# Patient Record
Sex: MALE | Race: WHITE | NOT HISPANIC OR LATINO | URBAN - METROPOLITAN AREA
[De-identification: names, ages, dates, MRNs, and addresses within clinical notes are randomized per-mention and may not be internally consistent; named-entity substitution may affect disease eponyms.]

---

## 2017-01-03 ENCOUNTER — OUTPATIENT (OUTPATIENT)
Dept: OUTPATIENT SERVICES | Facility: HOSPITAL | Age: 72
LOS: 1 days | Discharge: HOME | End: 2017-01-03
Payer: MEDICARE

## 2017-01-03 DIAGNOSIS — M48.00 SPINAL STENOSIS, SITE UNSPECIFIED: Chronic | ICD-10-CM

## 2017-01-03 DIAGNOSIS — Z41.9 ENCOUNTER FOR PROCEDURE FOR PURPOSES OTHER THAN REMEDYING HEALTH STATE, UNSPECIFIED: Chronic | ICD-10-CM

## 2017-01-03 DIAGNOSIS — K21.0 GASTRO-ESOPHAGEAL REFLUX DISEASE WITH ESOPHAGITIS: ICD-10-CM

## 2017-01-03 DIAGNOSIS — L05.91 PILONIDAL CYST WITHOUT ABSCESS: Chronic | ICD-10-CM

## 2017-01-03 DIAGNOSIS — Z98.89 OTHER SPECIFIED POSTPROCEDURAL STATES: Chronic | ICD-10-CM

## 2017-01-03 DIAGNOSIS — Q66.89 OTHER SPECIFIED CONGENITAL DEFORMITIES OF FEET: Chronic | ICD-10-CM

## 2017-01-03 PROCEDURE — 88313 SPECIAL STAINS GROUP 2: CPT

## 2017-01-03 PROCEDURE — 88313 SPECIAL STAINS GROUP 2: CPT | Mod: 26

## 2017-01-03 PROCEDURE — 88312 SPECIAL STAINS GROUP 1: CPT | Mod: 26

## 2017-01-03 PROCEDURE — 43239 EGD BIOPSY SINGLE/MULTIPLE: CPT

## 2017-01-03 PROCEDURE — 88305 TISSUE EXAM BY PATHOLOGIST: CPT

## 2017-01-03 PROCEDURE — 88312 SPECIAL STAINS GROUP 1: CPT

## 2017-01-03 PROCEDURE — 88305 TISSUE EXAM BY PATHOLOGIST: CPT | Mod: 26

## 2017-01-04 LAB — SURGICAL PATHOLOGY FINAL REPORT - CH: SIGNIFICANT CHANGE UP

## 2017-01-05 DIAGNOSIS — K29.50 UNSPECIFIED CHRONIC GASTRITIS WITHOUT BLEEDING: ICD-10-CM

## 2017-01-05 DIAGNOSIS — K27.9 PEPTIC ULCER, SITE UNSPECIFIED, UNSPECIFIED AS ACUTE OR CHRONIC, WITHOUT HEMORRHAGE OR PERFORATION: ICD-10-CM

## 2017-01-05 DIAGNOSIS — K21.0 GASTRO-ESOPHAGEAL REFLUX DISEASE WITH ESOPHAGITIS: ICD-10-CM

## 2017-01-05 DIAGNOSIS — Z87.891 PERSONAL HISTORY OF NICOTINE DEPENDENCE: ICD-10-CM

## 2017-01-05 DIAGNOSIS — I10 ESSENTIAL (PRIMARY) HYPERTENSION: ICD-10-CM

## 2017-01-05 DIAGNOSIS — Z79.82 LONG TERM (CURRENT) USE OF ASPIRIN: ICD-10-CM

## 2017-01-05 DIAGNOSIS — G70.00 MYASTHENIA GRAVIS WITHOUT (ACUTE) EXACERBATION: ICD-10-CM

## 2017-01-05 DIAGNOSIS — K29.80 DUODENITIS WITHOUT BLEEDING: ICD-10-CM

## 2017-01-23 ENCOUNTER — APPOINTMENT (OUTPATIENT)
Dept: CARDIOLOGY | Facility: CLINIC | Age: 72
End: 2017-01-23

## 2017-01-23 ENCOUNTER — NON-APPOINTMENT (OUTPATIENT)
Age: 72
End: 2017-01-23

## 2017-01-23 VITALS
HEIGHT: 67 IN | DIASTOLIC BLOOD PRESSURE: 76 MMHG | OXYGEN SATURATION: 97 % | HEART RATE: 67 BPM | BODY MASS INDEX: 44.1 KG/M2 | SYSTOLIC BLOOD PRESSURE: 125 MMHG | WEIGHT: 281 LBS

## 2017-01-23 DIAGNOSIS — E78.5 HYPERLIPIDEMIA, UNSPECIFIED: ICD-10-CM

## 2017-01-30 ENCOUNTER — APPOINTMENT (OUTPATIENT)
Dept: INTERNAL MEDICINE | Facility: CLINIC | Age: 72
End: 2017-01-30

## 2017-01-30 VITALS
HEIGHT: 67 IN | RESPIRATION RATE: 14 BRPM | SYSTOLIC BLOOD PRESSURE: 110 MMHG | BODY MASS INDEX: 44.1 KG/M2 | DIASTOLIC BLOOD PRESSURE: 80 MMHG | WEIGHT: 281 LBS | HEART RATE: 77 BPM | OXYGEN SATURATION: 96 %

## 2017-01-31 ENCOUNTER — APPOINTMENT (OUTPATIENT)
Dept: CARDIOLOGY | Facility: CLINIC | Age: 72
End: 2017-01-31

## 2017-01-31 LAB
ALBUMIN SERPL ELPH-MCNC: 4.3 G/DL
ALP BLD-CCNC: 62 U/L
ALT SERPL-CCNC: 17 U/L
ANION GAP SERPL CALC-SCNC: 18 MMOL/L
AST SERPL-CCNC: 20 U/L
BASOPHILS # BLD AUTO: 0.04 K/UL
BASOPHILS NFR BLD AUTO: 0.4 %
BILIRUB SERPL-MCNC: 0.4 MG/DL
BUN SERPL-MCNC: 22 MG/DL
CALCIUM SERPL-MCNC: 10.2 MG/DL
CHLORIDE SERPL-SCNC: 99 MMOL/L
CHOLEST SERPL-MCNC: 255 MG/DL
CHOLEST/HDLC SERPL: 4.2 RATIO
CK SERPL-CCNC: 145 U/L
CO2 SERPL-SCNC: 24 MMOL/L
CREAT SERPL-MCNC: 0.84 MG/DL
EOSINOPHIL # BLD AUTO: 0.24 K/UL
EOSINOPHIL NFR BLD AUTO: 2.2 %
ESTIMATED AVERAGE GLUCOSE: 126 MG/DL
GLUCOSE SERPL-MCNC: 94 MG/DL
HBA1C MFR BLD HPLC: 6 %
HCT VFR BLD CALC: 48.6 %
HDLC SERPL-MCNC: 61 MG/DL
HGB BLD-MCNC: 15.7 G/DL
IMM GRANULOCYTES NFR BLD AUTO: 0.5 %
LDLC SERPL CALC-MCNC: 168 MG/DL
LYMPHOCYTES # BLD AUTO: 1.9 K/UL
LYMPHOCYTES NFR BLD AUTO: 17.3 %
MAN DIFF?: NORMAL
MCHC RBC-ENTMCNC: 30.5 PG
MCHC RBC-ENTMCNC: 32.3 GM/DL
MCV RBC AUTO: 94.4 FL
MONOCYTES # BLD AUTO: 1.11 K/UL
MONOCYTES NFR BLD AUTO: 10.1 %
NEUTROPHILS # BLD AUTO: 7.67 K/UL
NEUTROPHILS NFR BLD AUTO: 69.5 %
PLATELET # BLD AUTO: 331 K/UL
POTASSIUM SERPL-SCNC: 5 MMOL/L
PROT SERPL-MCNC: 7.3 G/DL
RBC # BLD: 5.15 M/UL
RBC # FLD: 15.8 %
SODIUM SERPL-SCNC: 141 MMOL/L
TRIGL SERPL-MCNC: 128 MG/DL
WBC # FLD AUTO: 11.01 K/UL

## 2017-02-07 ENCOUNTER — OUTPATIENT (OUTPATIENT)
Dept: OUTPATIENT SERVICES | Facility: HOSPITAL | Age: 72
LOS: 1 days | Discharge: HOME | End: 2017-02-07
Payer: MEDICARE

## 2017-02-07 ENCOUNTER — TRANSCRIPTION ENCOUNTER (OUTPATIENT)
Age: 72
End: 2017-02-07

## 2017-02-07 DIAGNOSIS — L05.91 PILONIDAL CYST WITHOUT ABSCESS: Chronic | ICD-10-CM

## 2017-02-07 DIAGNOSIS — Q66.89 OTHER SPECIFIED CONGENITAL DEFORMITIES OF FEET: Chronic | ICD-10-CM

## 2017-02-07 DIAGNOSIS — Z98.89 OTHER SPECIFIED POSTPROCEDURAL STATES: Chronic | ICD-10-CM

## 2017-02-07 DIAGNOSIS — Z41.9 ENCOUNTER FOR PROCEDURE FOR PURPOSES OTHER THAN REMEDYING HEALTH STATE, UNSPECIFIED: Chronic | ICD-10-CM

## 2017-02-07 DIAGNOSIS — M48.00 SPINAL STENOSIS, SITE UNSPECIFIED: Chronic | ICD-10-CM

## 2017-02-07 DIAGNOSIS — K27.9 PEPTIC ULCER, SITE UNSPECIFIED, UNSPECIFIED AS ACUTE OR CHRONIC, WITHOUT HEMORRHAGE OR PERFORATION: ICD-10-CM

## 2017-02-07 PROCEDURE — G0105: CPT

## 2017-02-10 DIAGNOSIS — Z12.11 ENCOUNTER FOR SCREENING FOR MALIGNANT NEOPLASM OF COLON: ICD-10-CM

## 2017-02-10 DIAGNOSIS — Z79.82 LONG TERM (CURRENT) USE OF ASPIRIN: ICD-10-CM

## 2017-02-10 DIAGNOSIS — I10 ESSENTIAL (PRIMARY) HYPERTENSION: ICD-10-CM

## 2017-02-10 DIAGNOSIS — Z86.010 PERSONAL HISTORY OF COLONIC POLYPS: ICD-10-CM

## 2017-02-10 DIAGNOSIS — K57.30 DIVERTICULOSIS OF LARGE INTESTINE WITHOUT PERFORATION OR ABSCESS WITHOUT BLEEDING: ICD-10-CM

## 2017-02-10 DIAGNOSIS — G70.00 MYASTHENIA GRAVIS WITHOUT (ACUTE) EXACERBATION: ICD-10-CM

## 2017-02-10 DIAGNOSIS — Z79.52 LONG TERM (CURRENT) USE OF SYSTEMIC STEROIDS: ICD-10-CM

## 2017-03-29 ENCOUNTER — MEDICATION RENEWAL (OUTPATIENT)
Age: 72
End: 2017-03-29

## 2017-04-14 ENCOUNTER — RX RENEWAL (OUTPATIENT)
Age: 72
End: 2017-04-14

## 2017-05-22 ENCOUNTER — APPOINTMENT (OUTPATIENT)
Dept: INTERNAL MEDICINE | Facility: CLINIC | Age: 72
End: 2017-05-22

## 2017-05-22 VITALS
DIASTOLIC BLOOD PRESSURE: 68 MMHG | SYSTOLIC BLOOD PRESSURE: 124 MMHG | HEART RATE: 85 BPM | OXYGEN SATURATION: 98 % | RESPIRATION RATE: 14 BRPM | BODY MASS INDEX: 43.79 KG/M2 | WEIGHT: 279 LBS | HEIGHT: 67 IN

## 2017-05-22 DIAGNOSIS — R35.0 FREQUENCY OF MICTURITION: ICD-10-CM

## 2017-05-23 LAB
ALBUMIN SERPL ELPH-MCNC: 4.3 G/DL
ALP BLD-CCNC: 64 U/L
ALT SERPL-CCNC: 19 U/L
ANION GAP SERPL CALC-SCNC: 18 MMOL/L
AST SERPL-CCNC: 19 U/L
BASOPHILS # BLD AUTO: 0.03 K/UL
BASOPHILS NFR BLD AUTO: 0.3 %
BILIRUB SERPL-MCNC: 0.3 MG/DL
BUN SERPL-MCNC: 25 MG/DL
CALCIUM SERPL-MCNC: 9.6 MG/DL
CHLORIDE SERPL-SCNC: 101 MMOL/L
CHOLEST SERPL-MCNC: 189 MG/DL
CHOLEST/HDLC SERPL: 3.4 RATIO
CK SERPL-CCNC: 129 U/L
CO2 SERPL-SCNC: 22 MMOL/L
CREAT SERPL-MCNC: 0.89 MG/DL
EOSINOPHIL # BLD AUTO: 0.25 K/UL
EOSINOPHIL NFR BLD AUTO: 2.4 %
GLUCOSE SERPL-MCNC: 110 MG/DL
HBA1C MFR BLD HPLC: 6 %
HCT VFR BLD CALC: 47.4 %
HDLC SERPL-MCNC: 56 MG/DL
HGB BLD-MCNC: 14.9 G/DL
IMM GRANULOCYTES NFR BLD AUTO: 0.3 %
LDLC SERPL CALC-MCNC: 108 MG/DL
LYMPHOCYTES # BLD AUTO: 1.71 K/UL
LYMPHOCYTES NFR BLD AUTO: 16.4 %
MAN DIFF?: NORMAL
MCHC RBC-ENTMCNC: 30 PG
MCHC RBC-ENTMCNC: 31.4 GM/DL
MCV RBC AUTO: 95.6 FL
MONOCYTES # BLD AUTO: 0.68 K/UL
MONOCYTES NFR BLD AUTO: 6.5 %
NEUTROPHILS # BLD AUTO: 7.74 K/UL
NEUTROPHILS NFR BLD AUTO: 74.1 %
PLATELET # BLD AUTO: 354 K/UL
POTASSIUM SERPL-SCNC: 4.5 MMOL/L
PROT SERPL-MCNC: 7 G/DL
RBC # BLD: 4.96 M/UL
RBC # FLD: 16.4 %
SODIUM SERPL-SCNC: 141 MMOL/L
TRIGL SERPL-MCNC: 126 MG/DL
TSH SERPL-ACNC: 1.1 UIU/ML
WBC # FLD AUTO: 10.44 K/UL

## 2017-05-24 ENCOUNTER — TRANSCRIPTION ENCOUNTER (OUTPATIENT)
Age: 72
End: 2017-05-24

## 2017-05-26 LAB
CAU: 3 MG/DL
CREAT 24H UR-MCNC: 1.8 G/24 H
CREAT ?TM UR-MCNC: 117 MG/DL
MAGNESIUM 24H UR-MRATE: <15 MG/24H
PHOSPHATE/CREAT 24H UR-SRTO: 1.3 G/24 H
PROT 24H UR-MRATE: 8 MG/DL
PROT ?TM UR-MCNC: 24 HR
PROT UR-MCNC: 124 MG/24 H
SPECIMEN VOL 24H UR: 1500 ML
SPECIMEN VOL 24H UR: 1550 ML
SPECIMEN VOL 24H UR: 1550 ML
SPECIMEN VOL 24H UR: 46 MG/24 H
U MG: <1 MG/DL
U PHOS: 83 MG/DL
U URIC: 25.9 MG/DL
URATE/CREAT 24H UR: 388 MG/24HR

## 2017-05-31 LAB
CITRATE UR QL: 864 MG/24 H
CITRATE UR-MCNC: 1500 ML
OXALATE UR-SCNC: 73.5 MG/24 H
SPECIMEN VOL 24H UR: 1500 ML/24 H

## 2017-07-28 ENCOUNTER — RX RENEWAL (OUTPATIENT)
Age: 72
End: 2017-07-28

## 2017-07-28 ENCOUNTER — MEDICATION RENEWAL (OUTPATIENT)
Age: 72
End: 2017-07-28

## 2017-07-28 ENCOUNTER — APPOINTMENT (OUTPATIENT)
Dept: CARDIOLOGY | Facility: CLINIC | Age: 72
End: 2017-07-28
Payer: MEDICARE

## 2017-07-28 ENCOUNTER — NON-APPOINTMENT (OUTPATIENT)
Age: 72
End: 2017-07-28

## 2017-07-28 VITALS
BODY MASS INDEX: 44.73 KG/M2 | HEART RATE: 75 BPM | WEIGHT: 285 LBS | OXYGEN SATURATION: 95 % | SYSTOLIC BLOOD PRESSURE: 160 MMHG | HEIGHT: 67 IN | DIASTOLIC BLOOD PRESSURE: 75 MMHG

## 2017-07-28 VITALS — SYSTOLIC BLOOD PRESSURE: 140 MMHG | DIASTOLIC BLOOD PRESSURE: 80 MMHG

## 2017-07-28 PROCEDURE — 93000 ELECTROCARDIOGRAM COMPLETE: CPT

## 2017-07-28 PROCEDURE — 99215 OFFICE O/P EST HI 40 MIN: CPT

## 2017-07-28 RX ORDER — PREDNISONE 10 MG/1
10 TABLET ORAL
Qty: 180 | Refills: 0 | Status: DISCONTINUED | COMMUNITY
Start: 2017-03-07

## 2017-09-25 ENCOUNTER — APPOINTMENT (OUTPATIENT)
Dept: INTERNAL MEDICINE | Facility: CLINIC | Age: 72
End: 2017-09-25
Payer: MEDICARE

## 2017-09-25 VITALS
HEIGHT: 67 IN | HEART RATE: 82 BPM | SYSTOLIC BLOOD PRESSURE: 140 MMHG | OXYGEN SATURATION: 97 % | WEIGHT: 283 LBS | RESPIRATION RATE: 14 BRPM | DIASTOLIC BLOOD PRESSURE: 70 MMHG | TEMPERATURE: 98 F | BODY MASS INDEX: 44.42 KG/M2

## 2017-09-25 PROCEDURE — 36415 COLL VENOUS BLD VENIPUNCTURE: CPT

## 2017-09-25 PROCEDURE — 90686 IIV4 VACC NO PRSV 0.5 ML IM: CPT

## 2017-09-25 PROCEDURE — G0008: CPT

## 2017-09-25 PROCEDURE — 99214 OFFICE O/P EST MOD 30 MIN: CPT | Mod: 25

## 2017-09-26 LAB
ALBUMIN SERPL ELPH-MCNC: 3.9 G/DL
ALP BLD-CCNC: 54 U/L
ALT SERPL-CCNC: 13 U/L
ANION GAP SERPL CALC-SCNC: 14 MMOL/L
AST SERPL-CCNC: 24 U/L
BASOPHILS # BLD AUTO: 0.05 K/UL
BASOPHILS NFR BLD AUTO: 0.5 %
BILIRUB SERPL-MCNC: 0.3 MG/DL
BUN SERPL-MCNC: 25 MG/DL
CALCIUM SERPL-MCNC: 9.8 MG/DL
CHLORIDE SERPL-SCNC: 98 MMOL/L
CHOLEST SERPL-MCNC: 158 MG/DL
CHOLEST/HDLC SERPL: 3 RATIO
CK SERPL-CCNC: 140 U/L
CO2 SERPL-SCNC: 24 MMOL/L
CREAT SERPL-MCNC: 0.92 MG/DL
EOSINOPHIL # BLD AUTO: 0.39 K/UL
EOSINOPHIL NFR BLD AUTO: 3.8 %
GLUCOSE SERPL-MCNC: 130 MG/DL
HBA1C MFR BLD HPLC: 6.1 %
HCT VFR BLD CALC: 44.2 %
HDLC SERPL-MCNC: 52 MG/DL
HGB BLD-MCNC: 14.6 G/DL
IMM GRANULOCYTES NFR BLD AUTO: 0.6 %
LDLC SERPL CALC-MCNC: 82 MG/DL
LYMPHOCYTES # BLD AUTO: 2.22 K/UL
LYMPHOCYTES NFR BLD AUTO: 21.6 %
MAN DIFF?: NORMAL
MCHC RBC-ENTMCNC: 30.6 PG
MCHC RBC-ENTMCNC: 33 GM/DL
MCV RBC AUTO: 92.7 FL
MONOCYTES # BLD AUTO: 1 K/UL
MONOCYTES NFR BLD AUTO: 9.7 %
NEUTROPHILS # BLD AUTO: 6.55 K/UL
NEUTROPHILS NFR BLD AUTO: 63.8 %
PLATELET # BLD AUTO: 362 K/UL
POTASSIUM SERPL-SCNC: 4 MMOL/L
PROT SERPL-MCNC: 7.4 G/DL
PSA FREE FLD-MCNC: 24.2
PSA FREE SERPL-MCNC: 1.02 NG/ML
PSA SERPL-MCNC: 4.21 NG/ML
RBC # BLD: 4.77 M/UL
RBC # FLD: 15.7 %
SODIUM SERPL-SCNC: 136 MMOL/L
TRIGL SERPL-MCNC: 119 MG/DL
WBC # FLD AUTO: 10.27 K/UL

## 2017-10-13 ENCOUNTER — MEDICATION RENEWAL (OUTPATIENT)
Age: 72
End: 2017-10-13

## 2018-01-22 ENCOUNTER — APPOINTMENT (OUTPATIENT)
Dept: INTERNAL MEDICINE | Facility: CLINIC | Age: 73
End: 2018-01-22
Payer: MEDICARE

## 2018-01-22 ENCOUNTER — LABORATORY RESULT (OUTPATIENT)
Age: 73
End: 2018-01-22

## 2018-01-22 VITALS
OXYGEN SATURATION: 97 % | HEIGHT: 67 IN | BODY MASS INDEX: 43.79 KG/M2 | TEMPERATURE: 98.6 F | DIASTOLIC BLOOD PRESSURE: 70 MMHG | HEART RATE: 76 BPM | RESPIRATION RATE: 14 BRPM | SYSTOLIC BLOOD PRESSURE: 130 MMHG | WEIGHT: 279 LBS

## 2018-01-22 PROCEDURE — 99214 OFFICE O/P EST MOD 30 MIN: CPT | Mod: 25

## 2018-01-22 PROCEDURE — 36415 COLL VENOUS BLD VENIPUNCTURE: CPT

## 2018-01-23 LAB
25(OH)D3 SERPL-MCNC: 42.2 NG/ML
ALBUMIN SERPL ELPH-MCNC: 3.9 G/DL
ALP BLD-CCNC: 59 U/L
ALT SERPL-CCNC: 20 U/L
ANION GAP SERPL CALC-SCNC: 13 MMOL/L
AST SERPL-CCNC: 21 U/L
BASOPHILS # BLD AUTO: 0 K/UL
BASOPHILS NFR BLD AUTO: 0 %
BILIRUB SERPL-MCNC: 0.3 MG/DL
BUN SERPL-MCNC: 27 MG/DL
CALCIUM SERPL-MCNC: 10.3 MG/DL
CHLORIDE SERPL-SCNC: 98 MMOL/L
CHOLEST SERPL-MCNC: 180 MG/DL
CHOLEST/HDLC SERPL: 3.8 RATIO
CK SERPL-CCNC: 95 U/L
CO2 SERPL-SCNC: 26 MMOL/L
CREAT SERPL-MCNC: 1.05 MG/DL
EOSINOPHIL # BLD AUTO: 0.29 K/UL
EOSINOPHIL NFR BLD AUTO: 2.5
GLUCOSE SERPL-MCNC: 92 MG/DL
HBA1C MFR BLD HPLC: 6.2 %
HCT VFR BLD CALC: 46.2 %
HDLC SERPL-MCNC: 48 MG/DL
HGB BLD-MCNC: 15 G/DL
LDLC SERPL CALC-MCNC: 110 MG/DL
LYMPHOCYTES # BLD AUTO: 1.71 K/UL
LYMPHOCYTES NFR BLD AUTO: 14.9 %
MAN DIFF?: NORMAL
MCHC RBC-ENTMCNC: 30.1 PG
MCHC RBC-ENTMCNC: 32.5 GM/DL
MCV RBC AUTO: 92.6 FL
MONOCYTES # BLD AUTO: 1.52 K/UL
MONOCYTES NFR BLD AUTO: 13.2 %
NEUTROPHILS # BLD AUTO: 7.12 K/UL
NEUTROPHILS NFR BLD AUTO: 62 %
PLATELET # BLD AUTO: 411 K/UL
POTASSIUM SERPL-SCNC: 4.1 MMOL/L
PROT SERPL-MCNC: 7.6 G/DL
RBC # BLD: 4.99 M/UL
RBC # FLD: 15.4 %
SODIUM SERPL-SCNC: 137 MMOL/L
TRIGL SERPL-MCNC: 112 MG/DL
TSH SERPL-ACNC: 1.4 UIU/ML
WBC # FLD AUTO: 11.49 K/UL

## 2018-01-24 ENCOUNTER — NON-APPOINTMENT (OUTPATIENT)
Age: 73
End: 2018-01-24

## 2018-01-24 ENCOUNTER — APPOINTMENT (OUTPATIENT)
Dept: CARDIOLOGY | Facility: CLINIC | Age: 73
End: 2018-01-24
Payer: MEDICARE

## 2018-01-24 VITALS
SYSTOLIC BLOOD PRESSURE: 140 MMHG | HEIGHT: 67 IN | OXYGEN SATURATION: 95 % | BODY MASS INDEX: 43.79 KG/M2 | WEIGHT: 279 LBS | DIASTOLIC BLOOD PRESSURE: 79 MMHG | HEART RATE: 87 BPM

## 2018-01-24 PROCEDURE — 99214 OFFICE O/P EST MOD 30 MIN: CPT

## 2018-01-24 PROCEDURE — 93000 ELECTROCARDIOGRAM COMPLETE: CPT

## 2018-01-24 RX ORDER — SULFAMETHOXAZOLE AND TRIMETHOPRIM 800; 160 MG/1; MG/1
800-160 TABLET ORAL
Qty: 8 | Refills: 0 | Status: DISCONTINUED | COMMUNITY
Start: 2017-12-20

## 2018-02-05 ENCOUNTER — MEDICATION RENEWAL (OUTPATIENT)
Age: 73
End: 2018-02-05

## 2018-03-30 ENCOUNTER — APPOINTMENT (OUTPATIENT)
Dept: INTERNAL MEDICINE | Facility: CLINIC | Age: 73
End: 2018-03-30
Payer: MEDICARE

## 2018-03-30 VITALS
SYSTOLIC BLOOD PRESSURE: 140 MMHG | BODY MASS INDEX: 44.1 KG/M2 | WEIGHT: 281 LBS | OXYGEN SATURATION: 97 % | HEART RATE: 71 BPM | DIASTOLIC BLOOD PRESSURE: 70 MMHG | TEMPERATURE: 98.3 F | HEIGHT: 67 IN | RESPIRATION RATE: 14 BRPM

## 2018-03-30 VITALS — SYSTOLIC BLOOD PRESSURE: 130 MMHG | DIASTOLIC BLOOD PRESSURE: 74 MMHG

## 2018-03-30 LAB
CREAT SPEC-SCNC: 75 MG/DL
MICROALBUMIN 24H UR DL<=1MG/L-MCNC: 0.3 MG/DL
MICROALBUMIN/CREAT 24H UR-RTO: 4 MG/G

## 2018-03-30 PROCEDURE — 99214 OFFICE O/P EST MOD 30 MIN: CPT | Mod: 25

## 2018-03-30 PROCEDURE — 36415 COLL VENOUS BLD VENIPUNCTURE: CPT

## 2018-03-31 LAB
PSA FREE FLD-MCNC: 20.5
PSA FREE SERPL-MCNC: 0.95 NG/ML
PSA SERPL-MCNC: 4.64 NG/ML

## 2018-07-26 ENCOUNTER — NON-APPOINTMENT (OUTPATIENT)
Age: 73
End: 2018-07-26

## 2018-07-26 ENCOUNTER — APPOINTMENT (OUTPATIENT)
Dept: CARDIOLOGY | Facility: CLINIC | Age: 73
End: 2018-07-26
Payer: MEDICARE

## 2018-07-26 VITALS
HEIGHT: 67 IN | WEIGHT: 277 LBS | SYSTOLIC BLOOD PRESSURE: 152 MMHG | BODY MASS INDEX: 43.47 KG/M2 | OXYGEN SATURATION: 97 % | HEART RATE: 75 BPM | DIASTOLIC BLOOD PRESSURE: 79 MMHG

## 2018-07-26 VITALS — SYSTOLIC BLOOD PRESSURE: 146 MMHG | DIASTOLIC BLOOD PRESSURE: 70 MMHG

## 2018-07-26 PROCEDURE — 93000 ELECTROCARDIOGRAM COMPLETE: CPT

## 2018-07-26 PROCEDURE — 99214 OFFICE O/P EST MOD 30 MIN: CPT

## 2018-07-31 ENCOUNTER — APPOINTMENT (OUTPATIENT)
Dept: INTERNAL MEDICINE | Facility: CLINIC | Age: 73
End: 2018-07-31
Payer: MEDICARE

## 2018-07-31 VITALS
HEIGHT: 67 IN | HEART RATE: 69 BPM | TEMPERATURE: 98 F | SYSTOLIC BLOOD PRESSURE: 130 MMHG | DIASTOLIC BLOOD PRESSURE: 80 MMHG | BODY MASS INDEX: 43.63 KG/M2 | RESPIRATION RATE: 14 BRPM | OXYGEN SATURATION: 98 % | WEIGHT: 278 LBS

## 2018-07-31 PROCEDURE — 99214 OFFICE O/P EST MOD 30 MIN: CPT | Mod: 25

## 2018-07-31 PROCEDURE — 36415 COLL VENOUS BLD VENIPUNCTURE: CPT

## 2018-07-31 NOTE — HISTORY OF PRESENT ILLNESS
[FreeTextEntry1] : Here for follow up for his cholesterol and glucose \par Saw cardiologist\par will probably be switched to Crestor

## 2018-07-31 NOTE — PHYSICAL EXAM

## 2018-08-01 LAB
ALBUMIN SERPL ELPH-MCNC: 4.1 G/DL
ALP BLD-CCNC: 60 U/L
ALT SERPL-CCNC: 22 U/L
ANION GAP SERPL CALC-SCNC: 15 MMOL/L
AST SERPL-CCNC: 21 U/L
BASOPHILS # BLD AUTO: 0.04 K/UL
BASOPHILS NFR BLD AUTO: 0.4 %
BILIRUB SERPL-MCNC: 0.4 MG/DL
BUN SERPL-MCNC: 26 MG/DL
CALCIUM SERPL-MCNC: 9.6 MG/DL
CHLORIDE SERPL-SCNC: 98 MMOL/L
CHOLEST SERPL-MCNC: 172 MG/DL
CHOLEST/HDLC SERPL: 3.3 RATIO
CK SERPL-CCNC: 146 U/L
CO2 SERPL-SCNC: 24 MMOL/L
CREAT SERPL-MCNC: 0.94 MG/DL
EOSINOPHIL # BLD AUTO: 0.29 K/UL
EOSINOPHIL NFR BLD AUTO: 2.6 %
ESTIMATED AVERAGE GLUCOSE: 128 MG/DL
GLUCOSE SERPL-MCNC: 103 MG/DL
HBA1C MFR BLD HPLC: 6.1 %
HCT VFR BLD CALC: 46.1 %
HDLC SERPL-MCNC: 52 MG/DL
HGB BLD-MCNC: 14.7 G/DL
IMM GRANULOCYTES NFR BLD AUTO: 0.4 %
LDLC SERPL CALC-MCNC: 99 MG/DL
LYMPHOCYTES # BLD AUTO: 2.79 K/UL
LYMPHOCYTES NFR BLD AUTO: 24.8 %
MAN DIFF?: NORMAL
MCHC RBC-ENTMCNC: 29.2 PG
MCHC RBC-ENTMCNC: 31.9 GM/DL
MCV RBC AUTO: 91.7 FL
MONOCYTES # BLD AUTO: 1.46 K/UL
MONOCYTES NFR BLD AUTO: 13 %
NEUTROPHILS # BLD AUTO: 6.61 K/UL
NEUTROPHILS NFR BLD AUTO: 58.8 %
PLATELET # BLD AUTO: 363 K/UL
POTASSIUM SERPL-SCNC: 4.1 MMOL/L
PROT SERPL-MCNC: 7.4 G/DL
PSA FREE FLD-MCNC: 21.7
PSA FREE SERPL-MCNC: 0.93 NG/ML
PSA SERPL-MCNC: 4.28 NG/ML
RBC # BLD: 5.03 M/UL
RBC # FLD: 16.1 %
SODIUM SERPL-SCNC: 137 MMOL/L
TRIGL SERPL-MCNC: 103 MG/DL
WBC # FLD AUTO: 11.24 K/UL

## 2018-10-02 ENCOUNTER — APPOINTMENT (OUTPATIENT)
Dept: INTERNAL MEDICINE | Facility: CLINIC | Age: 73
End: 2018-10-02

## 2018-11-19 ENCOUNTER — MEDICATION RENEWAL (OUTPATIENT)
Age: 73
End: 2018-11-19

## 2018-11-27 ENCOUNTER — APPOINTMENT (OUTPATIENT)
Dept: INTERNAL MEDICINE | Facility: CLINIC | Age: 73
End: 2018-11-27
Payer: MEDICARE

## 2018-11-27 VITALS
DIASTOLIC BLOOD PRESSURE: 70 MMHG | HEART RATE: 88 BPM | RESPIRATION RATE: 14 BRPM | BODY MASS INDEX: 43.63 KG/M2 | TEMPERATURE: 98.7 F | OXYGEN SATURATION: 97 % | HEIGHT: 67 IN | WEIGHT: 278 LBS | SYSTOLIC BLOOD PRESSURE: 130 MMHG

## 2018-11-27 PROCEDURE — 36415 COLL VENOUS BLD VENIPUNCTURE: CPT

## 2018-11-27 PROCEDURE — 99214 OFFICE O/P EST MOD 30 MIN: CPT | Mod: 25

## 2018-11-27 NOTE — PHYSICAL EXAM
[No Acute Distress] : no acute distress [Well Nourished] : well nourished [Well Developed] : well developed [Well-Appearing] : well-appearing [Normal Voice/Communication] : normal voice/communication [Normal Sclera/Conjunctiva] : normal sclera/conjunctiva [PERRL] : pupils equal round and reactive to light [EOMI] : extraocular movements intact [Normal Outer Ear/Nose] : the outer ears and nose were normal in appearance [Normal Oropharynx] : the oropharynx was normal [Normal TMs] : both tympanic membranes were normal [No JVD] : no jugular venous distention [Supple] : supple [No Lymphadenopathy] : no lymphadenopathy [Thyroid Normal, No Nodules] : the thyroid was normal and there were no nodules present [No Respiratory Distress] : no respiratory distress  [Clear to Auscultation] : lungs were clear to auscultation bilaterally [No Accessory Muscle Use] : no accessory muscle use [Normal Rate] : normal rate  [Regular Rhythm] : with a regular rhythm [Normal S1, S2] : normal S1 and S2 [No Murmur] : no murmur heard [No Carotid Bruits] : no carotid bruits [No Abdominal Bruit] : a ~M bruit was not heard ~T in the abdomen [No Varicosities] : no varicosities [Pedal Pulses Present] : the pedal pulses are present [No Edema] : there was no peripheral edema [No Extremity Clubbing/Cyanosis] : no extremity clubbing/cyanosis [No Palpable Aorta] : no palpable aorta [Soft] : abdomen soft [Non Tender] : non-tender [Non-distended] : non-distended [No Masses] : no abdominal mass palpated [No HSM] : no HSM [Normal Bowel Sounds] : normal bowel sounds [Normal Supraclavicular Nodes] : no supraclavicular lymphadenopathy [Normal Axillary Nodes] : no axillary lymphadenopathy [Normal Posterior Cervical Nodes] : no posterior cervical lymphadenopathy [Normal Anterior Cervical Nodes] : no anterior cervical lymphadenopathy [No CVA Tenderness] : no CVA  tenderness [No Spinal Tenderness] : no spinal tenderness [No Joint Swelling] : no joint swelling [Grossly Normal Strength/Tone] : grossly normal strength/tone [No Rash] : no rash [Normal Gait] : normal gait [Coordination Grossly Intact] : coordination grossly intact [No Focal Deficits] : no focal deficits [Deep Tendon Reflexes (DTR)] : deep tendon reflexes were 2+ and symmetric [Speech Grossly Normal] : speech grossly normal [Memory Grossly Normal] : memory grossly normal [Normal Affect] : the affect was normal [Alert and Oriented x3] : oriented to person, place, and time [Normal Mood] : the mood was normal [Normal Insight/Judgement] : insight and judgment were intact

## 2018-11-28 LAB
25(OH)D3 SERPL-MCNC: 34.3 NG/ML
ALBUMIN SERPL ELPH-MCNC: 4.2 G/DL
ALP BLD-CCNC: 58 U/L
ALT SERPL-CCNC: 22 U/L
ANION GAP SERPL CALC-SCNC: 10 MMOL/L
AST SERPL-CCNC: 21 U/L
BASOPHILS # BLD AUTO: 0.04 K/UL
BASOPHILS NFR BLD AUTO: 0.4 %
BILIRUB SERPL-MCNC: 0.3 MG/DL
BUN SERPL-MCNC: 22 MG/DL
CALCIUM SERPL-MCNC: 10.1 MG/DL
CHLORIDE SERPL-SCNC: 100 MMOL/L
CHOLEST SERPL-MCNC: 184 MG/DL
CHOLEST/HDLC SERPL: 3.5 RATIO
CK SERPL-CCNC: 149 U/L
CO2 SERPL-SCNC: 26 MMOL/L
CREAT SERPL-MCNC: 0.78 MG/DL
EOSINOPHIL # BLD AUTO: 0.33 K/UL
EOSINOPHIL NFR BLD AUTO: 3.5 %
GLUCOSE SERPL-MCNC: 110 MG/DL
HBA1C MFR BLD HPLC: 6.1 %
HCT VFR BLD CALC: 47.2 %
HDLC SERPL-MCNC: 53 MG/DL
HGB BLD-MCNC: 15.4 G/DL
IMM GRANULOCYTES NFR BLD AUTO: 0.3 %
LDLC SERPL CALC-MCNC: 111 MG/DL
LYMPHOCYTES # BLD AUTO: 1.7 K/UL
LYMPHOCYTES NFR BLD AUTO: 18.2 %
MAN DIFF?: NORMAL
MCHC RBC-ENTMCNC: 30.1 PG
MCHC RBC-ENTMCNC: 32.6 GM/DL
MCV RBC AUTO: 92.4 FL
MONOCYTES # BLD AUTO: 0.77 K/UL
MONOCYTES NFR BLD AUTO: 8.2 %
NEUTROPHILS # BLD AUTO: 6.49 K/UL
NEUTROPHILS NFR BLD AUTO: 69.4 %
PLATELET # BLD AUTO: 392 K/UL
POTASSIUM SERPL-SCNC: 4.2 MMOL/L
PROT SERPL-MCNC: 7.6 G/DL
PSA FREE FLD-MCNC: 23.3
PSA FREE SERPL-MCNC: 0.95 NG/ML
PSA SERPL-MCNC: 4.07 NG/ML
RBC # BLD: 5.11 M/UL
RBC # FLD: 15.7 %
SODIUM SERPL-SCNC: 136 MMOL/L
TRIGL SERPL-MCNC: 98 MG/DL
TSH SERPL-ACNC: 1.25 UIU/ML
WBC # FLD AUTO: 9.36 K/UL

## 2019-01-25 ENCOUNTER — NON-APPOINTMENT (OUTPATIENT)
Age: 74
End: 2019-01-25

## 2019-01-25 ENCOUNTER — APPOINTMENT (OUTPATIENT)
Dept: CARDIOLOGY | Facility: CLINIC | Age: 74
End: 2019-01-25
Payer: MEDICARE

## 2019-01-25 VITALS
SYSTOLIC BLOOD PRESSURE: 153 MMHG | HEART RATE: 83 BPM | DIASTOLIC BLOOD PRESSURE: 73 MMHG | WEIGHT: 266 LBS | OXYGEN SATURATION: 96 % | BODY MASS INDEX: 41.75 KG/M2 | HEIGHT: 67 IN

## 2019-01-25 VITALS — DIASTOLIC BLOOD PRESSURE: 68 MMHG | SYSTOLIC BLOOD PRESSURE: 120 MMHG

## 2019-01-25 PROCEDURE — 93000 ELECTROCARDIOGRAM COMPLETE: CPT

## 2019-01-25 PROCEDURE — 99214 OFFICE O/P EST MOD 30 MIN: CPT

## 2019-02-15 ENCOUNTER — MEDICATION RENEWAL (OUTPATIENT)
Age: 74
End: 2019-02-15

## 2019-02-28 ENCOUNTER — TRANSCRIPTION ENCOUNTER (OUTPATIENT)
Age: 74
End: 2019-02-28

## 2019-03-04 ENCOUNTER — TRANSCRIPTION ENCOUNTER (OUTPATIENT)
Age: 74
End: 2019-03-04

## 2019-03-05 ENCOUNTER — RESULT REVIEW (OUTPATIENT)
Age: 74
End: 2019-03-05

## 2019-03-05 ENCOUNTER — OUTPATIENT (OUTPATIENT)
Dept: OUTPATIENT SERVICES | Facility: HOSPITAL | Age: 74
LOS: 1 days | End: 2019-03-05
Payer: MEDICARE

## 2019-03-05 DIAGNOSIS — Z41.9 ENCOUNTER FOR PROCEDURE FOR PURPOSES OTHER THAN REMEDYING HEALTH STATE, UNSPECIFIED: Chronic | ICD-10-CM

## 2019-03-05 DIAGNOSIS — Q66.89 OTHER SPECIFIED CONGENITAL DEFORMITIES OF FEET: Chronic | ICD-10-CM

## 2019-03-05 DIAGNOSIS — L05.91 PILONIDAL CYST WITHOUT ABSCESS: Chronic | ICD-10-CM

## 2019-03-05 DIAGNOSIS — Z98.89 OTHER SPECIFIED POSTPROCEDURAL STATES: Chronic | ICD-10-CM

## 2019-03-05 DIAGNOSIS — M48.00 SPINAL STENOSIS, SITE UNSPECIFIED: Chronic | ICD-10-CM

## 2019-03-05 DIAGNOSIS — K21.0 GASTRO-ESOPHAGEAL REFLUX DISEASE WITH ESOPHAGITIS: ICD-10-CM

## 2019-03-05 PROCEDURE — 43239 EGD BIOPSY SINGLE/MULTIPLE: CPT

## 2019-03-05 PROCEDURE — 88305 TISSUE EXAM BY PATHOLOGIST: CPT | Mod: 26

## 2019-03-05 PROCEDURE — 88313 SPECIAL STAINS GROUP 2: CPT | Mod: 26

## 2019-03-06 LAB — SURGICAL PATHOLOGY STUDY: SIGNIFICANT CHANGE UP

## 2019-03-18 ENCOUNTER — APPOINTMENT (OUTPATIENT)
Dept: INTERNAL MEDICINE | Facility: CLINIC | Age: 74
End: 2019-03-18
Payer: MEDICARE

## 2019-03-18 VITALS
DIASTOLIC BLOOD PRESSURE: 80 MMHG | BODY MASS INDEX: 42.06 KG/M2 | RESPIRATION RATE: 14 BRPM | OXYGEN SATURATION: 97 % | HEART RATE: 78 BPM | SYSTOLIC BLOOD PRESSURE: 140 MMHG | TEMPERATURE: 98.6 F | HEIGHT: 67 IN | WEIGHT: 268 LBS

## 2019-03-18 VITALS — SYSTOLIC BLOOD PRESSURE: 132 MMHG | DIASTOLIC BLOOD PRESSURE: 80 MMHG

## 2019-03-18 PROCEDURE — 99214 OFFICE O/P EST MOD 30 MIN: CPT | Mod: 25

## 2019-03-18 PROCEDURE — 36415 COLL VENOUS BLD VENIPUNCTURE: CPT

## 2019-03-18 NOTE — COUNSELING
[Weight management counseling provided] : Weight management [Low Fat Diet] : Low fat diet [Decrease Portions] : Decrease food portions [Walking] : Walking

## 2019-03-18 NOTE — PHYSICAL EXAM
[No Acute Distress] : no acute distress [Well Nourished] : well nourished [Well Developed] : well developed [Well-Appearing] : well-appearing [Normal Voice/Communication] : normal voice/communication [Normal Sclera/Conjunctiva] : normal sclera/conjunctiva [PERRL] : pupils equal round and reactive to light [EOMI] : extraocular movements intact [Normal Outer Ear/Nose] : the outer ears and nose were normal in appearance [Normal Oropharynx] : the oropharynx was normal [No JVD] : no jugular venous distention [Supple] : supple [No Lymphadenopathy] : no lymphadenopathy [Thyroid Normal, No Nodules] : the thyroid was normal and there were no nodules present [No Respiratory Distress] : no respiratory distress  [Clear to Auscultation] : lungs were clear to auscultation bilaterally [No Accessory Muscle Use] : no accessory muscle use [Normal Rate] : normal rate  [Regular Rhythm] : with a regular rhythm [Normal S1, S2] : normal S1 and S2 [No Murmur] : no murmur heard [No Carotid Bruits] : no carotid bruits [No Abdominal Bruit] : a ~M bruit was not heard ~T in the abdomen [No Varicosities] : no varicosities [Pedal Pulses Present] : the pedal pulses are present [No Edema] : there was no peripheral edema [No Extremity Clubbing/Cyanosis] : no extremity clubbing/cyanosis [No Palpable Aorta] : no palpable aorta [Soft] : abdomen soft [Non Tender] : non-tender [Non-distended] : non-distended [No Masses] : no abdominal mass palpated [No HSM] : no HSM [Normal Bowel Sounds] : normal bowel sounds [Normal Posterior Cervical Nodes] : no posterior cervical lymphadenopathy [Normal Anterior Cervical Nodes] : no anterior cervical lymphadenopathy [No CVA Tenderness] : no CVA  tenderness [No Spinal Tenderness] : no spinal tenderness [No Joint Swelling] : no joint swelling [Grossly Normal Strength/Tone] : grossly normal strength/tone [No Rash] : no rash [Coordination Grossly Intact] : coordination grossly intact [No Focal Deficits] : no focal deficits [Deep Tendon Reflexes (DTR)] : deep tendon reflexes were 2+ and symmetric [Speech Grossly Normal] : speech grossly normal [Memory Grossly Normal] : memory grossly normal [Normal Affect] : the affect was normal [Alert and Oriented x3] : oriented to person, place, and time [Normal Mood] : the mood was normal [Normal Insight/Judgement] : insight and judgment were intact [de-identified] : unsteady gait

## 2019-03-19 LAB
25(OH)D3 SERPL-MCNC: 32.9 NG/ML
ALBUMIN SERPL ELPH-MCNC: 4.2 G/DL
ALP BLD-CCNC: 74 U/L
ALT SERPL-CCNC: 31 U/L
ANION GAP SERPL CALC-SCNC: 13 MMOL/L
AST SERPL-CCNC: 27 U/L
BASOPHILS # BLD AUTO: 0.07 K/UL
BASOPHILS NFR BLD AUTO: 0.7 %
BILIRUB SERPL-MCNC: 0.3 MG/DL
BUN SERPL-MCNC: 25 MG/DL
CALCIUM SERPL-MCNC: 10.1 MG/DL
CHLORIDE SERPL-SCNC: 99 MMOL/L
CHOLEST SERPL-MCNC: 184 MG/DL
CHOLEST/HDLC SERPL: 3.4 RATIO
CK SERPL-CCNC: 118 U/L
CO2 SERPL-SCNC: 26 MMOL/L
CREAT SERPL-MCNC: 0.87 MG/DL
EOSINOPHIL # BLD AUTO: 0.26 K/UL
EOSINOPHIL NFR BLD AUTO: 2.4 %
GLUCOSE SERPL-MCNC: 99 MG/DL
HBA1C MFR BLD HPLC: 6.1 %
HCT VFR BLD CALC: 50.4 %
HDLC SERPL-MCNC: 55 MG/DL
HGB BLD-MCNC: 15.6 G/DL
IMM GRANULOCYTES NFR BLD AUTO: 0.5 %
LDLC SERPL CALC-MCNC: 99 MG/DL
LYMPHOCYTES # BLD AUTO: 2.29 K/UL
LYMPHOCYTES NFR BLD AUTO: 21.3 %
MAN DIFF?: NORMAL
MCHC RBC-ENTMCNC: 29.3 PG
MCHC RBC-ENTMCNC: 31 GM/DL
MCV RBC AUTO: 94.6 FL
MONOCYTES # BLD AUTO: 1.05 K/UL
MONOCYTES NFR BLD AUTO: 9.8 %
NEUTROPHILS # BLD AUTO: 7.01 K/UL
NEUTROPHILS NFR BLD AUTO: 65.3 %
PLATELET # BLD AUTO: 379 K/UL
POTASSIUM SERPL-SCNC: 4.5 MMOL/L
PROT SERPL-MCNC: 6.9 G/DL
PSA FREE FLD-MCNC: 23 %
PSA FREE SERPL-MCNC: 0.86 NG/ML
PSA SERPL-MCNC: 3.81 NG/ML
RBC # BLD: 5.33 M/UL
RBC # FLD: 16.1 %
SODIUM SERPL-SCNC: 138 MMOL/L
TRIGL SERPL-MCNC: 150 MG/DL
TSH SERPL-ACNC: 1.03 UIU/ML
WBC # FLD AUTO: 10.73 K/UL

## 2019-07-15 ENCOUNTER — APPOINTMENT (OUTPATIENT)
Dept: INTERNAL MEDICINE | Facility: CLINIC | Age: 74
End: 2019-07-15
Payer: MEDICARE

## 2019-07-15 VITALS
HEART RATE: 76 BPM | OXYGEN SATURATION: 98 % | RESPIRATION RATE: 14 BRPM | SYSTOLIC BLOOD PRESSURE: 122 MMHG | BODY MASS INDEX: 41.04 KG/M2 | TEMPERATURE: 98.4 F | WEIGHT: 262 LBS | DIASTOLIC BLOOD PRESSURE: 70 MMHG

## 2019-07-15 PROCEDURE — 36415 COLL VENOUS BLD VENIPUNCTURE: CPT

## 2019-07-15 PROCEDURE — 99214 OFFICE O/P EST MOD 30 MIN: CPT | Mod: 25

## 2019-07-15 NOTE — PHYSICAL EXAM
[No Acute Distress] : no acute distress [Well Nourished] : well nourished [Well Developed] : well developed [Well-Appearing] : well-appearing [Normal Voice/Communication] : normal voice/communication [Normal Sclera/Conjunctiva] : normal sclera/conjunctiva [PERRL] : pupils equal round and reactive to light [EOMI] : extraocular movements intact [Normal Outer Ear/Nose] : the outer ears and nose were normal in appearance [Normal Oropharynx] : the oropharynx was normal [No JVD] : no jugular venous distention [No Lymphadenopathy] : no lymphadenopathy [Supple] : supple [Thyroid Normal, No Nodules] : the thyroid was normal and there were no nodules present [No Respiratory Distress] : no respiratory distress  [No Accessory Muscle Use] : no accessory muscle use [Clear to Auscultation] : lungs were clear to auscultation bilaterally [Normal Rate] : normal rate  [Regular Rhythm] : with a regular rhythm [Normal S1, S2] : normal S1 and S2 [No Murmur] : no murmur heard [No Carotid Bruits] : no carotid bruits [No Abdominal Bruit] : a ~M bruit was not heard ~T in the abdomen [No Varicosities] : no varicosities [Pedal Pulses Present] : the pedal pulses are present [No Edema] : there was no peripheral edema [No Palpable Aorta] : no palpable aorta [No Extremity Clubbing/Cyanosis] : no extremity clubbing/cyanosis [Soft] : abdomen soft [Non Tender] : non-tender [Non-distended] : non-distended [No Masses] : no abdominal mass palpated [No HSM] : no HSM [Normal Bowel Sounds] : normal bowel sounds [Normal Posterior Cervical Nodes] : no posterior cervical lymphadenopathy [Normal Anterior Cervical Nodes] : no anterior cervical lymphadenopathy [No CVA Tenderness] : no CVA  tenderness [No Spinal Tenderness] : no spinal tenderness [No Joint Swelling] : no joint swelling [Grossly Normal Strength/Tone] : grossly normal strength/tone [No Rash] : no rash [Coordination Grossly Intact] : coordination grossly intact [No Focal Deficits] : no focal deficits [Deep Tendon Reflexes (DTR)] : deep tendon reflexes were 2+ and symmetric [Speech Grossly Normal] : speech grossly normal [Memory Grossly Normal] : memory grossly normal [Normal Affect] : the affect was normal [Alert and Oriented x3] : oriented to person, place, and time [Normal Mood] : the mood was normal [Normal Insight/Judgement] : insight and judgment were intact [de-identified] : unsteady gait

## 2019-07-15 NOTE — HEALTH RISK ASSESSMENT
[] : No [No] : In the past 12 months have you used drugs other than those required for medical reasons? No [No falls in past year] : Patient reported no falls in the past year [0] : 1) Little interest or pleasure doing things: Not at all (0)

## 2019-07-15 NOTE — COUNSELING
[Weight management counseling provided] : Weight management [Healthy eating counseling provided] : healthy eating [Activity counseling provided] : activity [Low Fat Diet] : Low fat diet [Low Salt Diet] : Low salt diet [Walking] : Walking

## 2019-07-16 LAB
25(OH)D3 SERPL-MCNC: 32.9 NG/ML
ALBUMIN SERPL ELPH-MCNC: 4.3 G/DL
ALP BLD-CCNC: 66 U/L
ALT SERPL-CCNC: 21 U/L
ANION GAP SERPL CALC-SCNC: 19 MMOL/L
AST SERPL-CCNC: 19 U/L
BASOPHILS # BLD AUTO: 0.06 K/UL
BASOPHILS NFR BLD AUTO: 0.6 %
BILIRUB SERPL-MCNC: 0.4 MG/DL
BUN SERPL-MCNC: 23 MG/DL
CALCIUM SERPL-MCNC: 9.9 MG/DL
CHLORIDE SERPL-SCNC: 100 MMOL/L
CHOLEST SERPL-MCNC: 174 MG/DL
CHOLEST/HDLC SERPL: 3.6 RATIO
CK SERPL-CCNC: 116 U/L
CO2 SERPL-SCNC: 19 MMOL/L
CREAT SERPL-MCNC: 0.88 MG/DL
EOSINOPHIL # BLD AUTO: 0.22 K/UL
EOSINOPHIL NFR BLD AUTO: 2.2 %
ESTIMATED AVERAGE GLUCOSE: 126 MG/DL
GLUCOSE SERPL-MCNC: 129 MG/DL
HBA1C MFR BLD HPLC: 6 %
HCT VFR BLD CALC: 48.1 %
HDLC SERPL-MCNC: 48 MG/DL
HGB BLD-MCNC: 15.5 G/DL
IMM GRANULOCYTES NFR BLD AUTO: 0.5 %
LDLC SERPL CALC-MCNC: 100 MG/DL
LYMPHOCYTES # BLD AUTO: 1.9 K/UL
LYMPHOCYTES NFR BLD AUTO: 19.4 %
MAN DIFF?: NORMAL
MCHC RBC-ENTMCNC: 29.6 PG
MCHC RBC-ENTMCNC: 32.2 GM/DL
MCV RBC AUTO: 91.8 FL
MONOCYTES # BLD AUTO: 0.82 K/UL
MONOCYTES NFR BLD AUTO: 8.4 %
NEUTROPHILS # BLD AUTO: 6.76 K/UL
NEUTROPHILS NFR BLD AUTO: 68.9 %
PLATELET # BLD AUTO: 372 K/UL
POTASSIUM SERPL-SCNC: 4.4 MMOL/L
PROT SERPL-MCNC: 6.8 G/DL
RBC # BLD: 5.24 M/UL
RBC # FLD: 15.5 %
SODIUM SERPL-SCNC: 138 MMOL/L
TRIGL SERPL-MCNC: 131 MG/DL
TSH SERPL-ACNC: 1.17 UIU/ML
WBC # FLD AUTO: 9.81 K/UL

## 2019-08-30 ENCOUNTER — APPOINTMENT (OUTPATIENT)
Dept: CARDIOLOGY | Facility: CLINIC | Age: 74
End: 2019-08-30
Payer: MEDICARE

## 2019-08-30 ENCOUNTER — NON-APPOINTMENT (OUTPATIENT)
Age: 74
End: 2019-08-30

## 2019-08-30 VITALS
WEIGHT: 261 LBS | HEART RATE: 103 BPM | BODY MASS INDEX: 40.97 KG/M2 | DIASTOLIC BLOOD PRESSURE: 77 MMHG | SYSTOLIC BLOOD PRESSURE: 157 MMHG | OXYGEN SATURATION: 96 % | HEIGHT: 67 IN

## 2019-08-30 VITALS — SYSTOLIC BLOOD PRESSURE: 130 MMHG | DIASTOLIC BLOOD PRESSURE: 70 MMHG

## 2019-08-30 PROCEDURE — 93000 ELECTROCARDIOGRAM COMPLETE: CPT

## 2019-08-30 PROCEDURE — 99214 OFFICE O/P EST MOD 30 MIN: CPT

## 2019-09-03 ENCOUNTER — NON-APPOINTMENT (OUTPATIENT)
Age: 74
End: 2019-09-03

## 2019-10-02 ENCOUNTER — APPOINTMENT (OUTPATIENT)
Dept: INTERNAL MEDICINE | Facility: CLINIC | Age: 74
End: 2019-10-02
Payer: MEDICARE

## 2019-10-02 PROCEDURE — G0008: CPT

## 2019-10-02 PROCEDURE — 90662 IIV NO PRSV INCREASED AG IM: CPT

## 2019-10-08 ENCOUNTER — RX RENEWAL (OUTPATIENT)
Age: 74
End: 2019-10-08

## 2019-11-11 ENCOUNTER — APPOINTMENT (OUTPATIENT)
Dept: INTERNAL MEDICINE | Facility: CLINIC | Age: 74
End: 2019-11-11
Payer: MEDICARE

## 2019-11-11 VITALS
BODY MASS INDEX: 41.28 KG/M2 | SYSTOLIC BLOOD PRESSURE: 140 MMHG | OXYGEN SATURATION: 95 % | HEIGHT: 67 IN | RESPIRATION RATE: 14 BRPM | DIASTOLIC BLOOD PRESSURE: 60 MMHG | HEART RATE: 78 BPM | TEMPERATURE: 98.7 F | WEIGHT: 263 LBS

## 2019-11-11 DIAGNOSIS — Z00.00 ENCOUNTER FOR GENERAL ADULT MEDICAL EXAMINATION W/OUT ABNORMAL FINDINGS: ICD-10-CM

## 2019-11-11 PROCEDURE — 36415 COLL VENOUS BLD VENIPUNCTURE: CPT

## 2019-11-11 PROCEDURE — 99214 OFFICE O/P EST MOD 30 MIN: CPT | Mod: 25

## 2019-11-11 NOTE — PHYSICAL EXAM
[No Acute Distress] : no acute distress [Well Nourished] : well nourished [Well Developed] : well developed [Well-Appearing] : well-appearing [Normal Voice/Communication] : normal voice/communication [Normal Sclera/Conjunctiva] : normal sclera/conjunctiva [PERRL] : pupils equal round and reactive to light [EOMI] : extraocular movements intact [Normal Outer Ear/Nose] : the outer ears and nose were normal in appearance [Normal Oropharynx] : the oropharynx was normal [No JVD] : no jugular venous distention [No Lymphadenopathy] : no lymphadenopathy [Supple] : supple [Thyroid Normal, No Nodules] : the thyroid was normal and there were no nodules present [No Respiratory Distress] : no respiratory distress  [No Accessory Muscle Use] : no accessory muscle use [Clear to Auscultation] : lungs were clear to auscultation bilaterally [Normal Rate] : normal rate  [Regular Rhythm] : with a regular rhythm [Normal S1, S2] : normal S1 and S2 [No Murmur] : no murmur heard [No Carotid Bruits] : no carotid bruits [No Abdominal Bruit] : a ~M bruit was not heard ~T in the abdomen [No Varicosities] : no varicosities [Pedal Pulses Present] : the pedal pulses are present [No Edema] : there was no peripheral edema [No Palpable Aorta] : no palpable aorta [No Extremity Clubbing/Cyanosis] : no extremity clubbing/cyanosis [Soft] : abdomen soft [Non Tender] : non-tender [Non-distended] : non-distended [No Masses] : no abdominal mass palpated [No HSM] : no HSM [Normal Bowel Sounds] : normal bowel sounds [Normal Supraclavicular Nodes] : no supraclavicular lymphadenopathy [Normal Posterior Cervical Nodes] : no posterior cervical lymphadenopathy [No CVA Tenderness] : no CVA  tenderness [Normal Anterior Cervical Nodes] : no anterior cervical lymphadenopathy [No Spinal Tenderness] : no spinal tenderness [Grossly Normal Strength/Tone] : grossly normal strength/tone [No Joint Swelling] : no joint swelling [Coordination Grossly Intact] : coordination grossly intact [No Rash] : no rash [No Focal Deficits] : no focal deficits [Normal Gait] : normal gait [Deep Tendon Reflexes (DTR)] : deep tendon reflexes were 2+ and symmetric [Memory Grossly Normal] : memory grossly normal [Speech Grossly Normal] : speech grossly normal [Normal Mood] : the mood was normal [Alert and Oriented x3] : oriented to person, place, and time [Normal Affect] : the affect was normal [Normal Insight/Judgement] : insight and judgment were intact

## 2019-11-12 LAB
25(OH)D3 SERPL-MCNC: 36.8 NG/ML
ALBUMIN SERPL ELPH-MCNC: 4.1 G/DL
ALP BLD-CCNC: 61 U/L
ALT SERPL-CCNC: 22 U/L
ANION GAP SERPL CALC-SCNC: 16 MMOL/L
AST SERPL-CCNC: 19 U/L
BASOPHILS # BLD AUTO: 0.06 K/UL
BASOPHILS NFR BLD AUTO: 0.6 %
BILIRUB SERPL-MCNC: 0.3 MG/DL
BUN SERPL-MCNC: 27 MG/DL
CALCIUM SERPL-MCNC: 10 MG/DL
CHLORIDE SERPL-SCNC: 101 MMOL/L
CHOLEST SERPL-MCNC: 177 MG/DL
CHOLEST/HDLC SERPL: 3.3 RATIO
CK SERPL-CCNC: 98 U/L
CO2 SERPL-SCNC: 22 MMOL/L
CREAT SERPL-MCNC: 0.91 MG/DL
EOSINOPHIL # BLD AUTO: 0.25 K/UL
EOSINOPHIL NFR BLD AUTO: 2.3 %
ESTIMATED AVERAGE GLUCOSE: 126 MG/DL
GLUCOSE SERPL-MCNC: 111 MG/DL
HBA1C MFR BLD HPLC: 6 %
HCT VFR BLD CALC: 47 %
HDLC SERPL-MCNC: 53 MG/DL
HGB BLD-MCNC: 15.1 G/DL
IMM GRANULOCYTES NFR BLD AUTO: 0.6 %
LDLC SERPL CALC-MCNC: 101 MG/DL
LYMPHOCYTES # BLD AUTO: 2.49 K/UL
LYMPHOCYTES NFR BLD AUTO: 23.1 %
MAN DIFF?: NORMAL
MCHC RBC-ENTMCNC: 29.8 PG
MCHC RBC-ENTMCNC: 32.1 GM/DL
MCV RBC AUTO: 92.7 FL
MONOCYTES # BLD AUTO: 1.1 K/UL
MONOCYTES NFR BLD AUTO: 10.2 %
NEUTROPHILS # BLD AUTO: 6.82 K/UL
NEUTROPHILS NFR BLD AUTO: 63.2 %
PLATELET # BLD AUTO: 353 K/UL
POTASSIUM SERPL-SCNC: 4.3 MMOL/L
PROT SERPL-MCNC: 6.6 G/DL
PSA SERPL-MCNC: 4.11 NG/ML
RBC # BLD: 5.07 M/UL
RBC # FLD: 15.7 %
SODIUM SERPL-SCNC: 139 MMOL/L
TRIGL SERPL-MCNC: 115 MG/DL
TSH SERPL-ACNC: 1.29 UIU/ML
WBC # FLD AUTO: 10.78 K/UL

## 2019-12-02 ENCOUNTER — RX RENEWAL (OUTPATIENT)
Age: 74
End: 2019-12-02

## 2019-12-27 ENCOUNTER — MEDICATION RENEWAL (OUTPATIENT)
Age: 74
End: 2019-12-27

## 2020-01-23 ENCOUNTER — NON-APPOINTMENT (OUTPATIENT)
Age: 75
End: 2020-01-23

## 2020-01-23 ENCOUNTER — APPOINTMENT (OUTPATIENT)
Dept: CARDIOLOGY | Facility: CLINIC | Age: 75
End: 2020-01-23
Payer: MEDICARE

## 2020-01-23 VITALS
BODY MASS INDEX: 40.65 KG/M2 | WEIGHT: 259 LBS | DIASTOLIC BLOOD PRESSURE: 78 MMHG | SYSTOLIC BLOOD PRESSURE: 122 MMHG | OXYGEN SATURATION: 97 % | HEART RATE: 94 BPM | HEIGHT: 67 IN

## 2020-01-23 PROCEDURE — 99214 OFFICE O/P EST MOD 30 MIN: CPT

## 2020-01-23 PROCEDURE — 93000 ELECTROCARDIOGRAM COMPLETE: CPT

## 2020-02-03 ENCOUNTER — EMERGENCY (EMERGENCY)
Facility: HOSPITAL | Age: 75
LOS: 1 days | Discharge: HOME | End: 2020-02-03
Attending: EMERGENCY MEDICINE | Admitting: EMERGENCY MEDICINE
Payer: MEDICARE

## 2020-02-03 VITALS
SYSTOLIC BLOOD PRESSURE: 115 MMHG | RESPIRATION RATE: 16 BRPM | DIASTOLIC BLOOD PRESSURE: 55 MMHG | HEART RATE: 84 BPM | OXYGEN SATURATION: 98 % | TEMPERATURE: 97 F

## 2020-02-03 VITALS
WEIGHT: 162.04 LBS | HEIGHT: 66 IN | TEMPERATURE: 98 F | SYSTOLIC BLOOD PRESSURE: 126 MMHG | OXYGEN SATURATION: 97 % | HEART RATE: 90 BPM | RESPIRATION RATE: 16 BRPM | DIASTOLIC BLOOD PRESSURE: 66 MMHG

## 2020-02-03 DIAGNOSIS — L05.91 PILONIDAL CYST WITHOUT ABSCESS: Chronic | ICD-10-CM

## 2020-02-03 DIAGNOSIS — Z98.89 OTHER SPECIFIED POSTPROCEDURAL STATES: Chronic | ICD-10-CM

## 2020-02-03 DIAGNOSIS — Q66.89 OTHER SPECIFIED CONGENITAL DEFORMITIES OF FEET: Chronic | ICD-10-CM

## 2020-02-03 DIAGNOSIS — M48.00 SPINAL STENOSIS, SITE UNSPECIFIED: Chronic | ICD-10-CM

## 2020-02-03 DIAGNOSIS — Z41.9 ENCOUNTER FOR PROCEDURE FOR PURPOSES OTHER THAN REMEDYING HEALTH STATE, UNSPECIFIED: Chronic | ICD-10-CM

## 2020-02-03 LAB
ALBUMIN SERPL ELPH-MCNC: 3.4 G/DL — SIGNIFICANT CHANGE UP (ref 3.3–5)
ALP SERPL-CCNC: 62 U/L — SIGNIFICANT CHANGE UP (ref 40–120)
ALT FLD-CCNC: 32 U/L — SIGNIFICANT CHANGE UP (ref 12–78)
ANION GAP SERPL CALC-SCNC: 8 MMOL/L — SIGNIFICANT CHANGE UP (ref 5–17)
APPEARANCE UR: CLEAR — SIGNIFICANT CHANGE UP
AST SERPL-CCNC: 37 U/L — SIGNIFICANT CHANGE UP (ref 15–37)
BASOPHILS # BLD AUTO: 0.04 K/UL — SIGNIFICANT CHANGE UP (ref 0–0.2)
BASOPHILS NFR BLD AUTO: 0.4 % — SIGNIFICANT CHANGE UP (ref 0–2)
BILIRUB SERPL-MCNC: 0.7 MG/DL — SIGNIFICANT CHANGE UP (ref 0.2–1.2)
BILIRUB UR-MCNC: NEGATIVE — SIGNIFICANT CHANGE UP
BUN SERPL-MCNC: 21 MG/DL — SIGNIFICANT CHANGE UP (ref 7–23)
CALCIUM SERPL-MCNC: 9.6 MG/DL — SIGNIFICANT CHANGE UP (ref 8.5–10.1)
CHLORIDE SERPL-SCNC: 103 MMOL/L — SIGNIFICANT CHANGE UP (ref 96–108)
CO2 SERPL-SCNC: 24 MMOL/L — SIGNIFICANT CHANGE UP (ref 22–31)
COLOR SPEC: SIGNIFICANT CHANGE UP
CREAT SERPL-MCNC: 1 MG/DL — SIGNIFICANT CHANGE UP (ref 0.5–1.3)
DIFF PNL FLD: NEGATIVE — SIGNIFICANT CHANGE UP
EOSINOPHIL # BLD AUTO: 0.16 K/UL — SIGNIFICANT CHANGE UP (ref 0–0.5)
EOSINOPHIL NFR BLD AUTO: 1.6 % — SIGNIFICANT CHANGE UP (ref 0–6)
GLUCOSE SERPL-MCNC: 117 MG/DL — HIGH (ref 70–99)
GLUCOSE UR QL: NEGATIVE — SIGNIFICANT CHANGE UP
HCT VFR BLD CALC: 48.3 % — SIGNIFICANT CHANGE UP (ref 39–50)
HGB BLD-MCNC: 16.2 G/DL — SIGNIFICANT CHANGE UP (ref 13–17)
IMM GRANULOCYTES NFR BLD AUTO: 0.6 % — SIGNIFICANT CHANGE UP (ref 0–1.5)
KETONES UR-MCNC: NEGATIVE — SIGNIFICANT CHANGE UP
LEUKOCYTE ESTERASE UR-ACNC: NEGATIVE — SIGNIFICANT CHANGE UP
LIDOCAIN IGE QN: 77 U/L — SIGNIFICANT CHANGE UP (ref 73–393)
LYMPHOCYTES # BLD AUTO: 2.33 K/UL — SIGNIFICANT CHANGE UP (ref 1–3.3)
LYMPHOCYTES # BLD AUTO: 23.8 % — SIGNIFICANT CHANGE UP (ref 13–44)
MCHC RBC-ENTMCNC: 30.1 PG — SIGNIFICANT CHANGE UP (ref 27–34)
MCHC RBC-ENTMCNC: 33.5 GM/DL — SIGNIFICANT CHANGE UP (ref 32–36)
MCV RBC AUTO: 89.6 FL — SIGNIFICANT CHANGE UP (ref 80–100)
MONOCYTES # BLD AUTO: 1.09 K/UL — HIGH (ref 0–0.9)
MONOCYTES NFR BLD AUTO: 11.1 % — SIGNIFICANT CHANGE UP (ref 2–14)
NEUTROPHILS # BLD AUTO: 6.11 K/UL — SIGNIFICANT CHANGE UP (ref 1.8–7.4)
NEUTROPHILS NFR BLD AUTO: 62.5 % — SIGNIFICANT CHANGE UP (ref 43–77)
NITRITE UR-MCNC: NEGATIVE — SIGNIFICANT CHANGE UP
NRBC # BLD: 0 /100 WBCS — SIGNIFICANT CHANGE UP (ref 0–0)
PH UR: 6 — SIGNIFICANT CHANGE UP (ref 5–8)
PLATELET # BLD AUTO: 358 K/UL — SIGNIFICANT CHANGE UP (ref 150–400)
POTASSIUM SERPL-MCNC: 4 MMOL/L — SIGNIFICANT CHANGE UP (ref 3.5–5.3)
POTASSIUM SERPL-SCNC: 4 MMOL/L — SIGNIFICANT CHANGE UP (ref 3.5–5.3)
PROT SERPL-MCNC: 7.7 G/DL — SIGNIFICANT CHANGE UP (ref 6–8.3)
PROT UR-MCNC: NEGATIVE — SIGNIFICANT CHANGE UP
RBC # BLD: 5.39 M/UL — SIGNIFICANT CHANGE UP (ref 4.2–5.8)
RBC # FLD: 15.3 % — HIGH (ref 10.3–14.5)
SODIUM SERPL-SCNC: 135 MMOL/L — SIGNIFICANT CHANGE UP (ref 135–145)
SP GR SPEC: 1.01 — SIGNIFICANT CHANGE UP (ref 1.01–1.02)
UROBILINOGEN FLD QL: NEGATIVE — SIGNIFICANT CHANGE UP
WBC # BLD: 9.79 K/UL — SIGNIFICANT CHANGE UP (ref 3.8–10.5)
WBC # FLD AUTO: 9.79 K/UL — SIGNIFICANT CHANGE UP (ref 3.8–10.5)

## 2020-02-03 PROCEDURE — 81003 URINALYSIS AUTO W/O SCOPE: CPT

## 2020-02-03 PROCEDURE — 74177 CT ABD & PELVIS W/CONTRAST: CPT | Mod: 26

## 2020-02-03 PROCEDURE — 99284 EMERGENCY DEPT VISIT MOD MDM: CPT | Mod: 25

## 2020-02-03 PROCEDURE — 96374 THER/PROPH/DIAG INJ IV PUSH: CPT | Mod: XU

## 2020-02-03 PROCEDURE — 74177 CT ABD & PELVIS W/CONTRAST: CPT

## 2020-02-03 PROCEDURE — 83690 ASSAY OF LIPASE: CPT

## 2020-02-03 PROCEDURE — 36415 COLL VENOUS BLD VENIPUNCTURE: CPT

## 2020-02-03 PROCEDURE — 85027 COMPLETE CBC AUTOMATED: CPT

## 2020-02-03 PROCEDURE — 99284 EMERGENCY DEPT VISIT MOD MDM: CPT

## 2020-02-03 PROCEDURE — 80053 COMPREHEN METABOLIC PANEL: CPT

## 2020-02-03 RX ORDER — SODIUM CHLORIDE 9 MG/ML
1000 INJECTION INTRAMUSCULAR; INTRAVENOUS; SUBCUTANEOUS ONCE
Refills: 0 | Status: COMPLETED | OUTPATIENT
Start: 2020-02-03 | End: 2020-02-03

## 2020-02-03 RX ORDER — FAMOTIDINE 10 MG/ML
20 INJECTION INTRAVENOUS ONCE
Refills: 0 | Status: COMPLETED | OUTPATIENT
Start: 2020-02-03 | End: 2020-02-03

## 2020-02-03 RX ADMIN — SODIUM CHLORIDE 1000 MILLILITER(S): 9 INJECTION INTRAMUSCULAR; INTRAVENOUS; SUBCUTANEOUS at 06:42

## 2020-02-03 RX ADMIN — FAMOTIDINE 20 MILLIGRAM(S): 10 INJECTION INTRAVENOUS at 06:49

## 2020-02-03 NOTE — ED PROVIDER NOTE - PATIENT PORTAL LINK FT
You can access the FollowMyHealth Patient Portal offered by [facility] by registering at the following website: [email or web address]. By joining [facility]’s FollowMyHealth portal, you will also be able to view your health information using other applications (apps) compatible with our system.

## 2020-02-03 NOTE — ED PEDIATRIC NURSE REASSESSMENT NOTE - NS ED NURSE REASSESS COMMENT FT2
Received the patient in the Er. Patient is alert and oriented, Denies any pain at this time. Pending for Ct scan

## 2020-02-03 NOTE — ED PROVIDER NOTE - CARE PROVIDER_API CALL
Aryan Hudson)  Gastroenterology; Internal Medicine  [address] [facility], [address] [place] [address]  Phone: [phone]  Fax: [phone]  Follow Up Time:

## 2020-02-03 NOTE — ED PROVIDER NOTE - OBJECTIVE STATEMENT
73yo male who presents with abd pain for a week, pt c/o pain after eating, no vomiting or diarrhea, no fever, chills, pt was to see his GI doc today but the gunjan is too severe, pt takes omeprazole for the pain

## 2020-02-03 NOTE — ED ADULT NURSE NOTE - PSH
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

## 2020-02-03 NOTE — ED ADULT NURSE NOTE - NSIMPLEMENTINTERV_GEN_ALL_ED
Implemented All Universal Safety Interventions:  [place] to call system. Call bell, personal items and telephone within reach. Instruct patient to call for assistance. Room bathroom lighting operational. Non-slip footwear when patient is off stretcher. Physically safe environment: no spills, clutter or unnecessary equipment. Stretcher in lowest position, wheels locked, appropriate side rails in place.

## 2020-02-03 NOTE — ED PROVIDER NOTE - PROVIDER TOKENS
PROVIDER:[TOKEN:[[id number]]] ,DirectAddress_Unknown,[email or web address]

## 2020-02-03 NOTE — ED ADULT NURSE NOTE - PMH
Calculus of kidney    Club foot  Born Right Foot  Diabetes  Type 2 - does not take medications - monitors Blood Glucose at home - diet controlled  Hypertension    Myasthenia gravis    Urinary tract infection  notes h/o UTI's

## 2020-02-03 NOTE — ED ADULT NURSE NOTE - OBJECTIVE STATEMENT
Patient complaining of diffuse abdominal pain started a week ago denies nausea and vomiting waiting for MD evaluation provided with pillow. As stated by patient it must have been the pills that he's been taking and he cannot eat a lot.

## 2020-03-02 ENCOUNTER — TRANSCRIPTION ENCOUNTER (OUTPATIENT)
Age: 75
End: 2020-03-02

## 2020-03-03 ENCOUNTER — RESULT REVIEW (OUTPATIENT)
Age: 75
End: 2020-03-03

## 2020-03-03 ENCOUNTER — OUTPATIENT (OUTPATIENT)
Dept: OUTPATIENT SERVICES | Facility: HOSPITAL | Age: 75
LOS: 1 days | End: 2020-03-03
Payer: MEDICARE

## 2020-03-03 DIAGNOSIS — Z41.9 ENCOUNTER FOR PROCEDURE FOR PURPOSES OTHER THAN REMEDYING HEALTH STATE, UNSPECIFIED: Chronic | ICD-10-CM

## 2020-03-03 DIAGNOSIS — L05.91 PILONIDAL CYST WITHOUT ABSCESS: Chronic | ICD-10-CM

## 2020-03-03 DIAGNOSIS — Q66.89 OTHER SPECIFIED CONGENITAL DEFORMITIES OF FEET: Chronic | ICD-10-CM

## 2020-03-03 DIAGNOSIS — K21.0 GASTRO-ESOPHAGEAL REFLUX DISEASE WITH ESOPHAGITIS: ICD-10-CM

## 2020-03-03 DIAGNOSIS — Z98.89 OTHER SPECIFIED POSTPROCEDURAL STATES: Chronic | ICD-10-CM

## 2020-03-03 DIAGNOSIS — M48.00 SPINAL STENOSIS, SITE UNSPECIFIED: Chronic | ICD-10-CM

## 2020-03-03 PROCEDURE — 88313 SPECIAL STAINS GROUP 2: CPT

## 2020-03-03 PROCEDURE — 88312 SPECIAL STAINS GROUP 1: CPT | Mod: 26

## 2020-03-03 PROCEDURE — 43239 EGD BIOPSY SINGLE/MULTIPLE: CPT

## 2020-03-03 PROCEDURE — 88305 TISSUE EXAM BY PATHOLOGIST: CPT

## 2020-03-03 PROCEDURE — 88313 SPECIAL STAINS GROUP 2: CPT | Mod: 26

## 2020-03-03 PROCEDURE — 88312 SPECIAL STAINS GROUP 1: CPT

## 2020-03-03 PROCEDURE — 88305 TISSUE EXAM BY PATHOLOGIST: CPT | Mod: 26

## 2020-03-04 LAB — SURGICAL PATHOLOGY STUDY: SIGNIFICANT CHANGE UP

## 2020-03-11 ENCOUNTER — APPOINTMENT (OUTPATIENT)
Dept: INTERNAL MEDICINE | Facility: CLINIC | Age: 75
End: 2020-03-11
Payer: MEDICARE

## 2020-03-11 VITALS
BODY MASS INDEX: 39.24 KG/M2 | DIASTOLIC BLOOD PRESSURE: 70 MMHG | HEIGHT: 67 IN | RESPIRATION RATE: 14 BRPM | HEART RATE: 86 BPM | OXYGEN SATURATION: 97 % | WEIGHT: 250 LBS | TEMPERATURE: 98.2 F | SYSTOLIC BLOOD PRESSURE: 124 MMHG

## 2020-03-11 PROCEDURE — 99214 OFFICE O/P EST MOD 30 MIN: CPT | Mod: 25

## 2020-03-11 PROCEDURE — 36415 COLL VENOUS BLD VENIPUNCTURE: CPT

## 2020-03-11 NOTE — PHYSICAL EXAM
[No Acute Distress] : no acute distress [Well Nourished] : well nourished [Well Developed] : well developed [Well-Appearing] : well-appearing [Normal Voice/Communication] : normal voice/communication [Normal Sclera/Conjunctiva] : normal sclera/conjunctiva [PERRL] : pupils equal round and reactive to light [EOMI] : extraocular movements intact [Normal Outer Ear/Nose] : the outer ears and nose were normal in appearance [Normal Oropharynx] : the oropharynx was normal [No JVD] : no jugular venous distention [No Lymphadenopathy] : no lymphadenopathy [Supple] : supple [Thyroid Normal, No Nodules] : the thyroid was normal and there were no nodules present [No Respiratory Distress] : no respiratory distress  [No Accessory Muscle Use] : no accessory muscle use [Clear to Auscultation] : lungs were clear to auscultation bilaterally [Normal Rate] : normal rate  [Regular Rhythm] : with a regular rhythm [Normal S1, S2] : normal S1 and S2 [No Murmur] : no murmur heard [No Carotid Bruits] : no carotid bruits [No Abdominal Bruit] : a ~M bruit was not heard ~T in the abdomen [No Varicosities] : no varicosities [Pedal Pulses Present] : the pedal pulses are present [No Edema] : there was no peripheral edema [No Palpable Aorta] : no palpable aorta [No Extremity Clubbing/Cyanosis] : no extremity clubbing/cyanosis [Soft] : abdomen soft [Non Tender] : non-tender [Non-distended] : non-distended [No Masses] : no abdominal mass palpated [No HSM] : no HSM [Normal Bowel Sounds] : normal bowel sounds [Normal Supraclavicular Nodes] : no supraclavicular lymphadenopathy [Normal Posterior Cervical Nodes] : no posterior cervical lymphadenopathy [Normal Anterior Cervical Nodes] : no anterior cervical lymphadenopathy [No CVA Tenderness] : no CVA  tenderness [No Spinal Tenderness] : no spinal tenderness [No Joint Swelling] : no joint swelling [Grossly Normal Strength/Tone] : grossly normal strength/tone [No Rash] : no rash [Coordination Grossly Intact] : coordination grossly intact [No Focal Deficits] : no focal deficits [Deep Tendon Reflexes (DTR)] : deep tendon reflexes were 2+ and symmetric [Speech Grossly Normal] : speech grossly normal [Memory Grossly Normal] : memory grossly normal [Normal Affect] : the affect was normal [Normal Mood] : the mood was normal [Normal Insight/Judgement] : insight and judgment were intact [de-identified] : Unsteady gait

## 2020-03-12 LAB
25(OH)D3 SERPL-MCNC: 38.9 NG/ML
ALBUMIN SERPL ELPH-MCNC: 4.3 G/DL
ALP BLD-CCNC: 56 U/L
ALT SERPL-CCNC: 18 U/L
ANION GAP SERPL CALC-SCNC: 17 MMOL/L
AST SERPL-CCNC: 19 U/L
BASOPHILS # BLD AUTO: 0.04 K/UL
BASOPHILS NFR BLD AUTO: 0.4 %
BILIRUB SERPL-MCNC: 0.4 MG/DL
BUN SERPL-MCNC: 20 MG/DL
CALCIUM SERPL-MCNC: 9.8 MG/DL
CHLORIDE SERPL-SCNC: 97 MMOL/L
CHOLEST SERPL-MCNC: 173 MG/DL
CHOLEST/HDLC SERPL: 2.9 RATIO
CK SERPL-CCNC: 69 U/L
CO2 SERPL-SCNC: 23 MMOL/L
CREAT SERPL-MCNC: 0.92 MG/DL
EOSINOPHIL # BLD AUTO: 0.06 K/UL
EOSINOPHIL NFR BLD AUTO: 0.6 %
ESTIMATED AVERAGE GLUCOSE: 128 MG/DL
GLUCOSE SERPL-MCNC: 97 MG/DL
HBA1C MFR BLD HPLC: 6.1 %
HCT VFR BLD CALC: 49 %
HDLC SERPL-MCNC: 59 MG/DL
HGB BLD-MCNC: 15.8 G/DL
IMM GRANULOCYTES NFR BLD AUTO: 0.5 %
LDLC SERPL CALC-MCNC: 91 MG/DL
LYMPHOCYTES # BLD AUTO: 1.21 K/UL
LYMPHOCYTES NFR BLD AUTO: 11.2 %
MAN DIFF?: NORMAL
MCHC RBC-ENTMCNC: 29.5 PG
MCHC RBC-ENTMCNC: 32.2 GM/DL
MCV RBC AUTO: 91.6 FL
MONOCYTES # BLD AUTO: 0.76 K/UL
MONOCYTES NFR BLD AUTO: 7 %
NEUTROPHILS # BLD AUTO: 8.73 K/UL
NEUTROPHILS NFR BLD AUTO: 80.3 %
PLATELET # BLD AUTO: 379 K/UL
POTASSIUM SERPL-SCNC: 4.3 MMOL/L
PROT SERPL-MCNC: 6.8 G/DL
PSA SERPL-MCNC: 4.72 NG/ML
RBC # BLD: 5.35 M/UL
RBC # FLD: 14.8 %
SODIUM SERPL-SCNC: 136 MMOL/L
TRIGL SERPL-MCNC: 116 MG/DL
TSH SERPL-ACNC: 0.84 UIU/ML
WBC # FLD AUTO: 10.85 K/UL

## 2020-06-15 ENCOUNTER — APPOINTMENT (OUTPATIENT)
Dept: INTERNAL MEDICINE | Facility: CLINIC | Age: 75
End: 2020-06-15
Payer: MEDICARE

## 2020-06-15 VITALS
OXYGEN SATURATION: 98 % | WEIGHT: 254 LBS | RESPIRATION RATE: 14 BRPM | DIASTOLIC BLOOD PRESSURE: 84 MMHG | SYSTOLIC BLOOD PRESSURE: 152 MMHG | TEMPERATURE: 97.8 F | BODY MASS INDEX: 39.78 KG/M2 | HEART RATE: 88 BPM

## 2020-06-15 VITALS — SYSTOLIC BLOOD PRESSURE: 130 MMHG | DIASTOLIC BLOOD PRESSURE: 80 MMHG

## 2020-06-15 PROCEDURE — 36415 COLL VENOUS BLD VENIPUNCTURE: CPT

## 2020-06-15 PROCEDURE — 99214 OFFICE O/P EST MOD 30 MIN: CPT | Mod: 25

## 2020-06-15 NOTE — PHYSICAL EXAM
[No Acute Distress] : no acute distress [Well Nourished] : well nourished [Well Developed] : well developed [Well-Appearing] : well-appearing [Normal Voice/Communication] : normal voice/communication [Normal Sclera/Conjunctiva] : normal sclera/conjunctiva [PERRL] : pupils equal round and reactive to light [EOMI] : extraocular movements intact [Normal Outer Ear/Nose] : the outer ears and nose were normal in appearance [No JVD] : no jugular venous distention [No Lymphadenopathy] : no lymphadenopathy [Supple] : supple [Thyroid Normal, No Nodules] : the thyroid was normal and there were no nodules present [No Respiratory Distress] : no respiratory distress  [No Accessory Muscle Use] : no accessory muscle use [Clear to Auscultation] : lungs were clear to auscultation bilaterally [Normal Rate] : normal rate  [Regular Rhythm] : with a regular rhythm [Normal S1, S2] : normal S1 and S2 [No Murmur] : no murmur heard [No Carotid Bruits] : no carotid bruits [No Abdominal Bruit] : a ~M bruit was not heard ~T in the abdomen [No Varicosities] : no varicosities [Pedal Pulses Present] : the pedal pulses are present [No Edema] : there was no peripheral edema [No Extremity Clubbing/Cyanosis] : no extremity clubbing/cyanosis [No Palpable Aorta] : no palpable aorta [Soft] : abdomen soft [Non Tender] : non-tender [Non-distended] : non-distended [No Masses] : no abdominal mass palpated [No HSM] : no HSM [Normal Bowel Sounds] : normal bowel sounds [Normal Supraclavicular Nodes] : no supraclavicular lymphadenopathy [Normal Posterior Cervical Nodes] : no posterior cervical lymphadenopathy [Normal Anterior Cervical Nodes] : no anterior cervical lymphadenopathy [No CVA Tenderness] : no CVA  tenderness [No Spinal Tenderness] : no spinal tenderness [No Joint Swelling] : no joint swelling [No Rash] : no rash [Grossly Normal Strength/Tone] : grossly normal strength/tone [Coordination Grossly Intact] : coordination grossly intact [No Focal Deficits] : no focal deficits [Normal Gait] : normal gait [Deep Tendon Reflexes (DTR)] : deep tendon reflexes were 2+ and symmetric [Speech Grossly Normal] : speech grossly normal [Memory Grossly Normal] : memory grossly normal [Alert and Oriented x3] : oriented to person, place, and time [Normal Affect] : the affect was normal [Normal Insight/Judgement] : insight and judgment were intact [Normal Mood] : the mood was normal

## 2020-06-16 LAB
25(OH)D3 SERPL-MCNC: 31.4 NG/ML
ALBUMIN SERPL ELPH-MCNC: 4.2 G/DL
ALP BLD-CCNC: 78 U/L
ALT SERPL-CCNC: 19 U/L
ANION GAP SERPL CALC-SCNC: 16 MMOL/L
AST SERPL-CCNC: 19 U/L
BASOPHILS # BLD AUTO: 0.06 K/UL
BASOPHILS NFR BLD AUTO: 0.6 %
BILIRUB SERPL-MCNC: 0.3 MG/DL
BUN SERPL-MCNC: 20 MG/DL
CALCIUM SERPL-MCNC: 9.8 MG/DL
CHLORIDE SERPL-SCNC: 98 MMOL/L
CHOLEST SERPL-MCNC: 170 MG/DL
CHOLEST/HDLC SERPL: 2.9 RATIO
CK SERPL-CCNC: 89 U/L
CO2 SERPL-SCNC: 24 MMOL/L
CREAT SERPL-MCNC: 0.84 MG/DL
EOSINOPHIL # BLD AUTO: 0.16 K/UL
EOSINOPHIL NFR BLD AUTO: 1.5 %
ESTIMATED AVERAGE GLUCOSE: 126 MG/DL
GLUCOSE SERPL-MCNC: 126 MG/DL
HBA1C MFR BLD HPLC: 6 %
HCT VFR BLD CALC: 50.8 %
HDLC SERPL-MCNC: 59 MG/DL
HGB BLD-MCNC: 16.1 G/DL
IMM GRANULOCYTES NFR BLD AUTO: 0.6 %
LDLC SERPL CALC-MCNC: 84 MG/DL
LYMPHOCYTES # BLD AUTO: 1.14 K/UL
LYMPHOCYTES NFR BLD AUTO: 10.7 %
MAN DIFF?: NORMAL
MCHC RBC-ENTMCNC: 29.4 PG
MCHC RBC-ENTMCNC: 31.7 GM/DL
MCV RBC AUTO: 92.7 FL
MONOCYTES # BLD AUTO: 0.82 K/UL
MONOCYTES NFR BLD AUTO: 7.7 %
NEUTROPHILS # BLD AUTO: 8.41 K/UL
NEUTROPHILS NFR BLD AUTO: 78.9 %
PLATELET # BLD AUTO: 365 K/UL
POTASSIUM SERPL-SCNC: 4.2 MMOL/L
PROT SERPL-MCNC: 6.4 G/DL
RBC # BLD: 5.48 M/UL
RBC # FLD: 15.5 %
SODIUM SERPL-SCNC: 138 MMOL/L
TRIGL SERPL-MCNC: 133 MG/DL
TSH SERPL-ACNC: 0.97 UIU/ML
WBC # FLD AUTO: 10.65 K/UL

## 2020-07-07 ENCOUNTER — APPOINTMENT (OUTPATIENT)
Dept: INTERNAL MEDICINE | Facility: CLINIC | Age: 75
End: 2020-07-07
Payer: MEDICARE

## 2020-07-07 VITALS
TEMPERATURE: 99.1 F | WEIGHT: 254 LBS | SYSTOLIC BLOOD PRESSURE: 130 MMHG | DIASTOLIC BLOOD PRESSURE: 82 MMHG | HEIGHT: 67 IN | HEART RATE: 100 BPM | BODY MASS INDEX: 39.87 KG/M2 | RESPIRATION RATE: 15 BRPM | OXYGEN SATURATION: 97 %

## 2020-07-07 PROCEDURE — 36415 COLL VENOUS BLD VENIPUNCTURE: CPT

## 2020-07-07 PROCEDURE — 99213 OFFICE O/P EST LOW 20 MIN: CPT | Mod: 25

## 2020-07-07 NOTE — HISTORY OF PRESENT ILLNESS
[de-identified] : Pt here for f/u. Hx of elevated PSA, followed by uro-Dr. Sims. Needs repeat. Otherwise feeling well, no acute complaints.

## 2020-07-07 NOTE — PHYSICAL EXAM
[No Acute Distress] : no acute distress [Well Developed] : well developed [Well Nourished] : well nourished [Well-Appearing] : well-appearing [Normal Sclera/Conjunctiva] : normal sclera/conjunctiva [PERRL] : pupils equal round and reactive to light [Normal Outer Ear/Nose] : the outer ears and nose were normal in appearance [EOMI] : extraocular movements intact [Normal Oropharynx] : the oropharynx was normal [No JVD] : no jugular venous distention [No Lymphadenopathy] : no lymphadenopathy [Thyroid Normal, No Nodules] : the thyroid was normal and there were no nodules present [Supple] : supple [No Respiratory Distress] : no respiratory distress  [No Accessory Muscle Use] : no accessory muscle use [Clear to Auscultation] : lungs were clear to auscultation bilaterally [Normal Rate] : normal rate  [Normal S1, S2] : normal S1 and S2 [Regular Rhythm] : with a regular rhythm [No Carotid Bruits] : no carotid bruits [No Murmur] : no murmur heard [No Varicosities] : no varicosities [Pedal Pulses Present] : the pedal pulses are present [No Abdominal Bruit] : a ~M bruit was not heard ~T in the abdomen [No Edema] : there was no peripheral edema [No Extremity Clubbing/Cyanosis] : no extremity clubbing/cyanosis [No Palpable Aorta] : no palpable aorta [Non Tender] : non-tender [Soft] : abdomen soft [Non-distended] : non-distended [No Masses] : no abdominal mass palpated [Normal Bowel Sounds] : normal bowel sounds [No HSM] : no HSM [No CVA Tenderness] : no CVA  tenderness [Grossly Normal Strength/Tone] : grossly normal strength/tone [No Spinal Tenderness] : no spinal tenderness [No Joint Swelling] : no joint swelling [Coordination Grossly Intact] : coordination grossly intact [No Focal Deficits] : no focal deficits [No Rash] : no rash [Normal Affect] : the affect was normal [Normal Insight/Judgement] : insight and judgment were intact [Deep Tendon Reflexes (DTR)] : deep tendon reflexes were 2+ and symmetric

## 2020-07-09 LAB
PSA FREE FLD-MCNC: 36 %
PSA FREE SERPL-MCNC: 2.57 NG/ML
PSA SERPL-MCNC: 7.08 NG/ML

## 2020-07-30 ENCOUNTER — APPOINTMENT (OUTPATIENT)
Dept: CARDIOLOGY | Facility: CLINIC | Age: 75
End: 2020-07-30
Payer: MEDICARE

## 2020-07-30 ENCOUNTER — NON-APPOINTMENT (OUTPATIENT)
Age: 75
End: 2020-07-30

## 2020-07-30 VITALS
WEIGHT: 254 LBS | SYSTOLIC BLOOD PRESSURE: 152 MMHG | HEIGHT: 67 IN | OXYGEN SATURATION: 96 % | HEART RATE: 103 BPM | DIASTOLIC BLOOD PRESSURE: 84 MMHG | BODY MASS INDEX: 39.87 KG/M2

## 2020-07-30 PROCEDURE — 99214 OFFICE O/P EST MOD 30 MIN: CPT

## 2020-07-30 PROCEDURE — 93000 ELECTROCARDIOGRAM COMPLETE: CPT

## 2020-10-14 ENCOUNTER — APPOINTMENT (OUTPATIENT)
Dept: INTERNAL MEDICINE | Facility: CLINIC | Age: 75
End: 2020-10-14
Payer: MEDICARE

## 2020-10-14 VITALS
TEMPERATURE: 97.3 F | WEIGHT: 252 LBS | HEART RATE: 110 BPM | OXYGEN SATURATION: 97 % | DIASTOLIC BLOOD PRESSURE: 78 MMHG | HEIGHT: 67 IN | SYSTOLIC BLOOD PRESSURE: 148 MMHG | RESPIRATION RATE: 14 BRPM | BODY MASS INDEX: 39.55 KG/M2

## 2020-10-14 VITALS — SYSTOLIC BLOOD PRESSURE: 136 MMHG | DIASTOLIC BLOOD PRESSURE: 80 MMHG

## 2020-10-14 DIAGNOSIS — Z23 ENCOUNTER FOR IMMUNIZATION: ICD-10-CM

## 2020-10-14 PROCEDURE — 90662 IIV NO PRSV INCREASED AG IM: CPT

## 2020-10-14 PROCEDURE — 99214 OFFICE O/P EST MOD 30 MIN: CPT | Mod: 25

## 2020-10-14 PROCEDURE — G0008: CPT

## 2020-10-14 NOTE — HEALTH RISK ASSESSMENT
[No] : In the past 12 months have you used drugs other than those required for medical reasons? No [No falls in past year] : Patient reported no falls in the past year [0] : 2) Feeling down, depressed, or hopeless: Not at all (0) [] : No [[id number]] : 0

## 2020-10-14 NOTE — PHYSICAL EXAM
[No Acute Distress] : no acute distress [Well Nourished] : well nourished [Well-Appearing] : well-appearing [Well Developed] : well developed [Normal Sclera/Conjunctiva] : normal sclera/conjunctiva [Normal Voice/Communication] : normal voice/communication [PERRL] : pupils equal round and reactive to light [EOMI] : extraocular movements intact [Normal Outer Ear/Nose] : the outer ears and nose were normal in appearance [Normal Oropharynx] : the oropharynx was normal [No JVD] : no jugular venous distention [Supple] : supple [No Lymphadenopathy] : no lymphadenopathy [Thyroid Normal, No Nodules] : the thyroid was normal and there were no nodules present [No Respiratory Distress] : no respiratory distress  [No Accessory Muscle Use] : no accessory muscle use [Clear to Auscultation] : lungs were clear to auscultation bilaterally [Normal Rate] : normal rate  [Regular Rhythm] : with a regular rhythm [Normal S1, S2] : normal S1 and S2 [No Murmur] : no murmur heard [No Carotid Bruits] : no carotid bruits [No Abdominal Bruit] : a ~M bruit was not heard ~T in the abdomen [No Varicosities] : no varicosities [Pedal Pulses Present] : the pedal pulses are present [No Edema] : there was no peripheral edema [No Palpable Aorta] : no palpable aorta [No Extremity Clubbing/Cyanosis] : no extremity clubbing/cyanosis [Soft] : abdomen soft [Non Tender] : non-tender [Non-distended] : non-distended [No Masses] : no abdominal mass palpated [No HSM] : no HSM [Normal Bowel Sounds] : normal bowel sounds [Normal Supraclavicular Nodes] : no supraclavicular lymphadenopathy [Normal Posterior Cervical Nodes] : no posterior cervical lymphadenopathy [Normal Anterior Cervical Nodes] : no anterior cervical lymphadenopathy [No CVA Tenderness] : no CVA  tenderness [No Spinal Tenderness] : no spinal tenderness [No Joint Swelling] : no joint swelling [Grossly Normal Strength/Tone] : grossly normal strength/tone [No Rash] : no rash [Coordination Grossly Intact] : coordination grossly intact [Normal Gait] : normal gait [No Focal Deficits] : no focal deficits [Speech Grossly Normal] : speech grossly normal [Memory Grossly Normal] : memory grossly normal [Normal Affect] : the affect was normal [Alert and Oriented x3] : oriented to person, place, and time [Normal Mood] : the mood was normal [Normal Insight/Judgement] : insight and judgment were intact

## 2020-10-15 LAB
25(OH)D3 SERPL-MCNC: 34.4 NG/ML
ALBUMIN SERPL ELPH-MCNC: 4.3 G/DL
ALP BLD-CCNC: 73 U/L
ALT SERPL-CCNC: 14 U/L
ANION GAP SERPL CALC-SCNC: 17 MMOL/L
AST SERPL-CCNC: 19 U/L
BASOPHILS # BLD AUTO: 0.07 K/UL
BASOPHILS NFR BLD AUTO: 0.6 %
BILIRUB SERPL-MCNC: 0.4 MG/DL
BUN SERPL-MCNC: 20 MG/DL
CALCIUM SERPL-MCNC: 9.8 MG/DL
CHLORIDE SERPL-SCNC: 98 MMOL/L
CHOLEST SERPL-MCNC: 190 MG/DL
CHOLEST/HDLC SERPL: 2.9 RATIO
CK SERPL-CCNC: 89 U/L
CO2 SERPL-SCNC: 22 MMOL/L
CREAT SERPL-MCNC: 0.94 MG/DL
EOSINOPHIL # BLD AUTO: 0.16 K/UL
EOSINOPHIL NFR BLD AUTO: 1.4 %
ESTIMATED AVERAGE GLUCOSE: 131 MG/DL
GLUCOSE SERPL-MCNC: 93 MG/DL
HBA1C MFR BLD HPLC: 6.2 %
HCT VFR BLD CALC: 50.1 %
HDLC SERPL-MCNC: 66 MG/DL
HGB BLD-MCNC: 16 G/DL
IMM GRANULOCYTES NFR BLD AUTO: 0.4 %
LDLC SERPL CALC-MCNC: 104 MG/DL
LYMPHOCYTES # BLD AUTO: 2.74 K/UL
LYMPHOCYTES NFR BLD AUTO: 24.1 %
MAN DIFF?: NORMAL
MCHC RBC-ENTMCNC: 29.5 PG
MCHC RBC-ENTMCNC: 31.9 GM/DL
MCV RBC AUTO: 92.4 FL
MONOCYTES # BLD AUTO: 1.4 K/UL
MONOCYTES NFR BLD AUTO: 12.3 %
NEUTROPHILS # BLD AUTO: 6.97 K/UL
NEUTROPHILS NFR BLD AUTO: 61.2 %
PLATELET # BLD AUTO: 348 K/UL
POTASSIUM SERPL-SCNC: 3.8 MMOL/L
PROT SERPL-MCNC: 6.9 G/DL
RBC # BLD: 5.42 M/UL
RBC # FLD: 15.5 %
SODIUM SERPL-SCNC: 136 MMOL/L
TRIGL SERPL-MCNC: 102 MG/DL
TSH SERPL-ACNC: 1.45 UIU/ML
WBC # FLD AUTO: 11.39 K/UL

## 2021-01-22 ENCOUNTER — NON-APPOINTMENT (OUTPATIENT)
Age: 76
End: 2021-01-22

## 2021-01-22 ENCOUNTER — APPOINTMENT (OUTPATIENT)
Dept: CARDIOLOGY | Facility: CLINIC | Age: 76
End: 2021-01-22
Payer: MEDICARE

## 2021-01-22 VITALS
WEIGHT: 260 LBS | HEART RATE: 103 BPM | BODY MASS INDEX: 40.81 KG/M2 | OXYGEN SATURATION: 95 % | HEIGHT: 67 IN | SYSTOLIC BLOOD PRESSURE: 164 MMHG | DIASTOLIC BLOOD PRESSURE: 75 MMHG

## 2021-01-22 VITALS — DIASTOLIC BLOOD PRESSURE: 60 MMHG | SYSTOLIC BLOOD PRESSURE: 118 MMHG

## 2021-01-22 PROCEDURE — 93000 ELECTROCARDIOGRAM COMPLETE: CPT

## 2021-01-22 PROCEDURE — 99214 OFFICE O/P EST MOD 30 MIN: CPT

## 2021-01-22 NOTE — REVIEW OF SYSTEMS
[Recent Weight Loss (___ Lbs)] : recent [unfilled] ~Ulb weight loss [see HPI] : see HPI [Dyspnea on exertion] : dyspnea during exertion [Lower Ext Edema] : lower extremity edema [Joint Pain] : joint pain [Negative] : Heme/Lymph

## 2021-01-22 NOTE — DISCUSSION/SUMMARY
[FreeTextEntry1] : Joesph is a very pleasant 75-year-old gentleman with hypertension, dyslipidemia, obesity, myasthenia gravis, diet-controlled diabetes,  and chronic lower extremity swelling who presents to the office for follow-up.  His blood pressure and heart rate are well controlled.   His LDL was controlled on Praluent, and he will continue it.  I asked him to elevate his extremities whenever possible.  His LE edema is at baseline currently.   All other medications will be continued as ordered.  He will follow in six months, or earlier if needed.   Overall he is stable from a cardiac point of view.  Bp controlled at home.

## 2021-01-22 NOTE — HISTORY OF PRESENT ILLNESS
[FreeTextEntry1] : Joesph is a 75-year-old gentleman with a history of hypertension, dyslipidemia, obesity, myasthenia gravis, diet-controlled diabetes, chronic lower extremity swelling who presents to the office for follow-up.  He was intolerant of statin drugs, and his LDL was uncontrolled.  He is now on Praluent, and his last LDL was controlled.  He was tolerating it without side effects.  He is not able to exercise secondary to his back pain.  In fact, he is quite limited due to his back pain.  He is unable to stand for prolonged periods of time, and can not walk long distances.  He does have dyspnea, but this has been a chronic problem for him and has not changed at all recently.  He denies any new chest pains.  He denies PND, orthopnea, syncope, dizziness, or lightheadedness.  He is medication and dietary compliant.    For the most part, everything is stable.  His PSA has trended up, and he is seeing urology tomorrow.  BP is high today, but has been controlled at outside office visits.

## 2021-01-22 NOTE — REASON FOR VISIT
[Follow-Up - Clinic] : a clinic follow-up of [Hyperlipidemia] : hyperlipidemia [Hypertension] : hypertension [FreeTextEntry1] : obesity, lower extremity edema

## 2021-01-22 NOTE — PHYSICAL EXAM
[Normal Appearance] : normal appearance [General Appearance - In No Acute Distress] : no acute distress [Normal Conjunctiva] : the conjunctiva exhibited no abnormalities [Normal Oral Mucosa] : normal oral mucosa [Normal Jugular Venous V Waves Present] : normal jugular venous V waves present [Respiration, Rhythm And Depth] : normal respiratory rhythm and effort [Exaggerated Use Of Accessory Muscles For Inspiration] : no accessory muscle use [Auscultation Breath Sounds / Voice Sounds] : lungs were clear to auscultation bilaterally [Bowel Sounds] : normal bowel sounds [Abdomen Soft] : soft [Abdomen Tenderness] : non-tender [Nail Clubbing] : no clubbing of the fingernails [Cyanosis, Localized] : no localized cyanosis [Petechial Hemorrhages (___cm)] : no petechial hemorrhages [] : no rash [Skin Color & Pigmentation] : normal skin color and pigmentation [No Venous Stasis] : no venous stasis [Oriented To Time, Place, And Person] : oriented to person, place, and time [Affect] : the affect was normal [Mood] : the mood was normal [No Anxiety] : not feeling anxious [Not Palpable] : not palpable [No Precordial Heave] : no precordial heave was noted [Normal Rate] : normal [Rhythm Regular] : regular [Normal S1] : normal S1 [S1 Diminished] : was diminished [Normal S2] : normal S2 [S2 Diminished] : was diminished [No Gallop] : no gallop heard [Distant] : the heart sounds were distant [No Murmur] : no murmurs heard [1+] : left 1+ [No Abnormalities] : the abdominal aorta was not enlarged and no bruit was heard [___ +] : bilateral [unfilled]U+ pretibial pitting edema [FreeTextEntry1] : Slow gait.  Walks with a cane. [Apical Thrill] : no thrill palpable at the apex [Click] : no click [Pericardial Rub] : no pericardial rub [Right Carotid Bruit] : no bruit heard over the right carotid [Left Carotid Bruit] : no bruit heard over the left carotid [Bruit] : no bruit heard [Rt] : no varicose veins of the right leg [Lt] : no varicose veins of the left leg

## 2021-01-22 NOTE — CARDIOLOGY SUMMARY
[No Ischemia] : no Ischemia [___] : [unfilled] [None] : normal LV function [Normal] : normal LA size [Mild] : mild mitral regurgitation

## 2021-02-10 ENCOUNTER — APPOINTMENT (OUTPATIENT)
Dept: INTERNAL MEDICINE | Facility: CLINIC | Age: 76
End: 2021-02-10
Payer: MEDICARE

## 2021-02-10 VITALS — DIASTOLIC BLOOD PRESSURE: 84 MMHG | SYSTOLIC BLOOD PRESSURE: 134 MMHG

## 2021-02-10 VITALS
DIASTOLIC BLOOD PRESSURE: 90 MMHG | BODY MASS INDEX: 40.1 KG/M2 | OXYGEN SATURATION: 97 % | RESPIRATION RATE: 15 BRPM | HEART RATE: 96 BPM | TEMPERATURE: 97.6 F | WEIGHT: 256 LBS | SYSTOLIC BLOOD PRESSURE: 140 MMHG

## 2021-02-10 DIAGNOSIS — R97.20 ELEVATED PROSTATE, SPECIFIC ANTIGEN [PSA]: ICD-10-CM

## 2021-02-10 PROCEDURE — 99214 OFFICE O/P EST MOD 30 MIN: CPT

## 2021-02-10 NOTE — PLAN
[FreeTextEntry1] : Continue blood pressure medications\par Will follow PSA\par Diabetes: Check A1c\par Advised to follow proper diet and lose weight

## 2021-02-10 NOTE — END OF VISIT
[FreeTextEntry3] : "I, Todd Dillard, personally scribed the services dictated to me by Dr. Colt Wade MD in this documentation on 02/10/2021 "\par \par "I Dr. Colt Wade MD, personally performed the services described in this documentation on 02/10/2021 for the patient as scribed by Todd Dillard in my presence. I have reviewed and verified that all the information is accurate and true."

## 2021-02-10 NOTE — HEALTH RISK ASSESSMENT
[No] : In the past 12 months have you used drugs other than those required for medical reasons? No [No falls in past year] : Patient reported no falls in the past year [0] : 2) Feeling down, depressed, or hopeless: Not at all (0) [] : No [[id number]] : 0

## 2021-02-10 NOTE — HISTORY OF PRESENT ILLNESS
[FreeTextEntry1] : follow up  [de-identified] : DEION HEATH is a 75 year old M with history of elevated PSA who presents today for follow up for his HTN, blood sugar, and cholesterol. Patient has no new complaints

## 2021-02-10 NOTE — PHYSICAL EXAM
[No Acute Distress] : no acute distress [Well Nourished] : well nourished [Well Developed] : well developed [Well-Appearing] : well-appearing [Normal Voice/Communication] : normal voice/communication [Normal Sclera/Conjunctiva] : normal sclera/conjunctiva [PERRL] : pupils equal round and reactive to light [EOMI] : extraocular movements intact [Normal Outer Ear/Nose] : the outer ears and nose were normal in appearance [No JVD] : no jugular venous distention [No Lymphadenopathy] : no lymphadenopathy [Supple] : supple [Thyroid Normal, No Nodules] : the thyroid was normal and there were no nodules present [No Respiratory Distress] : no respiratory distress  [No Accessory Muscle Use] : no accessory muscle use [Clear to Auscultation] : lungs were clear to auscultation bilaterally [Normal Rate] : normal rate  [Regular Rhythm] : with a regular rhythm [Normal S1, S2] : normal S1 and S2 [No Murmur] : no murmur heard [No Carotid Bruits] : no carotid bruits [No Abdominal Bruit] : a ~M bruit was not heard ~T in the abdomen [No Varicosities] : no varicosities [Pedal Pulses Present] : the pedal pulses are present [No Edema] : there was no peripheral edema [No Palpable Aorta] : no palpable aorta [No Extremity Clubbing/Cyanosis] : no extremity clubbing/cyanosis [Soft] : abdomen soft [Non Tender] : non-tender [Non-distended] : non-distended [No Masses] : no abdominal mass palpated [No HSM] : no HSM [Normal Bowel Sounds] : normal bowel sounds [Normal Posterior Cervical Nodes] : no posterior cervical lymphadenopathy [Normal Anterior Cervical Nodes] : no anterior cervical lymphadenopathy [No CVA Tenderness] : no CVA  tenderness [No Spinal Tenderness] : no spinal tenderness [No Joint Swelling] : no joint swelling [Grossly Normal Strength/Tone] : grossly normal strength/tone [No Rash] : no rash [Coordination Grossly Intact] : coordination grossly intact [No Focal Deficits] : no focal deficits [Deep Tendon Reflexes (DTR)] : deep tendon reflexes were 2+ and symmetric [Speech Grossly Normal] : speech grossly normal [Memory Grossly Normal] : memory grossly normal [Normal Affect] : the affect was normal [Alert and Oriented x3] : oriented to person, place, and time [Normal Mood] : the mood was normal [Normal Insight/Judgement] : insight and judgment were intact [de-identified] : ambulatory with cane

## 2021-02-11 LAB
25(OH)D3 SERPL-MCNC: 29.4 NG/ML
ALBUMIN SERPL ELPH-MCNC: 4.2 G/DL
ALP BLD-CCNC: 78 U/L
ALT SERPL-CCNC: 19 U/L
ANION GAP SERPL CALC-SCNC: 19 MMOL/L
AST SERPL-CCNC: 22 U/L
BASOPHILS # BLD AUTO: 0.08 K/UL
BASOPHILS NFR BLD AUTO: 0.8 %
BILIRUB SERPL-MCNC: 0.3 MG/DL
BUN SERPL-MCNC: 21 MG/DL
CALCIUM SERPL-MCNC: 10.1 MG/DL
CHLORIDE SERPL-SCNC: 102 MMOL/L
CHOLEST SERPL-MCNC: 182 MG/DL
CK SERPL-CCNC: 99 U/L
CO2 SERPL-SCNC: 19 MMOL/L
CREAT SERPL-MCNC: 1.02 MG/DL
EOSINOPHIL # BLD AUTO: 0.09 K/UL
EOSINOPHIL NFR BLD AUTO: 0.9 %
ESTIMATED AVERAGE GLUCOSE: 128 MG/DL
GLUCOSE SERPL-MCNC: 109 MG/DL
HBA1C MFR BLD HPLC: 6.1 %
HCT VFR BLD CALC: 51.4 %
HDLC SERPL-MCNC: 59 MG/DL
HGB BLD-MCNC: 16.7 G/DL
IMM GRANULOCYTES NFR BLD AUTO: 0.4 %
LDLC SERPL CALC-MCNC: 99 MG/DL
LYMPHOCYTES # BLD AUTO: 1.35 K/UL
LYMPHOCYTES NFR BLD AUTO: 13.5 %
MAN DIFF?: NORMAL
MCHC RBC-ENTMCNC: 30.4 PG
MCHC RBC-ENTMCNC: 32.5 GM/DL
MCV RBC AUTO: 93.5 FL
MONOCYTES # BLD AUTO: 0.81 K/UL
MONOCYTES NFR BLD AUTO: 8.1 %
NEUTROPHILS # BLD AUTO: 7.63 K/UL
NEUTROPHILS NFR BLD AUTO: 76.3 %
NONHDLC SERPL-MCNC: 122 MG/DL
PLATELET # BLD AUTO: 347 K/UL
POTASSIUM SERPL-SCNC: 5 MMOL/L
PROT SERPL-MCNC: 6.7 G/DL
PSA SERPL-MCNC: 5.89 NG/ML
RBC # BLD: 5.5 M/UL
RBC # FLD: 15.4 %
SODIUM SERPL-SCNC: 140 MMOL/L
TRIGL SERPL-MCNC: 117 MG/DL
TSH SERPL-ACNC: 1.12 UIU/ML
WBC # FLD AUTO: 10 K/UL

## 2021-05-03 ENCOUNTER — APPOINTMENT (OUTPATIENT)
Dept: INTERNAL MEDICINE | Facility: CLINIC | Age: 76
End: 2021-05-03
Payer: MEDICARE

## 2021-05-03 ENCOUNTER — NON-APPOINTMENT (OUTPATIENT)
Age: 76
End: 2021-05-03

## 2021-05-03 VITALS
HEIGHT: 67 IN | HEART RATE: 94 BPM | WEIGHT: 250 LBS | BODY MASS INDEX: 39.24 KG/M2 | DIASTOLIC BLOOD PRESSURE: 72 MMHG | OXYGEN SATURATION: 97 % | RESPIRATION RATE: 14 BRPM | SYSTOLIC BLOOD PRESSURE: 136 MMHG | TEMPERATURE: 97.3 F

## 2021-05-03 DIAGNOSIS — Z01.818 ENCOUNTER FOR OTHER PREPROCEDURAL EXAMINATION: ICD-10-CM

## 2021-05-03 DIAGNOSIS — E11.65 TYPE 2 DIABETES MELLITUS WITH HYPERGLYCEMIA: ICD-10-CM

## 2021-05-03 PROCEDURE — 93000 ELECTROCARDIOGRAM COMPLETE: CPT

## 2021-05-03 PROCEDURE — 99214 OFFICE O/P EST MOD 30 MIN: CPT | Mod: 25

## 2021-05-03 NOTE — PHYSICAL EXAM
[No Acute Distress] : no acute distress [Well Nourished] : well nourished [Well Developed] : well developed [Well-Appearing] : well-appearing [Normal Voice/Communication] : normal voice/communication [Normal Sclera/Conjunctiva] : normal sclera/conjunctiva [PERRL] : pupils equal round and reactive to light [EOMI] : extraocular movements intact [Normal Outer Ear/Nose] : the outer ears and nose were normal in appearance [No JVD] : no jugular venous distention [No Lymphadenopathy] : no lymphadenopathy [Supple] : supple [Thyroid Normal, No Nodules] : the thyroid was normal and there were no nodules present [No Respiratory Distress] : no respiratory distress  [No Accessory Muscle Use] : no accessory muscle use [Clear to Auscultation] : lungs were clear to auscultation bilaterally [Normal Rate] : normal rate  [Regular Rhythm] : with a regular rhythm [Normal S1, S2] : normal S1 and S2 [No Murmur] : no murmur heard [No Carotid Bruits] : no carotid bruits [No Abdominal Bruit] : a ~M bruit was not heard ~T in the abdomen [No Varicosities] : no varicosities [Pedal Pulses Present] : the pedal pulses are present [No Edema] : there was no peripheral edema [No Palpable Aorta] : no palpable aorta [No Extremity Clubbing/Cyanosis] : no extremity clubbing/cyanosis [Soft] : abdomen soft [Non Tender] : non-tender [Non-distended] : non-distended [No Masses] : no abdominal mass palpated [No HSM] : no HSM [Normal Bowel Sounds] : normal bowel sounds [Normal Posterior Cervical Nodes] : no posterior cervical lymphadenopathy [Normal Anterior Cervical Nodes] : no anterior cervical lymphadenopathy [No CVA Tenderness] : no CVA  tenderness [No Spinal Tenderness] : no spinal tenderness [No Joint Swelling] : no joint swelling [Grossly Normal Strength/Tone] : grossly normal strength/tone [No Rash] : no rash [Coordination Grossly Intact] : coordination grossly intact [No Focal Deficits] : no focal deficits [Normal Gait] : normal gait [Speech Grossly Normal] : speech grossly normal [Deep Tendon Reflexes (DTR)] : deep tendon reflexes were 2+ and symmetric [Memory Grossly Normal] : memory grossly normal [Normal Affect] : the affect was normal [Alert and Oriented x3] : oriented to person, place, and time [Normal Mood] : the mood was normal [Normal Insight/Judgement] : insight and judgment were intact

## 2021-05-04 LAB
ALBUMIN SERPL ELPH-MCNC: 4.3 G/DL
ALP BLD-CCNC: 74 U/L
ALT SERPL-CCNC: 21 U/L
ANION GAP SERPL CALC-SCNC: 14 MMOL/L
APPEARANCE: CLEAR
AST SERPL-CCNC: 22 U/L
BASOPHILS # BLD AUTO: 0.04 K/UL
BASOPHILS NFR BLD AUTO: 0.4 %
BILIRUB SERPL-MCNC: 0.4 MG/DL
BILIRUBIN URINE: NEGATIVE
BLOOD URINE: NEGATIVE
BUN SERPL-MCNC: 19 MG/DL
CALCIUM SERPL-MCNC: 9.9 MG/DL
CHLORIDE SERPL-SCNC: 100 MMOL/L
CO2 SERPL-SCNC: 23 MMOL/L
COLOR: NORMAL
CREAT SERPL-MCNC: 0.95 MG/DL
EOSINOPHIL # BLD AUTO: 0 K/UL
EOSINOPHIL NFR BLD AUTO: 0 %
GLUCOSE QUALITATIVE U: NEGATIVE
GLUCOSE SERPL-MCNC: 117 MG/DL
HCT VFR BLD CALC: 52 %
HGB BLD-MCNC: 16.7 G/DL
IMM GRANULOCYTES NFR BLD AUTO: 0.4 %
KETONES URINE: NEGATIVE
LEUKOCYTE ESTERASE URINE: NEGATIVE
LYMPHOCYTES # BLD AUTO: 0.93 K/UL
LYMPHOCYTES NFR BLD AUTO: 10.1 %
MAN DIFF?: NORMAL
MCHC RBC-ENTMCNC: 30 PG
MCHC RBC-ENTMCNC: 32.1 GM/DL
MCV RBC AUTO: 93.5 FL
MONOCYTES # BLD AUTO: 0.67 K/UL
MONOCYTES NFR BLD AUTO: 7.3 %
NEUTROPHILS # BLD AUTO: 7.54 K/UL
NEUTROPHILS NFR BLD AUTO: 81.8 %
NITRITE URINE: NEGATIVE
PH URINE: 7
PLATELET # BLD AUTO: 362 K/UL
POTASSIUM SERPL-SCNC: 4.3 MMOL/L
PROT SERPL-MCNC: 7 G/DL
PROTEIN URINE: NORMAL
RBC # BLD: 5.56 M/UL
RBC # FLD: 15.3 %
SODIUM SERPL-SCNC: 137 MMOL/L
SPECIFIC GRAVITY URINE: 1.02
UROBILINOGEN URINE: NORMAL
WBC # FLD AUTO: 9.22 K/UL

## 2021-05-06 LAB — BACTERIA UR CULT: ABNORMAL

## 2021-05-12 ENCOUNTER — APPOINTMENT (OUTPATIENT)
Dept: INTERNAL MEDICINE | Facility: CLINIC | Age: 76
End: 2021-05-12
Payer: MEDICARE

## 2021-05-12 VITALS
DIASTOLIC BLOOD PRESSURE: 76 MMHG | SYSTOLIC BLOOD PRESSURE: 142 MMHG | HEART RATE: 90 BPM | RESPIRATION RATE: 14 BRPM | BODY MASS INDEX: 39.39 KG/M2 | WEIGHT: 251 LBS | OXYGEN SATURATION: 98 % | TEMPERATURE: 96.2 F | HEIGHT: 67 IN

## 2021-05-12 DIAGNOSIS — A49.9 URINARY TRACT INFECTION, SITE NOT SPECIFIED: ICD-10-CM

## 2021-05-12 DIAGNOSIS — N39.0 URINARY TRACT INFECTION, SITE NOT SPECIFIED: ICD-10-CM

## 2021-05-12 LAB
APPEARANCE: CLEAR
BILIRUBIN URINE: NEGATIVE
BLOOD URINE: NEGATIVE
COLOR: NORMAL
GLUCOSE QUALITATIVE U: NEGATIVE
KETONES URINE: NEGATIVE
LEUKOCYTE ESTERASE URINE: NEGATIVE
NITRITE URINE: NEGATIVE
PH URINE: 6.5
PROTEIN URINE: NEGATIVE
SPECIFIC GRAVITY URINE: 1.01
UROBILINOGEN URINE: NORMAL

## 2021-05-12 PROCEDURE — 99214 OFFICE O/P EST MOD 30 MIN: CPT

## 2021-05-12 NOTE — PHYSICAL EXAM
[No Acute Distress] : no acute distress [Well Nourished] : well nourished [Well Developed] : well developed [Well-Appearing] : well-appearing [Normal Voice/Communication] : normal voice/communication [Normal Sclera/Conjunctiva] : normal sclera/conjunctiva [PERRL] : pupils equal round and reactive to light [EOMI] : extraocular movements intact [Normal Outer Ear/Nose] : the outer ears and nose were normal in appearance [No JVD] : no jugular venous distention [No Lymphadenopathy] : no lymphadenopathy [Supple] : supple [Thyroid Normal, No Nodules] : the thyroid was normal and there were no nodules present [No Respiratory Distress] : no respiratory distress  [No Accessory Muscle Use] : no accessory muscle use [Clear to Auscultation] : lungs were clear to auscultation bilaterally [Normal Rate] : normal rate  [Regular Rhythm] : with a regular rhythm [Normal S1, S2] : normal S1 and S2 [No Murmur] : no murmur heard [No Carotid Bruits] : no carotid bruits [No Abdominal Bruit] : a ~M bruit was not heard ~T in the abdomen [No Varicosities] : no varicosities [Pedal Pulses Present] : the pedal pulses are present [No Edema] : there was no peripheral edema [No Palpable Aorta] : no palpable aorta [No Extremity Clubbing/Cyanosis] : no extremity clubbing/cyanosis [Soft] : abdomen soft [Non Tender] : non-tender [Non-distended] : non-distended [No Masses] : no abdominal mass palpated [No HSM] : no HSM [Normal Bowel Sounds] : normal bowel sounds [Normal Supraclavicular Nodes] : no supraclavicular lymphadenopathy [Normal Posterior Cervical Nodes] : no posterior cervical lymphadenopathy [Normal Anterior Cervical Nodes] : no anterior cervical lymphadenopathy [No CVA Tenderness] : no CVA  tenderness [No Spinal Tenderness] : no spinal tenderness [No Joint Swelling] : no joint swelling [Grossly Normal Strength/Tone] : grossly normal strength/tone [No Rash] : no rash [Coordination Grossly Intact] : coordination grossly intact [No Focal Deficits] : no focal deficits [Deep Tendon Reflexes (DTR)] : deep tendon reflexes were 2+ and symmetric [Speech Grossly Normal] : speech grossly normal [Memory Grossly Normal] : memory grossly normal [Normal Affect] : the affect was normal [Alert and Oriented x3] : oriented to person, place, and time [Normal Mood] : the mood was normal [Normal Insight/Judgement] : insight and judgment were intact

## 2021-05-13 PROBLEM — N39.0 BACTERIAL UTI: Status: ACTIVE | Noted: 2021-05-06

## 2021-05-13 LAB — BACTERIA UR CULT: NORMAL

## 2021-05-13 NOTE — HEALTH RISK ASSESSMENT
[No] : In the past 12 months have you used drugs other than those required for medical reasons? No [No falls in past year] : Patient reported no falls in the past year [Assistive Device] : Patient uses an assistive device [0] : 2) Feeling down, depressed, or hopeless: Not at all (0) [] : No [de-identified] : cane [[id number]] : 0

## 2021-05-13 NOTE — RESULTS/DATA
[ECG Reviewed] : reviewed [NSR] : normal sinus rhythm [Left Anterior Hemiblock] : left anterior hemiblock [NS ST-T Wave Changes] : there are nonspecific ST-T wave changes [No Interval Change] : no interval change [] : results reviewed [de-identified] : HCT 52 [de-identified] : glucose 117 [de-identified] : UA negative

## 2021-05-13 NOTE — ASSESSMENT
[Patient Optimized for Surgery] : Patient optimized for surgery [No Further Testing Recommended] : no further testing recommended [Continue medications as is] : Continue current medications [FreeTextEntry4] : Cleared

## 2021-05-13 NOTE — END OF VISIT
[] : A student assisted with documenting this visit. I have reviewed and verified all information documented by the student, and made modifications to such information, when appropriate. [FreeTextEntry3] : "I, Todd Dillard, personally scribed the services dictated to me by Dr. Colt Wade MD in this documentation on 05/12/2021 "\par \par "I Dr. Colt Wade MD, personally performed the services described in this documentation on 05/12/2021 for the patient as scribed by Todd Dillard in my presence. I have reviewed and verified that all the information is accurate and true."

## 2021-05-13 NOTE — HISTORY OF PRESENT ILLNESS
[Diabetes] : diabetes [(Patient denies any chest pain, claudication, dyspnea on exertion, orthopnea, palpitations or syncope)] : Patient denies any chest pain, claudication, dyspnea on exertion, orthopnea, palpitations or syncope [Aortic Stenosis] : no aortic stenosis [Atrial Fibrillation] : no atrial fibrillation [Coronary Artery Disease] : no coronary artery disease [Recent Myocardial Infarction] : no recent myocardial infarction [Implantable Device/Pacemaker] : no implantable device/pacemaker [Asthma] : no asthma [COPD] : no COPD [Sleep Apnea] : no sleep apnea [Smoker] : not a smoker [Family Member] : no family member with adverse anesthesia reaction/sudden death [Self] : no previous adverse anesthesia reaction [Chronic Anticoagulation] : no chronic anticoagulation [Chronic Kidney Disease] : no chronic kidney disease [FreeTextEntry1] : Fusion Prostate Biopsy  [FreeTextEntry2] : May 21, 2021 [FreeTextEntry3] : Dr. Andrews

## 2021-05-13 NOTE — HISTORY OF PRESENT ILLNESS
[FreeTextEntry1] : follow up  [de-identified] : DEION HEATH is a 76 year old M who presents today for follow up for his UTI and BP. Presently on antibiotics. Patient has no new complaints Surgery next week

## 2021-05-14 ENCOUNTER — EMERGENCY (EMERGENCY)
Facility: HOSPITAL | Age: 76
LOS: 1 days | Discharge: HOME | End: 2021-05-14
Attending: EMERGENCY MEDICINE | Admitting: EMERGENCY MEDICINE
Payer: MEDICARE

## 2021-05-14 VITALS
HEART RATE: 68 BPM | DIASTOLIC BLOOD PRESSURE: 78 MMHG | OXYGEN SATURATION: 97 % | SYSTOLIC BLOOD PRESSURE: 126 MMHG | RESPIRATION RATE: 17 BRPM | TEMPERATURE: 99 F

## 2021-05-14 VITALS
DIASTOLIC BLOOD PRESSURE: 84 MMHG | OXYGEN SATURATION: 98 % | HEART RATE: 86 BPM | SYSTOLIC BLOOD PRESSURE: 145 MMHG | HEIGHT: 66 IN | TEMPERATURE: 98 F | WEIGHT: 250 LBS | RESPIRATION RATE: 18 BRPM

## 2021-05-14 DIAGNOSIS — Z41.9 ENCOUNTER FOR PROCEDURE FOR PURPOSES OTHER THAN REMEDYING HEALTH STATE, UNSPECIFIED: Chronic | ICD-10-CM

## 2021-05-14 DIAGNOSIS — Q66.89 OTHER SPECIFIED CONGENITAL DEFORMITIES OF FEET: Chronic | ICD-10-CM

## 2021-05-14 DIAGNOSIS — L05.91 PILONIDAL CYST WITHOUT ABSCESS: Chronic | ICD-10-CM

## 2021-05-14 DIAGNOSIS — Z98.89 OTHER SPECIFIED POSTPROCEDURAL STATES: Chronic | ICD-10-CM

## 2021-05-14 DIAGNOSIS — M48.00 SPINAL STENOSIS, SITE UNSPECIFIED: Chronic | ICD-10-CM

## 2021-05-14 LAB
ALBUMIN SERPL ELPH-MCNC: 3.5 G/DL — SIGNIFICANT CHANGE UP (ref 3.3–5)
ALP SERPL-CCNC: 69 U/L — SIGNIFICANT CHANGE UP (ref 40–120)
ALT FLD-CCNC: 21 U/L — SIGNIFICANT CHANGE UP (ref 12–78)
ANION GAP SERPL CALC-SCNC: 8 MMOL/L — SIGNIFICANT CHANGE UP (ref 5–17)
APPEARANCE UR: CLEAR — SIGNIFICANT CHANGE UP
AST SERPL-CCNC: 16 U/L — SIGNIFICANT CHANGE UP (ref 15–37)
BASOPHILS # BLD AUTO: 0.04 K/UL — SIGNIFICANT CHANGE UP (ref 0–0.2)
BASOPHILS NFR BLD AUTO: 0.4 % — SIGNIFICANT CHANGE UP (ref 0–2)
BILIRUB SERPL-MCNC: 0.5 MG/DL — SIGNIFICANT CHANGE UP (ref 0.2–1.2)
BILIRUB UR-MCNC: NEGATIVE — SIGNIFICANT CHANGE UP
BUN SERPL-MCNC: 18 MG/DL — SIGNIFICANT CHANGE UP (ref 7–23)
CALCIUM SERPL-MCNC: 9.2 MG/DL — SIGNIFICANT CHANGE UP (ref 8.5–10.1)
CHLORIDE SERPL-SCNC: 103 MMOL/L — SIGNIFICANT CHANGE UP (ref 96–108)
CO2 SERPL-SCNC: 26 MMOL/L — SIGNIFICANT CHANGE UP (ref 22–31)
COLOR SPEC: YELLOW — SIGNIFICANT CHANGE UP
CREAT SERPL-MCNC: 0.93 MG/DL — SIGNIFICANT CHANGE UP (ref 0.5–1.3)
DIFF PNL FLD: NEGATIVE — SIGNIFICANT CHANGE UP
EOSINOPHIL # BLD AUTO: 0.11 K/UL — SIGNIFICANT CHANGE UP (ref 0–0.5)
EOSINOPHIL NFR BLD AUTO: 1.1 % — SIGNIFICANT CHANGE UP (ref 0–6)
GLUCOSE SERPL-MCNC: 111 MG/DL — HIGH (ref 70–99)
GLUCOSE UR QL: NEGATIVE — SIGNIFICANT CHANGE UP
HCT VFR BLD CALC: 49.4 % — SIGNIFICANT CHANGE UP (ref 39–50)
HGB BLD-MCNC: 16.8 G/DL — SIGNIFICANT CHANGE UP (ref 13–17)
IMM GRANULOCYTES NFR BLD AUTO: 0.3 % — SIGNIFICANT CHANGE UP (ref 0–1.5)
KETONES UR-MCNC: NEGATIVE — SIGNIFICANT CHANGE UP
LACTATE SERPL-SCNC: 1.2 MMOL/L — SIGNIFICANT CHANGE UP (ref 0.7–2)
LACTATE SERPL-SCNC: 2.3 MMOL/L — HIGH (ref 0.7–2)
LEUKOCYTE ESTERASE UR-ACNC: NEGATIVE — SIGNIFICANT CHANGE UP
LIDOCAIN IGE QN: 84 U/L — SIGNIFICANT CHANGE UP (ref 73–393)
LYMPHOCYTES # BLD AUTO: 1.9 K/UL — SIGNIFICANT CHANGE UP (ref 1–3.3)
LYMPHOCYTES # BLD AUTO: 19.7 % — SIGNIFICANT CHANGE UP (ref 13–44)
MCHC RBC-ENTMCNC: 30.7 PG — SIGNIFICANT CHANGE UP (ref 27–34)
MCHC RBC-ENTMCNC: 34 GM/DL — SIGNIFICANT CHANGE UP (ref 32–36)
MCV RBC AUTO: 90.1 FL — SIGNIFICANT CHANGE UP (ref 80–100)
MONOCYTES # BLD AUTO: 1.18 K/UL — HIGH (ref 0–0.9)
MONOCYTES NFR BLD AUTO: 12.2 % — SIGNIFICANT CHANGE UP (ref 2–14)
NEUTROPHILS # BLD AUTO: 6.38 K/UL — SIGNIFICANT CHANGE UP (ref 1.8–7.4)
NEUTROPHILS NFR BLD AUTO: 66.3 % — SIGNIFICANT CHANGE UP (ref 43–77)
NITRITE UR-MCNC: NEGATIVE — SIGNIFICANT CHANGE UP
NRBC # BLD: 0 /100 WBCS — SIGNIFICANT CHANGE UP (ref 0–0)
PH UR: 6 — SIGNIFICANT CHANGE UP (ref 5–8)
PLATELET # BLD AUTO: 375 K/UL — SIGNIFICANT CHANGE UP (ref 150–400)
POTASSIUM SERPL-MCNC: 3.6 MMOL/L — SIGNIFICANT CHANGE UP (ref 3.5–5.3)
POTASSIUM SERPL-SCNC: 3.6 MMOL/L — SIGNIFICANT CHANGE UP (ref 3.5–5.3)
PROT SERPL-MCNC: 7.7 G/DL — SIGNIFICANT CHANGE UP (ref 6–8.3)
PROT UR-MCNC: NEGATIVE — SIGNIFICANT CHANGE UP
RBC # BLD: 5.48 M/UL — SIGNIFICANT CHANGE UP (ref 4.2–5.8)
RBC # FLD: 15.1 % — HIGH (ref 10.3–14.5)
SODIUM SERPL-SCNC: 137 MMOL/L — SIGNIFICANT CHANGE UP (ref 135–145)
SP GR SPEC: 1.01 — SIGNIFICANT CHANGE UP (ref 1.01–1.02)
UROBILINOGEN FLD QL: NEGATIVE — SIGNIFICANT CHANGE UP
WBC # BLD: 9.64 K/UL — SIGNIFICANT CHANGE UP (ref 3.8–10.5)
WBC # FLD AUTO: 9.64 K/UL — SIGNIFICANT CHANGE UP (ref 3.8–10.5)

## 2021-05-14 PROCEDURE — 36415 COLL VENOUS BLD VENIPUNCTURE: CPT

## 2021-05-14 PROCEDURE — 83690 ASSAY OF LIPASE: CPT

## 2021-05-14 PROCEDURE — G1004: CPT

## 2021-05-14 PROCEDURE — 80053 COMPREHEN METABOLIC PANEL: CPT

## 2021-05-14 PROCEDURE — 99284 EMERGENCY DEPT VISIT MOD MDM: CPT | Mod: 25

## 2021-05-14 PROCEDURE — 83605 ASSAY OF LACTIC ACID: CPT

## 2021-05-14 PROCEDURE — 99284 EMERGENCY DEPT VISIT MOD MDM: CPT

## 2021-05-14 PROCEDURE — 85025 COMPLETE CBC W/AUTO DIFF WBC: CPT

## 2021-05-14 PROCEDURE — 81003 URINALYSIS AUTO W/O SCOPE: CPT

## 2021-05-14 PROCEDURE — 74176 CT ABD & PELVIS W/O CONTRAST: CPT

## 2021-05-14 PROCEDURE — 87086 URINE CULTURE/COLONY COUNT: CPT

## 2021-05-14 PROCEDURE — 74176 CT ABD & PELVIS W/O CONTRAST: CPT | Mod: 26,ME

## 2021-05-14 PROCEDURE — 96360 HYDRATION IV INFUSION INIT: CPT

## 2021-05-14 RX ORDER — DICLOFENAC SODIUM 75 MG/1
2 TABLET, DELAYED RELEASE ORAL
Qty: 0 | Refills: 0 | DISCHARGE

## 2021-05-14 RX ORDER — SODIUM CHLORIDE 9 MG/ML
1000 INJECTION INTRAMUSCULAR; INTRAVENOUS; SUBCUTANEOUS ONCE
Refills: 0 | Status: COMPLETED | OUTPATIENT
Start: 2021-05-14 | End: 2021-05-14

## 2021-05-14 RX ADMIN — SODIUM CHLORIDE 1000 MILLILITER(S): 9 INJECTION INTRAMUSCULAR; INTRAVENOUS; SUBCUTANEOUS at 12:30

## 2021-05-14 RX ADMIN — SODIUM CHLORIDE 1000 MILLILITER(S): 9 INJECTION INTRAMUSCULAR; INTRAVENOUS; SUBCUTANEOUS at 11:30

## 2021-05-14 NOTE — ED PROVIDER NOTE - PROGRESS NOTE DETAILS
Ash montejo to [place] home on his Bactrim which he is already taking. He will fu with pt in office.  Pt feeling well, has been asymptomatic since arrival in ed, will finish abx course and will fu with PMD and urology Reevaluated patient at bedside.  Patient feeling much improved.  Discussed the results of all diagnostic testing in ED and copies of all reports given.   An opportunity to ask questions was given.  Discussed the importance of prompt, close medical follow-up.  Patient will return with any changes, concerns or persistent / worsening symptoms.  Understanding of all instructions verbalized.

## 2021-05-14 NOTE — ED PROVIDER NOTE - CARE PROVIDERS DIRECT ADDRESSES
[email or web address]

## 2021-05-14 NOTE — ED PROVIDER NOTE - CHPI ED SYMPTOMS NEG
no abdominal distension/no blood in stool/no dysuria/no fever/no hematuria/no vomiting/no burning urination/no chills

## 2021-05-14 NOTE — ED PROVIDER NOTE - PATIENT PORTAL LINK FT
You can access the FollowMyHealth Patient Portal offered by [facility] by registering at the following website: [email or web address]. By joining [employer]’s FollowMyHealth portal, you will also be able to view your health information using other applications (apps) compatible with our system.

## 2021-05-14 NOTE — ED PROVIDER NOTE - NSFOLLOWUPINSTRUCTIONS_ED_ALL_ED_FT
1) Follow-up with your Primary Medical Doctor. Call today for prompt follow-up.  2) Return to Emergency room for any worsening or persistent pain, weakness,  vomiting, diarrhea, unable to eat / drink, weak or dizzy, or any other concerning symptoms.   -If you have ANY FEVER, you must return to ED immediately  3) See attached instruction sheets for additional information, including information regarding signs and symptoms to look out for, reasons to seek immediate care and other important instructions.  4) Follow-up with Urology, see attached. Call ASAP today for appointment.  5) Continue your Bactrim as prescribed

## 2021-05-14 NOTE — ED PROVIDER NOTE - CARE PROVIDER_API CALL
Colt Wade)  Internal Medicine  [address] [place] [address]  Phone: [phone]  Fax: [phone]  Follow Up Time:     Terry Sims  UROLOGY  [address], [address]  [place] [address]  Phone: [phone]  Fax: [phone]  Follow Up Time:

## 2021-05-14 NOTE — ED PROVIDER NOTE - OBJECTIVE STATEMENT
77 yo M p/w L flank pain today. Pt was diag with UTI 1 week ago - althogh wasn't having any sx at that time. Now with L flank pain today. no fever/chills. No cough/uri. no known covid exposures, prior infxn. pt NOT yet vaccinated. No weakness / dizziness. no v/d. no fall / recent trauma. no agg/allev factors. no other inj or.

## 2021-05-15 LAB
CULTURE RESULTS: SIGNIFICANT CHANGE UP
SPECIMEN SOURCE: SIGNIFICANT CHANGE UP

## 2021-06-01 ENCOUNTER — APPOINTMENT (OUTPATIENT)
Dept: INTERNAL MEDICINE | Facility: CLINIC | Age: 76
End: 2021-06-01
Payer: MEDICARE

## 2021-06-01 ENCOUNTER — NON-APPOINTMENT (OUTPATIENT)
Age: 76
End: 2021-06-01

## 2021-06-01 ENCOUNTER — INPATIENT (INPATIENT)
Facility: HOSPITAL | Age: 76
LOS: 2 days | Discharge: HOME | DRG: 175 | End: 2021-06-04
Attending: HOSPITALIST | Admitting: STUDENT IN AN ORGANIZED HEALTH CARE EDUCATION/TRAINING PROGRAM
Payer: MEDICARE

## 2021-06-01 VITALS
DIASTOLIC BLOOD PRESSURE: 69 MMHG | HEART RATE: 88 BPM | SYSTOLIC BLOOD PRESSURE: 124 MMHG | RESPIRATION RATE: 18 BRPM | OXYGEN SATURATION: 96 % | TEMPERATURE: 98 F | WEIGHT: 250 LBS | HEIGHT: 66 IN

## 2021-06-01 VITALS
BODY MASS INDEX: 39.55 KG/M2 | SYSTOLIC BLOOD PRESSURE: 132 MMHG | HEART RATE: 99 BPM | TEMPERATURE: 97.9 F | HEIGHT: 67 IN | DIASTOLIC BLOOD PRESSURE: 74 MMHG | RESPIRATION RATE: 14 BRPM | WEIGHT: 252 LBS | OXYGEN SATURATION: 98 %

## 2021-06-01 DIAGNOSIS — M48.00 SPINAL STENOSIS, SITE UNSPECIFIED: Chronic | ICD-10-CM

## 2021-06-01 DIAGNOSIS — L05.91 PILONIDAL CYST WITHOUT ABSCESS: Chronic | ICD-10-CM

## 2021-06-01 DIAGNOSIS — R09.89 OTHER SPECIFIED SYMPTOMS AND SIGNS INVOLVING THE CIRCULATORY AND RESPIRATORY SYSTEMS: ICD-10-CM

## 2021-06-01 DIAGNOSIS — I26.99 OTHER PULMONARY EMBOLISM WITHOUT ACUTE COR PULMONALE: ICD-10-CM

## 2021-06-01 DIAGNOSIS — Z98.890 OTHER SPECIFIED POSTPROCEDURAL STATES: Chronic | ICD-10-CM

## 2021-06-01 DIAGNOSIS — Q66.89 OTHER SPECIFIED CONGENITAL DEFORMITIES OF FEET: Chronic | ICD-10-CM

## 2021-06-01 DIAGNOSIS — Z98.89 OTHER SPECIFIED POSTPROCEDURAL STATES: Chronic | ICD-10-CM

## 2021-06-01 DIAGNOSIS — Z41.9 ENCOUNTER FOR PROCEDURE FOR PURPOSES OTHER THAN REMEDYING HEALTH STATE, UNSPECIFIED: Chronic | ICD-10-CM

## 2021-06-01 LAB
ALBUMIN SERPL ELPH-MCNC: 3.2 G/DL — LOW (ref 3.3–5)
ALP SERPL-CCNC: 63 U/L — SIGNIFICANT CHANGE UP (ref 40–120)
ALT FLD-CCNC: 27 U/L — SIGNIFICANT CHANGE UP (ref 12–78)
ANION GAP SERPL CALC-SCNC: 8 MMOL/L — SIGNIFICANT CHANGE UP (ref 5–17)
AST SERPL-CCNC: 38 U/L — HIGH (ref 15–37)
BASOPHILS # BLD AUTO: 0.06 K/UL — SIGNIFICANT CHANGE UP (ref 0–0.2)
BASOPHILS NFR BLD AUTO: 0.6 % — SIGNIFICANT CHANGE UP (ref 0–2)
BILIRUB SERPL-MCNC: 0.6 MG/DL — SIGNIFICANT CHANGE UP (ref 0.2–1.2)
BUN SERPL-MCNC: 20 MG/DL — SIGNIFICANT CHANGE UP (ref 7–23)
CALCIUM SERPL-MCNC: 9 MG/DL — SIGNIFICANT CHANGE UP (ref 8.5–10.1)
CHLORIDE SERPL-SCNC: 101 MMOL/L — SIGNIFICANT CHANGE UP (ref 96–108)
CO2 SERPL-SCNC: 26 MMOL/L — SIGNIFICANT CHANGE UP (ref 22–31)
CREAT SERPL-MCNC: 0.91 MG/DL — SIGNIFICANT CHANGE UP (ref 0.5–1.3)
D DIMER BLD IA.RAPID-MCNC: 1130 NG/ML DDU — HIGH
EOSINOPHIL # BLD AUTO: 0.14 K/UL — SIGNIFICANT CHANGE UP (ref 0–0.5)
EOSINOPHIL NFR BLD AUTO: 1.3 % — SIGNIFICANT CHANGE UP (ref 0–6)
GLUCOSE SERPL-MCNC: 95 MG/DL — SIGNIFICANT CHANGE UP (ref 70–99)
HCT VFR BLD CALC: 46.4 % — SIGNIFICANT CHANGE UP (ref 39–50)
HGB BLD-MCNC: 15.5 G/DL — SIGNIFICANT CHANGE UP (ref 13–17)
IMM GRANULOCYTES NFR BLD AUTO: 0.6 % — SIGNIFICANT CHANGE UP (ref 0–1.5)
LYMPHOCYTES # BLD AUTO: 19.3 % — SIGNIFICANT CHANGE UP (ref 13–44)
LYMPHOCYTES # BLD AUTO: 2.1 K/UL — SIGNIFICANT CHANGE UP (ref 1–3.3)
MCHC RBC-ENTMCNC: 30.4 PG — SIGNIFICANT CHANGE UP (ref 27–34)
MCHC RBC-ENTMCNC: 33.4 GM/DL — SIGNIFICANT CHANGE UP (ref 32–36)
MCV RBC AUTO: 91 FL — SIGNIFICANT CHANGE UP (ref 80–100)
MONOCYTES # BLD AUTO: 1.28 K/UL — HIGH (ref 0–0.9)
MONOCYTES NFR BLD AUTO: 11.8 % — SIGNIFICANT CHANGE UP (ref 2–14)
NEUTROPHILS # BLD AUTO: 7.21 K/UL — SIGNIFICANT CHANGE UP (ref 1.8–7.4)
NEUTROPHILS NFR BLD AUTO: 66.4 % — SIGNIFICANT CHANGE UP (ref 43–77)
NRBC # BLD: 0 /100 WBCS — SIGNIFICANT CHANGE UP (ref 0–0)
NT-PROBNP SERPL-SCNC: 158 PG/ML — SIGNIFICANT CHANGE UP (ref 0–450)
PLATELET # BLD AUTO: 335 K/UL — SIGNIFICANT CHANGE UP (ref 150–400)
POTASSIUM SERPL-MCNC: 4 MMOL/L — SIGNIFICANT CHANGE UP (ref 3.5–5.3)
POTASSIUM SERPL-SCNC: 4 MMOL/L — SIGNIFICANT CHANGE UP (ref 3.5–5.3)
PROT SERPL-MCNC: 7.4 G/DL — SIGNIFICANT CHANGE UP (ref 6–8.3)
RBC # BLD: 5.1 M/UL — SIGNIFICANT CHANGE UP (ref 4.2–5.8)
RBC # FLD: 14.6 % — HIGH (ref 10.3–14.5)
SARS-COV-2 RNA SPEC QL NAA+PROBE: SIGNIFICANT CHANGE UP
SODIUM SERPL-SCNC: 135 MMOL/L — SIGNIFICANT CHANGE UP (ref 135–145)
TROPONIN I SERPL-MCNC: <.015 NG/ML — SIGNIFICANT CHANGE UP (ref 0.01–0.04)
WBC # BLD: 10.86 K/UL — HIGH (ref 3.8–10.5)
WBC # FLD AUTO: 10.86 K/UL — HIGH (ref 3.8–10.5)

## 2021-06-01 PROCEDURE — 99223 1ST HOSP IP/OBS HIGH 75: CPT

## 2021-06-01 PROCEDURE — 93306 TTE W/DOPPLER COMPLETE: CPT | Mod: 26

## 2021-06-01 PROCEDURE — 99214 OFFICE O/P EST MOD 30 MIN: CPT | Mod: 25

## 2021-06-01 PROCEDURE — 93000 ELECTROCARDIOGRAM COMPLETE: CPT

## 2021-06-01 PROCEDURE — 71045 X-RAY EXAM CHEST 1 VIEW: CPT | Mod: 26

## 2021-06-01 PROCEDURE — 99284 EMERGENCY DEPT VISIT MOD MDM: CPT

## 2021-06-01 PROCEDURE — 71275 CT ANGIOGRAPHY CHEST: CPT | Mod: 26,ME

## 2021-06-01 PROCEDURE — G1004: CPT

## 2021-06-01 PROCEDURE — 93970 EXTREMITY STUDY: CPT | Mod: 26

## 2021-06-01 PROCEDURE — 93010 ELECTROCARDIOGRAM REPORT: CPT

## 2021-06-01 RX ORDER — DEXTROSE 50 % IN WATER 50 %
12.5 SYRINGE (ML) INTRAVENOUS ONCE
Refills: 0 | Status: DISCONTINUED | OUTPATIENT
Start: 2021-06-01 | End: 2021-06-04

## 2021-06-01 RX ORDER — HYDROCHLOROTHIAZIDE 25 MG
12.5 TABLET ORAL DAILY
Refills: 0 | Status: DISCONTINUED | OUTPATIENT
Start: 2021-06-01 | End: 2021-06-02

## 2021-06-01 RX ORDER — PANTOPRAZOLE SODIUM 20 MG/1
40 TABLET, DELAYED RELEASE ORAL
Refills: 0 | Status: DISCONTINUED | OUTPATIENT
Start: 2021-06-01 | End: 2021-06-04

## 2021-06-01 RX ORDER — ENOXAPARIN SODIUM 100 MG/ML
110 INJECTION SUBCUTANEOUS
Refills: 0 | Status: DISCONTINUED | OUTPATIENT
Start: 2021-06-01 | End: 2021-06-04

## 2021-06-01 RX ORDER — ASPIRIN/CALCIUM CARB/MAGNESIUM 324 MG
81 TABLET ORAL DAILY
Refills: 0 | Status: DISCONTINUED | OUTPATIENT
Start: 2021-06-02 | End: 2021-06-04

## 2021-06-01 RX ORDER — PYRIDOSTIGMINE BROMIDE 60 MG/5ML
0 SOLUTION ORAL
Qty: 0 | Refills: 0 | DISCHARGE

## 2021-06-01 RX ORDER — DEXTROSE 50 % IN WATER 50 %
25 SYRINGE (ML) INTRAVENOUS ONCE
Refills: 0 | Status: DISCONTINUED | OUTPATIENT
Start: 2021-06-01 | End: 2021-06-04

## 2021-06-01 RX ORDER — PYRIDOSTIGMINE BROMIDE 60 MG/5ML
1 SOLUTION ORAL
Qty: 0 | Refills: 0 | DISCHARGE

## 2021-06-01 RX ORDER — CEFUROXIME AXETIL 250 MG/1
250 TABLET ORAL
Qty: 20 | Refills: 0 | Status: DISCONTINUED | COMMUNITY
Start: 2021-05-06 | End: 2021-06-01

## 2021-06-01 RX ORDER — ENOXAPARIN SODIUM 100 MG/ML
100 INJECTION SUBCUTANEOUS ONCE
Refills: 0 | Status: DISCONTINUED | OUTPATIENT
Start: 2021-06-01 | End: 2021-06-01

## 2021-06-01 RX ORDER — INSULIN LISPRO 100/ML
VIAL (ML) SUBCUTANEOUS
Refills: 0 | Status: DISCONTINUED | OUTPATIENT
Start: 2021-06-01 | End: 2021-06-04

## 2021-06-01 RX ORDER — GLUCAGON INJECTION, SOLUTION 0.5 MG/.1ML
1 INJECTION, SOLUTION SUBCUTANEOUS ONCE
Refills: 0 | Status: DISCONTINUED | OUTPATIENT
Start: 2021-06-01 | End: 2021-06-04

## 2021-06-01 RX ORDER — PYRIDOSTIGMINE BROMIDE 60 MG/5ML
120 SOLUTION ORAL DAILY
Refills: 0 | Status: DISCONTINUED | OUTPATIENT
Start: 2021-06-01 | End: 2021-06-04

## 2021-06-01 RX ORDER — FUROSEMIDE 40 MG
40 TABLET ORAL ONCE
Refills: 0 | Status: COMPLETED | OUTPATIENT
Start: 2021-06-01 | End: 2021-06-01

## 2021-06-01 RX ORDER — SODIUM CHLORIDE 9 MG/ML
1000 INJECTION, SOLUTION INTRAVENOUS
Refills: 0 | Status: DISCONTINUED | OUTPATIENT
Start: 2021-06-01 | End: 2021-06-04

## 2021-06-01 RX ORDER — LIDOCAINE 4 G/100G
1 CREAM TOPICAL EVERY 24 HOURS
Refills: 0 | Status: DISCONTINUED | OUTPATIENT
Start: 2021-06-01 | End: 2021-06-04

## 2021-06-01 RX ORDER — DEXTROSE 50 % IN WATER 50 %
15 SYRINGE (ML) INTRAVENOUS ONCE
Refills: 0 | Status: DISCONTINUED | OUTPATIENT
Start: 2021-06-01 | End: 2021-06-04

## 2021-06-01 RX ORDER — INSULIN LISPRO 100/ML
VIAL (ML) SUBCUTANEOUS AT BEDTIME
Refills: 0 | Status: DISCONTINUED | OUTPATIENT
Start: 2021-06-01 | End: 2021-06-04

## 2021-06-01 RX ORDER — LISINOPRIL 2.5 MG/1
20 TABLET ORAL DAILY
Refills: 0 | Status: DISCONTINUED | OUTPATIENT
Start: 2021-06-01 | End: 2021-06-04

## 2021-06-01 RX ADMIN — ENOXAPARIN SODIUM 110 MILLIGRAM(S): 100 INJECTION SUBCUTANEOUS at 21:56

## 2021-06-01 RX ADMIN — Medication 40 MILLIGRAM(S): at 16:26

## 2021-06-01 NOTE — HEALTH RISK ASSESSMENT
[No] : In the past 12 months have you used drugs other than those required for medical reasons? No [No falls in past year] : Patient reported no falls in the past year [Assistive Device] : Patient uses an assistive device [0] : 2) Feeling down, depressed, or hopeless: Not at all (0) [] : No [de-identified] : cane [[id number]] : 0

## 2021-06-01 NOTE — ED PROVIDER NOTE - ATTENDING CONTRIBUTION TO CARE
76 male sent to ER for evaluation of shortness of breath with exertion and bilateral lower extremity edema for the past week, patient obese, (+)lower bilateral lower extremity edema, lungs clear, no signs of cellulitis, f/u labs, ekg, chest xray, to get iv lasix, call cardiology.

## 2021-06-01 NOTE — HISTORY OF PRESENT ILLNESS
[FreeTextEntry8] : DEION HEATH is a 76 year old M who presents today for an acute visit. Pt complains of SOB and leg swelling that has been going on for about a week now.

## 2021-06-01 NOTE — H&P ADULT - NSHPLABSRESULTS_GEN_ALL_CORE
15.5   10.86 )-----------( 335      ( 01 Jun 2021 14:36 )             46.4     01 Jun 2021 14:36    135    |  101    |  20     ----------------------------<  95     4.0     |  26     |  0.91     Ca    9.0        01 Jun 2021 14:36    TPro  7.4    /  Alb  3.2    /  TBili  0.6    /  DBili  x      /  AST  38     /  ALT  27     /  AlkPhos  63     01 Jun 2021 14:36    LIVER FUNCTIONS - ( 01 Jun 2021 14:36 )  Alb: 3.2 g/dL / Pro: 7.4 g/dL / ALK PHOS: 63 U/L / ALT: 27 U/L / AST: 38 U/L / GGT: x             CAPILLARY BLOOD GLUCOSE    < from: CT Angio Chest PE Protocol w/ IV Cont (06.01.21 @ 18:09) >    IMPRESSION:  Bilateral pulmonary emboli as described above.    No evidence of right heart strain.    The findings were discussed with BENITO Jaramillo on 6/1/2021 7:48 PM    < end of copied text >            CARDIAC MARKERS ( 01 Jun 2021 14:36 )  <.015 ng/mL / x     / x     / x     / x

## 2021-06-01 NOTE — ED ADULT NURSE NOTE - OBJECTIVE STATEMENT
76 year old male presents to the ED complaining of shortness of breath and bilateral leg swelling. 76 year old male presents to the ED complaining of shortness of breath and bilateral leg swelling. Patient reports one week of symptoms. Patient reports worsening bilateral lower extremity swelling, +2 pitting edema noted in ED. Patient ambulatory with a cane at baseline, even prior to swelling. Patient reports associated shortness of breath for the last week. Patient with normal spO2 in ED without oxygen supplementation. Patient reports labored breathing with exertion, comfortable at rest. Patient denies sick contacts or known COVID exposure. Patient not yet vaccinated for COVID. Patient denies fever/chills. Patient admits he saw Dr. Sims urology for results of a prostate exam; Dr. Sims saw patient's legs and referred him to the PMD. PMD Mauro saw patient today who referred him to the ED for further evaluation. Patient denies history of CHF or COPD.

## 2021-06-01 NOTE — END OF VISIT
[FreeTextEntry3] : "I, Todd Dillard, personally scribed the services dictated to me by Dr. Colt Wade MD in this documentation on 06/01/2021 "\par \par "I Dr. Colt Wade MD, personally performed the services described in this documentation on 06/01/2021 for the patient as scribed by Todd Dillard in my presence. I have reviewed and verified that all the information is accurate and true."

## 2021-06-01 NOTE — H&P ADULT - NSHPREVIEWOFSYSTEMS_GEN_ALL_CORE
Constitutional: denies fever, chills, general malaise  HEENT: denies dry mouth, sore throat, runny nose, visual changes  Respiratory: denies SOB, FAUSTIN, cough, sputum production, wheezing, hemoptysis  Cardiovascular: denies CP, palpitations. admits bilaterally lower extremity edema   Gastrointestinal: denies nausea, vomiting, diarrhea, constipation, abdominal pain, melena, hematochezia   Genitourinary: denies dysuria, frequency, urgency, incontinence, hematuria   Skin/Breast: denies rash, wound, hives, itching  Musculoskeletal: denies myalgias, muscle weakness. admits chronic back pain, admits left hip pain. admits difficulty ambulating   Neurologic: denies syncope, LOC, headache, weakness, dizziness, parasthesias, numbness  ROS negative except as noted above

## 2021-06-01 NOTE — ED ADULT NURSE REASSESSMENT NOTE - NS ED NURSE REASSESS COMMENT FT1
Plan for tele admission. Patient and son made aware. Awaiting admission assessment and orders. COVID resulted negative. Awaiting bed assignment. Safety maintained.

## 2021-06-01 NOTE — H&P ADULT - NSICDXFAMILYHX_GEN_ALL_CORE_FT
FAMILY HISTORY:  Father  Still living? No  Family history of stroke, Age at diagnosis: Age Unknown    Mother  Still living? No  Family history of kidney disease, Age at diagnosis: Age Unknown    Sibling  Still living? No  Family history of diabetes mellitus type II, Age at diagnosis: Age Unknown

## 2021-06-01 NOTE — H&P ADULT - NSHPPHYSICALEXAM_GEN_ALL_CORE
Vital Signs Last 24 Hrs  T(C): 36.8 (01 Jun 2021 14:30), Max: 36.8 (01 Jun 2021 14:30)  T(F): 98.2 (01 Jun 2021 14:30), Max: 98.2 (01 Jun 2021 14:30)  HR: 71 (01 Jun 2021 16:15) (71 - 88)  BP: 130/64 (01 Jun 2021 16:15) (124/69 - 132/76)  BP(mean): --  RR: 16 (01 Jun 2021 16:15) (16 - 18)  SpO2: 97% (01 Jun 2021 16:15) (96% - 97%)    General: Well developed, well nourished, NAD, obese   HEENT: NCAT, PERRLA, EOMI bl, moist mucous membranes   Neck: Supple, nontender, no JVD  Neurology: A&Ox3, nonfocal  Respiratory: CTA B/L, No W/R/R  CV: RRR, +S1/S2, no murmurs, rubs or gallops  Abdominal: Soft, NT, ND +BSx4  Extremities: 2+ pitting edema b/l lower extremities  Skin: warm, dry, normal color

## 2021-06-01 NOTE — H&P ADULT - NSHPSOCIALHISTORY_GEN_ALL_CORE
Lives home alone   ambulates with a cane, needs to climb stairs to get to bathroom multiple times of day. denies any falls   former smoker- quit 25 years ago   denies etoh, recreational drugs  flu vaccine up to date  does not want covid vaccine in hospital , wants to speak with PCP Mauro and get it there.

## 2021-06-01 NOTE — H&P ADULT - ASSESSMENT
77 yo m pmh myasthenia gravis, htn, hld, type 2 dm (diet controlled- believes last a1c 6), chronic back pain, club foot, presents with complaint of lower extremity swelling found to have bilateral PE     # Pulmonary Emboli  - pt presenting with new onset lower extremity edema found to have bilateral PE   - CTA : Pulmonary emboli in the right upper and lower lobe pulmonary arteries with extension into segmental branches, segmental and subsegmental branches in the right middle lobe, and left upper lobe branches to the lingula  - pt is hemodynamically stable. troponin, probnp WNL. no evidence of heart strain on imaging  - ordered for 100mg SQ lovenox in ED. continue with lovenox 110mg SQ BID   - check TTE   - PESI 88, class III intermediate risk.   - Cardiology Consulted- [facility]     #Abnormal EKG  - No prior image for comparison however EKG from 2016 read has presence of anterior fascicular block  - pt with no anginal symptoms.   - tropoinin <.15  - continue asa 81mg   - cardiology following    # Myasthenia Gravis   - continue with pyridostigmine   - continue with prednisone   - pt with minimal ambulation, is not interested in physical therapy  - of note pt complaining of left hip pain, previously evaluated. will order lidoderm patch- should not be contraindicated given limited systemic absorption however if any worsening weakness noted will d/c     #Hypertension  - Bp wnl.   - continue lisinopril, hctz     #HLD  - per cardio intolerant to statins   - continue praluent upon d/c     # DM type 2 , not on medications  - diet controlled  - f/u am a1c. per pt recent alc 6  - glucose on presentation WNL  - monitor accucheks  - consistent carb diet     # GERD  - continue pantoprazole, interchange for home omeprazole     # CT findings:  - Calcified granulomas in the liver and spleen. Fatty infiltration of the pancreas. Nonobstructive calculus in the lower pole of the left kidney measuring 6 mm. Atherosclerotic changes including the superior mesenteric artery and renal arteries.   - no emergent interventions. monitor. f/u outpatient.     #Prophylactic measure  - DVT ppx - on full a/c for PE.

## 2021-06-01 NOTE — REVIEW OF SYSTEMS
[Lower Ext Edema] : lower extremity edema [Shortness Of Breath] : shortness of breath [Dyspnea on Exertion] : dyspnea on exertion [Negative] : Heme/Lymph

## 2021-06-01 NOTE — ED PROVIDER NOTE - OBJECTIVE STATEMENT
75 yo M PMHx HTN, DM, myasthenia gravis presents to ED c/o shortness of breath and painless bilateral lower extremity edema x 1 week, progressively worsening. Pt reports shorntess of breath at rest, worse with exertion, states he sleeps in lounge chair at night. Pt sent in by PCP. Pt denies chest pain, back pain, leg pain, fever/chills, recent travel, N/V.  PCP- Mauro Woods

## 2021-06-01 NOTE — H&P ADULT - HISTORY OF PRESENT ILLNESS
75 yo m pmh myasthenia gravis, htn, hld, type 2 dm (diet controlled- believes last a1c 6), chronic back pain, club foot, presents with complaint of lower extremity swelling. pt states approx 2 weeks ago he had a prostate biopsy with Dr. Sims. He was due to follow up for results and when he saw Dr. Sims he presented with new lower extremity edema. Dr. Sims referred pt back to Dr. Wade PCP. PCP noted pt had new lower extremity swelling and appeared short of breath. Pts son at bedside states pt also  has seemed much more short of breath recently however pt denies. PCP sent pt to ED for further evaluation.   Pt states swelling began 1 week ago, he has never had similar swelling. He does admit to being sedentary but states this is unchanged from his baseline as he has myasthenia gravis and chronic back pain. he only ambulates to get to and from the bathroom which he does frequently due to his hctz. denies fall/trauma but admits to left hip pain which was evaluated and he was told there was no pathology found.  Pt denies chest pain, palpitations, syncopal episodes, lightheadedness dizziness, shortness of breath, wheezing or cough. Denies previous blood clots.   of note per patient prostate biopsy was negative for malignancy.     In the ED CBC, Ddimer, CMP, troponin, probnp, EKG, CTA, COVID pcr were performed Significant for wbc 10.86, ddimer 1130, albumin 3.2, troponin and probnp wnl. EKG Sinus rhythm 73bpm Left anterior fasicular block. CTA revealed Pulmonary emboli in the right upper and lower lobe pulmonary arteries with extension into segmental branches, segmental and subsegmental branches in the right middle lobe, and left upper lobe branches to the lingula. No evidence of heart strain. Pt was given lasix 40 IVP x 1.

## 2021-06-01 NOTE — ED ADULT NURSE NOTE - NSIMPLEMENTINTERV_GEN_ALL_ED
Implemented All Universal Safety Interventions:  [place] to call system. Call bell, personal items and telephone within reach. Instruct patient to call for assistance. Room bathroom lighting operational. Non-slip footwear when patient is off stretcher. Physically safe environment: no spills, clutter or unnecessary equipment. Stretcher in lowest position, wheels locked, appropriate side rails in place.

## 2021-06-01 NOTE — CONSULT NOTE ADULT - ASSESSMENT
DOCUMENTATION IN PROGRESS     75-year-old male with a history of hypertension, dyslipidemia (intolerant of statin drugs, and is now on Praluent), obesity, myasthenia gravis, diet-controlled diabetes, chronic lower extremity swelling who presents to ED c/o progressively worsening shortness of breath and painless bilateral lower extremity edema x 1 week. Cardiology consulted for CHF. He follows Dr Woods for cardiology.     Diastolic CHF   - Patient p/w FAUSTIN and BLE edema   - Echo: 1/31/2017, Stage 1 diastolic dysfunction., normal LV function, normal LA size, mild mitral regurgitation   - Patient reports no shortness of breath at rest, but SOB worse with exertion. He sleeps in lounge chair at night for long time.     - He does have chr LE edema but reports much worsening.   - Serum Pro-Brain Natriuretic Peptide: 158 pg/mL (06-01-21 @ 14:36)  - Stress Test: 9/2014, no Ischemia, Vasodilator nuclear perfusion stress test was negative for ischemia. There was normal left ventricular systolic function and normal left ventricular myocardial perfusion. Post stress EF 61%.   - He takes HCTZ 12.5 at home, reports no reduction in urine output, rather he gets up night to void.    - CXR showed small right mid lung field granuloma   - Would diurese patient with Lasix 40 mg IV x 1.   - Please obtain transthoracic echocardiogram to assess ventricular function and r/o valvular abnormalities     Hypertension  - BP: 132/76 (06-01-21 @ 14:30) (124/69 - 132/76)  - Continue Lisinopril 20  - Monitor routine hemodynamics     Hyperlipidemia  - Patient intolerant to statins   - Continue Praluent     - Other cardiovascular workup will depend on clinical course.  - Will continue to follow if admitted     Mikayla Pink, MS FNP, [facility]  Nurse Practitioner- Cardiology   Spectra #[phone] DOCUMENTATION IN PROGRESS     75-year-old male with a history of hypertension, dyslipidemia (intolerant of statin drugs, and is now on Praluent), obesity, myasthenia gravis, diet-controlled diabetes, chronic lower extremity swelling who presents to ED c/o progressively worsening shortness of breath and painless bilateral lower extremity edema x 1 week. Cardiology consulted for CHF. He follows Dr Woods for cardiology.     Diastolic CHF   - Patient p/w FAUSTIN and BLE edema   - Echo: 1/31/2017, Stage 1 diastolic dysfunction., normal LV function, normal LA size, mild mitral regurgitation   - Patient reports no shortness of breath at rest, but SOB worse with exertion. He sleeps in lounge chair at night for long time.     - He does have chr LE edema but reports much worsening.   - Serum Pro-Brain Natriuretic Peptide: 158 pg/mL (06-01-21 @ 14:36)  - EKG showed SR, LAFB, similar to olf EKG. No ischemic changes noted.   - Stress Test: 9/2014, no Ischemia, Vasodilator nuclear perfusion stress test was negative for ischemia. There was normal left ventricular systolic function and normal left ventricular myocardial perfusion. Post stress EF 61%.   - He takes HCTZ 12.5 at home, reports no reduction in urine output, rather he gets up night to void.    - CXR showed small right mid lung field granuloma   - Would diurese patient with Lasix 40 mg IV x 1.   - Please obtain transthoracic echocardiogram to assess ventricular function and r/o valvular abnormalities     Hypertension  - BP: 132/76 (06-01-21 @ 14:30) (124/69 - 132/76)  - Continue Lisinopril 20  - Monitor routine hemodynamics     Hyperlipidemia  - Patient intolerant to statins   - Continue Praluent     - Other cardiovascular workup will depend on clinical course.  - Will continue to follow if admitted     Mikayla Pink, MS FNP, [facility]  Nurse Practitioner- Cardiology   Spectra #[phone] 75-year-old male with a history of hypertension, dyslipidemia (intolerant of statin drugs, and is now on Praluent), obesity, myasthenia gravis, diet-controlled diabetes, chronic lower extremity swelling who presents to ED c/o progressively worsening shortness of breath and painless bilateral lower extremity edema x 1 week. Cardiology consulted for CHF. He follows Dr Woods for cardiology.     Diastolic CHF   - Patient p/w FAUSTIN and BLE edema worseing x 1 week. Does not notice much FAUSTIN as he is not active baseline, He sleeps in lounge chair at night for almost 10 years. he reports no chnage in medications or diet.   - Echo: 1/31/2017, Stage 1 diastolic dysfunction., normal LV function, normal LA size, mild mitral regurgitation   - Serum Pro-Brain Natriuretic Peptide: 158 pg/mL (06-01-21 @ 14:36)  - EKG showed SR, LAFB, similar to olf EKG. No ischemic changes noted.   - CXR showed small right mid lung field granuloma. No pulmonary edema noted.   - Stress Test: 9/2014, no Ischemia, Vasodilator nuclear perfusion stress test was negative for ischemia. There was normal left ventricular systolic function and normal left ventricular myocardial perfusion. Post stress EF 61%.   - He takes HCTZ 12.5 at home, reports no reduction in urine output, rather he gets up night to void.    - Would diurese patient with Lasix 40 mg IV x 1.   - Re-assess the patient after diuresis. If improvement in edema and patient condition, he can be discharged home with lasix 40 mg x 1 week and then f/u with Dr Woods within 1 week.   - Patient may need repeat transthoracic echocardiogram to assess ventricular function and r/o valvular abnormalities. But can be done outpatient.     Hypertension  - BP: 132/76 (06-01-21 @ 14:30) (124/69 - 132/76)  - Continue Lisinopril 20/HCTZ 12.5   - Monitor routine hemodynamics     Hyperlipidemia  - Patient intolerant to statins   - Continue Praluent     - Other cardiovascular workup will depend on clinical course.  - Will continue to follow if admitted     MS FABIO DickensP, [facility]  Nurse Practitioner- Cardiology   Spectra #[phone] 75-year-old male with a history of hypertension, dyslipidemia (intolerant of statin drugs, and is now on Praluent), obesity, myasthenia gravis, diet-controlled diabetes, chronic lower extremity swelling who presents to ED c/o progressively worsening shortness of breath and painless bilateral lower extremity edema x 1 week. Cardiology consulted for CHF. He follows Dr Woods for cardiology.     Diastolic CHF   - Patient p/w FAUSTIN and BLE edema worseing x 1 week. Does not notice much FAUSTIN as he is not active baseline, He sleeps in lounge chair at night for almost 10 years. he reports no chnage in medications or diet.   - Echo: 1/31/2017, Stage 1 diastolic dysfunction., normal LV function, normal LA size, mild mitral regurgitation   - Serum Pro-Brain Natriuretic Peptide: 158 pg/mL (06-01-21 @ 14:36)  - EKG showed SR, LAFB, similar to old EKG. No ischemic changes noted.   - CXR showed small right mid lung field granuloma. No pulmonary edema noted.   - Stress Test: 9/2014, no Ischemia, Vasodilator nuclear perfusion stress test was negative for ischemia. There was normal left ventricular systolic function and normal left ventricular myocardial perfusion. Post stress EF 61%.   - He takes HCTZ 12.5 at home, reports no reduction in urine output, rather he gets up night to void.    - Would diurese patient with Lasix 40 mg IV x 1.   - Re-assess the patient after diuresis. If improvement in edema and patient condition, he can be discharged home with lasix 40 mg x 1 week and then f/u with Dr Woods within 1 week.   - check d dimer and lower ext dopplers.   - Patient may need repeat transthoracic echocardiogram to assess ventricular function and r/o valvular abnormalities. But can be done outpatient.     Hypertension  - BP: 132/76 (06-01-21 @ 14:30) (124/69 - 132/76)  - Continue Lisinopril 20/HCTZ 12.5   - Monitor routine hemodynamics     Hyperlipidemia  - Patient intolerant to statins   - Continue Praluent     - Other cardiovascular workup will depend on clinical course.  - Will continue to follow if admitted     Mikayla Pink, MS FNP, [facility]  Nurse Practitioner- Cardiology   Spectra #[phone]

## 2021-06-01 NOTE — ED PROVIDER NOTE - CLINICAL SUMMARY MEDICAL DECISION MAKING FREE TEXT BOX
75 yo M HTN DM p/w bl le edema and dyspnea-- ro new onset CHF -- plan for labs bnp trop, CXR, cards consult, ?lasix

## 2021-06-01 NOTE — CONSULT NOTE ADULT - SUBJECTIVE AND OBJECTIVE BOX
[facility] - Manuel Carter Grossman, [place], Peggy, Evan, Jovana Thibodeaux  Office Number: [phone]    Initial Consult Note  CHIEF COMPLAINT: Patient is a 76y old  Male who presents with a chief complaint of SOB  HPI: 75-year-old male with a history of hypertension, dyslipidemia, obesity, myasthenia gravis, diet-controlled diabetes, chronic lower extremity swelling who presents to ED c/o progressively worsening shortness of breath and painless bilateral lower extremity edema x 1 week. Pt reports shortness of breath at rest, worse with exertion, states he sleeps in lounge chair at night. Pt sent in by PCP. Pt denies chest pain, back pain, leg pain, fever/chills, recent travel, N/V.     He is intolerant of statin drugs, and is now on Praluent. He recently underwent prostate biopsy.     In ED, T(F): 98.2, HR: 76, BP: 132/76, RR: 18, SpO2: 97%. Labs remarkable for Trop I <.015.     Cardiology Summary  EK2021, NSR. LAFB. Nonspecific ST and T wave changes. Poor R wave progression   Stress Test: 2014, no Ischemia, Vasodilator nuclear perfusion stress test was negative for ischemia. There was normal left ventricular systolic function and normal left ventricular myocardial perfusion. Post stress EF 61%.   Echo: 2017, Stage 1 diastolic dysfunction., normal LV function, normal LA size, mild mitral regurgitation     Current Meds  · Aspirin EC 81 MG Oral Tablet Delayed Release; 1 tablet qod  · Lisinopril-hydroCHLOROthiazide 20-12.5 MG Oral Tablet; TAKE 1 TABLET BY MOUTH  DAILY  · Omeprazole 40 MG Oral Capsule Delayed Release; TAKE 1 CAPSULE BY MOUTH  DAILY  · Praluent 75 MG/ML Subcutaneous Solution Auto-injector; INJECT 75MG SUBCUTANEOUSLY EVERY 2 WEEKS  · PredniSONE 5 MG Oral Tablet; TAKE 1 AND 1/2 TABLETS QOD as per DIRECTIONS  · Pyridostigmine Bromide 60 MG Oral Tablet; TAKE 1 TABLET TWICE DAILY  Allergies  No Known Allergies    Intolerances  Avelox (Other)  fluoroquinolone antibiotics (Other)  Ketek (Other)  telithromycin (Other)    PAST MEDICAL & SURGICAL HISTORY:  Calculus of kidney    Club foot  Born Right Foot    Myasthenia gravis    Hypertension    Hypertension    Diabetes  Type 2 - does not take medications - monitors Blood Glucose at home - diet controlled    Urinary tract infection  notes h/o UTI&#x27;s    Elective surgery  4 age 11 @ [facility] - cut under Patella secondary 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    Club foot  Surgery at birth for Club Foot Right foot    Pilonidal cyst  Surgery 40 years ago    H/O colonoscopy    Spinal stenosis    MEDICATIONS  (STANDING):    MEDICATIONS  (PRN):    FAMILY HISTORY:  Family history of stroke (Father)  Father -  age 62    Family history of kidney disease (Mother)  Mother -  age 67    Family history of diabetes mellitus type II (Sibling)  Brother &amp; Sister    SOCIAL HISTORY  Marital Status:   Occupation:   Lives with:     SUBSTANCE USE  Tobacco Usage:  ( ) None ( x) never smoked   ( ) former smoker  ( ) current smoker; Packs per day:   Alcohol Usage: ( ) none  ( ) occasional ( ) 2-3 times a week ( ) daily; Last drink:   Recreational drugs ( ) None    REVIEW OF SYSTEMS   CONSTITUTIONAL: No fevers, No chills, No fatigue, No weight gain  EYES: No vision changes, No vertigo, No throat pain   ENT: No congestion, No ear pain, No sore throat.  NECK: No pain, No stiffness  RESPIRATORY: No shortness of breath, No cough, No wheezing, No hemoptysis  CARDIOVASCULAR: No chest pain. No palpitations, No FAUSTIN, No orthopnea, No PND, No pleuritic pain  GASTROINTESTINAL: No abdominal pain, No nausea, No vomiting, No hematemesis, No diarrhea No constipation. No melena  GENITOURINARY: No dysuria, No frequency, No incontinence, No hematuria  NEUROLOGICAL: No dizziness, No lightheadedness, No syncope, No LOC, No headache, No numbness, No weakness  MUSCULOSKELETAL: No joint pain, No joint swelling.  PSYCHIATRIC: No anxiety, No depression  DERMATOLOGY: No diaphoresis. No itching, No rashes, No pressure ulcers  HEME/LYMPH: No easy bruising, or bleeding gums  All other review of systems is negative unless indicated above.    VITAL SIGNS:   Vital Signs Last 24 Hrs  T(C): 36.8 (2021 14:30), Max: 36.8 (2021 14:30)  T(F): 98.2 (2021 14:30), Max: 98.2 (2021 14:30)  HR: 76 (2021 14:30) (76 - 88)  BP: 132/76 (2021 14:30) (124/69 - 132/76)  BP(mean): --  RR: 18 (2021 14:30) (18 - 18)  SpO2: 97% (2021 14:30) (96% - 97%)    Physical Exam:  Appearance: NAD, no distress, alert,   HEENT: Moist Mucous Membranes, Anicteric  Cardiovascular: Regular rate and rhythm, Normal S1 S2, No JVD, No murmurs, No rubs, gallops or clicks  Respiratory: Lungs clear to auscultation. No rales, No rhonchi, No wheezing. No tenderness to palpation  Gastrointestinal:  Soft, Non-tender, + BS  Neurologic: Non-focal  Skin: Warm and dry, No rashes, No ecchymosis, No cyanosis, No ulcers   Musculoskeletal: No clubbing, No cyanosis  Vascular: Peripheral pulses palpable 2+ bilaterally  Psychiatry: Mood & affect appropriate  Lymph: No peripheral edema.     I&O's Summary    LABS: All Labs Reviewed:                        15.5   10.86 )-----------( 335      ( 2021 14:36 )             46.4     2021 14:36    135    |  101    |  20     ----------------------------<  95     4.0     |  26     |  0.91     Ca    9.0        2021 14:36    TPro  7.4    /  Alb  3.2    /  TBili  0.6    /  DBili  x      /  AST  38     /  ALT  27     /  AlkPhos  63     2021 14:36  Troponin I, Serum: <.015 ng/mL (21 @ 14:36)   [facility] - Manuel Carter, Janel Calloway, Peggy, Evan, Jovana Thibodeaux  Office Number: [phone]    Initial Consult Note  CHIEF COMPLAINT: Patient is a 76y old  Male who presents with a chief complaint of SOB  HPI: 75-year-old male with a history of hypertension, dyslipidemia (intolerant of statin drugs, and is now on Praluent), obesity, myasthenia gravis, diet-controlled diabetes, chronic lower extremity swelling who presents to ED c/o progressively worsening shortness of breath and painless bilateral lower extremity edema x 1 week. Pt reports no shortness of breath at rest, but SOB worse with exertion. He sleeps in lounge chair at night for long time.  He recently underwent prostate biopsy, saw urologist today who noticed LE edema, sent him to PMD who then sent patient to ED.     In ED, T(F): 98.2, HR: 76, BP: 132/76, RR: 18, SpO2: 97%. Labs remarkable for Trop I <.015. Denies fever, chills, chest pain, palpitation, dizziness, lightheadedness. He follows Dr Woods for cardiology.     Cardiology Summary  EK2021, NSR. LAFB. Nonspecific ST and T wave changes. Poor R wave progression   Stress Test: 2014, no Ischemia, Vasodilator nuclear perfusion stress test was negative for ischemia. There was normal left ventricular systolic function and normal left ventricular myocardial perfusion. Post stress EF 61%.   Echo: 2017, Stage 1 diastolic dysfunction., normal LV function, normal LA size, mild mitral regurgitation     Current Meds  · Aspirin EC 81 MG Oral Tablet Delayed Release; 1 tablet qod  · Lisinopril-hydroCHLOROthiazide 20-12.5 MG Oral Tablet; TAKE 1 TABLET BY MOUTH  DAILY  · Omeprazole 40 MG Oral Capsule Delayed Release; TAKE 1 CAPSULE BY MOUTH  DAILY  · Praluent 75 MG/ML Subcutaneous Solution Auto-injector; INJECT 75MG SUBCUTANEOUSLY EVERY 2 WEEKS  · PredniSONE 5 MG Oral Tablet; TAKE 1 AND 1/2 TABLETS QOD as per DIRECTIONS  · Pyridostigmine Bromide 60 MG Oral Tablet; TAKE 1 TABLET TWICE DAILY  Allergies  No Known Allergies    Intolerances  Avelox (Other)  fluoroquinolone antibiotics (Other)  Ketek (Other)  telithromycin (Other)    PAST MEDICAL & SURGICAL HISTORY:  Calculus of kidney    Club foot  Born Right Foot    Myasthenia gravis    Hypertension    Hypertension    Diabetes  Type 2 - does not take medications - monitors Blood Glucose at home - diet controlled    Urinary tract infection  notes h/o UTI&#x27;s    Elective surgery  4 age 11 @ [facility] - cut under Patella secondary 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    Club foot  Surgery at birth for Club Foot Right foot    Pilonidal cyst  Surgery 40 years ago    H/O colonoscopy    Spinal stenosis    MEDICATIONS  (STANDING):    MEDICATIONS  (PRN):    FAMILY HISTORY:  Family history of stroke (Father)  Father -  age 62    Family history of kidney disease (Mother)  Mother -  age 67    Family history of diabetes mellitus type II (Sibling)  Brother &amp; Sister    SOCIAL HISTORY  Marital Status:   Occupation:   Lives with:     SUBSTANCE USE  Tobacco Usage:  ( ) None ( ) never smoked   (x ) former smoker  ( ) current smoker; Packs per day:   Alcohol Usage: ( x) none  ( ) occasional ( ) 2-3 times a week ( ) daily; Last drink:   Recreational drugs ( ) None    REVIEW OF SYSTEMS   CONSTITUTIONAL: No fevers, No chills, No fatigue, No weight gain  EYES: No vision changes, No vertigo, No throat pain   ENT: No congestion, No ear pain, No sore throat.  NECK: No pain, No stiffness  RESPIRATORY: No shortness of breath, No cough, No wheezing, No hemoptysis  CARDIOVASCULAR: No chest pain. No palpitations, No FAUSTIN, No orthopnea, No PND, No pleuritic pain  GASTROINTESTINAL: No abdominal pain, No nausea, No vomiting, No hematemesis, No diarrhea No constipation. No melena  GENITOURINARY: No dysuria, No frequency, No incontinence, No hematuria  NEUROLOGICAL: No dizziness, No lightheadedness, No syncope, No LOC, No headache, No numbness, No weakness  MUSCULOSKELETAL: No joint pain, No joint swelling.  PSYCHIATRIC: No anxiety, No depression  DERMATOLOGY: No diaphoresis. No itching, No rashes, No pressure ulcers  HEME/LYMPH: No easy bruising, or bleeding gums  All other review of systems is negative unless indicated above.    VITAL SIGNS:   Vital Signs Last 24 Hrs  T(C): 36.8 (2021 14:30), Max: 36.8 (2021 14:30)  T(F): 98.2 (2021 14:30), Max: 98.2 (2021 14:30)  HR: 76 (2021 14:30) (76 - 88)  BP: 132/76 (2021 14:30) (124/69 - 132/76)  BP(mean): --  RR: 18 (2021 14:30) (18 - 18)  SpO2: 97% (2021 14:30) (96% - 97%)    Physical Exam:  Appearance: NAD, no distress, alert, Obese   HEENT: Moist Mucous Membranes, Anicteric  Cardiovascular: Regular rate and rhythm, Normal S1 S2, No JVD, No murmurs, No rubs, gallops or clicks  Respiratory: Lungs clear to auscultation. No rales, No rhonchi, No wheezing. No tenderness to palpation  Gastrointestinal:  Soft, Non-tender, + BS  Neurologic: Non-focal  Skin: Warm and dry, No rashes, No ecchymosis, No cyanosis, No ulcers   Musculoskeletal: No clubbing, No cyanosis  Vascular: Peripheral pulses palpable 2+ bilaterally  Psychiatry: Mood & affect appropriate  Lymph: +2 Bilateral LE Edema     I&O's Summary    LABS: All Labs Reviewed:                        15.5   10.86 )-----------( 335      ( 2021 14:36 )             46.4     2021 14:36    135    |  101    |  20     ----------------------------<  95     4.0     |  26     |  0.91     Ca    9.0        2021 14:36    TPro  7.4    /  Alb  3.2    /  TBili  0.6    /  DBili  x      /  AST  38     /  ALT  27     /  AlkPhos  63     2021 14:36  Troponin I, Serum: <.015 ng/mL (21 @ 14:36)   [facility] - Manuel Carter, Janel Calloway, Peggy, Evan, Jovana Thibodeaux  Office Number: [phone]    Initial Consult Note  CHIEF COMPLAINT: Patient is a 76y old  Male who presents with a chief complaint of SOB  HPI: 75-year-old male with a history of hypertension, dyslipidemia (intolerant of statin drugs, and is now on Praluent), obesity, myasthenia gravis, diet-controlled diabetes, chronic lower extremity swelling who presents to ED c/o progressively worsening shortness of breath and painless bilateral lower extremity edema x 1 week. Pt reports no shortness of breath at rest, but SOB worse with exertion. He sleeps in lounge chair at night for long time.  He recently underwent prostate biopsy, saw urologist today who noticed LE edema, sent him to PMD who then sent patient to ED.     In ED, T(F): 98.2, HR: 76, BP: 132/76, RR: 18, SpO2: 97%. Labs remarkable for Trop I <.015. Denies fever, chills, chest pain, palpitation, dizziness, lightheadedness. He follows Dr Woods for cardiology.     Cardiology Summary  EK2021, NSR. LAFB. Nonspecific ST and T wave changes. Poor R wave progression   Stress Test: 2014, no Ischemia, Vasodilator nuclear perfusion stress test was negative for ischemia. There was normal left ventricular systolic function and normal left ventricular myocardial perfusion. Post stress EF 61%.   Echo: 2017, Stage 1 diastolic dysfunction., normal LV function, normal LA size, mild mitral regurgitation     Current Meds  · Aspirin EC 81 MG Oral Tablet Delayed Release; 1 tablet qod  · Lisinopril-hydroCHLOROthiazide 20-12.5 MG Oral Tablet; TAKE 1 TABLET BY MOUTH  DAILY  · Omeprazole 40 MG Oral Capsule Delayed Release; TAKE 1 CAPSULE BY MOUTH  DAILY  · Praluent 75 MG/ML Subcutaneous Solution Auto-injector; INJECT 75MG SUBCUTANEOUSLY EVERY 2 WEEKS  · PredniSONE 5 MG Oral Tablet; TAKE 1 AND 1/2 TABLETS QOD as per DIRECTIONS  · Pyridostigmine Bromide 60 MG Oral Tablet; TAKE 1 TABLET TWICE DAILY  Allergies  No Known Allergies    Intolerances  Avelox (Other)  fluoroquinolone antibiotics (Other)  Ketek (Other)  telithromycin (Other)    PAST MEDICAL & SURGICAL HISTORY:  Calculus of kidney    Club foot  Born Right Foot    Myasthenia gravis    Hypertension    Hypertension    Diabetes  Type 2 - does not take medications - monitors Blood Glucose at home - diet controlled    Urinary tract infection  notes h/o UTI&#x27;s    Elective surgery  4 age 11 @ [facility] - cut under Patella secondary 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    Club foot  Surgery at birth for Club Foot Right foot    Pilonidal cyst  Surgery 40 years ago    H/O colonoscopy    Spinal stenosis    MEDICATIONS  (STANDING):    MEDICATIONS  (PRN):    FAMILY HISTORY:  Family history of stroke (Father)  Father -  age 62    Family history of kidney disease (Mother)  Mother -  age 67    Family history of diabetes mellitus type II (Sibling)  Brother &amp; Sister    SOCIAL HISTORY  Marital Status:   Occupation:   Lives with:     SUBSTANCE USE  Tobacco Usage:  ( ) None ( ) never smoked   (x ) former smoker  ( ) current smoker; Packs per day:   Alcohol Usage: ( x) none  ( ) occasional ( ) 2-3 times a week ( ) daily; Last drink:   Recreational drugs ( ) None    REVIEW OF SYSTEMS   CONSTITUTIONAL: No fevers, No chills, No fatigue, No weight gain  EYES: No vision changes, No vertigo, No throat pain   ENT: No congestion, No ear pain, No sore throat.  NECK: No pain, No stiffness  RESPIRATORY: No shortness of breath, No cough, No wheezing, No hemoptysis  CARDIOVASCULAR: No chest pain. No palpitations, No FAUSTIN, No orthopnea, No PND, No pleuritic pain  GASTROINTESTINAL: No abdominal pain, No nausea, No vomiting, No hematemesis, No diarrhea No constipation. No melena  GENITOURINARY: No dysuria, No frequency, No incontinence, No hematuria  NEUROLOGICAL: No dizziness, No lightheadedness, No syncope, No LOC, No headache, No numbness, No weakness  MUSCULOSKELETAL: No joint pain, No joint swelling.  PSYCHIATRIC: No anxiety, No depression  DERMATOLOGY: No diaphoresis. No itching, No rashes, No pressure ulcers  HEME/LYMPH: No easy bruising, or bleeding gums  All other review of systems is negative unless indicated above.    VITAL SIGNS:   Vital Signs Last 24 Hrs  T(C): 36.8 (2021 14:30), Max: 36.8 (2021 14:30)  T(F): 98.2 (2021 14:30), Max: 98.2 (2021 14:30)  HR: 76 (2021 14:30) (76 - 88)  BP: 132/76 (2021 14:30) (124/69 - 132/76)  BP(mean): --  RR: 18 (2021 14:30) (18 - 18)  SpO2: 97% (2021 14:30) (96% - 97%)    Physical Exam:  Appearance: NAD, no distress, alert, Obese   HEENT: Moist Mucous Membranes, Anicteric  Cardiovascular: Regular rate and rhythm, Normal S1 S2, No JVD, No murmurs, No rubs, gallops or clicks  Respiratory: Lungs clear to auscultation. No rales, No rhonchi, No wheezing. No tenderness to palpation  Gastrointestinal:  Soft, Non-tender, + BS  Neurologic: Non-focal  Skin: Warm and dry, No rashes, No ecchymosis, No cyanosis, No ulcers   Musculoskeletal: No clubbing, No cyanosis  Vascular: Peripheral pulses palpable 2+ bilaterally  Psychiatry: Mood & affect appropriate  Lymph: +2-3 Bilateral LE Edema     I&O's Summary    LABS: All Labs Reviewed:                        15.5   10.86 )-----------( 335      ( 2021 14:36 )             46.4     2021 14:36    135    |  101    |  20     ----------------------------<  95     4.0     |  26     |  0.91     Ca    9.0        2021 14:36    TPro  7.4    /  Alb  3.2    /  TBili  0.6    /  DBili  x      /  AST  38     /  ALT  27     /  AlkPhos  63     2021 14:36  Troponin I, Serum: <.015 ng/mL (21 @ 14:36)

## 2021-06-01 NOTE — PHYSICAL EXAM
[No Acute Distress] : no acute distress [Well Nourished] : well nourished [Well Developed] : well developed [Well-Appearing] : well-appearing [Normal Voice/Communication] : normal voice/communication [Normal Sclera/Conjunctiva] : normal sclera/conjunctiva [PERRL] : pupils equal round and reactive to light [EOMI] : extraocular movements intact [Normal Outer Ear/Nose] : the outer ears and nose were normal in appearance [No JVD] : no jugular venous distention [No Lymphadenopathy] : no lymphadenopathy [Supple] : supple [Thyroid Normal, No Nodules] : the thyroid was normal and there were no nodules present [No Respiratory Distress] : no respiratory distress  [No Accessory Muscle Use] : no accessory muscle use [Clear to Auscultation] : lungs were clear to auscultation bilaterally [Normal Rate] : normal rate  [Regular Rhythm] : with a regular rhythm [Normal S1, S2] : normal S1 and S2 [No Murmur] : no murmur heard [No Carotid Bruits] : no carotid bruits [No Abdominal Bruit] : a ~M bruit was not heard ~T in the abdomen [No Varicosities] : no varicosities [Pedal Pulses Present] : the pedal pulses are present [No Palpable Aorta] : no palpable aorta [No Extremity Clubbing/Cyanosis] : no extremity clubbing/cyanosis [___ +] : bilateral [unfilled]U+ pitting edema to the knees [Soft] : abdomen soft [Non Tender] : non-tender [Non-distended] : non-distended [No Masses] : no abdominal mass palpated [No HSM] : no HSM [Normal Bowel Sounds] : normal bowel sounds [Normal Supraclavicular Nodes] : no supraclavicular lymphadenopathy [Normal Posterior Cervical Nodes] : no posterior cervical lymphadenopathy [Normal Anterior Cervical Nodes] : no anterior cervical lymphadenopathy [No CVA Tenderness] : no CVA  tenderness [No Spinal Tenderness] : no spinal tenderness [No Joint Swelling] : no joint swelling [Grossly Normal Strength/Tone] : grossly normal strength/tone [No Rash] : no rash [Coordination Grossly Intact] : coordination grossly intact [No Focal Deficits] : no focal deficits [Deep Tendon Reflexes (DTR)] : deep tendon reflexes were 2+ and symmetric [Speech Grossly Normal] : speech grossly normal [Memory Grossly Normal] : memory grossly normal [Normal Affect] : the affect was normal [Alert and Oriented x3] : oriented to person, place, and time [Normal Mood] : the mood was normal [Normal Insight/Judgement] : insight and judgment were intact [de-identified] : bilateral leg edema

## 2021-06-01 NOTE — CONSULT NOTE ADULT - ATTENDING COMMENTS
I saw and examined the patient personally. Spoke with above provider regarding this case. I reviewed the above findings completely.  I agree with the above history, physical, and plan which I have edited where appropriate.     pt with worsening lower ext edema x 1 week. he denies worsening ugalde but is noted to have more dyspnea. Check lower ext venous duplex/ddimer.   trial of IV lasix to see if improves. No signs of significant ischemia. Further cardiac workup will depend on clinical course.     Addendum  Noted to have DDIMER. ctpa with PEs. will need echo to assess for right heart strain. would with diuresis w lasix  will need full dose AC. monitor tele. Further cardiac workup will depend on clinical course.

## 2021-06-01 NOTE — H&P ADULT - NSICDXPASTMEDICALHX_GEN_ALL_CORE_FT
PAST MEDICAL HISTORY:  Calculus of kidney     Club foot Born Right Foot    Diabetes Type 2 - does not take medications - monitors Blood Glucose at home - diet controlled    Hyperlipidemia     Hypertension     Myasthenia gravis     Urinary tract infection notes h/o UTI's

## 2021-06-02 LAB
A1C WITH ESTIMATED AVERAGE GLUCOSE RESULT: 5.9 % — HIGH (ref 4–5.6)
ALBUMIN SERPL ELPH-MCNC: 3.1 G/DL — LOW (ref 3.3–5)
ALP SERPL-CCNC: 58 U/L — SIGNIFICANT CHANGE UP (ref 40–120)
ALT FLD-CCNC: 20 U/L — SIGNIFICANT CHANGE UP (ref 12–78)
ANION GAP SERPL CALC-SCNC: 8 MMOL/L — SIGNIFICANT CHANGE UP (ref 5–17)
AST SERPL-CCNC: 23 U/L — SIGNIFICANT CHANGE UP (ref 15–37)
BILIRUB SERPL-MCNC: 0.6 MG/DL — SIGNIFICANT CHANGE UP (ref 0.2–1.2)
BUN SERPL-MCNC: 21 MG/DL — SIGNIFICANT CHANGE UP (ref 7–23)
CALCIUM SERPL-MCNC: 9.2 MG/DL — SIGNIFICANT CHANGE UP (ref 8.5–10.1)
CHLORIDE SERPL-SCNC: 100 MMOL/L — SIGNIFICANT CHANGE UP (ref 96–108)
CO2 SERPL-SCNC: 30 MMOL/L — SIGNIFICANT CHANGE UP (ref 22–31)
COVID-19 SPIKE DOMAIN AB INTERP: NEGATIVE — SIGNIFICANT CHANGE UP
COVID-19 SPIKE DOMAIN ANTIBODY RESULT: 0.4 U/ML — SIGNIFICANT CHANGE UP
CREAT SERPL-MCNC: 0.93 MG/DL — SIGNIFICANT CHANGE UP (ref 0.5–1.3)
ESTIMATED AVERAGE GLUCOSE: 123 MG/DL — HIGH (ref 68–114)
GLUCOSE SERPL-MCNC: 80 MG/DL — SIGNIFICANT CHANGE UP (ref 70–99)
HCT VFR BLD CALC: 46.5 % — SIGNIFICANT CHANGE UP (ref 39–50)
HCYS SERPL-MCNC: 15.7 UMOL/L — HIGH
HGB BLD-MCNC: 15.2 G/DL — SIGNIFICANT CHANGE UP (ref 13–17)
MCHC RBC-ENTMCNC: 29.7 PG — SIGNIFICANT CHANGE UP (ref 27–34)
MCHC RBC-ENTMCNC: 32.7 GM/DL — SIGNIFICANT CHANGE UP (ref 32–36)
MCV RBC AUTO: 90.8 FL — SIGNIFICANT CHANGE UP (ref 80–100)
NRBC # BLD: 0 /100 WBCS — SIGNIFICANT CHANGE UP (ref 0–0)
PLATELET # BLD AUTO: 333 K/UL — SIGNIFICANT CHANGE UP (ref 150–400)
POTASSIUM SERPL-MCNC: 3.9 MMOL/L — SIGNIFICANT CHANGE UP (ref 3.5–5.3)
POTASSIUM SERPL-SCNC: 3.9 MMOL/L — SIGNIFICANT CHANGE UP (ref 3.5–5.3)
PROT SERPL-MCNC: 6.7 G/DL — SIGNIFICANT CHANGE UP (ref 6–8.3)
RBC # BLD: 5.12 M/UL — SIGNIFICANT CHANGE UP (ref 4.2–5.8)
RBC # FLD: 14.8 % — HIGH (ref 10.3–14.5)
SARS-COV-2 IGG+IGM SERPL QL IA: 0.4 U/ML — SIGNIFICANT CHANGE UP
SARS-COV-2 IGG+IGM SERPL QL IA: NEGATIVE — SIGNIFICANT CHANGE UP
SODIUM SERPL-SCNC: 138 MMOL/L — SIGNIFICANT CHANGE UP (ref 135–145)
TSH SERPL-MCNC: 0.77 UIU/ML — SIGNIFICANT CHANGE UP (ref 0.36–3.74)
WBC # BLD: 10.38 K/UL — SIGNIFICANT CHANGE UP (ref 3.8–10.5)
WBC # FLD AUTO: 10.38 K/UL — SIGNIFICANT CHANGE UP (ref 3.8–10.5)

## 2021-06-02 PROCEDURE — 99232 SBSQ HOSP IP/OBS MODERATE 35: CPT

## 2021-06-02 PROCEDURE — 99233 SBSQ HOSP IP/OBS HIGH 50: CPT

## 2021-06-02 RX ORDER — SENNA PLUS 8.6 MG/1
2 TABLET ORAL AT BEDTIME
Refills: 0 | Status: DISCONTINUED | OUTPATIENT
Start: 2021-06-02 | End: 2021-06-04

## 2021-06-02 RX ORDER — FUROSEMIDE 40 MG
40 TABLET ORAL DAILY
Refills: 0 | Status: DISCONTINUED | OUTPATIENT
Start: 2021-06-02 | End: 2021-06-03

## 2021-06-02 RX ADMIN — PYRIDOSTIGMINE BROMIDE 120 MILLIGRAM(S): 60 SOLUTION ORAL at 11:26

## 2021-06-02 RX ADMIN — LIDOCAINE 1 PATCH: 4 CREAM TOPICAL at 19:35

## 2021-06-02 RX ADMIN — ENOXAPARIN SODIUM 110 MILLIGRAM(S): 100 INJECTION SUBCUTANEOUS at 21:10

## 2021-06-02 RX ADMIN — Medication 7.5 MILLIGRAM(S): at 11:27

## 2021-06-02 RX ADMIN — LIDOCAINE 1 PATCH: 4 CREAM TOPICAL at 11:28

## 2021-06-02 RX ADMIN — Medication 81 MILLIGRAM(S): at 11:26

## 2021-06-02 RX ADMIN — LISINOPRIL 20 MILLIGRAM(S): 2.5 TABLET ORAL at 05:16

## 2021-06-02 RX ADMIN — ENOXAPARIN SODIUM 110 MILLIGRAM(S): 100 INJECTION SUBCUTANEOUS at 09:41

## 2021-06-02 RX ADMIN — PANTOPRAZOLE SODIUM 40 MILLIGRAM(S): 20 TABLET, DELAYED RELEASE ORAL at 05:16

## 2021-06-02 RX ADMIN — Medication 12.5 MILLIGRAM(S): at 05:15

## 2021-06-02 NOTE — PROGRESS NOTE ADULT - SUBJECTIVE AND OBJECTIVE BOX
[facility] -- Manuel Carter Grossman, [place], [place] Woods [place], [place]: Office # [phone]    Follow Up:  SOB PE    Subjective/Observations: Patient seen and examined. Patient awake, alert,  resting comfortably in chair. No complaints of chest pain, dyspnea, palpitations or dizziness. Tolerating room air.     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 1000 milliLiter(s) (50 mL/Hr) IV Continuous <Continuous>  dextrose 5%. 1000 milliLiter(s) (100 mL/Hr) IV Continuous <Continuous>  dextrose 50% Injectable 25 Gram(s) IV Push once  dextrose 50% Injectable 12.5 Gram(s) IV Push once  dextrose 50% Injectable 25 Gram(s) IV Push once  enoxaparin Injectable 110 milliGRAM(s) SubCutaneous two times a day  glucagon  Injectable 1 milliGRAM(s) IntraMuscular once  hydrochlorothiazide 12.5 milliGRAM(s) Oral daily  insulin lispro (ADMELOG) corrective regimen sliding scale   SubCutaneous three times a day before meals  insulin lispro (ADMELOG) corrective regimen sliding scale   SubCutaneous at bedtime  lidocaine   Patch 1 Patch Transdermal every 24 hours  lisinopril 20 milliGRAM(s) Oral daily  pantoprazole    Tablet 40 milliGRAM(s) Oral before breakfast  predniSONE   Tablet 7.5 milliGRAM(s) Oral every other day  pyridostigmine 120 milliGRAM(s) Oral daily  senna 2 Tablet(s) Oral at bedtime    MEDICATIONS  (PRN):    Allergies    No Known Allergies    Intolerances    Avelox (Other)  fluoroquinolone antibiotics (Other)  Ketek (Other)  telithromycin (Other)    Vital Signs Last 24 Hrs  T(C): 36.7 (02 Jun 2021 05:07), Max: 36.8 (01 Jun 2021 14:30)  T(F): 98.1 (02 Jun 2021 05:07), Max: 98.2 (01 Jun 2021 14:30)  HR: 62 (02 Jun 2021 05:07) (62 - 88)  BP: 114/64 (02 Jun 2021 05:07) (108/64 - 142/76)  BP(mean): --  RR: 17 (02 Jun 2021 05:07) (16 - 18)  SpO2: 94% (02 Jun 2021 05:07) (94% - 98%)  I&O's Summary    01 Jun 2021 07:01  -  02 Jun 2021 07:00  --------------------------------------------------------  IN: 0 mL / OUT: 600 mL / NET: -600 mL      Weight (kg): 108.9 (06-02 @ 05:07)    TELE: SR 60-80  PHYSICAL EXAM:  Appearance: NAD, no distress, alert, obese   HEENT: Moist Mucous Membranes, Anicteric  Cardiovascular: Regular rate and rhythm, Normal S1 S2, No JVD, No murmurs, No rubs, gallops or clicks  Respiratory: Non-labored, Clear to auscultation, No rales, No rhonchi, No wheezing.   Gastrointestinal:  Soft, Non-tender, + BS  Neurologic: Non-focal  Skin: Warm and dry, No visible rashes or ulcers, No ecchymosis, No cyanosis  Musculoskeletal: No clubbing, No cyanosis, No joint swelling/tenderness  Psychiatry: Mood & affect appropriate  Lymph: +2-3 LE b/l  peripheral edema.     LABS: All Labs Reviewed:                        15.2   10.38 )-----------( 333      ( 02 Jun 2021 07:03 )             46.5                         15.5   10.86 )-----------( 335      ( 01 Jun 2021 14:36 )             46.4     02 Jun 2021 07:03    138    |  100    |  21     ----------------------------<  80     3.9     |  30     |  0.93   01 Jun 2021 14:36    135    |  101    |  20     ----------------------------<  95     4.0     |  26     |  0.91     Ca    9.2        02 Jun 2021 07:03  Ca    9.0        01 Jun 2021 14:36    TPro  6.7    /  Alb  3.1    /  TBili  0.6    /  DBili  x      /  AST  23     /  ALT  20     /  AlkPhos  58     02 Jun 2021 07:03  TPro  7.4    /  Alb  3.2    /  TBili  0.6    /  DBili  x      /  AST  38     /  ALT  27     /  AlkPhos  63     01 Jun 2021 14:36  Troponin I, Serum: <.015 ng/mL (06-01-21 @ 14:36)  D-Dimer Assay, Quantitative: 1130 ng/mL DDU (06-01-21 @ 16:42)    < from: CT Angio Chest PE Protocol w/ IV Cont (06.01.21 @ 18:09) >  EXAM:  CT ANGIO CHEST PULM [facility]                        PROCEDURE DATE:  06/01/2021    INTERPRETATION:  CLINICAL INFORMATION: Positive d-dimer. Leg swelling.  COMPARISON: CT abdomen/pelvis 5/14/2021  CONTRAST/COMPLICATIONS:  IV Contrast: Omnipaque 350  65 cc administered   35 cc discarded  Oral Contrast: NONE  Complications: None reported at time of study completion  PROCEDURE:  CT Angiography of the Chest.  Sagittal and coronal reformats were performed as well as 3D (MIP) reconstructions.  FINDINGS:  LUNGS AND AIRWAYS: Secretions in the trachea. Calcified granulomas in the right upper lobe. Subcentimeter peripheral nodules in the right and left lower lobes, greater on the right, not significantly changed since 9/12/2015.  PLEURA: No pleural effusion.  MEDIASTINUM AND KONRAD: No enlarged mediastinal lymph nodes. Calcified mediastinal and right hilar lymph nodes.  VESSELS: Pulmonary emboli in the right upper and lower lobe pulmonary arteries with extension into segmental branches, segmental and subsegmental branches in the right middle lobe, and left upper lobe branches to the lingula. Thoracic aorta normal in caliber. Coronary artery calcifications.  HEART: Heart size is normal. No pericardial effusion. No evidence of right heart strain.  CHEST WALL AND LOWER NECK: No enlarged axillary lymph nodes.  VISUALIZED UPPER ABDOMEN: Calcified granulomas in the liver and spleen. Fatty infiltration of the pancreas. Nonobstructive calculus in the lower pole of the left kidney measuring 6 mm. Atherosclerotic changes including the superior mesenteric artery and renal arteries.  BONES: Degenerative changes in the spine.    IMPRESSION:  Bilateral pulmonary emboli as described above.  No evidence of right heart strain.  < end of copied text >    < from: US Duplex Venous Lower Ext Complete, Bilateral (06.01.21 @ 17:07) >  IMPRESSION:  No evidence of deep venous thrombosis in either lower extremity.  < end of copied text >

## 2021-06-02 NOTE — CONSULT NOTE ADULT - ASSESSMENT
[ASSESSMENT and  PLAN]  Unprovoked,. BL PE  ·	Pulmonary emboli in the right upper and lower lobe pulmonary arteries with extension into segmental branches, segmental and subsegmental branches in the right middle lobe, and left upper lobe branches to the lingula.   - PESI 88, class III intermediate risk.   Echo unremarkable. Limited study  CAD. Coronary artery calcifications.    Renal stones    L hip/flank pain. c/o now more L hip bone pain.    LFTs unremarkable     Depression / irritibaility      RECOMMENDATIONS  Check Xray of L hip [ordered]  Discussed limitations of testing.   Plan in AM    Continue Lovenox 1mg/kg q12h. .   Recommend change to Eliquis 10mg q12h x7d thereafter Eliquis 5mg q12h thereafter.     Thank you for consulting us.     I have discussed the above plan of care with patient/family  [son Olvin] in detail. They expressed understanding of the treatment plan . Risks, benefits and alternatives discussed in detail. I have asked if they have any questions or concerns and appropriately addressed them; all questions answered to their satisfactions and in lay terms.

## 2021-06-02 NOTE — CONSULT NOTE ADULT - ASSESSMENT
pt with new PE - bilateral  PE - AC - on lovenox - eventual transition to DOAC - no evidence of RV strain - CE and ProBNP noted - ECHO done - report pending, on RA - VSS  may need CT abd pel - eval reason for PE  MG - on Prednisone low dose and Mestinon - indep with ADL  GERD -   HTN - cvs rx regimen and BP control  Pulm nodules - multiple - all sub cm - very distant hx of smoking, non emergent Pulm follow up in the office with Dr Mccormick in [place] office -   out of bed  check Sat on RA  ambulate ok  will need Hypercoagulation work up as outpatient with Heme

## 2021-06-02 NOTE — CONSULT NOTE ADULT - SUBJECTIVE AND OBJECTIVE BOX
Date/Time Patient Seen:  		  Referring MD:   Data Reviewed	       Patient is a 76y old  Male who presents with a chief complaint of PE (01 Jun 2021 21:09)      Subjective/HPI  in bed  seen and examined  vs and meds reviewed  labs reviewed  h and p reviewed  er provider note reviewed  imaging noted    non smoker  lives alone  retired liquor distributor     75 yo m pmh myasthenia gravis, htn, hld, type 2 dm (diet controlled- believes last a1c 6), chronic back pain, club foot, presents with complaint of lower extremity swelling. pt states approx 2 weeks ago he had a prostate biopsy with Dr. Sims. He was due to follow up for results and when he saw Dr. Sims he presented with new lower extremity edema. Dr. Sims referred pt back to Dr. Wade PCP. PCP noted pt had new lower extremity swelling and appeared short of breath. Pts son at bedside states pt also  has seemed much more short of breath recently however pt denies. PCP sent pt to ED for further evaluation.   Pt states swelling began 1 week ago, he has never had similar swelling. He does admit to being sedentary but states this is unchanged from his baseline as he has myasthenia gravis and chronic back pain. he only ambulates to get to and from the bathroom which he does frequently due to his hctz. denies fall/trauma but admits to left hip pain which was evaluated and he was told there was no pathology found.  Pt denies chest pain, palpitations, syncopal episodes, lightheadedness dizziness, shortness of breath, wheezing or cough. Denies previous blood clots.   of note per patient prostate biopsy was negative for malignancy.     In the ED CBC, Ddimer, CMP, troponin, probnp, EKG, CTA, COVID pcr were performed Significant for wbc 10.86, ddimer 1130, albumin 3.2, troponin and probnp wnl. EKG Sinus rhythm 73bpm Left anterior fasicular block. CTA revealed Pulmonary emboli in the right upper and lower lobe pulmonary arteries with extension into segmental branches, segmental and subsegmental branches in the right middle lobe, and left upper lobe branches to the lingula. No evidence of heart strain. Pt was given lasix 40 IVP x 1.     FAMILY HISTORY:  Father  Still living? No  Family history of stroke, Age at diagnosis: Age Unknown    Mother  Still living? No  Family history of kidney disease, Age at diagnosis: Age Unknown    Sibling  Still living? No  Family history of diabetes mellitus type II, Age at diagnosis: Age Unknown.     Social History:  Social History (marital status, living situation, occupation, tobacco use, alcohol and drug use, and sexual history): Lives home alone   ambulates with a cane, needs to climb stairs to get to bathroom multiple times of day. denies any falls   former smoker- quit 25 years ago   denies etoh, recreational drugs  flu vaccine up to date  does not want covid vaccine in hospital , wants to speak with PCP Mauro and get it there.     Tobacco Screening:  · Core Measure Site	Yes  · Has the patient used tobacco in the past 30 days?	No    Risk Assessment:    Present on Admission:  Deep Venous Thrombosis	suspected  Pulmonary Embolus	yes     Heart Failure:  Does this patient have a history of or has been diagnosed with heart failure? no.       PAST MEDICAL & SURGICAL HISTORY:  Calculus of kidney    Club foot  Born Right Foot    Myasthenia gravis    Hypertension    Diabetes  Type 2 - does not take medications - monitors Blood Glucose at home - diet controlled    Urinary tract infection  notes h/o UTI&#x27;s    Hyperlipidemia    Elective surgery  1956 age 11 @ [facility] - cut under Patella secondary 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    Club foot  Surgery at birth for Club Foot Right foot    Pilonidal cyst  Surgery 40 years ago    H/O colonoscopy    Spinal stenosis    H/O prostate biopsy          Medication list         MEDICATIONS  (STANDING):  aspirin enteric coated 81 milliGRAM(s) Oral daily  dextrose 40% Gel 15 Gram(s) Oral once  dextrose 5%. 1000 milliLiter(s) (50 mL/Hr) IV Continuous <Continuous>  dextrose 5%. 1000 milliLiter(s) (100 mL/Hr) IV Continuous <Continuous>  dextrose 50% Injectable 25 Gram(s) IV Push once  dextrose 50% Injectable 12.5 Gram(s) IV Push once  dextrose 50% Injectable 25 Gram(s) IV Push once  enoxaparin Injectable 110 milliGRAM(s) SubCutaneous two times a day  glucagon  Injectable 1 milliGRAM(s) IntraMuscular once  hydrochlorothiazide 12.5 milliGRAM(s) Oral daily  insulin lispro (ADMELOG) corrective regimen sliding scale   SubCutaneous three times a day before meals  insulin lispro (ADMELOG) corrective regimen sliding scale   SubCutaneous at bedtime  lidocaine   Patch 1 Patch Transdermal every 24 hours  lisinopril 20 milliGRAM(s) Oral daily  pantoprazole    Tablet 40 milliGRAM(s) Oral before breakfast  predniSONE   Tablet 7.5 milliGRAM(s) Oral every other day  pyridostigmine 120 milliGRAM(s) Oral daily  senna 2 Tablet(s) Oral at bedtime    MEDICATIONS  (PRN):         Vitals log        ICU Vital Signs Last 24 Hrs  T(C): 36.7 (02 Jun 2021 05:07), Max: 36.8 (01 Jun 2021 14:30)  T(F): 98.1 (02 Jun 2021 05:07), Max: 98.2 (01 Jun 2021 14:30)  HR: 62 (02 Jun 2021 05:07) (62 - 88)  BP: 114/64 (02 Jun 2021 05:07) (108/64 - 142/76)  BP(mean): --  ABP: --  ABP(mean): --  RR: 17 (02 Jun 2021 05:07) (16 - 18)  SpO2: 94% (02 Jun 2021 05:07) (94% - 98%)           Input and Output:  I&O's Detail    01 Jun 2021 07:01  -  02 Jun 2021 07:00  --------------------------------------------------------  IN:  Total IN: 0 mL    OUT:    Voided (mL): 600 mL  Total OUT: 600 mL    Total NET: -600 mL          Lab Data                        15.2   10.38 )-----------( 333      ( 02 Jun 2021 07:03 )             46.5     06-01    135  |  101  |  20  ----------------------------<  95  4.0   |  26  |  0.91    Ca    9.0      01 Jun 2021 14:36    TPro  7.4  /  Alb  3.2<L>  /  TBili  0.6  /  DBili  x   /  AST  38<H>  /  ALT  27  /  AlkPhos  63  06-01      CARDIAC MARKERS ( 01 Jun 2021 14:36 )  <.015 ng/mL / x     / x     / x     / x            Review of Systems	  uses a cane  sob  ugalde      Objective     Physical Examination    heart s1s2  lung dec BS  abd soft  extr edema  head at  head nc  verbal  alert  cn grossly int  moves all extr      Pertinent Lab findings & Imaging      Restrepo:  NO   Adequate UO     I&O's Detail    01 Jun 2021 07:01  -  02 Jun 2021 07:00  --------------------------------------------------------  IN:  Total IN: 0 mL    OUT:    Voided (mL): 600 mL  Total OUT: 600 mL    Total NET: -600 mL               Discussed with:     Cultures:	        Radiology    EXAM:  CT ANGIO CHEST PULM [facility]                            PROCEDURE DATE:  06/01/2021          INTERPRETATION:  CLINICAL INFORMATION: Positive d-dimer. Leg swelling.    COMPARISON: CT abdomen/pelvis 5/14/2021    CONTRAST/COMPLICATIONS:  IV Contrast: Omnipaque 350  65 cc administered   35 cc discarded  Oral Contrast: NONE  Complications: None reported at time of study completion    PROCEDURE:  CT Angiography of the Chest.  Sagittal and coronal reformats were performed as well as 3D (MIP) reconstructions.    FINDINGS:    LUNGS AND AIRWAYS: Secretions in the trachea. Calcified granulomas in the right upper lobe. Subcentimeter peripheral nodules in the right and left lower lobes, greater on the right, not significantly changed since 9/12/2015.  PLEURA: No pleural effusion.  MEDIASTINUM AND KONRAD: No enlarged mediastinal lymph nodes. Calcified mediastinal and right hilar lymph nodes.  VESSELS: Pulmonary emboli in the right upper and lower lobe pulmonary arteries with extension into segmental branches, segmental and subsegmental branches in the right middle lobe, and left upper lobe branches to the lingula. Thoracic aorta normal in caliber. Coronary artery calcifications.  HEART: Heart size is normal. No pericardial effusion. No evidence of right heart strain.  CHEST WALL AND LOWER NECK: No enlarged axillary lymph nodes.  VISUALIZED UPPER ABDOMEN: Calcified granulomas in the liver and spleen. Fatty infiltration of the pancreas. Nonobstructive calculus in the lower pole of the left kidney measuring 6 mm. Atherosclerotic changes including the superior mesenteric artery and renal arteries.  BONES: Degenerative changes in the spine.    IMPRESSION:  Bilateral pulmonary emboli as described above.    No evidence of right heart strain.    The findings were discussed with BENITO Jaramillo on 6/1/2021 7:48 PM            CHRISTINE BARBOZA MD; Attending Radiologist  This document has been electronically signed. Jun 1 2021  7:52PM            LE doppler NEG

## 2021-06-02 NOTE — SBIRT NOTE ADULT - NSSBIRTSAVECONSULT_GEN_A_CORE
[address] 
[phone] Patient: Mary Webber MRN: [id number] :1941 About your hospitalization You were admitted on:  2018 You last received care in the:  [address] You were discharged on:  2018 Why you were hospitalized Your primary diagnosis was:  Not on File Your diagnoses also included:  Arnel (Acute Kidney Injury) (Hcc), Intractable Pain Follow-up Information Follow up With Details Comments Contact Info Francisco Rousseau MD   [place] [address] [address] 
[phone] Your Scheduled Appointments 2018  9:40 AM EDT  
REMOTE DEVICE CHECK ORDERS ONLY ENCOUNTER with NAOMI KAUFFMAN 62 [facility] CARDIOLOGY [place] OFFICE ([address] [place] [address]) [address] 
[address] Guido Cruz   
[phone] Please remember THIS REMOTE CHECK IS COMPLETED FROM HOME - YOU WILL NOT COME TO THE OFFICE FOR THIS APPOINTMENT. In preparation for this check, please ensure your monitor box is working appropriately. If your monitor requires you to send a transmission, please make sure it is sent by 11:00AM on this day so we can have it processed and resulted to your doctor without delay. If you have a question or problem with the monitor box, please contact your respective company:   [address] [place] [address]/[employer] - [phone]  [employer] - [phone]  [employer] - [phone]  [facility] [phone]  If you have any further questions or need to move this appointment, we are happy to help and can be reached at [phone]. 2018  3:15 PM EDT  
LAB with [address]  
[facility] OUTREACH INSURANCE ([address] 140 [facility]  
[phone]  2018  3:30 PM EDT  
 Follow Up with CONCEPCION Jose 52 [facility] Hematology and Oncology [facility]) C/ Gilberto Carrasquillos 33 [facility] [address]  
[phone] Monday July 23, 2018  3:45 PM EDT Follow Up with Shekhar John MD  
[facility] Hematology and Oncology [facility]) C/ Gilberto Abreu 33 [facility] [address]  
[phone] Tuesday July 24, 2018  8:00 AM EDT Infusion with NUR4  
ST. [address] ([address]) [address]  [address] [place] [phone] [facility] [address]  
[phone] [address] Tuesday July 24, 2018 11:30 AM EDT Office Visit with Jade Huynh MD  
[facility] ([address] [place] [address]) [address] [phone] Bring all medications, insurance and prescription cards and please be prepared to pay any copay that may be due. Thursday August 09, 2018  1:20 PM EDT Follow Up with Anders Yoder MD  
[facility] ENDOCRINOLOGY) [address] Ishmael Parsons [address] [facility] [address]  
[phone] Friday August 10, 2018 10:15 AM EDT Extended Office Visit with Jade Huynh MD  
[facility] ([address] [place] [address]) [address] [phone] Discharge Orders None A check sandip indicates which time of day the medication should be taken. My Medications START taking these medications Instructions Each Dose to Equal  
 Morning Noon Evening Bedtime  
 ixazomib 2.3 mg Cap Notes to Patient:  As dirceted Take 1 Cap by mouth every seven (7) days. 2.3 mg  
    
   
   
   
  
 magnesium oxide 400 mg tablet Commonly known as:  MAG-OX Take 1 Tab by mouth four (4) times daily.   
 400 mg  
    
  
   
  
 Complete senna-docusate 8.6-50 mg per tablet Commonly known as:  Jyl Orf Take 2 Tabs by mouth two (2) times a day. 2 Tab CHANGE how you take these medications Instructions Each Dose to Equal  
 Morning Noon Evening Bedtime * fentaNYL 75 mcg/hr Commonly known as:  Sweta Rogersa What changed:  Another medication with the same name was added. Make sure you understand how and when to take each. Notes to Patient:  As directed 1 Patch by TransDERmal route every seventy-two (72) hours. Max Daily Amount: 1 Patch. 1 Patch * fentaNYL 100 mcg/hr PATCH Commonly known as:  Sweta Borjas What changed: You were already taking a medication with the same name, and this prescription was added. Make sure you understand how and when to take each. 1 Patch by TransDERmal route every seventy-two (72) hours. Max Daily Amount: 1 Patch. 1 Patch 7/15/18 * Notice: This list has 2 medication(s) that are the same as other medications prescribed for you. Read the directions carefully, and ask your doctor or other care provider to review them with you. CONTINUE taking these medications Instructions Each Dose to Equal  
 Morning Noon Evening Bedtime ABREVA 10 % topical cream  
Generic drug:  docosanol Notes to Patient:  5 times a day Apply  to affected area five (5) times daily. atorvastatin 20 mg tablet Commonly known as:  LIPITOR Take 1 Tab by mouth daily. 20 mg  
    
  
   
   
   
  
 carvedilol 6.25 mg tablet Commonly known as:  Pink Parrot Take 1 Tab by mouth two (2) times daily (with meals). 6.25 mg  
    
  
   
   
  
   
  
 chlorhexidine 0.12 % solution Commonly known as:  PERIDEX 15 mL by Swish and Spit route two (2) times a day. 15 mL CROMOLYN NA  
   
 by Nasal route. ELIQUIS 5 mg tablet Generic drug:  apixaban Take 5 mg by mouth two (2) times a day. 5 mg  
    
  
   
   
  
   
  
 levothyroxine 137 mcg tablet Commonly known as:  SYNTHROID Take 137 mcg by mouth Daily (before breakfast). 137 mcg  
    
  
   
   
   
  
 lubiPROStone 24 mcg capsule Commonly known as:  Niesha Proper Take 1 Cap by mouth two (2) times daily (with meals) for 30 days. 24 mcg  
    
  
   
   
  
   
  
 mirtazapine 15 mg tablet Commonly known as:  Haylie Flynn Take 1 Tab by mouth nightly. 15 mg  
    
   
   
   
  
  
 omeprazole 20 mg capsule Commonly known as:  PRILOSEC Take 20 mg by mouth daily. 20 mg  
    
  
   
   
   
  
 oxyCODONE IR 30 mg immediate release tablet Commonly known as:  Chantell Ariza Notes to Patient:  Take on as needed schedule Take 1 Tab by mouth every four (4) hours as needed for Pain. Max Daily Amount: 180 mg.  
 30 mg  
    
   
   
   
  
 potassium chloride SA 10 mEq capsule Commonly known as:  Zada Chessman Take 1 Cap by mouth two (2) times a day. 10 mEq  
    
  
   
   
  
   
  
 torsemide 20 mg tablet Commonly known as:  DEMADEX Take 1 Tab by mouth daily. Indications: Edema, Pulmonary Edema due to Chronic Heart Failure 20 mg  
    
  
   
   
   
  
 VITAMIN B-12 1,000 mcg sublingual tablet Generic drug:  cyanocobalamin Take 1,000 mcg by mouth daily. 1000 mcg VITAMIN D3 2,000 unit Tab Generic drug:  cholecalciferol (vitamin D3) Take  by mouth. WHEY PROTEIN PO Take  by mouth. STOP taking these medications   
 lenalidomide 10 mg Cap Commonly known as:  REVLIMID [facility] to Get Your Medications [facility]  [address], [address] [address] Phone:  [phone]  
  magnesium oxide 400 mg tablet  
 senna-docusate 8.6-50 mg per tablet Information on where to get these meds will be given to you by the nurse or doctor. ! Ask your nurse or doctor about these medications  
  fentaNYL 100 mcg/hr PATCH  
 ixazomib 2.3 mg Cap Opioid Education Prescription Opioids: What You Need to Know: 
 
 
After general anesthesia or intravenous sedation, for 24 hours or while taking prescription Narcotics: · Limit your activities · Do not drive and operate hazardous machinery · Do not make important personal or business decisions · Do  not drink alcoholic beverages · If you have not urinated within 8 hours after discharge, please contact your surgeon on call. Report the following to your surgeon: 
· Excessive pain, swelling, redness or odor of or around the surgical area · Temperature over 100.5 · Nausea and vomiting lasting longer than 4 hours or if unable to take medications · Any signs of decreased circulation or nerve impairment to extremity: change in color, persistent  numbness, tingling, coldness or increase pain · Any questions What to do at Home: 
Recommended activity: Activity as tolerated, per MD instructions If you experience any of the following symptoms fever > 100.5, nausea, vomiting, pain, chest pain and/or shortness of breath please follow up with MD. 
 
*  Please give a list of your current medications to your Primary Care Provider. *  Please update this list whenever your medications are discontinued, doses are 
    changed, or new medications (including over-the-counter products) are added. *  Please carry medication information at all times in case of emergency situations. These are general instructions for a healthy lifestyle: No smoking/ No tobacco products/ Avoid exposure to second hand smoke Surgeon General's Warning:  Quitting smoking now greatly reduces serious risk to your health. Obesity, smoking, and sedentary lifestyle greatly increases your risk for illness A healthy diet, regular physical exercise & weight monitoring are important for maintaining a healthy lifestyle You may be retaining fluid if you have a history of heart failure or if you experience any of the following symptoms:  Weight gain of 3 pounds or more overnight or 5 pounds in a week, increased swelling in our hands or feet or shortness of breath while lying flat in bed. Please call your doctor as soon as you notice any of these symptoms; do not wait until your next office visit. Recognize signs and symptoms of STROKE: 
 
F-face looks uneven A-arms unable to move or move unevenly S-speech slurred or non-existent T-time-call 911 as soon as signs and symptoms begin-DO NOT go Back to bed or wait to see if you get better-TIME IS BRAIN. Warning Signs of HEART ATTACK Call 911 if you have these symptoms: 
? Chest discomfort. Most heart attacks involve discomfort in the center of the chest that lasts more than a few minutes, or that goes away and comes back. It can feel like uncomfortable pressure, squeezing, fullness, or pain. ? Discomfort in other areas of the upper body. Symptoms can include pain or discomfort in one or both arms, the back, neck, jaw, or stomach. ? Shortness of breath with or without chest discomfort. ? Other signs may include breaking out in a cold sweat, nausea, or lightheadedness. Don't wait more than five minutes to call [address]! Fast action can save your life.  Calling 911 is almost always the fastest way to get lifesaving treatment. Emergency Medical Services staff can begin treatment when they arrive  up to an hour sooner than if someone gets to the hospital by car. The discharge information has been reviewed with the patient. The patient verbalized understanding. Discharge medications reviewed with the patient and appropriate educational materials and side effects teaching were provided. ___________________________________________________________________________________________________________________________________ Acute Kidney Injury: Care Instructions Your Care Instructions Acute kidney injury (MARY) is a sudden decrease in kidney function. This can happen over a period of hours, days or, in some cases, weeks. MARY used to be called acute renal failure, but kidney failure doesn't always happen with MARY. Common causes of MARY are dehydration, blood loss, and medicines. When MARY happens, the kidneys have trouble removing waste and excess fluids from the body. The waste and fluids build up and become harmful. Kidney function may return to normal if the cause of MARY is treated quickly. Your chance of a full recovery depends on what caused the problem, how quickly the cause was treated, and what other medical problems you have. A machine may be used to help your kidneys remove waste and fluids for a short period of time. This is called dialysis. Follow-up care is a key part of your treatment and safety. Be sure to make and go to all appointments, and call your doctor if you are having problems. It's also a good idea to know your test results and keep a list of the medicines you take. How can you care for yourself at home? · Talk to your doctor about how much fluid you should drink. · Eat a balanced diet. Talk to your doctor or a dietitian about what type of diet may be best for you. You may need to limit sodium, potassium, and phosphorus. · If you need dialysis, follow the instructions and schedule for dialysis that your doctor gives you. · Do not smoke. Smoking can make your condition worse. If you need help quitting, talk to your doctor about stop-smoking programs and medicines. These can increase your chances of quitting for good. · Do not drink alcohol. · Review all of your medicines with your doctor. Do not take any medicines, including nonsteroidal anti-inflammatory drugs (NSAIDs) such as ibuprofen (Advil, Motrin) or naproxen (Aleve), unless your doctor says it is safe for you to do so. · Make sure that anyone treating you for any health problem knows that you have had MARY. When should you call for help? Call 911 anytime you think you may need emergency care. For example, call if: 
  · You passed out (lost consciousness).  
 Call your doctor now or seek immediate medical care if: 
  · You have new or worse nausea and vomiting.  
  · You have much less urine than normal, or you have no urine.  
  · You are feeling confused or cannot think clearly.  
  · You have new or more blood in your urine.  
  · You have new swelling.  
  · You are dizzy or lightheaded, or feel like you may faint.  
 Watch closely for changes in your health, and be sure to contact your doctor if: 
  · You do not get better as expected. Where can you learn more? Go to http://nakul-sharon.info/. Enter A137 in the search box to learn more about \"Acute Kidney Injury: Care Instructions. \" Current as of: May 12, 2017 Content Version: 11.7 © 8699-1372 [employer]. Care instructions adapted under license by [employer] (which disclaims liability or warranty for this information). If you have questions about a medical condition or this instruction, always ask your healthcare professional. [place] [address] any warranty or liability for your use of this information. Introducing [facility]! Dear Cecil Kayser: Thank you for requesting a FastCustomer account. Our records indicate that you already have an active FastCustomer account. You can access your account anytime at [email or web address] Did you know that you can access your hospital and ER discharge instructions at any time in FastCustomer? You can also review all of your test results from your hospital stay or ER visit. Additional Information If you have questions, please visit the Frequently Asked Questions section of the FastCustomer website at [email or web address] Remember, FastCustomer is NOT to be used for urgent needs. For medical emergencies, dial 911. Now available from your iPhone and Android! Introducing Nnamdi Cantu As a [facility] patient, I wanted to make you aware of our electronic visit tool called Nnamdi Cantu. OrtegaDailyStrength allows you to connect within minutes with a medical provider 24 hours a day, seven days a week via a mobile device or tablet or logging into a secure website from your computer. You can access Nnamdi Cantu from anywhere in the [place]. A virtual visit might be right for you when you have a simple condition and feel like you just dont want to get out of bed, or cant get away from work for an appointment, when your regular [facility] provider is not available (evenings, weekends or holidays), or when youre out of town and need minor care. Electronic visits cost only $49 and if the [employer]Cigital provider determines a prescription is needed to treat your condition, one can be electronically transmitted to a nearby pharmacy*. Please take a moment to enroll today if you have not already done so. The enrollment process is free and takes just a few minutes. To enroll, please download the IdeaString vadim to your tablet or phone, or visit [email or web address] to enroll on your computer. And, as an [address] [place] [address] patient with a YesVideo account, the results of your visits will be scanned into your electronic medical record and your primary care provider will be able to view the scanned results. We urge you to continue to see your regular [facility] provider for your ongoing medical care. And while your primary care provider may not be the one available when you seek a Nnamdi Langfordfin virtual visit, the peace of mind you get from getting a real diagnosis real time can be priceless. For more information on Nnamdi Langfordfin, view our Frequently Asked Questions (FAQs) at [email or web address]. Sincerely, 
 
Everton Rutledge MD 
Chief Medical Officer Vaibhav Culver *:  certain medications cannot be prescribed via BioGreen Teckmaria teresaSafedoX Unresulted Labs-Please follow up with your PCP about these lab tests Order Current Status IR INSERT TUNL CVC W PORT OVER 5 YEARS Preliminary result Providers Seen During Your Hospitalization Provider Specialty Primary office phone Vania Ocasio MD Hematology and Oncology [phone] Arash Frias MD Oncology [phone] Your Primary Care Physician (PCP) Primary Care Physician Office Phone Office Fax Janny Caicedo [phone] You are allergic to the following Allergen Reactions Ace Inhibitors Other (comments) Lisinopril Cough Pcn (Penicillins) Swelling Recent Documentation Weight BMI Smoking Status 82.2 kg 23.28 kg/m2 Never Smoker Emergency Contacts Name Discharge Info Relation Home Work Mobile [address] [place] [address] CAREGIVER [3] Spouse [3] [phone] Patient Belongings  The following personal items are in your possession at time of discharge: 
  Dental Appliances: None  Visual Aid: Glasses, With patient      Home Medications: None   Jewelry: None  Clothing: Shirt, Pants, Socks, [employer]    Other Valuables: [employer] Please provide this summary of care documentation to your next provider. Signatures-by signing, you are acknowledging that this After Visit Summary has been reviewed with you and you have received a copy. Patient Signature:  ____________________________________________________________ Date:  ____________________________________________________________  
  
[facility] Provider Signature:  ____________________________________________________________ Date:  ____________________________________________________________

## 2021-06-02 NOTE — CONSULT NOTE ADULT - SUBJECTIVE AND OBJECTIVE BOX
INCOMPLETE NOTE.  Documentation in Progress  PT SEEN AND EVALUATED.   FULL/ADDITIONAL RECOMMENDATIONS TO FOLLOW   ***************************************************************    Patient is a 76y old  Male who presents with a chief complaint of PE (2021 11:51)      HPI:  75 yo m pmh myasthenia gravis, htn, hld, type 2 dm (diet controlled- believes last a1c 6), chronic back pain, club foot, presents with complaint of lower extremity swelling. pt states approx 2 weeks ago he had a prostate biopsy with Dr. Sims. He was due to follow up for results and when he saw Dr. Sims he presented with new lower extremity edema. Dr. Sims referred pt back to Dr. Wade PCP. PCP noted pt had new lower extremity swelling and appeared short of breath. Pts son at bedside states pt also  has seemed much more short of breath recently however pt denies. PCP sent pt to ED for further evaluation.   Pt states swelling began 1 week ago, he has never had similar swelling. He does admit to being sedentary but states this is unchanged from his baseline as he has myasthenia gravis and chronic back pain. he only ambulates to get to and from the bathroom which he does frequently due to his hctz. denies fall/trauma but admits to left hip pain which was evaluated and he was told there was no pathology found.  Pt denies chest pain, palpitations, syncopal episodes, lightheadedness dizziness, shortness of breath, wheezing or cough. Denies previous blood clots.   of note per patient prostate biopsy was negative for malignancy.     In the ED CBC, Ddimer, CMP, troponin, probnp, EKG, CTA, COVID pcr were performed Significant for wbc 10.86, ddimer 1130, albumin 3.2, troponin and probnp wnl. EKG Sinus rhythm 73bpm Left anterior fasicular block. CTA revealed Pulmonary emboli in the right upper and lower lobe pulmonary arteries with extension into segmental branches, segmental and subsegmental branches in the right middle lobe, and left upper lobe branches to the lingula. No evidence of heart strain. Pt was given lasix 40 IVP x 1.  (2021 21:09)        PAST MEDICAL & SURGICAL HISTORY:  Calculus of kidney    Club foot  Born Right Foot    Myasthenia gravis    Hypertension    Diabetes  Type 2 - does not take medications - monitors Blood Glucose at home - diet controlled    Urinary tract infection  notes h/o UTI&#x27;s    Hyperlipidemia    Elective surgery  6 age 11 @ [facility] - cut under Patella secondary 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    Club foot  Surgery at birth for Club Foot Right foot    Pilonidal cyst  Surgery 40 years ago    H/O colonoscopy    Spinal stenosis    H/O prostate biopsy         HEALTH ISSUES - PROBLEM Dx:  Pulmonary thromboembolism    Suspected deep vein thrombosis (DVT)            Other pulmonary embolism without acute cor pulmonale [I26.99]    Calculus of kidney [592.0]    Club foot [754.70]    Myasthenia gravis [358.00]    Hypertension [401.9]    Diabetes [250.00]    Urinary tract infection [599.0]    Hyperlipidemia [E78.5]    Elective surgery [V50.9]    Club foot [754.70]    Pilonidal cyst [685.1]    H/O colonoscopy [V45.89]    Spinal stenosis [724.00]    H/O prostate biopsy [Z98.890]        FAMILY HISTORY:  Family history of stroke (Father)  Father -  age 62    Family history of kidney disease (Mother)  Mother -  age 67    Family history of diabetes mellitus type II (Sibling)  Brother &amp; Sister          [SOCIAL HISTORY: ]     smoking:       EtOH:       illicit drugs:       occupation:       marital status:       Other:       [ALLERGIES/INTOLERANCES:]  Allergies    No Known Allergies    Intolerances    Avelox (Other)  fluoroquinolone antibiotics (Other)  Ketek (Other)  telithromycin (Other)        [MEDICATIONS]  MEDICATIONS  (STANDING):  aspirin enteric coated 81 milliGRAM(s) Oral daily  dextrose 40% Gel 15 Gram(s) Oral once  dextrose 5%. 1000 milliLiter(s) (50 mL/Hr) IV Continuous <Continuous>  dextrose 5%. 1000 milliLiter(s) (100 mL/Hr) IV Continuous <Continuous>  dextrose 50% Injectable 25 Gram(s) IV Push once  dextrose 50% Injectable 12.5 Gram(s) IV Push once  dextrose 50% Injectable 25 Gram(s) IV Push once  enoxaparin Injectable 110 milliGRAM(s) SubCutaneous two times a day  furosemide   Injectable 40 milliGRAM(s) IV Push daily  glucagon  Injectable 1 milliGRAM(s) IntraMuscular once  insulin lispro (ADMELOG) corrective regimen sliding scale   SubCutaneous three times a day before meals  insulin lispro (ADMELOG) corrective regimen sliding scale   SubCutaneous at bedtime  lidocaine   Patch 1 Patch Transdermal every 24 hours  lisinopril 20 milliGRAM(s) Oral daily  pantoprazole    Tablet 40 milliGRAM(s) Oral before breakfast  predniSONE   Tablet 7.5 milliGRAM(s) Oral every other day  pyridostigmine 120 milliGRAM(s) Oral daily  senna 2 Tablet(s) Oral at bedtime    MEDICATIONS  (PRN):        [REVIEW OF SYSTEMS: ]  CONSTITUTIONAL: normal, no fever, no shakes, no chills   EYES: No eye pain, no visual disturbances, no discharge  ENMT:  no discharge  NECK: No pain, no stiffness  BREASTS: No pain, no masses, no nipple discharge  RESPIRATORY: No cough, no wheezing, no chills, no hemoptysis; No shortness of breath  CARDIOVASCULAR: No chest pain, no palpitations, no dizziness, no leg swelling  GASTROINTESTINAL: No abdominal, no epigastric pain. No nausea, no vomiting, no hematemesis; No diarrhea , no constipation. No melena, no hematochezia.  GENITOURINARY: No dysuria, no frequency, no hematuria, no incontinence  NEUROLOGICAL: No headaches, no memory loss, no loss of strength, no numbness, no tremors  SKIN: No itching, no burning, no rashes, no lesions   LYMPH NODES: No enlarged glands  ENDOCRINE: No heat or cold intolerance; No hair loss  MUSCULOSKELETAL: No joint pain or swelling; No muscle, no back, no extremity pain  PSYCHIATRIC: No depression, no anxiety, no mood swings, no difficulty sleeping  HEME/LYMPH: No easy bruising, no bleeding gums      [VITALS SIGNS 24hrs]  Vital Signs Last 24 Hrs  T(C): 36.5 (2021 19:35), Max: 36.7 (2021 22:00)  T(F): 97.7 (2021 19:35), Max: 98.1 (2021 22:00)  HR: 65 (2021 19:35) (62 - 86)  BP: 119/74 (2021 19:35) (104/67 - 142/76)  BP(mean): --  RR: 18 (2021 19:35) (16 - 18)  SpO2: 94% (2021 19:35) (94% - 98%)    [PHYSICAL EXAM]  General: adult in NAD,  WN,  WD.  HEENT: clear oropharynx, anicteric sclera, pink conjunctivae.  Neck: supple, no masses.  CV: normal S1S2, no murmur, no rubs, no gallops.  Lungs: clear to auscultation, no wheezes, no rales, no rhonchi.  Abdomen: soft, non-tender, non-distended, no hepatosplenomegaly, normal BS, no guarding.  Ext: no clubbing, no cyanosis, no edema.  Skin: no rashes,  no petechiae, no venous stasis changes.  Neuro: alert and oriented X3, no focal motor deficits.  LN: no SC NIKI.      [LABS: ]                        15.2   10.38 )-----------( 333      ( 2021 07:03 )             46.5     CBC Full  -  ( 2021 07:03 )  WBC Count : 10.38 K/uL  RBC Count : 5.12 M/uL  Hemoglobin : 15.2 g/dL  Hematocrit : 46.5 %  Platelet Count - Automated : 333 K/uL  Mean Cell Volume : 90.8 fl  Mean Cell Hemoglobin : 29.7 pg  Mean Cell Hemoglobin Concentration : 32.7 gm/dL  Auto Neutrophil # : x  Auto Lymphocyte # : x  Auto Monocyte # : x  Auto Eosinophil # : x  Auto Basophil # : x  Auto Neutrophil % : x  Auto Lymphocyte % : x  Auto Monocyte % : x  Auto Eosinophil % : x  Auto Basophil % : x    -02    138  |  100  |  21  ----------------------------<  80  3.9   |  30  |  0.93    Ca    9.2      2021 07:03    TPro  6.7  /  Alb  3.1<L>  /  TBili  0.6  /  DBili  x   /  AST  23  /  ALT  20  /  AlkPhos  58  06-02      LIVER FUNCTIONS - ( 2021 07:03 )  Alb: 3.1 g/dL / Pro: 6.7 g/dL / ALK PHOS: 58 U/L / ALT: 20 U/L / AST: 23 U/L / GGT: x           CARDIAC MARKERS ( 2021 14:36 )  <.015 ng/mL / x     / x     / x     / x              WBC  TREND (5 Days)  WBC Count: 10.38 K/uL ( @ 07:03)  WBC Count: 10.86 K/uL ( @ 14:36)    HGB  TREND (5 Days)  Hemoglobin: 15.2 g/dL ( @ 07:03)  Hemoglobin: 15.5 g/dL ( @ 14:36)    HCT  TREND (5 Days)  Hematocrit: 46.5 % ( @ 07:03)  Hematocrit: 46.4 % ( @ 14:36)    PLT  TREND (5 Days)  Platelet Count - Automated: 333 K/uL ( @ 07:03)  Platelet Count - Automated: 335 K/uL ( @ 14:36)      Anemia Studies                      ***********************  Myeloma Studies                          Microbiology        [PATHOLOGY]       **********************      [RADIOLOGY & ADDITIONAL STUDIES:]        Patient is a 76y old  Male who presents with a chief complaint of PE (2021 11:51)      HPI:  77 yo m pmh myasthenia gravis, htn, hld, type 2 dm (diet controlled- believes last a1c 6), chronic back pain, club foot, presents with complaint of lower extremity swelling. pt states approx 2 weeks ago he had a prostate biopsy with Dr. Sims. He was due to follow up for results and when he saw Dr. Sims he presented with new lower extremity edema. Dr. Sims referred pt back to Dr. Wade PCP. PCP noted pt had new lower extremity swelling and appeared short of breath. Pts son at bedside states pt also  has seemed much more short of breath recently however pt denies. PCP sent pt to ED for further evaluation.   Pt states swelling began 1 week ago, he has never had similar swelling. He does admit to being sedentary but states this is unchanged from his baseline as he has myasthenia gravis and chronic back pain. he only ambulates to get to and from the bathroom which he does frequently due to his hctz. denies fall/trauma but admits to left hip pain which was evaluated and he was told there was no pathology found.  Pt denies chest pain, palpitations, syncopal episodes, lightheadedness dizziness, shortness of breath, wheezing or cough. Denies previous blood clots.   of note per patient prostate biopsy was negative for malignancy.     In the ED CBC, Ddimer, CMP, troponin, probnp, EKG, CTA, COVID pcr were performed Significant for wbc 10.86, ddimer 1130, albumin 3.2, troponin and probnp wnl. EKG Sinus rhythm 73bpm Left anterior fasicular block. CTA revealed Pulmonary emboli in the right upper and lower lobe pulmonary arteries with extension into segmental branches, segmental and subsegmental branches in the right middle lobe, and left upper lobe branches to the lingula. No evidence of heart strain. Pt was given lasix 40 IVP x 1.  (2021 21:09)    Heme asked to see pt for new x PE  No personal hx VTE.  no travel  no surgery.   More sedentary  had sen urology for prostate bx in last 2mo    No dx COVID  refuses vaccination till can obtain with PMD.     +10lbs unintentional wt loss in 1mo  Colonoscopy 2017  EGD 2019  c/o L hip pain x1mo    PAST MEDICAL & SURGICAL HISTORY:  Calculus of kidney  Club foot, Born Right Foot  Myasthenia gravis  Hypertension  Diabetes, Type 2 - does not take medications - monitors Blood Glucose at home - diet controlled  Urinary tract infection, notes h/o UTI&#x27;s  Hyperlipidemia  Elective surgery, 1956 age 11 @ [facility] - cut under Patella secondary 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  Club foot, Surgery at birth for Club Foot Right foot  Pilonidal cyst, Surgery 40 years ago  H/O colonoscopy  Spinal stenosis  H/O prostate biopsy       HEALTH ISSUES - PROBLEM Dx:  Pulmonary thromboembolism  Suspected deep vein thrombosis (DVT)    Other pulmonary embolism without acute cor pulmonale [I26.99]  Calculus of kidney [592.0]  Club foot [754.70]  Myasthenia gravis [358.00]  Hypertension [401.9]  Diabetes [250.00]  Urinary tract infection [599.0]  Hyperlipidemia [E78.5]  Elective surgery [V50.9]  Club foot [754.70]  Pilonidal cyst [685.1]  H/O colonoscopy [V45.89]  Spinal stenosis [724.00]  H/O prostate biopsy [Z98.890]      FAMILY HISTORY:  Family history of stroke (Father)  Father -  age 62    Family history of kidney disease (Mother)  Mother -  age 67    Family history of diabetes mellitus type II (Sibling)  Brother &amp; Sister      mother  63yo, CHF, ? CVA  father  63yo CVA  Eldest sister Nelsy,  MI, DM  older brother Bruce. , DM, amputations, complicaiton of DM      [SOCIAL HISTORY: ]     smoking:  ex-moker 1PPD, quit x25yrs     EtOH:  denies     illicit drugs:  denies     occupation:  retired [employer] in [employer]     marital status:   since 2018     Other: 1 son Olvin      [ALLERGIES/INTOLERANCES:]  Allergies     No Known Allergies  Intolerances     Avelox (Other)     fluoroquinolone antibiotics (Other)     Ketek (Other)     telithromycin (Other)      [MEDICATIONS]  MEDICATIONS  (STANDING):  aspirin enteric coated 81 milliGRAM(s) Oral daily  dextrose 40% Gel 15 Gram(s) Oral once  dextrose 5%. 1000 milliLiter(s) (50 mL/Hr) IV Continuous <Continuous>  dextrose 5%. 1000 milliLiter(s) (100 mL/Hr) IV Continuous <Continuous>  dextrose 50% Injectable 25 Gram(s) IV Push once  dextrose 50% Injectable 12.5 Gram(s) IV Push once  dextrose 50% Injectable 25 Gram(s) IV Push once  enoxaparin Injectable 110 milliGRAM(s) SubCutaneous two times a day  furosemide   Injectable 40 milliGRAM(s) IV Push daily  glucagon  Injectable 1 milliGRAM(s) IntraMuscular once  insulin lispro (ADMELOG) corrective regimen sliding scale   SubCutaneous three times a day before meals  insulin lispro (ADMELOG) corrective regimen sliding scale   SubCutaneous at bedtime  lidocaine   Patch 1 Patch Transdermal every 24 hours  lisinopril 20 milliGRAM(s) Oral daily  pantoprazole    Tablet 40 milliGRAM(s) Oral before breakfast  predniSONE   Tablet 7.5 milliGRAM(s) Oral every other day  pyridostigmine 120 milliGRAM(s) Oral daily  senna 2 Tablet(s) Oral at bedtime    MEDICATIONS  (PRN):      [REVIEW OF SYSTEMS: ]  CONSTITUTIONAL: normal, no fever, no shakes, no chills   EYES: No eye pain, no visual disturbances, no discharge  ENMT:  no discharge  NECK: No pain, no stiffness  BREASTS: No pain, no masses, no nipple discharge  RESPIRATORY: No cough, no wheezing, no chills, no hemoptysis; No shortness of breath  CARDIOVASCULAR: No chest pain, no palpitations, no dizziness, no leg swelling  GASTROINTESTINAL: No abdominal, no epigastric pain. No nausea, no vomiting, no hematemesis; No diarrhea , no constipation. No melena, no hematochezia.  GENITOURINARY: No dysuria, no frequency, no hematuria, no incontinence  NEUROLOGICAL: No headaches, no memory loss, no loss of strength, no numbness, no tremors  SKIN: No itching, no burning, no rashes, no lesions   LYMPH NODES: No enlarged glands  ENDOCRINE: No heat or cold intolerance; No hair loss  MUSCULOSKELETAL: No joint pain or swelling; No muscle, no back, no extremity pain  PSYCHIATRIC: No depression, no anxiety, no mood swings, no difficulty sleeping  HEME/LYMPH: No easy bruising, no bleeding gums      [VITALS SIGNS 24hrs]  Vital Signs Last 24 Hrs  T(C): 36.5 (2021 19:35), Max: 36.7 (2021 22:00)  T(F): 97.7 (2021 19:35), Max: 98.1 (2021 22:00)  HR: 65 (2021 19:35) (62 - 86)  BP: 119/74 (2021 19:35) (104/67 - 142/76)  BP(mean): --  RR: 18 (2021 19:35) (16 - 18)  SpO2: 94% (2021 19:35) (94% - 98%)      [PHYSICAL EXAM]  General: adult in NAD,  WN,  WD.  HEENT: clear oropharynx, anicteric sclera, pink conjunctivae.  Neck: supple, no masses.  CV: normal S1S2, no murmur, no rubs, no gallops.  Lungs: clear to auscultation, no wheezes, no rales, no rhonchi.  Abdomen: soft, non-tender, non-distended, no hepatosplenomegaly, normal BS, no guarding.  Ext: no clubbing, no cyanosis, no edema.  Skin: no rashes,  no petechiae, no venous stasis changes.  Neuro: alert and oriented X3, no focal motor deficits.  LN: no SC NIKI.      [LABS: ]                        15.2   10.38 )-----------( 333      ( 2021 07:03 )             46.5     CBC Full  -  ( 2021 07:03 )  WBC Count : 10.38 K/uL  RBC Count : 5.12 M/uL  Hemoglobin : 15.2 g/dL  Hematocrit : 46.5 %  Platelet Count - Automated : 333 K/uL  Mean Cell Volume : 90.8 fl  Mean Cell Hemoglobin : 29.7 pg  Mean Cell Hemoglobin Concentration : 32.7 gm/dL  Auto Neutrophil # : x  Auto Lymphocyte # : x  Auto Monocyte # : x  Auto Eosinophil # : x  Auto Basophil # : x  Auto Neutrophil % : x  Auto Lymphocyte % : x  Auto Monocyte % : x  Auto Eosinophil % : x  Auto Basophil % : x    06-02    138  |  100  |  21  ----------------------------<  80  3.9   |  30  |  0.93    Ca    9.2      2021 07:03    TPro  6.7  /  Alb  3.1<L>  /  TBili  0.6  /  DBili  x   /  AST  23  /  ALT  20  /  AlkPhos  58  -      LIVER FUNCTIONS - ( 2021 07:03 )  Alb: 3.1 g/dL / Pro: 6.7 g/dL / ALK PHOS: 58 U/L / ALT: 20 U/L / AST: 23 U/L / GGT: x           CARDIAC MARKERS ( 2021 14:36 )  <.015 ng/mL / x     / x     / x     / x              WBC  TREND (5 Days)  WBC Count: 10.38 K/uL ( @ 07:03)  WBC Count: 10.86 K/uL ( @ 14:36)    HGB  TREND (5 Days)  Hemoglobin: 15.2 g/dL ( @ 07:03)  Hemoglobin: 15.5 g/dL ( @ 14:36)    HCT  TREND (5 Days)  Hematocrit: 46.5 % ( @ 07:03)  Hematocrit: 46.4 % ( @ 14:36)    PLT  TREND (5 Days)  Platelet Count - Automated: 333 K/uL ( @ 07:03)  Platelet Count - Automated: 335 K/uL ( @ 14:36)        [RADIOLOGY & ADDITIONAL STUDIES:]       < from: CT Angio Chest PE Protocol w/ IV Cont (21 @ 18:09) >  EXAM:  CT ANGIO CHEST PULM [facility]                        PROCEDURE DATE:  2021    INTERPRETATION:  CLINICAL INFORMATION: Positive d-dimer. Leg swelling.  COMPARISON: CT abdomen/pelvis 2021  FINDINGS:  ·	LUNGS AND AIRWAYS: Secretions in the trachea. Calcified granulomas in the right upper lobe. Subcentimeter peripheral nodules in the right and left lower lobes, greater on the right, not significantly changed since 2015.  ·	PLEURA: No pleural effusion.  ·	MEDIASTINUM AND KONRAD: No enlarged mediastinal lymph nodes. Calcified mediastinal and right hilar lymph nodes.  ·	VESSELS: Pulmonary emboli in the right upper and lower lobe pulmonary arteries with extension into segmental branches, segmental and subsegmental branches in the right middle lobe, and left upper lobe branches to the lingula. Thoracic aorta normal in caliber. Coronary artery calcifications.  ·	HEART: Heart size is normal. No pericardial effusion. No evidence of right heart strain.  ·	CHEST WALL AND LOWER NECK: No enlarged axillary lymph nodes.  ·	VISUALIZED UPPER ABDOMEN: Calcified granulomas in the liver and spleen. Fatty infiltration of the pancreas. Nonobstructive calculus in the lower pole of the left kidney measuring 6 mm. Atherosclerotic changes including the superior mesenteric artery and renal arteries.  ·	BONES: Degenerative changes in the spine.  IMPRESSION:  ·	Bilateral pulmonary emboli as described above.  ·	No evidence of right heart strain.  ·	The findings were discussed with BENITO Jaramillo on 2021 7:48 PM  < end of copied text >      < from: US Duplex Venous Lower Ext Complete, Bilateral (21 @ 17:07) >  EXAM:  US DPLX LWR EXT VEINS COMPL BI                        PROCEDURE DATE:  2021    INTERPRETATION:  CLINICAL INFORMATION: Bilateral lower extremity swelling.  COMPARISON: None available.  TECHNIQUE: Duplex sonography of the BILATERAL LOWER extremity veins with color and spectral Doppler, with and without compression.  FINDINGS:  RIGHT:  ·	Normal compressibility of the RIGHT common femoral, femoral and popliteal veins.  ·	Doppler examination shows normal spontaneous and phasic flow.  ·	No RIGHT calf vein thrombosis is detected.  LEFT:  ·	Normal compressibility of the LEFT common femoral, femoral and popliteal veins.  ·	Doppler examination shows normal spontaneous and phasic flow.  ·	No LEFT calf vein thrombosis is detected.  ·	Subcutaneous edema in the calves.  IMPRESSION:  ·	No evidence of deep venous thrombosis in either lower extremity.  < end of copied text >        < from: CT Renal Stone Hunt (21 @ 11:17) >  EXAM:  CT RENAL STONE HUNT                        PROCEDURE DATE:  2021    INTERPRETATION:  CLINICAL INFORMATION: Left flank pain. Recent urinary tract infection  COMPARISON: 2/3/2020  FINDINGS:  ·	LOWER CHEST: 3 small right lower lobe lung nodules,unchanged since the previous abdominal CT dated 2016. Given more then 4 years of stability, these are likely benign. Small left atelectasis.  ·	LIVER: Punctate calcifications are again seen in the liver, likely due to prior granulomatous disease.  ·	BILE DUCTS: Normal caliber.  ·	GALLBLADDER: Within normal limits.  ·	SPLEEN: Punctate calcifications are again seen in the spleen, likely due to prior granulomatous disease.  ·	PANCREAS: Within normal limits.  ·	ADRENALS: Within normal limits.  ·	KIDNEYS/URETERS: Small nonobstructive calculi in the left kidney.  No evidence for a calculus in the left ureter or right kidney.  Stable 6 mm calculus at the right distal ureter (image 93 series 2). No hydronephrosis.  ·	BLADDER: Within normal limits.  ·	REPRODUCTIVE ORGANS: The prostate is enlarged, unchanged.  ·	BOWEL: No bowel obstruction or grossly thickened bowel wall.  Colonic diverticulosis without evidence for diverticulitis. Small periampullary duodenal diverticulum. Appendix within normal limits.  ·	PERITONEUM: No free air or ascites.  ·	VESSELS: Calcified atherosclerotic disease.  ·	RETROPERITONEUM/LYMPH NODES: No lymphadenopathy.  ·	ABDOMINAL WALL: Small fat containing umbilical hernia.  ·	BONES: Degenerative spondylosis.  IMPRESSION:  ·	Small nonobstructive calculi in the left kidney.  No evidence for a calculus in the left ureter.  Stable 6 mm calculus at the right distal ureter. No hydronephrosis.  < end of copied text >

## 2021-06-02 NOTE — PROGRESS NOTE ADULT - ASSESSMENT
75-year-old male with a history of hypertension, dyslipidemia (intolerant of statin drugs, and is now on Praluent), obesity, myasthenia gravis, diet-controlled diabetes, chronic lower extremity swelling who presents to ED c/o progressively worsening shortness of breath and painless bilateral lower extremity edema x 1 week. Cardiology consulted for CHF. He follows Dr Woods for cardiology.     PE  - Patient p/w chronic FAUSTIN but recently has gotten worse and BLE edema worseing x 1 week  - D-dimer 1130  - CTA done revealing bilateral PEs  - Lower extremity doppler negative for DVT  - Full dose Lovenox initiated would change to DOAC when able  - Follow up TTE to assess for right heart strain    HfpEF  - Echo: 1/31/2017, Stage 1 diastolic dysfunction., normal LV function, normal LA size, mild mitral regurgitation   - Serum Pro-Brain Natriuretic Peptide: 158 pg/mL (06-01-21 @ 14:36)  - EKG showed SR, LAFB, similar to old EKG. No ischemic changes noted.   - Stress Test: 9/2014, no Ischemia, Vasodilator nuclear perfusion stress test was negative for ischemia. There was normal left ventricular systolic function and normal left ventricular myocardial perfusion. Post stress EF 61%.   -S/P  Lasix 40 mg IV x 1 in ED would continue to diurese with Lasix 40 mg IV QD  - D/C HCTZ  - Awaiting repeat transthoracic echocardiogram to assess ventricular function and r/o valvular abnormalities.     Hypertension  - BP: 114/64 (06-02-21 @ 05:07) (108/64 - 142/76)  - Continue Lisinopril 20 mg QD  - Monitor routine hemodynamics     Hyperlipidemia  - Patient intolerant to statins   - Continue Praluent       - Monitor and replete lytes, keep K>4, Mg>2.  - All other medical needs as per primary team.  - Other cardiovascular workup will depend on clinical course.  - Will continue to follow.    Yadi Crow, MS ANP, [facility]  Nurse Practitioner- Cardiology   Spectra #[phone]

## 2021-06-02 NOTE — PROGRESS NOTE ADULT - SUBJECTIVE AND OBJECTIVE BOX
Patient is a 76y old  Male who presents with a chief complaint of PE (02 Jun 2021 07:29)       INTERVAL HPI/OVERNIGHT EVENTS: Patient seen and examined at bedside. Denies chest pain, palpitation, sob     MEDICATIONS  (STANDING):  aspirin enteric coated 81 milliGRAM(s) Oral daily  dextrose 40% Gel 15 Gram(s) Oral once  dextrose 5%. 1000 milliLiter(s) (50 mL/Hr) IV Continuous <Continuous>  dextrose 5%. 1000 milliLiter(s) (100 mL/Hr) IV Continuous <Continuous>  dextrose 50% Injectable 25 Gram(s) IV Push once  dextrose 50% Injectable 12.5 Gram(s) IV Push once  dextrose 50% Injectable 25 Gram(s) IV Push once  enoxaparin Injectable 110 milliGRAM(s) SubCutaneous two times a day  glucagon  Injectable 1 milliGRAM(s) IntraMuscular once  hydrochlorothiazide 12.5 milliGRAM(s) Oral daily  insulin lispro (ADMELOG) corrective regimen sliding scale   SubCutaneous three times a day before meals  insulin lispro (ADMELOG) corrective regimen sliding scale   SubCutaneous at bedtime  lidocaine   Patch 1 Patch Transdermal every 24 hours  lisinopril 20 milliGRAM(s) Oral daily  pantoprazole    Tablet 40 milliGRAM(s) Oral before breakfast  predniSONE   Tablet 7.5 milliGRAM(s) Oral every other day  pyridostigmine 120 milliGRAM(s) Oral daily  senna 2 Tablet(s) Oral at bedtime    MEDICATIONS  (PRN):      Allergies    No Known Allergies    Intolerances    Avelox (Other)  fluoroquinolone antibiotics (Other)  Ketek (Other)  telithromycin (Other)      REVIEW OF SYSTEMS:  All 10 systems re  Vital Signs Last 24 Hrs  T(C): 36.7 (02 Jun 2021 05:07), Max: 36.8 (01 Jun 2021 14:30)  T(F): 98.1 (02 Jun 2021 05:07), Max: 98.2 (01 Jun 2021 14:30)  HR: 62 (02 Jun 2021 05:07) (62 - 88)  BP: 114/64 (02 Jun 2021 05:07) (108/64 - 142/76)  BP(mean): --  RR: 17 (02 Jun 2021 05:07) (16 - 18)  SpO2: 94% (02 Jun 2021 05:07) (94% - 98%)    PHYSICAL EXAM:  GENERAL: NAD, Awake, Alert   HEAD:  Atraumatic, Normocephalic  EYES: EOMI, PERRLA, conjunctiva and sclera clear  ENMT: No tonsillar erythema, exudates, or enlargement; Moist mucous membranes  NECK: Supple, No JVD, Normal thyroid  NERVOUS SYSTEM:  Alert & Oriented X3, no focal deficits   CHEST/LUNG: Clear to auscultation bilaterally; No rales, rhonchi, wheezing, or rubs  HEART: S1S2+. Regular rate and rhythm  ABDOMEN: Soft, Nontender, Nondistended; Bowel sounds present  EXTREMITIES:  2+ Peripheral Pulses, No clubbing, cyanosis  LYMPH: No lymphadenopathy noted  SKIN: No rashes or lesions    LABS:                        15.2   10.38 )-----------( 333      ( 02 Jun 2021 07:03 )             46.5     02 Jun 2021 07:03    138    |  100    |  21     ----------------------------<  80     3.9     |  30     |  0.93     Ca    9.2        02 Jun 2021 07:03    TPro  6.7    /  Alb  3.1    /  TBili  0.6    /  DBili  x      /  AST  23     /  ALT  20     /  AlkPhos  58     02 Jun 2021 07:03      CAPILLARY BLOOD GLUCOSE      POCT Blood Glucose.: 92 mg/dL (02 Jun 2021 08:00)  POCT Blood Glucose.: 108 mg/dL (01 Jun 2021 21:45)    BLOOD CULTURE    RADIOLOGY & ADDITIONAL TESTS:    Imaging Personally Reviewed:  [ ] YES     Consultant(s) Notes Reviewed:      Care Discussed with Consultants/Other Providers:

## 2021-06-02 NOTE — PROGRESS NOTE ADULT - ASSESSMENT
75 yo m pmh myasthenia gravis, htn, hld, type 2 dm (diet controlled- believes last a1c 6), chronic back pain, club foot, presents with complaint of lower extremity swelling found to have bilateral PE     # Pulmonary Emboli  - pt presenting with new onset lower extremity edema found to have bilateral PE   - CTA : Pulmonary emboli in the right upper and lower lobe pulmonary arteries with extension into segmental branches, segmental and subsegmental branches in the right middle lobe, and left upper lobe branches to the lingula  - pt is hemodynamically stable. troponin, probnp WNL. no evidence of heart strain on imaging  - Continue with lovenox 110mg SQ BID   - F/u  TTE results   - PESI 88, class III intermediate risk.   - Cardiology Consulted- [facility] as well Hematology Dr. 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   - tropoinin <.15  - continue asa 81mg   - cardiology following    # Myasthenia Gravis   - continue with pyridostigmine   - continue with prednisone   - pt with minimal ambulation, is not interested in physical therapy  - of note pt complaining of left hip pain, previously evaluated. will order lidoderm patch- should not be contraindicated given limited systemic absorption however if any worsening weakness noted will d/c   - Refusing PT, worried about weakness and fall. Neuro Consulted Dr. VERONIQUE Cunningham     #Hypertension  - Stable   - continue lisinopril, hctz     #HLD  - per cardio intolerant to statins   - continue praluent upon d/c     # DM type 2 , not on medications  - diet controlled  - f/u am a1c. per pt recent alc 6  - glucose on presentation WNL  - monitor accucheks  - consistent carb diet     # GERD  - continue pantoprazole, interchange for home omeprazole     # CT findings:  - Calcified granulomas in the liver and spleen. Fatty infiltration of the pancreas. Nonobstructive calculus in the lower pole of the left kidney measuring 6 mm. Atherosclerotic changes including the superior mesenteric artery and renal arteries.   - no emergent interventions. monitor. f/u outpatient.     #Prophylactic measure  - DVT ppx - on full a/c for PE.        77 yo m pmh myasthenia gravis, htn, hld, type 2 dm (diet controlled- believes last a1c 6), chronic back pain, club foot, presents with complaint of lower extremity swelling found to have bilateral PE     # Pulmonary Emboli  - pt presenting with new onset lower extremity edema found to have bilateral PE   - CTA : Pulmonary emboli in the right upper and lower lobe pulmonary arteries with extension into segmental branches, segmental and subsegmental branches in the right middle lobe, and left upper lobe branches to the lingula  - pt is hemodynamically stable. troponin, probnp WNL. no evidence of heart strain on imaging  - Continue with lovenox 110mg SQ BID   - F/u  TTE results   - PESI 88, class III intermediate risk.   - Cardiology Consulted- [facility] as well Hematology Dr. 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   - tropoinin <.15  - continue asa 81mg   - cardiology following    # Myasthenia Gravis   - continue with pyridostigmine   - continue with prednisone   - pt with minimal ambulation, is not interested in physical therapy  - of note pt complaining of left hip pain, previously evaluated. will order lidoderm patch- should not be contraindicated given limited systemic absorption however if any worsening weakness noted will d/c   - Refusing PT, worried about weakness and fall. Neuro Consulted Dr. VERONIQUE Cunningham     #Hypertension  - Stable   - continue lisinopril, hctz     #HLD  - per cardio intolerant to statins   - continue praluent upon d/c     # DM type 2 , not on medications  - diet controlled  - f/u am a1c. per pt recent alc 6  - glucose on presentation WNL  - monitor accucheks  - consistent carb diet     # GERD  - continue pantoprazole, interchange for home omeprazole     # CT findings:  - Calcified granulomas in the liver and spleen. Fatty infiltration of the pancreas. Nonobstructive calculus in the lower pole of the left kidney measuring 6 mm. Atherosclerotic changes including the superior mesenteric artery and renal arteries.   - no emergent interventions. monitor. f/u outpatient.     #Prophylactic measure  - DVT ppx - on full a/c for PE.

## 2021-06-03 ENCOUNTER — TRANSCRIPTION ENCOUNTER (OUTPATIENT)
Age: 76
End: 2021-06-03

## 2021-06-03 LAB
ANA TITR SER: NEGATIVE — SIGNIFICANT CHANGE UP
ANION GAP SERPL CALC-SCNC: 8 MMOL/L — SIGNIFICANT CHANGE UP (ref 5–17)
BUN SERPL-MCNC: 20 MG/DL — SIGNIFICANT CHANGE UP (ref 7–23)
CALCIUM SERPL-MCNC: 9.6 MG/DL — SIGNIFICANT CHANGE UP (ref 8.5–10.1)
CHLORIDE SERPL-SCNC: 98 MMOL/L — SIGNIFICANT CHANGE UP (ref 96–108)
CO2 SERPL-SCNC: 31 MMOL/L — SIGNIFICANT CHANGE UP (ref 22–31)
CREAT SERPL-MCNC: 0.99 MG/DL — SIGNIFICANT CHANGE UP (ref 0.5–1.3)
GLUCOSE SERPL-MCNC: 96 MG/DL — SIGNIFICANT CHANGE UP (ref 70–99)
HCT VFR BLD CALC: 51.3 % — HIGH (ref 39–50)
HGB BLD-MCNC: 16.9 G/DL — SIGNIFICANT CHANGE UP (ref 13–17)
MAGNESIUM SERPL-MCNC: 2.4 MG/DL — SIGNIFICANT CHANGE UP (ref 1.6–2.6)
MCHC RBC-ENTMCNC: 30 PG — SIGNIFICANT CHANGE UP (ref 27–34)
MCHC RBC-ENTMCNC: 32.9 GM/DL — SIGNIFICANT CHANGE UP (ref 32–36)
MCV RBC AUTO: 91.1 FL — SIGNIFICANT CHANGE UP (ref 80–100)
NRBC # BLD: 0 /100 WBCS — SIGNIFICANT CHANGE UP (ref 0–0)
PHOSPHATE SERPL-MCNC: 3.2 MG/DL — SIGNIFICANT CHANGE UP (ref 2.5–4.5)
PLATELET # BLD AUTO: 376 K/UL — SIGNIFICANT CHANGE UP (ref 150–400)
POTASSIUM SERPL-MCNC: 3.7 MMOL/L — SIGNIFICANT CHANGE UP (ref 3.5–5.3)
POTASSIUM SERPL-SCNC: 3.7 MMOL/L — SIGNIFICANT CHANGE UP (ref 3.5–5.3)
RBC # BLD: 5.63 M/UL — SIGNIFICANT CHANGE UP (ref 4.2–5.8)
RBC # FLD: 15 % — HIGH (ref 10.3–14.5)
SODIUM SERPL-SCNC: 137 MMOL/L — SIGNIFICANT CHANGE UP (ref 135–145)
WBC # BLD: 10.22 K/UL — SIGNIFICANT CHANGE UP (ref 3.8–10.5)
WBC # FLD AUTO: 10.22 K/UL — SIGNIFICANT CHANGE UP (ref 3.8–10.5)

## 2021-06-03 PROCEDURE — 99232 SBSQ HOSP IP/OBS MODERATE 35: CPT

## 2021-06-03 PROCEDURE — 99233 SBSQ HOSP IP/OBS HIGH 50: CPT

## 2021-06-03 PROCEDURE — 73522 X-RAY EXAM HIPS BI 3-4 VIEWS: CPT | Mod: 26

## 2021-06-03 RX ORDER — SENNA PLUS 8.6 MG/1
2 TABLET ORAL
Qty: 0 | Refills: 0 | DISCHARGE
Start: 2021-06-03

## 2021-06-03 RX ORDER — APIXABAN 2.5 MG/1
1 TABLET, FILM COATED ORAL
Qty: 60 | Refills: 0
Start: 2021-06-03 | End: 2021-07-02

## 2021-06-03 RX ORDER — LIDOCAINE 4 G/100G
1 CREAM TOPICAL
Qty: 0 | Refills: 0 | DISCHARGE
Start: 2021-06-03

## 2021-06-03 RX ORDER — FUROSEMIDE 40 MG
40 TABLET ORAL DAILY
Refills: 0 | Status: DISCONTINUED | OUTPATIENT
Start: 2021-06-04 | End: 2021-06-04

## 2021-06-03 RX ADMIN — SENNA PLUS 2 TABLET(S): 8.6 TABLET ORAL at 21:31

## 2021-06-03 RX ADMIN — LIDOCAINE 1 PATCH: 4 CREAM TOPICAL at 20:11

## 2021-06-03 RX ADMIN — LIDOCAINE 1 PATCH: 4 CREAM TOPICAL at 23:35

## 2021-06-03 RX ADMIN — Medication 40 MILLIGRAM(S): at 05:12

## 2021-06-03 RX ADMIN — ENOXAPARIN SODIUM 110 MILLIGRAM(S): 100 INJECTION SUBCUTANEOUS at 21:30

## 2021-06-03 RX ADMIN — ENOXAPARIN SODIUM 110 MILLIGRAM(S): 100 INJECTION SUBCUTANEOUS at 08:15

## 2021-06-03 RX ADMIN — LIDOCAINE 1 PATCH: 4 CREAM TOPICAL at 11:30

## 2021-06-03 RX ADMIN — PYRIDOSTIGMINE BROMIDE 120 MILLIGRAM(S): 60 SOLUTION ORAL at 11:30

## 2021-06-03 RX ADMIN — PANTOPRAZOLE SODIUM 40 MILLIGRAM(S): 20 TABLET, DELAYED RELEASE ORAL at 05:13

## 2021-06-03 RX ADMIN — LIDOCAINE 1 PATCH: 4 CREAM TOPICAL at 00:17

## 2021-06-03 RX ADMIN — Medication 81 MILLIGRAM(S): at 11:30

## 2021-06-03 NOTE — DISCHARGE NOTE PROVIDER - PROVIDER TOKENS
PROVIDER:[TOKEN:[[id number]]],PROVIDER:[TOKEN:[[id number]]] PROVIDER:[TOKEN:[[id number]]],PROVIDER:[TOKEN:[[id number]]]

## 2021-06-03 NOTE — DISCHARGE NOTE PROVIDER - CARE PROVIDERS DIRECT ADDRESSES
,DirectAddress_Unknown,DirectAddress_Unknown ,DirectAddress_Unknown,[email or web address]

## 2021-06-03 NOTE — DISCHARGE NOTE PROVIDER - NSDCMRMEDTOKEN_GEN_ALL_CORE_FT
aspirin 81 mg oral tablet: 1 tab(s) orally every other day  Eliquis Starter Pack for Treatment of DVT and PE 5 mg oral tablet: 1 tab(s) orally 2 times a day   lidocaine 5% topical film: Apply topically to affected area once a day  lisinopril-hydrochlorothiazide 20 mg-12.5 mg oral tablet: 1 tab(s) orally once a day  omeprazole 40 mg oral delayed release capsule: 1 cap(s) orally once a day  Praluent Pen 75 mg/mL subcutaneous solution:  subcutaneous every 2 weeks  predniSONE: 7.5 milligram(s) orally every other day (at bedtime) - pt takes in AM  pyridostigmine 60 mg oral tablet: 2 tab(s) orally once a day  senna oral tablet: 2 tab(s) orally once a day (at bedtime), As Needed for constipation    aspirin 81 mg oral tablet: 1 tab(s) orally every other day. Please f/u with your doctor to see if can continue since you are also on Eliquis now   Eliquis Starter Pack for Treatment of DVT and PE 5 mg oral tablet: 1 tab(s) orally 2 times a day   furosemide 40 mg oral tablet: 1 tab(s) orally once a day  lidocaine 5% topical film: Apply topically to affected area once a day  lisinopril-hydrochlorothiazide 20 mg-12.5 mg oral tablet: 1 tab(s) orally once a day  omeprazole 40 mg oral delayed release capsule: 1 cap(s) orally once a day  Praluent Pen 75 mg/mL subcutaneous solution:  subcutaneous every 2 weeks  predniSONE: 7.5 milligram(s) orally every other day (at bedtime) - pt takes in AM  pyridostigmine 60 mg oral tablet: 2 tab(s) orally once a day  senna oral tablet: 2 tab(s) orally once a day (at bedtime), As Needed for constipation    aspirin 81 mg oral tablet: 1 tab(s) orally every other day. Please f/u with your doctor to see if can continue since you are also on Eliquis now   Eliquis Starter Pack for Treatment of DVT and PE 5 mg oral tablet: 1 tab(s) orally 2 times a day   furosemide 40 mg oral tablet: 1 tab(s) orally once a day  lidocaine 5% topical film: Apply topically to affected area once a day  lisinopril-hydrochlorothiazide 20 mg-12.5 mg oral tablet: 1 tab(s) orally once a day  metoprolol succinate 25 mg oral tablet, extended release: 1 tab(s) orally once a day  omeprazole 40 mg oral delayed release capsule: 1 cap(s) orally once a day  Praluent Pen 75 mg/mL subcutaneous solution:  subcutaneous every 2 weeks  predniSONE: 7.5 milligram(s) orally every other day (at bedtime) - pt takes in AM  pyridostigmine 60 mg oral tablet: 2 tab(s) orally once a day  senna oral tablet: 2 tab(s) orally once a day (at bedtime), As Needed for constipation

## 2021-06-03 NOTE — PROGRESS NOTE ADULT - ASSESSMENT
75 yo m pmh myasthenia gravis, htn, hld, type 2 dm (diet controlled- believes last a1c 6), chronic back pain, club foot, presents with complaint of lower extremity swelling found to have bilateral PE     # Pulmonary Emboli  - pt presenting with new onset lower extremity edema found to have bilateral PE   - CTA : Pulmonary emboli in the right upper and lower lobe pulmonary arteries with extension into segmental branches, segmental and subsegmental branches in the right middle lobe, and left upper lobe branches to the lingula  - pt is hemodynamically stable. troponin, probnp WNL. no evidence of heart strain on imaging  - Continue with lovenox 110mg SQ BID   - Will switch to Eliquis upon discharge, when ready     # Acute diastolic  CHF   elevated BNP upon admission  Continue IV lasix daily per cardio for now  Strict I&O  Daily weight. TTE + diastolic dysfunction, EF 55-60%  Had episodes of NSVT on Monitor. Keep K >4.0 and Mg > 2.00  Cardio f/u       # Myasthenia Gravis   - continue with pyridostigmine   - continue with prednisone   - F/u X ray of Hip   - Neuro f/u     #Hypertension  - Stable   - continue lisinopril, hctz     #HLD  - per cardio intolerant to statins   - continue praluent upon d/c     # DM type 2 , not on medications  - diet controlled  - HbA1c 5.9   - glucose on presentation WNL  - monitor accucheks  - consistent carb diet     # GERD  - continue pantoprazole, interchange for home omeprazole     # CT findings:  - Calcified granulomas in the liver and spleen. Fatty infiltration of the pancreas. Nonobstructive calculus in the lower pole of the left kidney measuring 6 mm. Atherosclerotic changes including the superior mesenteric artery and renal arteries.   - no emergent interventions. monitor. f/u outpatient. Patient made aware and will f/u with his doctor.     #Prophylactic measure  - DVT ppx - on full a/c for PE.

## 2021-06-03 NOTE — PROGRESS NOTE ADULT - SUBJECTIVE AND OBJECTIVE BOX
Neurology follow up note    DEION [id number]      Interval History:    Patient feels ok no new complaints.    MEDICATIONS    aspirin enteric coated 81 milliGRAM(s) Oral daily  dextrose 40% Gel 15 Gram(s) Oral once  dextrose 5%. 1000 milliLiter(s) IV Continuous <Continuous>  dextrose 5%. 1000 milliLiter(s) IV Continuous <Continuous>  dextrose 50% Injectable 25 Gram(s) IV Push once  dextrose 50% Injectable 12.5 Gram(s) IV Push once  dextrose 50% Injectable 25 Gram(s) IV Push once  enoxaparin Injectable 110 milliGRAM(s) SubCutaneous two times a day  furosemide   Injectable 40 milliGRAM(s) IV Push daily  glucagon  Injectable 1 milliGRAM(s) IntraMuscular once  insulin lispro (ADMELOG) corrective regimen sliding scale   SubCutaneous three times a day before meals  insulin lispro (ADMELOG) corrective regimen sliding scale   SubCutaneous at bedtime  lidocaine   Patch 1 Patch Transdermal every 24 hours  lisinopril 20 milliGRAM(s) Oral daily  pantoprazole    Tablet 40 milliGRAM(s) Oral before breakfast  predniSONE   Tablet 7.5 milliGRAM(s) Oral every other day  pyridostigmine 120 milliGRAM(s) Oral daily  senna 2 Tablet(s) Oral at bedtime      Allergies    No Known Allergies    Intolerances    Avelox (Other)  fluoroquinolone antibiotics (Other)  Ketek (Other)  telithromycin (Other)        Weight (kg): 109 (06-03 @ 04:39)    Vital Signs Last 24 Hrs  T(C): 36.4 (03 Jun 2021 04:39), Max: 36.5 (02 Jun 2021 14:43)  T(F): 97.5 (03 Jun 2021 04:39), Max: 97.7 (02 Jun 2021 14:43)  HR: 61 (03 Jun 2021 04:39) (61 - 67)  BP: 109/71 (03 Jun 2021 04:39) (104/67 - 119/74)  BP(mean): --  RR: 18 (03 Jun 2021 04:39) (18 - 18)  SpO2: 96% (03 Jun 2021 04:39) (94% - 96%)      REVIEW OF SYSTEMS:  Constitutional:  The patient denies fever, chills, or night sweats.  Head:  No headaches.  Eyes:  No double vision or blurry vision.  Ears:  No ringing in the ears.  Neck:  No neck pain.  Respiratory:  No shortness of breath.  Cardiovascular:  No chest pain.  Abdomen:  No nausea, vomiting, or abdominal pain.  Extremities/Neurological:  No numbness or tingling.  Musculoskeletal:  No joint pain.  General:  Positive history of swelling in the legs which brought him to the emergency room.    PHYSICAL EXAMINATION:    HEENT:  Head:  Normocephalic, atraumatic.  Eyes:  No scleral icterus.  Ears:  Hearing bilaterally intact.  NECK:  Supple.  RESPIRATORY:  Good air entry bilaterally.  CARDIOVASCULAR:  S1 and S2 heard.  ABDOMEN:  Soft and nontender.  EXTREMITIES:  No clubbing or cyanosis was noted.      NEUROLOGIC:  The patient is awake and alert.  Extraocular movements were intact.  The patient was able to look up for over one minute with no ptosis.  Speech was fluent.  Smile was symmetric.  Motor:  Bilateral upper and lower were 4+/5.  Sensory:  Bilateral upper and lower intact to light touch.  No drift.  Finger-to-nose within normal limits.            LABS:  CBC Full  -  ( 03 Jun 2021 07:24 )  WBC Count : 10.22 K/uL  RBC Count : 5.63 M/uL  Hemoglobin : 16.9 g/dL  Hematocrit : 51.3 %  Platelet Count - Automated : 376 K/uL  Mean Cell Volume : 91.1 fl  Mean Cell Hemoglobin : 30.0 pg  Mean Cell Hemoglobin Concentration : 32.9 gm/dL  Auto Neutrophil # : x  Auto Lymphocyte # : x  Auto Monocyte # : x  Auto Eosinophil # : x  Auto Basophil # : x  Auto Neutrophil % : x  Auto Lymphocyte % : x  Auto Monocyte % : x  Auto Eosinophil % : x  Auto Basophil % : x      06-03    137  |  98  |  20  ----------------------------<  96  3.7   |  31  |  0.99    Ca    9.6      03 Jun 2021 07:24  Phos  3.2     06-03  Mg     2.4     06-03    TPro  6.7  /  Alb  3.1<L>  /  TBili  0.6  /  DBili  x   /  AST  23  /  ALT  20  /  AlkPhos  58  06-02    Hemoglobin A1C:     LIVER FUNCTIONS - ( 02 Jun 2021 07:03 )  Alb: 3.1 g/dL / Pro: 6.7 g/dL / ALK PHOS: 58 U/L / ALT: 20 U/L / AST: 23 U/L / GGT: x           Vitamin B12         RADIOLOGY    ANALYSIS AND PLAN:  This is a 76-year-old with a history of myasthenia gravis.  For myasthenia gravis, this appears to be an ocular variant in regards to ptosis.  The patient does not have any clear signs to suggest any exacerbation of myasthenia.  No motor weakness and no shortness of breath and does not have that from myasthenia.  For history of myasthenia gravis, continue the patient on his pyridostigmine 120 mg once a day along with prednisone 7.5 mg every other day.  For history of hypertension, monitor systolic blood pressure.  For history of pulmonary emboli, anticoagulation as needed.  Fall precaution.  Greater than 45 minutes of time was spent with the patient, plan of care, reviewing data, with greater than 50% of the visit was spent counseling and/or coordinating care with multidisciplinary healthcare team

## 2021-06-03 NOTE — DISCHARGE NOTE PROVIDER - NSDCCPCAREPLAN_GEN_ALL_CORE_FT
PRINCIPAL DISCHARGE DIAGNOSIS  Diagnosis: Pulmonary embolism  Assessment and Plan of Treatment: Continue Eliquis as prescribed  F/u with Heamtologist, information attached.  Continue all meds per the list  Acute Diastolic CHF: Continue lasix and f/u with Cardio  F/u with All your doctors.       PRINCIPAL DISCHARGE DIAGNOSIS  Diagnosis: Pulmonary embolism  Assessment and Plan of Treatment: Continue Eliquis as prescribed  F/u with Heamtologist, information attached.  Continue all meds per the list  Acute Diastolic CHF: Continue lasix and f/u with Cardio  F/u with All your doctors. f/u with PCP within 3 to 5 days       PRINCIPAL DISCHARGE DIAGNOSIS  Diagnosis: Pulmonary embolism  Assessment and Plan of Treatment: Continue Eliquis as prescribed  F/u with Heamtologist, information attached.  Continue all meds per the list  Acute Diastolic CHF: Continue lasix and f/u with Cardio Dr. Marion next week.   F/u with All your doctors. f/u with PCP within 3 to 5 days

## 2021-06-03 NOTE — PROGRESS NOTE ADULT - SUBJECTIVE AND OBJECTIVE BOX
Patient is a 76y old  Male who presents with a chief complaint of PE (03 Jun 2021 06:15)       INTERVAL HPI/OVERNIGHT EVENTS: Patient seen and examined at bedside. Denies new symptoms, complaints. Events noted for overnight few beats of NSVT. Patient  denies chest pain, palpitation, sob     MEDICATIONS  (STANDING):  aspirin enteric coated 81 milliGRAM(s) Oral daily  dextrose 40% Gel 15 Gram(s) Oral once  dextrose 5%. 1000 milliLiter(s) (50 mL/Hr) IV Continuous <Continuous>  dextrose 5%. 1000 milliLiter(s) (100 mL/Hr) IV Continuous <Continuous>  dextrose 50% Injectable 25 Gram(s) IV Push once  dextrose 50% Injectable 12.5 Gram(s) IV Push once  dextrose 50% Injectable 25 Gram(s) IV Push once  enoxaparin Injectable 110 milliGRAM(s) SubCutaneous two times a day  furosemide   Injectable 40 milliGRAM(s) IV Push daily  glucagon  Injectable 1 milliGRAM(s) IntraMuscular once  insulin lispro (ADMELOG) corrective regimen sliding scale   SubCutaneous three times a day before meals  insulin lispro (ADMELOG) corrective regimen sliding scale   SubCutaneous at bedtime  lidocaine   Patch 1 Patch Transdermal every 24 hours  lisinopril 20 milliGRAM(s) Oral daily  pantoprazole    Tablet 40 milliGRAM(s) Oral before breakfast  predniSONE   Tablet 7.5 milliGRAM(s) Oral every other day  pyridostigmine 120 milliGRAM(s) Oral daily  senna 2 Tablet(s) Oral at bedtime    MEDICATIONS  (PRN):      Allergies    No Known Allergies    Intolerances    Avelox (Other)  fluoroquinolone antibiotics (Other)  Ketek (Other)  telithromycin (Other)      REVIEW OF SYSTEMS:  CONSTITUTIONAL: No fever,  or fatigue  EYES: No eye pain, visual disturbances, or discharge  ENMT:  No difficulty hearing, tinnitus, vertigo; No sinus or throat pain  NECK: No pain or stiffness  RESPIRATORY: No cough, wheezing, chills or hemoptysis; No shortness of breath  CARDIOVASCULAR: No chest pain, palpitations, dizziness, or leg swelling  GASTROINTESTINAL: No abdominal or epigastric pain. No nausea, vomiting, or hematemesis; No diarrhea or constipation.   GENITOURINARY: No dysuria, frequency, hematuria, or incontinence  NEUROLOGICAL: No headaches, memory loss, loss of strength, numbness, or tremors  SKIN: No itching, burning, rashes, or lesions   LYMPH NODES: No enlarged glands  MUSCULOSKELETAL: No joint pain or swelling; No muscle, back, or extremity pain  HEME/LYMPH: No easy bruising, or bleeding gums  ALLERGY AND IMMUNOLOGIC: No hives or eczema    Vital Signs Last 24 Hrs  T(C): 36.4 (03 Jun 2021 04:39), Max: 36.5 (02 Jun 2021 14:43)  T(F): 97.5 (03 Jun 2021 04:39), Max: 97.7 (02 Jun 2021 14:43)  HR: 61 (03 Jun 2021 04:39) (61 - 67)  BP: 109/71 (03 Jun 2021 04:39) (104/67 - 119/74)  BP(mean): --  RR: 18 (03 Jun 2021 04:39) (18 - 18)  SpO2: 96% (03 Jun 2021 04:39) (94% - 96%)    PHYSICAL EXAM:  GENERAL: NAD, Awake, Alert   HEAD:  Atraumatic, Normocephalic  EYES: EOMI, PERRLA, conjunctiva and sclera clear  ENMT: No tonsillar erythema, exudates, or enlargement; Moist mucous membranes  NECK: Supple, No JVD, Normal thyroid  NERVOUS SYSTEM:  Alert & Oriented X3, no focal deficits   CHEST/LUNG: Clear to auscultation bilaterally; No rales, rhonchi, wheezing, or rubs  HEART: S1S2+. Regular rate and rhythm  ABDOMEN: Soft, Nontender, Nondistended; Bowel sounds present  EXTREMITIES:  2+ Peripheral Pulses, No clubbing, cyanosis, + edema bilat LE   LYMPH: No lymphadenopathy noted  SKIN: No rashes or lesions    LABS:                        16.9   10.22 )-----------( 376      ( 03 Jun 2021 07:24 )             51.3     03 Jun 2021 07:24    137    |  98     |  20     ----------------------------<  96     3.7     |  31     |  0.99     Ca    9.6        03 Jun 2021 07:24  Phos  3.2       03 Jun 2021 07:24  Mg     2.4       03 Jun 2021 07:24        CAPILLARY BLOOD GLUCOSE      POCT Blood Glucose.: 100 mg/dL (03 Jun 2021 07:54)  POCT Blood Glucose.: 118 mg/dL (02 Jun 2021 21:17)  POCT Blood Glucose.: 115 mg/dL (02 Jun 2021 16:58)  POCT Blood Glucose.: 116 mg/dL (02 Jun 2021 11:55)    BLOOD CULTURE    RADIOLOGY & ADDITIONAL TESTS:    Imaging Personally Reviewed:  [ ] YES     Consultant(s) Notes Reviewed:      Care Discussed with Consultants/Other Providers:

## 2021-06-03 NOTE — DISCHARGE NOTE PROVIDER - HOSPITAL COURSE
77 yo m pmh myasthenia gravis, htn, hld, type 2 dm (diet controlled- believes last a1c 6), chronic back pain, club foot, presents with complaint of lower extremity swelling found to have bilateral PE and acute CHF, diastolic dysfunction. Patient was started on Lovenox and IV Lasix. His TTE showed: EF 55-60% and diastolic dysfunction.  He was seen by cardio, neuro and Hematology. He will be  discharged on Eliquis and will f/u with hematologist and all his doctor as out patient. Seen and examined the days of discharge. Discharge time spent 36 minutes , including medication reconciliation, prescription writing, f/u with consultants, coordination with SW/CM.      77 yo m pmh myasthenia gravis, htn, hld, type 2 dm (diet controlled- believes last a1c 6), chronic back pain, club foot, presents with complaint of lower extremity swelling found to have bilateral PE and acute CHF, diastolic dysfunction. Patient was started on Lovenox and IV Lasix. His TTE showed: EF 55-60% and diastolic dysfunction.  He was seen by cardio, neuro and Hematology. He will be  discharged on Eliquis and will f/u with hematologist and all his doctor as out patient. Seen and examined the days of discharge. Discharge time spent 36 minutes , including medication reconciliation, prescription writing, f/u with consultants, coordination with SW/CM.   VSS  GEN: NAD, AAOx3  HEENT: NCAT, PERRLA   Heart: S1S2+, RRR  Lungs: Clear bilat, no wheezing   Abdomen: Soft, +BS  Extremities: No cyanosis, erythema   Skin: Warm, dry, no rash   Neuro: AAOx 3, no focal motor/sensory deficits      75 yo m pmh myasthenia gravis, htn, hld, type 2 dm (diet controlled- believes last a1c 6), chronic back pain, club foot, presents with complaint of lower extremity swelling found to have bilateral PE and acute CHF, diastolic dysfunction. Patient was started on Lovenox and IV Lasix. His TTE showed: EF 55-60% and diastolic dysfunction.  He was seen by cardio, neuro and Hematology. He will be  discharged on Eliquis and will f/u with hematologist and all his doctor as out patient. Patient also had few beats of NSVT and was started on metoprolol by cardiology. He will see Dr. Marion next week in the office. Patient cleared by Cardio for discharge, d/w Dr. Islas. Plan also d/w patient and his son at bedside. Seen and examined the days of discharge. Discharge time spent 36 minutes , including medication reconciliation, prescription writing, f/u with consultants, coordination with SW/CM.   VSS  GEN: NAD, AAOx3  HEENT: NCAT, PERRLA   Heart: S1S2+, RRR  Lungs: Clear bilat, no wheezing   Abdomen: Soft, +BS  Extremities: No cyanosis, erythema   Skin: Warm, dry, no rash   Neuro: AAOx 3, no focal motor/sensory deficits

## 2021-06-03 NOTE — CHART NOTE - NSCHARTNOTEFT_GEN_A_CORE
77 yo m pmh myasthenia gravis, htn, hld, type 2 dm (diet controlled- believes last a1c 6), chronic back pain, club foot, presents with complaint of lower extremity swelling found to have bilateral PE     - RN called informing that patient had 4 beats of non sustained V. Tech.  - Patient remained asymptomatic during the episode  - Vitals are stable  - will add mag and phos to the morning labs.  - Continue to monitor  - RN to call with any changes.

## 2021-06-03 NOTE — DISCHARGE NOTE PROVIDER - CARE PROVIDER_API CALL
Yandel Yao  HEMATOLOGY  [address] [facility] [facility], [address]  [place] [address]  Phone: [phone]  Fax: [phone]  Follow Up Time:     Vitaliy Whaley)  Cardiovascular Disease; Internal Medicine  [address] [place], [place] [id number]  Phone: [phone]  Fax: [phone]  Follow Up Time:    Yandel Yao I  HEMATOLOGY  [address] [facility], [address]  [place] [address]  Phone: [phone]  Fax: [phone]  Follow Up Time:     Vinh Woods)  Cardio [facility]  [address] [place] [address]  Phone: [phone]  Fax: [phone]  Follow Up Time:

## 2021-06-03 NOTE — PROGRESS NOTE ADULT - ASSESSMENT
TTE report - grossly normal  HEME Onc eval noted -   overnight events noted  Planned for Left Hip X ray    pt with new PE - bilateral  PE - AC - on lovenox - eventual transition to DOAC - no evidence of RV strain - CE and ProBNP noted - ECHO done - report pending, on RA - VSS  may need CT abd pel - eval reason for PE  MG - on Prednisone low dose and Mestinon - indep with ADL  GERD -   HTN - cvs rx regimen and BP control - on LASIX IV - Cardio following -   Pulm nodules - multiple - all sub cm - very distant hx of smoking, non emergent Pulm follow up in the office with Dr Mccormick in [place] office -   out of bed  check Sat on RA  ambulate ok  will need Hypercoagulation work up as outpatient with Elvin

## 2021-06-03 NOTE — PROGRESS NOTE ADULT - SUBJECTIVE AND OBJECTIVE BOX
Interval History:    Chart reviewed and events noted;   Overnight events:    MEDICATIONS  (STANDING):  aspirin enteric coated 81 milliGRAM(s) Oral daily  dextrose 40% Gel 15 Gram(s) Oral once  dextrose 5%. 1000 milliLiter(s) (50 mL/Hr) IV Continuous <Continuous>  dextrose 5%. 1000 milliLiter(s) (100 mL/Hr) IV Continuous <Continuous>  dextrose 50% Injectable 25 Gram(s) IV Push once  dextrose 50% Injectable 12.5 Gram(s) IV Push once  dextrose 50% Injectable 25 Gram(s) IV Push once  enoxaparin Injectable 110 milliGRAM(s) SubCutaneous two times a day  glucagon  Injectable 1 milliGRAM(s) IntraMuscular once  insulin lispro (ADMELOG) corrective regimen sliding scale   SubCutaneous three times a day before meals  insulin lispro (ADMELOG) corrective regimen sliding scale   SubCutaneous at bedtime  lidocaine   Patch 1 Patch Transdermal every 24 hours  lisinopril 20 milliGRAM(s) Oral daily  pantoprazole    Tablet 40 milliGRAM(s) Oral before breakfast  predniSONE   Tablet 7.5 milliGRAM(s) Oral every other day  pyridostigmine 120 milliGRAM(s) Oral daily  senna 2 Tablet(s) Oral at bedtime    MEDICATIONS  (PRN):      Vital Signs Last 24 Hrs  T(C): 36.6 (03 Jun 2021 16:05), Max: 36.6 (03 Jun 2021 16:05)  T(F): 97.8 (03 Jun 2021 16:05), Max: 97.8 (03 Jun 2021 16:05)  HR: 91 (03 Jun 2021 17:53) (60 - 91)  BP: 113/69 (03 Jun 2021 16:05) (109/71 - 125/62)  BP(mean): --  RR: 18 (03 Jun 2021 16:05) (18 - 18)  SpO2: 97% (03 Jun 2021 16:05) (94% - 97%)    PHYSICAL EXAM  General: adult in NAD  HEENT: clear oropharynx, anicteric sclera, pink conjunctivae  Neck: supple  CV: normal S1S2 with no murmur rubs or gallops  Lungs: clear to auscultation, no wheezes, no rhales  Abdomen: soft non-tender non-distended, no hepato/splenomegaly  Ext: no clubbing cyanosis or edema  Skin: no rashes and no petichiae  Neuro: alert and oriented X3 no focal deficits      LABS:  CBC Full  -  ( 03 Jun 2021 07:24 )  WBC Count : 10.22 K/uL  RBC Count : 5.63 M/uL  Hemoglobin : 16.9 g/dL  Hematocrit : 51.3 %  Platelet Count - Automated : 376 K/uL  Mean Cell Volume : 91.1 fl  Mean Cell Hemoglobin : 30.0 pg  Mean Cell Hemoglobin Concentration : 32.9 gm/dL  Auto Neutrophil # : x  Auto Lymphocyte # : x  Auto Monocyte # : x  Auto Eosinophil # : x  Auto Basophil # : x  Auto Neutrophil % : x  Auto Lymphocyte % : x  Auto Monocyte % : x  Auto Eosinophil % : x  Auto Basophil % : x    06-03    137  |  98  |  20  ----------------------------<  96  3.7   |  31  |  0.99    Ca    9.6      03 Jun 2021 07:24  Phos  3.2     06-03  Mg     2.4     06-03    TPro  6.7  /  Alb  3.1<L>  /  TBili  0.6  /  DBili  x   /  AST  23  /  ALT  20  /  AlkPhos  58  06-02        fe studies      WBC trend  10.22 K/uL (06-03-21 @ 07:24)  10.38 K/uL (06-02-21 @ 07:03)  10.86 K/uL (06-01-21 @ 14:36)      Hgb trend  16.9 g/dL (06-03-21 @ 07:24)  15.2 g/dL (06-02-21 @ 07:03)  15.5 g/dL (06-01-21 @ 14:36)      plt trend  376 K/uL (06-03-21 @ 07:24)  333 K/uL (06-02-21 @ 07:03)  335 K/uL (06-01-21 @ 14:36)        RADIOLOGY & ADDITIONAL STUDIES:  < from: CT Angio Chest PE Protocol w/ IV Cont (06.01.21 @ 18:09) >  EXAM:  CT ANGIO CHEST PULM [facility]                            PROCEDURE DATE:  06/01/2021          INTERPRETATION:  CLINICAL INFORMATION: Positive d-dimer. Leg swelling.    COMPARISON: CT abdomen/pelvis 5/14/2021    CONTRAST/COMPLICATIONS:  IV Contrast: Omnipaque 350  65 cc administered   35 cc discarded  Oral Contrast: NONE  Complications: None reported at time of study completion    PROCEDURE:  CT Angiography of the Chest.  Sagittal and coronal reformats were performed as well as 3D (MIP) reconstructions.    FINDINGS:    LUNGS AND AIRWAYS: Secretions in the trachea. Calcified granulomas in the right upper lobe. Subcentimeter peripheral nodules in the right and left lower lobes, greater on the right, not significantly changed since 9/12/2015.  PLEURA: No pleural effusion.  MEDIASTINUM AND KONRAD: No enlarged mediastinal lymph nodes. Calcified mediastinal and right hilar lymph nodes.  VESSELS: Pulmonary emboli in the right upper and lower lobe pulmonary arteries with extension into segmental branches, segmental and subsegmental branches in the right middle lobe, and left upper lobe branches to the lingula. Thoracic aorta normal in caliber. Coronary artery calcifications.  HEART: Heart size is normal. No pericardial effusion. No evidence of right heart strain.  CHEST WALL AND LOWER NECK: No enlarged axillary lymph nodes.  VISUALIZED UPPER ABDOMEN: Calcified granulomas in the liver and spleen. Fatty infiltration of the pancreas. Nonobstructive calculus in the lower pole of the left kidney measuring 6 mm. Atherosclerotic changes including the superior mesenteric artery and renal arteries.  BONES: Degenerative changes in the spine.    IMPRESSION:  Bilateral pulmonary emboli as described above.    No evidence of right heart strain.    The findings were discussed with BENITO Jaramillo on 6/1/2021 7:48 PM            CHRISTINE BARBOZA MD; Attending Radiologist  This document has been electronically signed. Jun 1 2021  7:52PM    < end of copied text >  < from: US Duplex Venous Lower Ext Complete, Bilateral (06.01.21 @ 17:07) >    EXAM:  US DPLX LWR EXT VEINS COMPL BI                            PROCEDURE DATE:  06/01/2021          INTERPRETATION:  CLINICAL INFORMATION: Bilateral lower extremity swelling.    COMPARISON: None available.    TECHNIQUE: Duplex sonography of the BILATERAL LOWER extremity veins with color and spectral Doppler, with and without compression.    FINDINGS:    RIGHT:  Normal compressibility of the RIGHT common femoral, femoral and popliteal veins.  Doppler examination shows normal spontaneous and phasic flow.  No RIGHT calf vein thrombosis is detected.    LEFT:  Normal compressibility of the LEFT common femoral, femoral and popliteal veins.  Doppler examination shows normal spontaneous and phasic flow.  No LEFT calf vein thrombosis is detected.    Subcutaneous edema in the calves.    IMPRESSION:  No evidence of deep venous thrombosis in either lower extremity.                CHRISTINE BARBOZA MD; Attending Radiologist  This document has been electronically signed. Jun 1 2021  5:20PM    < end of copied text >

## 2021-06-03 NOTE — PROGRESS NOTE ADULT - ASSESSMENT
Florina Galicia DNP, NP-C  Cardiology   Spectra #[phone]        75-year-old male with a history of hypertension, dyslipidemia (intolerant of statin drugs, and is now on Praluent), obesity, myasthenia gravis, diet-controlled diabetes, chronic lower extremity swelling who presents to ED c/o progressively worsening shortness of breath and painless bilateral lower extremity edema x 1 week. Cardiology consulted for CHF. He follows Dr Woods for cardiology.     Acute PE  - D-dimer 1130.  CTA + bilateral PE's  - Lower extremity doppler negative for DVT  - Hemodynamically stable.  TTE showed normal LV/RVF, no evidence of RHS  - Continue full dose Lovenox; can switch to DOAC     HfpEF  - Echo: 1/31/2017, Stage 1 diastolic dysfunction., normal LV function, normal LA size, mild mitral regurgitation   - Serum Pro-Brain Natriuretic Peptide: 158 pg/mL (06-01-21 @ 14:36)  - EKG showed SR, LAFB, similar to old EKG. No ischemic changes noted.   - Stress Test: 9/2014, no Ischemia, Vasodilator nuclear perfusion stress test was negative for ischemia. There was normal left ventricular systolic function and normal left ventricular myocardial perfusion. Post stress EF 61%.   - Diuresed well, now clinically euvolemic.  Net neg 2.7L.  Can switch IV Lasix to PO  - D/C HCTZ  - Monitor and replete lytes, keep K>4, Mg>2.    Hypertension  - BP stable and controlled  - Continue Lisinopril 20 mg QD  - Monitor routine hemodynamics     Hyperlipidemia  - Patient intolerant to statins   - On home Praluent     - All other medical needs as per primary team.  - Other cardiovascular workup will depend on clinical course.  - Will continue to follow.    Florina Galicia DNP, NP-C  Cardiology   Spectra #[phone]

## 2021-06-03 NOTE — PROGRESS NOTE ADULT - ASSESSMENT
[ASSESSMENT and  PLAN]  Unprovoked,. BL PE  ·	Pulmonary emboli in the right upper and lower lobe pulmonary arteries with extension into segmental branches, segmental and subsegmental branches in the right middle lobe, and left upper lobe branches to the lingula.   - PESI 88, class III intermediate risk.   Echo unremarkable. Limited study  CAD. Coronary artery calcifications.    Renal stones    L hip/flank pain. c/o now more L hip bone pain.    LFTs unremarkable     Depression / irritibaility      RECOMMENDATIONS  Check Xray of L hip [ordered]  Discussed limitations of testing.   Plan in AM    Continue Lovenox 1mg/kg q12h. .   Recommend change to Eliquis 10mg q12h x7d thereafter Eliquis 5mg q12h thereafter.     continue medical and orthopedic evaluation

## 2021-06-03 NOTE — PROGRESS NOTE ADULT - TIME BILLING
Patient seen and examined at bedside. Chart, meds, vitals, labs reviewed. Plan d/w patient, all questions answered.

## 2021-06-03 NOTE — PROGRESS NOTE ADULT - SUBJECTIVE AND OBJECTIVE BOX
[facility] -- Manuel Carter Grossman, [place], [place] Woods [place], [place]  Office # [phone]    Follow Up:  Volume overload, PE    Subjective/Observations:     REVIEW OF SYSTEMS: All other review of systems is negative unless indicated above  PAST MEDICAL & SURGICAL HISTORY:  Calculus of kidney    Club foot  Born Right Foot    Myasthenia gravis    Hypertension    Diabetes  Type 2 - does not take medications - monitors Blood Glucose at home - diet controlled    Urinary tract infection  notes h/o UTI&#x27;s    Hyperlipidemia    Elective surgery  1956 age 11 @ [facility] - cut under Patella secondary 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    Club foot  Surgery at birth for Club Foot Right foot    Pilonidal cyst  Surgery 40 years ago    H/O colonoscopy    Spinal stenosis    H/O prostate biopsy    MEDICATIONS  (STANDING):  aspirin enteric coated 81 milliGRAM(s) Oral daily  dextrose 40% Gel 15 Gram(s) Oral once  dextrose 5%. 1000 milliLiter(s) (50 mL/Hr) IV Continuous <Continuous>  dextrose 5%. 1000 milliLiter(s) (100 mL/Hr) IV Continuous <Continuous>  dextrose 50% Injectable 25 Gram(s) IV Push once  dextrose 50% Injectable 12.5 Gram(s) IV Push once  dextrose 50% Injectable 25 Gram(s) IV Push once  enoxaparin Injectable 110 milliGRAM(s) SubCutaneous two times a day  furosemide   Injectable 40 milliGRAM(s) IV Push daily  glucagon  Injectable 1 milliGRAM(s) IntraMuscular once  insulin lispro (ADMELOG) corrective regimen sliding scale   SubCutaneous three times a day before meals  insulin lispro (ADMELOG) corrective regimen sliding scale   SubCutaneous at bedtime  lidocaine   Patch 1 Patch Transdermal every 24 hours  lisinopril 20 milliGRAM(s) Oral daily  pantoprazole    Tablet 40 milliGRAM(s) Oral before breakfast  predniSONE   Tablet 7.5 milliGRAM(s) Oral every other day  pyridostigmine 120 milliGRAM(s) Oral daily  senna 2 Tablet(s) Oral at bedtime    MEDICATIONS  (PRN):    Allergies    No Known Allergies    Intolerances    Avelox (Other)  fluoroquinolone antibiotics (Other)  Ketek (Other)  telithromycin (Other)    Vital Signs Last 24 Hrs  T(C): 36.5 (03 Jun 2021 11:35), Max: 36.5 (02 Jun 2021 14:43)  T(F): 97.7 (03 Jun 2021 11:35), Max: 97.7 (02 Jun 2021 14:43)  HR: 66 (03 Jun 2021 11:35) (61 - 67)  BP: 125/62 (03 Jun 2021 11:35) (104/67 - 125/62)  BP(mean): --  RR: 18 (03 Jun 2021 11:35) (18 - 18)  SpO2: 97% (03 Jun 2021 11:35) (94% - 97%)  I&O's Summary    02 Jun 2021 07:01  -  03 Jun 2021 07:00  --------------------------------------------------------  IN: 0 mL / OUT: 2750 mL / NET: -2750 mL    03 Jun 2021 07:01  -  03 Jun 2021 13:13  --------------------------------------------------------  IN: 237 mL / OUT: 600 mL / NET: -363 mL      Weight (kg): 109 (06-03 @ 04:39)    PHYSICAL EXAM:  TELE: NSR  Constitutional: NAD, awake and alert, well-developed  HEENT: Moist Mucous Membranes, Anicteric  Pulmonary: Non-labored, breath sounds are clear bilaterally, No wheezing, rales or rhonchi  Cardiovascular: Regular, S1 and S2, No murmurs, rubs, gallops or clicks  Gastrointestinal: Bowel Sounds present, soft, nontender.   Lymph: No peripheral edema. No lymphadenopathy.  Skin: No visible rashes or ulcers.  Psych:  Mood & affect appropriate  LABS: All Labs Reviewed:                        16.9   10.22 )-----------( 376      ( 03 Jun 2021 07:24 )             51.3                         15.2   10.38 )-----------( 333      ( 02 Jun 2021 07:03 )             46.5                         15.5   10.86 )-----------( 335      ( 01 Jun 2021 14:36 )             46.4     03 Jun 2021 07:24    137    |  98     |  20     ----------------------------<  96     3.7     |  31     |  0.99   02 Jun 2021 07:03    138    |  100    |  21     ----------------------------<  80     3.9     |  30     |  0.93   01 Jun 2021 14:36    135    |  101    |  20     ----------------------------<  95     4.0     |  26     |  0.91     Ca    9.6        03 Jun 2021 07:24  Ca    9.2        02 Jun 2021 07:03  Ca    9.0        01 Jun 2021 14:36  Phos  3.2       03 Jun 2021 07:24  Mg     2.4       03 Jun 2021 07:24    TPro  6.7    /  Alb  3.1    /  TBili  0.6    /  DBili  x      /  AST  23     /  ALT  20     /  AlkPhos  58     02 Jun 2021 07:03  TPro  7.4    /  Alb  3.2    /  TBili  0.6    /  DBili  x      /  AST  38     /  ALT  27     /  AlkPhos  63     01 Jun 2021 14:36    CARDIAC MARKERS ( 01 Jun 2021 14:36 )  <.015 ng/mL / x     / x     / x     / x           EXAM:  ECHO TTE WO CON COMP W DOPP         PROCEDURE DATE:  06/01/2021        INTERPRETATION:  INDICATION: Bilateral pulmonary edema  Sonographer AS    Blood Pressure 130/64    Height 167.6 cm     Weight 113.4 kg       BSA 2.2 sq m    Dimensions:  LA 2.8       Normal Values: 2.0 - 4.0 cm  Ao 2.8        Normal Values: 2.0 - 3.8 cm  SEPTUM 1.0       Normal Values: 0.6 - 1.2 cm  PWT 0.9       Normal Values: 0.6 - 1.1 cm  LVIDd 3.6         Normal Values: 3.0 - 5.6 cm  LVIDs 2.4         Normal Values: 1.8 - 4.0 cm      OBSERVATIONS:  Technically difficult and limited study  Mitral Valve: normal, trace physiologic MR.  Aortic Valve/Aorta: Not well-visualized  Tricuspid Valve: Not well-visualized  Pulmonic Valve: Not well-visualized  Left Atrium:normal  Right Atrium: Not well-visualized  Left Ventricle: Left ventricle is not well-visualized. Overall grossly normal left ventricular systolic function, estimated LVEF of 55-60%.  Right Ventricle: Grossly normal size and systolic function.  Pericardium: no significant pericardial effusion.  Pulmonary/RV Pressure: estimated PA systolic pressure of 9.5 mmHg assuming an RA pressure of 3 mmHg.  LV diastolic dysfunction is present    IMPRESSION:  Technically difficult and limited study  Overall grossly normal left ventricular systolic function, estimated LVEF of 55-60%.  Grossly normal RV size and systolic function.  The aortic valve is not well-visualized  Trace physiologic MR      GULSHAN BECKER MD; Attending Cardiologist  This document has been electronically signed. Jun 2 2021  4:21PM      EXAM:  CT ANGIO CHEST PULM [facility]                            PROCEDURE DATE:  06/01/2021          INTERPRETATION:  CLINICAL INFORMATION: Positive d-dimer. Leg swelling.    COMPARISON: CT abdomen/pelvis 5/14/2021    CONTRAST/COMPLICATIONS:  IV Contrast: Omnipaque 350  65 cc administered   35 cc discarded  Oral Contrast: NONE  Complications: None reported at time of study completion    PROCEDURE:  CT Angiography of the Chest.  Sagittal and coronal reformats were performed as well as 3D (MIP) reconstructions.    FINDINGS:    LUNGS AND AIRWAYS: Secretions in the trachea. Calcified granulomas in the right upper lobe. Subcentimeter peripheral nodules in the right and left lower lobes, greater on the right, not significantly changed since 9/12/2015.  PLEURA: No pleural effusion.  MEDIASTINUM AND KONRAD: No enlarged mediastinal lymph nodes. Calcified mediastinal and right hilar lymph nodes.  VESSELS: Pulmonary emboli in the right upper and lower lobe pulmonary arteries with extension into segmental branches, segmental and subsegmental branches in the right middle lobe, and left upper lobe branches to the lingula. Thoracic aorta normal in caliber. Coronary artery calcifications.  HEART: Heart size is normal. No pericardial effusion. No evidence of right heart strain.  CHEST WALL AND LOWER NECK: No enlarged axillary lymph nodes.  VISUALIZED UPPER ABDOMEN: Calcified granulomas in the liver and spleen. Fatty infiltration of the pancreas. Nonobstructive calculus in the lower pole of the left kidney measuring 6 mm. Atherosclerotic changes including the superior mesenteric artery and renal arteries.  BONES: Degenerative changes in the spine.    IMPRESSION:  Bilateral pulmonary emboli as described above.    No evidence of right heart strain.    The findings were discussed with BENITO Jaramillo on 6/1/2021 7:48 PM    CHRISTINE BARBOZA MD; Attending Radiologist  This document has been electronically signed. Jun 1 2021  7:52PM      Ventricular Rate 78 BPM    Atrial Rate 78 BPM    P-R Interval 144 ms    QRS Duration 114 ms    Q-T Interval 390 ms    QTC Calculation(Bazett) 444 ms    P Axis 87 degrees    R Axis -49 degrees    T Axis 75 degrees    Diagnosis Line Normal sinus rhythm  Pulmonary disease pattern  Left anterior fascicular block  Nonspecific ST abnormality  Abnormal ECG  When compared with ECG of 30-SEP-2016 08:13,  No significant change was found  Confirmed by Brodie MANNING, Atr ([id number]) on 6/2/2021 12:41:06 PM     [facility] -- Manuel Carter Grossman, [place], [place], [place]  Office # [phone]    Follow Up:  Volume overload, PE    Subjective/Observations: Sitting on a recliner, comfortable breathing.  Denies SOB, FAUSTIN or orthopnea.  Denies pleuritic pain or chest pain.  Diuresing well    REVIEW OF SYSTEMS: All other review of systems is negative unless indicated above  PAST MEDICAL & SURGICAL HISTORY:  Calculus of kidney    Club foot  Born Right Foot    Myasthenia gravis    Hypertension    Diabetes  Type 2 - does not take medications - monitors Blood Glucose at home - diet controlled    Urinary tract infection  notes h/o UTI&#x27;s    Hyperlipidemia    Elective surgery  1956 age 11 @ [facility] - cut under Patella secondary 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    Club foot  Surgery at birth for Club Foot Right foot    Pilonidal cyst  Surgery 40 years ago    H/O colonoscopy    Spinal stenosis    H/O prostate biopsy    MEDICATIONS  (STANDING):  aspirin enteric coated 81 milliGRAM(s) Oral daily  dextrose 40% Gel 15 Gram(s) Oral once  dextrose 5%. 1000 milliLiter(s) (50 mL/Hr) IV Continuous <Continuous>  dextrose 5%. 1000 milliLiter(s) (100 mL/Hr) IV Continuous <Continuous>  dextrose 50% Injectable 25 Gram(s) IV Push once  dextrose 50% Injectable 12.5 Gram(s) IV Push once  dextrose 50% Injectable 25 Gram(s) IV Push once  enoxaparin Injectable 110 milliGRAM(s) SubCutaneous two times a day  furosemide   Injectable 40 milliGRAM(s) IV Push daily  glucagon  Injectable 1 milliGRAM(s) IntraMuscular once  insulin lispro (ADMELOG) corrective regimen sliding scale   SubCutaneous three times a day before meals  insulin lispro (ADMELOG) corrective regimen sliding scale   SubCutaneous at bedtime  lidocaine   Patch 1 Patch Transdermal every 24 hours  lisinopril 20 milliGRAM(s) Oral daily  pantoprazole    Tablet 40 milliGRAM(s) Oral before breakfast  predniSONE   Tablet 7.5 milliGRAM(s) Oral every other day  pyridostigmine 120 milliGRAM(s) Oral daily  senna 2 Tablet(s) Oral at bedtime    MEDICATIONS  (PRN):    Allergies    No Known Allergies    Intolerances    Avelox (Other)  fluoroquinolone antibiotics (Other)  Ketek (Other)  telithromycin (Other)    Vital Signs Last 24 Hrs  T(C): 36.5 (03 Jun 2021 11:35), Max: 36.5 (02 Jun 2021 14:43)  T(F): 97.7 (03 Jun 2021 11:35), Max: 97.7 (02 Jun 2021 14:43)  HR: 66 (03 Jun 2021 11:35) (61 - 67)  BP: 125/62 (03 Jun 2021 11:35) (104/67 - 125/62)  BP(mean): --  RR: 18 (03 Jun 2021 11:35) (18 - 18)  SpO2: 97% (03 Jun 2021 11:35) (94% - 97%)  I&O's Summary    02 Jun 2021 07:01  -  03 Jun 2021 07:00  --------------------------------------------------------  IN: 0 mL / OUT: 2750 mL / NET: -2750 mL    03 Jun 2021 07:01  -  03 Jun 2021 13:13  --------------------------------------------------------  IN: 237 mL / OUT: 600 mL / NET: -363 mL      Weight (kg): 109 (06-03 @ 04:39)    PHYSICAL EXAM:  TELE: NSR  Constitutional: NAD, awake and alert, obese  HEENT: Moist Mucous Membranes, Anicteric  Pulmonary: Non-labored, breath sounds are clear bilaterally, No wheezing, rales or rhonchi  Cardiovascular: Regular, S1 and S2, No murmurs, rubs, gallops or clicks  Gastrointestinal: Bowel Sounds present, soft, nontender.   Lymph: 1+ BLE edema. No lymphadenopathy.  Skin: No visible rashes or ulcers.  Psych:  Mood & affect appropriate  LABS: All Labs Reviewed:                        16.9   10.22 )-----------( 376      ( 03 Jun 2021 07:24 )             51.3                         15.2   10.38 )-----------( 333      ( 02 Jun 2021 07:03 )             46.5                         15.5   10.86 )-----------( 335      ( 01 Jun 2021 14:36 )             46.4     03 Jun 2021 07:24    137    |  98     |  20     ----------------------------<  96     3.7     |  31     |  0.99   02 Jun 2021 07:03    138    |  100    |  21     ----------------------------<  80     3.9     |  30     |  0.93   01 Jun 2021 14:36    135    |  101    |  20     ----------------------------<  95     4.0     |  26     |  0.91     Ca    9.6        03 Jun 2021 07:24  Ca    9.2        02 Jun 2021 07:03  Ca    9.0        01 Jun 2021 14:36  Phos  3.2       03 Jun 2021 07:24  Mg     2.4       03 Jun 2021 07:24    TPro  6.7    /  Alb  3.1    /  TBili  0.6    /  DBili  x      /  AST  23     /  ALT  20     /  AlkPhos  58     02 Jun 2021 07:03  TPro  7.4    /  Alb  3.2    /  TBili  0.6    /  DBili  x      /  AST  38     /  ALT  27     /  AlkPhos  63     01 Jun 2021 14:36    CARDIAC MARKERS ( 01 Jun 2021 14:36 )  <.015 ng/mL / x     / x     / x     / x           EXAM:  ECHO TTE WO CON COMP W DOPP         PROCEDURE DATE:  06/01/2021        INTERPRETATION:  INDICATION: Bilateral pulmonary edema  Sonographer AS    Blood Pressure 130/64    Height 167.6 cm     Weight 113.4 kg       BSA 2.2 sq m    Dimensions:  LA 2.8       Normal Values: 2.0 - 4.0 cm  Ao 2.8        Normal Values: 2.0 - 3.8 cm  SEPTUM 1.0       Normal Values: 0.6 - 1.2 cm  PWT 0.9       Normal Values: 0.6 - 1.1 cm  LVIDd 3.6         Normal Values: 3.0 - 5.6 cm  LVIDs 2.4         Normal Values: 1.8 - 4.0 cm      OBSERVATIONS:  Technically difficult and limited study  Mitral Valve: normal, trace physiologic MR.  Aortic Valve/Aorta: Not well-visualized  Tricuspid Valve: Not well-visualized  Pulmonic Valve: Not well-visualized  Left Atrium:normal  Right Atrium: Not well-visualized  Left Ventricle: Left ventricle is not well-visualized. Overall grossly normal left ventricular systolic function, estimated LVEF of 55-60%.  Right Ventricle: Grossly normal size and systolic function.  Pericardium: no significant pericardial effusion.  Pulmonary/RV Pressure: estimated PA systolic pressure of 9.5 mmHg assuming an RA pressure of 3 mmHg.  LV diastolic dysfunction is present    IMPRESSION:  Technically difficult and limited study  Overall grossly normal left ventricular systolic function, estimated LVEF of 55-60%.  Grossly normal RV size and systolic function.  The aortic valve is not well-visualized  Trace physiologic MR      GULSHAN BECKER MD; Attending Cardiologist  This document has been electronically signed. Jun 2 2021  4:21PM      EXAM:  CT ANGIO CHEST PULM [facility]                            PROCEDURE DATE:  06/01/2021          INTERPRETATION:  CLINICAL INFORMATION: Positive d-dimer. Leg swelling.    COMPARISON: CT abdomen/pelvis 5/14/2021    CONTRAST/COMPLICATIONS:  IV Contrast: Omnipaque 350  65 cc administered   35 cc discarded  Oral Contrast: NONE  Complications: None reported at time of study completion    PROCEDURE:  CT Angiography of the Chest.  Sagittal and coronal reformats were performed as well as 3D (MIP) reconstructions.    FINDINGS:    LUNGS AND AIRWAYS: Secretions in the trachea. Calcified granulomas in the right upper lobe. Subcentimeter peripheral nodules in the right and left lower lobes, greater on the right, not significantly changed since 9/12/2015.  PLEURA: No pleural effusion.  MEDIASTINUM AND KONRAD: No enlarged mediastinal lymph nodes. Calcified mediastinal and right hilar lymph nodes.  VESSELS: Pulmonary emboli in the right upper and lower lobe pulmonary arteries with extension into segmental branches, segmental and subsegmental branches in the right middle lobe, and left upper lobe branches to the lingula. Thoracic aorta normal in caliber. Coronary artery calcifications.  HEART: Heart size is normal. No pericardial effusion. No evidence of right heart strain.  CHEST WALL AND LOWER NECK: No enlarged axillary lymph nodes.  VISUALIZED UPPER ABDOMEN: Calcified granulomas in the liver and spleen. Fatty infiltration of the pancreas. Nonobstructive calculus in the lower pole of the left kidney measuring 6 mm. Atherosclerotic changes including the superior mesenteric artery and renal arteries.  BONES: Degenerative changes in the spine.    IMPRESSION:  Bilateral pulmonary emboli as described above.    No evidence of right heart strain.    The findings were discussed with BENITO Jaramillo on 6/1/2021 7:48 PM    CHRISTINE BARBOZA MD; Attending Radiologist  This document has been electronically signed. Jun 1 2021  7:52PM    Ventricular Rate 78 BPM    Atrial Rate 78 BPM    P-R Interval 144 ms    QRS Duration 114 ms    Q-T Interval 390 ms    QTC Calculation(Bazett) 444 ms    P Axis 87 degrees    R Axis -49 degrees    T Axis 75 degrees    Diagnosis Line Normal sinus rhythm  Pulmonary disease pattern  Left anterior fascicular block  Nonspecific ST abnormality  Abnormal ECG  When compared with ECG of 30-SEP-2016 08:13,  No significant change was found  Confirmed by Brodie MANNING, Art ([id number]) on 6/2/2021 12:41:06 PM

## 2021-06-03 NOTE — PROGRESS NOTE ADULT - SUBJECTIVE AND OBJECTIVE BOX
Date/Time Patient Seen:  		  Referring MD:   Data Reviewed	       Patient is a 76y old  Male who presents with a chief complaint of PE (02 Jun 2021 20:59)      Subjective/HPI     PAST MEDICAL & SURGICAL HISTORY:  Calculus of kidney    Club foot  Born Right Foot    Myasthenia gravis    Hypertension    Diabetes  Type 2 - does not take medications - monitors Blood Glucose at home - diet controlled    Urinary tract infection  notes h/o UTI&#x27;s    Hyperlipidemia    Elective surgery  1956 age 11 @ [facility] - cut under Patella secondary 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    Club foot  Surgery at birth for Club Foot Right foot    Pilonidal cyst  Surgery 40 years ago    H/O colonoscopy    Spinal stenosis    H/O prostate biopsy          Medication list         MEDICATIONS  (STANDING):  aspirin enteric coated 81 milliGRAM(s) Oral daily  dextrose 40% Gel 15 Gram(s) Oral once  dextrose 5%. 1000 milliLiter(s) (50 mL/Hr) IV Continuous <Continuous>  dextrose 5%. 1000 milliLiter(s) (100 mL/Hr) IV Continuous <Continuous>  dextrose 50% Injectable 25 Gram(s) IV Push once  dextrose 50% Injectable 12.5 Gram(s) IV Push once  dextrose 50% Injectable 25 Gram(s) IV Push once  enoxaparin Injectable 110 milliGRAM(s) SubCutaneous two times a day  furosemide   Injectable 40 milliGRAM(s) IV Push daily  glucagon  Injectable 1 milliGRAM(s) IntraMuscular once  insulin lispro (ADMELOG) corrective regimen sliding scale   SubCutaneous three times a day before meals  insulin lispro (ADMELOG) corrective regimen sliding scale   SubCutaneous at bedtime  lidocaine   Patch 1 Patch Transdermal every 24 hours  lisinopril 20 milliGRAM(s) Oral daily  pantoprazole    Tablet 40 milliGRAM(s) Oral before breakfast  predniSONE   Tablet 7.5 milliGRAM(s) Oral every other day  pyridostigmine 120 milliGRAM(s) Oral daily  senna 2 Tablet(s) Oral at bedtime    MEDICATIONS  (PRN):         Vitals log        ICU Vital Signs Last 24 Hrs  T(C): 36.4 (03 Jun 2021 04:39), Max: 36.5 (02 Jun 2021 14:43)  T(F): 97.5 (03 Jun 2021 04:39), Max: 97.7 (02 Jun 2021 14:43)  HR: 61 (03 Jun 2021 04:39) (61 - 67)  BP: 109/71 (03 Jun 2021 04:39) (104/67 - 119/74)  BP(mean): --  ABP: --  ABP(mean): --  RR: 18 (03 Jun 2021 04:39) (18 - 18)  SpO2: 96% (03 Jun 2021 04:39) (94% - 96%)           Input and Output:  I&O's Detail    01 Jun 2021 07:01  -  02 Jun 2021 07:00  --------------------------------------------------------  IN:  Total IN: 0 mL    OUT:    Voided (mL): 600 mL  Total OUT: 600 mL    Total NET: -600 mL      02 Jun 2021 07:01  -  03 Jun 2021 06:15  --------------------------------------------------------  IN:  Total IN: 0 mL    OUT:    Voided (mL): 1650 mL  Total OUT: 1650 mL    Total NET: -1650 mL          Lab Data                        15.2   10.38 )-----------( 333      ( 02 Jun 2021 07:03 )             46.5     06-02    138  |  100  |  21  ----------------------------<  80  3.9   |  30  |  0.93    Ca    9.2      02 Jun 2021 07:03    TPro  6.7  /  Alb  3.1<L>  /  TBili  0.6  /  DBili  x   /  AST  23  /  ALT  20  /  AlkPhos  58  06-02      CARDIAC MARKERS ( 01 Jun 2021 14:36 )  <.015 ng/mL / x     / x     / x     / x            Review of Systems	      Objective     Physical Examination    heart s1s2  lung dc BS  abd soft  head nc  head at  extr edema      Pertinent Lab findings & Imaging      Lauro:  NO   Adequate UO     I&O's Detail    01 Jun 2021 07:01  -  02 Jun 2021 07:00  --------------------------------------------------------  IN:  Total IN: 0 mL    OUT:    Voided (mL): 600 mL  Total OUT: 600 mL    Total NET: -600 mL      02 Jun 2021 07:01  -  03 Jun 2021 06:15  --------------------------------------------------------  IN:  Total IN: 0 mL    OUT:    Voided (mL): 1650 mL  Total OUT: 1650 mL    Total NET: -1650 mL               Discussed with:     Cultures:	        Radiology

## 2021-06-04 ENCOUNTER — TRANSCRIPTION ENCOUNTER (OUTPATIENT)
Age: 76
End: 2021-06-04

## 2021-06-04 VITALS
RESPIRATION RATE: 18 BRPM | DIASTOLIC BLOOD PRESSURE: 67 MMHG | TEMPERATURE: 98 F | HEART RATE: 77 BPM | SYSTOLIC BLOOD PRESSURE: 102 MMHG | OXYGEN SATURATION: 95 %

## 2021-06-04 LAB
ANION GAP SERPL CALC-SCNC: 5 MMOL/L — SIGNIFICANT CHANGE UP (ref 5–17)
BUN SERPL-MCNC: 20 MG/DL — SIGNIFICANT CHANGE UP (ref 7–23)
CALCIUM SERPL-MCNC: 9.1 MG/DL — SIGNIFICANT CHANGE UP (ref 8.5–10.1)
CHLORIDE SERPL-SCNC: 98 MMOL/L — SIGNIFICANT CHANGE UP (ref 96–108)
CO2 SERPL-SCNC: 33 MMOL/L — HIGH (ref 22–31)
CREAT SERPL-MCNC: 1 MG/DL — SIGNIFICANT CHANGE UP (ref 0.5–1.3)
GLUCOSE SERPL-MCNC: 84 MG/DL — SIGNIFICANT CHANGE UP (ref 70–99)
HCT VFR BLD CALC: 47.9 % — SIGNIFICANT CHANGE UP (ref 39–50)
HGB BLD-MCNC: 15.8 G/DL — SIGNIFICANT CHANGE UP (ref 13–17)
MCHC RBC-ENTMCNC: 30.1 PG — SIGNIFICANT CHANGE UP (ref 27–34)
MCHC RBC-ENTMCNC: 33 GM/DL — SIGNIFICANT CHANGE UP (ref 32–36)
MCV RBC AUTO: 91.2 FL — SIGNIFICANT CHANGE UP (ref 80–100)
NRBC # BLD: 0 /100 WBCS — SIGNIFICANT CHANGE UP (ref 0–0)
PLATELET # BLD AUTO: 350 K/UL — SIGNIFICANT CHANGE UP (ref 150–400)
POTASSIUM SERPL-MCNC: 3.8 MMOL/L — SIGNIFICANT CHANGE UP (ref 3.5–5.3)
POTASSIUM SERPL-SCNC: 3.8 MMOL/L — SIGNIFICANT CHANGE UP (ref 3.5–5.3)
RBC # BLD: 5.25 M/UL — SIGNIFICANT CHANGE UP (ref 4.2–5.8)
RBC # FLD: 14.6 % — HIGH (ref 10.3–14.5)
SODIUM SERPL-SCNC: 136 MMOL/L — SIGNIFICANT CHANGE UP (ref 135–145)
WBC # BLD: 9.3 K/UL — SIGNIFICANT CHANGE UP (ref 3.8–10.5)
WBC # FLD AUTO: 9.3 K/UL — SIGNIFICANT CHANGE UP (ref 3.8–10.5)

## 2021-06-04 PROCEDURE — 83880 ASSAY OF NATRIURETIC PEPTIDE: CPT

## 2021-06-04 PROCEDURE — 86038 ANTINUCLEAR ANTIBODIES: CPT

## 2021-06-04 PROCEDURE — 99232 SBSQ HOSP IP/OBS MODERATE 35: CPT

## 2021-06-04 PROCEDURE — 80053 COMPREHEN METABOLIC PANEL: CPT

## 2021-06-04 PROCEDURE — 80048 BASIC METABOLIC PNL TOTAL CA: CPT

## 2021-06-04 PROCEDURE — 83036 HEMOGLOBIN GLYCOSYLATED A1C: CPT

## 2021-06-04 PROCEDURE — 85027 COMPLETE CBC AUTOMATED: CPT

## 2021-06-04 PROCEDURE — 96374 THER/PROPH/DIAG INJ IV PUSH: CPT | Mod: XU

## 2021-06-04 PROCEDURE — 36415 COLL VENOUS BLD VENIPUNCTURE: CPT

## 2021-06-04 PROCEDURE — 85025 COMPLETE CBC W/AUTO DIFF WBC: CPT

## 2021-06-04 PROCEDURE — 93005 ELECTROCARDIOGRAM TRACING: CPT

## 2021-06-04 PROCEDURE — 99285 EMERGENCY DEPT VISIT HI MDM: CPT

## 2021-06-04 PROCEDURE — 83090 ASSAY OF HOMOCYSTEINE: CPT

## 2021-06-04 PROCEDURE — 85379 FIBRIN DEGRADATION QUANT: CPT

## 2021-06-04 PROCEDURE — 82962 GLUCOSE BLOOD TEST: CPT

## 2021-06-04 PROCEDURE — 93970 EXTREMITY STUDY: CPT

## 2021-06-04 PROCEDURE — 84484 ASSAY OF TROPONIN QUANT: CPT

## 2021-06-04 PROCEDURE — 71045 X-RAY EXAM CHEST 1 VIEW: CPT

## 2021-06-04 PROCEDURE — 97161 PT EVAL LOW COMPLEX 20 MIN: CPT

## 2021-06-04 PROCEDURE — 86769 SARS-COV-2 COVID-19 ANTIBODY: CPT

## 2021-06-04 PROCEDURE — 84100 ASSAY OF PHOSPHORUS: CPT

## 2021-06-04 PROCEDURE — 73522 X-RAY EXAM HIPS BI 3-4 VIEWS: CPT

## 2021-06-04 PROCEDURE — 99239 HOSP IP/OBS DSCHRG MGMT >30: CPT

## 2021-06-04 PROCEDURE — 93306 TTE W/DOPPLER COMPLETE: CPT

## 2021-06-04 PROCEDURE — 84443 ASSAY THYROID STIM HORMONE: CPT

## 2021-06-04 PROCEDURE — U0005: CPT

## 2021-06-04 PROCEDURE — 83735 ASSAY OF MAGNESIUM: CPT

## 2021-06-04 PROCEDURE — 71275 CT ANGIOGRAPHY CHEST: CPT

## 2021-06-04 PROCEDURE — U0003: CPT

## 2021-06-04 RX ORDER — ASPIRIN/CALCIUM CARB/MAGNESIUM 324 MG
1 TABLET ORAL
Qty: 0 | Refills: 0 | DISCHARGE

## 2021-06-04 RX ORDER — METOPROLOL TARTRATE 50 MG
1 TABLET ORAL
Qty: 10 | Refills: 0
Start: 2021-06-04 | End: 2021-06-13

## 2021-06-04 RX ORDER — LIDOCAINE 4 G/100G
1 CREAM TOPICAL
Qty: 3 | Refills: 0
Start: 2021-06-04 | End: 2021-06-06

## 2021-06-04 RX ORDER — FUROSEMIDE 40 MG
1 TABLET ORAL
Qty: 15 | Refills: 0
Start: 2021-06-04 | End: 2021-06-18

## 2021-06-04 RX ORDER — METOPROLOL TARTRATE 50 MG
25 TABLET ORAL DAILY
Refills: 0 | Status: DISCONTINUED | OUTPATIENT
Start: 2021-06-04 | End: 2021-06-04

## 2021-06-04 RX ADMIN — Medication 40 MILLIGRAM(S): at 05:42

## 2021-06-04 RX ADMIN — PYRIDOSTIGMINE BROMIDE 120 MILLIGRAM(S): 60 SOLUTION ORAL at 11:42

## 2021-06-04 RX ADMIN — Medication 7.5 MILLIGRAM(S): at 11:43

## 2021-06-04 RX ADMIN — Medication 81 MILLIGRAM(S): at 11:42

## 2021-06-04 RX ADMIN — PANTOPRAZOLE SODIUM 40 MILLIGRAM(S): 20 TABLET, DELAYED RELEASE ORAL at 05:42

## 2021-06-04 RX ADMIN — LIDOCAINE 1 PATCH: 4 CREAM TOPICAL at 11:43

## 2021-06-04 RX ADMIN — Medication 25 MILLIGRAM(S): at 14:35

## 2021-06-04 RX ADMIN — ENOXAPARIN SODIUM 110 MILLIGRAM(S): 100 INJECTION SUBCUTANEOUS at 11:43

## 2021-06-04 RX ADMIN — LISINOPRIL 20 MILLIGRAM(S): 2.5 TABLET ORAL at 05:42

## 2021-06-04 NOTE — PROGRESS NOTE ADULT - PROVIDER SPECIALTY LIST ADULT
Cardiology
Neurology
Pulmonology
Heme/Onc
Pulmonology
Cardiology
Cardiology
Neurology
Hospitalist
Heme/Onc
Hospitalist

## 2021-06-04 NOTE — PROGRESS NOTE ADULT - SUBJECTIVE AND OBJECTIVE BOX
Neurology follow up note    DEION [id number]      Interval History:    Patient feels ok no new complaints.    MEDICATIONS    aspirin enteric coated 81 milliGRAM(s) Oral daily  dextrose 40% Gel 15 Gram(s) Oral once  dextrose 5%. 1000 milliLiter(s) IV Continuous <Continuous>  dextrose 5%. 1000 milliLiter(s) IV Continuous <Continuous>  dextrose 50% Injectable 25 Gram(s) IV Push once  dextrose 50% Injectable 12.5 Gram(s) IV Push once  dextrose 50% Injectable 25 Gram(s) IV Push once  enoxaparin Injectable 110 milliGRAM(s) SubCutaneous two times a day  furosemide    Tablet 40 milliGRAM(s) Oral daily  glucagon  Injectable 1 milliGRAM(s) IntraMuscular once  insulin lispro (ADMELOG) corrective regimen sliding scale   SubCutaneous three times a day before meals  insulin lispro (ADMELOG) corrective regimen sliding scale   SubCutaneous at bedtime  lidocaine   Patch 1 Patch Transdermal every 24 hours  lisinopril 20 milliGRAM(s) Oral daily  pantoprazole    Tablet 40 milliGRAM(s) Oral before breakfast  predniSONE   Tablet 7.5 milliGRAM(s) Oral every other day  pyridostigmine 120 milliGRAM(s) Oral daily  senna 2 Tablet(s) Oral at bedtime      Allergies    No Known Allergies    Intolerances    Avelox (Other)  fluoroquinolone antibiotics (Other)  Ketek (Other)  telithromycin (Other)          Vital Signs Last 24 Hrs  T(C): 36.4 (04 Jun 2021 04:53), Max: 36.6 (03 Jun 2021 16:05)  T(F): 97.5 (04 Jun 2021 04:53), Max: 97.8 (03 Jun 2021 16:05)  HR: 66 (04 Jun 2021 04:53) (60 - 91)  BP: 125/76 (04 Jun 2021 04:53) (113/69 - 125/76)  BP(mean): --  RR: 18 (04 Jun 2021 04:53) (18 - 18)  SpO2: 94% (04 Jun 2021 04:53) (94% - 97%)    REVIEW OF SYSTEMS:  Constitutional:  The patient denies fever, chills, or night sweats.  Head:  No headaches.  Eyes:  No double vision or blurry vision.  Ears:  No ringing in the ears.  Neck:  No neck pain.  Respiratory:  No shortness of breath.  Cardiovascular:  No chest pain.  Abdomen:  No nausea, vomiting, or abdominal pain.  Extremities/Neurological:  No numbness or tingling.  Musculoskeletal:  No joint pain.  General:  Positive history of swelling in the legs which brought him to the emergency room.    PHYSICAL EXAMINATION:    HEENT:  Head:  Normocephalic, atraumatic.  Eyes:  No scleral icterus.  Ears:  Hearing bilaterally intact.  NECK:  Supple.  RESPIRATORY:  Good air entry bilaterally.  CARDIOVASCULAR:  S1 and S2 heard.  ABDOMEN:  Soft and nontender.  EXTREMITIES:  No clubbing or cyanosis was noted.      NEUROLOGIC:  The patient is awake and alert.  Extraocular movements were intact.  The patient was able to look up for over one minute with no ptosis.  Speech was fluent.  Smile was symmetric.  Motor:  Bilateral upper and lower were 4+/5.  Sensory:  Bilateral upper and lower intact to light touch.  No drift.  Finger-to-nose within normal limits.                 LABS:  CBC Full  -  ( 04 Jun 2021 06:46 )  WBC Count : 9.30 K/uL  RBC Count : 5.25 M/uL  Hemoglobin : 15.8 g/dL  Hematocrit : 47.9 %  Platelet Count - Automated : 350 K/uL  Mean Cell Volume : 91.2 fl  Mean Cell Hemoglobin : 30.1 pg  Mean Cell Hemoglobin Concentration : 33.0 gm/dL  Auto Neutrophil # : x  Auto Lymphocyte # : x  Auto Monocyte # : x  Auto Eosinophil # : x  Auto Basophil # : x  Auto Neutrophil % : x  Auto Lymphocyte % : x  Auto Monocyte % : x  Auto Eosinophil % : x  Auto Basophil % : x      06-04    136  |  98  |  20  ----------------------------<  84  3.8   |  33<H>  |  1.00    Ca    9.1      04 Jun 2021 06:46  Phos  3.2     06-03  Mg     2.4     06-03      Hemoglobin A1C:       Vitamin B12         RADIOLOGY    ANALYSIS AND PLAN:  This is a 76-year-old with a history of myasthenia gravis.  For myasthenia gravis, this appears to be an ocular variant in regards to ptosis.  The patient does not have any clear signs to suggest any exacerbation of myasthenia.  No motor weakness and no shortness of breath and does not have that from myasthenia.  For history of myasthenia gravis, continue the patient on his pyridostigmine 120 mg once a day along with prednisone 7.5 mg every other day.  For history of hypertension, monitor systolic blood pressure.  For history of pulmonary emboli, anticoagulation as needed.  Fall precaution.  Neurologic standpoint only cleared for discharge planning   Greater than 45 minutes of time was spent with the patient, plan of care, reviewing data, with greater than 50% of the visit was spent counseling and/or coordinating care with multidisciplinary healthcare team

## 2021-06-04 NOTE — PROGRESS NOTE ADULT - ASSESSMENT
[ASSESSMENT and  PLAN]  Unprovoked,. BL PE  ·	Pulmonary emboli in the right upper and lower lobe pulmonary arteries with extension into segmental branches, segmental and subsegmental branches in the right middle lobe, and left upper lobe branches to the lingula.   - PESI 88, class III intermediate risk.   Echo unremarkable. Limited study  CAD. Coronary artery calcifications.    Renal stones    L hip/flank pain. c/o now more L hip bone pain.    LFTs unremarkable     Depression / irritibaility    negative hip xrays.     RECOMMENDATIONS  Continue Eliquis 10mg q12h x7d thereafter Eliquis 5mg q12h thereafter.     continue medical and orthopedic evaluation    Follow in office post DC    Thank you for consulting us.   No additional recommendations at current time.   Will sign off on case for now.   Please call, or re-consult if needed.

## 2021-06-04 NOTE — PROGRESS NOTE ADULT - SUBJECTIVE AND OBJECTIVE BOX
[INTERVAL HX: ]  Patient seen and examined;  Chart reviewed and events noted;   no change in condition.   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 pt states approx 2 weeks ago he had a prostate biopsy with Dr. Sims. He was due to follow up for results and when he saw Dr. Sims he presented with new lower extremity edema. Dr. Sims referred pt back to Dr. Wade PCP. PCP noted pt had new lower extremity swelling and appeared short of breath. Pts son at bedside states pt also  has seemed much more short of breath recently however pt denies. PCP sent pt to ED for further evaluation.   Pt states swelling began 1 week ago, he has never had similar swelling. He does admit to being sedentary but states this is unchanged from his baseline as he has myasthenia gravis and chronic back pain. he only ambulates to get to and from the bathroom which he does frequently due to his hctz. denies fall/trauma but admits to left hip pain which was evaluated and he was told there was no pathology found.  Pt denies chest pain, palpitations, syncopal episodes, lightheadedness dizziness, shortness of breath, wheezing or cough. Denies previous blood clots.   of note per patient prostate biopsy was negative for malignancy.     In the ED CBC, Ddimer, CMP, troponin, probnp, EKG, CTA, COVID pcr were performed Significant for wbc 10.86, ddimer 1130, albumin 3.2, troponin and probnp wnl. EKG Sinus rhythm 73bpm Left anterior fasicular block. CTA revealed Pulmonary emboli in the right upper and lower lobe pulmonary arteries with extension into segmental branches, segmental and subsegmental branches in the right middle lobe, and left upper lobe branches to the lingula. No evidence of heart strain. Pt was given lasix 40 IVP x 1.  (01 Jun 2021 21:09)        MEDICATIONS  (STANDING):  aspirin enteric coated 81 milliGRAM(s) Oral daily  dextrose 40% Gel 15 Gram(s) Oral once  dextrose 5%. 1000 milliLiter(s) (50 mL/Hr) IV Continuous <Continuous>  dextrose 5%. 1000 milliLiter(s) (100 mL/Hr) IV Continuous <Continuous>  dextrose 50% Injectable 25 Gram(s) IV Push once  dextrose 50% Injectable 12.5 Gram(s) IV Push once  dextrose 50% Injectable 25 Gram(s) IV Push once  enoxaparin Injectable 110 milliGRAM(s) SubCutaneous two times a day  furosemide    Tablet 40 milliGRAM(s) Oral daily  glucagon  Injectable 1 milliGRAM(s) IntraMuscular once  insulin lispro (ADMELOG) corrective regimen sliding scale   SubCutaneous three times a day before meals  insulin lispro (ADMELOG) corrective regimen sliding scale   SubCutaneous at bedtime  lidocaine   Patch 1 Patch Transdermal every 24 hours  lisinopril 20 milliGRAM(s) Oral daily  metoprolol succinate ER 25 milliGRAM(s) Oral daily  pantoprazole    Tablet 40 milliGRAM(s) Oral before breakfast  predniSONE   Tablet 7.5 milliGRAM(s) Oral every other day  pyridostigmine 120 milliGRAM(s) Oral daily  senna 2 Tablet(s) Oral at bedtime    MEDICATIONS  (PRN):      Vital Signs Last 24 Hrs  T(C): 36.6 (04 Jun 2021 15:12), Max: 36.6 (04 Jun 2021 11:34)  T(F): 97.8 (04 Jun 2021 15:12), Max: 97.8 (04 Jun 2021 11:34)  HR: 77 (04 Jun 2021 15:12) (66 - 97)  BP: 102/67 (04 Jun 2021 15:12) (98/64 - 125/76)  BP(mean): --  RR: 18 (04 Jun 2021 15:12) (18 - 18)  SpO2: 95% (04 Jun 2021 15:12) (94% - 95%)      [PHYSICAL EXAM]  General: adult in NAD,  WN,  WD.  HEENT: clear oropharynx, anicteric sclera, pink conjunctivae.  Neck: supple, no masses.  CV: normal S1S2, no murmur, no rubs, no gallops.  Lungs: clear to auscultation, no wheezes, no rales, no rhonchi.  Abdomen: soft, non-tender, non-distended, no hepatosplenomegaly, normal BS, no guarding.  Ext: no clubbing, no cyanosis, no edema.  Skin: no rashes,  no petechiae, no venous stasis changes.  Neuro: alert and oriented X3  , no focal motor deficits.  LN: no SC NIKI.      [LABS:]                        15.8   9.30  )-----------( 350      ( 04 Jun 2021 06:46 )             47.9     06-04    136  |  98  |  20  ----------------------------<  84  3.8   |  33<H>  |  1.00    Ca    9.1      04 Jun 2021 06:46  Phos  3.2     06-03  Mg     2.4     06-03                  [RADIOLOGY STUDIES:]    < from: Xray Hip w/ Pelvis 3-4 Views, Bilateral (06.03.21 @ 10:15) >    EXAM:  XR HIPS BI WITH PELV 3-4V                            PROCEDURE DATE:  06/03/2021          INTERPRETATION:  Radiographs of the bilateral hips    CLINICAL INFORMATION:  Injury with   Pain.    TECHNIQUE: Bilateral AP and oblique views of the hip were obtained. AP pelvis    FINDINGS:   No prior exams are available for comparison.    No fracture or dislocation..  The hip remains located..  The joint space is preserved.  No significant productive changes or other cortical/ trabecular abnormalities..  There are diffuse arterial vascular wall calcifications. .    Osseous pelvis intact.    IMPRESSION:    No acute radiographic osseous pathology..      < end of copied text >

## 2021-06-04 NOTE — PROGRESS NOTE ADULT - ASSESSMENT
TTE report - grossly normal  HEME Onc eval noted -   MSK - x rays noted -   dc planned - will be going home on DOAC    pt with new PE - bilateral  PE - AC - on lovenox - eventual transition to DOAC - no evidence of RV strain - CE and ProBNP noted - ECHO done, on RA - VSS  MG - on Prednisone low dose and Mestinon - indep with ADL  GERD -   HTN - cvs rx regimen and BP control - on LASIX IV - Cardio following -   Pulm nodules - multiple - all sub cm - very distant hx of smoking, non emergent Pulm follow up in the office with Dr Mccormick in [place] office -   out of bed  check Sat on RA on exertion  ambulate ok  will need Hypercoagulation work up as outpatient with Elvin TTE report - grossly normal  HEME Onc eval noted -   MSK - x rays noted -   dc planned - will be going home on DOAC    pt with new PE - bilateral  PE - AC - on lovenox - eventual transition to DOAC - no evidence of RV strain - CE and ProBNP noted - ECHO done, on RA - VSS  MG - on Prednisone low dose and Mestinon - indep with ADL  GERD -   HTN - cvs rx regimen and BP control - on LASIX - Cardio following -   Pulm nodules - multiple - all sub cm - very distant hx of smoking, non emergent Pulm follow up in the office with Dr Mccormick in [place] office -   out of bed  check Sat on RA on exertion  ambulate ok  will need Hypercoagulation work up as outpatient with Elvin

## 2021-06-04 NOTE — PHYSICAL THERAPY INITIAL EVALUATION ADULT - PERTINENT HX OF CURRENT PROBLEM, REHAB EVAL
Pt admitted 6/1 with LE swelling and was diagnosed with bilateral PE. Pt also with left hip pain x-ray 6/3 (-) for acute pathology

## 2021-06-04 NOTE — PROGRESS NOTE ADULT - ASSESSMENT
75-year-old male with a history of hypertension, dyslipidemia (intolerant of statin drugs, and is now on Praluent), obesity, myasthenia gravis, diet-controlled diabetes, chronic lower extremity swelling who presents to ED c/o progressively worsening shortness of breath and painless bilateral lower extremity edema x 1 week. Cardiology consulted for CHF. He follows Dr Woods for cardiology.     Acute PE  - D-dimer 1130.  CTA + bilateral PE's  - Lower extremity doppler negative for DVT  - Remains hemodynamically stable.  TTE showed normal LV/RVF, no evidence of RHS  - Switch FD Lovenox to DOAC    HfpEF  - Echo: 1/31/2017, Stage 1 diastolic dysfunction, normal LV function, normal LA size, mild mitral regurgitation   - Serum Pro-Brain Natriuretic Peptide: 158 pg/mL (06-01-21 @ 14:36)  - EKG showed SR, LAFB, similar to old EKG. No ischemic changes noted.   - Stress Test: 9/2014, no Ischemia, Vasodilator nuclear perfusion stress test was negative for ischemia. There was normal left ventricular systolic function and normal left ventricular myocardial perfusion. Post stress EF 61%.   - Diuresed well, now clinically euvolemic.  Net neg 900 cc.    - On home HCTZ but will keep on Lasix PO on D/C and will follow up with Dr. Woods  - Monitor and replete lytes, keep K>4, Mg>2.    Hypertension  - BP stable and controlled, though, with couple episodes of systolic at high 90's.  Asymptomatic.  If remains hypotensive, woud reduce ACEI  - Continue Lisinopril 20 mg QD  - Monitor routine hemodynamics     Hyperlipidemia  - Patient intolerant to statins   - On home Praluent     - All other medical needs as per primary team.  - Stable from cardiac standpoint for D/C.  To follow up with Dr. Woods  - Will continue to follow.    Florina Galicia DNP, NP-C  Cardiology   Spectra #[phone]

## 2021-06-04 NOTE — DISCHARGE NOTE NURSING/CASE MANAGEMENT/SOCIAL WORK - PATIENT PORTAL LINK FT
You can access the FollowMyHealth Patient Portal offered by [facility] by registering at the following website: [email or web address]. By joining [employer]’s FollowMyHealth portal, you will also be able to view your health information using other applications (apps) compatible with our system.

## 2021-06-04 NOTE — PHYSICAL THERAPY INITIAL EVALUATION ADULT - PATIENT/FAMILY AGREES WITH PLAN
Per OV in July:    Plaque psoriasis  -     Discontinue: betamethasone valerate (VALISONE) 0.1 % ointment; Apply thin layer to affected areas on neck, chest, and groin twice daily for 3 weeks. Then, call your dermatologist.  -     betamethasone valerate (VALISONE) 0.1 % ointment; Apply thin layer to affected areas on neck, chest, and groin twice daily for 3 weeks. Then, call your dermatologist.  Â      BSA 10% today  Neg SILVIA indicates this is not dermatophyte  Discussed TCS vs Biologic  Trial of TCS first  Pt to update me in 3 wk  Would want to do skin bx prior to initiating biologic    ____________________________    Will send message to MD to advise on refill of topical medication. yes

## 2021-06-04 NOTE — PROVIDER CONTACT NOTE (CHANGE IN STATUS NOTIFICATION) - ASSESSMENT
Pt A&Ox4, pt sleeping when author entered room. Pt denies any CP/CD, SOB, palpitations. No acute distress.

## 2021-06-04 NOTE — PROGRESS NOTE ADULT - ATTENDING COMMENTS
Chart reviewed    Patient seen and examined    Agree with plan as outlined above    75-year-old male with a history of hypertension, dyslipidemia (intolerant of statin drugs, and is now on Praluent), obesity, myasthenia gravis, diet-controlled diabetes, chronic lower extremity swelling who presents to ED c/o progressively worsening shortness of breath and painless bilateral lower extremity edema x 1 week. Cardiology consulted for CHF. He follows Dr Woods for cardiology.     PE  - Patient p/w chronic FAUSTIN but recently has gotten worse and BLE edema worseing x 1 week  - D-dimer 1130  - CTA done revealing bilateral PEs  - Lower extremity doppler negative for DVT  - Full dose Lovenox initiated would change to DOAC when able  - Follow up TTE to assess for right heart strain    HfpEF  - Echo: 1/31/2017, Stage 1 diastolic dysfunction., normal LV function, normal LA size, mild mitral regurgitation   - Serum Pro-Brain Natriuretic Peptide: 158 pg/mL (06-01-21 @ 14:36)  - EKG showed SR, LAFB, similar to old EKG. No ischemic changes noted.   - Stress Test: 9/2014, no Ischemia, Vasodilator nuclear perfusion stress test was negative for ischemia. There was normal left ventricular systolic function and normal left ventricular myocardial perfusion. Post stress EF 61%.   -S/P  Lasix 40 mg IV x 1 in ED would continue to diurese with Lasix 40 mg IV QD  - D/C HCTZ  - Awaiting repeat transthoracic echocardiogram to assess ventricular function and r/o valvular abnormalities.
pe now on ac  improved overall  dc planning for today with followup in our office
Seen/examined. agree with above.  clinically improving  can change lasix to po  cont ac

## 2021-06-04 NOTE — PROGRESS NOTE ADULT - SUBJECTIVE AND OBJECTIVE BOX
Date/Time Patient Seen:  		  Referring MD:   Data Reviewed	       Patient is a 76y old  Male who presents with a chief complaint of PE (03 Jun 2021 18:18)      Subjective/HPI     PAST MEDICAL & SURGICAL HISTORY:  Calculus of kidney    Club foot  Born Right Foot    Myasthenia gravis    Hypertension    Diabetes  Type 2 - does not take medications - monitors Blood Glucose at home - diet controlled    Urinary tract infection  notes h/o UTI&#x27;s    Hyperlipidemia    Elective surgery  1956 age 11 @ [facility] - cut under Patella secondary 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    Club foot  Surgery at birth for Club Foot Right foot    Pilonidal cyst  Surgery 40 years ago    H/O colonoscopy    Spinal stenosis    H/O prostate biopsy          Medication list         MEDICATIONS  (STANDING):  aspirin enteric coated 81 milliGRAM(s) Oral daily  dextrose 40% Gel 15 Gram(s) Oral once  dextrose 5%. 1000 milliLiter(s) (50 mL/Hr) IV Continuous <Continuous>  dextrose 5%. 1000 milliLiter(s) (100 mL/Hr) IV Continuous <Continuous>  dextrose 50% Injectable 25 Gram(s) IV Push once  dextrose 50% Injectable 12.5 Gram(s) IV Push once  dextrose 50% Injectable 25 Gram(s) IV Push once  enoxaparin Injectable 110 milliGRAM(s) SubCutaneous two times a day  furosemide    Tablet 40 milliGRAM(s) Oral daily  glucagon  Injectable 1 milliGRAM(s) IntraMuscular once  insulin lispro (ADMELOG) corrective regimen sliding scale   SubCutaneous three times a day before meals  insulin lispro (ADMELOG) corrective regimen sliding scale   SubCutaneous at bedtime  lidocaine   Patch 1 Patch Transdermal every 24 hours  lisinopril 20 milliGRAM(s) Oral daily  pantoprazole    Tablet 40 milliGRAM(s) Oral before breakfast  predniSONE   Tablet 7.5 milliGRAM(s) Oral every other day  pyridostigmine 120 milliGRAM(s) Oral daily  senna 2 Tablet(s) Oral at bedtime    MEDICATIONS  (PRN):         Vitals log        ICU Vital Signs Last 24 Hrs  T(C): 36.4 (04 Jun 2021 04:53), Max: 36.6 (03 Jun 2021 16:05)  T(F): 97.5 (04 Jun 2021 04:53), Max: 97.8 (03 Jun 2021 16:05)  HR: 66 (04 Jun 2021 04:53) (60 - 91)  BP: 125/76 (04 Jun 2021 04:53) (113/69 - 125/76)  BP(mean): --  ABP: --  ABP(mean): --  RR: 18 (04 Jun 2021 04:53) (18 - 18)  SpO2: 94% (04 Jun 2021 04:53) (94% - 97%)           Input and Output:  I&O's Detail    02 Jun 2021 07:01  -  03 Jun 2021 07:00  --------------------------------------------------------  IN:  Total IN: 0 mL    OUT:    Voided (mL): 2750 mL  Total OUT: 2750 mL    Total NET: -2750 mL      03 Jun 2021 07:01  -  04 Jun 2021 05:58  --------------------------------------------------------  IN:    Oral Fluid: 237 mL  Total IN: 237 mL    OUT:    Voided (mL): 1200 mL  Total OUT: 1200 mL    Total NET: -963 mL          Lab Data                        16.9   10.22 )-----------( 376      ( 03 Jun 2021 07:24 )             51.3     06-03    137  |  98  |  20  ----------------------------<  96  3.7   |  31  |  0.99    Ca    9.6      03 Jun 2021 07:24  Phos  3.2     06-03  Mg     2.4     06-03    TPro  6.7  /  Alb  3.1<L>  /  TBili  0.6  /  DBili  x   /  AST  23  /  ALT  20  /  AlkPhos  58  06-02            Review of Systems	      Objective     Physical Examination    heart s1s2  lung dec BS  abd soft  head nc      Pertinent Lab findings & Imaging      Lauro:  NO   Adequate UO     I&O's Detail    02 Jun 2021 07:01  -  03 Jun 2021 07:00  --------------------------------------------------------  IN:  Total IN: 0 mL    OUT:    Voided (mL): 2750 mL  Total OUT: 2750 mL    Total NET: -2750 mL      03 Jun 2021 07:01  -  04 Jun 2021 05:58  --------------------------------------------------------  IN:    Oral Fluid: 237 mL  Total IN: 237 mL    OUT:    Voided (mL): 1200 mL  Total OUT: 1200 mL    Total NET: -963 mL               Discussed with:     Cultures:	        Radiology

## 2021-06-04 NOTE — PROGRESS NOTE ADULT - SUBJECTIVE AND OBJECTIVE BOX
[facility] -- Manuel Carter Grossman, [place], Evan Woods [place], [place]  Office # [phone]    Follow Up:    Volume overload, PE    Subjective/Observations: Sitting on  the chair, comfortable on RA.  Admits to be ambulating on RA with no FAUSTIN.  No orthopnea but sleeps on the recliner at baseline.  Denies chest pain or palpitations.  Still diuresing well      REVIEW OF SYSTEMS: All other review of systems is negative unless indicated above  PAST MEDICAL & SURGICAL HISTORY:  Calculus of kidney    Club foot  Born Right Foot    Myasthenia gravis    Hypertension    Diabetes  Type 2 - does not take medications - monitors Blood Glucose at home - diet controlled    Urinary tract infection  notes h/o UTI&#x27;s    Hyperlipidemia    Elective surgery  1956 age 11 @ [facility] - cut under Patella secondary 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    Club foot  Surgery at birth for Club Foot Right foot    Pilonidal cyst  Surgery 40 years ago    H/O colonoscopy    Spinal stenosis    H/O prostate biopsy    MEDICATIONS  (STANDING):  aspirin enteric coated 81 milliGRAM(s) Oral daily  dextrose 40% Gel 15 Gram(s) Oral once  dextrose 5%. 1000 milliLiter(s) (50 mL/Hr) IV Continuous <Continuous>  dextrose 5%. 1000 milliLiter(s) (100 mL/Hr) IV Continuous <Continuous>  dextrose 50% Injectable 25 Gram(s) IV Push once  dextrose 50% Injectable 12.5 Gram(s) IV Push once  dextrose 50% Injectable 25 Gram(s) IV Push once  enoxaparin Injectable 110 milliGRAM(s) SubCutaneous two times a day  furosemide    Tablet 40 milliGRAM(s) Oral daily  glucagon  Injectable 1 milliGRAM(s) IntraMuscular once  insulin lispro (ADMELOG) corrective regimen sliding scale   SubCutaneous three times a day before meals  insulin lispro (ADMELOG) corrective regimen sliding scale   SubCutaneous at bedtime  lidocaine   Patch 1 Patch Transdermal every 24 hours  lisinopril 20 milliGRAM(s) Oral daily  pantoprazole    Tablet 40 milliGRAM(s) Oral before breakfast  predniSONE   Tablet 7.5 milliGRAM(s) Oral every other day  pyridostigmine 120 milliGRAM(s) Oral daily  senna 2 Tablet(s) Oral at bedtime    MEDICATIONS  (PRN):    Allergies    No Known Allergies    Intolerances    Avelox (Other)  fluoroquinolone antibiotics (Other)  Ketek (Other)  telithromycin (Other)    Vital Signs Last 24 Hrs  T(C): 36.6 (04 Jun 2021 11:34), Max: 36.6 (03 Jun 2021 16:05)  T(F): 97.8 (04 Jun 2021 11:34), Max: 97.8 (03 Jun 2021 16:05)  HR: 97 (04 Jun 2021 11:34) (60 - 97)  BP: 98/64 (04 Jun 2021 11:50) (98/64 - 125/76)  BP(mean): --  RR: 18 (04 Jun 2021 11:34) (18 - 18)  SpO2: 94% (04 Jun 2021 11:34) (94% - 97%)  I&O's Summary    03 Jun 2021 07:01  -  04 Jun 2021 07:00  --------------------------------------------------------  IN: 237 mL / OUT: 1200 mL / NET: -963 mL    04 Jun 2021 07:01  -  04 Jun 2021 12:37  --------------------------------------------------------  IN: 0 mL / OUT: 1075 mL / NET: -1075 mL    PHYSICAL EXAM:  TELE: NSR with bried episodes of tachycardia during ambulation to the BR  Constitutional: NAD, awake and alert, morbidly obese  HEENT: Moist Mucous Membranes, Anicteric  Pulmonary: Non-labored, breath sounds are clear bilaterally, No wheezing, rales or rhonchi  Cardiovascular: Regular, S1 and S2, No murmurs, rubs, gallops or clicks  Gastrointestinal: Bowel Sounds present, soft, nontender.   Lymph: Trace peripheral edema. No lymphadenopathy.  Skin: No visible rashes or ulcers.  Psych:  Mood & affect appropriate  LABS: All Labs Reviewed:                        15.8   9.30  )-----------( 350      ( 04 Jun 2021 06:46 )             47.9                         16.9   10.22 )-----------( 376      ( 03 Jun 2021 07:24 )             51.3                         15.2   10.38 )-----------( 333      ( 02 Jun 2021 07:03 )             46.5     04 Jun 2021 06:46    136    |  98     |  20     ----------------------------<  84     3.8     |  33     |  1.00   03 Jun 2021 07:24    137    |  98     |  20     ----------------------------<  96     3.7     |  31     |  0.99   02 Jun 2021 07:03    138    |  100    |  21     ----------------------------<  80     3.9     |  30     |  0.93     Ca    9.1        04 Jun 2021 06:46  Ca    9.6        03 Jun 2021 07:24  Ca    9.2        02 Jun 2021 07:03  Phos  3.2       03 Jun 2021 07:24  Mg     2.4       03 Jun 2021 07:24    TPro  6.7    /  Alb  3.1    /  TBili  0.6    /  DBili  x      /  AST  23     /  ALT  20     /  AlkPhos  58     02 Jun 2021 07:03  TPro  7.4    /  Alb  3.2    /  TBili  0.6    /  DBili  x      /  AST  38     /  ALT  27     /  AlkPhos  63     01 Jun 2021 14:36     EXAM:  ECHO TTE WO CON COMP W DOPP         PROCEDURE DATE:  06/01/2021        INTERPRETATION:  INDICATION: Bilateral pulmonary edema  Sonographer AS    Blood Pressure 130/64    Height 167.6 cm     Weight 113.4 kg       BSA 2.2 sq m    Dimensions:  LA 2.8       Normal Values: 2.0 - 4.0 cm  Ao 2.8        Normal Values: 2.0 - 3.8 cm  SEPTUM 1.0       Normal Values: 0.6 - 1.2 cm  PWT 0.9       Normal Values: 0.6 - 1.1 cm  LVIDd 3.6         Normal Values: 3.0 - 5.6 cm  LVIDs 2.4         Normal Values: 1.8 - 4.0 cm      OBSERVATIONS:  Technically difficult and limited study  Mitral Valve: normal, trace physiologic MR.  Aortic Valve/Aorta: Not well-visualized  Tricuspid Valve: Not well-visualized  Pulmonic Valve: Not well-visualized  Left Atrium:normal  Right Atrium: Not well-visualized  Left Ventricle: Left ventricle is not well-visualized. Overall grossly normal left ventricular systolic function, estimated LVEF of 55-60%.  Right Ventricle: Grossly normal size and systolic function.  Pericardium: no significant pericardial effusion.  Pulmonary/RV Pressure: estimated PA systolic pressure of 9.5 mmHg assuming an RA pressure of 3 mmHg.  LV diastolic dysfunction is present    IMPRESSION:  Technically difficult and limited study  Overall grossly normal left ventricular systolic function, estimated LVEF of 55-60%.  Grossly normal RV size and systolic function.  The aortic valve is not well-visualized  Trace physiologic MR      GULSHAN BECKER MD; Attending Cardiologist  This document has been electronically signed. Jun 2 2021  4:21PM      [facility]                            PROCEDURE DATE:  06/01/2021          INTERPRETATION:  CLINICAL INFORMATION: Positive d-dimer. Leg swelling.    COMPARISON: CT abdomen/pelvis 5/14/2021    CONTRAST/COMPLICATIONS:  IV Contrast: Omnipaque 350  65 cc administered   35 cc discarded  Oral Contrast: NONE  Complications: None reported at time of study completion    PROCEDURE:  CT Angiography of the Chest.  Sagittal and coronal reformats were performed as well as 3D (MIP) reconstructions.    FINDINGS:    LUNGS AND AIRWAYS: Secretions in the trachea. Calcified granulomas in the right upper lobe. Subcentimeter peripheral nodules in the right and left lower lobes, greater on the right, not significantly changed since 9/12/2015.  PLEURA: No pleural effusion.  MEDIASTINUM AND KONRAD: No enlarged mediastinal lymph nodes. Calcified mediastinal and right hilar lymph nodes.  VESSELS: Pulmonary emboli in the right upper and lower lobe pulmonary arteries with extension into segmental branches, segmental and subsegmental branches in the right middle lobe, and left upper lobe branches to the lingula. Thoracic aorta normal in caliber. Coronary artery calcifications.  HEART: Heart size is normal. No pericardial effusion. No evidence of right heart strain.  CHEST WALL AND LOWER NECK: No enlarged axillary lymph nodes.  VISUALIZED UPPER ABDOMEN: Calcified granulomas in the liver and spleen. Fatty infiltration of the pancreas. Nonobstructive calculus in the lower pole of the left kidney measuring 6 mm. Atherosclerotic changes including the superior mesenteric artery and renal arteries.  BONES: Degenerative changes in the spine.    IMPRESSION:  Bilateral pulmonary emboli as described above.    No evidence of right heart strain.    The findings were discussed with BENITO Jaramillo on 6/1/2021 7:48 PM    CHRISTINE BARBOZA MD; Attending Radiologist  This document has been electronically signed. Jun 1 2021  7:52PM    Ventricular Rate 78 BPM    Atrial Rate 78 BPM    P-R Interval 144 ms    QRS Duration 114 ms    Q-T Interval 390 ms    QTC Calculation(Bazett) 444 ms    P Axis 87 degrees    R Axis -49 degrees    T Axis 75 degrees    Diagnosis Line Normal sinus rhythm  Pulmonary disease pattern  Left anterior fascicular block  Nonspecific ST abnormality  Abnormal ECG  When compared with ECG of 30-SEP-2016 08:13,  No significant change was found  Confirmed by Brodie MANNING, Art ([id number]) on 6/2/2021 12:41:06 PM

## 2021-06-07 PROBLEM — E78.5 HYPERLIPIDEMIA, UNSPECIFIED: Chronic | Status: ACTIVE | Noted: 2021-06-01

## 2021-06-08 ENCOUNTER — NON-APPOINTMENT (OUTPATIENT)
Age: 76
End: 2021-06-08

## 2021-06-11 ENCOUNTER — APPOINTMENT (OUTPATIENT)
Dept: CARDIOLOGY | Facility: CLINIC | Age: 76
End: 2021-06-11
Payer: MEDICARE

## 2021-06-11 ENCOUNTER — NON-APPOINTMENT (OUTPATIENT)
Age: 76
End: 2021-06-11

## 2021-06-11 ENCOUNTER — APPOINTMENT (OUTPATIENT)
Dept: INTERNAL MEDICINE | Facility: CLINIC | Age: 76
End: 2021-06-11
Payer: MEDICARE

## 2021-06-11 VITALS
HEART RATE: 93 BPM | BODY MASS INDEX: 37.67 KG/M2 | OXYGEN SATURATION: 97 % | WEIGHT: 240 LBS | SYSTOLIC BLOOD PRESSURE: 126 MMHG | DIASTOLIC BLOOD PRESSURE: 80 MMHG | RESPIRATION RATE: 17 BRPM | HEIGHT: 67 IN

## 2021-06-11 VITALS
TEMPERATURE: 96.7 F | RESPIRATION RATE: 14 BRPM | OXYGEN SATURATION: 98 % | HEIGHT: 67 IN | WEIGHT: 240 LBS | BODY MASS INDEX: 37.67 KG/M2 | HEART RATE: 100 BPM | DIASTOLIC BLOOD PRESSURE: 80 MMHG | SYSTOLIC BLOOD PRESSURE: 138 MMHG

## 2021-06-11 VITALS — HEART RATE: 84 BPM

## 2021-06-11 DIAGNOSIS — I26.99 OTHER PULMONARY EMBOLISM W/OUT ACUTE COR PULMONALE: ICD-10-CM

## 2021-06-11 PROCEDURE — 99215 OFFICE O/P EST HI 40 MIN: CPT

## 2021-06-11 PROCEDURE — 99496 TRANSJ CARE MGMT HIGH F2F 7D: CPT

## 2021-06-11 PROCEDURE — 93000 ELECTROCARDIOGRAM COMPLETE: CPT

## 2021-06-11 RX ORDER — ASPIRIN 81 MG/1
81 TABLET ORAL
Refills: 0 | Status: DISCONTINUED | COMMUNITY
Start: 2017-01-23 | End: 2021-06-11

## 2021-06-11 RX ORDER — LIDOCAINE 5% 700 MG/1
5 PATCH TOPICAL
Qty: 1 | Refills: 0 | Status: ACTIVE | COMMUNITY

## 2021-06-11 NOTE — PLAN
[FreeTextEntry1] : Continue medications\par Follow up with cardiologist and Hematologist\par Further instructions pending lab results \par

## 2021-06-11 NOTE — HISTORY OF PRESENT ILLNESS
[Post-hospitalization from ___ Hospital] : Post-hospitalization from [unfilled] Hospital [Admitted on: ___] : The patient was admitted on [unfilled] [Discharged on ___] : discharged on [unfilled] [Discharge Summary] : discharge summary [Discharge Med List] : discharge medication list [Patient Contacted By: ____] : and contacted by [unfilled] [FreeTextEntry2] : Pt admitted to hospital for SOB. Found to have CHF and Pulmonary Embolus. Also had NSVT. Seen by cardiologist. Medications adjusted. Presently on Eliquis, Lasix and Toprol. Feels better.

## 2021-06-11 NOTE — HEALTH RISK ASSESSMENT
[No] : In the past 12 months have you used drugs other than those required for medical reasons? No [No falls in past year] : Patient reported no falls in the past year [Assistive Device] : Patient uses an assistive device [0] : 2) Feeling down, depressed, or hopeless: Not at all (0) [] : No [de-identified] : cane [[id number]] : 0

## 2021-06-11 NOTE — END OF VISIT
[FreeTextEntry3] : "I, Jany Blevins, personally scribed the services dictated to me by Dr. Colt Wade MD in this documentation on 06/11/2021 "\par \par "I Dr. Colt Wade MD, personally performed the services described in this documentation on 06/11/2021 for the patient as scribed by Jany Blevins in my presence. I have reviewed and verified that all the information is accurate and true."\par \par

## 2021-06-12 LAB
ANION GAP SERPL CALC-SCNC: 20 MMOL/L
BASOPHILS # BLD AUTO: 0.06 K/UL
BASOPHILS NFR BLD AUTO: 0.6 %
BUN SERPL-MCNC: 29 MG/DL
CALCIUM SERPL-MCNC: 10 MG/DL
CHLORIDE SERPL-SCNC: 93 MMOL/L
CO2 SERPL-SCNC: 23 MMOL/L
CREAT SERPL-MCNC: 1.11 MG/DL
EOSINOPHIL # BLD AUTO: 0.06 K/UL
EOSINOPHIL NFR BLD AUTO: 0.6 %
GLUCOSE SERPL-MCNC: 123 MG/DL
HCT VFR BLD CALC: 51.3 %
HGB BLD-MCNC: 16.7 G/DL
IMM GRANULOCYTES NFR BLD AUTO: 0.6 %
LYMPHOCYTES # BLD AUTO: 1.77 K/UL
LYMPHOCYTES NFR BLD AUTO: 16.6 %
MAN DIFF?: NORMAL
MCHC RBC-ENTMCNC: 30.4 PG
MCHC RBC-ENTMCNC: 32.6 GM/DL
MCV RBC AUTO: 93.3 FL
MONOCYTES # BLD AUTO: 1.18 K/UL
MONOCYTES NFR BLD AUTO: 11 %
NEUTROPHILS # BLD AUTO: 7.56 K/UL
NEUTROPHILS NFR BLD AUTO: 70.6 %
PLATELET # BLD AUTO: 377 K/UL
POTASSIUM SERPL-SCNC: 3.6 MMOL/L
RBC # BLD: 5.5 M/UL
RBC # FLD: 15.2 %
SODIUM SERPL-SCNC: 136 MMOL/L
WBC # FLD AUTO: 10.69 K/UL

## 2021-07-06 ENCOUNTER — APPOINTMENT (OUTPATIENT)
Dept: CARDIOLOGY | Facility: CLINIC | Age: 76
End: 2021-07-06

## 2021-07-08 ENCOUNTER — EMERGENCY (EMERGENCY)
Facility: HOSPITAL | Age: 76
LOS: 1 days | Discharge: HOME | End: 2021-07-08
Attending: EMERGENCY MEDICINE | Admitting: EMERGENCY MEDICINE
Payer: MEDICARE

## 2021-07-08 ENCOUNTER — TRANSCRIPTION ENCOUNTER (OUTPATIENT)
Age: 76
End: 2021-07-08

## 2021-07-08 VITALS
HEART RATE: 88 BPM | OXYGEN SATURATION: 97 % | TEMPERATURE: 98 F | WEIGHT: 238.98 LBS | HEIGHT: 66 IN | SYSTOLIC BLOOD PRESSURE: 107 MMHG | DIASTOLIC BLOOD PRESSURE: 65 MMHG | RESPIRATION RATE: 16 BRPM

## 2021-07-08 VITALS
HEART RATE: 71 BPM | TEMPERATURE: 98 F | RESPIRATION RATE: 16 BRPM | OXYGEN SATURATION: 96 % | SYSTOLIC BLOOD PRESSURE: 118 MMHG | DIASTOLIC BLOOD PRESSURE: 54 MMHG

## 2021-07-08 DIAGNOSIS — Z41.9 ENCOUNTER FOR PROCEDURE FOR PURPOSES OTHER THAN REMEDYING HEALTH STATE, UNSPECIFIED: Chronic | ICD-10-CM

## 2021-07-08 DIAGNOSIS — L05.91 PILONIDAL CYST WITHOUT ABSCESS: Chronic | ICD-10-CM

## 2021-07-08 DIAGNOSIS — M48.00 SPINAL STENOSIS, SITE UNSPECIFIED: Chronic | ICD-10-CM

## 2021-07-08 DIAGNOSIS — Z98.89 OTHER SPECIFIED POSTPROCEDURAL STATES: Chronic | ICD-10-CM

## 2021-07-08 DIAGNOSIS — Z98.890 OTHER SPECIFIED POSTPROCEDURAL STATES: Chronic | ICD-10-CM

## 2021-07-08 DIAGNOSIS — Q66.89 OTHER SPECIFIED CONGENITAL DEFORMITIES OF FEET: Chronic | ICD-10-CM

## 2021-07-08 LAB
ALBUMIN SERPL ELPH-MCNC: 3.3 G/DL — SIGNIFICANT CHANGE UP (ref 3.3–5)
ALP SERPL-CCNC: 53 U/L — SIGNIFICANT CHANGE UP (ref 40–120)
ALT FLD-CCNC: 26 U/L — SIGNIFICANT CHANGE UP (ref 12–78)
ANION GAP SERPL CALC-SCNC: 9 MMOL/L — SIGNIFICANT CHANGE UP (ref 5–17)
APPEARANCE UR: CLEAR — SIGNIFICANT CHANGE UP
APTT BLD: 37.3 SEC — HIGH (ref 27.5–35.5)
AST SERPL-CCNC: 21 U/L — SIGNIFICANT CHANGE UP (ref 15–37)
BASOPHILS # BLD AUTO: 0.06 K/UL — SIGNIFICANT CHANGE UP (ref 0–0.2)
BASOPHILS NFR BLD AUTO: 0.5 % — SIGNIFICANT CHANGE UP (ref 0–2)
BILIRUB SERPL-MCNC: 0.9 MG/DL — SIGNIFICANT CHANGE UP (ref 0.2–1.2)
BILIRUB UR-MCNC: NEGATIVE — SIGNIFICANT CHANGE UP
BUN SERPL-MCNC: 39 MG/DL — HIGH (ref 7–23)
CALCIUM SERPL-MCNC: 9.6 MG/DL — SIGNIFICANT CHANGE UP (ref 8.5–10.1)
CHLORIDE SERPL-SCNC: 94 MMOL/L — LOW (ref 96–108)
CK MB CFR SERPL CALC: <1 NG/ML — SIGNIFICANT CHANGE UP (ref 0–3.6)
CO2 SERPL-SCNC: 28 MMOL/L — SIGNIFICANT CHANGE UP (ref 22–31)
COLOR SPEC: YELLOW — SIGNIFICANT CHANGE UP
CREAT SERPL-MCNC: 1.4 MG/DL — HIGH (ref 0.5–1.3)
DIFF PNL FLD: NEGATIVE — SIGNIFICANT CHANGE UP
EOSINOPHIL # BLD AUTO: 0.03 K/UL — SIGNIFICANT CHANGE UP (ref 0–0.5)
EOSINOPHIL NFR BLD AUTO: 0.2 % — SIGNIFICANT CHANGE UP (ref 0–6)
EPI CELLS # UR: NEGATIVE — SIGNIFICANT CHANGE UP
GLUCOSE SERPL-MCNC: 117 MG/DL — HIGH (ref 70–99)
GLUCOSE UR QL: NEGATIVE — SIGNIFICANT CHANGE UP
HCT VFR BLD CALC: 47.8 % — SIGNIFICANT CHANGE UP (ref 39–50)
HGB BLD-MCNC: 16.2 G/DL — SIGNIFICANT CHANGE UP (ref 13–17)
IMM GRANULOCYTES NFR BLD AUTO: 0.9 % — SIGNIFICANT CHANGE UP (ref 0–1.5)
INR BLD: 1.52 RATIO — HIGH (ref 0.88–1.16)
KETONES UR-MCNC: ABNORMAL
LEUKOCYTE ESTERASE UR-ACNC: ABNORMAL
LIDOCAIN IGE QN: 126 U/L — SIGNIFICANT CHANGE UP (ref 73–393)
LYMPHOCYTES # BLD AUTO: 1.3 K/UL — SIGNIFICANT CHANGE UP (ref 1–3.3)
LYMPHOCYTES # BLD AUTO: 10.4 % — LOW (ref 13–44)
MAGNESIUM SERPL-MCNC: 2.5 MG/DL — SIGNIFICANT CHANGE UP (ref 1.6–2.6)
MCHC RBC-ENTMCNC: 30.2 PG — SIGNIFICANT CHANGE UP (ref 27–34)
MCHC RBC-ENTMCNC: 33.9 GM/DL — SIGNIFICANT CHANGE UP (ref 32–36)
MCV RBC AUTO: 89.2 FL — SIGNIFICANT CHANGE UP (ref 80–100)
MONOCYTES # BLD AUTO: 1.3 K/UL — HIGH (ref 0–0.9)
MONOCYTES NFR BLD AUTO: 10.4 % — SIGNIFICANT CHANGE UP (ref 2–14)
NEUTROPHILS # BLD AUTO: 9.69 K/UL — HIGH (ref 1.8–7.4)
NEUTROPHILS NFR BLD AUTO: 77.6 % — HIGH (ref 43–77)
NITRITE UR-MCNC: NEGATIVE — SIGNIFICANT CHANGE UP
NRBC # BLD: 0 /100 WBCS — SIGNIFICANT CHANGE UP (ref 0–0)
NT-PROBNP SERPL-SCNC: 137 PG/ML — SIGNIFICANT CHANGE UP (ref 0–450)
PH UR: 5 — SIGNIFICANT CHANGE UP (ref 5–8)
PLATELET # BLD AUTO: 346 K/UL — SIGNIFICANT CHANGE UP (ref 150–400)
POTASSIUM SERPL-MCNC: 3.1 MMOL/L — LOW (ref 3.5–5.3)
POTASSIUM SERPL-SCNC: 3.1 MMOL/L — LOW (ref 3.5–5.3)
PROT SERPL-MCNC: 7.7 G/DL — SIGNIFICANT CHANGE UP (ref 6–8.3)
PROT UR-MCNC: 30 MG/DL
PROTHROM AB SERPL-ACNC: 17.4 SEC — HIGH (ref 10.6–13.6)
RBC # BLD: 5.36 M/UL — SIGNIFICANT CHANGE UP (ref 4.2–5.8)
RBC # FLD: 14.7 % — HIGH (ref 10.3–14.5)
RBC CASTS # UR COMP ASSIST: SIGNIFICANT CHANGE UP /HPF (ref 0–4)
SODIUM SERPL-SCNC: 131 MMOL/L — LOW (ref 135–145)
SP GR SPEC: 1.02 — SIGNIFICANT CHANGE UP (ref 1.01–1.02)
TROPONIN I SERPL-MCNC: <.015 NG/ML — SIGNIFICANT CHANGE UP (ref 0.01–0.04)
UROBILINOGEN FLD QL: NEGATIVE — SIGNIFICANT CHANGE UP
WBC # BLD: 12.49 K/UL — HIGH (ref 3.8–10.5)
WBC # FLD AUTO: 12.49 K/UL — HIGH (ref 3.8–10.5)
WBC UR QL: SIGNIFICANT CHANGE UP

## 2021-07-08 PROCEDURE — 83880 ASSAY OF NATRIURETIC PEPTIDE: CPT

## 2021-07-08 PROCEDURE — 93005 ELECTROCARDIOGRAM TRACING: CPT

## 2021-07-08 PROCEDURE — 99284 EMERGENCY DEPT VISIT MOD MDM: CPT

## 2021-07-08 PROCEDURE — 99284 EMERGENCY DEPT VISIT MOD MDM: CPT | Mod: 25

## 2021-07-08 PROCEDURE — 85025 COMPLETE CBC W/AUTO DIFF WBC: CPT

## 2021-07-08 PROCEDURE — 87086 URINE CULTURE/COLONY COUNT: CPT

## 2021-07-08 PROCEDURE — 36415 COLL VENOUS BLD VENIPUNCTURE: CPT

## 2021-07-08 PROCEDURE — 93010 ELECTROCARDIOGRAM REPORT: CPT

## 2021-07-08 PROCEDURE — 71045 X-RAY EXAM CHEST 1 VIEW: CPT

## 2021-07-08 PROCEDURE — 82553 CREATINE MB FRACTION: CPT

## 2021-07-08 PROCEDURE — 99285 EMERGENCY DEPT VISIT HI MDM: CPT

## 2021-07-08 PROCEDURE — 84484 ASSAY OF TROPONIN QUANT: CPT

## 2021-07-08 PROCEDURE — 81001 URINALYSIS AUTO W/SCOPE: CPT

## 2021-07-08 PROCEDURE — 83735 ASSAY OF MAGNESIUM: CPT

## 2021-07-08 PROCEDURE — 71045 X-RAY EXAM CHEST 1 VIEW: CPT | Mod: 26

## 2021-07-08 PROCEDURE — 85610 PROTHROMBIN TIME: CPT

## 2021-07-08 PROCEDURE — 85730 THROMBOPLASTIN TIME PARTIAL: CPT

## 2021-07-08 PROCEDURE — 80053 COMPREHEN METABOLIC PANEL: CPT

## 2021-07-08 PROCEDURE — 83690 ASSAY OF LIPASE: CPT

## 2021-07-08 RX ORDER — POTASSIUM CHLORIDE 20 MEQ
40 PACKET (EA) ORAL ONCE
Refills: 0 | Status: COMPLETED | OUTPATIENT
Start: 2021-07-08 | End: 2021-07-08

## 2021-07-08 RX ORDER — SODIUM CHLORIDE 9 MG/ML
500 INJECTION INTRAMUSCULAR; INTRAVENOUS; SUBCUTANEOUS ONCE
Refills: 0 | Status: COMPLETED | OUTPATIENT
Start: 2021-07-08 | End: 2021-07-08

## 2021-07-08 RX ORDER — ASPIRIN/CALCIUM CARB/MAGNESIUM 324 MG
1 TABLET ORAL
Qty: 0 | Refills: 0 | DISCHARGE

## 2021-07-08 RX ADMIN — SODIUM CHLORIDE 500 MILLILITER(S): 9 INJECTION INTRAMUSCULAR; INTRAVENOUS; SUBCUTANEOUS at 09:34

## 2021-07-08 RX ADMIN — Medication 40 MILLIEQUIVALENT(S): at 12:55

## 2021-07-08 NOTE — ED PROVIDER NOTE - FAMILY HISTORY
Father  Still living? No  Family history of stroke, Age at diagnosis: Age Unknown     Mother  Still living? No  Family history of kidney disease, Age at diagnosis: Age Unknown     Sibling  Still living? No  Family history of diabetes mellitus type II, Age at diagnosis: Age Unknown

## 2021-07-08 NOTE — ED PROVIDER NOTE - PMH
Calculus of kidney    Club foot  Born Right Foot  Diabetes  Type 2 - does not take medications - monitors Blood Glucose at home - diet controlled  Hyperlipidemia    Hypertension    Myasthenia gravis    Urinary tract infection  notes h/o UTI's

## 2021-07-08 NOTE — ED PROVIDER NOTE - PATIENT PORTAL LINK FT
You can access the FollowMyHealth Patient Portal offered by [facility] by registering at the following website: [email or web address]. By joining [facility]’s FollowMyHealth portal, you will also be able to view your health information using other applications (apps) compatible with our system.

## 2021-07-08 NOTE — ED PROVIDER NOTE - NSFOLLOWUPINSTRUCTIONS_ED_ALL_ED_FT
Stop taking lasix (furosemide) and lisinopril/hctz. Continue with all your other medications. Go to see your cardiologist tomorrow.    WHAT YOU NEED TO KNOW:    Hypotension is a condition that causes your blood pressure (BP) to drop lower than it should be. Hypotension may be mild, serious, or life-threatening.    DISCHARGE INSTRUCTIONS:    Medicines:   •Alpha-adrenoreceptor agonists: These medicines may increase your BP and decrease your symptoms. Take these medicines as directed.       •Steroids: This medicine helps prevent salt loss from your body. Steroids may also help increase the amount of fluid in your body and raise your BP.      •Vasopressors: These medicines help constrict (make smaller) your blood vessels and increase your BP. Vasopressor medicines may increase the blood flow to your brain and help decrease your symptoms.      •Antidiuretic hormone: This medicine helps control your BP and helps decrease your need to urinate during the night.       •Antiparkinson medicine: This medicine may help increase your standing BP and decrease your symptoms.      •Take your medicine as directed. Contact your healthcare provider if you think your medicine is not helping or if you have side effects. Tell him of her if you are allergic to any medicine. Keep a list of the medicines, vitamins, and herbs you take. Include the amounts, and when and why you take them. Bring the list or the pill bottles to follow-up visits. Carry your medicine list with you in case of an emergency.      Follow up with your healthcare provider or specialist as directed: Write down your questions so you remember to ask them during your visits.    Check your blood pressure: You may need to check your BP at home. Record the results and bring them with you to follow-up visits. Ask when and how often to check your BP. You may need to wear a BP monitor for up to 24 hours. This will record your blood pressure during your normal daily activities. The monitor will take your BP every 15 to 30 minutes. Try to keep still while your BP is taken. Avoid heavy activity, such as exercise, while you are wearing the monitor.    How to take a Blood Pressure         Manage your symptoms:   •Change positions slowly: When you get out of bed, sit up first, then slowly move your legs to the side of the bed. If you are not having any symptoms, slowly stand up. If you have symptoms, sit down right away.      •Exercise and do physical counter maneuvers: Ask your healthcare provider or specialist about the best exercise plan for you. Physical counter maneuvers may help to increase your BP and increase blood flow to your heart. They include crossing your legs, squatting, and bending at the waist. You can also rise up on your toes while you are standing, and tighten your thigh muscles.      •Drink liquids as directed: Ask your healthcare provider or specialist how much liquid to drink each day and which liquids are best for you. Drink 500 milliliters (½ liter) of liquid quickly in the morning or before meals to help increase your BP. Your healthcare provider may tell you to drink 2 cups of coffee with, or after, breakfast and lunch. The caffeine in the coffee can help prevent a drop in your BP. Your healthcare provider may also give you caffeine pills.       •Change how you eat meals: If your BP drops after eating large meals, try to eat smaller meals more often. Eat foods low in carbohydrates and cholesterol to help prevent BP drops after you eat. Ask if you need to increase the amount of sodium (salt) you eat each day.      •Raise the head of your bed: Raise the head of your bed 4 to 8 inches. This may help prevent morning BP drops and decrease the need to urinate during the night.      Avoid things that make your hypotension worse:   •Do not drink alcohol: Alcohol can make your symptoms worse. Ask for information if you need help quitting.      •Avoid straining: Activities and movements that cause you to strain can cause a drop in your BP. Activities to avoid include lifting, coughing, and other movements that increase the feeling of pressure in your chest.      •Avoid the heat: This can cause a decrease in your BP. Stay inside during very hot days, or limit the amount of time you are outside. Do not take hot baths.      Contact your healthcare provider or specialist if:   •You vomit several times or have diarrhea, and you cannot drink liquid.      •You have a fever.       •You have new or increased symptoms, such as dizziness, weakness, or fainting.      •Your legs, ankles, and feet are swollen, or you gain weight for no known reason.      •You have questions or concerns about your condition or care.      Return to the emergency department if:   •You become confused or cannot speak.      •You urinate very little or not at all.      •You have a seizure.      •You have chest pain or trouble breathing.      •You have changes in vision or cannot see.         © Copyright [employer] 2021           back to top                          © Copyright [employer] 2021

## 2021-07-08 NOTE — ED PROVIDER NOTE - PROGRESS NOTE DETAILS
Pt asymptomatic on orthostatics. Discussed plan of care with patient for 10min. Awaiting cardiac recommendations. patient to stop lisinopril/hctz and lasix, to see cardio Dr. Marion tomorrow

## 2021-07-08 NOTE — ED ADULT NURSE NOTE - OBJECTIVE STATEMENT
Received pt in bed alert and oriented x4.  C/O hypotension.  Pt reports he took blood pressure at home and called dr office who told him to go to the ER for hypotension.  Pt reports no symptoms of tiredness or weakness.  Pt help metoprolol, Lasix this am.  Pt denies chest pain, or sob. pt denies fevers.

## 2021-07-08 NOTE — ED PROVIDER NOTE - NS ED ROS FT
CONSTITUTIONAL: denies fever, chills, fatigue, weakness  HEENT: denies blurred vision, sore throat  SKIN: denies new lesions, rash  CARDIOVASCULAR: denies chest pain, chest pressure, palpitations  RESPIRATORY: denies shortness of breath, sputum production  GASTROINTESTINAL: admits nausea, admits vomiting x1, denies diarrhea, admits mild abdominal pain  GENITOURINARY: denies dysuria, discharge  NEUROLOGICAL: denies numbness, headache, focal weakness  HEMATOLOGIC: denies gross bleeding, bruising  LYMPHATICS: denies enlarged lymph nodes, extremity swelling  ENDOCRINOLOGIC: denies sweating, cold or heat intolerance

## 2021-07-08 NOTE — CONSULT NOTE ADULT - ATTENDING COMMENTS
Chart reviewed    Patient seen and examined    Agree with plan as outlined above    77yo male with PMH of recent PE diagnosed in 6/2021 on Eliquis, HFpEF (EF 55-60% in 6/21), HTN, HLD, obesity, Myasthenia gravis, diet controlled T2DM, chronic LE edema presents to the ED with hypotension. Patient was noted to have BP of 99/63 at urologist's office yesterday. PMD called patient to go to the ED to be evaluated. Currently, he is asymptomatic but did develop some blurry vision yesterday afternoon after his appointment. His orthostatics show lying BP 97/55 HR 64, sitting /55 HR 73 and standing /76 HR 88. He qualifies for positive orthostatics based on his tachycardia, but his BP is acceptable and he denied symptoms upon standing. On discharge, patient was sent home on Lasix 40mg daily in addition to his lisinopril-HCTZ which he was previously taking prior to admission. Suspect that his hypotension is a result of overdiuresis at this time.    Recommendation:   - Hold home Lisinopril-HCTZ and Lasix  - Patient to follow up in the office tomorrow for blood pressure recheck   - He can then follow up with Dr. Woods for further management of his blood pressure

## 2021-07-08 NOTE — ED PROVIDER NOTE - CARE PROVIDER_API CALL
Vinh Woods)  Cardio [facility]  [address] [place] [address]  Phone: [phone]  Fax: [phone]  Follow Up Time:

## 2021-07-08 NOTE — CONSULT NOTE ADULT - SUBJECTIVE AND OBJECTIVE BOX
[facility] -- Manuel Carter, Janel Calloway, Peggy, Evan, Carlos Eduardo Whaley: Office # [phone]    Patient is a 76y old  Male who presents with a chief complaint of hypotension.    INTERVAL HPI:   75yo male with PMH of recent PE diagnosed in 2021, HFpEF (EF 55-60% in ), HTN, HLD, obesity, Myasthenia gravis, diet controlled T2DM, chronic LE edema presents to the ED with hypotension. Per patient, he was seen at urology Dr. Sims's office when his vitals were taken showing BP 99/63. Dr. Sims called patient's primary doctor, Dr. Wade, who called the patient and urged him to come to the ED to be evaluated. Patient took his blood pressure at home with home BP wrist cuff and stated it was low. He did not come in last night despite Dr. Wade's recommendation. Yesterday, patient noted that his vision felt a little blurry after his urology appointment. Otherwise, he denies headache, dizziness, worsening shortness of breath, chest pain, palpitations. He admits to one episode of vomiting two days ago, which he attributes to eating too much.     Of note, patient was admitted on 2021 for bilateral pulmonary emboli and was started on Eliquis 5mg BID as an outpatient. He was noted to have worsening lower extremity edema, requiring IV lasix administration and was eventually switched to PO lasix. Patient has been taking Losartan-HCTZ for management of his BP. He was started on metoprolol 25mg daily while inpatient and discharged home with this. He was seen in the office by Dr. Woods on 21 for hospital follow up. He was continued on PO lasix at that time and asked to follow up in one month. Patient states he was scheduled for an appointment with Dr. Woods yesterday, but couldn't make it due to feeling unwell.    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    Club foot  Surgery at birth for Club Foot Right foot    Pilonidal cyst  Surgery 40 years ago    H/O colonoscopy    Spinal stenosis    H/O prostate biopsy        ECHO FINDINGS:  < from: TTE Echo Complete w/o Contrast w/ Doppler (21 @ 21:46) >     EXAM:  ECHO TTE WO CON COMP W DOPP         PROCEDURE DATE:  2021        INTERPRETATION:  INDICATION: Bilateral pulmonary edema  Sonographer AS    Blood Pressure 130/64    Height 167.6 cm     Weight 113.4 kg       BSA 2.2 sq m    Dimensions:  LA 2.8       Normal Values: 2.0 - 4.0 cm  Ao 2.8        Normal Values: 2.0 - 3.8 cm  SEPTUM 1.0       Normal Values: 0.6 - 1.2 cm  PWT 0.9       Normal Values: 0.6 - 1.1 cm  LVIDd 3.6         Normal Values: 3.0 - 5.6 cm  LVIDs 2.4         Normal Values: 1.8 - 4.0 cm      OBSERVATIONS:  Technically difficult and limited study  Mitral Valve: normal, trace physiologic MR.  Aortic Valve/Aorta: Not well-visualized  Tricuspid Valve: Not well-visualized  Pulmonic Valve: Not well-visualized  Left Atrium:normal  Right Atrium: Not well-visualized  Left Ventricle: Left ventricle is not well-visualized. Overall grossly normal left ventricular systolic function, estimated LVEF of 55-60%.  Right Ventricle: Grossly normal size and systolic function.  Pericardium: no significant pericardial effusion.  Pulmonary/RV Pressure: estimated PA systolic pressure of 9.5 mmHg assuming an RA pressure of 3 mmHg.  LV diastolic dysfunction is present        IMPRESSION:  Technically difficult and limited study  Overall grossly normal left ventricular systolic function, estimated LVEF of 55-60%.  Grossly normal RV size and systolic function.  The aortic valve is not well-visualized  Trace physiologic MR GULSHAN BECKER MD; Attending Cardiologist  This document has been electronically signed. 2021  4:21PM    < end of copied text >      MEDICATIONS  (STANDING):    MEDICATIONS  (PRN):      FAMILY HISTORY:  Family history of stroke (Father)  Father -  age 62    Family history of kidney disease (Mother)  Mother -  age 67    Family history of diabetes mellitus type II (Sibling)  Brother &amp; Sister      Denies Family history of CAD or early MI    ROS:  Constitutional: denies fever, chills  HEENT: admits blurry vision. denies difficulty hearing  Respiratory: admits chronic FAUSTIN. No SOB cough  Cardiovascular: denies CP, palpitations, orthopnea, PND, LE edema  Gastrointestinal: denies nausea, vomiting, abdominal pain  Genitourinary: denies urinary changes  Skin: Denies rashes, itching  Neurologic: denies headache, weakness, dizziness  Hematology/Oncology: denies bleeding, easy bruising  ROS negative except as noted above      SOCIAL HISTORY:  Former smoker quit 25 years ago, no drug or alcohol use  No tobacco, Alcohol or Drug use  Vital Signs Last 24 Hrs  T(C): 36.7 (2021 08:51), Max: 36.7 (2021 08:51)  T(F): 98 (2021 08:51), Max: 98 (2021 08:51)  HR: 75 (2021 09:31) (75 - 88)  BP: 109/55 (2021 09:31) (107/65 - 109/55)  BP(mean): --  RR: 16 (2021 08:51) (16 - 16)  SpO2: 97% (2021 08:51) (97% - 97%)    Physical Exam:  General: Well developed, well nourished, NAD  HEENT: NCAT, PERRLA, EOMI bl, moist mucous membranes   Neck: Supple, nontender, no mass  Neurology: A&Ox3, nonfocal, sensation intact   Respiratory: CTA B/L, No W/R/R  CV: RRR, +S1/S2, no murmurs, rubs or gallops  Abdominal: Soft, NT, ND +BSx4, no palpable masses  Extremities: No C/C/E, + peripheral pulses  MSK: Normal ROM, no joint erythema or warmth, no joint swelling   Heme: No obvious ecchymosis or petechiae   Skin: warm, dry, normal color      ECG: sinus rhytm, rate 68, left anterior fascicular block    I&O's Detail      LABS:                        16.2   12.49 )-----------( 346      ( 2021 09:38 )             47.8     07-08    131<L>  |  94<L>  |  39<H>  ----------------------------<  117<H>  3.1<L>   |  28  |  1.40<H>    Ca    9.6      2021 09:38  Mg     2.5     07-08    TPro  7.7  /  Alb  3.3  /  TBili  0.9  /  DBili  x   /  AST  21  /  ALT  26  /  AlkPhos  53  07-08    CARDIAC MARKERS ( 2021 09:38 )  <.015 ng/mL / x     / x     / x     / <1.0 ng/mL      PT/INR - ( 2021 09:38 )   PT: 17.4 sec;   INR: 1.52 ratio         PTT - ( 2021 09:38 )  PTT:37.3 sec  Urinalysis Basic - ( 2021 10:41 )    Color: Yellow / Appearance: Clear / S.020 / pH: x  Gluc: x / Ketone: Trace  / Bili: Negative / Urobili: Negative   Blood: x / Protein: 30 mg/dL / Nitrite: Negative   Leuk Esterase: Trace / RBC: 0-2 /HPF / WBC 0-2   Sq Epi: x / Non Sq Epi: Negative / Bacteria: x      I&O's Summary    BNPSerum Pro-Brain Natriuretic Peptide: 137 pg/mL ( @ 09:38)      RADIOLOGY & ADDITIONAL STUDIES:  prelim read CXR unchanged from previous

## 2021-07-08 NOTE — CONSULT NOTE ADULT - ASSESSMENT
Impression:  77yo male with PMH of recent PE diagnosed in 6/2021 on Eliquis, HFpEF (EF 55-60% in 6/21), HTN, HLD, obesity, Myasthenia gravis, diet controlled T2DM, chronic LE edema presents to the ED with hypotension. Patient was noted to have BP of 99/63 at urologist's office yesterday. PMD called patient to go to the ED to be evaluated. Currently, he is asymptomatic but did develop some blurry vision yesterday afternoon after his appointment. His orthostatics show lying BP 97/55 HR 64, sitting /55 HR 73 and standing /76 HR 88. He qualifies for positive orthostatics based on his tachycardia, but his BP is acceptable and he denied symptoms upon standing. On discharge, patient was sent home on Lasix 40mg daily in addition to his lisinopril-HCTZ which he was previously taking prior to admission. Suspect that his hypotension is a result of overdiuresis at this time.    Recommendation:   - Hold home Lisinopril-HCTZ and Lasix  - Patient to follow up in the office tomorrow for blood pressure recheck   - He can then follow up with Dr. Woods for further management of his blood pressure

## 2021-07-08 NOTE — ED PROVIDER NOTE - ATTENDING CONTRIBUTION TO CARE
76 male sent to ER for evalution of hypotension, patient feels well, no acute distress, heart and lungs clear, abdomen soft, non tender, f/u labs, ekg, reviwe meds, cardio consult, r-eval.

## 2021-07-08 NOTE — ED PROVIDER NOTE - OBJECTIVE STATEMENT
Pt is a 75 yo M presenting to the ED after calling his PCP office (Dr. Wade) and notifying the nurse on call of his "low blood pressure". Nurse on phone instructed patient to go to the ED promptly. Pt has cannot recall brachial BP value measured at home. Pt endorses nausea x3days, rated 3/10, worsened by PO intake of solids/liquids. Vomited x1 yesterday. Denies F/C/SOB/CP/LE Swelling/headache/dysuria/diarrhea. Endorses mild abd pain with associated PO intake. Abd pain has no radiating features, is intermittent, described as "an annoyance", located epigastrically, nonprogressive in severity.     Pt recently admitted on 6/11 found to have acute CHF w/ diastolic dysfunction and pulmonary emboli. D/C'd on eliquis 5mg BID, metoprolol succ 25mg qD, Lasix 40mg qD. He states he has not taken these three new medications today. He has not taken ASA 81mg today. He has taken the rest of his discharge meds per routine. Pt is a 75 yo M presenting to the ED after calling his PCP office (Dr. Wade) and notifying the nurse on call of his "low blood pressure". Nurse on phone instructed patient to go to the ED promptly. Pt has cannot recall brachial BP value measured at home. Pt endorses nausea x3days, rated 3/10, worsened by PO intake of solids/liquids. Vomited x1 yesterday. Denies F/C/SOB/CP/LE Swelling/headache/dysuria/diarrhea. Endorses mild abd pain with associated PO intake. Abd pain has no radiating features, is intermittent, described as "an annoyance", located epigastrically, nonprogressive in severity.     Pt recently admitted on 6/01/21 found to have acute CHF w/ diastolic dysfunction and pulmonary emboli. D/C'd on eliquis 5mg BID, metoprolol succ 25mg qD, Lasix 40mg qD. He states he has not taken these three new medications today. He has not taken ASA 81mg today. He has taken the rest of his discharge meds per routine.

## 2021-07-08 NOTE — ED PROVIDER NOTE - PHYSICAL EXAMINATION
T(C): 36.7 (07-08-21 @ 08:51), Max: 36.7 (07-08-21 @ 08:51)  HR: 75 (07-08-21 @ 09:31) (75 - 88)  BP: 109/55 (07-08-21 @ 09:31) (107/65 - 109/55)  RR: 16 (07-08-21 @ 08:51) (16 - 16)  SpO2: 97% (07-08-21 @ 08:51) (97% - 97%)    GENERAL: patient appears well, no acute distress, conversant  EYES: anicteric sclerae, no exudates  ENMT: oropharynx clear without erythema, no exudates, moist mucous membranes  NECK: supple, soft  LUNGS: clear to auscultation, no intercostal retractions  HEART: S1/S2, regular rate and rhythm, no murmurs noted, no lower extremity edema  GASTROINTESTINAL: abdomen is soft, nontender, nondistended, normoactive bowel sounds  INTEGUMENT: warm skin, appears well perfused  PSYCHIATRIC: mood is good, affect is congruent, linear and logical thought process  HEME/LYMPH: no palpable supraclavicular nodules, no obvious ecchymosis or petechiae

## 2021-07-08 NOTE — ED PROVIDER NOTE - PSH
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

## 2021-07-08 NOTE — ED PROVIDER NOTE - CLINICAL SUMMARY MEDICAL DECISION MAKING FREE TEXT BOX
77 yo M recently admitted on 6/01/21 for acute diastolic CHF and PE presenting to the ED after calling his PCP office (Dr. Wade) and notifying the nurse on call of his "low blood pressure". Nurse prompted patient to go to the ED. Working up cardiac, infectious, and metabolic etiologies for hypotension. Following up orthostatics, labs. Appreciating cardiac reccomendations.

## 2021-07-09 LAB
CULTURE RESULTS: SIGNIFICANT CHANGE UP
SPECIMEN SOURCE: SIGNIFICANT CHANGE UP

## 2021-07-12 ENCOUNTER — APPOINTMENT (OUTPATIENT)
Dept: CARDIOLOGY | Facility: CLINIC | Age: 76
End: 2021-07-12
Payer: MEDICARE

## 2021-07-12 ENCOUNTER — NON-APPOINTMENT (OUTPATIENT)
Age: 76
End: 2021-07-12

## 2021-07-12 VITALS
DIASTOLIC BLOOD PRESSURE: 79 MMHG | WEIGHT: 234 LBS | BODY MASS INDEX: 36.73 KG/M2 | HEIGHT: 67 IN | HEART RATE: 85 BPM | OXYGEN SATURATION: 94 % | SYSTOLIC BLOOD PRESSURE: 137 MMHG

## 2021-07-12 VITALS — DIASTOLIC BLOOD PRESSURE: 64 MMHG | SYSTOLIC BLOOD PRESSURE: 118 MMHG

## 2021-07-12 PROCEDURE — 99215 OFFICE O/P EST HI 40 MIN: CPT

## 2021-07-12 PROCEDURE — 93000 ELECTROCARDIOGRAM COMPLETE: CPT

## 2021-07-12 RX ORDER — FUROSEMIDE 40 MG/1
40 TABLET ORAL DAILY
Qty: 90 | Refills: 1 | Status: DISCONTINUED | COMMUNITY
Start: 1900-01-01 | End: 2021-07-12

## 2021-07-14 LAB
ALBUMIN SERPL ELPH-MCNC: 3.9 G/DL
ALP BLD-CCNC: 57 U/L
ALT SERPL-CCNC: 14 U/L
ANION GAP SERPL CALC-SCNC: 14 MMOL/L
AST SERPL-CCNC: 16 U/L
BASOPHILS # BLD AUTO: 0.06 K/UL
BASOPHILS NFR BLD AUTO: 0.6 %
BILIRUB SERPL-MCNC: 0.6 MG/DL
BUN SERPL-MCNC: 23 MG/DL
CALCIUM SERPL-MCNC: 9.9 MG/DL
CHLORIDE SERPL-SCNC: 98 MMOL/L
CO2 SERPL-SCNC: 27 MMOL/L
CREAT SERPL-MCNC: 1.18 MG/DL
EOSINOPHIL # BLD AUTO: 0.08 K/UL
EOSINOPHIL NFR BLD AUTO: 0.8 %
GLUCOSE SERPL-MCNC: 136 MG/DL
HCT VFR BLD CALC: 46.8 %
HGB BLD-MCNC: 15.5 G/DL
IMM GRANULOCYTES NFR BLD AUTO: 0.4 %
LYMPHOCYTES # BLD AUTO: 1.52 K/UL
LYMPHOCYTES NFR BLD AUTO: 15.5 %
MAN DIFF?: NORMAL
MCHC RBC-ENTMCNC: 30.6 PG
MCHC RBC-ENTMCNC: 33.1 GM/DL
MCV RBC AUTO: 92.3 FL
MONOCYTES # BLD AUTO: 0.94 K/UL
MONOCYTES NFR BLD AUTO: 9.6 %
NEUTROPHILS # BLD AUTO: 7.18 K/UL
NEUTROPHILS NFR BLD AUTO: 73.1 %
PLATELET # BLD AUTO: 362 K/UL
POTASSIUM SERPL-SCNC: 3.8 MMOL/L
PROT SERPL-MCNC: 6.5 G/DL
RBC # BLD: 5.07 M/UL
RBC # FLD: 15 %
SODIUM SERPL-SCNC: 140 MMOL/L
WBC # FLD AUTO: 9.82 K/UL

## 2021-07-22 ENCOUNTER — APPOINTMENT (OUTPATIENT)
Dept: CARDIOLOGY | Facility: CLINIC | Age: 76
End: 2021-07-22

## 2021-07-27 ENCOUNTER — TRANSCRIPTION ENCOUNTER (OUTPATIENT)
Age: 76
End: 2021-07-27

## 2021-07-27 ENCOUNTER — APPOINTMENT (OUTPATIENT)
Dept: INTERNAL MEDICINE | Facility: CLINIC | Age: 76
End: 2021-07-27
Payer: MEDICARE

## 2021-07-27 VITALS
SYSTOLIC BLOOD PRESSURE: 122 MMHG | OXYGEN SATURATION: 96 % | HEART RATE: 86 BPM | BODY MASS INDEX: 40.02 KG/M2 | RESPIRATION RATE: 14 BRPM | WEIGHT: 255 LBS | DIASTOLIC BLOOD PRESSURE: 72 MMHG | TEMPERATURE: 97.9 F | HEIGHT: 67 IN

## 2021-07-27 PROCEDURE — 99214 OFFICE O/P EST MOD 30 MIN: CPT

## 2021-07-27 NOTE — END OF VISIT
[FreeTextEntry3] : "I, Jany Blevins, personally scribed the services dictated to me by Dr. Colt Wade MD in this documentation on 07/27/2021 "\par \par "I Dr. Colt Wade MD, personally performed the services described in this documentation on 07/27/2021 for the patient as scribed by Jany Blevins in my presence. I have reviewed and verified that all the information is accurate and true."\par \par

## 2021-07-27 NOTE — REVIEW OF SYSTEMS
[Lower Ext Edema] : lower extremity edema [Dyspnea on Exertion] : dyspnea on exertion [Negative] : Heme/Lymph

## 2021-07-27 NOTE — PHYSICAL EXAM
[No Acute Distress] : no acute distress [Well Nourished] : well nourished [Well Developed] : well developed [Well-Appearing] : well-appearing [Normal Voice/Communication] : normal voice/communication [Normal Sclera/Conjunctiva] : normal sclera/conjunctiva [PERRL] : pupils equal round and reactive to light [EOMI] : extraocular movements intact [Normal Outer Ear/Nose] : the outer ears and nose were normal in appearance [No JVD] : no jugular venous distention [No Lymphadenopathy] : no lymphadenopathy [Supple] : supple [Thyroid Normal, No Nodules] : the thyroid was normal and there were no nodules present [No Respiratory Distress] : no respiratory distress  [No Accessory Muscle Use] : no accessory muscle use [Clear to Auscultation] : lungs were clear to auscultation bilaterally [Normal Rate] : normal rate  [Regular Rhythm] : with a regular rhythm [Normal S1, S2] : normal S1 and S2 [No Murmur] : no murmur heard [No Carotid Bruits] : no carotid bruits [No Abdominal Bruit] : a ~M bruit was not heard ~T in the abdomen [No Varicosities] : no varicosities [Pedal Pulses Present] : the pedal pulses are present [No Palpable Aorta] : no palpable aorta [No Extremity Clubbing/Cyanosis] : no extremity clubbing/cyanosis [Soft] : abdomen soft [Non Tender] : non-tender [Non-distended] : non-distended [No Masses] : no abdominal mass palpated [No HSM] : no HSM [Normal Bowel Sounds] : normal bowel sounds [Normal Supraclavicular Nodes] : no supraclavicular lymphadenopathy [Normal Posterior Cervical Nodes] : no posterior cervical lymphadenopathy [Normal Anterior Cervical Nodes] : no anterior cervical lymphadenopathy [No CVA Tenderness] : no CVA  tenderness [No Spinal Tenderness] : no spinal tenderness [No Joint Swelling] : no joint swelling [Grossly Normal Strength/Tone] : grossly normal strength/tone [No Rash] : no rash [Coordination Grossly Intact] : coordination grossly intact [No Focal Deficits] : no focal deficits [Normal Gait] : normal gait [Deep Tendon Reflexes (DTR)] : deep tendon reflexes were 2+ and symmetric [Speech Grossly Normal] : speech grossly normal [Memory Grossly Normal] : memory grossly normal [Normal Affect] : the affect was normal [Alert and Oriented x3] : oriented to person, place, and time [Normal Mood] : the mood was normal [Normal Insight/Judgement] : insight and judgment were intact [de-identified] : lower extremity edema

## 2021-07-27 NOTE — HEALTH RISK ASSESSMENT
[No] : In the past 12 months have you used drugs other than those required for medical reasons? No [No falls in past year] : Patient reported no falls in the past year [0] : 2) Feeling down, depressed, or hopeless: Not at all (0) [PHQ-2 Negative - No further assessment needed] : PHQ-2 Negative - No further assessment needed [] : No [[id number]] : 0

## 2021-07-28 LAB
ALBUMIN SERPL ELPH-MCNC: 3.9 G/DL
ALP BLD-CCNC: 58 U/L
ALT SERPL-CCNC: 15 U/L
ANION GAP SERPL CALC-SCNC: 11 MMOL/L
AST SERPL-CCNC: 18 U/L
BASOPHILS # BLD AUTO: 0.05 K/UL
BASOPHILS NFR BLD AUTO: 0.5 %
BILIRUB SERPL-MCNC: 0.4 MG/DL
BUN SERPL-MCNC: 15 MG/DL
CALCIUM SERPL-MCNC: 9.6 MG/DL
CHLORIDE SERPL-SCNC: 105 MMOL/L
CO2 SERPL-SCNC: 23 MMOL/L
CREAT SERPL-MCNC: 0.91 MG/DL
EOSINOPHIL # BLD AUTO: 0.14 K/UL
EOSINOPHIL NFR BLD AUTO: 1.4 %
GLUCOSE SERPL-MCNC: 93 MG/DL
HCT VFR BLD CALC: 45.7 %
HGB BLD-MCNC: 15 G/DL
IMM GRANULOCYTES NFR BLD AUTO: 0.5 %
LYMPHOCYTES # BLD AUTO: 1.9 K/UL
LYMPHOCYTES NFR BLD AUTO: 19.1 %
MAN DIFF?: NORMAL
MCHC RBC-ENTMCNC: 30.7 PG
MCHC RBC-ENTMCNC: 32.8 GM/DL
MCV RBC AUTO: 93.5 FL
MONOCYTES # BLD AUTO: 1.01 K/UL
MONOCYTES NFR BLD AUTO: 10.1 %
NEUTROPHILS # BLD AUTO: 6.81 K/UL
NEUTROPHILS NFR BLD AUTO: 68.4 %
PLATELET # BLD AUTO: 344 K/UL
POTASSIUM SERPL-SCNC: 4.4 MMOL/L
PROT SERPL-MCNC: 6.1 G/DL
RBC # BLD: 4.89 M/UL
RBC # FLD: 17.2 %
SODIUM SERPL-SCNC: 138 MMOL/L
WBC # FLD AUTO: 9.96 K/UL

## 2021-07-30 ENCOUNTER — APPOINTMENT (OUTPATIENT)
Dept: INTERNAL MEDICINE | Facility: CLINIC | Age: 76
End: 2021-07-30
Payer: MEDICARE

## 2021-07-30 VITALS
OXYGEN SATURATION: 97 % | HEART RATE: 82 BPM | SYSTOLIC BLOOD PRESSURE: 124 MMHG | WEIGHT: 248 LBS | RESPIRATION RATE: 14 BRPM | HEIGHT: 67 IN | TEMPERATURE: 97.8 F | BODY MASS INDEX: 38.92 KG/M2 | DIASTOLIC BLOOD PRESSURE: 74 MMHG

## 2021-07-30 LAB
ANION GAP SERPL CALC-SCNC: 13 MMOL/L
BUN SERPL-MCNC: 19 MG/DL
CALCIUM SERPL-MCNC: 9.9 MG/DL
CHLORIDE SERPL-SCNC: 103 MMOL/L
CO2 SERPL-SCNC: 23 MMOL/L
CREAT SERPL-MCNC: 0.91 MG/DL
GLUCOSE SERPL-MCNC: 115 MG/DL
POTASSIUM SERPL-SCNC: 4.7 MMOL/L
SODIUM SERPL-SCNC: 139 MMOL/L

## 2021-07-30 PROCEDURE — 99214 OFFICE O/P EST MOD 30 MIN: CPT

## 2021-07-30 NOTE — PHYSICAL EXAM
[No Acute Distress] : no acute distress [Well Nourished] : well nourished [Well Developed] : well developed [Well-Appearing] : well-appearing [Normal Voice/Communication] : normal voice/communication [Normal Sclera/Conjunctiva] : normal sclera/conjunctiva [PERRL] : pupils equal round and reactive to light [EOMI] : extraocular movements intact [Normal Outer Ear/Nose] : the outer ears and nose were normal in appearance [No JVD] : no jugular venous distention [No Lymphadenopathy] : no lymphadenopathy [Supple] : supple [Thyroid Normal, No Nodules] : the thyroid was normal and there were no nodules present [No Respiratory Distress] : no respiratory distress  [No Accessory Muscle Use] : no accessory muscle use [Clear to Auscultation] : lungs were clear to auscultation bilaterally [Normal Rate] : normal rate  [Regular Rhythm] : with a regular rhythm [Normal S1, S2] : normal S1 and S2 [No Murmur] : no murmur heard [No Carotid Bruits] : no carotid bruits [No Abdominal Bruit] : a ~M bruit was not heard ~T in the abdomen [No Varicosities] : no varicosities [Pedal Pulses Present] : the pedal pulses are present [No Palpable Aorta] : no palpable aorta [No Extremity Clubbing/Cyanosis] : no extremity clubbing/cyanosis [Soft] : abdomen soft [Non Tender] : non-tender [Non-distended] : non-distended [No Masses] : no abdominal mass palpated [No HSM] : no HSM [Normal Bowel Sounds] : normal bowel sounds [Normal Supraclavicular Nodes] : no supraclavicular lymphadenopathy [Normal Posterior Cervical Nodes] : no posterior cervical lymphadenopathy [Normal Anterior Cervical Nodes] : no anterior cervical lymphadenopathy [No CVA Tenderness] : no CVA  tenderness [No Spinal Tenderness] : no spinal tenderness [No Joint Swelling] : no joint swelling [Grossly Normal Strength/Tone] : grossly normal strength/tone [No Rash] : no rash [Coordination Grossly Intact] : coordination grossly intact [No Focal Deficits] : no focal deficits [Normal Gait] : normal gait [Deep Tendon Reflexes (DTR)] : deep tendon reflexes were 2+ and symmetric [Speech Grossly Normal] : speech grossly normal [Memory Grossly Normal] : memory grossly normal [Normal Affect] : the affect was normal [Alert and Oriented x3] : oriented to person, place, and time [Normal Mood] : the mood was normal [Normal Insight/Judgement] : insight and judgment were intact [de-identified] : lower extremity edema

## 2021-07-30 NOTE — HEALTH RISK ASSESSMENT
[No] : In the past 12 months have you used drugs other than those required for medical reasons? No [No falls in past year] : Patient reported no falls in the past year [0] : 2) Feeling down, depressed, or hopeless: Not at all (0) [PHQ-2 Negative - No further assessment needed] : PHQ-2 Negative - No further assessment needed [] : No [[id number]] : 0

## 2021-07-30 NOTE — END OF VISIT
[FreeTextEntry3] : "I, Jany Blevins, personally scribed the services dictated to me by Dr. Colt Wade MD in this documentation on 07/30/2021 "\par \par "I Dr. Colt Wade MD, personally performed the services described in this documentation on 07/30/2021 for the patient as scribed by Jany Blevins in my presence. I have reviewed and verified that all the information is accurate and true."\par \par

## 2021-07-30 NOTE — HISTORY OF PRESENT ILLNESS
[FreeTextEntry1] : follow up [de-identified] : DEION HERNANDEZLEONORA is a 76 year old M who presents today for follow up for CHF and SOB. Pt is feeling better today.

## 2021-08-03 ENCOUNTER — APPOINTMENT (OUTPATIENT)
Dept: INTERNAL MEDICINE | Facility: CLINIC | Age: 76
End: 2021-08-03
Payer: MEDICARE

## 2021-08-03 VITALS
BODY MASS INDEX: 37.67 KG/M2 | OXYGEN SATURATION: 98 % | DIASTOLIC BLOOD PRESSURE: 78 MMHG | WEIGHT: 240 LBS | HEIGHT: 67 IN | TEMPERATURE: 97.3 F | RESPIRATION RATE: 14 BRPM | HEART RATE: 64 BPM | SYSTOLIC BLOOD PRESSURE: 126 MMHG

## 2021-08-03 DIAGNOSIS — R06.02 SHORTNESS OF BREATH: ICD-10-CM

## 2021-08-03 DIAGNOSIS — R60.0 LOCALIZED EDEMA: ICD-10-CM

## 2021-08-03 PROCEDURE — 99213 OFFICE O/P EST LOW 20 MIN: CPT

## 2021-08-03 NOTE — HEALTH RISK ASSESSMENT
[No] : In the past 12 months have you used drugs other than those required for medical reasons? No [No falls in past year] : Patient reported no falls in the past year [0] : 2) Feeling down, depressed, or hopeless: Not at all (0) [PHQ-2 Negative - No further assessment needed] : PHQ-2 Negative - No further assessment needed [] : No [[id number]] : 0

## 2021-08-03 NOTE — HISTORY OF PRESENT ILLNESS
[FreeTextEntry1] : Follow up  [de-identified] : DEION HEATH is a 76 year old M who presents today for follow up for SOB and CHF. Pt has lost 15 pounds on medication. Patient has no new complaints\par

## 2021-08-03 NOTE — PHYSICAL EXAM
[No Acute Distress] : no acute distress [Well Nourished] : well nourished [Well Developed] : well developed [Well-Appearing] : well-appearing [Normal Voice/Communication] : normal voice/communication [Normal Sclera/Conjunctiva] : normal sclera/conjunctiva [PERRL] : pupils equal round and reactive to light [EOMI] : extraocular movements intact [Normal Outer Ear/Nose] : the outer ears and nose were normal in appearance [No JVD] : no jugular venous distention [No Lymphadenopathy] : no lymphadenopathy [Supple] : supple [Thyroid Normal, No Nodules] : the thyroid was normal and there were no nodules present [No Respiratory Distress] : no respiratory distress  [No Accessory Muscle Use] : no accessory muscle use [Clear to Auscultation] : lungs were clear to auscultation bilaterally [Normal Rate] : normal rate  [Regular Rhythm] : with a regular rhythm [Normal S1, S2] : normal S1 and S2 [No Murmur] : no murmur heard [No Carotid Bruits] : no carotid bruits [No Abdominal Bruit] : a ~M bruit was not heard ~T in the abdomen [No Varicosities] : no varicosities [Pedal Pulses Present] : the pedal pulses are present [No Palpable Aorta] : no palpable aorta [No Extremity Clubbing/Cyanosis] : no extremity clubbing/cyanosis [Soft] : abdomen soft [Non Tender] : non-tender [Non-distended] : non-distended [No Masses] : no abdominal mass palpated [No HSM] : no HSM [Normal Bowel Sounds] : normal bowel sounds [Normal Supraclavicular Nodes] : no supraclavicular lymphadenopathy [Normal Posterior Cervical Nodes] : no posterior cervical lymphadenopathy [Normal Anterior Cervical Nodes] : no anterior cervical lymphadenopathy [No CVA Tenderness] : no CVA  tenderness [No Spinal Tenderness] : no spinal tenderness [No Joint Swelling] : no joint swelling [Grossly Normal Strength/Tone] : grossly normal strength/tone [No Rash] : no rash [Coordination Grossly Intact] : coordination grossly intact [No Focal Deficits] : no focal deficits [Normal Gait] : normal gait [Deep Tendon Reflexes (DTR)] : deep tendon reflexes were 2+ and symmetric [Speech Grossly Normal] : speech grossly normal [Memory Grossly Normal] : memory grossly normal [Normal Affect] : the affect was normal [Alert and Oriented x3] : oriented to person, place, and time [Normal Mood] : the mood was normal [Normal Insight/Judgement] : insight and judgment were intact [de-identified] : decreased lower extremity edema

## 2021-08-03 NOTE — END OF VISIT
[FreeTextEntry3] : "I, Jany Blevins, personally scribed the services dictated to me by Dr. Colt Wade MD in this documentation on 08/03/2021 "\par \par "I Dr. Colt Wade MD, personally performed the services described in this documentation on 08/03/2021 for the patient as scribed by Jany Blevins in my presence. I have reviewed and verified that all the information is accurate and true."\par \par

## 2021-08-05 NOTE — ED PROVIDER NOTE - INTERPRETATION
- General Post-Op/Procedure Note


Date of Surgery/Procedure: 08/05/21


Operative Procedure(s): Gomez Cerclage


Pre Op Diagnosis: [id number] Cervical incomptance


Post-Op Diagnosis: Same


Anesthesia Technique: Spinal


Primary Surgeon: Fran Clemons


EBL in mLs: 50


Complications: None


Condition: Good
normal sinus rhythm

## 2021-08-10 ENCOUNTER — NON-APPOINTMENT (OUTPATIENT)
Age: 76
End: 2021-08-10

## 2021-08-10 ENCOUNTER — APPOINTMENT (OUTPATIENT)
Dept: CARDIOLOGY | Facility: CLINIC | Age: 76
End: 2021-08-10
Payer: MEDICARE

## 2021-08-10 VITALS
SYSTOLIC BLOOD PRESSURE: 159 MMHG | DIASTOLIC BLOOD PRESSURE: 83 MMHG | WEIGHT: 239 LBS | OXYGEN SATURATION: 98 % | HEIGHT: 67 IN | BODY MASS INDEX: 37.51 KG/M2 | HEART RATE: 62 BPM

## 2021-08-10 VITALS — SYSTOLIC BLOOD PRESSURE: 130 MMHG | DIASTOLIC BLOOD PRESSURE: 80 MMHG

## 2021-08-10 PROCEDURE — 93000 ELECTROCARDIOGRAM COMPLETE: CPT

## 2021-08-10 PROCEDURE — 99214 OFFICE O/P EST MOD 30 MIN: CPT

## 2021-08-18 ENCOUNTER — APPOINTMENT (OUTPATIENT)
Dept: INTERNAL MEDICINE | Facility: CLINIC | Age: 76
End: 2021-08-18
Payer: MEDICARE

## 2021-08-18 VITALS
RESPIRATION RATE: 15 BRPM | OXYGEN SATURATION: 98 % | BODY MASS INDEX: 37.59 KG/M2 | DIASTOLIC BLOOD PRESSURE: 82 MMHG | SYSTOLIC BLOOD PRESSURE: 146 MMHG | HEART RATE: 76 BPM | TEMPERATURE: 97.4 F | WEIGHT: 240 LBS

## 2021-08-18 DIAGNOSIS — R19.7 DIARRHEA, UNSPECIFIED: ICD-10-CM

## 2021-08-18 PROCEDURE — 99214 OFFICE O/P EST MOD 30 MIN: CPT | Mod: 25

## 2021-08-18 PROCEDURE — 36415 COLL VENOUS BLD VENIPUNCTURE: CPT

## 2021-08-18 NOTE — HISTORY OF PRESENT ILLNESS
[FreeTextEntry1] : follow up [de-identified] : DEION HEATH is a 76 year old M who presents today for diarrhea for 3 days watery\par no fever or chill no antibiotics no travel

## 2021-08-18 NOTE — PLAN
[FreeTextEntry1] : Further instructions pending lab results \par use Imodium if needed \par stool cultures\par push fluids

## 2021-08-18 NOTE — HEALTH RISK ASSESSMENT
[No] : In the past 12 months have you used drugs other than those required for medical reasons? No [No falls in past year] : Patient reported no falls in the past year [0] : 2) Feeling down, depressed, or hopeless: Not at all (0) [PHQ-2 Negative - No further assessment needed] : PHQ-2 Negative - No further assessment needed [] : No [[id number]] : 0

## 2021-08-19 LAB
ALBUMIN SERPL ELPH-MCNC: 4.1 G/DL
ALP BLD-CCNC: 70 U/L
ALT SERPL-CCNC: 13 U/L
ANION GAP SERPL CALC-SCNC: 14 MMOL/L
AST SERPL-CCNC: 20 U/L
BASOPHILS # BLD AUTO: 0.03 K/UL
BASOPHILS NFR BLD AUTO: 0.3 %
BILIRUB SERPL-MCNC: 0.5 MG/DL
BUN SERPL-MCNC: 15 MG/DL
CALCIUM SERPL-MCNC: 10 MG/DL
CHLORIDE SERPL-SCNC: 101 MMOL/L
CO2 SERPL-SCNC: 22 MMOL/L
CREAT SERPL-MCNC: 0.91 MG/DL
EOSINOPHIL # BLD AUTO: 0.01 K/UL
EOSINOPHIL NFR BLD AUTO: 0.1 %
GLUCOSE SERPL-MCNC: 108 MG/DL
HCT VFR BLD CALC: 49.6 %
HGB BLD-MCNC: 15.8 G/DL
IMM GRANULOCYTES NFR BLD AUTO: 0.4 %
LYMPHOCYTES # BLD AUTO: 0.87 K/UL
LYMPHOCYTES NFR BLD AUTO: 7.4 %
MAN DIFF?: NORMAL
MCHC RBC-ENTMCNC: 30.7 PG
MCHC RBC-ENTMCNC: 31.9 GM/DL
MCV RBC AUTO: 96.5 FL
MONOCYTES # BLD AUTO: 0.59 K/UL
MONOCYTES NFR BLD AUTO: 5 %
NEUTROPHILS # BLD AUTO: 10.16 K/UL
NEUTROPHILS NFR BLD AUTO: 86.8 %
PLATELET # BLD AUTO: 403 K/UL
POTASSIUM SERPL-SCNC: 4.4 MMOL/L
PROT SERPL-MCNC: 7 G/DL
RBC # BLD: 5.14 M/UL
RBC # FLD: 16.3 %
SODIUM SERPL-SCNC: 137 MMOL/L
WBC # FLD AUTO: 11.71 K/UL

## 2021-08-25 LAB
BACTERIA STL CULT: NORMAL
C DIFF TOX GENS STL QL NAA+PROBE: NORMAL
CDIFF BY PCR: NOT DETECTED

## 2021-08-26 LAB — DEPRECATED O AND P PREP STL: NORMAL

## 2021-09-15 ENCOUNTER — APPOINTMENT (OUTPATIENT)
Dept: INTERNAL MEDICINE | Facility: CLINIC | Age: 76
End: 2021-09-15
Payer: MEDICARE

## 2021-09-15 VITALS
DIASTOLIC BLOOD PRESSURE: 72 MMHG | WEIGHT: 235 LBS | HEART RATE: 74 BPM | SYSTOLIC BLOOD PRESSURE: 130 MMHG | RESPIRATION RATE: 14 BRPM | HEIGHT: 67 IN | TEMPERATURE: 97.3 F | OXYGEN SATURATION: 98 % | BODY MASS INDEX: 36.88 KG/M2

## 2021-09-15 PROCEDURE — G0008: CPT

## 2021-09-15 PROCEDURE — 90662 IIV NO PRSV INCREASED AG IM: CPT

## 2021-09-15 PROCEDURE — 99214 OFFICE O/P EST MOD 30 MIN: CPT | Mod: 25

## 2021-09-15 NOTE — PHYSICAL EXAM
[No Acute Distress] : no acute distress [Well Nourished] : well nourished [Well Developed] : well developed [Well-Appearing] : well-appearing [Normal Voice/Communication] : normal voice/communication [Normal Sclera/Conjunctiva] : normal sclera/conjunctiva [PERRL] : pupils equal round and reactive to light [EOMI] : extraocular movements intact [Normal Outer Ear/Nose] : the outer ears and nose were normal in appearance [No JVD] : no jugular venous distention [No Lymphadenopathy] : no lymphadenopathy [Supple] : supple [Thyroid Normal, No Nodules] : the thyroid was normal and there were no nodules present [No Respiratory Distress] : no respiratory distress  [No Accessory Muscle Use] : no accessory muscle use [Clear to Auscultation] : lungs were clear to auscultation bilaterally [Normal Rate] : normal rate  [Regular Rhythm] : with a regular rhythm [Normal S1, S2] : normal S1 and S2 [No Murmur] : no murmur heard [No Carotid Bruits] : no carotid bruits [No Abdominal Bruit] : a ~M bruit was not heard ~T in the abdomen [No Varicosities] : no varicosities [Pedal Pulses Present] : the pedal pulses are present [No Palpable Aorta] : no palpable aorta [No Extremity Clubbing/Cyanosis] : no extremity clubbing/cyanosis [Soft] : abdomen soft [Non Tender] : non-tender [Non-distended] : non-distended [No Masses] : no abdominal mass palpated [No HSM] : no HSM [Normal Bowel Sounds] : normal bowel sounds [Normal Supraclavicular Nodes] : no supraclavicular lymphadenopathy [Normal Posterior Cervical Nodes] : no posterior cervical lymphadenopathy [Normal Anterior Cervical Nodes] : no anterior cervical lymphadenopathy [No CVA Tenderness] : no CVA  tenderness [No Spinal Tenderness] : no spinal tenderness [No Joint Swelling] : no joint swelling [Grossly Normal Strength/Tone] : grossly normal strength/tone [No Rash] : no rash [Coordination Grossly Intact] : coordination grossly intact [No Focal Deficits] : no focal deficits [Normal Gait] : normal gait [Deep Tendon Reflexes (DTR)] : deep tendon reflexes were 2+ and symmetric [Speech Grossly Normal] : speech grossly normal [Memory Grossly Normal] : memory grossly normal [Normal Affect] : the affect was normal [Alert and Oriented x3] : oriented to person, place, and time [Normal Mood] : the mood was normal [Normal Insight/Judgement] : insight and judgment were intact [de-identified] : mild bilateral ankle edema

## 2021-09-15 NOTE — HISTORY OF PRESENT ILLNESS
[FreeTextEntry1] : follow up [de-identified] : DEION HEATH is a 76 year old M who presents today for DM, cholesterol and BP. Patient has no new complaints\par

## 2021-09-15 NOTE — HEALTH RISK ASSESSMENT
[No] : In the past 12 months have you used drugs other than those required for medical reasons? No [No falls in past year] : Patient reported no falls in the past year [0] : 2) Feeling down, depressed, or hopeless: Not at all (0) [PHQ-2 Negative - No further assessment needed] : PHQ-2 Negative - No further assessment needed [] : No [[id number]] : 0

## 2021-09-15 NOTE — END OF VISIT
[FreeTextEntry3] : "I, Jany Blevins, personally scribed the services dictated to me by Dr. Colt Wade MD in this documentation on 09/15/2021 "\par \par "I Dr. Colt Wade MD, personally performed the services described in this documentation on 09/15/2021 for the patient as scribed by Jany Blevins in my presence. I have reviewed and verified that all the information is accurate and true."\par \par

## 2021-09-16 DIAGNOSIS — E55.9 VITAMIN D DEFICIENCY, UNSPECIFIED: ICD-10-CM

## 2021-09-16 LAB
25(OH)D3 SERPL-MCNC: 25 NG/ML
ALBUMIN SERPL ELPH-MCNC: 4 G/DL
ALP BLD-CCNC: 71 U/L
ALT SERPL-CCNC: 14 U/L
ANION GAP SERPL CALC-SCNC: 14 MMOL/L
AST SERPL-CCNC: 18 U/L
BASOPHILS # BLD AUTO: 0.08 K/UL
BASOPHILS NFR BLD AUTO: 0.8 %
BILIRUB SERPL-MCNC: 0.4 MG/DL
BUN SERPL-MCNC: 19 MG/DL
CALCIUM SERPL-MCNC: 9.8 MG/DL
CHLORIDE SERPL-SCNC: 102 MMOL/L
CHOLEST SERPL-MCNC: 171 MG/DL
CK SERPL-CCNC: 65 U/L
CO2 SERPL-SCNC: 22 MMOL/L
CREAT SERPL-MCNC: 0.99 MG/DL
EOSINOPHIL # BLD AUTO: 0.17 K/UL
EOSINOPHIL NFR BLD AUTO: 1.6 %
ESTIMATED AVERAGE GLUCOSE: 117 MG/DL
GLUCOSE SERPL-MCNC: 105 MG/DL
HBA1C MFR BLD HPLC: 5.7 %
HCT VFR BLD CALC: 48 %
HDLC SERPL-MCNC: 53 MG/DL
HGB BLD-MCNC: 15.6 G/DL
IMM GRANULOCYTES NFR BLD AUTO: 0.4 %
LDLC SERPL CALC-MCNC: 94 MG/DL
LYMPHOCYTES # BLD AUTO: 1.37 K/UL
LYMPHOCYTES NFR BLD AUTO: 12.9 %
MAN DIFF?: NORMAL
MCHC RBC-ENTMCNC: 30.8 PG
MCHC RBC-ENTMCNC: 32.5 GM/DL
MCV RBC AUTO: 94.9 FL
MONOCYTES # BLD AUTO: 0.87 K/UL
MONOCYTES NFR BLD AUTO: 8.2 %
NEUTROPHILS # BLD AUTO: 8.08 K/UL
NEUTROPHILS NFR BLD AUTO: 76.1 %
NONHDLC SERPL-MCNC: 117 MG/DL
PLATELET # BLD AUTO: 367 K/UL
POTASSIUM SERPL-SCNC: 4.2 MMOL/L
PROT SERPL-MCNC: 6.5 G/DL
RBC # BLD: 5.06 M/UL
RBC # FLD: 15.3 %
SODIUM SERPL-SCNC: 137 MMOL/L
TRIGL SERPL-MCNC: 117 MG/DL
TSH SERPL-ACNC: 1.11 UIU/ML
WBC # FLD AUTO: 10.61 K/UL

## 2021-09-24 RX ORDER — POTASSIUM CHLORIDE 1500 MG/1
20 TABLET, EXTENDED RELEASE ORAL DAILY
Qty: 30 | Refills: 3 | Status: ACTIVE | COMMUNITY
Start: 2021-07-27 | End: 1900-01-01

## 2021-11-08 ENCOUNTER — APPOINTMENT (OUTPATIENT)
Dept: CARDIOLOGY | Facility: CLINIC | Age: 76
End: 2021-11-08
Payer: MEDICARE

## 2021-11-08 VITALS
SYSTOLIC BLOOD PRESSURE: 149 MMHG | DIASTOLIC BLOOD PRESSURE: 80 MMHG | BODY MASS INDEX: 38.45 KG/M2 | WEIGHT: 245 LBS | OXYGEN SATURATION: 98 % | HEART RATE: 60 BPM | HEIGHT: 67 IN

## 2021-11-08 PROCEDURE — 93000 ELECTROCARDIOGRAM COMPLETE: CPT

## 2021-11-08 PROCEDURE — 99214 OFFICE O/P EST MOD 30 MIN: CPT

## 2021-11-19 ENCOUNTER — RX RENEWAL (OUTPATIENT)
Age: 76
End: 2021-11-19

## 2021-12-15 ENCOUNTER — APPOINTMENT (OUTPATIENT)
Dept: INTERNAL MEDICINE | Facility: CLINIC | Age: 76
End: 2021-12-15
Payer: MEDICARE

## 2021-12-15 VITALS
BODY MASS INDEX: 38.45 KG/M2 | RESPIRATION RATE: 14 BRPM | DIASTOLIC BLOOD PRESSURE: 80 MMHG | TEMPERATURE: 97.7 F | WEIGHT: 245 LBS | SYSTOLIC BLOOD PRESSURE: 130 MMHG | HEART RATE: 64 BPM | OXYGEN SATURATION: 98 % | HEIGHT: 67 IN

## 2021-12-15 PROCEDURE — 99214 OFFICE O/P EST MOD 30 MIN: CPT

## 2021-12-15 NOTE — PHYSICAL EXAM
[No Acute Distress] : no acute distress [Well Nourished] : well nourished [Well Developed] : well developed [Well-Appearing] : well-appearing [Normal Voice/Communication] : normal voice/communication [Normal Sclera/Conjunctiva] : normal sclera/conjunctiva [PERRL] : pupils equal round and reactive to light [EOMI] : extraocular movements intact [Normal Outer Ear/Nose] : the outer ears and nose were normal in appearance [No JVD] : no jugular venous distention [No Lymphadenopathy] : no lymphadenopathy [Supple] : supple [Thyroid Normal, No Nodules] : the thyroid was normal and there were no nodules present [No Respiratory Distress] : no respiratory distress  [No Accessory Muscle Use] : no accessory muscle use [Clear to Auscultation] : lungs were clear to auscultation bilaterally [Normal Rate] : normal rate  [Regular Rhythm] : with a regular rhythm [Normal S1, S2] : normal S1 and S2 [No Murmur] : no murmur heard [No Carotid Bruits] : no carotid bruits [No Abdominal Bruit] : a ~M bruit was not heard ~T in the abdomen [No Varicosities] : no varicosities [Pedal Pulses Present] : the pedal pulses are present [No Palpable Aorta] : no palpable aorta [No Extremity Clubbing/Cyanosis] : no extremity clubbing/cyanosis [Soft] : abdomen soft [Non Tender] : non-tender [Non-distended] : non-distended [No Masses] : no abdominal mass palpated [No HSM] : no HSM [Normal Bowel Sounds] : normal bowel sounds [Normal Supraclavicular Nodes] : no supraclavicular lymphadenopathy [Normal Posterior Cervical Nodes] : no posterior cervical lymphadenopathy [Normal Anterior Cervical Nodes] : no anterior cervical lymphadenopathy [No CVA Tenderness] : no CVA  tenderness [No Spinal Tenderness] : no spinal tenderness [No Joint Swelling] : no joint swelling [Grossly Normal Strength/Tone] : grossly normal strength/tone [No Rash] : no rash [Coordination Grossly Intact] : coordination grossly intact [No Focal Deficits] : no focal deficits [Normal Gait] : normal gait [Deep Tendon Reflexes (DTR)] : deep tendon reflexes were 2+ and symmetric [Speech Grossly Normal] : speech grossly normal [Memory Grossly Normal] : memory grossly normal [Normal Affect] : the affect was normal [Alert and Oriented x3] : oriented to person, place, and time [Normal Mood] : the mood was normal [Normal Insight/Judgement] : insight and judgment were intact [de-identified] : mild bilateral leg edema

## 2021-12-15 NOTE — END OF VISIT
[FreeTextEntry3] : "I, Jany Blevins, personally scribed the services dictated to me by Dr. Colt Wade MD in this documentation on 12/15/2021 " \par \par "I Dr. Colt Wade MD, personally performed the services described in this documentation on 12/15/2021 for the patient as scribed by Jany Blevins in my presence. I have reviewed and verified that all the information is accurate and true."\par

## 2021-12-15 NOTE — HISTORY OF PRESENT ILLNESS
[FreeTextEntry1] : follow up\par  [de-identified] : DEION HEATH is a 76 year old M who presents today for follow up for BP, blood sugar and cholesterol. Patient has no new complaints\par

## 2021-12-15 NOTE — HEALTH RISK ASSESSMENT
[Former] : Former [No] : In the past 12 months have you used drugs other than those required for medical reasons? No [No falls in past year] : Patient reported no falls in the past year [Assistive Device] : Patient uses an assistive device [0] : 2) Feeling down, depressed, or hopeless: Not at all (0) [PHQ-2 Negative - No further assessment needed] : PHQ-2 Negative - No further assessment needed [de-identified] : cane [[id number]] : 0

## 2021-12-16 LAB
25(OH)D3 SERPL-MCNC: 29.6 NG/ML
ALBUMIN SERPL ELPH-MCNC: 4 G/DL
ALP BLD-CCNC: 87 U/L
ALT SERPL-CCNC: 12 U/L
ANION GAP SERPL CALC-SCNC: 15 MMOL/L
AST SERPL-CCNC: 16 U/L
BASOPHILS # BLD AUTO: 0.08 K/UL
BASOPHILS NFR BLD AUTO: 0.8 %
BILIRUB SERPL-MCNC: 0.4 MG/DL
BUN SERPL-MCNC: 20 MG/DL
CALCIUM SERPL-MCNC: 9.9 MG/DL
CHLORIDE SERPL-SCNC: 102 MMOL/L
CHOLEST SERPL-MCNC: 180 MG/DL
CK SERPL-CCNC: 75 U/L
CO2 SERPL-SCNC: 24 MMOL/L
CREAT SERPL-MCNC: 1.03 MG/DL
EOSINOPHIL # BLD AUTO: 0.2 K/UL
EOSINOPHIL NFR BLD AUTO: 1.9 %
ESTIMATED AVERAGE GLUCOSE: 126 MG/DL
GLUCOSE SERPL-MCNC: 82 MG/DL
HBA1C MFR BLD HPLC: 6 %
HCT VFR BLD CALC: 51.6 %
HDLC SERPL-MCNC: 62 MG/DL
HGB BLD-MCNC: 16.1 G/DL
IMM GRANULOCYTES NFR BLD AUTO: 0.5 %
LDLC SERPL CALC-MCNC: 96 MG/DL
LYMPHOCYTES # BLD AUTO: 2.48 K/UL
LYMPHOCYTES NFR BLD AUTO: 23.6 %
MAN DIFF?: NORMAL
MCHC RBC-ENTMCNC: 29.1 PG
MCHC RBC-ENTMCNC: 31.2 GM/DL
MCV RBC AUTO: 93.3 FL
MONOCYTES # BLD AUTO: 1.3 K/UL
MONOCYTES NFR BLD AUTO: 12.4 %
NEUTROPHILS # BLD AUTO: 6.38 K/UL
NEUTROPHILS NFR BLD AUTO: 60.8 %
NONHDLC SERPL-MCNC: 118 MG/DL
PLATELET # BLD AUTO: 351 K/UL
POTASSIUM SERPL-SCNC: 4.6 MMOL/L
PROT SERPL-MCNC: 6.7 G/DL
PSA SERPL-MCNC: 5.39 NG/ML
RBC # BLD: 5.53 M/UL
RBC # FLD: 15.3 %
SODIUM SERPL-SCNC: 140 MMOL/L
TRIGL SERPL-MCNC: 110 MG/DL
TSH SERPL-ACNC: 1.97 UIU/ML
WBC # FLD AUTO: 10.49 K/UL

## 2022-02-24 NOTE — PROGRESS NOTE ADULT - TIME BILLING
UROLOGY INPATIENT PROGRESS NOTE     Patient: Nick Castaneda Attending: Jonny Joshua MD   : 1967 Date of Admission:  2022   AGE: 54 year old Current Hospital Day:  Hospital Day: 2   SEX: male Date:  2022 10:07 AM   Code Status: Full Resuscitation        Chief Complaint:  Prostate biopsy post infection      Subjective      Hx:  1. No family hx prostate cancer  2. Elevated PSA consult 2021  3.   2022-4K score 28   4.    PSA 7.77 with 4K score 2022 Pt here for: f/u elevated psa after completing 4K score  5. S/p TRUS PNB 2022  6. Admitted for sepsis s/p prostate biopsy 22    Feeling slightly improved from yesterday, pain better, but still severe in entire groin area. Feverish. Nausea improved. Urinary urgency has improved, still notes dysuria, specifically at end of stream. Urine bloody, but no issues passing urine.      Problem List: Active Hospital Problems    *Fever and chills           Medications/Infusions/Fluids-I/O  Scheduled:   • sodium chloride  500 mL Intravenous Once   • VANCOMYCIN - PHARMACIST MONITORED   Does not apply See Admin Instructions   • vancomycin (VANCOCIN) IVPB  1,750 mg Intravenous Once   • [START ON 2022] vancomycin (VANCOCIN) IVPB  1,250 mg Intravenous 2 times per day   • sodium chloride (PF)  2 mL Intracatheter 2 times per day   • Magnesium Standard Replacement Protocol   Does not apply See Admin Instructions   • Potassium Standard Replacement Protocol   Does not apply See Admin Instructions   • piperacillin-tazobactam (ZOSYN) extended interval IVPB  3.375 g Intravenous 3 times per day   • pantoprazole  40 mg Oral Daily with dinner     Continuous Infusions:      Intake/Output:          Intake/Output Summary (Last 24 hours) at 2022 1007  Last data filed at 2022 0513  Gross per 24 hour   Intake 1395 ml   Output 1075 ml   Net 320 ml       Physical Exam     Vital Signs  Vital Last Value 24 Hour Range   Temperature 100.1 °F (37.8  Patient seen and examined at bedside. °C) Temp  Min: 98.9 °F (37.2 °C)  Max: 100.5 °F (38.1 °C)   Pulse 93 Pulse  Min: 83  Max: 109   Respiratory 16 Resp  Min: 16  Max: 25   Blood Pressure (!) 83/47 BP  Min: 83/47  Max: 125/66   Pulse Oximetry 90 % SpO2  Min: 90 %  Max: 100 %      General: Alert, oriented x3, no acute distress   Respiratory: Normal effort   Abdomen:  No abd tenderness  :  No SP fullness or tenderness. Normal phallus, no meatal discharge. Normal left testicle and cord. Normal right testicle, mildly epididymal fullness, mildly tender without guarding. No posterior scrotal tenderness or fluctuance. Warm to touch though.        Component      Latest Ref Rng & Units 12/27/2021 2/23/2022   COLOR       Yellow Jolie   CLARITY       Clear Cloudy   GLUCOSE(URINE)      Negative mg/dL Negative Negative   BILIRUBIN      Negative Negative Negative   KETONES      Negative mg/dL Negative Negative   SPECIFIC GRAVITY      1.005 - 1.030 1.017 >1.030 (H)   BLOOD      Negative Negative Large (A)   pH      5.0 - 7.0 7.0 5.0   PROTEIN(URINE)      Negative mg/dL Negative 100 (A)   UROBILINOGEN      0.2, 1.0 mg/dL 0.2 0.2   NITRITE      Negative Negative Negative   LEUKOCYTE ESTERASE      Negative Negative Negative   Squamous EPI'S      None Seen, 1 to 5 /hpf  None Seen   ERYTHROCYTES, URINALYSIS      None Seen, 1 to 2 /hpf  >100 (A)   LEUKOCYTES, URINALYSIS      None Seen, 1 to 5 /hpf  26 to 100 (A)   BACTERIA, URINALYSIS      None Seen /hpf  Few (A)   HYALINE CASTS, URINALYSIS      None Seen, 1 to 5 /lpf  None Seen       Component CULTURE,  FLUOROQUINOLONE RESISTANT ENTEROBACTERIACEAE SCREEN   Latest Ref Rng & Units    1/19/2022 No Fluoroquinolone resistant Gram Negative Bacilli Isolated       Component PROCALCITONIN   Latest Ref Rng & Units <=0.09 ng/mL   2/23/2022 0.95 (H)     Component       Gram Smear   Latest Ref Rng & Units          2/23/2022      1:25 PM Gram negative bacilli. (AA)     Component       Culture, Blood or Bone Marrow   Latest Ref Rng &  Units          2/23/2022      1:44 PM No Growth <24 hours     Component WBC   Latest Ref Rng & Units 4.2 - 11.0 K/mcL   2/23/2022 4.4   2/24/2022 13.8 (H)     Pathologic Diagnosis   Prostate, right base, right mid, right apex, left base, left mid and left apex, needle biopsies:   -Benign prostatic tissue with features suggesting nodular glandular hyperplasia  -Mild mixed inflammation, right side       Diagnostics    CT Pelvis    Result Date: 2/23/2022  CT PELVIS W CONTRAST Contrast: 100 cc of opaque 300 intravenous. INDICATION:  Abscess, anal or rectal, recent prostate biopsy yesteday, now w fever. pain- also concern hematoma. COMPARISON:  None. FINDINGS: Adjacent to the rectum inferiorly there are hazy changes within the pericolonic fat. These are consistent with some inflammation/post biopsy changes. No fluid collections are identified. No air is identified within the pericolonic tissue planes. The rectal wall is slightly thickened and this is also likely consequent to previous biopsy. Within the prostate gland no abnormal fluid collections are seen. No air is identified within the prostate gland. There is no evidence of pericolonic or perirectal abscess. No abscess is identified within the prostate gland. Prostate gland is enlarged measuring 4.8 x 5.9 x 4.7 cm. The seminal vesicles are slightly prominent but otherwise unremarkable. There is no free fluid identified in the pelvis. There are a few diverticula identified of the sigmoid colon without diverticulitis. Urinary bladder is unremarkable. The appendix is not identified area there are no CT findings to suggest appendicitis. The visualized small bowel is unremarkable. There is no adenopathy identified in the pelvis or inguinal regions. The osseous structures demonstrate some minimal degenerative changes of the spine. No fractures are seen.     IMPRESSION: 1. Strandy tissue plane induration surrounding the rectum without associated fluid collection or abnormal  gas collections within the soft tissue. Findings are consistent with some edema and probably post prostate biopsy edema in the tissue planes surrounding the rectum. No abscess seen. 2. Mild thickening of the rectal wall, consistent with postbiopsy changes. 3. Normal appearance of prostate gland except for enlargement. No evidence of abscess. 4. No free fluid in the abdomen or pelvis. No evidence of hematoma. 5. Diverticulosis of the sigmoid colon without diverticulitis.           Assessment       The patient is a 54 year old male   pod#2 s/p TRUS PNB with post procedural pain, possible sepsis / epididymitis.  -leukocytosis today  -fever spike of 100.5  -urine cultures pending. Gram neg on 1 blood culture so far. On zosyn  -path negative (not yet discussed with patient)    - post voids ok, continue prn pvr   -discussed pain control with wife, for safety would continue prn narcotics. Could consider scheduled tylenol, but may mask fever trend. Will evaluate for urine color, if not overtly bloody can add nsaids.     Plan  1. Hold next voided sample to evaluate urine color   2. If urine color ok (will evaluate)- will consider adding nsaids   3. Follow cx- urine and blood   4. Agree with broad spectrum abx (zosyn)  5. Will discuss path at next visit  6. Cont pvr prn  7. Cont sepsis protocol     ALE Palom[place]       Patient seen and examined at bedside. Chart, meds, vitals, labs reviewed.

## 2022-03-22 ENCOUNTER — APPOINTMENT (OUTPATIENT)
Dept: INTERNAL MEDICINE | Facility: CLINIC | Age: 77
End: 2022-03-22
Payer: MEDICARE

## 2022-03-22 VITALS
HEART RATE: 69 BPM | OXYGEN SATURATION: 97 % | HEIGHT: 67 IN | WEIGHT: 245 LBS | BODY MASS INDEX: 38.45 KG/M2 | DIASTOLIC BLOOD PRESSURE: 78 MMHG | SYSTOLIC BLOOD PRESSURE: 130 MMHG | RESPIRATION RATE: 14 BRPM

## 2022-03-22 PROCEDURE — 99214 OFFICE O/P EST MOD 30 MIN: CPT

## 2022-03-22 NOTE — HEALTH RISK ASSESSMENT
[Never] : Never [No] : In the past 12 months have you used drugs other than those required for medical reasons? No [No falls in past year] : Patient reported no falls in the past year [0] : 2) Feeling down, depressed, or hopeless: Not at all (0) [PHQ-2 Negative - No further assessment needed] : PHQ-2 Negative - No further assessment needed [[id number]] : 0

## 2022-03-22 NOTE — PHYSICAL EXAM
[No Acute Distress] : no acute distress [Well Nourished] : well nourished [Well Developed] : well developed [Well-Appearing] : well-appearing [Normal Voice/Communication] : normal voice/communication [Normal Sclera/Conjunctiva] : normal sclera/conjunctiva [PERRL] : pupils equal round and reactive to light [EOMI] : extraocular movements intact [Normal Outer Ear/Nose] : the outer ears and nose were normal in appearance [Normal Oropharynx] : the oropharynx was normal [No Lymphadenopathy] : no lymphadenopathy [No JVD] : no jugular venous distention [Supple] : supple [Thyroid Normal, No Nodules] : the thyroid was normal and there were no nodules present [No Respiratory Distress] : no respiratory distress  [No Accessory Muscle Use] : no accessory muscle use [Clear to Auscultation] : lungs were clear to auscultation bilaterally [Normal Rate] : normal rate  [Regular Rhythm] : with a regular rhythm [Normal S1, S2] : normal S1 and S2 [No Murmur] : no murmur heard [No Carotid Bruits] : no carotid bruits [No Abdominal Bruit] : a ~M bruit was not heard ~T in the abdomen [No Varicosities] : no varicosities [Pedal Pulses Present] : the pedal pulses are present [No Palpable Aorta] : no palpable aorta [No Extremity Clubbing/Cyanosis] : no extremity clubbing/cyanosis [Soft] : abdomen soft [Non Tender] : non-tender [Non-distended] : non-distended [No Masses] : no abdominal mass palpated [No HSM] : no HSM [Normal Bowel Sounds] : normal bowel sounds [Normal Supraclavicular Nodes] : no supraclavicular lymphadenopathy [Normal Posterior Cervical Nodes] : no posterior cervical lymphadenopathy [Normal Anterior Cervical Nodes] : no anterior cervical lymphadenopathy [No CVA Tenderness] : no CVA  tenderness [No Spinal Tenderness] : no spinal tenderness [No Joint Swelling] : no joint swelling [Grossly Normal Strength/Tone] : grossly normal strength/tone [No Rash] : no rash [Coordination Grossly Intact] : coordination grossly intact [No Focal Deficits] : no focal deficits [Normal Gait] : normal gait [Deep Tendon Reflexes (DTR)] : deep tendon reflexes were 2+ and symmetric [Speech Grossly Normal] : speech grossly normal [Normal Affect] : the affect was normal [Normal Mood] : the mood was normal [Normal Insight/Judgement] : insight and judgment were intact [de-identified] : bilateral leg edema

## 2022-03-22 NOTE — HISTORY OF PRESENT ILLNESS
[FreeTextEntry1] : follow up [de-identified] : DEION HEATH is a 76 year old M who presents today for follow up for his cholesterol blood sugars HTN CHF

## 2022-03-22 NOTE — PLAN
[FreeTextEntry1] : Continue medications\par Further instructions pending lab results \par follow up  with Cardiologist\par return 3 months

## 2022-03-23 LAB
25(OH)D3 SERPL-MCNC: 35.2 NG/ML
ALBUMIN SERPL ELPH-MCNC: 4.1 G/DL
ALP BLD-CCNC: 78 U/L
ALT SERPL-CCNC: 13 U/L
ANION GAP SERPL CALC-SCNC: 16 MMOL/L
AST SERPL-CCNC: 18 U/L
BASOPHILS # BLD AUTO: 0.07 K/UL
BASOPHILS NFR BLD AUTO: 0.7 %
BILIRUB SERPL-MCNC: 0.5 MG/DL
BUN SERPL-MCNC: 21 MG/DL
CALCIUM SERPL-MCNC: 9.5 MG/DL
CHLORIDE SERPL-SCNC: 103 MMOL/L
CHOLEST SERPL-MCNC: 172 MG/DL
CK SERPL-CCNC: 78 U/L
CO2 SERPL-SCNC: 23 MMOL/L
CREAT SERPL-MCNC: 1 MG/DL
EGFR: 77 ML/MIN/1.73M2
EOSINOPHIL # BLD AUTO: 0.16 K/UL
EOSINOPHIL NFR BLD AUTO: 1.6 %
ESTIMATED AVERAGE GLUCOSE: 120 MG/DL
GLUCOSE SERPL-MCNC: 96 MG/DL
HBA1C MFR BLD HPLC: 5.8 %
HCT VFR BLD CALC: 51.5 %
HDLC SERPL-MCNC: 63 MG/DL
HGB BLD-MCNC: 16.3 G/DL
IMM GRANULOCYTES NFR BLD AUTO: 0.6 %
LDLC SERPL CALC-MCNC: 86 MG/DL
LYMPHOCYTES # BLD AUTO: 1.37 K/UL
LYMPHOCYTES NFR BLD AUTO: 13.9 %
MAN DIFF?: NORMAL
MCHC RBC-ENTMCNC: 29.7 PG
MCHC RBC-ENTMCNC: 31.7 GM/DL
MCV RBC AUTO: 93.8 FL
MONOCYTES # BLD AUTO: 0.72 K/UL
MONOCYTES NFR BLD AUTO: 7.3 %
NEUTROPHILS # BLD AUTO: 7.45 K/UL
NEUTROPHILS NFR BLD AUTO: 75.9 %
NONHDLC SERPL-MCNC: 109 MG/DL
PLATELET # BLD AUTO: 300 K/UL
POTASSIUM SERPL-SCNC: 4.7 MMOL/L
PROT SERPL-MCNC: 6.8 G/DL
PSA SERPL-MCNC: 4.66 NG/ML
RBC # BLD: 5.49 M/UL
RBC # FLD: 15.2 %
SODIUM SERPL-SCNC: 142 MMOL/L
TRIGL SERPL-MCNC: 114 MG/DL
TSH SERPL-ACNC: 1.74 UIU/ML
WBC # FLD AUTO: 9.83 K/UL

## 2022-04-14 DIAGNOSIS — K21.9 GASTRO-ESOPHAGEAL REFLUX DISEASE W/OUT ESOPHAGITIS: ICD-10-CM

## 2022-05-05 ENCOUNTER — APPOINTMENT (OUTPATIENT)
Dept: CARDIOLOGY | Facility: CLINIC | Age: 77
End: 2022-05-05
Payer: MEDICARE

## 2022-05-05 ENCOUNTER — NON-APPOINTMENT (OUTPATIENT)
Age: 77
End: 2022-05-05

## 2022-05-05 VITALS
WEIGHT: 245 LBS | HEIGHT: 67 IN | SYSTOLIC BLOOD PRESSURE: 151 MMHG | HEART RATE: 58 BPM | OXYGEN SATURATION: 97 % | BODY MASS INDEX: 38.45 KG/M2 | DIASTOLIC BLOOD PRESSURE: 66 MMHG

## 2022-05-05 PROCEDURE — 93000 ELECTROCARDIOGRAM COMPLETE: CPT

## 2022-05-05 PROCEDURE — 99214 OFFICE O/P EST MOD 30 MIN: CPT

## 2022-05-21 ENCOUNTER — RX RENEWAL (OUTPATIENT)
Age: 77
End: 2022-05-21

## 2022-06-21 ENCOUNTER — APPOINTMENT (OUTPATIENT)
Dept: INTERNAL MEDICINE | Facility: CLINIC | Age: 77
End: 2022-06-21
Payer: MEDICARE

## 2022-06-21 VITALS
TEMPERATURE: 97.1 F | OXYGEN SATURATION: 98 % | BODY MASS INDEX: 39.24 KG/M2 | WEIGHT: 250 LBS | DIASTOLIC BLOOD PRESSURE: 82 MMHG | RESPIRATION RATE: 16 BRPM | HEART RATE: 69 BPM | HEIGHT: 67 IN | SYSTOLIC BLOOD PRESSURE: 138 MMHG

## 2022-06-21 DIAGNOSIS — E11.9 TYPE 2 DIABETES MELLITUS W/OUT COMPLICATIONS: ICD-10-CM

## 2022-06-21 PROCEDURE — 99214 OFFICE O/P EST MOD 30 MIN: CPT

## 2022-06-21 NOTE — HEALTH RISK ASSESSMENT
[Never] : Never [No] : In the past 12 months have you used drugs other than those required for medical reasons? No [No falls in past year] : Patient reported no falls in the past year [Assistive Device] : Patient uses an assistive device [0] : 2) Feeling down, depressed, or hopeless: Not at all (0) [PHQ-2 Negative - No further assessment needed] : PHQ-2 Negative - No further assessment needed [de-identified] : cane  [[id number]] : 0

## 2022-06-21 NOTE — PLAN
[FreeTextEntry1] : Continue medications \par Further instructions pending lab results \par Follow up with neurologist \par Sent to Dr. Madsen

## 2022-06-21 NOTE — PHYSICAL EXAM
[No Acute Distress] : no acute distress [Well Nourished] : well nourished [Well Developed] : well developed [Well-Appearing] : well-appearing [Normal Voice/Communication] : normal voice/communication [Normal Sclera/Conjunctiva] : normal sclera/conjunctiva [PERRL] : pupils equal round and reactive to light [EOMI] : extraocular movements intact [Normal Outer Ear/Nose] : the outer ears and nose were normal in appearance [No JVD] : no jugular venous distention [No Lymphadenopathy] : no lymphadenopathy [Supple] : supple [Thyroid Normal, No Nodules] : the thyroid was normal and there were no nodules present [No Respiratory Distress] : no respiratory distress  [No Accessory Muscle Use] : no accessory muscle use [Clear to Auscultation] : lungs were clear to auscultation bilaterally [Normal Rate] : normal rate  [Regular Rhythm] : with a regular rhythm [Normal S1, S2] : normal S1 and S2 [No Murmur] : no murmur heard [No Carotid Bruits] : no carotid bruits [No Abdominal Bruit] : a ~M bruit was not heard ~T in the abdomen [No Varicosities] : no varicosities [Pedal Pulses Present] : the pedal pulses are present [No Palpable Aorta] : no palpable aorta [No Extremity Clubbing/Cyanosis] : no extremity clubbing/cyanosis [Soft] : abdomen soft [Non Tender] : non-tender [Non-distended] : non-distended [No Masses] : no abdominal mass palpated [No HSM] : no HSM [Normal Bowel Sounds] : normal bowel sounds [Normal Supraclavicular Nodes] : no supraclavicular lymphadenopathy [Normal Posterior Cervical Nodes] : no posterior cervical lymphadenopathy [Normal Anterior Cervical Nodes] : no anterior cervical lymphadenopathy [No CVA Tenderness] : no CVA  tenderness [No Spinal Tenderness] : no spinal tenderness [No Joint Swelling] : no joint swelling [Grossly Normal Strength/Tone] : grossly normal strength/tone [No Rash] : no rash [Coordination Grossly Intact] : coordination grossly intact [No Focal Deficits] : no focal deficits [Normal Gait] : normal gait [Deep Tendon Reflexes (DTR)] : deep tendon reflexes were 2+ and symmetric [Speech Grossly Normal] : speech grossly normal [Memory Grossly Normal] : memory grossly normal [Normal Affect] : the affect was normal [Alert and Oriented x3] : oriented to person, place, and time [Normal Mood] : the mood was normal [Normal Insight/Judgement] : insight and judgment were intact [de-identified] : bilateral leg edema

## 2022-06-21 NOTE — END OF VISIT
[FreeTextEntry3] : "I, Jany Blevins, personally scribed the services dictated to me by Dr. Colt Wade MD in this documentation on 06/21/2022 " \par \par "I Dr. Colt Wade MD, personally performed the services described in this documentation on 06/21/2022 for the patient as scribed by Jany Blevins in my presence. I have reviewed and verified that all the information is accurate and true."\par

## 2022-06-21 NOTE — HISTORY OF PRESENT ILLNESS
[FreeTextEntry1] : follow up  [de-identified] : DEION HEATH is a 77 year old M who presents today for follow up for DM, cholesterol and BP. Patient has no new complaints\par

## 2022-06-22 LAB
25(OH)D3 SERPL-MCNC: 36.1 NG/ML
ALBUMIN SERPL ELPH-MCNC: 4.1 G/DL
ALP BLD-CCNC: 71 U/L
ALT SERPL-CCNC: 12 U/L
ANION GAP SERPL CALC-SCNC: 18 MMOL/L
AST SERPL-CCNC: 19 U/L
BASOPHILS # BLD AUTO: 0.06 K/UL
BASOPHILS NFR BLD AUTO: 0.7 %
BILIRUB SERPL-MCNC: 0.4 MG/DL
BUN SERPL-MCNC: 18 MG/DL
CALCIUM SERPL-MCNC: 9.6 MG/DL
CHLORIDE SERPL-SCNC: 102 MMOL/L
CHOLEST SERPL-MCNC: 167 MG/DL
CK SERPL-CCNC: 84 U/L
CO2 SERPL-SCNC: 22 MMOL/L
CREAT SERPL-MCNC: 1.06 MG/DL
EGFR: 71 ML/MIN/1.73M2
EOSINOPHIL # BLD AUTO: 0.1 K/UL
EOSINOPHIL NFR BLD AUTO: 1.1 %
ESTIMATED AVERAGE GLUCOSE: 126 MG/DL
GLUCOSE SERPL-MCNC: 90 MG/DL
HBA1C MFR BLD HPLC: 6 %
HCT VFR BLD CALC: 52.6 %
HDLC SERPL-MCNC: 63 MG/DL
HGB BLD-MCNC: 16.3 G/DL
IMM GRANULOCYTES NFR BLD AUTO: 0.5 %
LDLC SERPL CALC-MCNC: 85 MG/DL
LYMPHOCYTES # BLD AUTO: 1.88 K/UL
LYMPHOCYTES NFR BLD AUTO: 21.4 %
MAN DIFF?: NORMAL
MCHC RBC-ENTMCNC: 30 PG
MCHC RBC-ENTMCNC: 31 GM/DL
MCV RBC AUTO: 96.9 FL
MONOCYTES # BLD AUTO: 1.14 K/UL
MONOCYTES NFR BLD AUTO: 13 %
NEUTROPHILS # BLD AUTO: 5.58 K/UL
NEUTROPHILS NFR BLD AUTO: 63.3 %
NONHDLC SERPL-MCNC: 104 MG/DL
PLATELET # BLD AUTO: 322 K/UL
POTASSIUM SERPL-SCNC: 4.9 MMOL/L
PROT SERPL-MCNC: 6.8 G/DL
PSA SERPL-MCNC: 5.39 NG/ML
RBC # BLD: 5.43 M/UL
RBC # FLD: 15.5 %
SODIUM SERPL-SCNC: 141 MMOL/L
TRIGL SERPL-MCNC: 97 MG/DL
TSH SERPL-ACNC: 1.44 UIU/ML
WBC # FLD AUTO: 8.8 K/UL

## 2022-08-16 ENCOUNTER — RX RENEWAL (OUTPATIENT)
Age: 77
End: 2022-08-16

## 2022-09-20 ENCOUNTER — APPOINTMENT (OUTPATIENT)
Dept: INTERNAL MEDICINE | Facility: CLINIC | Age: 77
End: 2022-09-20

## 2022-09-20 VITALS
TEMPERATURE: 97.9 F | BODY MASS INDEX: 39.24 KG/M2 | HEIGHT: 67 IN | SYSTOLIC BLOOD PRESSURE: 136 MMHG | WEIGHT: 250 LBS | DIASTOLIC BLOOD PRESSURE: 72 MMHG | OXYGEN SATURATION: 96 % | HEART RATE: 82 BPM | RESPIRATION RATE: 16 BRPM

## 2022-09-20 PROCEDURE — 99214 OFFICE O/P EST MOD 30 MIN: CPT | Mod: 25

## 2022-09-20 PROCEDURE — G0008: CPT

## 2022-09-20 PROCEDURE — 90662 IIV NO PRSV INCREASED AG IM: CPT

## 2022-09-20 NOTE — PHYSICAL EXAM
[No Acute Distress] : no acute distress [Well Nourished] : well nourished [Well Developed] : well developed [Well-Appearing] : well-appearing [Normal Voice/Communication] : normal voice/communication [Normal Sclera/Conjunctiva] : normal sclera/conjunctiva [PERRL] : pupils equal round and reactive to light [EOMI] : extraocular movements intact [Normal Outer Ear/Nose] : the outer ears and nose were normal in appearance [Normal Oropharynx] : the oropharynx was normal [No JVD] : no jugular venous distention [No Lymphadenopathy] : no lymphadenopathy [Supple] : supple [Thyroid Normal, No Nodules] : the thyroid was normal and there were no nodules present [No Respiratory Distress] : no respiratory distress  [No Accessory Muscle Use] : no accessory muscle use [Clear to Auscultation] : lungs were clear to auscultation bilaterally [Normal Rate] : normal rate  [Regular Rhythm] : with a regular rhythm [Normal S1, S2] : normal S1 and S2 [No Murmur] : no murmur heard [No Carotid Bruits] : no carotid bruits [No Abdominal Bruit] : a ~M bruit was not heard ~T in the abdomen [No Varicosities] : no varicosities [Pedal Pulses Present] : the pedal pulses are present [No Palpable Aorta] : no palpable aorta [No Extremity Clubbing/Cyanosis] : no extremity clubbing/cyanosis [Soft] : abdomen soft [Non Tender] : non-tender [Non-distended] : non-distended [No Masses] : no abdominal mass palpated [No HSM] : no HSM [Normal Bowel Sounds] : normal bowel sounds [Normal Supraclavicular Nodes] : no supraclavicular lymphadenopathy [Normal Posterior Cervical Nodes] : no posterior cervical lymphadenopathy [Normal Anterior Cervical Nodes] : no anterior cervical lymphadenopathy [No CVA Tenderness] : no CVA  tenderness [No Spinal Tenderness] : no spinal tenderness [No Joint Swelling] : no joint swelling [Grossly Normal Strength/Tone] : grossly normal strength/tone [No Rash] : no rash [Coordination Grossly Intact] : coordination grossly intact [No Focal Deficits] : no focal deficits [Normal Gait] : normal gait [Deep Tendon Reflexes (DTR)] : deep tendon reflexes were 2+ and symmetric [Speech Grossly Normal] : speech grossly normal [Memory Grossly Normal] : memory grossly normal [Normal Affect] : the affect was normal [Alert and Oriented x3] : oriented to person, place, and time [Normal Mood] : the mood was normal [Normal Insight/Judgement] : insight and judgment were intact [de-identified] : Mild bilateral leg edema

## 2022-09-20 NOTE — END OF VISIT
[FreeTextEntry3] : "I, Pamela Phipps, personally scribed the services dictated to me by Dr. Colt Wade MD in this documentation on 09/20/2022 " \par \par "I Dr. Colt Wade MD, personally performed the services described in this documentation on 09/20/2022 for the patient as scribed by Pamela Phipps in my presence. I have reviewed and verified that all the information is accurate and true."

## 2022-09-20 NOTE — HEALTH RISK ASSESSMENT
[Never] : Never [Never (0 pts)] : Never (0 points) [No] : In the past 12 months have you used drugs other than those required for medical reasons? No [No falls in past year] : Patient reported no falls in the past year [0] : 2) Feeling down, depressed, or hopeless: Not at all (0) [PHQ-2 Negative - No further assessment needed] : PHQ-2 Negative - No further assessment needed [[id number]] : 0

## 2022-09-20 NOTE — HISTORY OF PRESENT ILLNESS
[FreeTextEntry1] : follow up  [de-identified] : DEION HAETH is a 77 year old M who presents today for Follow up for Blood pressure, and Cholesterol. Pt recently saw neurologist\par medications adjusted

## 2022-09-21 LAB
25(OH)D3 SERPL-MCNC: 36.9 NG/ML
ALBUMIN SERPL ELPH-MCNC: 3.9 G/DL
ALP BLD-CCNC: 71 U/L
ALT SERPL-CCNC: 12 U/L
ANION GAP SERPL CALC-SCNC: 16 MMOL/L
AST SERPL-CCNC: 13 U/L
BASOPHILS # BLD AUTO: 0.08 K/UL
BASOPHILS NFR BLD AUTO: 0.9 %
BILIRUB SERPL-MCNC: 0.6 MG/DL
BUN SERPL-MCNC: 20 MG/DL
CALCIUM SERPL-MCNC: 9.9 MG/DL
CHLORIDE SERPL-SCNC: 102 MMOL/L
CHOLEST SERPL-MCNC: 151 MG/DL
CK SERPL-CCNC: 67 U/L
CO2 SERPL-SCNC: 21 MMOL/L
CREAT SERPL-MCNC: 1.16 MG/DL
EGFR: 64 ML/MIN/1.73M2
EOSINOPHIL # BLD AUTO: 0.15 K/UL
EOSINOPHIL NFR BLD AUTO: 1.7 %
ESTIMATED AVERAGE GLUCOSE: 123 MG/DL
GLUCOSE SERPL-MCNC: 94 MG/DL
HBA1C MFR BLD HPLC: 5.9 %
HCT VFR BLD CALC: 50.2 %
HDLC SERPL-MCNC: 62 MG/DL
HGB BLD-MCNC: 16.3 G/DL
IMM GRANULOCYTES NFR BLD AUTO: 0.6 %
LDLC SERPL CALC-MCNC: 73 MG/DL
LYMPHOCYTES # BLD AUTO: 1.64 K/UL
LYMPHOCYTES NFR BLD AUTO: 18.3 %
MAN DIFF?: NORMAL
MCHC RBC-ENTMCNC: 29.5 PG
MCHC RBC-ENTMCNC: 32.5 GM/DL
MCV RBC AUTO: 90.9 FL
MONOCYTES # BLD AUTO: 1.09 K/UL
MONOCYTES NFR BLD AUTO: 12.2 %
NEUTROPHILS # BLD AUTO: 5.95 K/UL
NEUTROPHILS NFR BLD AUTO: 66.3 %
NONHDLC SERPL-MCNC: 89 MG/DL
PLATELET # BLD AUTO: 344 K/UL
POTASSIUM SERPL-SCNC: 4.5 MMOL/L
PROT SERPL-MCNC: 6.4 G/DL
PSA SERPL-MCNC: 4.5 NG/ML
RBC # BLD: 5.52 M/UL
RBC # FLD: 15.6 %
SODIUM SERPL-SCNC: 138 MMOL/L
TRIGL SERPL-MCNC: 77 MG/DL
TSH SERPL-ACNC: 1.43 UIU/ML
WBC # FLD AUTO: 8.96 K/UL

## 2022-10-20 RX ORDER — ISOPROPYL ALCOHOL 0.7 ML/ML
SWAB TOPICAL
Qty: 1 | Refills: 3 | Status: ACTIVE | COMMUNITY
Start: 2022-10-20

## 2022-11-02 ENCOUNTER — NON-APPOINTMENT (OUTPATIENT)
Age: 77
End: 2022-11-02

## 2022-11-02 ENCOUNTER — APPOINTMENT (OUTPATIENT)
Dept: CARDIOLOGY | Facility: CLINIC | Age: 77
End: 2022-11-02

## 2022-11-02 VITALS
HEART RATE: 60 BPM | WEIGHT: 238 LBS | BODY MASS INDEX: 37.35 KG/M2 | HEIGHT: 67 IN | DIASTOLIC BLOOD PRESSURE: 83 MMHG | SYSTOLIC BLOOD PRESSURE: 147 MMHG | OXYGEN SATURATION: 97 %

## 2022-11-02 PROCEDURE — 99214 OFFICE O/P EST MOD 30 MIN: CPT

## 2022-11-02 PROCEDURE — 93000 ELECTROCARDIOGRAM COMPLETE: CPT

## 2022-11-10 NOTE — ED PROVIDER NOTE - PR
· HIV, Hep C negative  · Colonoscopy discussed and will like to discuss next year  · Flu vaccine today 144

## 2022-11-14 ENCOUNTER — RX RENEWAL (OUTPATIENT)
Age: 77
End: 2022-11-14

## 2022-11-22 ENCOUNTER — RX RENEWAL (OUTPATIENT)
Age: 77
End: 2022-11-22

## 2022-12-20 ENCOUNTER — APPOINTMENT (OUTPATIENT)
Dept: INTERNAL MEDICINE | Facility: CLINIC | Age: 77
End: 2022-12-20

## 2022-12-20 VITALS
RESPIRATION RATE: 14 BRPM | BODY MASS INDEX: 37.67 KG/M2 | DIASTOLIC BLOOD PRESSURE: 80 MMHG | HEIGHT: 67 IN | WEIGHT: 240 LBS | HEART RATE: 101 BPM | OXYGEN SATURATION: 98 % | SYSTOLIC BLOOD PRESSURE: 136 MMHG

## 2022-12-20 DIAGNOSIS — E78.00 PURE HYPERCHOLESTEROLEMIA, UNSPECIFIED: ICD-10-CM

## 2022-12-20 PROCEDURE — 99214 OFFICE O/P EST MOD 30 MIN: CPT

## 2022-12-20 NOTE — PHYSICAL EXAM
[No Acute Distress] : no acute distress [Well Nourished] : well nourished [Well Developed] : well developed [Well-Appearing] : well-appearing [Normal Voice/Communication] : normal voice/communication [Normal Sclera/Conjunctiva] : normal sclera/conjunctiva [PERRL] : pupils equal round and reactive to light [EOMI] : extraocular movements intact [Normal Outer Ear/Nose] : the outer ears and nose were normal in appearance [Normal Oropharynx] : the oropharynx was normal [No JVD] : no jugular venous distention [No Lymphadenopathy] : no lymphadenopathy [Supple] : supple [Thyroid Normal, No Nodules] : the thyroid was normal and there were no nodules present [No Respiratory Distress] : no respiratory distress  [No Accessory Muscle Use] : no accessory muscle use [Clear to Auscultation] : lungs were clear to auscultation bilaterally [Normal Rate] : normal rate  [Regular Rhythm] : with a regular rhythm [Normal S1, S2] : normal S1 and S2 [No Murmur] : no murmur heard [No Carotid Bruits] : no carotid bruits [No Abdominal Bruit] : a ~M bruit was not heard ~T in the abdomen [No Varicosities] : no varicosities [Pedal Pulses Present] : the pedal pulses are present [No Palpable Aorta] : no palpable aorta [No Extremity Clubbing/Cyanosis] : no extremity clubbing/cyanosis [Soft] : abdomen soft [Non Tender] : non-tender [Non-distended] : non-distended [No Masses] : no abdominal mass palpated [No HSM] : no HSM [Normal Bowel Sounds] : normal bowel sounds [Normal Supraclavicular Nodes] : no supraclavicular lymphadenopathy [Normal Posterior Cervical Nodes] : no posterior cervical lymphadenopathy [Normal Anterior Cervical Nodes] : no anterior cervical lymphadenopathy [No CVA Tenderness] : no CVA  tenderness [No Spinal Tenderness] : no spinal tenderness [No Joint Swelling] : no joint swelling [Grossly Normal Strength/Tone] : grossly normal strength/tone [No Rash] : no rash [Coordination Grossly Intact] : coordination grossly intact [No Focal Deficits] : no focal deficits [Normal Gait] : normal gait [Deep Tendon Reflexes (DTR)] : deep tendon reflexes were 2+ and symmetric [Speech Grossly Normal] : speech grossly normal [Memory Grossly Normal] : memory grossly normal [Normal Affect] : the affect was normal [Alert and Oriented x3] : oriented to person, place, and time [Normal Mood] : the mood was normal [Normal Insight/Judgement] : insight and judgment were intact [de-identified] : bilateral leg edema [de-identified] : lesions on head

## 2022-12-20 NOTE — COUNSELING
[Potential consequences of obesity discussed] : Potential consequences of obesity discussed [Encouraged to increase physical activity] : Encouraged to increase physical activity [Decrease Portions] : decrease portions [Good understanding] : Patient has a good understanding of disease, goals and obesity follow-up plan

## 2022-12-20 NOTE — END OF VISIT
[FreeTextEntry3] : "I, Pamela Phipps, personally scribed the services dictated to me by Dr. Colt Wade MD in this documentation on 12/20/2022 " \par \par "I Dr. Colt Wade MD, personally performed the services described in this documentation on 12/20/2022 for the patient as scribed by Pamela Phipps in my presence. I have reviewed and verified that all the information is accurate and true."

## 2022-12-20 NOTE — HISTORY OF PRESENT ILLNESS
[FreeTextEntry1] : follow up  [de-identified] : DEION HEATH is a 77 year old M who presents today for Follow up for Blood pressure, Blood sugar, and Cholesterol

## 2022-12-20 NOTE — HEALTH RISK ASSESSMENT
[Never] : Never [Never (0 pts)] : Never (0 points) [No] : In the past 12 months have you used drugs other than those required for medical reasons? No [No falls in past year] : Patient reported no falls in the past year [0] : 2) Feeling down, depressed, or hopeless: Not at all (0) [PHQ-2 Negative - No further assessment needed] : PHQ-2 Negative - No further assessment needed [[id number]] : 0

## 2022-12-20 NOTE — PLAN
[FreeTextEntry1] : Sent to Dermatologist \par continue medications \par further instructions pending lab results

## 2022-12-21 LAB
ALBUMIN SERPL ELPH-MCNC: 3.9 G/DL
ALP BLD-CCNC: 81 U/L
ALT SERPL-CCNC: 10 U/L
ANION GAP SERPL CALC-SCNC: 13 MMOL/L
AST SERPL-CCNC: 17 U/L
BASOPHILS # BLD AUTO: 0.07 K/UL
BASOPHILS NFR BLD AUTO: 0.7 %
BILIRUB SERPL-MCNC: 0.5 MG/DL
BUN SERPL-MCNC: 19 MG/DL
CALCIUM SERPL-MCNC: 9.6 MG/DL
CHLORIDE SERPL-SCNC: 103 MMOL/L
CHOLEST SERPL-MCNC: 154 MG/DL
CK SERPL-CCNC: 69 U/L
CO2 SERPL-SCNC: 24 MMOL/L
CREAT SERPL-MCNC: 1.11 MG/DL
EGFR: 68 ML/MIN/1.73M2
EOSINOPHIL # BLD AUTO: 0.2 K/UL
EOSINOPHIL NFR BLD AUTO: 2 %
ESTIMATED AVERAGE GLUCOSE: 120 MG/DL
GLUCOSE SERPL-MCNC: 80 MG/DL
HBA1C MFR BLD HPLC: 5.8 %
HCT VFR BLD CALC: 51.4 %
HDLC SERPL-MCNC: 60 MG/DL
HGB BLD-MCNC: 16.4 G/DL
IMM GRANULOCYTES NFR BLD AUTO: 0.4 %
LDLC SERPL CALC-MCNC: 81 MG/DL
LYMPHOCYTES # BLD AUTO: 1.7 K/UL
LYMPHOCYTES NFR BLD AUTO: 16.7 %
MAN DIFF?: NORMAL
MCHC RBC-ENTMCNC: 30.5 PG
MCHC RBC-ENTMCNC: 31.9 GM/DL
MCV RBC AUTO: 95.5 FL
MONOCYTES # BLD AUTO: 1.11 K/UL
MONOCYTES NFR BLD AUTO: 10.9 %
NEUTROPHILS # BLD AUTO: 7.09 K/UL
NEUTROPHILS NFR BLD AUTO: 69.3 %
NONHDLC SERPL-MCNC: 93 MG/DL
PLATELET # BLD AUTO: 297 K/UL
POTASSIUM SERPL-SCNC: 4.7 MMOL/L
PROT SERPL-MCNC: 6.4 G/DL
RBC # BLD: 5.38 M/UL
RBC # FLD: 15.8 %
SODIUM SERPL-SCNC: 140 MMOL/L
TRIGL SERPL-MCNC: 63 MG/DL
TSH SERPL-ACNC: 1.3 UIU/ML
WBC # FLD AUTO: 10.21 K/UL

## 2023-04-18 ENCOUNTER — APPOINTMENT (OUTPATIENT)
Dept: INTERNAL MEDICINE | Facility: CLINIC | Age: 78
End: 2023-04-18
Payer: MEDICARE

## 2023-04-18 VITALS
HEIGHT: 67 IN | BODY MASS INDEX: 37.35 KG/M2 | WEIGHT: 238 LBS | RESPIRATION RATE: 14 BRPM | OXYGEN SATURATION: 98 % | SYSTOLIC BLOOD PRESSURE: 128 MMHG | TEMPERATURE: 98.1 F | HEART RATE: 88 BPM | DIASTOLIC BLOOD PRESSURE: 80 MMHG

## 2023-04-18 DIAGNOSIS — E78.00 PURE HYPERCHOLESTEROLEMIA, UNSPECIFIED: ICD-10-CM

## 2023-04-18 DIAGNOSIS — I10 ESSENTIAL (PRIMARY) HYPERTENSION: ICD-10-CM

## 2023-04-18 DIAGNOSIS — G70.00 MYASTHENIA GRAVIS W/OUT (ACUTE) EXACERBATION: ICD-10-CM

## 2023-04-18 DIAGNOSIS — E11.43 TYPE 2 DIABETES MELLITUS WITH DIABETIC AUTONOMIC (POLY)NEUROPATHY: ICD-10-CM

## 2023-04-18 PROCEDURE — 99214 OFFICE O/P EST MOD 30 MIN: CPT

## 2023-04-18 NOTE — PHYSICAL EXAM
[No Acute Distress] : no acute distress [Well Nourished] : well nourished [Well Developed] : well developed [Well-Appearing] : well-appearing [Normal Voice/Communication] : normal voice/communication [Normal Sclera/Conjunctiva] : normal sclera/conjunctiva [PERRL] : pupils equal round and reactive to light [EOMI] : extraocular movements intact [Normal Outer Ear/Nose] : the outer ears and nose were normal in appearance [Normal Oropharynx] : the oropharynx was normal [No JVD] : no jugular venous distention [No Lymphadenopathy] : no lymphadenopathy [Supple] : supple [Thyroid Normal, No Nodules] : the thyroid was normal and there were no nodules present [No Respiratory Distress] : no respiratory distress  [No Accessory Muscle Use] : no accessory muscle use [Clear to Auscultation] : lungs were clear to auscultation bilaterally [Normal Rate] : normal rate  [Regular Rhythm] : with a regular rhythm [Normal S1, S2] : normal S1 and S2 [No Murmur] : no murmur heard [No Carotid Bruits] : no carotid bruits [No Abdominal Bruit] : a ~M bruit was not heard ~T in the abdomen [No Varicosities] : no varicosities [Pedal Pulses Present] : the pedal pulses are present [No Palpable Aorta] : no palpable aorta [No Extremity Clubbing/Cyanosis] : no extremity clubbing/cyanosis [Soft] : abdomen soft [Non Tender] : non-tender [Non-distended] : non-distended [No Masses] : no abdominal mass palpated [No HSM] : no HSM [Normal Bowel Sounds] : normal bowel sounds [Normal Supraclavicular Nodes] : no supraclavicular lymphadenopathy [Normal Posterior Cervical Nodes] : no posterior cervical lymphadenopathy [Normal Anterior Cervical Nodes] : no anterior cervical lymphadenopathy [No CVA Tenderness] : no CVA  tenderness [No Spinal Tenderness] : no spinal tenderness [No Joint Swelling] : no joint swelling [Grossly Normal Strength/Tone] : grossly normal strength/tone [No Rash] : no rash [Coordination Grossly Intact] : coordination grossly intact [No Focal Deficits] : no focal deficits [Normal Gait] : normal gait [Deep Tendon Reflexes (DTR)] : deep tendon reflexes were 2+ and symmetric [Speech Grossly Normal] : speech grossly normal [Memory Grossly Normal] : memory grossly normal [Normal Affect] : the affect was normal [Alert and Oriented x3] : oriented to person, place, and time [Normal Mood] : the mood was normal [Normal Insight/Judgement] : insight and judgment were intact [de-identified] : mild left leg edema

## 2023-04-18 NOTE — HEALTH RISK ASSESSMENT
[No] : In the past 12 months have you used drugs other than those required for medical reasons? No [No falls in past year] : Patient reported no falls in the past year [0] : 2) Feeling down, depressed, or hopeless: Not at all (0) [PHQ-2 Negative - No further assessment needed] : PHQ-2 Negative - No further assessment needed [Former] : Former [> 15 Years] : > 15 Years [[id number]] : 0

## 2023-04-18 NOTE — HISTORY OF PRESENT ILLNESS
[FreeTextEntry1] : follow up  [de-identified] : DEION HEATH is a 78 year old M who presents today for Follow up for Blood pressure, Blood sugar, and Cholesterol \par feels well\par tolerating medications\par has Myasthenia Gravis stable

## 2023-04-18 NOTE — END OF VISIT
[FreeTextEntry3] : "I, Andrea Mustafa, personally scribed the services dictated to me by Dr. Colt Wade MD in this documentation on 04/18/2023 " \par \par "I Dr. Colt Wade MD, personally performed the services described in this documentation on 04/18/2023 for the patient as scribed by Andrea Mustafa in my presence. I have reviewed and verified that all the information is accurate and true."

## 2023-04-18 NOTE — PLAN
[FreeTextEntry1] : continue medications \par further instructions pending lab results \par Advised to lose weight \par follow up Cardiologist

## 2023-04-19 LAB
ALBUMIN SERPL ELPH-MCNC: 4.1 G/DL
ALP BLD-CCNC: 87 U/L
ALT SERPL-CCNC: 10 U/L
ANION GAP SERPL CALC-SCNC: 17 MMOL/L
AST SERPL-CCNC: 18 U/L
BASOPHILS # BLD AUTO: 0.08 K/UL
BASOPHILS NFR BLD AUTO: 0.7 %
BILIRUB SERPL-MCNC: 0.5 MG/DL
BUN SERPL-MCNC: 22 MG/DL
CALCIUM SERPL-MCNC: 10 MG/DL
CHLORIDE SERPL-SCNC: 101 MMOL/L
CHOLEST SERPL-MCNC: 154 MG/DL
CK SERPL-CCNC: 61 U/L
CO2 SERPL-SCNC: 22 MMOL/L
CREAT SERPL-MCNC: 1.15 MG/DL
EGFR: 64 ML/MIN/1.73M2
EOSINOPHIL # BLD AUTO: 0.16 K/UL
EOSINOPHIL NFR BLD AUTO: 1.5 %
ESTIMATED AVERAGE GLUCOSE: 114 MG/DL
GLUCOSE SERPL-MCNC: 95 MG/DL
HBA1C MFR BLD HPLC: 5.6 %
HCT VFR BLD CALC: 54.3 %
HDLC SERPL-MCNC: 68 MG/DL
HGB BLD-MCNC: 17.3 G/DL
IMM GRANULOCYTES NFR BLD AUTO: 0.5 %
LDLC SERPL CALC-MCNC: 70 MG/DL
LYMPHOCYTES # BLD AUTO: 1.74 K/UL
LYMPHOCYTES NFR BLD AUTO: 16 %
MAN DIFF?: NORMAL
MCHC RBC-ENTMCNC: 30.5 PG
MCHC RBC-ENTMCNC: 31.9 GM/DL
MCV RBC AUTO: 95.6 FL
MONOCYTES # BLD AUTO: 1.23 K/UL
MONOCYTES NFR BLD AUTO: 11.3 %
NEUTROPHILS # BLD AUTO: 7.6 K/UL
NEUTROPHILS NFR BLD AUTO: 70 %
NONHDLC SERPL-MCNC: 86 MG/DL
PLATELET # BLD AUTO: 312 K/UL
POTASSIUM SERPL-SCNC: 4.5 MMOL/L
PROT SERPL-MCNC: 6.7 G/DL
PSA SERPL-MCNC: 4.57 NG/ML
RBC # BLD: 5.68 M/UL
RBC # FLD: 15.5 %
SODIUM SERPL-SCNC: 140 MMOL/L
TRIGL SERPL-MCNC: 78 MG/DL
TSH SERPL-ACNC: 1.25 UIU/ML
WBC # FLD AUTO: 10.86 K/UL

## 2023-05-16 ENCOUNTER — RX RENEWAL (OUTPATIENT)
Age: 78
End: 2023-05-16

## 2023-05-30 ENCOUNTER — NON-APPOINTMENT (OUTPATIENT)
Age: 78
End: 2023-05-30

## 2023-05-30 ENCOUNTER — APPOINTMENT (OUTPATIENT)
Dept: CARDIOLOGY | Facility: CLINIC | Age: 78
End: 2023-05-30
Payer: MEDICARE

## 2023-05-30 VITALS
OXYGEN SATURATION: 97 % | BODY MASS INDEX: 38.14 KG/M2 | SYSTOLIC BLOOD PRESSURE: 141 MMHG | DIASTOLIC BLOOD PRESSURE: 85 MMHG | WEIGHT: 243 LBS | HEART RATE: 67 BPM | HEIGHT: 67 IN

## 2023-05-30 DIAGNOSIS — I50.9 HEART FAILURE, UNSPECIFIED: ICD-10-CM

## 2023-05-30 PROCEDURE — 99214 OFFICE O/P EST MOD 30 MIN: CPT

## 2023-05-30 PROCEDURE — 93000 ELECTROCARDIOGRAM COMPLETE: CPT

## 2023-06-29 ENCOUNTER — RX RENEWAL (OUTPATIENT)
Age: 78
End: 2023-06-29

## 2023-07-28 NOTE — HISTORY OF PRESENT ILLNESS
Spoke with patient and encouraged her to minimize the use of DHT reliance and we will put her on Qulipta 60 mg daily jamb starting it with Medrol Dosepak this will replace the Eli Malave MD  Vascular & General Neurology  Director, Multiple Sclerosis Program  [facility] [place]  7/28/2023, Time completed 12:20 PM [Friend] : friend [FreeTextEntry8] : here for leg edema 20 pound weight loss over 3 weeks \par no SOB\par no chest pains

## 2023-08-13 ENCOUNTER — RX RENEWAL (OUTPATIENT)
Age: 78
End: 2023-08-13

## 2023-08-14 ENCOUNTER — APPOINTMENT (OUTPATIENT)
Dept: INTERNAL MEDICINE | Facility: CLINIC | Age: 78
End: 2023-08-14

## 2023-08-14 ENCOUNTER — INPATIENT (INPATIENT)
Facility: HOSPITAL | Age: 78
LOS: 2 days | Discharge: NURSING, REHAB OR LONG-TERM CARE | DRG: 300 | End: 2023-08-17
Attending: HOSPITALIST | Admitting: INTERNAL MEDICINE
Payer: MEDICARE

## 2023-08-14 VITALS — WEIGHT: 242.95 LBS | HEIGHT: 66 IN

## 2023-08-14 DIAGNOSIS — K21.9 GASTRO-ESOPHAGEAL REFLUX DISEASE WITHOUT ESOPHAGITIS: ICD-10-CM

## 2023-08-14 DIAGNOSIS — Z98.89 OTHER SPECIFIED POSTPROCEDURAL STATES: Chronic | ICD-10-CM

## 2023-08-14 DIAGNOSIS — I10 ESSENTIAL (PRIMARY) HYPERTENSION: ICD-10-CM

## 2023-08-14 DIAGNOSIS — Z79.899 OTHER LONG TERM (CURRENT) DRUG THERAPY: ICD-10-CM

## 2023-08-14 DIAGNOSIS — M48.00 SPINAL STENOSIS, SITE UNSPECIFIED: Chronic | ICD-10-CM

## 2023-08-14 DIAGNOSIS — G70.00 MYASTHENIA GRAVIS WITHOUT (ACUTE) EXACERBATION: ICD-10-CM

## 2023-08-14 DIAGNOSIS — Z98.890 OTHER SPECIFIED POSTPROCEDURAL STATES: Chronic | ICD-10-CM

## 2023-08-14 DIAGNOSIS — R26.2 DIFFICULTY IN WALKING, NOT ELSEWHERE CLASSIFIED: ICD-10-CM

## 2023-08-14 DIAGNOSIS — Q66.89 OTHER SPECIFIED CONGENITAL DEFORMITIES OF FEET: Chronic | ICD-10-CM

## 2023-08-14 DIAGNOSIS — Z29.9 ENCOUNTER FOR PROPHYLACTIC MEASURES, UNSPECIFIED: ICD-10-CM

## 2023-08-14 DIAGNOSIS — I50.30 UNSPECIFIED DIASTOLIC (CONGESTIVE) HEART FAILURE: ICD-10-CM
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
## 2023-08-14 DIAGNOSIS — Z86.711 PERSONAL HISTORY OF PULMONARY EMBOLISM: ICD-10-CM

## 2023-08-14 DIAGNOSIS — E11.9 TYPE 2 DIABETES MELLITUS WITHOUT COMPLICATIONS: ICD-10-CM

## 2023-08-14 DIAGNOSIS — E78.5 HYPERLIPIDEMIA, UNSPECIFIED: ICD-10-CM

## 2023-08-14 DIAGNOSIS — Z41.9 ENCOUNTER FOR PROCEDURE FOR PURPOSES OTHER THAN REMEDYING HEALTH STATE, UNSPECIFIED: Chronic | ICD-10-CM

## 2023-08-14 DIAGNOSIS — L05.91 PILONIDAL CYST WITHOUT ABSCESS: Chronic | ICD-10-CM

## 2023-08-14 DIAGNOSIS — R93.89 ABNORMAL FINDINGS ON DIAGNOSTIC IMAGING OF OTHER SPECIFIED BODY STRUCTURES: ICD-10-CM

## 2023-08-14 LAB
ALBUMIN SERPL ELPH-MCNC: 2.6 G/DL — LOW (ref 3.3–5)
ALP SERPL-CCNC: 77 U/L — SIGNIFICANT CHANGE UP (ref 40–120)
ALT FLD-CCNC: 31 U/L — SIGNIFICANT CHANGE UP (ref 12–78)
ANION GAP SERPL CALC-SCNC: 7 MMOL/L — SIGNIFICANT CHANGE UP (ref 5–17)
APPEARANCE UR: CLEAR — SIGNIFICANT CHANGE UP
AST SERPL-CCNC: 22 U/L — SIGNIFICANT CHANGE UP (ref 15–37)
BACTERIA # UR AUTO: ABNORMAL /HPF
BASOPHILS # BLD AUTO: 0.07 K/UL — SIGNIFICANT CHANGE UP (ref 0–0.2)
BASOPHILS NFR BLD AUTO: 0.5 % — SIGNIFICANT CHANGE UP (ref 0–2)
BILIRUB SERPL-MCNC: 1.2 MG/DL — SIGNIFICANT CHANGE UP (ref 0.2–1.2)
BILIRUB UR-MCNC: NEGATIVE — SIGNIFICANT CHANGE UP
BUN SERPL-MCNC: 18 MG/DL — SIGNIFICANT CHANGE UP (ref 7–23)
CALCIUM SERPL-MCNC: 9.2 MG/DL — SIGNIFICANT CHANGE UP (ref 8.5–10.1)
CHLORIDE SERPL-SCNC: 103 MMOL/L — SIGNIFICANT CHANGE UP (ref 96–108)
CO2 SERPL-SCNC: 24 MMOL/L — SIGNIFICANT CHANGE UP (ref 22–31)
COLOR SPEC: SIGNIFICANT CHANGE UP
CREAT SERPL-MCNC: 1.2 MG/DL — SIGNIFICANT CHANGE UP (ref 0.5–1.3)
DIFF PNL FLD: NEGATIVE — SIGNIFICANT CHANGE UP
EGFR: 61 ML/MIN/1.73M2 — SIGNIFICANT CHANGE UP
EOSINOPHIL # BLD AUTO: 0.18 K/UL — SIGNIFICANT CHANGE UP (ref 0–0.5)
EOSINOPHIL NFR BLD AUTO: 1.4 % — SIGNIFICANT CHANGE UP (ref 0–6)
EPI CELLS # UR: PRESENT
GLUCOSE SERPL-MCNC: 105 MG/DL — HIGH (ref 70–99)
GLUCOSE UR QL: NEGATIVE MG/DL — SIGNIFICANT CHANGE UP
HCT VFR BLD CALC: 49.5 % — SIGNIFICANT CHANGE UP (ref 39–50)
HGB BLD-MCNC: 16.1 G/DL — SIGNIFICANT CHANGE UP (ref 13–17)
IMM GRANULOCYTES NFR BLD AUTO: 1.1 % — HIGH (ref 0–0.9)
KETONES UR-MCNC: NEGATIVE MG/DL — SIGNIFICANT CHANGE UP
LEUKOCYTE ESTERASE UR-ACNC: NEGATIVE — SIGNIFICANT CHANGE UP
LYMPHOCYTES # BLD AUTO: 1.64 K/UL — SIGNIFICANT CHANGE UP (ref 1–3.3)
LYMPHOCYTES # BLD AUTO: 12.5 % — LOW (ref 13–44)
MCHC RBC-ENTMCNC: 30.4 PG — SIGNIFICANT CHANGE UP (ref 27–34)
MCHC RBC-ENTMCNC: 32.5 GM/DL — SIGNIFICANT CHANGE UP (ref 32–36)
MCV RBC AUTO: 93.4 FL — SIGNIFICANT CHANGE UP (ref 80–100)
MONOCYTES # BLD AUTO: 1.49 K/UL — HIGH (ref 0–0.9)
MONOCYTES NFR BLD AUTO: 11.4 % — SIGNIFICANT CHANGE UP (ref 2–14)
NEUTROPHILS # BLD AUTO: 9.6 K/UL — HIGH (ref 1.8–7.4)
NEUTROPHILS NFR BLD AUTO: 73.1 % — SIGNIFICANT CHANGE UP (ref 43–77)
NITRITE UR-MCNC: NEGATIVE — SIGNIFICANT CHANGE UP
NRBC # BLD: 0 /100 WBCS — SIGNIFICANT CHANGE UP (ref 0–0)
PH UR: 5.5 — SIGNIFICANT CHANGE UP (ref 5–8)
PLATELET # BLD AUTO: 351 K/UL — SIGNIFICANT CHANGE UP (ref 150–400)
POTASSIUM SERPL-MCNC: 4.2 MMOL/L — SIGNIFICANT CHANGE UP (ref 3.5–5.3)
POTASSIUM SERPL-SCNC: 4.2 MMOL/L — SIGNIFICANT CHANGE UP (ref 3.5–5.3)
PROT SERPL-MCNC: 7.1 G/DL — SIGNIFICANT CHANGE UP (ref 6–8.3)
PROT UR-MCNC: NEGATIVE MG/DL — SIGNIFICANT CHANGE UP
RBC # BLD: 5.3 M/UL — SIGNIFICANT CHANGE UP (ref 4.2–5.8)
RBC # FLD: 15.2 % — HIGH (ref 10.3–14.5)
RBC CASTS # UR COMP ASSIST: 3 /HPF — SIGNIFICANT CHANGE UP (ref 0–4)
SODIUM SERPL-SCNC: 134 MMOL/L — LOW (ref 135–145)
SP GR SPEC: 1.01 — SIGNIFICANT CHANGE UP (ref 1–1.03)
UROBILINOGEN FLD QL: 1 MG/DL — SIGNIFICANT CHANGE UP (ref 0.2–1)
WBC # BLD: 13.12 K/UL — HIGH (ref 3.8–10.5)
WBC # FLD AUTO: 13.12 K/UL — HIGH (ref 3.8–10.5)
WBC UR QL: 6 /HPF — HIGH (ref 0–5)

## 2023-08-14 PROCEDURE — 93010 ELECTROCARDIOGRAM REPORT: CPT

## 2023-08-14 PROCEDURE — 99285 EMERGENCY DEPT VISIT HI MDM: CPT | Mod: FS

## 2023-08-14 PROCEDURE — 71045 X-RAY EXAM CHEST 1 VIEW: CPT | Mod: 26

## 2023-08-14 PROCEDURE — 99223 1ST HOSP IP/OBS HIGH 75: CPT | Mod: GC

## 2023-08-14 PROCEDURE — 72170 X-RAY EXAM OF PELVIS: CPT | Mod: 26

## 2023-08-14 PROCEDURE — 73562 X-RAY EXAM OF KNEE 3: CPT | Mod: 26,50

## 2023-08-14 RX ORDER — PYRIDOSTIGMINE BROMIDE 60 MG/5ML
120 SOLUTION ORAL DAILY
Refills: 0 | Status: DISCONTINUED | OUTPATIENT
Start: 2023-08-14 | End: 2023-08-15

## 2023-08-14 RX ORDER — PANTOPRAZOLE SODIUM 20 MG/1
40 TABLET, DELAYED RELEASE ORAL
Refills: 0 | Status: DISCONTINUED | OUTPATIENT
Start: 2023-08-14 | End: 2023-08-17

## 2023-08-14 RX ORDER — FUROSEMIDE 40 MG
40 TABLET ORAL
Refills: 0 | Status: DISCONTINUED | OUTPATIENT
Start: 2023-08-15 | End: 2023-08-17

## 2023-08-14 RX ORDER — LISINOPRIL/HYDROCHLOROTHIAZIDE 10-12.5 MG
1 TABLET ORAL
Qty: 0 | Refills: 0 | DISCHARGE

## 2023-08-14 RX ORDER — ACETAMINOPHEN 500 MG
650 TABLET ORAL EVERY 6 HOURS
Refills: 0 | Status: DISCONTINUED | OUTPATIENT
Start: 2023-08-14 | End: 2023-08-17

## 2023-08-14 RX ORDER — METOPROLOL TARTRATE 50 MG
25 TABLET ORAL DAILY
Refills: 0 | Status: DISCONTINUED | OUTPATIENT
Start: 2023-08-14 | End: 2023-08-17

## 2023-08-14 RX ORDER — APIXABAN 2.5 MG/1
5 TABLET, FILM COATED ORAL EVERY 12 HOURS
Refills: 0 | Status: DISCONTINUED | OUTPATIENT
Start: 2023-08-14 | End: 2023-08-15

## 2023-08-14 NOTE — ED ADULT NURSE NOTE - NSICDXPASTSURGICALHX_GEN_ALL_CORE_FT
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

## 2023-08-14 NOTE — H&P ADULT - PROBLEM SELECTOR PROBLEM 4
HTN (hypertension) History of pulmonary embolism (HFpEF) heart failure with preserved ejection fraction

## 2023-08-14 NOTE — ED PROVIDER NOTE - OBJECTIVE STATEMENT
79 yo M PMHx myasthenia gravis, HTN presents to ED c/o bilateral leg pain when ambulating x 1 week. pt reports mechanical slip and fall out of chair 1 week ago. states he accidentally slid out of chair onto ground, no head trauma, was lying on ground for about 5 hours until his son was able to help him up. son states he found father on ground awake and alert. father has had trouble ambulating since fall. Denies headache, dizziness, chest pain, back pain, abd pain, N/V/D, SOB, fever/chills. pt states he is asymptomatic at rest

## 2023-08-14 NOTE — ED ADULT NURSE NOTE - PAIN RATING/NUMBER SCALE (0-10): ACTIVITY
[Time Spent: ___ minutes] : I have spent [unfilled] minutes of time on the encounter. 0 (no pain/absence of nonverbal indicators of pain)

## 2023-08-14 NOTE — H&P ADULT - PROBLEM SELECTOR PLAN 7
Chronic  - Patient takes Praluent pen SQ [id number]; last dose was Sat 8/12 Chronic, known history of T2DM, noted on chart review from previous admission 6/2021 -- was controlled   - Not currently taking home meds  - Hypoglycemia protocol, fingerstick glucose QAC&HS   - Consistent carb/DASH diet  - F/u AM HbA1c Chronic, known history of T2DM, noted on chart review from previous admission 6/2021 -- controlled, a1c 5.9  - Not currently taking home meds  - Fingerstick glucose QAC&HS   - Consistent carb/DASH diet  - F/u AM HbA1c Chronic  - Continue therapeutic interchange for home Omeprazole - Pantoprazole

## 2023-08-14 NOTE — H&P ADULT - PROBLEM SELECTOR PLAN 1
Patient presents s/p mechanical fall, occurred 8/6; denies headstrike  XR pelvis: Arterial calcifications are seen. Degenerative loss of disc height particularly on the right at L4-5 again noted. Consider rather free of degeneration. The extreme left lateral hip excluded. No visible fracture or change in third 2021.  XR Right knee. There is a mild to moderate effusion and arterial calcification. There is diffuse moderate degeneration calcified menisci. No fracture.  XR Left knee. There is a left knee effusion and arterial calcification. There is advanced tricompartmental degeneration. No fracture.  CXR: Stable right lung granuloma. Heart size normal compared to July 8, 2021.  - Fall precautions, bed alarm, chair alarm  - Check orthostatics  - PT eval, recommended KAVITA  - Social work following Patient presents s/p mechanical fall, occurred 8/6; denies headstrike, no fractures on imaging  XR pelvis: Arterial calcifications are seen. Degenerative loss of disc height particularly on the right at L4-5 again noted. Consider rather free of degeneration. The extreme left lateral hip excluded. No visible fracture or change in third 2021.  XR Right knee. There is a mild to moderate effusion and arterial calcification. There is diffuse moderate degeneration calcified menisci. No fracture.  XR Left knee. There is a left knee effusion and arterial calcification. There is advanced tricompartmental degeneration. No fracture.  CXR: Stable right lung granuloma. Heart size normal compared to July 8, 2021.  - Fall precautions, bed alarm, chair alarm  - Check orthostatics  - PT eval, recommended K[facility]  - Social work following

## 2023-08-14 NOTE — H&P ADULT - HISTORY OF PRESENT ILLNESS
------------CHARTING IN PROGRESS-----------  77 yo M PMHx myasthenia gravis, HTN presents to ED c/o bilateral leg pain when ambulating x 1 week. pt reports mechanical slip and fall out of chair 1 week ago. states he accidentally slid out of chair onto ground, no head trauma, was lying on ground for about 5 hours until his son was able to help him up. son states he found father on ground awake and alert. father has had trouble ambulating since fall. Denies headache, dizziness, chest pain, back pain, abd pain, N/V/D, SOB, fever/chills. pt states he is asymptomatic at rest      ED Course:   Vitals: BP: 142/76 --> 135/77, HR: 60, Temp: 97.8F, RR: 18, SpO2: 98% on RA   Labs: WBC elevated 13.12, Na low 134, Glucose 105, Albumin low 2.6  UA: negative nitrites, negative LE, WBC 6, moderate bacteria, present squamous epithelial cells  XR pelvis: Arterial calcifications are seen. Degenerative loss of disc height particularly on the right at L4-5 again noted. Consider rather free of degeneration. The extreme left lateral hip excluded. No visible fracture or change in third 2021.    XR bilateral knees:  Right knee. There is a mild to moderate effusion and arterial   calcification. There is diffuse moderate degeneration calcified menisci. No fracture.  Left knee. There is a left knee effusion and arterial calcification.   There is advanced tricompartmental degeneration. No fracture.    CXR: Stable right lung granuloma. Heart size normal compared to July 8, 2021.    EKG: PENDING    Received in the ED PT evaluation, recommends [facility]     79 yo M PMHx myasthenia gravis, HTN, HLD, T2DM, chronic back pain, club foot presents to ED c/o difficulty ambulating x 1 week. Pt reports mechanical slip and fall out of chair 1 week ago on 8/6. Patient states he accidentally slid out of lounge chair onto ground, no head trauma, was lying on ground for about 5 hours until his son arrived home and was able to help him up. Per ED note, son states he found father on ground awake and alert. Patient has had trouble ambulating since fall. Denies leg pain, headache, dizziness, chest pain, back pain, abd pain, N/V/D, SOB, fever/chills. Pt states he is asymptomatic at rest.       ED Course:   Vitals: BP: 142/76 --> 135/77, HR: 60, Temp: 97.8F, RR: 18, SpO2: 98% on RA   Labs: WBC elevated 13.12, Na low 134, Glucose 105, Albumin low 2.6  UA: negative nitrites, negative LE, WBC 6, moderate bacteria, present squamous epithelial cells  XR pelvis: Arterial calcifications are seen. Degenerative loss of disc height particularly on the right at L4-5 again noted. Consider rather free of degeneration. The extreme left lateral hip excluded. No visible fracture or change in third 2021.    XR bilateral knees:  Right knee. There is a mild to moderate effusion and arterial   calcification. There is diffuse moderate degeneration calcified menisci. No fracture.  Left knee. There is a left knee effusion and arterial calcification.   There is advanced tricompartmental degeneration. No fracture.    CXR: Stable right lung granuloma. Heart size normal compared to July 8, 2021.    EKG: PENDING    Received in the ED PT evaluation, recommends [facility]ITA     77 yo M with PMH myasthenia gravis, HFpEF (EF 55-60% in 6/21), HTN, HLD, T2DM, chronic back pain, club foot, chronic LE edema, presents to ED c/o difficulty when ambulating x 1 week s/p mechanical fall at home. Pt reports mechanical slip and fall out of chair 1 week ago on 8/6. Patient states he accidentally slid out of lounge chair onto ground, no head trauma, was lying on ground for about 5 hours until his son arrived home and was able to help him up. Per ED note, son states he found father on ground awake and alert. Patient has had trouble ambulating since fall. Denies leg pain, headache, dizziness, chest pain, back pain, abd pain, N/V/D, SOB, fever/chills. Pt states he is asymptomatic at rest. Patient was seen by PT and SW in ED.       ED Course:   Vitals: BP: 142/76 --> 135/77, HR: 60, Temp: 97.8F, RR: 18, SpO2: 98% on RA   Labs: WBC elevated 13.12, Na low 134, Glucose 105, Albumin low 2.6  UA: negative nitrites, negative LE, WBC 6, moderate bacteria, present squamous epithelial cells  XR pelvis: Arterial calcifications are seen. Degenerative loss of disc height particularly on the right at L4-5 again noted. Consider rather free of degeneration. The extreme left lateral hip excluded. No visible fracture or change in third 2021.    XR bilateral knees:  Right knee. There is a mild to moderate effusion and arterial   calcification. There is diffuse moderate degeneration calcified menisci. No fracture.  Left knee. There is a left knee effusion and arterial calcification.   There is advanced tricompartmental degeneration. No fracture.    CXR: Stable right lung granuloma. Heart size normal compared to July 8, 2021.    EKG: PENDING    Received in the ED PT evaluation, recommends KAVITA

## 2023-08-14 NOTE — ED ADULT NURSE REASSESSMENT NOTE - NS ED NURSE REASSESS COMMENT FT1
1702:  patient aware that he will be admitted.  he was unable to walk or participate with the physical therapist.  awaiting orders.  lolita montes.

## 2023-08-14 NOTE — H&P ADULT - PROBLEM SELECTOR PLAN 3
Chronic  - Continue home pyridostigmine Patient was previously admitted from 6/1-6/4/2021 for bilateral PE and acute CHF, diastolic dysfunction  - Continue home Eliquis 5mg BID

## 2023-08-14 NOTE — H&P ADULT - PROBLEM SELECTOR PLAN 8
CXR: Stable right lung granuloma. Heart size normal compared to July 8, 2021.  - Discussed with patient need for follow up with PCP for continued monitoring  - Per patient, aware of granuloma on previous imaging Chronic  - Continue therapeutic interchange for home Omeprazole - Pantoprazole Chronic  - Patient takes Praluent pen SQ [id number]; last dose was Sat 8/12

## 2023-08-14 NOTE — PHYSICAL THERAPY INITIAL EVALUATION ADULT - ADDITIONAL COMMENTS
Patient lives with son in private home, split level, but remains on one level for most of time. Patient was independent in ADLs and ambulated with SAC as needed, however, son notes decline in function, particularly since fall last week.

## 2023-08-14 NOTE — H&P ADULT - PROBLEM SELECTOR PLAN 5
Chronic, known history of T2DM, noted on chart review from previous admission 6/2021 -- was controlled   - Not currently taking home meds  - Hypoglycemia protocol, fingerstick glucose QAC&HS   - Consistent carb/DASH diet  - F/u AM HbA1c Patient with history fo HFpEF (EF 55-60% in 6/21), denies SOB on admission, has chronic LE edema  - Patient takes Lasix PO 40mg qod  - Did not take lasix today, due for medication tomorrow  - Continue home Lasix 40 qod starting tomorrow 8/15 Patient with history fo HFpEF (EF 55-60% in 6/21), denies SOB on admission, has chronic LE edema  - Patient takes Lasix PO 40mg qod  - Did not take lasix today, due for medication tomorrow  - Continue home Lasix 40 qod starting tomorrow 8/15  - Follows with Cardio Dr. Woods Patient with history fo HFpEF (EF 55-60% in 6/21), denies SOB on admission, has chronic LE edema  - Patient takes Lasix PO 40mg qod  - Did not take Lasix today, due for medication tomorrow  - Continue home Lasix 40 qod starting tomorrow 8/15  - Follows with Cardio Dr. Woods Chronic, stable on admission  - Continue home medications -metoprolol succinate- with hold parameters  - Continue home furosemide qod  - Monitor routine hemodynamics

## 2023-08-14 NOTE — ED ADULT NURSE NOTE - OBJECTIVE STATEMENT
the patient presents to the er with complaints of leg pain (knees down).  he states that LAST Sunday (8/5/23) the patient got up in the am and went to sit in chair.  When he went to get up from that chair, he states "my legs just gave out".  He landed on floor, where he laid for at least 7.5 hours.  He denies hitting his head at the time.  No loc.  He states he has not had any medical treatment to date.  He is here now because his son is concerned, stating "we live in a split level, and over the years, I have seen his legs deteriorating and him having a harder time going up / down steps".  the patient only complains of pain from knees down bilaterally and only with standing.  he denies any back pain.  he is alert / oriented x4.  awaiting evaluation.

## 2023-08-14 NOTE — ED PROVIDER NOTE - ATTENDING APP SHARED VISIT CONTRIBUTION OF CARE
This was a shared visit with SHARIFA. I reviewed and verified the documentation and independently performed the documented MDM.

## 2023-08-14 NOTE — H&P ADULT - NSHPSOCIALHISTORY_GEN_ALL_CORE
Living Situation: lives with son  ADLs/Mobility: at home does not use assistive devices; when he is outside of the house, uses cane  Tobacco Use: quit 40 years ago  Alcohol Use: denies  Drug Use: denies

## 2023-08-14 NOTE — ED PROVIDER NOTE - DIFFERENTIAL DIAGNOSIS
occult fracture, occult infection, functional decline, anemia, electrolyte abnormality. Differential Diagnosis

## 2023-08-14 NOTE — H&P ADULT - PROBLEM SELECTOR PLAN 4
Chronic, stable on admission  - Continue home medications -metoprolol succinate- with hold parameters  - Continue home furosemide qod  - Monitor routine hemodynamics Patient was previously admitted from 6/1-6/4/2021 for bilateral PE and acute CHF, diastolic dysfunction  - Continue home Eliquis 5mg BID Patient with history fo HFpEF (EF 55-60% in 6/21), denies SOB on admission, has chronic LE edema  - Patient takes Lasix PO 40mg qod  - Did not take Lasix today, due for medication tomorrow  - Continue home Lasix 40 qod starting tomorrow 8/15  - Follows with Cardio Dr. Woods

## 2023-08-14 NOTE — H&P ADULT - PROBLEM SELECTOR PLAN 2
Confirmed some of the medications with patient, however does not remember all; defers to son for the full list  - Called son Olvin [phone] x3, no answer left VM  - Patient will tell son to bring in completed list  - Called [facility], only KCl 40mEq qd picked up from pharmacy  - Called [facility], closed; only reachable during business hours  - Primary team to confirm updated list of medications with patient's son Confirmed some of the medications with patient, however does not remember all; defers to son for the full list  - Called son Olvin [phone] x4, no answer left VM  - Patient will tell son to bring in completed list  - Called [facility], only KCl 40mEq qd picked up from pharmacy  - Called [facility], closed; only reachable during business hours  - Primary team to confirm updated list of medications with patient's son Chronic  - Continue home pyridostigmine, prednisone

## 2023-08-14 NOTE — ED ADULT NURSE NOTE - NSICDXFAMILYHX_GEN_ALL_CORE_FT
Excision of 4cm back mass FAMILY HISTORY:  Father  Still living? No  Family history of stroke, Age at diagnosis: Age Unknown    Mother  Still living? No  Family history of kidney disease, Age at diagnosis: Age Unknown    Sibling  Still living? No  Family history of diabetes mellitus type II, Age at diagnosis: Age Unknown

## 2023-08-14 NOTE — H&P ADULT - ASSESSMENT
79 yo M PMHx myasthenia gravis, HTN, HLD, T2DM, chronic back pain, club foot presents to ED c/o bilateral leg pain when ambulating x 1 week. Admitted for fall and KAVITA placement.  77 yo M PMHx myasthenia gravis, HFpEF (EF 55-60% in 6/21), HTN, HLD, T2DM, chronic back pain, club foot, chronic LE edema, presents to ED c/o bilateral leg pain when ambulating x 1 week. Admitted for fall and KAVITA placement.  79 yo M PMHx myasthenia gravis, HFpEF (EF 55-60% in 6/21), HTN, HLD, T2DM, chronic back pain, club foot, chronic LE edema, presents to ED c/o difficulty when ambulating x 1 week s/p mechanical fall at home. Admitted for fall and KAVITA placement.

## 2023-08-14 NOTE — H&P ADULT - PROBLEM SELECTOR PROBLEM 5
T2DM (type 2 diabetes mellitus) (HFpEF) heart failure with preserved ejection fraction HTN (hypertension)

## 2023-08-14 NOTE — H&P ADULT - PROBLEM SELECTOR PLAN 10
CXR: Stable right lung granuloma. Heart size normal compared to July 8, 2021.  - Discussed with patient need for follow up with PCP for continued monitoring  - Per patient, aware of granuloma on previous imaging DVT: Patient takes Eliquis     DISPO: Planning for [facility]. SW and PT following

## 2023-08-14 NOTE — PHYSICAL THERAPY INITIAL EVALUATION ADULT - TRANSFER TRAINING, PT EVAL
Patient will transfer from all surfaces with min A x 1 in 2 weeks in order to increase mobility at home

## 2023-08-14 NOTE — H&P ADULT - NSHPPHYSICALEXAM_GEN_ALL_CORE
T(C): 36.6 (08-14-23 @ 16:05), Max: 36.6 (08-14-23 @ 16:05)  HR: 64 (08-14-23 @ 18:15) (60 - 64)  BP: 135/77 (08-14-23 @ 18:15) (135/77 - 142/76)  RR: 18 (08-14-23 @ 18:15) (18 - 18)  SpO2: 98% (08-14-23 @ 18:15) (98% - 98%)  Wt(kg): --    Physical Exam:   GENERAL: NAD  HEENT: head NC/AT; EOM intact, conjunctiva & sclera clear; hearing grossly intact, moist mucous membranes  NECK: supple, no JVD, nontender  RESPIRATORY: CTA B/L, no wheezing, rales, rhonchi or rubs  CARDIOVASCULAR: S1&S2, RRR, no murmurs or gallops  ABDOMEN: soft, non-tender, non-distended, + Bowel sounds x4 quadrants, no guarding, rebound or rigidity  MUSCULOSKELETAL:  pedal edema up to ankle 2+ bilaterally  LYMPH: no cervical lymphadenopathy  VASCULAR: Radial pulses 2+ bilaterally, no varicose veins   SKIN: warm and dry, several hyperpigmented lesions at the surface of head, erythematous circumscribed swelling with excoriations  NEUROLOGIC: AA&O X3, no focal deficits, no sensory loss, able to move all extremities spontaneously  Psych: Normal mood and affect, normal behavior

## 2023-08-14 NOTE — ED ADULT NURSE NOTE - PRIMARY CARE PROVIDER
sofiya kelly Spiral Flap Text: The defect edges were debeveled with a #15 scalpel blade.  Given the location of the defect, shape of the defect and the proximity to free margins a spiral flap was deemed most appropriate.  Using a sterile surgical marker, an appropriate rotation flap was drawn incorporating the defect and placing the expected incisions within the relaxed skin tension lines where possible. The area thus outlined was incised deep to adipose tissue with a #15 scalpel blade.  The skin margins were undermined to an appropriate distance in all directions utilizing iris scissors.

## 2023-08-14 NOTE — ED PROVIDER NOTE - CLINICAL SUMMARY MEDICAL DECISION MAKING FREE TEXT BOX
brought in by family- declining at home, fell recently and has decreased mobility. check labs, x-ray,  pt/sw, likely admit.

## 2023-08-14 NOTE — H&P ADULT - PROBLEM SELECTOR PLAN 9
Lovenox for DVT ppx    DISPO: Planning for [facility]. SW and PT following Chronic  - Patient takes Praluent pen SQ [id number]; last dose was Sat 8/12 CXR: Stable right lung granuloma. Heart size normal compared to July 8, 2021.  - Discussed with patient need for follow up with PCP for continued monitoring  - Per patient, aware of granuloma on previous imaging

## 2023-08-14 NOTE — ED ADULT NURSE NOTE - NSFALLHARMRISKINTERV_ED_ALL_ED
Assistance OOB with selected safe patient handling equipment if applicable/Assistance with ambulation/Communicate risk of Fall with Harm to all staff, patient, and family/Monitor gait and stability/Provide visual cue: red socks, yellow wristband, yellow gown, etc/Reinforce activity limits and safety measures with patient and family/Bed in lowest position, wheels locked, appropriate side rails in place/Call bell, personal items and telephone in reach/Instruct patient to call for assistance before getting out of bed/chair/stretcher/Non-slip footwear applied when patient is off stretcher/[place] to call system/Physically safe environment - no spills, clutter or unnecessary equipment/Purposeful Proactive Rounding/Room/bathroom lighting operational, light cord in reach

## 2023-08-14 NOTE — H&P ADULT - ATTENDING COMMENTS
79 yo M PMHx myasthenia gravis, HFpEF (EF 55-60% in 6/21), HTN, HLD, T2DM, chronic back pain, club foot, chronic LE edema, presents to ED c/o difficulty when ambulating x 1 week s/p mechanical fall at home. Admitted for fall and [facility] placement.     HPI as above.     Plan:  Mechanical fall, will need rehab placement  -comtinue home medications.     remainder as above

## 2023-08-14 NOTE — H&P ADULT - PROBLEM SELECTOR PLAN 6
Chronic  - Continue therapeutic interchange for home Omeprazole - Pantoprazole Chronic, stable on admission  - Continue home medications -metoprolol succinate- with hold parameters  - Continue home furosemide qod  - Monitor routine hemodynamics Chronic, known history of T2DM, noted on chart review from previous admission 6/2021 -- controlled, a1c 5.9  - Not currently taking home meds  - Fingerstick glucose QAC&HS   - Consistent carb/DASH diet  - F/u AM HbA1c

## 2023-08-14 NOTE — ED PROVIDER NOTE - PROGRESS NOTE DETAILS
Received signout on this patient.  Seen by PT, social work.  Recommend admission for rehab placement.  Spoke with Dr. Celis for admission.  Shashank Pack MD.

## 2023-08-15 LAB
A1C WITH ESTIMATED AVERAGE GLUCOSE RESULT: 5.6 % — SIGNIFICANT CHANGE UP (ref 4–5.6)
ALBUMIN SERPL ELPH-MCNC: 2.3 G/DL — LOW (ref 3.3–5)
ALP SERPL-CCNC: 68 U/L — SIGNIFICANT CHANGE UP (ref 40–120)
ALT FLD-CCNC: 26 U/L — SIGNIFICANT CHANGE UP (ref 12–78)
ANION GAP SERPL CALC-SCNC: 7 MMOL/L — SIGNIFICANT CHANGE UP (ref 5–17)
AST SERPL-CCNC: 22 U/L — SIGNIFICANT CHANGE UP (ref 15–37)
BASOPHILS # BLD AUTO: 0.04 K/UL — SIGNIFICANT CHANGE UP (ref 0–0.2)
BASOPHILS NFR BLD AUTO: 0.3 % — SIGNIFICANT CHANGE UP (ref 0–2)
BILIRUB SERPL-MCNC: 1.2 MG/DL — SIGNIFICANT CHANGE UP (ref 0.2–1.2)
BUN SERPL-MCNC: 18 MG/DL — SIGNIFICANT CHANGE UP (ref 7–23)
CALCIUM SERPL-MCNC: 8.9 MG/DL — SIGNIFICANT CHANGE UP (ref 8.5–10.1)
CHLORIDE SERPL-SCNC: 102 MMOL/L — SIGNIFICANT CHANGE UP (ref 96–108)
CO2 SERPL-SCNC: 21 MMOL/L — LOW (ref 22–31)
CREAT SERPL-MCNC: 0.9 MG/DL — SIGNIFICANT CHANGE UP (ref 0.5–1.3)
EGFR: 86 ML/MIN/1.73M2 — SIGNIFICANT CHANGE UP
EOSINOPHIL # BLD AUTO: 0.1 K/UL — SIGNIFICANT CHANGE UP (ref 0–0.5)
EOSINOPHIL NFR BLD AUTO: 0.8 % — SIGNIFICANT CHANGE UP (ref 0–6)
ESTIMATED AVERAGE GLUCOSE: 114 MG/DL — SIGNIFICANT CHANGE UP (ref 68–114)
GLUCOSE SERPL-MCNC: 96 MG/DL — SIGNIFICANT CHANGE UP (ref 70–99)
HCT VFR BLD CALC: 45.4 % — SIGNIFICANT CHANGE UP (ref 39–50)
HGB BLD-MCNC: 15 G/DL — SIGNIFICANT CHANGE UP (ref 13–17)
IMM GRANULOCYTES NFR BLD AUTO: 1.2 % — HIGH (ref 0–0.9)
LYMPHOCYTES # BLD AUTO: 1.37 K/UL — SIGNIFICANT CHANGE UP (ref 1–3.3)
LYMPHOCYTES # BLD AUTO: 11.5 % — LOW (ref 13–44)
MCHC RBC-ENTMCNC: 30.5 PG — SIGNIFICANT CHANGE UP (ref 27–34)
MCHC RBC-ENTMCNC: 33 GM/DL — SIGNIFICANT CHANGE UP (ref 32–36)
MCV RBC AUTO: 92.3 FL — SIGNIFICANT CHANGE UP (ref 80–100)
MONOCYTES # BLD AUTO: 1.42 K/UL — HIGH (ref 0–0.9)
MONOCYTES NFR BLD AUTO: 11.9 % — SIGNIFICANT CHANGE UP (ref 2–14)
NEUTROPHILS # BLD AUTO: 8.84 K/UL — HIGH (ref 1.8–7.4)
NEUTROPHILS NFR BLD AUTO: 74.3 % — SIGNIFICANT CHANGE UP (ref 43–77)
NRBC # BLD: 0 /100 WBCS — SIGNIFICANT CHANGE UP (ref 0–0)
PLATELET # BLD AUTO: 317 K/UL — SIGNIFICANT CHANGE UP (ref 150–400)
POTASSIUM SERPL-MCNC: 4.6 MMOL/L — SIGNIFICANT CHANGE UP (ref 3.5–5.3)
POTASSIUM SERPL-SCNC: 4.6 MMOL/L — SIGNIFICANT CHANGE UP (ref 3.5–5.3)
PROT SERPL-MCNC: 6.4 G/DL — SIGNIFICANT CHANGE UP (ref 6–8.3)
RBC # BLD: 4.92 M/UL — SIGNIFICANT CHANGE UP (ref 4.2–5.8)
RBC # FLD: 15 % — HIGH (ref 10.3–14.5)
SODIUM SERPL-SCNC: 130 MMOL/L — LOW (ref 135–145)
WBC # BLD: 11.91 K/UL — HIGH (ref 3.8–10.5)
WBC # FLD AUTO: 11.91 K/UL — HIGH (ref 3.8–10.5)

## 2023-08-15 PROCEDURE — 99232 SBSQ HOSP IP/OBS MODERATE 35: CPT

## 2023-08-15 RX ORDER — ACETAMINOPHEN 500 MG
1000 TABLET ORAL ONCE
Refills: 0 | Status: COMPLETED | OUTPATIENT
Start: 2023-08-15 | End: 2023-08-15

## 2023-08-15 RX ORDER — PYRIDOSTIGMINE BROMIDE 60 MG/5ML
60 SOLUTION ORAL DAILY
Refills: 0 | Status: DISCONTINUED | OUTPATIENT
Start: 2023-08-15 | End: 2023-08-17

## 2023-08-15 RX ORDER — APIXABAN 2.5 MG/1
5 TABLET, FILM COATED ORAL
Refills: 0 | Status: DISCONTINUED | OUTPATIENT
Start: 2023-08-16 | End: 2023-08-17

## 2023-08-15 RX ORDER — LIDOCAINE 4 G/100G
1 CREAM TOPICAL ONCE
Refills: 0 | Status: COMPLETED | OUTPATIENT
Start: 2023-08-15 | End: 2023-08-15

## 2023-08-15 RX ADMIN — Medication 40 MILLIGRAM(S): at 08:38

## 2023-08-15 RX ADMIN — APIXABAN 5 MILLIGRAM(S): 2.5 TABLET, FILM COATED ORAL at 22:01

## 2023-08-15 RX ADMIN — Medication 400 MILLIGRAM(S): at 01:40

## 2023-08-15 RX ADMIN — APIXABAN 5 MILLIGRAM(S): 2.5 TABLET, FILM COATED ORAL at 08:37

## 2023-08-15 RX ADMIN — PYRIDOSTIGMINE BROMIDE 60 MILLIGRAM(S): 60 SOLUTION ORAL at 17:28

## 2023-08-15 RX ADMIN — PANTOPRAZOLE SODIUM 40 MILLIGRAM(S): 20 TABLET, DELAYED RELEASE ORAL at 08:38

## 2023-08-15 NOTE — CARE COORDINATION ASSESSMENT. - NSPASTMEDSURGHISTORY_GEN_ALL_CORE_FT
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

## 2023-08-15 NOTE — PATIENT PROFILE ADULT - FUNCTIONAL ASSESSMENT - BASIC MOBILITY 2.
Pt provided with saltine crackers for PO challenge at this time  PO ice chips and water without vomiting  No s/s distress  Call bell in reach        Mike Thomson RN  02/21/20 Rl Moreno RN  02/21/20 [id number] 2 = A lot of assistance

## 2023-08-15 NOTE — CARE COORDINATION ASSESSMENT. - OTHER PERTINENT DISCHARGE PLANNING INFORMATION:
SW met with this pt at bedside admitted s/p fall for 3 night stay to [facility]. Pt from home with his son, uses a rolling walker for ambulation. Pt reports he has 1 step to enter and remains on the first floor. Pts PCP is Dr. Wade, cardio is Dr Marion. PT recommending [facility], pt in agreement- [address] [place] [address]. SW consult noted for Life challenegs and unable to ambulate- PT recommending [facility]. TASHIA requested, SW to follow.

## 2023-08-15 NOTE — CARE COORDINATION ASSESSMENT. - NSCAREPROVIDERS_GEN_ALL_CORE_FT
CARE PROVIDERS:  Accepting Physician: Shai Celis  Access Services: Amanda Corcoran  Administration: Gideon Hayward  Administration: Ashli Villela  Admitting: Shai Celis  Attending: Shai Celis  Case Management: Flaquito Martinez  Case Management: Hayley Jaimes  Covering Team: Zoraida Herndon  ED ACP: Ofe Jaramillo  ED Attending: Spenser Mercer ED Nurse: Carmelita Foreman  Emergency Medicine: Shashank Pack  Nurse: Shashank Jeffrey  Nurse: Nelsy Melgoza  Oncology: Hon Bassem  Outpatient Provider: Zay Cohen  Outpatient Provider: Yoni Castellon  Outpatient Provider: Vinh Woods  Outpatient Provider: Colt Wade  Override: Nelsy Melgoza  Override: Davon Morales  Override: Shashank Jeffrey  PCA/Nursing Assistant: Cinda Hu  PCA/Nursing Assistant: Aissatou Moss  Primary Team: Yulissa Connell  Primary Team: Joo Love  Primary Team: Marlene Nichols  Registered Dietitian: Ana Rawls  : Zoraida Buckner

## 2023-08-15 NOTE — PATIENT PROFILE ADULT - FALL HARM RISK - HARM RISK INTERVENTIONS

## 2023-08-16 ENCOUNTER — TRANSCRIPTION ENCOUNTER (OUTPATIENT)
Age: 78
End: 2023-08-16

## 2023-08-16 LAB
ANION GAP SERPL CALC-SCNC: 9 MMOL/L — SIGNIFICANT CHANGE UP (ref 5–17)
BUN SERPL-MCNC: 16 MG/DL — SIGNIFICANT CHANGE UP (ref 7–23)
CALCIUM SERPL-MCNC: 8.8 MG/DL — SIGNIFICANT CHANGE UP (ref 8.5–10.1)
CHLORIDE SERPL-SCNC: 101 MMOL/L — SIGNIFICANT CHANGE UP (ref 96–108)
CO2 SERPL-SCNC: 26 MMOL/L — SIGNIFICANT CHANGE UP (ref 22–31)
CREAT SERPL-MCNC: 1.1 MG/DL — SIGNIFICANT CHANGE UP (ref 0.5–1.3)
EGFR: 68 ML/MIN/1.73M2 — SIGNIFICANT CHANGE UP
GLUCOSE SERPL-MCNC: 93 MG/DL — SIGNIFICANT CHANGE UP (ref 70–99)
HCT VFR BLD CALC: 47.2 % — SIGNIFICANT CHANGE UP (ref 39–50)
HGB BLD-MCNC: 15.5 G/DL — SIGNIFICANT CHANGE UP (ref 13–17)
MCHC RBC-ENTMCNC: 30.5 PG — SIGNIFICANT CHANGE UP (ref 27–34)
MCHC RBC-ENTMCNC: 32.8 GM/DL — SIGNIFICANT CHANGE UP (ref 32–36)
MCV RBC AUTO: 92.9 FL — SIGNIFICANT CHANGE UP (ref 80–100)
NRBC # BLD: 0 /100 WBCS — SIGNIFICANT CHANGE UP (ref 0–0)
PLATELET # BLD AUTO: 340 K/UL — SIGNIFICANT CHANGE UP (ref 150–400)
POTASSIUM SERPL-MCNC: 3.6 MMOL/L — SIGNIFICANT CHANGE UP (ref 3.5–5.3)
POTASSIUM SERPL-SCNC: 3.6 MMOL/L — SIGNIFICANT CHANGE UP (ref 3.5–5.3)
RBC # BLD: 5.08 M/UL — SIGNIFICANT CHANGE UP (ref 4.2–5.8)
RBC # FLD: 15 % — HIGH (ref 10.3–14.5)
SODIUM SERPL-SCNC: 136 MMOL/L — SIGNIFICANT CHANGE UP (ref 135–145)
WBC # BLD: 11.16 K/UL — HIGH (ref 3.8–10.5)
WBC # FLD AUTO: 11.16 K/UL — HIGH (ref 3.8–10.5)

## 2023-08-16 PROCEDURE — 99233 SBSQ HOSP IP/OBS HIGH 50: CPT

## 2023-08-16 RX ORDER — PYRIDOSTIGMINE BROMIDE 60 MG/5ML
2 SOLUTION ORAL
Qty: 0 | Refills: 0 | DISCHARGE

## 2023-08-16 RX ADMIN — APIXABAN 5 MILLIGRAM(S): 2.5 TABLET, FILM COATED ORAL at 08:11

## 2023-08-16 RX ADMIN — PANTOPRAZOLE SODIUM 40 MILLIGRAM(S): 20 TABLET, DELAYED RELEASE ORAL at 05:22

## 2023-08-16 RX ADMIN — Medication 25 MILLIGRAM(S): at 05:22

## 2023-08-16 RX ADMIN — APIXABAN 5 MILLIGRAM(S): 2.5 TABLET, FILM COATED ORAL at 17:49

## 2023-08-16 RX ADMIN — PYRIDOSTIGMINE BROMIDE 60 MILLIGRAM(S): 60 SOLUTION ORAL at 11:51

## 2023-08-16 NOTE — PROGRESS NOTE ADULT - PROBLEM SELECTOR PLAN 2
Chronic  - Continue home pyridostigmine, prednisone
Chronic  - Continue home pyridostigmine, prednisone

## 2023-08-16 NOTE — DISCHARGE NOTE PROVIDER - NSDCCPCAREPLAN_GEN_ALL_CORE_FT
PRINCIPAL DISCHARGE DIAGNOSIS  Diagnosis: Ambulatory dysfunction  Assessment and Plan of Treatment: You presented to the hospital with complaint of fall and difficulty ambulating. During your admission we did radiological studies (xrays) of your pelvis, knee and chest. The results were negative, no evidence of fracture. You were seen by the physical therapy department and they recommended sub-acute rehabilitaion facility to help you regain strenth and mobility.      SECONDARY DISCHARGE DIAGNOSES  Diagnosis: Myasthenia gravis  Assessment and Plan of Treatment: Continue pyridostigme. Follow with your neurologist as outpatient.    Diagnosis: HTN (hypertension)  Assessment and Plan of Treatment: Continue current medical management.     PRINCIPAL DISCHARGE DIAGNOSIS  Diagnosis: Ambulatory dysfunction  Assessment and Plan of Treatment: You presented to the hospital with complaint of fall and difficulty ambulating. During your admission we did radiological studies (xrays) of your pelvis, knee and chest. The results were negative, no evidence of fracture. You were seen by the physical therapy department and they recommended sub-acute rehabilitaion facility to help you regain strenth and mobility.  Patient to f/u with all his doctors. Please f/u with your doctor  about Myasthenia Gravis Mediciations.      SECONDARY DISCHARGE DIAGNOSES  Diagnosis: Myasthenia gravis  Assessment and Plan of Treatment: Continue pyridostigme. Follow with your neurologist as outpatient.    Diagnosis: HTN (hypertension)  Assessment and Plan of Treatment: Continue current medical management.

## 2023-08-16 NOTE — PROGRESS NOTE ADULT - PROBLEM SELECTOR PLAN 10
DVT: Patient takes Eliquis     DISPO: Planning for [facility]. SW and PT following
DVT: Patient takes Eliquis     DISPO: Planning for [facility]. SW and PT following

## 2023-08-16 NOTE — PROGRESS NOTE ADULT - PROBLEM SELECTOR PROBLEM 4
(HFpEF) heart failure with preserved ejection fraction
(HFpEF) heart failure with preserved ejection fraction

## 2023-08-16 NOTE — DISCHARGE NOTE PROVIDER - NSDCFUSCHEDAPPT_GEN_ALL_CORE_FT
Vinh Woods  [facility]  CARDIOLOGY [address] [facility]  Scheduled Appointment: 11/14/2023

## 2023-08-16 NOTE — PROGRESS NOTE ADULT - PROBLEM SELECTOR PLAN 5
Chronic, stable on admission  - Continue home medications -metoprolol succinate- with hold parameters  - Continue home furosemide qod  - Monitor routine hemodynamics
Chronic, stable on admission  - Continue home medications -metoprolol succinate- with hold parameters  - Continue home furosemide qod  - Monitor routine hemodynamics

## 2023-08-16 NOTE — DISCHARGE NOTE PROVIDER - NSDCMRMEDTOKEN_GEN_ALL_CORE_FT
Eliquis Starter Pack for Treatment of DVT and PE 5 mg oral tablet: 1 tab(s) orally 2 times a day   furosemide 40 mg oral tablet: 1 tab(s) orally once a day  metoprolol succinate 25 mg oral tablet, extended release: 1 tab(s) orally once a day  omeprazole 40 mg oral delayed release capsule: 1 cap(s) orally once a day  Praluent Pen 75 mg/mL subcutaneous solution:  subcutaneous every 2 weeks  pyRIDostigmine 60 mg oral tablet: 1 tab(s) orally once a day   acetaminophen 325 mg oral tablet: 2 tab(s) orally every 6 hours As needed Temp greater or equal to 38C (100.4F), Mild Pain (1 - 3)  apixaban 5 mg oral tablet: 1 tab(s) orally 2 times a day  furosemide 40 mg oral tablet: 1 tab(s) orally once a day  metoprolol succinate 25 mg oral tablet, extended release: 1 tab(s) orally once a day  omeprazole 40 mg oral delayed release capsule: 1 cap(s) orally once a day  Praluent Pen 75 mg/mL subcutaneous solution:  subcutaneous every 2 weeks  predniSONE 2.5 mg oral tablet: 3 tab(s) orally once a day  pyRIDostigmine 60 mg oral tablet: 1 tab(s) orally once a day

## 2023-08-16 NOTE — DISCHARGE NOTE PROVIDER - CARE PROVIDER_API CALL
Colt Wade  Internal Medicine  [address] [place] [id number]  Phone: [phone]  Fax: [phone]  Follow Up Time:

## 2023-08-16 NOTE — PROGRESS NOTE ADULT - PROBLEM SELECTOR PLAN 9
CXR: Stable right lung granuloma. Heart size normal compared to July 8, 2021.  - Discussed with patient need for follow up with PCP for continued monitoring  - Per patient, aware of granuloma on previous imaging
CXR: Stable right lung granuloma. Heart size normal compared to July 8, 2021.  - Discussed with patient need for follow up with PCP for continued monitoring  - Per patient, aware of granuloma on previous imaging

## 2023-08-16 NOTE — PROGRESS NOTE ADULT - PROBLEM SELECTOR PLAN 6
Chronic, known history of T2DM, noted on chart review from previous admission 6/2021 -- controlled, a1c 5.9  - Not currently taking home meds  - Fingerstick glucose QAC&HS   - Consistent carb/DASH diet  - F/u AM HbA1c
Chronic, known history of T2DM, noted on chart review from previous admission 6/2021 -- controlled, a1c 5.9  - Not currently taking home meds  - Fingerstick glucose QAC&HS   - Consistent carb/DASH diet  - HbA1c 5.6

## 2023-08-16 NOTE — PROGRESS NOTE ADULT - PROBLEM SELECTOR PLAN 3
Patient was previously admitted from 6/1-6/4/2021 for bilateral PE and acute CHF, diastolic dysfunction  - Continue home Eliquis 5mg BID
Patient was previously admitted from 6/1-6/4/2021 for bilateral PE and acute CHF, diastolic dysfunction  - Continue home Eliquis 5mg BID

## 2023-08-16 NOTE — DISCHARGE NOTE PROVIDER - HOSPITAL COURSE
HPI:  77 yo M with PMH myasthenia gravis, HFpEF (EF 55-60% in 6/21), HTN, HLD, T2DM, chronic back pain, club foot, chronic LE edema, presents to ED c/o difficulty when ambulating x 1 week s/p mechanical fall at home. Pt reports mechanical slip and fall out of chair 1 week ago on 8/6. Patient states he accidentally slid out of lounge chair onto ground, no head trauma, was lying on ground for about 5 hours until his son arrived home and was able to help him up. Per ED note, son states he found father on ground awake and alert. Patient has had trouble ambulating since fall. Denies leg pain, headache, dizziness, chest pain, back pain, abd pain, N/V/D, SOB, fever/chills. Pt states he is asymptomatic at rest. Patient was seen by PT and SW in ED.       ED Course:   Vitals: BP: 142/76 --> 135/77, HR: 60, Temp: 97.8F, RR: 18, SpO2: 98% on RA   Labs: WBC elevated 13.12, Na low 134, Glucose 105, Albumin low 2.6  UA: negative nitrites, negative LE, WBC 6, moderate bacteria, present squamous epithelial cells  XR pelvis: Arterial calcifications are seen. Degenerative loss of disc height particularly on the right at L4-5 again noted. Consider rather free of degeneration. The extreme left lateral hip excluded. No visible fracture or change in third 2021.    XR bilateral knees:  Right knee. There is a mild to moderate effusion and arterial   calcification. There is diffuse moderate degeneration calcified menisci. No fracture.  Left knee. There is a left knee effusion and arterial calcification.   There is advanced tricompartmental degeneration. No fracture.    CXR: Stable right lung granuloma. Heart size normal compared to July 8, 2021.    EKG: PENDING    Received in the ED PT evaluation, recommends [facility]     (14 Aug 2023 18:29)      ---  HOSPITAL COURSE: Patient admitted to medicine floor for management of     Pt seen and examined on day of discharge. Patient is medically optimized for discharge to home with close outpatient followup.    PHYSICAL EXAM ON DAY OF DISCHARGE:        ---  CONSULTANTS:     ---  TIME SPENT:  I, the attending physician, was physically present for the key portions of the evaluation and management (E/M) service provided. The total amount of time spent reviewing the hospital notes, laboratory values, imaging findings, assessing/counseling the patient, discussing with consultant physicians, social work, nursing staff was -- minutes    ---  Primary care provider was made aware of plan for discharge:      [  ] NO     [  ] YES   HPI:  79 yo M with PMH myasthenia gravis, HFpEF (EF 55-60% in 6/21), HTN, HLD, T2DM, chronic back pain, club foot, chronic LE edema, presents to ED c/o difficulty when ambulating x 1 week s/p mechanical fall at home. Pt reports mechanical slip and fall out of chair 1 week ago on 8/6. Patient states he accidentally slid out of lounge chair onto ground, no head trauma, was lying on ground for about 5 hours until his son arrived home and was able to help him up. Per ED note, son states he found father on ground awake and alert. Patient has had trouble ambulating since fall. Denies leg pain, headache, dizziness, chest pain, back pain, abd pain, N/V/D, SOB, fever/chills. Pt states he is asymptomatic at rest. Patient was seen by PT and SW in ED.       ED Course:   Vitals: BP: 142/76 --> 135/77, HR: 60, Temp: 97.8F, RR: 18, SpO2: 98% on RA   Labs: WBC elevated 13.12, Na low 134, Glucose 105, Albumin low 2.6  UA: negative nitrites, negative LE, WBC 6, moderate bacteria, present squamous epithelial cells  XR pelvis: Arterial calcifications are seen. Degenerative loss of disc height particularly on the right at L4-5 again noted. Consider rather free of degeneration. The extreme left lateral hip excluded. No visible fracture or change in third 2021.    XR bilateral knees:  Right knee. There is a mild to moderate effusion and arterial   calcification. There is diffuse moderate degeneration calcified menisci. No fracture.  Left knee. There is a left knee effusion and arterial calcification.   There is advanced tricompartmental degeneration. No fracture.    CXR: Stable right lung granuloma. Heart size normal compared to July 8, 2021.    EKG: PENDING    Received in the ED PT evaluation, recommends [facility]     (14 Aug 2023 18:29)      ---  HOSPITAL COURSE: Patient admitted to medicine floor for management of     Pt seen and examined on day of discharge. Patient is medically optimized for discharge to home with close outpatient followup.    PHYSICAL EXAM ON DAY OF DISCHARGE:  GENERAL: NAD, lying in bed comfortably, answering appropriately   HEAD:  Atraumatic, Normocephalic  EYES: EOMI, conjunctiva and sclera clear  ENT: Moist mucous membranes  NECK: Supple, No JVD  CHEST/LUNG: Clear to auscultation bilaterally; No rales, rhonchi, wheezing, or rubs. Unlabored respirations  HEART: Regular rate and rhythm; No murmurs, rubs, or gallops  ABDOMEN: Bowel sounds present; Soft, Nontender, Nondistended  EXTREMITIES:  2+ Peripheral Pulses, brisk capillary refill. No clubbing, cyanosis, or edema  NERVOUS SYSTEM:  Alert & Oriented X3, speech clear. No deficits   SKIN: chronic LE skin changes. no erythema. Warm , dry, color appropriate           ---  CONSULTANTS:     ---  TIME SPENT:  I, the attending physician, was physically present for the key portions of the evaluation and management (E/M) service provided. The total amount of time spent reviewing the hospital notes, laboratory values, imaging findings, assessing/counseling the patient, discussing with consultant physicians, social work, nursing staff was 56 minutes    ---  Primary care provider was made aware of plan for discharge:      [  ] NO     [  ] YES

## 2023-08-16 NOTE — PROGRESS NOTE ADULT - PROBLEM SELECTOR PLAN 8
Chronic  - Patient takes Praluent pen SQ [id number]; last dose was Sat 8/12
Chronic  - Patient takes Praluent pen SQ [id number]; last dose was Sat 8/12

## 2023-08-16 NOTE — PROGRESS NOTE ADULT - PROBLEM SELECTOR PLAN 4
Patient with history fo HFpEF (EF 55-60% in 6/21), denies SOB on admission, has chronic LE edema  - Continue home Lasix 40 qod   - Follows with Cardio Dr. Woods
Patient with history fo HFpEF (EF 55-60% in 6/21), denies SOB on admission, has chronic LE edema  - Continue home Lasix 40 qod   - Follows with Cardio Dr. Woods

## 2023-08-16 NOTE — PROGRESS NOTE ADULT - PROBLEM SELECTOR PLAN 7
Chronic  - Continue therapeutic interchange for home Omeprazole - Pantoprazole
Chronic  - Continue therapeutic interchange for home Omeprazole - Pantoprazole

## 2023-08-16 NOTE — PROGRESS NOTE ADULT - TIME BILLING
Note written by attending. Meds, labs, vitals, chart reviewed.
Note written by attending, see above.  Time spent: 40min. More than 50% of the visit was spent counseling the patient on medical condition and coordination of care

## 2023-08-16 NOTE — PROGRESS NOTE ADULT - SUBJECTIVE AND OBJECTIVE BOX
Patient is a 78y old  Male who presents with a chief complaint of fall, need for KAVITA (15 Aug 2023 11:04)       INTERVAL HPI/OVERNIGHT EVENTS: Patient seen and examined at bedside. Awake, alert. Denies chest pain, palpitation, sob     MEDICATIONS  (STANDING):  apixaban 5 milliGRAM(s) Oral <User Schedule>  furosemide    Tablet 40 milliGRAM(s) Oral <User Schedule>  lidocaine   4% Patch 1 Patch Transdermal once  metoprolol succinate ER 25 milliGRAM(s) Oral daily  pantoprazole    Tablet 40 milliGRAM(s) Oral before breakfast  predniSONE   Tablet 7.5 milliGRAM(s) Oral <User Schedule>  pyridostigmine 60 milliGRAM(s) Oral daily    MEDICATIONS  (PRN):  acetaminophen     Tablet .. 650 milliGRAM(s) Oral every 6 hours PRN Temp greater or equal to 38C (100.4F), Mild Pain (1 - 3)      Allergies    No Known Allergies    Intolerances    Ketek (Other)  fluoroquinolone antibiotics (Other)  telithromycin (Other)  Avelox (Other)      REVIEW OF SYSTEMS:  Per HPI, all other ROS noted negative   Vital Signs Last 24 Hrs  T(C): 36.3 (16 Aug 2023 05:24), Max: 36.8 (15 Aug 2023 19:50)  T(F): 97.4 (16 Aug 2023 05:24), Max: 98.3 (15 Aug 2023 19:50)  HR: 61 (16 Aug 2023 05:24) (56 - 62)  BP: 142/75 (16 Aug 2023 05:24) (120/75 - 142/75)  BP(mean): --  RR: 18 (16 Aug 2023 05:24) (18 - 18)  SpO2: 95% (16 Aug 2023 05:24) (95% - 96%)    Parameters below as of 16 Aug 2023 05:24  Patient On (Oxygen Delivery Method): room air        PHYSICAL EXAM:  GENERAL: NAD, lying in bed comfortably, answering appropriately   HEAD:  Atraumatic, Normocephalic  EYES: EOMI, conjunctiva and sclera clear  ENT: Moist mucous membranes  NECK: Supple, No JVD  CHEST/LUNG: Clear to auscultation bilaterally; No rales, rhonchi, wheezing, or rubs. Unlabored respirations  HEART: Regular rate and rhythm; No murmurs, rubs, or gallops  ABDOMEN: Bowel sounds present; Soft, Nontender, Nondistended  EXTREMITIES:  2+ Peripheral Pulses, brisk capillary refill. No clubbing, cyanosis, or edema  NERVOUS SYSTEM:  Alert & Oriented X3, speech clear. No deficits   SKIN: chronic LE skin changes. no erythema. Warm , dry, color appropriate     LABS:                        15.5   11.16 )-----------( 340      ( 16 Aug 2023 08:53 )             47.2     16 Aug 2023 08:53    x      |  x      |  16     ----------------------------<  93     x       |  26     |  1.10     Ca    8.8        16 Aug 2023 08:53        CAPILLARY BLOOD GLUCOSE        BLOOD CULTURE    RADIOLOGY & ADDITIONAL TESTS:    Imaging Personally Reviewed:  [ ] YES     Consultant(s) Notes Reviewed:      Care Discussed with Consultants/Other Providers:
Patient is a 78y old  Male who presents with a chief complaint of fall, need for KAVITA (14 Aug 2023 18:29)      Subjective:  INTERVAL HPI/OVERNIGHT EVENTS: Patient seen and examined at bedside. Pt admitted yesterday evening. s/p fall at home. Pt states he has had progressive weakness over time, worse since this past sunday. usually able to get around his house with minimal assistance, uses cane outside of house. Currently denies all complaints. PT eval rec KAVITA.     MEDICATIONS  (STANDING):  apixaban 5 milliGRAM(s) Oral every 12 hours  furosemide    Tablet 40 milliGRAM(s) Oral <User Schedule>  lidocaine   4% Patch 1 Patch Transdermal once  metoprolol succinate ER 25 milliGRAM(s) Oral daily  pantoprazole    Tablet 40 milliGRAM(s) Oral before breakfast  predniSONE   Tablet 7.5 milliGRAM(s) Oral <User Schedule>  pyridostigmine 120 milliGRAM(s) Oral daily    MEDICATIONS  (PRN):  acetaminophen     Tablet .. 650 milliGRAM(s) Oral every 6 hours PRN Temp greater or equal to 38C (100.4F), Mild Pain (1 - 3)      Allergies    No Known Allergies    Intolerances    Ketek (Other)  fluoroquinolone antibiotics (Other)  telithromycin (Other)  Avelox (Other)      REVIEW OF SYSTEMS:  CONSTITUTIONAL: No fever or chills +generalized weakness.   HEENT:  No headache, no sore throat  RESPIRATORY: No cough, wheezing, or shortness of breath  CARDIOVASCULAR: No chest pain, palpitations  GASTROINTESTINAL: No abd pain, nausea, vomiting, or diarrhea  GENITOURINARY: No dysuria, frequency, or hematuria  NEUROLOGICAL: no focal weakness or dizziness. denies numbness/tingling  MUSCULOSKELETAL: no myalgias +chronic b/l foot pain    Objective:  Vital Signs Last 24 Hrs  T(C): 36.9 (15 Aug 2023 08:34), Max: 36.9 (15 Aug 2023 06:59)  T(F): 98.4 (15 Aug 2023 08:34), Max: 98.5 (15 Aug 2023 06:59)  HR: 54 (15 Aug 2023 08:34) (54 - 71)  BP: 146/83 (15 Aug 2023 08:34) (131/73 - 166/88)  BP(mean): --  RR: 18 (15 Aug 2023 08:34) (17 - 18)  SpO2: 98% (15 Aug 2023 08:34) (93% - 98%)    Parameters below as of 15 Aug 2023 08:34  Patient On (Oxygen Delivery Method): room air        GENERAL: NAD, lying in bed comfortably  HEAD:  Atraumatic, Normocephalic  EYES: EOMI, conjunctiva and sclera clear  ENT: Moist mucous membranes  NECK: Supple, No JVD  CHEST/LUNG: Clear to auscultation bilaterally; No rales, rhonchi, wheezing, or rubs. Unlabored respirations  HEART: Regular rate and rhythm; No murmurs, rubs, or gallops  ABDOMEN: Bowel sounds present; Soft, Nontender, Nondistended  EXTREMITIES:  2+ Peripheral Pulses, brisk capillary refill. No clubbing, cyanosis, or edema  NERVOUS SYSTEM:  Alert & Oriented X3, speech clear. No deficits   SKIN: chronic LE skin changes. no erythema. Warm , dry, color appropriate     LABS:                        15.0   11.91 )-----------( 317      ( 15 Aug 2023 04:52 )             45.4     CBC Full  -  ( 15 Aug 2023 04:52 )  WBC Count : 11.91 K/uL  Hemoglobin : 15.0 g/dL  Hematocrit : 45.4 %  Platelet Count - Automated : 317 K/uL  Mean Cell Volume : 92.3 fl  Mean Cell Hemoglobin : 30.5 pg  Mean Cell Hemoglobin Concentration : 33.0 gm/dL  Auto Neutrophil # : 8.84 K/uL  Auto Lymphocyte # : 1.37 K/uL  Auto Monocyte # : 1.42 K/uL  Auto Eosinophil # : 0.10 K/uL  Auto Basophil # : 0.04 K/uL  Auto Neutrophil % : 74.3 %  Auto Lymphocyte % : 11.5 %  Auto Monocyte % : 11.9 %  Auto Eosinophil % : 0.8 %  Auto Basophil % : 0.3 %    15 Aug 2023 04:52    130    |  102    |  18     ----------------------------<  96     4.6     |  21     |  0.90     Ca    8.9        15 Aug 2023 04:52    TPro  6.4    /  Alb  2.3    /  TBili  1.2    /  DBili  x      /  AST  22     /  ALT  26     /  AlkPhos  68     15 Aug 2023 04:52      Urinalysis Basic - ( 15 Aug 2023 04:52 )    Color: x / Appearance: x / SG: x / pH: x  Gluc: 96 mg/dL / Ketone: x  / Bili: x / Urobili: x   Blood: x / Protein: x / Nitrite: x   Leuk Esterase: x / RBC: x / WBC x   Sq Epi: x / Non Sq Epi: x / Bacteria: x      CAPILLARY BLOOD GLUCOSE              RADIOLOGY & ADDITIONAL TESTS:    Personally reviewed.     Consultant(s) Notes Reviewed:  [x] YES  [ ] NO

## 2023-08-16 NOTE — PROGRESS NOTE ADULT - ASSESSMENT
79 yo M PMHx myasthenia gravis, HFpEF (EF 55-60% in 6/21), HTN, HLD, T2DM, chronic back pain, club foot, chronic LE edema, presents to ED c/o difficulty when ambulating x 1 week s/p mechanical fall at home. Admitted for fall and KAVITA placement. 
79 yo M PMHx myasthenia gravis, HFpEF (EF 55-60% in 6/21), HTN, HLD, T2DM, chronic back pain, club foot, chronic LE edema, presents to ED c/o difficulty when ambulating x 1 week s/p mechanical fall at home. Admitted for fall and KAVITA placement.

## 2023-08-16 NOTE — DISCHARGE NOTE PROVIDER - NSDCFUADDAPPT_GEN_ALL_CORE_FT
Please call and make a follow up appointment with your neurologist and your primary medical doctor for post hospital follow up.

## 2023-08-17 ENCOUNTER — TRANSCRIPTION ENCOUNTER (OUTPATIENT)
Age: 78
End: 2023-08-17

## 2023-08-17 VITALS
DIASTOLIC BLOOD PRESSURE: 80 MMHG | SYSTOLIC BLOOD PRESSURE: 133 MMHG | OXYGEN SATURATION: 94 % | RESPIRATION RATE: 18 BRPM | TEMPERATURE: 98 F | HEART RATE: 71 BPM

## 2023-08-17 LAB
HCT VFR BLD CALC: 46.7 % — SIGNIFICANT CHANGE UP (ref 39–50)
HGB BLD-MCNC: 15.1 G/DL — SIGNIFICANT CHANGE UP (ref 13–17)
MCHC RBC-ENTMCNC: 30 PG — SIGNIFICANT CHANGE UP (ref 27–34)
MCHC RBC-ENTMCNC: 32.3 GM/DL — SIGNIFICANT CHANGE UP (ref 32–36)
MCV RBC AUTO: 92.8 FL — SIGNIFICANT CHANGE UP (ref 80–100)
NRBC # BLD: 0 /100 WBCS — SIGNIFICANT CHANGE UP (ref 0–0)
PLATELET # BLD AUTO: 367 K/UL — SIGNIFICANT CHANGE UP (ref 150–400)
RBC # BLD: 5.03 M/UL — SIGNIFICANT CHANGE UP (ref 4.2–5.8)
RBC # FLD: 15 % — HIGH (ref 10.3–14.5)
WBC # BLD: 11.9 K/UL — HIGH (ref 3.8–10.5)
WBC # FLD AUTO: 11.9 K/UL — HIGH (ref 3.8–10.5)

## 2023-08-17 PROCEDURE — 85027 COMPLETE CBC AUTOMATED: CPT

## 2023-08-17 PROCEDURE — 81001 URINALYSIS AUTO W/SCOPE: CPT

## 2023-08-17 PROCEDURE — 80053 COMPREHEN METABOLIC PANEL: CPT

## 2023-08-17 PROCEDURE — 99239 HOSP IP/OBS DSCHRG MGMT >30: CPT

## 2023-08-17 PROCEDURE — 73562 X-RAY EXAM OF KNEE 3: CPT

## 2023-08-17 PROCEDURE — 93005 ELECTROCARDIOGRAM TRACING: CPT

## 2023-08-17 PROCEDURE — 71045 X-RAY EXAM CHEST 1 VIEW: CPT

## 2023-08-17 PROCEDURE — 97162 PT EVAL MOD COMPLEX 30 MIN: CPT

## 2023-08-17 PROCEDURE — 82962 GLUCOSE BLOOD TEST: CPT

## 2023-08-17 PROCEDURE — 83036 HEMOGLOBIN GLYCOSYLATED A1C: CPT

## 2023-08-17 PROCEDURE — 97116 GAIT TRAINING THERAPY: CPT

## 2023-08-17 PROCEDURE — 72170 X-RAY EXAM OF PELVIS: CPT

## 2023-08-17 PROCEDURE — 80048 BASIC METABOLIC PNL TOTAL CA: CPT

## 2023-08-17 PROCEDURE — 36415 COLL VENOUS BLD VENIPUNCTURE: CPT

## 2023-08-17 PROCEDURE — 85025 COMPLETE CBC W/AUTO DIFF WBC: CPT

## 2023-08-17 PROCEDURE — 97530 THERAPEUTIC ACTIVITIES: CPT

## 2023-08-17 PROCEDURE — 99285 EMERGENCY DEPT VISIT HI MDM: CPT

## 2023-08-17 RX ORDER — APIXABAN 2.5 MG/1
1 TABLET, FILM COATED ORAL
Qty: 0 | Refills: 0 | DISCHARGE
Start: 2023-08-17

## 2023-08-17 RX ORDER — ACETAMINOPHEN 500 MG
2 TABLET ORAL
Qty: 0 | Refills: 0 | DISCHARGE
Start: 2023-08-17

## 2023-08-17 RX ADMIN — APIXABAN 5 MILLIGRAM(S): 2.5 TABLET, FILM COATED ORAL at 08:55

## 2023-08-17 RX ADMIN — Medication 25 MILLIGRAM(S): at 05:21

## 2023-08-17 RX ADMIN — Medication 7.5 MILLIGRAM(S): at 05:21

## 2023-08-17 RX ADMIN — PANTOPRAZOLE SODIUM 40 MILLIGRAM(S): 20 TABLET, DELAYED RELEASE ORAL at 05:21

## 2023-08-17 RX ADMIN — PYRIDOSTIGMINE BROMIDE 60 MILLIGRAM(S): 60 SOLUTION ORAL at 11:16

## 2023-08-17 RX ADMIN — Medication 40 MILLIGRAM(S): at 05:21

## 2023-08-17 NOTE — DISCHARGE NOTE NURSING/CASE MANAGEMENT/SOCIAL WORK - PATIENT PORTAL LINK FT
You can access the FollowMyHealth Patient Portal offered by [facility] by registering at the following website: [email or web address]. By joining [employer]’s FollowMyHealth portal, you will also be able to view your health information using other applications (apps) compatible with our system.

## 2023-08-17 NOTE — CAREGIVER ENGAGEMENT NOTE - CAREGIVER OUTREACH NOTES - FREE TEXT
SW met with this pt and spoke with son via telephone- plan for DC today to [facility], both in agreement with dc. Kyle mitchell arranged for 1pm . Pt, family, team in agreement. SW remains available.

## 2023-09-08 RX ORDER — ALIROCUMAB 75 MG/ML
75 INJECTION, SOLUTION SUBCUTANEOUS
Qty: 6 | Refills: 3 | Status: ACTIVE | COMMUNITY
Start: 2019-02-04

## 2023-11-03 ENCOUNTER — INPATIENT (INPATIENT)
Facility: HOSPITAL | Age: 78
LOS: 4 days | Discharge: HOME | DRG: 871 | End: 2023-11-08
Attending: STUDENT IN AN ORGANIZED HEALTH CARE EDUCATION/TRAINING PROGRAM | Admitting: STUDENT IN AN ORGANIZED HEALTH CARE EDUCATION/TRAINING PROGRAM
Payer: MEDICARE

## 2023-11-03 VITALS
RESPIRATION RATE: 16 BRPM | TEMPERATURE: 98 F | HEART RATE: 81 BPM | SYSTOLIC BLOOD PRESSURE: 160 MMHG | WEIGHT: 240.08 LBS | DIASTOLIC BLOOD PRESSURE: 89 MMHG | OXYGEN SATURATION: 97 % | HEIGHT: 68 IN

## 2023-11-03 DIAGNOSIS — Q66.89 OTHER SPECIFIED CONGENITAL DEFORMITIES OF FEET: Chronic | ICD-10-CM

## 2023-11-03 DIAGNOSIS — L05.91 PILONIDAL CYST WITHOUT ABSCESS: Chronic | ICD-10-CM

## 2023-11-03 DIAGNOSIS — Z98.89 OTHER SPECIFIED POSTPROCEDURAL STATES: Chronic | ICD-10-CM

## 2023-11-03 DIAGNOSIS — M48.00 SPINAL STENOSIS, SITE UNSPECIFIED: Chronic | ICD-10-CM

## 2023-11-03 DIAGNOSIS — Z98.890 OTHER SPECIFIED POSTPROCEDURAL STATES: Chronic | ICD-10-CM

## 2023-11-03 DIAGNOSIS — Z41.9 ENCOUNTER FOR PROCEDURE FOR PURPOSES OTHER THAN REMEDYING HEALTH STATE, UNSPECIFIED: Chronic | ICD-10-CM

## 2023-11-03 LAB
ALBUMIN SERPL ELPH-MCNC: 2.6 G/DL — LOW (ref 3.3–5)
ALBUMIN SERPL ELPH-MCNC: 2.6 G/DL — LOW (ref 3.3–5)
ALP SERPL-CCNC: 84 U/L — SIGNIFICANT CHANGE UP (ref 40–120)
ALP SERPL-CCNC: 84 U/L — SIGNIFICANT CHANGE UP (ref 40–120)
ALT FLD-CCNC: 29 U/L — SIGNIFICANT CHANGE UP (ref 12–78)
ALT FLD-CCNC: 29 U/L — SIGNIFICANT CHANGE UP (ref 12–78)
ANION GAP SERPL CALC-SCNC: 10 MMOL/L — SIGNIFICANT CHANGE UP (ref 5–17)
ANION GAP SERPL CALC-SCNC: 10 MMOL/L — SIGNIFICANT CHANGE UP (ref 5–17)
APPEARANCE UR: ABNORMAL
APPEARANCE UR: ABNORMAL
APTT BLD: 34.2 SEC — SIGNIFICANT CHANGE UP (ref 24.5–35.6)
APTT BLD: 34.2 SEC — SIGNIFICANT CHANGE UP (ref 24.5–35.6)
AST SERPL-CCNC: 28 U/L — SIGNIFICANT CHANGE UP (ref 15–37)
AST SERPL-CCNC: 28 U/L — SIGNIFICANT CHANGE UP (ref 15–37)
BASOPHILS # BLD AUTO: 0 K/UL — SIGNIFICANT CHANGE UP (ref 0–0.2)
BASOPHILS # BLD AUTO: 0 K/UL — SIGNIFICANT CHANGE UP (ref 0–0.2)
BASOPHILS NFR BLD AUTO: 0 % — SIGNIFICANT CHANGE UP (ref 0–2)
BASOPHILS NFR BLD AUTO: 0 % — SIGNIFICANT CHANGE UP (ref 0–2)
BILIRUB SERPL-MCNC: 0.8 MG/DL — SIGNIFICANT CHANGE UP (ref 0.2–1.2)
BILIRUB SERPL-MCNC: 0.8 MG/DL — SIGNIFICANT CHANGE UP (ref 0.2–1.2)
BILIRUB UR-MCNC: NEGATIVE — SIGNIFICANT CHANGE UP
BILIRUB UR-MCNC: NEGATIVE — SIGNIFICANT CHANGE UP
BUN SERPL-MCNC: 15 MG/DL — SIGNIFICANT CHANGE UP (ref 7–23)
BUN SERPL-MCNC: 15 MG/DL — SIGNIFICANT CHANGE UP (ref 7–23)
CALCIUM SERPL-MCNC: 9.2 MG/DL — SIGNIFICANT CHANGE UP (ref 8.5–10.1)
CALCIUM SERPL-MCNC: 9.2 MG/DL — SIGNIFICANT CHANGE UP (ref 8.5–10.1)
CHLORIDE SERPL-SCNC: 99 MMOL/L — SIGNIFICANT CHANGE UP (ref 96–108)
CHLORIDE SERPL-SCNC: 99 MMOL/L — SIGNIFICANT CHANGE UP (ref 96–108)
CK MB BLD-MCNC: <5.3 % — HIGH (ref 0–3.5)
CK MB BLD-MCNC: <5.3 % — HIGH (ref 0–3.5)
CK MB CFR SERPL CALC: <1 NG/ML — SIGNIFICANT CHANGE UP (ref 0–3.6)
CK MB CFR SERPL CALC: <1 NG/ML — SIGNIFICANT CHANGE UP (ref 0–3.6)
CK SERPL-CCNC: 19 U/L — LOW (ref 26–308)
CK SERPL-CCNC: 19 U/L — LOW (ref 26–308)
CO2 SERPL-SCNC: 28 MMOL/L — SIGNIFICANT CHANGE UP (ref 22–31)
CO2 SERPL-SCNC: 28 MMOL/L — SIGNIFICANT CHANGE UP (ref 22–31)
COLOR SPEC: YELLOW — SIGNIFICANT CHANGE UP
COLOR SPEC: YELLOW — SIGNIFICANT CHANGE UP
CREAT SERPL-MCNC: 0.99 MG/DL — SIGNIFICANT CHANGE UP (ref 0.5–1.3)
CREAT SERPL-MCNC: 0.99 MG/DL — SIGNIFICANT CHANGE UP (ref 0.5–1.3)
DIFF PNL FLD: ABNORMAL
DIFF PNL FLD: ABNORMAL
EGFR: 77 ML/MIN/1.73M2 — SIGNIFICANT CHANGE UP
EGFR: 77 ML/MIN/1.73M2 — SIGNIFICANT CHANGE UP
EOSINOPHIL # BLD AUTO: 0 K/UL — SIGNIFICANT CHANGE UP (ref 0–0.5)
EOSINOPHIL # BLD AUTO: 0 K/UL — SIGNIFICANT CHANGE UP (ref 0–0.5)
EOSINOPHIL NFR BLD AUTO: 0 % — SIGNIFICANT CHANGE UP (ref 0–6)
EOSINOPHIL NFR BLD AUTO: 0 % — SIGNIFICANT CHANGE UP (ref 0–6)
GLUCOSE SERPL-MCNC: 149 MG/DL — HIGH (ref 70–99)
GLUCOSE SERPL-MCNC: 149 MG/DL — HIGH (ref 70–99)
GLUCOSE UR QL: NEGATIVE MG/DL — SIGNIFICANT CHANGE UP
GLUCOSE UR QL: NEGATIVE MG/DL — SIGNIFICANT CHANGE UP
HCT VFR BLD CALC: 50.4 % — HIGH (ref 39–50)
HCT VFR BLD CALC: 50.4 % — HIGH (ref 39–50)
HGB BLD-MCNC: 16.7 G/DL — SIGNIFICANT CHANGE UP (ref 13–17)
HGB BLD-MCNC: 16.7 G/DL — SIGNIFICANT CHANGE UP (ref 13–17)
INR BLD: 1.37 RATIO — HIGH (ref 0.85–1.18)
INR BLD: 1.37 RATIO — HIGH (ref 0.85–1.18)
KETONES UR-MCNC: NEGATIVE MG/DL — SIGNIFICANT CHANGE UP
KETONES UR-MCNC: NEGATIVE MG/DL — SIGNIFICANT CHANGE UP
LACTATE SERPL-SCNC: 3.9 MMOL/L — HIGH (ref 0.7–2)
LACTATE SERPL-SCNC: 3.9 MMOL/L — HIGH (ref 0.7–2)
LEUKOCYTE ESTERASE UR-ACNC: ABNORMAL
LEUKOCYTE ESTERASE UR-ACNC: ABNORMAL
LIDOCAIN IGE QN: 22 U/L — SIGNIFICANT CHANGE UP (ref 13–75)
LIDOCAIN IGE QN: 22 U/L — SIGNIFICANT CHANGE UP (ref 13–75)
LYMPHOCYTES # BLD AUTO: 0.93 K/UL — LOW (ref 1–3.3)
LYMPHOCYTES # BLD AUTO: 0.93 K/UL — LOW (ref 1–3.3)
LYMPHOCYTES # BLD AUTO: 5 % — LOW (ref 13–44)
LYMPHOCYTES # BLD AUTO: 5 % — LOW (ref 13–44)
MAGNESIUM SERPL-MCNC: 2 MG/DL — SIGNIFICANT CHANGE UP (ref 1.6–2.6)
MAGNESIUM SERPL-MCNC: 2 MG/DL — SIGNIFICANT CHANGE UP (ref 1.6–2.6)
MCHC RBC-ENTMCNC: 31.6 PG — SIGNIFICANT CHANGE UP (ref 27–34)
MCHC RBC-ENTMCNC: 31.6 PG — SIGNIFICANT CHANGE UP (ref 27–34)
MCHC RBC-ENTMCNC: 33.1 GM/DL — SIGNIFICANT CHANGE UP (ref 32–36)
MCHC RBC-ENTMCNC: 33.1 GM/DL — SIGNIFICANT CHANGE UP (ref 32–36)
MCV RBC AUTO: 95.3 FL — SIGNIFICANT CHANGE UP (ref 80–100)
MCV RBC AUTO: 95.3 FL — SIGNIFICANT CHANGE UP (ref 80–100)
MONOCYTES # BLD AUTO: 1.3 K/UL — HIGH (ref 0–0.9)
MONOCYTES # BLD AUTO: 1.3 K/UL — HIGH (ref 0–0.9)
MONOCYTES NFR BLD AUTO: 7 % — SIGNIFICANT CHANGE UP (ref 2–14)
MONOCYTES NFR BLD AUTO: 7 % — SIGNIFICANT CHANGE UP (ref 2–14)
NEUTROPHILS # BLD AUTO: 16.33 K/UL — HIGH (ref 1.8–7.4)
NEUTROPHILS # BLD AUTO: 16.33 K/UL — HIGH (ref 1.8–7.4)
NEUTROPHILS NFR BLD AUTO: 88 % — HIGH (ref 43–77)
NEUTROPHILS NFR BLD AUTO: 88 % — HIGH (ref 43–77)
NITRITE UR-MCNC: NEGATIVE — SIGNIFICANT CHANGE UP
NITRITE UR-MCNC: NEGATIVE — SIGNIFICANT CHANGE UP
NRBC # BLD: SIGNIFICANT CHANGE UP /100 WBCS (ref 0–0)
NRBC # BLD: SIGNIFICANT CHANGE UP /100 WBCS (ref 0–0)
NT-PROBNP SERPL-SCNC: 616 PG/ML — HIGH (ref 0–450)
NT-PROBNP SERPL-SCNC: 616 PG/ML — HIGH (ref 0–450)
PH UR: 6 — SIGNIFICANT CHANGE UP (ref 5–8)
PH UR: 6 — SIGNIFICANT CHANGE UP (ref 5–8)
PLATELET # BLD AUTO: 367 K/UL — SIGNIFICANT CHANGE UP (ref 150–400)
PLATELET # BLD AUTO: 367 K/UL — SIGNIFICANT CHANGE UP (ref 150–400)
POTASSIUM SERPL-MCNC: 3.7 MMOL/L — SIGNIFICANT CHANGE UP (ref 3.5–5.3)
POTASSIUM SERPL-MCNC: 3.7 MMOL/L — SIGNIFICANT CHANGE UP (ref 3.5–5.3)
POTASSIUM SERPL-SCNC: 3.7 MMOL/L — SIGNIFICANT CHANGE UP (ref 3.5–5.3)
POTASSIUM SERPL-SCNC: 3.7 MMOL/L — SIGNIFICANT CHANGE UP (ref 3.5–5.3)
PROT SERPL-MCNC: 7.2 G/DL — SIGNIFICANT CHANGE UP (ref 6–8.3)
PROT SERPL-MCNC: 7.2 G/DL — SIGNIFICANT CHANGE UP (ref 6–8.3)
PROT UR-MCNC: NEGATIVE MG/DL — SIGNIFICANT CHANGE UP
PROT UR-MCNC: NEGATIVE MG/DL — SIGNIFICANT CHANGE UP
PROTHROM AB SERPL-ACNC: 15.9 SEC — HIGH (ref 9.5–13)
PROTHROM AB SERPL-ACNC: 15.9 SEC — HIGH (ref 9.5–13)
RBC # BLD: 5.29 M/UL — SIGNIFICANT CHANGE UP (ref 4.2–5.8)
RBC # BLD: 5.29 M/UL — SIGNIFICANT CHANGE UP (ref 4.2–5.8)
RBC # FLD: 17.2 % — HIGH (ref 10.3–14.5)
RBC # FLD: 17.2 % — HIGH (ref 10.3–14.5)
SODIUM SERPL-SCNC: 137 MMOL/L — SIGNIFICANT CHANGE UP (ref 135–145)
SODIUM SERPL-SCNC: 137 MMOL/L — SIGNIFICANT CHANGE UP (ref 135–145)
SP GR SPEC: 1.01 — SIGNIFICANT CHANGE UP (ref 1–1.03)
SP GR SPEC: 1.01 — SIGNIFICANT CHANGE UP (ref 1–1.03)
TROPONIN I, HIGH SENSITIVITY RESULT: 12.3 NG/L — SIGNIFICANT CHANGE UP
TROPONIN I, HIGH SENSITIVITY RESULT: 12.3 NG/L — SIGNIFICANT CHANGE UP
UROBILINOGEN FLD QL: 1 MG/DL — SIGNIFICANT CHANGE UP (ref 0.2–1)
UROBILINOGEN FLD QL: 1 MG/DL — SIGNIFICANT CHANGE UP (ref 0.2–1)
WBC # BLD: 18.56 K/UL — HIGH (ref 3.8–10.5)
WBC # BLD: 18.56 K/UL — HIGH (ref 3.8–10.5)
WBC # FLD AUTO: 18.56 K/UL — HIGH (ref 3.8–10.5)
WBC # FLD AUTO: 18.56 K/UL — HIGH (ref 3.8–10.5)

## 2023-11-03 PROCEDURE — 74177 CT ABD & PELVIS W/CONTRAST: CPT | Mod: 26,MA

## 2023-11-03 PROCEDURE — 93010 ELECTROCARDIOGRAM REPORT: CPT

## 2023-11-03 PROCEDURE — 99285 EMERGENCY DEPT VISIT HI MDM: CPT

## 2023-11-03 PROCEDURE — 71260 CT THORAX DX C+: CPT | Mod: 26,MA

## 2023-11-03 PROCEDURE — 73610 X-RAY EXAM OF ANKLE: CPT | Mod: 26,LT

## 2023-11-03 PROCEDURE — 93970 EXTREMITY STUDY: CPT | Mod: 26

## 2023-11-03 RX ORDER — PYRIDOSTIGMINE BROMIDE 60 MG/5ML
1 SOLUTION ORAL
Refills: 0 | DISCHARGE

## 2023-11-03 RX ORDER — PIPERACILLIN AND TAZOBACTAM 4; .5 G/20ML; G/20ML
3.38 INJECTION, POWDER, LYOPHILIZED, FOR SOLUTION INTRAVENOUS ONCE
Refills: 0 | Status: COMPLETED | OUTPATIENT
Start: 2023-11-03 | End: 2023-11-03

## 2023-11-03 RX ORDER — SODIUM CHLORIDE 9 MG/ML
2000 INJECTION INTRAMUSCULAR; INTRAVENOUS; SUBCUTANEOUS ONCE
Refills: 0 | Status: COMPLETED | OUTPATIENT
Start: 2023-11-03 | End: 2023-11-03

## 2023-11-03 RX ORDER — SODIUM CHLORIDE 9 MG/ML
500 INJECTION INTRAMUSCULAR; INTRAVENOUS; SUBCUTANEOUS ONCE
Refills: 0 | Status: COMPLETED | OUTPATIENT
Start: 2023-11-03 | End: 2023-11-03

## 2023-11-03 RX ADMIN — PIPERACILLIN AND TAZOBACTAM 200 GRAM(S): 4; .5 INJECTION, POWDER, LYOPHILIZED, FOR SOLUTION INTRAVENOUS at 22:30

## 2023-11-03 RX ADMIN — SODIUM CHLORIDE 500 MILLILITER(S): 9 INJECTION INTRAMUSCULAR; INTRAVENOUS; SUBCUTANEOUS at 19:45

## 2023-11-03 RX ADMIN — Medication 0.5 MILLIGRAM(S): at 21:37

## 2023-11-03 RX ADMIN — SODIUM CHLORIDE 2000 MILLILITER(S): 9 INJECTION INTRAMUSCULAR; INTRAVENOUS; SUBCUTANEOUS at 23:45

## 2023-11-03 RX ADMIN — SODIUM CHLORIDE 500 MILLILITER(S): 9 INJECTION INTRAMUSCULAR; INTRAVENOUS; SUBCUTANEOUS at 19:22

## 2023-11-03 NOTE — CONSULT NOTE ADULT - SUBJECTIVE AND OBJECTIVE BOX
[facility] - Janel Jackson, Peggy, Evan, Jovana, Tomas Thibodeaux  Office Number: [phone]    Initial Consult Note    CHIEF COMPLAINT: Patient is a 78y old  Male who presents with a chief complaint chest pain     HPI:  78 year old male with past medical history of PE , HF ef 55-60% , chronic le edema,OA myasthenia gravis, HTN, DM2, HLD, presenting with chest pain.    Son at bedside reports that he was just discharged from rehab on Monday..  He endorses that earlier today around 11 AM he was complaining of abdominal pain with nausea vomiting.  Physical therapy came to the house and was working with his legs during this time he developed chest discomfort which lasted for at least an hour after the physical therapist left.  For the abdominal pain and the chest discomfort patient son became concerned so EMS was called and patient was taken to the emergency room.    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    FAMILY HISTORY:  Family history of stroke (Father)  Father -  age 62    Family history of kidney disease (Mother)  Mother -  age 67    Family history of diabetes mellitus type II (Sibling)  Brother & Sister      No family history of acute MI or sudden cardiac death.    MEDICATIONS  (STANDING):  sodium chloride 0.9% Bolus 500 milliLiter(s) IV Bolus once    MEDICATIONS  (PRN):      Allergies    levofloxacin (Unknown)  ofloxacin (Unknown)  gatifloxacin (Unknown)    Intolerances    Avelox (Other)  Ketek (Other)  telithromycin (Other)  fluoroquinolone antibiotics (Other)      REVIEW OF SYSTEMS:  All other review of systems is negative unless indicated above    VITAL SIGNS:   Vital Signs Last 24 Hrs  T(C): 36.4 (2023 16:32), Max: 36.4 (2023 16:32)  T(F): 97.6 (2023 16:32), Max: 97.6 (2023 16:32)  HR: 81 (2023 16:32) (81 - 81)  BP: 160/89 (2023 16:32) (160/89 - 160/89)  BP(mean): --  RR: 16 (2023 16:32) (16 - 16)  SpO2: 97% (2023 16:32) (97% - 97%)    Parameters below as of 2023 16:32  Patient On (Oxygen Delivery Method): room air        I&O's Summary      On Exam:    Constitutional: NAD, alert and oriented x 3  Lungs:  Non-labored, breath sounds are clear bilaterally, No wheezing, rales or rhonchi  Cardiovascular: RRR.  S1 and S2 positive.  No murmurs, rubs, gallops or clicks  Gastrointestinal: Bowel Sounds present, soft, nontender.   Lymph: No peripheral edema. No cervical lymphadenopathy.  Neurological: Alert, no focal deficits  Skin: No rashes or ulcers   Psych:  Mood & affect appropriate.    LABS: All Labs Reviewed:                Blood Culture:         RADIOLOGY:    EKG:     EXAM:  ECHO TTE WO CON COMP W DOPP         PROCEDURE DATE:  2021        INTERPRETATION:  INDICATION: Bilateral pulmonary edema  Sonographer AS    Blood Pressure 130/64    Height 167.6 cm     Weight 113.4 kg       BSA 2.2 sq m    Dimensions:  LA 2.8       Normal Values: 2.0 - 4.0 cm  Ao 2.8        Normal Values: 2.0 - 3.8 cm  SEPTUM 1.0       Normal Values: 0.6 - 1.2 cm  PWT 0.9       Normal Values: 0.6 - 1.1 cm  LVIDd 3.6         Normal Values: 3.0 - 5.6 cm  LVIDs 2.4         Normal Values: 1.8 - 4.0 cm      OBSERVATIONS:  Technically difficult and limited study  Mitral Valve: normal, trace physiologic MR.  Aortic Valve/Aorta: Not well-visualized  Tricuspid Valve: Not well-visualized  Pulmonic Valve: Not well-visualized  Left Atrium:normal  Right Atrium: Not well-visualized  Left Ventricle: Left ventricle is not well-visualized. Overall grossly normal left ventricular systolic function, estimated LVEF of 55-60%.  Right Ventricle: Grossly normal size and systolic function.  Pericardium: no significant pericardial effusion.  Pulmonary/RV Pressure: estimated PA systolic pressure of 9.5 mmHg assuming an RA pressure of 3 mmHg.  LV diastolic dysfunction is present        IMPRESSION:  Technically difficult and limited study  Overall grossly normal left ventricular systolic function, estimated LVEF of 55-60%.  Grossly normal RV size and systolic function.  The aortic valve is not well-visualized  Trace physiologic MR                           [facility] - Janel Jackson, Peggy, Evan, Jovana, Tomas Thibodeaux  Office Number: [phone]    Initial Consult Note    CHIEF COMPLAINT: Patient is a 78y old  Male who presents with a chief complaint chest pain     HPI:  78 year old male with past medical history of PE , HF ef 55-60% , chronic le edema,OA myasthenia gravis, HTN, DM2, HLD, presenting with chest pain.    Son at bedside reports that he was just discharged from rehab on Monday after he was there for a fall .  He endorses that earlier today around 11 AM he was complaining of abdominal pain with nausea vomiting. Has been constipated this week.   Physical therapy came to the house and was working with his legs during this time he developed chest discomfort which lasted for at least an hour after the physical therapist left.  Also with complaints of calf pain. Seen at bedside, does report some memory issues but denies chest pain currently , no dizziness palpitations or shortness of breath.   Follows in office with Dr Woods   Dw Son at bedside.   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    FAMILY HISTORY:  Family history of stroke (Father)  Father -  age 62    Family history of kidney disease (Mother)  Mother -  age 67    Family history of diabetes mellitus type II (Sibling)  Brother & Sister      No family history of acute MI or sudden cardiac death.    MEDICATIONS  (STANDING):  sodium chloride 0.9% Bolus 500 milliLiter(s) IV Bolus once    MEDICATIONS  (PRN):      Allergies    levofloxacin (Unknown)  ofloxacin (Unknown)  gatifloxacin (Unknown)    Intolerances    Avelox (Other)  Ketek (Other)  telithromycin (Other)  fluoroquinolone antibiotics (Other)      REVIEW OF SYSTEMS:  All other review of systems is negative unless indicated above    VITAL SIGNS:   Vital Signs Last 24 Hrs  T(C): 36.4 (2023 16:32), Max: 36.4 (2023 16:32)  T(F): 97.6 (2023 16:32), Max: 97.6 (2023 16:32)  HR: 81 (2023 16:32) (81 - 81)  BP: 160/89 (2023 16:32) (160/89 - 160/89)  BP(mean): --  RR: 16 (2023 16:32) (16 - 16)  SpO2: 97% (2023 16:32) (97% - 97%)    Parameters below as of 2023 16:32  Patient On (Oxygen Delivery Method): room air        I&O's Summary      On Exam:    Constitutional: NAD,  Lungs:  Non-labored, breath sounds are clear bilaterally, No wheezing, rales or rhonchi  Cardiovascular: RRR.  S1 and S2 positive.  No murmurs, rubs, gallops or clicks  Gastrointestinal: Bowel Sounds present, soft, nontender.   Lymph: + le edema   Neurological: Alert, no focal deficits  Skin: No rashes or ulcers   Psych:  Mood & affect appropriate.    LABS: All Labs Reviewed:         EXAM:  ECHO TTE WO CON COMP W DOPP         PROCEDURE DATE:  2021        INTERPRETATION:  INDICATION: Bilateral pulmonary edema  Sonographer AS    Blood Pressure 130/64    Height 167.6 cm     Weight 113.4 kg       BSA 2.2 sq m    Dimensions:  LA 2.8       Normal Values: 2.0 - 4.0 cm  Ao 2.8        Normal Values: 2.0 - 3.8 cm  SEPTUM 1.0       Normal Values: 0.6 - 1.2 cm  PWT 0.9       Normal Values: 0.6 - 1.1 cm  LVIDd 3.6         Normal Values: 3.0 - 5.6 cm  LVIDs 2.4         Normal Values: 1.8 - 4.0 cm      OBSERVATIONS:  Technically difficult and limited study  Mitral Valve: normal, trace physiologic MR.  Aortic Valve/Aorta: Not well-visualized  Tricuspid Valve: Not well-visualized  Pulmonic Valve: Not well-visualized  Left Atrium:normal  Right Atrium: Not well-visualized  Left Ventricle: Left ventricle is not well-visualized. Overall grossly normal left ventricular systolic function, estimated LVEF of 55-60%.  Right Ventricle: Grossly normal size and systolic function.  Pericardium: no significant pericardial effusion.  Pulmonary/RV Pressure: estimated PA systolic pressure of 9.5 mmHg assuming an RA pressure of 3 mmHg.  LV diastolic dysfunction is present        IMPRESSION:  Technically difficult and limited study  Overall grossly normal left ventricular systolic function, estimated LVEF of 55-60%.  Grossly normal RV size and systolic function.  The aortic valve is not well-visualized  Trace physiologic MR

## 2023-11-03 NOTE — ED PROVIDER NOTE - PROGRESS NOTE DETAILS
Patient is very agitated that he has not been able to eat or drink explained in great detail that his white count is elevated there was concern for intra-abdominal process and without the CTs he is not medically cleared.  Became very aggressive to the nursing staff.  Again went to bedside explained in great detail why.  Son reporting that his father is very agitated he is going to keep going on until he "has a stroke" and if that happens then he will hold its responsible.  Explained that without clearance from the CAT scan I cannot say that he can medically be medically cleared.  Also advised that if they decide to eat or drink that would be within the realm we would still continue work-up.  Son does not want to make the medical decision but again threatened to hold me liable for anything that happens.  Upon arriving back to my desk the CT report was completed.  There is left perinephric stranding and moderate to severe left hydro and hydroureter to the level of the ureterovesicular junction no nephrolithiasis.  Evaluation of the left ureterovesicular junction is limited by your prominent prostate gland protruding into the base of the bladder bladder mass or distal ureteral stricture cannot be excluded urology consultation recommended.  Explained these results to patient and son at bedside patient still demanding water.  Asked if he has been seen by urology he has but they cannot recall who.  Explained that this could be an infected pyelonephritis secondary to obstructive process urology to be consulted for possible stent placement.  Still demanding to eat again stated that they can but without urology consultation this is not recommended again patient's son threatened.  Nursing supervision was called.  After much consultation the decision was made to give small dose of Ativan and continue to monitor. received call from Dr. Malone, CT a/p report is for the wrong patient, CT shows distended gallbladder and patchy lower lobe infiltrate. Patient is very agitated that he has not been able to eat or drink explained in great detail that his white count is elevated there was concern for intra-abdominal process and without the CTs he is not medically cleared.  Became very aggressive to the nursing staff.  Again went to bedside explained in great detail why.  Son reporting that his father is very agitated he is going to keep going on until he "has a stroke" and if that happens then he will hold its responsible.  Explained that without clearance from the CAT scan I cannot say that he can medically be medically cleared.  Also advised that if they decide to eat or drink that would be within the realm we would still continue work-up.  Son does not want to make the medical decision but again threatened to hold me liable for anything that happens.  Upon arriving back to my desk the CT report was completed.  There is left perinephric stranding and moderate to severe left hydro and hydroureter to the level of the ureterovesicular junction no nephrolithiasis.  Evaluation of the left ureterovesicular junction is limited by your prominent prostate gland protruding into the base of the bladder bladder mass or distal ureteral stricture cannot be excluded urology consultation recommended.  Explained these results to patient and son at bedside patient still demanding water.  Asked if he has been seen by urology he has but they cannot recall who.  Explained that this could be an infected pyelonephritis secondary to obstructive process urology to be consulted for possible stent placement.  Still demanding to eat again stated that they can but without urology consultation this is not recommended again patient's son threatened.  Nursing supervision was called.  After much consultation the decision was made to give small dose of Ativan and continue to monitor. Risks benefits of giving Ativan in the setting of myasthenia gravis was reviewed.  At this time patient did not appear to be in a myasthenia crisis with no respiratory distress.  Given the man of agitation and inability to calm the patient the decision was made that would be his best interest to give low-dose Ativan.  Patient was placed on monitor with continuous pulse ox reading.

## 2023-11-03 NOTE — ED ADULT NURSE NOTE - NSFALLHARMRISKINTERV_ED_ALL_ED

## 2023-11-03 NOTE — ED ADULT NURSE NOTE - OBJECTIVE STATEMENT
pt to er by ambulance c/o chest / ab pain was nauseous at home denies nausea in hospital is alert oriented currently pain free iv started 20 angio left arm labs drawn family at bedside with pt

## 2023-11-03 NOTE — ED PROVIDER NOTE - OBJECTIVE STATEMENT
Patient is a 78-year-old gentleman who presents to the emergency room with episode of chest pain.  Past medical history of myasthenia gravis hypertension hyperlipidemia diabetes chronic back pain clubfoot chronic lower extremity edema.  Patient was last admitted to the hospital August 14 August 17, 2023 with difficulty ambulating x1 week and mechanical fall.  Patient was evaluated by PT was recommended that he be admitted for subacute rehab.  He was medically managed and ultimately discharged.  Patient presenting to the emergency room for multiple medical complaints.  Son at bedside reports that he was just discharged from rehab on Monday.  Son is currently residing with him.  He has not had a bowel movement since Monday.  He endorses that earlier today around 11 AM he was complaining of abdominal pain with nausea vomiting.  Physical therapy came to the house and was working with his legs during this time he developed chest discomfort which lasted for at least an hour after the physical therapist left.  For the abdominal pain and the chest discomfort patient son became concerned so EMS was called and patient was taken to the emergency room.  Currently patient reports that he feels dehydrated and is requesting water.  Denies any headache nausea vomiting chest pain or shortness of breath at this time.  Denies abdominal pain but is tender on exam.  Son is also endorsing that patient was complaining of atraumatic left ankle pain at site of a clubfoot.

## 2023-11-03 NOTE — ED PROVIDER NOTE - CLINICAL SUMMARY MEDICAL DECISION MAKING FREE TEXT BOX
Patient is a 78-year-old gentleman who presents to the emergency room with episode of chest pain.  Past medical history of myasthenia gravis hypertension hyperlipidemia diabetes chronic back pain clubfoot chronic lower extremity edema.  Patient was last admitted to the hospital August 14 August 17, 2023 with difficulty ambulating x1 week and mechanical fall.  Patient was evaluated by PT was recommended that he be admitted for subacute rehab.  He was medically managed and ultimately discharged.  Patient presenting to the emergency room for multiple medical complaints.  Son at bedside reports that he was just discharged from rehab on Monday.  Son is currently residing with him.  He has not had a bowel movement since Monday.  He endorses that earlier today around 11 AM he was complaining of abdominal pain with nausea vomiting.  Physical therapy came to the house and was working with his legs during this time he developed chest discomfort which lasted for at least an hour after the physical therapist left.  For the abdominal pain and the chest discomfort patient son became concerned so EMS was called and patient was taken to the emergency room.  Currently patient reports that he feels dehydrated and is requesting water.  Denies any headache nausea vomiting chest pain or shortness of breath at this time.  Denies abdominal pain but is tender on exam.  Son is also endorsing that patient was complaining of atraumatic left ankle pain at site of a clubfoot. Patient presenting for multiple medical complaints for abdominal pain nausea vomiting differential is broad includes possible colitis versus diverticulitis versus gastritis this patient had been on omeprazole but does not seem to have been taking this while in rehab.  Was also consider UTI versus small bowel obstruction given the fact the patient has not had a bowel movement in days.  Will obtain screening labs check UA urine culture obtain CT imaging of the abdomen pelvis hydrate and monitor.  For chest discomfort this is brought on by exertion could be related to his abdominal pain but must also consider cardiac pathology.  Currently chest pain-free.  Will obtain screening enzymes EKG and monitor.  For left ankle pain may be related to clubfoot will obtain venous Doppler monitor.  Patient's ultimate clinical disposition will be pending results and imaging. Independent review of EKG reveals a sinus rhythm with occasional PVCs at a rate of 82 bpm Patient is a 78-year-old gentleman who presents to the emergency room with episode of chest pain.  Past medical history of myasthenia gravis hypertension hyperlipidemia diabetes chronic back pain clubfoot chronic lower extremity edema.  Patient was last admitted to the hospital August 14 August 17, 2023 with difficulty ambulating x1 week and mechanical fall.  Patient was evaluated by PT was recommended that he be admitted for subacute rehab.  He was medically managed and ultimately discharged.  Patient presenting to the emergency room for multiple medical complaints.  Son at bedside reports that he was just discharged from rehab on Monday.  Son is currently residing with him.  He has not had a bowel movement since Monday.  He endorses that earlier today around 11 AM he was complaining of abdominal pain with nausea vomiting.  Physical therapy came to the house and was working with his legs during this time he developed chest discomfort which lasted for at least an hour after the physical therapist left.  For the abdominal pain and the chest discomfort patient son became concerned so EMS was called and patient was taken to the emergency room.  Currently patient reports that he feels dehydrated and is requesting water.  Denies any headache nausea vomiting chest pain or shortness of breath at this time.  Denies abdominal pain but is tender on exam.  Son is also endorsing that patient was complaining of atraumatic left ankle pain at site of a clubfoot. Patient presenting for multiple medical complaints for abdominal pain nausea vomiting differential is broad includes possible colitis versus diverticulitis versus gastritis this patient had been on omeprazole but does not seem to have been taking this while in rehab.  Was also consider UTI versus small bowel obstruction given the fact the patient has not had a bowel movement in days.  Will obtain screening labs check UA urine culture obtain CT imaging of the abdomen pelvis hydrate and monitor.  For chest discomfort this is brought on by exertion could be related to his abdominal pain but must also consider cardiac pathology.  Currently chest pain-free.  Will obtain screening enzymes EKG and monitor.  For left ankle pain may be related to clubfoot will obtain venous Doppler monitor.  Patient's ultimate clinical disposition will be pending results and imaging. Independent review of EKG reveals a sinus rhythm with occasional PVCs at a rate of 82 bpm. Results of labs reviewed patient with an elevated white count no significant electrolyte abnormality cardiac enzyme within normal proBNP not significantly elevated.  CT imaging notes left perinephric stranding and moderate to severe left hydro and hydroureter to the level of the left ureterovesicular junction evaluation of the left UVJ is limited by prominent prostate gland protruding into the base of the bladder.  Bladder mass or distal ureteral obstruction cannot be excluded.  Antibiotics running urine noted appears to be slightly infected urology Dr. Disla contacted.  CT imaging reviewed with him over the phone.  Per read there is no significant hydro.  Gallbladder does seem to be enlarged although T. bili and LFTs were within normal.  Blood cultures lactate ordered antibiotics running.  At this time no acute urologic intervention.  We will add renal ultrasound right upper quadrant ultrasound to further quantify CT imaging.  Attempts to reach radiologist were unsuccessful to review the scan with him.  Initial lactate was noted to be 3.9 additional fluid bolus was ordered per sepsis guidelines.  Initially patient only received a liter as there is history of CHF and concern for possible volume overload.  We will repeat lactate.  Repeat lactate noted 2.6 another 500 cc bolus ordered.  Ultrasound still pending patient signed out to night attending pending results.  Patient will require admission for further work-up and management.

## 2023-11-03 NOTE — CONSULT NOTE ADULT - ASSESSMENT
78 year old male with past medical history of PE 2021, HF ef 55-60% , chronic le edema, myasthenia gravis, HTN, DM2, HLD, presenting with chest pain.     EKG SR with PVCS and LAFB similar to baseline   troponin pending     Echo as above 2021 trace mr , lv systolic ef 55-60% , Lv diastolic dysfunction   history pe on eliquis     bp 160/89 on home toprol    ALL LABS pending at time of consult     Further plan pending clinical course and results of above   Monitor and replete electrolytes. Keep K>4.0 and Mg>2.0.  Janet Loo FNP-C  Cardiology NP  [id number] 78 year old male with past medical history of PE 2021, HF ef 55-60% , chronic le edema,OA myasthenia gravis, HTN, DM2, HLD, presenting with chest pain.    Son at bedside reports that he was just discharged from rehab on Monday after he was there for a fall .  He endorses that earlier today around 11 AM he was complaining of abdominal pain with nausea vomiting. Has been constipated this week.   Physical therapy came to the house and was working with his legs during this time he developed chest discomfort which lasted for at least an hour after the physical therapist left.    EKG SR with PVCS and LAFB similar to baseline   troponin pending    Echo as above 2021 trace mr , lv systolic ef 55-60% , Lv diastolic dysfunction   no sign of volume overload on room air, aside from chronic LE edema   history pe on eliquis BID son reports compliance   le doppler pending     bp 160/89 on home toprol    Leukocytosis noted, fu primary recs     Further plan pending clinical course and results of above   Monitor and replete electrolytes. Keep K>4.0 and Mg>2.0.    Janet Loo FNP-C  Cardiology NP  [facility] [id number]

## 2023-11-03 NOTE — ED PROVIDER NOTE - DIFFERENTIAL DIAGNOSIS
Patient presenting for multiple medical complaints for abdominal pain nausea vomiting differential is broad includes possible colitis versus diverticulitis versus gastritis this patient had been on omeprazole but does not seem to have been taking this while in rehab.  Was also consider UTI versus small bowel obstruction given the fact the patient has not had a bowel movement in days.  Will obtain screening labs check UA urine culture obtain CT imaging of the abdomen pelvis hydrate and monitor.  For chest discomfort this is brought on by exertion could be related to his abdominal pain but must also consider cardiac pathology.  Currently chest pain-free.  Will obtain screening enzymes EKG and monitor.  For left ankle pain may be related to clubfoot will obtain venous Doppler monitor.  Patient's ultimate clinical disposition will be pending results and imaging. Differential Diagnosis

## 2023-11-03 NOTE — ED PROVIDER NOTE - CPE EDP ENMT NORM
Patient requests all Lab, Cardiology, and Radiology Results on their Discharge Instructions normal...

## 2023-11-04 DIAGNOSIS — Z86.711 PERSONAL HISTORY OF PULMONARY EMBOLISM: ICD-10-CM

## 2023-11-04 DIAGNOSIS — K21.9 GASTRO-ESOPHAGEAL REFLUX DISEASE WITHOUT ESOPHAGITIS: ICD-10-CM

## 2023-11-04 DIAGNOSIS — I50.30 UNSPECIFIED DIASTOLIC (CONGESTIVE) HEART FAILURE: ICD-10-CM

## 2023-11-04 DIAGNOSIS — A41.9 SEPSIS, UNSPECIFIED ORGANISM: ICD-10-CM

## 2023-11-04 DIAGNOSIS — Z29.9 ENCOUNTER FOR PROPHYLACTIC MEASURES, UNSPECIFIED: ICD-10-CM

## 2023-11-04 DIAGNOSIS — R07.9 CHEST PAIN, UNSPECIFIED: ICD-10-CM

## 2023-11-04 DIAGNOSIS — K81.9 CHOLECYSTITIS, UNSPECIFIED: ICD-10-CM

## 2023-11-04 DIAGNOSIS — E78.5 HYPERLIPIDEMIA, UNSPECIFIED: ICD-10-CM

## 2023-11-04 DIAGNOSIS — N39.0 URINARY TRACT INFECTION, SITE NOT SPECIFIED: ICD-10-CM

## 2023-11-04 DIAGNOSIS — N13.30 UNSPECIFIED HYDRONEPHROSIS: ICD-10-CM

## 2023-11-04 DIAGNOSIS — J18.9 PNEUMONIA, UNSPECIFIED ORGANISM: ICD-10-CM

## 2023-11-04 DIAGNOSIS — G70.00 MYASTHENIA GRAVIS WITHOUT (ACUTE) EXACERBATION: ICD-10-CM

## 2023-11-04 DIAGNOSIS — I10 ESSENTIAL (PRIMARY) HYPERTENSION: ICD-10-CM

## 2023-11-04 LAB
ALBUMIN SERPL ELPH-MCNC: 2.1 G/DL — LOW (ref 3.3–5)
ALBUMIN SERPL ELPH-MCNC: 2.1 G/DL — LOW (ref 3.3–5)
ALP SERPL-CCNC: 73 U/L — SIGNIFICANT CHANGE UP (ref 40–120)
ALP SERPL-CCNC: 73 U/L — SIGNIFICANT CHANGE UP (ref 40–120)
ALT FLD-CCNC: 32 U/L — SIGNIFICANT CHANGE UP (ref 12–78)
ALT FLD-CCNC: 32 U/L — SIGNIFICANT CHANGE UP (ref 12–78)
ANION GAP SERPL CALC-SCNC: 10 MMOL/L — SIGNIFICANT CHANGE UP (ref 5–17)
ANION GAP SERPL CALC-SCNC: 10 MMOL/L — SIGNIFICANT CHANGE UP (ref 5–17)
AST SERPL-CCNC: 41 U/L — HIGH (ref 15–37)
AST SERPL-CCNC: 41 U/L — HIGH (ref 15–37)
BASOPHILS # BLD AUTO: 0.04 K/UL — SIGNIFICANT CHANGE UP (ref 0–0.2)
BASOPHILS # BLD AUTO: 0.04 K/UL — SIGNIFICANT CHANGE UP (ref 0–0.2)
BASOPHILS NFR BLD AUTO: 0.2 % — SIGNIFICANT CHANGE UP (ref 0–2)
BASOPHILS NFR BLD AUTO: 0.2 % — SIGNIFICANT CHANGE UP (ref 0–2)
BILIRUB SERPL-MCNC: 0.9 MG/DL — SIGNIFICANT CHANGE UP (ref 0.2–1.2)
BILIRUB SERPL-MCNC: 0.9 MG/DL — SIGNIFICANT CHANGE UP (ref 0.2–1.2)
BUN SERPL-MCNC: 15 MG/DL — SIGNIFICANT CHANGE UP (ref 7–23)
BUN SERPL-MCNC: 15 MG/DL — SIGNIFICANT CHANGE UP (ref 7–23)
CALCIUM SERPL-MCNC: 8.7 MG/DL — SIGNIFICANT CHANGE UP (ref 8.5–10.1)
CALCIUM SERPL-MCNC: 8.7 MG/DL — SIGNIFICANT CHANGE UP (ref 8.5–10.1)
CHLORIDE SERPL-SCNC: 103 MMOL/L — SIGNIFICANT CHANGE UP (ref 96–108)
CHLORIDE SERPL-SCNC: 103 MMOL/L — SIGNIFICANT CHANGE UP (ref 96–108)
CO2 SERPL-SCNC: 26 MMOL/L — SIGNIFICANT CHANGE UP (ref 22–31)
CO2 SERPL-SCNC: 26 MMOL/L — SIGNIFICANT CHANGE UP (ref 22–31)
CREAT SERPL-MCNC: 0.87 MG/DL — SIGNIFICANT CHANGE UP (ref 0.5–1.3)
CREAT SERPL-MCNC: 0.87 MG/DL — SIGNIFICANT CHANGE UP (ref 0.5–1.3)
EGFR: 87 ML/MIN/1.73M2 — SIGNIFICANT CHANGE UP
EGFR: 87 ML/MIN/1.73M2 — SIGNIFICANT CHANGE UP
EOSINOPHIL # BLD AUTO: 0 K/UL — SIGNIFICANT CHANGE UP (ref 0–0.5)
EOSINOPHIL # BLD AUTO: 0 K/UL — SIGNIFICANT CHANGE UP (ref 0–0.5)
EOSINOPHIL NFR BLD AUTO: 0 % — SIGNIFICANT CHANGE UP (ref 0–6)
EOSINOPHIL NFR BLD AUTO: 0 % — SIGNIFICANT CHANGE UP (ref 0–6)
GLUCOSE SERPL-MCNC: 139 MG/DL — HIGH (ref 70–99)
GLUCOSE SERPL-MCNC: 139 MG/DL — HIGH (ref 70–99)
HCT VFR BLD CALC: 46.5 % — SIGNIFICANT CHANGE UP (ref 39–50)
HCT VFR BLD CALC: 46.5 % — SIGNIFICANT CHANGE UP (ref 39–50)
HGB BLD-MCNC: 15.7 G/DL — SIGNIFICANT CHANGE UP (ref 13–17)
HGB BLD-MCNC: 15.7 G/DL — SIGNIFICANT CHANGE UP (ref 13–17)
IMM GRANULOCYTES NFR BLD AUTO: 1.1 % — HIGH (ref 0–0.9)
IMM GRANULOCYTES NFR BLD AUTO: 1.1 % — HIGH (ref 0–0.9)
LACTATE SERPL-SCNC: 2.6 MMOL/L — HIGH (ref 0.7–2)
LACTATE SERPL-SCNC: 2.6 MMOL/L — HIGH (ref 0.7–2)
LYMPHOCYTES # BLD AUTO: 0.64 K/UL — LOW (ref 1–3.3)
LYMPHOCYTES # BLD AUTO: 0.64 K/UL — LOW (ref 1–3.3)
LYMPHOCYTES # BLD AUTO: 3.5 % — LOW (ref 13–44)
LYMPHOCYTES # BLD AUTO: 3.5 % — LOW (ref 13–44)
MCHC RBC-ENTMCNC: 31.7 PG — SIGNIFICANT CHANGE UP (ref 27–34)
MCHC RBC-ENTMCNC: 31.7 PG — SIGNIFICANT CHANGE UP (ref 27–34)
MCHC RBC-ENTMCNC: 33.8 GM/DL — SIGNIFICANT CHANGE UP (ref 32–36)
MCHC RBC-ENTMCNC: 33.8 GM/DL — SIGNIFICANT CHANGE UP (ref 32–36)
MCV RBC AUTO: 93.8 FL — SIGNIFICANT CHANGE UP (ref 80–100)
MCV RBC AUTO: 93.8 FL — SIGNIFICANT CHANGE UP (ref 80–100)
MONOCYTES # BLD AUTO: 1.6 K/UL — HIGH (ref 0–0.9)
MONOCYTES # BLD AUTO: 1.6 K/UL — HIGH (ref 0–0.9)
MONOCYTES NFR BLD AUTO: 8.7 % — SIGNIFICANT CHANGE UP (ref 2–14)
MONOCYTES NFR BLD AUTO: 8.7 % — SIGNIFICANT CHANGE UP (ref 2–14)
MRSA PCR RESULT.: SIGNIFICANT CHANGE UP
MRSA PCR RESULT.: SIGNIFICANT CHANGE UP
NEUTROPHILS # BLD AUTO: 15.85 K/UL — HIGH (ref 1.8–7.4)
NEUTROPHILS # BLD AUTO: 15.85 K/UL — HIGH (ref 1.8–7.4)
NEUTROPHILS NFR BLD AUTO: 86.5 % — HIGH (ref 43–77)
NEUTROPHILS NFR BLD AUTO: 86.5 % — HIGH (ref 43–77)
NRBC # BLD: 0 /100 WBCS — SIGNIFICANT CHANGE UP (ref 0–0)
NRBC # BLD: 0 /100 WBCS — SIGNIFICANT CHANGE UP (ref 0–0)
PLATELET # BLD AUTO: 289 K/UL — SIGNIFICANT CHANGE UP (ref 150–400)
PLATELET # BLD AUTO: 289 K/UL — SIGNIFICANT CHANGE UP (ref 150–400)
POTASSIUM SERPL-MCNC: 3.7 MMOL/L — SIGNIFICANT CHANGE UP (ref 3.5–5.3)
POTASSIUM SERPL-MCNC: 3.7 MMOL/L — SIGNIFICANT CHANGE UP (ref 3.5–5.3)
POTASSIUM SERPL-SCNC: 3.7 MMOL/L — SIGNIFICANT CHANGE UP (ref 3.5–5.3)
POTASSIUM SERPL-SCNC: 3.7 MMOL/L — SIGNIFICANT CHANGE UP (ref 3.5–5.3)
PROT SERPL-MCNC: 6.2 G/DL — SIGNIFICANT CHANGE UP (ref 6–8.3)
PROT SERPL-MCNC: 6.2 G/DL — SIGNIFICANT CHANGE UP (ref 6–8.3)
RBC # BLD: 4.96 M/UL — SIGNIFICANT CHANGE UP (ref 4.2–5.8)
RBC # BLD: 4.96 M/UL — SIGNIFICANT CHANGE UP (ref 4.2–5.8)
RBC # FLD: 17.1 % — HIGH (ref 10.3–14.5)
RBC # FLD: 17.1 % — HIGH (ref 10.3–14.5)
S AUREUS DNA NOSE QL NAA+PROBE: DETECTED
S AUREUS DNA NOSE QL NAA+PROBE: DETECTED
SODIUM SERPL-SCNC: 139 MMOL/L — SIGNIFICANT CHANGE UP (ref 135–145)
SODIUM SERPL-SCNC: 139 MMOL/L — SIGNIFICANT CHANGE UP (ref 135–145)
WBC # BLD: 18.33 K/UL — HIGH (ref 3.8–10.5)
WBC # BLD: 18.33 K/UL — HIGH (ref 3.8–10.5)
WBC # FLD AUTO: 18.33 K/UL — HIGH (ref 3.8–10.5)
WBC # FLD AUTO: 18.33 K/UL — HIGH (ref 3.8–10.5)

## 2023-11-04 PROCEDURE — 78226 HEPATOBILIARY SYSTEM IMAGING: CPT | Mod: 26

## 2023-11-04 PROCEDURE — 99232 SBSQ HOSP IP/OBS MODERATE 35: CPT

## 2023-11-04 PROCEDURE — 76775 US EXAM ABDO BACK WALL LIM: CPT | Mod: 26,XU

## 2023-11-04 PROCEDURE — 99223 1ST HOSP IP/OBS HIGH 75: CPT

## 2023-11-04 PROCEDURE — 99223 1ST HOSP IP/OBS HIGH 75: CPT | Mod: GC

## 2023-11-04 PROCEDURE — 76705 ECHO EXAM OF ABDOMEN: CPT | Mod: 26

## 2023-11-04 RX ORDER — APIXABAN 2.5 MG/1
5 TABLET, FILM COATED ORAL EVERY 12 HOURS
Refills: 0 | Status: DISCONTINUED | OUTPATIENT
Start: 2023-11-04 | End: 2023-11-08

## 2023-11-04 RX ORDER — INFLUENZA VIRUS VACCINE 15; 15; 15; 15 UG/.5ML; UG/.5ML; UG/.5ML; UG/.5ML
0.7 SUSPENSION INTRAMUSCULAR ONCE
Refills: 0 | Status: DISCONTINUED | OUTPATIENT
Start: 2023-11-04 | End: 2023-11-08

## 2023-11-04 RX ORDER — PIPERACILLIN AND TAZOBACTAM 4; .5 G/20ML; G/20ML
3.38 INJECTION, POWDER, LYOPHILIZED, FOR SOLUTION INTRAVENOUS EVERY 8 HOURS
Refills: 0 | Status: COMPLETED | OUTPATIENT
Start: 2023-11-04 | End: 2023-11-07

## 2023-11-04 RX ORDER — METOPROLOL TARTRATE 50 MG
25 TABLET ORAL DAILY
Refills: 0 | Status: DISCONTINUED | OUTPATIENT
Start: 2023-11-04 | End: 2023-11-08

## 2023-11-04 RX ORDER — ACETAMINOPHEN 500 MG
650 TABLET ORAL EVERY 6 HOURS
Refills: 0 | Status: DISCONTINUED | OUTPATIENT
Start: 2023-11-04 | End: 2023-11-08

## 2023-11-04 RX ORDER — PYRIDOSTIGMINE BROMIDE 60 MG/5ML
60 SOLUTION ORAL DAILY
Refills: 0 | Status: DISCONTINUED | OUTPATIENT
Start: 2023-11-04 | End: 2023-11-08

## 2023-11-04 RX ORDER — APIXABAN 2.5 MG/1
5 TABLET, FILM COATED ORAL EVERY 12 HOURS
Refills: 0 | Status: DISCONTINUED | OUTPATIENT
Start: 2023-11-04 | End: 2023-11-04

## 2023-11-04 RX ORDER — HYDROCORTISONE 20 MG
100 TABLET ORAL ONCE
Refills: 0 | Status: COMPLETED | OUTPATIENT
Start: 2023-11-04 | End: 2023-11-04

## 2023-11-04 RX ORDER — HYDROCORTISONE 20 MG
50 TABLET ORAL EVERY 8 HOURS
Refills: 0 | Status: DISCONTINUED | OUTPATIENT
Start: 2023-11-04 | End: 2023-11-06

## 2023-11-04 RX ORDER — ONDANSETRON 8 MG/1
4 TABLET, FILM COATED ORAL EVERY 8 HOURS
Refills: 0 | Status: DISCONTINUED | OUTPATIENT
Start: 2023-11-04 | End: 2023-11-08

## 2023-11-04 RX ORDER — PANTOPRAZOLE SODIUM 20 MG/1
40 TABLET, DELAYED RELEASE ORAL
Refills: 0 | Status: DISCONTINUED | OUTPATIENT
Start: 2023-11-04 | End: 2023-11-08

## 2023-11-04 RX ORDER — SODIUM CHLORIDE 9 MG/ML
500 INJECTION INTRAMUSCULAR; INTRAVENOUS; SUBCUTANEOUS ONCE
Refills: 0 | Status: COMPLETED | OUTPATIENT
Start: 2023-11-04 | End: 2023-11-04

## 2023-11-04 RX ORDER — FUROSEMIDE 40 MG
40 TABLET ORAL DAILY
Refills: 0 | Status: DISCONTINUED | OUTPATIENT
Start: 2023-11-04 | End: 2023-11-08

## 2023-11-04 RX ORDER — POTASSIUM CHLORIDE 20 MEQ
20 PACKET (EA) ORAL DAILY
Refills: 0 | Status: DISCONTINUED | OUTPATIENT
Start: 2023-11-04 | End: 2023-11-08

## 2023-11-04 RX ADMIN — Medication 50 MILLIGRAM(S): at 13:07

## 2023-11-04 RX ADMIN — PIPERACILLIN AND TAZOBACTAM 25 GRAM(S): 4; .5 INJECTION, POWDER, LYOPHILIZED, FOR SOLUTION INTRAVENOUS at 21:20

## 2023-11-04 RX ADMIN — SODIUM CHLORIDE 500 MILLILITER(S): 9 INJECTION INTRAMUSCULAR; INTRAVENOUS; SUBCUTANEOUS at 02:44

## 2023-11-04 RX ADMIN — Medication 0.5 MILLIGRAM(S): at 00:38

## 2023-11-04 RX ADMIN — Medication 100 MILLIGRAM(S): at 05:53

## 2023-11-04 RX ADMIN — Medication 25 MILLIGRAM(S): at 06:30

## 2023-11-04 RX ADMIN — PANTOPRAZOLE SODIUM 40 MILLIGRAM(S): 20 TABLET, DELAYED RELEASE ORAL at 06:30

## 2023-11-04 RX ADMIN — SODIUM CHLORIDE 2000 MILLILITER(S): 9 INJECTION INTRAMUSCULAR; INTRAVENOUS; SUBCUTANEOUS at 02:00

## 2023-11-04 RX ADMIN — APIXABAN 5 MILLIGRAM(S): 2.5 TABLET, FILM COATED ORAL at 06:30

## 2023-11-04 RX ADMIN — PIPERACILLIN AND TAZOBACTAM 25 GRAM(S): 4; .5 INJECTION, POWDER, LYOPHILIZED, FOR SOLUTION INTRAVENOUS at 13:08

## 2023-11-04 RX ADMIN — PYRIDOSTIGMINE BROMIDE 60 MILLIGRAM(S): 60 SOLUTION ORAL at 13:06

## 2023-11-04 RX ADMIN — Medication 50 MILLIGRAM(S): at 21:20

## 2023-11-04 RX ADMIN — Medication 20 MILLIEQUIVALENT(S): at 13:06

## 2023-11-04 RX ADMIN — Medication 40 MILLIGRAM(S): at 06:59

## 2023-11-04 RX ADMIN — PIPERACILLIN AND TAZOBACTAM 25 GRAM(S): 4; .5 INJECTION, POWDER, LYOPHILIZED, FOR SOLUTION INTRAVENOUS at 06:30

## 2023-11-04 RX ADMIN — APIXABAN 5 MILLIGRAM(S): 2.5 TABLET, FILM COATED ORAL at 17:48

## 2023-11-04 NOTE — CARE COORDINATION ASSESSMENT. - NSCAREPROVIDERS_GEN_ALL_CORE_FT
CARE PROVIDERS:  Accepting Physician: Mary Jang  Admitting: Mary Jang  Attending: Mary Jang  Consultant: Bret Echevarria  Consultant: Jordin Mckinley  Consultant: Levy Nieves  Consultant: Rick Baxter  Consultant: Janet Loo  Consultant: Zhang Henry  Consultant: Yandel Yao  Consultant: Vinh Woods  ED Attending: Sujatha Ji  ED Nurse: Anuradha Ramirez  ED Nurse 2: Abiola Dias  Nurse: Claudette Tomlinson  Nurse: Bhavana Kohler  Nurse: Samina Stephens  Oncology: Luevano,   Ordered: ADM, User  Ordered: Physician, Ordering  Outpatient Provider: Zay Cohen  Outpatient Provider: Yoni Castellon  Outpatient Provider: Vinh Woods  Outpatient Provider: Colt Wade  Override: Jean Hair  Override: Claudette Tomlinson  PCA/Nursing Assistant: Aakash English  Primary Team: Klarissa Alvarado  Primary Team: Ni Gomez  Primary Team: Lesley Knight  Primary Team: Mary Jang  Primary Team: Marco Gomez  Primary Team: Edna Marshall  Primary Team: Sd Romero  Radiology Technician: Lashon Copeland  Registered Dietitian: Meron aJcobs  : Ruby Del Angel  Team: [facility]  Hospitalists, Team

## 2023-11-04 NOTE — CONSULT NOTE ADULT - SUBJECTIVE AND OBJECTIVE BOX
[facility]  INFECTIOUS DISEASES   [address] [place] [address]  Tel: [phone]     Fax: [phone]  =======================================================      [id number]  DEION HEATH    History obtained from the chart. Unable to obtain medical history from patient due to medical condition      CC: Patient is a 78y old  Male who presents with a chief complaint of sepsis 2/2 UTI, suspected cholecystitis and PNA (04 Nov 2023 08:31)      78y  Male with h/o PE (6/2021), CHF (EF 55-60% in 6/2021), Myasthenia Gravis, Hypertension, Hyperlipidemia, clubfoot, chronic LE edema presents to ED from home c/o chest pain. Pt was [facility] to [facility] [place] from 8/14-8/17 s/p fall and was discharged to [facility], He returned home from Rehab on Monday 10/30 and moved in with his son, Olvin. on 11/3 was complaining of abdominal pain associated with nausea and a few episodes of non-bloody emesis. Later in the day, he was working with physical therapy at home when he developed sudden-onset chest discomfort. The episode lasted a few hours and was not associated with any dizziness, palpitations, shortness of breath. He presented to the ER, noted to be afebrile, leukocytosis to 18.5k. Patient started on Zosyn. ID input requested.       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  Club foot Surgery at birth for Club Foot Right foot  Pilonidal cyst Surgery 40 years ago  H/O colonoscopy  Spinal stenosis  H/O prostate biopsy      Social Hx:  Living at home       FAMILY HISTORY:  Family history of stroke (Father)   Family history of kidney disease (Mother)   Family history of diabetes mellitus type II (Sibling) Brother & Sister      Allergies  levofloxacin (Unknown)  ofloxacin (Unknown)  gatifloxacin (Unknown)    Intolerances  Avelox (Other)  Ketek (Other)  telithromycin (Other)  fluoroquinolone antibiotics (Other)      REVIEW OF SYSTEMS:  unable to obtain due to medical condition       Physical Exam:  GEN: lethargic   HEENT: normocephalic and atraumatic. anicteric     NECK: Supple.   LUNGS: CTA B/L.  HEART: RRR  ABDOMEN: Soft, NT, ND.  +BS.    : No CVA tenderness  EXTREMITIES: Without  edema.  MSK: No joint swelling  NEUROLOGIC: Confused       Height (cm): 172.7 (11-03 @ 16:32)  Weight (kg): 108.9 (11-03 @ 16:32)  BMI (kg/m2): 36.5 (11-03 @ 16:32)  BSA (m2): 2.21 (11-03 @ 16:32)    Vitals:  T(F): 97.9 (04 Nov 2023 06:25), Max: 99.3 (04 Nov 2023 00:57)  HR: 88 (04 Nov 2023 06:25)  BP: 139/90 (04 Nov 2023 06:25)  RR: 18 (04 Nov 2023 06:25)  SpO2: 94% (04 Nov 2023 06:25) (93% - 98%)  temp max in last 48H T(F): , Max: 99.3 (11-04-23 @ 00:57)    Current Antibiotics:  piperacillin/tazobactam IVPB.. 3.375 Gram(s) IV Intermittent every 8 hours    Other medications:  furosemide    Tablet 40 milliGRAM(s) Oral daily  hydrocortisone sodium succinate Injectable 50 milliGRAM(s) IV Push every 8 hours  metoprolol succinate ER 25 milliGRAM(s) Oral daily  pantoprazole    Tablet 40 milliGRAM(s) Oral before breakfast  potassium chloride    Tablet ER 20 milliEquivalent(s) Oral daily  pyridostigmine 60 milliGRAM(s) Oral daily                            15.7   18.33 )-----------( 289      ( 04 Nov 2023 08:45 )             46.5     11-04    139  |  103  |  15  ----------------------------<  139<H>  3.7   |  26  |  0.87    Ca    8.7      04 Nov 2023 08:45  Mg     2.0     11-03    TPro  6.2  /  Alb  2.1<L>  /  TBili  0.9  /  DBili  x   /  AST  41<H>  /  ALT  32  /  AlkPhos  73  11-04    RECENT CULTURES:      WBC Count: 18.33 K/uL (11-04-23 @ 08:45)  WBC Count: 18.56 K/uL (11-03-23 @ 17:23)    Creatinine: 0.87 mg/dL (11-04-23 @ 08:45)  Creatinine: 0.99 mg/dL (11-03-23 @ 19:15)    Urinalysis (11.03.23 @ 21:40)    pH Urine: 6.0   Glucose Qualitative, Urine: Negative mg/dL   Blood, Urine: Small   Color: Yellow   Urine Appearance: Cloudy   Bilirubin: Negative   Ketone - Urine: Negative mg/dL   Specific Gravity: 1.010   Protein, Urine: Negative mg/dL   Urobilinogen: 1.0 mg/dL   Nitrite: Negative   Leukocyte Esterase Concentration: Large  Urine Microscopic-Add On (NC) (11.03.23 @ 21:40)    Bacteria: Many /HPF   Squamous Epithelial Cells: Present   Red Blood Cell - Urine: 5 /HPF   White Blood Cell - Urine: 32 /HPF          < from: CT Abdomen and Pelvis w/ IV Cont (11.03.23 @ 20:40) >  ACC: [id number] EXAM:  CT CHEST IC   ORDERED BY: MADELEINE NGUYEN     ACC: [id number] EXAM:  CT ABDOMEN AND PELVIS IC   ORDERED BY: MADELEINE NGUYEN     PROCEDURE DATE:  11/03/2023      INTERPRETATION:  CLINICAL INFORMATION: Chest and abdominal pain and   leukocytosis.    COMPARISON: CT chest abdomen and pelvis 12/10/2014.    CONTRAST/COMPLICATIONS:  IV Contrast: Omnipaque 350 (accession [id number]), IV contrast documented   in unlinked concurrent exam (accession [id number])  90 cc administered   (accession [id number]), 0 cc administered (accession [id number])   10 cc   discarded (accession [id number]), 0 cc discarded (accession [id number])  Oral Contrast: NONE  Complications: None reported at time of study completion    PROCEDURE:  CT of the Chest, Abdomen and Pelvis was performed.  Sagittal and coronal reformats were performed.    FINDINGS:  CHEST:  LUNGS AND LARGE AIRWAYS: Patent central airways. Subcentimeter left lower   lobe calcified granuloma. 2 mm right upper lobe pulmonary nodule.  PLEURA: No pleural effusion.  VESSELS: Within normal limits.  HEART: Left atrial enlargement. Mild coronary artery calcifications. No   pericardial effusion.  MEDIASTINUM AND KONRAD: No lymphadenopathy.  CHEST WALL AND LOWER NECK: Within normal limits.    ABDOMEN AND PELVIS:  LIVER: Within normal limits.  BILE DUCTS: Normal caliber.  GALLBLADDER: Within normal limits.  SPLEEN: Within normal limits.  PANCREAS: Within normal limits.  ADRENALS: Within normal limits.  KIDNEYS/URETERS: Numerous bilateralrenal cysts some of which are likely   hemorrhagic.  Left perinephric stranding and moderate to severe left hydronephrosis and   hydroureter to the level of the left ureterovesicular junction. No   nephrolithiasis. Evaluation of the left ureterovesicular junction is   limited by a prominent prostate gland median lobe protruding into the   base of the bladder. Bladder mass or distal ureteral stricture cannot be   excluded. Recommend urology consultation.    BLADDER: Within normal limits.  REPRODUCTIVE ORGANS: Enlarged prostate with prominent median lobe with   mass effect on the urinary bladder.    BOWEL: No bowel obstruction. Appendix is not visualized. No evidence of   inflammation in the pericecal region.  PERITONEUM: No ascites.  VESSELS: Within normal limits.  RETROPERITONEUM/LYMPH NODES: No lymphadenopathy.  ABDOMINAL WALL: Within normal limits.  BONES: Degenerative changes. Irregular sclerotic focus in the right pubic   symphysis is indeterminant. Another sclerotic focus is noted atthe L3   right pedicle.    IMPRESSION:    Left perinephric stranding and moderate to severe left hydronephrosis and   hydroureter to the level of the left ureterovesicular junction. No   nephrolithiasis. Evaluation of the left ureterovesicular junction is   limited by a prominent prostate gland median lobe protruding into the   base of the bladder. Bladder mass or distal ureteral stricture cannot be   excluded. Recommend urology consultation.    Numerous bilateral renal cysts some of which are likely hemorrhagic.   Recommend ultrasound and/or MRI for further evaluation.    --- End of Report ---  < end of copied text >      < from: US Renal (11.04.23 @ 04:34) >  ACC: [id number] EXAM:  US KIDNEY(S)   ORDERED BY: MADELEINE NGUYEN     ACC: [id number] EXAM:  US ABDOMEN RT UPR QUADRANT   ORDERED BY: MADELEINE NGUYEN     PROCEDURE DATE:  11/04/2023      INTERPRETATION:  CLINICAL INFORMATION: Abdominal pain.  Nausea and   vomiting.    COMPARISON: None available.    TECHNIQUE: Sonography of the right upper quadrant.  Sonography of the   kidneys and bladder.    FINDINGS:  Liver: Poorly visualized.  13.3 cm.  Bile ducts: Limited visualization.  Common bileduct not visualized  Gallbladder: Poorly visualized.  Layering sludge.  No shadowing   gallstones.  Pancreas: Obscured by bowel gas.  Right kidney: 9.6 cm. No hydronephrosis.  Left kidney: 9.6 cm.  No hydronephrosis.  Bladder: Incompletely distended with volume of 182 mL.  Reproductive organs: Enlarged prostate measures 4.7 x 4.3 x 4.1 cm,   within ellipsoid volume of approximately 43 mL.  Ascites: None.  Aorta/IVC: Obscured by bowel gas.    IMPRESSION:  Gallbladder sludge.  No gross sonographic evidence for cholelithiasis or   acute cholecystitis.  No right-sided or left-sided hydronephrosis.  Incompletely distended bladder.  Enlarged prostate with volume of approximately 43 mL.    < end of copied text >      < from: NM Hepatobiliary Imaging (11.04.23 @ 12:49) >  ACC: [id number] EXAM:  NM HEPATOBILIARY IMG   ORDERED BY: TIERRA VAZQUEZ     PROCEDURE DATE:  11/04/2023          INTERPRETATION:  CLINICAL INFORMATION: Right upper quadrant pain    DURATION of DYNAMIC SERIES: 80 minutes  RADIOPHARMACEUTICAL: 3.3 mCi Tc-99m-Mebrofenin, I.V.  PHARMACOLOGIC INTERVENTION: None.    TECHNIQUE: Dynamic imaging of the anterior abdomen was performed   following radiopharmaceutical injection. Static images of the abdomen in   the anterior, right anterior oblique, and right lateral views were   obtained immediately thereafter.    COMPARISON: None    OTHER STUDIES USED FOR CORRELATION: CT 11/3/2023    FINDINGS: There is prompt, homogeneous uptake of radiopharmaceutical by   the hepatocytes. Activity is seen in the gallbladder at approximately 50   minutes and in the bowel at approximately 25 minutes. There is good   clearance of activity from the liver by the end of the study.    IMPRESSION: Normal hepatobiliary scan.    No evidence of acute cholcystitis or biliaryobstruction.    --- End of Report ---    < end of copied text >

## 2023-11-04 NOTE — PROGRESS NOTE ADULT - ASSESSMENT
78 year old male with past medical history of PE 2021, HF ef 55-60% , chronic le edema,OA myasthenia gravis, HTN, DM2, HLD, presenting with chest pain.    Son at bedside reports that he was just discharged from rehab on Monday after he was there for a fall .  He endorses that earlier today around 11 AM he was complaining of abdominal pain with nausea vomiting. Has been constipated this week.   Physical therapy came to the house and was working with his legs during this time he developed chest discomfort which lasted for at least an hour after the physical therapist left.    chest pain  - chest pain during PT. chest pain free   -EKG SR with PVCS and LAFB similar to baseline   -troponin neg x1, can check one more time, then stop trending if wnl    -Echo as above 2021 trace mr , lv systolic ef 55-60% , Lv diastolic dysfunction   -no sign of volume overload on room air, aside from chronic LE edema   -history pe on eliquis BID son reports compliance   -le doppler neg for dvt  - continue lasix po    - bp controlled, continue toprol   - Monitor and replete lytes, keep K>4, Mg>2.  - Will continue to follow.    Levy Nieves NP  Nurse Practitioner- Cardiology   Call TEAMS

## 2023-11-04 NOTE — CONSULT NOTE ADULT - ASSESSMENT
78y  Male with h/o PE (6/2021), CHF (EF 55-60% in 6/2021), Myasthenia Gravis, Hypertension, Hyperlipidemia, clubfoot, chronic LE edema presents to ED from home c/o chest pain. Pt was admitted to [facility] from 8/14-8/17 s/p fall and was discharged to [facility], He returned home from Rehab on Monday 10/30 and moved in with his son, Olvin. on 11/3 was complaining of abdominal pain associated with nausea and a few episodes of non-bloody emesis. Later in the day, he was working with physical therapy at home when he developed sudden-onset chest discomfort. The episode lasted a few hours and was not associated with any dizziness, palpitations, shortness of breath. He presented to the ER, noted to be afebrile, leukocytosis to 18.5k. Patient started on Zosyn.    Leukocytosis  Concern for infection    - Blood cultures pending  - Urine Cx pending  - UA with some pyuria   - CT Chest reporting no PNA   - CT A/P reporting left perinephric stranding with left hydronephrosis. Renal cysts. Enlarged prostate.   - US Abd reporting no cholcystitis, no hydronephrosis. Enlarged prostate  - HIDA scan negative for acute cholecystitis   - Continue  Zosyn, if cultures are no growth will D/C antibiotics   - Follow up cultures  - Trend Fever  - Trend WBC      Thank you for allowing me to participate in the care of your patient.   Will Follow    Discussed treatment plan with: Dr Lara

## 2023-11-04 NOTE — H&P ADULT - HISTORY OF PRESENT ILLNESS
77 yo M with PMH of PE (6/2021), CHF (EF 55-60% in 6/2021), Myasthenia Gravis, Hypertension, Hyperlipidemia, clubfoot, chronic LE edema presents to ED from home c/o chest pain. History is obtained from pt's cousin Maranda via telephone as pt is unable to provide 2/2 somnolence. Pt was admitted to [facility] from 8/14-8/17 s/p fall and was discharged to [facility], He returned home from Rehab on Monday and moved in with his son, Olvin. This AM, pt was complaining of abdominal pain associated with nausea and a few episodes of non-bloody emesis. Later in the day, he was working with physical therapy at home when he developed sudden-onset chest discomfort. The episode lasted a few hours and was not associated with any dizziness, palpitations, shortness of breath.    ED course:  Vitals: T 97.6F, HR 81, /89, RR 16, SpO2 97% on RA  Labs: WBC 18.56, Hct 50.4, pro-   UA: many bacteria, large LE, small blood, 32 WBC, 5 RBC  EKG: NSR w PVCs 82bpm  CXR: unremarkable per wet read, pending official read  CT a/p: Left perinephric stranding and moderate to severe left hydronephrosis and hydroureter to the level of the left ureterovesicular junction. No nephrolithiasis. Evaluation of the left ureterovesicular junction is limited by a prominent prostate gland median lobe protruding into the base of the bladder. Bladder mass or distal ureteral stricture cannot be  excluded. Recommend urology consultation. Numerous bilateral renal cysts some of which are likely hemorrhagic. Recommend ultrasound and/or MRI for further evaluation.  Given: IV Ativan 0.5mg x2, IV Zosyn x1, 2L IV NS bolus x1, 0.5L IV NS bolus x1, 0.5L IV NS bolus x1 79 yo M with PMH of PE (6/2021), CHF (EF 55-60% in 6/2021), Myasthenia Gravis, Hypertension, Hyperlipidemia, clubfoot, chronic LE edema presents to ED from home c/o chest pain. History is obtained from pt's cousin Maranda via telephone as pt is unable to provide 2/2 somnolence. Pt was admitted to [facility] from 8/14-8/17 s/p fall and was discharged to [facility], He returned home from Rehab on Monday and moved in with his son, Olvin. This AM, pt was complaining of abdominal pain associated with nausea and a few episodes of non-bloody emesis. Later in the day, he was working with physical therapy at home when he developed sudden-onset chest discomfort. The episode lasted a few hours and was not associated with any dizziness, palpitations, shortness of breath.    ED course:  Vitals: T 97.6F, HR 81, /89, RR 16, SpO2 97% on RA  Labs: WBC 18.56, Hct 50.4, pro-   UA: many bacteria, large LE, small blood, 32 WBC, 5 RBC  EKG: NSR w PVCs 82bpm  CXR: pending read  CT c/a/p: distended gallbladder per wet read, f/u official report   Given: IV Ativan 0.5mg x2, IV Zosyn x1, 2L IV NS bolus x1, 0.5L IV NS bolus x1, 0.5L IV NS bolus x1 79 yo M with PMH of PE (6/2021), CHF (EF 55-60% in 6/2021), Myasthenia Gravis, Hypertension, Hyperlipidemia, clubfoot, chronic LE edema presents to ED from home c/o chest pain. History is obtained from pt's cousin Maranda via telephone as pt is unable to provide 2/2 somnolence. Pt was admitted to [facility]"" from 8/14-8/17 s/p fall and was discharged to [facility], He returned home from Rehab on Monday and moved in with his son, Olvin. This AM, pt was complaining of abdominal pain associated with nausea and a few episodes of non-bloody emesis. Later in the day, he was working with physical therapy at home when he developed sudden-onset chest discomfort. The episode lasted a few hours and was not associated with any dizziness, palpitations, shortness of breath.    ED course:  Vitals: T 97.6F, HR 81, /89, RR 16, SpO2 97% on RA  Labs: WBC 18.56, Hct 50.4, pro-   UA: many bacteria, large LE, small blood, 32 WBC, 5 RBC  EKG: NSR w PVCs 82bpm  CXR: pending read  CT c/a/p: distended gallbladder without stones and patchy RLL infiltrates indicative of PNA per wet read, f/u official report   Given: IV Ativan 0.5mg x2, IV Zosyn x1, 2L IV NS bolus x1, 0.5L IV NS bolus x1, 0.5L IV NS bolus x1 79 yo M with PMH of PE (6/2021), CHF (EF 55-60% in 6/2021), Myasthenia Gravis, Hypertension, Hyperlipidemia, clubfoot, chronic LE edema presents to ED from home c/o chest pain. History is obtained from pt's cousin Maranda via telephone as pt is unable to provide 2/2 somnolence. Pt was admitted to [facility] from 8/14-8/17 s/p fall and was discharged to [facility], He returned home from Rehab on Monday and moved in with his son, Olvin. This AM, pt was complaining of abdominal pain associated with nausea and a few episodes of non-bloody emesis. Later in the day, he was working with physical therapy at home when he developed sudden-onset chest discomfort. The episode lasted a few hours and was not associated with any dizziness, palpitations, shortness of breath.  ED course:  Vitals: T 97.6F, HR 81, /89, RR 16, SpO2 97% on RA  Labs: WBC 18.56, Hct 50.4, pro-   UA: many bacteria, large LE, small blood, 32 WBC, 5 RBC  EKG: NSR w PVCs 82bpm  CXR: pending read  CT c/a/p: distended gallbladder without stones and patchy RLL infiltrates indicative of PNA per wet read, f/u official report   Given: IV Ativan 0.5mg x2, IV Zosyn x1, 2L IV NS bolus x1, 0.5L IV NS bolus x1, 0.5L IV NS bolus x1

## 2023-11-04 NOTE — CARE COORDINATION ASSESSMENT. - OTHER PERTINENT REFERRAL INFORMATION
CLEM spoke with pt marti Max via telephone; pt unable to engage in assessment due to confusion. Pt resides in a split level home where his son Olvin has converted an apartment for him on the first floor. Son Olvin has been staying with him to asst with ADL's. Pt was recently discharged from [facility] where he used about 75 of his medicare days. Pt was getting homecare PTA, however marti Bah does not recall the name of the homecare agency.

## 2023-11-04 NOTE — PATIENT PROFILE ADULT - FALL HARM RISK - PATIENT NEEDS ASSISTANCE
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      Standing/Walking/Toileting/Moving from bed to chair

## 2023-11-04 NOTE — H&P ADULT - TIME BILLING
I personally conducted a physical examination of the patient. I personally gathered the patient's history. I edited the above listed findings which were prepared by the listed resident physician. I personally discussed the plan of care with the patient. CARISSA ed RN, ed provider, surgery PA, attending radiologist

## 2023-11-04 NOTE — PATIENT PROFILE ADULT - NSFALLSECTIONLABEL_GEN_A_CORE
. Double O-Z Flap Text: The defect edges were debeveled with a #15 scalpel blade.  Given the location of the defect, shape of the defect and the proximity to free margins a Double O-Z flap was deemed most appropriate.  Using a sterile surgical marker, an appropriate transposition flap was drawn incorporating the defect and placing the expected incisions within the relaxed skin tension lines where possible. The area thus outlined was incised deep to adipose tissue with a #15 scalpel blade.  The skin margins were undermined to an appropriate distance in all directions utilizing iris scissors.

## 2023-11-04 NOTE — H&P ADULT - ASSESSMENT
77 yo M with PMH of PE (6/2021), CHF (EF 55-60% in 6/2021), Myasthenia Gravis, Hypertension, Hyperlipidemia, clubfoot, chronic LE edema presents to ED from home c/o chest pain, admitted for sepsis 2/2 hydronephrosis.   77 yo M with PMH of PE (6/2021), CHF (EF 55-60% in 6/2021), Myasthenia Gravis, Hypertension, Hyperlipidemia, clubfoot, chronic LE edema presents to ED from home c/o chest pain, admitted for sepsis 2/2 UTI and possible cholecystitis.       77 yo M with PMH of PE (6/2021), CHF (EF 55-60% in 6/2021), Myasthenia Gravis, Hypertension, Hyperlipidemia, clubfoot, chronic LE edema presents to ED from home c/o chest pain, admitted for sepsis 2/2 UTI, suspected cholecystitis and PNA

## 2023-11-04 NOTE — CONSULT NOTE ADULT - NS ATTEND AMEND GEN_ALL_CORE FT
77 yo male chest pain, r/o cholecystitis, h/o PEon eliquis  Abd soft, NT    -Will get HIDA scan
78 year old male with past medical history of PE 2021, HF ef 55-60% , chronic le edema,OA myasthenia gravis, HTN, DM2, HLD, presenting with chest pain.    Son reports that he was just discharged from rehab on Monday after he was there for a fall .  He endorses that earlier today around 11 AM he was complaining of abdominal pain with nausea vomiting. Has been constipated this week.   Physical therapy came to the house and was working with his legs during this time he developed chest discomfort which lasted for at least an hour after the physical therapist left.    EKG SR with PVCS and LAFB similar to baseline   troponin pending.  Trend x 2    Echo as above 2021 trace mr , lv systolic ef 55-60% , Lv diastolic dysfunction   no sign of volume overload on room air, aside from chronic LE edema   history pe on eliquis BID son reports compliance   le doppler pending   repeat echo    bp 160/89 on home toprol    Leukocytosis noted, fu primary recs

## 2023-11-04 NOTE — H&P ADULT - PROBLEM SELECTOR PLAN 5
- On home Praluent subQ [id number] - B/l PE diagnosed in 6/2021  - Continue Eliquis - Continue home Pyridostigmine  - Pt on home Prednisone x years   - Hold home Prednisone and start stress dose steroids in the setting of sepsis   - Start stress dose steroids - IV Solucortef 100mg x1 now followed by IV Solucortef 50mg q8h - Echo (6/2021): EF 55-60%  - No signs/symptoms of volume overload at present  - Continue home Lasix 40mg qd

## 2023-11-04 NOTE — PATIENT PROFILE ADULT - FALL HARM RISK - HARM RISK INTERVENTIONS

## 2023-11-04 NOTE — H&P ADULT - PROBLEM SELECTOR PLAN 3
- B/l PE diagnosed in 6/2021  - Continue Eliquis - Echo (6/2021): EF 55-60%  - No signs/symptoms of volume overload at present  - Continue home Lasix 40mg qd - Chest pain resolved  - EKG: NSR w PVCs  - Trops negative   - Follows with Cardio Dr. Woods outpt   - Cardio (Dr. Woods) consulted in ED, recs appreciated - CT chest concerning for PNA  - Pt sating well on RA at present   - Supplemental O2 PRN   - F/u culture data and legionella urine ag, strep pneumo ag, MRSA PCR   - Continue IV Zosyn

## 2023-11-04 NOTE — CONSULT NOTE ADULT - SUBJECTIVE AND OBJECTIVE BOX
HPI:  77 yo M with PMH of PE (2021), CHF (EF 55-60% in 2021), Myasthenia Gravis, Hypertension, Hyperlipidemia, clubfoot, chronic LE edema presents to ED from home c/o chest pain. History is obtained from pt's cousin Maranda via telephone as pt is unable to provide 2/2 somnolence. Pt was admitted to hospitals from - s/p fall and was discharged to [facility], He returned home from Rehab on Monday and moved in with his son, Olvin. This AM, pt was complaining of abdominal pain associated with nausea and a few episodes of non-bloody emesis. Later in the day, he was working with physical therapy at home when he developed sudden-onset chest discomfort. The episode lasted a few hours and was not associated with any dizziness, palpitations, shortness of breath.    Interval HPI:  Patient seen and examined at bedside, per nurse patient is usually A&Ox3 but at this time he is agitated and will not participate in exam. 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 3.375 Gram(s) IV Intermittent every 8 hours  potassium chloride    Tablet ER 20 milliEquivalent(s) Oral daily  pyridostigmine 60 milliGRAM(s) Oral daily    MEDICATIONS  (PRN):  acetaminophen     Tablet .. 650 milliGRAM(s) Oral every 6 hours PRN Temp greater or equal to 38C (100.4F), Mild Pain (1 - 3)  ondansetron Injectable 4 milliGRAM(s) IV Push every 8 hours PRN Nausea and/or Vomiting      Allergies  levofloxacin (Unknown)  ofloxacin (Unknown)  gatifloxacin (Unknown)    Intolerances  Avelox (Other)  Ketek (Other)  telithromycin (Other)  fluoroquinolone antibiotics (Other)    FAMILY HISTORY:  Family history of stroke (Father)  Father -  age 62    Family history of kidney disease (Mother)  Mother -  age 67    Family history of diabetes mellitus type II (Sibling)  Brother & Sister      Vital Signs Last 24 Hrs  T(C): 36.6 (2023 06:25), Max: 37.4 (2023 00:57)  T(F): 97.9 (2023 06:25), Max: 99.3 (2023 00:57)  HR: 88 (2023 06:25) (81 - 93)  BP: 139/90 (2023 06:25) (131/84 - 160/89)  BP(mean): --  RR: 18 (2023 06:25) (16 - 18)  SpO2: 94% (2023 06:25) (93% - 98%)    Parameters below as of 2023 06:25  Patient On (Oxygen Delivery Method): room air  O2 Flow (L/min): 2      PHYSICAL EXAM:  Constitutional: AAOx1?, agitated  HEENT: NCAT, airway patent  Cardiovascular: RRR, pulses present bilaterally  Respiratory: nonlabored breathing  Gastrointestinal: abdomen soft, nontender, non distended, limited by patient refusal to participate  Neuro: no focal deficits  Extremities: non edematous, no calf pain bilaterally     LABS:                        16.7   18.56 )-----------( 367      ( 2023 17:23 )             50.4     11-03    137  |  99  |  15  ----------------------------<  149<H>  3.7   |  28  |  0.99    Ca    9.2      2023 19:15  Mg     2.0     11-03    TPro  7.2  /  Alb  2.6<L>  /  TBili  0.8  /  DBili  x   /  AST  28  /  ALT  29  /  AlkPhos  84  11-03    PT/INR - ( 2023 19:15 )   PT: 15.9 sec;   INR: 1.37 ratio         PTT - ( 2023 19:15 )  PTT:34.2 sec  Urinalysis Basic - ( 2023 21:40 )    Color: Yellow / Appearance: Cloudy / S.010 / pH: x  Gluc: x / Ketone: Negative mg/dL  / Bili: Negative / Urobili: 1.0 mg/dL   Blood: x / Protein: Negative mg/dL / Nitrite: Negative   Leuk Esterase: Large / RBC: 5 /HPF / WBC 32 /HPF   Sq Epi: x / Non Sq Epi: x / Bacteria: Many /HPF        RADIOLOGY & ADDITIONAL STUDIES:  < from: US Abdomen Upper Quadrant Right (23 @ 04:33) >  IMPRESSION:  Gallbladder sludge.  No gross sonographic evidence for cholelithiasis or   acute cholecystitis.  No right-sided or left-sided hydronephrosis.  Incompletely distended bladder.  Enlarged prostate with volume of approximately 43 mL.    < end of copied text >    *CT report does not correlate with patient CT, linked to the incorrect chart*

## 2023-11-04 NOTE — H&P ADULT - PROBLEM SELECTOR PLAN 2
- Continue home Prednisone and Pyridostigmine - Chest pain resolved  - EKG: NSR w PVCs  - Trops negative   - Follows with Cardio Dr. Woods outpt   - Cardio (Dr. Woods) consulted in ED, recs appreciated - CT a/p concerning for cholecystitis  - F/u RUQ US  - Continue IV Zosyn  - NPO for now   - Surgery consulted, f/u recs - CT a/p concerning for cholecystitis  - F/u RUQ US  - Continue IV Zosyn  - NPO for now pending surgery eval   - Surgery consulted, f/u recs

## 2023-11-04 NOTE — H&P ADULT - PROBLEM SELECTOR PLAN 11
DVT ppx: continue home Eliquis DVT ppx: continue home Eliquis, if need for procedure will switch to heparin gtt for full ac

## 2023-11-04 NOTE — CONSULT NOTE ADULT - ASSESSMENT
79 yo male pmh PE, CHF, myasthenia gravis, HTN, HLD, clubfoot, chronic LE edema, presented w chest pain, admitted for sepsis. Found to be septic, WBC 18K, UTI, ?PNA, ?cholecystitis    Plan:  - HIDA scan today  - pending results of HIDA patient may need to be transferred for perc genaro  - IV abx  - NPO  - continue to trend labs   - pain/nausea management  - rest of care per primary team

## 2023-11-04 NOTE — H&P ADULT - PROBLEM SELECTOR PLAN 6
- Continue home Omeprazole - Continue home Metoprolol and Lasix with hold parameters - b/l PE diagnosed in 6/2021  - Continue Eliquis - Continue home Pyridostigmine  - Pt on home Prednisone x years   - Hold home Prednisone and start stress dose steroids in the setting of sepsis   - Start stress dose steroids - IV Solucortef 100mg x1 now followed by IV Solucortef 50mg q8h

## 2023-11-04 NOTE — H&P ADULT - PROBLEM SELECTOR PLAN 8
- Continue home Omeprazole - On home Praluent subQ [id number] - Continue home Metoprolol and Lasix with hold parameters

## 2023-11-04 NOTE — PROGRESS NOTE ADULT - ASSESSMENT
79 yo M with PMH of PE (6/2021), CHF (EF 55-60% in 6/2021), Myasthenia Gravis, Hypertension, Hyperlipidemia, clubfoot, chronic LE edema presents to ED from home c/o chest pain, admitted for sepsis 2/2 UTI, suspected cholecystitis and PNA

## 2023-11-04 NOTE — H&P ADULT - NSHPPHYSICALEXAM_GEN_ALL_CORE
VITALS:   T(C): 37.4 (11-04-23 @ 00:57), Max: 37.4 (11-04-23 @ 00:57)  HR: 90 (11-04-23 @ 00:57) (81 - 93)  BP: 145/79 (11-04-23 @ 00:57) (145/79 - 160/89)  RR: 17 (11-04-23 @ 00:57) (16 - 17)  SpO2: 93% (11-04-23 @ 00:57) (93% - 97%)    GENERAL: NAD, somnolent  HEAD:  Atraumatic, Normocephalic  EYES: EOMI, PERRLA, conjunctiva and sclera clear  ENT: Moist mucous membranes, no pharyngeal exudates  NECK: Supple, No JVD  CHEST/LUNG: Clear to auscultation bilaterally; No rales, rhonchi, wheezing, or rubs. Unlabored respirations  HEART: Regular rate and rhythm; No murmurs, rubs, or gallops  ABDOMEN: Soft, nontender, nondistended  EXTREMITIES: No clubbing, cyanosis, or edema  NERVOUS SYSTEM: somnolent, unable to participate in exam  SKIN: No rashes or lesions VITALS:   T(C): 37.4 (11-04-23 @ 00:57), Max: 37.4 (11-04-23 @ 00:57)  HR: 90 (11-04-23 @ 00:57) (81 - 93)  BP: 145/79 (11-04-23 @ 00:57) (145/79 - 160/89)  RR: 17 (11-04-23 @ 00:57) (16 - 17)  SpO2: 93% (11-04-23 @ 00:57) (93% - 97%)    GENERAL: NAD, somnolent  HEAD:  Atraumatic, Normocephalic  EYES: EOMI, PERRLA, conjunctiva and sclera clear  ENT: Moist mucous membranes, no pharyngeal exudates  NECK: Supple, No JVD  CHEST/LUNG: Clear to auscultation bilaterally; No rales, rhonchi, wheezing, or rubs. Unlabored respirations  HEART: Regular rate and rhythm; No murmurs, rubs, or gallops  ABDOMEN: Soft, nontender, nondistended  EXTREMITIES: No clubbing, cyanosis, or edema  NERVOUS SYSTEM: somnolent, arousable to verbal commands although unable to participate in exam  SKIN: No rashes or lesions VITALS:   T(C): 37.4 (11-04-23 @ 00:57), Max: 37.4 (11-04-23 @ 00:57)  HR: 90 (11-04-23 @ 00:57) (81 - 93)  BP: 145/79 (11-04-23 @ 00:57) (145/79 - 160/89)  RR: 17 (11-04-23 @ 00:57) (16 - 17)  SpO2: 93% (11-04-23 @ 00:57) (93% - 97%)  GENERAL: NAD  HEAD:  Atraumatic, Normocephalic  EYES: EOMI, PERRLA, conjunctiva and sclera clear  ENT: Moist mucous membranes, no pharyngeal exudates  NECK: Supple, No JVD  CHEST/LUNG: Clear to auscultation bilaterally; No rales, rhonchi, wheezing, or rubs. Unlabored respirations  HEART: Regular rate and rhythm; No murmurs, rubs, or gallops  ABDOMEN: Soft, nontender, nondistended  EXTREMITIES: No clubbing, cyanosis, or edema  NERVOUS SYSTEM:  arousable to verbal commands although unable to participate in exam  SKIN: No rashes or lesions

## 2023-11-04 NOTE — H&P ADULT - REASON FOR ADMISSION
sepsis 2/2 hydronephrosis sepsis 2/2 UTI and suspected cholecystitis sepsis 2/2 UTI, suspected cholecystitis and PNA

## 2023-11-04 NOTE — H&P ADULT - PROBLEM SELECTOR PROBLEM 3
History of pulmonary embolism (HFpEF) heart failure with preserved ejection fraction Chest pain Pneumonia

## 2023-11-04 NOTE — CHART NOTE - NSCHARTNOTEFT_GEN_A_CORE
Spoke to radiologist Dr. Malone to clarify radiology results after discussion with ED provider. CT report in chart from 2040 on Nov 3 does not correlate to patient CT and appears to be linked to the wrong chart. Reading radiologist notified by Dr. Malone so that report can be appended in AM. Per Dr. Malone CT with patchy RLL infiltrates indicative of possible PNA. Gallbladder distended with wall thickening. RUQ US with gallbladder wall thickening and sludge. No stones. H&P has been adjusted to reflect above and plan edited appropriately.

## 2023-11-04 NOTE — H&P ADULT - PROBLEM SELECTOR PROBLEM 5
HLD (hyperlipidemia) History of pulmonary embolism Myasthenia gravis (HFpEF) heart failure with preserved ejection fraction

## 2023-11-04 NOTE — H&P ADULT - PROBLEM SELECTOR PLAN 4
- Continue home Prednisone and Pyridostigmine - Echo (6/2021): EF 55-60%  - No signs/symptoms of volume overload at present  - Continue home Lasix 40mg qd - Chest pain resolved  - EKG: NSR w PVCs  - Trops negative   - Follows with Cardio Dr. Woods outpt   - Cardio (Dr. Woods) consulted in ED, recs appreciated

## 2023-11-04 NOTE — H&P ADULT - ATTENDING COMMENTS
79 yo M with PMH of PE (6/2021), CHF (EF 55-60% in 6/2021), Myasthenia Gravis, Hypertension, Hyperlipidemia, clubfoot, chronic LE edema presents to ED from home c/o chest pain, admitted for sepsis 2/2 UTI, suspected cholecystitis and PNA    Agree with above, Edited where appropriate. See chart note regarding radiology results, the ct report currently in system is incorrect. Plan above reflects discussion with radiologist regarding accurate ct/US results.

## 2023-11-04 NOTE — H&P ADULT - PROBLEM SELECTOR PLAN 7
DVT ppx: continue home Eliquis - On home Praluent subQ [id number] - Continue home Metoprolol and Lasix with hold parameters - b/l PE diagnosed in 6/2021  - Continue Eliquis

## 2023-11-05 ENCOUNTER — TRANSCRIPTION ENCOUNTER (OUTPATIENT)
Age: 78
End: 2023-11-05

## 2023-11-05 LAB
ALBUMIN SERPL ELPH-MCNC: 2.2 G/DL — LOW (ref 3.3–5)
ALBUMIN SERPL ELPH-MCNC: 2.2 G/DL — LOW (ref 3.3–5)
ALP SERPL-CCNC: 74 U/L — SIGNIFICANT CHANGE UP (ref 40–120)
ALP SERPL-CCNC: 74 U/L — SIGNIFICANT CHANGE UP (ref 40–120)
ALT FLD-CCNC: 33 U/L — SIGNIFICANT CHANGE UP (ref 12–78)
ALT FLD-CCNC: 33 U/L — SIGNIFICANT CHANGE UP (ref 12–78)
ANION GAP SERPL CALC-SCNC: 9 MMOL/L — SIGNIFICANT CHANGE UP (ref 5–17)
ANION GAP SERPL CALC-SCNC: 9 MMOL/L — SIGNIFICANT CHANGE UP (ref 5–17)
AST SERPL-CCNC: 28 U/L — SIGNIFICANT CHANGE UP (ref 15–37)
AST SERPL-CCNC: 28 U/L — SIGNIFICANT CHANGE UP (ref 15–37)
BASOPHILS # BLD AUTO: 0.02 K/UL — SIGNIFICANT CHANGE UP (ref 0–0.2)
BASOPHILS # BLD AUTO: 0.02 K/UL — SIGNIFICANT CHANGE UP (ref 0–0.2)
BASOPHILS NFR BLD AUTO: 0.1 % — SIGNIFICANT CHANGE UP (ref 0–2)
BASOPHILS NFR BLD AUTO: 0.1 % — SIGNIFICANT CHANGE UP (ref 0–2)
BILIRUB SERPL-MCNC: 0.9 MG/DL — SIGNIFICANT CHANGE UP (ref 0.2–1.2)
BILIRUB SERPL-MCNC: 0.9 MG/DL — SIGNIFICANT CHANGE UP (ref 0.2–1.2)
BUN SERPL-MCNC: 16 MG/DL — SIGNIFICANT CHANGE UP (ref 7–23)
BUN SERPL-MCNC: 16 MG/DL — SIGNIFICANT CHANGE UP (ref 7–23)
CALCIUM SERPL-MCNC: 9 MG/DL — SIGNIFICANT CHANGE UP (ref 8.5–10.1)
CALCIUM SERPL-MCNC: 9 MG/DL — SIGNIFICANT CHANGE UP (ref 8.5–10.1)
CHLORIDE SERPL-SCNC: 100 MMOL/L — SIGNIFICANT CHANGE UP (ref 96–108)
CHLORIDE SERPL-SCNC: 100 MMOL/L — SIGNIFICANT CHANGE UP (ref 96–108)
CO2 SERPL-SCNC: 31 MMOL/L — SIGNIFICANT CHANGE UP (ref 22–31)
CO2 SERPL-SCNC: 31 MMOL/L — SIGNIFICANT CHANGE UP (ref 22–31)
CREAT SERPL-MCNC: 1 MG/DL — SIGNIFICANT CHANGE UP (ref 0.5–1.3)
CREAT SERPL-MCNC: 1 MG/DL — SIGNIFICANT CHANGE UP (ref 0.5–1.3)
EGFR: 76 ML/MIN/1.73M2 — SIGNIFICANT CHANGE UP
EGFR: 76 ML/MIN/1.73M2 — SIGNIFICANT CHANGE UP
EOSINOPHIL # BLD AUTO: 0 K/UL — SIGNIFICANT CHANGE UP (ref 0–0.5)
EOSINOPHIL # BLD AUTO: 0 K/UL — SIGNIFICANT CHANGE UP (ref 0–0.5)
EOSINOPHIL NFR BLD AUTO: 0 % — SIGNIFICANT CHANGE UP (ref 0–6)
EOSINOPHIL NFR BLD AUTO: 0 % — SIGNIFICANT CHANGE UP (ref 0–6)
GLUCOSE SERPL-MCNC: 121 MG/DL — HIGH (ref 70–99)
GLUCOSE SERPL-MCNC: 121 MG/DL — HIGH (ref 70–99)
HCT VFR BLD CALC: 44.3 % — SIGNIFICANT CHANGE UP (ref 39–50)
HCT VFR BLD CALC: 44.3 % — SIGNIFICANT CHANGE UP (ref 39–50)
HGB BLD-MCNC: 14.7 G/DL — SIGNIFICANT CHANGE UP (ref 13–17)
HGB BLD-MCNC: 14.7 G/DL — SIGNIFICANT CHANGE UP (ref 13–17)
IMM GRANULOCYTES NFR BLD AUTO: 1.2 % — HIGH (ref 0–0.9)
IMM GRANULOCYTES NFR BLD AUTO: 1.2 % — HIGH (ref 0–0.9)
LYMPHOCYTES # BLD AUTO: 1.25 K/UL — SIGNIFICANT CHANGE UP (ref 1–3.3)
LYMPHOCYTES # BLD AUTO: 1.25 K/UL — SIGNIFICANT CHANGE UP (ref 1–3.3)
LYMPHOCYTES # BLD AUTO: 7.7 % — LOW (ref 13–44)
LYMPHOCYTES # BLD AUTO: 7.7 % — LOW (ref 13–44)
MCHC RBC-ENTMCNC: 31.5 PG — SIGNIFICANT CHANGE UP (ref 27–34)
MCHC RBC-ENTMCNC: 31.5 PG — SIGNIFICANT CHANGE UP (ref 27–34)
MCHC RBC-ENTMCNC: 33.2 GM/DL — SIGNIFICANT CHANGE UP (ref 32–36)
MCHC RBC-ENTMCNC: 33.2 GM/DL — SIGNIFICANT CHANGE UP (ref 32–36)
MCV RBC AUTO: 95.1 FL — SIGNIFICANT CHANGE UP (ref 80–100)
MCV RBC AUTO: 95.1 FL — SIGNIFICANT CHANGE UP (ref 80–100)
MONOCYTES # BLD AUTO: 1.22 K/UL — HIGH (ref 0–0.9)
MONOCYTES # BLD AUTO: 1.22 K/UL — HIGH (ref 0–0.9)
MONOCYTES NFR BLD AUTO: 7.6 % — SIGNIFICANT CHANGE UP (ref 2–14)
MONOCYTES NFR BLD AUTO: 7.6 % — SIGNIFICANT CHANGE UP (ref 2–14)
NEUTROPHILS # BLD AUTO: 13.46 K/UL — HIGH (ref 1.8–7.4)
NEUTROPHILS # BLD AUTO: 13.46 K/UL — HIGH (ref 1.8–7.4)
NEUTROPHILS NFR BLD AUTO: 83.4 % — HIGH (ref 43–77)
NEUTROPHILS NFR BLD AUTO: 83.4 % — HIGH (ref 43–77)
NRBC # BLD: 0 /100 WBCS — SIGNIFICANT CHANGE UP (ref 0–0)
NRBC # BLD: 0 /100 WBCS — SIGNIFICANT CHANGE UP (ref 0–0)
PLATELET # BLD AUTO: 311 K/UL — SIGNIFICANT CHANGE UP (ref 150–400)
PLATELET # BLD AUTO: 311 K/UL — SIGNIFICANT CHANGE UP (ref 150–400)
POTASSIUM SERPL-MCNC: 3.7 MMOL/L — SIGNIFICANT CHANGE UP (ref 3.5–5.3)
POTASSIUM SERPL-MCNC: 3.7 MMOL/L — SIGNIFICANT CHANGE UP (ref 3.5–5.3)
POTASSIUM SERPL-SCNC: 3.7 MMOL/L — SIGNIFICANT CHANGE UP (ref 3.5–5.3)
POTASSIUM SERPL-SCNC: 3.7 MMOL/L — SIGNIFICANT CHANGE UP (ref 3.5–5.3)
PROT SERPL-MCNC: 6.3 G/DL — SIGNIFICANT CHANGE UP (ref 6–8.3)
PROT SERPL-MCNC: 6.3 G/DL — SIGNIFICANT CHANGE UP (ref 6–8.3)
RBC # BLD: 4.66 M/UL — SIGNIFICANT CHANGE UP (ref 4.2–5.8)
RBC # BLD: 4.66 M/UL — SIGNIFICANT CHANGE UP (ref 4.2–5.8)
RBC # FLD: 17 % — HIGH (ref 10.3–14.5)
RBC # FLD: 17 % — HIGH (ref 10.3–14.5)
S PNEUM AG UR QL: NEGATIVE — SIGNIFICANT CHANGE UP
S PNEUM AG UR QL: NEGATIVE — SIGNIFICANT CHANGE UP
SODIUM SERPL-SCNC: 140 MMOL/L — SIGNIFICANT CHANGE UP (ref 135–145)
SODIUM SERPL-SCNC: 140 MMOL/L — SIGNIFICANT CHANGE UP (ref 135–145)
WBC # BLD: 16.14 K/UL — HIGH (ref 3.8–10.5)
WBC # BLD: 16.14 K/UL — HIGH (ref 3.8–10.5)
WBC # FLD AUTO: 16.14 K/UL — HIGH (ref 3.8–10.5)
WBC # FLD AUTO: 16.14 K/UL — HIGH (ref 3.8–10.5)

## 2023-11-05 PROCEDURE — 99232 SBSQ HOSP IP/OBS MODERATE 35: CPT

## 2023-11-05 RX ORDER — ASCORBIC ACID 60 MG
500 TABLET,CHEWABLE ORAL
Refills: 0 | Status: DISCONTINUED | OUTPATIENT
Start: 2023-11-05 | End: 2023-11-08

## 2023-11-05 RX ADMIN — PIPERACILLIN AND TAZOBACTAM 25 GRAM(S): 4; .5 INJECTION, POWDER, LYOPHILIZED, FOR SOLUTION INTRAVENOUS at 21:19

## 2023-11-05 RX ADMIN — PYRIDOSTIGMINE BROMIDE 60 MILLIGRAM(S): 60 SOLUTION ORAL at 11:58

## 2023-11-05 RX ADMIN — PANTOPRAZOLE SODIUM 40 MILLIGRAM(S): 20 TABLET, DELAYED RELEASE ORAL at 05:39

## 2023-11-05 RX ADMIN — Medication 50 MILLIGRAM(S): at 05:38

## 2023-11-05 RX ADMIN — APIXABAN 5 MILLIGRAM(S): 2.5 TABLET, FILM COATED ORAL at 17:37

## 2023-11-05 RX ADMIN — Medication 50 MILLIGRAM(S): at 21:23

## 2023-11-05 RX ADMIN — Medication 20 MILLIEQUIVALENT(S): at 11:58

## 2023-11-05 RX ADMIN — APIXABAN 5 MILLIGRAM(S): 2.5 TABLET, FILM COATED ORAL at 05:39

## 2023-11-05 RX ADMIN — Medication 40 MILLIGRAM(S): at 05:39

## 2023-11-05 RX ADMIN — PIPERACILLIN AND TAZOBACTAM 25 GRAM(S): 4; .5 INJECTION, POWDER, LYOPHILIZED, FOR SOLUTION INTRAVENOUS at 13:51

## 2023-11-05 RX ADMIN — Medication 50 MILLIGRAM(S): at 15:43

## 2023-11-05 RX ADMIN — Medication 650 MILLIGRAM(S): at 19:17

## 2023-11-05 RX ADMIN — Medication 500 MILLIGRAM(S): at 17:37

## 2023-11-05 RX ADMIN — PIPERACILLIN AND TAZOBACTAM 25 GRAM(S): 4; .5 INJECTION, POWDER, LYOPHILIZED, FOR SOLUTION INTRAVENOUS at 05:39

## 2023-11-05 NOTE — DIETITIAN INITIAL EVALUATION ADULT - PROBLEM SELECTOR PLAN 2
- CT a/p concerning for cholecystitis  - F/u RUQ US  - Continue IV Zosyn  - NPO for now pending surgery eval   - Surgery consulted, f/u recs

## 2023-11-05 NOTE — DIETITIAN INITIAL EVALUATION ADULT - PROBLEM SELECTOR PLAN 3
- CT chest concerning for PNA  - Pt sating well on RA at present   - Supplemental O2 PRN   - F/u culture data and legionella urine ag, strep pneumo ag, MRSA PCR   - Continue IV Zosyn

## 2023-11-05 NOTE — DIETITIAN INITIAL EVALUATION ADULT - PERTINENT MEDS FT
MEDICATIONS  (STANDING):  apixaban 5 milliGRAM(s) Oral every 12 hours  furosemide    Tablet 40 milliGRAM(s) Oral daily  hydrocortisone sodium succinate Injectable 50 milliGRAM(s) IV Push every 8 hours  influenza  Vaccine (HIGH DOSE) 0.7 milliLiter(s) IntraMuscular once  metoprolol succinate ER 25 milliGRAM(s) Oral daily  pantoprazole    Tablet 40 milliGRAM(s) Oral before breakfast  piperacillin/tazobactam IVPB.. 3.375 Gram(s) IV Intermittent every 8 hours  potassium chloride    Tablet ER 20 milliEquivalent(s) Oral daily  pyridostigmine 60 milliGRAM(s) Oral daily    MEDICATIONS  (PRN):  acetaminophen     Tablet .. 650 milliGRAM(s) Oral every 6 hours PRN Temp greater or equal to 38C (100.4F), Mild Pain (1 - 3)  ondansetron Injectable 4 milliGRAM(s) IV Push every 8 hours PRN Nausea and/or Vomiting

## 2023-11-05 NOTE — DISCHARGE NOTE PROVIDER - PROVIDER TOKENS
PROVIDER:[TOKEN:[[id number]]],PROVIDER:[TOKEN:[[id number]],FOLLOWUP:[2 weeks]] PROVIDER:[TOKEN:[[id number]]],PROVIDER:[TOKEN:[[id number]],FOLLOWUP:[2 weeks]],PROVIDER:[TOKEN:[[id number]]]

## 2023-11-05 NOTE — PROGRESS NOTE ADULT - ASSESSMENT
78y  Male with h/o PE (6/2021), CHF (EF 55-60% in 6/2021), Myasthenia Gravis, Hypertension, Hyperlipidemia, clubfoot, chronic LE edema presents to ED from home c/o chest pain. Pt was admitted to [facility] from 8/14-8/17 s/p fall and was discharged to [facility], He returned home from Rehab on Monday 10/30 and moved in with his son, Olvin. on 11/3 was complaining of abdominal pain associated with nausea and a few episodes of non-bloody emesis. Later in the day, he was working with physical therapy at home when he developed sudden-onset chest discomfort. The episode lasted a few hours and was not associated with any dizziness, palpitations, shortness of breath. He presented to the ER, noted to be afebrile, leukocytosis to 18.5k. Patient started on Zosyn.    Leukocytosis  Concern for infection    - Blood cultures 11/3 no growth   - Urine Cx pending  - UA with some pyuria   - CT Chest reporting no PNA   - CT A/P reporting left perinephric stranding with left hydronephrosis. Renal cysts. Enlarged prostate.   - US Abd reporting no cholecystitis no hydronephrosis. Enlarged prostate  - HIDA scan negative for acute cholecystitis   - Continue  Zosyn, if cultures are no growth will D/C antibiotics   - Follow up cultures  - Trend Fever  - Trend WBC      Will Follow    Discussed treatment plan with: Dr Lara

## 2023-11-05 NOTE — DISCHARGE NOTE PROVIDER - CARE PROVIDER_API CALL
Vinh Woosd  Cardiology  [address] [place] [address]  Phone: [phone]  Fax: [phone]  Follow Up Time:     Rober Sanchez  Pulmonary Disease  [address] [facility] [address] [facility], [address]  [place] [address]  Phone: [phone]  Fax: [phone]  Follow Up Time: 2 weeks   Vinh Woods  Cardiology  [address] [place] [address]  Phone: [phone]  Fax: [phone]  Follow Up Time:     Rober Sanchez  Pulmonary Disease  [address] [facility], [address]  [place] [address]  Phone: [phone]  Fax: [phone]  Follow Up Time: 2 weeks    [facility]  Follow Up Time:

## 2023-11-05 NOTE — DISCHARGE NOTE PROVIDER - NSDCMRMEDTOKEN_GEN_ALL_CORE_FT
apixaban 5 mg oral tablet: 1 tab(s) orally 2 times a day  furosemide 40 mg oral tablet: 1 tab(s) orally once a day  metoprolol succinate 25 mg oral tablet, extended release: 1 tab(s) orally once a day  omeprazole 40 mg oral delayed release capsule: 1 cap(s) orally once a day  Potassium Chloride (Eqv-Klor-Con M20) 20 mEq oral tablet, extended release: 1 tab(s) orally once a day  Praluent Pen 75 mg/mL subcutaneous solution: 75 milligram(s) subcutaneous every 2 weeks  predniSONE 2.5 mg oral tablet: 3 tab(s) orally once a day  pyRIDostigmine 60 mg oral tablet: 1 tab(s) orally once a day   apixaban 5 mg oral tablet: 1 tab(s) orally 2 times a day  ascorbic acid 500 mg oral tablet: 1 tab(s) orally 2 times a day  cefpodoxime 200 mg oral tablet: 1 tab(s) orally every 12 hours  furosemide 40 mg oral tablet: 1 tab(s) orally once a day  metoprolol succinate 25 mg oral tablet, extended release: 1 tab(s) orally once a day  Multiple Vitamins oral tablet: 1 tab(s) orally once a day  omeprazole 40 mg oral delayed release capsule: 1 cap(s) orally once a day  Potassium Chloride (Eqv-Klor-Con M20) 20 mEq oral tablet, extended release: 1 tab(s) orally once a day  Praluent Pen 75 mg/mL subcutaneous solution: 75 milligram(s) subcutaneous every 2 weeks  predniSONE 2.5 mg oral tablet: 3 tab(s) orally once a day  pyRIDostigmine 60 mg oral tablet: 1 tab(s) orally once a day   apixaban 5 mg oral tablet: 1 tab(s) orally 2 times a day  ascorbic acid 500 mg oral tablet: 1 tab(s) orally 2 times a day  cefpodoxime 200 mg oral tablet: 1 tab(s) orally every 12 hours Take 1 tablet tonight at 6 PM  furosemide 40 mg oral tablet: 1 tab(s) orally once a day  metoprolol succinate 25 mg oral tablet, extended release: 1 tab(s) orally once a day  Multiple Vitamins oral tablet: 1 tab(s) orally once a day  omeprazole 40 mg oral delayed release capsule: 1 cap(s) orally once a day  Potassium Chloride (Eqv-Klor-Con M20) 20 mEq oral tablet, extended release: 1 tab(s) orally once a day Resume taking this tomorrow, 11/9/23  Praluent Pen 75 mg/mL subcutaneous solution: 75 milligram(s) subcutaneous every 2 weeks  predniSONE 2.5 mg oral tablet: 3 tab(s) orally once a day  pyRIDostigmine 60 mg oral tablet: 1 tab(s) orally once a day   cefpodoxime 200 mg oral tablet: 1 tab(s) orally every 12 hours Take 1 tablet tonight at 6 PM  Eliquis 5 mg oral tablet: 1 tab(s) orally 2 times a day  furosemide 40 mg oral tablet: 1 tab(s) orally once a day  metoprolol succinate 25 mg oral tablet, extended release: 1 tab(s) orally once a day  Multiple Vitamins oral tablet: 1 tab(s) orally once a day  omeprazole 40 mg oral delayed release capsule: 1 cap(s) orally once a day  Potassium Chloride (Eqv-Klor-Con M20) 20 mEq oral tablet, extended release: 1 tab(s) orally once a day Resume taking this tomorrow, 11/9/23  Praluent Pen 75 mg/mL subcutaneous solution: 75 milligram(s) subcutaneous every 2 weeks  predniSONE 2.5 mg oral tablet: 3 tab(s) orally once a day

## 2023-11-05 NOTE — DIETITIAN INITIAL EVALUATION ADULT - PROBLEM SELECTOR PLAN 6
- Continue home Pyridostigmine  - Pt on home Prednisone x years   - Hold home Prednisone and start stress dose steroids in the setting of sepsis   - Start stress dose steroids - IV Solucortef 100mg x1 now followed by IV Solucortef 50mg q8h

## 2023-11-05 NOTE — DIETITIAN INITIAL EVALUATION ADULT - NSFNSPHYEXAMSKINFT_GEN_A_CORE
Pressure Injury 1: sacrum, Stage II  Pressure Injury 2: Right:, elbow, Stage I  Pressure Injury 3: none, none  Pressure Injury 4: none, none  Pressure Injury 5: none, none  Pressure Injury 6: none, none  Pressure Injury 7: none, none  Pressure Injury 8: none, none  Pressure Injury 9: none, none  Pressure Injury 10: none, none  Pressure Injury 11: none, none, Pressure Injury 1: sacrum, Stage II  Pressure Injury 2: Right:, elbow, Stage I  Pressure Injury 3: none, none  Pressure Injury 4: none, none  Pressure Injury 5: none, none  Pressure Injury 6: none, none  Pressure Injury 7: none, none  Pressure Injury 8: none, none  Pressure Injury 9: none, none  Pressure Injury 10: none, none  Pressure Injury 11: none, none

## 2023-11-05 NOTE — DISCHARGE NOTE PROVIDER - CARE PROVIDERS DIRECT ADDRESSES
[email or web address]

## 2023-11-05 NOTE — DIETITIAN INITIAL EVALUATION ADULT - PROBLEM SELECTOR PLAN 5
- Echo (6/2021): EF 55-60%  - No signs/symptoms of volume overload at present  - Continue home Lasix 40mg qd

## 2023-11-05 NOTE — DISCHARGE NOTE PROVIDER - NSDCCPCAREPLAN_GEN_ALL_CORE_FT
PRINCIPAL DISCHARGE DIAGNOSIS  Diagnosis: Sepsis  Assessment and Plan of Treatment: You were admitted for sepsis, suspected to be due to a UTI and pneumonia. You were treated with IV antibiotics. Please take your last dose of oral antibiotics (Cefpodoxime) this evening at 6 PM.   - Your CT chest showed findings concerning for granulomas, it is recommended that you schedule an appointment with a pulmonologist after discharge for further evaluation and imaging.  - Follow up with your primary care doctor within 1 week for follow up of pending infectious workup tests and further management.      SECONDARY DISCHARGE DIAGNOSES  Diagnosis: Acute UTI  Assessment and Plan of Treatment:

## 2023-11-05 NOTE — CONSULT NOTE ADULT - REASON FOR ADMISSION
sepsis 2/2 UTI, suspected cholecystitis and PNA
chest pain

## 2023-11-05 NOTE — DIETITIAN INITIAL EVALUATION ADULT - PROBLEM SELECTOR PLAN 1
Pt meets sepsis criteria on admission 2/2 UTI, possible cholecystitis and PNA  - CT c/a/p: distended gallbladder without stones and patchy RLL infiltrates indicative of PNA per wet read, f/u official report   - UA: many bacteria, large LE, small blood, 32 WBC, 5 RBC  - Lactate 3.9->2.6  - Given 2L IV NS bolus x1, 0.5L IV NS bolus x1, 0.5L IV NS bolus x1, IV Zosyn in ED  - Continue IV Zosyn   - Will hold off on further IVF given Hx of CHF  - Trend WBC and monitor for fever  - F/u UCx, BCx x2  - Tylenol PRN for fever/pain   - ID (Dr. Mckinley) consulted, f/u recs

## 2023-11-05 NOTE — DISCHARGE NOTE PROVIDER - NSDCFUSCHEDAPPT_GEN_ALL_CORE_FT
Vinh Woods  [facility]  CARDIOLOGY [address] [facility] [address]  Scheduled Appointment: 11/14/2023

## 2023-11-05 NOTE — DIETITIAN INITIAL EVALUATION ADULT - PROBLEM SELECTOR PLAN 9
DISPLAY PLAN FREE TEXT DISPLAY PLAN FREE TEXT DISPLAY PLAN FREE TEXT DISPLAY PLAN FREE TEXT DISPLAY PLAN FREE TEXT DISPLAY PLAN FREE TEXT DISPLAY PLAN FREE TEXT DISPLAY PLAN FREE TEXT DISPLAY PLAN FREE TEXT DISPLAY PLAN FREE TEXT - On home Praluent subQ [email or web address]

## 2023-11-05 NOTE — DIETITIAN INITIAL EVALUATION ADULT - PROBLEM SELECTOR PLAN 4
- Chest pain resolved  - EKG: NSR w PVCs  - Trops negative   - Follows with Cardio Dr. Woods outpt   - Cardio (Dr. Woods) consulted in ED, recs appreciated

## 2023-11-05 NOTE — DIETITIAN INITIAL EVALUATION ADULT - PERTINENT LABORATORY DATA
11-05    140  |  100  |  16  ----------------------------<  121<H>  3.7   |  31  |  1.00    Ca    9.0      05 Nov 2023 07:40  Mg     2.0     11-03    TPro  6.3  /  Alb  2.2<L>  /  TBili  0.9  /  DBili  x   /  AST  28  /  ALT  33  /  AlkPhos  74  11-05  POCT Blood Glucose.: 105 mg/dL (11-04-23 @ 21:26)  A1C with Estimated Average Glucose Result: 5.6 % (08-15-23 @ 04:52)

## 2023-11-05 NOTE — DISCHARGE NOTE PROVIDER - ATTENDING DISCHARGE PHYSICAL EXAMINATION:
General: laying in bed, NAD  Neurology: A&Ox3, nonfocal, BURNS x 4  Respiratory: CTA B/L  CV: RRR, S1S2, no murmurs, rubs or gallops  Abdominal: Soft, NT, ND +BS  Extremities: No edema, + peripheral pulses    Vital Signs Last 24 Hrs  T(C): 36.5 (08 Nov 2023 05:14), Max: 36.6 (07 Nov 2023 12:17)  T(F): 97.7 (08 Nov 2023 05:14), Max: 97.9 (07 Nov 2023 12:17)  HR: 50 (08 Nov 2023 05:14) (50 - 62)  BP: 127/70 (08 Nov 2023 05:14) (119/69 - 128/76)  BP(mean): --  RR: 17 (08 Nov 2023 05:14) (17 - 18)  SpO2: 95% (08 Nov 2023 05:14) (94% - 95%)    Parameters below as of 08 Nov 2023 05:14  Patient On (Oxygen Delivery Method): room air

## 2023-11-05 NOTE — CONSULT NOTE ADULT - ASSESSMENT
r/o acute ccy    no further GI symptoms  hida negative  ct and u/s noted  Advance diet as tolerated  cont iv antibiotics per primary for  infection  will follow

## 2023-11-05 NOTE — PROGRESS NOTE ADULT - ASSESSMENT
78 year old male with past medical history of PE 2021, HF ef 55-60% , chronic le edema,OA myasthenia gravis, HTN, DM2, HLD, presenting with chest pain.      Chest pain   - recent discharge from rehab, with home PT service, he developed chest discomfort during and post session that lasted at least an hour  - no anginal complaints during exam     - EKG SR with PVCs and LAFB similar to baseline   - troponin neg x1, no trend there after     - TTE: (2021) trace MR LVSF normal EF 55-60% , LVDD   - no sign of volume overload, on O2 via NC, wean as tolerated  - + chronic LE edema   - continue Lasix po    - know hx of PE on Eliquis BID  - LE doppler neg for DVT  - HR, BP controlled and stable   - continue Toprol  - Monitor and replete Lytes. Keep K > 4 and Mg > 2    - Will continue to follow.    April Hutchinson, [facility]  Nurse Practitioner - Cardiology   call TEAMS

## 2023-11-05 NOTE — DIETITIAN INITIAL EVALUATION ADULT - OTHER INFO
79 y/o male adm with sepsis, poss cholecystitis and pneumonia. PMH HLD, UTI, DM, HTN, myasthenia gravis, HF, PE. Pt visited at bedside this morning. Pt reports that his appetite is good, tolerating diet well, no complaints. Pt usually has a good appetite. No problems chewing or swallowing. Pt's diet recall indicates that pt consumes an adequate intake of HBV proteins. Verbally reviewed DASH/TLC diet with pt. Pt verbalized understanding.

## 2023-11-05 NOTE — DISCHARGE NOTE PROVIDER - HOSPITAL COURSE
ADMISSION H+P:    HPI:  79 yo M with PMH of PE (6/2021), CHF (EF 55-60% in 6/2021), Myasthenia Gravis, Hypertension, Hyperlipidemia, clubfoot, chronic LE edema presents to ED from home c/o chest pain. History is obtained from pt's cousin Maranda via telephone as pt is unable to provide 2/2 somnolence. Pt was admitted to [facility] from 8/14-8/17 s/p fall and was discharged to [facility], He returned home from Rehab on Monday and moved in with his son, Olvin. This AM, pt was complaining of abdominal pain associated with nausea and a few episodes of non-bloody emesis. Later in the day, he was working with physical therapy at home when he developed sudden-onset chest discomfort. The episode lasted a few hours and was not associated with any dizziness, palpitations, shortness of breath.  ED course:  Vitals: T 97.6F, HR 81, /89, RR 16, SpO2 97% on RA  Labs: WBC 18.56, Hct 50.4, pro-   UA: many bacteria, large LE, small blood, 32 WBC, 5 RBC  EKG: NSR w PVCs 82bpm  CXR: pending read  CT c/a/p: distended gallbladder without stones and patchy RLL infiltrates indicative of PNA per wet read, f/u official report   Given: IV Ativan 0.5mg x2, IV Zosyn x1, 2L IV NS bolus x1, 0.5L IV NS bolus x1, 0.5L IV NS bolus x1 (04 Nov 2023 03:21)      ---  HOSPITAL COURSE: Patient admitted for sepsis 2/2 UTI, suspected cholecystitis and PNA. HIDA negative. UCx >100,000 GNR. Patient treated with IV Zosyn.     Patient was medically optimized and improved clinically throughout hospital course. Patient seen and examined on day of discharge.    Vital Signs    Physical Exam:    Patient is medically stable for discharge to ____ with outpatient follow up.  ---  CONSULTANTS:     ---  TIME SPENT:   I, the attending physician, was physically present for the key portions of the evaluation and management (E/M) service provided. The total amount of time spent reviewing the hospital course, laboratory values, imaging findings, assessing/counseling the patient, discussing with consultant physicians, social work, nursing staff was -- minutes.     ---  FINAL DISCHARGE DIAGNOSIS LIST:  Please see last daily progress note for final discharge diagnoses    ---  Primary care provider was made aware of plan for discharge:  [    ] NO     [     ] YES       ADMISSION H+P:    HPI:  77 yo M with PMH of PE (6/2021), CHF (EF 55-60% in 6/2021), Myasthenia Gravis, Hypertension, Hyperlipidemia, clubfoot, chronic LE edema presents to ED from home c/o chest pain. History is obtained from pt's cousin Maranda via telephone as pt is unable to provide 2/2 somnolence. Pt was admitted to [facility] from 8/14-8/17 s/p fall and was discharged to [facility], He returned home from Rehab on Monday and moved in with his son, Olvin. This AM, pt was complaining of abdominal pain associated with nausea and a few episodes of non-bloody emesis. Later in the day, he was working with physical therapy at home when he developed sudden-onset chest discomfort. The episode lasted a few hours and was not associated with any dizziness, palpitations, shortness of breath.  ED course:  Vitals: T 97.6F, HR 81, /89, RR 16, SpO2 97% on RA  Labs: WBC 18.56, Hct 50.4, pro-   UA: many bacteria, large LE, small blood, 32 WBC, 5 RBC  EKG: NSR w PVCs 82bpm  CXR: pending read  CT c/a/p: distended gallbladder without stones and patchy RLL infiltrates indicative of PNA per wet read, f/u official report   Given: IV Ativan 0.5mg x2, IV Zosyn x1, 2L IV NS bolus x1, 0.5L IV NS bolus x1, 0.5L IV NS bolus x1 (04 Nov 2023 03:21)      ---  HOSPITAL COURSE: Patient admitted for sepsis 2/2 UTI, suspected cholecystitis and PNA. HIDA negative. UCx >100,000 GNR (klebsiella). Patient treated with IV Zosyn and then transitioned to PO antibiotics. Patient's symptoms improved throughout hospital course. PT evaluated patient, however patient mostly wheelchair bound and usually has assistance with transfers. CT chest concerning for granulomas, recommend outpatient Pulm evaluation.    Patient was medically optimized and improved clinically throughout hospital course. Patient seen and examined on day of discharge.    Vital Signs    Physical Exam:    Patient is medically stable for discharge to ____ with outpatient follow up.  ---  CONSULTANTS:   Dr. Connor (ID)  Dr. Silva (Surgery)  Dr. Woods (Cardio)  Dr. Burgos (GI)    ---  TIME SPENT:   I, the attending physician, was physically present for the key portions of the evaluation and management (E/M) service provided. The total amount of time spent reviewing the hospital course, laboratory values, imaging findings, assessing/counseling the patient, discussing with consultant physicians, social work, nursing staff was -- minutes.     ---  Primary care provider was made aware of plan for discharge:  [    ] NO     [     ] YES       ADMISSION H+P:    HPI:  79 yo M with PMH of PE (6/2021), CHF (EF 55-60% in 6/2021), Myasthenia Gravis, Hypertension, Hyperlipidemia, clubfoot, chronic LE edema presents to ED from home c/o chest pain. History is obtained from pt's cousin Maranda via telephone as pt is unable to provide 2/2 somnolence. Pt was admitted to [facility] from 8/14-8/17 s/p fall and was discharged to [facility], He returned home from Rehab on Monday and moved in with his son, Olvin. This AM, pt was complaining of abdominal pain associated with nausea and a few episodes of non-bloody emesis. Later in the day, he was working with physical therapy at home when he developed sudden-onset chest discomfort. The episode lasted a few hours and was not associated with any dizziness, palpitations, shortness of breath.  ED course:  Vitals: T 97.6F, HR 81, /89, RR 16, SpO2 97% on RA  Labs: WBC 18.56, Hct 50.4, pro-   UA: many bacteria, large LE, small blood, 32 WBC, 5 RBC  EKG: NSR w PVCs 82bpm  CXR: pending read  CT c/a/p: distended gallbladder without stones and patchy RLL infiltrates indicative of PNA per wet read, f/u official report   Given: IV Ativan 0.5mg x2, IV Zosyn x1, 2L IV NS bolus x1, 0.5L IV NS bolus x1, 0.5L IV NS bolus x1 (04 Nov 2023 03:21)      ---  HOSPITAL COURSE: Patient admitted for sepsis 2/2 UTI, suspected cholecystitis and PNA. HIDA negative. UCx >100,000 GNR (klebsiella). Patient treated with IV Zosyn and then transitioned to PO antibiotics. Patient's symptoms improved throughout hospital course. PT evaluated patient, however patient mostly wheelchair bound and usually has assistance with transfers. CT chest concerning for granulomas, recommend outpatient Pulm evaluation.    Patient was medically optimized and improved clinically throughout hospital course. Patient seen and examined on day of discharge.    Patient is medically stable for discharge with outpatient follow up.    ---  CONSULTANTS:   Dr. Connor (ID)  Dr. Silva (Surgery)  Dr. Woods (Cardio)  Dr. Burgos (GI) ADMISSION H+P:    HPI:  77 yo M with PMH of PE (6/2021), CHF (EF 55-60% in 6/2021), Myasthenia Gravis, Hypertension, Hyperlipidemia, clubfoot, chronic LE edema presents to ED from home c/o chest pain. History is obtained from pt's cousin Maranda via telephone as pt is unable to provide 2/2 somnolence. Pt was [facility]14[facility]8/17 s/p fall and was discharged to [facility], He returned home from Rehab on Monday and moved in with his son, Olvin. This AM, pt was complaining of abdominal pain associated with nausea and a few episodes of non-bloody emesis. Later in the day, he was working with physical therapy at home when he developed sudden-onset chest discomfort. The episode lasted a few hours and was not associated with any dizziness, palpitations, shortness of breath.  ED course:  Vitals: T 97.6F, HR 81, /89, RR 16, SpO2 97% on RA  Labs: WBC 18.56, Hct 50.4, pro-   UA: many bacteria, large LE, small blood, 32 WBC, 5 RBC  EKG: NSR w PVCs 82bpm  CXR: pending read  CT c/a/p: distended gallbladder without stones and patchy RLL infiltrates indicative of PNA per wet read, f/u official report   Given: IV Ativan 0.5mg x2, IV Zosyn x1, 2L IV NS bolus x1, 0.5L IV NS bolus x1, 0.5L IV NS bolus x1 (04 Nov 2023 03:21)      ---  HOSPITAL COURSE: Patient admitted for sepsis 2/2 UTI, suspected cholecystitis and PNA. HIDA negative. UCx >100,000 GNR (klebsiella). Patient treated with IV Zosyn and then transitioned to PO antibiotics. Patient's symptoms improved throughout hospital course. PT evaluated patient, however patient mostly wheelchair bound and usually has assistance with transfers. CT chest concerning for granulomas, recommend outpatient Pulm evaluation.     Patient was medically optimized and improved clinically throughout hospital course. Patient seen and examined on day of discharge. Patient is medically stable for discharge with outpatient follow up. Discharge meds reviewed with son Olvin - he reports that patient takes Eliquis (no longer on generic apixaban) which he will resume at home and he is no longer on pyridostigmine (son confirmed it was not dispensed with his retail and mail order pharmacies).    ---  CONSULTANTS:   Dr. Connor (ID)  Dr. Silva (Surgery)  Dr. Woods (Cardio)  Dr. Burgos (GI)

## 2023-11-06 ENCOUNTER — TRANSCRIPTION ENCOUNTER (OUTPATIENT)
Age: 78
End: 2023-11-06

## 2023-11-06 LAB
ANION GAP SERPL CALC-SCNC: 8 MMOL/L — SIGNIFICANT CHANGE UP (ref 5–17)
ANION GAP SERPL CALC-SCNC: 8 MMOL/L — SIGNIFICANT CHANGE UP (ref 5–17)
BUN SERPL-MCNC: 12 MG/DL — SIGNIFICANT CHANGE UP (ref 7–23)
BUN SERPL-MCNC: 12 MG/DL — SIGNIFICANT CHANGE UP (ref 7–23)
CALCIUM SERPL-MCNC: 9.2 MG/DL — SIGNIFICANT CHANGE UP (ref 8.5–10.1)
CALCIUM SERPL-MCNC: 9.2 MG/DL — SIGNIFICANT CHANGE UP (ref 8.5–10.1)
CHLORIDE SERPL-SCNC: 98 MMOL/L — SIGNIFICANT CHANGE UP (ref 96–108)
CHLORIDE SERPL-SCNC: 98 MMOL/L — SIGNIFICANT CHANGE UP (ref 96–108)
CO2 SERPL-SCNC: 31 MMOL/L — SIGNIFICANT CHANGE UP (ref 22–31)
CO2 SERPL-SCNC: 31 MMOL/L — SIGNIFICANT CHANGE UP (ref 22–31)
CREAT SERPL-MCNC: 1 MG/DL — SIGNIFICANT CHANGE UP (ref 0.5–1.3)
CREAT SERPL-MCNC: 1 MG/DL — SIGNIFICANT CHANGE UP (ref 0.5–1.3)
EGFR: 76 ML/MIN/1.73M2 — SIGNIFICANT CHANGE UP
EGFR: 76 ML/MIN/1.73M2 — SIGNIFICANT CHANGE UP
GLUCOSE SERPL-MCNC: 111 MG/DL — HIGH (ref 70–99)
GLUCOSE SERPL-MCNC: 111 MG/DL — HIGH (ref 70–99)
HCT VFR BLD CALC: 46.6 % — SIGNIFICANT CHANGE UP (ref 39–50)
HCT VFR BLD CALC: 46.6 % — SIGNIFICANT CHANGE UP (ref 39–50)
HGB BLD-MCNC: 15.4 G/DL — SIGNIFICANT CHANGE UP (ref 13–17)
HGB BLD-MCNC: 15.4 G/DL — SIGNIFICANT CHANGE UP (ref 13–17)
LEGIONELLA AG UR QL: NEGATIVE — SIGNIFICANT CHANGE UP
LEGIONELLA AG UR QL: NEGATIVE — SIGNIFICANT CHANGE UP
MCHC RBC-ENTMCNC: 31.5 PG — SIGNIFICANT CHANGE UP (ref 27–34)
MCHC RBC-ENTMCNC: 31.5 PG — SIGNIFICANT CHANGE UP (ref 27–34)
MCHC RBC-ENTMCNC: 33 GM/DL — SIGNIFICANT CHANGE UP (ref 32–36)
MCHC RBC-ENTMCNC: 33 GM/DL — SIGNIFICANT CHANGE UP (ref 32–36)
MCV RBC AUTO: 95.3 FL — SIGNIFICANT CHANGE UP (ref 80–100)
MCV RBC AUTO: 95.3 FL — SIGNIFICANT CHANGE UP (ref 80–100)
NRBC # BLD: 0 /100 WBCS — SIGNIFICANT CHANGE UP (ref 0–0)
NRBC # BLD: 0 /100 WBCS — SIGNIFICANT CHANGE UP (ref 0–0)
PLATELET # BLD AUTO: 319 K/UL — SIGNIFICANT CHANGE UP (ref 150–400)
PLATELET # BLD AUTO: 319 K/UL — SIGNIFICANT CHANGE UP (ref 150–400)
POTASSIUM SERPL-MCNC: 2.9 MMOL/L — CRITICAL LOW (ref 3.5–5.3)
POTASSIUM SERPL-MCNC: 2.9 MMOL/L — CRITICAL LOW (ref 3.5–5.3)
POTASSIUM SERPL-SCNC: 2.9 MMOL/L — CRITICAL LOW (ref 3.5–5.3)
POTASSIUM SERPL-SCNC: 2.9 MMOL/L — CRITICAL LOW (ref 3.5–5.3)
RBC # BLD: 4.89 M/UL — SIGNIFICANT CHANGE UP (ref 4.2–5.8)
RBC # BLD: 4.89 M/UL — SIGNIFICANT CHANGE UP (ref 4.2–5.8)
RBC # FLD: 17.1 % — HIGH (ref 10.3–14.5)
RBC # FLD: 17.1 % — HIGH (ref 10.3–14.5)
SODIUM SERPL-SCNC: 137 MMOL/L — SIGNIFICANT CHANGE UP (ref 135–145)
SODIUM SERPL-SCNC: 137 MMOL/L — SIGNIFICANT CHANGE UP (ref 135–145)
WBC # BLD: 16.61 K/UL — HIGH (ref 3.8–10.5)
WBC # BLD: 16.61 K/UL — HIGH (ref 3.8–10.5)
WBC # FLD AUTO: 16.61 K/UL — HIGH (ref 3.8–10.5)
WBC # FLD AUTO: 16.61 K/UL — HIGH (ref 3.8–10.5)

## 2023-11-06 PROCEDURE — 99232 SBSQ HOSP IP/OBS MODERATE 35: CPT

## 2023-11-06 PROCEDURE — 99233 SBSQ HOSP IP/OBS HIGH 50: CPT

## 2023-11-06 RX ORDER — POTASSIUM CHLORIDE 20 MEQ
10 PACKET (EA) ORAL ONCE
Refills: 0 | Status: COMPLETED | OUTPATIENT
Start: 2023-11-06 | End: 2023-11-06

## 2023-11-06 RX ORDER — POTASSIUM CHLORIDE 20 MEQ
40 PACKET (EA) ORAL EVERY 4 HOURS
Refills: 0 | Status: COMPLETED | OUTPATIENT
Start: 2023-11-06 | End: 2023-11-06

## 2023-11-06 RX ORDER — HYDROCORTISONE 20 MG
50 TABLET ORAL EVERY 12 HOURS
Refills: 0 | Status: DISCONTINUED | OUTPATIENT
Start: 2023-11-06 | End: 2023-11-07

## 2023-11-06 RX ADMIN — Medication 500 MILLIGRAM(S): at 17:13

## 2023-11-06 RX ADMIN — Medication 40 MILLIEQUIVALENT(S): at 22:13

## 2023-11-06 RX ADMIN — Medication 1 TABLET(S): at 13:27

## 2023-11-06 RX ADMIN — PYRIDOSTIGMINE BROMIDE 60 MILLIGRAM(S): 60 SOLUTION ORAL at 13:27

## 2023-11-06 RX ADMIN — APIXABAN 5 MILLIGRAM(S): 2.5 TABLET, FILM COATED ORAL at 17:13

## 2023-11-06 RX ADMIN — Medication 100 MILLIEQUIVALENT(S): at 17:14

## 2023-11-06 RX ADMIN — Medication 40 MILLIGRAM(S): at 18:41

## 2023-11-06 RX ADMIN — Medication 20 MILLIEQUIVALENT(S): at 13:26

## 2023-11-06 RX ADMIN — Medication 50 MILLIGRAM(S): at 17:13

## 2023-11-06 RX ADMIN — PIPERACILLIN AND TAZOBACTAM 25 GRAM(S): 4; .5 INJECTION, POWDER, LYOPHILIZED, FOR SOLUTION INTRAVENOUS at 22:13

## 2023-11-06 RX ADMIN — PIPERACILLIN AND TAZOBACTAM 25 GRAM(S): 4; .5 INJECTION, POWDER, LYOPHILIZED, FOR SOLUTION INTRAVENOUS at 13:27

## 2023-11-06 RX ADMIN — Medication 40 MILLIEQUIVALENT(S): at 17:13

## 2023-11-06 NOTE — PROGRESS NOTE ADULT - ASSESSMENT
r/o acute ccy    no further GI symptoms  hida negative  ct and u/s noted  Advance diet as tolerated  cont iv antibiotics per primary for  infection  will follow     I reviewed the overnight course of events on the unit, re-confirming the patient history.  40 minutes spent on total encounter of which more than fifty percent of the encounter was spent counseling and/or coordinating care by the attending physician.  Advanced care planning was discussed with patient.  Advanced care planning forms were reviewed and discussed.  Risks, benefits and alternatives of gastroenterologic procedures were discussed in detail and all questions were answered.

## 2023-11-06 NOTE — PATIENT CHOICE NOTE. - NSPTCHOICESTATE_GEN_ALL_CORE

## 2023-11-06 NOTE — PROGRESS NOTE ADULT - ASSESSMENT
78 year old male with past medical history of PE 2021, HF ef 55-60% , chronic le edema,OA myasthenia gravis, HTN, DM2, HLD, presenting with chest pain.      Chest pain   - recent discharge from rehab, with home PT service, he developed chest discomfort during and post session that lasted at least an hour  - denies anginal complaints during exam     - EKG SR with PVCs and LAFB similar to baseline   - troponin neg x1, no trend there after     - TTE: (2021) trace MR LVSF normal EF 55-60% , LVDD   - no sign of volume overload, weaned off O2   - + chronic LE edema   - continue Lasix po    - know hx of PE on Eliquis  - LE doppler neg for DVT  - HR, BP controlled and stable   - continue Toprol and AC   - Monitor and replete Lytes. Keep K > 4 and Mg > 2    - Will continue to follow.    April Hutchinson [facility]  Nurse Practitioner - Cardiology   call TEAMS

## 2023-11-06 NOTE — PROGRESS NOTE ADULT - ASSESSMENT
78y  Male with h/o PE (6/2021), CHF (EF 55-60% in 6/2021), Myasthenia Gravis, Hypertension, Hyperlipidemia, clubfoot, chronic LE edema presents to ED from home c/o chest pain. Pt was admitted to [facility] from 8/14-8/17 s/p fall and was discharged to [facility], He returned home from Rehab on Monday 10/30 and moved in with his son, Olvin. on 11/3 was complaining of abdominal pain associated with nausea and a few episodes of non-bloody emesis. Later in the day, he was working with physical therapy at home when he developed sudden-onset chest discomfort. The episode lasted a few hours and was not associated with any dizziness, palpitations, shortness of breath.     Found to have leukocytosis, could be 2/2 RLL consolidation suspicious or pneumonia. CT also showed RUL calcified granulomas on RUL, liver and spleen. HIDA showed no evidence of acute cholecystitis or biliary obstruction. Urine culture growing gram negative rods and blood cultures currently no growth. No fevers and WBC improved today.    #Leukocytosis  #RLL pneumonia  #Positive urine culture    -continue Zosyn pending urine cultures  -follow cultures to completion  -monitor WBC    Rachel Connor MD  Division of infectious Diseases  Cell [phone] between 8am and 6pm  After 6pm and over the weekends please call ID service line at [phone].     55 minutes spent on total encounter assessing patient, examination, chart review, counseling and coordinating care by the attending physician/nurse/care manager.    78y  Male with h/o PE (6/2021), CHF (EF 55-60% in 6/2021), Myasthenia Gravis, Hypertension, Hyperlipidemia, clubfoot, chronic LE edema presents to ED from home c/o chest pain. Pt was admitted to [facility] from 8/14-8/17 s/p fall and was discharged to [facility], He returned home from Rehab on Monday 10/30 and moved in with his son, Olvin. on 11/3 was complaining of abdominal pain associated with nausea and a few episodes of non-bloody emesis. Later in the day, he was working with physical therapy at home when he developed sudden-onset chest discomfort. The episode lasted a few hours and was not associated with any dizziness, palpitations, shortness of breath.     Found to have leukocytosis, could be 2/2 RLL consolidation suspicious or pneumonia. CT also showed RUL calcified granulomas on RUL, liver and spleen. HIDA showed no evidence of acute cholecystitis or biliary obstruction. Urine culture growing gram negative rods and blood cultures currently no growth. No fevers and WBC improved today.    #Leukocytosis  #RLL pneumonia  #Positive urine culture    -continue Zosyn to complete 5 days, can switch to cefpodoxime if ready for discharge prior  -follow cultures to completion  -monitor WBC  -aspiration precautions  -discussed with Dr. Catracho Connor MD  Division of infectious Diseases  Cell [phone] between 8am and 6pm  After 6pm and over the weekends please call ID service line at [phone].     55 minutes spent on total encounter assessing patient, examination, chart review, counseling and coordinating care by the attending physician/nurse/care manager.    78y  Male with h/o PE (6/2021), CHF (EF 55-60% in 6/2021), Myasthenia Gravis, Hypertension, Hyperlipidemia, clubfoot, chronic LE edema presents to ED from home c/o chest pain. Pt was admitted to \[facility]\"" from 8/14-8/17 s/p fall and was discharged to [facility], He returned home from Rehab on Monday 10/30 and moved in with his son, Olvin. on 11/3 was complaining of abdominal pain associated with nausea and a few episodes of non-bloody emesis. Later in the day, he was working with physical therapy at home when he developed sudden-onset chest discomfort. The episode lasted a few hours and was not associated with any dizziness, palpitations, shortness of breath.     Found to have leukocytosis, could be 2/2 RLL consolidation suspicious or pneumonia. CT also showed RUL calcified granulomas on RUL, liver and spleen. HIDA showed no evidence of acute cholecystitis or biliary obstruction. Urine culture growing gram negative rods and blood cultures currently no growth. No fevers and WBC improved today.    #Leukocytosis  #RLL pneumonia  #RUL calcified granulomas  #Positive urine culture    -continue Zosyn to complete 5 days, can switch to cefpodoxime if ready for discharge prior  -suggest Quantiferon, serum fungitell and galactomannan, urine histoplasma antigen  -follow cultures to completion  -monitor WBC  -aspiration precautions  -discussed with Dr. Catracho Connor MD  Division of infectious Diseases  Cell [phone] between 8am and 6pm  After 6pm and over the weekends please call ID service line at [phone].     55 minutes spent on total encounter assessing patient, examination, chart review, counseling and coordinating care by the attending physician/nurse/care manager.    78y  Male with h/o PE (6/2021), CHF (EF 55-60% in 6/2021), Myasthenia Gravis, Hypertension, Hyperlipidemia, clubfoot, chronic LE edema presents to ED from home c/o chest pain. Pt was admitted to [facility] from 8/14-8/17 s/p fall and was discharged to [facility], He returned home from Rehab on Monday 10/30 and moved in with his son, Olvin. on 11/3 was complaining of abdominal pain associated with nausea and a few episodes of non-bloody emesis. Later in the day, he was working with physical therapy at home when he developed sudden-onset chest discomfort. The episode lasted a few hours and was not associated with any dizziness, palpitations, shortness of breath.     Found to have leukocytosis, could be 2/2 RLL consolidation suspicious or pneumonia. CT addendum reviewed, initial reports was done in error. CT also showed RUL calcified granulomas on RUL, liver and spleen. HIDA showed no evidence of acute cholecystitis or biliary obstruction. Urine culture growing gram negative rods and blood cultures currently no growth. No fevers and WBC improved today.    #Leukocytosis  #RLL pneumonia  #RUL calcified granulomas  #Positive urine culture    -continue Zosyn to complete 5 days, can switch to cefpodoxime if ready for discharge prior  -suggest Quantiferon, serum fungitell and galactomannan, urine histoplasma antigen  -follow cultures to completion  -monitor WBC  -aspiration precautions  -discussed with Dr. Catracho Connor MD  Division of infectious Diseases  Cell [phone] between 8am and 6pm  After 6pm and over the weekends please call ID service line at [phone].     55 minutes spent on total encounter assessing patient, examination, chart review, counseling and coordinating care by the attending physician/nurse/care manager.

## 2023-11-06 NOTE — GOALS OF CARE CONVERSATION - ADVANCED CARE PLANNING - CONVERSATION DETAILS
[facility]- Palliative care SW met with patient at bedside. Patient presented with some confusion and asked SW to leave as he did not want to answer any questions. Call placed to patient's son, Olvin. Reviewed patient's medical and social history as well as events leading to patient's hospitalization. Writer discussed patient's current diagnosis (PE, CHF, Myasthenia Gravis, Hypertension, Hyperlipidemia, clubfoot, chronic LE edema)  medical condition and management. Son states patient has a HCP and he is patient's health care agent. Son showed insight into medical condition. All questions answered.

## 2023-11-06 NOTE — DISCHARGE NOTE NURSING/CASE MANAGEMENT/SOCIAL WORK - PATIENT PORTAL LINK FT
You can access the FollowMyHealth Patient Portal offered by [facility] by registering at the following website: [email or web address]. By joining [facility]’s FollowMyHealth portal, you will also be able to view your health information using other applications (apps) compatible with our system.

## 2023-11-07 LAB
-  AMOXICILLIN/CLAVULANIC ACID: SIGNIFICANT CHANGE UP
-  AMOXICILLIN/CLAVULANIC ACID: SIGNIFICANT CHANGE UP
-  AMPICILLIN/SULBACTAM: SIGNIFICANT CHANGE UP
-  AMPICILLIN/SULBACTAM: SIGNIFICANT CHANGE UP
-  AMPICILLIN: SIGNIFICANT CHANGE UP
-  AMPICILLIN: SIGNIFICANT CHANGE UP
-  AZTREONAM: SIGNIFICANT CHANGE UP
-  AZTREONAM: SIGNIFICANT CHANGE UP
-  CEFAZOLIN: SIGNIFICANT CHANGE UP
-  CEFAZOLIN: SIGNIFICANT CHANGE UP
-  CEFEPIME: SIGNIFICANT CHANGE UP
-  CEFEPIME: SIGNIFICANT CHANGE UP
-  CEFOXITIN: SIGNIFICANT CHANGE UP
-  CEFOXITIN: SIGNIFICANT CHANGE UP
-  CEFTRIAXONE: SIGNIFICANT CHANGE UP
-  CEFTRIAXONE: SIGNIFICANT CHANGE UP
-  CEFUROXIME: SIGNIFICANT CHANGE UP
-  CEFUROXIME: SIGNIFICANT CHANGE UP
-  CIPROFLOXACIN: SIGNIFICANT CHANGE UP
-  CIPROFLOXACIN: SIGNIFICANT CHANGE UP
-  ERTAPENEM: SIGNIFICANT CHANGE UP
-  ERTAPENEM: SIGNIFICANT CHANGE UP
-  GENTAMICIN: SIGNIFICANT CHANGE UP
-  GENTAMICIN: SIGNIFICANT CHANGE UP
-  IMIPENEM: SIGNIFICANT CHANGE UP
-  IMIPENEM: SIGNIFICANT CHANGE UP
-  LEVOFLOXACIN: SIGNIFICANT CHANGE UP
-  LEVOFLOXACIN: SIGNIFICANT CHANGE UP
-  MEROPENEM: SIGNIFICANT CHANGE UP
-  MEROPENEM: SIGNIFICANT CHANGE UP
-  NITROFURANTOIN: SIGNIFICANT CHANGE UP
-  NITROFURANTOIN: SIGNIFICANT CHANGE UP
-  PIPERACILLIN/TAZOBACTAM: SIGNIFICANT CHANGE UP
-  PIPERACILLIN/TAZOBACTAM: SIGNIFICANT CHANGE UP
-  TOBRAMYCIN: SIGNIFICANT CHANGE UP
-  TOBRAMYCIN: SIGNIFICANT CHANGE UP
-  TRIMETHOPRIM/SULFAMETHOXAZOLE: SIGNIFICANT CHANGE UP
-  TRIMETHOPRIM/SULFAMETHOXAZOLE: SIGNIFICANT CHANGE UP
ALBUMIN SERPL ELPH-MCNC: 2.1 G/DL — LOW (ref 3.3–5)
ALBUMIN SERPL ELPH-MCNC: 2.1 G/DL — LOW (ref 3.3–5)
ALP SERPL-CCNC: 88 U/L — SIGNIFICANT CHANGE UP (ref 40–120)
ALP SERPL-CCNC: 88 U/L — SIGNIFICANT CHANGE UP (ref 40–120)
ALT FLD-CCNC: 52 U/L — SIGNIFICANT CHANGE UP (ref 12–78)
ALT FLD-CCNC: 52 U/L — SIGNIFICANT CHANGE UP (ref 12–78)
ANION GAP SERPL CALC-SCNC: 9 MMOL/L — SIGNIFICANT CHANGE UP (ref 5–17)
ANION GAP SERPL CALC-SCNC: 9 MMOL/L — SIGNIFICANT CHANGE UP (ref 5–17)
AST SERPL-CCNC: 52 U/L — HIGH (ref 15–37)
AST SERPL-CCNC: 52 U/L — HIGH (ref 15–37)
BILIRUB SERPL-MCNC: 0.8 MG/DL — SIGNIFICANT CHANGE UP (ref 0.2–1.2)
BILIRUB SERPL-MCNC: 0.8 MG/DL — SIGNIFICANT CHANGE UP (ref 0.2–1.2)
BUN SERPL-MCNC: 18 MG/DL — SIGNIFICANT CHANGE UP (ref 7–23)
BUN SERPL-MCNC: 18 MG/DL — SIGNIFICANT CHANGE UP (ref 7–23)
CALCIUM SERPL-MCNC: 8.8 MG/DL — SIGNIFICANT CHANGE UP (ref 8.5–10.1)
CALCIUM SERPL-MCNC: 8.8 MG/DL — SIGNIFICANT CHANGE UP (ref 8.5–10.1)
CHLORIDE SERPL-SCNC: 99 MMOL/L — SIGNIFICANT CHANGE UP (ref 96–108)
CHLORIDE SERPL-SCNC: 99 MMOL/L — SIGNIFICANT CHANGE UP (ref 96–108)
CO2 SERPL-SCNC: 33 MMOL/L — HIGH (ref 22–31)
CO2 SERPL-SCNC: 33 MMOL/L — HIGH (ref 22–31)
CREAT SERPL-MCNC: 1.1 MG/DL — SIGNIFICANT CHANGE UP (ref 0.5–1.3)
CREAT SERPL-MCNC: 1.1 MG/DL — SIGNIFICANT CHANGE UP (ref 0.5–1.3)
CULTURE RESULTS: ABNORMAL
CULTURE RESULTS: ABNORMAL
EGFR: 68 ML/MIN/1.73M2 — SIGNIFICANT CHANGE UP
EGFR: 68 ML/MIN/1.73M2 — SIGNIFICANT CHANGE UP
GLUCOSE SERPL-MCNC: 111 MG/DL — HIGH (ref 70–99)
GLUCOSE SERPL-MCNC: 111 MG/DL — HIGH (ref 70–99)
HCT VFR BLD CALC: 40.7 % — SIGNIFICANT CHANGE UP (ref 39–50)
HCT VFR BLD CALC: 40.7 % — SIGNIFICANT CHANGE UP (ref 39–50)
HGB BLD-MCNC: 13.2 G/DL — SIGNIFICANT CHANGE UP (ref 13–17)
HGB BLD-MCNC: 13.2 G/DL — SIGNIFICANT CHANGE UP (ref 13–17)
MAGNESIUM SERPL-MCNC: 2 MG/DL — SIGNIFICANT CHANGE UP (ref 1.6–2.6)
MAGNESIUM SERPL-MCNC: 2 MG/DL — SIGNIFICANT CHANGE UP (ref 1.6–2.6)
MCHC RBC-ENTMCNC: 31.1 PG — SIGNIFICANT CHANGE UP (ref 27–34)
MCHC RBC-ENTMCNC: 31.1 PG — SIGNIFICANT CHANGE UP (ref 27–34)
MCHC RBC-ENTMCNC: 32.4 GM/DL — SIGNIFICANT CHANGE UP (ref 32–36)
MCHC RBC-ENTMCNC: 32.4 GM/DL — SIGNIFICANT CHANGE UP (ref 32–36)
MCV RBC AUTO: 96 FL — SIGNIFICANT CHANGE UP (ref 80–100)
MCV RBC AUTO: 96 FL — SIGNIFICANT CHANGE UP (ref 80–100)
METHOD TYPE: SIGNIFICANT CHANGE UP
METHOD TYPE: SIGNIFICANT CHANGE UP
NRBC # BLD: 0 /100 WBCS — SIGNIFICANT CHANGE UP (ref 0–0)
NRBC # BLD: 0 /100 WBCS — SIGNIFICANT CHANGE UP (ref 0–0)
ORGANISM # SPEC MICROSCOPIC CNT: ABNORMAL
ORGANISM # SPEC MICROSCOPIC CNT: ABNORMAL
ORGANISM # SPEC MICROSCOPIC CNT: SIGNIFICANT CHANGE UP
ORGANISM # SPEC MICROSCOPIC CNT: SIGNIFICANT CHANGE UP
PLATELET # BLD AUTO: 301 K/UL — SIGNIFICANT CHANGE UP (ref 150–400)
PLATELET # BLD AUTO: 301 K/UL — SIGNIFICANT CHANGE UP (ref 150–400)
POTASSIUM SERPL-MCNC: 3.4 MMOL/L — LOW (ref 3.5–5.3)
POTASSIUM SERPL-MCNC: 3.4 MMOL/L — LOW (ref 3.5–5.3)
POTASSIUM SERPL-SCNC: 3.4 MMOL/L — LOW (ref 3.5–5.3)
POTASSIUM SERPL-SCNC: 3.4 MMOL/L — LOW (ref 3.5–5.3)
PROT SERPL-MCNC: 5.7 G/DL — LOW (ref 6–8.3)
PROT SERPL-MCNC: 5.7 G/DL — LOW (ref 6–8.3)
RBC # BLD: 4.24 M/UL — SIGNIFICANT CHANGE UP (ref 4.2–5.8)
RBC # BLD: 4.24 M/UL — SIGNIFICANT CHANGE UP (ref 4.2–5.8)
RBC # FLD: 17.1 % — HIGH (ref 10.3–14.5)
RBC # FLD: 17.1 % — HIGH (ref 10.3–14.5)
SODIUM SERPL-SCNC: 141 MMOL/L — SIGNIFICANT CHANGE UP (ref 135–145)
SODIUM SERPL-SCNC: 141 MMOL/L — SIGNIFICANT CHANGE UP (ref 135–145)
SPECIMEN SOURCE: SIGNIFICANT CHANGE UP
SPECIMEN SOURCE: SIGNIFICANT CHANGE UP
WBC # BLD: 11.42 K/UL — HIGH (ref 3.8–10.5)
WBC # BLD: 11.42 K/UL — HIGH (ref 3.8–10.5)
WBC # FLD AUTO: 11.42 K/UL — HIGH (ref 3.8–10.5)
WBC # FLD AUTO: 11.42 K/UL — HIGH (ref 3.8–10.5)

## 2023-11-07 PROCEDURE — 99232 SBSQ HOSP IP/OBS MODERATE 35: CPT

## 2023-11-07 RX ORDER — CEFPODOXIME PROXETIL 100 MG
1 TABLET ORAL
Qty: 2 | Refills: 0
Start: 2023-11-07 | End: 2023-11-07

## 2023-11-07 RX ORDER — ASCORBIC ACID 60 MG
1 TABLET,CHEWABLE ORAL
Qty: 0 | Refills: 0 | DISCHARGE
Start: 2023-11-07

## 2023-11-07 RX ORDER — CEFPODOXIME PROXETIL 100 MG
200 TABLET ORAL EVERY 12 HOURS
Refills: 0 | Status: DISCONTINUED | OUTPATIENT
Start: 2023-11-08 | End: 2023-11-08

## 2023-11-07 RX ORDER — POTASSIUM CHLORIDE 20 MEQ
40 PACKET (EA) ORAL ONCE
Refills: 0 | Status: COMPLETED | OUTPATIENT
Start: 2023-11-07 | End: 2023-11-07

## 2023-11-07 RX ADMIN — APIXABAN 5 MILLIGRAM(S): 2.5 TABLET, FILM COATED ORAL at 06:00

## 2023-11-07 RX ADMIN — Medication 40 MILLIGRAM(S): at 06:01

## 2023-11-07 RX ADMIN — PIPERACILLIN AND TAZOBACTAM 25 GRAM(S): 4; .5 INJECTION, POWDER, LYOPHILIZED, FOR SOLUTION INTRAVENOUS at 06:00

## 2023-11-07 RX ADMIN — APIXABAN 5 MILLIGRAM(S): 2.5 TABLET, FILM COATED ORAL at 17:02

## 2023-11-07 RX ADMIN — Medication 40 MILLIEQUIVALENT(S): at 17:42

## 2023-11-07 RX ADMIN — Medication 50 MILLIGRAM(S): at 17:02

## 2023-11-07 RX ADMIN — Medication 500 MILLIGRAM(S): at 06:00

## 2023-11-07 RX ADMIN — PYRIDOSTIGMINE BROMIDE 60 MILLIGRAM(S): 60 SOLUTION ORAL at 11:07

## 2023-11-07 RX ADMIN — Medication 650 MILLIGRAM(S): at 17:48

## 2023-11-07 RX ADMIN — Medication 650 MILLIGRAM(S): at 16:31

## 2023-11-07 RX ADMIN — Medication 1 TABLET(S): at 11:07

## 2023-11-07 RX ADMIN — PIPERACILLIN AND TAZOBACTAM 25 GRAM(S): 4; .5 INJECTION, POWDER, LYOPHILIZED, FOR SOLUTION INTRAVENOUS at 21:33

## 2023-11-07 RX ADMIN — Medication 500 MILLIGRAM(S): at 17:02

## 2023-11-07 RX ADMIN — PANTOPRAZOLE SODIUM 40 MILLIGRAM(S): 20 TABLET, DELAYED RELEASE ORAL at 06:00

## 2023-11-07 RX ADMIN — Medication 20 MILLIEQUIVALENT(S): at 11:07

## 2023-11-07 RX ADMIN — PIPERACILLIN AND TAZOBACTAM 25 GRAM(S): 4; .5 INJECTION, POWDER, LYOPHILIZED, FOR SOLUTION INTRAVENOUS at 13:08

## 2023-11-07 RX ADMIN — Medication 50 MILLIGRAM(S): at 06:01

## 2023-11-07 NOTE — PHYSICAL THERAPY INITIAL EVALUATION ADULT - PERTINENT HX OF CURRENT PROBLEM, REHAB EVAL
79 yo M with PMH of PE (6/2021), CHF (EF 55-60% in 6/2021), Myasthenia Gravis, Hypertension, Hyperlipidemia, clubfoot, chronic LE edema presents to ED from home c/o chest pain. History is obtained from pt's cousin Maranda via telephone as pt is unable to provide 2/2 somnolence. Pt was admitted to [facility] from 8/14-8/17 s/p fall and was discharged to [facility], He returned home from Rehab on Monday and moved in with his son, Olvin. This AM, pt was complaining of abdominal pain associated with nausea and a few episodes of non-bloody emesis. Later in the day, he was working with physical therapy at home when he developed sudden-onset chest discomfort. The episode lasted a few hours and was not associated with any dizziness, palpitations, shortness of breath.

## 2023-11-07 NOTE — PHYSICAL THERAPY INITIAL EVALUATION ADULT - ADDITIONAL COMMENTS
Pt stated he is 2-person assist to transfer to w/c at home.  Son and friend help him OOB in the morning and then back to bed later in the day.  Pt owns hospital bed, w/c.

## 2023-11-07 NOTE — PROGRESS NOTE ADULT - PROBLEM SELECTOR PROBLEM 5
(HFpEF) heart failure with preserved ejection fraction

## 2023-11-07 NOTE — PROGRESS NOTE ADULT - PROBLEM SELECTOR PLAN 2
- CT a/p concerning for cholecystitis  - F/u RUQ US  - Continue IV Zosyn  - NPO for now pending surgery eval   - Surgery consulted, f/u recs
- CT a/p concerning for cholecystitis  - F/u RUQ US  - Continue IV Zosyn  - NPO for now pending surgery eval   - Surgery consulted, f/u recs
- CT a/p concerning for cholecystitis  -HIDA negative, continue current diet, no plan for surgical intervention at this time  - Surgery consulted, f/u recs
- CT a/p concerning for cholecystitis  -HIDA negative, continue current diet, no plan for surgical intervention at this time  - Surgery consulted, f/u recs

## 2023-11-07 NOTE — PROGRESS NOTE ADULT - ASSESSMENT
78y  Male with h/o PE (6/2021), CHF (EF 55-60% in 6/2021), Myasthenia Gravis, Hypertension, Hyperlipidemia, clubfoot, chronic LE edema presents to ED from home c/o chest pain. Pt was admitted to [facility] from 8/14-8/17 s/p fall and was discharged to [facility], He returned home from Rehab on Monday 10/30 and moved in with his son, Olvin. on 11/3 was complaining of abdominal pain associated with nausea and a few episodes of non-bloody emesis. Later in the day, he was working with physical therapy at home when he developed sudden-onset chest discomfort. The episode lasted a few hours and was not associated with any dizziness, palpitations, shortness of breath.     Found to have leukocytosis, could be 2/2 RLL consolidation suspicious or pneumonia. CT also showed RUL calcified granulomas on RUL, liver and spleen. HIDA showed no evidence of acute cholecystitis or biliary obstruction. Urine culture growing klebsiella (sensitivities reviewed), although no urinary complaints. Blood cultures remain no growth. No fevers and WBC improved today, suspect leukocytosis is also multifactorial from steroids.    #Leukocytosis  #RLL pneumonia  #RUL calcified granulomas  #Positive urine culture    -suggest cefpodoxime 200mg PO BID x 1 day  -discontinue Zosyn  -follow up Quantiferon, serum fungitell and galactomannan  -suggest urine histoplasma antigen  -follow cultures to completion  -suggest Pulmonary evaluation and follow up lung imaging as outpatient  -aspiration precautions  -discussed with Dr. Hayden  -will sign off, please call ID if any further questions. Thank you.    Rachel Connor MD  Division of infectious Diseases  Cell [phone] between 8am and 6pm  After 6pm and over the weekends please call ID service line at [phone].    78y  Male with h/o PE (6/2021), CHF (EF 55-60% in 6/2021), Myasthenia Gravis, Hypertension, Hyperlipidemia, clubfoot, chronic LE edema presents to ED from home c/o chest pain. Pt was admitted to [facility] from 8/14-8/17 s/p fall and was discharged to [facility], He returned home from Rehab on Monday 10/30 and moved in with his son, Olvin. on 11/3 was complaining of abdominal pain associated with nausea and a few episodes of non-bloody emesis. Later in the day, he was working with physical therapy at home when he developed sudden-onset chest discomfort. The episode lasted a few hours and was not associated with any dizziness, palpitations, shortness of breath.     Found to have leukocytosis, could be 2/2 RLL consolidation suspicious or pneumonia. CT also showed RUL calcified granulomas on RUL, liver and spleen. HIDA showed no evidence of acute cholecystitis or biliary obstruction. Urine culture growing klebsiella (sensitivities reviewed), although no urinary complaints. Blood cultures remain no growth. No fevers and WBC improved today, suspect leukocytosis is also multifactorial from steroids.    #Leukocytosis  #RLL pneumonia  #RUL calcified granulomas  #Positive urine culture    -suggest cefpodoxime 200mg PO BID x 1 day  -discontinue Zosyn  -follow up Quantiferon, serum fungitell and galactomannan  -suggest urine histoplasma antigen  -follow cultures to completion  -suggest Pulmonary evaluation and follow up lung imaging as outpatient  -aspiration precautions  -discussed with Dr. Hayden  -will sign off, please call ID if any further questions. Thank you.    Rachel Connor MD  Division of infectious Diseases  Cell [phone] between 8am and 6pm  After 6pm and over the weekends please call ID service line at [phone].     35 minutes spent on total encounter assessing patient, examination, chart review, counseling and coordinating care by the attending physician/nurse/care manager.

## 2023-11-07 NOTE — PROGRESS NOTE ADULT - PROBLEM SELECTOR PLAN 6
- Continue home Pyridostigmine  - Pt on home Prednisone x years   - Hold home Prednisone and start stress dose steroids in the setting of sepsis- sepsis resolved, tapered solucortef  - restart home dose prednisone in AM
- Continue home Pyridostigmine  - Pt on home Prednisone x years   - Hold home Prednisone and start stress dose steroids in the setting of sepsis   - Start stress dose steroids - IV Solucortef 100mg x1 now followed by IV Solucortef 50mg q8h- tapered to Q12H today
- Continue home Pyridostigmine  - Pt on home Prednisone x years   - Hold home Prednisone and start stress dose steroids in the setting of sepsis   - Start stress dose steroids - IV Solucortef 100mg x1 now followed by IV Solucortef 50mg q8h
- Continue home Pyridostigmine  - Pt on home Prednisone x years   - Hold home Prednisone and start stress dose steroids in the setting of sepsis   - Start stress dose steroids - IV Solucortef 100mg x1 now followed by IV Solucortef 50mg q8h

## 2023-11-07 NOTE — PROGRESS NOTE ADULT - PROBLEM SELECTOR PLAN 10
- Continue home Omeprazole

## 2023-11-07 NOTE — CASE MANAGEMENT PROGRESS NOTE - NSCMPROGRESSNOTE_GEN_ALL_CORE
Patient is ready for transitional care. The patient and son given 24 hour notice. The patient will be DC tomorrow via ambulette at 11am via wide wheelchair. The patient will have a TR with [facility] [phone] for SN, PT, HHA. The patient has a safe DC plan home.

## 2023-11-07 NOTE — PROGRESS NOTE ADULT - ASSESSMENT
r/o acute ccy    no further GI symptoms  hida negative  ct and u/s noted  Advance diet as tolerated  Abx per ID recs  will follow     I reviewed the overnight course of events on the unit, re-confirming the patient history.  40 minutes spent on total encounter of which more than fifty percent of the encounter was spent counseling and/or coordinating care by the attending physician.  Advanced care planning was discussed with patient.  Advanced care planning forms were reviewed and discussed.  Risks, benefits and alternatives of gastroenterologic procedures were discussed in detail and all questions were answered.

## 2023-11-07 NOTE — PROGRESS NOTE ADULT - PROBLEM SELECTOR PLAN 7
- b/l PE diagnosed in 6/2021  - Continue Eliquis

## 2023-11-07 NOTE — PROGRESS NOTE ADULT - PROBLEM SELECTOR PLAN 11
DVT ppx: continue home Eliquis, if need for procedure will switch to heparin gtt for full ac
DVT ppx: continue home Eliquis
DVT ppx: continue home Eliquis, if need for procedure will switch to heparin gtt for full ac
DVT ppx: continue home Eliquis

## 2023-11-07 NOTE — PROGRESS NOTE ADULT - ASSESSMENT
78 year old male with past medical history of PE 2021, HF ef 55-60% , chronic le edema,OA myasthenia gravis, HTN, DM2, HLD, presenting with chest pain.      Chest pain   - recent discharge from rehab, with home PT service, he developed chest discomfort during and post session that lasted at least an hour  - EKG SR with PVCs and LAFB similar to baseline   - troponin neg x1, no trend there after   - denies anginal complaints during exam     - TTE: (2021) trace MR LVSF normal EF 55-60% , LVDD   - no sign of volume overload, weaned off O2   - + chronic LE edema   - continue Lasix PO     - know hx of PE on Eliquis  - LE doppler neg for DVT  - HR, BP controlled and stable   - continue Toprol and AC   - Monitor and replete Lytes. Keep K > 4 and Mg > 2    - +UTI/ concern for PNA on ABX per ID/primary   - Will continue to follow.    April Hutchinson [facility]  Nurse Practitioner - Cardiology   call TEAMS

## 2023-11-07 NOTE — PROGRESS NOTE ADULT - PROBLEM SELECTOR PLAN 8
- Continue home Metoprolol and Lasix with hold parameters

## 2023-11-07 NOTE — PROGRESS NOTE ADULT - PROBLEM SELECTOR PLAN 1
Pt meets sepsis criteria on admission 2/2 UTI, possible cholecystitis and PNA  - CT c/a/p: distended gallbladder without stones and patchy RLL infiltrates indicative of PNA   - UA: many bacteria, large LE, small blood, 32 WBC, 5 RBC  - Given 2L IV NS bolus x1, 0.5L IV NS bolus x1, 0.5L IV NS bolus x1, IV Zosyn in ED  - Continue IV abx  - Trend WBC and monitor for fever  - F/u UCx, BCx x2, Urine cx >100k gram neg rods- klebsiella  - Tylenol PRN for fever/pain   - Discussed with ID, patient without urinary symptoms, doubt UTI, likely PNA, will complete 5 day course of IV antibiotics tonight, plan for DC home in AM
Pt meets sepsis criteria on admission 2/2 UTI, possible cholecystitis and PNA  - CT c/a/p: distended gallbladder without stones and patchy RLL infiltrates indicative of PNA per wet read, f/u official report   - UA: many bacteria, large LE, small blood, 32 WBC, 5 RBC  - Lactate 3.9->2.6  - Given 2L IV NS bolus x1, 0.5L IV NS bolus x1, 0.5L IV NS bolus x1, IV Zosyn in ED  - Continue IV Zosyn   - Will hold off on further IVF given Hx of CHF  - Trend WBC and monitor for fever  - F/u UCx, BCx x2  - Tylenol PRN for fever/pain   - ID (Dr. Mckinley) consulted, f/u recs
Pt meets sepsis criteria on admission 2/2 UTI, possible cholecystitis and PNA  - CT c/a/p: distended gallbladder without stones and patchy RLL infiltrates indicative of PNA per wet read, f/u official report   - UA: many bacteria, large LE, small blood, 32 WBC, 5 RBC  - Lactate 3.9->2.6  - Given 2L IV NS bolus x1, 0.5L IV NS bolus x1, 0.5L IV NS bolus x1, IV Zosyn in ED  - Continue IV Zosyn   - Will hold off on further IVF given Hx of CHF  - Trend WBC and monitor for fever  - F/u UCx, BCx x2  - Tylenol PRN for fever/pain   - ID (Dr. Mckinley) consulted, f/u recs
Pt meets sepsis criteria on admission 2/2 UTI, possible cholecystitis and PNA  - CT c/a/p: distended gallbladder without stones and patchy RLL infiltrates indicative of PNA   - UA: many bacteria, large LE, small blood, 32 WBC, 5 RBC  - Given 2L IV NS bolus x1, 0.5L IV NS bolus x1, 0.5L IV NS bolus x1, IV Zosyn in ED  - Continue IV abx  - Trend WBC and monitor for fever  - F/u UCx, BCx x2, Urine cx >100k gram neg rods  - Tylenol PRN for fever/pain   - ID (Dr. Mckinley) consulted, f/u recs

## 2023-11-07 NOTE — PROGRESS NOTE ADULT - PROBLEM SELECTOR PLAN 3
- CT chest concerning for PNA  - Pt sating well on RA at present   - Supplemental O2 PRN   - F/u culture data and legionella urine ag, strep pneumo ag, MRSA PCR   - Continue IV abx, appreciate ID recs
- CT chest concerning for PNA  - Pt sating well on RA at present   - Supplemental O2 PRN   - F/u culture data and legionella urine ag, strep pneumo ag, MRSA PCR   - Continue IV Zosyn
- CT chest concerning for PNA  - Pt sating well on RA at present   - Supplemental O2 PRN   - F/u culture data and legionella urine ag, strep pneumo ag, MRSA PCR   - Continue IV abx, appreciate ID recs- completes course tonight
- CT chest concerning for PNA  - Pt sating well on RA at present   - Supplemental O2 PRN   - F/u culture data and legionella urine ag, strep pneumo ag, MRSA PCR   - Continue IV Zosyn

## 2023-11-07 NOTE — PROGRESS NOTE ADULT - NSPROGADDITIONALINFOA_GEN_ALL_CORE
plan of care discussed with son, Olvin, over phone
plan of care discussed with son, Olvin, over phone. Patient is usually wheelchair bound, needs help with transfers which son usually does. Will call PT to assess if patient will require any additional help at home. Plan for dc home in AM.

## 2023-11-07 NOTE — PROGRESS NOTE ADULT - PROBLEM SELECTOR PLAN 9
- On home Praluent subQ [id number]
- On home Praluent subQ [id number]
- On home Praluent subQ [id number]
- On home Praluent subQ [id number]

## 2023-11-08 VITALS
HEART RATE: 50 BPM | RESPIRATION RATE: 17 BRPM | OXYGEN SATURATION: 95 % | SYSTOLIC BLOOD PRESSURE: 127 MMHG | DIASTOLIC BLOOD PRESSURE: 70 MMHG | TEMPERATURE: 98 F

## 2023-11-08 LAB
1,3 BETA GLUCAN SER QL: NEGATIVE — SIGNIFICANT CHANGE UP
1,3 BETA GLUCAN SER QL: NEGATIVE — SIGNIFICANT CHANGE UP
ANION GAP SERPL CALC-SCNC: 9 MMOL/L — SIGNIFICANT CHANGE UP (ref 5–17)
ANION GAP SERPL CALC-SCNC: 9 MMOL/L — SIGNIFICANT CHANGE UP (ref 5–17)
BUN SERPL-MCNC: 18 MG/DL — SIGNIFICANT CHANGE UP (ref 7–23)
BUN SERPL-MCNC: 18 MG/DL — SIGNIFICANT CHANGE UP (ref 7–23)
CALCIUM SERPL-MCNC: 8.7 MG/DL — SIGNIFICANT CHANGE UP (ref 8.5–10.1)
CALCIUM SERPL-MCNC: 8.7 MG/DL — SIGNIFICANT CHANGE UP (ref 8.5–10.1)
CHLORIDE SERPL-SCNC: 101 MMOL/L — SIGNIFICANT CHANGE UP (ref 96–108)
CHLORIDE SERPL-SCNC: 101 MMOL/L — SIGNIFICANT CHANGE UP (ref 96–108)
CO2 SERPL-SCNC: 28 MMOL/L — SIGNIFICANT CHANGE UP (ref 22–31)
CO2 SERPL-SCNC: 28 MMOL/L — SIGNIFICANT CHANGE UP (ref 22–31)
CREAT SERPL-MCNC: 0.88 MG/DL — SIGNIFICANT CHANGE UP (ref 0.5–1.3)
CREAT SERPL-MCNC: 0.88 MG/DL — SIGNIFICANT CHANGE UP (ref 0.5–1.3)
EGFR: 87 ML/MIN/1.73M2 — SIGNIFICANT CHANGE UP
EGFR: 87 ML/MIN/1.73M2 — SIGNIFICANT CHANGE UP
FUNGITELL: 46 PG/ML — SIGNIFICANT CHANGE UP
FUNGITELL: 46 PG/ML — SIGNIFICANT CHANGE UP
GLUCOSE SERPL-MCNC: 105 MG/DL — HIGH (ref 70–99)
GLUCOSE SERPL-MCNC: 105 MG/DL — HIGH (ref 70–99)
HCT VFR BLD CALC: 40.5 % — SIGNIFICANT CHANGE UP (ref 39–50)
HCT VFR BLD CALC: 40.5 % — SIGNIFICANT CHANGE UP (ref 39–50)
HGB BLD-MCNC: 13.4 G/DL — SIGNIFICANT CHANGE UP (ref 13–17)
HGB BLD-MCNC: 13.4 G/DL — SIGNIFICANT CHANGE UP (ref 13–17)
MCHC RBC-ENTMCNC: 31.5 PG — SIGNIFICANT CHANGE UP (ref 27–34)
MCHC RBC-ENTMCNC: 31.5 PG — SIGNIFICANT CHANGE UP (ref 27–34)
MCHC RBC-ENTMCNC: 33.1 GM/DL — SIGNIFICANT CHANGE UP (ref 32–36)
MCHC RBC-ENTMCNC: 33.1 GM/DL — SIGNIFICANT CHANGE UP (ref 32–36)
MCV RBC AUTO: 95.1 FL — SIGNIFICANT CHANGE UP (ref 80–100)
MCV RBC AUTO: 95.1 FL — SIGNIFICANT CHANGE UP (ref 80–100)
NRBC # BLD: 0 /100 WBCS — SIGNIFICANT CHANGE UP (ref 0–0)
NRBC # BLD: 0 /100 WBCS — SIGNIFICANT CHANGE UP (ref 0–0)
PLATELET # BLD AUTO: 269 K/UL — SIGNIFICANT CHANGE UP (ref 150–400)
PLATELET # BLD AUTO: 269 K/UL — SIGNIFICANT CHANGE UP (ref 150–400)
POTASSIUM SERPL-MCNC: 3.3 MMOL/L — LOW (ref 3.5–5.3)
POTASSIUM SERPL-MCNC: 3.3 MMOL/L — LOW (ref 3.5–5.3)
POTASSIUM SERPL-SCNC: 3.3 MMOL/L — LOW (ref 3.5–5.3)
POTASSIUM SERPL-SCNC: 3.3 MMOL/L — LOW (ref 3.5–5.3)
RBC # BLD: 4.26 M/UL — SIGNIFICANT CHANGE UP (ref 4.2–5.8)
RBC # BLD: 4.26 M/UL — SIGNIFICANT CHANGE UP (ref 4.2–5.8)
RBC # FLD: 16.8 % — HIGH (ref 10.3–14.5)
RBC # FLD: 16.8 % — HIGH (ref 10.3–14.5)
SODIUM SERPL-SCNC: 138 MMOL/L — SIGNIFICANT CHANGE UP (ref 135–145)
SODIUM SERPL-SCNC: 138 MMOL/L — SIGNIFICANT CHANGE UP (ref 135–145)
WBC # BLD: 10.64 K/UL — HIGH (ref 3.8–10.5)
WBC # BLD: 10.64 K/UL — HIGH (ref 3.8–10.5)
WBC # FLD AUTO: 10.64 K/UL — HIGH (ref 3.8–10.5)
WBC # FLD AUTO: 10.64 K/UL — HIGH (ref 3.8–10.5)

## 2023-11-08 PROCEDURE — 99232 SBSQ HOSP IP/OBS MODERATE 35: CPT

## 2023-11-08 PROCEDURE — 99239 HOSP IP/OBS DSCHRG MGMT >30: CPT

## 2023-11-08 RX ORDER — PYRIDOSTIGMINE BROMIDE 60 MG/5ML
1 SOLUTION ORAL
Refills: 0 | DISCHARGE

## 2023-11-08 RX ORDER — CEFPODOXIME PROXETIL 100 MG
1 TABLET ORAL
Qty: 1 | Refills: 0
Start: 2023-11-08 | End: 2023-11-08

## 2023-11-08 RX ORDER — POTASSIUM CHLORIDE 20 MEQ
40 PACKET (EA) ORAL ONCE
Refills: 0 | Status: COMPLETED | OUTPATIENT
Start: 2023-11-08 | End: 2023-11-08

## 2023-11-08 RX ADMIN — Medication 650 MILLIGRAM(S): at 09:02

## 2023-11-08 RX ADMIN — Medication 40 MILLIEQUIVALENT(S): at 10:36

## 2023-11-08 RX ADMIN — Medication 650 MILLIGRAM(S): at 00:34

## 2023-11-08 RX ADMIN — Medication 200 MILLIGRAM(S): at 05:28

## 2023-11-08 RX ADMIN — APIXABAN 5 MILLIGRAM(S): 2.5 TABLET, FILM COATED ORAL at 05:27

## 2023-11-08 RX ADMIN — Medication 650 MILLIGRAM(S): at 01:34

## 2023-11-08 RX ADMIN — Medication 500 MILLIGRAM(S): at 05:27

## 2023-11-08 RX ADMIN — Medication 7.5 MILLIGRAM(S): at 05:27

## 2023-11-08 RX ADMIN — PANTOPRAZOLE SODIUM 40 MILLIGRAM(S): 20 TABLET, DELAYED RELEASE ORAL at 05:28

## 2023-11-08 RX ADMIN — Medication 40 MILLIGRAM(S): at 05:28

## 2023-11-08 NOTE — PHARMACOTHERAPY INTERVENTION NOTE - COMMENTS
[facility] Discharge Reconciliation    Reviewed completed discharge reconciliation, cefpodoxime for one more day to complete 5 day course for pneumonia, patient to be discharged home.

## 2023-11-08 NOTE — PROGRESS NOTE ADULT - SUBJECTIVE AND OBJECTIVE BOX
[facility]  Zay Yi PA-C  [address] [place] [address]  [phone]      INTERVAL HPI/OVERNIGHT EVENTS:  Pt s/e  No GI complaints    MEDICATIONS  (STANDING):  apixaban 5 milliGRAM(s) Oral every 12 hours  ascorbic acid 500 milliGRAM(s) Oral two times a day  furosemide    Tablet 40 milliGRAM(s) Oral daily  hydrocortisone sodium succinate Injectable 50 milliGRAM(s) IV Push every 12 hours  influenza  Vaccine (HIGH DOSE) 0.7 milliLiter(s) IntraMuscular once  metoprolol succinate ER 25 milliGRAM(s) Oral daily  multivitamin 1 Tablet(s) Oral daily  pantoprazole    Tablet 40 milliGRAM(s) Oral before breakfast  piperacillin/tazobactam IVPB.. 3.375 Gram(s) IV Intermittent every 8 hours  potassium chloride    Tablet ER 20 milliEquivalent(s) Oral daily  pyridostigmine 60 milliGRAM(s) Oral daily    MEDICATIONS  (PRN):  acetaminophen     Tablet .. 650 milliGRAM(s) Oral every 6 hours PRN Temp greater or equal to 38C (100.4F), Mild Pain (1 - 3)  ondansetron Injectable 4 milliGRAM(s) IV Push every 8 hours PRN Nausea and/or Vomiting      Allergies    levofloxacin (Unknown)  ofloxacin (Unknown)  gatifloxacin (Unknown)    Intolerances    Avelox (Other)  Ketek (Other)  telithromycin (Other)  fluoroquinolone antibiotics (Other)      PHYSICAL EXAM:   Vital Signs:  Vital Signs Last 24 Hrs  T(C): 36.6 (2023 12:17), Max: 36.6 (2023 12:17)  T(F): 97.9 (2023 12:17), Max: 97.9 (2023 12:17)  HR: 62 (2023 12:17) (53 - 63)  BP: 128/76 (2023 12:17) (113/70 - 132/75)  BP(mean): --  RR: 18 (2023 12:17) (18 - 18)  SpO2: 95% (2023 12:17) (95% - 97%)    Parameters below as of 2023 12:17  Patient On (Oxygen Delivery Method): room air      Daily     Daily Weight in k.5 (2023 05:27)    GENERAL:  Appears stated age  HEENT:  NC/AT  CHEST:  Full & symmetric excursion  HEART:  Regular rhythm  ABDOMEN:  Soft, non-tender, non-distended  EXTEREMITIES:  no cyanosis  SKIN:  No rash  NEURO:  Alert      LABS:                        13.2   11.42 )-----------( 301      ( 2023 07:20 )             40.7     11-07    141  |  99  |  18  ----------------------------<  111<H>  3.4<L>   |  33<H>  |  1.10    Ca    8.8      2023 07:20  Mg     2.0     11-07    TPro  5.7<L>  /  Alb  2.1<L>  /  TBili  0.8  /  DBili  x   /  AST  52<H>  /  ALT  52  /  AlkPhos  88  11-07      Urinalysis Basic - ( 2023 07:20 )    Color: x / Appearance: x / SG: x / pH: x  Gluc: 111 mg/dL / Ketone: x  / Bili: x / Urobili: x   Blood: x / Protein: x / Nitrite: x   Leuk Esterase: x / RBC: x / WBC x   Sq Epi: x / Non Sq Epi: x / Bacteria: x  
Negat HIDA scan
[facility]  INFECTIOUS DISEASES - Lalita Mckinley, [address], [place] [address]  Tel: [phone]     Fax: [phone]  =======================================================    DEION HEATH [id number]    Follow up: No fevers. Denies any pain, cough or SOB. Reports feeling well.    Allergies:  Avelox (Other)  Ketek (Other)  telithromycin (Other)  levofloxacin (Unknown)  fluoroquinolone antibiotics (Other)  ofloxacin (Unknown)  gatifloxacin (Unknown)      Antibiotics:  acetaminophen     Tablet .. 650 milliGRAM(s) Oral every 6 hours PRN  apixaban 5 milliGRAM(s) Oral every 12 hours  ascorbic acid 500 milliGRAM(s) Oral two times a day  furosemide    Tablet 40 milliGRAM(s) Oral daily  hydrocortisone sodium succinate Injectable 50 milliGRAM(s) IV Push every 12 hours  influenza  Vaccine (HIGH DOSE) 0.7 milliLiter(s) IntraMuscular once  metoprolol succinate ER 25 milliGRAM(s) Oral daily  multivitamin 1 Tablet(s) Oral daily  ondansetron Injectable 4 milliGRAM(s) IV Push every 8 hours PRN  pantoprazole    Tablet 40 milliGRAM(s) Oral before breakfast  piperacillin/tazobactam IVPB.. 3.375 Gram(s) IV Intermittent every 8 hours  potassium chloride    Tablet ER 20 milliEquivalent(s) Oral daily  pyridostigmine 60 milliGRAM(s) Oral daily       REVIEW OF SYSTEMS:  CONSTITUTIONAL:  No Fever or chills  HEENT:   No sore throat or runny nose.  CARDIOVASCULAR:  No chest pain or SOB  RESPIRATORY:  No cough, shortness of breath  GASTROINTESTINAL:  No nausea, vomiting or diarrhea.  GENITOURINARY:  No dysuria  MUSCULOSKELETAL:  no joint aches, no back pain  NEUROLOGIC:  No headache or dizziness  PSYCHIATRIC:  No disorder of thought or mood.     Physical Exam:  ICU Vital Signs Last 24 Hrs  T(C): 36.6 (07 Nov 2023 12:17), Max: 36.6 (07 Nov 2023 12:17)  T(F): 97.9 (07 Nov 2023 12:17), Max: 97.9 (07 Nov 2023 12:17)  HR: 62 (07 Nov 2023 12:17) (53 - 63)  BP: 128/76 (07 Nov 2023 12:17) (113/70 - 132/75)  BP(mean): --  ABP: --  ABP(mean): --  RR: 18 (07 Nov 2023 12:17) (18 - 18)  SpO2: 95% (07 Nov 2023 12:17) (95% - 97%)    O2 Parameters below as of 07 Nov 2023 12:17  Patient On (Oxygen Delivery Method): room air       GEN: NAD  HEENT: normocephalic and atraumatic.  NECK: Supple.    LUNGS: Normal respiratory effort  HEART: Regular rate and rhythm   ABDOMEN: Soft, nontender, and nondistended.    EXTREMITIES: No leg edema.  NEUROLOGIC: Answering questions appropriately  PSYCHIATRIC: Appropriate affect .    Labs:  11-07    141  |  99  |  18  ----------------------------<  111<H>  3.4<L>   |  33<H>  |  1.10    Ca    8.8      07 Nov 2023 07:20  Mg     2.0     11-07    TPro  5.7<L>  /  Alb  2.1<L>  /  TBili  0.8  /  DBili  x   /  AST  52<H>  /  ALT  52  /  AlkPhos  88  11-07                          13.2   11.42 )-----------( 301      ( 07 Nov 2023 07:20 )             40.7       Urinalysis Basic - ( 07 Nov 2023 07:20 )    Color: x / Appearance: x / SG: x / pH: x  Gluc: 111 mg/dL / Ketone: x  / Bili: x / Urobili: x   Blood: x / Protein: x / Nitrite: x   Leuk Esterase: x / RBC: x / WBC x   Sq Epi: x / Non Sq Epi: x / Bacteria: x      LIVER FUNCTIONS - ( 07 Nov 2023 07:20 )  Alb: 2.1 g/dL / Pro: 5.7 g/dL / ALK PHOS: 88 U/L / ALT: 52 U/L / AST: 52 U/L / GGT: x             RECENT CULTURES:  11-03 @ 21:50 .Blood Blood-Venous     No growth at 72 Hours        11-03 @ 21:45 .Blood Blood-Venous     No growth at 72 Hours        11-03 @ 21:40 Clean Catch Clean Catch (Midstream) Klebsiella pneumoniae    >100,000 CFU/ml Klebsiella pneumoniae              All imaging and data are reviewed.   
[facility]  INFECTIOUS DISEASES   [address] [place] [address]  Tel: [phone]     Fax: [phone]  =======================================================      DEION HEATH [id number]    Follow up: Leukocytosis    No complaints   More awake and alert this morning       Allergies:  Avelox (Other)  Ketek (Other)  telithromycin (Other)  levofloxacin (Unknown)  fluoroquinolone antibiotics (Other)  ofloxacin (Unknown)  gatifloxacin (Unknown)       REVIEW OF SYSTEMS:  CONSTITUTIONAL:  No Fever or chills  HEENT:   No diplopia or blurred vision.  No earache, sore throat or runny nose.  CARDIOVASCULAR:  No Chest Pain  RESPIRATORY:  No cough, shortness of breath  GASTROINTESTINAL:  No nausea, vomiting or diarrhea.  GENITOURINARY:  No dysuria, frequency or urgency. No Blood in urine  MUSCULOSKELETAL:  no joint aches, no muscle pain  SKIN:  No change in skin, hair or nails.  NEUROLOGIC:  No Headaches      Physical Exam:  GEN: NAD  HEENT: normocephalic and atraumatic. EOMI. PERRL.    NECK: Supple.   LUNGS: CTA B/L.  HEART: RRR  ABDOMEN: Soft, NT, ND.  +BS.    : No CVA tenderness  EXTREMITIES: Without  edema.  MSK: No joint swelling  NEUROLOGIC:  Alert and oriented to person and place  SKIN: No rash      Vitals:  T(F): 97.2 (05 Nov 2023 05:01), Max: 97.7 (04 Nov 2023 20:48)  HR: 52 (05 Nov 2023 05:01)  BP: 127/71 (05 Nov 2023 05:01)  RR: 18 (05 Nov 2023 05:01)  SpO2: 97% (05 Nov 2023 05:01) (97% - 97%)  temp max in last 48H T(F): , Max: 99.3 (11-04-23 @ 00:57)    Current Antibiotics:  piperacillin/tazobactam IVPB.. 3.375 Gram(s) IV Intermittent every 8 hours    Other medications:  apixaban 5 milliGRAM(s) Oral every 12 hours  furosemide    Tablet 40 milliGRAM(s) Oral daily  hydrocortisone sodium succinate Injectable 50 milliGRAM(s) IV Push every 8 hours  influenza  Vaccine (HIGH DOSE) 0.7 milliLiter(s) IntraMuscular once  metoprolol succinate ER 25 milliGRAM(s) Oral daily  pantoprazole    Tablet 40 milliGRAM(s) Oral before breakfast  potassium chloride    Tablet ER 20 milliEquivalent(s) Oral daily  pyridostigmine 60 milliGRAM(s) Oral daily                            15.7   18.33 )-----------( 289      ( 04 Nov 2023 08:45 )             46.5     11-04    139  |  103  |  15  ----------------------------<  139<H>  3.7   |  26  |  0.87    Ca    8.7      04 Nov 2023 08:45  Mg     2.0     11-03    TPro  6.2  /  Alb  2.1<L>  /  TBili  0.9  /  DBili  x   /  AST  41<H>  /  ALT  32  /  AlkPhos  73  11-04    RECENT CULTURES:  11-03 @ 21:50 .Blood Blood-Venous     No growth at 24 hours    11-03 @ 21:45 .Blood Blood-Venous     No growth at 24 hours      WBC Count: 18.33 K/uL (11-04-23 @ 08:45)  WBC Count: 18.56 K/uL (11-03-23 @ 17:23)    Creatinine: 0.87 mg/dL (11-04-23 @ 08:45)  Creatinine: 0.99 mg/dL (11-03-23 @ 19:15)                            
[facility]  INFECTIOUS DISEASES - Lalita Mckinley, [place] [address]  Tel: [phone]     Fax: [phone]  =======================================================    JOESPH GAXIOLA [id number]    Follow up: No fevers. Denies any pain, nausea, diarrhea, cough or SOB. Denies any headache, back pain or abdominal pain.    Allergies:  Avelox (Other)  Ketek (Other)  telithromycin (Other)  levofloxacin (Unknown)  fluoroquinolone antibiotics (Other)  ofloxacin (Unknown)  gatifloxacin (Unknown)      Antibiotics:  acetaminophen     Tablet .. 650 milliGRAM(s) Oral every 6 hours PRN  apixaban 5 milliGRAM(s) Oral every 12 hours  ascorbic acid 500 milliGRAM(s) Oral two times a day  furosemide    Tablet 40 milliGRAM(s) Oral daily  hydrocortisone sodium succinate Injectable 50 milliGRAM(s) IV Push every 12 hours  influenza  Vaccine (HIGH DOSE) 0.7 milliLiter(s) IntraMuscular once  metoprolol succinate ER 25 milliGRAM(s) Oral daily  multivitamin 1 Tablet(s) Oral daily  ondansetron Injectable 4 milliGRAM(s) IV Push every 8 hours PRN  pantoprazole    Tablet 40 milliGRAM(s) Oral before breakfast  piperacillin/tazobactam IVPB.. 3.375 Gram(s) IV Intermittent every 8 hours  potassium chloride    Tablet ER 20 milliEquivalent(s) Oral daily  pyridostigmine 60 milliGRAM(s) Oral daily       REVIEW OF SYSTEMS:  CONSTITUTIONAL:  No Fever or chills  HEENT:   No sore throat or runny nose.  CARDIOVASCULAR:  No chest pain or SOB  RESPIRATORY:  No cough, shortness of breath  GASTROINTESTINAL:  No nausea, vomiting or diarrhea.  GENITOURINARY:  No dysuria  MUSCULOSKELETAL:  no joint aches, no back pain  NEUROLOGIC:  No headache or dizziness  PSYCHIATRIC:  No disorder of thought or mood.     Physical Exam:  ICU Vital Signs Last 24 Hrs  T(C): 36.4 (2023 11:47), Max: 36.4 (2023 11:47)  T(F): 97.6 (2023 11:47), Max: 97.6 (2023 11:47)  HR: 72 (2023 11:47) (55 - 72)  BP: 119/62 (2023 11:47) (107/66 - 125/77)  BP(mean): --  ABP: --  ABP(mean): --  RR: 18 (2023 11:47) (18 - 18)  SpO2: 97% (2023 11:47) (95% - 97%)    O2 Parameters below as of 2023 11:47  Patient On (Oxygen Delivery Method): room air           GEN: NAD  HEENT: normocephalic and atraumatic.  NECK: Supple.  No lymphadenopathy   LUNGS: Clear to auscultation.  HEART: Regular rate and rhythm   ABDOMEN: Soft, nontender, and nondistended.    EXTREMITIES: No leg edema.  NEUROLOGIC: Answering questions appropriately  PSYCHIATRIC: Appropriate affect .    Labs:      140  |  100  |  16  ----------------------------<  121<H>  3.7   |  31  |  1.00    Ca    9.0      2023 07:40    TPro  6.3  /  Alb  2.2<L>  /  TBili  0.9  /  DBili  x   /  AST  28  /  ALT  33  /  AlkPhos  74                            14.7   16.14 )-----------( 311      ( 2023 07:40 )             44.3       Urinalysis Basic - ( 2023 07:40 )    Color: x / Appearance: x / SG: x / pH: x  Gluc: 121 mg/dL / Ketone: x  / Bili: x / Urobili: x   Blood: x / Protein: x / Nitrite: x   Leuk Esterase: x / RBC: x / WBC x   Sq Epi: x / Non Sq Epi: x / Bacteria: x      LIVER FUNCTIONS - ( 2023 07:40 )  Alb: 2.2 g/dL / Pro: 6.3 g/dL / ALK PHOS: 74 U/L / ALT: 33 U/L / AST: 28 U/L / GGT: x             RECENT CULTURES:   @ 21:50 .Blood Blood-Venous     No growth at 48 Hours         @ 21:45 .Blood Blood-Venous     No growth at 48 Hours        11- @ 21:40 Clean Catch Clean Catch (Midstream)     >100,000 CFU/ml Gram Negative Rods              All imaging and data are reviewed.     < from: [facility] Hepatobiliary Imaging (23 @ 12:49) >  FINDINGS: There is prompt, homogeneous uptake of radiopharmaceutical by   the hepatocytes. Activity is seen in the gallbladder at approximately 50   minutes and in the bowel at approximately 25 minutes. There is good   clearance of activity from the liver by the end of the study.    IMPRESSION: Normal hepatobiliary scan.    No evidence of acute cholcystitis or biliaryobstruction.    < end of copied text >    < from: CT Chest w/ IV Cont (23 @ 20:39) >  *** ADDENDUM # 1 ***    The dictation which appears in this CT report is incorrect and belongs to   another patient. This was a result of computer error. Dr. Malone   conveyed the correct results for this patient to Mary Jang DO. at   5:10 AM on .    The correct dictation for patient Joesph Gaxiola  1945, CT of   chest abdomen pelvis on 11/3/2023 is as follows:    CLINICAL INFORMATION: Abdominal pain. Leukocytosis    COMPARISON: Chest CT 2021. CT abdomen pelvis 2021.    PROCEDURE:  CT of the Chest, Abdomen and Pelvis was performed.  Sagittal and coronal reformats were performed.    FINDINGS:  CHEST:  LUNGS AND LARGE AIRWAYS: Patent central airways. Right lobe consolidation   with air bronchograms suspicious for pneumonia. Correlate clinically.   Recommend short-term follow-up noncontrast chest CT to assess for   resolution.  Right upper lobe calcified granulomas.  PLEURA: No pleural effusion.  VESSELS: Within normal limits.  HEART: Heart size is normal. No pericardial effusion. Coronary artery   calcifications.  MEDIASTINUM AND KONRAD: Calcified mediastinal and right hilar lymph nodes.  CHEST WALL AND LOWER NECK: 2.3 cm right lobe thyroid nodule with small   calcification. Thyroid ultrasound is recommended.    ABDOMEN AND PELVIS:  LIVER: Numerous calcified granulomas..  BILE DUCTS: Normal caliber.  GALLBLADDER: The gallbladder is mildly distended. There is diffuse   gallbladder wall thickening.  SPLEEN: Numerous calcified granulomas..  PANCREAS: Mild fatty infiltration of the pancreas.  ADRENALS: Within normal limits.  KIDNEYS/URETERS: A few small cortical hypodensities are too small to   characterize. No hydronephrosis or nephrolithiasis.    BLADDER: The urinary bladder contains a small amount of nondependent air.   Question recent instrumentation.  REPRODUCTIVE ORGANS: Prostate gland is enlarged with mass effect on the   urinary bladder.    BOWEL: Small periampullary duodenal diverticulum. No bowel obstruction.   Appendix is normal.  PERITONEUM: No ascites.  VESSELS: Atherosclerotic changes.  RETROPERITONEUM/LYMPH NODES: No lymphadenopathy.  ABDOMINAL WALL: Within normal limits.  BONES: Degenerative changes. Mild lumbar scoliosis.    IMPRESSION:    Right lobe consolidation with air bronchograms suspicious for pneumonia.   Correlate clinically. Recommend short-term follow-up noncontrast chest CT   to assess for resolution.    Right upper lobe calcified granulomas and right hilar calcified lymph   nodes are suspicious for prior granulomatous disease such as tuberculosis   or sarcoidosis. Numerous calcified granulomas are also noted within the   liver and spleen. Correlate clinically.    The gallbladder is mildly distended. There is diffuse gallbladder wall   thickening. Correlate for gallbladder disease.    The urinary bladder contains a small amount of nondependent air. Question   recent instrumentation.disease.    2.3 cm right lobe thyroid nodule with small calcification. Thyroid   ultrasound is recommended.    --- End of Report ---    *** END OF ADDENDUM # 1 ***      PROCEDURE DATE:  2023          INTERPRETATION:  CLINICAL INFORMATION: Chest and abdominal pain and   leukocytosis.    COMPARISON: CT chest abdomen and pelvis 12/10/2014.    CONTRAST/COMPLICATIONS:  IV Contrast: Omnipaque 350 (accession [id number]), IV contrast documented   in unlinked concurrent exam (accession [id number])  90 cc administered   (accession [id number]), 0 cc administered (accession [id number])   10 cc   discarded (accession [id number]), 0 cc discarded (accession [id number])  Oral Contrast: NONE  Complications: None reported at time of study completion    PROCEDURE:  CT of the Chest, Abdomen and Pelvis was performed.  Sagittal and coronal reformats were performed.    FINDINGS:  CHEST:  LUNGS AND LARGE AIRWAYS: Patent central airways. Subcentimeter left lower   lobe calcified granuloma. 2 mm right upper lobe pulmonary nodule.  PLEURA: No pleural effusion.  VESSELS: Within normal limits.  HEART: Left atrial enlargement. Mild coronary artery calcifications. No   pericardial effusion.  MEDIASTINUM AND KONRAD: No lymphadenopathy.  CHEST WALL AND LOWER NECK: Within normal limits.    ABDOMEN AND PELVIS:  LIVER: Within normal limits.  BILE DUCTS: Normal caliber.  GALLBLADDER: Within normal limits.  SPLEEN: Within normal limits.  PANCREAS: Within normal limits.  ADRENALS: Within normal limits.  KIDNEYS/URETERS: Numerous bilateral renal cysts some of which are likely   hemorrhagic.  Left perinephric stranding and moderate to severe left hydronephrosis and   hydroureter to the level of the left ureterovesicular junction. No   nephrolithiasis. Evaluation of the left ureterovesicular junction is   limited by a prominent prostate gland median lobe protruding into the   base of the bladder. Bladder mass or distal ureteral stricture cannot be   excluded. Recommend urology consultation.    BLADDER: Within normal limits.  REPRODUCTIVE ORGANS: Enlarged prostate with prominent median lobe with   mass effect on the urinary bladder.    BOWEL: No bowel obstruction. Appendix is not visualized. No evidence of   inflammation in the pericecal region.  PERITONEUM: No ascites.  VESSELS: Within normal limits.  RETROPERITONEUM/LYMPH NODES: No lymphadenopathy.  ABDOMINAL WALL: Within normal limits.  BONES: Degenerative changes. Irregular sclerotic focus in the right pubic   symphysis is indeterminant. Another sclerotic focus is noted at the L3   right pedicle.    IMPRESSION:    Left perinephric stranding and moderate to severe left hydronephrosis and   hydroureter to the level of the left ureterovesicular junction. No   nephrolithiasis. Evaluation of the left ureterovesicular junction is   limited by a prominent prostate gland median lobe protruding into the   base of the bladder. Bladder mass or distal ureteral stricture cannot be   excluded. Recommend urology consultation.    Numerous bilateral renal cysts some of which are likely hemorrhagic.   Recommend ultrasound and/or MRI for further evaluation.      --- End of Report ---    ***Please see the addendum at the top of this report. It may contain   additional important information or changes.****      < end of copied text >  
[facility] -- Janel Jackson Pannella, Patel, Savella, Goodger, Cohen  Office # [phone]    Follow Up:   Chest pain     Subjective/Observations:  seen and examined, awake, alert, resting in bed, denies chest pain, dyspnea, now on room air, refusing meds and requesting to go home.       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      Club foot  Surgery at birth for Club Foot Right foot      Pilonidal cyst  Surgery 40 years ago      H/O colonoscopy      Spinal stenosis      H/O prostate biopsy        MEDICATIONS  (STANDING):  apixaban 5 milliGRAM(s) Oral every 12 hours  ascorbic acid 500 milliGRAM(s) Oral two times a day  furosemide    Tablet 40 milliGRAM(s) Oral daily  hydrocortisone sodium succinate Injectable 50 milliGRAM(s) IV Push every 8 hours  influenza  Vaccine (HIGH DOSE) 0.7 milliLiter(s) IntraMuscular once  metoprolol succinate ER 25 milliGRAM(s) Oral daily  multivitamin 1 Tablet(s) Oral daily  pantoprazole    Tablet 40 milliGRAM(s) Oral before breakfast  piperacillin/tazobactam IVPB.. 3.375 Gram(s) IV Intermittent every 8 hours  potassium chloride    Tablet ER 20 milliEquivalent(s) Oral daily  pyridostigmine 60 milliGRAM(s) Oral daily    MEDICATIONS  (PRN):  acetaminophen     Tablet .. 650 milliGRAM(s) Oral every 6 hours PRN Temp greater or equal to 38C (100.4F), Mild Pain (1 - 3)  ondansetron Injectable 4 milliGRAM(s) IV Push every 8 hours PRN Nausea and/or Vomiting    Allergies    levofloxacin (Unknown)  ofloxacin (Unknown)  gatifloxacin (Unknown)    Intolerances    Avelox (Other)  Ketek (Other)  telithromycin (Other)  fluoroquinolone antibiotics (Other)    Vital Signs Last 24 Hrs  T(C): 36.2 (06 Nov 2023 04:32), Max: 36.3 (05 Nov 2023 20:00)  T(F): 97.2 (06 Nov 2023 04:32), Max: 97.3 (05 Nov 2023 20:00)  HR: 55 (06 Nov 2023 04:32) (55 - 66)  BP: 125/77 (06 Nov 2023 04:32) (107/66 - 125/77)  BP(mean): --  RR: 18 (06 Nov 2023 04:32) (18 - 18)  SpO2: 96% (06 Nov 2023 04:32) (95% - 96%)    Parameters below as of 06 Nov 2023 04:32  Patient On (Oxygen Delivery Method): room air      I&O's Summary        TELE: Not on telemetry   PHYSICAL EXAM:  Constitutional: NAD, awake and alert, obese  HEENT: Moist Mucous Membranes, Anicteric  Pulmonary: Non-labored, breath sounds are clear bilaterally, No wheezing, rales or rhonchi  Cardiovascular: Regular, S1 and S2, No murmurs, rubs, gallops or clicks  Gastrointestinal: Bowel Sounds present, soft, nontender.   Lymph: trace b/l LE  edema. No lymphadenopathy.  Skin: No visible rashes or ulcers.  Psych:  Mood & affect appropriate  LABS: All Labs Reviewed:                        14.7   16.14 )-----------( 311      ( 05 Nov 2023 07:40 )             44.3                         15.7   18.33 )-----------( 289      ( 04 Nov 2023 08:45 )             46.5                         16.7   18.56 )-----------( 367      ( 03 Nov 2023 17:23 )             50.4     05 Nov 2023 07:40    140    |  100    |  16     ----------------------------<  121    3.7     |  31     |  1.00   04 Nov 2023 08:45    139    |  103    |  15     ----------------------------<  139    3.7     |  26     |  0.87   03 Nov 2023 19:15    137    |  99     |  15     ----------------------------<  149    3.7     |  28     |  0.99     Ca    9.0        05 Nov 2023 07:40  Ca    8.7        04 Nov 2023 08:45  Ca    9.2        03 Nov 2023 19:15  Mg     2.0       03 Nov 2023 19:15    TPro  6.3    /  Alb  2.2    /  TBili  0.9    /  DBili  x      /  AST  28     /  ALT  33     /  AlkPhos  74     05 Nov 2023 07:40  TPro  6.2    /  Alb  2.1    /  TBili  0.9    /  DBili  x      /  AST  41     /  ALT  32     /  AlkPhos  73     04 Nov 2023 08:45  TPro  7.2    /  Alb  2.6    /  TBili  0.8    /  DBili  x      /  AST  28     /  ALT  29     /  AlkPhos  84     03 Nov 2023 19:15          12 Lead ECG:   Ventricular Rate 79 BPM    Atrial Rate 79 BPM    P-R Interval 142 ms    QRS Duration 112 ms    Q-T Interval 462 ms    QTC Calculation(Bazett) 529 ms    P Axis 37 degrees    R Axis -50 degrees    T Axis 106 degrees    Diagnosis Line Sinus rhythm with frequent premature ventricular complexes  Left anterior fascicular block  T wave abnormality, consider lateral ischemia  Prolonged QT  Confirmed by SANDY PAUL ([id number]) on 11/4/2023 11:46:19 AM (11-03-23 @ 16:26)       EXAM:  ECHO TTE WO CON COMP W DOPP         PROCEDURE DATE:  06/01/2021        INTERPRETATION:  INDICATION: Bilateral pulmonary edema  Sonographer AS    Blood Pressure 130/64    Height 167.6 cm     Weight 113.4 kg       BSA 2.2 sq m    Dimensions:  LA 2.8       Normal Values: 2.0 - 4.0 cm  Ao 2.8        Normal Values: 2.0 - 3.8 cm  SEPTUM 1.0       Normal Values: 0.6 - 1.2 cm  PWT 0.9       Normal Values: 0.6 - 1.1 cm  LVIDd 3.6         Normal Values: 3.0 - 5.6 cm  LVIDs 2.4         Normal Values: 1.8 - 4.0 cm      OBSERVATIONS:  Technically difficult and limited study  Mitral Valve: normal, trace physiologic MR.  Aortic Valve/Aorta: Not well-visualized  Tricuspid Valve: Not well-visualized  Pulmonic Valve: Not well-visualized  Left Atrium:normal  Right Atrium: Not well-visualized  Left Ventricle: Left ventricle is not well-visualized. Overall grossly normal left ventricular systolic function, estimated LVEF of 55-60%.  Right Ventricle: Grossly normal size and systolic function.  Pericardium: no significant pericardial effusion.  Pulmonary/RV Pressure: estimated PA systolic pressure of 9.5 mmHg assuming an RA pressure of 3 mmHg.  LV diastolic dysfunction is present        IMPRESSION:  Technically difficult and limited study  Overall grossly normal left ventricular systolic function, estimated LVEF of 55-60%.  Grossly normal RV size and systolic function.  The aortic valve is not well-visualized  Trace physiologic MR GULSHAN BECKER MD; Attending Cardiologist  This document has been electronically signed. Jun 2 2021  4:21PM     
[facility] -- Janel Jackson, Evan Woods Savella, Goodger, Cohen  Office # [phone]    Follow Up:  Chest pain     Subjective/Observations: Patient seen and examined, awake, alert, calm and cooperative, denies chest pain, dyspnea, palpitations or dizziness, orthopnea and PND. Tolerating room air.     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      Club foot  Surgery at birth for Club Foot Right foot      Pilonidal cyst  Surgery 40 years ago      H/O colonoscopy      Spinal stenosis      H/O prostate biopsy        MEDICATIONS  (STANDING):  apixaban 5 milliGRAM(s) Oral every 12 hours  ascorbic acid 500 milliGRAM(s) Oral two times a day  furosemide    Tablet 40 milliGRAM(s) Oral daily  hydrocortisone sodium succinate Injectable 50 milliGRAM(s) IV Push every 12 hours  influenza  Vaccine (HIGH DOSE) 0.7 milliLiter(s) IntraMuscular once  metoprolol succinate ER 25 milliGRAM(s) Oral daily  multivitamin 1 Tablet(s) Oral daily  pantoprazole    Tablet 40 milliGRAM(s) Oral before breakfast  piperacillin/tazobactam IVPB.. 3.375 Gram(s) IV Intermittent every 8 hours  potassium chloride    Tablet ER 20 milliEquivalent(s) Oral daily  pyridostigmine 60 milliGRAM(s) Oral daily    MEDICATIONS  (PRN):  acetaminophen     Tablet .. 650 milliGRAM(s) Oral every 6 hours PRN Temp greater or equal to 38C (100.4F), Mild Pain (1 - 3)  ondansetron Injectable 4 milliGRAM(s) IV Push every 8 hours PRN Nausea and/or Vomiting    Allergies    levofloxacin (Unknown)  ofloxacin (Unknown)  gatifloxacin (Unknown)    Intolerances    Avelox (Other)  Ketek (Other)  telithromycin (Other)  fluoroquinolone antibiotics (Other)    Vital Signs Last 24 Hrs  T(C): 36.2 (07 Nov 2023 05:27), Max: 36.5 (06 Nov 2023 20:30)  T(F): 97.2 (07 Nov 2023 05:27), Max: 97.7 (06 Nov 2023 20:30)  HR: 53 (07 Nov 2023 05:27) (53 - 72)  BP: 132/75 (07 Nov 2023 05:27) (113/70 - 132/75)  BP(mean): --  RR: 18 (07 Nov 2023 05:27) (18 - 18)  SpO2: 96% (07 Nov 2023 05:27) (96% - 97%)    Parameters below as of 07 Nov 2023 05:27  Patient On (Oxygen Delivery Method): room air      I&O's Summary    06 Nov 2023 07:01  -  07 Nov 2023 07:00  --------------------------------------------------------  IN: 200 mL / OUT: 0 mL / NET: 200 mL          TELE: Not on telemetry   PHYSICAL EXAM:  Constitutional: NAD, awake and alert, obese  HEENT: Moist Mucous Membranes, Anicteric  Pulmonary: Non-labored, breath sounds are clear bilaterally, No wheezing, rales or rhonchi  Cardiovascular: Regular, S1 and S2, No murmurs, rubs, gallops or clicks  Gastrointestinal: Bowel Sounds present, soft, nontender.   Lymph: trace b/l LE  edema. No lymphadenopathy.  Skin: No visible rashes or ulcers.  Psych:  Mood & affect appropriate  LABS: All Labs Reviewed:                                   13.2   11.42 )-----------( 301      ( 07 Nov 2023 07:20 )             40.7                         15.4   16.61 )-----------( 319      ( 06 Nov 2023 14:06 )             46.6                         14.7   16.14 )-----------( 311      ( 05 Nov 2023 07:40 )             44.3     07 Nov 2023 07:20    141    |  99     |  18     ----------------------------<  111    3.4     |  33     |  1.10   06 Nov 2023 14:06    137    |  98     |  12     ----------------------------<  111    2.9     |  31     |  1.00   05 Nov 2023 07:40    140    |  100    |  16     ----------------------------<  121    3.7     |  31     |  1.00     Ca    8.8        07 Nov 2023 07:20  Ca    9.2        06 Nov 2023 14:06  Ca    9.0        05 Nov 2023 07:40  Mg     2.0       07 Nov 2023 07:20    TPro  5.7    /  Alb  2.1    /  TBili  0.8    /  DBili  x      /  AST  52     /  ALT  52     /  AlkPhos  88     07 Nov 2023 07:20  TPro  6.3    /  Alb  2.2    /  TBili  0.9    /  DBili  x      /  AST  28     /  ALT  33     /  AlkPhos  74     05 Nov 2023 07:40          12 Lead ECG:   Ventricular Rate 79 BPM    Atrial Rate 79 BPM    P-R Interval 142 ms    QRS Duration 112 ms    Q-T Interval 462 ms    QTC Calculation(Bazett) 529 ms    P Axis 37 degrees    R Axis -50 degrees    T Axis 106 degrees    Diagnosis Line Sinus rhythm with frequent premature ventricular complexes  Left anterior fascicular block  T wave abnormality, consider lateral ischemia  Prolonged QT  Confirmed by SANDY WOODS ([id number]) on 11/4/2023 11:46:19 AM (11-03-23 @ 16:26)       EXAM:  ECHO TTE WO CON COMP W DOPP         PROCEDURE DATE:  06/01/2021        INTERPRETATION:  INDICATION: Bilateral pulmonary edema  Sonographer AS    Blood Pressure 130/64    Height 167.6 cm     Weight 113.4 kg       BSA 2.2 sq m    Dimensions:  LA 2.8       Normal Values: 2.0 - 4.0 cm  Ao 2.8        Normal Values: 2.0 - 3.8 cm  SEPTUM 1.0       Normal Values: 0.6 - 1.2 cm  PWT 0.9       Normal Values: 0.6 - 1.1 cm  LVIDd 3.6         Normal Values: 3.0 - 5.6 cm  LVIDs 2.4         Normal Values: 1.8 - 4.0 cm      OBSERVATIONS:  Technically difficult and limited study  Mitral Valve: normal, trace physiologic MR.  Aortic Valve/Aorta: Not well-visualized  Tricuspid Valve: Not well-visualized  Pulmonic Valve: Not well-visualized  Left Atrium:normal  Right Atrium: Not well-visualized  Left Ventricle: Left ventricle is not well-visualized. Overall grossly normal left ventricular systolic function, estimated LVEF of 55-60%.  Right Ventricle: Grossly normal size and systolic function.  Pericardium: no significant pericardial effusion.  Pulmonary/RV Pressure: estimated PA systolic pressure of 9.5 mmHg assuming an RA pressure of 3 mmHg.  LV diastolic dysfunction is present        IMPRESSION:  Technically difficult and limited study  Overall grossly normal left ventricular systolic function, estimated LVEF of 55-60%.  Grossly normal RV size and systolic function.  The aortic valve is not well-visualized  Trace physiologic MR GULSHAN BECKER MD; Attending Cardiologist  This document has been electronically signed. Jun 2 2021  4:21PM     
[facility] -- Manuel Carter,  Janel, Evan Woods Savella, Goodger  Office # [phone]    Follow Up:  chest pain    Subjective/Observations: No events overnight resting comfortably in bed.  No complaints of chest pain, dyspnea, or palpitations reported. No signs of orthopnea or PND. On supplemental 02 via NC 3L.       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 3.375 Gram(s) IV Intermittent every 8 hours  potassium chloride    Tablet ER 20 milliEquivalent(s) Oral daily  pyridostigmine 60 milliGRAM(s) Oral daily    MEDICATIONS  (PRN):  acetaminophen     Tablet .. 650 milliGRAM(s) Oral every 6 hours PRN Temp greater or equal to 38C (100.4F), Mild Pain (1 - 3)  ondansetron Injectable 4 milliGRAM(s) IV Push every 8 hours PRN Nausea and/or Vomiting    Allergies    levofloxacin (Unknown)  ofloxacin (Unknown)  gatifloxacin (Unknown)    Intolerances    Avelox (Other)  Ketek (Other)  telithromycin (Other)  fluoroquinolone antibiotics (Other)    Vital Signs Last 24 Hrs  T(C): 36.6 (04 Nov 2023 06:25), Max: 37.4 (04 Nov 2023 00:57)  T(F): 97.9 (04 Nov 2023 06:25), Max: 99.3 (04 Nov 2023 00:57)  HR: 88 (04 Nov 2023 06:25) (81 - 93)  BP: 139/90 (04 Nov 2023 06:25) (131/84 - 160/89)  BP(mean): --  RR: 18 (04 Nov 2023 06:25) (16 - 18)  SpO2: 94% (04 Nov 2023 06:25) (93% - 98%)    Parameters below as of 04 Nov 2023 06:25  Patient On (Oxygen Delivery Method): room air  O2 Flow (L/min): 2    I&O's Summary    Weight (kg): 108.9 (11-03 @ 16:32)    TELE: off  PHYSICAL EXAM:  Constitutional: NAD, awake and alert, elderly   HEENT: Moist Mucous Membranes, Anicteric  Pulmonary: Non-labored, breath sounds are clear bilaterally, No wheezing, rales or rhonchi  Cardiovascular: Regular, S1 and S2, No murmurs, rubs, gallops or clicks  Gastrointestinal: Bowel Sounds present, soft, nontender.   Lymph: +trace  b/l LE peripheral edema, cool to touch. No lymphadenopathy.  Skin: No visible rashes or ulcers.  Psych:  Mood & affect appropriate    LABS: All Labs Reviewed:                        16.7   18.56 )-----------( 367      ( 03 Nov 2023 17:23 )             50.4     03 Nov 2023 19:15    137    |  99     |  15     ----------------------------<  149    3.7     |  28     |  0.99     Ca    9.2        03 Nov 2023 19:15  Mg     2.0       03 Nov 2023 19:15    TPro  7.2    /  Alb  2.6    /  TBili  0.8    /  DBili  x      /  AST  28     /  ALT  29     /  AlkPhos  84     03 Nov 2023 19:15    PT/INR - ( 03 Nov 2023 19:15 )   PT: 15.9 sec;   INR: 1.37 ratio         PTT - ( 03 Nov 2023 19:15 )  PTT:34.2 sec  CARDIAC MARKERS ( 03 Nov 2023 19:15 )  x     / x     / 19 U/L / x     / <1.0 ng/mL           EXAM:  ECHO TTE WO CON COMP W DOPP         PROCEDURE DATE:  06/01/2021        INTERPRETATION:  INDICATION: Bilateral pulmonary edema  Sonographer AS    Blood Pressure 130/64    Height 167.6 cm     Weight 113.4 kg       BSA 2.2 sq m    Dimensions:  LA 2.8       Normal Values: 2.0 - 4.0 cm  Ao 2.8        Normal Values: 2.0 - 3.8 cm  SEPTUM 1.0       Normal Values: 0.6 - 1.2 cm  PWT 0.9       Normal Values: 0.6 - 1.1 cm  LVIDd 3.6         Normal Values: 3.0 - 5.6 cm  LVIDs 2.4         Normal Values: 1.8 - 4.0 cm      OBSERVATIONS:  Technically difficult and limited study  Mitral Valve: normal, trace physiologic MR.  Aortic Valve/Aorta: Not well-visualized  Tricuspid Valve: Not well-visualized  Pulmonic Valve: Not well-visualized  Left Atrium:normal  Right Atrium: Not well-visualized  Left Ventricle: Left ventricle is not well-visualized. Overall grossly normal left ventricular systolic function, estimated LVEF of 55-60%.  Right Ventricle: Grossly normal size and systolic function.  Pericardium: no significant pericardial effusion.  Pulmonary/RV Pressure: estimated PA systolic pressure of 9.5 mmHg assuming an RA pressure of 3 mmHg.  LV diastolic dysfunction is present        IMPRESSION:  Technically difficult and limited study  Overall grossly normal left ventricular systolic function, estimated LVEF of 55-60%.  Grossly normal RV size and systolic function.  The aortic valve is not well-visualized  Trace physiologic MR                GULSHAN BECKER MD; Attending Cardiologist  This document has been electronically signed. Jun 2 2021  4:21PM     
[facility]  Zay Yi PA-C  [address] [place] [address]  [phone]      INTERVAL HPI/OVERNIGHT EVENTS:  Pt s/e  No new GI events    MEDICATIONS  (STANDING):  apixaban 5 milliGRAM(s) Oral every 12 hours  ascorbic acid 500 milliGRAM(s) Oral two times a day  furosemide    Tablet 40 milliGRAM(s) Oral daily  hydrocortisone sodium succinate Injectable 50 milliGRAM(s) IV Push every 8 hours  influenza  Vaccine (HIGH DOSE) 0.7 milliLiter(s) IntraMuscular once  metoprolol succinate ER 25 milliGRAM(s) Oral daily  multivitamin 1 Tablet(s) Oral daily  pantoprazole    Tablet 40 milliGRAM(s) Oral before breakfast  piperacillin/tazobactam IVPB.. 3.375 Gram(s) IV Intermittent every 8 hours  potassium chloride    Tablet ER 20 milliEquivalent(s) Oral daily  pyridostigmine 60 milliGRAM(s) Oral daily    MEDICATIONS  (PRN):  acetaminophen     Tablet .. 650 milliGRAM(s) Oral every 6 hours PRN Temp greater or equal to 38C (100.4F), Mild Pain (1 - 3)  ondansetron Injectable 4 milliGRAM(s) IV Push every 8 hours PRN Nausea and/or Vomiting      Allergies    levofloxacin (Unknown)  ofloxacin (Unknown)  gatifloxacin (Unknown)    Intolerances    Avelox (Other)  Ketek (Other)  telithromycin (Other)  fluoroquinolone antibiotics (Other)      PHYSICAL EXAM:   Vital Signs:  Vital Signs Last 24 Hrs  T(C): 36.2 (2023 04:32), Max: 36.3 (2023 20:00)  T(F): 97.2 (2023 04:32), Max: 97.3 (2023 20:00)  HR: 55 (2023 04:32) (55 - 66)  BP: 125/77 (2023 04:32) (107/66 - 125/77)  BP(mean): --  RR: 18 (2023 04:32) (18 - 18)  SpO2: 96% (2023 04:32) (95% - 96%)    Parameters below as of 2023 04:32  Patient On (Oxygen Delivery Method): room air      Daily     Daily Weight in k.7 (2023 04:32)    GENERAL:  Appears stated age  HEENT:  NC/AT  CHEST:  Full & symmetric excursion  HEART:  Regular rhythm  ABDOMEN:  Soft, non-tender, non-distended  EXTEREMITIES:  no cyanosis  SKIN:  No rash  NEURO:  Alert      LABS:                        14.7   16.14 )-----------( 311      ( 2023 07:40 )             44.3     11-05    140  |  100  |  16  ----------------------------<  121<H>  3.7   |  31  |  1.00    Ca    9.0      2023 07:40    TPro  6.3  /  Alb  2.2<L>  /  TBili  0.9  /  DBili  x   /  AST  28  /  ALT  33  /  AlkPhos  74  11-05      Urinalysis Basic - ( 2023 07:40 )    Color: x / Appearance: x / SG: x / pH: x  Gluc: 121 mg/dL / Ketone: x  / Bili: x / Urobili: x   Blood: x / Protein: x / Nitrite: x   Leuk Esterase: x / RBC: x / WBC x   Sq Epi: x / Non Sq Epi: x / Bacteria: x  
[facility] -- Janel Jackson Pannella, Patel, Savella, Goodger, Cohen  Office # [phone]    Follow Up:  Chest pain     Subjective/Observations: seen and examined, awake, alert, resting comfortably in bed, denies chest pain, dyspnea, palpitations or dizziness, orthopnea and PND. 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 3.375 Gram(s) IV Intermittent every 8 hours  potassium chloride    Tablet ER 20 milliEquivalent(s) Oral daily  pyridostigmine 60 milliGRAM(s) Oral daily    MEDICATIONS  (PRN):  acetaminophen     Tablet .. 650 milliGRAM(s) Oral every 6 hours PRN Temp greater or equal to 38C (100.4F), Mild Pain (1 - 3)  ondansetron Injectable 4 milliGRAM(s) IV Push every 8 hours PRN Nausea and/or Vomiting    Allergies    levofloxacin (Unknown)  ofloxacin (Unknown)  gatifloxacin (Unknown)    Intolerances    Avelox (Other)  Ketek (Other)  telithromycin (Other)  fluoroquinolone antibiotics (Other)    Vital Signs Last 24 Hrs  T(C): 36.2 (05 Nov 2023 05:01), Max: 36.5 (04 Nov 2023 20:48)  T(F): 97.2 (05 Nov 2023 05:01), Max: 97.7 (04 Nov 2023 20:48)  HR: 52 (05 Nov 2023 05:01) (52 - 75)  BP: 127/71 (05 Nov 2023 05:01) (109/64 - 127/71)  BP(mean): --  RR: 18 (05 Nov 2023 05:01) (18 - 18)  SpO2: 97% (05 Nov 2023 05:01) (97% - 97%)    Parameters below as of 05 Nov 2023 05:01  Patient On (Oxygen Delivery Method): nasal cannula      I&O's Summary    04 Nov 2023 08:01  -  05 Nov 2023 07:00  --------------------------------------------------------  IN: 0 mL / OUT: 300 mL / NET: -300 mL          TELE: Not on telemetry   PHYSICAL EXAM:  Constitutional: NAD, awake and alert, obese  HEENT: Moist Mucous Membranes, Anicteric  Pulmonary: Non-labored, breath sounds are clear bilaterally, No wheezing, rales or rhonchi  Cardiovascular: Regular, S1 and S2, No murmurs, rubs, gallops or clicks  Gastrointestinal: Bowel Sounds present, soft, nontender.   Lymph: trace b/l LE  edema. No lymphadenopathy.  Skin: No visible rashes or ulcers.  Psych:  Mood & affect appropriate  LABS: All Labs Reviewed:                        14.7   16.14 )-----------( 311      ( 05 Nov 2023 07:40 )             44.3                         15.7   18.33 )-----------( 289      ( 04 Nov 2023 08:45 )             46.5                         16.7   18.56 )-----------( 367      ( 03 Nov 2023 17:23 )             50.4     04 Nov 2023 08:45    139    |  103    |  15     ----------------------------<  139    3.7     |  26     |  0.87   03 Nov 2023 19:15    137    |  99     |  15     ----------------------------<  149    3.7     |  28     |  0.99     Ca    8.7        04 Nov 2023 08:45  Ca    9.2        03 Nov 2023 19:15  Mg     2.0       03 Nov 2023 19:15    TPro  6.2    /  Alb  2.1    /  TBili  0.9    /  DBili  x      /  AST  41     /  ALT  32     /  AlkPhos  73     04 Nov 2023 08:45  TPro  7.2    /  Alb  2.6    /  TBili  0.8    /  DBili  x      /  AST  28     /  ALT  29     /  AlkPhos  84     03 Nov 2023 19:15    PT/INR - ( 03 Nov 2023 19:15 )   PT: 15.9 sec;   INR: 1.37 ratio         PTT - ( 03 Nov 2023 19:15 )  PTT:34.2 sec  CARDIAC MARKERS ( 03 Nov 2023 19:15 )  x     / x     / 19 U/L / x     / <1.0 ng/mL      12 Lead ECG:   Ventricular Rate 79 BPM    Atrial Rate 79 BPM    P-R Interval 142 ms    QRS Duration 112 ms    Q-T Interval 462 ms    QTC Calculation(Bazett) 529 ms    P Axis 37 degrees    R Axis -50 degrees    T Axis 106 degrees    Diagnosis Line Sinus rhythm with frequent premature ventricular complexes  Left anterior fascicular block  T wave abnormality, consider lateral ischemia  Prolonged QT  Confirmed by SANDY PAUL ([id number]) on 11/4/2023 11:46:19 AM (11-03-23 @ 16:26)       EXAM:  ECHO TTE WO CON COMP W DOPP         PROCEDURE DATE:  06/01/2021        INTERPRETATION:  INDICATION: Bilateral pulmonary edema  Sonographer AS    Blood Pressure 130/64    Height 167.6 cm     Weight 113.4 kg       BSA 2.2 sq m    Dimensions:  LA 2.8       Normal Values: 2.0 - 4.0 cm  Ao 2.8        Normal Values: 2.0 - 3.8 cm  SEPTUM 1.0       Normal Values: 0.6 - 1.2 cm  PWT 0.9       Normal Values: 0.6 - 1.1 cm  LVIDd 3.6         Normal Values: 3.0 - 5.6 cm  LVIDs 2.4         Normal Values: 1.8 - 4.0 cm      OBSERVATIONS:  Technically difficult and limited study  Mitral Valve: normal, trace physiologic MR.  Aortic Valve/Aorta: Not well-visualized  Tricuspid Valve: Not well-visualized  Pulmonic Valve: Not well-visualized  Left Atrium:normal  Right Atrium: Not well-visualized  Left Ventricle: Left ventricle is not well-visualized. Overall grossly normal left ventricular systolic function, estimated LVEF of 55-60%.  Right Ventricle: Grossly normal size and systolic function.  Pericardium: no significant pericardial effusion.  Pulmonary/RV Pressure: estimated PA systolic pressure of 9.5 mmHg assuming an RA pressure of 3 mmHg.  LV diastolic dysfunction is present        IMPRESSION:  Technically difficult and limited study  Overall grossly normal left ventricular systolic function, estimated LVEF of 55-60%.  Grossly normal RV size and systolic function.  The aortic valve is not well-visualized  Trace physiologic MR GULSHAN BECKER MD; Attending Cardiologist  This document has been electronically signed. Jun 2 2021  4:21PM     
[facility] -- Janel Jackson Pannella, Patel, Savella, Goodger, Cohen  Office # [phone]    Follow Up:  Chest pain     Subjective/Observations: Patient seen and examined, awake, alert, calm and cooperative, denies chest pain, dyspnea, palpitations or dizziness, orthopnea and PND. Tolerating room air.     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      Club foot  Surgery at birth for Club Foot Right foot      Pilonidal cyst  Surgery 40 years ago      H/O colonoscopy      Spinal stenosis      H/O prostate biopsy        MEDICATIONS  (STANDING):  apixaban 5 milliGRAM(s) Oral every 12 hours  ascorbic acid 500 milliGRAM(s) Oral two times a day  cefpodoxime 200 milliGRAM(s) Oral every 12 hours  furosemide    Tablet 40 milliGRAM(s) Oral daily  influenza  Vaccine (HIGH DOSE) 0.7 milliLiter(s) IntraMuscular once  metoprolol succinate ER 25 milliGRAM(s) Oral daily  multivitamin 1 Tablet(s) Oral daily  pantoprazole    Tablet 40 milliGRAM(s) Oral before breakfast  potassium chloride    Tablet ER 20 milliEquivalent(s) Oral daily  predniSONE   Tablet 7.5 milliGRAM(s) Oral daily  pyridostigmine 60 milliGRAM(s) Oral daily    MEDICATIONS  (PRN):  acetaminophen     Tablet .. 650 milliGRAM(s) Oral every 6 hours PRN Temp greater or equal to 38C (100.4F), Mild Pain (1 - 3)  ondansetron Injectable 4 milliGRAM(s) IV Push every 8 hours PRN Nausea and/or Vomiting    Allergies    levofloxacin (Unknown)  ofloxacin (Unknown)  gatifloxacin (Unknown)    Intolerances    Avelox (Other)  Ketek (Other)  telithromycin (Other)  fluoroquinolone antibiotics (Other)    Vital Signs Last 24 Hrs  T(C): 36.5 (08 Nov 2023 05:14), Max: 36.6 (07 Nov 2023 12:17)  T(F): 97.7 (08 Nov 2023 05:14), Max: 97.9 (07 Nov 2023 12:17)  HR: 50 (08 Nov 2023 05:14) (50 - 62)  BP: 127/70 (08 Nov 2023 05:14) (119/69 - 128/76)  BP(mean): --  RR: 17 (08 Nov 2023 05:14) (17 - 18)  SpO2: 95% (08 Nov 2023 05:14) (94% - 95%)    Parameters below as of 08 Nov 2023 05:14  Patient On (Oxygen Delivery Method): room air      I&O's Summary        TELE:   PHYSICAL EXAM:  Constitutional: NAD, awake and alert  HEENT: Moist Mucous Membranes, Anicteric  Pulmonary: Non-labored, breath sounds are clear bilaterally, No wheezing, rales or rhonchi  Cardiovascular: IRRRegular, S1 and S2, No murmurs, rubs, gallops or clicks  Gastrointestinal: Bowel Sounds present, soft, nontender.   Lymph: No peripheral edema. No lymphadenopathy.  Skin: No visible rashes or ulcers.  Psych:  Mood & affect appropriate  LABS: All Labs Reviewed:                        13.4   10.64 )-----------( 269      ( 08 Nov 2023 06:30 )             40.5                         13.2   11.42 )-----------( 301      ( 07 Nov 2023 07:20 )             40.7                         15.4   16.61 )-----------( 319      ( 06 Nov 2023 14:06 )             46.6     08 Nov 2023 06:30    138    |  101    |  18     ----------------------------<  105    3.3     |  28     |  0.88   07 Nov 2023 07:20    141    |  99     |  18     ----------------------------<  111    3.4     |  33     |  1.10   06 Nov 2023 14:06    137    |  98     |  12     ----------------------------<  111    2.9     |  31     |  1.00     Ca    8.7        08 Nov 2023 06:30  Ca    8.8        07 Nov 2023 07:20  Ca    9.2        06 Nov 2023 14:06  Mg     2.0       07 Nov 2023 07:20    TPro  5.7    /  Alb  2.1    /  TBili  0.8    /  DBili  x      /  AST  52     /  ALT  52     /  AlkPhos  88     07 Nov 2023 07:20          12 Lead ECG:   Ventricular Rate 79 BPM    Atrial Rate 79 BPM    P-R Interval 142 ms    QRS Duration 112 ms    Q-T Interval 462 ms    QTC Calculation(Bazett) 529 ms    P Axis 37 degrees    R Axis -50 degrees    T Axis 106 degrees    Diagnosis Line Sinus rhythm with frequent premature ventricular complexes  Left anterior fascicular block  T wave abnormality, consider lateral ischemia  Prolonged QT  Confirmed by SANDY PAUL ([id number]) on 11/4/2023 11:46:19 AM (11-03-23 @ 16:26)       EXAM:  ECHO TTE WO CON COMP W DOPP         PROCEDURE DATE:  06/01/2021        INTERPRETATION:  INDICATION: Bilateral pulmonary edema  Sonographer AS    Blood Pressure 130/64    Height 167.6 cm     Weight 113.4 kg       BSA 2.2 sq m    Dimensions:  LA 2.8       Normal Values: 2.0 - 4.0 cm  Ao 2.8        Normal Values: 2.0 - 3.8 cm  SEPTUM 1.0       Normal Values: 0.6 - 1.2 cm  PWT 0.9       Normal Values: 0.6 - 1.1 cm  LVIDd 3.6         Normal Values: 3.0 - 5.6 cm  LVIDs 2.4         Normal Values: 1.8 - 4.0 cm      OBSERVATIONS:  Technically difficult and limited study  Mitral Valve: normal, trace physiologic MR.  Aortic Valve/Aorta: Not well-visualized  Tricuspid Valve: Not well-visualized  Pulmonic Valve: Not well-visualized  Left Atrium:normal  Right Atrium: Not well-visualized  Left Ventricle: Left ventricle is not well-visualized. Overall grossly normal left ventricular systolic function, estimated LVEF of 55-60%.  Right Ventricle: Grossly normal size and systolic function.  Pericardium: no significant pericardial effusion.  Pulmonary/RV Pressure: estimated PA systolic pressure of 9.5 mmHg assuming an RA pressure of 3 mmHg.  LV diastolic dysfunction is present        IMPRESSION:  Technically difficult and limited study  Overall grossly normal left ventricular systolic function, estimated LVEF of 55-60%.  Grossly normal RV size and systolic function.  The aortic valve is not well-visualized  Trace physiologic MR                GULSHAN BECKER MD; Attending Cardiologist  This document has been electronically signed. Jun 2 2021  4:21PM     
INTERVAL HPI/OVERNIGHT EVENTS:  Patient seen and examined at bedside. States he wants to go home and there is nothing wrong with him. Educated patient on his current condition, diagnosis and prognosis. Educated on benefits of continued IV abx and to wait for final culture results prior to discharge, patient agreeable and willing to stay. States he is feeling overall well, denies any chest pain, SOB, abd pain, cough.    ROS: All other review of systems is negative unless indicated above.    MEDICATIONS  (STANDING):  apixaban 5 milliGRAM(s) Oral every 12 hours  ascorbic acid 500 milliGRAM(s) Oral two times a day  furosemide    Tablet 40 milliGRAM(s) Oral daily  hydrocortisone sodium succinate Injectable 50 milliGRAM(s) IV Push every 12 hours  influenza  Vaccine (HIGH DOSE) 0.7 milliLiter(s) IntraMuscular once  metoprolol succinate ER 25 milliGRAM(s) Oral daily  multivitamin 1 Tablet(s) Oral daily  pantoprazole    Tablet 40 milliGRAM(s) Oral before breakfast  piperacillin/tazobactam IVPB.. 3.375 Gram(s) IV Intermittent every 8 hours  potassium chloride    Tablet ER 20 milliEquivalent(s) Oral daily  potassium chloride   Powder 40 milliEquivalent(s) Oral every 4 hours  potassium chloride  10 mEq/100 mL IVPB 10 milliEquivalent(s) IV Intermittent once  pyridostigmine 60 milliGRAM(s) Oral daily    MEDICATIONS  (PRN):  acetaminophen     Tablet .. 650 milliGRAM(s) Oral every 6 hours PRN Temp greater or equal to 38C (100.4F), Mild Pain (1 - 3)  ondansetron Injectable 4 milliGRAM(s) IV Push every 8 hours PRN Nausea and/or Vomiting      Allergies    levofloxacin (Unknown)  ofloxacin (Unknown)  gatifloxacin (Unknown)    Intolerances    Avelox (Other)  Ketek (Other)  telithromycin (Other)  fluoroquinolone antibiotics (Other)        Vital Signs Last 24 Hrs  T(C): 36.4 (06 Nov 2023 11:47), Max: 36.4 (06 Nov 2023 11:47)  T(F): 97.6 (06 Nov 2023 11:47), Max: 97.6 (06 Nov 2023 11:47)  HR: 72 (06 Nov 2023 11:47) (55 - 72)  BP: 119/62 (06 Nov 2023 11:47) (107/66 - 125/77)  BP(mean): --  RR: 18 (06 Nov 2023 11:47) (18 - 18)  SpO2: 97% (06 Nov 2023 11:47) (95% - 97%)    Parameters below as of 06 Nov 2023 11:47  Patient On (Oxygen Delivery Method): room air        Physical Exam:  General: laying in bed, NAD  Neurology: A&Ox3, nonfocal, BURNS x 4  Respiratory: CTA B/L  CV: RRR, S1S2, no murmurs, rubs or gallops  Abdominal: Soft, NT, ND +BS  Extremities: No edema, + peripheral pulses      LABS:                        15.4   16.61 )-----------( 319      ( 06 Nov 2023 14:06 )             46.6     11-06    137  |  98  |  12  ----------------------------<  111<H>  2.9<LL>   |  31  |  1.00    Ca    9.2      06 Nov 2023 14:06    TPro  6.3  /  Alb  2.2<L>  /  TBili  0.9  /  DBili  x   /  AST  28  /  ALT  33  /  AlkPhos  74  11-05      Urinalysis Basic - ( 06 Nov 2023 14:06 )    Color: x / Appearance: x / SG: x / pH: x  Gluc: 111 mg/dL / Ketone: x  / Bili: x / Urobili: x   Blood: x / Protein: x / Nitrite: x   Leuk Esterase: x / RBC: x / WBC x   Sq Epi: x / Non Sq Epi: x / Bacteria: x        RADIOLOGY & ADDITIONAL TESTS:
Patient is a 78y old  Male who presents with a chief complaint of sepsis 2/2 UTI, suspected cholecystitis and PNA (2023 03:21)      INTERVAL HPI/OVERNIGHT EVENTS: Patient seen and examined at bedside. No overnight events.     MEDICATIONS  (STANDING):  apixaban 5 milliGRAM(s) Oral every 12 hours  furosemide    Tablet 40 milliGRAM(s) Oral daily  hydrocortisone sodium succinate Injectable 50 milliGRAM(s) IV Push every 8 hours  metoprolol succinate ER 25 milliGRAM(s) Oral daily  pantoprazole    Tablet 40 milliGRAM(s) Oral before breakfast  piperacillin/tazobactam IVPB.. 3.375 Gram(s) IV Intermittent every 8 hours  potassium chloride    Tablet ER 20 milliEquivalent(s) Oral daily  pyridostigmine 60 milliGRAM(s) Oral daily    MEDICATIONS  (PRN):  acetaminophen     Tablet .. 650 milliGRAM(s) Oral every 6 hours PRN Temp greater or equal to 38C (100.4F), Mild Pain (1 - 3)  ondansetron Injectable 4 milliGRAM(s) IV Push every 8 hours PRN Nausea and/or Vomiting      Allergies    levofloxacin (Unknown)  ofloxacin (Unknown)  gatifloxacin (Unknown)    Intolerances    Avelox (Other)  Ketek (Other)  telithromycin (Other)  fluoroquinolone antibiotics (Other)      REVIEW OF SYSTEMS:  CONSTITUTIONAL: No fever or chills  HEENT:  No headache, no sore throat  RESPIRATORY: No cough, wheezing, or shortness of breath  CARDIOVASCULAR: No chest pain, palpitations  GASTROINTESTINAL: No abd pain, nausea, vomiting, or diarrhea  GENITOURINARY: No dysuria, frequency, or hematuria  NEUROLOGICAL: no focal weakness or dizziness  MUSCULOSKELETAL: no myalgias     Vital Signs Last 24 Hrs  T(C): 36.6 (2023 06:25), Max: 37.4 (2023 00:57)  T(F): 97.9 (2023 06:25), Max: 99.3 (2023 00:57)  HR: 88 (2023 06:25) (81 - 93)  BP: 139/90 (2023 06:25) (131/84 - 160/89)  BP(mean): --  RR: 18 (2023 06:25) (16 - 18)  SpO2: 94% (2023 06:25) (93% - 98%)    Parameters below as of 2023 06:25  Patient On (Oxygen Delivery Method): room air  O2 Flow (L/min): 2      PHYSICAL EXAM:  GENERAL: NAD  HEENT:  anicteric, moist mucous membranes  CHEST/LUNG:  decreased breath sounds b/l  HEART:  RRR, S1, S2  ABDOMEN:  BS+, soft, nontender, nondistended  EXTREMITIES: no edema, cyanosis, or calf tenderness  NERVOUS SYSTEM: awake, alert    LABS:                        16.7   18.56 )-----------( 367      ( 2023 17:23 )             50.4     CBC Full  -  ( 2023 17:23 )  WBC Count : 18.56 K/uL  Hemoglobin : 16.7 g/dL  Hematocrit : 50.4 %  Platelet Count - Automated : 367 K/uL  Mean Cell Volume : 95.3 fl  Mean Cell Hemoglobin : 31.6 pg  Mean Cell Hemoglobin Concentration : 33.1 gm/dL  Auto Neutrophil # : 16.33 K/uL  Auto Lymphocyte # : 0.93 K/uL  Auto Monocyte # : 1.30 K/uL  Auto Eosinophil # : 0.00 K/uL  Auto Basophil # : 0.00 K/uL  Auto Neutrophil % : 88.0 %  Auto Lymphocyte % : 5.0 %  Auto Monocyte % : 7.0 %  Auto Eosinophil % : 0.0 %  Auto Basophil % : 0.0 %    2023 19:15    137    |  99     |  15     ----------------------------<  149    3.7     |  28     |  0.99     Ca    9.2        2023 19:15  Mg     2.0       2023 19:15    TPro  7.2    /  Alb  2.6    /  TBili  0.8    /  DBili  x      /  AST  28     /  ALT  29     /  AlkPhos  84     2023 19:15    PT/INR - ( 2023 19:15 )   PT: 15.9 sec;   INR: 1.37 ratio         PTT - ( 2023 19:15 )  PTT:34.2 sec  Urinalysis Basic - ( 2023 21:40 )    Color: Yellow / Appearance: Cloudy / S.010 / pH: x  Gluc: x / Ketone: Negative mg/dL  / Bili: Negative / Urobili: 1.0 mg/dL   Blood: x / Protein: Negative mg/dL / Nitrite: Negative   Leuk Esterase: Large / RBC: 5 /HPF / WBC 32 /HPF   Sq Epi: x / Non Sq Epi: x / Bacteria: Many /HPF      CAPILLARY BLOOD GLUCOSE              RADIOLOGY & ADDITIONAL TESTS:    Personally reviewed.     Consultant(s) Notes Reviewed:  [x] YES  [ ] NO    
INTERVAL HPI/OVERNIGHT EVENTS:  Patient seen and examined at bedside. States he is feeling well, denies any chest pain, SOB, abd pain. Eager to go home.     ROS: All other review of systems is negative unless indicated above.    MEDICATIONS  (STANDING):  apixaban 5 milliGRAM(s) Oral every 12 hours  ascorbic acid 500 milliGRAM(s) Oral two times a day  furosemide    Tablet 40 milliGRAM(s) Oral daily  hydrocortisone sodium succinate Injectable 50 milliGRAM(s) IV Push every 12 hours  influenza  Vaccine (HIGH DOSE) 0.7 milliLiter(s) IntraMuscular once  metoprolol succinate ER 25 milliGRAM(s) Oral daily  multivitamin 1 Tablet(s) Oral daily  pantoprazole    Tablet 40 milliGRAM(s) Oral before breakfast  piperacillin/tazobactam IVPB.. 3.375 Gram(s) IV Intermittent every 8 hours  potassium chloride    Tablet ER 20 milliEquivalent(s) Oral daily  pyridostigmine 60 milliGRAM(s) Oral daily    MEDICATIONS  (PRN):  acetaminophen     Tablet .. 650 milliGRAM(s) Oral every 6 hours PRN Temp greater or equal to 38C (100.4F), Mild Pain (1 - 3)  ondansetron Injectable 4 milliGRAM(s) IV Push every 8 hours PRN Nausea and/or Vomiting      Allergies    levofloxacin (Unknown)  ofloxacin (Unknown)  gatifloxacin (Unknown)    Intolerances    Avelox (Other)  Ketek (Other)  telithromycin (Other)  fluoroquinolone antibiotics (Other)        Vital Signs Last 24 Hrs  T(C): 36.6 (07 Nov 2023 12:17), Max: 36.6 (07 Nov 2023 12:17)  T(F): 97.9 (07 Nov 2023 12:17), Max: 97.9 (07 Nov 2023 12:17)  HR: 62 (07 Nov 2023 12:17) (53 - 63)  BP: 128/76 (07 Nov 2023 12:17) (113/70 - 132/75)  BP(mean): --  RR: 18 (07 Nov 2023 12:17) (18 - 18)  SpO2: 95% (07 Nov 2023 12:17) (95% - 97%)    Parameters below as of 07 Nov 2023 12:17  Patient On (Oxygen Delivery Method): room air        11-06 @ 07:01 - 11-07 @ 07:00  --------------------------------------------------------  IN: 200 mL / OUT: 0 mL / NET: 200 mL      Physical Exam:  General: laying in bed, NAD  Neurology: A&Ox3, nonfocal, BURNS x 4  Respiratory: CTA B/L  CV: RRR, S1S2, no murmurs, rubs or gallops  Abdominal: Soft, NT, ND +BS  Extremities: No edema, + peripheral pulses        LABS:                        13.2   11.42 )-----------( 301      ( 07 Nov 2023 07:20 )             40.7     11-07    141  |  99  |  18  ----------------------------<  111<H>  3.4<L>   |  33<H>  |  1.10    Ca    8.8      07 Nov 2023 07:20  Mg     2.0     11-07    TPro  5.7<L>  /  Alb  2.1<L>  /  TBili  0.8  /  DBili  x   /  AST  52<H>  /  ALT  52  /  AlkPhos  88  11-07      Urinalysis Basic - ( 07 Nov 2023 07:20 )    Color: x / Appearance: x / SG: x / pH: x  Gluc: 111 mg/dL / Ketone: x  / Bili: x / Urobili: x   Blood: x / Protein: x / Nitrite: x   Leuk Esterase: x / RBC: x / WBC x   Sq Epi: x / Non Sq Epi: x / Bacteria: x        RADIOLOGY & ADDITIONAL TESTS:
Patient is a 78y old  Male who presents with a chief complaint of sepsis 2/2 UTI, suspected cholecystitis and PNA (05 Nov 2023 08:05)      INTERVAL HPI/OVERNIGHT EVENTS: Patient seen and examined at bedside. HIDA scan negative yesterday. No complaints at this time.     MEDICATIONS  (STANDING):  apixaban 5 milliGRAM(s) Oral every 12 hours  furosemide    Tablet 40 milliGRAM(s) Oral daily  hydrocortisone sodium succinate Injectable 50 milliGRAM(s) IV Push every 8 hours  influenza  Vaccine (HIGH DOSE) 0.7 milliLiter(s) IntraMuscular once  metoprolol succinate ER 25 milliGRAM(s) Oral daily  pantoprazole    Tablet 40 milliGRAM(s) Oral before breakfast  piperacillin/tazobactam IVPB.. 3.375 Gram(s) IV Intermittent every 8 hours  potassium chloride    Tablet ER 20 milliEquivalent(s) Oral daily  pyridostigmine 60 milliGRAM(s) Oral daily    MEDICATIONS  (PRN):  acetaminophen     Tablet .. 650 milliGRAM(s) Oral every 6 hours PRN Temp greater or equal to 38C (100.4F), Mild Pain (1 - 3)  ondansetron Injectable 4 milliGRAM(s) IV Push every 8 hours PRN Nausea and/or Vomiting      Allergies    levofloxacin (Unknown)  ofloxacin (Unknown)  gatifloxacin (Unknown)    Intolerances    Avelox (Other)  Ketek (Other)  telithromycin (Other)  fluoroquinolone antibiotics (Other)      REVIEW OF SYSTEMS:  CONSTITUTIONAL: No fever or chills  HEENT:  No headache, no sore throat  RESPIRATORY: No cough, wheezing, or shortness of breath  CARDIOVASCULAR: No chest pain, palpitations  GASTROINTESTINAL: No abd pain, nausea, vomiting, or diarrhea  GENITOURINARY: No dysuria, frequency, or hematuria  NEUROLOGICAL: no focal weakness or dizziness  MUSCULOSKELETAL: no myalgias     Vital Signs Last 24 Hrs  T(C): 36.2 (05 Nov 2023 05:01), Max: 36.5 (04 Nov 2023 20:48)  T(F): 97.2 (05 Nov 2023 05:01), Max: 97.7 (04 Nov 2023 20:48)  HR: 52 (05 Nov 2023 05:01) (52 - 75)  BP: 127/71 (05 Nov 2023 05:01) (109/64 - 127/71)  BP(mean): --  RR: 18 (05 Nov 2023 05:01) (18 - 18)  SpO2: 97% (05 Nov 2023 05:01) (97% - 97%)    Parameters below as of 05 Nov 2023 05:01  Patient On (Oxygen Delivery Method): nasal cannula        PHYSICAL EXAM:  GENERAL: NAD  HEENT:  anicteric, moist mucous membranes  CHEST/LUNG:  CTA b/l, no rales, wheezes, or rhonchi  HEART:  RRR, S1, S2  ABDOMEN:  BS+, soft, nontender, nondistended  EXTREMITIES: no edema, cyanosis, or calf tenderness  NERVOUS SYSTEM: awake, alert    LABS:                        14.7   16.14 )-----------( 311      ( 05 Nov 2023 07:40 )             44.3     CBC Full  -  ( 05 Nov 2023 07:40 )  WBC Count : 16.14 K/uL  Hemoglobin : 14.7 g/dL  Hematocrit : 44.3 %  Platelet Count - Automated : 311 K/uL  Mean Cell Volume : 95.1 fl  Mean Cell Hemoglobin : 31.5 pg  Mean Cell Hemoglobin Concentration : 33.2 gm/dL  Auto Neutrophil # : 13.46 K/uL  Auto Lymphocyte # : 1.25 K/uL  Auto Monocyte # : 1.22 K/uL  Auto Eosinophil # : 0.00 K/uL  Auto Basophil # : 0.02 K/uL  Auto Neutrophil % : 83.4 %  Auto Lymphocyte % : 7.7 %  Auto Monocyte % : 7.6 %  Auto Eosinophil % : 0.0 %  Auto Basophil % : 0.1 %    05 Nov 2023 07:40    140    |  100    |  16     ----------------------------<  121    3.7     |  31     |  1.00     Ca    9.0        05 Nov 2023 07:40    TPro  6.3    /  Alb  2.2    /  TBili  0.9    /  DBili  x      /  AST  28     /  ALT  33     /  AlkPhos  74     05 Nov 2023 07:40    PT/INR - ( 03 Nov 2023 19:15 )   PT: 15.9 sec;   INR: 1.37 ratio         PTT - ( 03 Nov 2023 19:15 )  PTT:34.2 sec  Urinalysis Basic - ( 05 Nov 2023 07:40 )    Color: x / Appearance: x / SG: x / pH: x  Gluc: 121 mg/dL / Ketone: x  / Bili: x / Urobili: x   Blood: x / Protein: x / Nitrite: x   Leuk Esterase: x / RBC: x / WBC x   Sq Epi: x / Non Sq Epi: x / Bacteria: x      CAPILLARY BLOOD GLUCOSE      POCT Blood Glucose.: 105 mg/dL (04 Nov 2023 21:26)  POCT Blood Glucose.: 132 mg/dL (04 Nov 2023 16:43)        Culture - Blood (collected 11-03-23 @ 21:50)  Source: .Blood Blood-Venous  Preliminary Report (11-05-23 @ 03:01):    No growth at 24 hours    Culture - Blood (collected 11-03-23 @ 21:45)  Source: .Blood Blood-Venous  Preliminary Report (11-05-23 @ 03:01):    No growth at 24 hours        RADIOLOGY & ADDITIONAL TESTS:    Personally reviewed.     Consultant(s) Notes Reviewed:  [x] YES  [ ] NO

## 2023-11-08 NOTE — PROGRESS NOTE ADULT - REASON FOR ADMISSION
sepsis 2/2 UTI, suspected cholecystitis and PNA

## 2023-11-08 NOTE — PROGRESS NOTE ADULT - ASSESSMENT
78 year old male with past medical history of PE 2021, HF ef 55-60% , chronic le edema,OA myasthenia gravis, HTN, DM2, HLD, presenting with chest pain.      Chest pain   - recent discharge from rehab, with home PT service, he developed chest discomfort during and post PT session that lasted at least an hour, resolved  - EKG SR with PVCs and LAFB similar to baseline   - troponin neg x1, no trend there after   - denies anginal complaints during exam     - TTE: (2021) trace MR LVSF normal EF 55-60% , LVDD   - no sign of volume overload, weaned off O2   - + chronic LE edema   - continue Lasix PO     - know hx of PE on Eliquis  - LE doppler neg for DVT  - BP controlled and stable, HR 50-60's   - continue home Toprol and AC   - Monitor and replete Lytes. Keep K > 4 and Mg > 2    - +UTI/ concern for PNA on ABX per ID/primary   - DC planning per primary   - Will continue to follow.    April Hutchinson [facility]  Nurse Practitioner - Cardiology   call TEAMS

## 2023-11-08 NOTE — PROGRESS NOTE ADULT - NS ATTEND AMEND GEN_ALL_CORE FT
78 year old male with past medical history of PE 2021, HF ef 55-60% , chronic le edema,OA myasthenia gravis, HTN, DM2, HLD, presenting with chest pain.      no s/s acute ischemia  non-cardiac cp, no need to recheck ce  normal lvef without sig valve disease  chronic edema without left hf, cont lasix po  h/o pe, cont ac
78 year old male with past medical history of PE 2021, HF ef 55-60% , chronic le edema,OA myasthenia gravis, HTN, DM2, HLD, presenting with chest pain.      no s/s acute ischemia  non-cardiac cp, no need to recheck ce  normal lvef without sig valve disease  chronic edema without left hf, cont lasix po  h/o pe, cont ac
78 year old male with past medical history of PE 2021, HF ef 55-60% , chronic le edema,OA myasthenia gravis, HTN, DM2, HLD, presenting with chest pain.      no s/s acute ischemia  non-cardiac CP, trop negative x 1  normal lvef without sig valve disease  chronic edema without left hf, cont lasix po  h/o pe, cont ac.  DC planning
78 year old male with past medical history of PE 2021, HF ef 55-60% , chronic le edema,OA myasthenia gravis, HTN, DM2, HLD, presenting with chest pain.    Son at bedside reported that he was just discharged from rehab on Monday after he was there for a fall .  He endorses that on day of admission he was complaining of abdominal pain with nausea vomiting. Has been constipated this week.   Physical therapy came to the house and was working with his legs during this time he developed chest discomfort which lasted for at least an hour after the physical therapist left.    chest pain  - chest pain during PT. chest pain free   -EKG SR with PVCS and LAFB similar to baseline   -no evidence of ischemia    -Echo as above 2021 trace mr , lv systolic ef 55-60% , Lv diastolic dysfunction   -no sign of volume overload on room air, aside from chronic LE edema   -history pe on eliquis BID son reports compliance   -le doppler neg for dvt  - continue lasix po    - bp controlled, continue toprol
78 year old male with past medical history of PE 2021, HF ef 55-60% , chronic le edema,OA myasthenia gravis, HTN, DM2, HLD, presenting with chest pain.    Son at bedside reported that he was just discharged from rehab on Monday after he was there for a fall .  He endorses that on day of admission he was complaining of abdominal pain with nausea vomiting. Has been constipated this week.   Physical therapy came to the house and was working with his legs during this time he developed chest discomfort which lasted for at least an hour after the physical therapist left.    chest pain  - chest pain during PT. chest pain free   -EKG SR with PVCS and LAFB similar to baseline   -no evidence of ischemia    -Echo as above 2021 trace mr , lv systolic ef 55-60% , Lv diastolic dysfunction   -no sign of volume overload on room air, aside from chronic LE edema   -history pe on eliquis BID son reports compliance   -le doppler neg for dvt  - continue lasix po    - bp controlled, continue toprol

## 2023-11-08 NOTE — PROGRESS NOTE ADULT - PROVIDER SPECIALTY LIST ADULT
Cardiology
Gastroenterology
Gastroenterology
Cardiology
Infectious Disease
Surgery
Infectious Disease
Infectious Disease
Hospitalist

## 2023-11-09 LAB
CULTURE RESULTS: SIGNIFICANT CHANGE UP
GAMMA INTERFERON BACKGROUND BLD IA-ACNC: 0.02 IU/ML — SIGNIFICANT CHANGE UP
GAMMA INTERFERON BACKGROUND BLD IA-ACNC: 0.02 IU/ML — SIGNIFICANT CHANGE UP
M TB IFN-G BLD-IMP: ABNORMAL
M TB IFN-G BLD-IMP: ABNORMAL
M TB IFN-G CD4+ BCKGRND COR BLD-ACNC: 0.01 IU/ML — SIGNIFICANT CHANGE UP
M TB IFN-G CD4+ BCKGRND COR BLD-ACNC: 0.01 IU/ML — SIGNIFICANT CHANGE UP
M TB IFN-G CD4+CD8+ BCKGRND COR BLD-ACNC: 0.01 IU/ML — SIGNIFICANT CHANGE UP
M TB IFN-G CD4+CD8+ BCKGRND COR BLD-ACNC: 0.01 IU/ML — SIGNIFICANT CHANGE UP
QUANT TB PLUS MITOGEN MINUS NIL: 0.17 IU/ML — SIGNIFICANT CHANGE UP
QUANT TB PLUS MITOGEN MINUS NIL: 0.17 IU/ML — SIGNIFICANT CHANGE UP
SPECIMEN SOURCE: SIGNIFICANT CHANGE UP

## 2023-11-10 LAB
GALACTOMANNAN AG SERPL-ACNC: 0.07 INDEX — SIGNIFICANT CHANGE UP (ref 0–0.49)
GALACTOMANNAN AG SERPL-ACNC: 0.07 INDEX — SIGNIFICANT CHANGE UP (ref 0–0.49)

## 2023-11-13 PROCEDURE — 93970 EXTREMITY STUDY: CPT

## 2023-11-13 PROCEDURE — 85610 PROTHROMBIN TIME: CPT

## 2023-11-13 PROCEDURE — 82962 GLUCOSE BLOOD TEST: CPT

## 2023-11-13 PROCEDURE — 87640 STAPH A DNA AMP PROBE: CPT

## 2023-11-13 PROCEDURE — 36415 COLL VENOUS BLD VENIPUNCTURE: CPT

## 2023-11-13 PROCEDURE — 87899 AGENT NOS ASSAY W/OPTIC: CPT

## 2023-11-13 PROCEDURE — 96375 TX/PRO/DX INJ NEW DRUG ADDON: CPT

## 2023-11-13 PROCEDURE — 97162 PT EVAL MOD COMPLEX 30 MIN: CPT

## 2023-11-13 PROCEDURE — 87086 URINE CULTURE/COLONY COUNT: CPT

## 2023-11-13 PROCEDURE — 87305 ASPERGILLUS AG IA: CPT

## 2023-11-13 PROCEDURE — 87077 CULTURE AEROBIC IDENTIFY: CPT

## 2023-11-13 PROCEDURE — 87040 BLOOD CULTURE FOR BACTERIA: CPT

## 2023-11-13 PROCEDURE — 93005 ELECTROCARDIOGRAM TRACING: CPT

## 2023-11-13 PROCEDURE — 85730 THROMBOPLASTIN TIME PARTIAL: CPT

## 2023-11-13 PROCEDURE — 87186 SC STD MICRODIL/AGAR DIL: CPT

## 2023-11-13 PROCEDURE — 96374 THER/PROPH/DIAG INJ IV PUSH: CPT

## 2023-11-13 PROCEDURE — 73610 X-RAY EXAM OF ANKLE: CPT

## 2023-11-13 PROCEDURE — 81001 URINALYSIS AUTO W/SCOPE: CPT

## 2023-11-13 PROCEDURE — 76775 US EXAM ABDO BACK WALL LIM: CPT

## 2023-11-13 PROCEDURE — 76705 ECHO EXAM OF ABDOMEN: CPT

## 2023-11-13 PROCEDURE — 82550 ASSAY OF CK (CPK): CPT

## 2023-11-13 PROCEDURE — 74177 CT ABD & PELVIS W/CONTRAST: CPT | Mod: MA

## 2023-11-13 PROCEDURE — 99285 EMERGENCY DEPT VISIT HI MDM: CPT | Mod: 25

## 2023-11-13 PROCEDURE — 83605 ASSAY OF LACTIC ACID: CPT

## 2023-11-13 PROCEDURE — 87449 NOS EACH ORGANISM AG IA: CPT

## 2023-11-13 PROCEDURE — 85025 COMPLETE CBC W/AUTO DIFF WBC: CPT

## 2023-11-13 PROCEDURE — 78226 HEPATOBILIARY SYSTEM IMAGING: CPT

## 2023-11-13 PROCEDURE — 86480 TB TEST CELL IMMUN MEASURE: CPT

## 2023-11-13 PROCEDURE — 83690 ASSAY OF LIPASE: CPT

## 2023-11-13 PROCEDURE — 84484 ASSAY OF TROPONIN QUANT: CPT

## 2023-11-13 PROCEDURE — 80048 BASIC METABOLIC PNL TOTAL CA: CPT

## 2023-11-13 PROCEDURE — 80053 COMPREHEN METABOLIC PANEL: CPT

## 2023-11-13 PROCEDURE — 87641 MR-STAPH DNA AMP PROBE: CPT

## 2023-11-13 PROCEDURE — 71260 CT THORAX DX C+: CPT | Mod: MA

## 2023-11-13 PROCEDURE — 85027 COMPLETE CBC AUTOMATED: CPT

## 2023-11-13 PROCEDURE — A9537: CPT

## 2023-11-13 PROCEDURE — 82553 CREATINE MB FRACTION: CPT

## 2023-11-13 PROCEDURE — 96361 HYDRATE IV INFUSION ADD-ON: CPT

## 2023-11-13 PROCEDURE — 83735 ASSAY OF MAGNESIUM: CPT

## 2023-11-13 PROCEDURE — 83880 ASSAY OF NATRIURETIC PEPTIDE: CPT

## 2023-11-13 PROCEDURE — 96376 TX/PRO/DX INJ SAME DRUG ADON: CPT

## 2023-11-14 ENCOUNTER — APPOINTMENT (OUTPATIENT)
Dept: CARDIOLOGY | Facility: CLINIC | Age: 78
End: 2023-11-14

## 2023-11-16 ENCOUNTER — TRANSCRIPTION ENCOUNTER (OUTPATIENT)
Age: 78
End: 2023-11-16

## 2023-12-02 ENCOUNTER — RX RENEWAL (OUTPATIENT)
Age: 78
End: 2023-12-02

## 2024-01-31 ENCOUNTER — RX RENEWAL (OUTPATIENT)
Age: 79
End: 2024-01-31

## 2024-03-22 NOTE — ED ADULT NURSE NOTE - NS ED NURSE LEVEL OF CONSCIOUSNESS MENTAL STATUS
[Time Spent: ___ minutes] : I have spent [unfilled] minutes of time on the encounter. Awake/Alert/Cooperative

## 2024-03-26 ENCOUNTER — INPATIENT (INPATIENT)
Facility: HOSPITAL | Age: 79
LOS: 2 days | Discharge: HOME | DRG: 872 | End: 2024-03-29
Attending: HOSPITALIST | Admitting: INTERNAL MEDICINE
Payer: MEDICARE

## 2024-03-26 VITALS
DIASTOLIC BLOOD PRESSURE: 80 MMHG | OXYGEN SATURATION: 95 % | RESPIRATION RATE: 16 BRPM | WEIGHT: 250 LBS | HEART RATE: 70 BPM | SYSTOLIC BLOOD PRESSURE: 131 MMHG | HEIGHT: 66 IN | TEMPERATURE: 98 F

## 2024-03-26 DIAGNOSIS — Z98.890 OTHER SPECIFIED POSTPROCEDURAL STATES: Chronic | ICD-10-CM

## 2024-03-26 DIAGNOSIS — N30.01 ACUTE CYSTITIS WITH HEMATURIA: ICD-10-CM

## 2024-03-26 DIAGNOSIS — K21.9 GASTRO-ESOPHAGEAL REFLUX DISEASE WITHOUT ESOPHAGITIS: ICD-10-CM

## 2024-03-26 DIAGNOSIS — G70.00 MYASTHENIA GRAVIS WITHOUT (ACUTE) EXACERBATION: ICD-10-CM

## 2024-03-26 DIAGNOSIS — R94.31 ABNORMAL ELECTROCARDIOGRAM [ECG] [EKG]: ICD-10-CM

## 2024-03-26 DIAGNOSIS — Z41.9 ENCOUNTER FOR PROCEDURE FOR PURPOSES OTHER THAN REMEDYING HEALTH STATE, UNSPECIFIED: Chronic | ICD-10-CM

## 2024-03-26 DIAGNOSIS — Q66.89 OTHER SPECIFIED CONGENITAL DEFORMITIES OF FEET: Chronic | ICD-10-CM

## 2024-03-26 DIAGNOSIS — I50.9 HEART FAILURE, UNSPECIFIED: ICD-10-CM

## 2024-03-26 DIAGNOSIS — Z29.9 ENCOUNTER FOR PROPHYLACTIC MEASURES, UNSPECIFIED: ICD-10-CM

## 2024-03-26 DIAGNOSIS — I10 ESSENTIAL (PRIMARY) HYPERTENSION: ICD-10-CM

## 2024-03-26 DIAGNOSIS — M48.00 SPINAL STENOSIS, SITE UNSPECIFIED: Chronic | ICD-10-CM

## 2024-03-26 DIAGNOSIS — L05.91 PILONIDAL CYST WITHOUT ABSCESS: Chronic | ICD-10-CM

## 2024-03-26 DIAGNOSIS — Z98.89 OTHER SPECIFIED POSTPROCEDURAL STATES: Chronic | ICD-10-CM

## 2024-03-26 DIAGNOSIS — E78.5 HYPERLIPIDEMIA, UNSPECIFIED: ICD-10-CM

## 2024-03-26 LAB
ALBUMIN SERPL ELPH-MCNC: 2.4 G/DL — LOW (ref 3.3–5)
ALP SERPL-CCNC: 82 U/L — SIGNIFICANT CHANGE UP (ref 40–120)
ALT FLD-CCNC: 29 U/L — SIGNIFICANT CHANGE UP (ref 12–78)
ANION GAP SERPL CALC-SCNC: 16 MMOL/L — SIGNIFICANT CHANGE UP (ref 5–17)
APPEARANCE UR: ABNORMAL
APTT BLD: 31.2 SEC — SIGNIFICANT CHANGE UP (ref 24.5–35.6)
AST SERPL-CCNC: 32 U/L — SIGNIFICANT CHANGE UP (ref 15–37)
BACTERIA # UR AUTO: ABNORMAL /HPF
BASOPHILS # BLD AUTO: 0.04 K/UL — SIGNIFICANT CHANGE UP (ref 0–0.2)
BASOPHILS NFR BLD AUTO: 0.3 % — SIGNIFICANT CHANGE UP (ref 0–2)
BILIRUB SERPL-MCNC: 1.2 MG/DL — SIGNIFICANT CHANGE UP (ref 0.2–1.2)
BILIRUB UR-MCNC: NEGATIVE — SIGNIFICANT CHANGE UP
BUN SERPL-MCNC: 22 MG/DL — SIGNIFICANT CHANGE UP (ref 7–23)
CALCIUM SERPL-MCNC: 8.9 MG/DL — SIGNIFICANT CHANGE UP (ref 8.5–10.1)
CHLORIDE SERPL-SCNC: 104 MMOL/L — SIGNIFICANT CHANGE UP (ref 96–108)
CO2 SERPL-SCNC: 19 MMOL/L — LOW (ref 22–31)
COLOR SPEC: SIGNIFICANT CHANGE UP
COMMENT - URINE: SIGNIFICANT CHANGE UP
CREAT SERPL-MCNC: 0.87 MG/DL — SIGNIFICANT CHANGE UP (ref 0.5–1.3)
DIFF PNL FLD: ABNORMAL
EGFR: 87 ML/MIN/1.73M2 — SIGNIFICANT CHANGE UP
EOSINOPHIL # BLD AUTO: 0.27 K/UL — SIGNIFICANT CHANGE UP (ref 0–0.5)
EOSINOPHIL NFR BLD AUTO: 2.2 % — SIGNIFICANT CHANGE UP (ref 0–6)
EPI CELLS # UR: PRESENT
FLUAV AG NPH QL: SIGNIFICANT CHANGE UP
FLUBV AG NPH QL: SIGNIFICANT CHANGE UP
GLUCOSE SERPL-MCNC: 70 MG/DL — SIGNIFICANT CHANGE UP (ref 70–99)
GLUCOSE UR QL: NEGATIVE MG/DL — SIGNIFICANT CHANGE UP
HCT VFR BLD CALC: 45.1 % — SIGNIFICANT CHANGE UP (ref 39–50)
HGB BLD-MCNC: 14.5 G/DL — SIGNIFICANT CHANGE UP (ref 13–17)
IMM GRANULOCYTES NFR BLD AUTO: 0.9 % — SIGNIFICANT CHANGE UP (ref 0–0.9)
INR BLD: 1.04 RATIO — SIGNIFICANT CHANGE UP (ref 0.85–1.18)
KETONES UR-MCNC: 80 MG/DL
LACTATE SERPL-SCNC: 1.3 MMOL/L — SIGNIFICANT CHANGE UP (ref 0.7–2)
LACTATE SERPL-SCNC: 2.1 MMOL/L — HIGH (ref 0.7–2)
LEUKOCYTE ESTERASE UR-ACNC: ABNORMAL
LYMPHOCYTES # BLD AUTO: 1.22 K/UL — SIGNIFICANT CHANGE UP (ref 1–3.3)
LYMPHOCYTES # BLD AUTO: 9.8 % — LOW (ref 13–44)
MCHC RBC-ENTMCNC: 30.7 PG — SIGNIFICANT CHANGE UP (ref 27–34)
MCHC RBC-ENTMCNC: 32.2 GM/DL — SIGNIFICANT CHANGE UP (ref 32–36)
MCV RBC AUTO: 95.3 FL — SIGNIFICANT CHANGE UP (ref 80–100)
MONOCYTES # BLD AUTO: 1.28 K/UL — HIGH (ref 0–0.9)
MONOCYTES NFR BLD AUTO: 10.3 % — SIGNIFICANT CHANGE UP (ref 2–14)
NEUTROPHILS # BLD AUTO: 9.47 K/UL — HIGH (ref 1.8–7.4)
NEUTROPHILS NFR BLD AUTO: 76.5 % — SIGNIFICANT CHANGE UP (ref 43–77)
NITRITE UR-MCNC: POSITIVE
NRBC # BLD: 0 /100 WBCS — SIGNIFICANT CHANGE UP (ref 0–0)
PH UR: 5.5 — SIGNIFICANT CHANGE UP (ref 5–8)
PLATELET # BLD AUTO: 270 K/UL — SIGNIFICANT CHANGE UP (ref 150–400)
POTASSIUM SERPL-MCNC: 3.9 MMOL/L — SIGNIFICANT CHANGE UP (ref 3.5–5.3)
POTASSIUM SERPL-SCNC: 3.9 MMOL/L — SIGNIFICANT CHANGE UP (ref 3.5–5.3)
PROT SERPL-MCNC: 6.6 G/DL — SIGNIFICANT CHANGE UP (ref 6–8.3)
PROT UR-MCNC: 30 MG/DL
PROTHROM AB SERPL-ACNC: 12.2 SEC — SIGNIFICANT CHANGE UP (ref 9.5–13)
RBC # BLD: 4.73 M/UL — SIGNIFICANT CHANGE UP (ref 4.2–5.8)
RBC # FLD: 15.4 % — HIGH (ref 10.3–14.5)
RBC CASTS # UR COMP ASSIST: 19 /HPF — HIGH (ref 0–4)
RSV RNA NPH QL NAA+NON-PROBE: SIGNIFICANT CHANGE UP
SARS-COV-2 RNA SPEC QL NAA+PROBE: SIGNIFICANT CHANGE UP
SODIUM SERPL-SCNC: 139 MMOL/L — SIGNIFICANT CHANGE UP (ref 135–145)
SP GR SPEC: 1.02 — SIGNIFICANT CHANGE UP (ref 1–1.03)
UROBILINOGEN FLD QL: 1 MG/DL — SIGNIFICANT CHANGE UP (ref 0.2–1)
WBC # BLD: 12.39 K/UL — HIGH (ref 3.8–10.5)
WBC # FLD AUTO: 12.39 K/UL — HIGH (ref 3.8–10.5)
WBC UR QL: ABNORMAL /HPF (ref 0–5)

## 2024-03-26 PROCEDURE — 99223 1ST HOSP IP/OBS HIGH 75: CPT | Mod: GC

## 2024-03-26 PROCEDURE — 71045 X-RAY EXAM CHEST 1 VIEW: CPT | Mod: 26

## 2024-03-26 PROCEDURE — 99285 EMERGENCY DEPT VISIT HI MDM: CPT | Mod: FS

## 2024-03-26 PROCEDURE — 74176 CT ABD & PELVIS W/O CONTRAST: CPT | Mod: 26

## 2024-03-26 PROCEDURE — 93010 ELECTROCARDIOGRAM REPORT: CPT

## 2024-03-26 RX ORDER — CEFTRIAXONE 500 MG/1
1000 INJECTION, POWDER, FOR SOLUTION INTRAMUSCULAR; INTRAVENOUS ONCE
Refills: 0 | Status: COMPLETED | OUTPATIENT
Start: 2024-03-26 | End: 2024-03-26

## 2024-03-26 RX ORDER — POTASSIUM CHLORIDE 20 MEQ
1 PACKET (EA) ORAL
Qty: 0 | Refills: 0 | DISCHARGE

## 2024-03-26 RX ORDER — SODIUM CHLORIDE 9 MG/ML
500 INJECTION INTRAMUSCULAR; INTRAVENOUS; SUBCUTANEOUS ONCE
Refills: 0 | Status: COMPLETED | OUTPATIENT
Start: 2024-03-26 | End: 2024-03-26

## 2024-03-26 RX ORDER — SODIUM CHLORIDE 9 MG/ML
1000 INJECTION INTRAMUSCULAR; INTRAVENOUS; SUBCUTANEOUS ONCE
Refills: 0 | Status: COMPLETED | OUTPATIENT
Start: 2024-03-26 | End: 2024-03-26

## 2024-03-26 RX ORDER — ACETAMINOPHEN 500 MG
650 TABLET ORAL ONCE
Refills: 0 | Status: COMPLETED | OUTPATIENT
Start: 2024-03-26 | End: 2024-03-26

## 2024-03-26 RX ORDER — OMEPRAZOLE 10 MG/1
1 CAPSULE, DELAYED RELEASE ORAL
Qty: 0 | Refills: 0 | DISCHARGE

## 2024-03-26 RX ORDER — APIXABAN 2.5 MG/1
1 TABLET, FILM COATED ORAL
Qty: 0 | Refills: 0 | DISCHARGE

## 2024-03-26 RX ORDER — CEFTRIAXONE 500 MG/1
1000 INJECTION, POWDER, FOR SOLUTION INTRAMUSCULAR; INTRAVENOUS EVERY 24 HOURS
Refills: 0 | Status: DISCONTINUED | OUTPATIENT
Start: 2024-03-27 | End: 2024-03-28

## 2024-03-26 RX ORDER — METOPROLOL TARTRATE 50 MG
25 TABLET ORAL DAILY
Refills: 0 | Status: DISCONTINUED | OUTPATIENT
Start: 2024-03-26 | End: 2024-03-29

## 2024-03-26 RX ORDER — APIXABAN 2.5 MG/1
5 TABLET, FILM COATED ORAL EVERY 12 HOURS
Refills: 0 | Status: DISCONTINUED | OUTPATIENT
Start: 2024-03-26 | End: 2024-03-26

## 2024-03-26 RX ORDER — FUROSEMIDE 40 MG
40 TABLET ORAL DAILY
Refills: 0 | Status: DISCONTINUED | OUTPATIENT
Start: 2024-03-26 | End: 2024-03-29

## 2024-03-26 RX ORDER — HYDROCORTISONE 20 MG
100 TABLET ORAL ONCE
Refills: 0 | Status: DISCONTINUED | OUTPATIENT
Start: 2024-03-26 | End: 2024-03-27

## 2024-03-26 RX ORDER — PANTOPRAZOLE SODIUM 20 MG/1
40 TABLET, DELAYED RELEASE ORAL
Refills: 0 | Status: DISCONTINUED | OUTPATIENT
Start: 2024-03-26 | End: 2024-03-29

## 2024-03-26 RX ADMIN — Medication 25 MILLIGRAM(S): at 22:16

## 2024-03-26 RX ADMIN — APIXABAN 5 MILLIGRAM(S): 2.5 TABLET, FILM COATED ORAL at 22:16

## 2024-03-26 RX ADMIN — SODIUM CHLORIDE 500 MILLILITER(S): 9 INJECTION INTRAMUSCULAR; INTRAVENOUS; SUBCUTANEOUS at 15:08

## 2024-03-26 RX ADMIN — Medication 40 MILLIGRAM(S): at 22:16

## 2024-03-26 RX ADMIN — Medication 650 MILLIGRAM(S): at 14:17

## 2024-03-26 RX ADMIN — SODIUM CHLORIDE 1000 MILLILITER(S): 9 INJECTION INTRAMUSCULAR; INTRAVENOUS; SUBCUTANEOUS at 13:18

## 2024-03-26 RX ADMIN — CEFTRIAXONE 100 MILLIGRAM(S): 500 INJECTION, POWDER, FOR SOLUTION INTRAMUSCULAR; INTRAVENOUS at 17:27

## 2024-03-26 NOTE — H&P ADULT - NSHPREVIEWOFSYSTEMS_GEN_ALL_CORE
Constitutional: admits to weakness. denies fever, chills, sweating  HEENT: denies headache, dizziness, or lightheadedness  Respiratory: denies SOB, cough, or wheezing  Cardiovascular: denies CP, palpitations  Gastrointestinal: denies nausea, vomiting, diarrhea, constipation, abdominal pain, or bloody stools  Genitourinary: admits bloody urine. denies painful urination, increased frequency, urgency  Skin/Breast: denies rashes or itching  Musculoskeletal: denies muscle aches, joint swelling, or muscle weakness  Neurologic: admits to BL foot hypersensitivity to touch. denies loss of sensation, numbness, or tingling  ROS negative except as noted above

## 2024-03-26 NOTE — ED PROVIDER NOTE - ATTENDING APP SHARED VISIT CONTRIBUTION OF CARE
This was a shared visit with SHARIFA. I reviewed and verified the documentation and independently performed the documented MDM. This was a shared visit with an SHARIFA. I reviewed and verified the documentation and independently performed the documented MDM.

## 2024-03-26 NOTE — H&P ADULT - PROBLEM SELECTOR PLAN 3
- Patient with hx of (HFpEF) heart failure with preserved ejection fraction.  - Last echo (6/2021): EF 55-60%  - EKG on admission- Sinus rhythm with occasional premature ventricular complexes, Left anterior fascicular block, 68 bpm   - No signs/symptoms of volume overload at present  - Continue home Lasix 40mg qd.  - Troponin on admission negative   - Patient follows with cardio Dr. Woods outpatient   - remote tele   - Continue to monitor for routine hemodynamic changes Chronic   - Patient on home Prednisone 10mg daily   - Hold home Prednisone and start stress dose steroids in the setting of sepsis   - Start stress dose steroids - IV Solucortef 100mg x1 now followed by IV Solucortef 50mg q8h. Chronic   - Patient on home Prednisone 10mg daily   - Hold home Prednisone and start stress dose steroids in the setting of sepsis   - Start stress dose steroids - IV Solucortef 100mg x1  - Neuro Dr. Castellon consulted

## 2024-03-26 NOTE — ED ADULT NURSE NOTE - OBJECTIVE STATEMENT
Pt is AOX3, coming from home, son at bedside, pt came to the ED with c/o increase weakness, lethargy, fever, blood in urine. Pts son states he has been feeling symptoms more the last 2 days, does have hx of hematuria, has not recently f/u with outpt urologist. Pts son noticed decreased fluid and food intake. No c/o pain at this time. Son states that blood in urine is intermittent. Hx myasthenia gravis, pre diabetic, HTN,.HLD, CHF. Allergies to cipro, fluoroquinolones, ketek, telithromycin ,avelox. Able to follow commands, bed bound, skin intact, abrasion on top of head noted, no bleeding noted at this time. denies CP/SOB/HA. Denies n/v/d. Denies pain when voiding. Rectal temp 100.3. pending lab and radiology orders. care ongoing.

## 2024-03-26 NOTE — CONSULT NOTE ADULT - ASSESSMENT
ASSESSMENT & PLAN  79 yo male with 78-year-old male, history of CHF (EF 55–60-60% as of 6/21), PE (on apixaban), hypertension, HLD, MG, chronic lower extremity edema, urosepsis, pneumonia, cholecystitis, brought by son for evaluation of generalized weakness for 4 days and hematuria is found to have UTI. CT demonstrates 7 mm right distal ureteral calculus with mild right hydroureteronephrosis and dilated extrarenal pelvis. Bladder calculi measuring up to 8 mm.    - Increased WBC 12.39; trend  - Continue abx   - Improved lactate  - Hgb 14.5; trend  - Labs otherwise stable with no worsening kidney function  - Recommend conservative management at the moment with Flomax daily   - Recommend strainer when urinating for calculi    - Will follow   - Cases discussed with Dr. Paulino  ASSESSMENT & PLAN  77 yo male with 78-year-old male, history of CHF (EF 55–60-60% as of 6/21), PE (on apixaban), hypertension, HLD, MG, chronic lower extremity edema, urosepsis, pneumonia, cholecystitis, brought by son for evaluation of generalized weakness for 4 days and hematuria is found to have UTI. CT demonstrates 7 mm right distal ureteral calculus with mild right hydroureteronephrosis and dilated extrarenal pelvis. Bladder calculi measuring up to 8 mm.    - Increased WBC 12.39; trend  - Continue abx   - Improved lactate  - Hgb 14.5; trend  - Labs otherwise stable with no worsening kidney function  - Recommend conservative management at the moment with Flomax daily   - Recommend strainer when urinating for calculi    - Keep NPO at midnight every night just incase patient deteriorates   - Will follow   - Cases discussed with Dr. Paulino

## 2024-03-26 NOTE — ED PROVIDER NOTE - CLINICAL SUMMARY MEDICAL DECISION MAKING FREE TEXT BOX
78-year-old male, brought by son for evaluation of generalized weakness for 4 days and possible bloody urine.  Patient nontoxic-appearing, appears dehydrated on exam.  Lungs CTA, abdomen soft, nondistended nontender.  No signs of urinary retention.  No skin break or pressure ulcers.  Patient has history of admission for urosepsis November 2023.  At this time patient will need EKG, chest x-ray, labs, urine, cultures to assess the reason for his weakness.  Symptoms may be related to cardiac cause versus infectious cause versus dehydration.  Will give IV fluids and reassess with likely need for admission to the hospital. 78-year-old male, brought by son for evaluation of generalized weakness for 4 days and possible bloody urine.  Patient nontoxic-appearing, appears dehydrated on exam.  Lungs CTA, abdomen soft, nondistended nontender.  No signs of urinary retention.  No skin break or pressure ulcers.  Patient has history of admission for urosepsis November 2023.  At this time patient will need EKG, chest x-ray, labs, urine, cultures to assess the reason for his weakness.  Symptoms may be related to cardiac cause versus infectious cause versus dehydration.  Will give IV fluids and reassess with likely need for admission to the hospital.    KV: here for several days of generalized weakness, decreased PO intake, feeling cold, possible hematuria. Will screen for infection, check labs, XR, ekg, UA, consider CT, consider admission. 78-year-old male, brought by son for evaluation of generalized weakness for 4 days and possible bloody urine.  Patient nontoxic-appearing, appears dehydrated on exam.  Lungs CTA, abdomen soft, nondistended nontender.  No signs of urinary retention.  No skin break or pressure ulcers.  Patient has history of admission for urosepsis November 2023.  At this time patient will need EKG, chest x-ray, labs, urine, cultures to assess the reason for his weakness.  Symptoms may be related to cardiac cause versus infectious cause versus dehydration.  Will give IV fluids and reassess with likely need for admission to the hospital.    KV: here for several days of generalized weakness, decreased PO intake, feeling cold, possible hematuria. Will screen for infection, check labs, XR, ekg, UA, consider CT, consider admission.    18:22-UA consistent with infection. Given risk factors and 11/23 admission for urosepsis will admit for IV antibx and obs.

## 2024-03-26 NOTE — H&P ADULT - PROBLEM SELECTOR PLAN 6
Chronic   - On home Praluent 75mg subQ [id number]. Chronic   - Continue home medication Omeprazole 40mg per day-- converted to pantoprazole 40mg daily

## 2024-03-26 NOTE — H&P ADULT - NSHPSOCIALHISTORY_GEN_ALL_CORE
Lives with son  Does not move out of bed much Denies etoh  Smoking- Former smoker, quit over 50 years go- smokes a few cigarettes a day  recc drug use- denies   Lives with son  Does not move out of bed much

## 2024-03-26 NOTE — CONSULT NOTE ADULT - SUBJECTIVE AND OBJECTIVE BOX
SURGERY PA CONSULT NOTE:    CHIEF COMPLAINT:  Patient is a 78y old  Male who presents with a chief complaint of Acute hemorrhagic cystitis (26 Mar 2024 18:38)      HPI FROM ED:  HPI:  78-year-old male, history of CHF (EF 55–60-60% as of ), PE (on apixaban), hypertension, HLD, MG, chronic lower extremity edema, urosepsis, pneumonia, cholecystitis, brought by son for evaluation of generalized weakness for 4 days and possible hematuria.  Patient states that his son brought him into the hospital because he was appearing more tired and weak than usual. As per son, patient  does not walk and usually spends mostly in bed. Discussed with son, patient was having intermittent hematuria since past 2-3 months and was treated with PO antibiotics. However since the past 3-4 days patient was complaining of feeling cold and was appearing weaker than usual. Patient also endorses decreased appetite since the past few days. Son noticed discolored sheets today when changing patient and thought it may be bloody urine. Otherwise patient denies any chest pain, shortness of breath, abdominal pain, suprapubic tenderness, n/v/d/c.     ED Course:   Vitals: T 100.4F -> 97.8, HR 68, /71, RR 16, SPO2 98% RA  Labs: WBC 12.39, lactate 2.1 -> 1.3, UA: mod blood, protein, ketone, large LE, nitrite +, WBC, bacteria, squamous epithelial cells   EKG: Sinus rhythm with occasional premature ventricular complexes, Left anterior fascicular block, 68 bpm   Imaging: CXR- Small calcified granuloma in the right midlung and small calcified lymph nodes around the right hilum again noted. Mild chronic symmetric apical thickening again noted. No acute finding or change from CAT scan of November 3, 2023. Chronic lung findings again noted.  Given in the ED: PO Tylenol 650mg x1, IV Rocephin x1, 1.5L NS  (26 Mar 2024 18:38)    Interval HPI:   77 yo male with pmhx of CHF (EF 55–60-60% as of ), PE (on apixaban), hypertension, HLD, MG, chronic lower extremity edema, urosepsis, pneumonia, cholecystitis, brought by son for evaluation of generalized weakness for 4 days and possible hematuria.  Patient states that his son brought him into the hospital because he was appearing more tired and weak than usual. Patient denies any flank pain, abdominal pain.  Denies any sweats, chills, fevers.        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 No nausea, vomiting, or hematemesis; No diarrhea or constipation.   GENITOURINARY: +hematuria  SKIN: No itching, rashes      MEDICATIONS:  Home Medications:  Eliquis 5 mg oral tablet: 1 tab(s) orally 2 times a day (26 Mar 2024 19:43)  omeprazole 40 mg oral delayed release capsule: 1 cap(s) orally once a day (26 Mar 2024 19:43)  Potassium Chloride (Eqv-Klor-Con M20) 20 mEq oral tablet, extended release: 1 tab(s) orally once a day Resume taking this tomorrow, 23 (26 Mar 2024 19:43)  Praluent Pen 75 mg/mL subcutaneous solution: 75 milligram(s) subcutaneous every 2 weeks (26 Mar 2024 19:43)  predniSONE 10 mg oral tablet: 1 tab(s) orally once a day (26 Mar 2024 19:42)    MEDICATIONS  (STANDING):  cefTRIAXone   IVPB 1000 milliGRAM(s) IV Intermittent every 24 hours  furosemide    Tablet 40 milliGRAM(s) Oral daily  hydrocortisone sodium succinate Injectable 100 milliGRAM(s) IV Push once  metoprolol succinate ER 25 milliGRAM(s) Oral daily  pantoprazole    Tablet 40 milliGRAM(s) Oral before breakfast    MEDICATIONS  (PRN):      ALLERGIES:  Allergies    levofloxacin (Unknown)  gatifloxacin (Unknown)  ofloxacin (Unknown)    Intolerances    fluoroquinolone antibiotics (Other)  Ketek (Other)  telithromycin (Other)  Avelox (Other)      SOCIAL HISTORY:  Social History:  Denies etoh  Smoking- Former smoker, quit over 50 years go- smokes a few cigarettes a day  recc drug use- denies   Lives with son  Does not move out of bed much (26 Mar 2024 18:38)        FAMILY HISTORY:  FAMILY HISTORY:  Family history of stroke (Father)  Father -  age 62    Family history of kidney disease (Mother)  Mother -  age 67    Family history of diabetes mellitus type II (Sibling)  Brother & Sister        VITAL SIGNS:  Vital Signs Last 24 Hrs  T(C): 37 (27 Mar 2024 03:12), Max: 38 (26 Mar 2024 13:47)  T(F): 98.6 (27 Mar 2024 03:12), Max: 100.4 (26 Mar 2024 13:47)  HR: 78 (27 Mar 2024 03:12) (68 - 78)  BP: 150/90 (27 Mar 2024 03:12) (114/71 - 150/90)  BP(mean): --  RR: 16 (27 Mar 2024 00:14) (16 - 17)  SpO2: 97% (27 Mar 2024 00:14) (95% - 98%)    Parameters below as of 27 Mar 2024 00:14  Patient On (Oxygen Delivery Method): room air        PHYSICAL EXAM:  GENERAL: NAD, lying in bed comfortably  HEAD:  Atraumatic, normocephalic  EYES: EOMI, PERRLA, conjunctiva and sclera clear  NECK: Supple, trachea midline, no JVD  HEART: Regular rate and rhythm  LUNGS: Unlabored respirations.    ABDOMEN: Soft, nontender, nondistended, no flank pain  EXTREMITIES: no calf tenderness b/l  NERVOUS SYSTEM:  A&Ox3  SKIN: warm    LABS:                        14.5   12.39 )-----------( 270      ( 26 Mar 2024 13:05 )             45.1     -    139  |  104  |  22  ----------------------------<  70  3.9   |  19<L>  |  0.87    Ca    8.9      26 Mar 2024 13:05    TPro  6.6  /  Alb  2.4<L>  /  TBili  1.2  /  DBili  x   /  AST  32  /  ALT  29  /  AlkPhos  82  03-26    PT/INR - ( 26 Mar 2024 13:05 )   PT: 12.2 sec;   INR: 1.04 ratio         PTT - ( 26 Mar 2024 13:05 )  PTT:31.2 sec  Urinalysis Basic - ( 26 Mar 2024 16:25 )    Color: Dark Yellow / Appearance: Cloudy / S.023 / pH: x  Gluc: x / Ketone: 80 mg/dL  / Bili: Negative / Urobili: 1.0 mg/dL   Blood: x / Protein: 30 mg/dL / Nitrite: Positive   Leuk Esterase: Large / RBC: 19 /HPF / WBC Too Numerous to count /HPF   Sq Epi: x / Non Sq Epi: x / Bacteria: Too Numerous to count /HPF      LIVER FUNCTIONS - ( 26 Mar 2024 13:05 )  Alb: 2.4 g/dL / Pro: 6.6 g/dL / ALK PHOS: 82 U/L / ALT: 29 U/L / AST: 32 U/L / GGT: x               RADIOLOGY & ADDITIONAL STUDIES:  < from: CT Abdomen and Pelvis No Cont (24 @ 20:49) >  ACC: [id number] EXAM:  CT ABDOMEN AND PELVIS   ORDERED BY: ELIA GUZMAN     PROCEDURE DATE:  2024          INTERPRETATION:  CLINICAL INFORMATION: 78 years  Male with hemorraghic   cystitis.    COMPARISON: Contrast-enhanced CT abdomen and pelvis 11/3/2023    CONTRAST/COMPLICATIONS:  IV Contrast: None  Oral Contrast: None  Complications: None    PROCEDURE:  CT of the Abdomen and Pelvis was performed.  Sagittal and coronal reformats were performed.    LIMITATIONS: Evaluation of the solid organs, vascular structures and GI   tract is limited without oral and IV contrast.    FINDINGS:  LOWER CHEST: Bilateral lower lobe bronchiectasis. Coronary artery   calcifications and/or stents. 8 mm right lower lobe calcified granuloma.   Multiple calcified mediastinal and right hilar lymph nodes measuring up   to 1.3 cm.    LIVER: Innumerable tiny calcified granulomata.  BILE DUCTS: Normal caliber.  GALLBLADDER: Within normal limits.  SPLEEN: Innumerable tiny calcified granulomata.  PANCREAS: Atrophic.  ADRENALS: Within normal limits.  KIDNEYS/URETERS: Mild right hydroureteronephrosis to the level of a 7 mm   distal ureteral calculus (301:100). Dilated right extrarenal pelvis. No   left hydroureteronephrosis or calculus.    BLADDER: 8 mm and 5 mmright-sided bladder calculi. 2 mm left-sided   bladder calculus.  REPRODUCTIVE ORGANS: Enlarged prostate indenting the bladder base.    BOWEL: No bowel obstruction. Appendix is normal.  PERITONEUM: No ascites.  VESSELS: Atherosclerotic changes.  RETROPERITONEUM/LYMPH NODES: No lymphadenopathy.  ABDOMINAL WALL: Small fat-containing umbilical hernia.  BONES: Degenerative changes.    IMPRESSION:  7 mm right distal ureteral calculus with mild right hydroureteronephrosis   and dilated extrarenal pelvis. Bladder calculi measuring up to 8 mm.    Enlarged prostate indenting the bladder base.    Old granulomatous disease.        --- End of Report ---            TRINI BAKER MD; Attending Radiologist  This document has been electronically signed. Mar 26 2024  9:22PM    < end of copied text >

## 2024-03-26 NOTE — H&P ADULT - PROBLEM SELECTOR PLAN 5
- Chronic, stable on admission   - Continue home Metoprolol and Lasix with hold parameters. Chronic   - On home Praluent 75mg subQ [id number]. Chronic   - On home Praluent 75mg subQ [id number]- resume upon discharge

## 2024-03-26 NOTE — H&P ADULT - NSHPPHYSICALEXAM_GEN_ALL_CORE
T(C): 36.6 (03-26-24 @ 15:41), Max: 38 (03-26-24 @ 13:47)  HR: 68 (03-26-24 @ 15:41) (68 - 70)  BP: 114/71 (03-26-24 @ 15:41) (114/71 - 131/80)  RR: 16 (03-26-24 @ 15:41) (16 - 16)  SpO2: 98% (03-26-24 @ 15:41) (95% - 98%)    Physical Exam:  Gen: well appearing, NAD  HEENT: NCAT, PEERLA b/l, EOMI b/l, no conjunctival erythema  Cardio: regular rate and rhythm, +s1s2, no murmurs, rubs, or gallops  Pulm: CTA b/l, no wheezes, rales or rhonchi  Abdomen: soft, nontender, nondistended, +BS x4 quadrants, no guarding  Extremities: no clubbing, cyanosis or edema, +2 pedal pulses  Neuro: AAOx3, sensation intact, able to move upper and lower extremities   Skin: warm and dry

## 2024-03-26 NOTE — H&P ADULT - ATTENDING COMMENTS
78-year-old male, history of CHF (EF 55–60-60% as of 6/21), PE (on apixaban), hypertension, HLD, MG, chronic lower extremity edema, urosepsis, pneumonia, cholecystitis, brought by son for evaluation of generalized weakness for 4 days and possible hematuria now admitted for acute hemorraghic cystitis.      HPI as above.     Plan:   Continue Rocephin  -f/u culture results  -urology and cardio consulted  -hold eliquis tonight, f/u consultant recs regarding when to resume eliquis.   -monitor H/H  -f/u CT scan results

## 2024-03-26 NOTE — ED PROVIDER NOTE - OBJECTIVE STATEMENT
78-year-old male, history of CHF (EF 55–60-60% as of 6/21), ?PE (on apixaban), hypertension, HLD, MG, chronic lower extremity edema, urosepsis, pneumonia, cholecystitis, brought by son for evaluation of generalized weakness for 4 days and possible hematuria.  Son reports that patient  does not walk and usually spends mostly in bed.  He noticed that patient was feeling cold 4 days ago and since then reports having generalized tiredness and decreased appetite.   patient denies any headache or vision changes.  Denies any nausea/vomiting/diarrhea or constipation.  No urinary symptoms.  Denies any recent viral symptoms.  Today's son was changing him noticed some brown discoloration of the bedsheet which he thought  was bloody urine.  Patient reports never evaluated by urologist in the past.  No other complaints. 78-year-old male, history of CHF (EF 55–60-60% as of 6/21), PE (on apixaban), hypertension, HLD, MG, chronic lower extremity edema, urosepsis, pneumonia, cholecystitis, brought by son for evaluation of generalized weakness for 4 days and possible hematuria.  Son reports that patient  does not walk and usually spends mostly in bed.  He noticed that patient was feeling cold 4 days ago and since then reports having generalized tiredness and decreased appetite.   patient denies any headache or vision changes.  Denies any nausea/vomiting/diarrhea or constipation.  No urinary symptoms.  Denies any recent viral symptoms.  Today's son was changing him noticed some brown discoloration of the bedsheet which he thought  was bloody urine.  Patient reports never evaluated by urologist in the past.  No other complaints.

## 2024-03-26 NOTE — ED ADULT NURSE NOTE - NSFALLHARMRISKINTERV_ED_ALL_ED

## 2024-03-26 NOTE — H&P ADULT - PROBLEM SELECTOR PLAN 1
Patient presents to ED with 4 days of generalized weakness and bloody urine   - On admission T 100.4F, WBC 12.39, and UA grossly positive for infection and with large amounts of blood- patient meets sepsis criteria on admission   - s/p PO Tylenol 650mg x1, IV Rocephin x1, 1.5L NS in ED   - CXR- Small calcified granuloma in the right midlung and small calcified lymph nodes around the right hilum again noted. Mild chronic symmetric apical thickening again noted. No acute finding or change from CAT scan of November 3, 2023. Chronic lung findings again noted.  - UA: mod blood, protein, ketone, large LE, nitrite +, WBC, bacteria, squamous epithelial cells   - Lactate 2.1 -> 1.3   - Continue with IV Rocephin   - Ucx and Bcx ordered, f/u reccs   - Trend WBC and monitor for fever  - PO Tylneol 650mg q6 PRN for fever   - Will hold off on further IVF given Hx of CHF  - ID Dr. Mckinley consulted, f/u reccs Patient presents to ED with 4 days of generalized weakness and bloody urine   - On admission T 100.4F, WBC 12.39, and UA grossly positive for infection and with large amounts of blood- patient meets sepsis criteria on admission   - s/p PO Tylenol 650mg x1, IV Rocephin x1, 1.5L NS in ED   - CXR- Small calcified granuloma in the right midlung and small calcified lymph nodes around the right hilum again noted. Mild chronic symmetric apical thickening again noted. No acute finding or change from CAT scan of November 3, 2023. Chronic lung findings again noted.  - UA: mod blood, protein, ketone, large LE, nitrite +, WBC, bacteria, squamous epithelial cells   - CT abd/pelvis ordered, f/u results   - Lactate 2.1 -> 1.3   - Continue with IV Rocephin   - Ucx and Bcx ordered, f/u reccs   - Trend WBC and monitor for fever  - PO Tylneol 650mg q6 PRN for fever   - Will hold off on further IVF given Hx of CHF  - ID Dr. Mckinley consulted, f/u reccs  - Urology consulted- f/u reccs

## 2024-03-26 NOTE — ED PROVIDER NOTE - SKIN, MLM
Skin normal color for race, warm, dry and intact. No evidence of rash.   No pressure ulcers or skin break.

## 2024-03-26 NOTE — H&P ADULT - PROBLEM SELECTOR PLAN 4
Chronic   - Patient on home Prednisone 10mg daily   - Hold home Prednisone and start stress dose steroids in the setting of sepsis   - Start stress dose steroids - IV Solucortef 100mg x1 now followed by IV Solucortef 50mg q8h. - Chronic, stable on admission   - Continue home Metoprolol and Lasix with hold parameters.

## 2024-03-26 NOTE — H&P ADULT - HISTORY OF PRESENT ILLNESS
78-year-old male, history of CHF (EF 55–60-60% as of 6/21), PE (on apixaban), hypertension, HLD, MG, chronic lower extremity edema, urosepsis, pneumonia, cholecystitis, brought by son for evaluation of generalized weakness for 4 days and possible hematuria.  Son reports that patient  does not walk and usually spends mostly in bed.  He noticed that patient was feeling cold 4 days ago and since then reports having generalized tiredness and decreased appetite.   patient denies any headache or vision changes.  Denies any nausea/vomiting/diarrhea or constipation.  No urinary symptoms.  Denies any recent viral symptoms.  Today's son was changing him noticed some brown discoloration of the bedsheet which he thought  was bloody urine.  Patient reports never evaluated by urologist in the past.  No other complaints.    ED Course:   Vitals: T 100.4F -> 97.8, HR 68, /71, RR 16, SPO2 98% RA  Labs: WBC 12.39, lactate 2.1 -> 1.3, UA: mod blood, protein, ketone, large LE, nitrite +, WBC, bacteria, squamous epithelial cells   EKG: Sinus rhythm with occasional premature ventricular complexes, Left anterior fascicular block, 68 bpm   Imaging: CXR- Small calcified granuloma in the right midlung and small calcified lymph nodes around the right hilum again noted. Mild chronic symmetric apical thickening again noted. No acute finding or change from CAT scan of November 3, 2023. Chronic lung findings again noted.  Given in the ED: PO Tylenol 650mg x1, IV Rocephin x1, 1.5L NS  78-year-old male, history of CHF (EF 55–60-60% as of 6/21), PE (on apixaban), hypertension, HLD, MG, chronic lower extremity edema, urosepsis, pneumonia, cholecystitis, brought by son for evaluation of generalized weakness for 4 days and possible hematuria.  Patient states that his son brought him into the hospital because he was appearing more tired and weak than usual. As per son, patient  does not walk and usually spends mostly in bed. Patient also endorses decreased appetite since the past few days. Son noticed discolored sheets today when changing patient and thought it may be bloody urine. Otherwise patient denies any chest pain, shortness of breath, abdominal pain, suprapubic tenderness, n/v/d/c.     ED Course:   Vitals: T 100.4F -> 97.8, HR 68, /71, RR 16, SPO2 98% RA  Labs: WBC 12.39, lactate 2.1 -> 1.3, UA: mod blood, protein, ketone, large LE, nitrite +, WBC, bacteria, squamous epithelial cells   EKG: Sinus rhythm with occasional premature ventricular complexes, Left anterior fascicular block, 68 bpm   Imaging: CXR- Small calcified granuloma in the right midlung and small calcified lymph nodes around the right hilum again noted. Mild chronic symmetric apical thickening again noted. No acute finding or change from CAT scan of November 3, 2023. Chronic lung findings again noted.  Given in the ED: PO Tylenol 650mg x1, IV Rocephin x1, 1.5L NS  78-year-old male, history of CHF (EF 55–60-60% as of 6/21), PE (on apixaban), hypertension, HLD, MG, chronic lower extremity edema, urosepsis, pneumonia, cholecystitis, brought by son for evaluation of generalized weakness for 4 days and possible hematuria.  Patient states that his son brought him into the hospital because he was appearing more tired and weak than usual. As per son, patient  does not walk and usually spends mostly in bed. Discussed with son, patient was having intermittent hematuria since past 2-3 months and was treated with PO antibiotics. However since the past 3-4 days patient was complaining of feeling cold and was appearing weaker than usual. Patient also endorses decreased appetite since the past few days. Son noticed discolored sheets today when changing patient and thought it may be bloody urine. Otherwise patient denies any chest pain, shortness of breath, abdominal pain, suprapubic tenderness, n/v/d/c.     ED Course:   Vitals: T 100.4F -> 97.8, HR 68, /71, RR 16, SPO2 98% RA  Labs: WBC 12.39, lactate 2.1 -> 1.3, UA: mod blood, protein, ketone, large LE, nitrite +, WBC, bacteria, squamous epithelial cells   EKG: Sinus rhythm with occasional premature ventricular complexes, Left anterior fascicular block, 68 bpm   Imaging: CXR- Small calcified granuloma in the right midlung and small calcified lymph nodes around the right hilum again noted. Mild chronic symmetric apical thickening again noted. No acute finding or change from CAT scan of November 3, 2023. Chronic lung findings again noted.  Given in the ED: PO Tylenol 650mg x1, IV Rocephin x1, 1.5L NS

## 2024-03-26 NOTE — H&P ADULT - PROBLEM SELECTOR PLAN 2
EKG on admission- Sinus rhythm with occasional premature ventricular complexes, Left anterior fascicular block, 68 bpm   - Troponin on admission negative   - Patient follows with cardio Dr. Woods outpatient   - Remote tele  - Continue to monitor for routine hemodynamic changes - Patient with hx of (HFpEF) heart failure with preserved ejection fraction.  - Last echo (6/2021): EF 55-60%  - EKG on admission- Sinus rhythm with occasional premature ventricular complexes, Left anterior fascicular block, 68 bpm   - No signs/symptoms of volume overload at present  - Continue home Lasix 40mg qd.  - Troponin on admission negative   - Patient follows with cardio Dr. Woods outpatient   - remote tele   - Continue to monitor for routine hemodynamic changes - Patient with hx of (HFpEF) heart failure with preserved ejection fraction.  - Last echo (6/2021): EF 55-60%  - EKG on admission- Sinus rhythm with occasional premature ventricular complexes, Left anterior fascicular block, 68 bpm   - No signs/symptoms of volume overload at present  - Continue home Lasix 40mg qd.  - Troponin on admission negative   - Patient follows with cardio Dr. Woods outpatient   - Cardio consulted, f/u reccs   - remote tele   - Continue to monitor for routine hemodynamic changes

## 2024-03-26 NOTE — H&P ADULT - ASSESSMENT
78-year-old male, history of CHF (EF 55–60-60% as of 6/21), PE (on apixaban), hypertension, HLD, MG, chronic lower extremity edema, urosepsis, pneumonia, cholecystitis, brought by son for evaluation of generalized weakness for 4 days and possible hematuria now admitted for acute hemorraghic cystitis.

## 2024-03-27 DIAGNOSIS — N39.0 URINARY TRACT INFECTION, SITE NOT SPECIFIED: ICD-10-CM

## 2024-03-27 LAB
ALBUMIN SERPL ELPH-MCNC: 2.3 G/DL — LOW (ref 3.3–5)
ALP SERPL-CCNC: 72 U/L — SIGNIFICANT CHANGE UP (ref 40–120)
ALT FLD-CCNC: 24 U/L — SIGNIFICANT CHANGE UP (ref 12–78)
ANION GAP SERPL CALC-SCNC: 12 MMOL/L — SIGNIFICANT CHANGE UP (ref 5–17)
AST SERPL-CCNC: 22 U/L — SIGNIFICANT CHANGE UP (ref 15–37)
BASOPHILS # BLD AUTO: 0.05 K/UL — SIGNIFICANT CHANGE UP (ref 0–0.2)
BASOPHILS NFR BLD AUTO: 0.4 % — SIGNIFICANT CHANGE UP (ref 0–2)
BILIRUB SERPL-MCNC: 0.8 MG/DL — SIGNIFICANT CHANGE UP (ref 0.2–1.2)
BUN SERPL-MCNC: 16 MG/DL — SIGNIFICANT CHANGE UP (ref 7–23)
CALCIUM SERPL-MCNC: 8.5 MG/DL — SIGNIFICANT CHANGE UP (ref 8.5–10.1)
CHLORIDE SERPL-SCNC: 101 MMOL/L — SIGNIFICANT CHANGE UP (ref 96–108)
CO2 SERPL-SCNC: 26 MMOL/L — SIGNIFICANT CHANGE UP (ref 22–31)
CREAT SERPL-MCNC: 0.75 MG/DL — SIGNIFICANT CHANGE UP (ref 0.5–1.3)
EGFR: 91 ML/MIN/1.73M2 — SIGNIFICANT CHANGE UP
EOSINOPHIL # BLD AUTO: 0.33 K/UL — SIGNIFICANT CHANGE UP (ref 0–0.5)
EOSINOPHIL NFR BLD AUTO: 2.9 % — SIGNIFICANT CHANGE UP (ref 0–6)
FOLATE SERPL-MCNC: 13 NG/ML — SIGNIFICANT CHANGE UP
GLUCOSE SERPL-MCNC: 75 MG/DL — SIGNIFICANT CHANGE UP (ref 70–99)
HCT VFR BLD CALC: 41.3 % — SIGNIFICANT CHANGE UP (ref 39–50)
HGB BLD-MCNC: 13.7 G/DL — SIGNIFICANT CHANGE UP (ref 13–17)
IMM GRANULOCYTES NFR BLD AUTO: 0.9 % — SIGNIFICANT CHANGE UP (ref 0–0.9)
LYMPHOCYTES # BLD AUTO: 0.92 K/UL — LOW (ref 1–3.3)
LYMPHOCYTES # BLD AUTO: 8 % — LOW (ref 13–44)
MCHC RBC-ENTMCNC: 31 PG — SIGNIFICANT CHANGE UP (ref 27–34)
MCHC RBC-ENTMCNC: 33.2 GM/DL — SIGNIFICANT CHANGE UP (ref 32–36)
MCV RBC AUTO: 93.4 FL — SIGNIFICANT CHANGE UP (ref 80–100)
MONOCYTES # BLD AUTO: 1.21 K/UL — HIGH (ref 0–0.9)
MONOCYTES NFR BLD AUTO: 10.6 % — SIGNIFICANT CHANGE UP (ref 2–14)
NEUTROPHILS # BLD AUTO: 8.85 K/UL — HIGH (ref 1.8–7.4)
NEUTROPHILS NFR BLD AUTO: 77.2 % — HIGH (ref 43–77)
NRBC # BLD: 0 /100 WBCS — SIGNIFICANT CHANGE UP (ref 0–0)
PLATELET # BLD AUTO: 264 K/UL — SIGNIFICANT CHANGE UP (ref 150–400)
POTASSIUM SERPL-MCNC: 3.4 MMOL/L — LOW (ref 3.5–5.3)
POTASSIUM SERPL-SCNC: 3.4 MMOL/L — LOW (ref 3.5–5.3)
PROT SERPL-MCNC: 6.2 G/DL — SIGNIFICANT CHANGE UP (ref 6–8.3)
RBC # BLD: 4.42 M/UL — SIGNIFICANT CHANGE UP (ref 4.2–5.8)
RBC # FLD: 15.1 % — HIGH (ref 10.3–14.5)
SODIUM SERPL-SCNC: 139 MMOL/L — SIGNIFICANT CHANGE UP (ref 135–145)
TSH SERPL-MCNC: 1.12 UIU/ML — SIGNIFICANT CHANGE UP (ref 0.36–3.74)
VIT B12 SERPL-MCNC: 434 PG/ML — SIGNIFICANT CHANGE UP (ref 232–1245)
WBC # BLD: 11.46 K/UL — HIGH (ref 3.8–10.5)
WBC # FLD AUTO: 11.46 K/UL — HIGH (ref 3.8–10.5)

## 2024-03-27 PROCEDURE — 99233 SBSQ HOSP IP/OBS HIGH 50: CPT

## 2024-03-27 PROCEDURE — 99222 1ST HOSP IP/OBS MODERATE 55: CPT

## 2024-03-27 RX ORDER — POTASSIUM CHLORIDE 20 MEQ
10 PACKET (EA) ORAL
Refills: 0 | Status: COMPLETED | OUTPATIENT
Start: 2024-03-27 | End: 2024-03-27

## 2024-03-27 RX ORDER — INFLUENZA VIRUS VACCINE 15; 15; 15; 15 UG/.5ML; UG/.5ML; UG/.5ML; UG/.5ML
0.7 SUSPENSION INTRAMUSCULAR ONCE
Refills: 0 | Status: DISCONTINUED | OUTPATIENT
Start: 2024-03-27 | End: 2024-03-29

## 2024-03-27 RX ORDER — TAMSULOSIN HYDROCHLORIDE 0.4 MG/1
0.4 CAPSULE ORAL AT BEDTIME
Refills: 0 | Status: DISCONTINUED | OUTPATIENT
Start: 2024-03-27 | End: 2024-03-29

## 2024-03-27 RX ORDER — SODIUM CHLORIDE 9 MG/ML
1000 INJECTION, SOLUTION INTRAVENOUS
Refills: 0 | Status: DISCONTINUED | OUTPATIENT
Start: 2024-03-27 | End: 2024-03-27

## 2024-03-27 RX ADMIN — Medication 100 MILLIEQUIVALENT(S): at 09:38

## 2024-03-27 RX ADMIN — CEFTRIAXONE 100 MILLIGRAM(S): 500 INJECTION, POWDER, FOR SOLUTION INTRAMUSCULAR; INTRAVENOUS at 06:05

## 2024-03-27 RX ADMIN — Medication 100 MILLIEQUIVALENT(S): at 13:41

## 2024-03-27 RX ADMIN — PANTOPRAZOLE SODIUM 40 MILLIGRAM(S): 20 TABLET, DELAYED RELEASE ORAL at 06:04

## 2024-03-27 RX ADMIN — TAMSULOSIN HYDROCHLORIDE 0.4 MILLIGRAM(S): 0.4 CAPSULE ORAL at 21:35

## 2024-03-27 RX ADMIN — Medication 40 MILLIGRAM(S): at 06:04

## 2024-03-27 RX ADMIN — Medication 10 MILLIGRAM(S): at 09:48

## 2024-03-27 RX ADMIN — Medication 100 MILLIEQUIVALENT(S): at 11:30

## 2024-03-27 RX ADMIN — Medication 25 MILLIGRAM(S): at 06:04

## 2024-03-27 RX ADMIN — SODIUM CHLORIDE 75 MILLILITER(S): 9 INJECTION, SOLUTION INTRAVENOUS at 09:38

## 2024-03-27 NOTE — PATIENT PROFILE ADULT - FALL HARM RISK - HARM RISK INTERVENTIONS

## 2024-03-27 NOTE — PROGRESS NOTE ADULT - SUBJECTIVE AND OBJECTIVE BOX
Patient is a 78y old  Male who presents with a chief complaint of Acute hemorrhagic cystitis (26 Mar 2024 22:15)       INTERVAL HPI/OVERNIGHT EVENTS: Patient seen and examined at bedside. Denies any new symptoms/complaints. Denies chest pain, palpitations, sob     MEDICATIONS  (STANDING):  cefTRIAXone   IVPB 1000 milliGRAM(s) IV Intermittent every 24 hours  dextrose 5% + sodium chloride 0.9%. 1000 milliLiter(s) (75 mL/Hr) IV Continuous <Continuous>  furosemide    Tablet 40 milliGRAM(s) Oral daily  hydrocortisone sodium succinate Injectable 100 milliGRAM(s) IV Push once  influenza  Vaccine (HIGH DOSE) 0.7 milliLiter(s) IntraMuscular once  metoprolol succinate ER 25 milliGRAM(s) Oral daily  pantoprazole    Tablet 40 milliGRAM(s) Oral before breakfast  potassium chloride  10 mEq/100 mL IVPB 10 milliEquivalent(s) IV Intermittent every 1 hour  tamsulosin 0.4 milliGRAM(s) Oral at bedtime    MEDICATIONS  (PRN):      Allergies    levofloxacin (Unknown)  gatifloxacin (Unknown)  ofloxacin (Unknown)    Intolerances    fluoroquinolone antibiotics (Other)  Ketek (Other)  telithromycin (Other)  Avelox (Other)      REVIEW OF SYSTEMS:  CONSTITUTIONAL: No fever or fatigue  EYES: No eye pain, visual disturbances, or discharge  ENMT:  No difficulty hearing, tinnitus, vertigo; No sinus or throat pain  NECK: No pain or stiffness  RESPIRATORY: No cough, wheezing, chills or hemoptysis; No shortness of breath  CARDIOVASCULAR: No chest pain, palpitations, dizziness, or leg swelling  GASTROINTESTINAL: No abdominal or epigastric pain. No nausea, vomiting, or hematemesis     Vital Signs Last 24 Hrs  T(C): 36.4 (27 Mar 2024 05:39), Max: 38 (26 Mar 2024 13:47)  T(F): 97.6 (27 Mar 2024 05:39), Max: 100.4 (26 Mar 2024 13:47)  HR: 62 (27 Mar 2024 05:39) (62 - 78)  BP: 133/78 (27 Mar 2024 05:39) (114/71 - 150/90)  BP(mean): --  RR: 18 (27 Mar 2024 05:39) (16 - 18)  SpO2: 95% (27 Mar 2024 05:39) (94% - 98%)    Parameters below as of 27 Mar 2024 05:39  Patient On (Oxygen Delivery Method): room air        PHYSICAL EXAM:  GENERAL: NAD, awake, alert   HEAD:  Atraumatic, Normocephalic  EYES: EOMI, PERRLA, conjunctiva and sclera clear  ENMT: No tonsillar erythema, exudates, or enlargement; Moist mucous membranes  NECK: Supple, No JVD, Normal thyroid  NERVOUS SYSTEM:  Alert & Oriented X3, Good concentration; Motor Strength 5/5 B/L upper and lower extremities  CHEST/LUNG: Clear to auscultation bilaterally; No rales, rhonchi, wheezing, or rubs  HEART: S1S2+,  Regular rate and rhythm  ABDOMEN: Soft, Nontender, Nondistended; Bowel sounds present  EXTREMITIES:  2+ Peripheral Pulses, No clubbing, cyanosis  LYMPH: No lymphadenopathy noted  SKIN: No rashes or lesions    LABS:                        13.7   11.46 )-----------( 264      ( 27 Mar 2024 05:20 )             41.3     27 Mar 2024 05:20    139    |  101    |  16     ----------------------------<  75     3.4     |  26     |  0.75     Ca    8.5        27 Mar 2024 05:20    TPro  6.2    /  Alb  2.3    /  TBili  0.8    /  DBili  x      /  AST  22     /  ALT  24     /  AlkPhos  72     27 Mar 2024 05:20    PT/INR - ( 26 Mar 2024 13:05 )   PT: 12.2 sec;   INR: 1.04 ratio         PTT - ( 26 Mar 2024 13:05 )  PTT:31.2 sec  CAPILLARY BLOOD GLUCOSE        BLOOD CULTURE    RADIOLOGY & ADDITIONAL TESTS:    Imaging Personally Reviewed:  [ ] YES     Consultant(s) Notes Reviewed:      Care Discussed with Consultants/Other Providers:

## 2024-03-27 NOTE — CASE MANAGEMENT PROGRESS NOTE - NSCMPROGRESSNOTE_GEN_ALL_CORE
CM consult for managing medications at home noted and reviewed. CM to remain available and monitor for home care needs

## 2024-03-27 NOTE — CARE COORDINATION ASSESSMENT. - NSCAREPROVIDERS_GEN_ALL_CORE_FT
CARE PROVIDERS:  Accepting Physician: Shai Celis  Administration: Gideon Hayward  Admitting: Shai Celis  Attending: Carlos Brenner  Case Management: Rico Barahona  Consultant: April Hutchinson  Consultant: Yoni Castellon  Consultant: Patricia Gordon  Consultant: Mariya Paulino  Consultant: Rob Spears  Consultant: Perlita Dumont  Consultant: Mikayla Pink  Consultant: Jordin Mckinley  Consultant: Khan, Fahrina  Consultant: Polly Anthony  ED ACP: Bowen Feliciano  ED Attending: Spenser Mercer  ED Nurse: Nicolette Carrington  Nurse: Salima Andujar  Nurse: Tila Rodriguez  Nurse: Andrew Patel  Nurse: Leora Horton  Oncology: Hon Bassem  Ordered: ADM, User  Outpatient Provider: Vinh Woods  Outpatient Provider: Zay Cohen  Outpatient Provider: Yoni Castellon  Outpatient Provider: Colt Wade  Override: Leora Horton  Override: Tila Rodriguez  Override: Andrew Patel  PCA/Nursing Assistant: Cindi Ramirez  PCA/Nursing Assistant: Pascual Segovia  Physical Therapy: Ember Patricia  Physical Therapy: Clint Mendiola  Primary Team: Ron Morales  Primary Team: Shai Celis  Primary Team: Carlos Brenner  Primary Team: Gilmar Marie  Registered Dietitian: Latesha Jarquin  Respiratory Therapy: Kelli Romero  : Carolina aHyward  Team: PLV  Hospitalists, Team  UR// Supp. Assoc.: Lulú Seals

## 2024-03-27 NOTE — CARE COORDINATION ASSESSMENT. - OTHER PERTINENT DISCHARGE PLANNING INFORMATION:
Met patient at bedside.  Explained role of CM, verbalized understanding. Pt was made aware a CM will remain available through hospitalization.  Contact information given in discharge/ transitions resource folder. CM met w pt. Pt provided verbal consent to speak with son / caregiver Olvin Gaxiola - [phone]. Per pt  lives with son and is wheelchair bound. Per pt his son assists him with ADL and medication management prior to admission. Pt receives NP visits at his home. Per pt he will require ambulance or ambulette services to return home.  Pt has 1 stair to enter home, 0 steps inside stated everything is on same level, pt states he gets home care services at home but is unsure of what company. Pt son stated he would call CM back. with information. Pt has walker and wheelchair. CM verified: PCP: Dr. Wade Pharmacy: [facility]. : stated no.

## 2024-03-27 NOTE — PATIENT CHOICE NOTE. - NSPTCHOICENOTES_GEN_ALL_CORE
D/C resource folder provided at bedside this includes lists for both rehab facilities and home care agencies. Per pt he is receiving visiting nurse / PT services, but he is unsure of which company. Pt son stated he will call CM and inform CM of company.

## 2024-03-27 NOTE — PROGRESS NOTE ADULT - ASSESSMENT
ASSESSMENT & PLAN  79 yo male with 78-year-old male, history of CHF (EF 55–60-60% as of 6/21), PE (on apixaban), hypertension, HLD, MG, chronic lower extremity edema, urosepsis, pneumonia, cholecystitis, brought by son for evaluation of generalized weakness for 4 days and hematuria is found to have UTI. CT demonstrates 7 mm right distal ureteral calculus with mild right hydroureteronephrosis and dilated extrarenal pelvis. VSSAF today. 3/27 labs show improving leukocytosis.    - Regular diet, f/u tolerance  - D/c external catheter  - If pain well controlled with oral analgesics, recommend medical expulsion therapy at this time with continuation of flomax for 2 weeks, to be d/c with strainer  - Pt discussed medical likely luna of passage of stone as well as risk, pt understands  - Case discussed with Dr. Paulino ASSESSMENT & PLAN  77 yo male with 78-year-old male, history of CHF (EF 55–60-60% as of 6/21), PE (on apixaban), hypertension, HLD, MG, chronic lower extremity edema, urosepsis, pneumonia, cholecystitis, brought by son for evaluation of generalized weakness for 4 days and hematuria is found to have UTI. CT demonstrates 7 mm right distal ureteral calculus with mild right hydroureteronephrosis and dilated extrarenal pelvis. VSSAF today. 3/27 labs show improving leukocytosis.    - Regular diet, f/u tolerance  - PVRs  - If pain well controlled with oral analgesics, recommend medical expulsion therapy at this time with continuation of flomax for 2 weeks  - Pt discussed medical likelyhood of passage of stone as well as risk, pt understands  - Case discussed with Dr. Paulino

## 2024-03-27 NOTE — SOCIAL WORK PROGRESS NOTE - NSSWPROGRESSNOTE_GEN_ALL_CORE
Spoke with patients son. Discussed discharge planning. He sates his father is a full lift to chair and will not want to go to rehab. Awaiting PT evaluation.

## 2024-03-27 NOTE — CONSULT NOTE ADULT - REASON FOR ADMISSION
Acute hemorrhagic cystitis

## 2024-03-27 NOTE — CARE COORDINATION ASSESSMENT. - NS SW [facility] DISCIPLINE
pt unsure of company so will call with informaton/occupational therapy/physical therapy/skilled nursing

## 2024-03-27 NOTE — PATIENT CHOICE NOTE. - NSPTCHOICESTATE_GEN_ALL_CORE

## 2024-03-27 NOTE — CONSULT NOTE ADULT - SUBJECTIVE AND OBJECTIVE BOX
[facility]  INFECTIOUS DISEASES   [address] [place] [address]  Tel: [phone]     Fax: [phone]  ========================================================  MD Lalita Oquendo Kaushal, MD Cho, Michelle, MD Sunjit, Jaspal, MD  ========================================================    MRN-[id number]  DEION HEATH     CC: Patient is a 78y old  Male who presents with a chief complaint of Acute hemorrhagic cystitis (27 Mar 2024 10:34)    HPI:  78-year-old male, history of CHF (EF 55–60-60% as of ), PE (on apixaban), hypertension, HLD, MG, chronic lower extremity edema, urosepsis, pneumonia, cholecystitis, brought by son for evaluation of generalized weakness for 4 days and possible hematuria.  Patient states that his son brought him into the hospital because he was appearing more tired and weak than usual. As per son, patient  does not walk and usually spends mostly in bed. Discussed with son, patient was having intermittent hematuria since past 2-3 months and was treated with PO antibiotics. However since the past 3-4 days patient was complaining of feeling cold and was appearing weaker than usual. Patient also endorses decreased appetite since the past few days. Son noticed discolored sheets today when changing patient and thought it may be bloody urine. Otherwise patient denies any chest pain, shortness of breath, abdominal pain, suprapubic tenderness, n/v/d/c.     ED Course:   Vitals: T 100.4F -> 97.8, HR 68, /71, RR 16, SPO2 98% RA  Labs: WBC 12.39, lactate 2.1 -> 1.3, UA: mod blood, protein, ketone, large LE, nitrite +, WBC, bacteria, squamous epithelial cells   EKG: Sinus rhythm with occasional premature ventricular complexes, Left anterior fascicular block, 68 bpm   Imaging: CXR- Small calcified granuloma in the right midlung and small calcified lymph nodes around the right hilum again noted. Mild chronic symmetric apical thickening again noted. No acute finding or change from CAT scan of November 3, 2023. Chronic lung findings again noted.  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  Club foot  Surgery at birth for Club Foot Right foot  Pilonidal cyst  Surgery 40 years ago  H/O colonoscopy  Spinal stenosis  H/O prostate biopsy    Social Hx: No current smoking, EtOH or drugs     FAMILY HISTORY:  Family history of stroke (Father)  Father -  age 62    Family history of kidney disease (Mother)  Mother -  age 67    Family history of diabetes mellitus type II (Sibling)  Brother & Sister    Noncontributory     Allergies  levofloxacin (Unknown)  gatifloxacin (Unknown)  ofloxacin (Unknown)  fluoroquinolone antibiotics (Other)  Ketek (Other)  telithromycin (Other)  Avelox (Other)    MEDICATIONS  (STANDING):  cefTRIAXone   IVPB 1000 milliGRAM(s) IV Intermittent every 24 hours  dextrose 5% + sodium chloride 0.9%. 1000 milliLiter(s) (75 mL/Hr) IV Continuous <Continuous>  furosemide    Tablet 40 milliGRAM(s) Oral daily  influenza  Vaccine (HIGH DOSE) 0.7 milliLiter(s) IntraMuscular once  metoprolol succinate ER 25 milliGRAM(s) Oral daily  pantoprazole    Tablet 40 milliGRAM(s) Oral before breakfast  potassium chloride  10 mEq/100 mL IVPB 10 milliEquivalent(s) IV Intermittent every 1 hour  predniSONE   Tablet 10 milliGRAM(s) Oral daily  tamsulosin 0.4 milliGRAM(s) Oral at bedtime     REVIEW OF SYSTEMS:  CONSTITUTIONAL:  No Fever or chills  HEENT:  No diplopia or blurred vision.  No sore throat or runny nose.  CARDIOVASCULAR:  No chest pain   RESPIRATORY:  No cough, shortness of breath, PND or orthopnea.  GASTROINTESTINAL:  No nausea, vomiting or diarrhea.  GENITOURINARY:  No dysuria, frequency or urgency. No Blood in urine  MUSCULOSKELETAL:  no joint aches, no muscle pain  SKIN:  No change in skin, hair or nails.    Physical Exam:  Vital Signs Last 24 Hrs  T(C): 36.4 (27 Mar 2024 05:39), Max: 38 (26 Mar 2024 13:47)  T(F): 97.6 (27 Mar 2024 05:39), Max: 100.4 (26 Mar 2024 13:47)  HR: 62 (27 Mar 2024 05:39) (62 - 78)  BP: 133/78 (27 Mar 2024 05:39) (114/71 - 150/90)  BP(mean): --  RR: 18 (27 Mar 2024 05:39) (16 - 18)  SpO2: 95% (27 Mar 2024 05:39) (94% - 98%)  Parameters below as of 27 Mar 2024 05:39  Patient On (Oxygen Delivery Method): room air  Height (cm): 167.6 ( @ 12:19)  Weight (kg): 113.4 ( @ 12:19)  BMI (kg/m2): 40.4 ( @ 12:19)  BSA (m2): 2.2 ( @ 12:19)  GEN: NAD  HEENT: normocephalic and atraumatic. EOMI. PERRL.    NECK: Supple.  No lymphadenopathy   LUNGS: Clear to auscultation.  HEART: Regular rate and rhythm   ABDOMEN: Soft, nontender, and nondistended.  Positive bowel sounds.    : No CVA tenderness  EXTREMITIES: + edema. No wound, tender in touch whole lower leg and feet  NEUROLOGIC: grossly intact.  PSYCHIATRIC: Appropriate affect .  SKIN: No rash     Labs:      139  |  101  |  16  ----------------------------<  75  3.4<L>   |  26  |  0.75    Ca    8.5      27 Mar 2024 05:20    TPro  6.2  /  Alb  2.3<L>  /  TBili  0.8  /  DBili  x   /  AST  22  /  ALT  24  /  AlkPhos  72                          13.7   11.46 )-----------( 264      ( 27 Mar 2024 05:20 )             41.3     PT/INR - ( 26 Mar 2024 13:05 )   PT: 12.2 sec;   INR: 1.04 ratio    PTT - ( 26 Mar 2024 13:05 )  PTT:31.2 sec  Urinalysis Basic - ( 27 Mar 2024 05:20 )    Color: x / Appearance: x / SG: x / pH: x  Gluc: 75 mg/dL / Ketone: x  / Bili: x / Urobili: x   Blood: x / Protein: x / Nitrite: x   Leuk Esterase: x / RBC: x / WBC x   Sq Epi: x / Non Sq Epi: x / Bacteria: x    LIVER FUNCTIONS - ( 27 Mar 2024 05:20 )  Alb: 2.3 g/dL / Pro: 6.2 g/dL / ALK PHOS: 72 U/L / ALT: 24 U/L / AST: 22 U/L / GGT: x           SARS-CoV-2 Result: NotDetec (24 @ 13:05)    All imaging and other data have been reviewed.  < from: CT Abdomen and Pelvis No Cont (24 @ 20:49) >  IMPRESSION:  7 mm right distal ureteral calculus with mild right hydroureteronephrosis   and dilated extrarenal pelvis. Bladder calculi measuring up to 8 mm.  Enlarged prostate indenting the bladder base.  Old granulomatous disease.    Assessment and Plan:   77yo man with PMH of HTN, CHF, PE, MG and chronic LE edema and pain was admitted with generalized weakness for 4 days and hematuria. As per chart patient was having intermittent hematuria since past 2-3 months and was treated with PO antibiotics. However since the past 3-4 days patient was complaining of feeling cold and was appearing weaker than usual. Patient also endorses decreased appetite since the past few days. Son noticed discolored sheets today when changing patient and thought it may be bloody urine. Otherwise patient denies any chest pain, shortness of breath, abdominal pain, suprapubic tenderness, n/v/d/c.   UA showed WBC TNTC and RBC=19   WBC on admission 12k    CT abdomen with 7mm R distal ureteral calculus wit mild hydroureteronephrosis and 8mm bladder calculus and enlarged prostate    # UTI  # Nephrolithiasis     - Will follow cultures   - Urology consult noted, no intervention at this time   - Ceftriaxone 1gm daily   - Based on culture results will modify treatment   - Monitor WBC and Tmax   - Rest of the care as per primary team    Thank you for courtesy of this consult.     Will follow.  Discussed with the primary team.     Jordin Mckinley MD  Division of Infectious Diseases   Please call ID service at [phone] with any question.    75 minutes spent on total encounter assessing patient, examination, chart review, counseling and coordinating care by the attending physician/nurse/care manager.

## 2024-03-27 NOTE — CONSULT NOTE ADULT - ASSESSMENT
Assessment: 77 yo M w/ pmh HFpEF, PE on Eliquis, HTN, dyslipidemia, myasthenia gravis, diet controlled T2DM     Plan:   - No clear evidence of acute ischemia, trops negative at 9.6.  - No acute changes on EKG compared to previous. NSR @ 68bpm w/ occasional PVC's and LAFB.  - No meaningful evidence of volume overload at this time.  - Previous TTE 06/2021 shows LVEF 55-60%.  - Would recommend repeat TTE.  - Would recommend starting patient back on  FD Lovenox/Heparin if there are no further contraindications to AC or plan for procedure by Urology as hematuria has cleared (in relation to infection) in order to bridge patient back to Eliquis for PE tx as well as high risk for thrombus as patient is bed bound.  - Strict I/Os, daily weights.  - BP well controlled, monitor routine hemodynamics.  - Monitor and replete lytes, keep K>4, Mg>2.  - Other cardiovascular workup will depend on clinical course.  - All other workup per primary team.  - Will continue to follow.              79 yo M w/ pmh HFpEF, PE on Eliquis, HTN, dyslipidemia, myasthenia gravis, diet controlled T2DM p/w UTI and hematuria      - No clear evidence of acute ischemia, trops negative at 9.6.  - No acute changes on EKG compared to previous. NSR @ 68bpm w/ occasional PVC's and LAFB.  - No meaningful evidence of volume overload at this time.  - Previous TTE 06/2021 shows LVEF 55-60%.  - Would recommend repeat TTE as routine    - hematuria likely from UTI  - Fu  recs   - Would recommend starting patient back on  FD Lovenox/Heparin if there are no further contraindications to AC or plan for procedure by Urology as hematuria has cleared (in relation to infection) in order to bridge patient back to Eliquis for PE tx as well as high risk for thrombus as patient is bed bound.  - Strict I/Os, daily weights.  - BP well controlled, monitor routine hemodynamics.  - Monitor and replete lytes, keep K>4, Mg>2.  - Other cardiovascular workup will depend on clinical course.  - All other workup per primary team.  - Will continue to follow.

## 2024-03-27 NOTE — PATIENT PROFILE ADULT - NSTRANSFERBELONGINGSRESP_GEN_A_NUR
7/7/2019 1421    Patient's mom called in with complaints that the patient has pinkeye, low-grade temperature and just not feeling well.  He also had nasal congestion.  I did discuss that cannot really treat things over the phone and recommended he go to urgent treatment.  Mom acknowledged she would tell her son that.    EWA Nogueira   yes

## 2024-03-27 NOTE — PROGRESS NOTE ADULT - SUBJECTIVE AND OBJECTIVE BOX
SUBJECTIVE:  Patient seen and examined at bedside.  No overnight events.  Patient reports no new complaints at this time. Patient denies any fever, chills, chest pain, shortness of breath, nausea, vomiting, or other complaints.      VITALS  Vital Signs Last 24 Hrs  T(C): 36.4 (27 Mar 2024 05:39), Max: 38 (26 Mar 2024 13:47)  T(F): 97.6 (27 Mar 2024 05:39), Max: 100.4 (26 Mar 2024 13:47)  HR: 62 (27 Mar 2024 05:39) (62 - 78)  BP: 133/78 (27 Mar 2024 05:39) (114/71 - 150/90)  RR: 18 (27 Mar 2024 05:39) (16 - 18)  SpO2: 95% (27 Mar 2024 05:39) (94% - 98%)    Parameters below as of 27 Mar 2024 05:39  Patient On (Oxygen Delivery Method): room air        PHYSICAL EXAM  GENERAL: Male lying comfortably in bed in NAD.  RESPIRATORY:  Nonlabored breathing, no accessory muscle use.  ABDOMEN:  Soft, nondistended, nontender.  : external catheter in place  NEURO:  A&O x 3      INTAKE & OUTPUT  I&O's Summary    I&O's Detail      MEDICATIONS  MEDICATIONS  (STANDING):  cefTRIAXone   IVPB 1000 milliGRAM(s) IV Intermittent every 24 hours  furosemide    Tablet 40 milliGRAM(s) Oral daily  influenza  Vaccine (HIGH DOSE) 0.7 milliLiter(s) IntraMuscular once  metoprolol succinate ER 25 milliGRAM(s) Oral daily  pantoprazole    Tablet 40 milliGRAM(s) Oral before breakfast  potassium chloride  10 mEq/100 mL IVPB 10 milliEquivalent(s) IV Intermittent every 1 hour  predniSONE   Tablet 10 milliGRAM(s) Oral daily  tamsulosin 0.4 milliGRAM(s) Oral at bedtime    MEDICATIONS  (PRN):      LABS:                        13.7   11.46 )-----------( 264      ( 27 Mar 2024 05:20 )             41.3     03-27    139  |  101  |  16  ----------------------------<  75  3.4<L>   |  26  |  0.75    Ca    8.5      27 Mar 2024 05:20    TPro  6.2  /  Alb  2.3<L>  /  TBili  0.8  /  DBili  x   /  AST  22  /  ALT  24  /  AlkPhos  72  03-27    PT/INR - ( 26 Mar 2024 13:05 )   PT: 12.2 sec;   INR: 1.04 ratio         PTT - ( 26 Mar 2024 13:05 )  PTT:31.2 sec

## 2024-03-27 NOTE — PATIENT PROFILE ADULT - FUNCTIONAL ASSESSMENT - BASIC MOBILITY 6.
2-calculated by average/Not able to assess (calculate score using [facility] averaging method)

## 2024-03-27 NOTE — CONSULT NOTE ADULT - SUBJECTIVE AND OBJECTIVE BOX
ams weakness decondition age related  changes with infection  antibiotics as needed  MG home medications   spoke to son and outside neurologist

## 2024-03-27 NOTE — PROGRESS NOTE ADULT - ASSESSMENT
78-year-old male, history of CHF (EF 55–60-60% as of 6/21), PE (on apixaban), hypertension, HLD, MG, chronic lower extremity edema, urosepsis, pneumonia, cholecystitis, brought by son for evaluation of generalized weakness for 4 days and hematuria  admitted for UTI, CT demonstrates 7 mm right distal ureteral calculus with mild right hydroureteronephrosis and dilated extrarenal pelvis. Bladder calculi measuring up to 8 mm.

## 2024-03-27 NOTE — CONSULT NOTE ADULT - SUBJECTIVE AND OBJECTIVE BOX
[facility]         Peggy Islas Patel, Jovana, Tomas      [phone] (office)    Reason for Consult: HFpEF    Interval HPI: Patient seen and examined at bedside. No acute events overnight. Pt states that he feels back to his normal self again. States that while his legs are weak and he is bed bound, he has noticed they are note as swollen as normal. Denies CP, SOB, palpitations, dizziness, lightheadedness.    HPI:  78-year-old male, history of CHF (EF 55–60-60% as of ), PE (on apixaban), hypertension, HLD, MG, chronic lower extremity edema, urosepsis, pneumonia, cholecystitis, brought by son for evaluation of generalized weakness for 4 days and possible hematuria.  Patient states that his son brought him into the hospital because he was appearing more tired and weak than usual. As per son, patient  does not walk and usually spends mostly in bed. Discussed with son, patient was having intermittent hematuria since past 2-3 months and was treated with PO antibiotics. However since the past 3-4 days patient was complaining of feeling cold and was appearing weaker than usual. Patient also endorses decreased appetite since the past few days. Son noticed discolored sheets today when changing patient and thought it may be bloody urine. Otherwise patient denies any chest pain, shortness of breath, abdominal pain, suprapubic tenderness, n/v/d/c.     ED Course:   Vitals: T 100.4F -> 97.8, HR 68, /71, RR 16, SPO2 98% RA  Labs: WBC 12.39, lactate 2.1 -> 1.3, UA: mod blood, protein, ketone, large LE, nitrite +, WBC, bacteria, squamous epithelial cells   EKG: Sinus rhythm with occasional premature ventricular complexes, Left anterior fascicular block, 68 bpm   Imaging: CXR- Small calcified granuloma in the right midlung and small calcified lymph nodes around the right hilum again noted. Mild chronic symmetric apical thickening again noted. No acute finding or change from CAT scan of November 3, 2023. Chronic lung findings again noted.  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      Club foot  Surgery at birth for Club Foot Right foot      Pilonidal cyst  Surgery 40 years ago      H/O colonoscopy      Spinal stenosis      H/O prostate biopsy          SOCIAL HISTORY: No active tobacco, alcohol or illicit drug use    FAMILY HISTORY:  Family history of stroke (Father)  Father -  age 62    Family history of kidney disease (Mother)  Mother -  age 67    Family history of diabetes mellitus type II (Sibling)  Brother & Sister        Home Medications:  Eliquis 5 mg oral tablet: 1 tab(s) orally 2 times a day (26 Mar 2024 19:43)  omeprazole 40 mg oral delayed release capsule: 1 cap(s) orally once a day (26 Mar 2024 19:43)  Potassium Chloride (Eqv-Klor-Con M20) 20 mEq oral tablet, extended release: 1 tab(s) orally once a day Resume taking this tomorrow, 23 (26 Mar 2024 19:43)  Praluent Pen 75 mg/mL subcutaneous solution: 75 milligram(s) subcutaneous every 2 weeks (26 Mar 2024 19:43)  predniSONE 10 mg oral tablet: 1 tab(s) orally once a day (26 Mar 2024 19:42)      MEDICATIONS  (STANDING):  cefTRIAXone   IVPB 1000 milliGRAM(s) IV Intermittent every 24 hours  dextrose 5% + sodium chloride 0.9%. 1000 milliLiter(s) (75 mL/Hr) IV Continuous <Continuous>  furosemide    Tablet 40 milliGRAM(s) Oral daily  influenza  Vaccine (HIGH DOSE) 0.7 milliLiter(s) IntraMuscular once  metoprolol succinate ER 25 milliGRAM(s) Oral daily  pantoprazole    Tablet 40 milliGRAM(s) Oral before breakfast  potassium chloride  10 mEq/100 mL IVPB 10 milliEquivalent(s) IV Intermittent every 1 hour  predniSONE   Tablet 10 milliGRAM(s) Oral daily  tamsulosin 0.4 milliGRAM(s) Oral at bedtime    MEDICATIONS  (PRN):      Allergies    levofloxacin (Unknown)  gatifloxacin (Unknown)  ofloxacin (Unknown)    Intolerances    fluoroquinolone antibiotics (Other)  Ketek (Other)  telithromycin (Other)  Avelox (Other)      REVIEW OF SYSTEMS: Negative except as per HPI.    VITAL SIGNS:   Vital Signs Last 24 Hrs  T(C): 36.4 (27 Mar 2024 05:39), Max: 38 (26 Mar 2024 13:47)  T(F): 97.6 (27 Mar 2024 05:39), Max: 100.4 (26 Mar 2024 13:47)  HR: 62 (27 Mar 2024 05:39) (62 - 78)  BP: 133/78 (27 Mar 2024 05:39) (114/71 - 150/90)  BP(mean): --  RR: 18 (27 Mar 2024 05:39) (16 - 18)  SpO2: 95% (27 Mar 2024 05:39) (94% - 98%)    Parameters below as of 27 Mar 2024 05:39  Patient On (Oxygen Delivery Method): room air        I&O's Summary      PHYSICAL EXAM:  Constitutional: NAD, well-developed  HEENT NC/AT, moist mucous membranes  Pulmonary: Non-labored, breath sounds are clear bilaterally, no wheezing, rales or rhonchi  Cardiovascular: +S1, S2, RRR, no murmur  Gastrointestinal: Soft, nontender, nondistended, normoactive bowel sounds  Extremities: No peripheral edema   Neurological: Alert, strength and sensitivity are grossly intact  Skin: No obvious lesions/rashes  Psych: Mood & affect appropriate    LABS: All Labs Reviewed:                        13.7   11.46 )-----------( 264      ( 27 Mar 2024 05:20 )             41.3                         14.5   12.39 )-----------( 270      ( 26 Mar 2024 13:05 )             45.1     27 Mar 2024 05:20    139    |  101    |  16     ----------------------------<  75     3.4     |  26     |  0.75   26 Mar 2024 13:05    139    |  104    |  22     ----------------------------<  70     3.9     |  19     |  0.87     Ca    8.5        27 Mar 2024 05:20  Ca    8.9        26 Mar 2024 13:05    TPro  6.2    /  Alb  2.3    /  TBili  0.8    /  DBili  x      /  AST  22     /  ALT  24     /  AlkPhos  72     27 Mar 2024 05:20  TPro  6.6    /  Alb  2.4    /  TBili  1.2    /  DBili  x      /  AST  32     /  ALT  29     /  AlkPhos  82     26 Mar 2024 13:05    PT/INR - ( 26 Mar 2024 13:05 )   PT: 12.2 sec;   INR: 1.04 ratio         PTT - ( 26 Mar 2024 13:05 )  PTT:31.2 sec      Blood Culture:         EKG:    RADIOLOGY:    CXR:   [facility]         Peggy Islas Patel, Jovana, Tomas      [phone] (office)    Reason for Consult: HFpEF    Interval HPI: Patient seen and examined at bedside. No acute events overnight. Pt states that he feels back to his normal self again. States that while his legs are weak and he is bed bound, he has noticed they are note as swollen as normal. Denies CP, SOB, palpitations, dizziness, lightheadedness.    HPI:  78-year-old male, history of CHF (EF 55–60-60% as of ), PE (on apixaban), hypertension, HLD, MG, chronic lower extremity edema, urosepsis, pneumonia, cholecystitis, brought by son for evaluation of generalized weakness for 4 days and possible hematuria.  Patient states that his son brought him into the hospital because he was appearing more tired and weak than usual. As per son, patient  does not walk and usually spends mostly in bed. Discussed with son, patient was having intermittent hematuria since past 2-3 months and was treated with PO antibiotics. However since the past 3-4 days patient was complaining of feeling cold and was appearing weaker than usual. Patient also endorses decreased appetite since the past few days. Son noticed discolored sheets today when changing patient and thought it may be bloody urine. Otherwise patient denies any chest pain, shortness of breath, abdominal pain, suprapubic tenderness, n/v/d/c.     ED Course:   Vitals: T 100.4F -> 97.8, HR 68, /71, RR 16, SPO2 98% RA  Labs: WBC 12.39, lactate 2.1 -> 1.3, UA: mod blood, protein, ketone, large LE, nitrite +, WBC, bacteria, squamous epithelial cells   EKG: Sinus rhythm with occasional premature ventricular complexes, Left anterior fascicular block, 68 bpm   Imaging: CXR- Small calcified granuloma in the right midlung and small calcified lymph nodes around the right hilum again noted. Mild chronic symmetric apical thickening again noted. No acute finding or change from CAT scan of November 3, 2023. Chronic lung findings again noted.  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      Club foot  Surgery at birth for Club Foot Right foot      Pilonidal cyst  Surgery 40 years ago      H/O colonoscopy      Spinal stenosis      H/O prostate biopsy          SOCIAL HISTORY: No active tobacco, alcohol or illicit drug use    FAMILY HISTORY:  Family history of stroke (Father)  Father -  age 62    Family history of kidney disease (Mother)  Mother -  age 67    Family history of diabetes mellitus type II (Sibling)  Brother & Sister        Home Medications:  Eliquis 5 mg oral tablet: 1 tab(s) orally 2 times a day (26 Mar 2024 19:43)  omeprazole 40 mg oral delayed release capsule: 1 cap(s) orally once a day (26 Mar 2024 19:43)  Potassium Chloride (Eqv-Klor-Con M20) 20 mEq oral tablet, extended release: 1 tab(s) orally once a day Resume taking this tomorrow, 23 (26 Mar 2024 19:43)  Praluent Pen 75 mg/mL subcutaneous solution: 75 milligram(s) subcutaneous every 2 weeks (26 Mar 2024 19:43)  predniSONE 10 mg oral tablet: 1 tab(s) orally once a day (26 Mar 2024 19:42)      MEDICATIONS  (STANDING):  cefTRIAXone   IVPB 1000 milliGRAM(s) IV Intermittent every 24 hours  dextrose 5% + sodium chloride 0.9%. 1000 milliLiter(s) (75 mL/Hr) IV Continuous <Continuous>  furosemide    Tablet 40 milliGRAM(s) Oral daily  influenza  Vaccine (HIGH DOSE) 0.7 milliLiter(s) IntraMuscular once  metoprolol succinate ER 25 milliGRAM(s) Oral daily  pantoprazole    Tablet 40 milliGRAM(s) Oral before breakfast  potassium chloride  10 mEq/100 mL IVPB 10 milliEquivalent(s) IV Intermittent every 1 hour  predniSONE   Tablet 10 milliGRAM(s) Oral daily  tamsulosin 0.4 milliGRAM(s) Oral at bedtime    MEDICATIONS  (PRN):      Allergies    levofloxacin (Unknown)  gatifloxacin (Unknown)  ofloxacin (Unknown)    Intolerances    fluoroquinolone antibiotics (Other)  Ketek (Other)  telithromycin (Other)  Avelox (Other)      REVIEW OF SYSTEMS: Negative except as per HPI.    VITAL SIGNS:   Vital Signs Last 24 Hrs  T(C): 36.4 (27 Mar 2024 05:39), Max: 38 (26 Mar 2024 13:47)  T(F): 97.6 (27 Mar 2024 05:39), Max: 100.4 (26 Mar 2024 13:47)  HR: 62 (27 Mar 2024 05:39) (62 - 78)  BP: 133/78 (27 Mar 2024 05:39) (114/71 - 150/90)  BP(mean): --  RR: 18 (27 Mar 2024 05:39) (16 - 18)  SpO2: 95% (27 Mar 2024 05:39) (94% - 98%)    Parameters below as of 27 Mar 2024 05:39  Patient On (Oxygen Delivery Method): room air        I&O's Summary      PHYSICAL EXAM:  Constitutional: NAD, well-developed  HEENT NC/AT, moist mucous membranes  Pulmonary: Non-labored, breath sounds are clear bilaterally, no wheezing, rales or rhonchi  Cardiovascular: +S1, S2, RRR, no murmur  Gastrointestinal: Soft, nontender, nondistended, normoactive bowel sounds, obese  Extremities: No peripheral edema to B/L extremities   Neurological: Alert, 3/5 strength in B/L lower extremities  Skin: No obvious lesions/rashes  Psych: Mood & affect appropriate    LABS: All Labs Reviewed:                        13.7   11.46 )-----------( 264      ( 27 Mar 2024 05:20 )             41.3                         14.5   12.39 )-----------( 270      ( 26 Mar 2024 13:05 )             45.1     27 Mar 2024 05:20    139    |  101    |  16     ----------------------------<  75     3.4     |  26     |  0.75   26 Mar 2024 13:05    139    |  104    |  22     ----------------------------<  70     3.9     |  19     |  0.87     Ca    8.5        27 Mar 2024 05:20  Ca    8.9        26 Mar 2024 13:05    TPro  6.2    /  Alb  2.3    /  TBili  0.8    /  DBili  x      /  AST  22     /  ALT  24     /  AlkPhos  72     27 Mar 2024 05:20  TPro  6.6    /  Alb  2.4    /  TBili  1.2    /  DBili  x      /  AST  32     /  ALT  29     /  AlkPhos  82     26 Mar 2024 13:05    PT/INR - ( 26 Mar 2024 13:05 )   PT: 12.2 sec;   INR: 1.04 ratio         PTT - ( 26 Mar 2024 13:05 )  PTT:31.2 sec       [facility]         Peggy Islas Patel, Jovana, Tomas      [phone] (office)    Reason for Consult: HFpEF    Interval HPI: Patient seen and examined at bedside. No acute events overnight. Pt states that he feels back to his normal self again. States that while his legs are weak and he is bed bound, he has noticed they are note as swollen as normal. Denies CP, SOB, palpitations, dizziness, lightheadedness.    HPI:  78-year-old male, history of CHF (EF 55–60-60% as of ), PE (on apixaban), hypertension, HLD, MG, chronic lower extremity edema, urosepsis, pneumonia, cholecystitis, brought by son for evaluation of generalized weakness for 4 days and possible hematuria.  Patient states that his son brought him into the hospital because he was appearing more tired and weak than usual. As per son, patient  does not walk and usually spends mostly in bed. Discussed with son, patient was having intermittent hematuria since past 2-3 months and was treated with PO antibiotics. However since the past 3-4 days patient was complaining of feeling cold and was appearing weaker than usual. Patient also endorses decreased appetite since the past few days. Son noticed discolored sheets today when changing patient and thought it may be bloody urine. Otherwise patient denies any chest pain, shortness of breath, abdominal pain, suprapubic tenderness, n/v/d/c.     ED Course:   Vitals: T 100.4F -> 97.8, HR 68, /71, RR 16, SPO2 98% RA  Labs: WBC 12.39, lactate 2.1 -> 1.3, UA: mod blood, protein, ketone, large LE, nitrite +, WBC, bacteria, squamous epithelial cells   EKG: Sinus rhythm with occasional premature ventricular complexes, Left anterior fascicular block, 68 bpm   Imaging: CXR- Small calcified granuloma in the right midlung and small calcified lymph nodes around the right hilum again noted. Mild chronic symmetric apical thickening again noted. No acute finding or change from CAT scan of November 3, 2023. Chronic lung findings again noted.  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      Club foot  Surgery at birth for Club Foot Right foot      Pilonidal cyst  Surgery 40 years ago      H/O colonoscopy      Spinal stenosis      H/O prostate biopsy          SOCIAL HISTORY: No active tobacco, alcohol or illicit drug use    FAMILY HISTORY:  Family history of stroke (Father)  Father -  age 62    Family history of kidney disease (Mother)  Mother -  age 67    Family history of diabetes mellitus type II (Sibling)  Brother & Sister        Home Medications:  Eliquis 5 mg oral tablet: 1 tab(s) orally 2 times a day (26 Mar 2024 19:43)  omeprazole 40 mg oral delayed release capsule: 1 cap(s) orally once a day (26 Mar 2024 19:43)  Potassium Chloride (Eqv-Klor-Con M20) 20 mEq oral tablet, extended release: 1 tab(s) orally once a day Resume taking this tomorrow, 23 (26 Mar 2024 19:43)  Praluent Pen 75 mg/mL subcutaneous solution: 75 milligram(s) subcutaneous every 2 weeks (26 Mar 2024 19:43)  predniSONE 10 mg oral tablet: 1 tab(s) orally once a day (26 Mar 2024 19:42)      MEDICATIONS  (STANDING):  cefTRIAXone   IVPB 1000 milliGRAM(s) IV Intermittent every 24 hours  dextrose 5% + sodium chloride 0.9%. 1000 milliLiter(s) (75 mL/Hr) IV Continuous <Continuous>  furosemide    Tablet 40 milliGRAM(s) Oral daily  influenza  Vaccine (HIGH DOSE) 0.7 milliLiter(s) IntraMuscular once  metoprolol succinate ER 25 milliGRAM(s) Oral daily  pantoprazole    Tablet 40 milliGRAM(s) Oral before breakfast  potassium chloride  10 mEq/100 mL IVPB 10 milliEquivalent(s) IV Intermittent every 1 hour  predniSONE   Tablet 10 milliGRAM(s) Oral daily  tamsulosin 0.4 milliGRAM(s) Oral at bedtime    MEDICATIONS  (PRN):      Allergies    levofloxacin (Unknown)  gatifloxacin (Unknown)  ofloxacin (Unknown)    Intolerances    fluoroquinolone antibiotics (Other)  Ketek (Other)  telithromycin (Other)  Avelox (Other)      REVIEW OF SYSTEMS: Negative except as per HPI.    VITAL SIGNS:   Vital Signs Last 24 Hrs  T(C): 36.4 (27 Mar 2024 05:39), Max: 38 (26 Mar 2024 13:47)  T(F): 97.6 (27 Mar 2024 05:39), Max: 100.4 (26 Mar 2024 13:47)  HR: 62 (27 Mar 2024 05:39) (62 - 78)  BP: 133/78 (27 Mar 2024 05:39) (114/71 - 150/90)  BP(mean): --  RR: 18 (27 Mar 2024 05:39) (16 - 18)  SpO2: 95% (27 Mar 2024 05:39) (94% - 98%)    Parameters below as of 27 Mar 2024 05:39  Patient On (Oxygen Delivery Method): room air        I&O's Summary      PHYSICAL EXAM:  Constitutional: NAD, well-developed  HEENT NC/AT, moist mucous membranes  Pulmonary: Non-labored, breath sounds are clear bilaterally, no wheezing, rales or rhonchi  Cardiovascular: +S1, S2, RRR, + SM   Gastrointestinal: Soft, nontender, nondistended, normoactive bowel sounds, obese  Extremities: No peripheral edema to B/L extremities   Neurological: Alert, 3/5 strength in B/L lower extremities  Skin: No obvious lesions/rashes  Psych: Mood & affect appropriate    LABS: All Labs Reviewed:                        13.7   11.46 )-----------( 264      ( 27 Mar 2024 05:20 )             41.3                         14.5   12.39 )-----------( 270      ( 26 Mar 2024 13:05 )             45.1     27 Mar 2024 05:20    139    |  101    |  16     ----------------------------<  75     3.4     |  26     |  0.75   26 Mar 2024 13:05    139    |  104    |  22     ----------------------------<  70     3.9     |  19     |  0.87     Ca    8.5        27 Mar 2024 05:20  Ca    8.9        26 Mar 2024 13:05    TPro  6.2    /  Alb  2.3    /  TBili  0.8    /  DBili  x      /  AST  22     /  ALT  24     /  AlkPhos  72     27 Mar 2024 05:20  TPro  6.6    /  Alb  2.4    /  TBili  1.2    /  DBili  x      /  AST  32     /  ALT  29     /  AlkPhos  82     26 Mar 2024 13:05    PT/INR - ( 26 Mar 2024 13:05 )   PT: 12.2 sec;   INR: 1.04 ratio         PTT - ( 26 Mar 2024 13:05 )  PTT:31.2 sec

## 2024-03-27 NOTE — CASE MANAGEMENT PROGRESS NOTE - NSCMPROGRESSNOTE_GEN_ALL_CORE
Discussed pt on rounds, pt remains acute, guerrero, on IV rocephin, febrile, NPO. pending PT eval. CM will continue to collaborate with interdisciplinary team and remain available to assist.

## 2024-03-27 NOTE — CARE COORDINATION ASSESSMENT. - NSPASTMEDSURGHISTORY_GEN_ALL_CORE_FT
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

## 2024-03-27 NOTE — CAREGIVER ENGAGEMENT NOTE - CAREGIVER OUTREACH NOTES - FREE TEXT
Met patient at bedside who gave consent to speak with caregiver / son Olvin Gaxiola - [phone].  Explained role of CM to caregiver, verbalized understanding. Pt caregiver was made aware a CM will remain available through hospitalization.  Contact information given in discharge/ transitions resource folder.

## 2024-03-27 NOTE — CARE COORDINATION ASSESSMENT. - PRO ARRIVE FROM
CM met w pt. Pt provided verbal consent to speak with son / caregiver Olvin Gaxiola - [phone]. Per pt  lives with son and is wheelchair bound. Per pt his son assists him with ADL and medication management prior to admission. Pt receives NP visits at his home. Per pt he will require ambulance or ambulette services to return home.  Pt has 1 stair to enter home, 0 steps inside stated everything is on same level, pt states he gets home care services at home but is unsure of what company. Pt son stated he would call CM back. with information. Pt has walker and wheelchair. CM verified: PCP: Dr. Wade Pharmacy: [facility]. [place]: stated no./home

## 2024-03-27 NOTE — CONSULT NOTE ADULT - ATTENDING COMMENTS
77 yo M w/ pmh HFpEF, PE on Eliquis, HTN, dyslipidemia, myasthenia gravis, diet controlled T2DM p/w UTI and hematuria    No signs of significant ischemia or volume overload.   EKG nonischemic  hx of PE and is very sedentary/immobile.   hematuria currently cleared. If no  procedures planned consider resuming FD AC possible can start with Lovenox FD and if tolerates trasition back to DOAC.   Monitor and replete electrolytes. Keep K>4.0 and Mg>2.0.  Further cardiac workup will depend on clinical course.

## 2024-03-28 LAB
ANION GAP SERPL CALC-SCNC: 8 MMOL/L — SIGNIFICANT CHANGE UP (ref 5–17)
BUN SERPL-MCNC: 18 MG/DL — SIGNIFICANT CHANGE UP (ref 7–23)
CALCIUM SERPL-MCNC: 9.2 MG/DL — SIGNIFICANT CHANGE UP (ref 8.5–10.1)
CHLORIDE SERPL-SCNC: 104 MMOL/L — SIGNIFICANT CHANGE UP (ref 96–108)
CO2 SERPL-SCNC: 30 MMOL/L — SIGNIFICANT CHANGE UP (ref 22–31)
CREAT SERPL-MCNC: 0.9 MG/DL — SIGNIFICANT CHANGE UP (ref 0.5–1.3)
EGFR: 86 ML/MIN/1.73M2 — SIGNIFICANT CHANGE UP
GLUCOSE SERPL-MCNC: 119 MG/DL — HIGH (ref 70–99)
HCT VFR BLD CALC: 43.2 % — SIGNIFICANT CHANGE UP (ref 39–50)
HGB BLD-MCNC: 14.3 G/DL — SIGNIFICANT CHANGE UP (ref 13–17)
MAGNESIUM SERPL-MCNC: 1.8 MG/DL — SIGNIFICANT CHANGE UP (ref 1.6–2.6)
MCHC RBC-ENTMCNC: 31 PG — SIGNIFICANT CHANGE UP (ref 27–34)
MCHC RBC-ENTMCNC: 33.1 GM/DL — SIGNIFICANT CHANGE UP (ref 32–36)
MCV RBC AUTO: 93.5 FL — SIGNIFICANT CHANGE UP (ref 80–100)
NRBC # BLD: 0 /100 WBCS — SIGNIFICANT CHANGE UP (ref 0–0)
PLATELET # BLD AUTO: 296 K/UL — SIGNIFICANT CHANGE UP (ref 150–400)
POTASSIUM SERPL-MCNC: 3 MMOL/L — LOW (ref 3.5–5.3)
POTASSIUM SERPL-SCNC: 3 MMOL/L — LOW (ref 3.5–5.3)
RBC # BLD: 4.62 M/UL — SIGNIFICANT CHANGE UP (ref 4.2–5.8)
RBC # FLD: 14.9 % — HIGH (ref 10.3–14.5)
SODIUM SERPL-SCNC: 142 MMOL/L — SIGNIFICANT CHANGE UP (ref 135–145)
WBC # BLD: 11.01 K/UL — HIGH (ref 3.8–10.5)
WBC # FLD AUTO: 11.01 K/UL — HIGH (ref 3.8–10.5)

## 2024-03-28 PROCEDURE — 99233 SBSQ HOSP IP/OBS HIGH 50: CPT

## 2024-03-28 PROCEDURE — 99232 SBSQ HOSP IP/OBS MODERATE 35: CPT

## 2024-03-28 RX ORDER — POTASSIUM CHLORIDE 20 MEQ
40 PACKET (EA) ORAL EVERY 4 HOURS
Refills: 0 | Status: COMPLETED | OUTPATIENT
Start: 2024-03-28 | End: 2024-03-28

## 2024-03-28 RX ORDER — CEFUROXIME AXETIL 250 MG
500 TABLET ORAL EVERY 12 HOURS
Refills: 0 | Status: DISCONTINUED | OUTPATIENT
Start: 2024-03-28 | End: 2024-03-29

## 2024-03-28 RX ORDER — MAGNESIUM SULFATE 500 MG/ML
1 VIAL (ML) INJECTION ONCE
Refills: 0 | Status: COMPLETED | OUTPATIENT
Start: 2024-03-28 | End: 2024-03-28

## 2024-03-28 RX ADMIN — Medication 10 MILLIGRAM(S): at 06:14

## 2024-03-28 RX ADMIN — Medication 25 MILLIGRAM(S): at 06:14

## 2024-03-28 RX ADMIN — Medication 40 MILLIEQUIVALENT(S): at 11:48

## 2024-03-28 RX ADMIN — TAMSULOSIN HYDROCHLORIDE 0.4 MILLIGRAM(S): 0.4 CAPSULE ORAL at 22:46

## 2024-03-28 RX ADMIN — Medication 40 MILLIEQUIVALENT(S): at 14:00

## 2024-03-28 RX ADMIN — PANTOPRAZOLE SODIUM 40 MILLIGRAM(S): 20 TABLET, DELAYED RELEASE ORAL at 06:13

## 2024-03-28 RX ADMIN — Medication 500 MILLIGRAM(S): at 18:09

## 2024-03-28 RX ADMIN — Medication 40 MILLIGRAM(S): at 06:14

## 2024-03-28 NOTE — CHART NOTE - NSCHARTNOTEFT_GEN_A_CORE
I called and spoke to patient's son Tamie Gaxiola. He expressed concern that patient has 3 episodes of hematuria and UTI due to the Kidney stone and patient is basically bedbound and unable to go for his follow up appointments as out patient si if  Urology can do any procedure that is necessary as inpatient and wants to speak to them. I have informed the surgical/ Urology team, they will speak to the son. Patient refused IV insertion, so antibiotics are switched to oral. Spoke to patient as well, he states that he is agreeable for Urological procedure if clinically indicated as long he gets anesthesia. I called and spoke to patient's son Mr. Gaxiola. He expressed concern that patient has 3 episodes of hematuria and UTI over the course of past 3 months,  due to the Kidney stone and patient is unable to go for his follow up Urology appointment as outpatient because he  is basically bedbound and  and  if  Urology can do any procedure that is necessary as inpatient and wants to speak to them. I have informed the surgical/ Urology team, they will speak to the son. Patient refused IV insertion, so antibiotics are switched to oral. Spoke to patient as well, he states that he is agreeable for Urological procedure if clinically indicated as long he gets anesthesia.

## 2024-03-28 NOTE — PHYSICAL THERAPY INITIAL EVALUATION ADULT - PERTINENT HX OF CURRENT PROBLEM, REHAB EVAL
78-year-old male, history of CHF (EF 55–60-60% as of 6/21), PE (on apixaban), hypertension, HLD, MG, chronic lower extremity edema, urosepsis, pneumonia, cholecystitis, brought by son for evaluation of generalized weakness for 4 days and possible hematuria.  Patient states that his son brought him into the hospital because he was appearing more tired and weak than usual. As per son, patient  does not walk and usually spends mostly in bed. Discussed with son, patient was having intermittent hematuria since past 2-3 months and was treated with PO antibiotics. However since the past 3-4 days patient was complaining of feeling cold and was appearing weaker than usual. Patient also endorses decreased appetite since the past few days. Son noticed discolored sheets today when changing patient and thought it may be bloody urine. Otherwise patient denies any chest pain, shortness of breath, abdominal pain, suprapubic tenderness, n/v/d/c.

## 2024-03-28 NOTE — PROGRESS NOTE ADULT - SUBJECTIVE AND OBJECTIVE BOX
Patient is a 78y old  Male who presents with a chief complaint of Acute hemorrhagic cystitis (27 Mar 2024 11:12)      INTERVAL HPI/OVERNIGHT EVENTS: Patient seen and examined at bedside. Awake, alert. Refusing IV placement, since it infiltrated, refusing IV antibiotics, ok oral. States that he can not walk and does not want PT. Wants to go home.     MEDICATIONS  (STANDING):  cefuroxime   Tablet 500 milliGRAM(s) Oral every 12 hours  furosemide    Tablet 40 milliGRAM(s) Oral daily  influenza  Vaccine (HIGH DOSE) 0.7 milliLiter(s) IntraMuscular once  metoprolol succinate ER 25 milliGRAM(s) Oral daily  pantoprazole    Tablet 40 milliGRAM(s) Oral before breakfast  predniSONE   Tablet 10 milliGRAM(s) Oral daily  tamsulosin 0.4 milliGRAM(s) Oral at bedtime    MEDICATIONS  (PRN):      Allergies    levofloxacin (Unknown)  gatifloxacin (Unknown)  ofloxacin (Unknown)    Intolerances    fluoroquinolone antibiotics (Other)  Ketek (Other)  telithromycin (Other)  Avelox (Other)      REVIEW OF SYSTEMS:  CONSTITUTIONAL: No fever or fatigue  EYES: No eye pain, visual disturbances, or discharge  ENMT:  No difficulty hearing, tinnitus, vertigo; No sinus or throat pain  NECK: No pain or stiffness  RESPIRATORY: No cough, wheezing, chills or hemoptysis; No shortness of breath  CARDIOVASCULAR: No chest pain, palpitations, dizziness, or leg swelling  GASTROINTESTINAL: No abdominal or epigastric pain. No nausea, vomiting, or hematemesis; No diarrhea or constipation.   GENITOURINARY: No dysuria, frequency, hematuria, or incontinence  NEUROLOGICAL: No headaches, memory loss, loss of strength, numbness, or tremors  SKIN: No itching, burning, rashes, or lesions   LYMPH NODES: No enlarged glands    Vital Signs Last 24 Hrs  T(C): 36.3 (28 Mar 2024 05:30), Max: 36.7 (27 Mar 2024 13:42)  T(F): 97.4 (28 Mar 2024 05:30), Max: 98 (27 Mar 2024 13:42)  HR: 61 (28 Mar 2024 06:02) (59 - 70)  BP: 117/81 (28 Mar 2024 05:30) (111/77 - 143/83)  BP(mean): --  RR: 20 (28 Mar 2024 05:30) (19 - 20)  SpO2: 96% (28 Mar 2024 06:02) (93% - 97%)    Parameters below as of 28 Mar 2024 06:02  Patient On (Oxygen Delivery Method): room air        PHYSICAL EXAM:  GENERAL: NAD, comfortable.  HEAD:  Atraumatic, Normocephalic  EYES: EOMI, PERRLA, conjunctiva and sclera clear  ENMT: No tonsillar erythema, exudates, or enlargement; Moist mucous membranes  NECK: Supple, No JVD, Normal thyroid  NERVOUS SYSTEM:  Alert & Oriented X3, Good concentration; Motor Strength 5/5 B/L upper and lower extremities  CHEST/LUNG: Clear to auscultation bilaterally; No rales, rhonchi, wheezing, or rubs  HEART: S1S2+,  Regular rate and rhythm  ABDOMEN: Soft, Nontender, Nondistended; Bowel sounds present  EXTREMITIES:  2+ Peripheral Pulses, No clubbing, cyanosis, chronic nail deformity.   LYMPH: No lymphadenopathy noted  SKIN: No rashes or lesions    LABS:      Ca    8.5        27 Mar 2024 05:20      PT/INR - ( 26 Mar 2024 13:05 )   PT: 12.2 sec;   INR: 1.04 ratio         PTT - ( 26 Mar 2024 13:05 )  PTT:31.2 sec  CAPILLARY BLOOD GLUCOSE        BLOOD CULTURE  03-26 @ 13:05   No growth at 24 hours  --  --  03-26 @ 13:00   No growth at 24 hours  --  --    RADIOLOGY & ADDITIONAL TESTS:    Imaging Personally Reviewed:  [ ] YES     Consultant(s) Notes Reviewed:      Care Discussed with Consultants/Other Providers:

## 2024-03-28 NOTE — PHYSICAL THERAPY INITIAL EVALUATION ADULT - ADDITIONAL COMMENTS
Pt lives in a private home with son Olvin. Pt has no steps to enter. As per pt report, pts son assists pt with all ADLs, and pt uses w/c for primary means of mobility. Pt son performs all transfers as pt is a dependent lift to w/c. Pt states he has a commode, walk in shower, a wheelchair and all necessary equipment in place at home. Pt states he receives home care PT 2x/week.

## 2024-03-28 NOTE — PROGRESS NOTE ADULT - SUBJECTIVE AND OBJECTIVE BOX
Neurology follow up note    DEION [id number]      Interval History:    Patient feels ok no new complaints.    Allergies    levofloxacin (Unknown)  gatifloxacin (Unknown)  ofloxacin (Unknown)    Intolerances    fluoroquinolone antibiotics (Other)  Ketek (Other)  telithromycin (Other)  Avelox (Other)      MEDICATIONS    cefuroxime   Tablet 500 milliGRAM(s) Oral every 12 hours  furosemide    Tablet 40 milliGRAM(s) Oral daily  influenza  Vaccine (HIGH DOSE) 0.7 milliLiter(s) IntraMuscular once  metoprolol succinate ER 25 milliGRAM(s) Oral daily  pantoprazole    Tablet 40 milliGRAM(s) Oral before breakfast  predniSONE   Tablet 10 milliGRAM(s) Oral daily  tamsulosin 0.4 milliGRAM(s) Oral at bedtime              Vital Signs Last 24 Hrs  T(C): 36.3 (28 Mar 2024 05:30), Max: 36.7 (27 Mar 2024 13:42)  T(F): 97.4 (28 Mar 2024 05:30), Max: 98 (27 Mar 2024 13:42)  HR: 61 (28 Mar 2024 06:02) (59 - 70)  BP: 117/81 (28 Mar 2024 05:30) (111/77 - 143/83)  BP(mean): --  RR: 20 (28 Mar 2024 05:30) (19 - 20)  SpO2: 96% (28 Mar 2024 06:02) (93% - 97%)    Parameters below as of 28 Mar 2024 06:02  Patient On (Oxygen Delivery Method): room air        REVIEW OF SYSTEMS:  CONSTITUTIONAL:  No fevers, chills, night sweats.  HEAD:  No headache.  EYES:  No double vision or blurry vision.  EARS:  No ringing in the ears.  NECK:  No neck pain.  CARDIOVASCULAR:  No chest pain with shortness of breath.  ABDOMEN:  No nausea, vomiting, abdominal pain.  EXTREMITY/NEUROLOGICAL:  No numbness, no tingling.  MUSCULOSKELETAL:  Occasional joint pain.  GENERAL:  Possible episodes of feeling weak all over.  No droopy eyes.    PHYSICAL EXAMINATION:  HEAD:  Normocephalic, atraumatic.  EYES:  No scleral icterus.  EARS:  Hearing appear to be intact.  NECK:  Supple.  CARDIOVASCULAR:  S1 and S2 heard.  RESPIRATORY:  Air entry decreased bilaterally.  ABDOMEN:  Soft, nontender.  EXTREMITIES:  No clubbing or cyanosis were noted.    NEUROLOGIC:  Patient awake, alert, location [facility], year 2024, month was March, recall was 0/3 within 3 minutes and 1/3 with prompting.  The patient does have memory issues was able to name simple objects.  Extraocular movements were intact.  Speech was fluent.  Smile symmetric.  Motor, patient has decreased range of motion bilateral shoulders, unable to elevate off the bed, suspect secondary arthritis of rotator cuff issues.  Biceps, triceps, hand grasp was 4/5 bilateral lower extremity.  Slight flexion and extension of the hip and knee, has significantly increased tone say overall 1/5.      LABS:  CBC Full  -  ( 27 Mar 2024 05:20 )  WBC Count : 11.46 K/uL  RBC Count : 4.42 M/uL  Hemoglobin : 13.7 g/dL  Hematocrit : 41.3 %  Platelet Count - Automated : 264 K/uL  Mean Cell Volume : 93.4 fl  Mean Cell Hemoglobin : 31.0 pg  Mean Cell Hemoglobin Concentration : 33.2 gm/dL  Auto Neutrophil # : 8.85 K/uL  Auto Lymphocyte # : 0.92 K/uL  Auto Monocyte # : 1.21 K/uL  Auto Eosinophil # : 0.33 K/uL  Auto Basophil # : 0.05 K/uL  Auto Neutrophil % : 77.2 %  Auto Lymphocyte % : 8.0 %  Auto Monocyte % : 10.6 %  Auto Eosinophil % : 2.9 %  Auto Basophil % : 0.4 %    Urinalysis Basic - ( 27 Mar 2024 05:20 )    Color: x / Appearance: x / SG: x / pH: x  Gluc: 75 mg/dL / Ketone: x  / Bili: x / Urobili: x   Blood: x / Protein: x / Nitrite: x   Leuk Esterase: x / RBC: x / WBC x   Sq Epi: x / Non Sq Epi: x / Bacteria: x      03-27    139  |  101  |  16  ----------------------------<  75  3.4<L>   |  26  |  0.75    Ca    8.5      27 Mar 2024 05:20    TPro  6.2  /  Alb  2.3<L>  /  TBili  0.8  /  DBili  x   /  AST  22  /  ALT  24  /  AlkPhos  72  03-27    Hemoglobin A1C:     LIVER FUNCTIONS - ( 27 Mar 2024 05:20 )  Alb: 2.3 g/dL / Pro: 6.2 g/dL / ALK PHOS: 72 U/L / ALT: 24 U/L / AST: 22 U/L / GGT: x           Vitamin B12 Vitamin B12, Serum: 434 pg/mL (03-27 @ 12:25)    PT/INR - ( 26 Mar 2024 13:05 )   PT: 12.2 sec;   INR: 1.04 ratio         PTT - ( 26 Mar 2024 13:05 )  PTT:31.2 sec      RADIOLOGY    ANALYSIS AND PLAN:  This is an 78-year-old with a history of myasthenia gravis and generalized weakness.    For generalized weakness, paresis, suspect most likely this is secondary to age-related changes, deconditioning which has exacerbated by underlying infectious type process.  Suspect less likely this is a primary CNS event.  The patient just recently had on March 19th and MRI of thoracic lumbar spine did not show any cord compression.  For history of myasthenia gravis, the patient was also offered alternate medication by his outside neurologist, which he refused.  We will recommend continue patient on prednisone.  For possibly infectious type process.  Would be cautious with using antibiotics, aminoglycoside, fluoroquinolones, Macrolides, cardiovascular drugs such as beta blockers, procainamide, and other medications such as statins.  Monitor patient's magnesium level.  If antibiotics are needed risks versus benefits of using those specific antibiotics.  For history of pulmonary emboli, continue patient on anticoagulation.  For history of hypertension, monitor systolic blood pressure.  history of hypertension, monitor systolic blood pressure.  For mild cognitive impairment.  We will check TSH, vitamin 12 and folate.    I spoke with son, Olvin is aware while in hospital setting may become more disoriented with the change in location.  Olvin cell phone #[phone].  Spoke with patient outside neurologist, Dr. Rudy Bowman, updated him in the past.  He will be faxing over MRI results.    49 minutes of time was spent with the patient, plan of care, reviewing data, speaking to multidisciplinary healthcare team with greater than 50% time counseling and care coordination.  Thank you for courtesy of consultation.    PATIENT HAS NOT YET BEEN SEEN AND EXAMINED TODAY. NOTE AND CHART REVIEWED IN AM AND EXAM FORM PREVIOUS.  ONCE PATIENT SEEN, CHART WILL BE UPDATE AT PRESENT NOTE IS INCOMPLETE     Neurology follow up note    DEION [id number]      Interval History:    Patient feels ok no new complaints.    Allergies    levofloxacin (Unknown)  gatifloxacin (Unknown)  ofloxacin (Unknown)    Intolerances    fluoroquinolone antibiotics (Other)  Ketek (Other)  telithromycin (Other)  Avelox (Other)      MEDICATIONS    cefuroxime   Tablet 500 milliGRAM(s) Oral every 12 hours  furosemide    Tablet 40 milliGRAM(s) Oral daily  influenza  Vaccine (HIGH DOSE) 0.7 milliLiter(s) IntraMuscular once  metoprolol succinate ER 25 milliGRAM(s) Oral daily  pantoprazole    Tablet 40 milliGRAM(s) Oral before breakfast  predniSONE   Tablet 10 milliGRAM(s) Oral daily  tamsulosin 0.4 milliGRAM(s) Oral at bedtime              Vital Signs Last 24 Hrs  T(C): 36.3 (28 Mar 2024 05:30), Max: 36.7 (27 Mar 2024 13:42)  T(F): 97.4 (28 Mar 2024 05:30), Max: 98 (27 Mar 2024 13:42)  HR: 61 (28 Mar 2024 06:02) (59 - 70)  BP: 117/81 (28 Mar 2024 05:30) (111/77 - 143/83)  BP(mean): --  RR: 20 (28 Mar 2024 05:30) (19 - 20)  SpO2: 96% (28 Mar 2024 06:02) (93% - 97%)    Parameters below as of 28 Mar 2024 06:02  Patient On (Oxygen Delivery Method): room air        REVIEW OF SYSTEMS:  CONSTITUTIONAL:  No fevers, chills, night sweats.  HEAD:  No headache.  EYES:  No double vision or blurry vision.  EARS:  No ringing in the ears.  NECK:  No neck pain.  CARDIOVASCULAR:  No chest pain with shortness of breath.  ABDOMEN:  No nausea, vomiting, abdominal pain.  EXTREMITY/NEUROLOGICAL:  No numbness, no tingling.  MUSCULOSKELETAL:  Occasional joint pain.  GENERAL:  Possible episodes of feeling weak all over.  No droopy eyes.    PHYSICAL EXAMINATION:  HEAD:  Normocephalic, atraumatic.  EYES:  No scleral icterus.  EARS:  Hearing appear to be intact.  NECK:  Supple.  CARDIOVASCULAR:  S1 and S2 heard.  RESPIRATORY:  Air entry decreased bilaterally.  ABDOMEN:  Soft, nontender.  EXTREMITIES:  No clubbing or cyanosis were noted.    NEUROLOGIC:  Patient awake, alert, location [facility], year 2024, month was March, recall was 0/3 within 3 minutes and 1/3 with prompting.  The patient does have memory issues was able to name simple objects.  Extraocular movements were intact.  Speech was fluent.  Smile symmetric.  Motor, patient has decreased range of motion bilateral shoulders, unable to elevate off the bed, suspect secondary arthritis of rotator cuff issues.  Biceps, triceps, hand grasp was 4/5 bilateral lower extremity.  Slight flexion and extension of the hip and knee, has significantly increased tone say overall 1/5.      LABS:  CBC Full  -  ( 27 Mar 2024 05:20 )  WBC Count : 11.46 K/uL  RBC Count : 4.42 M/uL  Hemoglobin : 13.7 g/dL  Hematocrit : 41.3 %  Platelet Count - Automated : 264 K/uL  Mean Cell Volume : 93.4 fl  Mean Cell Hemoglobin : 31.0 pg  Mean Cell Hemoglobin Concentration : 33.2 gm/dL  Auto Neutrophil # : 8.85 K/uL  Auto Lymphocyte # : 0.92 K/uL  Auto Monocyte # : 1.21 K/uL  Auto Eosinophil # : 0.33 K/uL  Auto Basophil # : 0.05 K/uL  Auto Neutrophil % : 77.2 %  Auto Lymphocyte % : 8.0 %  Auto Monocyte % : 10.6 %  Auto Eosinophil % : 2.9 %  Auto Basophil % : 0.4 %    Urinalysis Basic - ( 27 Mar 2024 05:20 )    Color: x / Appearance: x / SG: x / pH: x  Gluc: 75 mg/dL / Ketone: x  / Bili: x / Urobili: x   Blood: x / Protein: x / Nitrite: x   Leuk Esterase: x / RBC: x / WBC x   Sq Epi: x / Non Sq Epi: x / Bacteria: x      03-27    139  |  101  |  16  ----------------------------<  75  3.4<L>   |  26  |  0.75    Ca    8.5      27 Mar 2024 05:20    TPro  6.2  /  Alb  2.3<L>  /  TBili  0.8  /  DBili  x   /  AST  22  /  ALT  24  /  AlkPhos  72  03-27    Hemoglobin A1C:     LIVER FUNCTIONS - ( 27 Mar 2024 05:20 )  Alb: 2.3 g/dL / Pro: 6.2 g/dL / ALK PHOS: 72 U/L / ALT: 24 U/L / AST: 22 U/L / GGT: x           Vitamin B12 Vitamin B12, Serum: 434 pg/mL (03-27 @ 12:25)    PT/INR - ( 26 Mar 2024 13:05 )   PT: 12.2 sec;   INR: 1.04 ratio         PTT - ( 26 Mar 2024 13:05 )  PTT:31.2 sec      RADIOLOGY    ANALYSIS AND PLAN:  This is an 78-year-old with a history of myasthenia gravis and generalized weakness.    For generalized weakness, paresis, suspect most likely this is secondary to age-related changes, deconditioning which has exacerbated by underlying infectious type process.  Suspect less likely this is a primary CNS event.  The patient just recently had on March 19th and MRI of thoracic lumbar spine did not show any cord compression.  For history of myasthenia gravis, the patient was also offered alternate medication by his outside neurologist, which he refused.  We will recommend continue patient on prednisone.  For possibly infectious type process.  Would be cautious with using antibiotics, aminoglycoside, fluoroquinolones, Macrolides, cardiovascular drugs such as beta blockers, procainamide, and other medications such as statins.  Monitor patient's magnesium level.  If antibiotics are needed risks versus benefits of using those specific antibiotics.  For history of pulmonary emboli, continue patient on anticoagulation.  For history of hypertension, monitor systolic blood pressure.  history of hypertension, monitor systolic blood pressure.  For mild cognitive impairment.  We will check TSH, vitamin 12 and folate.    I spoke with son, Olvin is aware while in hospital setting may become more disoriented with the change in location.  Olvin cell phone #[phone] 3/28  Spoke with patient outside neurologist, Dr. Rudy Bowman, updated him in the past.  He will be faxing over MRI results.    49 minutes of time was spent with the patient, plan of care, reviewing data, speaking to multidisciplinary healthcare team with greater than 50% time counseling and care coordination.  Thank you for courtesy of consultation.

## 2024-03-28 NOTE — PROGRESS NOTE ADULT - ASSESSMENT
79 yo M w/ pmh HFpEF, PE on Eliquis, HTN, dyslipidemia, myasthenia gravis, diet controlled T2DM p/w UTI and hematuria    HFpEF   - p/w UTI (s/p abx) and hematuria likely from UTI now resolved, urology following   - known hx of PE on Eliquis, he is very sedentary/immobile   - no sx intervention at this time  - would consider resuming FD AC possible can start with Lovenox FD and if tolerates transition back to DOAC     - EKG: NSR @ 68bpm w/ occasional PVC's and LAFB.,unchanged from prior   - No clear evidence of acute ischemia  - trops negative  - no anginal complaints     - No meaningful evidence of volume overload at this time.  - TTE (6/2021) LVEF 55-60%.  - Would recommend repeat TTE as routine  - continue home dose Lasix     - BP, HR stable and well controlled  - continue BB  - continue to monitor routine hemodynamics.  - Monitor and replete Lytes. Keep K > 4 and Mg > 2    - Will continue to follow.    JENNY VillatoroJAMIE  Nurse Practitioner - Cardiology   call TEAMS   77 yo M w/ pmh HFpEF, PE on Eliquis, HTN, dyslipidemia, myasthenia gravis, diet controlled T2DM p/w UTI and hematuria    HFpEF   - p/w UTI (s/p abx) and hematuria likely from UTI now resolved, urology following   - known hx of PE on Eliquis, he is very sedentary/immobile   - no sx intervention at this time  - would consider resuming FD AC possible can start with Lovenox FD and if tolerates transition back to DOAC     - EKG: NSR @ 68bpm w/ occasional PVC's and LAFB, unchanged from prior   - No clear evidence of acute ischemia  - trops negative  - no anginal complaints     - No meaningful evidence of volume overload at this time.  - TTE (6/2021) LVEF 55-60%.  - Would recommend repeat TTE as routine  - continue home dose Lasix     - BP, HR stable and well controlled  - continue BB  - continue to monitor routine hemodynamics.  - Monitor and replete Lytes. Keep K > 4 and Mg > 2    - Will continue to follow.    JENNY VillatoroJAMIE  Nurse Practitioner - Cardiology   call TEAMS

## 2024-03-28 NOTE — PROGRESS NOTE ADULT - SUBJECTIVE AND OBJECTIVE BOX
[facility] -- Janel Jackson Pannella, Patel, Savella, Goodger, Cohen  Office # [phone]    Follow Up:  HFpEF     Subjective/Observations: seen and examined, awake, alert, resting comfortably in bed, denies chest pain, dyspnea, palpitations or dizziness, Tolerating room air. 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      Club foot  Surgery at birth for Club Foot Right foot      Pilonidal cyst  Surgery 40 years ago      H/O colonoscopy      Spinal stenosis      H/O prostate biopsy        MEDICATIONS  (STANDING):  cefuroxime   Tablet 500 milliGRAM(s) Oral every 12 hours  furosemide    Tablet 40 milliGRAM(s) Oral daily  influenza  Vaccine (HIGH DOSE) 0.7 milliLiter(s) IntraMuscular once  metoprolol succinate ER 25 milliGRAM(s) Oral daily  pantoprazole    Tablet 40 milliGRAM(s) Oral before breakfast  predniSONE   Tablet 10 milliGRAM(s) Oral daily  tamsulosin 0.4 milliGRAM(s) Oral at bedtime    MEDICATIONS  (PRN):    Allergies    levofloxacin (Unknown)  gatifloxacin (Unknown)  ofloxacin (Unknown)    Intolerances    fluoroquinolone antibiotics (Other)  Ketek (Other)  telithromycin (Other)  Avelox (Other)    Vital Signs Last 24 Hrs  T(C): 36.3 (28 Mar 2024 05:30), Max: 36.7 (27 Mar 2024 13:42)  T(F): 97.4 (28 Mar 2024 05:30), Max: 98 (27 Mar 2024 13:42)  HR: 61 (28 Mar 2024 06:02) (59 - 70)  BP: 117/81 (28 Mar 2024 05:30) (111/77 - 143/83)  BP(mean): --  RR: 20 (28 Mar 2024 05:30) (19 - 20)  SpO2: 96% (28 Mar 2024 06:02) (93% - 97%)    Parameters below as of 28 Mar 2024 06:02  Patient On (Oxygen Delivery Method): room air      I&O's Summary    27 Mar 2024 07:01  -  28 Mar 2024 07:00  --------------------------------------------------------  IN: 1025 mL / OUT: 1600 mL / NET: -575 mL          TELE: SR/ SB 50-90's   PHYSICAL EXAM:  Constitutional: NAD, awake and alert  HEENT: Moist Mucous Membranes, Anicteric  Pulmonary: Non-labored, breath sounds are clear bilaterally, No wheezing, rales or rhonchi  Cardiovascular: Regular, S1 and S2, No murmurs, rubs, gallops or clicks  Gastrointestinal: Bowel Sounds present, soft, nontender.   Lymph: No peripheral edema. No lymphadenopathy.  Skin: No visible rashes or ulcers.  Psych:  Mood & affect appropriate  LABS: All Labs Reviewed:                        14.3   11.01 )-----------( 296      ( 28 Mar 2024 05:50 )             43.2                         13.7   11.46 )-----------( 264      ( 27 Mar 2024 05:20 )             41.3                         14.5   12.39 )-----------( 270      ( 26 Mar 2024 13:05 )             45.1     28 Mar 2024 05:50    142    |  104    |  18     ----------------------------<  119    3.0     |  30     |  0.90   27 Mar 2024 05:20    139    |  101    |  16     ----------------------------<  75     3.4     |  26     |  0.75   26 Mar 2024 13:05    139    |  104    |  22     ----------------------------<  70     3.9     |  19     |  0.87     Ca    9.2        28 Mar 2024 05:50  Ca    8.5        27 Mar 2024 05:20  Ca    8.9        26 Mar 2024 13:05  Mg     1.8       28 Mar 2024 05:50    TPro  6.2    /  Alb  2.3    /  TBili  0.8    /  DBili  x      /  AST  22     /  ALT  24     /  AlkPhos  72     27 Mar 2024 05:20  TPro  6.6    /  Alb  2.4    /  TBili  1.2    /  DBili  x      /  AST  32     /  ALT  29     /  AlkPhos  82     26 Mar 2024 13:05    PT/INR - ( 26 Mar 2024 13:05 )   PT: 12.2 sec;   INR: 1.04 ratio         PTT - ( 26 Mar 2024 13:05 )  PTT:31.2 sec      12 Lead ECG:   Ventricular Rate 68 BPM    Atrial Rate 68 BPM    P-R Interval 132 ms    QRS Duration 108 ms    Q-T Interval 418 ms    QTC Calculation(Bazett) 444 ms    P Axis 34 degrees    R Axis -45 degrees    T Axis 50 degrees    Diagnosis Line Sinus rhythm withoccasional premature ventricular complexes  Left anterior fascicular block  Abnormal ECG  When compared with ECG of 03-NOV-2023 16:26,  T wave inversion no longer evident in Lateral leads  QT has shortened  Confirmed by BRENDAN GONZALEZ ([id number]) on 3/27/2024 7:06:28 PM (03-26-24 @ 13:30)       EXAM:  ECHO TTE WO CON COMP W DOPP         PROCEDURE DATE:  06/01/2021        INTERPRETATION:  INDICATION: Bilateral pulmonary edema  Sonographer AS    Blood Pressure 130/64    Height 167.6 cm     Weight 113.4 kg       BSA 2.2 sq m    Dimensions:  LA 2.8       Normal Values: 2.0 - 4.0 cm  Ao 2.8        Normal Values: 2.0 - 3.8 cm  SEPTUM 1.0       Normal Values: 0.6 - 1.2 cm  PWT 0.9       Normal Values: 0.6 - 1.1 cm  LVIDd 3.6         Normal Values: 3.0 - 5.6 cm  LVIDs 2.4         Normal Values: 1.8 - 4.0 cm      OBSERVATIONS:  Technically difficult and limited study  Mitral Valve: normal, trace physiologic MR.  Aortic Valve/Aorta: Not well-visualized  Tricuspid Valve: Not well-visualized  Pulmonic Valve: Not well-visualized  Left Atrium:normal  Right Atrium: Not well-visualized  Left Ventricle: Left ventricle is not well-visualized. Overall grossly normal left ventricular systolic function, estimated LVEF of 55-60%.  Right Ventricle: Grossly normal size and systolic function.  Pericardium: no significant pericardial effusion.  Pulmonary/RV Pressure: estimated PA systolic pressure of 9.5 mmHg assuming an RA pressure of 3 mmHg.  LV diastolic dysfunction is present        IMPRESSION:  Technically difficult and limited study  Overall grossly normal left ventricular systolic function, estimated LVEF of 55-60%.  Grossly normal RV size and systolic function.  The aortic valve is not well-visualized  Trace physiologic MR GULSHAN BECKER MD; Attending Cardiologist  This document has been electronically signed. Jun 2 2021  4:21PM      [facility] -- Janel Jackson Pannella, Patel, Savella, Goodger, Cohen  Office # [phone]    Follow Up:  HFpEF     Subjective/Observations: seen and examined, awake, alert, resting comfortably in bed, denies chest pain, dyspnea, palpitations or dizziness, Tolerating room air. 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      Club foot  Surgery at birth for Club Foot Right foot      Pilonidal cyst  Surgery 40 years ago      H/O colonoscopy      Spinal stenosis      H/O prostate biopsy        MEDICATIONS  (STANDING):  cefuroxime   Tablet 500 milliGRAM(s) Oral every 12 hours  furosemide    Tablet 40 milliGRAM(s) Oral daily  influenza  Vaccine (HIGH DOSE) 0.7 milliLiter(s) IntraMuscular once  metoprolol succinate ER 25 milliGRAM(s) Oral daily  pantoprazole    Tablet 40 milliGRAM(s) Oral before breakfast  predniSONE   Tablet 10 milliGRAM(s) Oral daily  tamsulosin 0.4 milliGRAM(s) Oral at bedtime    MEDICATIONS  (PRN):    Allergies    levofloxacin (Unknown)  gatifloxacin (Unknown)  ofloxacin (Unknown)    Intolerances    fluoroquinolone antibiotics (Other)  Ketek (Other)  telithromycin (Other)  Avelox (Other)    Vital Signs Last 24 Hrs  T(C): 36.3 (28 Mar 2024 05:30), Max: 36.7 (27 Mar 2024 13:42)  T(F): 97.4 (28 Mar 2024 05:30), Max: 98 (27 Mar 2024 13:42)  HR: 61 (28 Mar 2024 06:02) (59 - 70)  BP: 117/81 (28 Mar 2024 05:30) (111/77 - 143/83)  BP(mean): --  RR: 20 (28 Mar 2024 05:30) (19 - 20)  SpO2: 96% (28 Mar 2024 06:02) (93% - 97%)    Parameters below as of 28 Mar 2024 06:02  Patient On (Oxygen Delivery Method): room air      I&O's Summary    27 Mar 2024 07:01  -  28 Mar 2024 07:00  --------------------------------------------------------  IN: 1025 mL / OUT: 1600 mL / NET: -575 mL          TELE: SR/ SB 50-90's   PHYSICAL EXAM:  Constitutional: NAD, awake and alert  HEENT: Moist Mucous Membranes, Anicteric  Pulmonary: Non-labored, breath sounds are clear bilaterally, No wheezing, rales or rhonchi  Cardiovascular: Regular, S1 and S2, No murmurs, rubs, gallops or clicks  Gastrointestinal: Bowel Sounds present, soft, nontender.   Lymph: No peripheral edema. No lymphadenopathy.  Skin: No visible rashes or ulcers.  Psych:  Mood & affect appropriate  LABS: All Labs Reviewed:                        14.3   11.01 )-----------( 296      ( 28 Mar 2024 05:50 )             43.2                         13.7   11.46 )-----------( 264      ( 27 Mar 2024 05:20 )             41.3                         14.5   12.39 )-----------( 270      ( 26 Mar 2024 13:05 )             45.1     28 Mar 2024 05:50    142    |  104    |  18     ----------------------------<  119    3.0     |  30     |  0.90   27 Mar 2024 05:20    139    |  101    |  16     ----------------------------<  75     3.4     |  26     |  0.75   26 Mar 2024 13:05    139    |  104    |  22     ----------------------------<  70     3.9     |  19     |  0.87     Ca    9.2        28 Mar 2024 05:50  Ca    8.5        27 Mar 2024 05:20  Ca    8.9        26 Mar 2024 13:05  Mg     1.8       28 Mar 2024 05:50    TPro  6.2    /  Alb  2.3    /  TBili  0.8    /  DBili  x      /  AST  22     /  ALT  24     /  AlkPhos  72     27 Mar 2024 05:20  TPro  6.6    /  Alb  2.4    /  TBili  1.2    /  DBili  x      /  AST  32     /  ALT  29     /  AlkPhos  82     26 Mar 2024 13:05    PT/INR - ( 26 Mar 2024 13:05 )   PT: 12.2 sec;   INR: 1.04 ratio         PTT - ( 26 Mar 2024 13:05 )  PTT:31.2 sec      12 Lead ECG:   Ventricular Rate 68 BPM    Atrial Rate 68 BPM    P-R Interval 132 ms    QRS Duration 108 ms    Q-T Interval 418 ms    QTC Calculation(Bazett) 444 ms    P Axis 34 degrees    R Axis -45 degrees    T Axis 50 degrees    Diagnosis Line Sinus rhythm withoccasional premature ventricular complexes  Left anterior fascicular block  Abnormal ECG  When compared with ECG of 03-NOV-2023 16:26,  T wave inversion no longer evident in Lateral leads  QT has shortened  Confirmed by BRENDAN GONZALEZ ([id number]) on 3/27/2024 7:06:28 PM (03-26-24 @ 13:30)       EXAM:  ECHO TTE WO CON COMP W DOPP         PROCEDURE DATE:  06/01/2021        INTERPRETATION:  INDICATION: Bilateral pulmonary edema  Sonographer AS    Blood Pressure 130/64    Height 167.6 cm     Weight 113.4 kg       BSA 2.2 sq m    Dimensions:  LA 2.8       Normal Values: 2.0 - 4.0 cm  Ao 2.8        Normal Values: 2.0 - 3.8 cm  SEPTUM 1.0       Normal Values: 0.6 - 1.2 cm  PWT 0.9       Normal Values: 0.6 - 1.1 cm  LVIDd 3.6         Normal Values: 3.0 - 5.6 cm  LVIDs 2.4         Normal Values: 1.8 - 4.0 cm      OBSERVATIONS:  Technically difficult and limited study  Mitral Valve: normal, trace physiologic MR.  Aortic Valve/Aorta: Not well-visualized  Tricuspid Valve: Not well-visualized  Pulmonic Valve: Not well-visualized  Left Atrium:normal  Right Atrium: Not well-visualized  Left Ventricle: Left ventricle is not well-visualized. Overall grossly normal left ventricular systolic function, estimated LVEF of 55-60%.  Right Ventricle: Grossly normal size and systolic function.  Pericardium: no significant pericardial effusion.  Pulmonary/RV Pressure: estimated PA systolic pressure of 9.5 mmHg assuming an RA pressure of 3 mmHg.  LV diastolic dysfunction is present        IMPRESSION:  Technically difficult and limited study  Overall grossly normal left ventricular systolic function, estimated LVEF of 55-60%.  Grossly normal RV size and systolic function.  The aortic valve is not well-visualized  Trace physiologic MR GULSHAN BECKER MD; Attending Cardiologist  This document has been electronically signed. Jun 2 2021  4:21PM

## 2024-03-28 NOTE — PROGRESS NOTE ADULT - SUBJECTIVE AND OBJECTIVE BOX
SUBJECTIVE:  Patient seen and examined at bedside. No overnight events. Patient reports no new complaints at this time. Tolerating breakfast at time of encounter. Voiding.  Patient denies any fever, chills, chest pain, shortness of breath, nausea, vomiting, or urinary complaints.    VITALS  Vital Signs Last 24 Hrs  T(C): 36.3 (28 Mar 2024 05:30), Max: 36.7 (27 Mar 2024 13:42)  T(F): 97.4 (28 Mar 2024 05:30), Max: 98 (27 Mar 2024 13:42)  HR: 61 (28 Mar 2024 06:02) (59 - 70)  BP: 117/81 (28 Mar 2024 05:30) (111/77 - 143/83)  BP(mean): --  RR: 20 (28 Mar 2024 05:30) (19 - 20)  SpO2: 96% (28 Mar 2024 06:02) (93% - 97%)    Parameters below as of 28 Mar 2024 06:02  Patient On (Oxygen Delivery Method): room air    PHYSICAL EXAM  GENERAL:  Well-developed Male lying comfortably in bed in NAD.  HEENT:  NC/AT. Sclera white. Mucous membranes moist.  ABDOMEN: Soft, nondistended, nontender;  No rebound tenderness or guarding.  EXTREMITIES: No calf tenderness bilaterally.  SKIN:  No jaundice, pallor, or cyanosis  NEURO:  A&O x 3    INTAKE & OUTPUT  I&O's Summary    27 Mar 2024 07:01  -  28 Mar 2024 07:00  --------------------------------------------------------  IN: 1025 mL / OUT: 1600 mL / NET: -575 mL    I&O's Detail    27 Mar 2024 07:01  -  28 Mar 2024 07:00  --------------------------------------------------------  IN:    dextrose 5% + sodium chloride 0.9%: 150 mL    IV PiggyBack: 75 mL    IV PiggyBack: 300 mL    Oral Fluid: 500 mL  Total IN: 1025 mL    OUT:    Voided (mL): 1600 mL  Total OUT: 1600 mL    Total NET: -575 mL    MEDICATIONS  MEDICATIONS  (STANDING):  cefuroxime   Tablet 500 milliGRAM(s) Oral every 12 hours  furosemide    Tablet 40 milliGRAM(s) Oral daily  influenza  Vaccine (HIGH DOSE) 0.7 milliLiter(s) IntraMuscular once  metoprolol succinate ER 25 milliGRAM(s) Oral daily  pantoprazole    Tablet 40 milliGRAM(s) Oral before breakfast  predniSONE   Tablet 10 milliGRAM(s) Oral daily  tamsulosin 0.4 milliGRAM(s) Oral at bedtime    LABS:                      14.3   11.01 )-----------( 296      ( 28 Mar 2024 05:50 )             43.2     03-28    142  |  104  |  18  ----------------------------<  119<H>  3.0<L>   |  30  |  0.90    Ca    9.2      28 Mar 2024 05:50  Mg     1.8     03-28    TPro  6.2  /  Alb  2.3<L>  /  TBili  0.8  /  DBili  x   /  AST  22  /  ALT  24  /  AlkPhos  72  03-27    PT/INR - ( 26 Mar 2024 13:05 )   PT: 12.2 sec;   INR: 1.04 ratio    PTT - ( 26 Mar 2024 13:05 )  PTT:31.2 sec    Culture - Blood (collected 26 Mar 2024 13:05)  Source: .Blood Blood-Peripheral  Preliminary Report (27 Mar 2024 19:01):    No growth at 24 hours    Culture - Blood (collected 26 Mar 2024 13:00)  Source: .Blood Blood-Peripheral  Preliminary Report (27 Mar 2024 19:01):    No growth at 24 hours    RADIOLOGY & ADDITIONAL STUDIES:    ASSESSMENT & PLAN   78y Male POD# s/p    - Continue diet  - Continue antibiotics  - Serial abdominal exams  - Is & Os  - DVT prophylaxis with  - OOB, pain control  - Incentive spirometry  - Follow up AM labs  - Case to be discussed with   SUBJECTIVE:  Patient seen and examined at bedside. No overnight events. Patient reports no new complaints at this time. Tolerating breakfast at time of encounter. Voiding.  Patient denies any fever, chills, chest pain, shortness of breath, nausea, vomiting, or urinary complaints.    VITALS  Vital Signs Last 24 Hrs  T(C): 36.3 (28 Mar 2024 05:30), Max: 36.7 (27 Mar 2024 13:42)  T(F): 97.4 (28 Mar 2024 05:30), Max: 98 (27 Mar 2024 13:42)  HR: 61 (28 Mar 2024 06:02) (59 - 70)  BP: 117/81 (28 Mar 2024 05:30) (111/77 - 143/83)  BP(mean): --  RR: 20 (28 Mar 2024 05:30) (19 - 20)  SpO2: 96% (28 Mar 2024 06:02) (93% - 97%)    Parameters below as of 28 Mar 2024 06:02  Patient On (Oxygen Delivery Method): room air    PHYSICAL EXAM  GENERAL:  Well-developed Male lying comfortably in bed in NAD. Urine yellow, somewhat cloudy w/ in cannister at bedside  HEENT:  NC/AT. Sclera white. Mucous membranes moist.  ABDOMEN: Soft, nondistended, nontender;  No rebound tenderness or guarding.  SKIN:  No jaundice, pallor, or cyanosis  NEURO:  A&O x 3    INTAKE & OUTPUT  I&O's Summary    27 Mar 2024 07:01  -  28 Mar 2024 07:00  --------------------------------------------------------  IN: 1025 mL / OUT: 1600 mL / NET: -575 mL    I&O's Detail    27 Mar 2024 07:01  -  28 Mar 2024 07:00  --------------------------------------------------------  IN:    dextrose 5% + sodium chloride 0.9%: 150 mL    IV PiggyBack: 75 mL    IV PiggyBack: 300 mL    Oral Fluid: 500 mL  Total IN: 1025 mL    OUT:    Voided (mL): 1600 mL  Total OUT: 1600 mL    Total NET: -575 mL    MEDICATIONS  MEDICATIONS  (STANDING):  cefuroxime   Tablet 500 milliGRAM(s) Oral every 12 hours  furosemide    Tablet 40 milliGRAM(s) Oral daily  influenza  Vaccine (HIGH DOSE) 0.7 milliLiter(s) IntraMuscular once  metoprolol succinate ER 25 milliGRAM(s) Oral daily  pantoprazole    Tablet 40 milliGRAM(s) Oral before breakfast  predniSONE   Tablet 10 milliGRAM(s) Oral daily  tamsulosin 0.4 milliGRAM(s) Oral at bedtime    LABS:                      14.3   11.01 )-----------( 296      ( 28 Mar 2024 05:50 )             43.2     03-28    142  |  104  |  18  ----------------------------<  119<H>  3.0<L>   |  30  |  0.90    Ca    9.2      28 Mar 2024 05:50  Mg     1.8     03-28    TPro  6.2  /  Alb  2.3<L>  /  TBili  0.8  /  DBili  x   /  AST  22  /  ALT  24  /  AlkPhos  72  03-27    PT/INR - ( 26 Mar 2024 13:05 )   PT: 12.2 sec;   INR: 1.04 ratio    PTT - ( 26 Mar 2024 13:05 )  PTT:31.2 sec    Culture - Blood (collected 26 Mar 2024 13:05)  Source: .Blood Blood-Peripheral  Preliminary Report (27 Mar 2024 19:01):    No growth at 24 hours    Culture - Blood (collected 26 Mar 2024 13:00)  Source: .Blood Blood-Peripheral  Preliminary Report (27 Mar 2024 19:01):    No growth at 24 hours    ASSESSMENT & PLAN  78-year-old male, history of CHF (EF 55–60-60% as of 6/21), PE (on apixaban), hypertension, HLD, MG, chronic lower extremity edema, urosepsis, pneumonia, cholecystitis, a/w UTI & CT evidence of 7 mm right distal ureteral calculus with mild right hydroureteronephrosis and dilated extrarenal pelvis.   Pt doing well, notes resolved hematuria, denies abdominal/flank pain or urologic complaints.  AFVSS. Urine yellow, somewhat cloudy in bedside cannister.    - Regular diet  - ID recs appreciated: continue Rocephin, follow cultures  - continue PVRs  - Continue MET with continuation of flomax for 2 weeks  - No indication for acute urologic intervention at this time. SUBJECTIVE:  Patient seen and examined at bedside. No overnight events. Patient reports no new complaints at this time. Tolerating breakfast at time of encounter. Voiding.  Patient denies any fever, chills, chest pain, shortness of breath, nausea, vomiting, or urinary complaints.    VITALS  Vital Signs Last 24 Hrs  T(C): 36.3 (28 Mar 2024 05:30), Max: 36.7 (27 Mar 2024 13:42)  T(F): 97.4 (28 Mar 2024 05:30), Max: 98 (27 Mar 2024 13:42)  HR: 61 (28 Mar 2024 06:02) (59 - 70)  BP: 117/81 (28 Mar 2024 05:30) (111/77 - 143/83)  BP(mean): --  RR: 20 (28 Mar 2024 05:30) (19 - 20)  SpO2: 96% (28 Mar 2024 06:02) (93% - 97%)    Parameters below as of 28 Mar 2024 06:02  Patient On (Oxygen Delivery Method): room air    PHYSICAL EXAM  GENERAL:  Well-developed Male lying comfortably in bed in NAD. Urine yellow, somewhat cloudy w/ in cannister at bedside  HEENT:  NC/AT. Sclera white. Mucous membranes moist.  ABDOMEN: Soft, nondistended, nontender;  No rebound tenderness or guarding.  SKIN:  No jaundice, pallor, or cyanosis  NEURO:  A&O x 3    INTAKE & OUTPUT  I&O's Summary    27 Mar 2024 07:01  -  28 Mar 2024 07:00  --------------------------------------------------------  IN: 1025 mL / OUT: 1600 mL / NET: -575 mL    I&O's Detail    27 Mar 2024 07:01  -  28 Mar 2024 07:00  --------------------------------------------------------  IN:    dextrose 5% + sodium chloride 0.9%: 150 mL    IV PiggyBack: 75 mL    IV PiggyBack: 300 mL    Oral Fluid: 500 mL  Total IN: 1025 mL    OUT:    Voided (mL): 1600 mL  Total OUT: 1600 mL    Total NET: -575 mL    MEDICATIONS  MEDICATIONS  (STANDING):  cefuroxime   Tablet 500 milliGRAM(s) Oral every 12 hours  furosemide    Tablet 40 milliGRAM(s) Oral daily  influenza  Vaccine (HIGH DOSE) 0.7 milliLiter(s) IntraMuscular once  metoprolol succinate ER 25 milliGRAM(s) Oral daily  pantoprazole    Tablet 40 milliGRAM(s) Oral before breakfast  predniSONE   Tablet 10 milliGRAM(s) Oral daily  tamsulosin 0.4 milliGRAM(s) Oral at bedtime    LABS:                      14.3   11.01 )-----------( 296      ( 28 Mar 2024 05:50 )             43.2     03-28    142  |  104  |  18  ----------------------------<  119<H>  3.0<L>   |  30  |  0.90    Ca    9.2      28 Mar 2024 05:50  Mg     1.8     03-28    TPro  6.2  /  Alb  2.3<L>  /  TBili  0.8  /  DBili  x   /  AST  22  /  ALT  24  /  AlkPhos  72  03-27    PT/INR - ( 26 Mar 2024 13:05 )   PT: 12.2 sec;   INR: 1.04 ratio    PTT - ( 26 Mar 2024 13:05 )  PTT:31.2 sec    Culture - Blood (collected 26 Mar 2024 13:05)  Source: .Blood Blood-Peripheral  Preliminary Report (27 Mar 2024 19:01):    No growth at 24 hours    Culture - Blood (collected 26 Mar 2024 13:00)  Source: .Blood Blood-Peripheral  Preliminary Report (27 Mar 2024 19:01):    No growth at 24 hours    ASSESSMENT & PLAN  78-year-old male, history of CHF (EF 55–60-60% as of 6/21), PE (on apixaban), hypertension, HLD, MG, chronic lower extremity edema, urosepsis, pneumonia, cholecystitis, a/w UTI & CT evidence of 7 mm right distal ureteral calculus with mild right hydroureteronephrosis and dilated extrarenal pelvis.   Pt doing well, notes resolved hematuria, denies abdominal/flank pain or urologic complaints.  AFVSS. Urine yellow, somewhat cloudy in bedside cannister.    - Regular diet  - ID recs appreciated: continue Rocephin, follow cultures  - continue PVRs  - Continue MET with continuation of flomax for 2 weeks  - No indication for acute urologic intervention at this time.  - Will sign off, please reconsult as necessary

## 2024-03-28 NOTE — PROGRESS NOTE ADULT - SUBJECTIVE AND OBJECTIVE BOX
[facility]  INFECTIOUS DISEASES   [address] [place] [address]  Tel: [phone]     Fax: [phone]  ========================================================  MD Lalita Oquedno Kaushal, MD Cho, Michelle, MD Sunjit, Jaspal, MD  ========================================================    [id number]  DEION HEATH     Follow up: Renal stones and UTI    No fever, no urinary symptoms this morning. No flank or abdominal pain.     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  Club foot  Surgery at birth for Club Foot Right foot  Pilonidal cyst  Surgery 40 years ago  H/O colonoscopy  Spinal stenosis  H/O prostate biopsy    Social Hx: No current smoking, EtOH or drugs     FAMILY HISTORY:  Family history of stroke (Father)  Father -  age 62    Family history of kidney disease (Mother)  Mother -  age 67    Family history of diabetes mellitus type II (Sibling)  Brother & Sister    Allergies  levofloxacin (Unknown)  gatifloxacin (Unknown)  ofloxacin (Unknown)  fluoroquinolone antibiotics (Other)  Ketek (Other)  telithromycin (Other)  Avelox (Other)    MEDICATIONS  (STANDING):  cefTRIAXone   IVPB 1000 milliGRAM(s) IV Intermittent every 24 hours  dextrose 5% + sodium chloride 0.9%. 1000 milliLiter(s) (75 mL/Hr) IV Continuous <Continuous>  furosemide    Tablet 40 milliGRAM(s) Oral daily  influenza  Vaccine (HIGH DOSE) 0.7 milliLiter(s) IntraMuscular once  metoprolol succinate ER 25 milliGRAM(s) Oral daily  pantoprazole    Tablet 40 milliGRAM(s) Oral before breakfast  potassium chloride  10 mEq/100 mL IVPB 10 milliEquivalent(s) IV Intermittent every 1 hour  predniSONE   Tablet 10 milliGRAM(s) Oral daily  tamsulosin 0.4 milliGRAM(s) Oral at bedtime     REVIEW OF SYSTEMS:  CONSTITUTIONAL:  No Fever or chills  HEENT:  No diplopia or blurred vision.  No sore throat or runny nose.  CARDIOVASCULAR:  No chest pain   RESPIRATORY:  No cough, shortness of breath, PND or orthopnea.  GASTROINTESTINAL:  No nausea, vomiting or diarrhea.  GENITOURINARY:  No dysuria, frequency or urgency. No Blood in urine  MUSCULOSKELETAL:  no joint aches, no muscle pain  SKIN:  No change in skin, hair or nails.    Physical Exam:  Vital Signs Last 24 Hrs  T(C): 36.3 (28 Mar 2024 05:30), Max: 36.7 (27 Mar 2024 13:42)  T(F): 97.4 (28 Mar 2024 05:30), Max: 98 (27 Mar 2024 13:42)  HR: 61 (28 Mar 2024 06:02) (59 - 70)  BP: 117/81 (28 Mar 2024 05:30) (111/77 - 143/83)  BP(mean): --  RR: 20 (28 Mar 2024 05:30) (19 - 20)  SpO2: 96% (28 Mar 2024 06:02) (93% - 97%)  Parameters below as of 28 Mar 2024 06:02  Patient On (Oxygen Delivery Method): room air  GEN: NAD  HEENT: normocephalic and atraumatic. EOMI. PERRL.    NECK: Supple.  No lymphadenopathy   LUNGS: Clear to auscultation.  HEART: Regular rate and rhythm   ABDOMEN: Soft, nontender, and nondistended.  Positive bowel sounds.    : No CVA tenderness  EXTREMITIES: + edema. No wound, tender in touch whole lower leg and feet  NEUROLOGIC: grossly intact.  PSYCHIATRIC: Appropriate affect .  SKIN: No rash       Labs:                        14.3   11.01 )-----------( 296      ( 28 Mar 2024 05:50 )             43.2         142  |  104  |  18  ----------------------------<  119<H>  3.0<L>   |  30  |  0.90    Ca    9.2      28 Mar 2024 05:50  Mg     1.8         TPro  6.2  /  Alb  2.3<L>  /  TBili  0.8  /  DBili  x   /  AST  22  /  ALT  24  /  AlkPhos  72      Culture - Urine (collected 24 @ 16:25)  Source: Clean Catch Clean Catch (Midstream)  Preliminary Report (24 @ 09:09):    50,000 - 99,000 CFU/mL Gram Negative Rods    Culture - Blood (collected 24 @ 13:05)  Source: .Blood Blood-Peripheral  Preliminary Report (24 @ 19:01):    No growth at 24 hours    Culture - Urine (collected 23 @ 21:40)  Source: Clean Catch Clean Catch (Midstream)  Final Report (23 @ 10:29):    >100,000 CFU/ml Klebsiella pneumoniae  Organism: Klebsiella pneumoniae (23 @ 10:29)  Organism: Klebsiella pneumoniae (23 @ 10:29)    Sensitivities:      Method Type: MONI      -  Amoxicillin/Clavulanic Acid: S <=8/4      -  Ampicillin: R 16 These ampicillin results predict results for amoxicillin      -  Ampicillin/Sulbactam: S <=4/2      -  Aztreonam: S <=4      -  Cefazolin: S <=2 For uncomplicated UTI with K. pneumoniae, E. coli, or P. mirablis: MONI <=16 is sensitive and MONI >=32 is resistant. This also predicts results for oral agents cefaclor, cefdinir, cefpodoxime, cefprozil, cefuroxime axetil, cephalexin and locarbef for uncomplicated UTI. Note that some isolates may be susceptible to these agents while testing resistant to cefazolin.      -  Cefepime: S <=2      -  Cefoxitin: S <=8      -  Ceftriaxone: S <=1      -  Cefuroxime: S <=4      -  Ciprofloxacin: S <=0.25      -  Ertapenem: S <=0.5      -  Gentamicin: S <=2      -  Imipenem: S <=1      -  Levofloxacin: S <=0.5      -  Meropenem: S <=1      -  Nitrofurantoin: S <=32 Should not be used to treat pyelonephritis      -  Piperacillin/Tazobactam: S <=8      -  Tobramycin: S <=2      -  Trimethoprim/Sulfamethoxazole: S <=0.5/9.5    WBC Count: 11.01 K/uL (24 @ 05:50)  WBC Count: 11.46 K/uL (24 @ 05:20)  WBC Count: 12.39 K/uL (24 @ 13:05)    Creatinine: 0.90 mg/dL (24 @ 05:50)  Creatinine: 0.75 mg/dL (24 @ 05:20)  Creatinine: 0.87 mg/dL (24 @ 13:05)     SARS-CoV-2 Result: NotDetec (24 @ 13:05)    All imaging and other data have been reviewed.  < from: CT Abdomen and Pelvis No Cont (24 @ 20:49) >  IMPRESSION:  7 mm right distal ureteral calculus with mild right hydroureteronephrosis   and dilated extrarenal pelvis. Bladder calculi measuring up to 8 mm.  Enlarged prostate indenting the bladder base.  Old granulomatous disease.    Assessment and Plan:   77yo man with PMH of HTN, CHF, PE, MG and chronic LE edema and pain was admitted with generalized weakness for 4 days and hematuria. As per chart patient was having intermittent hematuria since past 2-3 months and was treated with PO antibiotics. However since the past 3-4 days patient was complaining of feeling cold and was appearing weaker than usual. Patient also endorses decreased appetite since the past few days. Son noticed discolored sheets today when changing patient and thought it may be bloody urine. Otherwise patient denies any chest pain, shortness of breath, abdominal pain, suprapubic tenderness, n/v/d/c.   UA showed WBC TNTC and RBC=19   WBC on admission 12k    CT abdomen with 7mm R distal ureteral calculus wit mild hydroureteronephrosis and 8mm bladder calculus and enlarged prostate    # UTI  # Nephrolithiasis     - Blood cultures NGTD   - UC with GNR will follow ID and sensitivity (had a Sensitive Klebsiella in the past)  - Urology consult noted, no intervention at this time   - Continue Ceftriaxone 1gm daily   - Based on culture results will modify treatment   - Monitor WBC and Tmax   - Rest of the care as per primary team    Will follow.    Jordin Mckinley MD  Division of Infectious Diseases   Please call ID service at [phone] with any question.    50 minutes spent on total encounter assessing patient, examination, chart review, counseling and coordinating care by the attending physician/nurse/care manager.   [facility]  INFECTIOUS DISEASES   [address] [place] [address]  Tel: [phone]     Fax: [phone]  ========================================================  MD Lalita Oquendo Kaushal, MD Cho, Michelle, MD Sunjit, Jaspal, MD  ========================================================    [id number]  DEION HEATH     Follow up: Renal stones and UTI    No fever, no urinary symptoms this morning. No flank or abdominal pain.     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  Club foot  Surgery at birth for Club Foot Right foot  Pilonidal cyst  Surgery 40 years ago  H/O colonoscopy  Spinal stenosis  H/O prostate biopsy    Social Hx: No current smoking, EtOH or drugs     FAMILY HISTORY:  Family history of stroke (Father)  Father -  age 62    Family history of kidney disease (Mother)  Mother -  age 67    Family history of diabetes mellitus type II (Sibling)  Brother & Sister    Allergies  levofloxacin (Unknown)  gatifloxacin (Unknown)  ofloxacin (Unknown)  fluoroquinolone antibiotics (Other)  Ketek (Other)  telithromycin (Other)  Avelox (Other)    MEDICATIONS  (STANDING):  cefTRIAXone   IVPB 1000 milliGRAM(s) IV Intermittent every 24 hours  dextrose 5% + sodium chloride 0.9%. 1000 milliLiter(s) (75 mL/Hr) IV Continuous <Continuous>  furosemide    Tablet 40 milliGRAM(s) Oral daily  influenza  Vaccine (HIGH DOSE) 0.7 milliLiter(s) IntraMuscular once  metoprolol succinate ER 25 milliGRAM(s) Oral daily  pantoprazole    Tablet 40 milliGRAM(s) Oral before breakfast  potassium chloride  10 mEq/100 mL IVPB 10 milliEquivalent(s) IV Intermittent every 1 hour  predniSONE   Tablet 10 milliGRAM(s) Oral daily  tamsulosin 0.4 milliGRAM(s) Oral at bedtime     REVIEW OF SYSTEMS:  CONSTITUTIONAL:  No Fever or chills  HEENT:  No diplopia or blurred vision.  No sore throat or runny nose.  CARDIOVASCULAR:  No chest pain   RESPIRATORY:  No cough, shortness of breath, PND or orthopnea.  GASTROINTESTINAL:  No nausea, vomiting or diarrhea.  GENITOURINARY:  No dysuria, frequency or urgency. No Blood in urine  MUSCULOSKELETAL:  no joint aches, no muscle pain  SKIN:  No change in skin, hair or nails.    Physical Exam:  Vital Signs Last 24 Hrs  T(C): 36.3 (28 Mar 2024 05:30), Max: 36.7 (27 Mar 2024 13:42)  T(F): 97.4 (28 Mar 2024 05:30), Max: 98 (27 Mar 2024 13:42)  HR: 61 (28 Mar 2024 06:02) (59 - 70)  BP: 117/81 (28 Mar 2024 05:30) (111/77 - 143/83)  BP(mean): --  RR: 20 (28 Mar 2024 05:30) (19 - 20)  SpO2: 96% (28 Mar 2024 06:02) (93% - 97%)  Parameters below as of 28 Mar 2024 06:02  Patient On (Oxygen Delivery Method): room air  GEN: NAD  HEENT: normocephalic and atraumatic. EOMI. PERRL.    NECK: Supple.  No lymphadenopathy   LUNGS: Clear to auscultation.  HEART: Regular rate and rhythm   ABDOMEN: Soft, nontender, and nondistended.  Positive bowel sounds.    : No CVA tenderness  EXTREMITIES: + edema. No wound, tender in touch whole lower leg and feet  NEUROLOGIC: grossly intact.  PSYCHIATRIC: Appropriate affect .  SKIN: No rash       Labs:                        14.3   11.01 )-----------( 296      ( 28 Mar 2024 05:50 )             43.2         142  |  104  |  18  ----------------------------<  119<H>  3.0<L>   |  30  |  0.90    Ca    9.2      28 Mar 2024 05:50  Mg     1.8         TPro  6.2  /  Alb  2.3<L>  /  TBili  0.8  /  DBili  x   /  AST  22  /  ALT  24  /  AlkPhos  72      Culture - Urine (collected 24 @ 16:25)  Source: Clean Catch Clean Catch (Midstream)  Preliminary Report (24 @ 09:09):    50,000 - 99,000 CFU/mL Gram Negative Rods    Culture - Blood (collected 24 @ 13:05)  Source: .Blood Blood-Peripheral  Preliminary Report (24 @ 19:01):    No growth at 24 hours    Culture - Urine (collected 23 @ 21:40)  Source: Clean Catch Clean Catch (Midstream)  Final Report (23 @ 10:29):    >100,000 CFU/ml Klebsiella pneumoniae  Organism: Klebsiella pneumoniae (23 @ 10:29)  Organism: Klebsiella pneumoniae (23 @ 10:29)    Sensitivities:      Method Type: MONI      -  Amoxicillin/Clavulanic Acid: S <=8/4      -  Ampicillin: R 16 These ampicillin results predict results for amoxicillin      -  Ampicillin/Sulbactam: S <=4/2      -  Aztreonam: S <=4      -  Cefazolin: S <=2 For uncomplicated UTI with K. pneumoniae, E. coli, or P. mirablis: MONI <=16 is sensitive and MONI >=32 is resistant. This also predicts results for oral agents cefaclor, cefdinir, cefpodoxime, cefprozil, cefuroxime axetil, cephalexin and locarbef for uncomplicated UTI. Note that some isolates may be susceptible to these agents while testing resistant to cefazolin.      -  Cefepime: S <=2      -  Cefoxitin: S <=8      -  Ceftriaxone: S <=1      -  Cefuroxime: S <=4      -  Ciprofloxacin: S <=0.25      -  Ertapenem: S <=0.5      -  Gentamicin: S <=2      -  Imipenem: S <=1      -  Levofloxacin: S <=0.5      -  Meropenem: S <=1      -  Nitrofurantoin: S <=32 Should not be used to treat pyelonephritis      -  Piperacillin/Tazobactam: S <=8      -  Tobramycin: S <=2      -  Trimethoprim/Sulfamethoxazole: S <=0.5/9.5    WBC Count: 11.01 K/uL (24 @ 05:50)  WBC Count: 11.46 K/uL (24 @ 05:20)  WBC Count: 12.39 K/uL (24 @ 13:05)    Creatinine: 0.90 mg/dL (24 @ 05:50)  Creatinine: 0.75 mg/dL (24 @ 05:20)  Creatinine: 0.87 mg/dL (24 @ 13:05)     SARS-CoV-2 Result: NotDetec (24 @ 13:05)    All imaging and other data have been reviewed.  < from: CT Abdomen and Pelvis No Cont (24 @ 20:49) >  IMPRESSION:  7 mm right distal ureteral calculus with mild right hydroureteronephrosis   and dilated extrarenal pelvis. Bladder calculi measuring up to 8 mm.  Enlarged prostate indenting the bladder base.  Old granulomatous disease.    Assessment and Plan:   77yo man with PMH of HTN, CHF, PE, MG and chronic LE edema and pain was admitted with generalized weakness for 4 days and hematuria. As per chart patient was having intermittent hematuria since past 2-3 months and was treated with PO antibiotics. However since the past 3-4 days patient was complaining of feeling cold and was appearing weaker than usual. Patient also endorses decreased appetite since the past few days. Son noticed discolored sheets today when changing patient and thought it may be bloody urine. Otherwise patient denies any chest pain, shortness of breath, abdominal pain, suprapubic tenderness, n/v/d/c.   UA showed WBC TNTC and RBC=19   WBC on admission 12k    CT abdomen with 7mm R distal ureteral calculus wit mild hydroureteronephrosis and 8mm bladder calculus and enlarged prostate    # UTI  # Nephrolithiasis     - Blood cultures NGTD   - UC with GNR will follow ID and sensitivity (had a Sensitive Klebsiella in the past)  - Urology consult noted, no intervention at this time   - Was on Ceftriaxone 1gm daily   - Lost IV and declined reinsertion so switched to cefuroxime that can be continued for now    - Based on culture results will modify treatment   - Monitor WBC and Tmax   - Rest of the care as per primary team    Will follow.    Jordin Mckinley MD  Division of Infectious Diseases   Please call ID service at [phone] with any question.    50 minutes spent on total encounter assessing patient, examination, chart review, counseling and coordinating care by the attending physician/nurse/care manager.

## 2024-03-28 NOTE — CASE MANAGEMENT PROGRESS NOTE - NSCMPROGRESSNOTE_GEN_ALL_CORE
Patient is pending Urology consult for possible procedure. CM will continue to collaborate with interdisciplinary team and remain available to assist.

## 2024-03-28 NOTE — SOCIAL WORK PROGRESS NOTE - NSSWPROGRESSNOTE_GEN_ALL_CORE
Met with patient to discuss discharge planning. He is requesting at this time to return home. He no longer wants rehab or Assisted Living.

## 2024-03-28 NOTE — PROGRESS NOTE ADULT - ASSESSMENT
ASSESSMENT & PLAN  79 yo male with 78-year-old male, history of CHF (EF 55–60-60% as of 6/21), PE (on apixaban), hypertension, HLD, MG, chronic lower extremity edema, urosepsis, pneumonia, cholecystitis, brought by son for evaluation of generalized weakness for 4 days and hematuria is found to have UTI. CT demonstrates 7 mm right distal ureteral calculus with mild right hydroureteronephrosis and dilated extrarenal pelvis.

## 2024-03-28 NOTE — PHYSICAL THERAPY INITIAL EVALUATION ADULT - THERAPY FREQUENCY, PT EVAL
pt refused all PT services despite encouragement. Will D/C pt from PT at this time and no longer follow.

## 2024-03-29 ENCOUNTER — TRANSCRIPTION ENCOUNTER (OUTPATIENT)
Age: 79
End: 2024-03-29

## 2024-03-29 VITALS
HEART RATE: 61 BPM | TEMPERATURE: 97 F | DIASTOLIC BLOOD PRESSURE: 65 MMHG | RESPIRATION RATE: 17 BRPM | SYSTOLIC BLOOD PRESSURE: 105 MMHG | OXYGEN SATURATION: 95 %

## 2024-03-29 PROCEDURE — 99239 HOSP IP/OBS DSCHRG MGMT >30: CPT

## 2024-03-29 PROCEDURE — 84484 ASSAY OF TROPONIN QUANT: CPT

## 2024-03-29 PROCEDURE — 82746 ASSAY OF FOLIC ACID SERUM: CPT

## 2024-03-29 PROCEDURE — 87040 BLOOD CULTURE FOR BACTERIA: CPT

## 2024-03-29 PROCEDURE — 82607 VITAMIN B-12: CPT

## 2024-03-29 PROCEDURE — 99232 SBSQ HOSP IP/OBS MODERATE 35: CPT

## 2024-03-29 PROCEDURE — 97162 PT EVAL MOD COMPLEX 30 MIN: CPT

## 2024-03-29 PROCEDURE — 80053 COMPREHEN METABOLIC PANEL: CPT

## 2024-03-29 PROCEDURE — 87637 SARSCOV2&INF A&B&RSV AMP PRB: CPT

## 2024-03-29 PROCEDURE — 96374 THER/PROPH/DIAG INJ IV PUSH: CPT

## 2024-03-29 PROCEDURE — 83735 ASSAY OF MAGNESIUM: CPT

## 2024-03-29 PROCEDURE — 81001 URINALYSIS AUTO W/SCOPE: CPT

## 2024-03-29 PROCEDURE — 74176 CT ABD & PELVIS W/O CONTRAST: CPT | Mod: MC

## 2024-03-29 PROCEDURE — 80048 BASIC METABOLIC PNL TOTAL CA: CPT

## 2024-03-29 PROCEDURE — 85730 THROMBOPLASTIN TIME PARTIAL: CPT

## 2024-03-29 PROCEDURE — 87186 SC STD MICRODIL/AGAR DIL: CPT

## 2024-03-29 PROCEDURE — 83605 ASSAY OF LACTIC ACID: CPT

## 2024-03-29 PROCEDURE — 99285 EMERGENCY DEPT VISIT HI MDM: CPT | Mod: 25

## 2024-03-29 PROCEDURE — 71045 X-RAY EXAM CHEST 1 VIEW: CPT

## 2024-03-29 PROCEDURE — 87077 CULTURE AEROBIC IDENTIFY: CPT

## 2024-03-29 PROCEDURE — 85025 COMPLETE CBC W/AUTO DIFF WBC: CPT

## 2024-03-29 PROCEDURE — 87086 URINE CULTURE/COLONY COUNT: CPT

## 2024-03-29 PROCEDURE — 84443 ASSAY THYROID STIM HORMONE: CPT

## 2024-03-29 PROCEDURE — 85027 COMPLETE CBC AUTOMATED: CPT

## 2024-03-29 PROCEDURE — 93005 ELECTROCARDIOGRAM TRACING: CPT

## 2024-03-29 PROCEDURE — 85610 PROTHROMBIN TIME: CPT

## 2024-03-29 PROCEDURE — 36415 COLL VENOUS BLD VENIPUNCTURE: CPT

## 2024-03-29 RX ORDER — CEFUROXIME AXETIL 250 MG
1 TABLET ORAL
Qty: 12 | Refills: 0
Start: 2024-03-29 | End: 2024-04-03

## 2024-03-29 RX ORDER — APIXABAN 2.5 MG/1
5 TABLET, FILM COATED ORAL EVERY 12 HOURS
Refills: 0 | Status: DISCONTINUED | OUTPATIENT
Start: 2024-03-29 | End: 2024-03-29

## 2024-03-29 RX ORDER — APIXABAN 2.5 MG/1
5 TABLET, FILM COATED ORAL ONCE
Refills: 0 | Status: COMPLETED | OUTPATIENT
Start: 2024-03-29 | End: 2024-03-29

## 2024-03-29 RX ORDER — TAMSULOSIN HYDROCHLORIDE 0.4 MG/1
1 CAPSULE ORAL
Qty: 0 | Refills: 0 | DISCHARGE
Start: 2024-03-29

## 2024-03-29 RX ADMIN — APIXABAN 5 MILLIGRAM(S): 2.5 TABLET, FILM COATED ORAL at 10:41

## 2024-03-29 RX ADMIN — Medication 10 MILLIGRAM(S): at 05:40

## 2024-03-29 RX ADMIN — PANTOPRAZOLE SODIUM 40 MILLIGRAM(S): 20 TABLET, DELAYED RELEASE ORAL at 05:40

## 2024-03-29 RX ADMIN — Medication 500 MILLIGRAM(S): at 05:40

## 2024-03-29 NOTE — PROGRESS NOTE ADULT - PROBLEM SELECTOR PLAN 5
Chronic   - On home Praluent 75mg subQ [id number]- resume upon discharge
Chronic   - On home Praluent 75mg subQ [id number]- resume upon discharge
Chronic   - On home Praluent 75mg subQ [id number]- resume upon discharge

## 2024-03-29 NOTE — PROGRESS NOTE ADULT - REASON FOR ADMISSION
Acute hemorrhagic cystitis
Hematuria
Acute hemorrhagic cystitis

## 2024-03-29 NOTE — DISCHARGE NOTE PROVIDER - HOSPITAL COURSE
79 yo male with 78-year-old male, history of CHF (EF 55–60-60% as of 6/21), PE (on apixaban), hypertension, HLD, MG, chronic lower extremity edema, urosepsis, pneumonia, cholecystitis, brought by son for evaluation of generalized weakness for 4 days and hematuria is found to have UTI. CT demonstrates 7 mm right distal ureteral calculus with mild right hydroureteronephrosis and dilated extrarenal pelvis.     -S/p IV Ceftriaxone. On Ceftin now   -BC NGTD  -ID suggested Ceftin upon dc for 6 more days   - PO Tylneol 650mg q6 PRN for fever  -Nephrolithiasis: Per Urology no intervention needed. Spoke to son   - Refusing PT.   -SW consult  - Per Urology no intervention needed, spoke to patient 's son as well. Ok to resume Eliquis for h/o PE, stable h/h, no hematuria as inpatient  - Patient has been cleared by Urologist and ID for discharge on PO antibiotics.    Total discharge time spent 55 minutes, including medication reconciliation, counseling and education, prescriptions writing

## 2024-03-29 NOTE — DISCHARGE NOTE PROVIDER - CARE PROVIDER_API CALL
Mariya Paulino [facility]  Urology  [address] [place] [id number]  Phone: [phone]  Fax: [phone]  Follow Up Time:

## 2024-03-29 NOTE — PROGRESS NOTE ADULT - SUBJECTIVE AND OBJECTIVE BOX
[facility] -- Janel Jackson, Peggy, Jovana Gordon, , Carlos Eduardo Marin  Office # [phone]    Follow Up:  Afib on Eliquis, Hematuria    Subjective/Observations:     REVIEW OF SYSTEMS: All other review of systems is negative unless indicated above  PAST MEDICAL & SURGICAL HISTORY:  Calculus of kidney  Club foot  Born Right Foot  Myasthenia gravis  Hypertension  Diabetes  Type 2 - does not take medications - monitors Blood Glucose at home - diet controlled  Urinary tract infection  notes h/o UTI's  Hyperlipidemia  Elective surgery  1956 age 11 @ [facility] - cut under Patella secondary 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  Club foot  Surgery at birth for Club Foot Right foot  Pilonidal cyst  Surgery 40 years ago  H/O colonoscopy  Spinal stenosis  H/O prostate biopsy    MEDICATIONS  (STANDING):  cefuroxime   Tablet 500 milliGRAM(s) Oral every 12 hours  furosemide    Tablet 40 milliGRAM(s) Oral daily  influenza  Vaccine (HIGH DOSE) 0.7 milliLiter(s) IntraMuscular once  metoprolol succinate ER 25 milliGRAM(s) Oral daily  pantoprazole    Tablet 40 milliGRAM(s) Oral before breakfast  predniSONE   Tablet 10 milliGRAM(s) Oral daily  tamsulosin 0.4 milliGRAM(s) Oral at bedtime    MEDICATIONS  (PRN):    Allergies    levofloxacin (Unknown)  gatifloxacin (Unknown)  ofloxacin (Unknown)    Intolerances    fluoroquinolone antibiotics (Other)  Ketek (Other)  telithromycin (Other)  Avelox (Other)    Vital Signs Last 24 Hrs  T(C): 36.2 (29 Mar 2024 05:00), Max: 36.7 (28 Mar 2024 20:14)  T(F): 97.2 (29 Mar 2024 05:00), Max: 98 (28 Mar 2024 20:14)  HR: 61 (29 Mar 2024 05:00) (61 - 76)  BP: 105/62 (29 Mar 2024 05:00) (105/62 - 134/75)  BP(mean): --  RR: 18 (29 Mar 2024 05:00) (18 - 20)  SpO2: 94% (29 Mar 2024 05:00) (94% - 96%)    Parameters below as of 29 Mar 2024 05:00  Patient On (Oxygen Delivery Method): room air      I&O's Summary    28 Mar 2024 07:01  -  29 Mar 2024 07:00  --------------------------------------------------------  IN: 600 mL / OUT: 1150 mL / NET: -550 mL        PHYSICAL EXAM:  TELE:   Constitutional: NAD, awake and alert, well-developed  HEENT: Moist Mucous Membranes, Anicteric  Pulmonary: Non-labored, breath sounds are clear bilaterally, No wheezing, rales or rhonchi  Cardiovascular: Regular, S1 and S2, No murmurs, rubs, gallops or clicks  Gastrointestinal: Bowel Sounds present, soft, nontender.   Lymph: No peripheral edema. No lymphadenopathy.  Skin: No visible rashes or ulcers.  Psych:  Mood & affect appropriate  LABS: All Labs Reviewed:                        14.3   11.01 )-----------( 296      ( 28 Mar 2024 05:50 )             43.2                         13.7   11.46 )-----------( 264      ( 27 Mar 2024 05:20 )             41.3                         14.5   12.39 )-----------( 270      ( 26 Mar 2024 13:05 )             45.1     28 Mar 2024 05:50    142    |  104    |  18     ----------------------------<  119    3.0     |  30     |  0.90   27 Mar 2024 05:20    139    |  101    |  16     ----------------------------<  75     3.4     |  26     |  0.75   26 Mar 2024 13:05    139    |  104    |  22     ----------------------------<  70     3.9     |  19     |  0.87     Ca    9.2        28 Mar 2024 05:50  Ca    8.5        27 Mar 2024 05:20  Ca    8.9        26 Mar 2024 13:05  Mg     1.8       28 Mar 2024 05:50    TPro  6.2    /  Alb  2.3    /  TBili  0.8    /  DBili  x      /  AST  22     /  ALT  24     /  AlkPhos  72     27 Mar 2024 05:20  TPro  6.6    /  Alb  2.4    /  TBili  1.2    /  DBili  x      /  AST  32     /  ALT  29     /  AlkPhos  82     26 Mar 2024 13:05          12 Lead ECG:   Ventricular Rate 68 BPM    Atrial Rate 68 BPM    P-R Interval 132 ms    QRS Duration 108 ms    Q-T Interval 418 ms    QTC Calculation(Bazett) 444 ms    P Axis 34 degrees    R Axis -45 degrees    T Axis 50 degrees    Diagnosis Line Sinus rhythm withoccasional premature ventricular complexes  Left anterior fascicular block  Abnormal ECG  When compared with ECG of 03-NOV-2023 16:26,  T wave inversion no longer evident in Lateral leads  QT has shortened  Confirmed by BRENDAN GORDON ([id number]) on 3/27/2024 7:06:28 PM (03-26-24 @ 13:30)   EXAM:  ECHO TTE WO CON COMP W DOPP     PROCEDURE DATE:  06/01/2021    INTERPRETATION:  INDICATION: Bilateral pulmonary edema  Sonographer AS    Blood Pressure 130/64    Height 167.6 cm     Weight 113.4 kg       BSA 2.2 sq m    Dimensions:  LA 2.8       Normal Values: 2.0 - 4.0 cm  Ao 2.8        Normal Values: 2.0 - 3.8 cm  SEPTUM 1.0       Normal Values: 0.6 - 1.2 cm  PWT 0.9       Normal Values: 0.6 - 1.1 cm  LVIDd 3.6         Normal Values: 3.0 - 5.6 cm  LVIDs 2.4         Normal Values: 1.8 - 4.0 cm    OBSERVATIONS:  Technically difficult and limited study  Mitral Valve: normal, trace physiologic MR.  Aortic Valve/Aorta: Not well-visualized  Tricuspid Valve: Not well-visualized  Pulmonic Valve: Not well-visualized  Left Atrium:normal  Right Atrium: Not well-visualized  Left Ventricle: Left ventricle is not well-visualized. Overall grossly normal left ventricular systolic function, estimated LVEF of 55-60%.  Right Ventricle: Grossly normal size and systolic function.  Pericardium: no significant pericardial effusion.  Pulmonary/RV Pressure: estimated PA systolic pressure of 9.5 mmHg assuming an RA pressure of 3 mmHg.  LV diastolic dysfunction is present    IMPRESSION:  Technically difficult and limited study  Overall grossly normal left ventricular systolic function, estimated LVEF of 55-60%.  Grossly normal RV size and systolic function.  The aortic valve is not well-visualized  Trace physiologic MR      GULSHAN BECKER MD; Attending Cardiologist  This document has been electronically signed. Jun 2 2021  4:21PM      [facility] -- Janel Jackson, Peggy, Jovana Gordon, , Carlos Eduardo Marin  Office # [phone]    Follow Up:  Afib on Eliquis, Hematuria    Subjective/Observations: Awake and alert, non-orthopneic on RA.  Denies any form of respiratory or cardiac discomfort.  No further hematuria.  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  Club foot  Surgery at birth for Club Foot Right foot  Pilonidal cyst  Surgery 40 years ago  H/O colonoscopy  Spinal stenosis  H/O prostate biopsy    MEDICATIONS  (STANDING):  cefuroxime   Tablet 500 milliGRAM(s) Oral every 12 hours  furosemide    Tablet 40 milliGRAM(s) Oral daily  influenza  Vaccine (HIGH DOSE) 0.7 milliLiter(s) IntraMuscular once  metoprolol succinate ER 25 milliGRAM(s) Oral daily  pantoprazole    Tablet 40 milliGRAM(s) Oral before breakfast  predniSONE   Tablet 10 milliGRAM(s) Oral daily  tamsulosin 0.4 milliGRAM(s) Oral at bedtime    MEDICATIONS  (PRN):    Allergies    levofloxacin (Unknown)  gatifloxacin (Unknown)  ofloxacin (Unknown)    Intolerances    fluoroquinolone antibiotics (Other)  Ketek (Other)  telithromycin (Other)  Avelox (Other)    Vital Signs Last 24 Hrs  T(C): 36.2 (29 Mar 2024 05:00), Max: 36.7 (28 Mar 2024 20:14)  T(F): 97.2 (29 Mar 2024 05:00), Max: 98 (28 Mar 2024 20:14)  HR: 61 (29 Mar 2024 05:00) (61 - 76)  BP: 105/62 (29 Mar 2024 05:00) (105/62 - 134/75)  BP(mean): --  RR: 18 (29 Mar 2024 05:00) (18 - 20)  SpO2: 94% (29 Mar 2024 05:00) (94% - 96%)    Parameters below as of 29 Mar 2024 05:00  Patient On (Oxygen Delivery Method): room air      I&O's Summary    28 Mar 2024 07:01  -  29 Mar 2024 07:00  --------------------------------------------------------  IN: 600 mL / OUT: 1150 mL / NET: -550 mL      PHYSICAL EXAM:  TELE: Not on tele  Constitutional: NAD, awake and alert  HEENT: Moist Mucous Membranes, Anicteric  Pulmonary: Non-labored, breath sounds are clear bilaterally, No wheezing, rales or rhonchi  Cardiovascular: Regular, S1 and S2, No murmurs, rubs, gallops or clicks  Gastrointestinal: Bowel Sounds present, soft, nontender.   Lymph: No peripheral edema. No lymphadenopathy.  Skin: No visible rashes or ulcers.  Psych:  Mood & affect appropriate  LABS: All Labs Reviewed:                        14.3   11.01 )-----------( 296      ( 28 Mar 2024 05:50 )             43.2                         13.7   11.46 )-----------( 264      ( 27 Mar 2024 05:20 )             41.3                         14.5   12.39 )-----------( 270      ( 26 Mar 2024 13:05 )             45.1     28 Mar 2024 05:50    142    |  104    |  18     ----------------------------<  119    3.0     |  30     |  0.90   27 Mar 2024 05:20    139    |  101    |  16     ----------------------------<  75     3.4     |  26     |  0.75   26 Mar 2024 13:05    139    |  104    |  22     ----------------------------<  70     3.9     |  19     |  0.87     Ca    9.2        28 Mar 2024 05:50  Ca    8.5        27 Mar 2024 05:20  Ca    8.9        26 Mar 2024 13:05  Mg     1.8       28 Mar 2024 05:50    TPro  6.2    /  Alb  2.3    /  TBili  0.8    /  DBili  x      /  AST  22     /  ALT  24     /  AlkPhos  72     27 Mar 2024 05:20  TPro  6.6    /  Alb  2.4    /  TBili  1.2    /  DBili  x      /  AST  32     /  ALT  29     /  AlkPhos  82     26 Mar 2024 13:05    12 Lead ECG:   Ventricular Rate 68 BPM    Atrial Rate 68 BPM    P-R Interval 132 ms    QRS Duration 108 ms    Q-T Interval 418 ms    QTC Calculation(Bazett) 444 ms    P Axis 34 degrees    R Axis -45 degrees    T Axis 50 degrees    Diagnosis Line Sinus rhythm withoccasional premature ventricular complexes  Left anterior fascicular block  Abnormal ECG  When compared with ECG of 03-NOV-2023 16:26,  T wave inversion no longer evident in Lateral leads  QT has shortened  Confirmed by BRENDAN GORDON ([id number]) on 3/27/2024 7:06:28 PM (03-26-24 @ 13:30)   EXAM:  ECHO TTE WO CON COMP W DOPP     PROCEDURE DATE:  06/01/2021    INTERPRETATION:  INDICATION: Bilateral pulmonary edema  Sonographer AS    Blood Pressure 130/64    Height 167.6 cm     Weight 113.4 kg       BSA 2.2 sq m    Dimensions:  LA 2.8       Normal Values: 2.0 - 4.0 cm  Ao 2.8        Normal Values: 2.0 - 3.8 cm  SEPTUM 1.0       Normal Values: 0.6 - 1.2 cm  PWT 0.9       Normal Values: 0.6 - 1.1 cm  LVIDd 3.6         Normal Values: 3.0 - 5.6 cm  LVIDs 2.4         Normal Values: 1.8 - 4.0 cm    OBSERVATIONS:  Technically difficult and limited study  Mitral Valve: normal, trace physiologic MR.  Aortic Valve/Aorta: Not well-visualized  Tricuspid Valve: Not well-visualized  Pulmonic Valve: Not well-visualized  Left Atrium:normal  Right Atrium: Not well-visualized  Left Ventricle: Left ventricle is not well-visualized. Overall grossly normal left ventricular systolic function, estimated LVEF of 55-60%.  Right Ventricle: Grossly normal size and systolic function.  Pericardium: no significant pericardial effusion.  Pulmonary/RV Pressure: estimated PA systolic pressure of 9.5 mmHg assuming an RA pressure of 3 mmHg.  LV diastolic dysfunction is present    IMPRESSION:  Technically difficult and limited study  Overall grossly normal left ventricular systolic function, estimated LVEF of 55-60%.  Grossly normal RV size and systolic function.  The aortic valve is not well-visualized  Trace physiologic MR      GULSHAN BECKER MD; Attending Cardiologist  This document has been electronically signed. Jun 2 2021  4:21PM

## 2024-03-29 NOTE — PROGRESS NOTE ADULT - PROBLEM SELECTOR PLAN 2
- Patient with hx of (HFpEF) heart failure with preserved ejection fraction.  - Last echo (6/2021): EF 55-60%  - EKG on admission- Sinus rhythm with occasional premature ventricular complexes, Left anterior fascicular block, 68 bpm   - No signs/symptoms of volume overload at present  - Continue home Lasix 40mg qd.  - Troponin on admission negative   - Patient follows with cardio Dr. Woods outpatient   - Cardio consulted by admitting team   - remote tele no events/ dc Tele   - Continue to monitor for routine hemodynamic changes
- Patient with hx of (HFpEF) heart failure with preserved ejection fraction.  - Last echo (6/2021): EF 55-60%   - No signs/symptoms of volume overload at present  - Continue home Lasix 40mg qd.  - Patient follows with cardio Dr. Woods outpatient   - Cardio consulted by admitting team   - remote tele no events/ dc Tele   - Continue to monitor for routine hemodynamic changes
- Patient with hx of (HFpEF) heart failure with preserved ejection fraction.  - Last echo (6/2021): EF 55-60%  - EKG on admission- Sinus rhythm with occasional premature ventricular complexes, Left anterior fascicular block, 68 bpm   - No signs/symptoms of volume overload at present  - Continue home Lasix 40mg qd.  - Troponin on admission negative   - Patient follows with cardio Dr. Woods outpatient   - Cardio consulted by admitting team   - remote tele   - Continue to monitor for routine hemodynamic changes

## 2024-03-29 NOTE — PROGRESS NOTE ADULT - PROBLEM SELECTOR PLAN 7
DVT ppx: SCD's. Hold Eliquis due to hematuria    Hx of PE: takes Eliquis 5mg BID, will hold tonight and f/u urology and cardio recs if safe to resume
DVT ppx: SCD's. Hold Eliquis due to hematuria    Hx of PE: takes Eliquis 5mg BID, will hold tonight and f/u urology and cardio recs if safe to resume
DVT ppx: SCD's. Cleared by  to resume Eliquis, no more hematuria, stable h/h

## 2024-03-29 NOTE — CASE MANAGEMENT PROGRESS NOTE - NSCMPROGRESSNOTE_GEN_ALL_CORE
Discussed patient with Dr. Brenner and plan for transition home today. CM met with patient at bedside and spoke with son Olvin over phone to discuss plan. CMS STAR patient status explained and pt accepting [facility]CS-chose [facility]. Follow up appt discussed and son Olvin verbalizes concern that patient does not leave house for MD visits. Due to this concern, I contacted Abiola Drew at [facility] and discussed appt concern. Abiola arranged with [facility] for an NP in-home visit. [facility] messaged me in [employer] that they are aware of NP visit  as well. House call Physician list provided. Patient requesting [facility] for transport home which was arranged and son called [employer] with credit card payment. [facility] referral sent and accepted for VN/PT SOC 3/30. CM contact info provided. CM will remain available.

## 2024-03-29 NOTE — PROGRESS NOTE ADULT - PROBLEM SELECTOR PROBLEM 1
Exercise for a Healthier Heart     Exercise with a friend. When activity is fun, you're more likely to stick with it.   You may wonder how you can improve the health of your heart. If you’re thinking about exercise, you’re on the right track. You don’t need to become an athlete. But you do need a certain amount of brisk exercise to help strengthen your heart. If you have been diagnosed with a heart condition, your healthcare provider may advise exercise to help stabilize your condition. To help make exercise a habit, choose safe, fun activities.   Before you start  Check with your healthcare provider before starting an exercise program. This is especially important if you have not been active for a while. It's also important if you have a long-term (chronic) health problem such as heart disease, diabetes, or obesity. Or if you are at high risk for having these problems.   Why exercise?  Exercising regularly offers many healthy rewards. It can help you do all of the following:  Improve your blood cholesterol level to help prevent further heart trouble  Lower your blood pressure to help prevent a stroke or heart attack  Control diabetes, or reduce your risk of getting this disease  Improve your heart and lung function  Reach and stay at a healthy weight  Make your muscles stronger so you can stay active  Prevent falls and fractures by slowing the loss of bone mass (osteoporosis)  Manage stress better  Reduce your blood pressure  Improve your sense of self and your body image  Exercise tips    Ease into your routine. Set small goals. Then build on them. If you are not sure what your activity level should be, talk with your healthcare provider first before starting an exercise routine.  Exercise on most days. Aim for a total of 150 minutes (2 hours and 30 minutes) or more of moderate-intensity aerobic activity each week. Or 75 minutes (1 hour and 15 minutes) or more of vigorous-intensity aerobic activity each week. Or 
Acute UTI
try for a combination of both. Moderate activity means that you breathe heavier and your heart rate increases but you can still talk. Think about doing 40 minutes of moderate exercise, 3 to 4 times a week. For best results, activity should last for about 40 minutes to lower blood pressure and cholesterol. It's OK to work up to the 40-minute period over time. Examples of moderate-intensity activity are walking 1 mile in 15 minutes. Or doing 30 to 45 minutes of yard work.  Step up your daily activity level.  Along with your exercise program, try being more active the whole day. Walk instead of drive. Or park further away so that you take more steps each day. Do more household tasks or yard work. You may not be able to meet the advised mount of physical activity. But doing some moderate- or vigorous-intensity aerobic activity can help reduce your risk for heart disease. Your healthcare provider can help you figure out what is best for you.  Choose 1 or more activities you enjoy.  Walking is one of the easiest things you can do. You can also try swimming, riding a bike, dancing, or taking an exercise class.    When to call your healthcare provider  Call your healthcare provider if you have any of these:   Chest pain or feel dizzy or lightheaded  Burning, tightness, pressure, or heaviness in your chest, neck, shoulders, back, or arms  Abnormal shortness of breath  More joint or muscle pain  A very fast or irregular heartbeat (palpitations)  Deborah last reviewed this educational content on 7/1/2019  © 5759-6320 [employer]. [address] [place] [address], [place] [address]. All rights reserved. This information is not intended as a substitute for professional medical care. Always follow your healthcare professional's instructions.        
Acute UTI
Acute UTI

## 2024-03-29 NOTE — DISCHARGE NOTE PROVIDER - NSDCCPCAREPLAN_GEN_ALL_CORE_FT
PRINCIPAL DISCHARGE DIAGNOSIS  Diagnosis: Acute UTI  Assessment and Plan of Treatment: You were treated with IV antibiotics   Urologist suggested no intervention for Kidney stone  You can get copies of your test result/ CT scan after discharge from medical records  please take meds per the leigh ann t  f/u with all your doctors   f/u with Urologist

## 2024-03-29 NOTE — PROGRESS NOTE ADULT - TIME BILLING
Note written by attending. Meds, labs, vitals, chart reviewed. Plan d/w patient. Counseling and education provided.
Note written by attending. Meds, labs, vitals, chart reviewed. Plan d/w patient. Counseling and education provided.
Note written by attending. Meds, labs, vitals, chart reviewed

## 2024-03-29 NOTE — PROGRESS NOTE ADULT - PROBLEM SELECTOR PLAN 3
-On Prednisone   - Neuro Dr. Castellon
Chronic   - Patient on home Prednisone 10mg daily   - Hold home Prednisone and start stress dose steroids in the setting of sepsis   - S/p stress dose steroids - IV Solucortef 100mg x1  - Neuro Dr. Castellon consulted
Chronic   - Patient on home Prednisone 10mg daily   - Hold home Prednisone and start stress dose steroids in the setting of sepsis   - S/p stress dose steroids - IV Solucortef 100mg x1  - Neuro Dr. Castellon consulted

## 2024-03-29 NOTE — PROGRESS NOTE ADULT - NS ATTEND AMEND GEN_ALL_CORE FT
77 yo M w/ pmh HFpEF, PE on Eliquis, HTN, dyslipidemia, myasthenia gravis, diet controlled T2DM p/w UTI and hematuria    HFpEF   - p/w hematuria likely from UTI now resolved, urology following   - Known hx of PE on Eliquis, he is very sedentary/immobile   - Hgb has been stable.  Would resume home DOAC if cleared by Uro    - EKG: NSR @ 68bpm w/ occasional PVC's and LAFB, unchanged from prior   - No clear evidence of acute ischemia  - Trops negative  - No anginal complaints     - No meaningful evidence of volume overload at this time.  - TTE (6/2021) LVEF 55-60%.  - Continue home dose Lasix     - BP, HR stable and well controlled  - Continue home BB  - Monitor and replete Lytes. Keep K > 4 and Mg > 2  - Will continue to follow.  Otherwise, stable from cardiac standpoint
79 yo M w/ pmh HFpEF, PE on Eliquis, HTN, dyslipidemia, myasthenia gravis, diet controlled T2DM p/w UTI and hematuria    No signs of significant ischemia or volume overload.   EKG nonischemic  hx of PE and is very sedentary/immobile.   hematuria currently cleared. If no  procedures planned consider resuming FD AC possible can start with Lovenox FD and if tolerates transition back to DOAC.   Monitor and replete electrolytes. Keep K>4.0 and Mg>2.0. Needs aggressive K repletion this AM.   Further cardiac workup will depend on clinical course.

## 2024-03-29 NOTE — DISCHARGE NOTE PROVIDER - NSDCMRMEDTOKEN_GEN_ALL_CORE_FT
cefuroxime 500 mg oral tablet: 1 tab(s) orally every 12 hours  Eliquis 5 mg oral tablet: 1 tab(s) orally 2 times a day  furosemide 40 mg oral tablet: 1 tab(s) orally once a day  metoprolol succinate 25 mg oral tablet, extended release: 1 tab(s) orally once a day  omeprazole 40 mg oral delayed release capsule: 1 cap(s) orally once a day  Potassium Chloride (Eqv-Klor-Con M20) 20 mEq oral tablet, extended release: 1 tab(s) orally once a day Resume taking this tomorrow, 11/9/23  Praluent Pen 75 mg/mL subcutaneous solution: 75 milligram(s) subcutaneous every 2 weeks  predniSONE 10 mg oral tablet: 1 tab(s) orally once a day  tamsulosin 0.4 mg oral capsule: 1 cap(s) orally once a day (at bedtime)

## 2024-03-29 NOTE — PROGRESS NOTE ADULT - PROVIDER SPECIALTY LIST ADULT
Infectious Disease
Infectious Disease
Cardiology
Neurology
Neurology
Urology
Cardiology
Urology
Hospitalist

## 2024-03-29 NOTE — PHARMACOTHERAPY INTERVENTION NOTE - COMMENTS
77 yo male presenting with several days of generalized weakness, decreased PO intake, feeling cold and hematuria. Patient with complicated PMHx including MG.     Patient with urine culture + 11/3/2023 for Klebsiella pneumoniae pan-susceptible. UA+ today in the ED. ED team concerned about appropriate abx therapy given hx of MG. Discussed with NP Neploch - evidence based literature does not show a correlation between ceftriaxone and MG exacerbation. Order for ceftriaxone 1g x 1 entered per discussion. 
Patient with history of PE (6/2021) ordered for Eliquis 5 mg BID - clarified timing of administration with Dr. Brenner since current order will default to first dose being given tonight. MD would like for patient to receive dose this morning. Ordered Eliquis 5 mg STAT dose  for patient as per discussion.

## 2024-03-29 NOTE — PROGRESS NOTE ADULT - ASSESSMENT
77 yo M w/ pmh HFpEF, PE on Eliquis, HTN, dyslipidemia, myasthenia gravis, diet controlled T2DM p/w UTI and hematuria    HFpEF   - p/w hematuria likely from UTI now resolved, urology following   - Known hx of PE on Eliquis, he is very sedentary/immobile   - Hgb has been stable.  Would resume home DOAC if cleared by Uro    - EKG: NSR @ 68bpm w/ occasional PVC's and LAFB, unchanged from prior   - No clear evidence of acute ischemia  - Trops negative  - No anginal complaints     - No meaningful evidence of volume overload at this time.  - TTE (6/2021) LVEF 55-60%.  - Continue home dose Lasix     - BP, HR stable and well controlled  - Continue home BB  - Monitor and replete Lytes. Keep K > 4 and Mg > 2  - Will continue to follow.  Otherwise, stable from cardiac standpoint    Florina Galicia DNP, NP-C, AGACNP-C  Cardiology   Call TEAMS

## 2024-03-29 NOTE — DISCHARGE NOTE NURSING/CASE MANAGEMENT/SOCIAL WORK - NSDCPEFALRISK_GEN_ALL_CORE
For information on Fall & Injury Prevention, visit: [email or web address] OR  [email or web address] OR  https://www.cdc.gov/steadi/patient.html

## 2024-03-29 NOTE — DISCHARGE NOTE NURSING/CASE MANAGEMENT/SOCIAL WORK - PATIENT PORTAL LINK FT
You can access the FollowMyHealth Patient Portal offered by [facility] by registering at the following website: [email or web address]. By joining [employer]’s FollowMyHealth portal, you will also be able to view your health information using other applications (apps) compatible with our system.

## 2024-03-29 NOTE — PROGRESS NOTE ADULT - PROBLEM SELECTOR PLAN 6
Chronic   - Continue home medication Omeprazole 40mg per day-- converted to pantoprazole 40mg daily

## 2024-03-29 NOTE — DISCHARGE NOTE NURSING/CASE MANAGEMENT/SOCIAL WORK - NSDCFUADDAPPT_GEN_ALL_CORE_FT
I have contact [facility] & [facility] to coordinate a NP visit by [facility] within 1 week of discharge as son states pt is difficult to take out of house for visits.

## 2024-03-29 NOTE — PROGRESS NOTE ADULT - SUBJECTIVE AND OBJECTIVE BOX
Patient is a 78y old  Male who presents with a chief complaint of Acute hemorrhagic cystitis (29 Mar 2024 07:50)       INTERVAL HPI/OVERNIGHT EVENTS: Patient seen and examined at bedside. Denies any new symptoms/complaints. No chest pain, hematuria, sob. Wants to go home today     MEDICATIONS  (STANDING):  apixaban 5 milliGRAM(s) Oral every 12 hours  cefuroxime   Tablet 500 milliGRAM(s) Oral every 12 hours  furosemide    Tablet 40 milliGRAM(s) Oral daily  influenza  Vaccine (HIGH DOSE) 0.7 milliLiter(s) IntraMuscular once  metoprolol succinate ER 25 milliGRAM(s) Oral daily  pantoprazole    Tablet 40 milliGRAM(s) Oral before breakfast  predniSONE   Tablet 10 milliGRAM(s) Oral daily  tamsulosin 0.4 milliGRAM(s) Oral at bedtime    MEDICATIONS  (PRN):      Allergies    levofloxacin (Unknown)  gatifloxacin (Unknown)  ofloxacin (Unknown)    Intolerances    fluoroquinolone antibiotics (Other)  Ketek (Other)  telithromycin (Other)  Avelox (Other)      REVIEW OF SYSTEMS:  Per HPI. All other ROS noted Negative   Vital Signs Last 24 Hrs  T(C): 36.2 (29 Mar 2024 05:00), Max: 36.7 (28 Mar 2024 20:14)  T(F): 97.2 (29 Mar 2024 05:00), Max: 98 (28 Mar 2024 20:14)  HR: 61 (29 Mar 2024 05:00) (61 - 76)  BP: 105/62 (29 Mar 2024 05:00) (105/62 - 134/75)  BP(mean): --  RR: 18 (29 Mar 2024 05:00) (18 - 20)  SpO2: 94% (29 Mar 2024 05:00) (94% - 96%)    Parameters below as of 29 Mar 2024 05:00  Patient On (Oxygen Delivery Method): room air        PHYSICAL EXAM:  GENERAL: NAD, comfortable.  HEAD:  Atraumatic, Normocephalic  EYES: EOMI, PERRLA, conjunctiva and sclera clear  ENMT: No tonsillar erythema, exudates, or enlargement; Moist mucous membranes  NECK: Supple, No JVD, Normal thyroid  NERVOUS SYSTEM:  Alert & Oriented X3, Good concentration; Motor Strength 5/5 B/L upper and lower extremities  CHEST/LUNG: Clear to auscultation bilaterally; No rales, rhonchi, wheezing, or rubs  HEART: S1S2+,  Regular rate and rhythm  ABDOMEN: Soft, Nontender, Nondistended; Bowel sounds present  EXTREMITIES:  2+ Peripheral Pulses, No clubbing, cyanosis, chronic nail deformity.   LYMPH: No lymphadenopathy noted  SKIN: No rashes or lesions    LABS:      Ca    9.2        28 Mar 2024 05:50        CAPILLARY BLOOD GLUCOSE        BLOOD CULTURE  03-26 @ 16:25   50,000 - 99,000 CFU/mL Klebsiella pneumoniae  --  --  03-26 @ 13:05   No growth at 48 Hours  --  --  03-26 @ 13:00   No growth at 48 Hours  --  --    RADIOLOGY & ADDITIONAL TESTS:    Imaging Personally Reviewed:  [ ] YES     Consultant(s) Notes Reviewed:      Care Discussed with Consultants/Other Providers:

## 2024-03-29 NOTE — PROGRESS NOTE ADULT - SUBJECTIVE AND OBJECTIVE BOX
[facility]  INFECTIOUS DISEASES   [address] [place] [address]  Tel: [phone]     Fax: [phone]  ========================================================  MD Lalita Oquendo Kaushal, MD Cho, Michelle, MD Sunjit, Jaspal, MD  ========================================================    [id number]  DEION HEATH     Follow up: Renal stones and UTI    No fever, no urinary symptoms. No flank or abdominal pain.     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  Club foot  Surgery at birth for Club Foot Right foot  Pilonidal cyst  Surgery 40 years ago  H/O colonoscopy  Spinal stenosis  H/O prostate biopsy    Social Hx: No current smoking, EtOH or drugs     FAMILY HISTORY:  Family history of stroke (Father)  Father -  age 62    Family history of kidney disease (Mother)  Mother -  age 67    Family history of diabetes mellitus type II (Sibling)  Brother & Sister    Allergies  levofloxacin (Unknown)  gatifloxacin (Unknown)  ofloxacin (Unknown)  fluoroquinolone antibiotics (Other)  Ketek (Other)  telithromycin (Other)  Avelox (Other)    MEDICATIONS  (STANDING):  apixaban 5 milliGRAM(s) Oral every 12 hours  cefuroxime   Tablet 500 milliGRAM(s) Oral every 12 hours  furosemide    Tablet 40 milliGRAM(s) Oral daily  influenza  Vaccine (HIGH DOSE) 0.7 milliLiter(s) IntraMuscular once  metoprolol succinate ER 25 milliGRAM(s) Oral daily  pantoprazole    Tablet 40 milliGRAM(s) Oral before breakfast  predniSONE   Tablet 10 milliGRAM(s) Oral daily  tamsulosin 0.4 milliGRAM(s) Oral at bedtime     REVIEW OF SYSTEMS:  CONSTITUTIONAL:  No Fever or chills  HEENT:  No diplopia or blurred vision.  No sore throat or runny nose.  CARDIOVASCULAR:  No chest pain   RESPIRATORY:  No cough, shortness of breath, PND or orthopnea.  GASTROINTESTINAL:  No nausea, vomiting or diarrhea.  GENITOURINARY:  No dysuria, frequency or urgency. No Blood in urine  MUSCULOSKELETAL:  no joint aches, no muscle pain  SKIN:  No change in skin, hair or nails.    Physical Exam:  Vital Signs Last 24 Hrs  T(C): 36.2 (29 Mar 2024 05:00), Max: 36.7 (28 Mar 2024 20:14)  T(F): 97.2 (29 Mar 2024 05:00), Max: 98 (28 Mar 2024 20:14)  HR: 61 (29 Mar 2024 05:00) (61 - 76)  BP: 105/62 (29 Mar 2024 05:00) (105/62 - 134/75)  RR: 18 (29 Mar 2024 05:00) (18 - 20)  SpO2: 94% (29 Mar 2024 05:00) (94% - 96%)  Parameters below as of 29 Mar 2024 05:00  Patient On (Oxygen Delivery Method): room air  GEN: NAD  HEENT: normocephalic and atraumatic. EOMI. PERRL.    NECK: Supple.  No lymphadenopathy   LUNGS: Clear to auscultation.  HEART: Regular rate and rhythm   ABDOMEN: Soft, nontender, and nondistended.  Positive bowel sounds.    : No CVA tenderness  EXTREMITIES: + edema. No wound, tender in touch whole lower leg and feet  NEUROLOGIC: grossly intact.  PSYCHIATRIC: Appropriate affect .  SKIN: No rash     Labs:                        14.3   11.01 )-----------( 296      ( 28 Mar 2024 05:50 )             43.2         142  |  104  |  18  ----------------------------<  119<H>  3.0<L>   |  30  |  0.90    Ca    9.2      28 Mar 2024 05:50  Mg     1.8         Culture - Urine (collected 24 @ 16:25)  Source: Clean Catch Clean Catch (Midstream)  Preliminary Report (24 @ 21:00):    50,000 - 99,000 CFU/mL Klebsiella pneumoniae    Culture - Blood (collected 24 @ 13:05)  Source: .Blood Blood-Peripheral  Preliminary Report (24 @ 19:02):    No growth at 48 Hours    Culture - Blood (collected 24 @ 13:00)  Source: .Blood Blood-Peripheral  Preliminary Report (24 @ 19:02):    No growth at 48 Hours    Culture - Blood (collected 23 @ 21:50)  Source: .Blood Blood-Venous  Final Report (23 @ 03:01):    No growth at 5 days    Culture - Blood (collected 23 @ 21:45)  Source: .Blood Blood-Venous  Final Report (23 @ 03:01):    No growth at 5 days    Culture - Urine (collected 23 @ 21:40)  Source: Clean Catch Clean Catch (Midstream)  Final Report (23 @ 10:29):    >100,000 CFU/ml Klebsiella pneumoniae  Organism: Klebsiella pneumoniae (23 @ 10:29)  Organism: Klebsiella pneumoniae (23 @ 10:29)    Sensitivities:      Method Type: MONI      -  Amoxicillin/Clavulanic Acid: S <=8/4      -  Ampicillin: R 16 These ampicillin results predict results for amoxicillin      -  Ampicillin/Sulbactam: S <=4/2      -  Aztreonam: S <=4      -  Cefazolin: S <=2 For uncomplicated UTI with K. pneumoniae, E. coli, or P. mirablis: MONI <=16 is sensitive and MONI >=32 is resistant. This also predicts results for oral agents cefaclor, cefdinir, cefpodoxime, cefprozil, cefuroxime axetil, cephalexin and locarbef for uncomplicated UTI. Note that some isolates may be susceptible to these agents while testing resistant to cefazolin.      -  Cefepime: S <=2      -  Cefoxitin: S <=8      -  Ceftriaxone: S <=1      -  Cefuroxime: S <=4      -  Ciprofloxacin: S <=0.25      -  Ertapenem: S <=0.5      -  Gentamicin: S <=2      -  Imipenem: S <=1      -  Levofloxacin: S <=0.5      -  Meropenem: S <=1      -  Nitrofurantoin: S <=32 Should not be used to treat pyelonephritis      -  Piperacillin/Tazobactam: S <=8      -  Tobramycin: S <=2      -  Trimethoprim/Sulfamethoxazole: S <=0.5/9.5    WBC Count: 11.01 K/uL (24 @ 05:50)  WBC Count: 11.46 K/uL (24 @ 05:20)  WBC Count: 12.39 K/uL (24 @ 13:05)    Creatinine: 0.90 mg/dL (24 @ 05:50)  Creatinine: 0.75 mg/dL (24 @ 05:20)  Creatinine: 0.87 mg/dL (24 @ 13:05)     SARS-CoV-2 Result: NotDetec (24 @ 13:05)    All imaging and other data have been reviewed.  < from: CT Abdomen and Pelvis No Cont (24 @ 20:49) >  IMPRESSION:  7 mm right distal ureteral calculus with mild right hydroureteronephrosis   and dilated extrarenal pelvis. Bladder calculi measuring up to 8 mm.  Enlarged prostate indenting the bladder base.  Old granulomatous disease.    Assessment and Plan:   77yo man with PMH of HTN, CHF, PE, MG and chronic LE edema and pain was admitted with generalized weakness for 4 days and hematuria. As per chart patient was having intermittent hematuria since past 2-3 months and was treated with PO antibiotics. However since the past 3-4 days patient was complaining of feeling cold and was appearing weaker than usual. Patient also endorses decreased appetite since the past few days. Son noticed discolored sheets today when changing patient and thought it may be bloody urine. Otherwise patient denies any chest pain, shortness of breath, abdominal pain, suprapubic tenderness, n/v/d/c.   UA showed WBC TNTC and RBC=19   WBC on admission 12k    CT abdomen with 7mm R distal ureteral calculus wit mild hydroureteronephrosis and 8mm bladder calculus and enlarged prostate    # UTI  # Nephrolithiasis     - Blood cultures NGTD   - UC with Klebsiella sensitivity is pending but had the same in 2023 pansensitive   - Urology consult noted, no intervention at this time   - Was on Ceftriaxone 1gm daily   - Lost IV and declined reinsertion on 3/28 so switched to cefuroxime 500mg q12 (allergic to FQ)  - Monitor WBC and Tmax   - Will complete 10days of antibiotics total (6days of cefuroxime) due to hydronephrosis and the fact he still has the stone  - If recurrent pain or infection, will need  intervention for source control.     Will sign off please call with any question.   D/W Dr. Brenner.    Jordin Mckinley MD  Division of Infectious Diseases   Please call ID service at [phone] with any question.    50 minutes spent on total encounter assessing patient, examination, chart review, counseling and coordinating care by the attending physician/nurse/care manager.

## 2024-03-29 NOTE — PROGRESS NOTE ADULT - PROBLEM SELECTOR PLAN 4
- Chronic, stable on admission   - Continue home Metoprolol and Lasix with hold parameters.

## 2024-03-29 NOTE — PROGRESS NOTE ADULT - PROBLEM SELECTOR PLAN 1
- S/p IV Ceftriaxone. Refusing IV  -Start Ceftin 500mg po BID   -BS NGTD  -UC Pending   - Trend WBC and monitor for fever  - PO Tylneol 650mg q6 PRN for fever  -Nephrolithiasis: Per Urology no intervention needed.   -Patient wants to go home. States that at baseline can not walk. Refusing PT.   -SW consult
UA: mod blood, protein, ketone, large LE, nitrite +, WBC, bacteria, squamous epithelial cells   - CT abd/pelvis ordered, f/u results   - Lactate 2.1 -> 1.3   - Continue with IV Rocephin   - f/u  Ucx and Bcx  - Trend WBC and monitor for fever  - PO Tylneol 650mg q6 PRN for fever   - ID Dr. Mckinley consulted by admitting team. Will f/u recommendations   - Urology following. NPO for now. Gentle hydration while NPO.   -Will resume diet if  not planning for any procedure
- S/p IV Ceftriaxone. Refusing IV  -Start Ceftin 500mg po BID   -BS NGTD  -UC Pending sensitivities.   - Trend WBC and monitor for fever  - PO Tylneol 650mg q6 PRN for fever  -Nephrolithiasis: Per Urology no intervention needed.   -Patient wants to go home. States that at baseline can not walk. Refusing PT.   -SW consult  - Per Urology no intervention needed, spoke to patient 's son as well. Ok to resume Eliquis for h/o PE, stable h/h, no hematuria as inpatient

## 2024-03-29 NOTE — PROGRESS NOTE ADULT - SUBJECTIVE AND OBJECTIVE BOX
Neurology follow up note    DEION [id number]      Interval History:    Patient feels ok no new complaints.    Allergies    levofloxacin (Unknown)  gatifloxacin (Unknown)  ofloxacin (Unknown)    Intolerances    fluoroquinolone antibiotics (Other)  Ketek (Other)  telithromycin (Other)  Avelox (Other)      MEDICATIONS    cefuroxime   Tablet 500 milliGRAM(s) Oral every 12 hours  furosemide    Tablet 40 milliGRAM(s) Oral daily  influenza  Vaccine (HIGH DOSE) 0.7 milliLiter(s) IntraMuscular once  metoprolol succinate ER 25 milliGRAM(s) Oral daily  pantoprazole    Tablet 40 milliGRAM(s) Oral before breakfast  predniSONE   Tablet 10 milliGRAM(s) Oral daily  tamsulosin 0.4 milliGRAM(s) Oral at bedtime              Vital Signs Last 24 Hrs  T(C): 36.2 (29 Mar 2024 05:00), Max: 36.7 (28 Mar 2024 20:14)  T(F): 97.2 (29 Mar 2024 05:00), Max: 98 (28 Mar 2024 20:14)  HR: 61 (29 Mar 2024 05:00) (61 - 76)  BP: 105/62 (29 Mar 2024 05:00) (105/62 - 134/75)  BP(mean): --  RR: 18 (29 Mar 2024 05:00) (18 - 20)  SpO2: 94% (29 Mar 2024 05:00) (94% - 96%)    Parameters below as of 29 Mar 2024 05:00  Patient On (Oxygen Delivery Method): room air    REVIEW OF SYSTEMS:  CONSTITUTIONAL:  No fevers, chills, night sweats.  HEAD:  No headache.  EYES:  No double vision or blurry vision.  EARS:  No ringing in the ears.  NECK:  No neck pain.  CARDIOVASCULAR:  No chest pain with shortness of breath.  ABDOMEN:  No nausea, vomiting, abdominal pain.  EXTREMITY/NEUROLOGICAL:  No numbness, no tingling.  MUSCULOSKELETAL:  Occasional joint pain.  GENERAL:  Possible episodes of feeling weak all over.  No droopy eyes.    PHYSICAL EXAMINATION:  HEAD:  Normocephalic, atraumatic.  EYES:  No scleral icterus.  EARS:  Hearing appear to be intact.  NECK:  Supple.  CARDIOVASCULAR:  S1 and S2 heard.  RESPIRATORY:  Air entry decreased bilaterally.  ABDOMEN:  Soft, nontender.  EXTREMITIES:  No clubbing or cyanosis were noted.    NEUROLOGIC:  Patient awake, alert, location [facility], year 2024, month was March, recall was 0/3 within 3 minutes and 1/3 with prompting.  The patient does have memory issues was able to name simple objects.  Extraocular movements were intact.  Speech was fluent.  Smile symmetric.  Motor, patient has decreased range of motion bilateral shoulders, unable to elevate off the bed, suspect secondary arthritis of rotator cuff issues.  Biceps, triceps, hand grasp was 4/5 bilateral lower extremity.  Slight flexion and extension of the hip and knee, has significantly increased tone say overall 1/5.    LABS:  CBC Full  -  ( 28 Mar 2024 05:50 )  WBC Count : 11.01 K/uL  RBC Count : 4.62 M/uL  Hemoglobin : 14.3 g/dL  Hematocrit : 43.2 %  Platelet Count - Automated : 296 K/uL  Mean Cell Volume : 93.5 fl  Mean Cell Hemoglobin : 31.0 pg  Mean Cell Hemoglobin Concentration : 33.1 gm/dL  Auto Neutrophil # : x  Auto Lymphocyte # : x  Auto Monocyte # : x  Auto Eosinophil # : x  Auto Basophil # : x  Auto Neutrophil % : x  Auto Lymphocyte % : x  Auto Monocyte % : x  Auto Eosinophil % : x  Auto Basophil % : x    Urinalysis Basic - ( 28 Mar 2024 05:50 )    Color: x / Appearance: x / SG: x / pH: x  Gluc: 119 mg/dL / Ketone: x  / Bili: x / Urobili: x   Blood: x / Protein: x / Nitrite: x   Leuk Esterase: x / RBC: x / WBC x   Sq Epi: x / Non Sq Epi: x / Bacteria: x      03-28    142  |  104  |  18  ----------------------------<  119<H>  3.0<L>   |  30  |  0.90    Ca    9.2      28 Mar 2024 05:50  Mg     1.8     03-28      Hemoglobin A1C:       Vitamin B12         RADIOLOGY      ANALYSIS AND PLAN:  This is an 78-year-old with a history of myasthenia gravis and generalized weakness.    For generalized weakness, paresis, suspect most likely this is secondary to age-related changes, deconditioning which has exacerbated by underlying infectious type process.  Suspect less likely this is a primary CNS event.  The patient just recently had on March 19th and MRI of thoracic lumbar spine did not show any cord compression.  For history of myasthenia gravis, the patient was also offered alternate medication by his outside neurologist, which he refused.  We will recommend continue patient on prednisone.  For possibly infectious type process.  Would be cautious with using antibiotics, aminoglycoside, fluoroquinolones, Macrolides, cardiovascular drugs such as beta blockers, procainamide, and other medications such as statins.  Monitor patient's magnesium level.  If antibiotics are needed risks versus benefits of using those specific antibiotics.  For history of pulmonary emboli, continue patient on anticoagulation.  For history of hypertension, monitor systolic blood pressure.  history of hypertension, monitor systolic blood pressure.  For mild cognitive impairment.     I spoke with son, Olvin is aware while in hospital setting may become more disoriented with the change in location.  Olvin cell phone #[phone] 3/28    Spoke with patient outside neurologist, Dr. Rudy Bowman, updated him in the past.  He will be faxing over MRI results.    49 minutes of time was spent with the patient, plan of care, reviewing data, speaking to multidisciplinary healthcare team with greater than 50% time counseling and care coordination.  Thank you for courtesy of consultation.    PATIENT HAS NOT YET BEEN SEEN AND EXAMINED TODAY. NOTE AND CHART REVIEWED IN AM AND EXAM FORM PREVIOUS.  ONCE PATIENT SEEN, CHART WILL BE UPDATE AT PRESENT NOTE IS INCOMPLETE     Neurology follow up note    DEION [id number]      Interval History:    Patient feels ok no new complaints.    Allergies    levofloxacin (Unknown)  gatifloxacin (Unknown)  ofloxacin (Unknown)    Intolerances    fluoroquinolone antibiotics (Other)  Ketek (Other)  telithromycin (Other)  Avelox (Other)      MEDICATIONS    cefuroxime   Tablet 500 milliGRAM(s) Oral every 12 hours  furosemide    Tablet 40 milliGRAM(s) Oral daily  influenza  Vaccine (HIGH DOSE) 0.7 milliLiter(s) IntraMuscular once  metoprolol succinate ER 25 milliGRAM(s) Oral daily  pantoprazole    Tablet 40 milliGRAM(s) Oral before breakfast  predniSONE   Tablet 10 milliGRAM(s) Oral daily  tamsulosin 0.4 milliGRAM(s) Oral at bedtime              Vital Signs Last 24 Hrs  T(C): 36.2 (29 Mar 2024 05:00), Max: 36.7 (28 Mar 2024 20:14)  T(F): 97.2 (29 Mar 2024 05:00), Max: 98 (28 Mar 2024 20:14)  HR: 61 (29 Mar 2024 05:00) (61 - 76)  BP: 105/62 (29 Mar 2024 05:00) (105/62 - 134/75)  BP(mean): --  RR: 18 (29 Mar 2024 05:00) (18 - 20)  SpO2: 94% (29 Mar 2024 05:00) (94% - 96%)    Parameters below as of 29 Mar 2024 05:00  Patient On (Oxygen Delivery Method): room air    REVIEW OF SYSTEMS:  CONSTITUTIONAL:  No fevers, chills, night sweats.  HEAD:  No headache.  EYES:  No double vision or blurry vision.  EARS:  No ringing in the ears.  NECK:  No neck pain.  CARDIOVASCULAR:  No chest pain with shortness of breath.  ABDOMEN:  No nausea, vomiting, abdominal pain.  EXTREMITY/NEUROLOGICAL:  No numbness, no tingling.  MUSCULOSKELETAL:  Occasional joint pain.  GENERAL:  Possible episodes of feeling weak all over.  No droopy eyes.    PHYSICAL EXAMINATION:  HEAD:  Normocephalic, atraumatic.  EYES:  No scleral icterus.  EARS:  Hearing appear to be intact.  NECK:  Supple.  CARDIOVASCULAR:  S1 and S2 heard.  RESPIRATORY:  Air entry decreased bilaterally.  ABDOMEN:  Soft, nontender.  EXTREMITIES:  No clubbing or cyanosis were noted.    NEUROLOGIC:  Patient awake, alert, location [facility], year 2024, month was March, recall was 0/3 within 3 minutes and 1/3 with prompting.  The patient does have memory issues was able to name simple objects.  Extraocular movements were intact.  Speech was fluent.  Smile symmetric.  Motor, patient has decreased range of motion bilateral shoulders, unable to elevate off the bed, suspect secondary arthritis of rotator cuff issues.  Biceps, triceps, hand grasp was 4/5 bilateral lower extremity.  Slight flexion and extension of the hip and knee, has significantly increased tone say overall 1/5.    LABS:  CBC Full  -  ( 28 Mar 2024 05:50 )  WBC Count : 11.01 K/uL  RBC Count : 4.62 M/uL  Hemoglobin : 14.3 g/dL  Hematocrit : 43.2 %  Platelet Count - Automated : 296 K/uL  Mean Cell Volume : 93.5 fl  Mean Cell Hemoglobin : 31.0 pg  Mean Cell Hemoglobin Concentration : 33.1 gm/dL  Auto Neutrophil # : x  Auto Lymphocyte # : x  Auto Monocyte # : x  Auto Eosinophil # : x  Auto Basophil # : x  Auto Neutrophil % : x  Auto Lymphocyte % : x  Auto Monocyte % : x  Auto Eosinophil % : x  Auto Basophil % : x    Urinalysis Basic - ( 28 Mar 2024 05:50 )    Color: x / Appearance: x / SG: x / pH: x  Gluc: 119 mg/dL / Ketone: x  / Bili: x / Urobili: x   Blood: x / Protein: x / Nitrite: x   Leuk Esterase: x / RBC: x / WBC x   Sq Epi: x / Non Sq Epi: x / Bacteria: x      03-28    142  |  104  |  18  ----------------------------<  119<H>  3.0<L>   |  30  |  0.90    Ca    9.2      28 Mar 2024 05:50  Mg     1.8     03-28      Hemoglobin A1C:       Vitamin B12         RADIOLOGY      ANALYSIS AND PLAN:  This is an 78-year-old with a history of myasthenia gravis and generalized weakness.    For generalized weakness, paresis, suspect most likely this is secondary to age-related changes, deconditioning which has exacerbated by underlying infectious type process.  Suspect less likely this is a primary CNS event.  The patient just recently had on March 19th and MRI of thoracic lumbar spine did not show any cord compression.  For history of myasthenia gravis, the patient was also offered alternate medication by his outside neurologist, which he refused.  We will recommend continue patient on prednisone.  For possibly infectious type process.  Would be cautious with using antibiotics, aminoglycoside, fluoroquinolones, Macrolides, cardiovascular drugs such as beta blockers, procainamide, and other medications such as statins.  Monitor patient's magnesium level.  If antibiotics are needed risks versus benefits of using those specific antibiotics.  For history of pulmonary emboli, continue patient on anticoagulation.  For history of hypertension, monitor systolic blood pressure.  history of hypertension, monitor systolic blood pressure.  For mild cognitive impairment.   neurology wise stable     I spoke with son, Olvin is aware while in hospital setting may become more disoriented with the change in location.  Olvin cell phone #[phone] 3/29    Spoke with patient outside neurologist, Dr. Rudy Bowman, updated him in the past.  He will be faxing over MRI results.    49 minutes of time was spent with the patient, plan of care, reviewing data, speaking to multidisciplinary healthcare team with greater than 50% time counseling and care coordination.  Thank you for courtesy of consultation.

## 2024-03-31 LAB
CULTURE RESULTS: SIGNIFICANT CHANGE UP
CULTURE RESULTS: SIGNIFICANT CHANGE UP
SPECIMEN SOURCE: SIGNIFICANT CHANGE UP
SPECIMEN SOURCE: SIGNIFICANT CHANGE UP

## 2024-05-15 RX ORDER — METOPROLOL SUCCINATE 25 MG/1
25 TABLET, EXTENDED RELEASE ORAL
Qty: 90 | Refills: 0 | Status: ACTIVE | COMMUNITY
Start: 1900-01-01 | End: 1900-01-01

## 2024-05-15 RX ORDER — POTASSIUM CHLORIDE 1500 MG/1
20 TABLET, EXTENDED RELEASE ORAL
Qty: 90 | Refills: 0 | Status: ACTIVE | COMMUNITY
Start: 2021-11-19 | End: 1900-01-01

## 2024-05-16 RX ORDER — APIXABAN 5 MG/1
5 TABLET, FILM COATED ORAL
Qty: 180 | Refills: 0 | Status: ACTIVE | COMMUNITY
Start: 1900-01-01 | End: 1900-01-01

## 2024-05-17 ENCOUNTER — TRANSCRIPTION ENCOUNTER (OUTPATIENT)
Age: 79
End: 2024-05-17

## 2024-05-23 RX ORDER — OMEPRAZOLE 40 MG/1
40 CAPSULE, DELAYED RELEASE ORAL
Qty: 30 | Refills: 0 | Status: ACTIVE | COMMUNITY
Start: 1900-01-01 | End: 1900-01-01

## 2024-06-05 RX ORDER — FUROSEMIDE 40 MG/1
40 TABLET ORAL
Qty: 45 | Refills: 0 | Status: ACTIVE | COMMUNITY
Start: 2021-08-10 | End: 1900-01-01

## 2024-07-30 NOTE — H&P ADULT - PROBLEM SELECTOR PROBLEM 3
Nathaniel Krause Patient Age: 43 year old  MESSAGE:   Patient called. Stated she possibly has a bug in her right ear. Started last night. Patient is booked for 8/2/24@10am. Patient would like to know if she can wait until Friday to be seen or is this something urgent she needs to come in for right away? Also stated if there are any home remedies she can do until she comes in. Please advise.      WEIGHT AND HEIGHT:   Wt Readings from Last 1 Encounters:   04/13/18 55.8 kg (123 lb)     Ht Readings from Last 1 Encounters:   04/13/18 5' 2\" (1.575 m)     BMI Readings from Last 1 Encounters:   04/13/18 22.50 kg/m²       ALLERGIES:  Patient has no allergy information on record.  No current outpatient medications on file.     No current facility-administered medications for this visit.     PHARMACY to use: [id number] - [place] [address] AT [facility] ([address] [facility] [address]  Phone: [phone] Fax: [phone]          Pharmacy preference(s) on file:   [facility][id number] - [place] [address] AT [facility] ([facility]  [address] [facility] [address]  Phone: [phone] Fax: [phone]      CALL BACK INFO: Ok to leave response (including medical information) with family member or on answering machine  ROUTING: Patient's physician/staff        PCP: Pcp, Verify         INS: Payor: SETH/LUIS ANGEL / Plan: [id number] / Product Type: PPO MISC   PATIENT ADDRESS:  [address] [place] [address] [place] [address]   Chronic CHF Myasthenia gravis

## 2024-07-31 NOTE — DISCHARGE NOTE NURSING/CASE MANAGEMENT/SOCIAL WORK - NSDCVIVACCINE_GEN_ALL_CORE_FT
Addended by: WIL MORGAN on: 7/31/2024 10:21 AM     Modules accepted: Orders     No Vaccines Administered.

## 2024-09-04 ENCOUNTER — TRANSCRIPTION ENCOUNTER (OUTPATIENT)
Age: 79
End: 2024-09-04

## 2024-09-05 ENCOUNTER — INPATIENT (INPATIENT)
Facility: HOSPITAL | Age: 79
LOS: 0 days | Discharge: ANOTHER HOSPITAL | DRG: 871 | End: 2024-09-05
Attending: STUDENT IN AN ORGANIZED HEALTH CARE EDUCATION/TRAINING PROGRAM | Admitting: INTERNAL MEDICINE
Payer: MEDICARE

## 2024-09-05 ENCOUNTER — INPATIENT (INPATIENT)
Facility: HOSPITAL | Age: 79
LOS: 10 days | Discharge: NURSING, REHAB OR LONG-TERM CARE | DRG: 871 | End: 2024-09-16
Attending: INTERNAL MEDICINE | Admitting: INTERNAL MEDICINE
Payer: MEDICARE

## 2024-09-05 VITALS
DIASTOLIC BLOOD PRESSURE: 61 MMHG | RESPIRATION RATE: 31 BRPM | OXYGEN SATURATION: 94 % | SYSTOLIC BLOOD PRESSURE: 90 MMHG | TEMPERATURE: 99 F | HEART RATE: 84 BPM

## 2024-09-05 VITALS
WEIGHT: 240.08 LBS | TEMPERATURE: 101 F | HEIGHT: 66 IN | HEART RATE: 135 BPM | DIASTOLIC BLOOD PRESSURE: 54 MMHG | OXYGEN SATURATION: 95 % | SYSTOLIC BLOOD PRESSURE: 92 MMHG | RESPIRATION RATE: 20 BRPM

## 2024-09-05 DIAGNOSIS — L05.91 PILONIDAL CYST WITHOUT ABSCESS: Chronic | ICD-10-CM

## 2024-09-05 DIAGNOSIS — M48.00 SPINAL STENOSIS, SITE UNSPECIFIED: Chronic | ICD-10-CM

## 2024-09-05 DIAGNOSIS — Z98.890 OTHER SPECIFIED POSTPROCEDURAL STATES: Chronic | ICD-10-CM

## 2024-09-05 DIAGNOSIS — Z98.89 OTHER SPECIFIED POSTPROCEDURAL STATES: Chronic | ICD-10-CM

## 2024-09-05 DIAGNOSIS — A41.9 SEPSIS, UNSPECIFIED ORGANISM: ICD-10-CM

## 2024-09-05 DIAGNOSIS — Q66.89 OTHER SPECIFIED CONGENITAL DEFORMITIES OF FEET: Chronic | ICD-10-CM

## 2024-09-05 DIAGNOSIS — Z41.9 ENCOUNTER FOR PROCEDURE FOR PURPOSES OTHER THAN REMEDYING HEALTH STATE, UNSPECIFIED: Chronic | ICD-10-CM

## 2024-09-05 DIAGNOSIS — R65.21 SEVERE SEPSIS WITH SEPTIC SHOCK: ICD-10-CM

## 2024-09-05 LAB
ALBUMIN SERPL ELPH-MCNC: 1.9 G/DL — LOW (ref 3.3–5)
ALBUMIN SERPL ELPH-MCNC: 2.1 G/DL — LOW (ref 3.3–5)
ALP SERPL-CCNC: 79 U/L — SIGNIFICANT CHANGE UP (ref 40–120)
ALP SERPL-CCNC: 84 U/L — SIGNIFICANT CHANGE UP (ref 40–120)
ALT FLD-CCNC: 34 U/L — SIGNIFICANT CHANGE UP (ref 12–78)
ALT FLD-CCNC: 37 U/L — SIGNIFICANT CHANGE UP (ref 12–78)
ANION GAP SERPL CALC-SCNC: 12 MMOL/L — SIGNIFICANT CHANGE UP (ref 5–17)
ANION GAP SERPL CALC-SCNC: 12 MMOL/L — SIGNIFICANT CHANGE UP (ref 5–17)
APPEARANCE UR: ABNORMAL
APTT BLD: 39 SEC — HIGH (ref 24.5–35.6)
APTT BLD: 42.3 SEC — HIGH (ref 24.5–35.6)
AST SERPL-CCNC: 27 U/L — SIGNIFICANT CHANGE UP (ref 15–37)
AST SERPL-CCNC: 32 U/L — SIGNIFICANT CHANGE UP (ref 15–37)
BACTERIA # UR AUTO: ABNORMAL /HPF
BASOPHILS # BLD AUTO: 0 K/UL — SIGNIFICANT CHANGE UP (ref 0–0.2)
BASOPHILS NFR BLD AUTO: 0 % — SIGNIFICANT CHANGE UP (ref 0–2)
BILIRUB SERPL-MCNC: 0.8 MG/DL — SIGNIFICANT CHANGE UP (ref 0.2–1.2)
BILIRUB SERPL-MCNC: 1 MG/DL — SIGNIFICANT CHANGE UP (ref 0.2–1.2)
BILIRUB UR-MCNC: NEGATIVE — SIGNIFICANT CHANGE UP
BUN SERPL-MCNC: 36 MG/DL — HIGH (ref 7–23)
BUN SERPL-MCNC: 37 MG/DL — HIGH (ref 7–23)
CALCIUM SERPL-MCNC: 8.8 MG/DL — SIGNIFICANT CHANGE UP (ref 8.5–10.1)
CALCIUM SERPL-MCNC: 9 MG/DL — SIGNIFICANT CHANGE UP (ref 8.5–10.1)
CHLORIDE SERPL-SCNC: 100 MMOL/L — SIGNIFICANT CHANGE UP (ref 96–108)
CHLORIDE SERPL-SCNC: 105 MMOL/L — SIGNIFICANT CHANGE UP (ref 96–108)
CO2 SERPL-SCNC: 16 MMOL/L — LOW (ref 22–31)
CO2 SERPL-SCNC: 21 MMOL/L — LOW (ref 22–31)
COLOR SPEC: YELLOW — SIGNIFICANT CHANGE UP
COMMENT - URINE: SIGNIFICANT CHANGE UP
CREAT SERPL-MCNC: 2.2 MG/DL — HIGH (ref 0.5–1.3)
CREAT SERPL-MCNC: 2.6 MG/DL — HIGH (ref 0.5–1.3)
DIFF PNL FLD: ABNORMAL
EGFR: 24 ML/MIN/1.73M2 — LOW
EGFR: 29 ML/MIN/1.73M2 — LOW
EOSINOPHIL # BLD AUTO: 0 K/UL — SIGNIFICANT CHANGE UP (ref 0–0.5)
EOSINOPHIL NFR BLD AUTO: 0 % — SIGNIFICANT CHANGE UP (ref 0–6)
EPI CELLS # UR: PRESENT
FLUAV AG NPH QL: SIGNIFICANT CHANGE UP
FLUBV AG NPH QL: SIGNIFICANT CHANGE UP
GLUCOSE SERPL-MCNC: 105 MG/DL — HIGH (ref 70–99)
GLUCOSE SERPL-MCNC: 150 MG/DL — HIGH (ref 70–99)
GLUCOSE UR QL: NEGATIVE MG/DL — SIGNIFICANT CHANGE UP
HCT VFR BLD CALC: 47 % — SIGNIFICANT CHANGE UP (ref 39–50)
HCT VFR BLD CALC: 52.6 % — HIGH (ref 39–50)
HGB BLD-MCNC: 14.7 G/DL — SIGNIFICANT CHANGE UP (ref 13–17)
HGB BLD-MCNC: 17 G/DL — SIGNIFICANT CHANGE UP (ref 13–17)
HYPOSEGMENTATION: PRESENT — SIGNIFICANT CHANGE UP
INR BLD: 2.88 RATIO — HIGH (ref 0.85–1.18)
INR BLD: 2.96 RATIO — HIGH (ref 0.85–1.18)
KETONES UR-MCNC: NEGATIVE MG/DL — SIGNIFICANT CHANGE UP
LACTATE SERPL-SCNC: 6.7 MMOL/L — CRITICAL HIGH (ref 0.7–2)
LACTATE SERPL-SCNC: 7.9 MMOL/L — CRITICAL HIGH (ref 0.7–2)
LEUKOCYTE ESTERASE UR-ACNC: ABNORMAL
LYMPHOCYTES # BLD AUTO: 0.41 K/UL — LOW (ref 1–3.3)
LYMPHOCYTES # BLD AUTO: 1 % — LOW (ref 13–44)
MAGNESIUM SERPL-MCNC: 1.9 MG/DL — SIGNIFICANT CHANGE UP (ref 1.6–2.6)
MANUAL SMEAR VERIFICATION: SIGNIFICANT CHANGE UP
MCHC RBC-ENTMCNC: 30.1 PG — SIGNIFICANT CHANGE UP (ref 27–34)
MCHC RBC-ENTMCNC: 30.5 PG — SIGNIFICANT CHANGE UP (ref 27–34)
MCHC RBC-ENTMCNC: 31.3 GM/DL — LOW (ref 32–36)
MCHC RBC-ENTMCNC: 32.3 GM/DL — SIGNIFICANT CHANGE UP (ref 32–36)
MCV RBC AUTO: 94.4 FL — SIGNIFICANT CHANGE UP (ref 80–100)
MCV RBC AUTO: 96.3 FL — SIGNIFICANT CHANGE UP (ref 80–100)
METAMYELOCYTES # FLD: 3 % — HIGH (ref 0–0)
MONOCYTES # BLD AUTO: 0.83 K/UL — SIGNIFICANT CHANGE UP (ref 0–0.9)
MONOCYTES NFR BLD AUTO: 2 % — SIGNIFICANT CHANGE UP (ref 2–14)
MYELOCYTES NFR BLD: 2 % — HIGH (ref 0–0)
NEUTROPHILS # BLD AUTO: 38.13 K/UL — HIGH (ref 1.8–7.4)
NEUTROPHILS NFR BLD AUTO: 86 % — HIGH (ref 43–77)
NEUTS BAND # BLD: 6 % — SIGNIFICANT CHANGE UP (ref 0–8)
NITRITE UR-MCNC: NEGATIVE — SIGNIFICANT CHANGE UP
NRBC # BLD: 0 /100 WBCS — SIGNIFICANT CHANGE UP (ref 0–0)
NRBC # BLD: 0 /100 WBCS — SIGNIFICANT CHANGE UP (ref 0–0)
NRBC # BLD: SIGNIFICANT CHANGE UP /100 WBCS (ref 0–0)
NT-PROBNP SERPL-SCNC: HIGH PG/ML (ref 0–450)
PH UR: 7 — SIGNIFICANT CHANGE UP (ref 5–8)
PHOSPHATE SERPL-MCNC: 4.8 MG/DL — HIGH (ref 2.5–4.5)
PLAT MORPH BLD: NORMAL — SIGNIFICANT CHANGE UP
PLATELET # BLD AUTO: 226 K/UL — SIGNIFICANT CHANGE UP (ref 150–400)
PLATELET # BLD AUTO: 289 K/UL — SIGNIFICANT CHANGE UP (ref 150–400)
POTASSIUM SERPL-MCNC: 4.7 MMOL/L — SIGNIFICANT CHANGE UP (ref 3.5–5.3)
POTASSIUM SERPL-MCNC: 5.6 MMOL/L — HIGH (ref 3.5–5.3)
POTASSIUM SERPL-SCNC: 4.7 MMOL/L — SIGNIFICANT CHANGE UP (ref 3.5–5.3)
POTASSIUM SERPL-SCNC: 5.6 MMOL/L — HIGH (ref 3.5–5.3)
PROCALCITONIN SERPL-MCNC: 26.81 NG/ML — HIGH
PROCALCITONIN SERPL-MCNC: 36.31 NG/ML — HIGH
PROT SERPL-MCNC: 5.6 G/DL — LOW (ref 6–8.3)
PROT SERPL-MCNC: 6.4 G/DL — SIGNIFICANT CHANGE UP (ref 6–8.3)
PROT UR-MCNC: SIGNIFICANT CHANGE UP MG/DL
PROTHROM AB SERPL-ACNC: 31.8 SEC — HIGH (ref 9.5–13)
PROTHROM AB SERPL-ACNC: 32.7 SEC — HIGH (ref 9.5–13)
RBC # BLD: 4.88 M/UL — SIGNIFICANT CHANGE UP (ref 4.2–5.8)
RBC # BLD: 5.57 M/UL — SIGNIFICANT CHANGE UP (ref 4.2–5.8)
RBC # FLD: 16.4 % — HIGH (ref 10.3–14.5)
RBC # FLD: 16.8 % — HIGH (ref 10.3–14.5)
RBC BLD AUTO: SIGNIFICANT CHANGE UP
RBC CASTS # UR COMP ASSIST: 12 /HPF — HIGH (ref 0–4)
RSV RNA NPH QL NAA+NON-PROBE: SIGNIFICANT CHANGE UP
SARS-COV-2 RNA SPEC QL NAA+PROBE: SIGNIFICANT CHANGE UP
SODIUM SERPL-SCNC: 133 MMOL/L — LOW (ref 135–145)
SODIUM SERPL-SCNC: 133 MMOL/L — LOW (ref 135–145)
SP GR SPEC: 1.01 — SIGNIFICANT CHANGE UP (ref 1–1.03)
TOXIC GRANULES BLD QL SMEAR: PRESENT — SIGNIFICANT CHANGE UP
TSH SERPL-MCNC: 1.72 UIU/ML — SIGNIFICANT CHANGE UP (ref 0.36–3.74)
UROBILINOGEN FLD QL: 0.2 MG/DL — SIGNIFICANT CHANGE UP (ref 0.2–1)
WBC # BLD: 35.99 K/UL — HIGH (ref 3.8–10.5)
WBC # BLD: 41.45 K/UL — CRITICAL HIGH (ref 3.8–10.5)
WBC # FLD AUTO: 35.99 K/UL — HIGH (ref 3.8–10.5)
WBC # FLD AUTO: 41.45 K/UL — CRITICAL HIGH (ref 3.8–10.5)
WBC UR QL: 35 /HPF — HIGH (ref 0–5)

## 2024-09-05 PROCEDURE — 99223 1ST HOSP IP/OBS HIGH 75: CPT | Mod: GC

## 2024-09-05 PROCEDURE — 71250 CT THORAX DX C-: CPT | Mod: 26,MC

## 2024-09-05 PROCEDURE — 52332 CYSTOSCOPY AND TREATMENT: CPT | Mod: 53,RT

## 2024-09-05 PROCEDURE — 99285 EMERGENCY DEPT VISIT HI MDM: CPT

## 2024-09-05 PROCEDURE — 74176 CT ABD & PELVIS W/O CONTRAST: CPT | Mod: 26,MC

## 2024-09-05 PROCEDURE — 93010 ELECTROCARDIOGRAM REPORT: CPT

## 2024-09-05 PROCEDURE — 71045 X-RAY EXAM CHEST 1 VIEW: CPT | Mod: 26

## 2024-09-05 PROCEDURE — 99222 1ST HOSP IP/OBS MODERATE 55: CPT

## 2024-09-05 PROCEDURE — 99291 CRITICAL CARE FIRST HOUR: CPT

## 2024-09-05 DEVICE — GUIDEWIRE SENSOR DUAL-FLEX NITINOL STRAIGHT .035" X 150CM: Type: IMPLANTABLE DEVICE | Status: FUNCTIONAL

## 2024-09-05 DEVICE — URETERAL CATH OPEN END FLEXI-TIP 5FR .038" X 70CM: Type: IMPLANTABLE DEVICE | Status: FUNCTIONAL

## 2024-09-05 RX ORDER — ONDANSETRON 2 MG/ML
4 INJECTION, SOLUTION INTRAMUSCULAR; INTRAVENOUS ONCE
Refills: 0 | Status: DISCONTINUED | OUTPATIENT
Start: 2024-09-05 | End: 2024-09-05

## 2024-09-05 RX ORDER — PANTOPRAZOLE SODIUM 40 MG
40 TABLET, DELAYED RELEASE (ENTERIC COATED) ORAL DAILY
Refills: 0 | Status: DISCONTINUED | OUTPATIENT
Start: 2024-09-05 | End: 2024-09-05

## 2024-09-05 RX ORDER — VANCOMYCIN/0.9 % SOD CHLORIDE 1.75G/25
1000 PLASTIC BAG, INJECTION (ML) INTRAVENOUS ONCE
Refills: 0 | Status: DISCONTINUED | OUTPATIENT
Start: 2024-09-05 | End: 2024-09-05

## 2024-09-05 RX ORDER — CEFEPIME 2 G/1
2000 INJECTION, POWDER, FOR SOLUTION INTRAVENOUS EVERY 12 HOURS
Refills: 0 | Status: DISCONTINUED | OUTPATIENT
Start: 2024-09-05 | End: 2024-09-05

## 2024-09-05 RX ORDER — VANCOMYCIN/0.9 % SOD CHLORIDE 1.75G/25
1000 PLASTIC BAG, INJECTION (ML) INTRAVENOUS ONCE
Refills: 0 | Status: COMPLETED | OUTPATIENT
Start: 2024-09-05 | End: 2024-09-05

## 2024-09-05 RX ORDER — PIPERACILLIN SODIUM AND TAZOBACTAM SODIUM 3; .375 G/15ML; G/15ML
3.38 INJECTION, POWDER, FOR SOLUTION INTRAVENOUS EVERY 8 HOURS
Refills: 0 | Status: DISCONTINUED | OUTPATIENT
Start: 2024-09-05 | End: 2024-09-05

## 2024-09-05 RX ORDER — HYDROMORPHONE HYDROCHLORIDE 2 MG/1
0.5 TABLET ORAL
Refills: 0 | Status: DISCONTINUED | OUTPATIENT
Start: 2024-09-05 | End: 2024-09-05

## 2024-09-05 RX ORDER — ACETAMINOPHEN 325 MG/1
1000 TABLET ORAL ONCE
Refills: 0 | Status: COMPLETED | OUTPATIENT
Start: 2024-09-05 | End: 2024-09-05

## 2024-09-05 RX ORDER — SODIUM CHLORIDE 9 MG/ML
2500 INJECTION INTRAMUSCULAR; INTRAVENOUS; SUBCUTANEOUS ONCE
Refills: 0 | Status: COMPLETED | OUTPATIENT
Start: 2024-09-05 | End: 2024-09-05

## 2024-09-05 RX ORDER — ROPIVACAINE IN 0.9% SOD CHL/PF 0.1 %
0.05 PLASTIC BAG, INJECTION (ML) EPIDURAL
Qty: 8 | Refills: 0 | Status: DISCONTINUED | OUTPATIENT
Start: 2024-09-05 | End: 2024-09-05

## 2024-09-05 RX ORDER — PIPERACILLIN SODIUM AND TAZOBACTAM SODIUM 3; .375 G/15ML; G/15ML
3.38 INJECTION, POWDER, FOR SOLUTION INTRAVENOUS ONCE
Refills: 0 | Status: COMPLETED | OUTPATIENT
Start: 2024-09-05 | End: 2024-09-05

## 2024-09-05 RX ORDER — ACETAMINOPHEN 325 MG/1
650 TABLET ORAL ONCE
Refills: 0 | Status: DISCONTINUED | OUTPATIENT
Start: 2024-09-05 | End: 2024-09-05

## 2024-09-05 RX ADMIN — Medication 10.2 MICROGRAM(S)/KG/MIN: at 21:45

## 2024-09-05 RX ADMIN — ACETAMINOPHEN 1000 MILLIGRAM(S): 325 TABLET ORAL at 16:00

## 2024-09-05 RX ADMIN — Medication 75 MILLILITER(S): at 21:17

## 2024-09-05 RX ADMIN — Medication 1000 MILLILITER(S): at 20:55

## 2024-09-05 RX ADMIN — PIPERACILLIN SODIUM AND TAZOBACTAM SODIUM 25 GRAM(S): 3; .375 INJECTION, POWDER, FOR SOLUTION INTRAVENOUS at 21:09

## 2024-09-05 RX ADMIN — ACETAMINOPHEN 400 MILLIGRAM(S): 325 TABLET ORAL at 15:20

## 2024-09-05 RX ADMIN — PIPERACILLIN SODIUM AND TAZOBACTAM SODIUM 200 GRAM(S): 3; .375 INJECTION, POWDER, FOR SOLUTION INTRAVENOUS at 15:20

## 2024-09-05 RX ADMIN — Medication 250 MILLIGRAM(S): at 17:27

## 2024-09-05 RX ADMIN — SODIUM CHLORIDE 2500 MILLILITER(S): 9 INJECTION INTRAMUSCULAR; INTRAVENOUS; SUBCUTANEOUS at 15:21

## 2024-09-05 NOTE — CONSULT NOTE ADULT - SUBJECTIVE AND OBJECTIVE BOX
Date/Time Patient Seen:  		  Referring MD:   Data Reviewed	       Patient is a 79y old  Male who presents with a chief complaint of     Subjective/HPI   79yo male bib ems with sob and confusion, as per son pt was sob today with fever, no cough, no vomiting or diarrhea, pt is ams  PAST MEDICAL & SURGICAL HISTORY:  Calculus of kidney    Club foot  Born Right Foot    Myasthenia gravis    Hypertension    Diabetes  Type 2 - does not take medications - monitors Blood Glucose at home - diet controlled    Urinary tract infection  notes h/o UTI's    Hyperlipidemia    Elective surgery  1956 age 11 @ [facility] - cut under Patella secondary 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 denies fever, chills, sweating  HEENT: denies headache, dizziness, or lightheadedness  Respiratory: denies SOB, cough, or wheezing  Cardiovascular: denies CP, palpitations  Gastrointestinal: denies nausea, vomiting, diarrhea, constipation, abdominal pain, or bloody stools  Genitourinary: admits bloody urine. denies painful urination, increased frequency, urgency  Skin/Breast: denies rashes or itching  Musculoskeletal: denies muscle aches, joint swelling, or muscle weakness  Neurologic: admits to BL foot hypersensitivity to touch. denies loss of sensation, numbness, or tingling  ROS negative except as noted above    Goals of Care:    Advance Directives:  · Caregiver:	yes  · Name	Olvin Gaxiola  · Phone Number	[phone]      Allergies and Intolerances:        Allergies:  	levofloxacin: Drug, Unknown  	gatifloxacin: Drug, Unknown  	ofloxacin: Drug, Unknown, nurofloxacin       Intolerances:  	telithromycin: Drug, Other, Myasthenia Gravis  	Ketek: Drug, Other, Myasthenia Gravis  	Avelox: Drug, Other, Myasthenia Gravis  	fluoroquinolone antibiotics: Drug Category, Other, Myasthenia Gravis    Home Medications:   * Patient Currently Takes Medications as of 26-Mar-2024 19:43 documented in Structured Notes  · 	metoprolol succinate 25 mg oral tablet, extended release: Last Dose Taken:  , 1 tab(s) orally once a day  · 	furosemide 40 mg oral tablet: Last Dose Taken:  , 1 tab(s) orally once a day  · 	omeprazole 40 mg oral delayed release capsule: Last Dose Taken:  , 1 cap(s) orally once a day  · 	Potassium Chloride (Eqv-Klor-Con M20) 20 mEq oral tablet, extended release: Last Dose Taken:  , 1 tab(s) orally once a day Resume taking this tomorrow, 11/9/23  · 	predniSONE 10 mg oral tablet: Last Dose Taken:  , 1 tab(s) orally once a day  · 	Eliquis 5 mg oral tablet: Last Dose Taken:  , 1 tab(s) orally 2 times a day  · 	Praluent Pen 75 mg/mL subcutaneous solution: Last Dose Taken:  , 75 milligram(s) subcutaneous every 2 weeks    Patient History:    Past Medical, Past Surgical, and Family History:  PAST MEDICAL HISTORY:  Calculus of kidney     Club foot Born Right Foot    Diabetes Type 2 - does not take medications - monitors Blood Glucose at home - diet controlled    Hyperlipidemia     Hypertension     Myasthenia gravis     Urinary tract infection notes h/o UTI's.     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     FAMILY HISTORY:  Father  Still living? No  Family history of stroke, Age at diagnosis: Age Unknown    Mother  Still living? No  Family history of kidney disease, Age at diagnosis: Age Unknown    Sibling  Still living? No  Family history of diabetes mellitus type II, Age at diagnosis: Age Unknown.     Social History:  · Substance use	No  · Social History (marital status, living situation, occupation, and sexual history)	Denies etoh  Smoking- Former smoker, quit over 50 years go- smokes a few cigarettes a day  recc drug use- denies   Lives with son  Does not move out of bed much     Tobacco Screening:  · Core Measure Site	Yes  · Has the patient used tobacco in the past 30 days?	No    Risk Assessment:    Present on Admission:  Deep Venous Thrombosis	no  Pulmonary Embolus	no     HIV Screening:  · In accordance with [place] State law, we offer every patient who comes to our ED an HIV test. Would you like to be tested today?	Opt out        Medication list         MEDICATIONS  (STANDING):  acetaminophen  Suppository .. 650 milliGRAM(s) Rectal once    MEDICATIONS  (PRN):         Vitals log        ICU Vital Signs Last 24 Hrs  T(C): 39.4 (05 Sep 2024 14:30), Max: 39.4 (05 Sep 2024 14:30)  T(F): 103 (05 Sep 2024 14:30), Max: 103 (05 Sep 2024 14:30)  HR: 101 (05 Sep 2024 15:30) (101 - 135)  BP: 117/88 (05 Sep 2024 15:30) (87/47 - 117/88)  BP(mean): --  ABP: --  ABP(mean): --  RR: 20 (05 Sep 2024 15:30) (20 - 24)  SpO2: 98% (05 Sep 2024 15:30) (93% - 98%)    O2 Parameters below as of 05 Sep 2024 15:30  Patient On (Oxygen Delivery Method): nasal cannula  O2 Flow (L/min): 4               Input and Output:  I&O's Detail      Lab Data                        17.0   41.45 )-----------( 289      ( 05 Sep 2024 14:30 )             52.6     09-05    133<L>  |  100  |  37<H>  ----------------------------<  150<H>  4.7   |  21<L>  |  2.60<H>    Ca    9.0      05 Sep 2024 15:45    TPro  6.4  /  Alb  2.1<L>  /  TBili  1.0  /  DBili  x   /  AST  27  /  ALT  37  /  AlkPhos  79  09-05            Review of Systems	  weakness  sob  on o2 support  poor historian      Objective     Physical Examination    head nc  head at  heart s1s2  lung dc BS  abd soft  obese  on o2 support      Pertinent Lab findings & Imaging      Restrepo:  NO   Adequate UO     I&O's Detail           Discussed with:     Cultures:	        Radiology      ACC: [id number] EXAM:  XR CHEST PORTABLE IMMED 1V   ORDERED BY: DOREEN SIVLA     PROCEDURE DATE:  09/05/2024          INTERPRETATION:  An AP portable supine chest radiograph was performed for   sepsis.    Comparison is made to 3/26/2024.    There is a calcified granuloma in the right upper lobe along with   multiple calcified right lower paratracheal and possibly right hilar   mediastinal lymph nodes, all consistent with old granulomatous disease   and remain unchanged. There are no infiltrates seen on either side. There   is no pneumothorax. There are no pleural effusions. There is no hilar or   mediastinal widening otherwise seen in the cardiac silhouette is not   enlarged for the projection. There is no CHF. Degenerative changes of the   thoracic spine and left greater than right shoulders.    IMPRESSION:  1. Old granulomatous disease with no acute cardiopulmonary abnormalities.  2. Degenerative changes of the thoracic spine as well as the left greater   than right shoulders, unchanged.    --- End of Report ---            GUSTAVO LOZA MD; Attending Radiologist  This document has been electronically signed. Sep  5 2024  3:00PM

## 2024-09-05 NOTE — ED ADULT TRIAGE NOTE - BP NONINVASIVE DIASTOLIC (MM HG)
54 Information: Selecting Yes will display possible errors in your note based on the variables you have selected. This validation is only offered as a suggestion for you. PLEASE NOTE THAT THE VALIDATION TEXT WILL BE REMOVED WHEN YOU FINALIZE YOUR NOTE. IF YOU WANT TO FAX A PRELIMINARY NOTE YOU WILL NEED TO TOGGLE THIS TO 'NO' IF YOU DO NOT WANT IT IN YOUR FAXED NOTE.

## 2024-09-05 NOTE — ED ADULT NURSE NOTE - NSFALLHARMRISKINTERV_ED_ALL_ED

## 2024-09-05 NOTE — DISCHARGE NOTE PROVIDER - NSDCMRMEDTOKEN_GEN_ALL_CORE_FT
Eliquis 5 mg oral tablet: 1 tab(s) orally 2 times a day  furosemide 40 mg oral tablet: 1 tab(s) orally once a day  metoprolol succinate 25 mg oral tablet, extended release: 1 tab(s) orally once a day  omeprazole 40 mg oral delayed release capsule: 1 cap(s) orally once a day  Praluent Pen 75 mg/mL subcutaneous solution: 75 milligram(s) subcutaneous every 2 weeks  predniSONE 10 mg oral tablet: 1 tab(s) orally once a day  tamsulosin 0.4 mg oral capsule: 1 cap(s) orally once a day (at bedtime)

## 2024-09-05 NOTE — CONSULT NOTE ADULT - SUBJECTIVE AND OBJECTIVE BOX
Patient is a 79y old  Male who presents with a chief complaint of ureteral stone    BRIEF HOSPITAL COURSE: 81 yo M with pmhx of HTN, CHF, PE, Myasthenia Gravis, and chronic LE edema, history of 7 mm distal ureteral calculus with mild right hydroureteronephrosis and pain presented to the ED with shortness of breath and confusion after failing to follow up outpt on ureteral calculus after prior evaluation at [facility].  Patient's son is at bedside he states he seems more confused and altered then baseline; has not been feeling well. Patient was complaining of nauseous. Also is retaining urine; usually urinates in a urinal. Decreased appetite; weak; chills. Patient hasn't seen cardiologist since March to follow up on his CHF. Labs significant for WBC 41.45, procal 36.3, lactate 7.9, Scr 2.6, BNP 75973, UA. CT CAP revealed R hydro from 7 mm stone in UVJ. Patient sent emergently with urology for stent placement.    Events last 24 hours: Stent placement for 7 mm stone unsuccessful, procedure aborted, patient transferred to ICU for septic shock. Discussed case with urology for planned transfer for emergent IR for nephrostomy tube placement.    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 for Club Foot Right foot      Pilonidal cyst  Surgery 40 years ago      H/O colonoscopy      Spinal stenosis      H/O prostate biopsy        Allergies    levofloxacin (Unknown)  ofloxacin (Unknown)  gatifloxacin (Unknown)    Intolerances    telithromycin (Other)  fluoroquinolone antibiotics (Other)  Avelox (Other)  Ketek (Other)    FAMILY HISTORY:  Family history of stroke (Father)  Father -  age 62    Family history of kidney disease (Mother)  Mother -  age 67    Family history of diabetes mellitus type II (Sibling)  Brother & Sister      SOCIAL HISTORY:     Review of Systems:  Negative unless stated above    Medications:  piperacillin/tazobactam IVPB.. 3.375 Gram(s) IV Intermittent every 8 hours    norepinephrine Infusion 0.05 MICROgram(s)/kG/Min IV Continuous <Continuous>      HYDROmorphone  Injectable 0.5 milliGRAM(s) IV Push every 10 minutes PRN  ondansetron Injectable 4 milliGRAM(s) IV Push once PRN              lactated ringers Bolus 1000 milliLiter(s) IV Bolus once  lactated ringers. 1000 milliLiter(s) IV Continuous <Continuous>                ICU Vital Signs Last 24 Hrs  T(C): 37.6 (05 Sep 2024 20:41), Max: 39.4 (05 Sep 2024 14:30)  T(F): 99.7 (05 Sep 2024 20:41), Max: 103 (05 Sep 2024 14:30)  HR: 81 (05 Sep 2024 20:55) (81 - 135)  BP: 88/54 (05 Sep 2024 20:55) (74/58 - 119/56)  BP(mean): 66 (05 Sep 2024 20:55) (63 - 75)  ABP: --  ABP(mean): --  RR: 27 (05 Sep 2024 20:55) (19 - 27)  SpO2: 99% (05 Sep 2024 20:55) (90% - 99%)    O2 Parameters below as of 05 Sep 2024 20:35  Patient On (Oxygen Delivery Method): mask, nonrebreather    O2 Concentration (%): 100      Vital Signs Last 24 Hrs  T(C): 37.6 (05 Sep 2024 20:41), Max: 39.4 (05 Sep 2024 14:30)  T(F): 99.7 (05 Sep 2024 20:41), Max: 103 (05 Sep 2024 14:30)  HR: 81 (05 Sep 2024 20:55) (81 - 135)  BP: 88/54 (05 Sep 2024 20:55) (74/58 - 119/56)  BP(mean): 66 (05 Sep 2024 20:55) (63 - 75)  RR: 27 (05 Sep 2024 20:55) (19 - 27)  SpO2: 99% (05 Sep 2024 20:55) (90% - 99%)    Parameters below as of 05 Sep 2024 20:35  Patient On (Oxygen Delivery Method): mask, nonrebreather    O2 Concentration (%): 100        I&O's Detail      LABS:                        17.0   41.45 )-----------( 289      ( 05 Sep 2024 14:30 )             52.6     09-    133<L>  |  100  |  37<H>  ----------------------------<  150<H>  4.7   |  21<L>  |  2.60<H>    Ca    9.0      05 Sep 2024 15:45    TPro  6.4  /  Alb  2.1<L>  /  TBili  1.0  /  DBili  x   /  AST  27  /  ALT  37  /  AlkPhos  79  09-05          CAPILLARY BLOOD GLUCOSE      POCT Blood Glucose.: 151 mg/dL (05 Sep 2024 13:47)    PT/INR - ( 05 Sep 2024 14:30 )   PT: 31.8 sec;   INR: 2.88 ratio         PTT - ( 05 Sep 2024 14:30 )  PTT:42.3 sec  Urinalysis Basic - ( 05 Sep 2024 15:45 )    Color: x / Appearance: x / SG: x / pH: x  Gluc: 150 mg/dL / Ketone: x  / Bili: x / Urobili: x   Blood: x / Protein: x / Nitrite: x   Leuk Esterase: x / RBC: x / WBC x   Sq Epi: x / Non Sq Epi: x / Bacteria: x        CULTURES:      Physical Examination:    General: Toxic appearing, afebrile, lying in bed in distress.    HEENT: Pupils equal, reactive to light. Symmetric. No scleral icterus or injection.    PULM: Clear to auscultation B/L. No wheezes, rales, or rhonchi appreciated. No significant sputum production or increased respiratory effort.    NECK: Supple, no lymphadenopathy, trachea midline.    CVS: Regular rate and rhythm, no murmurs appreciated, +s1/s2.    ABD: Soft, nondistended, nontender, normoactive bowel sounds.    EXT: No edema, nontender.    SKIN: Warm and well perfused, no rashes noted.    NEURO: Alert, oriented, interactive, nonfocal.   Patient is a 79y old  Male who presents with a chief complaint of ureteral stone    BRIEF HOSPITAL COURSE: 81 yo M with pmhx of HTN, CHF, PE, Myasthenia Gravis, and chronic LE edema, history of 7 mm distal ureteral calculus with mild right hydroureteronephrosis and pain presented to the ED with shortness of breath and confusion after failing to follow up outpt on ureteral calculus after prior evaluation at [facility].  Patient's son is at bedside he states he seems more confused and altered then baseline; has not been feeling well. Patient was complaining of nauseous. Also is retaining urine; usually urinates in a urinal. Decreased appetite; weak; chills. Patient hasn't seen cardiologist since March to follow up on his CHF. Labs significant for WBC 41.45, procal 36.3, lactate 7.9, Scr 2.6, BNP 56242, UA. CT CAP revealed R hydro from 7 mm stone in UVJ. Patient sent emergently with urology for stent placement.    Events last 24 hours: Stent placement for 7 mm stone unsuccessful, procedure aborted, patient transferred to ICU for septic shock. 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 for Club Foot Right foot      Pilonidal cyst  Surgery 40 years ago      H/O colonoscopy      Spinal stenosis      H/O prostate biopsy        Allergies    levofloxacin (Unknown)  ofloxacin (Unknown)  gatifloxacin (Unknown)    Intolerances    telithromycin (Other)  fluoroquinolone antibiotics (Other)  Avelox (Other)  Ketek (Other)    FAMILY HISTORY:  Family history of stroke (Father)  Father -  age 62    Family history of kidney disease (Mother)  Mother -  age 67    Family history of diabetes mellitus type II (Sibling)  Brother & Sister      SOCIAL HISTORY:     Review of Systems:  Negative unless stated above    Medications:  piperacillin/tazobactam IVPB.. 3.375 Gram(s) IV Intermittent every 8 hours    norepinephrine Infusion 0.05 MICROgram(s)/kG/Min IV Continuous <Continuous>      HYDROmorphone  Injectable 0.5 milliGRAM(s) IV Push every 10 minutes PRN  ondansetron Injectable 4 milliGRAM(s) IV Push once PRN              lactated ringers Bolus 1000 milliLiter(s) IV Bolus once  lactated ringers. 1000 milliLiter(s) IV Continuous <Continuous>                ICU Vital Signs Last 24 Hrs  T(C): 37.6 (05 Sep 2024 20:41), Max: 39.4 (05 Sep 2024 14:30)  T(F): 99.7 (05 Sep 2024 20:41), Max: 103 (05 Sep 2024 14:30)  HR: 81 (05 Sep 2024 20:55) (81 - 135)  BP: 88/54 (05 Sep 2024 20:55) (74/58 - 119/56)  BP(mean): 66 (05 Sep 2024 20:55) (63 - 75)  ABP: --  ABP(mean): --  RR: 27 (05 Sep 2024 20:55) (19 - 27)  SpO2: 99% (05 Sep 2024 20:55) (90% - 99%)    O2 Parameters below as of 05 Sep 2024 20:35  Patient On (Oxygen Delivery Method): mask, nonrebreather    O2 Concentration (%): 100      Vital Signs Last 24 Hrs  T(C): 37.6 (05 Sep 2024 20:41), Max: 39.4 (05 Sep 2024 14:30)  T(F): 99.7 (05 Sep 2024 20:41), Max: 103 (05 Sep 2024 14:30)  HR: 81 (05 Sep 2024 20:55) (81 - 135)  BP: 88/54 (05 Sep 2024 20:55) (74/58 - 119/56)  BP(mean): 66 (05 Sep 2024 20:55) (63 - 75)  RR: 27 (05 Sep 2024 20:55) (19 - 27)  SpO2: 99% (05 Sep 2024 20:55) (90% - 99%)    Parameters below as of 05 Sep 2024 20:35  Patient On (Oxygen Delivery Method): mask, nonrebreather    O2 Concentration (%): 100        I&O's Detail      LABS:                        17.0   41.45 )-----------( 289      ( 05 Sep 2024 14:30 )             52.6     09-    133<L>  |  100  |  37<H>  ----------------------------<  150<H>  4.7   |  21<L>  |  2.60<H>    Ca    9.0      05 Sep 2024 15:45    TPro  6.4  /  Alb  2.1<L>  /  TBili  1.0  /  DBili  x   /  AST  27  /  ALT  37  /  AlkPhos  79  09-          CAPILLARY BLOOD GLUCOSE      POCT Blood Glucose.: 151 mg/dL (05 Sep 2024 13:47)    PT/INR - ( 05 Sep 2024 14:30 )   PT: 31.8 sec;   INR: 2.88 ratio         PTT - ( 05 Sep 2024 14:30 )  PTT:42.3 sec  Urinalysis Basic - ( 05 Sep 2024 15:45 )    Color: x / Appearance: x / SG: x / pH: x  Gluc: 150 mg/dL / Ketone: x  / Bili: x / Urobili: x   Blood: x / Protein: x / Nitrite: x   Leuk Esterase: x / RBC: x / WBC x   Sq Epi: x / Non Sq Epi: x / Bacteria: x        CULTURES:      Physical Examination:    General: Toxic appearing, afebrile, lying in bed in distress.    HEENT: Pupils equal, reactive to light. Symmetric. No scleral icterus or injection.    PULM: Clear to auscultation B/L. No wheezes, rales, or rhonchi appreciated. No significant sputum production or increased respiratory effort.    NECK: Supple, no lymphadenopathy, trachea midline.    CVS: Regular rate and rhythm, tachycardic with PVCs, +s1/s2.    ABD: Soft,rounded, nontender, normoactive bowel sounds.    EXT: Nonpitting edema, nontender.    SKIN: Cool to distal palpation, sacral redness.    NEURO: Alert, oriented, interactive, nonfocal.

## 2024-09-05 NOTE — CONSULT NOTE ADULT - SUBJECTIVE AND OBJECTIVE BOX
UROLOGY PA CONSULT NOTE:    CHIEF COMPLAINT:  Patient is a 79y old  Male who presents with a chief complaint of fevers, AMS    HPI FROM ED:  79yo male bib ems with sob and confusion, as per son pt was sob today with fever, no cough, no vomiting or diarrhea, pt is ams so hx is limited    INTERVAL HPI:   79 yo male with pmhx of HTN, CHF, PE, MG and chronic LE edema and pain presented to the ED with shortness of breath and confusion.  Patient's son is at bedside he states he seems more confused and altered then baseline; has not been feeling well. Patient was complaining of nauseous. Also is retaining urine; usually urinates in a urinal. Decreased appetite; weak; chills. Patient hasn't seen cardiologist since March to follow up on his CHF. Last BM was Saturday Patient hasn't ate since yesterday.    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 +nausea, no vomiting, +constipation.   GENITOURINARY: No dysuria, frequency or hematuria  NEUROLOGICAL: No numbness or weakness  SKIN: No itching, rashes    MEDICATIONS:  Home Medications:  Eliquis 5 mg oral tablet: 1 tab(s) orally 2 times a day (26 Mar 2024 19:43)  omeprazole 40 mg oral delayed release capsule: 1 cap(s) orally once a day (26 Mar 2024 19:43)  Potassium Chloride (Eqv-Klor-Con M20) 20 mEq oral tablet, extended release: 1 tab(s) orally once a day Resume taking this tomorrow, 23 (26 Mar 2024 19:43)  Praluent Pen 75 mg/mL subcutaneous solution: 75 milligram(s) subcutaneous every 2 weeks (26 Mar 2024 19:43)  predniSONE 10 mg oral tablet: 1 tab(s) orally once a day (26 Mar 2024 19:42)  tamsulosin 0.4 mg oral capsule: 1 cap(s) orally once a day (at bedtime) (29 Mar 2024 10:34)    MEDICATIONS  (STANDING):  acetaminophen  Suppository .. 650 milliGRAM(s) Rectal once  cefepime   IVPB 2000 milliGRAM(s) IV Intermittent every 12 hours  norepinephrine Infusion 0.05 MICROgram(s)/kG/Min (10.2 mL/Hr) IV Continuous <Continuous>    ALLERGIES:  levofloxacin (Unknown)  ofloxacin (Unknown)  gatifloxacin (Unknown)    Intolerances  telithromycin (Other)  fluoroquinolone antibiotics (Other)  Avelox (Other)  Ketek (Other)    SOCIAL HISTORY:  Bed bound  Lives with home  Has visiting nursing     FAMILY HISTORY:  FAMILY HISTORY:  Family history of stroke (Father)  Father -  age 62    Family history of kidney disease (Mother)  Mother -  age 67    Family history of diabetes mellitus type II (Sibling)  Brother & Sister    VITAL SIGNS:  Vital Signs Last 24 Hrs  T(C): 36.3 (05 Sep 2024 17:00), Max: 39.4 (05 Sep 2024 14:30)  T(F): 97.3 (05 Sep 2024 17:00), Max: 103 (05 Sep 2024 14:30)  HR: 97 (05 Sep 2024 17:30) (97 - 135)  BP: 90/58 (05 Sep 2024 17:30) (81/52 - 117/88)  BP(mean): --  RR: 20 (05 Sep 2024 17:30) (20 - 24)  SpO2: 98% (05 Sep 2024 17:30) (93% - 98%)    Parameters below as of 05 Sep 2024 17:30  Patient On (Oxygen Delivery Method): nasal cannula  O2 Flow (L/min): 4    PHYSICAL EXAM:  GENERAL: NAD  HEAD:  Atraumatic, normocephalic  EYES: EOMI, PERRLA, conjunctiva and sclera clear  NECK: Supple, trachea midline, no JVD  HEART: Tachy  LUNGS: Unlabored respirations.   ABDOMEN: Soft, Left sided abdominal tenderness on mild palpation, mildly distended  EXTREMITIES: No calf tenderness bilaterally  NERVOUS SYSTEM: A&Ox1; aware of his name    LABS:                      17.0   41.45 )-----------( 289      ( 05 Sep 2024 14:30 )             52.6     133<L>  |  100  |  37<H>  ----------------------------<  150<H>  4.7   |  21<L>  |  2.60<H>    Ca    9.0      05 Sep 2024 15:45    TPro  6.4  /  Alb  2.1<L>  /  TBili  1.0  /  DBili  x   /  AST  27  /  ALT  37  /  AlkPhos  79  09-    PT/INR - ( 05 Sep 2024 14:30 )   PT: 31.8 sec;   INR: 2.88 ratio         PTT - ( 05 Sep 2024 14:30 )  PTT:42.3 sec  Urinalysis Basic - ( 05 Sep 2024 15:45 )    Color: x / Appearance: x / SG: x / pH: x  Gluc: 150 mg/dL / Ketone: x  / Bili: x / Urobili: x   Blood: x / Protein: x / Nitrite: x   Leuk Esterase: x / RBC: x / WBC x   Sq Epi: x / Non Sq Epi: x / Bacteria: x    LIVER FUNCTIONS - ( 05 Sep 2024 15:45 )  Alb: 2.1 g/dL / Pro: 6.4 g/dL / ALK PHOS: 79 U/L / ALT: 37 U/L / AST: 27 U/L / GGT: x           Urinalysis with Rflx Culture (collected 05 Sep 2024 15:20)    RADIOLOGY & ADDITIONAL STUDIES:  < from: CT Abdomen and Pelvis No Cont (24 @ 16:26) >    ACC: [id number] EXAM:  CT ABDOMEN AND PELVIS   ORDERED BY: DOREEN SILVA   ACC: [id number] EXAM:  CT CHEST   ORDERED BY: DOREEN SILVA   PROCEDURE DATE:  2024    INTERPRETATION:  CLINICAL INFORMATION: Shortness of breath, AMS, fever.  COMPARISON: CT abdomen and pelvis 3/26/2024. CT chest, abdomen and pelvis 11/3/2023.    CONTRAST/COMPLICATIONS:  IV Contrast: NONE  Oral Contrast: NONE  Complications: None reported at time of study completion    PROCEDURE:  CT of the Chest, Abdomen and Pelvis was performed.  Sagittal and coronal reformats were performed.    FINDINGS:  CHEST:  LUNGS AND LARGE AIRWAYS: Limited evaluation of the pulmonary parenchyma due to respiratory motion artifacts obscuring detail. Patent central airways. Bilateral interlobular septal thickening more prominent in the lower lobes suggesting pulmonary venous congestion. Bilateral lower lobe groundglass and dependent patchy opacities consistent with dependent atelectasis, underlying right lower lobe pneumonia is not excluded.  PLEURA: No pleural effusion.  VESSELS: Atherosclerotic calcifications of the thoracic aorta and aortic arch branches.  HEART: Cardiomegaly. Aortic valve and severe coronary artery calcifications. No pericardial effusion.  MEDIASTINUM AND KONRAD: Again noted is calcified mediastinal and right hilar lymphadenopathy and calcified lung nodules compatible with old granulomatous disease.  CHEST WALL AND LOWER NECK: 2.3 cm low-density left thyroid nodule.    ABDOMEN AND PELVIS:  LIVER: Numerous punctate calcified nodules compatible with calcified granulomas.  BILE DUCTS: Normal caliber.  GALLBLADDER: Probable sludge. Gallstones not convincingly seen, but difficult to exclude due to motion artifacts.  SPLEEN: Numerous punctate calcified nodules consistent with calcified granulomas.  PANCREAS: Pancreatic fatty atrophy.  ADRENALS: Within normal limits.  KIDNEYS/URETERS: Nonobstructing right intrarenal calculi measuring up to 5 mm. Mild right hydroureteronephrosis extending to the right UVJ where there is a 7 mm right UVJ calculus. Punctate nonobstructing left upper pole renal calculus. Dilated left extrarenal pelvis but no left hydronephrosis.    BLADDER: Multiple urinary bladder calculi. Small left posterolateral urinary bladder diverticulum. Air bubble within the urinary bladder without a Restrepo catheter.  REPRODUCTIVE ORGANS: Prostatomegaly.    BOWEL: Limited evaluation due to respiratory motion artifacts. No bowel obstruction. Appendix is normal. Distended stool filled rectum measuring 6.4 cm in diameter consistent with fecal rectal impaction.  PERITONEUM/RETROPERITONEUM: Within normal limits.  VESSELS: Severe diffuse coarse atherosclerotic calcifications of the abdominal aorta, abdominal aortic branches, bilateral iliac and femoral arteries with scattered areas of severe stenosis and focal areas of probable short segment occlusions in bilateral femoral arteries,. Severe stenosis of the left main renal artery, SMA, celiac axis origin.  LYMPH NODES: Punctate calcified right upper quadrant lymph nodes.  ABDOMINAL WALL: Small fat-containing umbilical hernia. Diffuse pelvic/gluteal/upper thighs muscle fatty atrophy.  BONES: Severe degenerative changes throughout the spine. Lumbar scoliosis. Advanced osteoarthritis of the pubic symphysis, SI joints, hip joints.    IMPRESSION:  Bilateral interlobular septal thickening more prominent in the lower lobes suggesting pulmonary venous congestion.    Bilateral lower lobe dependent atelectasis, underlying right lower lobe infiltrates not excluded.    Distended stool filled rectum compatible with fecal rectal impaction.    Mild right hydroureteronephrosis resulting from a 7 mm stone in the right UVJ. Urinary bladder calculi.    Air within the urinary bladder lumen without a Restrepo catheter, correlation with history of recent bladder catheterization and urinalysis is recommended in order to exclude emphysematous cystitis.    Prostatomegaly. Correlation with rectal exam and PSA level is recommended.    Evidence of old granulomatous disease.    Additional findings noted above.    --- End of Report ---    ASSESSMENT:  79 year old male with PMH of HTN, CHF, PE, MG and chronic LE edema and pain presented with septic stone; CT demonstrates mild right hydroureteronephrosis resulting from a 7 mm stone in the right UVJ, leukocytosis, elevated lactate, hypotensive, tachy, febrile, OR planning tonight    PLAN:  - Recommend medicine admit  - NPO  - Monitor BP; on pressors  - Febrile   - Leukocytosis on 41.45  - Received vanco, zosyn, cefepime in the ED  - Elevated lactate 7.9  - IVF; resuscitate   - Planning for OR for cysto stent placement; likely need ICU observation s/p cysto  - Discussed case with anesthesiologist and urologist  UROLOGY PA CONSULT NOTE:    CHIEF COMPLAINT:  Patient is a 79y old  Male who presents with a chief complaint of fevers, AMS    HPI FROM ED:  79yo male bib ems with sob and confusion, as per son pt was sob today with fever, no cough, no vomiting or diarrhea, pt is ams so hx is limited    INTERVAL HPI:   81 yo male with pmhx of HTN, CHF, PE, MG and chronic LE edema, history of 7 mm distal ureteral calculus with mild right hydroureteronephrosis and pain presented to the ED with shortness of breath and confusion.  Patient's son is at bedside he states he seems more confused and altered then baseline; has not been feeling well. Patient was complaining of nauseous. Also is retaining urine; usually urinates in a urinal. Decreased appetite; weak; chills. Patient hasn't seen cardiologist since March to follow up on his CHF. Last BM was Saturday Patient hasn't ate since yesterday.  In addition, patient was seen at [facility] for ureteral calculus whereby patient was instructed to follow up as outpatient for further evaluation, and failed to follow up.      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 +nausea, no vomiting, +constipation.   GENITOURINARY: No dysuria, frequency or hematuria  NEUROLOGICAL: No numbness or weakness  SKIN: No itching, rashes    MEDICATIONS:  Home Medications:  Eliquis 5 mg oral tablet: 1 tab(s) orally 2 times a day (26 Mar 2024 19:43)  omeprazole 40 mg oral delayed release capsule: 1 cap(s) orally once a day (26 Mar 2024 19:43)  Potassium Chloride (Eqv-Klor-Con M20) 20 mEq oral tablet, extended release: 1 tab(s) orally once a day Resume taking this tomorrow, 23 (26 Mar 2024 19:43)  Praluent Pen 75 mg/mL subcutaneous solution: 75 milligram(s) subcutaneous every 2 weeks (26 Mar 2024 19:43)  predniSONE 10 mg oral tablet: 1 tab(s) orally once a day (26 Mar 2024 19:42)  tamsulosin 0.4 mg oral capsule: 1 cap(s) orally once a day (at bedtime) (29 Mar 2024 10:34)    MEDICATIONS  (STANDING):  acetaminophen  Suppository .. 650 milliGRAM(s) Rectal once  cefepime   IVPB 2000 milliGRAM(s) IV Intermittent every 12 hours  norepinephrine Infusion 0.05 MICROgram(s)/kG/Min (10.2 mL/Hr) IV Continuous <Continuous>    ALLERGIES:  levofloxacin (Unknown)  ofloxacin (Unknown)  gatifloxacin (Unknown)    Intolerances  telithromycin (Other)  fluoroquinolone antibiotics (Other)  Avelox (Other)  Ketek (Other)    SOCIAL HISTORY:  Bed bound  Lives with home  Has visiting nursing     FAMILY HISTORY:  FAMILY HISTORY:  Family history of stroke (Father)  Father -  age 62    Family history of kidney disease (Mother)  Mother -  age 67    Family history of diabetes mellitus type II (Sibling)  Brother & Sister    VITAL SIGNS:  Vital Signs Last 24 Hrs  T(C): 36.3 (05 Sep 2024 17:00), Max: 39.4 (05 Sep 2024 14:30)  T(F): 97.3 (05 Sep 2024 17:00), Max: 103 (05 Sep 2024 14:30)  HR: 97 (05 Sep 2024 17:30) (97 - 135)  BP: 90/58 (05 Sep 2024 17:30) (81/52 - 117/88)  BP(mean): --  RR: 20 (05 Sep 2024 17:30) (20 - 24)  SpO2: 98% (05 Sep 2024 17:30) (93% - 98%)    Parameters below as of 05 Sep 2024 17:30  Patient On (Oxygen Delivery Method): nasal cannula  O2 Flow (L/min): 4    PHYSICAL EXAM:  GENERAL: NAD  HEAD:  Atraumatic, normocephalic  EYES: EOMI, PERRLA, conjunctiva and sclera clear  NECK: Supple, trachea midline, no JVD  HEART: Tachy  LUNGS: Unlabored respirations.   ABDOMEN: Soft, Left sided abdominal tenderness on mild palpation, mildly distended  EXTREMITIES: No calf tenderness bilaterally  NERVOUS SYSTEM: A&Ox1; aware of his name    LABS:                      17.0   41.45 )-----------( 289      ( 05 Sep 2024 14:30 )             52.6     133<L>  |  100  |  37<H>  ----------------------------<  150<H>  4.7   |  21<L>  |  2.60<H>    Ca    9.0      05 Sep 2024 15:45    TPro  6.4  /  Alb  2.1<L>  /  TBili  1.0  /  DBili  x   /  AST  27  /  ALT  37  /  AlkPhos  79  09-    PT/INR - ( 05 Sep 2024 14:30 )   PT: 31.8 sec;   INR: 2.88 ratio         PTT - ( 05 Sep 2024 14:30 )  PTT:42.3 sec  Urinalysis Basic - ( 05 Sep 2024 15:45 )    Color: x / Appearance: x / SG: x / pH: x  Gluc: 150 mg/dL / Ketone: x  / Bili: x / Urobili: x   Blood: x / Protein: x / Nitrite: x   Leuk Esterase: x / RBC: x / WBC x   Sq Epi: x / Non Sq Epi: x / Bacteria: x    LIVER FUNCTIONS - ( 05 Sep 2024 15:45 )  Alb: 2.1 g/dL / Pro: 6.4 g/dL / ALK PHOS: 79 U/L / ALT: 37 U/L / AST: 27 U/L / GGT: x           Urinalysis with Rflx Culture (collected 05 Sep 2024 15:20)    RADIOLOGY & ADDITIONAL STUDIES:  < from: CT Abdomen and Pelvis No Cont (24 @ 16:26) >    ACC: [id number] EXAM:  CT ABDOMEN AND PELVIS   ORDERED BY: DOREEN SILVA   ACC: [id number] EXAM:  CT CHEST   ORDERED BY: DOREEN SILVA   PROCEDURE DATE:  2024    INTERPRETATION:  CLINICAL INFORMATION: Shortness of breath, AMS, fever.  COMPARISON: CT abdomen and pelvis 3/26/2024. CT chest, abdomen and pelvis 11/3/2023.    CONTRAST/COMPLICATIONS:  IV Contrast: NONE  Oral Contrast: NONE  Complications: None reported at time of study completion    PROCEDURE:  CT of the Chest, Abdomen and Pelvis was performed.  Sagittal and coronal reformats were performed.    FINDINGS:  CHEST:  LUNGS AND LARGE AIRWAYS: Limited evaluation of the pulmonary parenchyma due to respiratory motion artifacts obscuring detail. Patent central airways. Bilateral interlobular septal thickening more prominent in the lower lobes suggesting pulmonary venous congestion. Bilateral lower lobe groundglass and dependent patchy opacities consistent with dependent atelectasis, underlying right lower lobe pneumonia is not excluded.  PLEURA: No pleural effusion.  VESSELS: Atherosclerotic calcifications of the thoracic aorta and aortic arch branches.  HEART: Cardiomegaly. Aortic valve and severe coronary artery calcifications. No pericardial effusion.  MEDIASTINUM AND KONRAD: Again noted is calcified mediastinal and right hilar lymphadenopathy and calcified lung nodules compatible with old granulomatous disease.  CHEST WALL AND LOWER NECK: 2.3 cm low-density left thyroid nodule.    ABDOMEN AND PELVIS:  LIVER: Numerous punctate calcified nodules compatible with calcified granulomas.  BILE DUCTS: Normal caliber.  GALLBLADDER: Probable sludge. Gallstones not convincingly seen, but difficult to exclude due to motion artifacts.  SPLEEN: Numerous punctate calcified nodules consistent with calcified granulomas.  PANCREAS: Pancreatic fatty atrophy.  ADRENALS: Within normal limits.  KIDNEYS/URETERS: Nonobstructing right intrarenal calculi measuring up to 5 mm. Mild right hydroureteronephrosis extending to the right UVJ where there is a 7 mm right UVJ calculus. Punctate nonobstructing left upper pole renal calculus. Dilated left extrarenal pelvis but no left hydronephrosis.    BLADDER: Multiple urinary bladder calculi. Small left posterolateral urinary bladder diverticulum. Air bubble within the urinary bladder without a Restrepo catheter.  REPRODUCTIVE ORGANS: Prostatomegaly.    BOWEL: Limited evaluation due to respiratory motion artifacts. No bowel obstruction. Appendix is normal. Distended stool filled rectum measuring 6.4 cm in diameter consistent with fecal rectal impaction.  PERITONEUM/RETROPERITONEUM: Within normal limits.  VESSELS: Severe diffuse coarse atherosclerotic calcifications of the abdominal aorta, abdominal aortic branches, bilateral iliac and femoral arteries with scattered areas of severe stenosis and focal areas of probable short segment occlusions in bilateral femoral arteries,. Severe stenosis of the left main renal artery, SMA, celiac axis origin.  LYMPH NODES: Punctate calcified right upper quadrant lymph nodes.  ABDOMINAL WALL: Small fat-containing umbilical hernia. Diffuse pelvic/gluteal/upper thighs muscle fatty atrophy.  BONES: Severe degenerative changes throughout the spine. Lumbar scoliosis. Advanced osteoarthritis of the pubic symphysis, SI joints, hip joints.    IMPRESSION:  Bilateral interlobular septal thickening more prominent in the lower lobes suggesting pulmonary venous congestion.    Bilateral lower lobe dependent atelectasis, underlying right lower lobe infiltrates not excluded.    Distended stool filled rectum compatible with fecal rectal impaction.    Mild right hydroureteronephrosis resulting from a 7 mm stone in the right UVJ. Urinary bladder calculi.    Air within the urinary bladder lumen without a Restrepo catheter, correlation with history of recent bladder catheterization and urinalysis is recommended in order to exclude emphysematous cystitis.    Prostatomegaly. Correlation with rectal exam and PSA level is recommended.    Evidence of old granulomatous disease.    Additional findings noted above.    --- End of Report ---    ASSESSMENT:  79 year old male with PMH of HTN, CHF, PE, MG and chronic LE edema and pain presented with septic stone; CT demonstrates mild right hydroureteronephrosis resulting from a 7 mm stone in the right UVJ, leukocytosis, elevated lactate, hypotensive, tachy, febrile, OR planning tonight    PLAN:  - Recommend medicine admit  - NPO  - Monitor BP; on pressors  - Febrile   - Leukocytosis on 41.45  - Received vanco, zosyn, cefepime in the ED  - Elevated lactate 7.9  - IVF; resuscitate   - Planning for OR for cysto stent placement; likely need ICU observation s/p cysto  - Discussed case with anesthesiologist and urologist

## 2024-09-05 NOTE — ED ADULT NURSE NOTE - OBJECTIVE STATEMENT
Received pt in room 3B, 79 yr/o male A+OX2 to self and placed, hx of     pt was brought to the ED by son for c.o increased confusion with SOB and fever at home. Received pt in room 3B, 79 yr/o male A+OX2 to self and placed, pt is bed bound, lives at home with son. pt was brought to the ED by son for c.o increased confusion with SOB and fever at home. pt is pursed lip breathing; nasal canula applied pt tolerating well. pt was tachycardic and tychpenic on arrival, pt received meds as ordered and tolerated well. Vital signs improved after medication administration. PERRLA and facial symmetry noted. pt is denying chest pain and palpations. abdomen is obese, soft, nontender on palpation; denies nausea, vomiting, diarrhea, and constipation. pt had formed BM on arrival. pt cleaned turned and repositioned, no obvious joint deformities noted. stage 1 pressure ulcer noted to sacrum, blanchable redness noted to b/l heels.

## 2024-09-05 NOTE — BRIEF OPERATIVE NOTE - OPERATION/FINDINGS
Cystoscope passed into bladder.  Unable to identify R ureteral opening due to amount of blood in bladder.  Irrigation performed, some clots noted to be removed.  Procedure aborted and patient was transferred to ICU for further management.

## 2024-09-05 NOTE — ED PROVIDER NOTE - OBJECTIVE STATEMENT
79yo male bib ems with sob and confusion, as per son pt was sob today with fever, no cough, no vomiting or diarrhea, pt is ams so hx is lmited

## 2024-09-05 NOTE — CONSULT NOTE ADULT - CONSULT REASON
Septic shock
Septic stone
shortness of breath, fatigue
sepsis
sob  obesity  weakness  poor historian

## 2024-09-05 NOTE — CONSULT NOTE ADULT - ASSESSMENT
81yo male bib ems with sob and confusion, as per son pt was sob today with fever, no cough, no vomiting or diarrhea, pt is ams    CHF  MG  AMS  OP  OA  ? ATUL  GERD  hx of UTI  Sepsis  Lactic Acidosis  CATINA    hydration  I and O  serial labs  cx - biomarkers  cxr noted  labs reviewed  ICU cx -   at risk for decompensation  broad spectrum ABX  HOB elev  asp prec  oral hygiene  skin care  o2 support as needed  monitor VS and HD and Sat  monitored unit admission  OLD records reviewed  known to [facility]  monitor mentation  hx of MG - may have underlying resp insuff and ATUL -     
79 year old male with PMH of HTN, CHF, PE, MG and chronic LE edema and pain, R distal ureteral calculus, bed bound at baseline, who presents with SOB, not acting like himself.     Found to have fever and leukocytosis, concern for sepsis of urinary source. Also with CATINA and elevated lactate. Concern for septic stone, urinalysis shows pyuria and CT showed mild right hydroureteronephrosis resulting from a 7 mm stone in the right UVJ. It also showed air in the urinary bladder. Prior urine culture grew klebsiella pneumoniae.    #Fever  #Leukocytosis  #Sepsis  #UTI  #R UVJ stone    -suggest cefepime (renally dosed)  -suggest vancomycin x 1, then check level in AM  -suggest Urology evaluation given concern for septic stone  -follow blood and urine cultures  -monitor WBC  -discussed with son at bedside    Thank you for courtesy of this consult.     Will follow.  Discussed with the primary team.     Rachel Connor MD  Division of Infectious Diseases   Cell [phone] between 8am and 6pm   After 6pm and weekends please call ID service at [phone].     55 minutes spent on total encounter assessing patient, examination, chart review, counseling and coordinating care by the attending physician/nurse/care manager.   
Assessment: Patient is a 78 y/o M with PMHx of HTN, HLD, CHF, myasthenia gravis, and T2DM who presents to ED with shortness of breath and fatigue. Admitted for septic likely 2/2 UTI. ureteral stone. Cardiology consulted for shortness of breath.     Plan:   - Patient without symptoms of angina  - No troponin collected; EKG sinus tachycardia with known L anterior fascicular block unchanged from prior, no ischemic changes  - Monitor closely for the development of anginal symptoms or clinical signs of ischemia.   - Ok to hold Eliquis if plan for urologic procedure, f/u urology recommendations.     - proBNP 46916 with shortness of breath on presentation, appears dry on exam  - Low suspicion for acute CHF exacerbation at this time; likely shortness of breath response to lactic acidosis from distributive shock from renal stone   - Elevation in proBNP likely magnified in setting of significant CATINA   - No diuresis at this time, recommend gentle fluid resuscitation   - Previous TTE 06/21: LVEF 55-60% with poor visualization of valves; would obtain repeat TTE   - Strict I/Os, daily weights.    - S/P 2.5L NS bolus, BP remains soft   - Hold home BP medications; may require additional BP support with pressors     - Monitor and replete lytes, keep K>4, Mg>2.  - Other cardiovascular workup will depend on clinical course.  - All other workup per primary team.  - Will continue to follow.
Assessment:  81 yo M with pmhx of HTN, CHF, PE, Myasthenia Gravis, and chronic LE edema, history of 7 mm distal ureteral calculus with mild right hydroureteronephrosis and pain presented to the ED with shortness of breath and confusion after failing to follow up outpt on ureteral calculus after prior evaluation at [facility].  Patient's son is at bedside he states he seems more confused and altered then baseline; has not been feeling well. Patient was complaining of nauseous. Also is retaining urine; usually urinates in a urinal. Decreased appetite; weak; chills. Patient hasn't seen cardiologist since March to follow up on his CHF. Labs significant for WBC 41.45, procal 36.3, lactate 7.9, Scr 2.6, BNP 52016, UA. CT CAP revealed R hydro from 7 mm stone in UVJ. Patient sent emergently with urology for stent placement but unfortunately unsuccessful with urology. Patient sent to [facility] for septic shock management and stabilization. Patient accepted for transfer to [facility] with IR for emergent nephrostomy tube placement with Dr Shaw and medical management with [facility] MICU.    Septic shock  Urosepsis  UTI  CATINA  Hydronephrosis 2/2 7 mm R ureteral stone  Lactic acidosis  Leukocytosis    Plan:  -Septic shock 2/2 Urosepsis s/p IVF resuscitation requiring levophed gtt. Actively titrating levophed gtt to maintain MAP>65 for adequate organ perfusion  -Actively titrating supplemental O2 to maintain SO2>92 for adequate tissue oxygenation, SO2 97 on NC currently.  -Urosepsis, +UA noted, leukocytosis noted, blood cultures sent. S/p Zosyn, Vanco. Ordered to continue Zosyn ABX. Trend WBC and fever curves.  -Tylenol for fever, analgesia, avoid narcotics for deliriogenic effects.  -CATINA with Scr 2.6, hematuria noted with minimal UOP. Failed stent placement with urology. Patient accepted for transfer to [facility] with IR for emergent nephrostomy tube placement. Monitor I/Os. Avoid nephrotoxic meds. Replete lytes to maintain K>4, Mg>2, Phos>3.  -NPO, protonix for GI PPX  -Hold Eliquis 2/2 hematuria noted, IR procedure planned. H&H stable, trend daily. Transfuse if Hgb < 7.  -BG goal 110-180 with fingersticks PRN    CRITICAL CARE TIME SPENT: 80 minutes  Time spent evaluating/treating patient with medical issues that pose a high risk for life threatening deterioration, and/or end-organ damage, reviewing data/labs/imaging, discussing case with multidisciplinary team, discussing plan/goals of care with patient/family. Non-inclusive of procedure time. Date of entry of this note is equal to the date of services rendered.     Case Discussed with ICU Attending, Dr Rona Celis

## 2024-09-05 NOTE — CONSULT NOTE ADULT - ATTENDING COMMENTS
80 y/o M with PMHx of HTN, HLD, CHF, myasthenia gravis, and T2DM, PE on AC who presents to ED with shortness of breath and fatigue. Admitted for septic likely 2/2 UTI. ureteral stone. Cardiology consulted for shortness of breath.      - pts sx at home likey from urosepsis from obstructing stone  - now with elevated lactate.   - despite elevated BNP and mild congestion on imaging overall clinically appears dry and likely in septic shock  - Previous TTE 06/21: LVEF 55-60% with poor visualization of valves; would obtain repeat TTE   - bolus IVF  - may need midodrine or low dose pressor support if bp does not improve.   - hold bp meds.   - trend lactate  - need guerrero    - EKG sinus tachycardia with known L anterior fascicular block unchanged from prior, no ischemic changes  - Monitor closely for the development of anginal symptoms or clinical signs of ischemia.   - hx of PE. on AC. can hold if procedure planned.     - will need urgent  evaluation  - if necessary can proceed with emergent  intervention without further cardiac workup.

## 2024-09-05 NOTE — DISCHARGE NOTE PROVIDER - CARE PROVIDER_API CALL
Mariya Paulino [facility]  Urology  [address] [place] [id number]  Phone: [phone]  Fax: [phone]  Follow Up Time:

## 2024-09-05 NOTE — DISCHARGE NOTE PROVIDER - HOSPITAL COURSE
BRIEF HOSPITAL COURSE: 81 yo M with pmhx of HTN, CHF, PE, Myasthenia Gravis, and chronic LE edema, history of 7 mm distal ureteral calculus with mild right hydroureteronephrosis and pain presented to the ED with shortness of breath and confusion after failing to follow up outpt on ureteral calculus after prior evaluation at [facility].  Patient's son is at bedside he states he seems more confused and altered then baseline; has not been feeling well. Patient was complaining of nauseous. Also is retaining urine; usually urinates in a urinal. Decreased appetite; weak; chills. Patient hasn't seen cardiologist since March to follow up on his CHF. Labs significant for WBC 41.45, procal 36.3, lactate 7.9, Scr 2.6, BNP 65662, UA. CT CAP revealed R hydro from 7 mm stone in UVJ. Patient sent emergently with urology for stent placement.    Events last 24 hours: Stent placement for 7 mm stone unsuccessful, procedure aborted, patient transferred to ICU for septic shock s/p IVF and requiring levophed gtt. Patient accepted for transfer to [facility] for IR procedure with Dr Shaw and medical management with [facility] MICU.

## 2024-09-05 NOTE — CONSULT NOTE ADULT - SUBJECTIVE AND OBJECTIVE BOX
[facility]  INFECTIOUS DISEASES - Lalita Mckinley, [address], [place] [address]  Tel: [phone]     Fax: [phone]  =======================================================    [id number]  DEION HEATH     CC: Patient is a 79y old  Male who presents with a chief complaint of SOB and confusion    HPI:  79 year old male with PMH of HTN, CHF, PE, MG and chronic LE edema and pain, R distal ureteral calculus, bed bound at baseline, who presents with SOB. Son at bedside said that since yesterday patient has been SOB, also not acting like his usual self. Patient appears weak but shook head no when asked if he had any pain, headache or SOB. Patient has not urinated since last night. No known sick contacts or recent travel.     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      Club foot  Surgery at birth for Club Foot Right foot      Pilonidal cyst  Surgery 40 years ago      H/O colonoscopy      Spinal stenosis      H/O prostate biopsy          Social Hx:     FAMILY HISTORY:  Family history of stroke (Father)  Father -  age 62    Family history of kidney disease (Mother)  Mother -  age 67    Family history of diabetes mellitus type II (Sibling)  Brother & Sister        Allergies    levofloxacin (Unknown)  ofloxacin (Unknown)  gatifloxacin (Unknown)    Intolerances    telithromycin (Other)  fluoroquinolone antibiotics (Other)  Avelox (Other)  Ketek (Other)      Antibiotics:  MEDICATIONS  (STANDING):  acetaminophen  Suppository .. 650 milliGRAM(s) Rectal once  cefepime   IVPB 2000 milliGRAM(s) IV Intermittent every 12 hours  vancomycin  IVPB 1000 milliGRAM(s) IV Intermittent Once  vancomycin  IVPB. 1000 milliGRAM(s) IV Intermittent once    MEDICATIONS  (PRN):       REVIEW OF SYSTEMS:  limited 2/2 clinical condition, as per HPI    Physical Exam:  Vital Signs Last 24 Hrs  T(C): 39.4 (05 Sep 2024 14:30), Max: 39.4 (05 Sep 2024 14:30)  T(F): 103 (05 Sep 2024 14:30), Max: 103 (05 Sep 2024 14:30)  HR: 101 (05 Sep 2024 15:30) (101 - 135)  BP: 117/88 (05 Sep 2024 15:30) (87/47 - 117/88)  BP(mean): --  RR: 20 (05 Sep 2024 15:30) (20 - 24)  SpO2: 98% (05 Sep 2024 15:30) (93% - 98%)    Parameters below as of 05 Sep 2024 15:30  Patient On (Oxygen Delivery Method): nasal cannula  O2 Flow (L/min): 4    Height (cm): 167.6 ( @ 13:37)  Weight (kg): 108.9 ( @ 13:37)  BMI (kg/m2): 38.8 ( @ 13:37)  BSA (m2): 2.16 ( @ 13:37)    GEN: Appears ill but awake  HEENT: normocephalic and atraumatic.   NECK: Supple.   LUNGS: Normal respiratory effort  HEART: Tachycardic  ABDOMEN: Soft, nontender, and nondistended.    EXTREMITIES: No leg edema.  NEUROLOGIC: appears weak but answering some questions with yes/no, knows he is in the hospital    Labs:      133<L>  |  100  |  37<H>  ----------------------------<  150<H>  4.7   |  21<L>  |  2.60<H>    Ca    9.0      05 Sep 2024 15:45    TPro  6.4  /  Alb  2.1<L>  /  TBili  1.0  /  DBili  x   /  AST  27  /  ALT  37  /  AlkPhos  79                            17.0   41.45 )-----------( 289      ( 05 Sep 2024 14:30 )             52.6     PT/INR - ( 05 Sep 2024 14:30 )   PT: 31.8 sec;   INR: 2.88 ratio         PTT - ( 05 Sep 2024 14:30 )  PTT:42.3 sec  Urinalysis Basic - ( 05 Sep 2024 15:45 )    Color: x / Appearance: x / SG: x / pH: x  Gluc: 150 mg/dL / Ketone: x  / Bili: x / Urobili: x   Blood: x / Protein: x / Nitrite: x   Leuk Esterase: x / RBC: x / WBC x   Sq Epi: x / Non Sq Epi: x / Bacteria: x      LIVER FUNCTIONS - ( 05 Sep 2024 15:45 )  Alb: 2.1 g/dL / Pro: 6.4 g/dL / ALK PHOS: 79 U/L / ALT: 37 U/L / AST: 27 U/L / GGT: x                 Procalcitonin: 26.81 ng/mL (24 @ 15:45)        SARS-CoV-2 Result: NotDetec (24 @ 14:30)      RECENT CULTURES:        All imaging and other data have been reviewed.    < from: CT Chest No Cont (24 @ 16:24) >    FINDINGS:  CHEST:  LUNGS AND LARGE AIRWAYS: Limited evaluation of the pulmonary parenchyma   due to respiratory motion artifacts obscuring detail. Patent central   airways. Bilateral interlobular septal thickening more prominent in the   lower lobes suggesting pulmonary venous congestion. Bilateral lower lobe   groundglass and dependent patchy opacities consistent with dependent   atelectasis, underlying right lower lobe pneumonia is not excluded.  PLEURA: No pleural effusion.  VESSELS: Atherosclerotic calcifications of the thoracic aorta and aortic   arch branches.  HEART: Cardiomegaly. Aortic valve and severe coronary artery   calcifications. No pericardial effusion.  MEDIASTINUM AND KONRAD: Again noted is calcified mediastinal and right   hilar lymphadenopathy and calcified lung nodules compatible with old   granulomatous disease.  CHEST WALL AND LOWER NECK: 2.3 cm low-density left thyroid nodule.    ABDOMEN AND PELVIS:  LIVER: Numerous punctate calcified nodules compatible with calcified   granulomas.  BILE DUCTS: Normal caliber.  GALLBLADDER: Probable sludge. Gallstones not convincingly seen, but   difficult to exclude due to motion artifacts.  SPLEEN: Numerous punctate calcified nodules consistent with calcified   granulomas.  PANCREAS: Pancreatic fatty atrophy.  ADRENALS: Within normal limits.  KIDNEYS/URETERS: Nonobstructing right intrarenal calculi measuring up to   5 mm. Mild right hydroureteronephrosis extending to the right UVJ where   there is a 7 mm right UVJ calculus. Punctate nonobstructing left upper   pole renal calculus. Dilated left extrarenal pelvis but no left   hydronephrosis.    BLADDER: Multiple urinary bladder calculi. Small left posterolateral   urinary bladder diverticulum. Air bubble within the urinary bladder   without a Restrepo catheter.  REPRODUCTIVE ORGANS: Prostatomegaly.    BOWEL: Limited evaluation due to respiratory motion artifacts. No bowel   obstruction. Appendix is normal. Distended stool filled rectum measuring   6.4 cm in diameter consistent with fecal rectal impaction.  PERITONEUM/RETROPERITONEUM: Within normal limits.  VESSELS: Severe diffuse coarse atherosclerotic calcifications of the   abdominal aorta, abdominal aortic branches, bilateral iliac and femoral   arteries with scattered areas of severe stenosis and focal areas of   probable short segment occlusions in bilateral femoral arteries,. Severe   stenosis of the left main renal artery, SMA, celiac axis origin.  LYMPH NODES: Punctate calcified right upper quadrant lymph nodes.  ABDOMINAL WALL: Small fat-containing umbilical hernia. Diffuse   pelvic/gluteal/upper thighs muscle fatty atrophy.  BONES: Severe degenerative changes throughout the spine. Lumbar   scoliosis. Advanced osteoarthritis of the pubic symphysis, SI joints, hip   joints.    IMPRESSION:  Bilateral interlobular septal thickening more prominent in the lower   lobes suggesting pulmonary venous congestion.    Bilateral lower lobe dependent atelectasis, underlying right lower lobe   infiltrates not excluded.    Distended stool filled rectum compatible with fecal rectal impaction.    Mild right hydroureteronephrosis resulting from a 7 mm stone in the right   UVJ. Urinary bladder calculi.    Air within the urinary bladder lumen without a Restrepo catheter,   correlation with history of recent bladder catheterization and urinalysis   is recommended in order to exclude emphysematous cystitis.    Prostatomegaly. Correlation with rectal exam and PSA level is recommended.    Evidence of old granulomatous disease.    Additional findings noted above.    < end of copied text >

## 2024-09-05 NOTE — CONSULT NOTE ADULT - SUBJECTIVE AND OBJECTIVE BOX
[facility]         Peggy Islas Patel, Savella, Cohen      [phone] (office)    Reason for Consult: sob     Interval HPI: Patient is a 80 y/o M with PMHx of HTN, HLD, CHF, myasthenia gravis, and T2DM who presents to ED with shortness of breath and fatigue. Son is at bedside who provides assistance with history. Son states that the patient had been complaining of fatigue and shortness of breath since last night. Continued into this morning and worsened in severity. This has never happened to the patient before. Pt had been following with Dr. Woods prior as his cardiologist until he became bedbound about 1 year ago, unclear reason for progression into bedbound weakness. Son also states that his father had been urinating less and does have history of UTIs. Pt is compliant with his medications.   Otherwise, denies any CAD or arrhythmia history. Denies any chest pain, palpitations, nausea or vomiting.         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 for Club Foot Right foot      Pilonidal cyst  Surgery 40 years ago      H/O colonoscopy      Spinal stenosis      H/O prostate biopsy          SOCIAL HISTORY: No active tobacco, alcohol or illicit drug use    FAMILY HISTORY:  Family history of stroke (Father)  Father -  age 62    Family history of kidney disease (Mother)  Mother -  age 67    Family history of diabetes mellitus type II (Sibling)  Brother & Sister        Home Medications:  Eliquis 5 mg oral tablet: 1 tab(s) orally 2 times a day (26 Mar 2024 19:43)  omeprazole 40 mg oral delayed release capsule: 1 cap(s) orally once a day (26 Mar 2024 19:43)  Potassium Chloride (Eqv-Klor-Con M20) 20 mEq oral tablet, extended release: 1 tab(s) orally once a day Resume taking this tomorrow, 23 (26 Mar 2024 19:43)  Praluent Pen 75 mg/mL subcutaneous solution: 75 milligram(s) subcutaneous every 2 weeks (26 Mar 2024 19:43)  predniSONE 10 mg oral tablet: 1 tab(s) orally once a day (26 Mar 2024 19:42)  tamsulosin 0.4 mg oral capsule: 1 cap(s) orally once a day (at bedtime) (29 Mar 2024 10:34)      MEDICATIONS  (STANDING):  acetaminophen  Suppository .. 650 milliGRAM(s) Rectal once  cefepime   IVPB 2000 milliGRAM(s) IV Intermittent every 12 hours  norepinephrine Infusion 0.05 MICROgram(s)/kG/Min (10.2 mL/Hr) IV Continuous <Continuous>    MEDICATIONS  (PRN):      Allergies    levofloxacin (Unknown)  ofloxacin (Unknown)  gatifloxacin (Unknown)    Intolerances    telithromycin (Other)  fluoroquinolone antibiotics (Other)  Avelox (Other)  Ketek (Other)      REVIEW OF SYSTEMS: Negative except as per HPI.    VITAL SIGNS:   Vital Signs Last 24 Hrs  T(C): 36.3 (05 Sep 2024 17:00), Max: 39.4 (05 Sep 2024 14:30)  T(F): 97.3 (05 Sep 2024 17:00), Max: 103 (05 Sep 2024 14:30)  HR: 97 (05 Sep 2024 17:30) (97 - 135)  BP: 90/58 (05 Sep 2024 17:30) (81/52 - 117/88)  BP(mean): --  RR: 20 (05 Sep 2024 17:30) (20 - 24)  SpO2: 98% (05 Sep 2024 17:30) (93% - 98%)    Parameters below as of 05 Sep 2024 17:30  Patient On (Oxygen Delivery Method): nasal cannula  O2 Flow (L/min): 4      I&O's Summary      PHYSICAL EXAM:  Constitutional: NAD, elderly, ill appearing   HEENT NC/AT, dry mucous membranes  Pulmonary: Non-labored, soft wheezing b/l lobes   Cardiovascular: +S1, S2, RRR   Gastrointestinal: Soft, nontender, nondistended, normoactive bowel sounds  Extremities: No peripheral edema   Neurological: Alert, minimal movement of extremities due to weakness   Skin: Ecchymosis of upper extremities, moist texture   Psych: Mood & affect appropriate    LABS: All Labs Reviewed:                        17.0   41.45 )-----------( 289      ( 05 Sep 2024 14:30 )             52.6     05 Sep 2024 15:45    133    |  100    |  37     ----------------------------<  150    4.7     |  21     |  2.60     Ca    9.0        05 Sep 2024 15:45    TPro  6.4    /  Alb  2.1    /  TBili  1.0    /  DBili  x      /  AST  27     /  ALT  37     /  AlkPhos  79     05 Sep 2024 15:45    PT/INR - ( 05 Sep 2024 14:30 )   PT: 31.8 sec;   INR: 2.88 ratio         PTT - ( 05 Sep 2024 14:30 )  PTT:42.3 sec      Blood Culture:         EKG: sinus tachycardia with known L anterior fascicular block    RADIOLOGY: < from: CT Chest No Cont (24 @ 16:24) >    ACC: [id number] EXAM:  CT ABDOMEN AND PELVIS   ORDERED BY: DOREEN SILVA     ACC: [id number] EXAM:  CT CHEST   ORDERED BY: DOREEN SILVA     PROCEDURE DATE:  2024          INTERPRETATION:  CLINICAL INFORMATION: Shortness of breath, AMS, fever.    COMPARISON: CT abdomen and pelvis 3/26/2024. CT chest, abdomen and pelvis   11/3/2023.    CONTRAST/COMPLICATIONS:  IV Contrast: NONE  Oral Contrast: NONE  Complications: None reported at time of study completion    PROCEDURE:  CT of the Chest, Abdomen and Pelvis was performed.  Sagittal and coronal reformats were performed.    FINDINGS:  CHEST:  LUNGS AND LARGE AIRWAYS: Limited evaluation of the pulmonary parenchyma   due to respiratory motion artifacts obscuring detail. Patent central   airways. Bilateral interlobular septal thickening more prominent in the   lower lobes suggesting pulmonary venous congestion. Bilateral lower lobe   groundglass and dependent patchy opacities consistent with dependent   atelectasis, underlying right lower lobe pneumonia is not excluded.  PLEURA: No pleural effusion.  VESSELS: Atherosclerotic calcifications of the thoracic aorta and aortic   arch branches.  HEART: Cardiomegaly. Aortic valve and severe coronary artery   calcifications. No pericardial effusion.  MEDIASTINUM AND KONRAD: Again noted is calcified mediastinal and right   hilar lymphadenopathy and calcified lung nodules compatible with old   granulomatous disease.  CHEST WALL AND LOWER NECK: 2.3 cm low-density left thyroid nodule.    ABDOMEN AND PELVIS:  LIVER: Numerous punctate calcified nodules compatible with calcified   granulomas.  BILE DUCTS: Normal caliber.  GALLBLADDER: Probable sludge. Gallstones not convincingly seen, but   difficult to exclude due to motion artifacts.  SPLEEN: Numerous punctate calcified nodules consistent with calcified   granulomas.  PANCREAS: Pancreatic fatty atrophy.  ADRENALS: Within normal limits.  KIDNEYS/URETERS: Nonobstructing right intrarenal calculi measuring up to   5 mm. Mild right hydroureteronephrosis extending to the right UVJ where   there is a 7 mm right UVJ calculus. Punctate nonobstructing left upper   pole renal calculus. Dilated left extrarenal pelvis but no left   hydronephrosis.    BLADDER: Multiple urinary bladder calculi. Small left posterolateral   urinary bladder diverticulum. Air bubble within the urinary bladder   without a Restrepo catheter.  REPRODUCTIVE ORGANS: Prostatomegaly.    BOWEL: Limited evaluation due to respiratory motion artifacts. No bowel   obstruction. Appendix is normal. Distended stool filled rectum measuring   6.4 cm in diameter consistent with fecal rectal impaction.  PERITONEUM/RETROPERITONEUM: Within normal limits.  VESSELS: Severe diffuse coarse atherosclerotic calcifications of the   abdominal aorta, abdominal aortic branches, bilateral iliac and femoral   arteries with scattered areas of severe stenosis and focal areas of   probable short segment occlusions in bilateral femoral arteries,. Severe   stenosis of the left main renal artery, SMA, celiac axis origin.  LYMPH NODES: Punctate calcified right upper quadrant lymph nodes.  ABDOMINAL WALL: Small fat-containing umbilical hernia. Diffuse   pelvic/gluteal/upper thighs muscle fatty atrophy.  BONES: Severe degenerative changes throughout the spine. Lumbar   scoliosis. Advanced osteoarthritis of the pubic symphysis, SI joints, hip   joints.    IMPRESSION:  Bilateral interlobular septal thickening more prominent in the lower   lobes suggesting pulmonary venous congestion.    Bilateral lower lobe dependent atelectasis, underlying right lower lobe   infiltrates not excluded.    Distended stool filled rectum compatible with fecal rectal impaction.    Mild right hydroureteronephrosis resulting from a 7 mm stone in the right   UVJ. Urinary bladder calculi.    Air within the urinary bladder lumen without a Restrepo catheter,   correlation with history of recent bladder catheterization and urinalysis   is recommended in order to exclude emphysematous cystitis.    Prostatomegaly. Correlation with rectal exam and PSA level is recommended.    Evidence of old granulomatous disease.    Additional findings noted above.    --- End of Report ---    < end of copied text >  < from: Xray Chest 1 View-PORTABLE IMMEDIATE (24 @ 14:45) >  ACC: [id number] EXAM:  XR CHEST PORTABLE IMMED 1V   ORDERED BY: DOREEN SILVA     PROCEDURE DATE:  2024          INTERPRETATION:  An AP portable supine chest radiograph was performed for   sepsis.    Comparison is made to 3/26/2024.    There is a calcified granuloma in the right upper lobe along with   multiple calcified right lower paratracheal and possibly right hilar   mediastinal lymph nodes, all consistent with old granulomatous disease   and remain unchanged. There are no infiltrates seen on either side. There   is no pneumothorax. There are no pleural effusions. There is no hilar or   mediastinal widening otherwise seen in the cardiac silhouette is not   enlarged for the projection. There is no CHF. Degenerative changes ofthe   thoracic spine and left greater than right shoulders.    IMPRESSION:  1. Old granulomatous disease with no acute cardiopulmonary abnormalities.  2. Degenerative changes of the thoracic spine as well as the left greater   than right shoulders, unchanged.    --- End of Report ---      < end of copied text >   [facility]         Peggy Islas Patel, Savella, Cohen      [phone] (office)    Reason for Consult: sob     Interval HPI: Patient is a 78 y/o M with PMHx of HTN, HLD, CHF, myasthenia gravis, and T2DM who presents to ED with shortness of breath and fatigue. Son is at bedside who provides assistance with history. Son states that the patient had been complaining of fatigue and shortness of breath since last night. Continued into this morning and worsened in severity. This has never happened to the patient before. Pt had been following with Dr. Wodos prior as his cardiologist until he became bedbound about 1 year ago, unclear reason for progression into bedbound weakness. Son also states that his father had been urinating less and does have history of UTIs. Pt is compliant with his medications.   Otherwise, denies any CAD or arrhythmia history. Denies any chest pain, palpitations, nausea or vomiting.         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 for Club Foot Right foot      Pilonidal cyst  Surgery 40 years ago      H/O colonoscopy      Spinal stenosis      H/O prostate biopsy          SOCIAL HISTORY: No active tobacco, alcohol or illicit drug use    FAMILY HISTORY:  Family history of stroke (Father)  Father -  age 62    Family history of kidney disease (Mother)  Mother -  age 67    Family history of diabetes mellitus type II (Sibling)  Brother & Sister        Home Medications:  Eliquis 5 mg oral tablet: 1 tab(s) orally 2 times a day (26 Mar 2024 19:43)  omeprazole 40 mg oral delayed release capsule: 1 cap(s) orally once a day (26 Mar 2024 19:43)  Potassium Chloride (Eqv-Klor-Con M20) 20 mEq oral tablet, extended release: 1 tab(s) orally once a day Resume taking this tomorrow, 23 (26 Mar 2024 19:43)  Praluent Pen 75 mg/mL subcutaneous solution: 75 milligram(s) subcutaneous every 2 weeks (26 Mar 2024 19:43)  predniSONE 10 mg oral tablet: 1 tab(s) orally once a day (26 Mar 2024 19:42)  tamsulosin 0.4 mg oral capsule: 1 cap(s) orally once a day (at bedtime) (29 Mar 2024 10:34)      MEDICATIONS  (STANDING):  acetaminophen  Suppository .. 650 milliGRAM(s) Rectal once  cefepime   IVPB 2000 milliGRAM(s) IV Intermittent every 12 hours  norepinephrine Infusion 0.05 MICROgram(s)/kG/Min (10.2 mL/Hr) IV Continuous <Continuous>    MEDICATIONS  (PRN):      Allergies    levofloxacin (Unknown)  ofloxacin (Unknown)  gatifloxacin (Unknown)    Intolerances    telithromycin (Other)  fluoroquinolone antibiotics (Other)  Avelox (Other)  Ketek (Other)      REVIEW OF SYSTEMS: Negative except as per HPI.    VITAL SIGNS:   Vital Signs Last 24 Hrs  T(C): 36.3 (05 Sep 2024 17:00), Max: 39.4 (05 Sep 2024 14:30)  T(F): 97.3 (05 Sep 2024 17:00), Max: 103 (05 Sep 2024 14:30)  HR: 97 (05 Sep 2024 17:30) (97 - 135)  BP: 90/58 (05 Sep 2024 17:30) (81/52 - 117/88)  BP(mean): --  RR: 20 (05 Sep 2024 17:30) (20 - 24)  SpO2: 98% (05 Sep 2024 17:30) (93% - 98%)    Parameters below as of 05 Sep 2024 17:30  Patient On (Oxygen Delivery Method): nasal cannula  O2 Flow (L/min): 4      I&O's Summary      PHYSICAL EXAM:  Constitutional:  , elderly, ill appearing   HEENT NC/AT, dry mucous membranes  Pulmonary: Non-labored, soft wheezing b/l lobes   Cardiovascular: +S1, S2, RRR   Gastrointestinal: Soft, nontender, nondistended, normoactive bowel sounds  Extremities: No peripheral edema   Neurological: Alert, minimal movement of extremities due to weakness   Skin: Ecchymosis of upper extremities, moist texture   Psych: Mood & affect appropriate    LABS: All Labs Reviewed:                        17.0   41.45 )-----------( 289      ( 05 Sep 2024 14:30 )             52.6     05 Sep 2024 15:45    133    |  100    |  37     ----------------------------<  150    4.7     |  21     |  2.60     Ca    9.0        05 Sep 2024 15:45    TPro  6.4    /  Alb  2.1    /  TBili  1.0    /  DBili  x      /  AST  27     /  ALT  37     /  AlkPhos  79     05 Sep 2024 15:45    PT/INR - ( 05 Sep 2024 14:30 )   PT: 31.8 sec;   INR: 2.88 ratio         PTT - ( 05 Sep 2024 14:30 )  PTT:42.3 sec      Blood Culture:         EKG: sinus tachycardia with known L anterior fascicular block    RADIOLOGY: < from: CT Chest No Cont (24 @ 16:24) >    ACC: [id number] EXAM:  CT ABDOMEN AND PELVIS   ORDERED BY: ODREEN SILVA     ACC: [id number] EXAM:  CT CHEST   ORDERED BY: DOREEN SILVA     PROCEDURE DATE:  2024          INTERPRETATION:  CLINICAL INFORMATION: Shortness of breath, AMS, fever.    COMPARISON: CT abdomen and pelvis 3/26/2024. CT chest, abdomen and pelvis   11/3/2023.    CONTRAST/COMPLICATIONS:  IV Contrast: NONE  Oral Contrast: NONE  Complications: None reported at time of study completion    PROCEDURE:  CT of the Chest, Abdomen and Pelvis was performed.  Sagittal and coronal reformats were performed.    FINDINGS:  CHEST:  LUNGS AND LARGE AIRWAYS: Limited evaluation of the pulmonary parenchyma   due to respiratory motion artifacts obscuring detail. Patent central   airways. Bilateral interlobular septal thickening more prominent in the   lower lobes suggesting pulmonary venous congestion. Bilateral lower lobe   groundglass and dependent patchy opacities consistent with dependent   atelectasis, underlying right lower lobe pneumonia is not excluded.  PLEURA: No pleural effusion.  VESSELS: Atherosclerotic calcifications of the thoracic aorta and aortic   arch branches.  HEART: Cardiomegaly. Aortic valve and severe coronary artery   calcifications. No pericardial effusion.  MEDIASTINUM AND KONRAD: Again noted is calcified mediastinal and right   hilar lymphadenopathy and calcified lung nodules compatible with old   granulomatous disease.  CHEST WALL AND LOWER NECK: 2.3 cm low-density left thyroid nodule.    ABDOMEN AND PELVIS:  LIVER: Numerous punctate calcified nodules compatible with calcified   granulomas.  BILE DUCTS: Normal caliber.  GALLBLADDER: Probable sludge. Gallstones not convincingly seen, but   difficult to exclude due to motion artifacts.  SPLEEN: Numerous punctate calcified nodules consistent with calcified   granulomas.  PANCREAS: Pancreatic fatty atrophy.  ADRENALS: Within normal limits.  KIDNEYS/URETERS: Nonobstructing right intrarenal calculi measuring up to   5 mm. Mild right hydroureteronephrosis extending to the right UVJ where   there is a 7 mm right UVJ calculus. Punctate nonobstructing left upper   pole renal calculus. Dilated left extrarenal pelvis but no left   hydronephrosis.    BLADDER: Multiple urinary bladder calculi. Small left posterolateral   urinary bladder diverticulum. Air bubble within the urinary bladder   without a Restrepo catheter.  REPRODUCTIVE ORGANS: Prostatomegaly.    BOWEL: Limited evaluation due to respiratory motion artifacts. No bowel   obstruction. Appendix is normal. Distended stool filled rectum measuring   6.4 cm in diameter consistent with fecal rectal impaction.  PERITONEUM/RETROPERITONEUM: Within normal limits.  VESSELS: Severe diffuse coarse atherosclerotic calcifications of the   abdominal aorta, abdominal aortic branches, bilateral iliac and femoral   arteries with scattered areas of severe stenosis and focal areas of   probable short segment occlusions in bilateral femoral arteries,. Severe   stenosis of the left main renal artery, SMA, celiac axis origin.  LYMPH NODES: Punctate calcified right upper quadrant lymph nodes.  ABDOMINAL WALL: Small fat-containing umbilical hernia. Diffuse   pelvic/gluteal/upper thighs muscle fatty atrophy.  BONES: Severe degenerative changes throughout the spine. Lumbar   scoliosis. Advanced osteoarthritis of the pubic symphysis, SI joints, hip   joints.    IMPRESSION:  Bilateral interlobular septal thickening more prominent in the lower   lobes suggesting pulmonary venous congestion.    Bilateral lower lobe dependent atelectasis, underlying right lower lobe   infiltrates not excluded.    Distended stool filled rectum compatible with fecal rectal impaction.    Mild right hydroureteronephrosis resulting from a 7 mm stone in the right   UVJ. Urinary bladder calculi.    Air within the urinary bladder lumen without a Restrepo catheter,   correlation with history of recent bladder catheterization and urinalysis   is recommended in order to exclude emphysematous cystitis.    Prostatomegaly. Correlation with rectal exam and PSA level is recommended.    Evidence of old granulomatous disease.    Additional findings noted above.    --- End of Report ---    < end of copied text >  < from: Xray Chest 1 View-PORTABLE IMMEDIATE (24 @ 14:45) >  ACC: [id number] EXAM:  XR CHEST PORTABLE IMMED 1V   ORDERED BY: DOREEN SILVA     PROCEDURE DATE:  2024          INTERPRETATION:  An AP portable supine chest radiograph was performed for   sepsis.    Comparison is made to 3/26/2024.    There is a calcified granuloma in the right upper lobe along with   multiple calcified right lower paratracheal and possibly right hilar   mediastinal lymph nodes, all consistent with old granulomatous disease   and remain unchanged. There are no infiltrates seen on either side. There   is no pneumothorax. There are no pleural effusions. There is no hilar or   mediastinal widening otherwise seen in the cardiac silhouette is not   enlarged for the projection. There is no CHF. Degenerative changes ofthe   thoracic spine and left greater than right shoulders.    IMPRESSION:  1. Old granulomatous disease with no acute cardiopulmonary abnormalities.  2. Degenerative changes of the thoracic spine as well as the left greater   than right shoulders, unchanged.    --- End of Report ---      < end of copied text >

## 2024-09-06 ENCOUNTER — RESULT REVIEW (OUTPATIENT)
Age: 79
End: 2024-09-06

## 2024-09-06 VITALS
HEART RATE: 96 BPM | RESPIRATION RATE: 19 BRPM | DIASTOLIC BLOOD PRESSURE: 48 MMHG | OXYGEN SATURATION: 98 % | WEIGHT: 207.45 LBS | SYSTOLIC BLOOD PRESSURE: 74 MMHG | HEIGHT: 67 IN

## 2024-09-06 DIAGNOSIS — N17.9 ACUTE KIDNEY FAILURE, UNSPECIFIED: ICD-10-CM

## 2024-09-06 DIAGNOSIS — R13.10 DYSPHAGIA, UNSPECIFIED: ICD-10-CM

## 2024-09-06 DIAGNOSIS — I50.9 HEART FAILURE, UNSPECIFIED: ICD-10-CM

## 2024-09-06 DIAGNOSIS — R78.81 BACTEREMIA: ICD-10-CM

## 2024-09-06 DIAGNOSIS — Z51.5 ENCOUNTER FOR PALLIATIVE CARE: ICD-10-CM

## 2024-09-06 DIAGNOSIS — N13.9 OBSTRUCTIVE AND REFLUX UROPATHY, UNSPECIFIED: ICD-10-CM

## 2024-09-06 DIAGNOSIS — A41.9 SEPSIS, UNSPECIFIED ORGANISM: ICD-10-CM

## 2024-09-06 LAB
-  K. PNEUMONIAE GROUP: SIGNIFICANT CHANGE UP
ALBUMIN SERPL ELPH-MCNC: 2.3 G/DL — LOW (ref 3.3–5.2)
ALBUMIN SERPL ELPH-MCNC: 2.5 G/DL — LOW (ref 3.3–5.2)
ALP SERPL-CCNC: 74 U/L — SIGNIFICANT CHANGE UP (ref 40–120)
ALP SERPL-CCNC: 77 U/L — SIGNIFICANT CHANGE UP (ref 40–120)
ALT FLD-CCNC: 27 U/L — SIGNIFICANT CHANGE UP
ALT FLD-CCNC: 31 U/L — SIGNIFICANT CHANGE UP
ANION GAP SERPL CALC-SCNC: 16 MMOL/L — SIGNIFICANT CHANGE UP (ref 5–17)
ANION GAP SERPL CALC-SCNC: 18 MMOL/L — HIGH (ref 5–17)
ANION GAP SERPL CALC-SCNC: 18 MMOL/L — HIGH (ref 5–17)
ANION GAP SERPL CALC-SCNC: 20 MMOL/L — HIGH (ref 5–17)
ANISOCYTOSIS BLD QL: SLIGHT — SIGNIFICANT CHANGE UP
APTT BLD: 34.1 SEC — SIGNIFICANT CHANGE UP (ref 24.5–35.6)
AST SERPL-CCNC: 26 U/L — SIGNIFICANT CHANGE UP
AST SERPL-CCNC: 27 U/L — SIGNIFICANT CHANGE UP
BASOPHILS # BLD AUTO: 0 K/UL — SIGNIFICANT CHANGE UP (ref 0–0.2)
BASOPHILS NFR BLD AUTO: 0 % — SIGNIFICANT CHANGE UP (ref 0–2)
BILIRUB SERPL-MCNC: 0.5 MG/DL — SIGNIFICANT CHANGE UP (ref 0.4–2)
BILIRUB SERPL-MCNC: 0.8 MG/DL — SIGNIFICANT CHANGE UP (ref 0.4–2)
BLD GP AB SCN SERPL QL: SIGNIFICANT CHANGE UP
BUN SERPL-MCNC: 30.5 MG/DL — HIGH (ref 8–20)
BUN SERPL-MCNC: 30.5 MG/DL — HIGH (ref 8–20)
BUN SERPL-MCNC: 30.6 MG/DL — HIGH (ref 8–20)
BUN SERPL-MCNC: 31 MG/DL — HIGH (ref 8–20)
BURR CELLS BLD QL SMEAR: PRESENT — SIGNIFICANT CHANGE UP
CALCIUM SERPL-MCNC: 8.1 MG/DL — LOW (ref 8.4–10.5)
CALCIUM SERPL-MCNC: 8.1 MG/DL — LOW (ref 8.4–10.5)
CALCIUM SERPL-MCNC: 8.4 MG/DL — SIGNIFICANT CHANGE UP (ref 8.4–10.5)
CALCIUM SERPL-MCNC: 8.5 MG/DL — SIGNIFICANT CHANGE UP (ref 8.4–10.5)
CHLORIDE SERPL-SCNC: 96 MMOL/L — SIGNIFICANT CHANGE UP (ref 96–108)
CHLORIDE SERPL-SCNC: 96 MMOL/L — SIGNIFICANT CHANGE UP (ref 96–108)
CHLORIDE SERPL-SCNC: 99 MMOL/L — SIGNIFICANT CHANGE UP (ref 96–108)
CHLORIDE SERPL-SCNC: 99 MMOL/L — SIGNIFICANT CHANGE UP (ref 96–108)
CO2 SERPL-SCNC: 14 MMOL/L — LOW (ref 22–29)
CO2 SERPL-SCNC: 18 MMOL/L — LOW (ref 22–29)
CO2 SERPL-SCNC: 18 MMOL/L — LOW (ref 22–29)
CO2 SERPL-SCNC: 20 MMOL/L — LOW (ref 22–29)
CREAT SERPL-MCNC: 1.52 MG/DL — HIGH (ref 0.5–1.3)
CREAT SERPL-MCNC: 1.6 MG/DL — HIGH (ref 0.5–1.3)
CREAT SERPL-MCNC: 1.68 MG/DL — HIGH (ref 0.5–1.3)
CREAT SERPL-MCNC: 1.73 MG/DL — HIGH (ref 0.5–1.3)
CRP SERPL-MCNC: 226 MG/L — HIGH
EGFR: 39 ML/MIN/1.73M2 — LOW
EGFR: 41 ML/MIN/1.73M2 — LOW
EGFR: 43 ML/MIN/1.73M2 — LOW
EGFR: 46 ML/MIN/1.73M2 — LOW
EOSINOPHIL # BLD AUTO: 0 K/UL — SIGNIFICANT CHANGE UP (ref 0–0.5)
EOSINOPHIL NFR BLD AUTO: 0 % — SIGNIFICANT CHANGE UP (ref 0–6)
GAS PNL BLDA: SIGNIFICANT CHANGE UP
GAS PNL BLDA: SIGNIFICANT CHANGE UP
GLUCOSE BLDC GLUCOMTR-MCNC: 106 MG/DL — HIGH (ref 70–99)
GLUCOSE BLDC GLUCOMTR-MCNC: 122 MG/DL — HIGH (ref 70–99)
GLUCOSE BLDC GLUCOMTR-MCNC: 83 MG/DL — SIGNIFICANT CHANGE UP (ref 70–99)
GLUCOSE BLDC GLUCOMTR-MCNC: 88 MG/DL — SIGNIFICANT CHANGE UP (ref 70–99)
GLUCOSE SERPL-MCNC: 118 MG/DL — HIGH (ref 70–99)
GLUCOSE SERPL-MCNC: 129 MG/DL — HIGH (ref 70–99)
GLUCOSE SERPL-MCNC: 76 MG/DL — SIGNIFICANT CHANGE UP (ref 70–99)
GLUCOSE SERPL-MCNC: 88 MG/DL — SIGNIFICANT CHANGE UP (ref 70–99)
GRAM STN FLD: ABNORMAL
GRAM STN FLD: SIGNIFICANT CHANGE UP
HCT VFR BLD CALC: 36.2 % — LOW (ref 39–50)
HCT VFR BLD CALC: 37.4 % — LOW (ref 39–50)
HCT VFR BLD CALC: 38.3 % — LOW (ref 39–50)
HCT VFR BLD CALC: 38.6 % — LOW (ref 39–50)
HGB BLD-MCNC: 12.6 G/DL — LOW (ref 13–17)
HGB BLD-MCNC: 12.8 G/DL — LOW (ref 13–17)
HGB BLD-MCNC: 13 G/DL — SIGNIFICANT CHANGE UP (ref 13–17)
HGB BLD-MCNC: 13.2 G/DL — SIGNIFICANT CHANGE UP (ref 13–17)
INR BLD: 1.72 RATIO — HIGH (ref 0.85–1.18)
INR BLD: 2.38 RATIO — HIGH (ref 0.85–1.18)
LACTATE SERPL-SCNC: 3.4 MMOL/L — HIGH (ref 0.5–2)
LACTATE SERPL-SCNC: 3.6 MMOL/L — HIGH (ref 0.5–2)
LACTATE SERPL-SCNC: 6 MMOL/L — CRITICAL HIGH (ref 0.5–2)
LYMPHOCYTES # BLD AUTO: 0 % — LOW (ref 13–44)
LYMPHOCYTES # BLD AUTO: 0 K/UL — LOW (ref 1–3.3)
MAGNESIUM SERPL-MCNC: 1.4 MG/DL — LOW (ref 1.6–2.6)
MAGNESIUM SERPL-MCNC: 1.8 MG/DL — SIGNIFICANT CHANGE UP (ref 1.6–2.6)
MAGNESIUM SERPL-MCNC: 2 MG/DL — SIGNIFICANT CHANGE UP (ref 1.6–2.6)
MAGNESIUM SERPL-MCNC: 2.1 MG/DL — SIGNIFICANT CHANGE UP (ref 1.6–2.6)
MANUAL SMEAR VERIFICATION: SIGNIFICANT CHANGE UP
MCHC RBC-ENTMCNC: 30.8 PG — SIGNIFICANT CHANGE UP (ref 27–34)
MCHC RBC-ENTMCNC: 30.8 PG — SIGNIFICANT CHANGE UP (ref 27–34)
MCHC RBC-ENTMCNC: 30.9 PG — SIGNIFICANT CHANGE UP (ref 27–34)
MCHC RBC-ENTMCNC: 31 PG — SIGNIFICANT CHANGE UP (ref 27–34)
MCHC RBC-ENTMCNC: 33.2 GM/DL — SIGNIFICANT CHANGE UP (ref 32–36)
MCHC RBC-ENTMCNC: 34.5 GM/DL — SIGNIFICANT CHANGE UP (ref 32–36)
MCHC RBC-ENTMCNC: 34.8 GM/DL — SIGNIFICANT CHANGE UP (ref 32–36)
MCHC RBC-ENTMCNC: 34.8 GM/DL — SIGNIFICANT CHANGE UP (ref 32–36)
MCV RBC AUTO: 88.8 FL — SIGNIFICANT CHANGE UP (ref 80–100)
MCV RBC AUTO: 88.9 FL — SIGNIFICANT CHANGE UP (ref 80–100)
MCV RBC AUTO: 89.5 FL — SIGNIFICANT CHANGE UP (ref 80–100)
MCV RBC AUTO: 92.8 FL — SIGNIFICANT CHANGE UP (ref 80–100)
METAMYELOCYTES # FLD: 2.6 % — HIGH (ref 0–0)
METHOD TYPE: SIGNIFICANT CHANGE UP
MONOCYTES # BLD AUTO: 1.18 K/UL — HIGH (ref 0–0.9)
MONOCYTES NFR BLD AUTO: 3.5 % — SIGNIFICANT CHANGE UP (ref 2–14)
MRSA PCR RESULT.: SIGNIFICANT CHANGE UP
NEUTROPHILS # BLD AUTO: 31.79 K/UL — HIGH (ref 1.8–7.4)
NEUTROPHILS NFR BLD AUTO: 87 % — HIGH (ref 43–77)
NEUTS BAND # BLD: 6.9 % — SIGNIFICANT CHANGE UP (ref 0–8)
PHOSPHATE SERPL-MCNC: 3.7 MG/DL — SIGNIFICANT CHANGE UP (ref 2.4–4.7)
PHOSPHATE SERPL-MCNC: 3.9 MG/DL — SIGNIFICANT CHANGE UP (ref 2.4–4.7)
PHOSPHATE SERPL-MCNC: 4.2 MG/DL — SIGNIFICANT CHANGE UP (ref 2.4–4.7)
PHOSPHATE SERPL-MCNC: 5.2 MG/DL — HIGH (ref 2.4–4.7)
PLAT MORPH BLD: NORMAL — SIGNIFICANT CHANGE UP
PLATELET # BLD AUTO: 161 K/UL — SIGNIFICANT CHANGE UP (ref 150–400)
PLATELET # BLD AUTO: 167 K/UL — SIGNIFICANT CHANGE UP (ref 150–400)
PLATELET # BLD AUTO: 185 K/UL — SIGNIFICANT CHANGE UP (ref 150–400)
PLATELET # BLD AUTO: 192 K/UL — SIGNIFICANT CHANGE UP (ref 150–400)
POIKILOCYTOSIS BLD QL AUTO: SIGNIFICANT CHANGE UP
POLYCHROMASIA BLD QL SMEAR: SLIGHT — SIGNIFICANT CHANGE UP
POTASSIUM SERPL-MCNC: 3.3 MMOL/L — LOW (ref 3.5–5.3)
POTASSIUM SERPL-MCNC: 3.8 MMOL/L — SIGNIFICANT CHANGE UP (ref 3.5–5.3)
POTASSIUM SERPL-MCNC: 3.8 MMOL/L — SIGNIFICANT CHANGE UP (ref 3.5–5.3)
POTASSIUM SERPL-MCNC: 4.2 MMOL/L — SIGNIFICANT CHANGE UP (ref 3.5–5.3)
POTASSIUM SERPL-SCNC: 3.3 MMOL/L — LOW (ref 3.5–5.3)
POTASSIUM SERPL-SCNC: 3.8 MMOL/L — SIGNIFICANT CHANGE UP (ref 3.5–5.3)
POTASSIUM SERPL-SCNC: 3.8 MMOL/L — SIGNIFICANT CHANGE UP (ref 3.5–5.3)
POTASSIUM SERPL-SCNC: 4.2 MMOL/L — SIGNIFICANT CHANGE UP (ref 3.5–5.3)
PROT SERPL-MCNC: 5 G/DL — LOW (ref 6.6–8.7)
PROT SERPL-MCNC: 5 G/DL — LOW (ref 6.6–8.7)
PROTHROM AB SERPL-ACNC: 18.8 SEC — HIGH (ref 9.5–13)
PROTHROM AB SERPL-ACNC: 25.8 SEC — HIGH (ref 9.5–13)
RBC # BLD: 4.07 M/UL — LOW (ref 4.2–5.8)
RBC # BLD: 4.16 M/UL — LOW (ref 4.2–5.8)
RBC # BLD: 4.21 M/UL — SIGNIFICANT CHANGE UP (ref 4.2–5.8)
RBC # BLD: 4.28 M/UL — SIGNIFICANT CHANGE UP (ref 4.2–5.8)
RBC # FLD: 15.9 % — HIGH (ref 10.3–14.5)
RBC # FLD: 16 % — HIGH (ref 10.3–14.5)
RBC # FLD: 16.1 % — HIGH (ref 10.3–14.5)
RBC # FLD: 16.2 % — HIGH (ref 10.3–14.5)
RBC BLD AUTO: ABNORMAL
S AUREUS DNA NOSE QL NAA+PROBE: DETECTED
SODIUM SERPL-SCNC: 132 MMOL/L — LOW (ref 135–145)
SODIUM SERPL-SCNC: 132 MMOL/L — LOW (ref 135–145)
SODIUM SERPL-SCNC: 133 MMOL/L — LOW (ref 135–145)
SODIUM SERPL-SCNC: 135 MMOL/L — SIGNIFICANT CHANGE UP (ref 135–145)
SPECIMEN SOURCE: SIGNIFICANT CHANGE UP
WBC # BLD: 32.79 K/UL — HIGH (ref 3.8–10.5)
WBC # BLD: 33.85 K/UL — HIGH (ref 3.8–10.5)
WBC # BLD: 35.77 K/UL — HIGH (ref 3.8–10.5)
WBC # BLD: 37.28 K/UL — HIGH (ref 3.8–10.5)
WBC # FLD AUTO: 32.79 K/UL — HIGH (ref 3.8–10.5)
WBC # FLD AUTO: 33.85 K/UL — HIGH (ref 3.8–10.5)
WBC # FLD AUTO: 35.77 K/UL — HIGH (ref 3.8–10.5)
WBC # FLD AUTO: 37.28 K/UL — HIGH (ref 3.8–10.5)

## 2024-09-06 PROCEDURE — 93306 TTE W/DOPPLER COMPLETE: CPT | Mod: 26

## 2024-09-06 PROCEDURE — 70450 CT HEAD/BRAIN W/O DYE: CPT | Mod: 26

## 2024-09-06 PROCEDURE — 99497 ADVNCD CARE PLAN 30 MIN: CPT | Mod: 25

## 2024-09-06 PROCEDURE — 50432 PLMT NEPHROSTOMY CATHETER: CPT | Mod: RT

## 2024-09-06 PROCEDURE — 99233 SBSQ HOSP IP/OBS HIGH 50: CPT | Mod: GC

## 2024-09-06 PROCEDURE — 99223 1ST HOSP IP/OBS HIGH 75: CPT

## 2024-09-06 PROCEDURE — 71045 X-RAY EXAM CHEST 1 VIEW: CPT | Mod: 26,76

## 2024-09-06 PROCEDURE — 93010 ELECTROCARDIOGRAM REPORT: CPT

## 2024-09-06 RX ORDER — TAMSULOSIN HYDROCHLORIDE 0.4 MG/1
0.4 CAPSULE ORAL AT BEDTIME
Refills: 0 | Status: DISCONTINUED | OUTPATIENT
Start: 2024-09-06 | End: 2024-09-16

## 2024-09-06 RX ORDER — CHLORHEXIDINE GLUCONATE 40 MG/ML
1 SOLUTION TOPICAL DAILY
Refills: 0 | Status: DISCONTINUED | OUTPATIENT
Start: 2024-09-06 | End: 2024-09-16

## 2024-09-06 RX ORDER — PANTOPRAZOLE SODIUM 40 MG
40 TABLET, DELAYED RELEASE (ENTERIC COATED) ORAL DAILY
Refills: 0 | Status: DISCONTINUED | OUTPATIENT
Start: 2024-09-06 | End: 2024-09-16

## 2024-09-06 RX ORDER — HYDROCORTISONE 10 MG/1
50 TABLET ORAL EVERY 6 HOURS
Refills: 0 | Status: DISCONTINUED | OUTPATIENT
Start: 2024-09-06 | End: 2024-09-09

## 2024-09-06 RX ORDER — CHLORHEXIDINE GLUCONATE 40 MG/ML
15 SOLUTION TOPICAL EVERY 12 HOURS
Refills: 0 | Status: DISCONTINUED | OUTPATIENT
Start: 2024-09-06 | End: 2024-09-06

## 2024-09-06 RX ORDER — BACITRACIN 500 UNIT/G
1 OINTMENT (GRAM) TOPICAL ONCE
Refills: 0 | Status: COMPLETED | OUTPATIENT
Start: 2024-09-06 | End: 2024-09-06

## 2024-09-06 RX ORDER — CEFEPIME 2 G/1
1000 INJECTION, POWDER, FOR SOLUTION INTRAVENOUS EVERY 12 HOURS
Refills: 0 | Status: DISCONTINUED | OUTPATIENT
Start: 2024-09-06 | End: 2024-09-06

## 2024-09-06 RX ORDER — POTASSIUM CHLORIDE 10 MEQ
40 TABLET, EXT RELEASE, PARTICLES/CRYSTALS ORAL EVERY 4 HOURS
Refills: 0 | Status: COMPLETED | OUTPATIENT
Start: 2024-09-06 | End: 2024-09-07

## 2024-09-06 RX ORDER — PROTHROMBIN COMPLEX CONCENTRATE (HUMAN) 25.5; 16.5; 24; 22; 22; 26 [IU]/ML; [IU]/ML; [IU]/ML; [IU]/ML; [IU]/ML; [IU]/ML
4500 POWDER, FOR SOLUTION INTRAVENOUS ONCE
Refills: 0 | Status: COMPLETED | OUTPATIENT
Start: 2024-09-06 | End: 2024-09-06

## 2024-09-06 RX ORDER — POTASSIUM CHLORIDE 10 MEQ
10 TABLET, EXT RELEASE, PARTICLES/CRYSTALS ORAL
Refills: 0 | Status: COMPLETED | OUTPATIENT
Start: 2024-09-06 | End: 2024-09-06

## 2024-09-06 RX ORDER — MIDODRINE HYDROCHLORIDE 5 MG/1
10 TABLET ORAL EVERY 8 HOURS
Refills: 0 | Status: DISCONTINUED | OUTPATIENT
Start: 2024-09-06 | End: 2024-09-08

## 2024-09-06 RX ORDER — PROPOFOL 10 MG/ML
20 INJECTION, EMULSION INTRAVENOUS
Qty: 1000 | Refills: 0 | Status: DISCONTINUED | OUTPATIENT
Start: 2024-09-06 | End: 2024-09-06

## 2024-09-06 RX ORDER — SODIUM CHLORIDE 9 MG/ML
1000 INJECTION INTRAMUSCULAR; INTRAVENOUS; SUBCUTANEOUS
Refills: 0 | Status: DISCONTINUED | OUTPATIENT
Start: 2024-09-06 | End: 2024-09-07

## 2024-09-06 RX ORDER — NYSTATIN 100000/G
1 CREAM (GRAM) TOPICAL
Refills: 0 | Status: DISCONTINUED | OUTPATIENT
Start: 2024-09-06 | End: 2024-09-16

## 2024-09-06 RX ORDER — SODIUM BICARBONATE 84 MG/ML
0.16 INJECTION, SOLUTION INTRAVENOUS
Qty: 150 | Refills: 0 | Status: DISCONTINUED | OUTPATIENT
Start: 2024-09-06 | End: 2024-09-06

## 2024-09-06 RX ORDER — CEFEPIME 2 G/1
1000 INJECTION, POWDER, FOR SOLUTION INTRAVENOUS ONCE
Refills: 0 | Status: COMPLETED | OUTPATIENT
Start: 2024-09-06 | End: 2024-09-06

## 2024-09-06 RX ORDER — POTASSIUM CHLORIDE 10 MEQ
40 TABLET, EXT RELEASE, PARTICLES/CRYSTALS ORAL EVERY 4 HOURS
Refills: 0 | Status: DISCONTINUED | OUTPATIENT
Start: 2024-09-06 | End: 2024-09-06

## 2024-09-06 RX ORDER — CEFEPIME 2 G/1
2000 INJECTION, POWDER, FOR SOLUTION INTRAVENOUS EVERY 12 HOURS
Refills: 0 | Status: DISCONTINUED | OUTPATIENT
Start: 2024-09-06 | End: 2024-09-06

## 2024-09-06 RX ORDER — CEFEPIME 2 G/1
2000 INJECTION, POWDER, FOR SOLUTION INTRAVENOUS EVERY 24 HOURS
Refills: 0 | Status: DISCONTINUED | OUTPATIENT
Start: 2024-09-06 | End: 2024-09-06

## 2024-09-06 RX ORDER — PIPERACILLIN SODIUM AND TAZOBACTAM SODIUM 3; .375 G/15ML; G/15ML
3.38 INJECTION, POWDER, FOR SOLUTION INTRAVENOUS EVERY 8 HOURS
Refills: 0 | Status: DISCONTINUED | OUTPATIENT
Start: 2024-09-06 | End: 2024-09-06

## 2024-09-06 RX ORDER — ROPIVACAINE IN 0.9% SOD CHL/PF 0.1 %
0.05 PLASTIC BAG, INJECTION (ML) EPIDURAL
Qty: 8 | Refills: 0 | Status: DISCONTINUED | OUTPATIENT
Start: 2024-09-06 | End: 2024-09-07

## 2024-09-06 RX ORDER — HYDROCORTISONE 10 MG/1
100 TABLET ORAL ONCE
Refills: 0 | Status: COMPLETED | OUTPATIENT
Start: 2024-09-06 | End: 2024-09-06

## 2024-09-06 RX ADMIN — HYDROCORTISONE 50 MILLIGRAM(S): 10 TABLET ORAL at 13:03

## 2024-09-06 RX ADMIN — Medication 1 APPLICATION(S): at 21:22

## 2024-09-06 RX ADMIN — CHLORHEXIDINE GLUCONATE 15 MILLILITER(S): 40 SOLUTION TOPICAL at 05:54

## 2024-09-06 RX ADMIN — Medication 40 MILLIEQUIVALENT(S): at 21:22

## 2024-09-06 RX ADMIN — Medication 2000 MILLIGRAM(S): at 12:55

## 2024-09-06 RX ADMIN — PROPOFOL 11.3 MICROGRAM(S)/KG/MIN: 10 INJECTION, EMULSION INTRAVENOUS at 03:35

## 2024-09-06 RX ADMIN — CEFEPIME 1000 MILLIGRAM(S): 2 INJECTION, POWDER, FOR SOLUTION INTRAVENOUS at 10:44

## 2024-09-06 RX ADMIN — Medication 8.82 MICROGRAM(S)/KG/MIN: at 03:35

## 2024-09-06 RX ADMIN — SODIUM CHLORIDE 100 MILLILITER(S): 9 INJECTION INTRAMUSCULAR; INTRAVENOUS; SUBCUTANEOUS at 14:18

## 2024-09-06 RX ADMIN — Medication 40 MILLIGRAM(S): at 11:44

## 2024-09-06 RX ADMIN — Medication 1000 MILLILITER(S): at 02:44

## 2024-09-06 RX ADMIN — Medication 25 GRAM(S): at 04:13

## 2024-09-06 RX ADMIN — CEFEPIME 1000 MILLIGRAM(S): 2 INJECTION, POWDER, FOR SOLUTION INTRAVENOUS at 02:33

## 2024-09-06 RX ADMIN — Medication 1 APPLICATION(S): at 18:02

## 2024-09-06 RX ADMIN — HYDROCORTISONE 50 MILLIGRAM(S): 10 TABLET ORAL at 21:23

## 2024-09-06 RX ADMIN — HYDROCORTISONE 100 MILLIGRAM(S): 10 TABLET ORAL at 02:33

## 2024-09-06 RX ADMIN — Medication 100 MILLIEQUIVALENT(S): at 14:17

## 2024-09-06 RX ADMIN — HYDROCORTISONE 50 MILLIGRAM(S): 10 TABLET ORAL at 08:39

## 2024-09-06 RX ADMIN — Medication 100 MILLIEQUIVALENT(S): at 16:26

## 2024-09-06 RX ADMIN — PROTHROMBIN COMPLEX CONCENTRATE (HUMAN) 400 INTERNATIONAL UNIT(S): 25.5; 16.5; 24; 22; 22; 26 POWDER, FOR SOLUTION INTRAVENOUS at 03:00

## 2024-09-06 RX ADMIN — CHLORHEXIDINE GLUCONATE 1 APPLICATION(S): 40 SOLUTION TOPICAL at 11:44

## 2024-09-06 RX ADMIN — SODIUM BICARBONATE 100 MEQ/KG/HR: 84 INJECTION, SOLUTION INTRAVENOUS at 03:00

## 2024-09-06 RX ADMIN — Medication 1000 MILLILITER(S): at 03:16

## 2024-09-06 NOTE — CONSULT NOTE ADULT - SUBJECTIVE AND OBJECTIVE BOX
HPI: BRIEF HOSPITAL COURSE: 79 yo M with pmhx of HTN, CHF, PE, Myasthenia Gravis, and chronic LE edema, history of 7 mm distal ureteral calculus with mild right hydroureteronephrosis and pain presented to the ED with shortness of breath and confusion after failing to follow up outpt on ureteral calculus after prior evaluation at [facility].  Patient's son is at bedside he states he seems more confused and altered then baseline; has not been feeling well. Patient was complaining of nauseous. Also is retaining urine; usually urinates in a urinal. Decreased appetite; weak; chills. Patient hasn't seen cardiologist since March to follow up on his CHF. Labs significant for WBC 41.45, procal 36.3, lactate 7.9, Scr 2.6, BNP 44396, UA. CT CAP revealed R hydro from 7 mm stone in UVJ. Patient sent emergently with urology for stent placement.  Stent placement for 7 mm stone unsuccessful, procedure aborted, patient transferred to ICU for septic shock. Discussed case with urology for planned transfer for emergent IR for nephrostomy tube placement with Dr Shaw.    Now s/p PCN by IR. Remains intubated and in shock. Admit to ICU. (06 Sep 2024 02:42)    80M with past medical history of CHF, PE (on apixaban), HTN, HLD, myasthenia gravis, pneumonia, UTIs who presented as a transfer from [facility] for SOB, AMS, and urine retention, found to have sepsis secondary to obstructive calculus with hydronephrosis. Patient was evaluated by Urology for urethral stent placement which he failed on  and transferred here for emergent nephrostomy tube placement with Dr. Garcia on . Patient intubated post-procedure, extubated on .     Patient was recently discharged from [facility] in March for acute hemorrhagic cystitis where he had a CT scan showing a 7mm distal ureteral calculus with mild right hydronephrosis that he was instructed to follow up on outpatient.    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    Club foot  Surgery at birth for Club Foot Right foot    Pilonidal cyst  Surgery 40 years ago    H/O colonoscopy    Spinal stenosis    H/O prostate biopsy    SOCIAL HISTORY:                      Substance history: former smoker                    Admitted from: [facility]                    Pentecostalism/spirituality: Religion                    Cultural concerns: None                      Surrogate/HCP/Guardian: Phone#:    FAMILY HISTORY:  Family history of stroke (Father)  Father -  age 62    Family history of kidney disease (Mother)  Mother -  age 67    Family history of diabetes mellitus type II (Sibling)  Brother & Sister    Allergies    levofloxacin (Unknown)  gatifloxacin (Unknown)  ofloxacin (Unknown)    Intolerances    Ketek (Other)  telithromycin (Other)  Avelox (Other)  fluoroquinolone antibiotics (Other)      ADVANCE DIRECTIVES/TREATMENT PREFERENCES:  Full code, all aggressive measures desired   DNR/DNI - MOLST, continue all other medical treatments  DNR/DNI - MOLST, comfort measures only     Baseline ADLs (prior to admission):  Dependent      Karnofsky/Palliative Performance Status Version 2:  %  http://npcrc.org/files/news/palliative_performance_scale_ppsv2.pdf    Present Symptoms:     Dyspnea: Mild Moderate Severe  Nausea/Vomiting: Yes No  Anxiety:  Yes No  Depression: Yes No  Fatigue: Yes No  Loss of appetite: Yes No  Constipation:     Pain: Yes No            Character-            Duration-            Effect-            Factors-            Frequency-            Location-            Severity-    Pain AD Score:  http://geriatrictoolkit.[email or web address]/painad.pdf (press ctrl + left click to view)    Review of Systems: Reviewed                     Negative:                     Positive:  Unable to obtain due to poor mentation   All others negative    MEDICATIONS  (STANDING):  cefTRIAXone Injectable. 2000 milliGRAM(s) IV Push every 24 hours  chlorhexidine 2% Cloths 1 Application(s) Topical daily  hydrocortisone sodium succinate Injectable 50 milliGRAM(s) IV Push every 6 hours  norepinephrine Infusion 0.05 MICROgram(s)/kG/Min (8.82 mL/Hr) IV Continuous <Continuous>  nystatin Powder 1 Application(s) Topical two times a day  pantoprazole  Injectable 40 milliGRAM(s) IV Push daily  sodium bicarbonate  Infusion 0.159 mEq/kG/Hr (100 mL/Hr) IV Continuous <Continuous>    MEDICATIONS  (PRN):    PHYSICAL EXAM:    Vital Signs Last 24 Hrs  T(C): 37.6 (06 Sep 2024 11:15), Max: 39.4 (05 Sep 2024 14:30)  T(F): 99.7 (06 Sep 2024 11:15), Max: 103 (05 Sep 2024 14:30)  HR: 80 (06 Sep 2024 11:15) (74 - 135)  BP: 83/69 (06 Sep 2024 11:00) (74/48 - 157/96)  BP(mean): 76 (06 Sep 2024 11:00) (57 - 112)  RR: 28 (06 Sep 2024 11:15) (18 - 31)  SpO2: 100% (06 Sep 2024 11:15) (90% - 100%)    Parameters below as of 06 Sep 2024 11:00  Patient On (Oxygen Delivery Method): room air    General: alert  oriented x ____ lethargic agitated delirious                  cachexia  nonverbal  coma    HEENT: normal  dry mouth  ET tube/trach    Lungs: comfortable tachypnea/labored breathing  excessive secretions    CV: normal  tachycardia    GI: normal  distended  tender  no BS               PEG/NG/OG tube  constipation  last BM:     : normal  incontinent  oliguria/anuria  guerrero    MSK: normal  weakness  edema             ambulatory  bedbound/wheelchair bound    Neuro: no focal deficits cognitive impairment dysphagia dysarthria hemiplegia     Skin: normal  pressure ulcers- Stage_____  no rash    LABS:                        13.2   35.77 )-----------( 185      ( 06 Sep 2024 08:40 )             38.3     09-06    132<L>  |  96  |  30.5<H>  ----------------------------<  118<H>  3.8   |  18.0<L>  |  1.68<H>    Ca    8.5      06 Sep 2024 08:40  Phos  4.2     09-06  Mg     2.1     09-06    TPro  5.0<L>  /  Alb  2.5<L>  /  TBili  0.8  /  DBili  x   /  AST  27  /  ALT  31  /  AlkPhos  74  09-06    PT/INR - ( 06 Sep 2024 02:20 )   PT: 25.8 sec;   INR: 2.38 ratio         PTT - ( 06 Sep 2024 02:20 )  PTT:34.1 sec  Urinalysis Basic - ( 06 Sep 2024 08:40 )    Color: x / Appearance: x / SG: x / pH: x  Gluc: 118 mg/dL / Ketone: x  / Bili: x / Urobili: x   Blood: x / Protein: x / Nitrite: x   Leuk Esterase: x / RBC: x / WBC x   Sq Epi: x / Non Sq Epi: x / Bacteria: x      I&O's Summary    05 Sep 2024 07:01  -  06 Sep 2024 07:00  --------------------------------------------------------  IN: 1654.3 mL / OUT: 2050 mL / NET: -395.7 mL    06 Sep 2024 07:01  -  06 Sep 2024 11:50  --------------------------------------------------------  IN: 431.7 mL / OUT: 485 mL / NET: -53.3 mL    RADIOLOGY & ADDITIONAL STUDIES:    < from: CT Chest No Cont (24 @ 16:24) >  IMPRESSION:  Bilateral interlobular septal thickening more prominent in the lower   lobes suggesting pulmonary venous congestion.    Bilateral lower lobe dependent atelectasis, underlying right lower lobe   infiltrates not excluded.    Distended stool filled rectum compatible with fecal rectal impaction.    Mild right hydroureteronephrosis resulting from a 7 mm stone in the right   UVJ. Urinary bladder calculi.    Air within the urinary bladder lumen without a Guerrero catheter,   correlation with history of recent bladder catheterization and urinalysis   is recommended in order to exclude emphysematous cystitis.    Prostatomegaly. Correlation with rectal exam and PSA level is recommended.    Evidence of old granulomatous disease.    Additional findings noted above.    < end of copied text >  < from: Xray Chest 1 View-PORTABLE IMMEDIATE (Xray Chest 1 View-PORTABLE IMMEDIATE .) (24 @ 03:06) >  IMPRESSION: Intubation. No acute finding.    < end of copied text >       HPI: BRIEF HOSPITAL COURSE: 79 yo M with pmhx of HTN, CHF, PE, Myasthenia Gravis, and chronic LE edema, history of 7 mm distal ureteral calculus with mild right hydroureteronephrosis and pain presented to the ED with shortness of breath and confusion after failing to follow up outpt on ureteral calculus after prior evaluation at [facility].  Patient's son is at bedside he states he seems more confused and altered then baseline; has not been feeling well. Patient was complaining of nauseous. Also is retaining urine; usually urinates in a urinal. Decreased appetite; weak; chills. Patient hasn't seen cardiologist since March to follow up on his CHF. Labs significant for WBC 41.45, procal 36.3, lactate 7.9, Scr 2.6, BNP 35394, UA. CT CAP revealed R hydro from 7 mm stone in UVJ. Patient sent emergently with urology for stent placement.  Stent placement for 7 mm stone unsuccessful, procedure aborted, patient transferred to ICU for septic shock. Discussed case with urology for planned transfer for emergent IR for nephrostomy tube placement with Dr Shaw.    Now s/p PCN by IR. Remains intubated and in shock. Admit to ICU. (06 Sep 2024 02:42)    80M with past medical history of CHF, PE (on apixaban), HTN, HLD, myasthenia gravis, pneumonia, UTIs who presented as a transfer from [facility] for SOB, AMS, and urine retention, found to have sepsis secondary to obstructive calculus with hydronephrosis. Patient was evaluated by urology at [facility] for cysto stent placement which he failed on  and was subsequently transferred to [facility] for emergent nephrostomy tube placement with Dr. Garcai on . Patient given Kcentra and FFP post procedure, was intubated, sedated and required pressor support. Patient now extubated, weaned off sedation but remains on levophed. Palliative consulted for goals of care discussion in the context of advanced illness.    Patient was recently discharged from [facility] in March for acute hemorrhagic cystitis where he had a CT scan showing a 7mm distal ureteral calculus with mild right hydronephrosis that he was instructed to follow up on outpatient.     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                    Tenriism/spirituality: Bahai                    Cultural concerns: None                      HCP:  Stephen Bah - [phone]    FAMILY HISTORY:  Family history of stroke (Father)  Father -  age 62    Family history of kidney disease (Mother)  Mother -  age 67    Family history of diabetes mellitus type II (Sibling)  Brother & Sister    Allergies    levofloxacin (Unknown)  gatifloxacin (Unknown)  ofloxacin (Unknown)    Intolerances    Ketek (Other)  telithromycin (Other)  Avelox (Other)  fluoroquinolone antibiotics (Other)    ADVANCE DIRECTIVES/TREATMENT PREFERENCES:  DNR/DNI - MOLST, continue all other medical treatments    Baseline ADLs (prior to admission):  Dependent      Karnofsky/Palliative Performance Status Version 2:  %  http://npcrc.org/files/news/palliative_performance_scale_ppsv2.pdf    Present Symptoms:     Dyspnea: No  Nausea/Vomiting: No  Anxiety:  No  Depression: No  Fatigue: Yes   Loss of appetite: Yes   Constipation: No, last BM     Pain: none currently            Character-            Duration-            Effect-            Factors-            Frequency-            Location-            Severity-    Pain AD Score:  http://geriatrictoolkit.[place].[email or web address]/cog/painad.pdf (press ctrl + left click to view)    Review of Systems: Reviewed. No chest pain. No SOB.   All others negative    MEDICATIONS  (STANDING):  cefTRIAXone Injectable. 2000 milliGRAM(s) IV Push every 24 hours  chlorhexidine 2% Cloths 1 Application(s) Topical daily  hydrocortisone sodium succinate Injectable 50 milliGRAM(s) IV Push every 6 hours  norepinephrine Infusion 0.05 MICROgram(s)/kG/Min (8.82 mL/Hr) IV Continuous <Continuous>  nystatin Powder 1 Application(s) Topical two times a day  pantoprazole  Injectable 40 milliGRAM(s) IV Push daily  sodium bicarbonate  Infusion 0.159 mEq/kG/Hr (100 mL/Hr) IV Continuous <Continuous>    MEDICATIONS  (PRN):    PHYSICAL EXAM:    Vital Signs Last 24 Hrs  T(C): 37.6 (06 Sep 2024 11:15), Max: 39.4 (05 Sep 2024 14:30)  T(F): 99.7 (06 Sep 2024 11:15), Max: 103 (05 Sep 2024 14:30)  HR: 80 (06 Sep 2024 11:15) (74 - 135)  BP: 83/69 (06 Sep 2024 11:00) (74/48 - 157/96)  BP(mean): 76 (06 Sep 2024 11:00) (57 - 112)  RR: 28 (06 Sep 2024 11:15) (18 - 31)  SpO2: 100% (06 Sep 2024 11:15) (90% - 100%)    Parameters below as of 06 Sep 2024 11:00  Patient On (Oxygen Delivery Method): room air    General: alert  oriented     HEENT: normal     Lungs: comfortable    CV: normal     GI: normal  last BM:     : guerrero     MSK: weakness      Neuro: no focal deficits    Skin: no rash    LABS:                        13.2   35.77 )-----------( 185      ( 06 Sep 2024 08:40 )             38.3     09-06    132<L>  |  96  |  30.5<H>  ----------------------------<  118<H>  3.8   |  18.0<L>  |  1.68<H>    Ca    8.5      06 Sep 2024 08:40  Phos  4.2     09-06  Mg     2.1     09-06    TPro  5.0<L>  /  Alb  2.5<L>  /  TBili  0.8  /  DBili  x   /  AST  27  /  ALT  31  /  AlkPhos  74  09-06    PT/INR - ( 06 Sep 2024 02:20 )   PT: 25.8 sec;   INR: 2.38 ratio         PTT - ( 06 Sep 2024 02:20 )  PTT:34.1 sec  Urinalysis Basic - ( 06 Sep 2024 08:40 )    Color: x / Appearance: x / SG: x / pH: x  Gluc: 118 mg/dL / Ketone: x  / Bili: x / Urobili: x   Blood: x / Protein: x / Nitrite: x   Leuk Esterase: x / RBC: x / WBC x   Sq Epi: x / Non Sq Epi: x / Bacteria: x      I&O's Summary    05 Sep 2024 07:  -  06 Sep 2024 07:00  --------------------------------------------------------  IN: 1654.3 mL / OUT: 2050 mL / NET: -395.7 mL    06 Sep 2024 07:01  -  06 Sep 2024 11:50  --------------------------------------------------------  IN: 431.7 mL / OUT: 485 mL / NET: -53.3 mL    RADIOLOGY & ADDITIONAL STUDIES:    < from: CT Chest No Cont (24 @ 16:24) >  IMPRESSION:  Bilateral interlobular septal thickening more prominent in the lower   lobes suggesting pulmonary venous congestion.    Bilateral lower lobe dependent atelectasis, underlying right lower lobe   infiltrates not excluded.    Distended stool filled rectum compatible with fecal rectal impaction.    Mild right hydroureteronephrosis resulting from a 7 mm stone in the right   UVJ. Urinary bladder calculi.    Air within the urinary bladder lumen without a Guerrero catheter,   correlation with history of recent bladder catheterization and urinalysis   is recommended in order to exclude emphysematous cystitis.    Prostatomegaly. Correlation with rectal exam and PSA level is recommended.    Evidence of old granulomatous disease.    Additional findings noted above.    < end of copied text >  < from: Xray Chest 1 View-PORTABLE IMMEDIATE (Xray Chest 1 View-PORTABLE IMMEDIATE .) (24 @ 03:06) >  IMPRESSION: Intubation. No acute finding.    < end of copied text >

## 2024-09-06 NOTE — H&P ADULT - CRITICAL CARE ATTENDING COMMENT
Pt seen w PA and jose manuel w above.  81 yo M with pmhx of HTN, CHF, PE, Myasthenia Gravis, and chronic LE edema, transferred from [facility] after pt was found to have an obstructing calculus w hydronephrosis. Unsuccessful attempt at stent placement, pt was transferred to [facility] for IR eval and PCN.  + bloody urine post PCN.  Pt treated in the OR w Kcentra and FFP.  Pt remained intubated post procedure and requiring pressor support.  Continue broad spectrum abx, f/u pan cx.  Taper pressors to maintain MAP >65.  SAT/SBT in am. Monitor in ICU.

## 2024-09-06 NOTE — H&P ADULT - ASSESSMENT
Assessment     1) Septic shock   2) UTI   3) hydronephrosis   4) hematuria   5) HAGMA  6) CATINA      Plan     Sedated with Propofol post procedure. SAT/SBT in AM  MV increased to attempt to compensate for metabolic acidosis. Oxygenating well.   Levophed for MAP >65.   Start Stress hydrocotisone as patient is on daily prednisone  TTE ordered  Bedside pocus with poor windows   HCO3 infusion to mitigate acidosis while lactate clears   CATINA on CKD, making ample urine.   Hematuria noted. May require CBI.   PCN in place. bloody output. Continue to monitor.   NPO  IV PPI while on stress dose steroids   Start Cefepime (given Vanco at [place]). Bcx sent. Ucx sent. F/u cultures.   Given 2 FFP in OR for hematuria. Given degree of hematuria and recent procedure, Will reverse with Kcentra (50u/Kg).   SCDs for dvt ppx  Admit to ICU    Discussed with Dr. Granados    __44__ minutes of critical care time spent providing medical care for patient's acute illness/conditions that impairs at least one vital organ system and/or poses a high risk of imminent or life threatening deterioration in the patient's condition. It includes time spent evaluating and treating the patient's acute illness as well as time spent reviewing labs, radiology, discussing goals of care with patient and/or patient's family, and discussing the case with a multidisciplinary team in an effort to prevent further life threatening deterioration or end organ damage. This time is independent of any procedures performed.

## 2024-09-06 NOTE — SWALLOW BEDSIDE ASSESSMENT ADULT - SWALLOW EVAL: DIAGNOSIS
Moderate to severe oral dysphagia for puree & all fluid densities, impacted by cognition & reduced endurance. Pharyngeal dysphagia suspected for all fluid densities. Pharyngeal stage of swallow clinically unremarkable for accepted puree trials however given oral dysphagia & reduced cognition, pt is at risk for aspiration at this time.

## 2024-09-06 NOTE — SWALLOW BEDSIDE ASSESSMENT ADULT - SLP GENERAL OBSERVATIONS
Pt received & seen seated upright in bed, awake/alert yet fatigued with reduced endurance, 02 NC (sats:95-97%), encouragement needed to accept PO trials, 0/10 pain pre/post

## 2024-09-06 NOTE — H&P ADULT - HISTORY OF PRESENT ILLNESS
79 yo M with pmhx of HTN, CHF, PE, Myasthenia Gravis, and chronic LE edema, history of 7 mm distal ureteral calculus with mild right hydroureteronephrosis and pain presented to the ED with shortness of breath and confusion after failing to follow up outpt on ureteral calculus after prior evaluation at [facility].  Patient's son is at bedside he states he seems more confused and altered then baseline; has not been feeling well. Patient was complaining of nauseous. Also is retaining urine; usually urinates in a urinal. Decreased appetite; weak; chills. Patient hasn't seen cardiologist since March to follow up on his CHF. Labs significant for WBC 41.45, procal 36.3, lactate 7.9, Scr 2.6, BNP 48283, UA. CT CAP revealed R hydro from 7 mm stone in UVJ. Patient sent emergently with urology for stent placement.    Events last 24 hours: Stent placement for 7 mm stone unsuccessful, procedure aborted, patient transferred to ICU for septic shock. Discussed case with urology for planned transfer for emergent IR for nephrostomy tube placement with Dr Shaw

## 2024-09-06 NOTE — H&P ADULT - ASSESSMENT
1 yo M with pmhx of HTN, CHF, PE, Myasthenia Gravis, and chronic LE edema, history of 7 mm distal ureteral calculus with mild right hydroureteronephrosis and pain presented to the ED with shortness of breath and confusion after failing to follow up outpt on ureteral calculus after prior evaluation at [facility].  Patient's son is at bedside he states he seems more confused and altered then baseline; has not been feeling well. Patient was complaining of nauseous. Also is retaining urine; usually urinates in a urinal. Decreased appetite; weak; chills. Patient hasn't seen cardiologist since March to follow up on his CHF. Labs significant for WBC 41.45, procal 36.3, lactate 7.9, Scr 2.6, BNP 71009, UA. CT CAP revealed R hydro from 7 mm stone in UVJ. Patient sent emergently with urology for stent placement but unfortunately unsuccessful with urology. Patient sent to [facility] for septic shock management and stabilization. Patient accepted for transfer to [facility] with IR for emergent nephrostomy tube placement with Dr Shaw and medical management with [facility] MICU.    Septic shock  Urosepsis  UTI  CATINA  Hydronephrosis 2/2 7 mm R ureteral stone  Lactic acidosis  Leukocytosis    Plan:  -Septic shock 2/2 Urosepsis s/p IVF resuscitation requiring levophed gtt. Actively titrating levophed gtt to maintain MAP>65 for adequate organ perfusion  -Actively titrating supplemental O2 to maintain SO2>92 for adequate tissue oxygenation, SO2 97 on NC currently.  -Urosepsis, +UA noted, leukocytosis noted, blood cultures sent. S/p Zosyn, Vanco. Ordered to continue Zosyn ABX. Trend WBC and fever curves.  -Tylenol for fever, analgesia, avoid narcotics for deliriogenic effects.  -CATINA with Scr 2.6, hematuria noted with minimal UOP. Failed stent placement with urology. Patient accepted for transfer to [facility] with IR for emergent nephrostomy tube placement. Monitor I/Os. Avoid nephrotoxic meds. Replete lytes to maintain K>4, Mg>2, Phos>3.  -NPO, protonix for GI PPX  -Hold Eliquis 2/2 hematuria noted, IR procedure planned. H&H stable, trend daily. Transfuse if Hgb < 7.  -BG goal 110-180 with fingersticks PRN

## 2024-09-06 NOTE — CONSULT NOTE ADULT - ASSESSMENT
80M with past medical history of CHF, PE (on apixaban), HTN, HLD, myasthenia gravis, pneumonia, UTIs who presented as a transfer from [facility] for sepsis secondary to obstructive calculus with hydronephrosis s/p failed urethral stent placement, now s/p nephrostomy tube placement on 9/6.     #Urosepsis     # 80M with past medical history of CHF, PE (on apixaban), HTN, HLD, myasthenia gravis, pneumonia, UTIs who presented as a transfer from [facility] for sepsis secondary to obstructive calculus with hydronephrosis s/p failed urethral stent placement, now s/p nephrostomy tube placement on 9/6.     # Metabolic encephalopathy 2/2 to Urosepsis  # Obstructive calculus/hydronephrosis   # UTI   - Leukocytosis, elevated lactate   - CT A/P with mild right hydroureteronephrosis from 7mm stone in right UVJ  - 9/5 failed stent placement  - 9/6 emergent right PCN placed  - s/p extubation 9/6, sating well on RA  - Remains on Levophed   - Indwelling guerrero in place, urology consulted   - IV Rocephin     # CHF   - TTE pending    # CATINA   - BUN/Cr improving  - Continue flomax  - Continue to monitor renal function     # Bacteremia   - 9/5 BCx positive for Klebsiella  - on IV Rocephin     # Dysphagia   - 9/6 bedside swallow - NPO   - ICU planning for NG tube and tube feeds at trickle rate    # Encounter for Palliative Care  - See goals of care

## 2024-09-06 NOTE — SWALLOW BEDSIDE ASSESSMENT ADULT - PHARYNGEAL PHASE
for 1 trial in which a swallow was triggered/Within functional limits increased WOB post intake/Multiple swallows Cough post oral intake

## 2024-09-06 NOTE — CONSULT NOTE ADULT - ATTENDING COMMENTS
79M with MG, HTN, heart failure, PE, admitted with septic shock, due to UTI, obstructive uropathy, due to klebsiella pneumoniae requiring Intensive care unit and vasopressors.     Note amended and changed as needed. Will continue to follow. patient clear about wishes to be do not resuscitate and do not intubate, MOLST on chart from previous evening with NP in Intensive care unit at [place] Aryan Acevedo signed and confirmed on 3rd page.

## 2024-09-06 NOTE — PROGRESS NOTE ADULT - SUBJECTIVE AND OBJECTIVE BOX
79yMale  PMHX:   BRIEF HOSPITAL COURSE: 81 yo M with pmhx of HTN, CHF, PE, Myasthenia Gravis, and chronic LE edema, history of 7 mm distal ureteral calculus with mild right hydroureteronephrosis and pain presented to the ED with shortness of breath and confusion after failing to follow up outpt on ureteral calculus after prior evaluation at [facility].  Patient's son is at bedside he states he seems more confused and altered then baseline; has not been feeling well. Patient was complaining of nauseous. Also is retaining urine; usually urinates in a urinal. Decreased appetite; weak; chills. Patient hasn't seen cardiologist since March to follow up on his CHF. Labs significant for WBC 41.45, procal 36.3, lactate 7.9, Scr 2.6, BNP 11473, UA. CT CAP revealed R hydro from 7 mm stone in UVJ. Patient sent emergently with urology for stent placement.  Stent placement for 7 mm stone unsuccessful, procedure aborted, patient transferred to ICU for septic shock. Discussed case with urology for planned transfer for emergent IR for nephrostomy tube placement with Dr Shaw.    Admitted for: septic shock s/p RCN by IR.   Overnight Events:  lovenox reversed extubated.   P/E:  -Neuro:  -Heart:  -Lungs:  -Abdomen:  -Extremities:    Vent Settings: Mode: CPAP with PS, FiO2: 40, PEEP: 6  24 IOs:   IN: 1654.3 mL / OUT: 2050 mL / NET: -395.7 mL      Labs:   WBC 37.28  Hg 12.8  INR 2.38  sodium 133  CATINA improving 30.6, 1.73 pre renal 2/2 shock  lactate 6.0 trending down  magnesium 1.4 repleted repeat BMP sent for 10 along with CBC            Cultures: gram negative rods in blood  Medications: :  Diet: NPO except meds  Last BM:  DVT PPx: hematuria lovenox reversed  Stress Ulcer PPx 40 mg IV push 79yMale  PMHX:   BRIEF HOSPITAL COURSE: 79 yo M with pmhx of HTN, CHF, PE, Myasthenia Gravis, and chronic LE edema, history of 7 mm distal ureteral calculus with mild right hydroureteronephrosis and pain presented to the ED with shortness of breath and confusion after failing to follow up outpt on ureteral calculus after prior evaluation at [facility].  Patient's son is at bedside he states he seems more confused and altered then baseline; has not been feeling well. Patient was complaining of nauseous. Also is retaining urine; usually urinates in a urinal. Decreased appetite; weak; chills. Patient hasn't seen cardiologist since March to follow up on his CHF. Labs significant for WBC 41.45, procal 36.3, lactate 7.9, Scr 2.6, BNP 70421, UA. CT CAP revealed R hydro from 7 mm stone in UVJ. Patient sent emergently with urology for stent placement.  Stent placement for 7 mm stone unsuccessful, procedure aborted, patient transferred to ICU for septic shock. Discussed case with urology for planned transfer for emergent IR for nephrostomy tube placement with Dr Shaw.    Admitted for: septic shock s/p RCN by IR.   Overnight Events:  lovenox reversed extubated.   P/E:  -Neuro: Patient is A&Ox1  -Heart:  -Lungs:  -Abdomen:  -Extremities:    Vent Settings: Mode: CPAP with PS, FiO2: 40, PEEP: 6  24 IOs:   IN: 1654.3 mL / OUT: 2050 mL / NET: -395.7 mL      Labs:   WBC 37.28  Hg 12.8  INR 2.38  sodium 133  CATINA improving 30.6, 1.73 pre renal 2/2 shock  lactate 6.0 trending down  magnesium 1.4 repleted repeat BMP sent for 10 along with CBC            Cultures: gram negative rods in blood  Medications: :  Diet: NPO except meds  Last BM:  DVT PPx: hematuria lovenox reversed  Stress Ulcer PPx 40 mg IV push 79yMale  PMHX:   BRIEF HOSPITAL COURSE: 79 yo M with pmhx of HTN, CHF, PE, Myasthenia Gravis, and chronic LE edema, history of 7 mm distal ureteral calculus with mild right hydroureteronephrosis and pain presented to the ED with shortness of breath and confusion after failing to follow up outpt on ureteral calculus after prior evaluation at [facility].  Patient's son is at bedside he states he seems more confused and altered then baseline; has not been feeling well. Patient was complaining of nauseous. Also is retaining urine; usually urinates in a urinal. Decreased appetite; weak; chills. Patient hasn't seen cardiologist since March to follow up on his CHF. Labs significant for WBC 41.45, procal 36.3, lactate 7.9, Scr 2.6, BNP 94927, UA. CT CAP revealed R hydro from 7 mm stone in UVJ. Patient sent emergently with urology for stent placement.  Stent placement for 7 mm stone unsuccessful, procedure aborted, patient transferred to ICU for septic shock. Discussed case with urology for planned transfer for emergent IR for nephrostomy tube placement with Dr Shaw.    Admitted for: septic shock s/p PCN by IR.   Overnight Events:  lovenox reversed extubated.   P/E:  -Neuro: Patient is A&Ox1  -Heart:  -Lungs:  -Abdomen:  -Extremities:    Vent Settings: Mode: CPAP with PS, FiO2: 40, PEEP: 6  24 IOs:   IN: 1654.3 mL / OUT: 2050 mL / NET: -395.7 mL      Labs:   WBC 37.28  Hg 12.8  INR 2.38  sodium 133  CTAINA improving 30.6, 1.73 pre renal 2/2 shock  lactate 6.0 trending down  magnesium 1.4 repleted repeat BMP sent for 10 along with CBC            Cultures: gram negative rods in blood  Medications: :  Diet: NPO except meds  Last BM:  DVT PPx: hematuria lovenox reversed  Stress Ulcer PPx 40 mg IV push 79yMale  PMHX:   BRIEF HOSPITAL COURSE: 81 yo M with pmhx of HTN, CHF, PE, Myasthenia Gravis, and chronic LE edema, history of 7 mm distal ureteral calculus with mild right hydroureteronephrosis and pain presented to the ED with shortness of breath and confusion after failing to follow up outpt on ureteral calculus after prior evaluation at [facility].  Patient's son is at bedside he states he seems more confused and altered then baseline; has not been feeling well. Patient was complaining of nauseous. Also is retaining urine; usually urinates in a urinal. Decreased appetite; weak; chills. Patient hasn't seen cardiologist since March to follow up on his CHF. Labs significant for WBC 41.45, procal 36.3, lactate 7.9, Scr 2.6, BNP 14719, UA. CT CAP revealed R hydro from 7 mm stone in UVJ. Patient sent emergently with urology for stent placement.  Stent placement for 7 mm stone unsuccessful, procedure aborted, patient transferred to ICU for septic shock. Discussed case with urology for planned transfer for emergent IR for nephrostomy tube placement with Dr Shaw.    Admitted for: septic shock s/p PCN by IR.   Overnight Events:  lovenox reversed extubated.   magnesium 1.4 repleted  Potassium repleated  NG tube placed    Patient is a 79y old  Male who presents with a chief complaint of     INTERVAL HPI/OVERNIGHT EVENTS:   No overnight events   Afebrile, hemodynamically stable     ICU Vital Signs Last 24 Hrs  T(C): 37.9 (06 Sep 2024 19:15), Max: 37.9 (06 Sep 2024 10:00)  T(F): 100.2 (06 Sep 2024 19:15), Max: 100.2 (06 Sep 2024 10:00)  HR: 109 (06 Sep 2024 19:15) (68 - 131)  BP: 91/58 (06 Sep 2024 19:00) (74/48 - 157/96)  BP(mean): 70 (06 Sep 2024 19:00) (57 - 112)  ABP: 117/64 (06 Sep 2024 19:15) (86/55 - 146/59)  ABP(mean): 79 (06 Sep 2024 19:15) (60 - 91)  RR: 19 (06 Sep 2024 19:15) (14 - 31)  SpO2: 100% (06 Sep 2024 19:15) (92% - 100%)    O2 Parameters below as of 06 Sep 2024 17:15  Patient On (Oxygen Delivery Method): room air          I&O's Summary    05 Sep 2024 07:01  -  06 Sep 2024 07:00  --------------------------------------------------------  IN: 1654.3 mL / OUT: 2050 mL / NET: -395.7 mL    06 Sep 2024 07:01  -  06 Sep 2024 19:15  --------------------------------------------------------  IN: 1605.3 mL / OUT: 1410 mL / NET: 195.3 mL      Mode: CPAP with PS  FiO2: 40  PEEP: 6  PS: 6  MAP: 10      LABS:                        12.6   33.85 )-----------( 167      ( 06 Sep 2024 12:10 )             36.2     09-06    132<L>  |  96  |  30.5<H>  ----------------------------<  129<H>  3.3<L>   |  20.0<L>  |  1.60<H>    Ca    8.1<L>      06 Sep 2024 12:10  Phos  3.9     09-06  Mg     2.0     09-06    TPro  5.0<L>  /  Alb  2.5<L>  /  TBili  0.8  /  DBili  x   /  AST  27  /  ALT  31  /  AlkPhos  74  09-06    PT/INR - ( 06 Sep 2024 12:10 )   PT: 18.8 sec;   INR: 1.72 ratio         PTT - ( 06 Sep 2024 02:20 )  PTT:34.1 sec  Urinalysis Basic - ( 06 Sep 2024 12:10 )    Color: x / Appearance: x / SG: x / pH: x  Gluc: 129 mg/dL / Ketone: x  / Bili: x / Urobili: x   Blood: x / Protein: x / Nitrite: x   Leuk Esterase: x / RBC: x / WBC x   Sq Epi: x / Non Sq Epi: x / Bacteria: x      CAPILLARY BLOOD GLUCOSE      POCT Blood Glucose.: 88 mg/dL (06 Sep 2024 17:33)  POCT Blood Glucose.: 122 mg/dL (06 Sep 2024 12:15)  POCT Blood Glucose.: 106 mg/dL (06 Sep 2024 08:39)  POCT Blood Glucose.: 83 mg/dL (06 Sep 2024 05:23)    ABG - ( 06 Sep 2024 04:11 )  pH, Arterial: 7.350 pH, Blood: x     /  pCO2: 29    /  pO2: 113   / HCO3: 16    / Base Excess: -9.6  /  SaO2: 99.4                RADIOLOGY & ADDITIONAL TESTS:    Consultant(s) Notes Reviewed:  [x ] YES  [ ] NO    MEDICATIONS  (STANDING):  bacitracin   Ointment 1 Application(s) Topical once  cefTRIAXone Injectable. 2000 milliGRAM(s) IV Push every 24 hours  chlorhexidine 2% Cloths 1 Application(s) Topical daily  hydrocortisone sodium succinate Injectable 50 milliGRAM(s) IV Push every 6 hours  norepinephrine Infusion 0.05 MICROgram(s)/kG/Min (8.82 mL/Hr) IV Continuous <Continuous>  nystatin Powder 1 Application(s) Topical two times a day  pantoprazole  Injectable 40 milliGRAM(s) IV Push daily  potassium chloride    Tablet ER 40 milliEquivalent(s) Oral every 4 hours  sodium chloride 0.9%. 1000 milliLiter(s) (100 mL/Hr) IV Continuous <Continuous>  tamsulosin 0.4 milliGRAM(s) Oral at bedtime    MEDICATIONS  (PRN):      PHYSICAL EXAM:  GENERAL: Patient lethargic lying in bed answers in 1-2 words  HEAD:  Atraumatic, Normocephalic  EYES: EOMI, PERRLA, conjunctiva and sclera clear  NECK: Supple, No JVD, Normal thyroid, no enlarged nodes  NERVOUS SYSTEM:  A&Ox1 lethargic  CHEST/LUNG: B/L good air entry; No rales, rhonchi, or wheezing  HEART: S1S2 normal, no S3, Regular rate and rhythm; No murmurs  ABDOMEN: Soft, Nontender, Nondistended; Bowel sounds present  EXTREMITIES:  2+ Peripheral Pulses, No clubbing, cyanosis, or edema  LYMPH: No lymphadenopathy noted  SKIN: No rashes or lesions    Care Discussed with Consultants/Other Providers [ x] YES  [ ] NO

## 2024-09-06 NOTE — PROGRESS NOTE ADULT - ATTENDING COMMENTS
Events:  S/p source control achieved with percutaneous nephrostomy  S/p extubation postprocedure  Transition to room air  Improved  lactic acid/serum bicarb; fluid transition to normal saline from sodium bicarb  NG tube to be placed as failed swallow evaluation      Assessment & Plan:    80-year-old male with history of hypertension congestive heart failure pulmonary embolism myasthenia gravis chronic lower extremity edema who presented to [facility] with difficulty breathing confusion admitted to medical ICU for    Acute metabolic encephalopathy  Distributive shock  Klebsiella pneumonia UTI and bacteremia complicated by hematuria  Acute kidney injury with metabolic acidosis with hydronephrosis s/p source control with percutaneous nephrostomy  Oropharyngeal dysphagia   supratherapeutic INR    ====================== NEUROLOGY=====================   Q4 neurocheck    ==================== RESPIRATORY======================  Currently extubated to room air  Incentive spirometry    ====================CARDIOVASCULAR==================  norepinephrine Infusion 0.05 MICROgram(s)/kG/Min (8.82 mL/Hr) IV Continuous <Continuous>   MAP goal 65-70  TTE  ===================HEMATOLOGIC/ONC ===================   SCDs  S/p Kcentra and FFP  Trending INR  ===================== RENAL =========================  Continue monitoring urine output  tamsulosin 0.4 milliGRAM(s) Oral at bedtime  sodium chloride 0.9%. 1000 milliLiter(s) (100 mL/Hr) IV Continuous <Continuous>   strict I's and O's  Continue indwelling urinary catheter; will consult urology  Percutaneous nephrostomy: IR following  ==================== GASTROINTESTINAL===================  pantoprazole  Injectable 40 milliGRAM(s) IV Push daily  potassium chloride    Tablet ER 40 milliEquivalent(s) Oral every 4 hours  potassium chloride  10 mEq/100 mL IVPB 10 milliEquivalent(s) IV Intermittent every 1 hour    Diet:  n.p.o. up until NG tube placed and then will start tube feed diet at trickle rate  =======================    ENDOCRINE  =====================  hydrocortisone sodium succinate Injectable 50 milliGRAM(s) IV Push every 6 hours as patient is on home dose of prednisone    Blood sugar goal: 100-200  ========================INFECTIOUS DISEASE================  cefTRIAXone Injectable. 2000 milliGRAM(s) IV Push every 24 hours    Cx:  urine and blood culture positive for Klebsiella pneumonia from [facility]      Care plan discussed with ICU care team  Plan d/w Family   palliative care consulted: Awaiting final consensus about goals of care as patient wanted DNR as per his wishes and as per his son's wishes he would want him to be full code

## 2024-09-06 NOTE — PATIENT PROFILE ADULT - FALL HARM RISK - HARM RISK INTERVENTIONS

## 2024-09-06 NOTE — PROCEDURE NOTE - ADDITIONAL PROCEDURE DETAILS
NG tube placed for medication and feeds. On x-ray placement was confirmed but tube was kinked. Tube deemed ok to use.

## 2024-09-06 NOTE — SWALLOW BEDSIDE ASSESSMENT ADULT - COMMENTS
As per MD note: "79 yo M with pmhx of HTN, CHF, PE, Myasthenia Gravis, and chronic LE edema, history of 7 mm distal ureteral calculus with mild right hydroureteronephrosis and pain presented to the ED with shortness of breath and confusion after failing to follow up outpt on ureteral calculus after prior evaluation at [facility].  Patient's son is at bedside he states he seems more confused and altered then baseline; has not been feeling well. Patient was complaining of nauseous. Also is retaining urine; usually urinates in a urinal. Decreased appetite; weak; chills. Patient hasn't seen cardiologist since March to follow up on his CHF. Labs significant for WBC 41.45, procal 36.3, lactate 7.9, Scr 2.6, BNP 13532, UA. CT CAP revealed R hydro from 7 mm stone in UVJ. Patient sent emergently with urology for stent placement.  Stent placement for 7 mm stone unsuccessful, procedure aborted, patient transferred to ICU for septic shock. Discussed case with urology for planned transfer for emergent IR for nephrostomy tube placement with Dr Shaw."

## 2024-09-06 NOTE — PROGRESS NOTE ADULT - ASSESSMENT
80-year-old male with history of hypertension congestive heart failure pulmonary embolism myasthenia gravis chronic lower extremity edema who presented to [facility] with difficulty breathing confusion admitted to medical ICU for    #Neurology  -Q4 neuro checks   -A&Ox1  -H/O mysthenia gravis being managed with prednisone continue as solu-cortef    Pulm  -Resting comfortably on RA    CV  -Levophed gtt  -continue to titrate  -MAP 65-70  -End organ perfusion improving  -lactate trending down    HEME  hold anticoagulation  monitor CBC in light of hematuria    ID  -Septic shock 2/2 urethral stones, klebsiella pneumonia, gram negative stefani bacteremia  -cefepime changed to ceftriaxone 2mg   -lactate trending down    RENAL and urological  -Producing urine  -CATINA 2/2 septic shock/bacteremia  Hematuria s/p urological procedure  -IR states hematuria is not concerning after procedure continue to monitor H&H  -monitor electrolytes  -Solucortef  -continue tamsulosin  -Metabolic acidosis improving with lactate trending down. Changed bicarbonate drip to normal saline.   -Potassium being repleted  -Monitor I:Os    GI  -Pantoprazole 40mg IV push qDay  -NG tube  -currently NPO  -can give medications through NG tube    Endocrine  -Solucortef continue

## 2024-09-06 NOTE — PROCEDURE NOTE - PROCEDURE FINDINGS AND DETAILS
emergent right PCN placed (supine position due to patient's condition)  elevated INR and eliquis rx with ffp x 2  cx sent   bloody urine, flush until clear   may need more FFP if persistent bleeding

## 2024-09-06 NOTE — CONSULT NOTE ADULT - CONVERSATION DETAILS
Discussed overall medical condition, prognosis, and options for plan of care with patient at bedside along with [facility] resident Claire Hickman and bedside nurse Leroy Schaefer. We addressed his current knowledge of his condition and why he is here at this hospital and in the Intensive care unit. He seemed to have a good understanding of his infection, and I further described that he has severe infection requiring artifical vasopressors to support his blood pressure currently. I addressed what his goals are and he stated " to get better." I then further explored with him his wishes regarding advanced care directives, and asked him if what we are doing does not work and he worsens or has an emergency where his heart stops or he stops breathing whether he would want CPR or life support and he stated " no." We then talked more about his conditions and doing everything possible including medications like the vasopressors and regular forms of oxygen should he need it but should those things not help if he were to die naturally we would not be doing CPR or placing him on ventilator and he agreed.  Support provided in difficult situation. He was also able to tell us that his son Olvin is involved in his medical care and decisions. I inquired if he had this discussion with his son and he states he has.     We then called his son Olvin via phone and explained current condition, medical updates, and dads decision, and son Olvin stated " I know my dad wants to live but that he wouldn't want to live on machines."

## 2024-09-06 NOTE — H&P ADULT - NSVTERISKREFERASSESS_GEN_ALL_CORE
LOS note:  Wearing 02/increased monitoring required for safety. Refer to the Assessment tab to view/cancel completed assessment.

## 2024-09-06 NOTE — H&P ADULT - HISTORY OF PRESENT ILLNESS
BRIEF HOSPITAL COURSE: 79 yo M with pmhx of HTN, CHF, PE, Myasthenia Gravis, and chronic LE edema, history of 7 mm distal ureteral calculus with mild right hydroureteronephrosis and pain presented to the ED with shortness of breath and confusion after failing to follow up outpt on ureteral calculus after prior evaluation at [facility].  Patient's son is at bedside he states he seems more confused and altered then baseline; has not been feeling well. Patient was complaining of nauseous. Also is retaining urine; usually urinates in a urinal. Decreased appetite; weak; chills. Patient hasn't seen cardiologist since March to follow up on his CHF. Labs significant for WBC 41.45, procal 36.3, lactate 7.9, Scr 2.6, BNP 69584, UA. CT CAP revealed R hydro from 7 mm stone in UVJ. Patient sent emergently with urology for stent placement.  Stent placement for 7 mm stone unsuccessful, procedure aborted, patient transferred to ICU for septic shock. Discussed case with urology for planned transfer for emergent IR for nephrostomy tube placement with Dr Shaw.    Now s/p PCN by IR. Remains intubated and in shock. Admit to ICU.

## 2024-09-07 LAB
-  AMPICILLIN/SULBACTAM: SIGNIFICANT CHANGE UP
-  AMPICILLIN: SIGNIFICANT CHANGE UP
-  AZTREONAM: SIGNIFICANT CHANGE UP
-  CEFAZOLIN: SIGNIFICANT CHANGE UP
-  CEFEPIME: SIGNIFICANT CHANGE UP
-  CEFOXITIN: SIGNIFICANT CHANGE UP
-  CEFTRIAXONE: SIGNIFICANT CHANGE UP
-  CIPROFLOXACIN: SIGNIFICANT CHANGE UP
-  ERTAPENEM: SIGNIFICANT CHANGE UP
-  GENTAMICIN: SIGNIFICANT CHANGE UP
-  IMIPENEM: SIGNIFICANT CHANGE UP
-  LEVOFLOXACIN: SIGNIFICANT CHANGE UP
-  MEROPENEM: SIGNIFICANT CHANGE UP
-  PIPERACILLIN/TAZOBACTAM: SIGNIFICANT CHANGE UP
-  TOBRAMYCIN: SIGNIFICANT CHANGE UP
-  TRIMETHOPRIM/SULFAMETHOXAZOLE: SIGNIFICANT CHANGE UP
ALBUMIN SERPL ELPH-MCNC: 2 G/DL — LOW (ref 3.3–5.2)
ALP SERPL-CCNC: 72 U/L — SIGNIFICANT CHANGE UP (ref 40–120)
ALT FLD-CCNC: 23 U/L — SIGNIFICANT CHANGE UP
ANION GAP SERPL CALC-SCNC: 16 MMOL/L — SIGNIFICANT CHANGE UP (ref 5–17)
AST SERPL-CCNC: 22 U/L — SIGNIFICANT CHANGE UP
BASOPHILS # BLD AUTO: 0 K/UL — SIGNIFICANT CHANGE UP (ref 0–0.2)
BASOPHILS NFR BLD AUTO: 0 % — SIGNIFICANT CHANGE UP (ref 0–2)
BILIRUB SERPL-MCNC: 0.4 MG/DL — SIGNIFICANT CHANGE UP (ref 0.4–2)
BUN SERPL-MCNC: 30.7 MG/DL — HIGH (ref 8–20)
BURR CELLS BLD QL SMEAR: PRESENT — SIGNIFICANT CHANGE UP
CALCIUM SERPL-MCNC: 7.7 MG/DL — LOW (ref 8.4–10.5)
CHLORIDE SERPL-SCNC: 103 MMOL/L — SIGNIFICANT CHANGE UP (ref 96–108)
CO2 SERPL-SCNC: 21 MMOL/L — LOW (ref 22–29)
CREAT SERPL-MCNC: 1.59 MG/DL — HIGH (ref 0.5–1.3)
CULTURE RESULTS: ABNORMAL
EGFR: 43 ML/MIN/1.73M2 — LOW
EOSINOPHIL # BLD AUTO: 0 K/UL — SIGNIFICANT CHANGE UP (ref 0–0.5)
EOSINOPHIL NFR BLD AUTO: 0 % — SIGNIFICANT CHANGE UP (ref 0–6)
GLUCOSE BLDC GLUCOMTR-MCNC: 142 MG/DL — HIGH (ref 70–99)
GLUCOSE BLDC GLUCOMTR-MCNC: 65 MG/DL — LOW (ref 70–99)
GLUCOSE BLDC GLUCOMTR-MCNC: 67 MG/DL — LOW (ref 70–99)
GLUCOSE BLDC GLUCOMTR-MCNC: 74 MG/DL — SIGNIFICANT CHANGE UP (ref 70–99)
GLUCOSE BLDC GLUCOMTR-MCNC: 80 MG/DL — SIGNIFICANT CHANGE UP (ref 70–99)
GLUCOSE SERPL-MCNC: 78 MG/DL — SIGNIFICANT CHANGE UP (ref 70–99)
GRAM STN FLD: ABNORMAL
HCT VFR BLD CALC: 35.3 % — LOW (ref 39–50)
HGB BLD-MCNC: 11.9 G/DL — LOW (ref 13–17)
INR BLD: 1.56 RATIO — HIGH (ref 0.85–1.18)
LACTATE SERPL-SCNC: 2.6 MMOL/L — HIGH (ref 0.5–2)
LYMPHOCYTES # BLD AUTO: 0.23 K/UL — LOW (ref 1–3.3)
LYMPHOCYTES # BLD AUTO: 0.9 % — LOW (ref 13–44)
MAGNESIUM SERPL-MCNC: 1.9 MG/DL — SIGNIFICANT CHANGE UP (ref 1.6–2.6)
MANUAL SMEAR VERIFICATION: SIGNIFICANT CHANGE UP
MCHC RBC-ENTMCNC: 30.7 PG — SIGNIFICANT CHANGE UP (ref 27–34)
MCHC RBC-ENTMCNC: 33.7 GM/DL — SIGNIFICANT CHANGE UP (ref 32–36)
MCV RBC AUTO: 91.2 FL — SIGNIFICANT CHANGE UP (ref 80–100)
METHOD TYPE: SIGNIFICANT CHANGE UP
MONOCYTES # BLD AUTO: 0.23 K/UL — SIGNIFICANT CHANGE UP (ref 0–0.9)
MONOCYTES NFR BLD AUTO: 0.9 % — LOW (ref 2–14)
NEUTROPHILS # BLD AUTO: 25.51 K/UL — HIGH (ref 1.8–7.4)
NEUTROPHILS NFR BLD AUTO: 98.2 % — HIGH (ref 43–77)
ORGANISM # SPEC MICROSCOPIC CNT: ABNORMAL
ORGANISM # SPEC MICROSCOPIC CNT: ABNORMAL
ORGANISM # SPEC MICROSCOPIC CNT: SIGNIFICANT CHANGE UP
PHOSPHATE SERPL-MCNC: 3.9 MG/DL — SIGNIFICANT CHANGE UP (ref 2.4–4.7)
PLAT MORPH BLD: NORMAL — SIGNIFICANT CHANGE UP
PLATELET # BLD AUTO: 130 K/UL — LOW (ref 150–400)
POIKILOCYTOSIS BLD QL AUTO: SIGNIFICANT CHANGE UP
POLYCHROMASIA BLD QL SMEAR: SLIGHT — SIGNIFICANT CHANGE UP
POTASSIUM SERPL-MCNC: 3.8 MMOL/L — SIGNIFICANT CHANGE UP (ref 3.5–5.3)
POTASSIUM SERPL-SCNC: 3.8 MMOL/L — SIGNIFICANT CHANGE UP (ref 3.5–5.3)
PROT SERPL-MCNC: 4.5 G/DL — LOW (ref 6.6–8.7)
PROTHROM AB SERPL-ACNC: 17.1 SEC — HIGH (ref 9.5–13)
RBC # BLD: 3.87 M/UL — LOW (ref 4.2–5.8)
RBC # FLD: 16 % — HIGH (ref 10.3–14.5)
RBC BLD AUTO: ABNORMAL
SODIUM SERPL-SCNC: 139 MMOL/L — SIGNIFICANT CHANGE UP (ref 135–145)
SPECIMEN SOURCE: SIGNIFICANT CHANGE UP
WBC # BLD: 25.98 K/UL — HIGH (ref 3.8–10.5)
WBC # FLD AUTO: 25.98 K/UL — HIGH (ref 3.8–10.5)

## 2024-09-07 PROCEDURE — 99233 SBSQ HOSP IP/OBS HIGH 50: CPT

## 2024-09-07 PROCEDURE — 71045 X-RAY EXAM CHEST 1 VIEW: CPT | Mod: 26

## 2024-09-07 RX ORDER — DEXTROSE 15 G/33 G
50 GEL IN PACKET (GRAM) ORAL ONCE
Refills: 0 | Status: COMPLETED | OUTPATIENT
Start: 2024-09-07 | End: 2024-09-07

## 2024-09-07 RX ORDER — POTASSIUM CHLORIDE 10 MEQ
40 TABLET, EXT RELEASE, PARTICLES/CRYSTALS ORAL ONCE
Refills: 0 | Status: DISCONTINUED | OUTPATIENT
Start: 2024-09-07 | End: 2024-09-07

## 2024-09-07 RX ORDER — ENOXAPARIN SODIUM 100 MG/ML
40 INJECTION SUBCUTANEOUS EVERY 24 HOURS
Refills: 0 | Status: DISCONTINUED | OUTPATIENT
Start: 2024-09-07 | End: 2024-09-08

## 2024-09-07 RX ORDER — POTASSIUM CHLORIDE 10 MEQ
40 TABLET, EXT RELEASE, PARTICLES/CRYSTALS ORAL ONCE
Refills: 0 | Status: COMPLETED | OUTPATIENT
Start: 2024-09-07 | End: 2024-09-07

## 2024-09-07 RX ADMIN — Medication 50 MILLILITER(S): at 11:54

## 2024-09-07 RX ADMIN — MIDODRINE HYDROCHLORIDE 10 MILLIGRAM(S): 5 TABLET ORAL at 11:35

## 2024-09-07 RX ADMIN — HYDROCORTISONE 50 MILLIGRAM(S): 10 TABLET ORAL at 21:39

## 2024-09-07 RX ADMIN — HYDROCORTISONE 50 MILLIGRAM(S): 10 TABLET ORAL at 16:05

## 2024-09-07 RX ADMIN — ENOXAPARIN SODIUM 40 MILLIGRAM(S): 100 INJECTION SUBCUTANEOUS at 11:37

## 2024-09-07 RX ADMIN — HYDROCORTISONE 50 MILLIGRAM(S): 10 TABLET ORAL at 01:29

## 2024-09-07 RX ADMIN — Medication 40 MILLIEQUIVALENT(S): at 00:31

## 2024-09-07 RX ADMIN — Medication 2000 MILLIGRAM(S): at 11:34

## 2024-09-07 RX ADMIN — HYDROCORTISONE 50 MILLIGRAM(S): 10 TABLET ORAL at 11:34

## 2024-09-07 RX ADMIN — Medication 1 APPLICATION(S): at 06:19

## 2024-09-07 RX ADMIN — CHLORHEXIDINE GLUCONATE 1 APPLICATION(S): 40 SOLUTION TOPICAL at 11:36

## 2024-09-07 RX ADMIN — Medication 25 MILLIGRAM(S): at 21:40

## 2024-09-07 RX ADMIN — Medication 40 MILLIEQUIVALENT(S): at 11:35

## 2024-09-07 RX ADMIN — MIDODRINE HYDROCHLORIDE 10 MILLIGRAM(S): 5 TABLET ORAL at 06:19

## 2024-09-07 RX ADMIN — Medication 100 GRAM(S): at 11:35

## 2024-09-07 RX ADMIN — MIDODRINE HYDROCHLORIDE 10 MILLIGRAM(S): 5 TABLET ORAL at 21:39

## 2024-09-07 RX ADMIN — TAMSULOSIN HYDROCHLORIDE 0.4 MILLIGRAM(S): 0.4 CAPSULE ORAL at 21:40

## 2024-09-07 RX ADMIN — Medication 40 MILLIGRAM(S): at 11:35

## 2024-09-07 RX ADMIN — MIDODRINE HYDROCHLORIDE 10 MILLIGRAM(S): 5 TABLET ORAL at 00:31

## 2024-09-07 RX ADMIN — Medication 1 APPLICATION(S): at 16:06

## 2024-09-07 NOTE — PROGRESS NOTE ADULT - SUBJECTIVE AND OBJECTIVE BOX
Patient is a 79y old  Male who presents with a chief complaint of     BRIEF HOSPITAL COURSE: 78 y/o M with a h/o HTN, HFpEF, PE (on apixaban), Myasthenia Gravis, chronic LE edema, known 7 mm distal ureteral calculus with mild right hydroureteronephrosis, initially presented to the [facility] ED on 9/5 with shortness of breath, urinary retention and confusion after failing to follow up outpatient for ureteral calculus. CT A/P revealed R hydro from 7 mm stone in UVJ. Underwent emergent cystoscopy with plan for ureteral stenting however procedure was aborted due to hematuria and inability to visualize right ureteral opening. Hospital course complicated by klebsiella pneumoniae bacteremia and septic shock requiring IV vasopressor support. Subsequently transferred to [facility] on 9/5 for emergent percutaneous nephrostomy.      Events last 24 hours: He remains dependent on IV vasopressor. New onset AF with RVR. Persistent hematuria. Encephalopathic and agitated at times.        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      Club foot  Surgery at birth for Club Foot Right foot      Pilonidal cyst  Surgery 40 years ago      H/O colonoscopy      Spinal stenosis      H/O prostate biopsy          Review of Systems:  Unable to obtain secondary to altered mentation.          Medications:  cefTRIAXone Injectable. 2000 milliGRAM(s) IV Push every 24 hours    midodrine 10 milliGRAM(s) Oral every 8 hours  norepinephrine Infusion 0.05 MICROgram(s)/kG/Min IV Continuous <Continuous>            pantoprazole  Injectable 40 milliGRAM(s) IV Push daily    tamsulosin 0.4 milliGRAM(s) Oral at bedtime    hydrocortisone sodium succinate Injectable 50 milliGRAM(s) IV Push every 6 hours    sodium chloride 0.9%. 1000 milliLiter(s) IV Continuous <Continuous>      chlorhexidine 2% Cloths 1 Application(s) Topical daily  nystatin Powder 1 Application(s) Topical two times a day        Mode: CPAP with PS  FiO2: 40  PEEP: 6  PS: 6  MAP: 10      ICU Vital Signs Last 24 Hrs  T(C): 37.3 (07 Sep 2024 01:45), Max: 37.9 (06 Sep 2024 10:00)  T(F): 99.1 (07 Sep 2024 01:45), Max: 100.2 (06 Sep 2024 10:00)  HR: 108 (07 Sep 2024 01:45) (68 - 131)  BP: 122/87 (07 Sep 2024 01:45) (74/48 - 142/94)  BP(mean): 98 (07 Sep 2024 01:45) (57 - 113)  ABP: 116/108 (07 Sep 2024 01:15) (69/64 - 146/59)  ABP(mean): 111 (07 Sep 2024 01:15) (60 - 111)  RR: 16 (07 Sep 2024 01:45) (14 - 29)  SpO2: 100% (07 Sep 2024 01:45) (94% - 100%)    O2 Parameters below as of 07 Sep 2024 00:00  Patient On (Oxygen Delivery Method): room air            ABG - ( 06 Sep 2024 04:11 )  pH, Arterial: 7.350 pH, Blood: x     /  pCO2: 29    /  pO2: 113   / HCO3: 16    / Base Excess: -9.6  /  SaO2: 99.4                I&O's Detail    05 Sep 2024 07:01  -  06 Sep 2024 07:00  --------------------------------------------------------  IN:    IV PiggyBack: 50 mL    Lactated Ringers Bolus: 1000 mL    Norepinephrine: 81.7 mL    Propofol: 22.6 mL    Sodium Bicarbonate: 500 mL  Total IN: 1654.3 mL    OUT:    Indwelling Catheter - Urethral (mL): 1900 mL    Nephrostomy Tube (mL): 150 mL  Total OUT: 2050 mL    Total NET: -395.7 mL      06 Sep 2024 07:01  -  07 Sep 2024 01:55  --------------------------------------------------------  IN:    IV PiggyBack: 200 mL    Norepinephrine: 186.6 mL    Oral Fluid: 110 mL    Sodium Bicarbonate: 500 mL    sodium chloride 0.9%: 1300 mL  Total IN: 2296.6 mL    OUT:    Indwelling Catheter - Urethral (mL): 1150 mL    Nephrostomy Tube (mL): 610 mL  Total OUT: 1760 mL    Total NET: 536.6 mL            LABS:                        13.0   32.79 )-----------( 161      ( 06 Sep 2024 20:00 )             37.4     09-06    135  |  99  |  31.0<H>  ----------------------------<  88  3.8   |  18.0<L>  |  1.52<H>    Ca    8.1<L>      06 Sep 2024 20:00  Phos  3.7     09-06  Mg     1.8     09-06    TPro  5.0<L>  /  Alb  2.3<L>  /  TBili  0.5  /  DBili  x   /  AST  26  /  ALT  27  /  AlkPhos  77  09-06          CAPILLARY BLOOD GLUCOSE      POCT Blood Glucose.: 88 mg/dL (06 Sep 2024 17:33)    PT/INR - ( 06 Sep 2024 12:10 )   PT: 18.8 sec;   INR: 1.72 ratio         PTT - ( 06 Sep 2024 02:20 )  PTT:34.1 sec  Urinalysis Basic - ( 06 Sep 2024 20:00 )    Color: x / Appearance: x / SG: x / pH: x  Gluc: 88 mg/dL / Ketone: x  / Bili: x / Urobili: x   Blood: x / Protein: x / Nitrite: x   Leuk Esterase: x / RBC: x / WBC x   Sq Epi: x / Non Sq Epi: x / Bacteria: x      CULTURES:  Culture Results:   Growth in aerobic and anaerobic bottles: Gram Negative Rods (09-05-24 @ 14:30)  Culture Results:   Growth in aerobic and anaerobic bottles: Klebsiella pneumoniae  Direct identification is available within approximately 3-5  hours either by Blood Panel Multiplexed PCR or Direct  MALDI-TOF. Details: [email or web address] (09-05-24 @ 14:15)        Physical Examination:    General: occasional agitation and thrashing in bed    HEENT: Pupils equal, reactive to light.  Symmetric. (+)NGT    PULM: Clear to auscultation bilaterally, no significant sputum production    CVS: tachycardic, irreg irreg rhythm, no murmurs, rubs, or gallops    ABD: Soft, nondistended, nontender, normoactive bowel sounds, no masses    EXT: bilateral lower extremity edema, nontender    SKIN: Warm and well perfused, no rashes noted.    NEURO: confused, delirious, moves all extremities        RADIOLOGY:     < from: CT Abdomen and Pelvis No Cont (09.05.24 @ 16:26) >  FINDINGS:  CHEST:  LUNGS AND LARGE AIRWAYS: Limited evaluation of the pulmonary parenchyma   due to respiratory motion artifacts obscuring detail. Patent central   airways. Bilateral interlobular septal thickening more prominent in the   lower lobes suggesting pulmonary venous congestion. Bilateral lower lobe   groundglass and dependent patchy opacities consistent with dependent   atelectasis, underlying right lower lobe pneumonia is not excluded.  PLEURA: No pleural effusion.  VESSELS: Atherosclerotic calcifications of the thoracic aorta and aortic   arch branches.  HEART: Cardiomegaly. Aortic valve and severe coronary artery   calcifications. No pericardial effusion.  MEDIASTINUM AND KONRAD: Again noted is calcified mediastinal and right   hilar lymphadenopathy and calcified lung nodules compatible with old   granulomatous disease.  CHEST WALL AND LOWER NECK: 2.3 cm low-density left thyroid nodule.    ABDOMEN AND PELVIS:  LIVER: Numerous punctate calcified nodules compatible with calcified   granulomas.  BILE DUCTS: Normal caliber.  GALLBLADDER: Probable sludge. Gallstones not convincingly seen, but   difficult to exclude due to motion artifacts.  SPLEEN: Numerous punctate calcified nodules consistent with calcified   granulomas.  PANCREAS: Pancreatic fatty atrophy.  ADRENALS: Within normal limits.  KIDNEYS/URETERS: Nonobstructing right intrarenal calculi measuring up to   5 mm. Mild right hydroureteronephrosis extending to the right UVJ where   there is a 7 mm right UVJ calculus. Punctate nonobstructing left upper   pole renal calculus. Dilated left extrarenal pelvis but no left   hydronephrosis.    BLADDER: Multiple urinary bladder calculi. Small left posterolateral   urinary bladder diverticulum. Air bubble within the urinary bladder   without a Restrepo catheter.  REPRODUCTIVE ORGANS: Prostatomegaly.    BOWEL: Limited evaluation due to respiratory motion artifacts. No bowel   obstruction. Appendix is normal. Distended stool filled rectum measuring   6.4 cm in diameter consistent with fecal rectal impaction.  PERITONEUM/RETROPERITONEUM: Within normal limits.  VESSELS: Severe diffuse coarse atherosclerotic calcifications of the   abdominal aorta, abdominal aortic branches, bilateral iliac and femoral   arteries with scattered areas of severe stenosis and focal areas of   probable short segment occlusions in bilateral femoral arteries,. Severe   stenosis of the left main renal artery, SMA, celiac axis origin.  LYMPH NODES: Punctate calcified right upper quadrant lymph nodes.  ABDOMINAL WALL: Small fat-containing umbilical hernia. Diffuse   pelvic/gluteal/upper thighs muscle fatty atrophy.  BONES: Severe degenerative changes throughout the spine. Lumbar   scoliosis. Advanced osteoarthritis of the pubic symphysis, SI joints, hip   joints.    IMPRESSION:  Bilateral interlobular septal thickening more prominent in the lower   lobes suggesting pulmonary venous congestion.    Bilateral lower lobe dependent atelectasis, underlying right lower lobe   infiltrates not excluded.    Distended stool filled rectum compatible with fecal rectal impaction.    Mild right hydroureteronephrosis resulting from a 7 mm stone in the right   UVJ. Urinary bladder calculi.    Air within the urinary bladder lumen without a Restrepo catheter,   correlation with history of recent bladder catheterization and urinalysis   is recommended in order to exclude emphysematous cystitis.    Prostatomegaly. Correlation with rectal exam and PSA level is recommended.    Evidence of old granulomatous disease.    < end of copied text >          CRITICAL CARE TIME SPENT: 42 mins  Time spent evaluating/treating patient with medical issues that pose a high risk for life threatening deterioration and/or end-organ damage, reviewing data/labs/imaging, discussing case with multidisciplinary team, discussing plan/goals of care with patient/family. Non-inclusive of procedure time. The date of entry of this note reflects the date of services rendered.

## 2024-09-07 NOTE — PROGRESS NOTE ADULT - ASSESSMENT
78 y/o M with a h/o HTN, HFpEF, PE (on apixaban), Myasthenia Gravis, chronic LE edema, known 7 mm distal ureteral calculus with mild right hydroureteronephrosis, with:    # Septic shock  # Klebsiella pneumoniae bacteremia  # Obstructive right ureteral calculi with hydronephrosis, s/p PCN  # Hematuria  # CATINA  # Metabolic encephalopathy  # New onset AF with RVR    - actively titrating norepinephrine infusion to maintain a MAP > 65  - add midodrine 10mg TID via NGT to help offload infusion requirement  - continue stress dose hydrocortisone  - TTE reviewed, preserved biventricular function  - continue ceftriaxone, organism sensitivities are pending  - new onset(?) rapid AF, captured on ECG, likely sepsis induced  - consider trial of heparin infusion later today depending on hematuria  - repeat H/H stable  - CATINA likely pre-renal/ischemic ATN, optimize end-organ perfusion as above  - trend BUN/Cr, electrolytes, acid-base balance, monitor hourly UOP (nonoliguric)  - no indication for urgent RRT at this time  - CT brain negative for acute pathology    Case discussed with MICU physician, Dr. Hein.   Pt notes that all of those ear drops are very expensive and can not afford any of them. Pt states that she still has leaking our of both ears the L moreso than the R without any ear pain. Please advise

## 2024-09-07 NOTE — PROGRESS NOTE ADULT - ASSESSMENT
78 y/o M with a h/o HTN, HFpEF, PE (on apixaban), Myasthenia Gravis, chronic LE edema, known 7 mm distal ureteral calculus with mild right hydroureteronephrosis, initially presented to the [place] ED on 9/5 with shortness of breath, urinary retention and confusion after failing to follow up outpatient for ureteral calculus. CT A/P revealed R hydro from 7 mm stone in UVJ. Underwent emergent cystoscopy with plan for ureteral stenting however procedure was aborted due to hematuria and inability to visualize right ureteral opening. Hospital course complicated by klebsiella pneumoniae bacteremia and septic shock requiring IV vasopressor support. Patient underwent emergent percutaneous nephrostomy tube placement. Now deemed stable for transfer to medicine for further management.    #Septic shock secondary to klebsiella bacteremia and Ureteral calculus  Patient was seen by Urology at [place] and underwent Cystoscopy there  Seen by IR and had nephrostomy tube placed  Blood cx 9/5 shows Klebseilla  UCX with GNR  Abdominal fluid cx with Klebseilla  ID was called by ICU  Repeat Blood cx ordered  Midodrine  Stress dose steroids was ordered   C/w Ceftriaxone 2 g daily  C/w FLomax    #Dysphagia  Failed swallow evaluation  Swallow following  NGT was placed    #A. fib  Newly diagnosed  TTE reviewed  On eliquis for PE - Will restart in AM if no episodes of hematuria    #ICU delirium  Started on Seroqel in ICU    #gerd  Protonix    DVT prophylaxis: Lovenox 80 y/o M with a h/o HTN, HFpEF, PE (on apixaban), Myasthenia Gravis, chronic LE edema, known 7 mm distal ureteral calculus with mild right hydroureteronephrosis, initially presented to the [place] ED on 9/5 with shortness of breath, urinary retention and confusion after failing to follow up outpatient for ureteral calculus. CT A/P revealed R hydro from 7 mm stone in UVJ. Underwent emergent cystoscopy with plan for ureteral stenting however procedure was aborted due to hematuria and inability to visualize right ureteral opening. Hospital course complicated by klebsiella pneumoniae bacteremia and septic shock requiring IV vasopressor support. Patient underwent emergent percutaneous nephrostomy tube placement. Now deemed stable for transfer to medicine for further management.    #Septic shock secondary to klebsiella bacteremia and Ureteral calculus  Patient was seen by Urology at [place] and underwent Cystoscopy there  Seen by IR and had nephrostomy tube placed  Blood cx 9/5 shows Klebseilla  UCX with GNR  Abdominal fluid cx with Klebseilla  ID was called by ICU  Repeat Blood cx ordered  Midodrine  Stress dose steroids was ordered   C/w Ceftriaxone 2 g daily  C/w FLomax    #Dysphagia  Failed swallow evaluation  Swallow following  NGT was placed    #A. fib  Newly diagnosed  TTE reviewed  On eliquis for PE - Will restart in AM if no episodes of hematuria    #ICU/Hospital acquired delirium  Started on Seroqel in ICU    #gerd  Protonix    DVT prophylaxis: Lovenox    Dispo: Pending improvement in mentation, resolution of bacteremia

## 2024-09-07 NOTE — CHART NOTE - NSCHARTNOTEFT_GEN_A_CORE
79yMale  PMHX:       Admitted for:  Overnight Events:    P/E:  -Neuro:  -Heart:  -Lungs:  -Abdomen:  -Extremities:    Vent Settings:   24 IOs:   IN: 2896.6 mL / OUT: 2010 mL / NET: 886.6 mL      Labs:                   Cultures:   Culture - Body Fluid with Gram Stain (collected 09-06)      Medications: :  Diet:   Last BM:  DVT PPx:  Stress Ulcer PPx 79yMale  PMHX:       Admitted for:  Overnight Events:    P/E:  -Neuro:  -Heart:  -Lungs:  -Abdomen:  -Extremities:    Vent Settings:   24 IOs:   IN: 2896.6 mL / OUT: 2010 mL / NET: 886.6 mL      Labs:                   Cultures:   Culture - Body Fluid with Gram Stain (collected 09-06)      Medications: :  Diet:   Last BM: unkown  DVT PPx:  Stress Ulcer PPx MICU DOWN GRADE NOTE    BRIEF HOSPITAL COURSE: 78 y/o M with a h/o HTN, HFpEF, PE (on apixaban), Myasthenia Gravis, chronic LE edema, known 7 mm distal ureteral calculus with mild right hydroureteronephrosis, initially presented to the [place] ED on 9/5 with shortness of breath, urinary retention and confusion after failing to follow up outpatient for ureteral calculus. CT A/P revealed R hydro from 7 mm stone in UVJ. Underwent emergent cystoscopy with plan for ureteral stenting however procedure was aborted due to hematuria and inability to visualize right ureteral opening. Hospital course complicated by klebsiella pneumoniae bacteremia and septic shock requiring IV vasopressor support. Subsequently transferred to [facility] on 9/5 for emergent percutaneous nephrostomy.    INTERVAL HPI/OVERNIGHT EVENTS:  Patient had episode of new onset A.fib with heart rate to ... currently in normal sinus rhythm  pressors were stopped last night at 8PM      REVIEW OF SYSTEMS:  CONSTITUTIONAL: No fever, chills  HEENT:  No blurry vision No sinus or throat pain  NECK: No pain or stiffness  RESPIRATORY: No cough, wheezing, chills or hemoptysis; No shortness of breath  CARDIOVASCULAR: No chest pain, palpitations  GASTROINTESTINAL: No abdominal pain. No nausea, vomiting, or diarrhea  GENITOURINARY: No dysuria  NEUROLOGICAL: No HA, No focal weakness  SKIN: No itching, burning, rashes, or lesions   MUSCULOSKELETAL: No joint pain or swelling; No muscle, back, or extremity pain    MEDICATIONS:  cefTRIAXone Injectable. 2000 milliGRAM(s) IV Push every 24 hours  chlorhexidine 2% Cloths 1 Application(s) Topical daily  enoxaparin Injectable 40 milliGRAM(s) SubCutaneous every 24 hours  hydrocortisone sodium succinate Injectable 50 milliGRAM(s) IV Push every 6 hours  midodrine 10 milliGRAM(s) Oral every 8 hours  nystatin Powder 1 Application(s) Topical two times a day  pantoprazole  Injectable 40 milliGRAM(s) IV Push daily  QUEtiapine 25 milliGRAM(s) Oral at bedtime  tamsulosin 0.4 milliGRAM(s) Oral at bedtime      T(C): 36.7 (09-07-24 @ 13:00), Max: 37.9 (09-06-24 @ 16:30)  HR: 77 (09-07-24 @ 13:00) (77 - 131)  BP: 131/96 (09-07-24 @ 13:00) (80/62 - 142/94)  RR: 28 (09-07-24 @ 13:00) (12 - 38)  SpO2: 100% (09-07-24 @ 13:00) (97% - 100%)  Wt(kg): --Vital Signs Last 24 Hrs  T(C): 36.7 (07 Sep 2024 13:00), Max: 37.9 (06 Sep 2024 16:30)  T(F): 98.1 (07 Sep 2024 13:00), Max: 100.2 (06 Sep 2024 16:30)  HR: 77 (07 Sep 2024 13:00) (77 - 131)  BP: 131/96 (07 Sep 2024 13:00) (80/62 - 142/94)  BP(mean): 105 (07 Sep 2024 13:00) (62 - 114)  RR: 28 (07 Sep 2024 13:00) (12 - 38)  SpO2: 100% (07 Sep 2024 13:00) (97% - 100%)    Parameters below as of 07 Sep 2024 12:00  Patient On (Oxygen Delivery Method): room air        PHYSICAL EXAM:  GENERAL: NAD, well-groomed, well-developed  HEAD:  Atraumatic, Normocephalic  EYES: EOMI, PERRLA, conjunctiva and sclera clear  ENMT:  Moist mucous membranes  NECK: Supple, No JVD,  CHEST/LUNG: Clear to auscultation bilaterally; No rales, rhonchi, wheezing, or rubs  HEART: Regular rate and rhythm; No murmurs, rubs, or gallops  ABDOMEN: Soft, Nontender, Nondistended; Bowel sounds present  NEURO: Alert & Oriented X3  EXTREMITIES: No LE edema, no calf tenderness  LYMPH: No lymphadenopathy noted  SKIN: No rashes or lesions    Consultant(s) Notes Reviewed:  [x ] YES  [ ] NO  Care Discussed with Consultants/Other Providers [ x] YES  [ ] NO    LABS:                        11.9   25.98 )-----------( 130      ( 07 Sep 2024 03:18 )             35.3     09-07    139  |  103  |  30.7<H>  ----------------------------<  78  3.8   |  21.0<L>  |  1.59<H>    Ca    7.7<L>      07 Sep 2024 03:18  Phos  3.9     09-07  Mg     1.9     09-07    TPro  4.5<L>  /  Alb  2.0<L>  /  TBili  0.4  /  DBili  x   /  AST  22  /  ALT  23  /  AlkPhos  72  09-07    PT/INR - ( 07 Sep 2024 03:18 )   PT: 17.1 sec;   INR: 1.56 ratio         PTT - ( 06 Sep 2024 02:20 )  PTT:34.1 sec  Urinalysis Basic - ( 07 Sep 2024 03:18 )    Color: x / Appearance: x / SG: x / pH: x  Gluc: 78 mg/dL / Ketone: x  / Bili: x / Urobili: x   Blood: x / Protein: x / Nitrite: x   Leuk Esterase: x / RBC: x / WBC x   Sq Epi: x / Non Sq Epi: x / Bacteria: x      CAPILLARY BLOOD GLUCOSE      POCT Blood Glucose.: 142 mg/dL (07 Sep 2024 12:12)  POCT Blood Glucose.: 74 mg/dL (07 Sep 2024 11:40)  POCT Blood Glucose.: 67 mg/dL (07 Sep 2024 11:40)  POCT Blood Glucose.: 65 mg/dL (07 Sep 2024 11:39)  POCT Blood Glucose.: 88 mg/dL (06 Sep 2024 17:33)      ABG - ( 06 Sep 2024 04:11 )  pH, Arterial: 7.350 pH, Blood: x     /  pCO2: 29    /  pO2: 113   / HCO3: 16    / Base Excess: -9.6  /  SaO2: 99.4              Urinalysis Basic - ( 07 Sep 2024 03:18 )    Color: x / Appearance: x / SG: x / pH: x  Gluc: 78 mg/dL / Ketone: x  / Bili: x / Urobili: x   Blood: x / Protein: x / Nitrite: x   Leuk Esterase: x / RBC: x / WBC x   Sq Epi: x / Non Sq Epi: x / Bacteria: x        RADIOLOGY & ADDITIONAL TESTS:    Imaging Personally Reviewed:  [x ] YES  [ ] NO    A+P    -Off of levophed infusion  -On midodrine  -On prednisone at home for myasthenia gravis continue solu-cortef stress dose steroids  -CATINA likely pre-renal/ischemic ATN, optimize end-organ perfusion as above  -trend BUN/cr, electrolytes, acid-base balance monitor UOP  -CT brain negative for acute pathology  -New onset rapid afib captured on ECG likely spesis induced

## 2024-09-07 NOTE — PROVIDER CONTACT NOTE (CRITICAL VALUE NOTIFICATION) - TEST AND RESULT REPORTED:
body fluid specimen positive klebsiella pneumoniae
blood culture: aerobic/Anaerobic Gram negative rods (preliminary drawn 9/5)

## 2024-09-07 NOTE — PROGRESS NOTE ADULT - SUBJECTIVE AND OBJECTIVE BOX
Hospitalist Daily Progress Note    Chief Complaint:  Patient is a 79y old  Male who presents with a chief complaint of     SUBJECTIVE / OVERNIGHT EVENTS:  Patient was seen and examined at bedside.   Complains of pain everywhere  No other complaints.   All remainder ROS negative.     MEDICATIONS  (STANDING):  cefTRIAXone Injectable. 2000 milliGRAM(s) IV Push every 24 hours  chlorhexidine 2% Cloths 1 Application(s) Topical daily  enoxaparin Injectable 40 milliGRAM(s) SubCutaneous every 24 hours  hydrocortisone sodium succinate Injectable 50 milliGRAM(s) IV Push every 6 hours  midodrine 10 milliGRAM(s) Oral every 8 hours  nystatin Powder 1 Application(s) Topical two times a day  pantoprazole  Injectable 40 milliGRAM(s) IV Push daily  QUEtiapine 25 milliGRAM(s) Oral at bedtime  tamsulosin 0.4 milliGRAM(s) Oral at bedtime    MEDICATIONS  (PRN):        I&O's Summary    06 Sep 2024 07:01  -  07 Sep 2024 07:00  --------------------------------------------------------  IN: 2896.6 mL / OUT: 2010 mL / NET: 886.6 mL    07 Sep 2024 07:01  -  07 Sep 2024 17:15  --------------------------------------------------------  IN: 460 mL / OUT: 375 mL / NET: 85 mL        PHYSICAL EXAM:  Vital Signs Last 24 Hrs  T(C): 36.9 (07 Sep 2024 17:00), Max: 37.9 (06 Sep 2024 19:15)  T(F): 98.4 (07 Sep 2024 17:00), Max: 100.2 (06 Sep 2024 19:15)  HR: 88 (07 Sep 2024 17:00) (74 - 131)  BP: 119/81 (07 Sep 2024 17:00) (91/58 - 142/94)  BP(mean): 92 (07 Sep 2024 17:00) (62 - 114)  RR: 18 (07 Sep 2024 17:00) (12 - 38)  SpO2: 100% (07 Sep 2024 17:00) (97% - 100%)    Parameters below as of 07 Sep 2024 16:00  Patient On (Oxygen Delivery Method): room air      Constitutional: NAD, Resting, NGT  ENT: Supple, No JVD  Lungs: CTA B/L, Non-labored breathing  Cardio: RRR, S1/S2, No murmur  Abdomen: Soft right sided nephrostomy tube, Restrepo in place  Extremities: No calf tenderness, No pitting edema  Musculoskeletal:   No joint swelling  Psych: Calm, cooperative affect appropriate  Neuro: Awake and alert, oriented to name, hospital, delirous  Skin: No rashes; no palpable lesions    LABS:                        11.9   25.98 )-----------( 130      ( 07 Sep 2024 03:18 )             35.3     09-07    139  |  103  |  30.7<H>  ----------------------------<  78  3.8   |  21.0<L>  |  1.59<H>    Ca    7.7<L>      07 Sep 2024 03:18  Phos  3.9     09-07  Mg     1.9     09-07    TPro  4.5<L>  /  Alb  2.0<L>  /  TBili  0.4  /  DBili  x   /  AST  22  /  ALT  23  /  AlkPhos  72  09-07    PT/INR - ( 07 Sep 2024 03:18 )   PT: 17.1 sec;   INR: 1.56 ratio         PTT - ( 06 Sep 2024 02:20 )  PTT:34.1 sec      Urinalysis Basic - ( 07 Sep 2024 03:18 )    Color: x / Appearance: x / SG: x / pH: x  Gluc: 78 mg/dL / Ketone: x  / Bili: x / Urobili: x   Blood: x / Protein: x / Nitrite: x   Leuk Esterase: x / RBC: x / WBC x   Sq Epi: x / Non Sq Epi: x / Bacteria: x        Culture - Body Fluid with Gram Stain (collected 06 Sep 2024 08:45)  Source: Abdominal Fl Abdominal Fluid  Gram Stain (07 Sep 2024 15:37):    Rare polymorphonuclear leukocytes per low power field    No organisms seen per oil power field  Preliminary Report (07 Sep 2024 15:37):    Moderate Klebsiella pneumoniae    Urinalysis with Rflx Culture (collected 05 Sep 2024 15:20)    Culture - Urine (collected 05 Sep 2024 15:20)  Source: Clean Catch Clean Catch (Midstream)  Preliminary Report (07 Sep 2024 11:02):    >100,000 CFU/ml Gram Negative Rods    Culture - Blood (collected 05 Sep 2024 14:30)  Source: .Blood Blood-Peripheral  Gram Stain (06 Sep 2024 08:30):    Growth in aerobic and anaerobic bottles: Gram Negative Rods  Preliminary Report (07 Sep 2024 11:19):    Growth in aerobic and anaerobic bottles: Klebsiella pneumoniae    See previous culture  [id number]    Culture - Blood (collected 05 Sep 2024 14:15)  Source: .Blood Blood-Peripheral  Gram Stain (06 Sep 2024 07:52):    Growth in aerobic bottle: Gram Negative Rods    Growth in anaerobic bottle: Gram Negative Rods  Final Report (07 Sep 2024 12:17):    Growth in aerobic and anaerobic bottles: Klebsiella pneumoniae    Direct identification is available within approximately 3-5    hours either by Blood Panel Multiplexed PCR or Direct    MALDI-TOF. Details: [email or web address]  Organism: Blood Culture PCR  Klebsiella pneumoniae (07 Sep 2024 12:17)  Organism: Klebsiella pneumoniae (07 Sep 2024 12:17)  Organism: Blood Culture PCR (07 Sep 2024 12:17)      CAPILLARY BLOOD GLUCOSE      POCT Blood Glucose.: 80 mg/dL (07 Sep 2024 16:08)  POCT Blood Glucose.: 142 mg/dL (07 Sep 2024 12:12)  POCT Blood Glucose.: 74 mg/dL (07 Sep 2024 11:40)  POCT Blood Glucose.: 67 mg/dL (07 Sep 2024 11:40)  POCT Blood Glucose.: 65 mg/dL (07 Sep 2024 11:39)  POCT Blood Glucose.: 88 mg/dL (06 Sep 2024 17:33)        RADIOLOGY REVIEWED

## 2024-09-07 NOTE — PROVIDER CONTACT NOTE (HYPOGLYCEMIA EVENT) - NS PROVIDER CONTACT BACKGROUND-HYPO
Age: 79y    Gender: Male    POCT Blood Glucose:  74 mg/dL (09-07-24 @ 11:40)  67 mg/dL (09-07-24 @ 11:40)  65 mg/dL (09-07-24 @ 11:39)  88 mg/dL (09-06-24 @ 17:33)  122 mg/dL (09-06-24 @ 12:15)      eMAR:  hydrocortisone sodium succinate Injectable   50 milliGRAM(s) IV Push (09-07-24 @ 11:34)   50 milliGRAM(s) IV Push (09-07-24 @ 01:29)   50 milliGRAM(s) IV Push (09-06-24 @ 21:23)   50 milliGRAM(s) IV Push (09-06-24 @ 13:03)

## 2024-09-08 DIAGNOSIS — I48.91 UNSPECIFIED ATRIAL FIBRILLATION: ICD-10-CM

## 2024-09-08 LAB
-  AMOXICILLIN/CLAVULANIC ACID: SIGNIFICANT CHANGE UP
-  AMOXICILLIN/CLAVULANIC ACID: SIGNIFICANT CHANGE UP
-  AMPICILLIN/SULBACTAM: SIGNIFICANT CHANGE UP
-  AMPICILLIN/SULBACTAM: SIGNIFICANT CHANGE UP
-  AMPICILLIN: SIGNIFICANT CHANGE UP
-  AMPICILLIN: SIGNIFICANT CHANGE UP
-  AZTREONAM: SIGNIFICANT CHANGE UP
-  AZTREONAM: SIGNIFICANT CHANGE UP
-  CEFAZOLIN: SIGNIFICANT CHANGE UP
-  CEFAZOLIN: SIGNIFICANT CHANGE UP
-  CEFEPIME: SIGNIFICANT CHANGE UP
-  CEFEPIME: SIGNIFICANT CHANGE UP
-  CEFOXITIN: SIGNIFICANT CHANGE UP
-  CEFOXITIN: SIGNIFICANT CHANGE UP
-  CEFTRIAXONE: SIGNIFICANT CHANGE UP
-  CEFTRIAXONE: SIGNIFICANT CHANGE UP
-  CIPROFLOXACIN: SIGNIFICANT CHANGE UP
-  CIPROFLOXACIN: SIGNIFICANT CHANGE UP
-  ERTAPENEM: SIGNIFICANT CHANGE UP
-  ERTAPENEM: SIGNIFICANT CHANGE UP
-  GENTAMICIN: SIGNIFICANT CHANGE UP
-  GENTAMICIN: SIGNIFICANT CHANGE UP
-  IMIPENEM: SIGNIFICANT CHANGE UP
-  IMIPENEM: SIGNIFICANT CHANGE UP
-  LEVOFLOXACIN: SIGNIFICANT CHANGE UP
-  LEVOFLOXACIN: SIGNIFICANT CHANGE UP
-  MEROPENEM: SIGNIFICANT CHANGE UP
-  MEROPENEM: SIGNIFICANT CHANGE UP
-  PIPERACILLIN/TAZOBACTAM: SIGNIFICANT CHANGE UP
-  PIPERACILLIN/TAZOBACTAM: SIGNIFICANT CHANGE UP
-  TOBRAMYCIN: SIGNIFICANT CHANGE UP
-  TOBRAMYCIN: SIGNIFICANT CHANGE UP
-  TRIMETHOPRIM/SULFAMETHOXAZOLE: SIGNIFICANT CHANGE UP
-  TRIMETHOPRIM/SULFAMETHOXAZOLE: SIGNIFICANT CHANGE UP
ALBUMIN SERPL ELPH-MCNC: 2.3 G/DL — LOW (ref 3.3–5.2)
ALP SERPL-CCNC: 118 U/L — SIGNIFICANT CHANGE UP (ref 40–120)
ALT FLD-CCNC: 25 U/L — SIGNIFICANT CHANGE UP
ANION GAP SERPL CALC-SCNC: 17 MMOL/L — SIGNIFICANT CHANGE UP (ref 5–17)
AST SERPL-CCNC: 20 U/L — SIGNIFICANT CHANGE UP
BILIRUB SERPL-MCNC: 0.3 MG/DL — LOW (ref 0.4–2)
BUN SERPL-MCNC: 37.1 MG/DL — HIGH (ref 8–20)
CALCIUM SERPL-MCNC: 8.3 MG/DL — LOW (ref 8.4–10.5)
CHLORIDE SERPL-SCNC: 101 MMOL/L — SIGNIFICANT CHANGE UP (ref 96–108)
CO2 SERPL-SCNC: 21 MMOL/L — LOW (ref 22–29)
CREAT SERPL-MCNC: 1.35 MG/DL — HIGH (ref 0.5–1.3)
CULTURE RESULTS: ABNORMAL
EGFR: 53 ML/MIN/1.73M2 — LOW
GLUCOSE BLDC GLUCOMTR-MCNC: 160 MG/DL — HIGH (ref 70–99)
GLUCOSE BLDC GLUCOMTR-MCNC: 168 MG/DL — HIGH (ref 70–99)
GLUCOSE SERPL-MCNC: 140 MG/DL — HIGH (ref 70–99)
HCT VFR BLD CALC: 36.6 % — LOW (ref 39–50)
HGB BLD-MCNC: 12.5 G/DL — LOW (ref 13–17)
MAGNESIUM SERPL-MCNC: 2.4 MG/DL — SIGNIFICANT CHANGE UP (ref 1.6–2.6)
MCHC RBC-ENTMCNC: 31 PG — SIGNIFICANT CHANGE UP (ref 27–34)
MCHC RBC-ENTMCNC: 34.2 GM/DL — SIGNIFICANT CHANGE UP (ref 32–36)
MCV RBC AUTO: 90.8 FL — SIGNIFICANT CHANGE UP (ref 80–100)
METHOD TYPE: SIGNIFICANT CHANGE UP
METHOD TYPE: SIGNIFICANT CHANGE UP
PHOSPHATE SERPL-MCNC: 2.7 MG/DL — SIGNIFICANT CHANGE UP (ref 2.4–4.7)
PLATELET # BLD AUTO: 101 K/UL — LOW (ref 150–400)
POTASSIUM SERPL-MCNC: 4 MMOL/L — SIGNIFICANT CHANGE UP (ref 3.5–5.3)
POTASSIUM SERPL-SCNC: 4 MMOL/L — SIGNIFICANT CHANGE UP (ref 3.5–5.3)
PROT SERPL-MCNC: 5.1 G/DL — LOW (ref 6.6–8.7)
RBC # BLD: 4.03 M/UL — LOW (ref 4.2–5.8)
RBC # FLD: 16.3 % — HIGH (ref 10.3–14.5)
SODIUM SERPL-SCNC: 139 MMOL/L — SIGNIFICANT CHANGE UP (ref 135–145)
SPECIMEN SOURCE: SIGNIFICANT CHANGE UP
WBC # BLD: 24.29 K/UL — HIGH (ref 3.8–10.5)
WBC # FLD AUTO: 24.29 K/UL — HIGH (ref 3.8–10.5)

## 2024-09-08 PROCEDURE — 99223 1ST HOSP IP/OBS HIGH 75: CPT

## 2024-09-08 PROCEDURE — 99233 SBSQ HOSP IP/OBS HIGH 50: CPT

## 2024-09-08 PROCEDURE — 99222 1ST HOSP IP/OBS MODERATE 55: CPT

## 2024-09-08 RX ORDER — ENOXAPARIN SODIUM 100 MG/ML
95 INJECTION SUBCUTANEOUS EVERY 12 HOURS
Refills: 0 | Status: DISCONTINUED | OUTPATIENT
Start: 2024-09-08 | End: 2024-09-10

## 2024-09-08 RX ORDER — MIDODRINE HYDROCHLORIDE 5 MG/1
5 TABLET ORAL EVERY 8 HOURS
Refills: 0 | Status: DISCONTINUED | OUTPATIENT
Start: 2024-09-08 | End: 2024-09-08

## 2024-09-08 RX ORDER — MIDODRINE HYDROCHLORIDE 5 MG/1
5 TABLET ORAL EVERY 8 HOURS
Refills: 0 | Status: DISCONTINUED | OUTPATIENT
Start: 2024-09-08 | End: 2024-09-09

## 2024-09-08 RX ORDER — METOPROLOL TARTRATE 100 MG/1
12.5 TABLET ORAL EVERY 12 HOURS
Refills: 0 | Status: DISCONTINUED | OUTPATIENT
Start: 2024-09-08 | End: 2024-09-08

## 2024-09-08 RX ORDER — OLANZAPINE 7.5 MG/1
5 TABLET ORAL ONCE
Refills: 0 | Status: COMPLETED | OUTPATIENT
Start: 2024-09-08 | End: 2024-09-08

## 2024-09-08 RX ORDER — METOPROLOL TARTRATE 100 MG/1
12.5 TABLET ORAL EVERY 12 HOURS
Refills: 0 | Status: DISCONTINUED | OUTPATIENT
Start: 2024-09-08 | End: 2024-09-09

## 2024-09-08 RX ADMIN — Medication 1 APPLICATION(S): at 06:17

## 2024-09-08 RX ADMIN — HYDROCORTISONE 50 MILLIGRAM(S): 10 TABLET ORAL at 02:00

## 2024-09-08 RX ADMIN — CHLORHEXIDINE GLUCONATE 1 APPLICATION(S): 40 SOLUTION TOPICAL at 11:13

## 2024-09-08 RX ADMIN — MIDODRINE HYDROCHLORIDE 5 MILLIGRAM(S): 5 TABLET ORAL at 21:00

## 2024-09-08 RX ADMIN — Medication 2000 MILLIGRAM(S): at 11:05

## 2024-09-08 RX ADMIN — HYDROCORTISONE 50 MILLIGRAM(S): 10 TABLET ORAL at 13:26

## 2024-09-08 RX ADMIN — MIDODRINE HYDROCHLORIDE 5 MILLIGRAM(S): 5 TABLET ORAL at 13:24

## 2024-09-08 RX ADMIN — MIDODRINE HYDROCHLORIDE 10 MILLIGRAM(S): 5 TABLET ORAL at 06:18

## 2024-09-08 RX ADMIN — Medication 25 MILLIGRAM(S): at 21:00

## 2024-09-08 RX ADMIN — TAMSULOSIN HYDROCHLORIDE 0.4 MILLIGRAM(S): 0.4 CAPSULE ORAL at 21:00

## 2024-09-08 RX ADMIN — HYDROCORTISONE 50 MILLIGRAM(S): 10 TABLET ORAL at 20:58

## 2024-09-08 RX ADMIN — METOPROLOL TARTRATE 12.5 MILLIGRAM(S): 100 TABLET ORAL at 13:24

## 2024-09-08 RX ADMIN — Medication 1 APPLICATION(S): at 17:40

## 2024-09-08 RX ADMIN — ENOXAPARIN SODIUM 40 MILLIGRAM(S): 100 INJECTION SUBCUTANEOUS at 11:06

## 2024-09-08 RX ADMIN — HYDROCORTISONE 50 MILLIGRAM(S): 10 TABLET ORAL at 08:44

## 2024-09-08 RX ADMIN — Medication 40 MILLIGRAM(S): at 11:05

## 2024-09-08 NOTE — CONSULT NOTE ADULT - SUBJECTIVE AND OBJECTIVE BOX
INFECTIOUS DISEASES AND INTERNAL MEDICINE at [place]  =======================================================  Jasen Willis MD  Diplomates American Board of Internal Medicine and Infectious Diseases  Telephone [phone]  Fax            [phone]  =======================================================    DEION [id number]      HPI:  BRIEF HOSPITAL COURSE: 79 yo M with pmhx of HTN, CHF, PE, Myasthenia Gravis, and chronic LE edema, history of 7 mm distal ureteral calculus with mild right hydroureteronephrosis and pain presented to the ED with shortness of breath and confusion after failing to follow up outpt on ureteral calculus after prior evaluation at [facility].  Patient's son is at bedside he states he seems more confused and altered then baseline; has not been feeling well. Patient was complaining of nauseous. Also is retaining urine; usually urinates in a urinal. Decreased appetite; weak; chills. Patient hasn't seen cardiologist since March to follow up on his CHF. Labs significant for WBC 41.45, procal 36.3, lactate 7.9, Scr 2.6, BNP 65800, UA. CT CAP revealed R hydro from 7 mm stone in UVJ. Patient sent emergently with urology for stent placement.  Stent placement for 7 mm stone unsuccessful, procedure aborted, patient transferred to ICU for septic shock.   emergent IR for nephrostomy tube placement w   Now s/p PCN by IR.   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 2000 milliGRAM(s) IV Push every 24 hours      Allergies    levofloxacin (Unknown)  gatifloxacin (Unknown)  ofloxacin (Unknown)    Intolerances    Ketek (Other)  telithromycin (Other)  Avelox (Other)  fluoroquinolone antibiotics (Other)      SOCIAL HISTORY:     FAMILY HX   FAMILY HISTORY:  Family history of stroke (Father)  Father -  age 62    Family history of kidney disease (Mother)  Mother -  age 67    Family history of diabetes mellitus type II (Sibling)  Brother & Sister        Vital Signs Last 24 Hrs  T(C): 36.1 (08 Sep 2024 09:00), Max: 37.1 (08 Sep 2024 06:00)  T(F): 97 (08 Sep 2024 09:00), Max: 98.8 (08 Sep 2024 06:00)  HR: 131 (08 Sep 2024 09:00) (74 - 131)  BP: 155/94 (08 Sep 2024 09:00) (103/79 - 155/94)  BP(mean): 114 (08 Sep 2024 09:00) (87 - 118)  RR: 19 (08 Sep 2024 09:00) (6 - 29)  SpO2: 98% (08 Sep 2024 09:00) (97% - 100%)    Parameters below as of 08 Sep 2024 08:00  Patient On (Oxygen Delivery Method): room air      Drug Dosing Weight  Height (cm): 170.2 (06 Sep 2024 02:00)  Weight (kg): 94.1 (06 Sep 2024 02:00)  BMI (kg/m2): 32.5 (06 Sep 2024 02:00)  BSA (m2): 2.05 (06 Sep 2024 02:00)      REVIEW OF SYSTEMS:    CONSTITUTIONAL:  As per HPI.    HEENT:  Eyes:  No diplopia or blurred vision. ENT:  No earache, sore throat or runny nose.    CARDIOVASCULAR:  No pressure, squeezing, strangling, tightness, heaviness or aching about the chest, neck, axilla or epigastrium.    RESPIRATORY:  No cough, shortness of breath, PND or orthopnea.    GASTROINTESTINAL:  No nausea, vomiting or diarrhea.    GENITOURINARY:  No dysuria, frequency or urgency.    MUSCULOSKELETAL:  As per HPI.    SKIN:  No change in skin, hair or nails.    NEUROLOGIC:   weakness.                  PHYSICAL EXAMINATION:    GENERAL: The patient is a _____in no apparent distress. ___     VITAL SIGNS: T(C): 36.1 (24 @ 09:00), Max: 37.1 (24 @ 06:00)  HR: 131 (24 @ 09:00) (74 - 131)  BP: 155/94 (24 @ 09:00) (103/79 - 155/94)  RR: 19 (24 @ 09:00) (6 - 29)  SpO2: 98% (24 @ 09:00) (97% - 100%)  Wt(kg): --    HEENT: Head is normocephalic and atraumatic.  ANICTERIC  NECK: Supple. No carotid bruits.  No lymphadenopathy or thyromegaly.    LUNGS:COARSE BREATH SOUNDS    HEART: Regular rate and rhythm without murmur.    ABDOMEN: Soft, nontender, and nondistended.  Positive bowel sounds.  No hepatosplenomegaly was noted. NO REBOUND NO GUARDING RIGHT NEPHROSTOY TUBE  EXTREMITIES: NO EDEMA NO ERYTHEMA    NEUROLOGIC: NON FOCAL      SKIN: No ulceration or induration present. NO RASH        BLOOD CULTURES  Culture Results:   >100,000 CFU/ml Gram Negative Rods ( @ 16:15)  Culture Results:   Moderate Klebsiella pneumoniae  Few Klebsiella pneumoniae #2  Multiple Morphological Strains ( @ 08:45)  Culture Results:   >100,000 CFU/ml Gram Negative Rods ( @ 15:20)  Culture Results:   Growth in aerobic and anaerobic bottles: Klebsiella pneumoniae  See previous culture  [id number] ( @ 14:30)  Culture Results:   Growth in aerobic and anaerobic bottles: Klebsiella pneumoniae  Direct identification is available within approximately 3-5  hours either by Blood Panel Multiplexed PCR or Direct  MALDI-TOF. Details: [email or web address] ( @ 14:15)       URINE CX          LABS:                        12.5   24.29 )-----------( 101      ( 08 Sep 2024 06:50 )             36.6     09-08    139  |  101  |  37.1<H>  ----------------------------<  140<H>  4.0   |  21.0<L>  |  1.35<H>    Ca    8.3<L>      08 Sep 2024 06:50  Phos  2.7     -  Mg     2.4     -08    TPro  5.1<L>  /  Alb  2.3<L>  /  TBili  0.3<L>  /  DBili  x   /  AST  20  /  ALT  25  /  AlkPhos  118  -08    PT/INR - ( 07 Sep 2024 03:18 )   PT: 17.1 sec;   INR: 1.56 ratio           Urinalysis Basic - ( 08 Sep 2024 06:50 )    Color: x / Appearance: x / SG: x / pH: x  Gluc: 140 mg/dL / Ketone: x  / Bili: x / Urobili: x   Blood: x / Protein: x / Nitrite: x   Leuk Esterase: x / RBC: x / WBC x   Sq Epi: x / Non Sq Epi: x / Bacteria: x        RADIOLOGY & ADDITIONAL STUDIES:      ASSESSMENT/PLAN       79 yo M with pmhx of HTN, CHF, PE, Myasthenia Gravis, and chronic LE edema, history of 7 mm distal ureteral calculus with mild right hydroureteronephrosis and pain presented to the ED with shortness of breath and confusion after failing to follow up outpt on ureteral calculus after prior evaluation at [facility].  Patient's son is at bedside he states he seems more confused and altered then baseline; has not been feeling well. Patient was complaining of nauseous. Also is retaining urine; usually urinates in a urinal. Decreased appetite; weak; chills. Patient hasn't seen cardiologist since March to follow up on his CHF. Labs significant for WBC 41.45, procal 36.3, lactate 7.9, Scr 2.6, BNP 50096, UA. CT CAP revealed R hydro from 7 mm stone in UVJ. Patient sent emergently with urology for stent placement.  Stent placement for 7 mm stone unsuccessful, procedure aborted, patient transferred to ICU for septic shock.   emergent IR for nephrostomy tube placement w   Now s/p PCN by IR.   AS ABOVE PT WITH KLEBSIELLA BACTEREMIA WITH STONE S/P EMERGENT NEPHROSTOMY TUBE PLACEMENT  CURRENTLY IN ICU  OVERALL IMPROVED AWAKE ALERT  ON IV CEFTRIAXONE FOR KLEB BACTEREMIA  WILL CONTINUE FOR NOW  WBC DOWN TO 24.9  WILL RECOMMEND FOLLOWUP CBC  WILL FOLLOWUP WITH FURTHER RECOMMENDATIONS               UBALDO HACKETT MD

## 2024-09-08 NOTE — CONSULT NOTE ADULT - ASSESSMENT
79 yo M with pmhx of HTN, HLD, Obesity, CHF, PE, Myasthenia Gravis, and chronic LE edema, history of 7 mm distal ureteral calculus with mild right hydroureteronephrosis and pain presented to the ED with shortness of breath and confusion after failing to follow up outpt on ureteral calculus after prior evaluation at [facility].   Patient sent emergently with urology for stent placement but unsuccessful and procedure aborted due to hematuria and inability to visualize right ureteral opening.  Hospital course complicated by klebsiella pneumoniae bacteremia and septic shock requiring IV vasopressor support. Subsequently transferred to [facility] on 9/5 for emergent percutaneous nephrostomy. He is now extubated in the MICU at [facility]. Episode of Afib with RVR on 9/7 prior to transfer. Cardiology consulted now for further management of Afib

## 2024-09-08 NOTE — CONSULT NOTE ADULT - PROBLEM SELECTOR RECOMMENDATION 9
.  - Afib with RVR currently to 130s  - likely sepsis induced with klebsiella pneumonia and septic shock  - weaned off vasopressor support, now on midodrine 10mg via NGT q8h, would wean as tolerated as SBP >130  - can start metoprolol 25mg via NGT BID for HR control   - TTE with EF 55-60% no RWMA  - primary management of pneumonia per MICU team  - per OP noted, patient was on Eliquis 5mg PO BID for hx of PE in 2023. Can continue for Afib

## 2024-09-08 NOTE — CONSULT NOTE ADULT - TIME BILLING
This includes chart review, patient assessment, discussion with interdisciplinary team members following patient. Coordination of care with providers and care coordination. This excludes advanced care planning discussion.
Septic shock, Afib with RVR

## 2024-09-08 NOTE — CONSULT NOTE ADULT - SUBJECTIVE AND OBJECTIVE BOX
[facility]                                              CARDIOLOGY AT [place] [address]                                             Telephone: [phone]. Fax:[phone]                                                       CARDIOLOGY CONSULTATION NOTE                                                                                             History obtained by: Patient and medical record  Community Cardiologist: Dr. Marion   obtained: Yes [  ] No [ x ]  Reason for Consultation: Afib   Available out pt records reviewed: Yes [ x] No [  ]    Chief complaint:    Patient is a 79y old  Male who presents with a chief complaint of     HPI:   81 yo M with pmhx of HTN, HLD, Obesity, CHF, PE, Myasthenia Gravis, and chronic LE edema, history of 7 mm distal ureteral calculus with mild right hydroureteronephrosis and pain presented to the ED with shortness of breath and confusion after failing to follow up outpt on ureteral calculus after prior evaluation at [facility].   Decreased appetite, weakness and chills. Patient hasn't seen cardiologist since March to follow up on his CHF. Labs significant for WBC 41.45, procal 36.3, lactate 7.9, Scr 2.6, BNP 51369, UA. CT CAP revealed R hydro from 7 mm stone in UVJ. Patient sent emergently with urology for stent placement but unsuccessful and procedure aborted due to hematuria and inability to visualize right ureteral opening.  Hospital course complicated by klebsiella pneumoniae bacteremia and septic shock requiring IV vasopressor support. Subsequently transferred to [facility] on  for emergent percutaneous nephrostomy. He is now extubated in the MICU at [facility]. Episode of Afib with RVR on  prior to transfer. Cardiology consultednow for further management. Patient is poor historian, Due to current medical condition.  History was obtained, to the extent possible, from review of the chart and collateral sources of information.      CARDIAC TESTING   ECHO:   < from: TTE W or WO Ultrasound Enhancing Agent (24 @ 10:03) >    TRANSTHORACIC ECHOCARDIOGRAM REPORT  ________________________________________________________________________________                                      _______       Pt. Name:       DEION HEATH Study Date:    2024  MRN:            [id number]   YOB: 1945  Accession #:    [id number]    Age:           79 years  Account#:       [id number]   Gender:        M  Heart Rate:                  Height:        66.93 in (170.00 cm)  Rhythm:                      Weight:        207.23 lb (94.00 kg)  Blood Pressure: 120/92 mmHg  BSA/BMI:       2.05 m² / 32.53 kg/m²  ________________________________________________________________________________________  Referring Physician:    [id number] Art Granados  Interpreting Physician: Carlin Scott MD  Primary Sonographer:    Pati Lucio    CPT:                ECHO TTE WITH CON COMP W DOPP - .m;DEFINITY ECHO                      CONTRAST PER ML - .m  Indication(s):      Cardiogenic shock - R57.0  Procedure:          Transthoracic echocardiogram with 2-D, M-mode and complete                      spectral and color flow Doppler.  Ordering Location:  [facility]  Admission Status:   Inpatient  Contrast Injection: Verbal consent was obtained for injection of Ultrasonic             Enhancing Agent following a discussion of risks and                      benefits.                      Endocardial visualization enhanced with 2 ml of Definity                      Ultrasound enhancing agent (Lot#:[id number] Exp.Date:2025-MAY).  UEA Reaction:       Patient had no adverse reaction after injection of                      Ultrasound Enhancing Agent.  Study Information:  Image quality for this study is technically difficult.    _______________________________________________________________________________________     CONCLUSIONS:      1. Technically difficult image quality.   2. Left ventricular cavity is small. Left ventricular wall thickness is mildly increased. Left ventricular systolic function is normal with an ejection fraction visually estimated at 55 to 60 %.   3. Normal left ventricular diastolic function.   4. Normal right ventricular cavity size and normal right ventricular systolic function.   5. Left atrium is normal in size.   6. The right atrium is normal in size.   7. Mild mitral regurgitation.    < end of copied text >    STRESS:Stress Test: 2014, no Ischemia, Vasodilator nuclear perfusion stress test was negative for ischemia. There was normal left ventricular systolic function and normal left ventricular myocardial perfusion. Post stress EF 61%.      CATH:     ELECTROPHYSIOLOGY:     PAST MEDICAL HISTORY  Calculus of kidney  Club foot  Myasthenia gravis  Hypertension  Diabetes  Urinary tract infection  Hyperlipidemia    PAST SURGICAL HISTORY  Elective surgery  Club foot  Pilonidal cyst  H/O colonoscopy  Spinal stenosis  H/O prostate biopsy    SOCIAL HISTORY:  Denies smoking/alcohol/drugs  CIGARETTES:     ALCOHOL:  DRUGS:    FAMILY HISTORY:  Family history of stroke (Father)  Father -  age 62    Family history of kidney disease (Mother)  Mother -  age 67    Family history of diabetes mellitus type II (Sibling)  Brother & Sister      Family History of Cardiovascular Disease:  Yes [  ] No [ x]  Coronary Artery Disease in first degree relative: Yes [  ] No [ x]  Sudden Cardiac Death in First degree relative: Yes [  ] No [ x]    HOME MEDICATIONS:  Eliquis 5 mg oral tablet: 1 tab(s) orally 2 times a day (06 Sep 2024 09:04)  omeprazole 40 mg oral delayed release capsule: 1 cap(s) orally once a day (06 Sep 2024 09:04)  Potassium Chloride (Eqv-Klor-Con M20) 20 mEq oral tablet, extended release: 1 tab(s) orally once a day (06 Sep 2024 09:04)  Praluent Pen 75 mg/mL subcutaneous solution: 75 milligram(s) subcutaneous every 2 weeks (06 Sep 2024 09:04)  predniSONE 10 mg oral tablet: 1 tab(s) orally once a day (06 Sep 2024 09:04)  tamsulosin 0.4 mg oral capsule: 1 cap(s) orally once a day (at bedtime) (06 Sep 2024 09:04)      CURRENT CARDIAC MEDICATIONS:  midodrine 10 milliGRAM(s) Oral every 8 hours      CURRENT OTHER MEDICATIONS:  QUEtiapine 25 milliGRAM(s) Oral at bedtime  pantoprazole  Injectable 40 milliGRAM(s) IV Push daily  cefTRIAXone Injectable. 2000 milliGRAM(s) IV Push every 24 hours, Stop order after: 8 Days  chlorhexidine 2% Cloths 1 Application(s) Topical daily  enoxaparin Injectable 40 milliGRAM(s) SubCutaneous every 24 hours  hydrocortisone sodium succinate Injectable 50 milliGRAM(s) IV Push every 6 hours  nystatin Powder 1 Application(s) Topical two times a day  tamsulosin 0.4 milliGRAM(s) Oral at bedtime      ALLERGIES:   Ketek (Other)  telithromycin (Other)  Avelox (Other)  fluoroquinolone antibiotics (Other)  levofloxacin (Unknown)  gatifloxacin (Unknown)  ofloxacin (Unknown)      REVIEW OF SYMPTOMS:   CONSTITUTIONAL: No fever, no chills, no weight loss, no weight gain, no fatigue   ENMT:  No vertigo; No sinus or throat pain  NECK: No pain or stiffness  CARDIOVASCULAR: No chest pain, no dyspnea, no syncope/presyncope, no palpitations, no dizziness, no Orthopnea, no Paroxsymal nocturnal dyspnea  RESPIRATORY: no Shortness of breath, no cough, no wheezing  : No dysuria, no hematuria   GI: No dark color stool, no nausea, no diarrhea, no constipation, no abdominal pain   NEURO: No headache, no slurred speech   MUSCULOSKELETAL: No joint pain or swelling; No muscle, back, or extremity pain  PSYCH: No agitation, no anxiety.    ALL OTHER REVIEW OF SYSTEMS ARE NEGATIVE.    VITAL SIGNS:  T(C): 36.1 (24 @ 09:00), Max: 37.1 (24 @ 06:00)  T(F): 97 (24 @ 09:00), Max: 98.8 (24 @ 06:00)  HR: 131 (24 @ 09:00) (74 - 131)  BP: 155/94 (24 @ 09:00) (114/82 - 155/94)  RR: 19 (24 @ 09:00) (6 - 29)  SpO2: 98% (24 @ 09:00) (97% - 100%)    INTAKE AND OUTPUT:      07:01  -   07:00  --------------------------------------------------------  IN: 910 mL / OUT: 635 mL / NET: 275 mL        PHYSICAL EXAM:  Constitutional: Comfortable . No acute distress.   HEENT: Atraumatic and normocephalic , neck is supple . no JVD. No carotid bruit.  CNS: A&Ox3. No focal deficits.   Respiratory: CTAB, unlabored   Cardiovascular: RRR normal s1 s2. No murmur. No rubs or gallop.  Gastrointestinal: Soft, non-tender. +Bowel sounds.   Extremities: 2+ Peripheral Pulses, No clubbing, cyanosis, or edema  Psychiatric: Calm . no agitation.   Skin: Warm and dry, no ulcers on extremities     LABS:                            12.5   24.29 )-----------( 101      ( 08 Sep 2024 06:50 )             36.6     09-08    139  |  101  |  37.1<H>  ----------------------------<  140<H>  4.0   |  21.0<L>  |  1.35<H>    Ca    8.3<L>      08 Sep 2024 06:50  Phos  2.7     09-08  Mg     2.4     09-08    TPro  5.1<L>  /  Alb  2.3<L>  /  TBili  0.3<L>  /  DBili  x   /  AST  20  /  ALT  25  /  AlkPhos  118  09-08    PT/INR - ( 07 Sep 2024 03:18 )   PT: 17.1 sec;   INR: 1.56 ratio           Urinalysis Basic - ( 08 Sep 2024 06:50 )    Color: x / Appearance: x / SG: x / pH: x  Gluc: 140 mg/dL / Ketone: x  / Bili: x / Urobili: x   Blood: x / Protein: x / Nitrite: x   Leuk Esterase: x / RBC: x / WBC x   Sq Epi: x / Non Sq Epi: x / Bacteria: x              INTERPRETATION OF TELEMETRY:     ECG: Afib with occasional RVR  Prior ECG: Yes [  ] No [  ]    RADIOLOGY & ADDITIONAL STUDIES:    X-ray:    CT scan:   MRI:   US:

## 2024-09-08 NOTE — CONSULT NOTE ADULT - PROBLEM SELECTOR RECOMMENDATION 2
.  - repeat TTE on 9/6 without significant abnormality, however appears to have been done with vasopressor on board  - pBNP 17749 on 9/6, recheck in AM  - continue GDMT       Beta Blocker: start metoprolol 12.5 mg via NGT BID with hold parameters, will change to Toprol XL on d/c       RAAS Inhibitor: defer, not on AECi as OP        MRA: defer for now       Diuretic: caution diuresis in septic patient, use PRN       ARNi/SGLT2i: n/a  - Strict I&O's  - Daily standing weights (if able).  - Keep K > 4, Mg > 2.    - Monitor renal function

## 2024-09-08 NOTE — PROGRESS NOTE ADULT - SUBJECTIVE AND OBJECTIVE BOX
[facility] Division of Hospital Medicine  Karri Degroot MD    Chief Complaint:  Patient is a 79y old  Male who presents with a chief complaint of     SUBJECTIVE / OVERNIGHT EVENTS:  Patient seen and examined at bedside. No acute events reported overnight. No new complaints.    MEDICATIONS  (STANDING):  cefTRIAXone Injectable. 2000 milliGRAM(s) IV Push every 24 hours  chlorhexidine 2% Cloths 1 Application(s) Topical daily  enoxaparin Injectable 40 milliGRAM(s) SubCutaneous every 24 hours  hydrocortisone sodium succinate Injectable 50 milliGRAM(s) IV Push every 6 hours  midodrine 10 milliGRAM(s) Oral every 8 hours  nystatin Powder 1 Application(s) Topical two times a day  pantoprazole  Injectable 40 milliGRAM(s) IV Push daily  QUEtiapine 25 milliGRAM(s) Oral at bedtime  tamsulosin 0.4 milliGRAM(s) Oral at bedtime    MEDICATIONS  (PRN):        I&O's Summary    07 Sep 2024 07:01  -  08 Sep 2024 07:00  --------------------------------------------------------  IN: 910 mL / OUT: 635 mL / NET: 275 mL        PHYSICAL EXAM:  Vital Signs Last 24 Hrs  T(C): 36.1 (08 Sep 2024 09:00), Max: 37.1 (08 Sep 2024 06:00)  T(F): 97 (08 Sep 2024 09:00), Max: 98.8 (08 Sep 2024 06:00)  HR: 131 (08 Sep 2024 09:00) (74 - 131)  BP: 155/94 (08 Sep 2024 09:00) (103/73 - 155/94)  BP(mean): 114 (08 Sep 2024 09:00) (82 - 118)  RR: 19 (08 Sep 2024 09:00) (6 - 29)  SpO2: 98% (08 Sep 2024 09:00) (97% - 100%)    Parameters below as of 08 Sep 2024 08:00  Patient On (Oxygen Delivery Method): room air          Constitutional: NAD, Resting, NGT  ENT: Supple, No JVD  Lungs: CTA B/L, Non-labored breathing  Cardio: RRR, S1/S2, No murmur  Abdomen: Soft right sided nephrostomy tube, Restrepo in place  Extremities: No calf tenderness, No pitting edema  Musculoskeletal:   No joint swelling  Psych: Calm, cooperative affect appropriate. Confused.  Neuro: Awake and alert, oriented to name, place. Does not know year.  Skin: No rashes; no palpable lesions    LABS:                        12.5   24.29 )-----------( 101      ( 08 Sep 2024 06:50 )             36.6     09-08    139  |  101  |  37.1<H>  ----------------------------<  140<H>  4.0   |  21.0<L>  |  1.35<H>    Ca    8.3<L>      08 Sep 2024 06:50  Phos  2.7     09-08  Mg     2.4     09-08    TPro  5.1<L>  /  Alb  2.3<L>  /  TBili  0.3<L>  /  DBili  x   /  AST  20  /  ALT  25  /  AlkPhos  118  09-08    PT/INR - ( 07 Sep 2024 03:18 )   PT: 17.1 sec;   INR: 1.56 ratio               Urinalysis Basic - ( 08 Sep 2024 06:50 )    Color: x / Appearance: x / SG: x / pH: x  Gluc: 140 mg/dL / Ketone: x  / Bili: x / Urobili: x   Blood: x / Protein: x / Nitrite: x   Leuk Esterase: x / RBC: x / WBC x   Sq Epi: x / Non Sq Epi: x / Bacteria: x        Culture - Body Fluid with Gram Stain (collected 06 Sep 2024 08:45)  Source: Abdominal Fl Abdominal Fluid  Gram Stain (07 Sep 2024 15:37):    Rare polymorphonuclear leukocytes per low power field    No organisms seen per oil power field  Preliminary Report (08 Sep 2024 07:50):    Moderate Klebsiella pneumoniae    Few Klebsiella pneumoniae #2    Multiple Morphological Strains  Organism: Klebsiella pneumoniae  Klebsiella pneumoniae (08 Sep 2024 07:50)  Organism: Klebsiella pneumoniae (08 Sep 2024 07:50)  Organism: Klebsiella pneumoniae (08 Sep 2024 07:50)    Urinalysis with Rflx Culture (collected 05 Sep 2024 15:20)    Culture - Urine (collected 05 Sep 2024 15:20)  Source: Clean Catch Clean Catch (Midstream)  Preliminary Report (07 Sep 2024 11:02):    >100,000 CFU/ml Gram Negative Rods    Culture - Blood (collected 05 Sep 2024 14:30)  Source: .Blood Blood-Peripheral  Gram Stain (06 Sep 2024 08:30):    Growth in aerobic and anaerobic bottles: Gram Negative Rods  Final Report (08 Sep 2024 07:28):    Growth in aerobic and anaerobic bottles: Klebsiella pneumoniae    See previous culture  [id number]    Culture - Blood (collected 05 Sep 2024 14:15)  Source: .Blood Blood-Peripheral  Gram Stain (06 Sep 2024 07:52):    Growth in aerobic bottle: Gram Negative Rods    Growth in anaerobic bottle: Gram Negative Rods  Final Report (07 Sep 2024 12:17):    Growth in aerobic and anaerobic bottles: Klebsiella pneumoniae    Direct identification is available within approximately 3-5    hours either by Blood Panel Multiplexed PCR or Direct    MALDI-TOF. Details: [email or web address]  Organism: Blood Culture PCR  Klebsiella pneumoniae (07 Sep 2024 12:17)  Organism: Klebsiella pneumoniae (07 Sep 2024 12:17)  Organism: Blood Culture PCR (07 Sep 2024 12:17)      CAPILLARY BLOOD GLUCOSE      POCT Blood Glucose.: 80 mg/dL (07 Sep 2024 16:08)  POCT Blood Glucose.: 142 mg/dL (07 Sep 2024 12:12)  POCT Blood Glucose.: 74 mg/dL (07 Sep 2024 11:40)  POCT Blood Glucose.: 67 mg/dL (07 Sep 2024 11:40)  POCT Blood Glucose.: 65 mg/dL (07 Sep 2024 11:39)        RADIOLOGY & ADDITIONAL TESTS:  Results Reviewed:   Imaging Personally Reviewed:  Electrocardiogram Personally Reviewed:

## 2024-09-08 NOTE — PROGRESS NOTE ADULT - ASSESSMENT
80 y/o M with a h/o HTN, HFpEF, PE (on apixaban), Myasthenia Gravis, chronic LE edema, known 7 mm distal ureteral calculus with mild right hydroureteronephrosis, initially presented to the [place] ED on 9/5 with shortness of breath, urinary retention and confusion after failing to follow up outpatient for ureteral calculus. CT A/P revealed R hydro from 7 mm stone in UVJ. Underwent emergent cystoscopy with plan for ureteral stenting however procedure was aborted due to hematuria and inability to visualize right ureteral opening. Hospital course complicated by klebsiella pneumoniae bacteremia and septic shock requiring IV vasopressor support. Patient underwent emergent percutaneous nephrostomy tube placement. Now deemed stable for transfer to medicine for further management.    #Septic shock POA secondary to klebsiella bacteremia and Ureteral calculus  - Patient was seen by Urology at [place] and underwent Cystoscopy there  - Seen by IR and had nephrostomy tube placed  - Blood cx 9/5 shows Klebseilla  - UCX with GNR  - Abdominal fluid cx with Klebseilla  - On Rocephin  - ID consulted, f/u recs  - On Midodrine  - On Steroid dose steroids, taper down as tolerated    #Dysphagia  - Failed swallow evaluation  - SLP following  - NGT was placed, continue feeds  - Nutrition evaluation    #New Afib on RVR  - TTE unremarkable  - On Eliquis for PE, on hold due to hematuria, will restart if hematuria clears up  - Telemetry  - Cardiology consulted, f/u recs    #ICU/Hospital acquired delirium  - Started on Seroquel in ICU  - Delirium precautions  - Improving    #GERD  - Improving    DVT prophylaxis: Lovenox    Dispo: Pending improvement in mentation, resolution of bacteremia

## 2024-09-09 LAB
-  AMOXICILLIN/CLAVULANIC ACID: SIGNIFICANT CHANGE UP
-  AMPICILLIN/SULBACTAM: SIGNIFICANT CHANGE UP
-  AMPICILLIN: SIGNIFICANT CHANGE UP
-  AZTREONAM: SIGNIFICANT CHANGE UP
-  CEFAZOLIN: SIGNIFICANT CHANGE UP
-  CEFEPIME: SIGNIFICANT CHANGE UP
-  CEFOXITIN: SIGNIFICANT CHANGE UP
-  CEFTRIAXONE: SIGNIFICANT CHANGE UP
-  CEFUROXIME: SIGNIFICANT CHANGE UP
-  CIPROFLOXACIN: SIGNIFICANT CHANGE UP
-  ERTAPENEM: SIGNIFICANT CHANGE UP
-  GENTAMICIN: SIGNIFICANT CHANGE UP
-  IMIPENEM: SIGNIFICANT CHANGE UP
-  LEVOFLOXACIN: SIGNIFICANT CHANGE UP
-  MEROPENEM: SIGNIFICANT CHANGE UP
-  NITROFURANTOIN: SIGNIFICANT CHANGE UP
-  PIPERACILLIN/TAZOBACTAM: SIGNIFICANT CHANGE UP
-  TOBRAMYCIN: SIGNIFICANT CHANGE UP
-  TRIMETHOPRIM/SULFAMETHOXAZOLE: SIGNIFICANT CHANGE UP
ALBUMIN SERPL ELPH-MCNC: 2.5 G/DL — LOW (ref 3.3–5.2)
ALP SERPL-CCNC: 100 U/L — SIGNIFICANT CHANGE UP (ref 40–120)
ALT FLD-CCNC: 21 U/L — SIGNIFICANT CHANGE UP
ANION GAP SERPL CALC-SCNC: 13 MMOL/L — SIGNIFICANT CHANGE UP (ref 5–17)
AST SERPL-CCNC: 16 U/L — SIGNIFICANT CHANGE UP
BILIRUB SERPL-MCNC: 0.4 MG/DL — SIGNIFICANT CHANGE UP (ref 0.4–2)
BUN SERPL-MCNC: 43.1 MG/DL — HIGH (ref 8–20)
CALCIUM SERPL-MCNC: 8.3 MG/DL — LOW (ref 8.4–10.5)
CHLORIDE SERPL-SCNC: 109 MMOL/L — HIGH (ref 96–108)
CO2 SERPL-SCNC: 23 MMOL/L — SIGNIFICANT CHANGE UP (ref 22–29)
CREAT SERPL-MCNC: 1.24 MG/DL — SIGNIFICANT CHANGE UP (ref 0.5–1.3)
CULTURE RESULTS: ABNORMAL
EGFR: 58 ML/MIN/1.73M2 — LOW
GLUCOSE SERPL-MCNC: 124 MG/DL — HIGH (ref 70–99)
HCT VFR BLD CALC: 34.4 % — LOW (ref 39–50)
HGB BLD-MCNC: 11.3 G/DL — LOW (ref 13–17)
MAGNESIUM SERPL-MCNC: 2.5 MG/DL — SIGNIFICANT CHANGE UP (ref 1.6–2.6)
MCHC RBC-ENTMCNC: 30.2 PG — SIGNIFICANT CHANGE UP (ref 27–34)
MCHC RBC-ENTMCNC: 32.8 GM/DL — SIGNIFICANT CHANGE UP (ref 32–36)
MCV RBC AUTO: 92 FL — SIGNIFICANT CHANGE UP (ref 80–100)
METHOD TYPE: SIGNIFICANT CHANGE UP
ORGANISM # SPEC MICROSCOPIC CNT: ABNORMAL
ORGANISM # SPEC MICROSCOPIC CNT: SIGNIFICANT CHANGE UP
PHOSPHATE SERPL-MCNC: 1.9 MG/DL — LOW (ref 2.4–4.7)
PLATELET # BLD AUTO: 78 K/UL — LOW (ref 150–400)
POTASSIUM SERPL-MCNC: 3.2 MMOL/L — LOW (ref 3.5–5.3)
POTASSIUM SERPL-SCNC: 3.2 MMOL/L — LOW (ref 3.5–5.3)
PROT SERPL-MCNC: 5 G/DL — LOW (ref 6.6–8.7)
RBC # BLD: 3.74 M/UL — LOW (ref 4.2–5.8)
RBC # FLD: 16.3 % — HIGH (ref 10.3–14.5)
SODIUM SERPL-SCNC: 144 MMOL/L — SIGNIFICANT CHANGE UP (ref 135–145)
SPECIMEN SOURCE: SIGNIFICANT CHANGE UP
WBC # BLD: 17.35 K/UL — HIGH (ref 3.8–10.5)
WBC # FLD AUTO: 17.35 K/UL — HIGH (ref 3.8–10.5)

## 2024-09-09 PROCEDURE — 99233 SBSQ HOSP IP/OBS HIGH 50: CPT

## 2024-09-09 RX ORDER — SPIRONOLACTONE 25 MG/1
25 TABLET, FILM COATED ORAL DAILY
Refills: 0 | Status: DISCONTINUED | OUTPATIENT
Start: 2024-09-09 | End: 2024-09-16

## 2024-09-09 RX ORDER — METOPROLOL TARTRATE 100 MG/1
25 TABLET ORAL DAILY
Refills: 0 | Status: DISCONTINUED | OUTPATIENT
Start: 2024-09-09 | End: 2024-09-09

## 2024-09-09 RX ORDER — POTASSIUM PHOSPHATE 236; 224 MG/ML; MG/ML
15 INJECTION, SOLUTION INTRAVENOUS ONCE
Refills: 0 | Status: COMPLETED | OUTPATIENT
Start: 2024-09-09 | End: 2024-09-09

## 2024-09-09 RX ORDER — RISPERIDONE 0.25 MG/1
0.25 TABLET, FILM COATED ORAL EVERY 12 HOURS
Refills: 0 | Status: DISCONTINUED | OUTPATIENT
Start: 2024-09-09 | End: 2024-09-16

## 2024-09-09 RX ORDER — METOPROLOL TARTRATE 100 MG/1
25 TABLET ORAL DAILY
Refills: 0 | Status: DISCONTINUED | OUTPATIENT
Start: 2024-09-09 | End: 2024-09-10

## 2024-09-09 RX ORDER — POTASSIUM CHLORIDE 10 MEQ
40 TABLET, EXT RELEASE, PARTICLES/CRYSTALS ORAL EVERY 4 HOURS
Refills: 0 | Status: COMPLETED | OUTPATIENT
Start: 2024-09-09 | End: 2024-09-09

## 2024-09-09 RX ORDER — HYDROCORTISONE 10 MG/1
25 TABLET ORAL EVERY 6 HOURS
Refills: 0 | Status: DISCONTINUED | OUTPATIENT
Start: 2024-09-09 | End: 2024-09-10

## 2024-09-09 RX ORDER — RISPERIDONE 0.25 MG/1
0.25 TABLET, FILM COATED ORAL ONCE
Refills: 0 | Status: COMPLETED | OUTPATIENT
Start: 2024-09-09 | End: 2024-09-09

## 2024-09-09 RX ORDER — METOPROLOL TARTRATE 100 MG/1
12.5 TABLET ORAL ONCE
Refills: 0 | Status: COMPLETED | OUTPATIENT
Start: 2024-09-09 | End: 2024-09-09

## 2024-09-09 RX ORDER — POTASSIUM PHOSPHATE 236; 224 MG/ML; MG/ML
15 INJECTION, SOLUTION INTRAVENOUS ONCE
Refills: 0 | Status: DISCONTINUED | OUTPATIENT
Start: 2024-09-09 | End: 2024-09-09

## 2024-09-09 RX ADMIN — RISPERIDONE 0.25 MILLIGRAM(S): 0.25 TABLET, FILM COATED ORAL at 12:51

## 2024-09-09 RX ADMIN — ENOXAPARIN SODIUM 95 MILLIGRAM(S): 100 INJECTION SUBCUTANEOUS at 05:36

## 2024-09-09 RX ADMIN — Medication 40 MILLIEQUIVALENT(S): at 16:36

## 2024-09-09 RX ADMIN — Medication 1 APPLICATION(S): at 05:37

## 2024-09-09 RX ADMIN — HYDROCORTISONE 50 MILLIGRAM(S): 10 TABLET ORAL at 08:51

## 2024-09-09 RX ADMIN — TAMSULOSIN HYDROCHLORIDE 0.4 MILLIGRAM(S): 0.4 CAPSULE ORAL at 23:03

## 2024-09-09 RX ADMIN — METOPROLOL TARTRATE 12.5 MILLIGRAM(S): 100 TABLET ORAL at 17:13

## 2024-09-09 RX ADMIN — METOPROLOL TARTRATE 12.5 MILLIGRAM(S): 100 TABLET ORAL at 05:37

## 2024-09-09 RX ADMIN — RISPERIDONE 0.25 MILLIGRAM(S): 0.25 TABLET, FILM COATED ORAL at 17:14

## 2024-09-09 RX ADMIN — HYDROCORTISONE 25 MILLIGRAM(S): 10 TABLET ORAL at 17:13

## 2024-09-09 RX ADMIN — Medication 1 TABLET(S): at 11:31

## 2024-09-09 RX ADMIN — OLANZAPINE 5 MILLIGRAM(S): 7.5 TABLET ORAL at 00:32

## 2024-09-09 RX ADMIN — POTASSIUM PHOSPHATE 62.5 MILLIMOLE(S): 236; 224 INJECTION, SOLUTION INTRAVENOUS at 11:32

## 2024-09-09 RX ADMIN — Medication 1 APPLICATION(S): at 17:14

## 2024-09-09 RX ADMIN — Medication 40 MILLIEQUIVALENT(S): at 10:10

## 2024-09-09 RX ADMIN — HYDROCORTISONE 50 MILLIGRAM(S): 10 TABLET ORAL at 02:59

## 2024-09-09 RX ADMIN — Medication 40 MILLIGRAM(S): at 11:31

## 2024-09-09 RX ADMIN — ENOXAPARIN SODIUM 95 MILLIGRAM(S): 100 INJECTION SUBCUTANEOUS at 17:13

## 2024-09-09 RX ADMIN — Medication 2000 MILLIGRAM(S): at 11:31

## 2024-09-09 RX ADMIN — SPIRONOLACTONE 25 MILLIGRAM(S): 25 TABLET, FILM COATED ORAL at 16:34

## 2024-09-09 RX ADMIN — MIDODRINE HYDROCHLORIDE 5 MILLIGRAM(S): 5 TABLET ORAL at 05:37

## 2024-09-09 RX ADMIN — CHLORHEXIDINE GLUCONATE 1 APPLICATION(S): 40 SOLUTION TOPICAL at 11:32

## 2024-09-09 RX ADMIN — Medication 25 MILLIGRAM(S): at 23:03

## 2024-09-09 NOTE — PROGRESS NOTE ADULT - ASSESSMENT
79 yo M with pmhx of HTN, HLD, Obesity, CHF, PE, Myasthenia Gravis, and chronic LE edema, history of 7 mm distal ureteral calculus with mild right hydroureteronephrosis and pain presented to the ED with shortness of breath and confusion after failing to follow up outpt on ureteral calculus after prior evaluation at [facility].   Patient sent emergently with urology for stent placement but unsuccessful and procedure aborted due to hematuria and inability to visualize right ureteral opening.  Hospital course complicated by klebsiella pneumoniae bacteremia and septic shock requiring IV vasopressor support. Subsequently transferred to [facility] on 9/5 for emergent percutaneous nephrostomy. He is now extubated in the MICU at [facility]. Episode of Afib with RVR on 9/7 prior to transfer. Cardiology consulted now for further management of Afib

## 2024-09-09 NOTE — PROGRESS NOTE ADULT - ASSESSMENT
80M with past medical history of CHF, PE (on apixaban), HTN, HLD, myasthenia gravis, pneumonia, UTIs who presented as a transfer from [facility] for sepsis secondary to obstructive calculus with hydronephrosis s/p failed urethral stent placement, now s/p nephrostomy tube placement on 9/6.     # Metabolic encephalopathy 2/2 to Urosepsis  # Obstructive calculus/hydronephrosis   # UTI  # klebsiella pneumoniae bacteremia   - Leukocytosis, elevated lactate, improving   - CT A/P with mild right hydroureteronephrosis from 7mm stone in right UVJ  - 9/5 failed stent placement  - 9/6 emergent right PCN placed  - s/p extubation 9/6, sating well on RA  - off pressors   - Indwelling guerrero in place, ? need for urology consult   - IV Rocephin per infectious diseases  - most recent cultures 9/7 NGTD   - wean steroids, hypertensive     # CHF, new onset afib  - TTE as reported above, LV function normal, some MR   - anticoagulation per primary     # CATINA   - BUN/Cr improving  - Continue flomax  - Continue to monitor renal function     # myasthenia gravis  - not in crisis  - supportive care    # agitation  - on bedtime Seroquel  - trial dose of risperidone  - likely delirium related to Intensive care unit, and steroids   - wean steroids     # Encounter for Palliative Care  - continue to follow and help with symptom management

## 2024-09-09 NOTE — DIETITIAN INITIAL EVALUATION ADULT - PERTINENT LABORATORY DATA
09-09    144  |  109<H>  |  43.1<H>  ----------------------------<  124<H>  3.2<L>   |  23.0  |  1.24    Ca    8.3<L>      09 Sep 2024 04:30  Phos  1.9     09-09  Mg     2.5     09-09    TPro  5.0<L>  /  Alb  2.5<L>  /  TBili  0.4  /  DBili  x   /  AST  16  /  ALT  21  /  AlkPhos  100  09-09  POCT Blood Glucose.: 168 mg/dL (09-08-24 @ 16:23)

## 2024-09-09 NOTE — PROGRESS NOTE ADULT - PROBLEM SELECTOR PLAN 2
.  - repeat TTE on 9/6 without significant abnormality, however appears to have been done with vasopressor on board  - pBNP 05717 on 9/6 recheck tomorrow  - continue GDMT       Beta Blocker: change to Toprol XL 25mg PO Daily        RAAS Inhibitor: defer, not on AECi as OP        MRA: add spironolactone 25mg PO daily       Diuretic: recheck pBNP in AM and add lasix 20mg PO daily       ARNi/SGLT2i:  add Farxiga prior to discharge  - Strict I&O's  - Daily standing weights (if able).  - Keep K > 4, Mg > 2.    - Monitor renal function.

## 2024-09-09 NOTE — PROGRESS NOTE ADULT - SUBJECTIVE AND OBJECTIVE BOX
[facility] Division of Hospital Medicine  Karri Degroot MD    Chief Complaint:  Patient is a 79y old  Male who presents with a chief complaint of Severe sepsis with septic shock     (09 Sep 2024 10:14)      SUBJECTIVE / OVERNIGHT EVENTS:  Patient seen and examined at bedside. Agitated overnight, pulling at lines and screaming. Zyprexa IM given. Agitated this morning, placed on restraints.     MEDICATIONS  (STANDING):  cefTRIAXone Injectable. 2000 milliGRAM(s) IV Push every 24 hours  chlorhexidine 2% Cloths 1 Application(s) Topical daily  enoxaparin Injectable 95 milliGRAM(s) SubCutaneous every 12 hours  hydrocortisone sodium succinate Injectable 25 milliGRAM(s) IV Push every 6 hours  metoprolol tartrate 12.5 milliGRAM(s) Oral every 12 hours  multivitamin 1 Tablet(s) Oral daily  nystatin Powder 1 Application(s) Topical two times a day  pantoprazole  Injectable 40 milliGRAM(s) IV Push daily  potassium chloride    Tablet ER 40 milliEquivalent(s) Oral every 4 hours  potassium phosphate IVPB 15 milliMole(s) IV Intermittent once  QUEtiapine 25 milliGRAM(s) Oral at bedtime  risperiDONE   Tablet 0.25 milliGRAM(s) Oral once  tamsulosin 0.4 milliGRAM(s) Oral at bedtime    MEDICATIONS  (PRN):        I&O's Summary    08 Sep 2024 07:01  -  09 Sep 2024 07:00  --------------------------------------------------------  IN: 1020 mL / OUT: 630 mL / NET: 390 mL        PHYSICAL EXAM:  Vital Signs Last 24 Hrs  T(C): 35.7 (09 Sep 2024 04:00), Max: 37.2 (08 Sep 2024 19:00)  T(F): 96.3 (09 Sep 2024 04:00), Max: 99 (08 Sep 2024 19:00)  HR: 84 (09 Sep 2024 08:00) (75 - 117)  BP: 139/66 (09 Sep 2024 08:00) (110/84 - 159/80)  BP(mean): 88 (09 Sep 2024 08:00) (84 - 108)  RR: 29 (09 Sep 2024 08:00) (13 - 29)  SpO2: 98% (09 Sep 2024 08:00) (96% - 100%)    Parameters below as of 08 Sep 2024 16:00  Patient On (Oxygen Delivery Method): room air        Constitutional: NAD, agitated  ENT: Supple, No JVD  Lungs: CTA B/L, Non-labored breathing  Cardio: RRR, S1/S2, No murmur  Abdomen: Soft right sided nephrostomy tube, Restrepo in place  Extremities: No calf tenderness, No pitting edema  Musculoskeletal:   No joint swelling  Psych:  Irritable, confused.  Neuro: Awake and alert, oriented to name, place. Does not know year.  Skin: No rashes; no palpable lesions      LABS:                        11.3   17.35 )-----------( 78       ( 09 Sep 2024 04:30 )             34.4     09-09    144  |  109<H>  |  43.1<H>  ----------------------------<  124<H>  3.2<L>   |  23.0  |  1.24    Ca    8.3<L>      09 Sep 2024 04:30  Phos  1.9     09-09  Mg     2.5     09-09    TPro  5.0<L>  /  Alb  2.5<L>  /  TBili  0.4  /  DBili  x   /  AST  16  /  ALT  21  /  AlkPhos  100  09-09          Urinalysis Basic - ( 09 Sep 2024 04:30 )    Color: x / Appearance: x / SG: x / pH: x  Gluc: 124 mg/dL / Ketone: x  / Bili: x / Urobili: x   Blood: x / Protein: x / Nitrite: x   Leuk Esterase: x / RBC: x / WBC x   Sq Epi: x / Non Sq Epi: x / Bacteria: x        Culture - Blood (collected 07 Sep 2024 16:02)  Source: .Blood Blood  Preliminary Report (09 Sep 2024 01:02):    No growth at 24 hours    Culture - Urine (collected 06 Sep 2024 16:15)  Source: .Urine Abdominal Fluid  Preliminary Report (08 Sep 2024 10:19):    >100,000 CFU/ml Gram Negative Rods      CAPILLARY BLOOD GLUCOSE      POCT Blood Glucose.: 168 mg/dL (08 Sep 2024 16:23)  POCT Blood Glucose.: 160 mg/dL (08 Sep 2024 11:46)        RADIOLOGY & ADDITIONAL TESTS:  Results Reviewed:   Imaging Personally Reviewed:  Electrocardiogram Personally Reviewed:

## 2024-09-09 NOTE — DIETITIAN INITIAL EVALUATION ADULT - NSFNSGIIOFT_GEN_A_CORE
09-08-24 @ 07:01  -  09-09-24 @ 07:00  --------------------------------------------------------  OUT:  Total OUT: 0 mL    Total NET: 960 mL

## 2024-09-09 NOTE — PROGRESS NOTE ADULT - SUBJECTIVE AND OBJECTIVE BOX
[facility]                                                         CARDIOLOGY AT [facility]                                                                  [address], [place] [address]                                                         Telephone: [phone]. Fax:[phone]                                                                             PROGRESS NOTE    Reason for follow up:   Update:       Review of symptoms:   Cardiac:  No chest pain. No dyspnea. No palpitations.  Respiratory: no cough. No dyspnea  Gastrointestinal: No diarrhea. No abdominal pain. No bleeding.   Neuro: No focal neuro complaints.    Vitals:  T(C): 37 (09-09-24 @ 11:00), Max: 37.2 (09-08-24 @ 19:00)  HR: 84 (09-09-24 @ 08:00) (75 - 107)  BP: 139/66 (09-09-24 @ 08:00) (110/84 - 159/80)  RR: 29 (09-09-24 @ 08:00) (13 - 29)  SpO2: 98% (09-09-24 @ 08:00) (97% - 100%)  Wt(kg): --  I&O's Summary    08 Sep 2024 07:01  -  09 Sep 2024 07:00  --------------------------------------------------------  IN: 1020 mL / OUT: 630 mL / NET: 390 mL      Weight (kg): 94.1 (09-06 @ 02:00), 93.3 (09-05 @ 20:45)    PHYSICAL EXAM:  Appearance: Comfortable. No acute distress  HEENT:  Atraumatic. Normocephalic.  Normal oral mucosa  Neurologic: A & O x 3, no gross focal deficits.  Cardiovascular: RRR S1 S2, No murmur, no rubs/gallops. No JVD  Respiratory: Lungs clear to auscultation, unlabored   Gastrointestinal:  Soft, Non-tender, + BS  Lower Extremities: 2+ Peripheral Pulses, No clubbing, cyanosis, or edema  Psychiatry: Patient is calm. No agitation.   Skin: warm and dry.    CURRENT CARDIAC MEDICATIONS:  metoprolol tartrate 12.5 milliGRAM(s) Oral once  spironolactone 25 milliGRAM(s) Oral daily      CURRENT OTHER MEDICATIONS:  cefTRIAXone Injectable. 2000 milliGRAM(s) IV Push every 24 hours  QUEtiapine 25 milliGRAM(s) Oral at bedtime  risperiDONE   Tablet 0.25 milliGRAM(s) Oral once  pantoprazole  Injectable 40 milliGRAM(s) IV Push daily  hydrocortisone sodium succinate Injectable 25 milliGRAM(s) IV Push every 6 hours  chlorhexidine 2% Cloths 1 Application(s) Topical daily  enoxaparin Injectable 95 milliGRAM(s) SubCutaneous every 12 hours  multivitamin 1 Tablet(s) Oral daily  nystatin Powder 1 Application(s) Topical two times a day  potassium chloride    Tablet ER 40 milliEquivalent(s) Oral every 4 hours, Stop order after: 2 Doses  tamsulosin 0.4 milliGRAM(s) Oral at bedtime      LABS:	 	                            11.3   17.35 )-----------( 78       ( 09 Sep 2024 04:30 )             34.4     09-09    144  |  109<H>  |  43.1<H>  ----------------------------<  124<H>  3.2<L>   |  23.0  |  1.24    Ca    8.3<L>      09 Sep 2024 04:30  Phos  1.9     09-09  Mg     2.5     09-09    TPro  5.0<L>  /  Alb  2.5<L>  /  TBili  0.4  /  DBili  x   /  AST  16  /  ALT  21  /  AlkPhos  100  09-09    PT/INR/PTT ( 07 Sep 2024 03:18 )                       :                       :      17.1         :       X                     .        .                   .              .           .       1.56        .                                       Lipid Profile:   HgA1c:   TSH: Thyroid Stimulating Hormone, Serum: 1.72 uIU/mL      TELEMETRY: Afib rate controlled  ECG:    DIAGNOSTIC TESTING:  [ ] Echocardiogram: < from: TTE W or WO Ultrasound Enhancing Agent (09.06.24 @ 10:03) >  TRANSTHORACIC ECHOCARDIOGRAM REPORT  ________________________________________________________________________________                                      _______       Pt. Name:       DEION HEATH Study Date:    9/6/2024  MRN:            [id number]   YOB: 1945  Accession #:    [id number]    Age:           79 years  Account#:       [id number]   Gender:        M  Heart Rate:                  Height:        66.93 in (170.00 cm)  Rhythm:                      Weight:        207.23 lb (94.00 kg)  Blood Pressure: 120/92 mmHg  BSA/BMI:       2.05 m² / 32.53 kg/m²  ________________________________________________________________________________________  Referring Physician:    [id number] Art Granados  Interpreting Physician: Carlin Scott MD  Primary Sonographer:    Pati Lucio    CPT:                ECHO TTE WITH CON COMP W DOPP - .m;DEFINITY ECHO                      CONTRAST PER ML - .m  Indication(s):      Cardiogenic shock - R57.0  Procedure:          Transthoracic echocardiogram with 2-D, M-mode and complete                      spectral and color flow Doppler.  Ordering Location:  [facility]  Admission Status:   Inpatient  Contrast Injection: Verbal consent was obtained for injection of Ultrasonic             Enhancing Agent following a discussion of risks and                      benefits.                      Endocardial visualization enhanced with 2 ml of Definity                      Ultrasound enhancing agent (Lot#:[id number] Exp.Date:2025-MAY).  UEA Reaction:       Patient had no adverse reaction after injection of                      Ultrasound Enhancing Agent.  Study Information:  Image quality for this study is technically difficult.    _______________________________________________________________________________________     CONCLUSIONS:      1. Technically difficult image quality.   2. Left ventricular cavity is small. Left ventricular wall thickness is mildly increased. Left ventricular systolic function is normal with an ejection fraction visually estimated at 55 to 60 %.   3. Normal left ventricular diastolic function.   4. Normal right ventricular cavity size and normal right ventricular systolic function.   5. Left atrium is normal in size.   6. The right atrium is normal in size.   7. Mild mitral regurgitation.    _____________________________________    < end of copied text >    [ ]  Catheterization:  [ ] Stress Test:    OTHER:

## 2024-09-09 NOTE — DIETITIAN INITIAL EVALUATION ADULT - OTHER INFO
80 y/o M with a h/o HTN, HFpEF, PE (on apixaban), Myasthenia Gravis, chronic LE edema, known 7 mm distal ureteral calculus with mild right hydroureteronephrosis, initially presented to the [place] ED on 9/5 with shortness of breath, urinary retention and confusion after failing to follow up outpatient for ureteral calculus. CT A/P revealed R hydro from 7 mm stone in UVJ. Underwent emergent cystoscopy with plan for ureteral stenting however procedure was aborted due to hematuria and inability to visualize right ureteral opening. Hospital course complicated by klebsiella pneumoniae bacteremia and septic shock requiring IV vasopressor support. Patient underwent emergent percutaneous nephrostomy tube placement. Now deemed stable for transfer to medicine for further management. SLP following, NGT removed.

## 2024-09-09 NOTE — PROGRESS NOTE ADULT - NS ATTEND AMEND GEN_ALL_CORE FT
heart failure with preserved ejection fraction .  diuresis.  maintiannce s..afib . .b and rate control meds heart failure with preserved ejection fraction .  diuresis.  maintiannce s..afib . .b and rate control meds  sepsis improved  will sign off.   david call for further qwuestions   outpaitent follow up with cardiologist

## 2024-09-09 NOTE — PROGRESS NOTE ADULT - SUBJECTIVE AND OBJECTIVE BOX
OVERNIGHT EVENTS: very agitated and delirious     Present Symptoms:     Dyspnea: none   Nausea/Vomiting: No  Anxiety:  Yes - severe agitation   Depression: unable   Fatigue: unable   Loss of appetite: no   Constipation: none this AM     Pain: none             Character-            Duration-            Effect-            Factors-            Frequency-            Location-            Severity-    Pain AD Score:  [email or web address] (press ctrl + left click to view)    Review of Systems: Reviewed                     Negative: no chest pain   Positive - agtiation                     All others negative    MEDICATIONS  (STANDING):  cefTRIAXone Injectable. 2000 milliGRAM(s) IV Push every 24 hours  chlorhexidine 2% Cloths 1 Application(s) Topical daily  enoxaparin Injectable 95 milliGRAM(s) SubCutaneous every 12 hours  hydrocortisone sodium succinate Injectable 50 milliGRAM(s) IV Push every 6 hours  metoprolol tartrate 12.5 milliGRAM(s) Oral every 12 hours  nystatin Powder 1 Application(s) Topical two times a day  pantoprazole  Injectable 40 milliGRAM(s) IV Push daily  potassium chloride    Tablet ER 40 milliEquivalent(s) Oral every 4 hours  QUEtiapine 25 milliGRAM(s) Oral at bedtime  risperiDONE   Tablet 0.25 milliGRAM(s) Oral once  tamsulosin 0.4 milliGRAM(s) Oral at bedtime    MEDICATIONS  (PRN):    PHYSICAL EXAM:    Vital Signs Last 24 Hrs  T(C): 35.7 (09 Sep 2024 04:00), Max: 37.2 (08 Sep 2024 19:00)  T(F): 96.3 (09 Sep 2024 04:00), Max: 99 (08 Sep 2024 19:00)  HR: 84 (09 Sep 2024 08:00) (75 - 117)  BP: 139/66 (09 Sep 2024 08:00) (110/84 - 159/80)  BP(mean): 88 (09 Sep 2024 08:00) (84 - 108)  RR: 29 (09 Sep 2024 08:00) (13 - 29)  SpO2: 98% (09 Sep 2024 08:00) (96% - 100%)    Parameters below as of 08 Sep 2024 16:00  Patient On (Oxygen Delivery Method): room air    General: alert and knows hes is in the hospital  agitated              HEENT: normal      Lungs: comfortable     CV: normal      GI: normal     :  guerrero    MSK: weakness, bl lower extremity edema     Skin: no rash    LABS:                      11.3   17.35 )-----------( 78       ( 09 Sep 2024 04:30 )             34.4     09-09    144  |  109<H>  |  43.1<H>  ----------------------------<  124<H>  3.2<L>   |  23.0  |  1.24    Ca    8.3<L>      09 Sep 2024 04:30  Phos  1.9     09-09  Mg     2.5     09-09    TPro  5.0<L>  /  Alb  2.5<L>  /  TBili  0.4  /  DBili  x   /  AST  16  /  ALT  21  /  AlkPhos  100  09-09    Urinalysis Basic - ( 09 Sep 2024 04:30 )    Color: x / Appearance: x / SG: x / pH: x  Gluc: 124 mg/dL / Ketone: x  / Bili: x / Urobili: x   Blood: x / Protein: x / Nitrite: x   Leuk Esterase: x / RBC: x / WBC x   Sq Epi: x / Non Sq Epi: x / Bacteria: x    I&O's Summary    08 Sep 2024 07:01  -  09 Sep 2024 07:00  --------------------------------------------------------  IN: 1020 mL / OUT: 630 mL / NET: 390 mL    RADIOLOGY & ADDITIONAL STUDIES:    < from: Xray Chest 1 View-PORTABLE IMMEDIATE (Xray Chest 1 View-PORTABLE IMMEDIATE .) (09.07.24 @ 01:21) >    IMPRESSION: Persistent small left base atelectatic infiltrate.   Endotracheal tube removed. Nasogastric tube inserted. Granulomas right   lung unchanged.    --- End of Report ---    < end of copied text >    < from: TTE W or WO Ultrasound Enhancing Agent (09.06.24 @ 10:03) >     CONCLUSIONS:      1. Technically difficult image quality.   2. Left ventricular cavity is small. Left ventricular wall thickness is mildly increased. Left ventricular systolic function is normal with an ejection fraction visually estimated at 55 to 60 %.   3. Normal left ventricular diastolic function.   4. Normal right ventricular cavity size and normal right ventricular systolic function.   5. Left atrium is normal in size.   6. The right atrium is normal in size.   7. Mild mitral regurgitation.    < end of copied text >      ADVANCE DIRECTIVES/TREATMENT PREFERENCES:  do not resuscitate and do not intubate

## 2024-09-09 NOTE — DIETITIAN INITIAL EVALUATION ADULT - PERTINENT MEDS FT
MEDICATIONS  (STANDING):  cefTRIAXone Injectable. 2000 milliGRAM(s) IV Push every 24 hours  hydrocortisone sodium succinate Injectable 50 milliGRAM(s) IV Push every 6 hours  pantoprazole  Injectable 40 milliGRAM(s) IV Push daily  potassium chloride    Tablet ER 40 milliEquivalent(s) Oral every 4 hours  QUEtiapine 25 milliGRAM(s) Oral at bedtime

## 2024-09-09 NOTE — CONSULT NOTE ADULT - ASSESSMENT
80 yo male pt with multiple medical issues, transferred for septic stone IR placement of right NT, CATINA, complicated UTI, bacteremia  - CATINA resolving  - UO marginal  - trend wbc  - trend renal fcn  - encourage PO fluids  - hematuria improving  - monitor I/O's  - cont abx  - pt will need ureteroscopy and lithotripsy as OP once infection completed treated  - pt may f/u with urologist close to home or may f/u with Dr. Heck in 1-2 weeks post dc  - no  intervention required at this time

## 2024-09-09 NOTE — CONSULT NOTE ADULT - SUBJECTIVE AND OBJECTIVE BOX
HPI:  BRIEF HOSPITAL COURSE: 81 yo M with pmhx of HTN, CHF, PE, Myasthenia Gravis, and chronic LE edema, history of 7 mm distal ureteral calculus with mild right hydroureteronephrosis and pain presented to the ED with shortness of breath and confusion after failing to follow up outpt on ureteral calculus after prior evaluation at [facility].  Patient's son is at bedside he states he seems more confused and altered then baseline; has not been feeling well. Patient was complaining of nauseous. Also is retaining urine; usually urinates in a urinal. Decreased appetite; weak; chills. Patient hasn't seen cardiologist since March to follow up on his CHF. Labs significant for WBC 41.45, procal 36.3, lactate 7.9, Scr 2.6, BNP 31223, UA. CT CAP revealed R hydro from 7 mm stone in UVJ. Patient sent emergently with urology for stent placement.  Stent placement for 7 mm stone unsuccessful, procedure aborted, patient transferred to ICU for septic shock. Discussed case with urology for planned transfer for emergent IR for nephrostomy tube placement with Dr Shaw.    Now s/p PCN by IR. Remains intubated and in shock. Admit to ICU. (06 Sep 2024 02:42)    Urology: called to see pt for septic stone and hematuria.  Pt transferred 3 days ago from [facility] for a septic stone and unsuccessful attempt of right ureteral stent due to hematuria. Pt transferred for IR placement of right NT which was performed 9/6.  pt has remained in MICU for septic shock, IVabx, monitoring of I/O's.  Pt has had hematuria initially, urine in guerrero dark, almost hubert in color.  Pt states he feels better.    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 2000 milliGRAM(s) IV Push every 24 hours  chlorhexidine 2% Cloths 1 Application(s) Topical daily  enoxaparin Injectable 95 milliGRAM(s) SubCutaneous every 12 hours  hydrocortisone sodium succinate Injectable 25 milliGRAM(s) IV Push every 6 hours  metoprolol tartrate 12.5 milliGRAM(s) Oral once  multivitamin 1 Tablet(s) Oral daily  nystatin Powder 1 Application(s) Topical two times a day  pantoprazole  Injectable 40 milliGRAM(s) IV Push daily  potassium chloride    Tablet ER 40 milliEquivalent(s) Oral every 4 hours  QUEtiapine 25 milliGRAM(s) Oral at bedtime  spironolactone 25 milliGRAM(s) Oral daily  tamsulosin 0.4 milliGRAM(s) Oral at bedtime      Ketek (Other)  telithromycin (Other)  Avelox (Other)  fluoroquinolone antibiotics (Other)  levofloxacin (Unknown)  gatifloxacin (Unknown)  ofloxacin (Unknown)      T(C): 37 (09-09-24 @ 11:00), Max: 37.2 (09-08-24 @ 19:00)  HR: 84 (09-09-24 @ 08:00) (75 - 101)  BP: 139/66 (09-09-24 @ 08:00) (110/84 - 159/80)  RR: 29 (09-09-24 @ 08:00) (13 - 29)  SpO2: 98% (09-09-24 @ 08:00) (97% - 100%)      09-08-24 @ 07:01  -  09-09-24 @ 07:00  --------------------------------------------------------  IN: 1020 mL / OUT: 630 mL / NET: 390 mL                              11.3   17.35 )-----------( 78       ( 09 Sep 2024 04:30 )             34.4       09-09    144  |  109<H>  |  43.1<H>  ----------------------------<  124<H>  3.2<L>   |  23.0  |  1.24    Ca    8.3<L>      09 Sep 2024 04:30  Phos  1.9     09-09  Mg     2.5     09-09    TPro  5.0<L>  /  Alb  2.5<L>  /  TBili  0.4  /  DBili  x   /  AST  16  /  ALT  21  /  AlkPhos  100  09-09      Urinalysis Basic - ( 09 Sep 2024 04:30 )    Color: x / Appearance: x / SG: x / pH: x  Gluc: 124 mg/dL / Ketone: x  / Bili: x / Urobili: x   Blood: x / Protein: x / Nitrite: x   Leuk Esterase: x / RBC: x / WBC x   Sq Epi: x / Non Sq Epi: x / Bacteria: x            Radiology:

## 2024-09-09 NOTE — CHART NOTE - NSCHARTNOTEFT_GEN_A_CORE
Pt with episode acute agitation.   Attempting to get OOB, pulling at lines, restless, screaming out.   All vital signs stable.   5mg IM zyprexa given.   Fall precautions in place.   RN to notify of acute change in pt status.

## 2024-09-09 NOTE — PROGRESS NOTE ADULT - ASSESSMENT
80 y/o M with a h/o HTN, HFpEF, PE (on apixaban), Myasthenia Gravis, chronic LE edema, known 7 mm distal ureteral calculus with mild right hydroureteronephrosis, initially presented to the [place] ED on 9/5 with shortness of breath, urinary retention and confusion after failing to follow up outpatient for ureteral calculus. CT A/P revealed R hydro from 7 mm stone in UVJ. Underwent emergent cystoscopy with plan for ureteral stenting however procedure was aborted due to hematuria and inability to visualize right ureteral opening. Hospital course complicated by klebsiella pneumoniae bacteremia and septic shock requiring IV vasopressor support. Patient underwent emergent percutaneous nephrostomy tube placement. Now deemed stable for transfer to medicine for further management.    #Septic shock POA secondary to klebsiella bacteremia and Ureteral calculus  - Patient was seen by Urology at [place] and underwent Cystoscopy there  - Seen by IR and had nephrostomy tube placed, monitor output.  - Blood cx 9/5 shows Klebsiella  - UCX with GNR  - Abdominal fluid cx with Klebsiella  - Repeat BCx w/ NGTD  - On Rocephin  - ID following  - Leukocytosis improving  - Midodrine tapered off  - On Steroid dose steroids, will taper down  - Monitor WBC, fever curve.    #Thrombocytopenia  - worsening  - possibly in setting of sepsis  - continue to monitor, if continues to downtrend would d/c AC    #Hypokalemia, hypophosphatemia  - repletion ordered  - monitor    #Dysphagia  - SLP following  - NGT removed  - Aspiration precautions  - SLP recs appreciated - diet advanced to soft/mod thick liquids.   - Nutrition evaluation appreciated    #New Afib on RVR  - TTE unremarkable  - Telemetry  - Cardiology following  - Metoprolol, Lovenox started by Cardiology    #ICU/Hospital acquired delirium  - Started on Seroquel in ICU  - agitated overnight  - Placed on restraints  - Risperdal started  - ?steroid induced, will decrease solucortef.    #GERD  - Continue PPI    DVT prophylaxis: Lovenox    Advanced Directives: DNR/DNI, trial of NIV. Palliative following.    Dispo: Pending improvement in mentation, resolution of bacteremia     78 y/o M with a h/o HTN, HFpEF, PE (on apixaban), Myasthenia Gravis, chronic LE edema, known 7 mm distal ureteral calculus with mild right hydroureteronephrosis, initially presented to the [place] ED on 9/5 with shortness of breath, urinary retention and confusion after failing to follow up outpatient for ureteral calculus. CT A/P revealed R hydro from 7 mm stone in UVJ. Underwent emergent cystoscopy with plan for ureteral stenting however procedure was aborted due to hematuria and inability to visualize right ureteral opening. Hospital course complicated by klebsiella pneumoniae bacteremia and septic shock requiring IV vasopressor support. Patient underwent emergent percutaneous nephrostomy tube placement. Now deemed stable for transfer to medicine for further management.    #Septic shock POA secondary to klebsiella bacteremia and Ureteral calculus  - Patient was seen by Urology at [place] and underwent Cystoscopy there  - Seen by IR and had nephrostomy tube placed, monitor output.  - Urology consulted, f/u recs  - Blood cX 9/5 shows Klebsiella  - UCX with GNR  - Abdominal fluid cx with Klebsiella  - Repeat BCx w/ NGTD  - On Rocephin  - ID following  - Leukocytosis improving  - Midodrine tapered off  - On Steroid dose steroids, will taper down  - Monitor WBC, fever curve.    #Thrombocytopenia  - worsening  - possibly in setting of sepsis  - continue to monitor, if continues to downtrend would d/c AC    #Hypokalemia, hypophosphatemia  - repletion ordered  - monitor    #Dysphagia  - SLP following  - NGT removed  - Aspiration precautions  - SLP recs appreciated - diet advanced to soft/mod thick liquids.   - Nutrition evaluation appreciated    #New Afib on RVR  - TTE unremarkable  - Telemetry  - Cardiology following  - Metoprolol, Lovenox started by Cardiology    #ICU/Hospital acquired delirium  - Started on Seroquel in ICU  - agitated overnight  - Placed on restraints  - Risperdal started  - ?steroid induced, will decrease solucortef.    #GERD  - Continue PPI    DVT prophylaxis: Lovenox    Advanced Directives: DNR/DNI, trial of NIV. Palliative following.    Dispo: Pending improvement in mentation, resolution of bacteremia

## 2024-09-09 NOTE — PROGRESS NOTE ADULT - PROBLEM SELECTOR PLAN 1
.  - Afib with RVR currently to 130s  - likely sepsis induced with klebsiella pneumonia and septic shock  - d/c midodrine  - can start metoprolol 25mg via NGT BID for HR control   - TTE with EF 55-60% no RWMA  - repeat limited TTE to assess EF off vasopressor support  - per OP noted, patient was on Eliquis 5mg PO BID for hx of PE in 2023. Can continue for Afib.  - currently on full dose lovenox

## 2024-09-09 NOTE — DIETITIAN INITIAL EVALUATION ADULT - NS FNS DIET ORDER
Diet, Soft and Bite Sized:   DASH/TLC {Sodium & Cholesterol Restricted} (DASH)  Moderately Thick Liquids (MODTHICKLIQS) (09-09-24 @ 09:02)

## 2024-09-09 NOTE — DIETITIAN INITIAL EVALUATION ADULT - ORAL INTAKE PTA/DIET HISTORY
Chart and events reviewed. Pt previously failed SLP swallow evals, h/o myasthenia gravis noted in chart. Was started on TF Glucerna 1.5 @ 10 cc/hr on 9/7, reached goal on 9/8 @ 40 cc/hr (x 24 hrs provided 960 ml, 1440 kcals, 79 g pro, 730 ml free water, which met ~85% est energy/protein needs). S/p SLP f/u again this AM with recommendation to advance diet to Soft and Bite Sized with moderately thick liquids, NGT removed. Pt confused and yelling this AM b/l mitts in place, unable to obtain hx from pt at this time. Spoke with RN, pt tolerated tube feeds and liquid diet, had BM this AM. Per chart: pt was seen by [facility] RD November 2023, BW at that time 240 lbs. This admission weight 219 lbs (+3 edema); reflects a 21 lb weight loss x 10 mos. Pt does not meet criteria for malnutrition at this time, however remains at risk. Hypokalemia and hypophosphatemia noted, unsure of PO intake prior to admission; pt possibly at risk for refeeding syndrome. Previously with hypoglycemia, now improved, no a1c in labs, pt noted to have h/o DM. Pended ONS order for verification, reached out to MD via teams with nutrition recs.

## 2024-09-09 NOTE — DIETITIAN INITIAL EVALUATION ADULT - ADD RECOMMEND
Monitor electrolytes, replete prn; monitor for re-feeding syndrome   Continue current diet as tolerated, defer to SLP recs  Add Glucerna oral shakes (moderately thick) BID (220 kcals, 10 g pro each)  Provide Magic Cup BID for additional nourishment (290 kcals, 9 g pro each)  Rx: MVI daily to optimize nutrition status

## 2024-09-10 ENCOUNTER — RESULT REVIEW (OUTPATIENT)
Age: 79
End: 2024-09-10

## 2024-09-10 LAB
-  AMOXICILLIN/CLAVULANIC ACID: SIGNIFICANT CHANGE UP
-  AMPICILLIN/SULBACTAM: SIGNIFICANT CHANGE UP
-  AMPICILLIN: SIGNIFICANT CHANGE UP
-  AZTREONAM: SIGNIFICANT CHANGE UP
-  CEFAZOLIN: SIGNIFICANT CHANGE UP
-  CEFEPIME: SIGNIFICANT CHANGE UP
-  CEFOXITIN: SIGNIFICANT CHANGE UP
-  CEFTRIAXONE: SIGNIFICANT CHANGE UP
-  CEFUROXIME: SIGNIFICANT CHANGE UP
-  CIPROFLOXACIN: SIGNIFICANT CHANGE UP
-  ERTAPENEM: SIGNIFICANT CHANGE UP
-  GENTAMICIN: SIGNIFICANT CHANGE UP
-  IMIPENEM: SIGNIFICANT CHANGE UP
-  LEVOFLOXACIN: SIGNIFICANT CHANGE UP
-  MEROPENEM: SIGNIFICANT CHANGE UP
-  NITROFURANTOIN: SIGNIFICANT CHANGE UP
-  PIPERACILLIN/TAZOBACTAM: SIGNIFICANT CHANGE UP
-  TOBRAMYCIN: SIGNIFICANT CHANGE UP
-  TRIMETHOPRIM/SULFAMETHOXAZOLE: SIGNIFICANT CHANGE UP
A1C WITH ESTIMATED AVERAGE GLUCOSE RESULT: 6.2 % — HIGH (ref 4–5.6)
ALBUMIN SERPL ELPH-MCNC: 2.2 G/DL — LOW (ref 3.3–5.2)
ALP SERPL-CCNC: 87 U/L — SIGNIFICANT CHANGE UP (ref 40–120)
ALT FLD-CCNC: 22 U/L — SIGNIFICANT CHANGE UP
ANION GAP SERPL CALC-SCNC: 11 MMOL/L — SIGNIFICANT CHANGE UP (ref 5–17)
APTT BLD: 33 SEC — SIGNIFICANT CHANGE UP (ref 24.5–35.6)
APTT BLD: 37.8 SEC — HIGH (ref 24.5–35.6)
AST SERPL-CCNC: 20 U/L — SIGNIFICANT CHANGE UP
BASOPHILS # BLD AUTO: 0.02 K/UL — SIGNIFICANT CHANGE UP (ref 0–0.2)
BASOPHILS # BLD AUTO: 0.04 K/UL — SIGNIFICANT CHANGE UP (ref 0–0.2)
BASOPHILS NFR BLD AUTO: 0.1 % — SIGNIFICANT CHANGE UP (ref 0–2)
BASOPHILS NFR BLD AUTO: 0.3 % — SIGNIFICANT CHANGE UP (ref 0–2)
BILIRUB SERPL-MCNC: 0.4 MG/DL — SIGNIFICANT CHANGE UP (ref 0.4–2)
BUN SERPL-MCNC: 33.8 MG/DL — HIGH (ref 8–20)
CALCIUM SERPL-MCNC: 8.2 MG/DL — LOW (ref 8.4–10.5)
CHLORIDE SERPL-SCNC: 112 MMOL/L — HIGH (ref 96–108)
CO2 SERPL-SCNC: 21 MMOL/L — LOW (ref 22–29)
CREAT SERPL-MCNC: 0.92 MG/DL — SIGNIFICANT CHANGE UP (ref 0.5–1.3)
CULTURE RESULTS: ABNORMAL
D DIMER BLD IA.RAPID-MCNC: 377 NG/ML DDU — HIGH
D DIMER BLD IA.RAPID-MCNC: 475 NG/ML DDU — HIGH
EGFR: 84 ML/MIN/1.73M2 — SIGNIFICANT CHANGE UP
EOSINOPHIL # BLD AUTO: 0 K/UL — SIGNIFICANT CHANGE UP (ref 0–0.5)
EOSINOPHIL # BLD AUTO: 0 K/UL — SIGNIFICANT CHANGE UP (ref 0–0.5)
EOSINOPHIL NFR BLD AUTO: 0 % — SIGNIFICANT CHANGE UP (ref 0–6)
EOSINOPHIL NFR BLD AUTO: 0 % — SIGNIFICANT CHANGE UP (ref 0–6)
ESTIMATED AVERAGE GLUCOSE: 131 MG/DL — HIGH (ref 68–114)
FIBRINOGEN PPP-MCNC: 431 MG/DL — SIGNIFICANT CHANGE UP (ref 200–450)
FIBRINOGEN PPP-MCNC: 448 MG/DL — SIGNIFICANT CHANGE UP (ref 200–450)
GLUCOSE SERPL-MCNC: 114 MG/DL — HIGH (ref 70–99)
HCT VFR BLD CALC: 34.3 % — LOW (ref 39–50)
HCT VFR BLD CALC: 35.7 % — LOW (ref 39–50)
HCT VFR BLD CALC: 37.2 % — LOW (ref 39–50)
HCT VFR BLD CALC: 38 % — LOW (ref 39–50)
HGB BLD-MCNC: 10.8 G/DL — LOW (ref 13–17)
HGB BLD-MCNC: 11.4 G/DL — LOW (ref 13–17)
HGB BLD-MCNC: 11.5 G/DL — LOW (ref 13–17)
HGB BLD-MCNC: 11.9 G/DL — LOW (ref 13–17)
IMM GRANULOCYTES NFR BLD AUTO: 1.3 % — HIGH (ref 0–0.9)
IMM GRANULOCYTES NFR BLD AUTO: 2 % — HIGH (ref 0–0.9)
INR BLD: 1.06 RATIO — SIGNIFICANT CHANGE UP (ref 0.85–1.18)
INR BLD: 1.08 RATIO — SIGNIFICANT CHANGE UP (ref 0.85–1.18)
LYMPHOCYTES # BLD AUTO: 0.6 K/UL — LOW (ref 1–3.3)
LYMPHOCYTES # BLD AUTO: 0.65 K/UL — LOW (ref 1–3.3)
LYMPHOCYTES # BLD AUTO: 3.8 % — LOW (ref 13–44)
LYMPHOCYTES # BLD AUTO: 4.3 % — LOW (ref 13–44)
MAGNESIUM SERPL-MCNC: 2.3 MG/DL — SIGNIFICANT CHANGE UP (ref 1.6–2.6)
MCHC RBC-ENTMCNC: 30.1 PG — SIGNIFICANT CHANGE UP (ref 27–34)
MCHC RBC-ENTMCNC: 30.1 PG — SIGNIFICANT CHANGE UP (ref 27–34)
MCHC RBC-ENTMCNC: 30.7 PG — SIGNIFICANT CHANGE UP (ref 27–34)
MCHC RBC-ENTMCNC: 30.9 GM/DL — LOW (ref 32–36)
MCHC RBC-ENTMCNC: 31 PG — SIGNIFICANT CHANGE UP (ref 27–34)
MCHC RBC-ENTMCNC: 31.3 GM/DL — LOW (ref 32–36)
MCHC RBC-ENTMCNC: 31.5 GM/DL — LOW (ref 32–36)
MCHC RBC-ENTMCNC: 31.9 GM/DL — LOW (ref 32–36)
MCV RBC AUTO: 95.5 FL — SIGNIFICANT CHANGE UP (ref 80–100)
MCV RBC AUTO: 96 FL — SIGNIFICANT CHANGE UP (ref 80–100)
MCV RBC AUTO: 97 FL — SIGNIFICANT CHANGE UP (ref 80–100)
MCV RBC AUTO: 99.2 FL — SIGNIFICANT CHANGE UP (ref 80–100)
METHOD TYPE: SIGNIFICANT CHANGE UP
MONOCYTES # BLD AUTO: 0.99 K/UL — HIGH (ref 0–0.9)
MONOCYTES # BLD AUTO: 1.13 K/UL — HIGH (ref 0–0.9)
MONOCYTES NFR BLD AUTO: 6.3 % — SIGNIFICANT CHANGE UP (ref 2–14)
MONOCYTES NFR BLD AUTO: 7.4 % — SIGNIFICANT CHANGE UP (ref 2–14)
NEUTROPHILS # BLD AUTO: 13.17 K/UL — HIGH (ref 1.8–7.4)
NEUTROPHILS # BLD AUTO: 13.8 K/UL — HIGH (ref 1.8–7.4)
NEUTROPHILS NFR BLD AUTO: 86 % — HIGH (ref 43–77)
NEUTROPHILS NFR BLD AUTO: 88.5 % — HIGH (ref 43–77)
NT-PROBNP SERPL-SCNC: 4146 PG/ML — HIGH (ref 0–300)
ORGANISM # SPEC MICROSCOPIC CNT: ABNORMAL
ORGANISM # SPEC MICROSCOPIC CNT: SIGNIFICANT CHANGE UP
PHOSPHATE SERPL-MCNC: 2.7 MG/DL — SIGNIFICANT CHANGE UP (ref 2.4–4.7)
PLATELET # BLD AUTO: 61 K/UL — LOW (ref 150–400)
PLATELET # BLD AUTO: 73 K/UL — LOW (ref 150–400)
PLATELET # BLD AUTO: 77 K/UL — LOW (ref 150–400)
PLATELET # BLD AUTO: 78 K/UL — LOW (ref 150–400)
POTASSIUM SERPL-MCNC: 4.1 MMOL/L — SIGNIFICANT CHANGE UP (ref 3.5–5.3)
POTASSIUM SERPL-SCNC: 4.1 MMOL/L — SIGNIFICANT CHANGE UP (ref 3.5–5.3)
PROT SERPL-MCNC: 4.7 G/DL — LOW (ref 6.6–8.7)
PROTHROM AB SERPL-ACNC: 11.7 SEC — SIGNIFICANT CHANGE UP (ref 9.5–13)
PROTHROM AB SERPL-ACNC: 12 SEC — SIGNIFICANT CHANGE UP (ref 9.5–13)
RBC # BLD: 3.59 M/UL — LOW (ref 4.2–5.8)
RBC # BLD: 3.68 M/UL — LOW (ref 4.2–5.8)
RBC # BLD: 3.75 M/UL — LOW (ref 4.2–5.8)
RBC # BLD: 3.96 M/UL — LOW (ref 4.2–5.8)
RBC # FLD: 16.4 % — HIGH (ref 10.3–14.5)
RBC # FLD: 16.6 % — HIGH (ref 10.3–14.5)
RBC # FLD: 16.7 % — HIGH (ref 10.3–14.5)
RBC # FLD: 16.8 % — HIGH (ref 10.3–14.5)
SODIUM SERPL-SCNC: 144 MMOL/L — SIGNIFICANT CHANGE UP (ref 135–145)
SPECIMEN SOURCE: SIGNIFICANT CHANGE UP
WBC # BLD: 14.75 K/UL — HIGH (ref 3.8–10.5)
WBC # BLD: 15.29 K/UL — HIGH (ref 3.8–10.5)
WBC # BLD: 15.62 K/UL — HIGH (ref 3.8–10.5)
WBC # BLD: 16.56 K/UL — HIGH (ref 3.8–10.5)
WBC # FLD AUTO: 14.75 K/UL — HIGH (ref 3.8–10.5)
WBC # FLD AUTO: 15.29 K/UL — HIGH (ref 3.8–10.5)
WBC # FLD AUTO: 15.62 K/UL — HIGH (ref 3.8–10.5)
WBC # FLD AUTO: 16.56 K/UL — HIGH (ref 3.8–10.5)

## 2024-09-10 PROCEDURE — 99233 SBSQ HOSP IP/OBS HIGH 50: CPT

## 2024-09-10 PROCEDURE — 93308 TTE F-UP OR LMTD: CPT | Mod: 26

## 2024-09-10 PROCEDURE — 99233 SBSQ HOSP IP/OBS HIGH 50: CPT | Mod: GC

## 2024-09-10 PROCEDURE — 99232 SBSQ HOSP IP/OBS MODERATE 35: CPT

## 2024-09-10 PROCEDURE — 93321 DOPPLER ECHO F-UP/LMTD STD: CPT | Mod: 26

## 2024-09-10 RX ORDER — METOPROLOL TARTRATE 100 MG/1
12.5 TABLET ORAL EVERY 8 HOURS
Refills: 0 | Status: DISCONTINUED | OUTPATIENT
Start: 2024-09-10 | End: 2024-09-15

## 2024-09-10 RX ORDER — MIDODRINE HYDROCHLORIDE 5 MG/1
10 TABLET ORAL EVERY 8 HOURS
Refills: 0 | Status: DISCONTINUED | OUTPATIENT
Start: 2024-09-10 | End: 2024-09-11

## 2024-09-10 RX ORDER — APIXABAN 5 MG/1
5 TABLET, FILM COATED ORAL
Refills: 0 | Status: DISCONTINUED | OUTPATIENT
Start: 2024-09-10 | End: 2024-09-16

## 2024-09-10 RX ORDER — HYDROCORTISONE 10 MG/1
50 TABLET ORAL EVERY 8 HOURS
Refills: 0 | Status: DISCONTINUED | OUTPATIENT
Start: 2024-09-10 | End: 2024-09-11

## 2024-09-10 RX ADMIN — HYDROCORTISONE 50 MILLIGRAM(S): 10 TABLET ORAL at 13:31

## 2024-09-10 RX ADMIN — METOPROLOL TARTRATE 12.5 MILLIGRAM(S): 100 TABLET ORAL at 22:22

## 2024-09-10 RX ADMIN — Medication 40 MILLIGRAM(S): at 13:30

## 2024-09-10 RX ADMIN — HYDROCORTISONE 25 MILLIGRAM(S): 10 TABLET ORAL at 06:52

## 2024-09-10 RX ADMIN — HYDROCORTISONE 50 MILLIGRAM(S): 10 TABLET ORAL at 22:22

## 2024-09-10 RX ADMIN — RISPERIDONE 0.25 MILLIGRAM(S): 0.25 TABLET, FILM COATED ORAL at 17:39

## 2024-09-10 RX ADMIN — HYDROCORTISONE 25 MILLIGRAM(S): 10 TABLET ORAL at 00:48

## 2024-09-10 RX ADMIN — CHLORHEXIDINE GLUCONATE 1 APPLICATION(S): 40 SOLUTION TOPICAL at 13:46

## 2024-09-10 RX ADMIN — APIXABAN 5 MILLIGRAM(S): 5 TABLET, FILM COATED ORAL at 17:39

## 2024-09-10 RX ADMIN — Medication 1 APPLICATION(S): at 06:24

## 2024-09-10 RX ADMIN — Medication 2000 MILLIGRAM(S): at 13:30

## 2024-09-10 RX ADMIN — ENOXAPARIN SODIUM 95 MILLIGRAM(S): 100 INJECTION SUBCUTANEOUS at 06:24

## 2024-09-10 RX ADMIN — TAMSULOSIN HYDROCHLORIDE 0.4 MILLIGRAM(S): 0.4 CAPSULE ORAL at 22:22

## 2024-09-10 RX ADMIN — Medication 1 TABLET(S): at 13:31

## 2024-09-10 RX ADMIN — RISPERIDONE 0.25 MILLIGRAM(S): 0.25 TABLET, FILM COATED ORAL at 06:25

## 2024-09-10 RX ADMIN — Medication 25 MILLIGRAM(S): at 22:22

## 2024-09-10 RX ADMIN — METOPROLOL TARTRATE 12.5 MILLIGRAM(S): 100 TABLET ORAL at 15:42

## 2024-09-10 NOTE — PROGRESS NOTE ADULT - ASSESSMENT
79 yo M with pmhx of HTN, CHF, PE, Myasthenia Gravis, and chronic LE edema, history of 7 mm distal ureteral calculus with mild right hydroureteronephrosis and pain presented to the ED with shortness of breath and confusion after failing to follow up outpt on ureteral calculus after prior evaluation at [facility].  Patient's son is at bedside he states he seems more confused and altered then baseline; has not been feeling well. Patient was complaining of nauseous. Also is retaining urine; usually urinates in a urinal. Decreased appetite; weak; chills. Patient hasn't seen cardiologist since March to follow up on his CHF. Labs significant for WBC 41.45, procal 36.3, lactate 7.9, Scr 2.6, BNP 73998, UA. CT CAP revealed R hydro from 7 mm stone in UVJ. Patient sent emergently with urology for stent placement.  Stent placement for 7 mm stone unsuccessful, procedure aborted, patient transferred to ICU for septic shock.   emergent IR for nephrostomy tube placement w   Now s/p PCN by IR.   AS ABOVE PT WITH KLEBSIELLA BACTEREMIA WITH STONE S/P EMERGENT NEPHROSTOMY TUBE PLACEMENT  CURRENTLY IN ICU  OVERALL IMPROVED AWAKE ALERT  ON IV CEFTRIAXONE FOR KLEB BACTEREMIA  WILL CONTINUE FOR NOW  WBC DOWN TO  15.6   OVERALL IMPROVED  WILL FOLLOWUP WITH FURTHER RECOMMENDATIONS

## 2024-09-10 NOTE — PROGRESS NOTE ADULT - SUBJECTIVE AND OBJECTIVE BOX
INFECTIOUS DISEASES AND INTERNAL MEDICINE at [facility]  =======================================================  Jasen Willis MD  Diplomates American Board of Internal Medicine and Infectious Diseases  Telephone [phone]  Fax            [phone]  =======================================================    IVANA DEION [id number]    Follow up: leukocytosis KLEBS BACTEREMIA    Allergies:  Ketek (Other)  telithromycin (Other)  Avelox (Other)  fluoroquinolone antibiotics (Other)  levofloxacin (Unknown)  gatifloxacin (Unknown)  ofloxacin (Unknown)      Medications:  cefTRIAXone Injectable. 2000 milliGRAM(s) IV Push every 24 hours  chlorhexidine 2% Cloths 1 Application(s) Topical daily  enoxaparin Injectable 95 milliGRAM(s) SubCutaneous every 12 hours  hydrocortisone sodium succinate Injectable 50 milliGRAM(s) IV Push every 8 hours  metoprolol tartrate 12.5 milliGRAM(s) Oral every 8 hours  midodrine 10 milliGRAM(s) Oral every 8 hours  multivitamin 1 Tablet(s) Oral daily  nystatin Powder 1 Application(s) Topical two times a day  pantoprazole  Injectable 40 milliGRAM(s) IV Push daily  QUEtiapine 25 milliGRAM(s) Oral at bedtime  risperiDONE   Tablet 0.25 milliGRAM(s) Oral every 12 hours  spironolactone 25 milliGRAM(s) Oral daily  tamsulosin 0.4 milliGRAM(s) Oral at bedtime    SOCIAL       FAMILY   FAMILY HISTORY:  Family history of stroke (Father)  Father -  age 62    Family history of kidney disease (Mother)  Mother -  age 67    Family history of diabetes mellitus type II (Sibling)  Brother & Sister      REVIEW OF SYSTEMS:  CONSTITUTIONAL:  No Fever or chills  HEENT:   No diplopia or blurred vision.  No earache, sore throat or runny nose.  CARDIOVASCULAR:  No pressure, squeezing, strangling, tightness, heaviness or aching about the chest, neck, axilla or epigastrium.  RESPIRATORY:  No cough, shortness of breath, PND or orthopnea.  GASTROINTESTINAL:  No nausea, vomiting or diarrhea.  GENITOURINARY:  No dysuria, frequency or urgency. No Blood in urine  MUSCULOSKELETAL:   moves all joints  SKIN:  No change in skin, hair or nails.  NEUROLOGIC:  No paresthesias, fasciculations, seizures or weakness.  PSYCHIATRIC:  No disorder of thought or mood.  ENDOCRINE:  No heat or cold intolerance, polyuria or polydipsia.  HEMATOLOGICAL:  No easy bruising or bleeding.            Physical Exam:   Vital Signs Last 24 Hrs  T(C): 36.5 (10 Sep 2024 04:56), Max: 37 (09 Sep 2024 11:00)  T(F): 97.7 (10 Sep 2024 04:56), Max: 98.6 (09 Sep 2024 11:00)  HR: 55 (10 Sep 2024 04:56) (55 - 104)  BP: 98/62 (10 Sep 2024 04:56) (98/62 - 155/90)  BP(mean): 105 (09 Sep 2024 16:00) (102 - 105)  RR: 18 (10 Sep 2024 04:56) (14 - 18)  SpO2: 98% (10 Sep 2024 04:56) (96% - 100%)    Parameters below as of 10 Sep 2024 04:56  Patient On (Oxygen Delivery Method): room air              GEN: NAD,   HEENT: normocephalic and atraumatic. EOMI. ALINE.    NECK: Supple. No carotid bruits.  No lymphadenopathy or thyromegaly.  LUNGS: Clear to auscultation.  HEART: Regular rate and rhythm without murmur.  ABDOMEN: Soft, nontender, and nondistended.  Positive bowel sounds.    : No CVA tenderness  EXTREMITIES: Without any cyanosis, clubbing, rash, lesions or edema.  MSK: no joint swelling  NEUROLOGIC: Cranial nerves II through XII are grossly intact.  PSYCHIATRIC: Appropriate affect .  SKIN: No ulceration or induration present.        Labs:  Vitals:  ===========     =======================================================  Current Antibiotics:  cefTRIAXone Injectable. 2000 milliGRAM(s) IV Push every 24 hours    Other medications:  chlorhexidine 2% Cloths 1 Application(s) Topical daily  enoxaparin Injectable 95 milliGRAM(s) SubCutaneous every 12 hours  hydrocortisone sodium succinate Injectable 50 milliGRAM(s) IV Push every 8 hours  metoprolol tartrate 12.5 milliGRAM(s) Oral every 8 hours  midodrine 10 milliGRAM(s) Oral every 8 hours  multivitamin 1 Tablet(s) Oral daily  nystatin Powder 1 Application(s) Topical two times a day  pantoprazole  Injectable 40 milliGRAM(s) IV Push daily  QUEtiapine 25 milliGRAM(s) Oral at bedtime  risperiDONE   Tablet 0.25 milliGRAM(s) Oral every 12 hours  spironolactone 25 milliGRAM(s) Oral daily  tamsulosin 0.4 milliGRAM(s) Oral at bedtime      =======================================================  Labs:                                      11.5   16.56 )-----------( 61       ( 10 Sep 2024 04:48 )             37.2   09-10    144  |  112<H>  |  33.8<H>  ----------------------------<  114<H>  4.1   |  21.0<L>  |  0.92    Ca    8.2<L>      10 Sep 2024 04:48  Phos  2.7     09-10  Mg     2.3     09-10    TPro  4.7<L>  /  Alb  2.2<L>  /  TBili  0.4  /  DBili  x   /  AST  20  /  ALT  22  /  AlkPhos  87  10        Culture - Blood (collected 24 @ 16:02)  Source: .Blood Blood  Preliminary Report (09-10-24 @ 01:02):    No growth at 48 Hours    Culture - Urine (collected 24 @ 16:15)  Source: .Urine Abdominal Fluid  Preliminary Report (24 @ 11:58):    >100,000 CFU/ml Klebsiella pneumoniae    Culture - Body Fluid with Gram Stain (collected 24 @ 08:45)  Source: Abdominal Fl Abdominal Fluid  Gram Stain (24 @ 15:37):    Rare polymorphonuclear leukocytes per low power field    No organisms seen per oil power field  Preliminary Report (24 @ 07:50):    Moderate Klebsiella pneumoniae    Few Klebsiella pneumoniae #2    Multiple Morphological Strains  Organism: Klebsiella pneumoniae  Klebsiella pneumoniae (24 @ 07:50)  Organism: Klebsiella pneumoniae (24 @ 07:50)    Sensitivities:      Method Type: MONI      -  Amoxicillin/Clavulanic Acid: S <=8/4      -  Ampicillin: R 16 These ampicillin results predict results for amoxicillin      -  Ampicillin/Sulbactam: S <=4/2      -  Aztreonam: S <=4      -  Cefazolin: S <=2      -  Cefepime: S <=2      -  Cefoxitin: I 16      -  Ceftriaxone: S <=1      -  Ciprofloxacin: S <=0.25      -  Ertapenem: S <=0.5      -  Gentamicin: S <=2      -  Imipenem: S <=1      -  Levofloxacin: S <=0.5      -  Meropenem: S <=1      -  Piperacillin/Tazobactam: S <=8      -  Tobramycin: S <=2      -  Trimethoprim/Sulfamethoxazole: S <=0.5/9.5  Organism: Klebsiella pneumoniae (24 @ 07:50)    Sensitivities:      Method Type: MONI      -  Amoxicillin/Clavulanic Acid: S <=8/4      -  Ampicillin: R >16 These ampicillin results predict results for amoxicillin      -  Ampicillin/Sulbactam: S <=4/2      -  Aztreonam: S <=4      -  Cefazolin: S <=2      -  Cefepime: S <=2      -  Cefoxitin: S <=8      -  Ceftriaxone: S <=1      -  Ciprofloxacin: I 0.5      -  Ertapenem: S <=0.5      -  Gentamicin: S <=2      -  Imipenem: S <=1      -  Levofloxacin: S <=0.5      -  Meropenem: S <=1      -  Piperacillin/Tazobactam: S <=8      -  Tobramycin: S <=2      -  Trimethoprim/Sulfamethoxazole: S <=0.5/9.5    Urinalysis with Rflx Culture (collected 24 @ 15:20)    Culture - Urine (collected 24 @ 15:20)  Source: Clean Catch Clean Catch (Midstream)  Final Report (24 @ 14:50):    >100,000 CFU/ml Klebsiella pneumoniae  Organism: Klebsiella pneumoniae (24 @ 14:50)  Organism: Klebsiella pneumoniae (24 @ 14:50)    Sensitivities:      Method Type: MONI      -  Amoxicillin/Clavulanic Acid: S <=8/4      -  Ampicillin: R >16 These ampicillin results predict results for amoxicillin      -  Ampicillin/Sulbactam: S <=4/2      -  Aztreonam: S <=4      -  Cefazolin: S <=2 For uncomplicated UTI with K. pneumoniae, E. coli, or P. mirablis: MONI <=16 is sensitive and MONI >=32 is resistant. This also predicts results for oral agents cefaclor, cefdinir, cefpodoxime, cefprozil, cefuroxime axetil, cephalexin and locarbef for uncomplicated UTI. Note that some isolates may be susceptible to these agents while testing resistant to cefazolin.      -  Cefepime: S <=2      -  Cefoxitin: S <=8      -  Ceftriaxone: S <=1      -  Cefuroxime: S <=4      -  Ciprofloxacin: S <=0.25      -  Ertapenem: S <=0.5      -  Gentamicin: S <=2      -  Imipenem: S <=1      -  Levofloxacin: S <=0.5      -  Meropenem: S <=1      -  Nitrofurantoin: S <=32 Should not be used to treat pyelonephritis      -  Piperacillin/Tazobactam: S <=8      -  Tobramycin: S <=2      -  Trimethoprim/Sulfamethoxazole: S <=0.5/9.5    Culture - Blood (collected 24 @ 14:30)  Source: .Blood Blood-Peripheral  Gram Stain (24 @ 08:30):    Growth in aerobic and anaerobic bottles: Gram Negative Rods  Final Report (24 @ 07:28):    Growth in aerobic and anaerobic bottles: Klebsiella pneumoniae    See previous culture  [id number]    Culture - Blood (collected 24 @ 14:15)  Source: .Blood Blood-Peripheral  Gram Stain (24 @ 07:52):    Growth in aerobic bottle: Gram Negative Rods    Growth in anaerobic bottle: Gram Negative Rods  Final Report (24 @ 12:17):    Growth in aerobic and anaerobic bottles: Klebsiella pneumoniae    Direct identification is available within approximately 3-5    hours either by Blood Panel Multiplexed PCR or Direct    MALDI-TOF. Details: [email or web address]  Organism: Blood Culture PCR  Klebsiella pneumoniae (24 @ 12:17)  Organism: Klebsiella pneumoniae (24 @ 12:17)    Sensitivities:      Method Type: MONI      -  Ampicillin: R >16 These ampicillin results predict results for amoxicillin      -  Ampicillin/Sulbactam: S <=4/2      -  Aztreonam: S <=4      -  Cefazolin: S <=2      -  Cefepime: S <=2      -  Cefoxitin: S <=8      -  Ceftriaxone: S <=1      -  Ciprofloxacin: S <=0.25      -  Ertapenem: S <=0.5      -  Gentamicin: S <=2      -  Imipenem: S <=1      -  Levofloxacin: S <=0.5      -  Meropenem: S <=1      -  Piperacillin/Tazobactam: S <=8      -  Tobramycin: S <=2      -  Trimethoprim/Sulfamethoxazole: S <=0.5/9.5  Organism: Blood Culture PCR (24 @ 12:17)    Sensitivities:      Method Type: PCR      -  K. pneumoniae group: Detec (K. pneumoniae, K. quasipneumoniae, K. variicola)    Culture - Urine (collected 24 @ 16:25)  Source: Clean Catch Clean Catch (Midstream)  Final Report (24 @ 16:16):    50,000 - 99,000 CFU/mL Klebsiella pneumoniae  Organism: Klebsiella pneumoniae (24 @ 16:16)  Organism: Klebsiella pneumoniae (24 @ 16:16)    Sensitivities:      Method Type: MONI      -  Amoxicillin/Clavulanic Acid: S <=8/4      -  Ampicillin: R >16 These ampicillin results predict results for amoxicillin      -  Ampicillin/Sulbactam: S <=4/2      -  Aztreonam: S <=4      -  Cefazolin: S <=2 For uncomplicated UTI with K. pneumoniae, E. coli, or P. mirablis: MONI <=16 is sensitive and MONI >=32 is resistant. This also predicts results for oral agents cefaclor, cefdinir, cefpodoxime, cefprozil, cefuroxime axetil, cephalexin and locarbef for uncomplicated UTI. Note that some isolates may be susceptible to these agents while testing resistant to cefazolin.      -  Cefepime: S <=2      -  Cefoxitin: S <=8      -  Ceftriaxone: S <=1      -  Cefuroxime: S 8      -  Ciprofloxacin: I 0.5      -  Ertapenem: S <=0.5      -  Gentamicin: S <=2      -  Imipenem: S <=1      -  Levofloxacin: S <=0.5      -  Meropenem: S <=1      -  Nitrofurantoin: R >64 Should not be used to treat pyelonephritis      -  Piperacillin/Tazobactam: S <=8      -  Tobramycin: S <=2      -  Trimethoprim/Sulfamethoxazole: S <=0.5/9.5    Culture - Blood (collected 24 @ 13:05)  Source: .Blood Blood-Peripheral  Final Report (24 @ 19:00):    No growth at 5 days    Culture - Blood (collected 24 @ 13:00)  Source: .Blood Blood-Peripheral  Final Report (24 @ 19:00):    No growth at 5 days    Culture - Blood (collected 23 @ 21:50)  Source: .Blood Blood-Venous  Final Report (23 @ 03:01):    No growth at 5 days    Culture - Blood (collected 23 @ 21:45)  Source: .Blood Blood-Venous  Final Report (23 @ 03:01):    No growth at 5 days    Culture - Urine (collected 23 @ 21:40)  Source: Clean Catch Clean Catch (Midstream)  Final Report (23 @ 10:29):    >100,000 CFU/ml Klebsiella pneumoniae  Organism: Klebsiella pneumoniae (23 @ 10:29)  Organism: Klebsiella pneumoniae (23 @ 10:29)    Sensitivities:      Method Type: MONI      -  Amoxicillin/Clavulanic Acid: S <=8/4      -  Ampicillin: R 16 These ampicillin results predict results for amoxicillin      -  Ampicillin/Sulbactam: S <=4/2      -  Aztreonam: S <=4      -  Cefazolin: S <=2 For uncomplicated UTI with K. pneumoniae, E. coli, or P. mirablis: MONI <=16 is sensitive and MONI >=32 is resistant. This also predicts results for oral agents cefaclor, cefdinir, cefpodoxime, cefprozil, cefuroxime axetil, cephalexin and locarbef for uncomplicated UTI. Note that some isolates may be susceptible to these agents while testing resistant to cefazolin.      -  Cefepime: S <=2      -  Cefoxitin: S <=8      -  Ceftriaxone: S <=1      -  Cefuroxime: S <=4      -  Ciprofloxacin: S <=0.25      -  Ertapenem: S <=0.5      -  Gentamicin: S <=2      -  Imipenem: S <=1      -  Levofloxacin: S <=0.5      -  Meropenem: S <=1      -  Nitrofurantoin: S <=32 Should not be used to treat pyelonephritis      -  Piperacillin/Tazobactam: S <=8      -  Tobramycin: S <=2      -  Trimethoprim/Sulfamethoxazole: S <=0.5/9.5      Creatinine: 0.92 mg/dL (09-10-24 @ 04:48)  Creatinine: 1.24 mg/dL (24 @ 04:30)  Creatinine: 1.35 mg/dL (24 @ 06:50)  Creatinine: 1.59 mg/dL (24 @ 03:18)  Creatinine: 1.52 mg/dL (24 @ 20:00)  Creatinine: 1.60 mg/dL (24 @ 12:10)  Creatinine: 1.68 mg/dL (24 @ 08:40)  Creatinine: 1.73 mg/dL (24 @ 02:20)  Creatinine: 2.20 mg/dL (24 @ 21:40)  Creatinine: 2.60 mg/dL (24 @ 15:45)    Procalcitonin: 36.31 ng/mL (24 @ 18:20)  Procalcitonin: 26.81 ng/mL (24 @ 15:45)    D-Dimer Assay, Quantitative: 475 ng/mL DDU (09-10-24 @ 08:40)      C-Reactive Protein: 226 mg/L (24 @ 18:20)    WBC Count: 16.56 K/uL (09-10-24 @ 04:48)  WBC Count: 17.35 K/uL (24 @ 04:30)  WBC Count: 24.29 K/uL (24 @ 06:50)  WBC Count: 25.98 K/uL (24 @ 03:18)  WBC Count: 32.79 K/uL (24 @ 20:00)  WBC Count: 33.85 K/uL (24 @ 12:10)  WBC Count: 35.77 K/uL (24 @ 08:40)  WBC Count: 37.28 K/uL (24 @ 02:20)  WBC Count: 35.99 K/uL (24 @ 21:40)  WBC Count: 41.45 K/uL (24 @ 14:30)    SARS-CoV-2 Result: NotDetec (24 @ 14:30)      Alkaline Phosphatase: 87 U/L (09-10-24 @ 04:48)  Alkaline Phosphatase: 100 U/L (24 @ 04:30)  Alkaline Phosphatase: 118 U/L (24 @ 06:50)  Alkaline Phosphatase: 72 U/L (24 @ 03:18)  Alkaline Phosphatase: 77 U/L (24 @ 20:00)  Alanine Aminotransferase (ALT/SGPT): 22 U/L (09-10-24 @ 04:48)  Alanine Aminotransferase (ALT/SGPT): 21 U/L (24 @ 04:30)  Alanine Aminotransferase (ALT/SGPT): 25 U/L (24 @ 06:50)  Alanine Aminotransferase (ALT/SGPT): 23 U/L (24 @ 03:18)  Alanine Aminotransferase (ALT/SGPT): 27 U/L (24 @ 20:00)  Aspartate Aminotransferase (AST/SGOT): 20 U/L (09-10-24 @ 04:48)  Aspartate Aminotransferase (AST/SGOT): 16 U/L (24 @ 04:30)  Aspartate Aminotransferase (AST/SGOT): 20 U/L (24 @ 06:50)  Aspartate Aminotransferase (AST/SGOT): 22 U/L (24 @ 03:18)  Aspartate Aminotransferase (AST/SGOT): 26 U/L (24 @ 20:00)  Bilirubin Total: 0.4 mg/dL (09-10-24 @ 04:48)  Bilirubin Total: 0.4 mg/dL (24 @ 04:30)  Bilirubin Total: 0.3 mg/dL (24 @ 06:50)  Bilirubin Total: 0.4 mg/dL (24 @ 03:18)  Bilirubin Total: 0.5 mg/dL (24 @ 20:00)

## 2024-09-10 NOTE — PROGRESS NOTE ADULT - SUBJECTIVE AND OBJECTIVE BOX
OVERNIGHT EVENTS:    Present Symptoms:     Dyspnea: Mild Moderate Severe  Nausea/Vomiting: Yes No  Anxiety:  Yes No  Depression: Yes No  Fatigue: Yes No  Loss of appetite: Yes No  Constipation:     Pain:             Character-            Duration-            Effect-            Factors-            Frequency-            Location-            Severity-    Pain AD Score:  [email or web address] (press ctrl + left click to view)    Review of Systems: Reviewed                     Negative:                     Positive:  Unable to obtain due to poor mentation   All others negative    MEDICATIONS  (STANDING):  cefTRIAXone Injectable. 2000 milliGRAM(s) IV Push every 24 hours  chlorhexidine 2% Cloths 1 Application(s) Topical daily  enoxaparin Injectable 95 milliGRAM(s) SubCutaneous every 12 hours  hydrocortisone sodium succinate Injectable 25 milliGRAM(s) IV Push every 6 hours  metoprolol succinate ER 25 milliGRAM(s) Oral daily  multivitamin 1 Tablet(s) Oral daily  nystatin Powder 1 Application(s) Topical two times a day  pantoprazole  Injectable 40 milliGRAM(s) IV Push daily  QUEtiapine 25 milliGRAM(s) Oral at bedtime  risperiDONE   Tablet 0.25 milliGRAM(s) Oral every 12 hours  spironolactone 25 milliGRAM(s) Oral daily  tamsulosin 0.4 milliGRAM(s) Oral at bedtime    MEDICATIONS  (PRN):      PHYSICAL EXAM:    Vital Signs Last 24 Hrs  T(C): 36.5 (10 Sep 2024 04:56), Max: 37 (09 Sep 2024 11:00)  T(F): 97.7 (10 Sep 2024 04:56), Max: 98.6 (09 Sep 2024 11:00)  HR: 55 (10 Sep 2024 04:56) (55 - 104)  BP: 98/62 (10 Sep 2024 04:56) (98/62 - 155/90)  BP(mean): 105 (09 Sep 2024 16:00) (102 - 105)  RR: 18 (10 Sep 2024 04:56) (14 - 18)  SpO2: 98% (10 Sep 2024 04:56) (96% - 100%)    Parameters below as of 10 Sep 2024 04:56  Patient On (Oxygen Delivery Method): room air        General: alert  oriented x ____ lethargic agitated                  cachexia  nonverbal  coma    Karnofsky:  %    HEENT: normal  dry mouth  ET tube/trach    Lungs: comfortable tachypnea/labored breathing  excessive secretions    CV: normal  tachycardia    GI: normal  distended  tender  no BS               PEG/NG/OG tube  constipation  last BM:     : normal  incontinent  oliguria/anuria  guerrero    MSK: normal  weakness  edema             ambulatory  bedbound/wheelchair bound    Skin: normal  pressure ulcers- Stage_____  no rash    LABS:                          11.5   16.56 )-----------( 61       ( 10 Sep 2024 04:48 )             37.2     09-10    144  |  112<H>  |  33.8<H>  ----------------------------<  114<H>  4.1   |  21.0<L>  |  0.92    Ca    8.2<L>      10 Sep 2024 04:48  Phos  2.7     09-10  Mg     2.3     09-10    TPro  4.7<L>  /  Alb  2.2<L>  /  TBili  0.4  /  DBili  x   /  AST  20  /  ALT  22  /  AlkPhos  87  09-10      Urinalysis Basic - ( 10 Sep 2024 04:48 )    Color: x / Appearance: x / SG: x / pH: x  Gluc: 114 mg/dL / Ketone: x  / Bili: x / Urobili: x   Blood: x / Protein: x / Nitrite: x   Leuk Esterase: x / RBC: x / WBC x   Sq Epi: x / Non Sq Epi: x / Bacteria: x      I&O's Summary    09 Sep 2024 07:01  -  10 Sep 2024 07:00  --------------------------------------------------------  IN: 80 mL / OUT: 615 mL / NET: -535 mL        RADIOLOGY & ADDITIONAL STUDIES:    ADVANCE DIRECTIVES/TREATMENT PREFERENCES:  DNR YES NO  Completed on:                     MOLST  YES NO   Completed on:  Living Will  YES NO   Completed on: OVERNIGHT EVENTS: doing better, just wants to eat     Present Symptoms:     Dyspnea: none   Nausea/Vomiting: No  Anxiety:  No  Depression: unable   Fatigue: yes   Loss of appetite: unable   Constipation: none     Pain: none             Character-            Duration-            Effect-            Factors-            Frequency-            Location-            Severity-    Pain AD Score:  [email or web address]/[email or web address].pdf (press ctrl + left click to view)    Review of Systems: Reviewed                     Negative: no chest pain                    All others negative    MEDICATIONS  (STANDING):  cefTRIAXone Injectable. 2000 milliGRAM(s) IV Push every 24 hours  chlorhexidine 2% Cloths 1 Application(s) Topical daily  enoxaparin Injectable 95 milliGRAM(s) SubCutaneous every 12 hours  hydrocortisone sodium succinate Injectable 25 milliGRAM(s) IV Push every 6 hours  metoprolol succinate ER 25 milliGRAM(s) Oral daily  multivitamin 1 Tablet(s) Oral daily  nystatin Powder 1 Application(s) Topical two times a day  pantoprazole  Injectable 40 milliGRAM(s) IV Push daily  QUEtiapine 25 milliGRAM(s) Oral at bedtime  risperiDONE   Tablet 0.25 milliGRAM(s) Oral every 12 hours  spironolactone 25 milliGRAM(s) Oral daily  tamsulosin 0.4 milliGRAM(s) Oral at bedtime    MEDICATIONS  (PRN):    PHYSICAL EXAM:    Vital Signs Last 24 Hrs  T(C): 36.5 (10 Sep 2024 04:56), Max: 37 (09 Sep 2024 11:00)  T(F): 97.7 (10 Sep 2024 04:56), Max: 98.6 (09 Sep 2024 11:00)  HR: 55 (10 Sep 2024 04:56) (55 - 104)  BP: 98/62 (10 Sep 2024 04:56) (98/62 - 155/90)  BP(mean): 105 (09 Sep 2024 16:00) (102 - 105)  RR: 18 (10 Sep 2024 04:56) (14 - 18)  SpO2: 98% (10 Sep 2024 04:56) (96% - 100%)    Parameters below as of 10 Sep 2024 04:56  Patient On (Oxygen Delivery Method): room air    General: alert     Karnofsky:  %    HEENT: normal  dry mouth  ET tube/trach    Lungs: comfortable tachypnea/labored breathing  excessive secretions    CV: normal  tachycardia    GI: normal  distended  tender  no BS               PEG/NG/OG tube  constipation  last BM:     : normal  incontinent  oliguria/anuria  guerrero    MSK: normal  weakness  edema             ambulatory  bedbound/wheelchair bound    Skin: normal  pressure ulcers- Stage_____  no rash    LABS:                          11.5   16.56 )-----------( 61       ( 10 Sep 2024 04:48 )             37.2     09-10    144  |  112<H>  |  33.8<H>  ----------------------------<  114<H>  4.1   |  21.0<L>  |  0.92    Ca    8.2<L>      10 Sep 2024 04:48  Phos  2.7     09-10  Mg     2.3     09-10    TPro  4.7<L>  /  Alb  2.2<L>  /  TBili  0.4  /  DBili  x   /  AST  20  /  ALT  22  /  AlkPhos  87  09-10      Urinalysis Basic - ( 10 Sep 2024 04:48 )    Color: x / Appearance: x / SG: x / pH: x  Gluc: 114 mg/dL / Ketone: x  / Bili: x / Urobili: x   Blood: x / Protein: x / Nitrite: x   Leuk Esterase: x / RBC: x / WBC x   Sq Epi: x / Non Sq Epi: x / Bacteria: x      I&O's Summary    09 Sep 2024 07:01  -  10 Sep 2024 07:00  --------------------------------------------------------  IN: 80 mL / OUT: 615 mL / NET: -535 mL        RADIOLOGY & ADDITIONAL STUDIES:    ADVANCE DIRECTIVES/TREATMENT PREFERENCES:  DNR YES NO  Completed on:                     MOLST  YES NO   Completed on:  Living Will  YES NO   Completed on:

## 2024-09-10 NOTE — PROGRESS NOTE ADULT - SUBJECTIVE AND OBJECTIVE BOX
SUBJECTIVE:    Chief Complaint: Patient is a 79y old  Male who presents with a chief complaint of Severe sepsis with septic shock     (09 Sep 2024 10:14)      BRIEF HOSPITAL COURSE:    INTERVAL HPI/LAST 24HRS EVENTS: Patient seen and examined at bedside at AM. No clinically acute event overnight.   Denies any chest pain, palpitations, SOB, PND, orthopnea, leg edema, N/V/D, or any other complaints.     MEDICATIONS  (STANDING):  cefTRIAXone Injectable. 2000 milliGRAM(s) IV Push every 24 hours  chlorhexidine 2% Cloths 1 Application(s) Topical daily  enoxaparin Injectable 95 milliGRAM(s) SubCutaneous every 12 hours  hydrocortisone sodium succinate Injectable 25 milliGRAM(s) IV Push every 6 hours  metoprolol succinate ER 25 milliGRAM(s) Oral daily  multivitamin 1 Tablet(s) Oral daily  nystatin Powder 1 Application(s) Topical two times a day  pantoprazole  Injectable 40 milliGRAM(s) IV Push daily  QUEtiapine 25 milliGRAM(s) Oral at bedtime  risperiDONE   Tablet 0.25 milliGRAM(s) Oral every 12 hours  spironolactone 25 milliGRAM(s) Oral daily  tamsulosin 0.4 milliGRAM(s) Oral at bedtime    MEDICATIONS  (PRN):      Allergies    levofloxacin (Unknown)  gatifloxacin (Unknown)  ofloxacin (Unknown)    Intolerances    Ketek (Other)  telithromycin (Other)  Avelox (Other)  fluoroquinolone antibiotics (Other)      REVIEW OF SYSTEMS:  CONSTITUTIONAL: No fever, weight loss, or fatigue  RESPIRATORY: No cough, wheezing, chills or hemoptysis; No shortness of breath  CARDIOVASCULAR: No chest pain, palpitations, dizziness, or leg swelling  GASTROINTESTINAL: No abdominal or epigastric pain. No nausea, vomiting, or hematemesis; No diarrhea or constipation. No melena or hematochezia.  NEUROLOGICAL: No headaches, loss of strength, numbness, or tremors  MUSCULOSKELETAL: No joint pain or swelling; No muscle, back, or extremity pain    OBJECTIVE:    Vital Signs Last 24 Hrs  T(C): 36.5 (10 Sep 2024 04:56), Max: 37 (09 Sep 2024 11:00)  T(F): 97.7 (10 Sep 2024 04:56), Max: 98.6 (09 Sep 2024 11:00)  HR: 55 (10 Sep 2024 04:56) (55 - 104)  BP: 98/62 (10 Sep 2024 04:56) (98/62 - 155/90)  BP(mean): 105 (09 Sep 2024 16:00) (88 - 105)  RR: 18 (10 Sep 2024 04:56) (14 - 29)  SpO2: 98% (10 Sep 2024 04:56) (96% - 100%)    Parameters below as of 10 Sep 2024 04:56  Patient On (Oxygen Delivery Method): room air        PHYSICAL EXAM:  Constitutional: Alert, interactive, comfortable, NAD  Head and Face: Atraumatic, head and face were normal in appearance  Eyes: Sclera and conjunctiva were normal, pupils were equal in size, round, eyelids normal  ENT: Ears and nose were normal in appearance  Neck: Appearance was normal, neck was supple, No JVD  Pulmonary: Clear to auscultation bilaterally, no rales/crackles, or wheezing  Heart: Regular rate and rhythm, no murmurs, gallops, or pericardial rubs  Abdominal: Soft, nontender, nondistended. No appreciable hepatosplenomegaly. Bowel sounds normal  Skin: Normal color and intact without appreciable rash or abnormal skin lesion  Extremities: Warm without edema. No clubbing.  Pulse: 2+ radial pulse  Neuro: Oriented to person, place, and time    Lab/ Imaging:    LABS:                        11.5   16.56 )-----------( 61       ( 10 Sep 2024 04:48 )             37.2     09-10    144  |  112<H>  |  33.8<H>  ----------------------------<  114<H>  4.1   |  21.0<L>  |  0.92    Ca    8.2<L>      10 Sep 2024 04:48  Phos  2.7     09-10  Mg     2.3     09-10    TPro  4.7<L>  /  Alb  2.2<L>  /  TBili  0.4  /  DBili  x   /  AST  20  /  ALT  22  /  AlkPhos  87  09-10      Urinalysis Basic - ( 10 Sep 2024 04:48 )    Color: x / Appearance: x / SG: x / pH: x  Gluc: 114 mg/dL / Ketone: x  / Bili: x / Urobili: x   Blood: x / Protein: x / Nitrite: x   Leuk Esterase: x / RBC: x / WBC x   Sq Epi: x / Non Sq Epi: x / Bacteria: x      CAPILLARY BLOOD GLUCOSE              Imaging Personally Reviewed:  [ ] YES  [ ] NO    Consultant(s) Notes Reviewed:  [ ] YES  [ ] NO    Care Discussed with Consultants/Other Providers [ ] YES  [ ] NO    Plan of Care discussed with Housestaff [ ]YES [ ] NO Chief Complaint: Patient is a 79y old  Male who presents with a chief complaint of Severe sepsis with septic shock     (09 Sep 2024 10:14)        INTERVAL HPI/LAST 24HRS EVENTS: Patient seen and examined at bedside at AM. No clinically acute event overnight. Patient sitting in bed, no acute complaints. Reports having increased sensitivity to light touch of B/L LE. Denies any chest pain, palpitations, SOB, PND, orthopnea, leg edema, N/V/D, or any other complaints.       MEDICATIONS  (STANDING):  cefTRIAXone Injectable. 2000 milliGRAM(s) IV Push every 24 hours  chlorhexidine 2% Cloths 1 Application(s) Topical daily  enoxaparin Injectable 95 milliGRAM(s) SubCutaneous every 12 hours  hydrocortisone sodium succinate Injectable 25 milliGRAM(s) IV Push every 6 hours  metoprolol succinate ER 25 milliGRAM(s) Oral daily  multivitamin 1 Tablet(s) Oral daily  nystatin Powder 1 Application(s) Topical two times a day  pantoprazole  Injectable 40 milliGRAM(s) IV Push daily  QUEtiapine 25 milliGRAM(s) Oral at bedtime  risperiDONE   Tablet 0.25 milliGRAM(s) Oral every 12 hours  spironolactone 25 milliGRAM(s) Oral daily  tamsulosin 0.4 milliGRAM(s) Oral at bedtime    MEDICATIONS  (PRN):      Allergies    levofloxacin (Unknown)  gatifloxacin (Unknown)  ofloxacin (Unknown)    Intolerances    Ketek (Other)  telithromycin (Other)  Avelox (Other)  fluoroquinolone antibiotics (Other)      REVIEW OF SYSTEMS:  as above    OBJECTIVE:    Vital Signs Last 24 Hrs  T(C): 36.5 (10 Sep 2024 04:56), Max: 37 (09 Sep 2024 11:00)  T(F): 97.7 (10 Sep 2024 04:56), Max: 98.6 (09 Sep 2024 11:00)  HR: 55 (10 Sep 2024 04:56) (55 - 104)  BP: 98/62 (10 Sep 2024 04:56) (98/62 - 155/90)  BP(mean): 105 (09 Sep 2024 16:00) (88 - 105)  RR: 18 (10 Sep 2024 04:56) (14 - 29)  SpO2: 98% (10 Sep 2024 04:56) (96% - 100%)    Parameters below as of 10 Sep 2024 04:56  Patient On (Oxygen Delivery Method): room air        PHYSICAL EXAM:  Constitutional: Alert, awake, sitting comfortably in bed, NAD  Head and Face: NC/AT  Eyes: PERRLA. EOMI  ENT: Ears and nose were normal in appearance  Neck: Supple, No JVD  Pulmonary: CTAB. Non-labored respirations  Heart: RRR. S1/S2. No MRG  Abdominal: Soft, NT, ND. Normoactive BS in all 4 quadrants. RT sided nephrostomy tube, Restrepo in place  Skin: Normal color and intact without appreciable rash or abnormal skin lesion  Extremities: . LUE midline in place. Warm without edema. No clubbing.  Pulse: 2+ radial pulse  Neuro: AAOx3, no gross focal deficits      Lab/ Imaging:    LABS:                        11.5   16.56 )-----------( 61       ( 10 Sep 2024 04:48 )             37.2     09-10    144  |  112<H>  |  33.8<H>  ----------------------------<  114<H>  4.1   |  21.0<L>  |  0.92    Ca    8.2<L>      10 Sep 2024 04:48  Phos  2.7     09-10  Mg     2.3     09-10    TPro  4.7<L>  /  Alb  2.2<L>  /  TBili  0.4  /  DBili  x   /  AST  20  /  ALT  22  /  AlkPhos  87  09-10      Urinalysis Basic - ( 10 Sep 2024 04:48 )    Color: x / Appearance: x / SG: x / pH: x  Gluc: 114 mg/dL / Ketone: x  / Bili: x / Urobili: x   Blood: x / Protein: x / Nitrite: x   Leuk Esterase: x / RBC: x / WBC x   Sq Epi: x / Non Sq Epi: x / Bacteria: x      CAPILLARY BLOOD GLUCOSE              Imaging Personally Reviewed:  [ ] YES  [ ] NO    Consultant(s) Notes Reviewed:  [ ] YES  [ ] NO    Care Discussed with Consultants/Other Providers [ ] YES  [ ] NO    Plan of Care discussed with Housestaff [ ]YES [ ] NO

## 2024-09-10 NOTE — PROGRESS NOTE ADULT - ASSESSMENT
79y Male w/ PMH of - admitted for -. Now, -.    Plan  #    DVT PPx: Lovenox  Dispo: pending resolution of bacteremia 79y Male w/ PMH of HTN, HFpEF, PE (on apixaban), Myasthenia Gravis, chronic LE edema, known 7 mm distal ureteral calculus with mild right hydroureteronephrosis, initially presented to the [facility] ED on  with shortness of breath, urinary retention and confusion after failing to follow up outpatient for ureteral calculus. CT A/P revealed R hydro from 7 mm stone in UVJ. Underwent emergent cystoscopy with plan for ureteral stenting however procedure was aborted due to hematuria and inability to visualize right ureteral opening. Blood cx positive for klebsiella pneumoniae bacteremia. Pt developed septic shock requiring IV vasopressor support. Patient underwent emergent percutaneous nephrostomy tube placement. Transferred to medicine for further management.        Plan  #Septic shock POA secondary to klebsiella bacteremia and Ureteral calculus -- resolving  - Afebrile. WBC downtrendin.35 --> 16.56 --> 15.62  - Repeat blood cx w/ NGTD  - c/w IV Ceftriaxone. ID following  - continue to monitor WBC      #Thrombocytopenia  - possibly 2/2 to HIT vs. DIC  - PLT ct 289 on admission, today 73  - HIT panel and DIC panel ordered  - will consult heme/ onc for recs  - obtain CBC Q6hrs      #New Afib on RVR  - likely induced by septic shock  - TTE unremarkable  - c/w Tele   - Cardiology following  - c/w Metoprolol and Lovenox      #CATINA -- resolving  - BUN downtrending 43.1 --> 33.8  - Cr downtrending 1.24 --> 0.92  - c/w IVF  - Monitor BMP daily      #ICU/Hospital acquired delirium  - behaviorally stable  - c/w seroquel and risperdal prn      #GERD  - Continue PPI      DVT PPx: Lovenox   Code status: DNR/DNI, trial of NIV. Palliative following      Dispo: pending resolution of bacteremia and thrombocytopenia  79y Male w/ PMH of HTN, HFpEF, PE (on apixaban), Myasthenia Gravis, chronic LE edema, known 7 mm distal ureteral calculus with mild right hydroureteronephrosis, initially presented to the [facility] ED on  with shortness of breath, urinary retention and confusion after failing to follow up outpatient for ureteral calculus. CT A/P revealed R hydro from 7 mm stone in UVJ. Underwent emergent cystoscopy with plan for ureteral stenting however procedure was aborted due to hematuria and inability to visualize right ureteral opening. Blood cx positive for klebsiella pneumoniae bacteremia. Pt developed septic shock requiring IV vasopressor support. Patient underwent emergent percutaneous nephrostomy tube placement. Transferred to medicine for further management.        Plan  #Septic shock POA secondary to klebsiella bacteremia and Ureteral calculus -- resolving  - Afebrile. WBC downtrendin.35 --> 16.56 --> 15.62  - Repeat blood cx w/ NGTD  - c/w IV Ceftriaxone. ID following  - continue to monitor WBC  - on Midodrine 10mg Q8hr  - Solu-Cortef changed from 25 mg Q6hr to 50 mg Q8hr      #Thrombocytopenia  - possibly 2/2 to HIT vs. DIC  - PLT ct 289 on admission, today 73  - HIT panel and DIC panel ordered  - will consult heme/ onc for recs  - obtain CBC Q6hrs      #New Afib on RVR  - likely induced by septic shock  - TTE unremarkable  - c/w Tele   - Cardiology following  - c/w Metoprolol and Lovenox      #CATINA -- resolving  - BUN downtrending 43.1 --> 33.8  - Cr downtrending 1.24 --> 0.92  - c/w IVF  - Monitor BMP daily      #ICU/Hospital acquired delirium  - c/w seroquel and risperdal      #GERD  - Continue PPI      DVT PPx: Lovenox   Code status: DNR/DNI, trial of NIV. Palliative following      Dispo: pending resolution of bacteremia and thrombocytopenia  79y Male w/ PMH of HTN, HFpEF, PE (on apixaban), Myasthenia Gravis, chronic LE edema, known 7 mm distal ureteral calculus with mild right hydroureteronephrosis, initially presented to the [facility] ED on  with shortness of breath, urinary retention and confusion after failing to follow up outpatient for ureteral calculus. CT A/P revealed R hydro from 7 mm stone in UVJ. Underwent emergent cystoscopy with plan for ureteral stenting however procedure was aborted due to hematuria and inability to visualize right ureteral opening. Blood cx positive for klebsiella pneumoniae bacteremia. Pt developed septic shock requiring IV vasopressor support. Patient underwent emergent percutaneous nephrostomy tube placement. Transferred to medicine for further management.        Plan  #Septic shock POA secondary to klebsiella bacteremia and Ureteral calculus -- resolving  - Afebrile. WBC downtrendin.35 --> 16.56 --> 15.62  - Repeat blood cx w/ NGTD  - c/w IV Ceftriaxone. ID following  - continue to monitor WBC  - on Midodrine 10mg Q8hr  - Solu-Cortef changed from 25 mg Q6hr to 50 mg Q8hr      #Thrombocytopenia  - possibly 2/2 to HIT vs. DIC  - PLT ct 289 on admission, today 73  - HIT panel and DIC panel ordered  - will consult heme/ onc for recs  - obtain CBC Q6hrs      #New Afib on RVR  - likely induced by septic shock  - TTE unremarkable  - c/w Tele   - Cardiology following  - c/w Metoprolol   - d/c Lovenox. Start Eliquis 5mg BID      #CATINA -- resolving  - BUN downtrending 43.1 --> 33.8  - Cr downtrending 1.24 --> 0.92  - c/w IVF  - Monitor BMP daily      #ICU/Hospital acquired delirium  - c/w seroquel and risperdal      #GERD  - Continue PPI      DVT PPx: Lovenox switched to Eliquis 5mg BID  Code status: DNR/DNI, trial of NIV. Palliative following      Dispo: pending resolution of bacteremia and thrombocytopenia  79y Male w/ PMH of HTN, HFpEF, PE (on apixaban), Myasthenia Gravis, chronic LE edema, known 7 mm distal ureteral calculus with mild right hydroureteronephrosis, initially presented to the [facility] ED on  with shortness of breath, urinary retention and confusion after failing to follow up outpatient for ureteral calculus. CT A/P revealed R hydro from 7 mm stone in UVJ. Underwent emergent cystoscopy with plan for ureteral stenting however procedure was aborted due to hematuria and inability to visualize right ureteral opening. Blood cx positive for klebsiella pneumoniae bacteremia. admission complicated by septic shock requiring IV vasopressor support. Patient underwent emergent percutaneous nephrostomy tube placement. now  Transferred to medicine for further management.        Plan  #Septic shock POA secondary to klebsiella bacteremia and Ureteral calculus --improving   - Afebrile. WBC downtrendin.35 --> 16.56 --> 15.62  - Repeat blood cx w/ NGTD  - c/w IV Ceftriaxone. ID following  - continue to monitor WBC  - resumed on  Midodrine 10mg Q8hr given low bp   - Solu-Cortef changed from 25 mg Q6hr to 50 mg Q8hr, wean down as tolerated to home dose on steroids       #Thrombocytopenia/ likely due to acute sepsis / r/o  HIT vs. DIC  - PLT ct 289 on admission, today 73  - HIT panel and DIC panel ordered  - will consult heme/ onc for recs  - obtain CBC Q6hrs      #New Afib on RVR  - likely induced by septic shock  - TTE unremarkable  - c/w Tele   - Cardiology following  - c/w Metoprolol   - d/c Lovenox. Start Eliquis 5mg BID      #CATINA -- resolving  - BUN downtrending 43.1 --> 33.8  - Cr downtrending 1.24 --> 0.92  - c/w IVF  - Monitor BMP daily      #ICU/Hospital acquired delirium  - c/w seroquel and risperdal      #GERD  - Continue PPI      DVT PPx: Lovenox switched to Eliquis 5mg BID  Code status: DNR/DNI, trial of NIV. Palliative following      Dispo: pending resolution of bacteremia and thrombocytopenia

## 2024-09-10 NOTE — PROGRESS NOTE ADULT - ASSESSMENT
80M with past medical history of CHF, PE (on apixaban), HTN, HLD, myasthenia gravis, pneumonia, UTIs who presented as a transfer from [facility] for sepsis secondary to obstructive calculus with hydronephrosis s/p failed urethral stent placement, now s/p nephrostomy tube placement on 9/6.     # Metabolic encephalopathy 2/2 to Urosepsis  # Obstructive calculus/hydronephrosis   # UTI  # klebsiella pneumoniae bacteremia   - Leukocytosis, elevated lactate, improving   - CT A/P with mild right hydroureteronephrosis from 7mm stone in right UVJ  - 9/5 failed stent placement  - 9/6 emergent right PCN placed  - s/p extubation 9/6, sating well on RA  - off pressors   - Indwelling guerrero in place, urology evaluated, outpatient follow up recommended   - IV Rocephin per infectious diseases  - most recent cultures 9/7 NGTD   - wean steroi  - urine culture also + klebsiella     # CHF, new onset afib  - TTE as reported above, LV function normal, some MR   - anticoagulation per primary     # CATINA   - BUN/Cr improving  - Continue flomax  - Continue to monitor renal function, improving      # myasthenia gravis  - not in crisis  - supportive care    # agitation  - on bedtime Seroquel  - trial dose of risperidone  - likely delirium related to Intensive care unit, and steroids   - wean steroids   - slowly improving     # Encounter for Palliative Care  - slowly improving  - do not resuscitate and do not intubate  - continue medical optimization

## 2024-09-10 NOTE — PROGRESS NOTE ADULT - ATTENDING COMMENTS
patient is awake, alert x 3 , feels weak , states has not been able to ambulate for over one year.   at home patient is [employer] / 2 person assist for transfers.  patient mentation seems to be more clear today , able to understand plan of care   noted with thrombocytopenia , baseline plts on admission > 250 . r/o HIT/ DIC  c/w abxs   bp noted to be low this am , chnaged to 50 q 8 hydrocortisone , midodrine

## 2024-09-11 LAB
ANION GAP SERPL CALC-SCNC: 9 MMOL/L — SIGNIFICANT CHANGE UP (ref 5–17)
B PERT DNA SPEC QL NAA+PROBE: SIGNIFICANT CHANGE UP
BASOPHILS # BLD AUTO: 0 K/UL — SIGNIFICANT CHANGE UP (ref 0–0.2)
BASOPHILS NFR BLD AUTO: 0 % — SIGNIFICANT CHANGE UP (ref 0–2)
BUN SERPL-MCNC: 21.6 MG/DL — HIGH (ref 8–20)
C PNEUM DNA SPEC QL NAA+PROBE: SIGNIFICANT CHANGE UP
CALCIUM SERPL-MCNC: 7.7 MG/DL — LOW (ref 8.4–10.5)
CHLORIDE SERPL-SCNC: 109 MMOL/L — HIGH (ref 96–108)
CO2 SERPL-SCNC: 22 MMOL/L — SIGNIFICANT CHANGE UP (ref 22–29)
CREAT SERPL-MCNC: 0.59 MG/DL — SIGNIFICANT CHANGE UP (ref 0.5–1.3)
CULTURE RESULTS: ABNORMAL
EGFR: 97 ML/MIN/1.73M2 — SIGNIFICANT CHANGE UP
EOSINOPHIL # BLD AUTO: 0 K/UL — SIGNIFICANT CHANGE UP (ref 0–0.5)
EOSINOPHIL NFR BLD AUTO: 0 % — SIGNIFICANT CHANGE UP (ref 0–6)
FLUAV H1 2009 PAND RNA SPEC QL NAA+PROBE: SIGNIFICANT CHANGE UP
FLUAV H1 RNA SPEC QL NAA+PROBE: SIGNIFICANT CHANGE UP
FLUAV H3 RNA SPEC QL NAA+PROBE: SIGNIFICANT CHANGE UP
FLUAV SUBTYP SPEC NAA+PROBE: SIGNIFICANT CHANGE UP
FLUBV RNA SPEC QL NAA+PROBE: SIGNIFICANT CHANGE UP
GLUCOSE BLDC GLUCOMTR-MCNC: 154 MG/DL — HIGH (ref 70–99)
GLUCOSE SERPL-MCNC: 100 MG/DL — HIGH (ref 70–99)
HADV DNA SPEC QL NAA+PROBE: SIGNIFICANT CHANGE UP
HCOV PNL SPEC NAA+PROBE: SIGNIFICANT CHANGE UP
HCT VFR BLD CALC: 33.8 % — LOW (ref 39–50)
HCT VFR BLD CALC: 34.1 % — LOW (ref 39–50)
HEPARIN-PF4 AB RESULT: <0.6 U/ML — SIGNIFICANT CHANGE UP (ref 0–0.9)
HGB BLD-MCNC: 10.7 G/DL — LOW (ref 13–17)
HGB BLD-MCNC: 10.9 G/DL — LOW (ref 13–17)
HMPV RNA SPEC QL NAA+PROBE: SIGNIFICANT CHANGE UP
HPIV1 RNA SPEC QL NAA+PROBE: SIGNIFICANT CHANGE UP
HPIV2 RNA SPEC QL NAA+PROBE: SIGNIFICANT CHANGE UP
HPIV3 RNA SPEC QL NAA+PROBE: SIGNIFICANT CHANGE UP
HPIV4 RNA SPEC QL NAA+PROBE: SIGNIFICANT CHANGE UP
LYMPHOCYTES # BLD AUTO: 0.7 K/UL — LOW (ref 1–3.3)
LYMPHOCYTES # BLD AUTO: 5.2 % — LOW (ref 13–44)
MAGNESIUM SERPL-MCNC: 1.9 MG/DL — SIGNIFICANT CHANGE UP (ref 1.6–2.6)
MCHC RBC-ENTMCNC: 30.2 PG — SIGNIFICANT CHANGE UP (ref 27–34)
MCHC RBC-ENTMCNC: 30.6 PG — SIGNIFICANT CHANGE UP (ref 27–34)
MCHC RBC-ENTMCNC: 31.7 GM/DL — LOW (ref 32–36)
MCHC RBC-ENTMCNC: 32 GM/DL — SIGNIFICANT CHANGE UP (ref 32–36)
MCV RBC AUTO: 95.5 FL — SIGNIFICANT CHANGE UP (ref 80–100)
MCV RBC AUTO: 95.8 FL — SIGNIFICANT CHANGE UP (ref 80–100)
MONOCYTES # BLD AUTO: 0.71 K/UL — SIGNIFICANT CHANGE UP (ref 0–0.9)
MONOCYTES NFR BLD AUTO: 5.3 % — SIGNIFICANT CHANGE UP (ref 2–14)
NEUTROPHILS # BLD AUTO: 12.01 K/UL — HIGH (ref 1.8–7.4)
NEUTROPHILS NFR BLD AUTO: 89.5 % — HIGH (ref 43–77)
ORGANISM # SPEC MICROSCOPIC CNT: ABNORMAL
ORGANISM # SPEC MICROSCOPIC CNT: ABNORMAL
ORGANISM # SPEC MICROSCOPIC CNT: SIGNIFICANT CHANGE UP
PF4 HEPARIN CMPLX AB SER-ACNC: NEGATIVE — SIGNIFICANT CHANGE UP
PHOSPHATE SERPL-MCNC: 2 MG/DL — LOW (ref 2.4–4.7)
PLATELET # BLD AUTO: 79 K/UL — LOW (ref 150–400)
PLATELET # BLD AUTO: 80 K/UL — LOW (ref 150–400)
POTASSIUM SERPL-MCNC: 3.4 MMOL/L — LOW (ref 3.5–5.3)
POTASSIUM SERPL-SCNC: 3.4 MMOL/L — LOW (ref 3.5–5.3)
RAPID RVP RESULT: DETECTED
RBC # BLD: 3.54 M/UL — LOW (ref 4.2–5.8)
RBC # BLD: 3.56 M/UL — LOW (ref 4.2–5.8)
RBC # FLD: 16.4 % — HIGH (ref 10.3–14.5)
RBC # FLD: 16.5 % — HIGH (ref 10.3–14.5)
RV+EV RNA SPEC QL NAA+PROBE: SIGNIFICANT CHANGE UP
SARS-COV-2 RNA SPEC QL NAA+PROBE: DETECTED
SODIUM SERPL-SCNC: 140 MMOL/L — SIGNIFICANT CHANGE UP (ref 135–145)
SPECIMEN SOURCE: SIGNIFICANT CHANGE UP
WBC # BLD: 13.42 K/UL — HIGH (ref 3.8–10.5)
WBC # BLD: 13.65 K/UL — HIGH (ref 3.8–10.5)
WBC # FLD AUTO: 13.42 K/UL — HIGH (ref 3.8–10.5)
WBC # FLD AUTO: 13.65 K/UL — HIGH (ref 3.8–10.5)

## 2024-09-11 PROCEDURE — 99223 1ST HOSP IP/OBS HIGH 75: CPT

## 2024-09-11 PROCEDURE — 71045 X-RAY EXAM CHEST 1 VIEW: CPT | Mod: 26

## 2024-09-11 PROCEDURE — 99233 SBSQ HOSP IP/OBS HIGH 50: CPT

## 2024-09-11 RX ORDER — MIDODRINE HYDROCHLORIDE 5 MG/1
5 TABLET ORAL EVERY 8 HOURS
Refills: 0 | Status: DISCONTINUED | OUTPATIENT
Start: 2024-09-11 | End: 2024-09-12

## 2024-09-11 RX ORDER — POTASSIUM CHLORIDE 10 MEQ
40 TABLET, EXT RELEASE, PARTICLES/CRYSTALS ORAL ONCE
Refills: 0 | Status: COMPLETED | OUTPATIENT
Start: 2024-09-11 | End: 2024-09-11

## 2024-09-11 RX ORDER — HYDROCORTISONE 10 MG/1
50 TABLET ORAL EVERY 12 HOURS
Refills: 0 | Status: DISCONTINUED | OUTPATIENT
Start: 2024-09-11 | End: 2024-09-12

## 2024-09-11 RX ORDER — BACITRACIN 500 UNIT/G
1 OINTMENT (GRAM) TOPICAL
Refills: 0 | Status: DISCONTINUED | OUTPATIENT
Start: 2024-09-11 | End: 2024-09-16

## 2024-09-11 RX ADMIN — Medication 40 MILLIGRAM(S): at 11:14

## 2024-09-11 RX ADMIN — Medication 1 APPLICATION(S): at 17:29

## 2024-09-11 RX ADMIN — APIXABAN 5 MILLIGRAM(S): 5 TABLET, FILM COATED ORAL at 17:29

## 2024-09-11 RX ADMIN — METOPROLOL TARTRATE 12.5 MILLIGRAM(S): 100 TABLET ORAL at 05:36

## 2024-09-11 RX ADMIN — TAMSULOSIN HYDROCHLORIDE 0.4 MILLIGRAM(S): 0.4 CAPSULE ORAL at 21:49

## 2024-09-11 RX ADMIN — SPIRONOLACTONE 25 MILLIGRAM(S): 25 TABLET, FILM COATED ORAL at 05:36

## 2024-09-11 RX ADMIN — METOPROLOL TARTRATE 12.5 MILLIGRAM(S): 100 TABLET ORAL at 21:49

## 2024-09-11 RX ADMIN — RISPERIDONE 0.25 MILLIGRAM(S): 0.25 TABLET, FILM COATED ORAL at 05:36

## 2024-09-11 RX ADMIN — METOPROLOL TARTRATE 12.5 MILLIGRAM(S): 100 TABLET ORAL at 16:11

## 2024-09-11 RX ADMIN — Medication 2000 MILLIGRAM(S): at 11:14

## 2024-09-11 RX ADMIN — HYDROCORTISONE 50 MILLIGRAM(S): 10 TABLET ORAL at 17:29

## 2024-09-11 RX ADMIN — APIXABAN 5 MILLIGRAM(S): 5 TABLET, FILM COATED ORAL at 05:36

## 2024-09-11 RX ADMIN — RISPERIDONE 0.25 MILLIGRAM(S): 0.25 TABLET, FILM COATED ORAL at 17:30

## 2024-09-11 RX ADMIN — Medication 40 MILLIEQUIVALENT(S): at 11:15

## 2024-09-11 RX ADMIN — MIDODRINE HYDROCHLORIDE 5 MILLIGRAM(S): 5 TABLET ORAL at 16:11

## 2024-09-11 RX ADMIN — Medication 1 TABLET(S): at 11:15

## 2024-09-11 RX ADMIN — CHLORHEXIDINE GLUCONATE 1 APPLICATION(S): 40 SOLUTION TOPICAL at 11:16

## 2024-09-11 RX ADMIN — MIDODRINE HYDROCHLORIDE 5 MILLIGRAM(S): 5 TABLET ORAL at 21:49

## 2024-09-11 RX ADMIN — Medication 25 MILLIGRAM(S): at 21:49

## 2024-09-11 RX ADMIN — HYDROCORTISONE 50 MILLIGRAM(S): 10 TABLET ORAL at 05:36

## 2024-09-11 NOTE — PROGRESS NOTE ADULT - SUBJECTIVE AND OBJECTIVE BOX
Chief Complaint: Patient is a 79y old  Male who presents with a chief complaint of sepsis 2/2 klebsiella bacteremia + ureteral calculus (10 Sep 2024 07:34)        INTERVAL HPI/LAST 24HRS EVENTS: Patient seen and examined at bedside at AM. No clinically acute event overnight. Patient states that he slept well, no acute complaints. Reports good PO intake.  Denies fever, CP, palpitations, SOB, PND, orthopnea, leg edema, N/V/D, or any other complaints.       MEDICATIONS  (STANDING):  apixaban 5 milliGRAM(s) Oral two times a day  cefTRIAXone Injectable. 2000 milliGRAM(s) IV Push every 24 hours  chlorhexidine 2% Cloths 1 Application(s) Topical daily  hydrocortisone sodium succinate Injectable 50 milliGRAM(s) IV Push every 8 hours  metoprolol tartrate 12.5 milliGRAM(s) Oral every 8 hours  midodrine 10 milliGRAM(s) Oral every 8 hours  multivitamin 1 Tablet(s) Oral daily  nystatin Powder 1 Application(s) Topical two times a day  pantoprazole  Injectable 40 milliGRAM(s) IV Push daily  QUEtiapine 25 milliGRAM(s) Oral at bedtime  risperiDONE   Tablet 0.25 milliGRAM(s) Oral every 12 hours  spironolactone 25 milliGRAM(s) Oral daily  tamsulosin 0.4 milliGRAM(s) Oral at bedtime    MEDICATIONS  (PRN):      Allergies    levofloxacin (Unknown)  gatifloxacin (Unknown)  ofloxacin (Unknown)    Intolerances    Ketek (Other)  telithromycin (Other)  Avelox (Other)  fluoroquinolone antibiotics (Other)      REVIEW OF SYSTEMS:  as above      OBJECTIVE:    Vital Signs Last 24 Hrs  T(C): 36.3 (11 Sep 2024 04:10), Max: 36.7 (10 Sep 2024 09:00)  T(F): 97.4 (11 Sep 2024 04:10), Max: 98 (10 Sep 2024 09:00)  HR: 70 (11 Sep 2024 04:10) (63 - 70)  BP: 145/70 (11 Sep 2024 04:10) (128/74 - 157/79)  BP(mean): --  RR: 18 (11 Sep 2024 04:10) (18 - 18)  SpO2: 94% (11 Sep 2024 04:10) (94% - 98%)    Parameters below as of 11 Sep 2024 00:06  Patient On (Oxygen Delivery Method): room air        PHYSICAL EXAM:  Constitutional: Alert, awake, lying comfortably, NAD  Head and Face: NC/AT  Eyes: PERRLA. EOMI  ENT: Ears and nose were normal in appearance  Neck: Supple, No JVD  Pulmonary: CTAB. Non-labored respirations  Heart: RRR. S1/S2. No MRG  Abdominal: Soft, NT, ND. Normoactive BS in all 4 quadrants. RT sided nephrostomy tube in place  Skin: Normal color and intact without appreciable rash or abnormal skin lesion  Extremities: LUE midline in place. Warm without edema. No clubbing.  Pulse: 2+ radial pulse  Neuro: AAOx3, no gross focal deficits    Lab/ Imaging:    LABS:                        10.9   13.65 )-----------( 80       ( 11 Sep 2024 01:27 )             34.1     09-10    144  |  112<H>  |  33.8<H>  ----------------------------<  114<H>  4.1   |  21.0<L>  |  0.92    Ca    8.2<L>      10 Sep 2024 04:48  Phos  2.7     09-10  Mg     2.3     09-10    TPro  4.7<L>  /  Alb  2.2<L>  /  TBili  0.4  /  DBili  x   /  AST  20  /  ALT  22  /  AlkPhos  87  09-10    PT/INR - ( 10 Sep 2024 13:50 )   PT: 12.0 sec;   INR: 1.08 ratio         PTT - ( 10 Sep 2024 13:50 )  PTT:33.0 sec  Urinalysis Basic - ( 10 Sep 2024 04:48 )    Color: x / Appearance: x / SG: x / pH: x  Gluc: 114 mg/dL / Ketone: x  / Bili: x / Urobili: x   Blood: x / Protein: x / Nitrite: x   Leuk Esterase: x / RBC: x / WBC x   Sq Epi: x / Non Sq Epi: x / Bacteria: x      CAPILLARY BLOOD GLUCOSE      POCT Blood Glucose.: 154 mg/dL (11 Sep 2024 07:45)          Imaging Personally Reviewed:  [ ] YES  [ ] NO    Consultant(s) Notes Reviewed:  [ ] YES  [ ] NO    Care Discussed with Consultants/Other Providers [ ] YES  [ ] NO    Plan of Care discussed with Housestaff [ ]YES [ ] NO

## 2024-09-11 NOTE — CONSULT NOTE ADULT - SUBJECTIVE AND OBJECTIVE BOX
Hematology Consult Note    HPI:   81 yo M with pmhx of HTN, CHF, PE, Myasthenia Gravis, and chronic LE edema, history of 7 mm distal ureteral calculus with mild right hydroureteronephrosis and pain presented to the ED with shortness of breath and confusion after failing to follow up outpt on ureteral calculus after prior evaluation at [facility].  Patient's son is at bedside he states he seems more confused and altered then baseline; has not been feeling well. Patient was complaining of nauseous. Also is retaining urine; usually urinates in a urinal. Decreased appetite; weak; chills. Patient hasn't seen cardiologist since March to follow up on his CHF. Labs significant for WBC 41.45, procal 36.3, lactate 7.9, Scr 2.6, BNP 56255, UA. CT CAP revealed R hydro from 7 mm stone in UVJ. Patient sent emergently with urology for stent placement.  Stent placement for 7 mm stone unsuccessful, procedure aborted, patient transferred to ICU for septic shock. Discussed case with urology for planned transfer for emergent IR for nephrostomy tube placement with Dr Shaw.    Now s/p PCN by IR. Remains intubated and in shock. Admit to ICU. (06 Sep 2024 02:42)      Allergies    levofloxacin (Unknown)  gatifloxacin (Unknown)  ofloxacin (Unknown)    Intolerances    Ketek (Other)  telithromycin (Other)  Avelox (Other)  fluoroquinolone antibiotics (Other)      MEDICATIONS  (STANDING):  apixaban 5 milliGRAM(s) Oral two times a day  cefTRIAXone Injectable. 2000 milliGRAM(s) IV Push every 24 hours  chlorhexidine 2% Cloths 1 Application(s) Topical daily  hydrocortisone sodium succinate Injectable 50 milliGRAM(s) IV Push every 8 hours  metoprolol tartrate 12.5 milliGRAM(s) Oral every 8 hours  midodrine 10 milliGRAM(s) Oral every 8 hours  multivitamin 1 Tablet(s) Oral daily  nystatin Powder 1 Application(s) Topical two times a day  pantoprazole  Injectable 40 milliGRAM(s) IV Push daily  QUEtiapine 25 milliGRAM(s) Oral at bedtime  risperiDONE   Tablet 0.25 milliGRAM(s) Oral every 12 hours  spironolactone 25 milliGRAM(s) Oral daily  tamsulosin 0.4 milliGRAM(s) Oral at bedtime    MEDICATIONS  (PRN):      PAST MEDICAL & SURGICAL HISTORY:  Calculus of kidney      Club foot  Born Right Foot      Myasthenia gravis      Hypertension      Diabetes  Type 2 - does not take medications - monitors Blood Glucose at home - diet controlled      Urinary tract infection  notes h/o UTI's      Hyperlipidemia      Elective surgery  6 age 11 @ [facility] - cut under Patella secondary 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      Club foot  Surgery at birth for Club Foot Right foot      Pilonidal cyst  Surgery 40 years ago      H/O colonoscopy      Spinal stenosis      H/O prostate biopsy          FAMILY HISTORY:  Family history of stroke (Father)  Father -  age 62    Family history of kidney disease (Mother)  Mother -  age 67    Family history of diabetes mellitus type II (Sibling)  Brother & Sister        SOCIAL HISTORY: No EtOH, no tobacco    REVIEW OF SYSTEMS:    CONSTITUTIONAL: No weakness, fevers or chills  EYES/ENT: No visual changes;  No vertigo or throat pain   NECK: No pain or stiffness  RESPIRATORY: No cough, wheezing, hemoptysis; No shortness of breath  CARDIOVASCULAR: No chest pain or palpitations  GASTROINTESTINAL: No abdominal or epigastric pain. No nausea, vomiting, or hematemesis; No diarrhea or constipation. No melena or hematochezia.  GENITOURINARY: No dysuria, frequency or hematuria  NEUROLOGICAL: No numbness or weakness  SKIN: No itching, burning, rashes, or lesions   All other review of systems is negative unless indicated above.        T(F): 97.4 (24 @ 04:10), Max: 98 (09-10-24 @ 09:00)  HR: 70 (24 @ 04:10)  BP: 145/70 (24 @ 04:10)  RR: 18 (24 @ 04:10)  SpO2: 94% (24 @ 04:10)  Wt(kg): --    GENERAL: NAD, well-developed  HEAD:  Atraumatic, Normocephalic  EYES: EOMI, PERRLA, conjunctiva and sclera clear  NECK: Supple, No JVD  CHEST/LUNG: Clear to auscultation bilaterally; No wheeze  HEART: Regular rate and rhythm; No murmurs, rubs, or gallops  ABDOMEN: Soft, Nontender, Nondistended; Bowel sounds present  EXTREMITIES:  2+ Peripheral Pulses, No clubbing, cyanosis, or edema  NEUROLOGY: non-focal  SKIN: No rashes or lesions                          10.9   13.65 )-----------( 80       ( 11 Sep 2024 01:27 )             34.1       -10    144  |  112<H>  |  33.8<H>  ----------------------------<  114<H>  4.1   |  21.0<L>  |  0.92    Ca    8.2<L>      10 Sep 2024 04:48  Phos  2.7     09-10  Mg     2.3     09-10    TPro  4.7<L>  /  Alb  2.2<L>  /  TBili  0.4  /  DBili  x   /  AST  20  /  ALT  22  /  AlkPhos  87  09-10           Hematology Consult Note    HPI:   79 yo M with pmhx of HTN, CHF, PE, Myasthenia Gravis, and chronic LE edema, history of 7 mm distal ureteral calculus with mild right hydroureteronephrosis and pain presented to the ED with shortness of breath and confusion after failing to follow up outpt on ureteral calculus after prior evaluation at [facility].  Patient's son is at bedside he states he seems more confused and altered then baseline; has not been feeling well. Patient was complaining of nauseous. Also is retaining urine; usually urinates in a urinal. Decreased appetite; weak; chills. Patient hasn't seen cardiologist since March to follow up on his CHF. Labs significant for WBC 41.45, procal 36.3, lactate 7.9, Scr 2.6, BNP 67662, UA. CT CAP revealed R hydro from 7 mm stone in UVJ. Patient sent emergently with urology for stent placement.  Stent placement for 7 mm stone unsuccessful, procedure aborted, patient transferred to ICU for septic shock. Discussed case with urology for planned transfer for emergent IR for nephrostomy tube placement with Dr Shaw.    Now s/p PCN by IR. Remains intubated and in shock. Admit to ICU. (06 Sep 2024 02:42)      Allergies    levofloxacin (Unknown)  gatifloxacin (Unknown)  ofloxacin (Unknown)    Intolerances    Ketek (Other)  telithromycin (Other)  Avelox (Other)  fluoroquinolone antibiotics (Other)      MEDICATIONS  (STANDING):  apixaban 5 milliGRAM(s) Oral two times a day  cefTRIAXone Injectable. 2000 milliGRAM(s) IV Push every 24 hours  chlorhexidine 2% Cloths 1 Application(s) Topical daily  hydrocortisone sodium succinate Injectable 50 milliGRAM(s) IV Push every 8 hours  metoprolol tartrate 12.5 milliGRAM(s) Oral every 8 hours  midodrine 10 milliGRAM(s) Oral every 8 hours  multivitamin 1 Tablet(s) Oral daily  nystatin Powder 1 Application(s) Topical two times a day  pantoprazole  Injectable 40 milliGRAM(s) IV Push daily  QUEtiapine 25 milliGRAM(s) Oral at bedtime  risperiDONE   Tablet 0.25 milliGRAM(s) Oral every 12 hours  spironolactone 25 milliGRAM(s) Oral daily  tamsulosin 0.4 milliGRAM(s) Oral at bedtime    MEDICATIONS  (PRN):      PAST MEDICAL & SURGICAL HISTORY:  Calculus of kidney      Club foot  Born Right Foot      Myasthenia gravis      Hypertension      Diabetes  Type 2 - does not take medications - monitors Blood Glucose at home - diet controlled      Urinary tract infection  notes h/o UTI's      Hyperlipidemia      Elective surgery  4 age 11 @ [facility] - cut under Patella secondary 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      Club foot  Surgery at birth for Club Foot Right foot      Pilonidal cyst  Surgery 40 years ago      H/O colonoscopy      Spinal stenosis      H/O prostate biopsy          FAMILY HISTORY:  Family history of stroke (Father)  Father -  age 62    Family history of kidney disease (Mother)  Mother -  age 67    Family history of diabetes mellitus type II (Sibling)  Brother & Sister        SOCIAL HISTORY: No EtOH, no tobacco    REVIEW OF SYSTEMS:   Unable to fully assess patient very agitated stated " just wants to be left alone" but denies pain       T(F): 97.4 (24 @ 04:10), Max: 98 (09-10-24 @ 09:00)  HR: 70 (24 @ 04:10)  BP: 145/70 (24 @ 04:10)  RR: 18 (24 @ 04:10)  SpO2: 94% (24 @ 04:10)  Wt(kg): --    GENERAL: agitated   HEAD:  Atraumatic, Normocephalic  EYES: EOMI, PERRLA, conjunctiva and sclera clear  NECK: Supple, No JVD  CHEST/LUNG: patient refused   HEART: patient refused   ABDOMEN: patient refused   EXTREMITIES: +2 edema in RUE  NEUROLOGY: A/Ox3-4  SKIN: mult ecchymotic lesions and skin tears noted                         10.9   13.65 )-----------( 80       ( 11 Sep 2024 01:27 )             34.1       09-10    144  |  112<H>  |  33.8<H>  ----------------------------<  114<H>  4.1   |  21.0<L>  |  0.92    Ca    8.2<L>      10 Sep 2024 04:48  Phos  2.7     -10  Mg     2.3     -10    TPro  4.7<L>  /  Alb  2.2<L>  /  TBili  0.4  /  DBili  x   /  AST  20  /  ALT  22  /  AlkPhos  87  09-10

## 2024-09-11 NOTE — CONSULT NOTE ADULT - NS ATTEND AMEND GEN_ALL_CORE FT
seen with above,    80M history significant for obesity, HTN, HLD, Myathesia gravis, PE on chronic Eliquis, chronic LE edema, with urinary retention found with obstructive renal calculi unable to place ureter stent developed severe sepsis with WBC 41K serum lactate 7.9, CATINA with pBNP 20K, Echo with preserved LV EF, septic shock with klebsiella bacteremia underwent percutaneous nephrostomy tube interval extubated, has been in new Afib with RVR     -was on high dose midodrine continue to wean off  -not yet stable for Afib rhythm control, added low dose metoprolol for HR control, CHADSVasc 4- need full dose AC with LMWH first if stable and no further invasive intervention then transition back to Eliquis  -severely elevated pBNP level will repeat may need IV diuresis      Sha Starr DO, [facility]  Faculty Non-Invasive Cardiologist  [phone]
I attest my time as attending is greater than 50% of the total combined time spent on qualifying patient care activities by the PA/NP and attending.   I have made amendments to the documentation where necessary.

## 2024-09-11 NOTE — CONSULT NOTE ADULT - ASSESSMENT
incomplete     81 yo M with pmhx of HTN, CHF, PE, Myasthenia Gravis, and chronic LE edema, history of 7 mm distal ureteral calculus with mild right hydroureteronephrosis and pain presented to the ED with shortness of breath and confusion after failing to follow up outpt on ureteral calculus after prior evaluation at [facility]. Admitted on 9/5 for sepsis. Heme now consulted for acute thrombocytopenia.   9/10- Heparin-PF4 AB Interp: Negative   # Thrombocytopenia  #Anemia   -patient admitted to [facility] for sepsis/ (+) klebsiella bacteremia w/ stone s/p emergent PCN   -Plt on admit-289 and today-80 (no hx thrombocytopenia prior this admission)    -Hgb on admit 17, and today 10.9  -no active bleeding/ecchymosis  -LFT's WNL, DIC panel negative   -Peripheral Smear noted from 9/6 w/ bandemia    RECS  -patient admitted w/ NL plt count now downtrended since 9/7.  -Etiology of thrombocytopenia at this time likely multifactorial 2/2 medication induced / infection/ BM suppression in hosp setting   -patient started on LMWH 9/7-9/10, since d/c'd. HIT less likely in short time frame but HIT panel sent by primary team,-HIT AB negative-pending RUMA.  AC changed to Eliquis (afib)  -to note since admission patient has been tx w/ mult medications that have been known to have risk of thrombocytopenia including (Ceftriaxone, Spironolactone, Seroquel) -concern for medication induced   -If possible avoid  non-essential meds that can exacerbate plt drop   -anemia also likely multifactorial in setting of bacteremia, CATINA (now improved) BM suppression r/t hospitalization   -hemolysis less likely w/ NL bili  -can send anemia panel (total iron, TIBC, %sat, Ferritin, B12, Folate)  -tx underlying infection  -trend cbc w. diff  -Keep plt>10, Febrile in last 24hours >15K, LP>40K, Non-major bleeding/invasive procedures >50K, Major bleeding >80K   -maintain hgb >7 or if symptomatic       *Note not finalized until signed by Attending Physician    Thank you for the referral. Will continue to monitor the patient.  Please call with any questions [phone]  Above reviewed with Attending Dr. Antonio     [facility]  [address] [place] [id number]  [phone]  incomplete     81 yo M with pmhx of HTN, CHF, PE, Myasthenia Gravis, and chronic LE edema, history of 7 mm distal ureteral calculus with mild right hydroureteronephrosis and pain presented to the ED with shortness of breath and confusion after failing to follow up outpt on ureteral calculus after prior evaluation at [facility]. Admitted on 9/5 for sepsis. Heme now consulted for acute thrombocytopenia.   9/10- Heparin-PF4 AB Interp: Negative   # Thrombocytopenia  #Anemia   -patient admitted to [facility] for sepsis/ (+) klebsiella bacteremia w/ stone s/p emergent PCN   -Plt on admit-289 and today-80 (no hx thrombocytopenia prior this admission)    -Hgb on admit 17, and today 10.9  -mult large ecchymotic lesions/ skin tears noted from brief assessment but patient would not allow full PE   -LFT's WNL, DIC panel negative   -Peripheral Smear noted from 9/6 w/ bandemia    RECS  -patient admitted w/ NL plt count now downtrended since 9/7.  -Etiology of thrombocytopenia at this time likely multifactorial 2/2 medication induced / infection/ BM suppression in hosp setting   -patient started on LMWH 9/7-9/10, since d/c'd. HIT less likely in short time frame but HIT panel sent by primary team,-HIT AB negative-pending RUMA.  AC changed to Eliquis (afib)  -to note since admission patient has been tx w/ mult medications that have been known to have risk of thrombocytopenia including (Ceftriaxone, Spironolactone, Seroquel) -concern for medication induced   -If possible avoid  non-essential meds that can exacerbate plt drop   -anemia also likely multifactorial in setting of bacteremia, CATINA (now improved) BM suppression r/t hospitalization   -hemolysis less likely w/ NL bili  -can send anemia panel (total iron, TIBC, %sat, Ferritin, B12, Folate)  -tx underlying infection  -trend cbc w. diff  -Keep plt>10, Febrile in last 24hours >15K, LP>40K, Non-major bleeding/invasive procedures >50K, Major bleeding >80K   -maintain hgb >7 or if symptomatic       *Note not finalized until signed by Attending Physician    Thank you for the referral. Will continue to monitor the patient.  Please call with any questions [phone]  Above reviewed with Attending Dr. Antonio     [facility] [place] [address] [id number]  [phone]  79 yo M with pmhx of HTN, CHF, PE, Myasthenia Gravis, and chronic LE edema, history of 7 mm distal ureteral calculus with mild right hydroureteronephrosis and pain presented to the ED with shortness of breath and confusion after failing to follow up outpt on ureteral calculus after prior evaluation at [facility]. Admitted on 9/5 for sepsis. Heme now consulted for acute thrombocytopenia.   9/10- Heparin-PF4 AB Interp: Negative   # Thrombocytopenia  #Anemia   -patient admitted to [facility] for sepsis/ (+) klebsiella bacteremia w/ stone s/p emergent PCN   -Plt on admit-289 and today-80 (no hx thrombocytopenia prior this admission)    -Hgb on admit 17, and today 10.9  -mult large ecchymotic lesions/ skin tears noted from brief assessment but patient would not allow full PE   -LFT's WNL, DIC panel negative   -Peripheral Smear noted from 9/6 w/ bandemia    RECS  -patient admitted w/ NL plt count now downtrended since 9/7.  -Etiology of thrombocytopenia at this time likely multifactorial 2/2 medication induced / infection/ BM suppression in hosp setting   -patient started on LMWH 9/7-9/10, since d/c'd. HIT less likely in short time frame but HIT panel sent by primary team,-HIT AB negative-pending RUMA.  AC changed to Eliquis (afib)  -to note since admission patient has been tx w/ mult medications that have been known to have risk of thrombocytopenia including (Ceftriaxone, Spironolactone, Seroquel) -concern for medication induced   -If possible avoid  non-essential meds that can exacerbate plt drop   -anemia also likely multifactorial in setting of bacteremia, CATINA (now improved) BM suppression r/t hospitalization   -hemolysis less likely w/ NL bili  -can send anemia panel (total iron, TIBC, %sat, Ferritin, B12, Folate)  -tx underlying infection  -trend cbc w. diff  -Keep plt>10, Febrile in last 24hours >15K, LP>40K, Non-major bleeding/invasive procedures >50K, Major bleeding >80K   -maintain hgb >7 or if symptomatic       *Note not finalized until signed by Attending Physician    Thank you for the referral. Will continue to monitor the patient.  Please call with any questions [phone]  Above reviewed with Attending Dr. Antonio     [facility] [place]  [address] [id number] [phone]  79 yo M with pmhx of HTN, CHF, PE, Myasthenia Gravis, and chronic LE edema, history of 7 mm distal ureteral calculus with mild right hydroureteronephrosis and pain presented to the ED with shortness of breath and confusion after failing to follow up outpt on ureteral calculus after prior evaluation at [facility]. Admitted on 9/5 for sepsis. Heme now consulted for acute thrombocytopenia.   9/10- Heparin-PF4 AB Interp: Negative   # Thrombocytopenia  #Anemia   -patient admitted to [facility] for sepsis/ (+) klebsiella bacteremia w/ stone s/p emergent PCN   -Plt on admit-289 and today-80 (no hx thrombocytopenia prior this admission)    -Hgb on admit 17, and today 10.9  -mult large ecchymotic lesions/ skin tears noted from brief assessment but patient would not allow full PE   -LFT's WNL, DIC panel negative   -Peripheral Smear noted from 9/6 w/ bandemia    RECS  -patient admitted w/ NL plt count now downtrended since 9/7.  -Etiology of thrombocytopenia at this time likely multifactorial 2/2 medication induced / infection/ BM suppression in hosp setting   -patient started on LMWH 9/7-9/10, since d/c'd. HIT less likely in short time frame but HIT panel sent by primary team,-HIT AB negative-pending RUMA.  AC changed to Eliquis (afib)  -to note since admission patient has been tx w/ mult medications that have been known to have risk of thrombocytopenia including (Ceftriaxone, Spironolactone, Seroquel) -concern for medication induced   -If possible avoid  non-essential meds that can exacerbate plt drop   -anemia also likely multifactorial in setting of bacteremia, CATINA (now improved) BM suppression r/t hospitalization   -hemolysis less likely w/ NL bili  -can send anemia panel (total iron, TIBC, %sat, Ferritin, B12, Folate)  -tx underlying infection  -trend cbc w. diff  -Keep plt>10, Febrile in last 24hours >15K, LP>40K, Non-major bleeding/invasive procedures >50K, Major bleeding >80K   -maintain hgb >7 or if symptomatic     Thank you for the referral. Will continue to monitor the patient.  Please call with any questions [phone]  Above reviewed with Attending Dr. Antonio     [facility]  [address] [place] [address]  [phone]

## 2024-09-11 NOTE — PROGRESS NOTE ADULT - ATTENDING COMMENTS
79 year old male with HTN, CHF, PE, Myasthenia Gravis, T2DM, Chronic Lymphedema and ureterolithiasis with unsuccessful stent placement attempt was transferred for PCNT placement by IR. Patient with sepsis with shock, respiratory failure, encephalopathy requiring admission to MICU and eventually downgraded.   Hospital stay significant for Klebsiella bacteremia.     Patient was seen and interviewed at bedside earlier today with residents.  Aware of being in Hospital though unaware why he's here.  Denies any fever, chills, dizziness, dyspnea, abdominal pain, back pain.    Afebrile with normal high BP  Obese male lying in bed, comfortable  Cushingoid facies. No O2  Supple neck  regular rate and rhythm S1 S2  Fair air entry anteriorly  Protuberant abdomen with umbilical hernia, nondistended nontender BS+  Right Nephrostomy tubes draining light yellow urine  Restrepo with dark urine  Bilateral LE swelling. Barely able to lift legs off bed.  Less confused today, follows commands    WBC improved to 13.6  Hgb stable 10.9  Platelets 80    SCr 2.1 at admission now normalized  A1c 6.2    9/5/24 Blood culture Klebsiella  9/5/24 Urine culture - Klebsiella  9/7/24 Blood culture (-)    BNP 19798-->[id number]    Chest X-Ray - old granulomatous disease  TTE - limited study, normal EF    # Right Ureterolithiasis with Right hydroureteronephrosis resulting in   # Klebsiella bacteremia and sepsis associated with  # Sepsis with septic shock and respiratory failure (now weaned off vasopressor)  # CATINA (resolved)  # CHFpEF, acute on chronic  # AF with RVR  # Thrombocytopenia, likely due to sepsis. No bleeding  # Encephalopathy, likely superimposed delirium - improved  # Anemia, stable.   # T2DM. A1c 6.2  # Myasthenia Gravis  - monitoring and care of PCNT, Urology follow up  - IV Antibiotics, anticipate 2 weeks; ID recommendations (IV vs PO)  - Wean off Midodrine  - Avoid Nephrotoxins,   - Resume Diuretics, gradually reintroduce GDMT  - Apixaban, BB  - Monitor platelets, follow HIT panel, Hematology input  - Quetiapine QHS, transition Risperidone to PRN. Delirium precautions  - Transition back to Prednisone from Solu-Cortef for Myasthenia  - Mobilize with PT

## 2024-09-11 NOTE — PROGRESS NOTE ADULT - ASSESSMENT
79y Male w/ PMH of HTN, HFpEF, PE (on apixaban), Myasthenia Gravis, chronic LE edema, known 7 mm distal ureteral calculus with mild right hydroureteronephrosis, initially presented to the [facility] ED on  with shortness of breath, urinary retention and confusion after failing to follow up outpatient for ureteral calculus. CT A/P revealed R hydro from 7 mm stone in UVJ. Underwent emergent cystoscopy with plan for ureteral stenting however procedure was aborted due to hematuria and inability to visualize right ureteral opening. Blood cx positive for klebsiella pneumoniae bacteremia. admission complicated by septic shock requiring IV vasopressor support. Patient underwent emergent percutaneous nephrostomy tube placement. now  Transferred to medicine for further management. Pending heme/onc consult for thrombocytopenia.      Plan  #Septic shock POA secondary to klebsiella bacteremia and Ureteral calculus --improving   - Afebrile. WBC downtrendin.35 --> 16.56 --> 15.62 --> today 13.65  - Repeat blood cx w/ NGTD  - c/w IV Ceftriaxone. ID following  - continue to monitor WBC  - resumed on  Midodrine 10mg Q8hr given low bp   - c/w -Cortef 50 mg Q8hr, wean down as tolerated to home dose on steroids       #Thrombocytopenia/ likely due to acute sepsis / r/o  HIT vs. DIC  - PLT ct 289 on admission, yesterday --> 73, today --> 80  - HIT panel and DIC panel ordered  - pending consult heme/ onc for recs  - obtain CBC Q6hrs      #New Afib on RVR  - likely induced by septic shock  - TTE unremarkable  - c/w Tele   - Cardiology following  - c/w Metoprolol and Eliquis        #CATINA -- resolving  - BUN downtrending 43.1 --> 33.8  - Cr downtrending 1.24 --> 0.92  - c/w IVF  - Monitor BMP daily      #ICU/Hospital acquired delirium  - c/w seroquel and risperdal      #GERD  - Continue PPI      DVT PPx: Eliquis 5mg BID  Code status: DNR/DNI, trial of NIV. Palliative following      Dispo: pending resolution of bacteremia and thrombocytopenia      79y Male w/ PMH of HTN, HFpEF, PE (on apixaban), Myasthenia Gravis, chronic LE edema, known 7 mm distal ureteral calculus with mild right hydroureteronephrosis, initially presented to the [facility] ED on  with shortness of breath, urinary retention and confusion after failing to follow up outpatient for ureteral calculus. CT A/P revealed R hydro from 7 mm stone in UVJ. Underwent emergent cystoscopy with plan for ureteral stenting however procedure was aborted due to hematuria and inability to visualize right ureteral opening. Blood cx positive for klebsiella pneumoniae bacteremia. admission complicated by septic shock requiring IV vasopressor support. Patient underwent emergent percutaneous nephrostomy tube placement. Transferred to medicine for further management. Pending heme/onc consult for thrombocytopenia.      Plan  #Septic shock POA secondary to klebsiella bacteremia and Ureteral calculus --improving   - Afebrile. WBC downtrendin.35 --> 16.56 --> 15.62 --> today 13.65  - Repeat blood cx w/ NGTD  - c/w IV Ceftriaxone. ID following  - continue to monitor WBC  - resumed on  Midodrine 10mg Q8hr given low bp   - c/w -Cortef 50 mg Q8hr, wean down as tolerated to home dose on steroids       #Thrombocytopenia/ likely due to acute sepsis  - likely due to polypharmacy (CTX, seroquel, spironolactone)  - PLT ct 289 on admission, yesterday --> 73, today --> 80  - HIT AB negative, pending RUMA  - DIC panel negative  - Heme/onc consulted, recommends avoiding non-essential meds that can exacerbate PLT drop  - anemia panel pending AM collection  - continue to trend CBC w/ diff      #New Afib on RVR  - likely induced by septic shock  - TTE unremarkable  - c/w Tele   - Cardiology following  - c/w Metoprolol and Eliquis        #CATINA -- resolving  - BUN downtrending 43.1 --> 33.8  - Cr downtrending 1.24 --> 0.92  - c/w IVF  - Monitor BMP daily      #ICU/Hospital acquired delirium  - c/w seroquel and risperdal      #GERD  - Continue PPI      DVT PPx: Eliquis 5mg BID  Code status: DNR/DNI, trial of NIV. Palliative following      Dispo: pending resolution of bacteremia and thrombocytopenia      79y Male w/ PMH of HTN, HFpEF, PE (on apixaban), Myasthenia Gravis, chronic LE edema, known 7 mm distal ureteral calculus with mild right hydroureteronephrosis, initially presented to the [facility] ED on  with shortness of breath, urinary retention and confusion after failing to follow up outpatient for ureteral calculus. CT A/P revealed R hydro from 7 mm stone in UVJ. Underwent emergent cystoscopy with plan for ureteral stenting however procedure was aborted due to hematuria and inability to visualize right ureteral opening. Blood cx positive for klebsiella pneumoniae bacteremia. admission complicated by septic shock requiring IV vasopressor support. Patient underwent emergent percutaneous nephrostomy tube placement. Transferred to medicine for further management. Pending heme/onc consult for thrombocytopenia.      Plan  #Septic shock POA secondary to klebsiella bacteremia and Ureteral calculus --improving   - Afebrile. WBC downtrendin.35 --> 16.56 --> 15.62 --> today 13.65  - Repeat blood cx w/ NGTD  - c/w IV Ceftriaxone. ID following  - continue to monitor WBC  - changed Midodrine 10mg Q8hr to 5mg Q8hr  - changed Cortef 50 mg Q8hr to Q12hr    #Thrombocytopenia/ likely due to acute sepsis  - likely due to polypharmacy (CTX, seroquel, spironolactone)  - PLT ct 289 on admission, yesterday --> 73, today --> 80  - HIT AB negative, pending RUMA  - DIC panel negative  - Heme/onc consulted, recommends avoiding non-essential meds that can exacerbate PLT drop  - anemia panel pending AM collection  - continue to trend CBC w/ diff      #New Afib on RVR  - likely induced by septic shock  - TTE unremarkable  - c/w Tele   - Cardiology following  - c/w Metoprolol and Eliquis        #CATINA -- resolving  - BUN downtrending 43.1 --> 33.8 --> 21.6  - Cr downtrending 1.24 --> 0.92 --> 0.59  - c/w IVF  - Monitor BMP daily      #ICU/Hospital acquired delirium  - c/w seroquel and risperdal      #GERD  - Continue PPI      DVT PPx: Eliquis 5mg BID  Code status: DNR/DNI, trial of NIV. Palliative following      Dispo: pending resolution of bacteremia and thrombocytopenia      79y Male w/ PMH of HTN, HFpEF, PE (on apixaban), Myasthenia Gravis, chronic LE edema, known 7 mm distal ureteral calculus with mild right hydroureteronephrosis, initially presented to the [facility] ED on  with shortness of breath, urinary retention and confusion after failing to follow up outpatient for ureteral calculus. CT A/P revealed R hydro from 7 mm stone in UVJ. Underwent emergent cystoscopy with plan for ureteral stenting however procedure was aborted due to hematuria and inability to visualize right ureteral opening. Blood cx positive for klebsiella pneumoniae bacteremia. Admission complicated by septic shock requiring IV vasopressor support. Patient underwent emergent percutaneous nephrostomy tube placement. Transferred to medicine for further management. Pending heme/onc consult for thrombocytopenia.      Plan  #Septic shock POA secondary to klebsiella bacteremia and Ureteral calculus --improving   - Afebrile. WBC downtrendin.35 --> 16.56 --> 15.62 --> today 13.65  - Repeat blood cx w/ NGTD  - c/w IV Ceftriaxone. ID following  - continue to monitor WBC  - changed Midodrine 10mg Q8hr to 5mg Q8hr  - changed Cortef 50 mg Q8hr to Q12hr    #Thrombocytopenia/ likely due to acute sepsis  - likely due to polypharmacy (CTX, seroquel, spironolactone)  - PLT ct 289 on admission, yesterday --> 73, today --> 80  - HIT AB negative, pending RUMA  - DIC panel negative  - Heme/onc consulted, recommends avoiding non-essential meds that can exacerbate PLT drop  - anemia panel pending AM collection  - continue to trend CBC w/ diff      #New Afib on RVR  - likely induced by septic shock  - TTE unremarkable  - c/w Tele   - Cardiology following  - c/w Metoprolol and Eliquis        #CATINA -- resolving  - BUN downtrending 43.1 --> 33.8 --> 21.6  - Cr downtrending 1.24 --> 0.92 --> 0.59  - c/w IVF  - Monitor BMP daily      #ICU/Hospital acquired delirium  - c/w seroquel and risperdal      #GERD  - Continue PPI      DVT PPx: Eliquis 5mg BID  Code status: DNR/DNI, trial of NIV. Palliative following      Dispo: pending resolution of bacteremia and thrombocytopenia      79y Male w/ PMH of HTN, HFpEF, PE (on apixaban), Myasthenia Gravis, chronic LE edema, known 7 mm distal ureteral calculus with mild right hydroureteronephrosis, initially presented to the [facility] ED on  with shortness of breath, urinary retention and confusion after failing to follow up outpatient for ureteral calculus. CT A/P revealed R hydro from 7 mm stone in UVJ. Underwent emergent cystoscopy with plan for ureteral stenting however procedure was aborted due to hematuria and inability to visualize right ureteral opening. Blood cx positive for klebsiella pneumoniae bacteremia. Admission complicated by septic shock requiring IV vasopressor support. Patient underwent emergent percutaneous nephrostomy tube placement. Transferred to medicine for further management. Pending heme/onc consult for thrombocytopenia.      Plan  #Septic shock POA secondary to klebsiella bacteremia and Ureteral calculus --improving   - Afebrile. WBC downtrendin.35 --> 16.56 --> 15.62 --> today 13.65  - Repeat blood cx w/ NGTD  - c/w IV Ceftriaxone. ID following, pending rec for the duration of tx  - continue to monitor WBC  - changed Midodrine 10mg Q8hr to 5mg Q8hr  - changed Cortef 50 mg Q8hr to Q12hr    #Thrombocytopenia/ likely due to acute sepsis  - likely due to polypharmacy (CTX, seroquel, spironolactone)  - PLT ct 289 on admission, yesterday --> 73, today --> 80  - HIT AB negative, pending RUMA  - DIC panel negative  - Heme/onc consulted, recommends avoiding non-essential meds that can exacerbate PLT drop  - anemia panel pending AM collection  - continue to trend CBC w/ diff      #New Afib on RVR  - likely induced by septic shock  - TTE unremarkable  - c/w Tele   - Cardiology following  - c/w Metoprolol and Eliquis        #CATINA -- resolving  - BUN downtrending 43.1 --> 33.8 --> 21.6  - Cr downtrending 1.24 --> 0.92 --> 0.59  - c/w IVF  - Monitor BMP daily      #ICU/Hospital acquired delirium  - c/w seroquel and risperdal      #GERD  - Continue PPI      DVT PPx: Eliquis 5mg BID  Code status: DNR/DNI, trial of NIV. Palliative following      Dispo: pending resolution of bacteremia and thrombocytopenia

## 2024-09-12 PROCEDURE — 99233 SBSQ HOSP IP/OBS HIGH 50: CPT | Mod: GC

## 2024-09-12 PROCEDURE — 99233 SBSQ HOSP IP/OBS HIGH 50: CPT

## 2024-09-12 RX ORDER — HYDROCORTISONE 10 MG/1
25 TABLET ORAL EVERY 12 HOURS
Refills: 0 | Status: DISCONTINUED | OUTPATIENT
Start: 2024-09-12 | End: 2024-09-13

## 2024-09-12 RX ORDER — METOPROLOL TARTRATE 100 MG/1
2.5 TABLET ORAL EVERY 6 HOURS
Refills: 0 | Status: DISCONTINUED | OUTPATIENT
Start: 2024-09-12 | End: 2024-09-16

## 2024-09-12 RX ADMIN — Medication 1 APPLICATION(S): at 06:54

## 2024-09-12 RX ADMIN — Medication 2000 MILLIGRAM(S): at 11:47

## 2024-09-12 RX ADMIN — APIXABAN 5 MILLIGRAM(S): 5 TABLET, FILM COATED ORAL at 17:05

## 2024-09-12 RX ADMIN — APIXABAN 5 MILLIGRAM(S): 5 TABLET, FILM COATED ORAL at 06:55

## 2024-09-12 RX ADMIN — Medication 40 MILLIGRAM(S): at 11:46

## 2024-09-12 RX ADMIN — HYDROCORTISONE 50 MILLIGRAM(S): 10 TABLET ORAL at 06:54

## 2024-09-12 RX ADMIN — METOPROLOL TARTRATE 12.5 MILLIGRAM(S): 100 TABLET ORAL at 21:58

## 2024-09-12 RX ADMIN — Medication 1 TABLET(S): at 11:46

## 2024-09-12 RX ADMIN — SPIRONOLACTONE 25 MILLIGRAM(S): 25 TABLET, FILM COATED ORAL at 06:54

## 2024-09-12 RX ADMIN — HYDROCORTISONE 25 MILLIGRAM(S): 10 TABLET ORAL at 17:05

## 2024-09-12 RX ADMIN — RISPERIDONE 0.25 MILLIGRAM(S): 0.25 TABLET, FILM COATED ORAL at 06:55

## 2024-09-12 RX ADMIN — METOPROLOL TARTRATE 12.5 MILLIGRAM(S): 100 TABLET ORAL at 12:33

## 2024-09-12 RX ADMIN — TAMSULOSIN HYDROCHLORIDE 0.4 MILLIGRAM(S): 0.4 CAPSULE ORAL at 21:58

## 2024-09-12 RX ADMIN — CHLORHEXIDINE GLUCONATE 1 APPLICATION(S): 40 SOLUTION TOPICAL at 11:47

## 2024-09-12 RX ADMIN — Medication 25 MILLIGRAM(S): at 21:58

## 2024-09-12 RX ADMIN — RISPERIDONE 0.25 MILLIGRAM(S): 0.25 TABLET, FILM COATED ORAL at 17:05

## 2024-09-12 RX ADMIN — Medication 1 APPLICATION(S): at 17:05

## 2024-09-12 NOTE — PROGRESS NOTE ADULT - ATTENDING COMMENTS
Septic shock POA secondary to klebsiella bacteremia and Ureteral calculus --improving   thrombocytopenia / likely due to sepsis  tested positive for COVID-19  Repeat blood cx w/ NGTD  c/w IV Ceftriaxone. ID following, pending rec for the duration of tx  decrease Cortef 50 mg Q12hr --> 25 mg Q12hr  dc midodrine

## 2024-09-12 NOTE — PROGRESS NOTE ADULT - SUBJECTIVE AND OBJECTIVE BOX
SUBJECTIVE:    Chief Complaint: Patient is a 79y old  Male who presents with a chief complaint of sepsis w/ severe shock 2/2 klebsiella bacteremia and ureteral calculus (11 Sep 2024 08:19)      BRIEF HOSPITAL COURSE:    INTERVAL HPI/LAST 24HRS EVENTS: Patient seen and examined at bedside at AM. No clinically acute event overnight.   Denies any chest pain, palpitations, SOB, PND, orthopnea, leg edema, N/V/D, or any other complaints.     MEDICATIONS  (STANDING):  apixaban 5 milliGRAM(s) Oral two times a day  bacitracin   Ointment 1 Application(s) Topical two times a day  cefTRIAXone Injectable. 2000 milliGRAM(s) IV Push every 24 hours  chlorhexidine 2% Cloths 1 Application(s) Topical daily  hydrocortisone sodium succinate Injectable 50 milliGRAM(s) IV Push every 12 hours  metoprolol tartrate 12.5 milliGRAM(s) Oral every 8 hours  midodrine 5 milliGRAM(s) Oral every 8 hours  multivitamin 1 Tablet(s) Oral daily  nystatin Powder 1 Application(s) Topical two times a day  pantoprazole  Injectable 40 milliGRAM(s) IV Push daily  QUEtiapine 25 milliGRAM(s) Oral at bedtime  risperiDONE   Tablet 0.25 milliGRAM(s) Oral every 12 hours  spironolactone 25 milliGRAM(s) Oral daily  tamsulosin 0.4 milliGRAM(s) Oral at bedtime    MEDICATIONS  (PRN):      Allergies    levofloxacin (Unknown)  gatifloxacin (Unknown)  ofloxacin (Unknown)    Intolerances    Ketek (Other)  telithromycin (Other)  Avelox (Other)  fluoroquinolone antibiotics (Other)      REVIEW OF SYSTEMS:  CONSTITUTIONAL: No fever, weight loss, or fatigue  RESPIRATORY: No cough, wheezing, chills or hemoptysis; No shortness of breath  CARDIOVASCULAR: No chest pain, palpitations, dizziness, or leg swelling  GASTROINTESTINAL: No abdominal or epigastric pain. No nausea, vomiting, or hematemesis; No diarrhea or constipation. No melena or hematochezia.  NEUROLOGICAL: No headaches, loss of strength, numbness, or tremors  MUSCULOSKELETAL: No joint pain or swelling; No muscle, back, or extremity pain    OBJECTIVE:    Vital Signs Last 24 Hrs  T(C): 37 (12 Sep 2024 00:15), Max: 37.1 (11 Sep 2024 21:46)  T(F): 98.6 (12 Sep 2024 00:15), Max: 98.7 (11 Sep 2024 21:46)  HR: 76 (12 Sep 2024 00:15) (68 - 103)  BP: 146/81 (12 Sep 2024 00:15) (138/84 - 146/81)  BP(mean): --  RR: 18 (12 Sep 2024 00:15) (18 - 20)  SpO2: 97% (12 Sep 2024 00:15) (96% - 98%)    Parameters below as of 12 Sep 2024 00:15  Patient On (Oxygen Delivery Method): room air        PHYSICAL EXAM:  Constitutional: Alert, awake, lying comfortably, NAD  Head and Face: NC/AT  Eyes: PERRLA. EOMI  ENT: Ears and nose were normal in appearance  Neck: Supple, No JVD  Pulmonary: CTAB. Non-labored respirations  Heart: RRR. S1/S2. No MRG  Abdominal: Soft, NT, ND. Normoactive BS in all 4 quadrants. RT sided nephrostomy tube in place  Skin: Normal color and intact without appreciable rash or abnormal skin lesion  Extremities: LUE midline in place. Warm without edema. No clubbing.  Pulse: 2+ radial pulse  Neuro: AAOx3, no gross focal deficits      Lab/ Imaging:    LABS:                        10.7   13.42 )-----------( 79       ( 11 Sep 2024 08:30 )             33.8     09-11    140  |  109<H>  |  21.6<H>  ----------------------------<  100<H>  3.4<L>   |  22.0  |  0.59    Ca    7.7<L>      11 Sep 2024 08:30  Phos  2.0     09-11  Mg     1.9     09-11      PT/INR - ( 10 Sep 2024 13:50 )   PT: 12.0 sec;   INR: 1.08 ratio         PTT - ( 10 Sep 2024 13:50 )  PTT:33.0 sec  Urinalysis Basic - ( 11 Sep 2024 08:30 )    Color: x / Appearance: x / SG: x / pH: x  Gluc: 100 mg/dL / Ketone: x  / Bili: x / Urobili: x   Blood: x / Protein: x / Nitrite: x   Leuk Esterase: x / RBC: x / WBC x   Sq Epi: x / Non Sq Epi: x / Bacteria: x      CAPILLARY BLOOD GLUCOSE      POCT Blood Glucose.: 154 mg/dL (11 Sep 2024 07:45)          Imaging Personally Reviewed:  [ ] YES  [ ] NO    Consultant(s) Notes Reviewed:  [ ] YES  [ ] NO    Care Discussed with Consultants/Other Providers [ ] YES  [ ] NO    Plan of Care discussed with Housestaff [ ]YES [ ] NO Chief Complaint: Patient is a 79y old  Male who presents with a chief complaint of sepsis w/ severe shock 2/2 klebsiella bacteremia and ureteral calculus (11 Sep 2024 08:19)      INTERVAL HPI/LAST 24HRS EVENTS: Patient seen and examined at bedside at AM. States that he began to feel chest congestion yesterday afternoon. CXR was ordered. Tested positive for COVID-19 and RVP.  Denies fever, chest pain, palpitations, SOB, PND, orthopnea, leg edema, N/V/D, or any other complaints.       MEDICATIONS  (STANDING):  apixaban 5 milliGRAM(s) Oral two times a day  bacitracin   Ointment 1 Application(s) Topical two times a day  cefTRIAXone Injectable. 2000 milliGRAM(s) IV Push every 24 hours  chlorhexidine 2% Cloths 1 Application(s) Topical daily  hydrocortisone sodium succinate Injectable 50 milliGRAM(s) IV Push every 12 hours  metoprolol tartrate 12.5 milliGRAM(s) Oral every 8 hours  midodrine 5 milliGRAM(s) Oral every 8 hours  multivitamin 1 Tablet(s) Oral daily  nystatin Powder 1 Application(s) Topical two times a day  pantoprazole  Injectable 40 milliGRAM(s) IV Push daily  QUEtiapine 25 milliGRAM(s) Oral at bedtime  risperiDONE   Tablet 0.25 milliGRAM(s) Oral every 12 hours  spironolactone 25 milliGRAM(s) Oral daily  tamsulosin 0.4 milliGRAM(s) Oral at bedtime    MEDICATIONS  (PRN):      Allergies    levofloxacin (Unknown)  gatifloxacin (Unknown)  ofloxacin (Unknown)    Intolerances    Ketek (Other)  telithromycin (Other)  Avelox (Other)  fluoroquinolone antibiotics (Other)      REVIEW OF SYSTEMS:  as above    OBJECTIVE:    Vital Signs Last 24 Hrs  T(C): 37 (12 Sep 2024 00:15), Max: 37.1 (11 Sep 2024 21:46)  T(F): 98.6 (12 Sep 2024 00:15), Max: 98.7 (11 Sep 2024 21:46)  HR: 76 (12 Sep 2024 00:15) (68 - 103)  BP: 146/81 (12 Sep 2024 00:15) (138/84 - 146/81)  BP(mean): --  RR: 18 (12 Sep 2024 00:15) (18 - 20)  SpO2: 97% (12 Sep 2024 00:15) (96% - 98%)    Parameters below as of 12 Sep 2024 00:15  Patient On (Oxygen Delivery Method): room air        PHYSICAL EXAM:  Constitutional: Alert, awake, lying comfortably, NAD  Head and Face: NC/AT  Eyes: PERRLA. EOMI  ENT: Ears and nose were normal in appearance  Neck: Supple, No JVD  Pulmonary: Rhonchi appreciated in RUL and RLL. No expiratory wheezing  Heart: RRR. S1/S2. No MRG  Abdominal: Soft, NT, ND. Normoactive BS in all 4 quadrants. Protuberant abdomen with umbilical hernia. RT sided nephrostomy tube in place draining light yellow urine.   Skin: Normal color and intact without appreciable rash or abnormal skin lesion  Extremities: LUE midline in place. B/L LE edema  Pulse: 2+ radial pulse  Neuro: AAOx3, no gross focal deficits      Lab/ Imaging:    LABS:                        10.7   13.42 )-----------( 79       ( 11 Sep 2024 08:30 )             33.8     09-11    140  |  109<H>  |  21.6<H>  ----------------------------<  100<H>  3.4<L>   |  22.0  |  0.59    Ca    7.7<L>      11 Sep 2024 08:30  Phos  2.0     09-11  Mg     1.9     09-11      PT/INR - ( 10 Sep 2024 13:50 )   PT: 12.0 sec;   INR: 1.08 ratio         PTT - ( 10 Sep 2024 13:50 )  PTT:33.0 sec  Urinalysis Basic - ( 11 Sep 2024 08:30 )    Color: x / Appearance: x / SG: x / pH: x  Gluc: 100 mg/dL / Ketone: x  / Bili: x / Urobili: x   Blood: x / Protein: x / Nitrite: x   Leuk Esterase: x / RBC: x / WBC x   Sq Epi: x / Non Sq Epi: x / Bacteria: x      CAPILLARY BLOOD GLUCOSE      POCT Blood Glucose.: 154 mg/dL (11 Sep 2024 07:45)          Imaging Personally Reviewed:  [ ] YES  [ ] NO    Consultant(s) Notes Reviewed:  [ ] YES  [ ] NO    Care Discussed with Consultants/Other Providers [ ] YES  [ ] NO    Plan of Care discussed with Housestaff [ ]YES [ ] NO Chief Complaint: Patient is a 79y old  Male who presents with a chief complaint of sepsis w/ severe shock 2/2 klebsiella bacteremia and ureteral calculus (11 Sep 2024 08:19)      INTERVAL HPI/LAST 24HRS EVENTS: Patient seen and examined at bedside at AM. States that he began to feel chest congestion yesterday afternoon. CXR was ordered. Tested positive for COVID-19 and RVP. Reports having a productive cough.  Denies fever, chest pain, palpitations, PND, orthopnea, leg edema, N/V/D, or any other complaints.       MEDICATIONS  (STANDING):  apixaban 5 milliGRAM(s) Oral two times a day  bacitracin   Ointment 1 Application(s) Topical two times a day  cefTRIAXone Injectable. 2000 milliGRAM(s) IV Push every 24 hours  chlorhexidine 2% Cloths 1 Application(s) Topical daily  hydrocortisone sodium succinate Injectable 50 milliGRAM(s) IV Push every 12 hours  metoprolol tartrate 12.5 milliGRAM(s) Oral every 8 hours  midodrine 5 milliGRAM(s) Oral every 8 hours  multivitamin 1 Tablet(s) Oral daily  nystatin Powder 1 Application(s) Topical two times a day  pantoprazole  Injectable 40 milliGRAM(s) IV Push daily  QUEtiapine 25 milliGRAM(s) Oral at bedtime  risperiDONE   Tablet 0.25 milliGRAM(s) Oral every 12 hours  spironolactone 25 milliGRAM(s) Oral daily  tamsulosin 0.4 milliGRAM(s) Oral at bedtime    MEDICATIONS  (PRN):      Allergies    levofloxacin (Unknown)  gatifloxacin (Unknown)  ofloxacin (Unknown)    Intolerances    Ketek (Other)  telithromycin (Other)  Avelox (Other)  fluoroquinolone antibiotics (Other)      REVIEW OF SYSTEMS:  as above    OBJECTIVE:    Vital Signs Last 24 Hrs  T(C): 37 (12 Sep 2024 00:15), Max: 37.1 (11 Sep 2024 21:46)  T(F): 98.6 (12 Sep 2024 00:15), Max: 98.7 (11 Sep 2024 21:46)  HR: 76 (12 Sep 2024 00:15) (68 - 103)  BP: 146/81 (12 Sep 2024 00:15) (138/84 - 146/81)  BP(mean): --  RR: 18 (12 Sep 2024 00:15) (18 - 20)  SpO2: 97% (12 Sep 2024 00:15) (96% - 98%)    Parameters below as of 12 Sep 2024 00:15  Patient On (Oxygen Delivery Method): room air        PHYSICAL EXAM:  Constitutional: Alert, awake, lying comfortably, NAD  Head and Face: NC/AT  Eyes: PERRLA. EOMI  ENT: Ears and nose were normal in appearance  Neck: Supple, No JVD  Pulmonary: Rhonchi appreciated in RUL and RLL. No expiratory wheezing  Heart: RRR. S1/S2. No MRG  Abdominal: Soft, NT, ND. Normoactive BS in all 4 quadrants. Protuberant abdomen with umbilical hernia. RT sided nephrostomy tube in place draining light yellow urine.   Skin: Normal color and intact without appreciable rash or abnormal skin lesion  Extremities: LUE midline in place. B/L LE edema  Pulse: 2+ radial pulse  Neuro: AAOx3, no gross focal deficits      Lab/ Imaging:    LABS:                        10.7   13.42 )-----------( 79       ( 11 Sep 2024 08:30 )             33.8     09-11    140  |  109<H>  |  21.6<H>  ----------------------------<  100<H>  3.4<L>   |  22.0  |  0.59    Ca    7.7<L>      11 Sep 2024 08:30  Phos  2.0     09-11  Mg     1.9     09-11      PT/INR - ( 10 Sep 2024 13:50 )   PT: 12.0 sec;   INR: 1.08 ratio         PTT - ( 10 Sep 2024 13:50 )  PTT:33.0 sec  Urinalysis Basic - ( 11 Sep 2024 08:30 )    Color: x / Appearance: x / SG: x / pH: x  Gluc: 100 mg/dL / Ketone: x  / Bili: x / Urobili: x   Blood: x / Protein: x / Nitrite: x   Leuk Esterase: x / RBC: x / WBC x   Sq Epi: x / Non Sq Epi: x / Bacteria: x      CAPILLARY BLOOD GLUCOSE      POCT Blood Glucose.: 154 mg/dL (11 Sep 2024 07:45)          Imaging Personally Reviewed:  [ ] YES  [ ] NO    Consultant(s) Notes Reviewed:  [ ] YES  [ ] NO    Care Discussed with Consultants/Other Providers [ ] YES  [ ] NO    Plan of Care discussed with Housestaff [ ]YES [ ] NO

## 2024-09-12 NOTE — PROGRESS NOTE ADULT - SUBJECTIVE AND OBJECTIVE BOX
INFECTIOUS DISEASES AND INTERNAL MEDICINE at [place]  =======================================================  Jasen Willis MD  Diplomates American Board of Internal Medicine and Infectious Diseases  Telephone [phone]  Fax            [phone]  =======================================================    IVANA DEION [id number]    Follow up: leukocytosis KLEBS BACTEREMIA    Allergies:  Ketek (Other)  telithromycin (Other)  Avelox (Other)  fluoroquinolone antibiotics (Other)  levofloxacin (Unknown)  gatifloxacin (Unknown)  ofloxacin (Unknown)      Medications:  cefTRIAXone Injectable. 2000 milliGRAM(s) IV Push every 24 hours  chlorhexidine 2% Cloths 1 Application(s) Topical daily  enoxaparin Injectable 95 milliGRAM(s) SubCutaneous every 12 hours  hydrocortisone sodium succinate Injectable 50 milliGRAM(s) IV Push every 8 hours  metoprolol tartrate 12.5 milliGRAM(s) Oral every 8 hours  midodrine 10 milliGRAM(s) Oral every 8 hours  multivitamin 1 Tablet(s) Oral daily  nystatin Powder 1 Application(s) Topical two times a day  pantoprazole  Injectable 40 milliGRAM(s) IV Push daily  QUEtiapine 25 milliGRAM(s) Oral at bedtime  risperiDONE   Tablet 0.25 milliGRAM(s) Oral every 12 hours  spironolactone 25 milliGRAM(s) Oral daily  tamsulosin 0.4 milliGRAM(s) Oral at bedtime    SOCIAL       FAMILY   FAMILY HISTORY:  Family history of stroke (Father)  Father -  age 62    Family history of kidney disease (Mother)  Mother -  age 67    Family history of diabetes mellitus type II (Sibling)  Brother & Sister      REVIEW OF SYSTEMS:  CONSTITUTIONAL:  No Fever or chills  HEENT:   No diplopia or blurred vision.  No earache, sore throat or runny nose.  CARDIOVASCULAR:  No pressure, squeezing, strangling, tightness, heaviness or aching about the chest, neck, axilla or epigastrium.  RESPIRATORY:  No cough, shortness of breath, PND or orthopnea.  GASTROINTESTINAL:  No nausea, vomiting or diarrhea.  GENITOURINARY:  No dysuria, frequency or urgency. No Blood in urine  MUSCULOSKELETAL:   moves all joints  SKIN:  No change in skin, hair or nails.  NEUROLOGIC:  No paresthesias, fasciculations, seizures or weakness.  PSYCHIATRIC:  No disorder of thought or mood.  ENDOCRINE:  No heat or cold intolerance, polyuria or polydipsia.  HEMATOLOGICAL:  No easy bruising or bleeding.            Physical Exam:   V Vital Signs Last 24 Hrs  T(C): 37 (12 Sep 2024 00:15), Max: 37.1 (11 Sep 2024 21:46)  T(F): 98.6 (12 Sep 2024 00:15), Max: 98.7 (11 Sep 2024 21:46)  HR: 76 (12 Sep 2024 00:15) (68 - 103)  BP: 146/81 (12 Sep 2024 00:15) (138/84 - 146/81)  BP(mean): --  RR: 18 (12 Sep 2024 00:15) (18 - 20)  SpO2: 97% (12 Sep 2024 00:15) (96% - 98%)    Parameters below as of 12 Sep 2024 00:15  Patient On (Oxygen Delivery Method): room air                  GEN: NAD,   HEENT: normocephalic and atraumatic. EOMI. ALINE.    NECK: Supple. No carotid bruits.  No lymphadenopathy or thyromegaly.  LUNGS: Clear to auscultation.  HEART: Regular rate and rhythm without murmur.  ABDOMEN: Soft, nontender, and nondistended.  Positive bowel sounds.    : No CVA tenderness  EXTREMITIES: Without any cyanosis, clubbing, rash, lesions or edema.  MSK: no joint swelling  NEUROLOGIC: Cranial nerves II through XII are grossly intact.  PSYCHIATRIC: Appropriate affect .  SKIN: No ulceration or induration present.        Labs:  Vitals:  ===========     =======================================================  Current Antibiotics:  cefTRIAXone Injectable. 2000 milliGRAM(s) IV Push every 24 hours    Other medications:  chlorhexidine 2% Cloths 1 Application(s) Topical daily  enoxaparin Injectable 95 milliGRAM(s) SubCutaneous every 12 hours  hydrocortisone sodium succinate Injectable 50 milliGRAM(s) IV Push every 8 hours  metoprolol tartrate 12.5 milliGRAM(s) Oral every 8 hours  midodrine 10 milliGRAM(s) Oral every 8 hours  multivitamin 1 Tablet(s) Oral daily  nystatin Powder 1 Application(s) Topical two times a day  pantoprazole  Injectable 40 milliGRAM(s) IV Push daily  QUEtiapine 25 milliGRAM(s) Oral at bedtime  risperiDONE   Tablet 0.25 milliGRAM(s) Oral every 12 hours  spironolactone 25 milliGRAM(s) Oral daily  tamsulosin 0.4 milliGRAM(s) Oral at bedtime      =======================================================  Labs:                                                           10.7   13.42 )-----------( 79       ( 11 Sep 2024 08:30 )             33.8       140  |  109<H>  |  21.6<H>  ----------------------------<  100<H>  3.4<L>   |  22.0  |  0.59    Ca    7.7<L>      11 Sep 2024 08:30  Phos  2.0       Mg     1.9     11            Culture - Blood (collected 24 @ 16:02)  Source: .Blood Blood  Preliminary Report (09-10-24 @ 01:02):    No growth at 48 Hours    Culture - Urine (collected 24 @ 16:15)  Source: .Urine Abdominal Fluid  Preliminary Report (24 @ 11:58):    >100,000 CFU/ml Klebsiella pneumoniae    Culture - Body Fluid with Gram Stain (collected 24 @ 08:45)  Source: Abdominal Fl Abdominal Fluid  Gram Stain (24 @ 15:37):    Rare polymorphonuclear leukocytes per low power field    No organisms seen per oil power field  Preliminary Report (24 @ 07:50):    Moderate Klebsiella pneumoniae    Few Klebsiella pneumoniae #2    Multiple Morphological Strains  Organism: Klebsiella pneumoniae  Klebsiella pneumoniae (24 @ 07:50)  Organism: Klebsiella pneumoniae (24 @ 07:50)    Sensitivities:      Method Type: MONI      -  Amoxicillin/Clavulanic Acid: S <=8/4      -  Ampicillin: R 16 These ampicillin results predict results for amoxicillin      -  Ampicillin/Sulbactam: S <=4/2      -  Aztreonam: S <=4      -  Cefazolin: S <=2      -  Cefepime: S <=2      -  Cefoxitin: I 16      -  Ceftriaxone: S <=1      -  Ciprofloxacin: S <=0.25      -  Ertapenem: S <=0.5      -  Gentamicin: S <=2      -  Imipenem: S <=1      -  Levofloxacin: S <=0.5      -  Meropenem: S <=1      -  Piperacillin/Tazobactam: S <=8      -  Tobramycin: S <=2      -  Trimethoprim/Sulfamethoxazole: S <=0.5/9.5  Organism: Klebsiella pneumoniae (24 @ 07:50)    Sensitivities:      Method Type: MONI      -  Amoxicillin/Clavulanic Acid: S <=8/4      -  Ampicillin: R >16 These ampicillin results predict results for amoxicillin      -  Ampicillin/Sulbactam: S <=4/2      -  Aztreonam: S <=4      -  Cefazolin: S <=2      -  Cefepime: S <=2      -  Cefoxitin: S <=8      -  Ceftriaxone: S <=1      -  Ciprofloxacin: I 0.5      -  Ertapenem: S <=0.5      -  Gentamicin: S <=2      -  Imipenem: S <=1      -  Levofloxacin: S <=0.5      -  Meropenem: S <=1      -  Piperacillin/Tazobactam: S <=8      -  Tobramycin: S <=2      -  Trimethoprim/Sulfamethoxazole: S <=0.5/9.5    Urinalysis with Rflx Culture (collected 24 @ 15:20)    Culture - Urine (collected 24 @ 15:20)  Source: Clean Catch Clean Catch (Midstream)  Final Report (24 @ 14:50):    >100,000 CFU/ml Klebsiella pneumoniae  Organism: Klebsiella pneumoniae (24 @ 14:50)  Organism: Klebsiella pneumoniae (24 @ 14:50)    Sensitivities:      Method Type: MONI      -  Amoxicillin/Clavulanic Acid: S <=8/4      -  Ampicillin: R >16 These ampicillin results predict results for amoxicillin      -  Ampicillin/Sulbactam: S <=4/2      -  Aztreonam: S <=4      -  Cefazolin: S <=2 For uncomplicated UTI with K. pneumoniae, E. coli, or P. mirablis: MONI <=16 is sensitive and MONI >=32 is resistant. This also predicts results for oral agents cefaclor, cefdinir, cefpodoxime, cefprozil, cefuroxime axetil, cephalexin and locarbef for uncomplicated UTI. Note that some isolates may be susceptible to these agents while testing resistant to cefazolin.      -  Cefepime: S <=2      -  Cefoxitin: S <=8      -  Ceftriaxone: S <=1      -  Cefuroxime: S <=4      -  Ciprofloxacin: S <=0.25      -  Ertapenem: S <=0.5      -  Gentamicin: S <=2      -  Imipenem: S <=1      -  Levofloxacin: S <=0.5      -  Meropenem: S <=1      -  Nitrofurantoin: S <=32 Should not be used to treat pyelonephritis      -  Piperacillin/Tazobactam: S <=8      -  Tobramycin: S <=2      -  Trimethoprim/Sulfamethoxazole: S <=0.5/9.5    Culture - Blood (collected 24 @ 14:30)  Source: .Blood Blood-Peripheral  Gram Stain (24 @ 08:30):    Growth in aerobic and anaerobic bottles: Gram Negative Rods  Final Report (24 @ 07:28):    Growth in aerobic and anaerobic bottles: Klebsiella pneumoniae    See previous culture  [id number]    Culture - Blood (collected 24 @ 14:15)  Source: .Blood Blood-Peripheral  Gram Stain (24 @ 07:52):    Growth in aerobic bottle: Gram Negative Rods    Growth in anaerobic bottle: Gram Negative Rods  Final Report (24 @ 12:17):    Growth in aerobic and anaerobic bottles: Klebsiella pneumoniae    Direct identification is available within approximately 3-5    hours either by Blood Panel Multiplexed PCR or Direct    MALDI-TOF. Details: [email or web address]  Organism: Blood Culture PCR  Klebsiella pneumoniae (24 @ 12:17)  Organism: Klebsiella pneumoniae (24 @ 12:17)    Sensitivities:      Method Type: MONI      -  Ampicillin: R >16 These ampicillin results predict results for amoxicillin      -  Ampicillin/Sulbactam: S <=4/2      -  Aztreonam: S <=4      -  Cefazolin: S <=2      -  Cefepime: S <=2      -  Cefoxitin: S <=8      -  Ceftriaxone: S <=1      -  Ciprofloxacin: S <=0.25      -  Ertapenem: S <=0.5      -  Gentamicin: S <=2      -  Imipenem: S <=1      -  Levofloxacin: S <=0.5      -  Meropenem: S <=1      -  Piperacillin/Tazobactam: S <=8      -  Tobramycin: S <=2      -  Trimethoprim/Sulfamethoxazole: S <=0.5/9.5  Organism: Blood Culture PCR (24 @ 12:17)    Sensitivities:      Method Type: PCR      -  K. pneumoniae group: Detec (K. pneumoniae, K. quasipneumoniae, K. variicola)    Culture - Urine (collected 24 @ 16:25)  Source: Clean Catch Clean Catch (Midstream)  Final Report (24 @ 16:16):    50,000 - 99,000 CFU/mL Klebsiella pneumoniae  Organism: Klebsiella pneumoniae (24 @ 16:16)  Organism: Klebsiella pneumoniae (24 @ 16:16)    Sensitivities:      Method Type: MONI      -  Amoxicillin/Clavulanic Acid: S <=8/4      -  Ampicillin: R >16 These ampicillin results predict results for amoxicillin      -  Ampicillin/Sulbactam: S <=4/2      -  Aztreonam: S <=4      -  Cefazolin: S <=2 For uncomplicated UTI with K. pneumoniae, E. coli, or P. mirablis: MONI <=16 is sensitive and MONI >=32 is resistant. This also predicts results for oral agents cefaclor, cefdinir, cefpodoxime, cefprozil, cefuroxime axetil, cephalexin and locarbef for uncomplicated UTI. Note that some isolates may be susceptible to these agents while testing resistant to cefazolin.      -  Cefepime: S <=2      -  Cefoxitin: S <=8      -  Ceftriaxone: S <=1      -  Cefuroxime: S 8      -  Ciprofloxacin: I 0.5      -  Ertapenem: S <=0.5      -  Gentamicin: S <=2      -  Imipenem: S <=1      -  Levofloxacin: S <=0.5      -  Meropenem: S <=1      -  Nitrofurantoin: R >64 Should not be used to treat pyelonephritis      -  Piperacillin/Tazobactam: S <=8      -  Tobramycin: S <=2      -  Trimethoprim/Sulfamethoxazole: S <=0.5/9.5    Culture - Blood (collected 24 @ 13:05)  Source: .Blood Blood-Peripheral  Final Report (24 @ 19:00):    No growth at 5 days    Culture - Blood (collected 24 @ 13:00)  Source: .Blood Blood-Peripheral  Final Report (24 @ 19:00):    No growth at 5 days    Culture - Blood (collected 23 @ 21:50)  Source: .Blood Blood-Venous  Final Report (23 @ 03:01):    No growth at 5 days    Culture - Blood (collected 23 @ 21:45)  Source: .Blood Blood-Venous  Final Report (23 @ 03:01):    No growth at 5 days    Culture - Urine (collected 23 @ 21:40)  Source: Clean Catch Clean Catch (Midstream)  Final Report (23 @ 10:29):    >100,000 CFU/ml Klebsiella pneumoniae  Organism: Klebsiella pneumoniae (23 @ 10:29)  Organism: Klebsiella pneumoniae (23 @ 10:29)    Sensitivities:      Method Type: MONI      -  Amoxicillin/Clavulanic Acid: S <=8/4      -  Ampicillin: R 16 These ampicillin results predict results for amoxicillin      -  Ampicillin/Sulbactam: S <=4/2      -  Aztreonam: S <=4      -  Cefazolin: S <=2 For uncomplicated UTI with K. pneumoniae, E. coli, or P. mirablis: MONI <=16 is sensitive and MONI >=32 is resistant. This also predicts results for oral agents cefaclor, cefdinir, cefpodoxime, cefprozil, cefuroxime axetil, cephalexin and locarbef for uncomplicated UTI. Note that some isolates may be susceptible to these agents while testing resistant to cefazolin.      -  Cefepime: S <=2      -  Cefoxitin: S <=8      -  Ceftriaxone: S <=1      -  Cefuroxime: S <=4      -  Ciprofloxacin: S <=0.25      -  Ertapenem: S <=0.5      -  Gentamicin: S <=2      -  Imipenem: S <=1      -  Levofloxacin: S <=0.5      -  Meropenem: S <=1      -  Nitrofurantoin: S <=32 Should not be used to treat pyelonephritis      -  Piperacillin/Tazobactam: S <=8      -  Tobramycin: S <=2      -  Trimethoprim/Sulfamethoxazole: S <=0.5/9.5      Creatinine: 0.92 mg/dL (09-10-24 @ 04:48)  Creatinine: 1.24 mg/dL (24 @ 04:30)  Creatinine: 1.35 mg/dL (24 @ 06:50)  Creatinine: 1.59 mg/dL (24 @ 03:18)  Creatinine: 1.52 mg/dL (24 @ 20:00)  Creatinine: 1.60 mg/dL (24 @ 12:10)  Creatinine: 1.68 mg/dL (24 @ 08:40)  Creatinine: 1.73 mg/dL (24 @ 02:20)  Creatinine: 2.20 mg/dL (24 @ 21:40)  Creatinine: 2.60 mg/dL (24 @ 15:45)    Procalcitonin: 36.31 ng/mL (24 @ 18:20)  Procalcitonin: 26.81 ng/mL (24 @ 15:45)    D-Dimer Assay, Quantitative: 475 ng/mL DDU (09-10-24 @ 08:40)      C-Reactive Protein: 226 mg/L (24 @ 18:20)    WBC Count: 16.56 K/uL (09-10-24 @ 04:48)  WBC Count: 17.35 K/uL (24 @ 04:30)  WBC Count: 24.29 K/uL (24 @ 06:50)  WBC Count: 25.98 K/uL (24 @ 03:18)  WBC Count: 32.79 K/uL (24 @ 20:00)  WBC Count: 33.85 K/uL (24 @ 12:10)  WBC Count: 35.77 K/uL (24 @ 08:40)  WBC Count: 37.28 K/uL (24 @ 02:20)  WBC Count: 35.99 K/uL (24 @ 21:40)  WBC Count: 41.45 K/uL (24 @ 14:30)    SARS-CoV-2 Result: NotDetec (24 @ 14:30)      Alkaline Phosphatase: 87 U/L (09-10-24 @ 04:48)  Alkaline Phosphatase: 100 U/L (24 @ 04:30)  Alkaline Phosphatase: 118 U/L (24 @ 06:50)  Alkaline Phosphatase: 72 U/L (24 @ 03:18)  Alkaline Phosphatase: 77 U/L (24 @ 20:00)  Alanine Aminotransferase (ALT/SGPT): 22 U/L (09-10-24 @ 04:48)  Alanine Aminotransferase (ALT/SGPT): 21 U/L (24 @ 04:30)  Alanine Aminotransferase (ALT/SGPT): 25 U/L (24 @ 06:50)  Alanine Aminotransferase (ALT/SGPT): 23 U/L (24 @ 03:18)  Alanine Aminotransferase (ALT/SGPT): 27 U/L (24 @ 20:00)  Aspartate Aminotransferase (AST/SGOT): 20 U/L (09-10-24 @ 04:48)  Aspartate Aminotransferase (AST/SGOT): 16 U/L (24 @ 04:30)  Aspartate Aminotransferase (AST/SGOT): 20 U/L (24 @ 06:50)  Aspartate Aminotransferase (AST/SGOT): 22 U/L (24 @ 03:18)  Aspartate Aminotransferase (AST/SGOT): 26 U/L (24 @ 20:00)  Bilirubin Total: 0.4 mg/dL (09-10-24 @ 04:48)  Bilirubin Total: 0.4 mg/dL (24 @ 04:30)  Bilirubin Total: 0.3 mg/dL (24 @ 06:50)  Bilirubin Total: 0.4 mg/dL (24 @ 03:18)  Bilirubin Total: 0.5 mg/dL (24 @ 20:00)

## 2024-09-12 NOTE — PROGRESS NOTE ADULT - ASSESSMENT
79y Male w/ PMH of HTN, HFpEF, PE (on apixaban), Myasthenia Gravis, chronic LE edema, known 7 mm distal ureteral calculus with mild right hydroureteronephrosis, initially presented to the [place] ED on  with shortness of breath, urinary retention and confusion after failing to follow up outpatient for ureteral calculus. CT A/P revealed R hydro from 7 mm stone in UVJ. Underwent emergent cystoscopy with plan for ureteral stenting however procedure was aborted due to hematuria and inability to visualize right ureteral opening. Blood cx positive for klebsiella pneumoniae bacteremia. Admission complicated by septic shock requiring IV vasopressor support. Patient underwent emergent percutaneous nephrostomy tube placement. Transferred to medicine for further management. Heme/onc consult for thrombocytopenia, anemia panel ordered. Tested positive for RVP and COVID yesterday ().      Plan  #Septic shock POA secondary to klebsiella bacteremia and Ureteral calculus --improving   #COVID-19  - Afebrile. WBC downtrendin.35 --> 16.56 --> 15.62 -->13.65 --> 13.42  - Repeat blood cx w/ NGTD  - c/w IV Ceftriaxone. ID following, pending rec for the duration of tx  - continue to monitor WBC  - c/w Midodrine 5mg Q8hr  - c/w Cortef 50 mg Q12hr    #Thrombocytopenia/ likely due to acute sepsis  - likely due to polypharmacy (CTX, seroquel, spironolactone)  - PLT ct 289 on admission, (9/10) --> 73, ()--> 80  - HIT AB negative, pending RUMA  - DIC panel negative  - Heme/onc consulted, recommends avoiding non-essential meds that can exacerbate PLT drop  - anemia panel pending AM collection. Pt refused AM labs  - continue to trend CBC w/ diff      #New Afib on RVR  - likely induced by septic shock  - TTE unremarkable  - c/w Tele   - Cardiology following  - c/w Metoprolol and Eliquis        #CATINA -- resolving  - BUN downtrending 43.1 --> 33.8 --> 21.6  - Cr downtrending 1.24 --> 0.92 --> 0.59  - c/w IVF  - Monitor BMP daily      #ICU/Hospital acquired delirium  - c/w seroquel and risperdal      #GERD  - Continue PPI      DVT PPx: Eliquis 5mg BID  Code status: DNR/DNI, trial of NIV. Palliative following      Dispo: pending resolution of bacteremia and thrombocytopenia    79y Male w/ PMH of HTN, HFpEF, PE (on apixaban), Myasthenia Gravis, chronic LE edema, known 7 mm distal ureteral calculus with mild right hydroureteronephrosis, initially presented to the [place] ED on  with shortness of breath, urinary retention and confusion after failing to follow up outpatient for ureteral calculus. CT A/P revealed R hydro from 7 mm stone in UVJ. Underwent emergent cystoscopy with plan for ureteral stenting however procedure was aborted due to hematuria and inability to visualize right ureteral opening. Blood cx positive for klebsiella pneumoniae bacteremia. Admission complicated by septic shock requiring IV vasopressor support. Patient underwent emergent percutaneous nephrostomy tube placement. Transferred to medicine for further management. Heme/onc consult for thrombocytopenia, anemia panel ordered. Tested positive for RVP and COVID yesterday ().      Plan  #Septic shock POA secondary to klebsiella bacteremia and Ureteral calculus --improving   #COVID-19  - Afebrile. WBC downtrendin.35 --> 16.56 --> 15.62 -->13.65 --> 13.42  - Repeat blood cx w/ NGTD  - c/w IV Ceftriaxone. ID following, pending rec for the duration of tx  - continue to monitor WBC  - d/c Midodrine  - decrease Cortef 50 mg Q12hr --> 25 mg Q12hr  - start Albuterol 90 micrograms, 2 puffs Q4hr prn    #Thrombocytopenia/ likely due to acute sepsis  - likely due to polypharmacy (CTX, seroquel, spironolactone)  - PLT ct 289 on admission, (9/10) --> 73, ()--> 80  - HIT AB negative, pending RUMA  - DIC panel negative  - Heme/onc consulted, recommends avoiding non-essential meds that can exacerbate PLT drop  - anemia panel pending AM collection. Pt refused AM labs  - continue to trend CBC w/ diff      #New Afib on RVR  - likely induced by septic shock  - TTE unremarkable  - c/w Tele   - Cardiology following  - c/w Metoprolol and Eliquis        #CATINA -- resolving  - BUN downtrending 43.1 --> 33.8 --> 21.6  - Cr downtrending 1.24 --> 0.92 --> 0.59  - c/w IVF  - Monitor BMP daily      #ICU/Hospital acquired delirium  - c/w seroquel and risperdal      #GERD  - Continue PPI      DVT PPx: Eliquis 5mg BID  Code status: DNR/DNI, trial of NIV. Palliative following      Dispo: pending resolution of bacteremia and thrombocytopenia

## 2024-09-12 NOTE — PROGRESS NOTE ADULT - ASSESSMENT
81 yo M with pmhx of HTN, CHF, PE, Myasthenia Gravis, and chronic LE edema, history of 7 mm distal ureteral calculus with mild right hydroureteronephrosis and pain presented to the ED with shortness of breath and confusion after failing to follow up outpt on ureteral calculus after prior evaluation at [facility].  Patient's son is at bedside he states he seems more confused and altered then baseline; has not been feeling well. Patient was complaining of nauseous. Also is retaining urine; usually urinates in a urinal. Decreased appetite; weak; chills. Patient hasn't seen cardiologist since March to follow up on his CHF. Labs significant for WBC 41.45, procal 36.3, lactate 7.9, Scr 2.6, BNP 96295, UA. CT CAP revealed R hydro from 7 mm stone in UVJ. Patient sent emergently with urology for stent placement.  Stent placement for 7 mm stone unsuccessful, procedure aborted, patient transferred to ICU for septic shock.   emergent IR for nephrostomy tube placement w   Now s/p PCN by IR.   AS ABOVE PT WITH KLEBSIELLA BACTEREMIA WITH STONE S/P EMERGENT NEPHROSTOMY TUBE PLACEMENT   OVERALL IMPROVED AWAKE ALERT  ON IV CEFTRIAXONE FOR KLEB BACTEREMIA   WBC DOWN TO 13k    OVERALL  CLINICALLY IMPROVED   WHEN READY FOR D/C  CAN TRANSITION TO ORAL ABX VANTIN TO COMPLETE  2 WEEKS   PT NOW WITH POSITIVE COVID SWAB  NO RESP SX CAN DEFER TREATMENT  WILL FOLLOWUP WITH FURTHER RECOMMENDATIONS

## 2024-09-13 LAB
ANION GAP SERPL CALC-SCNC: 15 MMOL/L — SIGNIFICANT CHANGE UP (ref 5–17)
BASOPHILS # BLD AUTO: 0.04 K/UL — SIGNIFICANT CHANGE UP (ref 0–0.2)
BASOPHILS NFR BLD AUTO: 0.2 % — SIGNIFICANT CHANGE UP (ref 0–2)
BUN SERPL-MCNC: 16.7 MG/DL — SIGNIFICANT CHANGE UP (ref 8–20)
CALCIUM SERPL-MCNC: 8.6 MG/DL — SIGNIFICANT CHANGE UP (ref 8.4–10.5)
CHLORIDE SERPL-SCNC: 101 MMOL/L — SIGNIFICANT CHANGE UP (ref 96–108)
CO2 SERPL-SCNC: 23 MMOL/L — SIGNIFICANT CHANGE UP (ref 22–29)
CREAT SERPL-MCNC: 0.64 MG/DL — SIGNIFICANT CHANGE UP (ref 0.5–1.3)
CULTURE RESULTS: SIGNIFICANT CHANGE UP
EGFR: 95 ML/MIN/1.73M2 — SIGNIFICANT CHANGE UP
EOSINOPHIL # BLD AUTO: 0.1 K/UL — SIGNIFICANT CHANGE UP (ref 0–0.5)
EOSINOPHIL NFR BLD AUTO: 0.6 % — SIGNIFICANT CHANGE UP (ref 0–6)
FSP PPP-MCNC: < 5 UG/ML — SIGNIFICANT CHANGE UP
GLUCOSE BLDC GLUCOMTR-MCNC: 108 MG/DL — HIGH (ref 70–99)
GLUCOSE SERPL-MCNC: 65 MG/DL — LOW (ref 70–99)
HCT VFR BLD CALC: 40.6 % — SIGNIFICANT CHANGE UP (ref 39–50)
HGB BLD-MCNC: 13 G/DL — SIGNIFICANT CHANGE UP (ref 13–17)
IMM GRANULOCYTES NFR BLD AUTO: 2.9 % — HIGH (ref 0–0.9)
LYMPHOCYTES # BLD AUTO: 1.3 K/UL — SIGNIFICANT CHANGE UP (ref 1–3.3)
LYMPHOCYTES # BLD AUTO: 7.4 % — LOW (ref 13–44)
MAGNESIUM SERPL-MCNC: 1.9 MG/DL — SIGNIFICANT CHANGE UP (ref 1.6–2.6)
MCHC RBC-ENTMCNC: 30.2 PG — SIGNIFICANT CHANGE UP (ref 27–34)
MCHC RBC-ENTMCNC: 32 GM/DL — SIGNIFICANT CHANGE UP (ref 32–36)
MCV RBC AUTO: 94.4 FL — SIGNIFICANT CHANGE UP (ref 80–100)
MONOCYTES # BLD AUTO: 0.5 K/UL — SIGNIFICANT CHANGE UP (ref 0–0.9)
MONOCYTES NFR BLD AUTO: 2.8 % — SIGNIFICANT CHANGE UP (ref 2–14)
NEUTROPHILS # BLD AUTO: 15.17 K/UL — HIGH (ref 1.8–7.4)
NEUTROPHILS NFR BLD AUTO: 86.1 % — HIGH (ref 43–77)
PHOSPHATE SERPL-MCNC: 1.7 MG/DL — LOW (ref 2.4–4.7)
PLATELET # BLD AUTO: 124 K/UL — LOW (ref 150–400)
POTASSIUM SERPL-MCNC: 3.6 MMOL/L — SIGNIFICANT CHANGE UP (ref 3.5–5.3)
POTASSIUM SERPL-SCNC: 3.6 MMOL/L — SIGNIFICANT CHANGE UP (ref 3.5–5.3)
RBC # BLD: 4.3 M/UL — SIGNIFICANT CHANGE UP (ref 4.2–5.8)
RBC # FLD: 17.1 % — HIGH (ref 10.3–14.5)
SODIUM SERPL-SCNC: 138 MMOL/L — SIGNIFICANT CHANGE UP (ref 135–145)
SPECIMEN SOURCE: SIGNIFICANT CHANGE UP
UNFRACTIONATED HEPARIN INTERPRETATION: SIGNIFICANT CHANGE UP
UNFRACTIONATED HEPARIN RESULT: NEGATIVE — SIGNIFICANT CHANGE UP
UNFRACTIONATED HEPARIN-HIGH DOSE: 0 % — SIGNIFICANT CHANGE UP
UNFRACTIONATED HEPARIN-LOW DOSE: 0 % — SIGNIFICANT CHANGE UP
WBC # BLD: 17.62 K/UL — HIGH (ref 3.8–10.5)
WBC # FLD AUTO: 17.62 K/UL — HIGH (ref 3.8–10.5)

## 2024-09-13 PROCEDURE — 99233 SBSQ HOSP IP/OBS HIGH 50: CPT | Mod: GC

## 2024-09-13 RX ORDER — POTASSIUM PHOSPHATE 236; 224 MG/ML; MG/ML
30 INJECTION, SOLUTION INTRAVENOUS ONCE
Refills: 0 | Status: COMPLETED | OUTPATIENT
Start: 2024-09-13 | End: 2024-09-13

## 2024-09-13 RX ORDER — PREDNISONE 10 MG
30 TABLET, DOSE PACK ORAL ONCE
Refills: 0 | Status: COMPLETED | OUTPATIENT
Start: 2024-09-13 | End: 2024-09-13

## 2024-09-13 RX ORDER — PREDNISONE 10 MG
10 TABLET, DOSE PACK ORAL DAILY
Refills: 0 | Status: DISCONTINUED | OUTPATIENT
Start: 2024-09-15 | End: 2024-09-16

## 2024-09-13 RX ORDER — PREDNISONE 10 MG
30 TABLET, DOSE PACK ORAL DAILY
Refills: 0 | Status: DISCONTINUED | OUTPATIENT
Start: 2024-09-13 | End: 2024-09-13

## 2024-09-13 RX ORDER — PREDNISONE 10 MG
20 TABLET, DOSE PACK ORAL DAILY
Refills: 0 | Status: COMPLETED | OUTPATIENT
Start: 2024-09-14 | End: 2024-09-14

## 2024-09-13 RX ORDER — PREDNISONE 10 MG
TABLET, DOSE PACK ORAL
Refills: 0 | Status: DISCONTINUED | OUTPATIENT
Start: 2024-09-14 | End: 2024-09-16

## 2024-09-13 RX ADMIN — SPIRONOLACTONE 25 MILLIGRAM(S): 25 TABLET, FILM COATED ORAL at 05:40

## 2024-09-13 RX ADMIN — METOPROLOL TARTRATE 12.5 MILLIGRAM(S): 100 TABLET ORAL at 14:43

## 2024-09-13 RX ADMIN — Medication 30 MILLIGRAM(S): at 10:18

## 2024-09-13 RX ADMIN — CHLORHEXIDINE GLUCONATE 1 APPLICATION(S): 40 SOLUTION TOPICAL at 13:24

## 2024-09-13 RX ADMIN — Medication 1 TABLET(S): at 13:03

## 2024-09-13 RX ADMIN — Medication 1 APPLICATION(S): at 19:00

## 2024-09-13 RX ADMIN — HYDROCORTISONE 25 MILLIGRAM(S): 10 TABLET ORAL at 06:10

## 2024-09-13 RX ADMIN — APIXABAN 5 MILLIGRAM(S): 5 TABLET, FILM COATED ORAL at 19:00

## 2024-09-13 RX ADMIN — Medication 25 MILLIGRAM(S): at 21:19

## 2024-09-13 RX ADMIN — Medication 40 MILLIGRAM(S): at 13:03

## 2024-09-13 RX ADMIN — RISPERIDONE 0.25 MILLIGRAM(S): 0.25 TABLET, FILM COATED ORAL at 05:40

## 2024-09-13 RX ADMIN — METOPROLOL TARTRATE 12.5 MILLIGRAM(S): 100 TABLET ORAL at 21:18

## 2024-09-13 RX ADMIN — APIXABAN 5 MILLIGRAM(S): 5 TABLET, FILM COATED ORAL at 05:40

## 2024-09-13 RX ADMIN — TAMSULOSIN HYDROCHLORIDE 0.4 MILLIGRAM(S): 0.4 CAPSULE ORAL at 21:19

## 2024-09-13 RX ADMIN — Medication 1 APPLICATION(S): at 05:39

## 2024-09-13 RX ADMIN — Medication 1 APPLICATION(S): at 06:11

## 2024-09-13 RX ADMIN — METOPROLOL TARTRATE 12.5 MILLIGRAM(S): 100 TABLET ORAL at 05:40

## 2024-09-13 RX ADMIN — Medication 1 APPLICATION(S): at 19:29

## 2024-09-13 RX ADMIN — Medication 2000 MILLIGRAM(S): at 13:03

## 2024-09-13 RX ADMIN — RISPERIDONE 0.25 MILLIGRAM(S): 0.25 TABLET, FILM COATED ORAL at 19:01

## 2024-09-13 RX ADMIN — POTASSIUM PHOSPHATE 83.33 MILLIMOLE(S): 236; 224 INJECTION, SOLUTION INTRAVENOUS at 07:22

## 2024-09-13 NOTE — PROGRESS NOTE ADULT - TIME BILLING
This includes chart review, patient assessment, discussion with interdisciplinary team members following patient. Coordination of care with providers and care coordination. This excludes advanced care planning discussion. discussed with Dr. Degroot and bedside RN Deanna
patient care , labs , meds, hamlet review
patient care , labs ,meds, chart review
patient care , labs , meds , chart review
critical thinking medical chart, avoiding duplicate testing,  evaluate for cardiac cause , r/o myocardial infarction , r/o arrhythmias, r/o peripheral vascular disease, r/o heart failure, r/o cerebrovascular disease

## 2024-09-13 NOTE — PROGRESS NOTE ADULT - SUBJECTIVE AND OBJECTIVE BOX
SUBJECTIVE:    Chief Complaint: Patient is a 79y old  Male who presents with a chief complaint of sepsis 2/2 klebsiella bacteremia + ureteral calculus (12 Sep 2024 07:17)      BRIEF HOSPITAL COURSE:    INTERVAL HPI/LAST 24HRS EVENTS: Patient seen and examined at bedside at AM. No clinically acute event overnight.   Denies any chest pain, palpitations, SOB, PND, orthopnea, leg edema, N/V/D, or any other complaints.     MEDICATIONS  (STANDING):  apixaban 5 milliGRAM(s) Oral two times a day  bacitracin   Ointment 1 Application(s) Topical two times a day  cefTRIAXone Injectable. 2000 milliGRAM(s) IV Push every 24 hours  chlorhexidine 2% Cloths 1 Application(s) Topical daily  hydrocortisone sodium succinate Injectable 25 milliGRAM(s) IV Push every 12 hours  metoprolol tartrate 12.5 milliGRAM(s) Oral every 8 hours  multivitamin 1 Tablet(s) Oral daily  nystatin Powder 1 Application(s) Topical two times a day  pantoprazole  Injectable 40 milliGRAM(s) IV Push daily  QUEtiapine 25 milliGRAM(s) Oral at bedtime  risperiDONE   Tablet 0.25 milliGRAM(s) Oral every 12 hours  spironolactone 25 milliGRAM(s) Oral daily  tamsulosin 0.4 milliGRAM(s) Oral at bedtime    MEDICATIONS  (PRN):  albuterol    90 MICROgram(s) HFA Inhaler 2 Puff(s) Inhalation every 4 hours PRN Shortness of Breath and/or Wheezing  metoprolol tartrate Injectable 2.5 milliGRAM(s) IV Push every 6 hours PRN for heart rate > 130      Allergies    levofloxacin (Unknown)  gatifloxacin (Unknown)  ofloxacin (Unknown)    Intolerances    Ketek (Other)  telithromycin (Other)  Avelox (Other)  fluoroquinolone antibiotics (Other)      REVIEW OF SYSTEMS:  CONSTITUTIONAL: No fever, weight loss, or fatigue  RESPIRATORY: No cough, wheezing, chills or hemoptysis; No shortness of breath  CARDIOVASCULAR: No chest pain, palpitations, dizziness, or leg swelling  GASTROINTESTINAL: No abdominal or epigastric pain. No nausea, vomiting, or hematemesis; No diarrhea or constipation. No melena or hematochezia.  NEUROLOGICAL: No headaches, loss of strength, numbness, or tremors  MUSCULOSKELETAL: No joint pain or swelling; No muscle, back, or extremity pain    OBJECTIVE:    Vital Signs Last 24 Hrs  T(C): 36.5 (13 Sep 2024 04:50), Max: 36.8 (12 Sep 2024 09:53)  T(F): 97.7 (13 Sep 2024 04:50), Max: 98.3 (12 Sep 2024 12:00)  HR: 107 (13 Sep 2024 05:00) (57 - 122)  BP: 135/80 (13 Sep 2024 04:50) (115/79 - 144/70)  BP(mean): --  RR: 18 (13 Sep 2024 04:50) (18 - 20)  SpO2: 93% (13 Sep 2024 04:50) (93% - 94%)    Parameters below as of 13 Sep 2024 04:50  Patient On (Oxygen Delivery Method): room air        PHYSICAL EXAM:  Constitutional: Alert, interactive, comfortable, NAD  Head and Face: Atraumatic, head and face were normal in appearance  Eyes: Sclera and conjunctiva were normal, pupils were equal in size, round, eyelids normal  ENT: Ears and nose were normal in appearance  Neck: Appearance was normal, neck was supple, No JVD  Pulmonary: Clear to auscultation bilaterally, no rales/crackles, or wheezing  Heart: Regular rate and rhythm, no murmurs, gallops, or pericardial rubs  Abdominal: Soft, nontender, nondistended. No appreciable hepatosplenomegaly. Bowel sounds normal  Skin: Normal color and intact without appreciable rash or abnormal skin lesion  Extremities: Warm without edema. No clubbing.  Pulse: 2+ radial pulse  Neuro: Oriented to person, place, and time    Lab/ Imaging:    LABS:                        13.0   17.62 )-----------( 124      ( 13 Sep 2024 04:56 )             40.6     09-13    138  |  101  |  16.7  ----------------------------<  65<L>  3.6   |  23.0  |  0.64    Ca    8.6      13 Sep 2024 04:56  Phos  1.7     09-13  Mg     1.9     09-13        Urinalysis Basic - ( 13 Sep 2024 04:56 )    Color: x / Appearance: x / SG: x / pH: x  Gluc: 65 mg/dL / Ketone: x  / Bili: x / Urobili: x   Blood: x / Protein: x / Nitrite: x   Leuk Esterase: x / RBC: x / WBC x   Sq Epi: x / Non Sq Epi: x / Bacteria: x      CAPILLARY BLOOD GLUCOSE              Imaging Personally Reviewed:  [ ] YES  [ ] NO    Consultant(s) Notes Reviewed:  [ ] YES  [ ] NO    Care Discussed with Consultants/Other Providers [ ] YES  [ ] NO    Plan of Care discussed with Housestaff [ ]YES [ ] NO Chief Complaint: Patient is a 79y old  Male who presents with a chief complaint of sepsis 2/2 klebsiella bacteremia + ureteral calculus (12 Sep 2024 07:17)        INTERVAL HPI/LAST 24HRS EVENTS: Patient seen and examined at bedside at AM. No clinically acute event overnight. No acute complaints.  States that he is feeling well.  Denies fever, chest pain, palpitations, SOB, PND, orthopnea, leg edema, N/V/D, or any other complaints.         MEDICATIONS  (STANDING):  apixaban 5 milliGRAM(s) Oral two times a day  bacitracin   Ointment 1 Application(s) Topical two times a day  cefTRIAXone Injectable. 2000 milliGRAM(s) IV Push every 24 hours  chlorhexidine 2% Cloths 1 Application(s) Topical daily  hydrocortisone sodium succinate Injectable 25 milliGRAM(s) IV Push every 12 hours  metoprolol tartrate 12.5 milliGRAM(s) Oral every 8 hours  multivitamin 1 Tablet(s) Oral daily  nystatin Powder 1 Application(s) Topical two times a day  pantoprazole  Injectable 40 milliGRAM(s) IV Push daily  QUEtiapine 25 milliGRAM(s) Oral at bedtime  risperiDONE   Tablet 0.25 milliGRAM(s) Oral every 12 hours  spironolactone 25 milliGRAM(s) Oral daily  tamsulosin 0.4 milliGRAM(s) Oral at bedtime    MEDICATIONS  (PRN):  albuterol    90 MICROgram(s) HFA Inhaler 2 Puff(s) Inhalation every 4 hours PRN Shortness of Breath and/or Wheezing  metoprolol tartrate Injectable 2.5 milliGRAM(s) IV Push every 6 hours PRN for heart rate > 130      Allergies    levofloxacin (Unknown)  gatifloxacin (Unknown)  ofloxacin (Unknown)    Intolerances    Ketek (Other)  telithromycin (Other)  Avelox (Other)  fluoroquinolone antibiotics (Other)      REVIEW OF SYSTEMS:  as above      OBJECTIVE:    Vital Signs Last 24 Hrs  T(C): 36.5 (13 Sep 2024 04:50), Max: 36.8 (12 Sep 2024 09:53)  T(F): 97.7 (13 Sep 2024 04:50), Max: 98.3 (12 Sep 2024 12:00)  HR: 107 (13 Sep 2024 05:00) (57 - 122)  BP: 135/80 (13 Sep 2024 04:50) (115/79 - 144/70)  BP(mean): --  RR: 18 (13 Sep 2024 04:50) (18 - 20)  SpO2: 93% (13 Sep 2024 04:50) (93% - 94%)    Parameters below as of 13 Sep 2024 04:50  Patient On (Oxygen Delivery Method): room air        PHYSICAL EXAM:  Constitutional: Alert, interactive, comfortable, NAD  Head and Face: NC/AT  Eyes: PERRLA. EOMI  ENT: Ears and nose were normal in appearance  Neck: Supple, No JVD  Pulmonary: CTAB. Non-labored respirations  Heart: RRR. S1/S2. No MRG  Abdominal: Soft, NT, ND. Normoactive BS in all 4 quadrants  Skin: Normal color and intact without appreciable rash or abnormal skin lesion  Extremities: B/L LE edema  Pulse: 2+ radial pulse  Neuro: AAOx3, no gross focal deficits    Lab/ Imaging:    LABS:                        13.0   17.62 )-----------( 124      ( 13 Sep 2024 04:56 )             40.6     09-13    138  |  101  |  16.7  ----------------------------<  65<L>  3.6   |  23.0  |  0.64    Ca    8.6      13 Sep 2024 04:56  Phos  1.7     09-13  Mg     1.9     09-13        Urinalysis Basic - ( 13 Sep 2024 04:56 )    Color: x / Appearance: x / SG: x / pH: x  Gluc: 65 mg/dL / Ketone: x  / Bili: x / Urobili: x   Blood: x / Protein: x / Nitrite: x   Leuk Esterase: x / RBC: x / WBC x   Sq Epi: x / Non Sq Epi: x / Bacteria: x      CAPILLARY BLOOD GLUCOSE              Imaging Personally Reviewed:  [ ] YES  [ ] NO    Consultant(s) Notes Reviewed:  [ ] YES  [ ] NO    Care Discussed with Consultants/Other Providers [ ] YES  [ ] NO    Plan of Care discussed with Housestaff [ ]YES [ ] NO

## 2024-09-13 NOTE — PHYSICAL THERAPY INITIAL EVALUATION ADULT - LEVEL OF INDEPENDENCE: SUPINE/SIT, REHAB EVAL
able to achieve long sit only; recommend 2 person for next attempt/dependent (less than 25% patients effort)

## 2024-09-13 NOTE — PHYSICAL THERAPY INITIAL EVALUATION ADULT - PERTINENT HX OF CURRENT PROBLEM, REHAB EVAL
1y Male w/ PMH of HTN, HFpEF, PE (on apixaban), Myasthenia Gravis, chronic LE edema, known 7 mm distal ureteral calculus with mild right hydroureteronephrosis, initially presented to the [facility] ED on 9/5 with shortness of breath, urinary retention and confusion after failing to follow up outpatient for ureteral calculus with chief complaint of sepsis 2/2 klebsiella bacteremia + ureteral calculus

## 2024-09-13 NOTE — PROGRESS NOTE ADULT - ATTENDING COMMENTS
patient is more alert ,   repeat bcxs neg so far , change to po vantin 200 mg po bid on dc to complete x 2 wks from date of neg bcxs   covid pos , no resp sxs. hold remdesivir for now   taper iv stress dose steroids to po prednisone 10 mg  qd home dose ( on chronic prednisone for MG )   dc midodrine  c/w bb , eliquis  c/w nephrostomy tube , guerrero , plan for lithotripsy in 2 weeks as per urology    plan for [facility] patient is more alert ,   repeat bcxs neg so far , change to po vantin 200 mg po bid on dc to complete x 2 wks from date of neg bcxs   covid pos , no resp sxs. hold remdesivir for now   taper iv stress dose steroids to po prednisone 10 mg  qd home dose ( on chronic prednisone for MG )   dc midodrine  dic/ hit negative   thrombocytopenia likely due to severe sepsis   plts trending up    c/w bb , eliquis  c/w nephrostomy tube , guerrero , plan for lithotripsy in 2 weeks as per urology    plan for [facility]

## 2024-09-13 NOTE — PHYSICAL THERAPY INITIAL EVALUATION ADULT - ADDITIONAL COMMENTS
Pt unable to answer questions- marti Bah present- states pt lives with son (chau) and has been essentially bed bound, able to stand pivot with max A into WC for a year now. Pt has connie, [facility], RW and 24/7 assist from son chau- however son states pt has been getting weaker and is getting difficult to care for him.

## 2024-09-13 NOTE — PHYSICAL THERAPY INITIAL EVALUATION ADULT - LEVEL OF INDEPENDENCE: SIT/SUPINE, REHAB EVAL
able to achieve long sit only; recommend 2 person for next attempt. Pt c/o 10/10 pain requesting to defer further PT- request respected./dependent (less than 25% patients effort)

## 2024-09-13 NOTE — PROGRESS NOTE ADULT - ASSESSMENT
79y Male w/ PMH of - admitted for -. Now, -.    Plan  #    DVT PPx:  Dispo: 79y Male w/ PMH of HTN, HFpEF, PE (on apixaban), Myasthenia Gravis, chronic LE edema, known 7 mm distal ureteral calculus with mild right hydroureteronephrosis, initially presented to the [facility] ED on 9/5 with shortness of breath, urinary retention and confusion after failing to follow up outpatient for ureteral calculus. CT A/P revealed R hydro from 7 mm stone in UVJ. Underwent emergent cystoscopy with plan for ureteral stenting however procedure was aborted due to hematuria and inability to visualize right ureteral opening. Blood cx positive for klebsiella pneumoniae bacteremia. Admission complicated by septic shock requiring IV vasopressor support. Patient underwent emergent percutaneous nephrostomy tube placement. Transferred to medicine for further management. Heme/onc consult for thrombocytopenia, anemia panel ordered. Tested positive for RVP and COVID on 9/11.        Plan  #Septic shock POA secondary to klebsiella bacteremia and Ureteral calculus --improving   #COVID-19  - Afebrile. WBC : 17.35 --> 16.56 --> 15.62 -->13.65 --> 13.42 --> 17.62  - WBC uptrend likely due to COVID  - Repeat blood cx w/ NGTD  - c/w IV Ceftriaxone. ID following, pending rec for the duration of tx  - continue to monitor WBC  - c/w Cortef 25 mg Q12hr  - c/w Albuterol 90 micrograms, 2 puffs Q4hr prn    #Thrombocytopenia/ likely due to acute sepsis --> improving  - likely due to polypharmacy (CTX, seroquel, spironolactone)  - PLT ct 289 on admission, (9/10) --> 73, (9/11)--> 79, (9/12) --> 124, (9/13)  - HIT AB negative, pending RUMA  - DIC panel negative  - Heme/onc consulted, recommends avoiding non-essential meds that can exacerbate PLT drop  - anemia panel pending AM collection. Pt refused AM labs yesterday  - continue to trend CBC w/ diff      #New Afib on RVR  - likely induced by septic shock  - TTE unremarkable  - c/w Tele   - Cardiology following  - c/w Metoprolol and Eliquis        #CATINA -- resolved  - BUN downtrending 43.1 --> 33.8 --> 21.6 --> 16.7  - Cr downtrending 1.24 --> 0.92 --> 0.59 --> 0.64  - c/w IVF  - Monitor BMP daily      #ICU/Hospital acquired delirium  - c/w seroquel and risperdal      #GERD  - Continue PPI      DVT PPx: Eliquis 5mg BID  Code status: DNR/DNI, trial of NIV. Palliative following      Dispo: pending resolution of bacteremia      79y Male w/ PMH of HTN, HFpEF, PE (on apixaban), Myasthenia Gravis, chronic LE edema, known 7 mm distal ureteral calculus with mild right hydroureteronephrosis, initially presented to the [place] ED on 9/5 with shortness of breath, urinary retention and confusion after failing to follow up outpatient for ureteral calculus. CT A/P revealed R hydro from 7 mm stone in UVJ. Underwent emergent cystoscopy with plan for ureteral stenting however procedure was aborted due to hematuria and inability to visualize right ureteral opening. Blood cx positive for klebsiella pneumoniae bacteremia. Admission complicated by septic shock requiring IV vasopressor support. Patient underwent emergent percutaneous nephrostomy tube placement. Transferred to medicine for further management. Heme/onc consult for thrombocytopenia, anemia panel ordered. Tested positive for RVP and COVID on 9/11.        Plan  #Septic shock POA secondary to klebsiella bacteremia and Ureteral calculus --improving   #COVID-19  - Afebrile. WBC : 17.35 --> 16.56 --> 15.62 -->13.65 --> 13.42 --> 17.62  - WBC uptrend likely due to COVID  - Repeat blood cx w/ NGTD  - c/w IV Ceftriaxone. ID following, pending rec for the duration of tx  - continue to monitor WBC  - d/c IV Cortef. Start prednisone PO taper to home dose (10mg). Prednisone 30mg today --> 20 mg tomorrow --> 10mg Sunday  - c/w Albuterol 90 micrograms, 2 puffs Q4hr prn    #Thrombocytopenia/ likely due to acute sepsis --> improving  - likely due to polypharmacy (CTX, seroquel, spironolactone)  - PLT ct 289 on admission, (9/10) --> 73, (9/11)--> 79, (9/12) --> 124, (9/13)  - HIT AB negative, pending RUMA  - DIC panel negative  - Heme/onc consulted, recommends avoiding non-essential meds that can exacerbate PLT drop  - anemia panel pending AM collection. Pt refused AM labs yesterday  - continue to trend CBC w/ diff      #New Afib on RVR  - likely induced by septic shock  - TTE unremarkable  - c/w Tele   - Cardiology following  - c/w Metoprolol and Eliquis        #CATINA -- resolved  - BUN downtrending 43.1 --> 33.8 --> 21.6 --> 16.7  - Cr downtrending 1.24 --> 0.92 --> 0.59 --> 0.64  - c/w IVF  - Monitor BMP daily      #ICU/Hospital acquired delirium  - c/w seroquel and risperdal      #GERD  - Continue PPI      DVT PPx: Eliquis 5mg BID  Code status: DNR/DNI, trial of NIV. Palliative following      Dispo: pending resolution of bacteremia      79y Male w/ PMH of HTN, HFpEF, PE (on apixaban), Myasthenia Gravis, chronic LE edema, known 7 mm distal ureteral calculus with mild right hydroureteronephrosis, initially presented to the [facility] ED on 9/5 with shortness of breath, urinary retention and confusion after failing to follow up outpatient for ureteral calculus. CT A/P revealed R hydro from 7 mm stone in UVJ. Underwent emergent cystoscopy with plan for ureteral stenting however procedure was aborted due to hematuria and inability to visualize right ureteral opening. Blood cx positive for klebsiella pneumoniae bacteremia. Admission complicated by septic shock requiring IV vasopressor support. Patient underwent emergent percutaneous nephrostomy tube placement. Transferred to medicine for further management. Heme/onc consult for thrombocytopenia, anemia panel ordered. Tested positive for RVP and COVID on 9/11.        Plan  #Septic shock POA secondary to klebsiella bacteremia and Ureteral calculus --improving   #COVID-19  - Afebrile. WBC : 17.35 --> 16.56 --> 15.62 -->13.65 --> 13.42 --> 17.62  - WBC uptrend likely due to COVID  - Repeat blood cx w/ NGTD  - c/w IV Ceftriaxone. ID following, pending rec for the duration of tx  - continue to monitor WBC  - d/c IV Cortef. Start prednisone PO taper to home dose (10mg). Prednisone 30mg today --> 20 mg tomorrow --> 10mg Sunday  - c/w Albuterol 90 micrograms, 2 puffs Q4hr prn    #Thrombocytopenia/ likely due to acute sepsis --> improving  - likely due to polypharmacy (CTX, seroquel, spironolactone)  - PLT ct 289 on admission, (9/10) --> 73, (9/11)--> 79, (9/12) --> 124, (9/13)  - HIT AB negative, pending RUMA  - DIC panel negative  - Heme/onc consulted, recommends avoiding non-essential meds that can exacerbate PLT drop  - anemia panel pending AM collection. Pt refused AM labs yesterday  - continue to trend CBC w/ diff      #New Afib on RVR  - likely induced by septic shock  - TTE unremarkable  - c/w Tele   - Cardiology following  - c/w Metoprolol and Eliquis        #CATINA -- resolved  - BUN downtrending 43.1 --> 33.8 --> 21.6 --> 16.7  - Cr downtrending 1.24 --> 0.92 --> 0.59 --> 0.64  - c/w IVF  - Monitor BMP daily      #ICU/Hospital acquired delirium  - c/w seroquel and risperdal      #GERD  - Continue PPI      DVT PPx: Eliquis 5mg BID  Code status: DNR/DNI, trial of NIV. Palliative following      Dispo: Clinically active. Likely D/C to [facility], pending resolution of bacteremia      79y Male w/ PMH of HTN, HFpEF, PE (on apixaban), Myasthenia Gravis, chronic LE edema, known 7 mm distal ureteral calculus with mild right hydroureteronephrosis, initially presented to the [facility] ED on 9/5 with shortness of breath, urinary retention and confusion after failing to follow up outpatient for ureteral calculus. CT A/P revealed R hydro from 7 mm stone in UVJ. Underwent emergent cystoscopy with plan for ureteral stenting however procedure was aborted due to hematuria and inability to visualize right ureteral opening. Blood cx positive for klebsiella pneumoniae bacteremia. Admission complicated by septic shock requiring IV vasopressor support. Patient underwent emergent percutaneous nephrostomy tube placement. Transferred to medicine for further management. Heme/onc consult for thrombocytopenia, anemia panel ordered. Tested positive for RVP and COVID on 9/11.        Plan  #Septic shock POA secondary to klebsiella bacteremia and Ureteral calculus --improving   #COVID-19  - Afebrile. WBC : 17.35 --> 16.56 --> 15.62 -->13.65 --> 13.42 --> 17.62  - WBC uptrend likely due to COVID  - Repeat blood cx w/ NGTD  - c/w Albuterol 90 micrograms, 2 puffs Q4hr prn  - c/w IV Ceftriaxone. ID following, pending rec for the duration of tx  - continue to monitor WBC  - d/c IV Cortef. Start prednisone PO taper to home dose (10mg). Prednisone 30mg today --> 20 mg tomorrow --> 10mg Sunday  - pt will f/u outpt with urology (Dr. Heck) for lithotripsy within 1-2 weeks of being d/c    #Thrombocytopenia/ likely due to acute sepsis --> improving  - likely due to polypharmacy (CTX, seroquel, spironolactone)  - PLT ct 289 on admission, (9/10) --> 73, (9/11)--> 79, (9/12) --> 124, (9/13)  - HIT AB negative, pending RUMA  - DIC panel negative  - Heme/onc consulted, recommends avoiding non-essential meds that can exacerbate PLT drop  - anemia panel pending AM collection. Pt refused AM labs yesterday  - continue to trend CBC w/ diff      #New Afib on RVR  - likely induced by septic shock  - TTE unremarkable  - c/w Tele   - Cardiology following  - c/w Metoprolol and Eliquis        #CATINA -- resolved  - BUN downtrending 43.1 --> 33.8 --> 21.6 --> 16.7  - Cr downtrending 1.24 --> 0.92 --> 0.59 --> 0.64  - c/w IVF  - Monitor BMP daily      #ICU/Hospital acquired delirium  - c/w seroquel and risperdal      #GERD  - Continue PPI      DVT PPx: Eliquis 5mg BID  Code status: DNR/DNI, trial of NIV. Palliative following      Dispo: Clinically active. Likely D/C to [facility], pending PT eval and resolution of bacteremia      79y Male w/ PMH of HTN, HFpEF, PE (on apixaban), Myasthenia Gravis, chronic LE edema, known 7 mm distal ureteral calculus with mild right hydroureteronephrosis, initially presented to the [place] ED on 9/5 with shortness of breath, urinary retention and confusion after failing to follow up outpatient for ureteral calculus. CT A/P revealed R hydro from 7 mm stone in UVJ. Underwent emergent cystoscopy with plan for ureteral stenting however procedure was aborted due to hematuria and inability to visualize right ureteral opening. Blood cx positive for klebsiella pneumoniae bacteremia. Admission complicated by septic shock requiring IV vasopressor support. Patient underwent emergent percutaneous nephrostomy tube placement. Transferred to medicine for further management. Heme/onc consult for thrombocytopenia, anemia panel ordered. Tested positive for RVP and COVID on 9/11. midodrine dcd , stress dose steroids changed to po . plan for eventual [facility]ichael when medically stable.     #Septic shock POA secondary to klebsiella bacteremia and Ureteral calculus --improving   #COVID-19  - Afebrile. WBC : 17.35 --> 16.56 --> 15.62 -->13.65 --> 13.42 --> 17.62  - WBC uptrend likely due to COVID  - Repeat blood cx w/ NGTD  - c/w Albuterol 90 micrograms, 2 puffs Q4hr prn  - c/w IV Ceftriaxone. ID following, pending rec for the duration of tx  - continue to monitor WBC  - d/c IV Cortef. Start prednisone PO taper to home dose (10mg). Prednisone 30mg today --> 20 mg tomorrow --> 10mg Sunday  - pt will f/u outpt with urology (Dr. Heck) for lithotripsy within 1-2 weeks of being d/c    #Thrombocytopenia/ likely due to acute sepsis --> improving  - likely due to polypharmacy (CTX, seroquel, spironolactone)  - PLT ct 289 on admission, (9/10) --> 73, (9/11)--> 79, (9/12) --> 124, (9/13)  - HIT AB negative, pending RUMA  - DIC panel negative  - Heme/onc consulted, recommends avoiding non-essential meds that can exacerbate PLT drop  - anemia panel pending AM collection. Pt refused AM labs yesterday  - continue to trend CBC w/ diff      #New Afib on RVR  - likely induced by septic shock  - TTE unremarkable  - c/w Tele   - Cardiology following  - c/w Metoprolol and Eliquis        #CATINA -- resolved  - BUN downtrending 43.1 --> 33.8 --> 21.6 --> 16.7  - Cr downtrending 1.24 --> 0.92 --> 0.59 --> 0.64  - c/w IVF  - Monitor BMP daily      #ICU/Hospital acquired delirium  - c/w seroquel and risperdal      #GERD  - Continue PPI      DVT PPx: Eliquis 5mg BID  Code status: DNR/DNI, trial of NIV. Palliative following      Dispo: Clinically active. Likely D/C to [facility], pending PT eval and resolution of bacteremia

## 2024-09-13 NOTE — CHART NOTE - NSCHARTNOTEFT_GEN_A_CORE
Nutrition Note:   Pt now on medical floor.   Pt  seen by SLP and diet upgraded to easy to chew mildly thick liquids with Glucerna supplements taking po with varied appetite from poor to good.     Encourage po intake, monitor diet tolerance, and provide assistance at meals as needed.   Continue Glucerna supplement BID to optimize po intake and provide an additional 220 kcal, 10g protein per serving   RD to remain available

## 2024-09-14 PROCEDURE — 99233 SBSQ HOSP IP/OBS HIGH 50: CPT | Mod: GC

## 2024-09-14 RX ORDER — SODIUM PHOSPHATE, DIBASIC, ANHYDROUS, POTASSIUM PHOSPHATE, MONOBASIC, AND SODIUM PHOSPHATE, MONOBASIC, MONOHYDRATE 852; 155; 130 MG/1; MG/1; MG/1
1 TABLET, COATED ORAL
Refills: 0 | Status: DISCONTINUED | OUTPATIENT
Start: 2024-09-14 | End: 2024-09-16

## 2024-09-14 RX ADMIN — SODIUM PHOSPHATE, DIBASIC, ANHYDROUS, POTASSIUM PHOSPHATE, MONOBASIC, AND SODIUM PHOSPHATE, MONOBASIC, MONOHYDRATE 1 PACKET(S): 852; 155; 130 TABLET, COATED ORAL at 17:05

## 2024-09-14 RX ADMIN — Medication 20 MILLIGRAM(S): at 05:21

## 2024-09-14 RX ADMIN — APIXABAN 5 MILLIGRAM(S): 5 TABLET, FILM COATED ORAL at 17:05

## 2024-09-14 RX ADMIN — METOPROLOL TARTRATE 12.5 MILLIGRAM(S): 100 TABLET ORAL at 13:17

## 2024-09-14 RX ADMIN — Medication 2000 MILLIGRAM(S): at 17:04

## 2024-09-14 RX ADMIN — METOPROLOL TARTRATE 12.5 MILLIGRAM(S): 100 TABLET ORAL at 21:44

## 2024-09-14 RX ADMIN — Medication 1 APPLICATION(S): at 17:04

## 2024-09-14 RX ADMIN — TAMSULOSIN HYDROCHLORIDE 0.4 MILLIGRAM(S): 0.4 CAPSULE ORAL at 21:45

## 2024-09-14 RX ADMIN — RISPERIDONE 0.25 MILLIGRAM(S): 0.25 TABLET, FILM COATED ORAL at 05:20

## 2024-09-14 RX ADMIN — METOPROLOL TARTRATE 2.5 MILLIGRAM(S): 100 TABLET ORAL at 19:25

## 2024-09-14 RX ADMIN — Medication 1 APPLICATION(S): at 05:21

## 2024-09-14 RX ADMIN — Medication 25 MILLIGRAM(S): at 21:44

## 2024-09-14 RX ADMIN — Medication 1 APPLICATION(S): at 05:20

## 2024-09-14 RX ADMIN — RISPERIDONE 0.25 MILLIGRAM(S): 0.25 TABLET, FILM COATED ORAL at 17:05

## 2024-09-14 RX ADMIN — Medication 1 TABLET(S): at 13:17

## 2024-09-14 RX ADMIN — SPIRONOLACTONE 25 MILLIGRAM(S): 25 TABLET, FILM COATED ORAL at 05:21

## 2024-09-14 RX ADMIN — METOPROLOL TARTRATE 12.5 MILLIGRAM(S): 100 TABLET ORAL at 05:20

## 2024-09-14 RX ADMIN — APIXABAN 5 MILLIGRAM(S): 5 TABLET, FILM COATED ORAL at 05:21

## 2024-09-14 NOTE — PROGRESS NOTE ADULT - SUBJECTIVE AND OBJECTIVE BOX
HEALTH ISSUES - PROBLEM Dx:    CC- Klebsiella bacteremia, U calculus    INTERVAL HPI/ OVERNIGHT EVENTS:    only concern is the cough and being COVID +  offers no other complaints    REVIEW OF SYSTEMS:    as above    Vital Signs Last 24 Hrs  T(C): 36.6 (14 Sep 2024 09:53), Max: 37 (13 Sep 2024 17:00)  T(F): 97.9 (14 Sep 2024 09:53), Max: 98.6 (13 Sep 2024 17:00)  HR: 86 (14 Sep 2024 14:00) (64 - 89)  BP: 138/79 (14 Sep 2024 14:00) (118/74 - 145/72)  BP(mean): --  RR: 18 (14 Sep 2024 09:53) (18 - 18)  SpO2: 92% (14 Sep 2024 09:53) (92% - 93%)    Parameters below as of 14 Sep 2024 09:53  Patient On (Oxygen Delivery Method): room air        PHYSICAL EXAM-  Constitutional: Alert, awake, lying comfortably, NAD  Head and Face: NC/AT  Eyes: PERRLA. EOMI  ENT: Ears and nose were normal in appearance  Neck: Supple, No JVD  Pulmonary: CTA malena. No expiratory wheezing  Heart: RRR. S1/S2. No MRG  Abdominal: Soft, NT, ND. Normoactive BS in all 4 quadrants. Protuberant abdomen with umbilical hernia. RT sided nephrostomy tube in place draining light yellow urine. Restrepo +  Skin: Normal color and intact without appreciable rash or abnormal skin lesion  Extremities: LUE midline in place. B/L LE edema  Pulse: 2+ radial pulse  Neuro: AAOx3, no gross focal deficits      MEDICATIONS  (STANDING):  apixaban 5 milliGRAM(s) Oral two times a day  bacitracin   Ointment 1 Application(s) Topical two times a day  chlorhexidine 2% Cloths 1 Application(s) Topical daily  metoprolol tartrate 12.5 milliGRAM(s) Oral every 8 hours  multivitamin 1 Tablet(s) Oral daily  nystatin Powder 1 Application(s) Topical two times a day  pantoprazole  Injectable 40 milliGRAM(s) IV Push daily  predniSONE   Tablet   Oral   QUEtiapine 25 milliGRAM(s) Oral at bedtime  risperiDONE   Tablet 0.25 milliGRAM(s) Oral every 12 hours  spironolactone 25 milliGRAM(s) Oral daily  tamsulosin 0.4 milliGRAM(s) Oral at bedtime    MEDICATIONS  (PRN):  albuterol    90 MICROgram(s) HFA Inhaler 2 Puff(s) Inhalation every 4 hours PRN Shortness of Breath and/or Wheezing  metoprolol tartrate Injectable 2.5 milliGRAM(s) IV Push every 6 hours PRN for heart rate > 130      LABS:                        13.0   17.62 )-----------( 124      ( 13 Sep 2024 04:56 )             40.6     09-13    138  |  101  |  16.7  ----------------------------<  65<L>  3.6   |  23.0  |  0.64    Ca    8.6      13 Sep 2024 04:56  Phos  1.7     09-13  Mg     1.9     09-13        Urinalysis Basic - ( 13 Sep 2024 04:56 )    Color: x / Appearance: x / SG: x / pH: x  Gluc: 65 mg/dL / Ketone: x  / Bili: x / Urobili: x   Blood: x / Protein: x / Nitrite: x   Leuk Esterase: x / RBC: x / WBC x   Sq Epi: x / Non Sq Epi: x / Bacteria: x      Xray Chest 1 View- PORTABLE-Urgent:   ACC: [id number] EXAM:  XR CHEST PORTABLE URGENT 1V   ORDERED BY: DOREEN BEAVERS     PROCEDURE DATE:  09/11/2024          INTERPRETATION:  Portable AP chest radiograph    COMPARISON: 9/7/2024 chest x-ray.    CLINICAL INFORMATION: Chest congestion..    FINDINGS:  CATHETERS AND TUBES: None    PULMONARY: RIGHT mid lung calcified granulomata.  No airspace consolidations. No pleural effusion or pneumothorax.    HEART/VASCULAR: RIGHT hilar calcified lymph nodes.    The heart size and mediastinum configuration are within the limits of   normal.    BONES: The visualized osseous thorax is intact.    IMPRESSION:   No radiographic evidence of active chest disease..    --- End of Report ---            RUKHSANA HAUSER MD; Attending Radiologist  This document has been electronically signed. Sep 12 2024 10:56AM (09-11-24 @ 21:14)      Radiology personally reviewed.

## 2024-09-14 NOTE — PROGRESS NOTE ADULT - ASSESSMENT
79y Male w/ PMH of HTN, HFpEF, PE (on apixaban), Myasthenia Gravis, chronic LE edema, known 7 mm distal ureteral calculus with mild right hydroureteronephrosis, initially presented to the [place] ED on 9/5 with shortness of breath, urinary retention and confusion after failing to follow up outpatient for ureteral calculus. CT A/P revealed R hydro from 7 mm stone in UVJ. Underwent emergent cystoscopy with plan for ureteral stenting however procedure was aborted due to hematuria and inability to visualize right ureteral opening. Blood cx positive for klebsiella pneumoniae bacteremia. Admission complicated by septic shock requiring IV vasopressor support. Patient underwent emergent percutaneous nephrostomy tube placement. Transferred to medicine for further management. Heme/onc consult for thrombocytopenia, anemia panel ordered. Tested positive for RVP and COVID on 9/11. midodrine dcd , stress dose steroids changed to po . plan for eventual michael when medically stable.     #Septic shock POA -- resolved  # klebsiella bacteremia   # Obstructive URopathy with Ureteral calculus --improving   # Obstructive nephropathy---> improving well  # COVID-19  - COVID +ve  - Repeat blood cx w/ NGTD  - c/w Albuterol 90 micrograms, 2 puffs Q4hr prn  - c/w IV Ceftriaxone. ID following, WHEN READY FOR D/C  CAN TRANSITION TO ORAL ABX VANTIN TO COMPLETE  2 WEEKS   - s/p stress dose steroids and Midodrine. Now on prednisone PO taper to home dose (10mg). Prednisone 30mg today --> 20 mg tomorrow --> 10mg Sunday  - pt will f/u outpt with urology (Dr. Heck) for lithotripsy within 1-2 weeks of being d/c  - no respiratory symptoms  - s/p PCN by IR . c/w FLomax    #Thrombocytopenia --> improving  - likely due to polypharmacy (CTX, seroquel, spironolactone) and Sepsis  - HIT AB negative,  - DIC panel negative  - Heme/onc consulted, recommends avoiding non-essential meds that can exacerbate PLT drop    #Afib s/p RVR  - likely induced by septic shock  - TTE unremarkable  - Cardiology following  - c/w Metoprolol and Eliquis  - CHADsVASC 4    #s/p ICU/Hospital acquired delirium  - c/w Seroquel and Risperdal    #GERD  - Continue PPI      DVT PPx: Eliquis 5mg BID  Code status: DNR/DNI, trial of NIV. Palliative following      Dispo:  MICHAEL  when placed

## 2024-09-15 LAB
HCT VFR BLD CALC: 34.6 % — LOW (ref 39–50)
HGB BLD-MCNC: 11.2 G/DL — LOW (ref 13–17)
MCHC RBC-ENTMCNC: 30.7 PG — SIGNIFICANT CHANGE UP (ref 27–34)
MCHC RBC-ENTMCNC: 32.4 GM/DL — SIGNIFICANT CHANGE UP (ref 32–36)
MCV RBC AUTO: 94.8 FL — SIGNIFICANT CHANGE UP (ref 80–100)
PLATELET # BLD AUTO: 112 K/UL — LOW (ref 150–400)
RBC # BLD: 3.65 M/UL — LOW (ref 4.2–5.8)
RBC # FLD: 17.2 % — HIGH (ref 10.3–14.5)
WBC # BLD: 12.37 K/UL — HIGH (ref 3.8–10.5)
WBC # FLD AUTO: 12.37 K/UL — HIGH (ref 3.8–10.5)

## 2024-09-15 PROCEDURE — 99232 SBSQ HOSP IP/OBS MODERATE 35: CPT | Mod: GC

## 2024-09-15 RX ORDER — METOPROLOL TARTRATE 100 MG/1
25 TABLET ORAL THREE TIMES A DAY
Refills: 0 | Status: DISCONTINUED | OUTPATIENT
Start: 2024-09-15 | End: 2024-09-16

## 2024-09-15 RX ADMIN — SPIRONOLACTONE 25 MILLIGRAM(S): 25 TABLET, FILM COATED ORAL at 06:07

## 2024-09-15 RX ADMIN — TAMSULOSIN HYDROCHLORIDE 0.4 MILLIGRAM(S): 0.4 CAPSULE ORAL at 21:44

## 2024-09-15 RX ADMIN — RISPERIDONE 0.25 MILLIGRAM(S): 0.25 TABLET, FILM COATED ORAL at 06:06

## 2024-09-15 RX ADMIN — CHLORHEXIDINE GLUCONATE 1 APPLICATION(S): 40 SOLUTION TOPICAL at 13:07

## 2024-09-15 RX ADMIN — Medication 10 MILLIGRAM(S): at 06:06

## 2024-09-15 RX ADMIN — METOPROLOL TARTRATE 25 MILLIGRAM(S): 100 TABLET ORAL at 21:45

## 2024-09-15 RX ADMIN — Medication 2000 MILLIGRAM(S): at 17:17

## 2024-09-15 RX ADMIN — SODIUM PHOSPHATE, DIBASIC, ANHYDROUS, POTASSIUM PHOSPHATE, MONOBASIC, AND SODIUM PHOSPHATE, MONOBASIC, MONOHYDRATE 1 PACKET(S): 852; 155; 130 TABLET, COATED ORAL at 17:17

## 2024-09-15 RX ADMIN — METOPROLOL TARTRATE 25 MILLIGRAM(S): 100 TABLET ORAL at 13:03

## 2024-09-15 RX ADMIN — Medication 25 MILLIGRAM(S): at 21:45

## 2024-09-15 RX ADMIN — APIXABAN 5 MILLIGRAM(S): 5 TABLET, FILM COATED ORAL at 06:07

## 2024-09-15 RX ADMIN — Medication 1 APPLICATION(S): at 17:18

## 2024-09-15 RX ADMIN — Medication 40 MILLIGRAM(S): at 13:03

## 2024-09-15 RX ADMIN — APIXABAN 5 MILLIGRAM(S): 5 TABLET, FILM COATED ORAL at 17:17

## 2024-09-15 RX ADMIN — Medication 1 APPLICATION(S): at 06:07

## 2024-09-15 RX ADMIN — RISPERIDONE 0.25 MILLIGRAM(S): 0.25 TABLET, FILM COATED ORAL at 17:21

## 2024-09-15 RX ADMIN — Medication 1 APPLICATION(S): at 06:05

## 2024-09-15 RX ADMIN — METOPROLOL TARTRATE 12.5 MILLIGRAM(S): 100 TABLET ORAL at 06:05

## 2024-09-15 RX ADMIN — Medication 1 TABLET(S): at 13:03

## 2024-09-15 RX ADMIN — SODIUM PHOSPHATE, DIBASIC, ANHYDROUS, POTASSIUM PHOSPHATE, MONOBASIC, AND SODIUM PHOSPHATE, MONOBASIC, MONOHYDRATE 1 PACKET(S): 852; 155; 130 TABLET, COATED ORAL at 06:06

## 2024-09-15 NOTE — PROGRESS NOTE ADULT - ATTENDING COMMENTS
admitted with obstructive nephropathy and ureteral calculus and Kleb bacteremia, septic shock    s/p PCN  and August resolved  with Restrepo  Uro following  on ROcephin.   rpt c/s neg  oral Vantin at the time of discharge  f/u outpt with urology (Dr. Heck) for lithotripsy within 1-2 weeks of being d/c    # A fib RVR  increase BB today  cont Tele  now s/p shock    # COVID  no resp symptoms    Dispo- KAVITA  when placed    High [employer]quity and Spent at least 50 mins in evaluating, assessing and medical decision making in providing patient care seperate from teaching.

## 2024-09-15 NOTE — PROGRESS NOTE ADULT - ASSESSMENT
ASSESSMENT:   DEION HEATH is a 79y Male w/PMHx of HTN, HFpEF, PE (on apixaban), Myasthenia Gravis, chronic LE edema, known 7 mm distal ureteral calculus with mild right hydroureteronephrosis, initially presented to the [place] ED on 9/5 with shortness of breath, urinary retention and confusion after failing to follow up outpatient for ureteral calculus. CT A/P revealed R hydro from 7 mm stone in UVJ. Underwent emergent cystoscopy with plan for ureteral stenting however procedure was aborted due to hematuria and inability to visualize right ureteral opening. Blood cx positive for klebsiella pneumoniae bacteremia. Admission complicated by septic shock requiring IV vasopressor support. Patient underwent emergent percutaneous nephrostomy tube placement. Transferred to medicine for further management. Heme/onc consult for thrombocytopenia, anemia panel ordered. Tested positive for RVP and COVID on 9/11. midodrine dcd , stress dose steroids changed to po . plan for eventual michael when medically stable.     PLAN:  # Septic shock POA -- resolved  # klebsiella bacteremia   # Obstructive Uropathy with Ureteral calculus -- improving   # Obstructive nephropathy---> improving well  # COVID-19  - COVID +ve  - Repeat blood cx w/ NGTD  - c/w Albuterol 90 micrograms, 2 puffs Q4hr prn  - c/w IV Ceftriaxone. ID following, WHEN READY FOR D/C  CAN TRANSITION TO ORAL ABX VANTIN TO COMPLETE  2 WEEKS   - s/p stress dose steroids and Midodrine. Now on prednisone PO taper to home dose (10mg). Prednisone 30mg today --> 20 mg tomorrow --> 10mg Sunday  - Outpt F/U with urology (Dr. Heck) for lithotripsy within 1-2 weeks of being d/c  - no respiratory symptoms  - s/p PCN by IR  - c/w Flomax    #Thrombocytopenia --> improving  - likely due to polypharmacy (CTX, seroquel, spironolactone) and Sepsis  - HIT AB negative,  - DIC panel negative  - Heme/onc consulted, recommends avoiding non-essential meds that can exacerbate PLT drop    #Afib s/p RVR  - likely induced by septic shock  - TTE unremarkable  - Cardiology following  - c/w Metoprolol and Eliquis  - CHADsVASC 4    #s/p ICU/Hospital acquired delirium  - c/w Seroquel and Risperdal    #GERD  - Continue PPI    DVT PPx: Eliquis 5mg BID  Code status: DNR/DNI, trial of NIV. Palliative following    Dispo: MICHAEL when placed     ASSESSMENT:   DEION HEATH is a 79y Male w/PMHx of HTN, HFpEF, PE (on apixaban), Myasthenia Gravis, chronic LE edema, known 7 mm distal ureteral calculus with mild right hydroureteronephrosis, initially presented to the [place] ED on 9/5 with shortness of breath, urinary retention and confusion after failing to follow up outpatient for ureteral calculus. CT A/P revealed R hydro from 7 mm stone in UVJ. Underwent emergent cystoscopy with plan for ureteral stenting however procedure was aborted due to hematuria and inability to visualize right ureteral opening. Blood cx positive for klebsiella pneumoniae bacteremia. Admission complicated by septic shock requiring IV vasopressor support. Patient underwent emergent percutaneous nephrostomy tube placement. Transferred to medicine for further management. Heme/onc consult for thrombocytopenia, anemia panel ordered. Tested positive for RVP and COVID on 9/11. Midodrine DC'd. Stress dose steroids changed to PO. Plan for eventual KAVITA when medically stable    PLAN:  # Septic shock POA -- resolved  # klebsiella bacteremia   # Obstructive Uropathy with Ureteral calculus -- improving   # Obstructive nephropathy---> improving well  # COVID-19  - COVID +ve  - Repeat blood cx w/ NGTD  - c/w Albuterol 90 micrograms, 2 puffs Q4hr prn  - c/w IV Ceftriaxone. ID following, WHEN READY FOR D/C  CAN TRANSITION TO ORAL ABX VANTIN TO COMPLETE  2 WEEKS   - s/p stress dose steroids and Midodrine. Now on prednisone PO taper to home dose (10mg). Prednisone 30mg today --> 20 mg tomorrow --> 10mg Sunday  - Outpt F/U with urology (Dr. Heck) for lithotripsy within 1-2 weeks of being d/c  - no respiratory symptoms  - s/p PCN by IR  - c/w Flomax    #Thrombocytopenia --> improving  - likely due to polypharmacy (CTX, seroquel, spironolactone) and Sepsis  - HIT AB negative,  - DIC panel negative  - Heme/onc consulted, recommends avoiding non-essential meds that can exacerbate PLT drop    #Afib s/p RVR  - likely induced by septic shock  - TTE unremarkable  - Cardiology following  - c/w Metoprolol and Eliquis  - CHADsVASC 4    #s/p ICU/Hospital acquired delirium  - c/w Seroquel and Risperdal    #GERD  - Continue PPI    DVT PPx: Eliquis 5mg BID  Code status: DNR/DNI, trial of NIV. Palliative following    Dispo: [facility] when placed     ASSESSMENT:   DEION HEATH is a 79y Male w/PMHx of HTN, HFpEF, PE (on apixaban), Myasthenia Gravis, chronic LE edema, known 7 mm distal ureteral calculus with mild right hydroureteronephrosis, initially presented to the [place] ED on 9/5 with shortness of breath, urinary retention and confusion after failing to follow up outpatient for ureteral calculus. CT A/P revealed R hydro from 7 mm stone in UVJ. Underwent emergent cystoscopy with plan for ureteral stenting however procedure was aborted due to hematuria and inability to visualize right ureteral opening. Blood cx positive for klebsiella pneumoniae bacteremia. Admission complicated by septic shock requiring IV vasopressor support. Patient underwent emergent percutaneous nephrostomy tube placement. Transferred to medicine for further management. Heme/onc consult for thrombocytopenia, anemia panel ordered. Tested positive for RVP and COVID on 9/11. Midodrine DC'd. Stress dose steroids changed to PO. Plan for eventual KAVITA when medically stable    PLAN:  # Septic shock POA -- resolved  # Klebsiella Bacteremia   # Obstructive Uropathy with Ureteral calculus --> improving   # Obstructive nephropathy---> improving well  # COVID-19  - COVID +ve  - Repeat blood cx w/ NGTD  - c/w Albuterol 90 micrograms, 2 puffs Q4hr prn  - c/w IV Ceftriaxone. ID following, WHEN READY FOR D/C  CAN TRANSITION TO ORAL ABX VANTIN TO COMPLETE  2 WEEKS   - s/p stress dose steroids and Midodrine. Now on prednisone PO taper to home dose (10mg). Prednisone 30mg today --> 20 mg tomorrow --> 10mg Sunday  - Outpt F/U with urology (Dr. Heck) for lithotripsy within 1-2 weeks of being d/c  - no respiratory symptoms  - s/p PCN by IR  - c/w Flomax    #Thrombocytopenia --> improving  - likely due to polypharmacy (CTX, seroquel, spironolactone) and Sepsis  - HIT AB negative,  - DIC panel negative  - Heme/onc consulted, recommends avoiding non-essential meds that can exacerbate PLT drop    #Afib s/p RVR  - likely induced by septic shock  - HR elevated overnight to 130s  - TTE unremarkable  - Cardiology following  - On Metoprolol XL 25mg PO at home  - Previously on Metroprolol tartrate 12.5mg TID  - Uptitrate Metoprolol 25mg TID for better rate control  - Continue Eliquis  - CHADsVASC 4    #s/p ICU/Hospital acquired delirium  - c/w Seroquel and Risperdal    #GERD  - Continue PPI    DVT PPx: Eliquis 5mg BID  Code status: DNR/DNI, trial of NIV. Palliative following    Dispo: KAVITA when placed

## 2024-09-15 NOTE — PROGRESS NOTE ADULT - SUBJECTIVE AND OBJECTIVE BOX
Patient is a 79y old  Male who presents with a chief complaint of sepsis 2/2 klebsiella bacteremia (13 Sep 2024 07:27)      INTERVAL HPI/OVERNIGHT EVENTS:  - Tachycardic to 130s-140s    TODAY:  - Patient examined bedside, no complaints  - Patient states that he is feeling much better and felt like his labs were early in the AM  - Patient also states that he is not eating much, but that is no different than home  - Patient denies CP, SOB, fever, nausea, vomiting    MEDICATIONS  (STANDING):  apixaban 5 milliGRAM(s) Oral two times a day  bacitracin   Ointment 1 Application(s) Topical two times a day  cefTRIAXone Injectable.      cefTRIAXone Injectable. 2000 milliGRAM(s) IV Push every 24 hours  chlorhexidine 2% Cloths 1 Application(s) Topical daily  metoprolol tartrate 12.5 milliGRAM(s) Oral every 8 hours  multivitamin 1 Tablet(s) Oral daily  nystatin Powder 1 Application(s) Topical two times a day  pantoprazole  Injectable 40 milliGRAM(s) IV Push daily  potassium phosphate / sodium phosphate Powder (PHOS-NaK) 1 Packet(s) Oral two times a day  predniSONE   Tablet 10 milliGRAM(s) Oral daily  predniSONE   Tablet   Oral   QUEtiapine 25 milliGRAM(s) Oral at bedtime  risperiDONE   Tablet 0.25 milliGRAM(s) Oral every 12 hours  spironolactone 25 milliGRAM(s) Oral daily  tamsulosin 0.4 milliGRAM(s) Oral at bedtime    MEDICATIONS  (PRN):  albuterol    90 MICROgram(s) HFA Inhaler 2 Puff(s) Inhalation every 4 hours PRN Shortness of Breath and/or Wheezing  metoprolol tartrate Injectable 2.5 milliGRAM(s) IV Push every 6 hours PRN for heart rate > 130      Allergies    levofloxacin (Unknown)  gatifloxacin (Unknown)  ofloxacin (Unknown)    Intolerances    Ketek (Other)  telithromycin (Other)  Avelox (Other)  fluoroquinolone antibiotics (Other)      REVIEW OF SYSTEMS:  as above      Vital Signs Last 24 Hrs  T(C): 36.8 (15 Sep 2024 09:23), Max: 37.2 (14 Sep 2024 21:15)  T(F): 98.2 (15 Sep 2024 09:23), Max: 98.9 (14 Sep 2024 21:15)  HR: 72 (15 Sep 2024 09:23) (72 - 132)  BP: 120/70 (15 Sep 2024 09:23) (105/71 - 143/76)  BP(mean): --  RR: 18 (15 Sep 2024 09:23) (18 - 18)  SpO2: 97% (15 Sep 2024 09:23) (94% - 97%)    Parameters below as of 15 Sep 2024 09:23  Patient On (Oxygen Delivery Method): room air      PHYSICAL EXAM:  GENERAL: Not in acute distress, lying comfortably  HEAD:  Atraumatic, Normocephalic  EYES: EOMI, PERRLA, conjunctiva and sclera clear  NECK: Supple, No JVD, Normal thyroid  NERVOUS SYSTEM:  Alert & Oriented X3, No gross focal deficits  CHEST/LUNG: Clear to auscultation bilaterally; No rales, rhonchi, wheezing, or rubs  HEART: Regular rate and rhythm; No murmurs, rubs, or gallops  ABDOMEN: Protuberant abdomen with umbilical hernia.  URO: RT sided nephrostomy tube in place draining light yellow urine. Restrepo +  EXTREMITIES:  No clubbing, cyanosis, or edema; LUE midline; B/L Edema +1  SKIN: No rashes or lesions      LABS:                        11.2   12.37 )-----------( 112      ( 15 Sep 2024 06:40 )             34.6         CAPILLARY BLOOD GLUCOSE          RADIOLOGY & ADDITIONAL TESTS:    Imaging Personally Reviewed:  [X] YES  [ ] NO    Consultant(s) Notes Reviewed:  [X] YES  [ ] NO    Care Discussed with Consultants/Other Providers [X] YES  [ ] NO    Plan of Care discussed with House Staff: [X]YES [ ] NO Patient is a 79y old  Male who presents with a chief complaint of sepsis 2/2 klebsiella bacteremia (13 Sep 2024 07:27)      INTERVAL HPI/ OVERNIGHT EVENTS:  - Tachycardic to 130s-140s    TODAY:  - Patient examined bedside, no complaints  - Patient states that he is feeling much better and felt like his labs were early in the AM  - Patient also states that he is not eating much, but that is no different than home  - Patient denies CP, SOB, fever, nausea, vomiting    MEDICATIONS  (STANDING):  apixaban 5 milliGRAM(s) Oral two times a day  bacitracin   Ointment 1 Application(s) Topical two times a day  cefTRIAXone Injectable.      cefTRIAXone Injectable. 2000 milliGRAM(s) IV Push every 24 hours  chlorhexidine 2% Cloths 1 Application(s) Topical daily  metoprolol tartrate 12.5 milliGRAM(s) Oral every 8 hours  multivitamin 1 Tablet(s) Oral daily  nystatin Powder 1 Application(s) Topical two times a day  pantoprazole  Injectable 40 milliGRAM(s) IV Push daily  potassium phosphate / sodium phosphate Powder (PHOS-NaK) 1 Packet(s) Oral two times a day  predniSONE   Tablet 10 milliGRAM(s) Oral daily  predniSONE   Tablet   Oral   QUEtiapine 25 milliGRAM(s) Oral at bedtime  risperiDONE   Tablet 0.25 milliGRAM(s) Oral every 12 hours  spironolactone 25 milliGRAM(s) Oral daily  tamsulosin 0.4 milliGRAM(s) Oral at bedtime    MEDICATIONS  (PRN):  albuterol    90 MICROgram(s) HFA Inhaler 2 Puff(s) Inhalation every 4 hours PRN Shortness of Breath and/or Wheezing  metoprolol tartrate Injectable 2.5 milliGRAM(s) IV Push every 6 hours PRN for heart rate > 130      Allergies    levofloxacin (Unknown)  gatifloxacin (Unknown)  ofloxacin (Unknown)    Intolerances    Ketek (Other)  telithromycin (Other)  Avelox (Other)  fluoroquinolone antibiotics (Other)      REVIEW OF SYSTEMS:  as above      Vital Signs Last 24 Hrs  T(C): 36.8 (15 Sep 2024 09:23), Max: 37.2 (14 Sep 2024 21:15)  T(F): 98.2 (15 Sep 2024 09:23), Max: 98.9 (14 Sep 2024 21:15)  HR: 72 (15 Sep 2024 09:23) (72 - 132)  BP: 120/70 (15 Sep 2024 09:23) (105/71 - 143/76)  BP(mean): --  RR: 18 (15 Sep 2024 09:23) (18 - 18)  SpO2: 97% (15 Sep 2024 09:23) (94% - 97%)    Parameters below as of 15 Sep 2024 09:23  Patient On (Oxygen Delivery Method): room air      PHYSICAL EXAM:  GENERAL: Not in acute distress, lying comfortably  HEAD:  Atraumatic, Normocephalic  EYES: EOMI, PERRLA, conjunctiva and sclera clear  NECK: Supple, No JVD, Normal thyroid  NERVOUS SYSTEM:  Alert & Oriented X3, No gross focal deficits  CHEST/LUNG: Clear to auscultation bilaterally; No rales, rhonchi, wheezing, or rubs  HEART: Regular rate and rhythm; No murmurs, rubs, or gallops  ABDOMEN: Protuberant abdomen with umbilical hernia.  URO: RT sided nephrostomy tube in place draining light yellow urine. Restrepo +  EXTREMITIES:  No clubbing, cyanosis, or edema; LUE midline; B/L Edema +1  SKIN: No rashes or lesions      LABS:                        11.2   12.37 )-----------( 112      ( 15 Sep 2024 06:40 )             34.6         CAPILLARY BLOOD GLUCOSE          RADIOLOGY & ADDITIONAL TESTS:    Imaging Personally Reviewed:  [X] YES  [ ] NO    Consultant(s) Notes Reviewed:  [X] YES  [ ] NO    Care Discussed with Consultants/Other Providers [X] YES  [ ] NO    Plan of Care discussed with House Staff: [X]YES [ ] NO

## 2024-09-16 ENCOUNTER — TRANSCRIPTION ENCOUNTER (OUTPATIENT)
Age: 79
End: 2024-09-16

## 2024-09-16 VITALS
DIASTOLIC BLOOD PRESSURE: 77 MMHG | HEART RATE: 68 BPM | RESPIRATION RATE: 18 BRPM | SYSTOLIC BLOOD PRESSURE: 122 MMHG | OXYGEN SATURATION: 95 % | TEMPERATURE: 98 F

## 2024-09-16 LAB
HCT VFR BLD CALC: 34.4 % — LOW (ref 39–50)
HGB BLD-MCNC: 11.2 G/DL — LOW (ref 13–17)
MCHC RBC-ENTMCNC: 30.2 PG — SIGNIFICANT CHANGE UP (ref 27–34)
MCHC RBC-ENTMCNC: 32.6 GM/DL — SIGNIFICANT CHANGE UP (ref 32–36)
MCV RBC AUTO: 92.7 FL — SIGNIFICANT CHANGE UP (ref 80–100)
PLATELET # BLD AUTO: 113 K/UL — LOW (ref 150–400)
RBC # BLD: 3.71 M/UL — LOW (ref 4.2–5.8)
RBC # FLD: 16.8 % — HIGH (ref 10.3–14.5)
WBC # BLD: 15.43 K/UL — HIGH (ref 3.8–10.5)
WBC # FLD AUTO: 15.43 K/UL — HIGH (ref 3.8–10.5)

## 2024-09-16 PROCEDURE — 99239 HOSP IP/OBS DSCHRG MGMT >30: CPT

## 2024-09-16 RX ORDER — BACITRACIN 500 UNIT/G
1 OINTMENT (GRAM) TOPICAL
Qty: 0 | Refills: 0 | DISCHARGE
Start: 2024-09-16

## 2024-09-16 RX ORDER — POTASSIUM CHLORIDE 10 MEQ
1 TABLET, EXT RELEASE, PARTICLES/CRYSTALS ORAL
Refills: 0 | DISCHARGE

## 2024-09-16 RX ORDER — NYSTATIN 100000/G
1 CREAM (GRAM) TOPICAL
Qty: 0 | Refills: 0 | DISCHARGE
Start: 2024-09-16

## 2024-09-16 RX ORDER — METOPROLOL TARTRATE 100 MG/1
3 TABLET ORAL
Qty: 0 | Refills: 0 | DISCHARGE
Start: 2024-09-16

## 2024-09-16 RX ORDER — RISPERIDONE 0.25 MG/1
1 TABLET, FILM COATED ORAL
Qty: 0 | Refills: 0 | DISCHARGE
Start: 2024-09-16

## 2024-09-16 RX ORDER — CEFPODOXIME PROXETIL 100 MG/5ML
1 GRANULE, FOR SUSPENSION ORAL
Qty: 0 | Refills: 0 | DISCHARGE

## 2024-09-16 RX ORDER — SPIRONOLACTONE 25 MG/1
1 TABLET, FILM COATED ORAL
Qty: 0 | Refills: 0 | DISCHARGE
Start: 2024-09-16

## 2024-09-16 RX ADMIN — SODIUM PHOSPHATE, DIBASIC, ANHYDROUS, POTASSIUM PHOSPHATE, MONOBASIC, AND SODIUM PHOSPHATE, MONOBASIC, MONOHYDRATE 1 PACKET(S): 852; 155; 130 TABLET, COATED ORAL at 17:47

## 2024-09-16 RX ADMIN — METOPROLOL TARTRATE 25 MILLIGRAM(S): 100 TABLET ORAL at 05:47

## 2024-09-16 RX ADMIN — RISPERIDONE 0.25 MILLIGRAM(S): 0.25 TABLET, FILM COATED ORAL at 05:49

## 2024-09-16 RX ADMIN — SODIUM PHOSPHATE, DIBASIC, ANHYDROUS, POTASSIUM PHOSPHATE, MONOBASIC, AND SODIUM PHOSPHATE, MONOBASIC, MONOHYDRATE 1 PACKET(S): 852; 155; 130 TABLET, COATED ORAL at 05:48

## 2024-09-16 RX ADMIN — Medication 10 MILLIGRAM(S): at 05:49

## 2024-09-16 RX ADMIN — APIXABAN 5 MILLIGRAM(S): 5 TABLET, FILM COATED ORAL at 17:48

## 2024-09-16 RX ADMIN — APIXABAN 5 MILLIGRAM(S): 5 TABLET, FILM COATED ORAL at 05:47

## 2024-09-16 RX ADMIN — Medication 1 APPLICATION(S): at 05:48

## 2024-09-16 RX ADMIN — RISPERIDONE 0.25 MILLIGRAM(S): 0.25 TABLET, FILM COATED ORAL at 17:48

## 2024-09-16 RX ADMIN — CHLORHEXIDINE GLUCONATE 1 APPLICATION(S): 40 SOLUTION TOPICAL at 12:09

## 2024-09-16 RX ADMIN — Medication 1 APPLICATION(S): at 17:47

## 2024-09-16 RX ADMIN — SPIRONOLACTONE 25 MILLIGRAM(S): 25 TABLET, FILM COATED ORAL at 05:49

## 2024-09-16 RX ADMIN — Medication 40 MILLIGRAM(S): at 11:52

## 2024-09-16 RX ADMIN — METOPROLOL TARTRATE 25 MILLIGRAM(S): 100 TABLET ORAL at 13:57

## 2024-09-16 RX ADMIN — Medication 1 TABLET(S): at 11:52

## 2024-09-16 RX ADMIN — Medication 1 APPLICATION(S): at 05:47

## 2024-09-16 NOTE — ED ADULT TRIAGE NOTE - MEANS OF ARRIVAL
Today you were seen in the ER for shoulder pain.     A lidocaine patch was placed and can stay on for 12 hours.     Take Motrin 600 mg every 8 hours as needed for moderate pain-- take with food..    Take Tylenol 650mg (Two 325 mg pills) every 4-6 hours as needed for pain.    Rest, Ice, Elevate injured area    Follow-up with Orthopedics, See referred doctor. Call today / next business day for close, prompt follow-up.    Return to Emergency room for any worsening or persistent pain, weakness, numbness, fever, color change to extremity, or any other concerning symptoms.    Advance activity as tolerated.     Continue all previously prescribed medications as directed unless otherwise instructed.     Follow up with your primary care physician in 48-72 hours- bring copies of your results. stretcher

## 2024-09-16 NOTE — DISCHARGE NOTE PROVIDER - NSDCMRMEDTOKEN_GEN_ALL_CORE_FT
Eliquis 5 mg oral tablet: 1 tab(s) orally 2 times a day  furosemide 40 mg oral tablet: 1 tab(s) orally once a day  metoprolol succinate 25 mg oral tablet, extended release: 1 tab(s) orally once a day  omeprazole 40 mg oral delayed release capsule: 1 cap(s) orally once a day  Potassium Chloride (Eqv-Klor-Con M20) 20 mEq oral tablet, extended release: 1 tab(s) orally once a day  Praluent Pen 75 mg/mL subcutaneous solution: 75 milligram(s) subcutaneous every 2 weeks  predniSONE 10 mg oral tablet: 1 tab(s) orally once a day  tamsulosin 0.4 mg oral capsule: 1 cap(s) orally once a day (at bedtime)   bacitracin 500 units/g topical ointment: 1 Apply topically to affected area 2 times a day  Eliquis 5 mg oral tablet: 1 tab(s) orally 2 times a day  metoprolol succinate 25 mg oral capsule, extended release: 3 tab(s) orally once a day  Multiple Vitamins oral tablet: 1 tab(s) orally once a day  nystatin 100,000 units/g topical powder: 1 Apply topically to affected area 2 times a day  omeprazole 40 mg oral delayed release capsule: 1 cap(s) orally once a day  Praluent Pen 75 mg/mL subcutaneous solution: 75 milligram(s) subcutaneous every 2 weeks  predniSONE 10 mg oral tablet: 1 tab(s) orally once a day  QUEtiapine 25 mg oral tablet: 1 tab(s) orally once a day (at bedtime)  risperiDONE 0.25 mg oral tablet: 1 tab(s) orally every 12 hours  spironolactone 25 mg oral tablet: 1 tab(s) orally once a day  tamsulosin 0.4 mg oral capsule: 1 cap(s) orally once a day (at bedtime)  Vantin 200 mg oral tablet: 1 tab(s) orally 2 times a day for 5 more days

## 2024-09-16 NOTE — DISCHARGE NOTE NURSING/CASE MANAGEMENT/SOCIAL WORK - PATIENT PORTAL LINK FT
You can access the FollowMyHealth Patient Portal offered by [facility] by registering at the following website: [email or web address]. By joining [facility]’s FollowMyHealth portal, you will also be able to view your health information using other applications (apps) compatible with our system.

## 2024-09-16 NOTE — PROGRESS NOTE ADULT - PROVIDER SPECIALTY LIST ADULT
Critical Care
Hospitalist
Infectious Disease
Internal Medicine
Palliative Care
Critical Care
Hospitalist
Infectious Disease
Internal Medicine
Cardiology
Hospitalist
Internal Medicine
Palliative Care

## 2024-09-16 NOTE — PROGRESS NOTE ADULT - REASON FOR ADMISSION
sepsis 2/2 klebsiella bacteremia + ureteral calculus
klebsiella bacteremia 2/2 right ureteral calculus
sepsis 2/2 klebsiella bacteremia + ureteral calculus
sepsis 2/2 klebsiella bacteremia
sepsis w/ severe shock 2/2 klebsiella bacteremia and ureteral calculus

## 2024-09-16 NOTE — DISCHARGE NOTE NURSING/CASE MANAGEMENT/SOCIAL WORK - NSDCCRNAME_GEN_ALL_CORE_FT
[facility]  [address] [place] [address]  Phone: [phone] ext. [phone]

## 2024-09-16 NOTE — PROGRESS NOTE ADULT - SUBJECTIVE AND OBJECTIVE BOX
HPI  Patient is a 79y Male with PMHx       INTERVAL EVENTS    HOSPITAL COURSE    REVIEW OF SYSTEMS   General: Well appearing, in no acute distress   HEENT: No cough, throat pain, rhinorrhea hemoptysis    Chest: No shortness of breath, chest pain  Abdomen: No abdominal pain, hematemesis   Genitourinary No dysuria, No hematuria   Extremities: No joint pain, no change in range of motion  Skin: No rashes, ecchymoses purpura, nodules       MEDICATIONS  MEDICATIONS  (STANDING):  apixaban 5 milliGRAM(s) Oral two times a day  bacitracin   Ointment 1 Application(s) Topical two times a day  cefTRIAXone Injectable. 2000 milliGRAM(s) IV Push every 24 hours  cefTRIAXone Injectable.      chlorhexidine 2% Cloths 1 Application(s) Topical daily  metoprolol tartrate 25 milliGRAM(s) Oral three times a day  multivitamin 1 Tablet(s) Oral daily  nystatin Powder 1 Application(s) Topical two times a day  pantoprazole  Injectable 40 milliGRAM(s) IV Push daily  potassium phosphate / sodium phosphate Powder (PHOS-NaK) 1 Packet(s) Oral two times a day  predniSONE   Tablet 10 milliGRAM(s) Oral daily  predniSONE   Tablet   Oral   QUEtiapine 25 milliGRAM(s) Oral at bedtime  risperiDONE   Tablet 0.25 milliGRAM(s) Oral every 12 hours  spironolactone 25 milliGRAM(s) Oral daily  tamsulosin 0.4 milliGRAM(s) Oral at bedtime    MEDICATIONS  (PRN):  albuterol    90 MICROgram(s) HFA Inhaler 2 Puff(s) Inhalation every 4 hours PRN Shortness of Breath and/or Wheezing  metoprolol tartrate Injectable 2.5 milliGRAM(s) IV Push every 6 hours PRN for heart rate > 130      ALLERGIES  Allergies    levofloxacin (Unknown)  gatifloxacin (Unknown)  ofloxacin (Unknown)    Intolerances    Ketek (Other)  telithromycin (Other)  Avelox (Other)  fluoroquinolone antibiotics (Other)      PHYSICAL EXAM   Vital Signs Last 24 Hrs  T(C): 36.7 (16 Sep 2024 04:11), Max: 36.8 (15 Sep 2024 09:23)  T(F): 98.1 (16 Sep 2024 04:11), Max: 98.2 (15 Sep 2024 09:23)  HR: 69 (16 Sep 2024 04:11) (61 - 72)  BP: 110/60 (16 Sep 2024 04:11) (110/60 - 120/70)  BP(mean): --  RR: 18 (16 Sep 2024 04:11) (18 - 18)  SpO2: 94% (16 Sep 2024 04:11) (94% - 97%)    Parameters below as of 16 Sep 2024 04:11  Patient On (Oxygen Delivery Method): room air        T(C): 36.7 (09-16-24 @ 04:11), Max: 36.8 (09-15-24 @ 09:23)  HR: 69 (09-16-24 @ 04:11) (61 - 72)  BP: 110/60 (09-16-24 @ 04:11) (110/60 - 120/70)  RR: 18 (09-16-24 @ 04:11) (18 - 18)  SpO2: 94% (09-16-24 @ 04:11) (94% - 97%)    CONSTITUTIONAL: Well groomed, no apparent distress  EYES: PERRLA and symmetric, EOMI, No conjunctival or scleral injection, non-icteric  ENMT: Oral mucosa with moist membranes. Normal dentition; no pharyngeal injection or exudates             NECK: Supple, symmetric and without tracheal deviation   RESP: No respiratory distress, no use of accessory muscles; CTA b/l, no WRR  CV: RRR, +S1S2, no MRG; no JVD; no peripheral edema  GI: Soft, NT, ND, no rebound, no guarding; no palpable masses; no hepatosplenomegaly; no hernia palpated  LYMPH: No cervical LAD or tenderness; no axillary LAD or tenderness; no inguinal LAD or tenderness  MSK: Normal gait; No digital clubbing or cyanosis; examination of the (head/neck/spine/ribs/pelvis, RUE, LUE, RLE, LLE) without misalignment,            Normal ROM without pain, no spinal tenderness, normal muscle strength/tone  SKIN: No rashes or ulcers noted; no subcutaneous nodules or induration palpable  NEURO: CN II-XII intact; normal reflexes in upper and lower extremities, sensation intact in upper and lower extremities b/l to light touch   PSYCH: Appropriate insight/judgment; A+O x 3, mood and affect appropriate, recent/remote memory intact      LABS                        11.2   15.43 )-----------( 113      ( 16 Sep 2024 06:17 )             34.4       CBC Full  -  ( 16 Sep 2024 06:17 )  WBC Count : 15.43 K/uL  RBC Count : 3.71 M/uL  Hemoglobin : 11.2 g/dL  Hematocrit : 34.4 %  Platelet Count - Automated : 113 K/uL  Mean Cell Volume : 92.7 fl  Mean Cell Hemoglobin : 30.2 pg  Mean Cell Hemoglobin Concentration : 32.6 gm/dL  Auto Neutrophil # : x  Auto Lymphocyte # : x  Auto Monocyte # : x  Auto Eosinophil # : x  Auto Basophil # : x  Auto Neutrophil % : x  Auto Lymphocyte % : x  Auto Monocyte % : x  Auto Eosinophil % : x  Auto Basophil % : x              CAPILLARY BLOOD GLUCOSE                  IMAGING

## 2024-09-16 NOTE — DISCHARGE NOTE PROVIDER - HOSPITAL COURSE
79y Male w/PMHx of HTN, HFpEF, PE (on apixaban), Myasthenia Gravis, chronic LE edema, known 7 mm distal ureteral calculus with mild right hydroureteronephrosis, initially presented to the [place] ED on 9/5 with shortness of breath, urinary retention and confusion after failing to follow up outpatient for ureteral calculus. CT A/P revealed R hydro from 7 mm stone in UVJ. Underwent emergent cystoscopy with plan for ureteral stenting however procedure was aborted due to hematuria and inability to visualize right ureteral opening. Blood cx positive for klebsiella pneumoniae bacteremia on 9/5 with most recent blood cultures showing no growth to date on 9/7. Patient is treated with ceftriaxone for urosepsis and bacteremia. Admission complicated by septic shock requiring IV vasopressor support. Patient underwent emergent percutaneous nephrostomy tube placement. Transferred to medicine for further management. Heme/onc consult for thrombocytopenia on 9/10, anemia panel ordered, negative for HIT and DIC on 9/10. Tested positive for RVP and COVID on 9/11. Midodrine DC'd. Stress dose steroids changed to PO which have been tapered down to home dose of prednisone 10mg.       # Obstructive nephropathy---> improving well  # COVID-19  - COVID +ve  - Repeat blood cx w/ NGTD  - c/w Albuterol 90 micrograms, 2 puffs Q4hr prn  - c/w IV Ceftriaxone. ID following, WHEN READY FOR D/C  CAN TRANSITION TO ORAL ABX VANTIN TO COMPLETE  2 WEEKS   - s/p stress dose steroids and Midodrine. Now on prednisone PO taper to home dose (10mg). Prednisone 30mg today --> 20 mg tomorrow --> 10mg Sunday  - Outpt F/U with urology (Dr. Heck) for lithotripsy within 1-2 weeks of being d/c  - no respiratory symptoms  - s/p PCN by IR  - c/w Flomax    #Thrombocytopenia --> improving  - likely due to polypharmacy (CTX, seroquel, spironolactone) and Sepsis  - HIT AB negative,  - DIC panel negative  - Heme/onc consulted, recommends avoiding non-essential meds that can exacerbate PLT drop    #Afib s/p RVR  - likely induced by septic shock  - HR elevated overnight to 130s  - TTE unremarkable  - Cardiology following  - On Metoprolol XL 25mg PO at home  - Previously on Metroprolol tartrate 12.5mg TID  - Uptitrate Metoprolol 25mg TID for better rate control  - Continue Eliquis  - WARRENDsVASC 4    #s/p ICU/Hospital acquired delirium  - c/w Seroquel and Risperdal    #GERD  - Continue PPI    DVT PPx: Eliquis 5mg BID  Code status: DNR/DNI, trial of NIV. Palliative following    Dispo: [facility] when placed     79y Male w/PMHx of HTN, HFpEF, PE (on apixaban), Myasthenia Gravis, chronic LE edema, known 7 mm distal ureteral calculus with mild right hydroureteronephrosis, initially presented to the [place] ED on 9/5 with shortness of breath, urinary retention and confusion after failing to follow up outpatient for ureteral calculus. CT A/P revealed R hydro from 7 mm stone in UVJ. Underwent emergent cystoscopy with plan for ureteral stenting however procedure was aborted due to hematuria and inability to visualize right ureteral opening. Blood cx positive for klebsiella pneumoniae bacteremia on 9/5 with most recent blood cultures showing no growth to date on 9/7. Patient is treated with ceftriaxone for urosepsis and bacteremia. Admission complicated by septic shock requiring IV vasopressor support. Patient underwent emergent percutaneous nephrostomy tube placement. Transferred to medicine for further management. Heme/onc consult for thrombocytopenia on 9/10, anemia panel ordered, negative for HIT and DIC on 9/10. Tested positive for RVP and COVID on 9/11. Midodrine DC'd. Stress dose steroids changed to PO which have been tapered down to home dose of prednisone 10mg.       #Right ureteral obstruction    #Thrombocytopenia --> improving      #Afib s/p RVR      #s/p ICU/Hospital acquired delirium      #GERD      DVT PPx: Eliquis 5mg BID  Code status: DNR/DNI, trial of NIV. Palliative following    Dispo: [facility] when placed     79y Male w/PMHx of HTN, HFpEF, PE (on apixaban), Myasthenia Gravis, chronic LE edema, known 7 mm distal ureteral calculus with mild right hydroureteronephrosis, initially presented to the [place] ED on 9/5 with shortness of breath, urinary retention and confusion after failing to follow up outpatient for ureteral calculus. CT A/P revealed R hydro from 7 mm stone in UVJ. Underwent emergent cystoscopy with plan for ureteral stenting however procedure was aborted due to hematuria and inability to visualize right ureteral opening. Blood cx positive for klebsiella pneumoniae bacteremia on 9/5 with most recent blood cultures showing no growth to date on 9/7. Patient is treated with ceftriaxone for urosepsis and bacteremia. Admission complicated by septic shock requiring IV vasopressor support. Patient underwent emergent percutaneous nephrostomy tube placement. Transferred to medicine for further management. Heme/onc consult for thrombocytopenia on 9/10, anemia panel ordered, negative for HIT and DIC on 9/10. Tested positive for RVP and COVID on 9/11. Midodrine DC'd. Stress dose steroids changed to PO which have been tapered down to home dose of prednisone 10mg.       # Septic shock POA -- resolved  # Klebsiella Bacteremia   # Obstructive Uropathy with Ureteral calculus   # Obstructive nephropathy-  # COVID-19  #Thrombocytopenia   #Afib s/p RVR  #s/p ICU/Hospital acquired delirium  #GERD

## 2024-09-16 NOTE — DISCHARGE NOTE PROVIDER - CARE PROVIDER_API CALL
Nael Heck  Urology  [address] [place] [id number]  Phone: [phone]  Fax: [phone]  Follow Up Time:

## 2024-09-16 NOTE — DISCHARGE NOTE PROVIDER - NSDCCPCAREPLAN_GEN_ALL_CORE_FT
PRINCIPAL DISCHARGE DIAGNOSIS  Diagnosis: Septic shock  Assessment and Plan of Treatment:       SECONDARY DISCHARGE DIAGNOSES  Diagnosis: CATINA (acute kidney injury)  Assessment and Plan of Treatment:     Diagnosis: Bacteremia due to Klebsiella pneumoniae  Assessment and Plan of Treatment:     Diagnosis: Acute CHF  Assessment and Plan of Treatment:     Diagnosis: Obstructive uropathy  Assessment and Plan of Treatment:

## 2024-09-16 NOTE — DISCHARGE NOTE PROVIDER - ATTENDING DISCHARGE PHYSICAL EXAMINATION:
Vital Signs Last 24 Hrs  T(C): 36.6 (09-16-24 @ 08:52), Max: 36.7 (09-16-24 @ 04:11)  T(F): 97.9 (09-16-24 @ 08:52), Max: 98.1 (09-16-24 @ 04:11)  HR: 85 (09-16-24 @ 13:50) (61 - 85)  BP: 130/78 (09-16-24 @ 13:50) (110/60 - 130/78)  BP(mean): --  RR: 18 (09-16-24 @ 13:50) (18 - 18)  SpO2: 95% (09-16-24 @ 13:50) (94% - 95%)    GENERAL: Not in acute distress, lying comfortably  HEAD:  Atraumatic, Normocephalic  EYES: EOMI, PERRLA, conjunctiva and sclera clear  NECK: Supple, No JVD, Normal thyroid  NERVOUS SYSTEM:  Alert & Oriented X3, No gross focal deficits  CHEST/LUNG: Clear to auscultation bilaterally; No rales, rhonchi, wheezing, or rubs  HEART: Regular rate and rhythm; No murmurs, rubs, or gallops  ABDOMEN: Protuberant abdomen with umbilical hernia.  URO: RT sided nephrostomy tube in place draining light yellow urine. Restrepo +  EXTREMITIES:  No clubbing, cyanosis, or edema; LUE midline; B/L Edema +1  SKIN: No rashes or lesions

## 2024-09-29 PROCEDURE — 85379 FIBRIN DEGRADATION QUANT: CPT

## 2024-09-29 PROCEDURE — 86901 BLOOD TYPING SEROLOGIC RH(D): CPT

## 2024-09-29 PROCEDURE — 83735 ASSAY OF MAGNESIUM: CPT

## 2024-09-29 PROCEDURE — 87040 BLOOD CULTURE FOR BACTERIA: CPT

## 2024-09-29 PROCEDURE — 87641 MR-STAPH DNA AMP PROBE: CPT

## 2024-09-29 PROCEDURE — 83605 ASSAY OF LACTIC ACID: CPT

## 2024-09-29 PROCEDURE — 36415 COLL VENOUS BLD VENIPUNCTURE: CPT

## 2024-09-29 PROCEDURE — 94002 VENT MGMT INPAT INIT DAY: CPT

## 2024-09-29 PROCEDURE — C8929: CPT

## 2024-09-29 PROCEDURE — 85014 HEMATOCRIT: CPT

## 2024-09-29 PROCEDURE — 85027 COMPLETE CBC AUTOMATED: CPT

## 2024-09-29 PROCEDURE — C1751: CPT

## 2024-09-29 PROCEDURE — P9017: CPT

## 2024-09-29 PROCEDURE — C1769: CPT

## 2024-09-29 PROCEDURE — 86900 BLOOD TYPING SEROLOGIC ABO: CPT

## 2024-09-29 PROCEDURE — 99285 EMERGENCY DEPT VISIT HI MDM: CPT

## 2024-09-29 PROCEDURE — 84443 ASSAY THYROID STIM HORMONE: CPT

## 2024-09-29 PROCEDURE — C1894: CPT

## 2024-09-29 PROCEDURE — 84100 ASSAY OF PHOSPHORUS: CPT

## 2024-09-29 PROCEDURE — 82435 ASSAY OF BLOOD CHLORIDE: CPT

## 2024-09-29 PROCEDURE — 85025 COMPLETE CBC W/AUTO DIFF WBC: CPT

## 2024-09-29 PROCEDURE — 86985 SPLIT BLOOD OR PRODUCTS: CPT

## 2024-09-29 PROCEDURE — 87637 SARSCOV2&INF A&B&RSV AMP PRB: CPT

## 2024-09-29 PROCEDURE — 93321 DOPPLER ECHO F-UP/LMTD STD: CPT

## 2024-09-29 PROCEDURE — 85730 THROMBOPLASTIN TIME PARTIAL: CPT

## 2024-09-29 PROCEDURE — 36430 TRANSFUSION BLD/BLD COMPNT: CPT

## 2024-09-29 PROCEDURE — P9011: CPT

## 2024-09-29 PROCEDURE — 87186 SC STD MICRODIL/AGAR DIL: CPT

## 2024-09-29 PROCEDURE — C1758: CPT

## 2024-09-29 PROCEDURE — 80053 COMPREHEN METABOLIC PANEL: CPT

## 2024-09-29 PROCEDURE — C1729: CPT

## 2024-09-29 PROCEDURE — 86022 PLATELET ANTIBODIES: CPT

## 2024-09-29 PROCEDURE — 83036 HEMOGLOBIN GLYCOSYLATED A1C: CPT

## 2024-09-29 PROCEDURE — 83880 ASSAY OF NATRIURETIC PEPTIDE: CPT

## 2024-09-29 PROCEDURE — 87070 CULTURE OTHR SPECIMN AEROBIC: CPT

## 2024-09-29 PROCEDURE — 82803 BLOOD GASES ANY COMBINATION: CPT

## 2024-09-29 PROCEDURE — 87205 SMEAR GRAM STAIN: CPT

## 2024-09-29 PROCEDURE — 85610 PROTHROMBIN TIME: CPT

## 2024-09-29 PROCEDURE — 84295 ASSAY OF SERUM SODIUM: CPT

## 2024-09-29 PROCEDURE — 92526 ORAL FUNCTION THERAPY: CPT

## 2024-09-29 PROCEDURE — 84145 PROCALCITONIN (PCT): CPT

## 2024-09-29 PROCEDURE — 82962 GLUCOSE BLOOD TEST: CPT

## 2024-09-29 PROCEDURE — 86140 C-REACTIVE PROTEIN: CPT

## 2024-09-29 PROCEDURE — 87077 CULTURE AEROBIC IDENTIFY: CPT

## 2024-09-29 PROCEDURE — 82330 ASSAY OF CALCIUM: CPT

## 2024-09-29 PROCEDURE — C8924: CPT

## 2024-09-29 PROCEDURE — 80048 BASIC METABOLIC PNL TOTAL CA: CPT

## 2024-09-29 PROCEDURE — 76942 ECHO GUIDE FOR BIOPSY: CPT

## 2024-09-29 PROCEDURE — 87086 URINE CULTURE/COLONY COUNT: CPT

## 2024-09-29 PROCEDURE — 96374 THER/PROPH/DIAG INJ IV PUSH: CPT

## 2024-09-29 PROCEDURE — 70450 CT HEAD/BRAIN W/O DYE: CPT | Mod: MC

## 2024-09-29 PROCEDURE — 81001 URINALYSIS AUTO W/SCOPE: CPT

## 2024-09-29 PROCEDURE — 85018 HEMOGLOBIN: CPT

## 2024-09-29 PROCEDURE — 87015 SPECIMEN INFECT AGNT CONCNTJ: CPT

## 2024-09-29 PROCEDURE — 87075 CULTR BACTERIA EXCEPT BLOOD: CPT

## 2024-09-29 PROCEDURE — 84132 ASSAY OF SERUM POTASSIUM: CPT

## 2024-09-29 PROCEDURE — 87150 DNA/RNA AMPLIFIED PROBE: CPT

## 2024-09-29 PROCEDURE — 71045 X-RAY EXAM CHEST 1 VIEW: CPT

## 2024-09-29 PROCEDURE — 87640 STAPH A DNA AMP PROBE: CPT

## 2024-09-29 PROCEDURE — 93005 ELECTROCARDIOGRAM TRACING: CPT

## 2024-09-29 PROCEDURE — 0225U NFCT DS DNA&RNA 21 SARSCOV2: CPT

## 2024-09-29 PROCEDURE — 85384 FIBRINOGEN ACTIVITY: CPT

## 2024-09-29 PROCEDURE — 71250 CT THORAX DX C-: CPT | Mod: MC

## 2024-09-29 PROCEDURE — 76000 FLUOROSCOPY <1 HR PHYS/QHP: CPT

## 2024-09-29 PROCEDURE — 82947 ASSAY GLUCOSE BLOOD QUANT: CPT

## 2024-09-29 PROCEDURE — 74176 CT ABD & PELVIS W/O CONTRAST: CPT | Mod: MC

## 2024-09-29 PROCEDURE — 86850 RBC ANTIBODY SCREEN: CPT

## 2024-09-29 PROCEDURE — 85362 FIBRIN DEGRADATION PRODUCTS: CPT

## 2024-09-29 PROCEDURE — 96375 TX/PRO/DX INJ NEW DRUG ADDON: CPT

## 2024-10-09 ENCOUNTER — APPOINTMENT (OUTPATIENT)
Dept: UROLOGY | Facility: CLINIC | Age: 79
End: 2024-10-09
Payer: MEDICARE

## 2024-10-09 VITALS
DIASTOLIC BLOOD PRESSURE: 77 MMHG | RESPIRATION RATE: 16 BRPM | SYSTOLIC BLOOD PRESSURE: 122 MMHG | HEART RATE: 105 BPM | OXYGEN SATURATION: 98 %

## 2024-10-09 DIAGNOSIS — N20.1 CALCULUS OF URETER: ICD-10-CM

## 2024-10-09 PROCEDURE — 99205 OFFICE O/P NEW HI 60 MIN: CPT

## 2024-10-22 ENCOUNTER — NON-APPOINTMENT (OUTPATIENT)
Age: 79
End: 2024-10-22

## 2024-10-29 NOTE — H&P ADULT - NSHPREVIEWOFSYSTEMS_GEN_ALL_CORE
Patient CONSTITUTIONAL: denies fever, chills, fatigue, weakness  HEENT: denies blurred vision, sore throat  SKIN: denies new lesions, rash  CARDIOVASCULAR: denies chest pain, chest pressure, palpitations  RESPIRATORY: denies shortness of breath, sputum production  GASTROINTESTINAL: denies nausea, vomiting, diarrhea, abdominal pain  GENITOURINARY: denies dysuria, discharge  NEUROLOGICAL: denies numbness, headache, focal weakness  MUSCULOSKELETAL: +difficulty with walking; denies new joint pain, muscle aches  HEMATOLOGIC: denies gross bleeding, bruising  LYMPHATICS: denies enlarged lymph nodes, extremity swelling  PSYCHIATRIC: denies recent changes in anxiety, depression  ENDOCRINOLOGIC: denies sweating, cold or heat intolerance

## 2024-11-05 ENCOUNTER — APPOINTMENT (OUTPATIENT)
Dept: UROLOGY | Facility: HOSPITAL | Age: 79
End: 2024-11-05

## 2024-11-15 ENCOUNTER — OUTPATIENT (OUTPATIENT)
Dept: OUTPATIENT SERVICES | Facility: HOSPITAL | Age: 79
LOS: 1 days | End: 2024-11-15
Payer: MEDICARE

## 2024-11-15 DIAGNOSIS — M48.00 SPINAL STENOSIS, SITE UNSPECIFIED: Chronic | ICD-10-CM

## 2024-11-15 DIAGNOSIS — D72.829 ELEVATED WHITE BLOOD CELL COUNT, UNSPECIFIED: ICD-10-CM

## 2024-11-15 DIAGNOSIS — Z98.890 OTHER SPECIFIED POSTPROCEDURAL STATES: Chronic | ICD-10-CM

## 2024-11-15 DIAGNOSIS — Z98.89 OTHER SPECIFIED POSTPROCEDURAL STATES: Chronic | ICD-10-CM

## 2024-11-15 DIAGNOSIS — Z41.9 ENCOUNTER FOR PROCEDURE FOR PURPOSES OTHER THAN REMEDYING HEALTH STATE, UNSPECIFIED: Chronic | ICD-10-CM

## 2024-11-15 DIAGNOSIS — Q66.89 OTHER SPECIFIED CONGENITAL DEFORMITIES OF FEET: Chronic | ICD-10-CM

## 2024-11-15 DIAGNOSIS — L05.91 PILONIDAL CYST WITHOUT ABSCESS: Chronic | ICD-10-CM

## 2024-11-15 PROCEDURE — 71260 CT THORAX DX C+: CPT

## 2024-11-15 PROCEDURE — 71260 CT THORAX DX C+: CPT | Mod: 26,MH

## 2024-11-15 PROCEDURE — 74177 CT ABD & PELVIS W/CONTRAST: CPT | Mod: 26,MH

## 2024-11-15 PROCEDURE — 74177 CT ABD & PELVIS W/CONTRAST: CPT

## 2024-11-19 ENCOUNTER — INPATIENT (INPATIENT)
Facility: HOSPITAL | Age: 79
LOS: 13 days | Discharge: HOME | DRG: 872 | End: 2024-12-03
Attending: HOSPITALIST | Admitting: STUDENT IN AN ORGANIZED HEALTH CARE EDUCATION/TRAINING PROGRAM
Payer: MEDICARE

## 2024-11-19 VITALS
DIASTOLIC BLOOD PRESSURE: 64 MMHG | HEIGHT: 66 IN | RESPIRATION RATE: 19 BRPM | WEIGHT: 199.96 LBS | OXYGEN SATURATION: 96 % | HEART RATE: 100 BPM | TEMPERATURE: 98 F | SYSTOLIC BLOOD PRESSURE: 90 MMHG

## 2024-11-19 DIAGNOSIS — L05.91 PILONIDAL CYST WITHOUT ABSCESS: Chronic | ICD-10-CM

## 2024-11-19 DIAGNOSIS — Z41.9 ENCOUNTER FOR PROCEDURE FOR PURPOSES OTHER THAN REMEDYING HEALTH STATE, UNSPECIFIED: Chronic | ICD-10-CM

## 2024-11-19 DIAGNOSIS — Z98.890 OTHER SPECIFIED POSTPROCEDURAL STATES: Chronic | ICD-10-CM

## 2024-11-19 DIAGNOSIS — M48.00 SPINAL STENOSIS, SITE UNSPECIFIED: Chronic | ICD-10-CM

## 2024-11-19 DIAGNOSIS — Q66.89 OTHER SPECIFIED CONGENITAL DEFORMITIES OF FEET: Chronic | ICD-10-CM

## 2024-11-19 DIAGNOSIS — Z98.89 OTHER SPECIFIED POSTPROCEDURAL STATES: Chronic | ICD-10-CM

## 2024-11-19 LAB
ALBUMIN SERPL ELPH-MCNC: 2.3 G/DL — LOW (ref 3.3–5)
ALP SERPL-CCNC: 77 U/L — SIGNIFICANT CHANGE UP (ref 40–120)
ALT FLD-CCNC: 20 U/L — SIGNIFICANT CHANGE UP (ref 12–78)
ANION GAP SERPL CALC-SCNC: 11 MMOL/L — SIGNIFICANT CHANGE UP (ref 5–17)
APTT BLD: 36.1 SEC — HIGH (ref 24.5–35.6)
AST SERPL-CCNC: 15 U/L — SIGNIFICANT CHANGE UP (ref 15–37)
BASOPHILS # BLD AUTO: 0.05 K/UL — SIGNIFICANT CHANGE UP (ref 0–0.2)
BASOPHILS NFR BLD AUTO: 0.2 % — SIGNIFICANT CHANGE UP (ref 0–2)
BILIRUB SERPL-MCNC: 0.3 MG/DL — SIGNIFICANT CHANGE UP (ref 0.2–1.2)
BUN SERPL-MCNC: 18 MG/DL — SIGNIFICANT CHANGE UP (ref 7–23)
CALCIUM SERPL-MCNC: 9.2 MG/DL — SIGNIFICANT CHANGE UP (ref 8.5–10.1)
CHLORIDE SERPL-SCNC: 101 MMOL/L — SIGNIFICANT CHANGE UP (ref 96–108)
CO2 SERPL-SCNC: 25 MMOL/L — SIGNIFICANT CHANGE UP (ref 22–31)
CREAT SERPL-MCNC: 0.91 MG/DL — SIGNIFICANT CHANGE UP (ref 0.5–1.3)
EGFR: 85 ML/MIN/1.73M2 — SIGNIFICANT CHANGE UP
EOSINOPHIL # BLD AUTO: 0.02 K/UL — SIGNIFICANT CHANGE UP (ref 0–0.5)
EOSINOPHIL NFR BLD AUTO: 0.1 % — SIGNIFICANT CHANGE UP (ref 0–6)
GLUCOSE SERPL-MCNC: 120 MG/DL — HIGH (ref 70–99)
HCT VFR BLD CALC: 40.9 % — SIGNIFICANT CHANGE UP (ref 39–50)
HGB BLD-MCNC: 13.5 G/DL — SIGNIFICANT CHANGE UP (ref 13–17)
IMM GRANULOCYTES NFR BLD AUTO: 1.4 % — HIGH (ref 0–0.9)
INR BLD: 1.47 RATIO — HIGH (ref 0.85–1.16)
LYMPHOCYTES # BLD AUTO: 1.65 K/UL — SIGNIFICANT CHANGE UP (ref 1–3.3)
LYMPHOCYTES # BLD AUTO: 6.5 % — LOW (ref 13–44)
MANUAL SMEAR VERIFICATION: SIGNIFICANT CHANGE UP
MCHC RBC-ENTMCNC: 31.2 PG — SIGNIFICANT CHANGE UP (ref 27–34)
MCHC RBC-ENTMCNC: 33 G/DL — SIGNIFICANT CHANGE UP (ref 32–36)
MCV RBC AUTO: 94.5 FL — SIGNIFICANT CHANGE UP (ref 80–100)
MONOCYTES # BLD AUTO: 2.24 K/UL — HIGH (ref 0–0.9)
MONOCYTES NFR BLD AUTO: 8.9 % — SIGNIFICANT CHANGE UP (ref 2–14)
NEUTROPHILS # BLD AUTO: 20.95 K/UL — HIGH (ref 1.8–7.4)
NEUTROPHILS NFR BLD AUTO: 82.9 % — HIGH (ref 43–77)
NRBC # BLD: 0 /100 WBCS — SIGNIFICANT CHANGE UP (ref 0–0)
NT-PROBNP SERPL-SCNC: 1988 PG/ML — HIGH (ref 0–450)
PLAT MORPH BLD: NORMAL — SIGNIFICANT CHANGE UP
PLATELET # BLD AUTO: 427 K/UL — HIGH (ref 150–400)
POTASSIUM SERPL-MCNC: 3.9 MMOL/L — SIGNIFICANT CHANGE UP (ref 3.5–5.3)
POTASSIUM SERPL-SCNC: 3.9 MMOL/L — SIGNIFICANT CHANGE UP (ref 3.5–5.3)
PROT SERPL-MCNC: 7.2 G/DL — SIGNIFICANT CHANGE UP (ref 6–8.3)
PROTHROM AB SERPL-ACNC: 17.1 SEC — HIGH (ref 9.9–13.4)
RBC # BLD: 4.33 M/UL — SIGNIFICANT CHANGE UP (ref 4.2–5.8)
RBC # FLD: 17.7 % — HIGH (ref 10.3–14.5)
RBC BLD AUTO: NORMAL — SIGNIFICANT CHANGE UP
SODIUM SERPL-SCNC: 137 MMOL/L — SIGNIFICANT CHANGE UP (ref 135–145)
TROPONIN I, HIGH SENSITIVITY RESULT: 1099.8 NG/L — HIGH
WBC # BLD: 25.27 K/UL — HIGH (ref 3.8–10.5)
WBC # FLD AUTO: 25.27 K/UL — HIGH (ref 3.8–10.5)

## 2024-11-19 PROCEDURE — 71045 X-RAY EXAM CHEST 1 VIEW: CPT | Mod: 26

## 2024-11-19 PROCEDURE — 99285 EMERGENCY DEPT VISIT HI MDM: CPT

## 2024-11-19 PROCEDURE — 93010 ELECTROCARDIOGRAM REPORT: CPT

## 2024-11-19 RX ORDER — SODIUM CHLORIDE 9 MG/ML
1000 INJECTION, SOLUTION INTRAMUSCULAR; INTRAVENOUS; SUBCUTANEOUS ONCE
Refills: 0 | Status: COMPLETED | OUTPATIENT
Start: 2024-11-19 | End: 2024-11-19

## 2024-11-19 RX ADMIN — SODIUM CHLORIDE 1000 MILLILITER(S): 9 INJECTION, SOLUTION INTRAMUSCULAR; INTRAVENOUS; SUBCUTANEOUS at 22:32

## 2024-11-19 NOTE — ED ADULT NURSE NOTE - NSFALLHARMRISKINTERV_ED_ALL_ED

## 2024-11-19 NOTE — ED ADULT NURSE NOTE - OBJECTIVE STATEMENT
Pt arrived to ED by EMS sent from [facility] for elevated white count, elevated BP. Pt denies any distress at this time. Pt has hx of DM, Current PICC line LUE, nephrostomy tube, chronic Restrepo. Labs sent, fluids running, EKG done, placed on bedside monitor. Bed in lowest position, call bell within reach, safety maintained, monitoring ongoing.

## 2024-11-19 NOTE — ED PROVIDER NOTE - OBJECTIVE STATEMENT
79-year-old male with a history of HLD, DM, HTN, myasthenia gravis, kidney stone, PE on Eliquis, lower extremity edema, heart failure presents with hypertension and leukocytosis.  Patient resides at [facility], brought in by ambulance.  Her nursing home paperwork, patient had a blood pressure of 154/105.  He was given metoprolol 25 mg at 8:10 PM.  In ED patient's blood pressure is 90/64.  In addition paperwork states patient has elevated WBC count but there is no indication of what value was.  Patient offers no complaints.  He denies abdominal pain, headache, chest pain, cough, fever, vomiting, diarrhea, dysuria.  Of note, patient was admitted to [place] 9/5-9/16 for septic shock.  He had a retained 7 mm right UVJ stone and underwent emergent percutaneous nephrostomy.  Currently patient still has nephrostomy tube and Restrepo catheter.  He is DNR DNI. PMD Bhavesh Kay 79-year-old male with a history of HLD, DM, HTN, myasthenia gravis, kidney stone, PE on Eliquis, lower extremity edema, heart failure presents with hypertension and leukocytosis.  Patient resides at [facility], brought in by ambulance.  Her nursing home paperwork, patient had a blood pressure of 154/105.  He was given metoprolol 25 mg at 8:10 PM.  In ED patient's blood pressure is 90/64.  In addition paperwork states patient has elevated WBC count but there is no indication of what value was.  Patient offers no complaints.  He denies abdominal pain, headache, chest pain, cough, fever, vomiting, diarrhea, dysuria.  Of note, patient was admitted to [place] 9/5-9/16 for septic shock.  He had a retained 7 mm right UVJ stone and underwent emergent percutaneous nephrostomy at [facility].  Currently patient still has nephrostomy tube and Restrepo catheter.  PICC line in place, He is DNR/DNI. PMSHAJI Kay

## 2024-11-19 NOTE — ED PROVIDER NOTE - CLINICAL SUMMARY MEDICAL DECISION MAKING FREE TEXT BOX
79 year old male p/w HTN and leukocytosis, sent from [facility].  Patient was admitted 2 months ago for urosepsis and septic shock secondary to retained 7mm kidney stone.  Currently with nephrostomy and guerrero catheter in place.  Temp 100.3 in ED with BP 90/64 and .  Check labs, lactate, cultures, CE, change guerrero, UA/Ucx, CT chest/abd/pelvis, abx, fluids, admits, ICU consultation

## 2024-11-19 NOTE — ED PROVIDER NOTE - DIFFERENTIAL DIAGNOSIS
Ddx includes but not limited to PNA, UTI, viral illness, sepsis, septic shock, urosepsis, flu, covid, rsv, pyelonephritis, obstructive uropathy Differential Diagnosis

## 2024-11-19 NOTE — ED PROVIDER NOTE - PROGRESS NOTE DETAILS
Discussed with patient's son Olvin.  Patient has PICC in [facility] and was receiving Abx last week for a UTI, he recently completed a 10-day course

## 2024-11-19 NOTE — ED PROVIDER NOTE - CONSTITUTIONAL, MLM
normal... Well appearing, awake, alert, oriented x 2 (person and place) and in no apparent distress.  Able to follow commands and answer most questions appropriately

## 2024-11-19 NOTE — ED ADULT NURSE NOTE - CADM POA VASCULAR ACCESS TYPE
Discharge instructions reviewed with pt and pt denied having any questions. Discharge paperwork signed and pt discharged.         Tori Cisneros RN  01/08/20 [id number] PICC

## 2024-11-20 ENCOUNTER — RESULT REVIEW (OUTPATIENT)
Age: 79
End: 2024-11-20

## 2024-11-20 DIAGNOSIS — N40.0 BENIGN PROSTATIC HYPERPLASIA WITHOUT LOWER URINARY TRACT SYMPTOMS: ICD-10-CM

## 2024-11-20 DIAGNOSIS — Z29.9 ENCOUNTER FOR PROPHYLACTIC MEASURES, UNSPECIFIED: ICD-10-CM

## 2024-11-20 DIAGNOSIS — J84.10 PULMONARY FIBROSIS, UNSPECIFIED: ICD-10-CM

## 2024-11-20 DIAGNOSIS — B34.8 OTHER VIRAL INFECTIONS OF UNSPECIFIED SITE: ICD-10-CM

## 2024-11-20 DIAGNOSIS — Z86.711 PERSONAL HISTORY OF PULMONARY EMBOLISM: ICD-10-CM

## 2024-11-20 DIAGNOSIS — N39.0 URINARY TRACT INFECTION, SITE NOT SPECIFIED: ICD-10-CM

## 2024-11-20 DIAGNOSIS — R60.0 LOCALIZED EDEMA: ICD-10-CM

## 2024-11-20 DIAGNOSIS — R79.89 OTHER SPECIFIED ABNORMAL FINDINGS OF BLOOD CHEMISTRY: ICD-10-CM

## 2024-11-20 DIAGNOSIS — I10 ESSENTIAL (PRIMARY) HYPERTENSION: ICD-10-CM

## 2024-11-20 DIAGNOSIS — A41.9 SEPSIS, UNSPECIFIED ORGANISM: ICD-10-CM

## 2024-11-20 DIAGNOSIS — R93.89 ABNORMAL FINDINGS ON DIAGNOSTIC IMAGING OF OTHER SPECIFIED BODY STRUCTURES: ICD-10-CM

## 2024-11-20 DIAGNOSIS — I50.9 HEART FAILURE, UNSPECIFIED: ICD-10-CM

## 2024-11-20 DIAGNOSIS — E04.1 NONTOXIC SINGLE THYROID NODULE: ICD-10-CM

## 2024-11-20 DIAGNOSIS — G70.00 MYASTHENIA GRAVIS WITHOUT (ACUTE) EXACERBATION: ICD-10-CM

## 2024-11-20 LAB
A1C WITH ESTIMATED AVERAGE GLUCOSE RESULT: 5.6 % — SIGNIFICANT CHANGE UP (ref 4–5.6)
ALBUMIN SERPL ELPH-MCNC: 1.9 G/DL — LOW (ref 3.3–5)
ALP SERPL-CCNC: 59 U/L — SIGNIFICANT CHANGE UP (ref 40–120)
ALT FLD-CCNC: 19 U/L — SIGNIFICANT CHANGE UP (ref 12–78)
ANION GAP SERPL CALC-SCNC: 4 MMOL/L — LOW (ref 5–17)
APPEARANCE UR: ABNORMAL
AST SERPL-CCNC: 18 U/L — SIGNIFICANT CHANGE UP (ref 15–37)
BACTERIA # UR AUTO: ABNORMAL /HPF
BASOPHILS # BLD AUTO: 0.05 K/UL — SIGNIFICANT CHANGE UP (ref 0–0.2)
BASOPHILS NFR BLD AUTO: 0.3 % — SIGNIFICANT CHANGE UP (ref 0–2)
BILIRUB SERPL-MCNC: 0.4 MG/DL — SIGNIFICANT CHANGE UP (ref 0.2–1.2)
BILIRUB UR-MCNC: NEGATIVE — SIGNIFICANT CHANGE UP
BUN SERPL-MCNC: 15 MG/DL — SIGNIFICANT CHANGE UP (ref 7–23)
CALCIUM SERPL-MCNC: 8.5 MG/DL — SIGNIFICANT CHANGE UP (ref 8.5–10.1)
CHLORIDE SERPL-SCNC: 109 MMOL/L — HIGH (ref 96–108)
CHOLEST SERPL-MCNC: 113 MG/DL — SIGNIFICANT CHANGE UP
CK MB BLD-MCNC: 11.8 % — HIGH (ref 0–3.5)
CK MB CFR SERPL CALC: 3.3 NG/ML — SIGNIFICANT CHANGE UP (ref 0–3.6)
CK SERPL-CCNC: 28 U/L — SIGNIFICANT CHANGE UP (ref 26–308)
CO2 SERPL-SCNC: 26 MMOL/L — SIGNIFICANT CHANGE UP (ref 22–31)
COLOR SPEC: YELLOW — SIGNIFICANT CHANGE UP
COMMENT - URINE: SIGNIFICANT CHANGE UP
CREAT SERPL-MCNC: 0.73 MG/DL — SIGNIFICANT CHANGE UP (ref 0.5–1.3)
DIFF PNL FLD: ABNORMAL
EGFR: 91 ML/MIN/1.73M2 — SIGNIFICANT CHANGE UP
EOSINOPHIL # BLD AUTO: 0.19 K/UL — SIGNIFICANT CHANGE UP (ref 0–0.5)
EOSINOPHIL NFR BLD AUTO: 1 % — SIGNIFICANT CHANGE UP (ref 0–6)
EPI CELLS # UR: PRESENT
ESTIMATED AVERAGE GLUCOSE: 114 MG/DL — SIGNIFICANT CHANGE UP (ref 68–114)
GLUCOSE SERPL-MCNC: 116 MG/DL — HIGH (ref 70–99)
GLUCOSE UR QL: NEGATIVE MG/DL — SIGNIFICANT CHANGE UP
HCT VFR BLD CALC: 34.5 % — LOW (ref 39–50)
HDLC SERPL-MCNC: 45 MG/DL — SIGNIFICANT CHANGE UP
HGB BLD-MCNC: 11.1 G/DL — LOW (ref 13–17)
IMM GRANULOCYTES NFR BLD AUTO: 1.3 % — HIGH (ref 0–0.9)
KETONES UR-MCNC: NEGATIVE MG/DL — SIGNIFICANT CHANGE UP
LACTATE SERPL-SCNC: 1.3 MMOL/L — SIGNIFICANT CHANGE UP (ref 0.7–2)
LACTATE SERPL-SCNC: 3.1 MMOL/L — HIGH (ref 0.7–2)
LEUKOCYTE ESTERASE UR-ACNC: ABNORMAL
LIPID PNL WITH DIRECT LDL SERPL: 55 MG/DL — SIGNIFICANT CHANGE UP
LYMPHOCYTES # BLD AUTO: 11.8 % — LOW (ref 13–44)
LYMPHOCYTES # BLD AUTO: 2.25 K/UL — SIGNIFICANT CHANGE UP (ref 1–3.3)
MCHC RBC-ENTMCNC: 31 PG — SIGNIFICANT CHANGE UP (ref 27–34)
MCHC RBC-ENTMCNC: 32.2 G/DL — SIGNIFICANT CHANGE UP (ref 32–36)
MCV RBC AUTO: 96.4 FL — SIGNIFICANT CHANGE UP (ref 80–100)
MONOCYTES # BLD AUTO: 1.87 K/UL — HIGH (ref 0–0.9)
MONOCYTES NFR BLD AUTO: 9.8 % — SIGNIFICANT CHANGE UP (ref 2–14)
MRSA PCR RESULT.: SIGNIFICANT CHANGE UP
NEUTROPHILS # BLD AUTO: 14.48 K/UL — HIGH (ref 1.8–7.4)
NEUTROPHILS NFR BLD AUTO: 75.8 % — SIGNIFICANT CHANGE UP (ref 43–77)
NITRITE UR-MCNC: POSITIVE
NON HDL CHOLESTEROL: 68 MG/DL — SIGNIFICANT CHANGE UP
NRBC # BLD: 0 /100 WBCS — SIGNIFICANT CHANGE UP (ref 0–0)
PH UR: 6 — SIGNIFICANT CHANGE UP (ref 5–8)
PLATELET # BLD AUTO: 356 K/UL — SIGNIFICANT CHANGE UP (ref 150–400)
POTASSIUM SERPL-MCNC: 3.2 MMOL/L — LOW (ref 3.5–5.3)
POTASSIUM SERPL-SCNC: 3.2 MMOL/L — LOW (ref 3.5–5.3)
PROT SERPL-MCNC: 5.6 G/DL — LOW (ref 6–8.3)
PROT UR-MCNC: 30 MG/DL
RAPID RVP RESULT: DETECTED
RBC # BLD: 3.58 M/UL — LOW (ref 4.2–5.8)
RBC # FLD: 17.7 % — HIGH (ref 10.3–14.5)
RBC CASTS # UR COMP ASSIST: 67 /HPF — HIGH (ref 0–4)
RV+EV RNA SPEC QL NAA+PROBE: DETECTED
S AUREUS DNA NOSE QL NAA+PROBE: DETECTED
SARS-COV-2 RNA SPEC QL NAA+PROBE: SIGNIFICANT CHANGE UP
SODIUM SERPL-SCNC: 139 MMOL/L — SIGNIFICANT CHANGE UP (ref 135–145)
SP GR SPEC: 1.01 — SIGNIFICANT CHANGE UP (ref 1–1.03)
TRIGL SERPL-MCNC: 61 MG/DL — SIGNIFICANT CHANGE UP
TROPONIN I, HIGH SENSITIVITY RESULT: 852.9 NG/L — HIGH
TSH SERPL-MCNC: 0.58 UIU/ML — SIGNIFICANT CHANGE UP (ref 0.36–3.74)
UROBILINOGEN FLD QL: 0.2 MG/DL — SIGNIFICANT CHANGE UP (ref 0.2–1)
WBC # BLD: 19.08 K/UL — HIGH (ref 3.8–10.5)
WBC # FLD AUTO: 19.08 K/UL — HIGH (ref 3.8–10.5)
WBC UR QL: SIGNIFICANT CHANGE UP /HPF (ref 0–5)

## 2024-11-20 PROCEDURE — 93308 TTE F-UP OR LMTD: CPT | Mod: 26,XU

## 2024-11-20 PROCEDURE — 76604 US EXAM CHEST: CPT | Mod: 26

## 2024-11-20 PROCEDURE — 99232 SBSQ HOSP IP/OBS MODERATE 35: CPT

## 2024-11-20 PROCEDURE — 93010 ELECTROCARDIOGRAM REPORT: CPT

## 2024-11-20 PROCEDURE — 71250 CT THORAX DX C-: CPT | Mod: 26,MC

## 2024-11-20 PROCEDURE — 76705 ECHO EXAM OF ABDOMEN: CPT | Mod: 26

## 2024-11-20 PROCEDURE — 93308 TTE F-UP OR LMTD: CPT | Mod: 26

## 2024-11-20 PROCEDURE — 99291 CRITICAL CARE FIRST HOUR: CPT

## 2024-11-20 PROCEDURE — 93306 TTE W/DOPPLER COMPLETE: CPT | Mod: 26

## 2024-11-20 PROCEDURE — 74176 CT ABD & PELVIS W/O CONTRAST: CPT | Mod: 26,MC

## 2024-11-20 RX ORDER — VANCOMYCIN HCL 900 MCG/MG
1000 POWDER (GRAM) MISCELLANEOUS ONCE
Refills: 0 | Status: DISCONTINUED | OUTPATIENT
Start: 2024-11-20 | End: 2024-11-20

## 2024-11-20 RX ORDER — PANTOPRAZOLE SODIUM 40 MG/1
40 TABLET, DELAYED RELEASE ORAL ONCE
Refills: 0 | Status: COMPLETED | OUTPATIENT
Start: 2024-11-20 | End: 2024-11-20

## 2024-11-20 RX ORDER — HYDROCORTISONE ACETATE 25 MG/ML
50 VIAL (ML) INJECTION EVERY 6 HOURS
Refills: 0 | Status: DISCONTINUED | OUTPATIENT
Start: 2024-11-20 | End: 2024-11-21

## 2024-11-20 RX ORDER — NOREPINEPHRINE BITARTRATE 1 MG/ML
0.05 INJECTION, SOLUTION, CONCENTRATE INTRAVENOUS
Qty: 8 | Refills: 0 | Status: DISCONTINUED | OUTPATIENT
Start: 2024-11-20 | End: 2024-11-20

## 2024-11-20 RX ORDER — VANCOMYCIN HCL 900 MCG/MG
1500 POWDER (GRAM) MISCELLANEOUS ONCE
Refills: 0 | Status: COMPLETED | OUTPATIENT
Start: 2024-11-20 | End: 2024-11-20

## 2024-11-20 RX ORDER — POTASSIUM CHLORIDE 600 MG/1
10 TABLET, EXTENDED RELEASE ORAL
Refills: 0 | Status: DISCONTINUED | OUTPATIENT
Start: 2024-11-20 | End: 2024-11-20

## 2024-11-20 RX ORDER — 0.9 % SODIUM CHLORIDE 0.9 %
500 INTRAVENOUS SOLUTION INTRAVENOUS ONCE
Refills: 0 | Status: COMPLETED | OUTPATIENT
Start: 2024-11-20 | End: 2024-11-20

## 2024-11-20 RX ORDER — MIDODRINE HYDROCHLORIDE 5 MG/1
10 TABLET ORAL EVERY 8 HOURS
Refills: 0 | Status: DISCONTINUED | OUTPATIENT
Start: 2024-11-20 | End: 2024-11-21

## 2024-11-20 RX ORDER — SODIUM CHLORIDE 9 MG/ML
4 INJECTION, SOLUTION INTRAMUSCULAR; INTRAVENOUS; SUBCUTANEOUS ONCE
Refills: 0 | Status: COMPLETED | OUTPATIENT
Start: 2024-11-20 | End: 2024-11-20

## 2024-11-20 RX ORDER — SODIUM CHLORIDE 9 MG/ML
1000 INJECTION, SOLUTION INTRAMUSCULAR; INTRAVENOUS; SUBCUTANEOUS
Refills: 0 | Status: DISCONTINUED | OUTPATIENT
Start: 2024-11-20 | End: 2024-11-20

## 2024-11-20 RX ORDER — ACETAMINOPHEN, DIPHENHYDRAMINE HCL, PHENYLEPHRINE HCL 325; 25; 5 MG/1; MG/1; MG/1
3 TABLET ORAL AT BEDTIME
Refills: 0 | Status: DISCONTINUED | OUTPATIENT
Start: 2024-11-20 | End: 2024-11-20

## 2024-11-20 RX ORDER — ACETAMINOPHEN 500MG 500 MG/1
650 TABLET, COATED ORAL EVERY 6 HOURS
Refills: 0 | Status: DISCONTINUED | OUTPATIENT
Start: 2024-11-20 | End: 2024-12-03

## 2024-11-20 RX ORDER — MAGNESIUM, ALUMINUM HYDROXIDE 200-225/5
30 SUSPENSION, ORAL (FINAL DOSE FORM) ORAL EVERY 4 HOURS
Refills: 0 | Status: DISCONTINUED | OUTPATIENT
Start: 2024-11-20 | End: 2024-11-20

## 2024-11-20 RX ORDER — MEROPENEM 500 MG/1
1000 INJECTION, POWDER, FOR SOLUTION INTRAVENOUS EVERY 8 HOURS
Refills: 0 | Status: DISCONTINUED | OUTPATIENT
Start: 2024-11-20 | End: 2024-11-22

## 2024-11-20 RX ORDER — ONDANSETRON HYDROCHLORIDE 4 MG/1
4 TABLET, FILM COATED ORAL EVERY 8 HOURS
Refills: 0 | Status: DISCONTINUED | OUTPATIENT
Start: 2024-11-20 | End: 2024-12-03

## 2024-11-20 RX ORDER — POTASSIUM CHLORIDE 600 MG/1
40 TABLET, EXTENDED RELEASE ORAL EVERY 4 HOURS
Refills: 0 | Status: COMPLETED | OUTPATIENT
Start: 2024-11-20 | End: 2024-11-20

## 2024-11-20 RX ORDER — ACETAMINOPHEN 500MG 500 MG/1
1000 TABLET, COATED ORAL ONCE
Refills: 0 | Status: COMPLETED | OUTPATIENT
Start: 2024-11-20 | End: 2024-11-20

## 2024-11-20 RX ORDER — CHLORHEXIDINE GLUCONATE 1.2 MG/ML
1 RINSE ORAL
Refills: 0 | Status: DISCONTINUED | OUTPATIENT
Start: 2024-11-20 | End: 2024-11-27

## 2024-11-20 RX ORDER — SODIUM CHLORIDE 9 MG/ML
1000 INJECTION, SOLUTION INTRAMUSCULAR; INTRAVENOUS; SUBCUTANEOUS ONCE
Refills: 0 | Status: COMPLETED | OUTPATIENT
Start: 2024-11-20 | End: 2024-11-20

## 2024-11-20 RX ORDER — PIPERACILLIN SODIUM AND TAZOBACTAM SODIUM 4; .5 G/20ML; G/20ML
3.38 INJECTION, POWDER, LYOPHILIZED, FOR SOLUTION INTRAVENOUS ONCE
Refills: 0 | Status: COMPLETED | OUTPATIENT
Start: 2024-11-20 | End: 2024-11-20

## 2024-11-20 RX ORDER — APIXABAN 2.5 MG/1
5 TABLET, FILM COATED ORAL EVERY 12 HOURS
Refills: 0 | Status: DISCONTINUED | OUTPATIENT
Start: 2024-11-20 | End: 2024-12-03

## 2024-11-20 RX ADMIN — Medication 1000 MILLILITER(S): at 03:50

## 2024-11-20 RX ADMIN — PIPERACILLIN SODIUM AND TAZOBACTAM SODIUM 200 GRAM(S): 4; .5 INJECTION, POWDER, LYOPHILIZED, FOR SOLUTION INTRAVENOUS at 01:24

## 2024-11-20 RX ADMIN — Medication 50 MILLIGRAM(S): at 21:24

## 2024-11-20 RX ADMIN — Medication 324 MILLIGRAM(S): at 01:08

## 2024-11-20 RX ADMIN — ACETAMINOPHEN 500MG 400 MILLIGRAM(S): 500 TABLET, COATED ORAL at 01:09

## 2024-11-20 RX ADMIN — PANTOPRAZOLE SODIUM 40 MILLIGRAM(S): 40 TABLET, DELAYED RELEASE ORAL at 04:27

## 2024-11-20 RX ADMIN — MEROPENEM 100 MILLIGRAM(S): 500 INJECTION, POWDER, FOR SOLUTION INTRAVENOUS at 14:22

## 2024-11-20 RX ADMIN — MIDODRINE HYDROCHLORIDE 10 MILLIGRAM(S): 5 TABLET ORAL at 14:21

## 2024-11-20 RX ADMIN — MIDODRINE HYDROCHLORIDE 10 MILLIGRAM(S): 5 TABLET ORAL at 06:08

## 2024-11-20 RX ADMIN — Medication 50 MILLIGRAM(S): at 04:31

## 2024-11-20 RX ADMIN — MEROPENEM 100 MILLIGRAM(S): 500 INJECTION, POWDER, FOR SOLUTION INTRAVENOUS at 21:25

## 2024-11-20 RX ADMIN — MEROPENEM 100 MILLIGRAM(S): 500 INJECTION, POWDER, FOR SOLUTION INTRAVENOUS at 06:08

## 2024-11-20 RX ADMIN — POTASSIUM CHLORIDE 40 MILLIEQUIVALENT(S): 600 TABLET, EXTENDED RELEASE ORAL at 10:52

## 2024-11-20 RX ADMIN — POTASSIUM CHLORIDE 40 MILLIEQUIVALENT(S): 600 TABLET, EXTENDED RELEASE ORAL at 07:02

## 2024-11-20 RX ADMIN — CHLORHEXIDINE GLUCONATE 1 APPLICATION(S): 1.2 RINSE ORAL at 06:09

## 2024-11-20 RX ADMIN — APIXABAN 5 MILLIGRAM(S): 2.5 TABLET, FILM COATED ORAL at 10:54

## 2024-11-20 RX ADMIN — SODIUM CHLORIDE 1000 MILLILITER(S): 9 INJECTION, SOLUTION INTRAMUSCULAR; INTRAVENOUS; SUBCUTANEOUS at 01:07

## 2024-11-20 RX ADMIN — Medication 300 MILLIGRAM(S): at 06:08

## 2024-11-20 NOTE — DIETITIAN INITIAL EVALUATION ADULT - PROBLEM SELECTOR PLAN 4
Chronic  - Pt presented with hypotension s/p dose of metoprolol 25mg PO at CI  - s/p 1000cc NS bolus x2  - Hold home anti-hypertensives in setting of hypotension on arrival and now with refractory hypotension despite IV fluid resuscitation   - c/w IVF @ 60cc/hr, with close monitoring of volume status in setting of CHF  - Monitor routine hemodynamics  - ICU consulted for potential need for pressors, recs appreciated

## 2024-11-20 NOTE — PROGRESS NOTE ADULT - PROBLEM SELECTOR PLAN 5
Pt with documented hx of CHF, unspecified type however on GDMT for HFrEF  - TTE (9/2024): LVEF 55-60%, no diastolic dysfunction, moderate MR  - Evaluated by cardiology during admission at [facility] (9/2024) for acute CHF, started on GDMT  - Pro-BNP on admission 1988  - Hold home metoprolol, spironolactone and lasix in setting of hypotension  - Strict I&Os   - Does not appear clinically volume overloaded  - Cardiology ([facility]) consulted, recs appreciated

## 2024-11-20 NOTE — PROVIDER CONTACT NOTE (EICU) - ASSESSMENT
patient S/P PICC line insertion bedsdie team requesting cxr order to confirm placement
patient with K+ of 3.2 on labs  -bedside  had ordered IVPB 10 MEQs KCL riders x 3, however currently patient without access  -bedside RN states patient can take Po and is requesting order change to PO pills
Septic shock 2/2 UTI, likely demand ischemia.  calcified mediastinal and right hilar lymphadenopathy and calcified lung nodules compatible with old granulomatous disease-seen on prior CT as well.

## 2024-11-20 NOTE — DIETITIAN INITIAL EVALUATION ADULT - PERTINENT LABORATORY DATA
11-20    139  |  109[H]  |  15  ----------------------------<  116[H]  3.2[L]   |  26  |  0.73    Ca    8.5      20 Nov 2024 03:55    TPro  5.6[L]  /  Alb  1.9[L]  /  TBili  0.4  /  DBili  x   /  AST  18  /  ALT  19  /  AlkPhos  59  11-20  A1C with Estimated Average Glucose Result: 6.2 % (09-10-24 @ 04:48)

## 2024-11-20 NOTE — DIETITIAN INITIAL EVALUATION ADULT - PROBLEM SELECTOR PLAN 5
Pt with documented hx of CHF, unspecified type however on GDMT for HFrEF  - TTE (9/2024): LVEF 55-60%, no diastolic dysfunction, moderate MR  - Evaluated by cardiology during admission at [facility] (9/2024) for acute CHF, started on GDMT  - Pro-BNP on admission 1988  - s/p 1000cc NS bolus x2  - c/w IVF @ 60cc/hr, closely monitor volume status  - Hold home metoprolol, spironolactone and lasix in setting of hypotension  - Strict Is&Os q6h  - Does not appear clinically volume overloaded  - Cardiology ([facility]) consulted, recs appreciated

## 2024-11-20 NOTE — PROGRESS NOTE ADULT - SUBJECTIVE AND OBJECTIVE BOX
Patient is a 79y old  Male who presents with a chief complaint of Sepsis     (20 Nov 2024 11:40)      INTERVAL HPI/OVERNIGHT EVENTS: Pt seen and examined at bedside in the ICU. Unable to obtain meaningful ROS given clinical condition. Pt is somnolent, although arousable to verbal commands. On Levo gtt for septic shock 2/2 UTI and rhino/enterovirus.    MEDICATIONS  (STANDING):  apixaban 5 milliGRAM(s) Oral every 12 hours  chlorhexidine 2% Cloths 1 Application(s) Topical <User Schedule>  hydrocortisone sodium succinate Injectable 50 milliGRAM(s) IV Push every 6 hours  meropenem  IVPB 1000 milliGRAM(s) IV Intermittent every 8 hours  midodrine 10 milliGRAM(s) Oral every 8 hours  norepinephrine Infusion 0.05 MICROgram(s)/kG/Min (8.5 mL/Hr) IV Continuous <Continuous>    MEDICATIONS  (PRN):  acetaminophen     Tablet .. 650 milliGRAM(s) Oral every 6 hours PRN Temp greater or equal to 38C (100.4F), Mild Pain (1 - 3)  ondansetron Injectable 4 milliGRAM(s) IV Push every 8 hours PRN Nausea and/or Vomiting      Allergies    levofloxacin (Unknown)  ofloxacin (Unknown)  gatifloxacin (Unknown)    Intolerances    fluoroquinolone antibiotics (Other)  Ketek (Other)  Avelox (Other)  telithromycin (Other)      REVIEW OF SYSTEMS:  Unable to obtain as per HPI    Vital Signs Last 24 Hrs  T(C): 36.7 (20 Nov 2024 19:57), Max: 37.9 (19 Nov 2024 22:30)  T(F): 98.1 (20 Nov 2024 19:57), Max: 100.3 (19 Nov 2024 22:30)  HR: 47 (20 Nov 2024 21:00) (45 - 101)  BP: 115/56 (20 Nov 2024 21:00) (76/47 - 126/58)  BP(mean): 81 (20 Nov 2024 21:00) (52 - 84)  RR: 10 (20 Nov 2024 21:00) (10 - 28)  SpO2: 97% (20 Nov 2024 21:00) (92% - 100%)    Parameters below as of 20 Nov 2024 12:30  Patient On (Oxygen Delivery Method): room air        PHYSICAL EXAM:  GENERAL: NAD  HEENT:  NCAT  CHEST/LUNG:  CTA b/l, no rales, wheezes, or rhonchi  HEART:  RRR, S1, S2  ABDOMEN:  soft, nontender, nondistended  : +nephrostomy tube, +guerrero  EXTREMITIES: no edema, cyanosis, or calf tenderness  NERVOUS SYSTEM: arousable to verbal commands, unable to answer questions and follow commands appropriately    LABS:                        11.1   19.08 )-----------( 356      ( 20 Nov 2024 03:55 )             34.5     CBC Full  -  ( 20 Nov 2024 03:55 )  WBC Count : 19.08 K/uL  Hemoglobin : 11.1 g/dL  Hematocrit : 34.5 %  Platelet Count - Automated : 356 K/uL  Mean Cell Volume : 96.4 fl  Mean Cell Hemoglobin : 31.0 pg  Mean Cell Hemoglobin Concentration : 32.2 g/dL  Auto Neutrophil # : 14.48 K/uL  Auto Lymphocyte # : 2.25 K/uL  Auto Monocyte # : 1.87 K/uL  Auto Eosinophil # : 0.19 K/uL  Auto Basophil # : 0.05 K/uL  Auto Neutrophil % : 75.8 %  Auto Lymphocyte % : 11.8 %  Auto Monocyte % : 9.8 %  Auto Eosinophil % : 1.0 %  Auto Basophil % : 0.3 %    20 Nov 2024 03:55    139    |  109    |  15     ----------------------------<  116    3.2     |  26     |  0.73     Ca    8.5        20 Nov 2024 03:55    TPro  5.6    /  Alb  1.9    /  TBili  0.4    /  DBili  x      /  AST  18     /  ALT  19     /  AlkPhos  59     20 Nov 2024 03:55    PT/INR - ( 19 Nov 2024 22:25 )   PT: 17.1 sec;   INR: 1.47 ratio         PTT - ( 19 Nov 2024 22:25 )  PTT:36.1 sec  Urinalysis Basic - ( 20 Nov 2024 03:55 )    Color: x / Appearance: x / SG: x / pH: x  Gluc: 116 mg/dL / Ketone: x  / Bili: x / Urobili: x   Blood: x / Protein: x / Nitrite: x   Leuk Esterase: x / RBC: x / WBC x   Sq Epi: x / Non Sq Epi: x / Bacteria: x      CAPILLARY BLOOD GLUCOSE      POCT Blood Glucose.: 124 mg/dL (20 Nov 2024 12:30)        Urinalysis with Rflx Culture (collected 11-20-24 @ 01:25)        RADIOLOGY & ADDITIONAL TESTS:    Personally reviewed.     Consultant(s) Notes Reviewed:  [x] YES  [ ] NO

## 2024-11-20 NOTE — DIETITIAN INITIAL EVALUATION ADULT - PERTINENT MEDS FT
MEDICATIONS  (STANDING):  apixaban 5 milliGRAM(s) Oral every 12 hours  chlorhexidine 2% Cloths 1 Application(s) Topical <User Schedule>  hydrocortisone sodium succinate Injectable 50 milliGRAM(s) IV Push every 6 hours  meropenem  IVPB 1000 milliGRAM(s) IV Intermittent every 8 hours  midodrine 10 milliGRAM(s) Oral every 8 hours  norepinephrine Infusion 0.05 MICROgram(s)/kG/Min (8.5 mL/Hr) IV Continuous <Continuous>    MEDICATIONS  (PRN):  acetaminophen     Tablet .. 650 milliGRAM(s) Oral every 6 hours PRN Temp greater or equal to 38C (100.4F), Mild Pain (1 - 3)  ondansetron Injectable 4 milliGRAM(s) IV Push every 8 hours PRN Nausea and/or Vomiting

## 2024-11-20 NOTE — H&P ADULT - NSHPREVIEWOFSYSTEMS_GEN_ALL_CORE
CONSTITUTIONAL: denies fever, chills, fatigue, weakness  HEENT: denies  sore throat  SKIN: denies rash  CARDIOVASCULAR: denies chest pain, chest pressure, palpitations  RESPIRATORY: denies shortness of breath, cough  GASTROINTESTINAL: denies nausea, vomiting, diarrhea, abdominal pain  GENITOURINARY: denies dysuria  NEUROLOGICAL: denies numbness, headache, focal weakness  MUSCULOSKELETAL: denies muscle aches  HEMATOLOGIC: denies gross bleeding  LYMPHATICS: denies enlarged lymph nodes  PSYCHIATRIC: denies recent changes in anxiety, depression  ENDOCRINOLOGIC: denies sweating, cold or heat intolerance

## 2024-11-20 NOTE — CARE COORDINATION ASSESSMENT. - NSCAREPROVIDERS_GEN_ALL_CORE_FT
CARE PROVIDERS:  Accepting Physician: Curly Byrnes  Access Services: Virginia Rich  Access Services: Kenji Hartley  Administration: Ludwig Cortes  Admitting: Curly Byrnes  Attending: Curly Byrnes  Cardiology Technician: Ashli Arana  Consultant: Nael Bello  Consultant: Yoni Castellon  Consultant: Vinh Woods  Consultant: Randell Rivas  Consultant: Abe Gordon  Consultant: Elena Griffin  Consultant: Leroy Bradley  Consultant: Carrington Rendon  Consultant: Karolyn Gan  Consultant: Lyndsay Marin  Covering Team: Salima Pablo  ED Attending: Florina Otoole  ED Nurse: Art Colvin  Emergency Medicine: Florina Otoole  Emergency Medicine: Ludwig Armando  Nurse: PK Christensen  Nurse: Jayjay Dotson  Nurse: Lindsey Campo  Nurse: Honey Treviño  Nurse: Agustín Calderon  Nurse: Fox Long  Nurse: Oni Duarte  Oncology: Hon Bassem  Oncology: Julieta Jacob  Ordered: ServiceAccount, SCMMLM  Ordered: ADM, User  Ordered: Doctor, Unknown  Outpatient Provider: Yoni Castellon  Outpatient Provider: Vinh Woods  Outpatient Provider: Zay Cohen  Outpatient Provider: Colt Wade  Override: Jayjay Dotson  PCA/Nursing Assistant: Tanya Pacheco  PCA/Nursing Assistant: Ryder Andrews  PCA/Nursing Assistant: Rena Quiñones  Primary Team: Olvin Campos  Primary Team: Ariana Pressley  Primary Team: Ni Gomez  Primary Team: Carolina Rodriguez  Primary Team: Ulysses Yi  Primary Team: Eugenio Waters  Primary Team: Curly Byrnes  Primary Team: Gerald Ely  Registered Dietitian: Cecilia Henry  Registered Dietitian: Meron Jacobs  : Evelyne Loera  Student: Nessa Guevara  Team: PLV  Hospitalists, Team  UR// Supp. Assoc.: Vikas Sweeney

## 2024-11-20 NOTE — DIETITIAN INITIAL EVALUATION ADULT - OTHER INFO
"80 yo M with PMHx HTN, CHF, PE (on eliquis), Myasthenia Gravis, and chronic LE edema, nephrolithiasis (s/p nephrostomy tube placement ), urosepsis, BPH BIBEMS from [facility] for hypertension and elevated WBC count. Admitted for urosepsis."

## 2024-11-20 NOTE — CARE COORDINATION ASSESSMENT. - PRO ARRIVE FROM
Pt known to [facility] for short term care since September, plan is for pt to transition to long term care./inpatient rehabilitation facility

## 2024-11-20 NOTE — H&P ADULT - PROBLEM SELECTOR PLAN 6
Chronic  - Hold home anti-hypertensives in setting of hypotension on arrival  - s/p 1000cc NS bolus x2  - c/w IVF @ 60cc/hr  - Monitor routine hemodynamics Chronic  - Pt presented with hypotension s/p dose of metoprolol 25mg PO at CI  - s/p 1000cc NS bolus x2  - Hold home anti-hypertensives in setting of hypotension on arrival and now with refractory hypotension despite IV fluid resuscitation   - c/w IVF @ 60cc/hr, with close monitoring of volume status in setting of CHF  - Monitor routine hemodynamics  - ICU consulted for potential need for pressors, recs appreciated CT Chest/Ab/Pelv with IV Con: Evidence of granulomatous infection in lungs, liver and spleen  - Per chart review, pt has been aware of this finding  - Has been seen on prior imaging, evaluated by ID on previous admission (11/2023)  - Quant indeterminate, fungitell negative, aspergillus galactomannan negative at that time  - ID (Dr. Mckinley) consulted, recs appreciated

## 2024-11-20 NOTE — PROGRESS NOTE ADULT - PROBLEM SELECTOR PLAN 6
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 Mckinley) consulted, recs appreciated

## 2024-11-20 NOTE — PROVIDER CONTACT NOTE (EICU) - SITUATION
e-alerted for request to order levophed by bedside team due to hypotension. Ordered 8mg/250ml to be titrated for MAP >65.
EICU Attending Location: [place], [place] (remote)  Patient Location: [facility]

## 2024-11-20 NOTE — DIETITIAN INITIAL EVALUATION ADULT - ETIOLOGY
related to increased demand in setting of wound healing related to E intake exceeds needs, limited physical activity

## 2024-11-20 NOTE — CONSULT NOTE ADULT - SUBJECTIVE AND OBJECTIVE BOX
Patient is a 79y old  Male who presents with a chief complaint of Urosepsis (2024 00:43)      BRIEF HOSPITAL COURSE-  78 y/o Male with PMHx HTN, HFpEF (EF 55-60%), PE (On Eliquis), Myasthenia Gravis, and chronic LE edema, Nephrolithiasis (s/p Nephrostomy Tube), BPH BIBA from SNF for hypertension (/105), for which he received Metoprolol 25mg. Unable to obtain further history. Of note, patient with recent admission to [facility] for distributive shock secondary to obstructing renal calculi.     Events last 24 hours:   Persistent hypotension SBP 80s.       Review of Systems:  CONSTITUTIONAL: No fever, chills, or fatigue  EYES: No eye pain, visual disturbances, or discharge.  ENMT:  No difficulty hearing, tinnitus, vertigo. No sinus or throat pain.  NECK: No pain or stiffness.  RESPIRATORY: No cough, wheezing, chills or hemoptysis. No shortness of breath.  CARDIOVASCULAR: No chest pain, palpitations, dizziness, or leg swelling.  GASTROINTESTINAL: No abdominal or epigastric pain. No nausea, vomiting, or hematemesis. No diarrhea or constipation. No melena or hematochezia.  GENITOURINARY: No dysuria, frequency, hematuria, or incontinence.  NEUROLOGICAL: No headaches, memory loss, loss of strength, numbness, or tremors.  SKIN: No itching, burning, rashes, or lesions.  MUSCULOSKELETAL: No joint pain or swelling. No muscle, back, or extremity pain.  PSYCHIATRIC: No depression, anxiety, mood swings, or difficulty sleeping.        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 650 milliGRAM(s) Oral every 6 hours PRN  ondansetron Injectable 4 milliGRAM(s) IV Push every 8 hours PRN    pantoprazole  Injectable 40 milliGRAM(s) IV Push Once    hydrocortisone sodium succinate Injectable 50 milliGRAM(s) IV Push every 6 hours    lactated ringers Bolus 500 milliLiter(s) IV Bolus once    chlorhexidine 2% Cloths 1 Application(s) Topical <User Schedule>            ICU Vital Signs Last 24 Hrs  T(C): 36.4 (2024 02:30), Max: 37.9 (2024 22:30)  T(F): 97.6 (2024 02:30), Max: 100.3 (2024 22:30)  HR: 62 (2024 03:52) (60 - 100)  BP: 88/51 (2024 03:52) (84/63 - 105/59)  BP(mean): 66 (2024 03:52) (63 - 66)  ABP: --  ABP(mean): --  RR: 17 (2024 03:32) (17 - 19)  SpO2: 98% (2024 03:32) (96% - 99%)    O2 Parameters below as of 2024 03:32  Patient On (Oxygen Delivery Method): nasal cannula  O2 Flow (L/min): 2          I&O's Detail          LABS:                        13.5   25.27 )-----------( 427      ( 2024 22:25 )             40.9     137  |  101  |  18  ----------------------------<  120[H]  3.9   |  25  |  0.91    Ca    9.2      2024 22:25    TPro  7.2  /  Alb  2.3[L]  /  TBili  0.3  /  DBili  x   /  AST  15  /  ALT  20  /  AlkPhos  77  11-19        CAPILLARY BLOOD GLUCOSE        PT/INR - ( 2024 22:25 )   PT: 17.1 sec;   INR: 1.47 ratio    PTT - ( 2024 22:25 )  PTT:36.1 sec      Urinalysis Basic - ( 2024 01:25 )  Color: Yellow / Appearance: Turbid / S.006 / pH: x  Gluc: x / Ketone: Negative mg/dL  / Bili: Negative / Urobili: 0.2 mg/dL   Blood: x / Protein: 30 mg/dL / Nitrite: Positive   Leuk Esterase: Large / RBC: 67 /HPF / WBC TNTC /HPF   Sq Epi: x / Non Sq Epi: x / Bacteria: Many /HPF      CULTURES:  Rapid RVP Result: Detected ( @ 00:00)      Physical Examination:  General: Eldelry male, chronically ill appearing. In no acute distress.    HEENT: Pupils equal, reactive to light.  Symmetric.  PULM: Clear to auscultation bilaterally, no adventitious sounds. No significant sputum production.  NECK: Supple, no lymphadenopathy, trachea midline.  CVS: Regular rate and rhythm. No murmurs, rubs, or gallops. S1, S2 intact.  ABD: Soft, nondistended, nontender, normoactive bowel sounds. No masses palpable.  EXT: No edema. Nontender.  SKIN: Warm and well perfused, no rashes noted.  NEURO: Alert, oriented to self/ place. Answering most questions appropriately. Spontaneously moving B/L UE. Nonfocal.  DEVICES:       RADIOLOGY-    < from: CT Chest No Cont (24 @ 01:48) >  ACC: [id number] EXAM:  CT ABDOMEN AND PELVIS IC   ORDERED BY: JUNIOR ROJAS     ACC: [id number] EXAM:  CT CHEST   ORDERED BY: EDMUNDO SANFORD     ACC: [id number] EXAM:  CT CHEST IC   ORDERED BY: JUNIOR ROJAS     ACC: [id number] EXAM:  CT ABDOMEN AND PELVIS   ORDERED BY: EDMUNDO SANFORD     PROCEDURE DATE:  2024        INTERPRETATION:  CLINICAL INFORMATION: Sepsis    COMPARISON: None.    CONTRAST/COMPLICATIONS:  IV Contrast: Omnipaque 350 (accession [id number]), NONE (accession   [id number])  90 cc administered   10 cc discarded  Oral Contrast: NONE      PROCEDURE:  CT of the Chest, Abdomen and Pelvis was performed.  Sagittal and coronal reformats were performed.    FINDINGS:  CHEST:  LUNGS AND LARGE AIRWAYS: Patent central airways.  Subcentimeter granulomas in the right upper lobe. Scattered reticular and   mild airspace opacities throughout both lungs.  PLEURA: No pleural effusion.  VESSELS: Aortic calcifications. Coronary artery calcifications.  HEART: Heart size is normal. No pericardial effusion.  MEDIASTINUM AND KONRAD: Calcified right hilar and mediastinal lymph nodes.  CHEST WALL AND LOWER NECK: 2.6 cm left lobe thyroid nodule. Nonemergent   thyroid ultrasound is recommended.    ABDOMEN AND PELVIS:  LIVER: Innumerable punctate calcifications throughout the liver.  BILE DUCTS: Normal caliber.  GALLBLADDER: Dense gallbladder sludge or small stones. No surrounding   fluid.  SPLEEN: Innumerable punctate calcifications.  PANCREAS: Within normal limits.  ADRENALS: Within normal limits.  KIDNEYS/URETERS: Percutaneous right nephrostomy tube. No hydronephrosis.   Unremarkable left kidney.    BLADDER: Urinary bladder is collapsed around a Restrepo catheter limiting   evaluation. There are at least 3 urinary bladder stone measuring upto   1.0 cm.  REPRODUCTIVE ORGANS: Prostate is enlarged.    BOWEL: No bowel obstruction. Appendix is normal.  PERITONEUM/RETROPERITONEUM: Within normal limits.  VESSELS: Within normal limits.  LYMPH NODES: No lymphadenopathy.  ABDOMINAL WALL: Within normal limits.  BONES: Degenerative changes. Scoliosis.    IMPRESSION:    2.6 cm left lobe thyroid nodule. Nonemergent thyroid ultrasound is   recommended.    Subcentimeter granulomas in the right upper lobe. Scattered reticular and   mild airspace opacities throughout both lungs. Calcified right hilar and   mediastinal lymph nodes. The constellation of findings likely represents   granulomatous infection question tuberculosis or sarcoidosis.    Innumerable punctate calcifications throughout the liver and spleen   consistent with prior granulomatous infection.    Dense gallbladder sludge or small stones. No surrounding fluid.    Percutaneous right nephrostomy tube. No hydronephrosis. Unremarkable left   kidney.    Urinary bladder is collapsed around a Restrepo catheter limiting evaluation.   There are at least 3 urinary bladder stone measuring up to 1.0 cm.      --- End of Report ---      MEGAN BOLDEN MD; Attending Radiologist  This document has been electronically signed. 2024 2:11AM    < end of copied text >      CRITICAL CARE TIME SPENT: 109 minutes assessing presenting problems of acute illness, which pose high probability of life threatening deterioration or end organ damage/dysfunction, as well as medical decision making including initiating plan of care, reviewing data, reviewing radiologic exams, discussing with multidisciplinary team,  discussing goals of care with patient/family, and writing this note.  Non-inclusive of procedures performed,      DATE OF ENTRY OF THIS NOTE IS EQUAL TO THE DATE OF SERVICES RENDERED Patient is a 79y old  Male who presents with a chief complaint of Urosepsis (2024 00:43)      BRIEF HOSPITAL COURSE-  78 y/o Male with PMHx HTN, HFpEF (EF 55-60%), PE (On Eliquis), Myasthenia Gravis, and chronic LE edema, Nephrolithiasis (s/p Nephrostomy Tube), BPH BIBA from SNF for hypertension (/105), for which he received Metoprolol 25mg. Unable to obtain further history. Of note, patient with recent admission to [facility] for distributive shock secondary to obstructing renal calculi.     Events last 24 hours:   Persistent hypotension SBP 80s, despite aggressive resuscitation.       Review of Systems:  CONSTITUTIONAL: No fever, chills, or fatigue  EYES: No eye pain, visual disturbances, or discharge.  ENMT: No difficulty hearing, tinnitus, vertigo. No sinus or throat pain.  NECK: No pain or stiffness.  RESPIRATORY: No cough, wheezing, chills or hemoptysis. No shortness of breath.  CARDIOVASCULAR: No chest pain, palpitations, dizziness, or leg swelling.  GASTROINTESTINAL: No abdominal or epigastric pain. No nausea, vomiting, or hematemesis. No diarrhea or constipation. No melena or hematochezia.  GENITOURINARY: No dysuria, frequency, hematuria, or incontinence.  NEUROLOGICAL: No headaches, memory loss, loss of strength, numbness, or tremors.  SKIN: No itching, burning, rashes, or lesions.  MUSCULOSKELETAL: No joint pain or swelling. No muscle, back, or extremity pain.  PSYCHIATRIC: No depression, anxiety, mood swings, or difficulty sleeping.        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 650 milliGRAM(s) Oral every 6 hours PRN  ondansetron Injectable 4 milliGRAM(s) IV Push every 8 hours PRN    pantoprazole  Injectable 40 milliGRAM(s) IV Push Once    hydrocortisone sodium succinate Injectable 50 milliGRAM(s) IV Push every 6 hours    lactated ringers Bolus 500 milliLiter(s) IV Bolus once    chlorhexidine 2% Cloths 1 Application(s) Topical <User Schedule>            ICU Vital Signs Last 24 Hrs  T(C): 36.4 (2024 02:30), Max: 37.9 (2024 22:30)  T(F): 97.6 (2024 02:30), Max: 100.3 (2024 22:30)  HR: 62 (2024 03:52) (60 - 100)  BP: 88/51 (2024 03:52) (84/63 - 105/59)  BP(mean): 66 (2024 03:52) (63 - 66)  ABP: --  ABP(mean): --  RR: 17 (2024 03:32) (17 - 19)  SpO2: 98% (2024 03:32) (96% - 99%)    O2 Parameters below as of 2024 03:32  Patient On (Oxygen Delivery Method): nasal cannula  O2 Flow (L/min): 2          I&O's Detail          LABS:                        13.5   25.27 )-----------( 427      ( 2024 22:25 )             40.9     137  |  101  |  18  ----------------------------<  120[H]  3.9   |  25  |  0.91    Ca    9.2      2024 22:25    TPro  7.2  /  Alb  2.3[L]  /  TBili  0.3  /  DBili  x   /  AST  15  /  ALT  20  /  AlkPhos  77  11-19        CAPILLARY BLOOD GLUCOSE        PT/INR - ( 2024 22:25 )   PT: 17.1 sec;   INR: 1.47 ratio    PTT - ( 2024 22:25 )  PTT:36.1 sec      Urinalysis Basic - ( 2024 01:25 )  Color: Yellow / Appearance: Turbid / S.006 / pH: x  Gluc: x / Ketone: Negative mg/dL  / Bili: Negative / Urobili: 0.2 mg/dL   Blood: x / Protein: 30 mg/dL / Nitrite: Positive   Leuk Esterase: Large / RBC: 67 /HPF / WBC TNTC /HPF   Sq Epi: x / Non Sq Epi: x / Bacteria: Many /HPF      CULTURES:  Rapid RVP Result: Detected ( @ 00:00)      Physical Examination:  General: Eldelry male, chronically ill appearing. In no acute distress.    HEENT: Pupils equal, reactive to light.  Symmetric.  PULM: Clear to auscultation bilaterally, no adventitious sounds. No significant sputum production.  NECK: Supple, no lymphadenopathy, trachea midline.  CVS: Regular rate and rhythm. No murmurs, rubs, or gallops. S1, S2 intact.  ABD: Soft, nondistended, nontender, normoactive bowel sounds. No masses palpable.  EXT: No edema. Nontender.  SKIN: Warm and well perfused, no rashes noted.  NEURO: Alert, oriented to self/ place. Answering most questions appropriately. Spontaneously moving B/L UE. Nonfocal.  DEVICES:       RADIOLOGY-    < from: CT Chest No Cont (24 @ 01:48) >  ACC: [id number] EXAM:  CT ABDOMEN AND PELVIS IC   ORDERED BY: JUNIOR ROJAS     ACC: [id number] EXAM:  CT CHEST   ORDERED BY: EDMUNDO SANOFRD     ACC: [id number] EXAM:  CT CHEST IC   ORDERED BY: JUNIOR ROJAS     ACC: [id number] EXAM:  CT ABDOMEN AND PELVIS   ORDERED BY: EDMUNDO SANFORD     PROCEDURE DATE:  2024        INTERPRETATION:  CLINICAL INFORMATION: Sepsis    COMPARISON: None.    CONTRAST/COMPLICATIONS:  IV Contrast: Omnipaque 350 (accession [id number]), NONE (accession   [id number])  90 cc administered   10 cc discarded  Oral Contrast: NONE      PROCEDURE:  CT of the Chest, Abdomen and Pelvis was performed.  Sagittal and coronal reformats were performed.    FINDINGS:  CHEST:  LUNGS AND LARGE AIRWAYS: Patent central airways.  Subcentimeter granulomas in the right upper lobe. Scattered reticular and   mild airspace opacities throughout both lungs.  PLEURA: No pleural effusion.  VESSELS: Aortic calcifications. Coronary artery calcifications.  HEART: Heart size is normal. No pericardial effusion.  MEDIASTINUM AND KONRAD: Calcified right hilar and mediastinal lymph nodes.  CHEST WALL AND LOWER NECK: 2.6 cm left lobe thyroid nodule. Nonemergent   thyroid ultrasound is recommended.    ABDOMEN AND PELVIS:  LIVER: Innumerable punctate calcifications throughout the liver.  BILE DUCTS: Normal caliber.  GALLBLADDER: Dense gallbladder sludge or small stones. No surrounding   fluid.  SPLEEN: Innumerable punctate calcifications.  PANCREAS: Within normal limits.  ADRENALS: Within normal limits.  KIDNEYS/URETERS: Percutaneous right nephrostomy tube. No hydronephrosis.   Unremarkable left kidney.    BLADDER: Urinary bladder is collapsed around a Restrepo catheter limiting   evaluation. There are at least 3 urinary bladder stone measuring upto   1.0 cm.  REPRODUCTIVE ORGANS: Prostate is enlarged.    BOWEL: No bowel obstruction. Appendix is normal.  PERITONEUM/RETROPERITONEUM: Within normal limits.  VESSELS: Within normal limits.  LYMPH NODES: No lymphadenopathy.  ABDOMINAL WALL: Within normal limits.  BONES: Degenerative changes. Scoliosis.    IMPRESSION:    2.6 cm left lobe thyroid nodule. Nonemergent thyroid ultrasound is   recommended.    Subcentimeter granulomas in the right upper lobe. Scattered reticular and   mild airspace opacities throughout both lungs. Calcified right hilar and   mediastinal lymph nodes. The constellation of findings likely represents   granulomatous infection question tuberculosis or sarcoidosis.    Innumerable punctate calcifications throughout the liver and spleen   consistent with prior granulomatous infection.    Dense gallbladder sludge or small stones. No surrounding fluid.    Percutaneous right nephrostomy tube. No hydronephrosis. Unremarkable left   kidney.    Urinary bladder is collapsed around a Restrepo catheter limiting evaluation.   There are at least 3 urinary bladder stone measuring up to 1.0 cm.      --- End of Report ---      MEGAN BOLDEN MD; Attending Radiologist  This document has been electronically signed. 2024 2:11AM    < end of copied text >      CRITICAL CARE TIME SPENT: 109 minutes assessing presenting problems of acute illness, which pose high probability of life threatening deterioration or end organ damage/dysfunction, as well as medical decision making including initiating plan of care, reviewing data, reviewing radiologic exams, discussing with multidisciplinary team,  discussing goals of care with patient/family, and writing this note.  Non-inclusive of procedures performed,      DATE OF ENTRY OF THIS NOTE IS EQUAL TO THE DATE OF SERVICES RENDERED

## 2024-11-20 NOTE — PROVIDER CONTACT NOTE (EICU) - BACKGROUND
Mr. Gaxiola is an 78 yo M with history of HTN, CHF, PE (on eliquis), Myasthenia Gravis, and chronic LE edema, nephrolithiasis (s/p nephrostomy tube placement ), urosepsis, BPH BIBEMS from [facility] for hypertension and elevated WBC count. Per paperwork, nursing home paperwork, pt hypertensive to 154/105 and received metoprolol 25 mg (8:10 PM), and reported pt with "evelated WBC". Upon arrival, pt found to be hypotensive to 90/64. Arrived to ED with nephrostomy tube, chronic guerrero and PICC line in place. Pt was receiving IV zosyn x7d (start date 11/04) per paperwork review for UTI. Pt with no active complaints, denies fever, chills, chest pain, SOB, abdominal pain, n/v/d/c or dysuria.    Of note, pt recently admitted to [facility] in Sept 2024 and urgently transferred to [facility] for emergent nephrostomy tube placement after failed stent placement for 7mm kidney stone. Managed in ICU for septic shock/urosepsis and Klebsiella Bacteremia, treated with IV rocephin and vasopressors.     Now with leukocytosis and hypotension requiring vasopressors. Broadened to meropenem. Rhino/entero positive. Trops >1000.    Pt has prior [facility]ST with DNR/DNI.

## 2024-11-20 NOTE — CONSULT NOTE ADULT - ASSESSMENT
1 y/o Male with PMHx HTN, HFpEF (EF 55-60%), PE (On Eliquis), Myasthenia Gravis, and chronic LE edema, Nephrolithiasis (s/p Nephrostomy Tube), BPH BIBA from SNF for hypertension (/105) with:     1. Acute Metabolic Encephalopathy   2. Distributive Shock   3. UTI      -Admit to ICU   -UA+ on admission. Patient with multiple UTIs secondary to obstructing renal calculi, s/p perc nephrostomy tube with chronic folay, both presents on admission. CTAP with. Per facility, patient currently on Zosyn for UTI. Will transition to Meropenem. Given Vancomycin x1. BCx and UCx ordered, results pending.    80 y/o Male with PMHx HTN, HFpEF (EF 55-60%), PE (On Eliquis), Myasthenia Gravis, and chronic LE edema, Nephrolithiasis (s/p Nephrostomy Tube), BPH BIBA from SNF for hypertension (/105) with:     1. Acute Metabolic Encephalopathy   2. Distributive Shock   3. UTI        -Admit to ICU   -Leukocytosis, afebrile. UA+ on admission. Patient with multiple UTIs secondary to obstructing renal calculi, s/p perc nephrostomy tube with chronic guerrero, both presents on admission. CTAP with Percutaneous right nephrostomy tube. No hydronephrosis. Unremarkable left kidney. Urinary bladder is collapsed around a Guerrero catheter limiting evaluation. There are at least 3 urinary bladder stone measuring up to 1.0 cm. Per facility, patient currently on Zosyn for UTI. Will transition to Meropenem. Given Vancomycin x1. BCx and UCx ordered, results pending. Trend WBC and fever curve.   -Hypotensive, SBP 80s. s/p 2L NS. Clinically appears dry. POCUS with EF grossly normal, unable to visualize IVC, no B Lines or pleural effusions. Will give additional 500cc bolus. Initiated on Levophed gtt, titrate to MAP >65. Shock state likely secondary to acute underlying infectious etiology. Patient on home Prednisone, will start on stress dose steroids. Cortisol ordered. Troponin elevated, will continue to trend. No over s/s. TTE ordered, results pending.  Monitor clinical and laboratory end points of perfusion, maintain MAP >65.  -Guerrero catheter in place, exchanged in ED. Adequate urine output. Trend labs, replete lytes as needed to maintain K>4, Mg>2 and Phos>3. Avoid nephrotoxic medications.   -Maintaining O2>93% on NC. Actively titrating FiO2 as tolerated. Aspiration precautions. RVP Rhinovirus. CXR without acute pathology. CT Chest with Subcentimeter granulomas in the right upper lobe. Scattered reticular and mild airspace opacities throughout both lungs. Calcified right hilar and mediastinal lymph nodes. The constellation of findings likely represents granulomatous infection question tuberculosis or sarcoidosis.  -NPO except medications. Gi prophylaxis with Protonix. CTAP with. Liver enzymes normal, no tenderness on exam. Will order AUDREY.   -Lethargic, but arousble. Answering question appropriately. Defer delirogenic medications. Pain control PRN.   -H/H stable. Trend H/H, transfuse for hgb <7.0 if necessary. Hold full AC for now for possible intervention. SCDs in place.   -BG Q6hrs while NPO. Goal -180 while critically ill.       Plan discussed with ED Attending and eICU Attending. Patient updated, understanding and agreeable to plan. Will attempt to contact Patients Son.

## 2024-11-20 NOTE — DIETITIAN INITIAL EVALUATION ADULT - ORAL NUTRITION SUPPLEMENTS
8oz Ensure Max bid (150 audrey, 30gm prot/8oz) 8oz Ensure Max bid (150 audrey, 30gm prot/8oz)  MD notified via TEAMS

## 2024-11-20 NOTE — DIETITIAN INITIAL EVALUATION ADULT - ORAL INTAKE PTA/DIET HISTORY
Pt asleep, did not rouse to name being called. Per transfer papers pt was on NCS, low Na mechanical soft diet at [facility]. 66", 252# noted but date of data not known. Wt hx per EMR, 11/2023 240#, 9/2024 219#.  Wts this admission suggest a loss of ~20# x2 mo.

## 2024-11-20 NOTE — CARE COORDINATION ASSESSMENT. - REASON FOR CONSULT
Pt awake but undergoing pt care at the time of assessment. SW reached out to pt's son/Olvin at ([phone]) in order to conduct assessment and to discuss SW roles with good understanding./coordination of care/discharge planning

## 2024-11-20 NOTE — H&P ADULT - ASSESSMENT
80 yo M with PMHx HTN, CHF, PE (on eliquis), Myasthenia Gravis, and chronic LE edema, nephrolithiasis (s/p nephrostomy tube placement ) BIBEMS from [facility] for hypertension and elevated WBC count. Admitted for sepsis work-up, r/o bacteremia. 78 yo M with PMHx HTN, CHF, PE (on eliquis), Myasthenia Gravis, and chronic LE edema, nephrolithiasis (s/p nephrostomy tube placement ), urosepsis, BPH BIBEMS from [facility] for hypertension and elevated WBC count. Admitted for sepsis work-up, r/o bacteremia. 80 yo M with PMHx HTN, CHF, PE (on eliquis), Myasthenia Gravis, and chronic LE edema, nephrolithiasis (s/p nephrostomy tube placement ), urosepsis, BPH BIBEMS from [facility] for hypertension and elevated WBC count. Admitted for urosepsis.

## 2024-11-20 NOTE — DIETITIAN INITIAL EVALUATION ADULT - PROBLEM SELECTOR PLAN 7
CT Chest:  2.6 cm left lobe thyroid nodule and evidence of granulomatous infection in lungs, liver and spleen  - f/u AM TSH  - Primary team to consider non-urgent thyroid US

## 2024-11-20 NOTE — CONSULT NOTE ADULT - ASSESSMENT
#Volume  - Past echo and stress test from 11/2022 per Dr. Islas normal  - no evidence of significant volume overload    #Ischemia   - No clear evidence of acute ischemia, patient's symptoms have resolved upon presentation to the ED.  - Troponin negative x3  - EKG: NSR  - Hx of CAD: Continue home aspirin, lipitor, statin    #Arrhythmia  - EKG: NSR, unchanged from prior EKG  - Monitor and replete lytes, keep K>4, Mg>2.    #HTN  - BP stable currently  - Continue home atenolol and losartan.  - hemodynamically stable    Plan:   78 yo M with PMHx HTN, CHF, PE (on eliquis), Myasthenia Gravis, and chronic LE edema, nephrolithiasis (s/p nephrostomy tube placement ), urosepsis, BPH BIBEMS from [facility] for hypertension and elevated WBC count. Cardiology was consulted for hypotension.    #Volume  - Past echo from 11/2022 showed***  - f/u repeat TTE  - No evidence of significant volume overload    #Ischemia   - No clear evidence of acute ischemia, patient denies cardiac symptoms.  - Troponin negative x3***  - EKG: Diagnosis Line Sinus rhythm with sinus arrhythmia with occasional premature ventricular complexes; Left anterior fascicular block  - Hx of CAD: Continue home aspirin, statin    #Arrhythmia  - EKG: Diagnosis Line Sinus rhythm with sinus arrhythmia with occasional premature ventricular complexes; Left anterior fascicular block  - Monitor and replete lytes, keep K>4, Mg>2.    #HTN  - BP stable currently  - Continue home atenolol and losartan.****  - continue wean off levophed?**  - Hemodynamically stable       78 yo M with PMHx HTN, CHF, PE (on eliquis), Myasthenia Gravis, and chronic LE edema, nephrolithiasis (s/p nephrostomy tube placement ), urosepsis, BPH BIBEMS from [facility] for hypertension and elevated WBC count. Cardiology was consulted for hypotension.    #Volume  - Past echo from 09/2024 showed LVEF 55-60%, no diastolic dysfunction, moderate MR  - f/u repeat TTE  - No evidence of significant volume overload    #Ischemia   - No clear evidence of acute ischemia, patient denies cardiac symptoms.  - Troponin negative x2 downtrending 1099.8->852.9 likely 2/2 demand ischemia  - EKG: Diagnosis Line Sinus rhythm with sinus arrhythmia with occasional premature ventricular complexes; Left anterior fascicular block    #Arrhythmia  - EKG: Diagnosis Line Sinus rhythm with sinus arrhythmia with occasional premature ventricular complexes; Left anterior fascicular block  - Monitor and replete lytes, keep K>4, Mg>2.    #HTN  - BP stable currently  - Continue weaning levophed   - Continue midodrine 10 mg po q8h to wean off levophed

## 2024-11-20 NOTE — DIETITIAN INITIAL EVALUATION ADULT - NSFNSGIIOFT_GEN_A_CORE
Medicare Wellness Visit  Plan for Preventive Care    A good way for you to stay healthy is to use preventive care.  Medicare covers many services that can help you stay healthy.* The goal of these services is to find any health problems as quickly as possible. Finding problems early can help make them easier to treat.  Your personal plan below lists the services you may need and when they are due.     Health Maintenance Summary     DTaP/Tdap/Td Vaccine (2 - Td or Tdap)  Overdue since 6/25/2021    Influenza Vaccine (1)  Overdue since 9/1/2021    COVID-19 Vaccine (3 - Booster for Moderna series)  Overdue since 9/24/2021    Medicare Advantage- Medicare Wellness Visit (Yearly - January to December)  Due since 1/1/2022    Pneumococcal Vaccine 65+ (1 of 1 - PPSV23)  Due since 2/19/2022    Depression Screening (Yearly)  Next due on 3/9/2023    Breast Cancer Screening (Every 2 Years)  Next due on 2/4/2024    Colorectal Cancer Screen- (Colonoscopy - Every 10 Years)  Next due on 4/29/2031    Hepatitis B Vaccine   Aged Out    Hepatitis C Screening   Completed    Shingles Vaccine   Completed    Osteoporosis Screening   Completed    Meningococcal Vaccine   Aged Out    HPV Vaccine   Aged Out           Preventive Care for Women and Men    Heart Screenings (Cardiovascular):  · Blood tests are used to check your cholesterol, lipid and triglyceride levels. High levels can increase your risk for heart disease and stroke. High levels can be treated with medications, diet and exercise. Lowering your levels can help keep your heart and blood vessels healthy.  Your provider will order these tests if they are needed.    · An ultrasound is done to see if you have an abdominal aortic aneurysm (AAA).  This is an enlargement of one of the main blood vessels that delivers blood to the body.   In the [place], 9,000 deaths are caused by AAA.  You may not even know you have this problem and as many as 1 in 3 people will have a serious  problem if it is not treated.  Early diagnosis allows for more effective treatment and cure.  If you have a family history of AAA or are a male age 65-75 who has smoked, you are at higher risk of an AAA.  Your provider can order this test, if needed.    Colorectal Screening:  · There are many tests that are used to check for cancer of your colon and rectum. You and your provider should discuss what test is best for you and when to have it done.  Options include:  · Screening Colonoscopy: exam of the entire colon, seen through a flexible lighted tube.  · Flexible Sigmoidoscopy: exam of the last third (sigmoid portion) of the colon and rectum, seen through a flexible lighted tube.  · Cologuard DNA stool test: a sample of your stool is used to screen for cancer and unseen blood in your stool.  · Fecal Occult Blood Test: a sample of your stool is studied to find any unseen blood    Flu Shot:  · An immunization that helps to prevent influenza (the flu). You should get this every year. The best time to get the shot is in the fall.    Pneumococcal Shot:  • Vaccines are available that can help prevent pneumococcal disease, which is any type of infection caused by Streptococcus pneumoniae bacteria.   Their use can prevent some cases of pneumonia, meningitis, and sepsis. There are two types of pneumococcal vaccines:   o Conjugate vaccines (PCV-13 or Prevnar 13®) - helps protect against the 13 types of pneumococcal bacteria that are the most common causes of serious infections in children and adults.    o Polysaccharide vaccine (PPSV23 or Rnibkomqh69®) - helps protect against 23 types of pneumococcal bacteria for patients who are recommended to get it.  These vaccines should be given at least 12 months apart.  A booster is usually not needed.     Hepatitis B Shot:  · An immunization that helps to protect people from getting Hepatitis B. Hepatitis B is a virus that spreads through contact with infected blood or body fluids.  Many people with the virus do not have symptoms.  The virus can lead to serious problems, such as liver disease. Some people are at higher risk than others. Your doctor will tell you if you need this shot.     Diabetes Screening:  · A test to measure sugar (glucose) in your blood is called a fasting blood sugar. Fasting means you cannot have food or drink for at least 8 hours before the test. This test can detect diabetes long before you may notice symptoms.    Glaucoma Screening:  · Glaucoma screening is performed by your eye doctor. The test measures the fluid pressure inside your eyes to determine if you have glaucoma.     Hepatitis C Screening:  · A blood test to see if you have the hepatitis C virus.  Hepatitis C attacks the liver and is a major cause of chronic liver disease.  Medicare will cover a single screening for all adults born between 1945 & 1965, or high risk patients (people who have injected illegal drugs or people who have had blood transfusions).  High risk patients who continue to inject illegal drugs can be screened for Hepatitis C every year.    Smoking and Tobacco-Use Cessation Counseling:  · Tobacco is the single greatest cause of disease and early death in our country today. Medication and counseling together can increase a person’s chance of quitting for good.   · Medicare covers two quitting attempts per year, with four counseling sessions per attempt (eight sessions in a 12 month period)    Preventive Screening tests for Women    Screening Mammograms and Breast Exams:  · An x-ray of your breasts to check for breast cancer before you or your doctor may be able to feel it.  If breast cancer is found early it can usually be treated with success.    Pelvic Exams and Pap Tests:  · An exam to check for cervical and vaginal cancer. A Pap test is a lab test in which cells are taken from your cervix and sent to the lab to look for signs of cervical cancer. If cancer of the cervix is found early,  chances for a cure are good. Testing can generally end at age 65, or if a woman has a hysterectomy for a benign condition. Your provider may recommend more frequent testing if certain abnormal results are found.    Bone Mass Measurements:  · A painless x-ray of your bone density to see if you are at risk for a broken bone. Bone density refers to the thickness of bones or how tightly the bone tissue is packed.    Preventive Screening tests for Men    Prostate Screening:  · Should you have a prostate cancer test (PSA)?  It is up to you to decide if you want a prostate cancer test. Talk to your clinician to find out if the test is right for you.  Things for you to consider and talk about should include:  · Benefits and harms of the test  · Your family history  · How your race/ethnicity may influence the test  · If the test may impact other medical conditions you have  · Your values on screenings and treatments    *Medicare pays for many preventive services to keep you healthy. For some of these services, you might have to pay a deductible, coinsurance, and / or copayment.  The amounts vary depending on the type of services you need and the kind of Medicare health plan you have.    For further details on screenings offered by Medicare please visit: https://www.medicare.gov/coverage/preventive-screening-services    BM 11/20

## 2024-11-20 NOTE — DIETITIAN INITIAL EVALUATION ADULT - NSFNSPHYEXAMSKINFT_GEN_A_CORE
Pressure Injury 1: Right Buttocks, Stage II  Pressure Injury 2: Left buttocks, Suspected deep tissue injury

## 2024-11-20 NOTE — PROGRESS NOTE ADULT - ASSESSMENT
80 y/o Male with PMHx HTN, HFpEF (EF 55-60%), PE (On Eliquis), Myasthenia Gravis, and chronic LE edema, Nephrolithiasis (s/p Nephrostomy Tube), BPH BIBA from SNF for hypertension (/105) with:     1. Acute Metabolic Encephalopathy   3. Urosepsis 2/2 UTI & Distributive shock    NEURO: -Lethargic, but arousble. Answering question appropriately. Defer delirogenic medications. Pain control PRN.   - Pt w/ hx of myasthenia gravis on prednisone at home - to continue SDS w/ hydrocortisone 2/2 to shock state.    CV:  - hypotensive in the ED s/p 2L NS bolus on levo gtt, actively downtitrating most recently on 0.03 mcg/kg/hr(shock state likely 2/2 to acute infectious etiology). Maintain MAP>65.  - Continue hydrocortisone  - Continue midodrine 10 q8h.  - Bedside echo showing normal atrial movement, mod MR, minimal ventricular wall movement in L>R apices of heart. TTE w/ definity ordered, f/u results.  - K+ of 3.2 this AM, required PO KCl x2.   - f/u AM CMP, Mg, Phos.    PULM:   - Required supplemental O2 w/ NC on admission now downtitrated to room air. Maintain O2 sat >94%.    RENAL:  - Guerrero catheter in place with adequate, although turbid output. Trend lytes, maintain K>4, Mg>2, Phos>3. Avoiding nephrotoxic medications.     GI:   - Patient advanced from NPO to regular diet to assist with recovery from shock state.    ID:  - Patient UA w/ Turbid, large LE and + nitrites coming in with guerrero  - Leukocytosis downtrending, continue to monitor.  - Continue meropenem 1g q8h.   - f/u BCx, UCx.    ENDO:  - BG achs, goal Glu 110-180.    HEME:  - Hb 14->~11.1 this AM likely dilutional 2/2 to 1Lx2 NS bolus due to shock state.  - No inteventions performed at this time, OK to resume home Eliquis 5mg BID as patient has hx of PE.     DISPO:  - In ICU, continues to be critically Ill.    Tubes/Lines: peripheral IV, guerrero.   80 y/o Male with PMHx HTN, HFpEF (EF 55-60%), PE (On Eliquis), Myasthenia Gravis, and chronic LE edema, Nephrolithiasis (s/p Nephrostomy Tube), BPH BIBA from SNF for hypertension (/105) with:     1. Acute Metabolic Encephalopathy   3. Urosepsis 2/2 UTI & Distributive shock    NEURO: -Lethargic, but arousble. Answering question appropriately. Defer delirogenic medications. Pain control PRN.   - Pt w/ hx of myasthenia gravis on prednisone at home - to continue SDS w/ hydrocortisone 2/2 to shock state.    CV:  - hypotensive in the ED s/p 2L NS bolus on levo gtt, actively downtitrating most recently on 0.03 mcg/kg/hr(shock state likely 2/2 to acute infectious etiology). Maintain MAP>65.  - Continue hydrocortisone  - Continue midodrine 10 q8h.  - Bedside echo showing normal atrial movement, mod MR, minimal ventricular wall movement in L>R apices of heart. TTE w/ definity ordered, f/u results.  - K+ of 3.2 this AM, required PO KCl x2.   - f/u AM CMP, Mg, Phos.    PULM:   - Required supplemental O2 w/ NC on admission now downtitrated to room air. Maintain O2 sat >94%.    RENAL:  - Guerrero catheter in place with adequate, although turbid output. Trend lytes, maintain K>4, Mg>2, Phos>3. Avoiding nephrotoxic medications.     GI:   - Patient advanced from NPO to regular diet to assist with recovery from shock state.    ID:  - Patient UA w/ Turbid, large LE and + nitrites coming in with guerrero  - Continue meropenem 1g q8h.   - f/u BCx, UCx.  - Leukocytosis downtrending, continue to monitor along w/ fever curve.    ENDO:  - BG achs, goal Glu 110-180.    HEME:  - Hb 14->~11.1 this AM likely dilutional 2/2 to 1Lx2 NS bolus due to shock state.  - No inteventions performed at this time, OK to resume home Eliquis 5mg BID as patient has hx of PE.     DISPO:  - In ICU, continues to be critically Ill.    Tubes/Lines: peripheral IV, guerrero.

## 2024-11-20 NOTE — ED ADULT NURSE REASSESSMENT NOTE - NS ED NURSE REASSESS COMMENT FT1
Indwelling Restrepo replaced per order. UA/UC sent to lab. Pt tolerated well. Pt came to ED w/ indwelling 16 fr and replaced w/ 16 fr. Pt sent to CT.
RN started Levo @ 0.05 mcg/kg/min per ICU PA verbal order.
Pt awake and combative, does not want to be bothered. Being assessed by ICU PA. VS as above.
Pt responded well to Levo, no order in eMAR yet. RN Jayjay aware of VS and med added.

## 2024-11-20 NOTE — H&P ADULT - PROBLEM SELECTOR PLAN 3
RVP +rhinoenterovirus  - Pt asymptomatic, less likely cause of sepsis given history and +UTI  - Supportive care  - Tylenol PRN for fever  - c/w IVF RVP +rhinoenterovirus  - Pt asymptomatic, less likely cause of sepsis given history and +UTI  - Supportive care  - Tylenol PRN for fever  - c/w IVF @ 60cc/hr, with close monitoring of volume status in setting of CHF

## 2024-11-20 NOTE — CARE COORDINATION ASSESSMENT. - NSDCPLANSERVICES_GEN_ALL_CORE
Anticipated dc back to [facility] (transitioning from short term to long term care) when medically cleared./Skilled Nursing Facility-Short Term

## 2024-11-20 NOTE — DIETITIAN INITIAL EVALUATION ADULT - PROBLEM SELECTOR PLAN 2
Troponin 1099.8 on admission  - Pt asymptomatic. Denies chest pain, pressure, palpitations, SOB, nausea  - EKG reviewed, no evidence of ischemic changes, ST/T changes or Q waves  - s/p ASA 324mg x1 in ED  - f/u STAT cardiac enzymes  - f/u repeat EKG  - Continuous cardiac monitoring on telemetry  - Cardiology ([place] [facility]) consulted, recs appreciated

## 2024-11-20 NOTE — DIETITIAN INITIAL EVALUATION ADULT - PROBLEM SELECTOR PLAN 6
CT Chest/Ab/Pelv with IV Con: Evidence of granulomatous infection in lungs, liver and spleen  - Per chart review, pt has been aware of this finding  - Has been seen on prior imaging, evaluated by ID on previous admission (11/2023)  - Quant indeterminate, fungitell negative, aspergillus galactomannan negative at that time  - ID (Dr. Mckinley) consulted, recs appreciated

## 2024-11-20 NOTE — DIETITIAN INITIAL EVALUATION ADULT - PROBLEM SELECTOR PLAN 1
Pt p/w elevated WBC, pt asymptomatic. Hx urosepsis/klebsiella bacteremia treated with IV rocephin, hospital course complicated by Afib with RVR during recent admission (9/2024).  - Per CI paperwork, pt s/p 7d course of IV zosyn via PICC (11/4-11/10)  - Met sepsis criteria on admission with leukocytosis, lactate, tachycardia  - WBC 25.27, per chart review baseline around 12-13 (on chronic steroids for MG)  - Lactate 3.1  - UA: Turbid, large blood, large LEC, positive nitrite, many bacteria, WBC TNTC  - CT: Urinary bladder is collapsed, limiting evaluation. At least 3 stones measuring up to 1.0 cm. Right nephrostomy tube. No hydronephrosis  - s/p IV vancomycin x1, IV zosyn x1, ofirmev 1g IV x1, 1000cc NS bolus x2, in ED  - Admit to telemetry for urosepsis 2/2 UTI  - Restrepo catheter present on admission, replaced in ED  - BP still soft ~90 SBP (MAP ~60 per RN) despite volume resuscitation, will consult ICU for potential need for pressor support   - c/w IV abx  - c/w IVF @ 60cc/hr, with close monitoring of volume status in setting of CHF  - f/u repeat lactate in AM  - f/u BCx x2, UCx  - Tylenol PRN for fever  - Monitor WBC, fever curve  - ICU consulted, recs appreciated  - ID (Dr. Mckinley) Consulted, recs appreciated

## 2024-11-20 NOTE — CHART NOTE - NSCHARTNOTEFT_GEN_A_CORE
: LUCA Barry    INDICATION: Shock, elevated troponin    PROCEDURE:  [x] LIMITED ECHO  [ ] LIMITED CHEST  [ ] LIMITED RETROPERITONEAL  [ ] LIMITED ABDOMINAL  [ ] LIMITED DVT  [ ] NEEDLE GUIDANCE VASCULAR  [ ] NEEDLE GUIDANCE THORACENTESIS  [ ] NEEDLE GUIDANCE PARACENTESIS  [ ] NEEDLE GUIDANCE PERICARDIOCENTESIS  [ ] OTHER    FINDINGS: At least mildly reduced LV systolic function, with apical akinesis and some apical ballooning, consistent with a stress takotsubo cardiomyopathy pattern.  VTI 8.4cm.  RV normal size and function with TAPSE 1.7cm.    INTERPRETATION: Mildly reduced LV systolic function, consistent with takotsubo stress cardiomyopathy.    -Randell Rivas M.D.   PGY-6 EM/IM/CC  Pager #[phone]    Images stored in [facility]

## 2024-11-20 NOTE — H&P ADULT - PROBLEM SELECTOR PLAN 5
CT Chest:  2.6 cm left lobe thyroid nodule and evidence of granulomatous infection in lungs, liver and spleen  - f/u AM TSH  - Primary team to consider non-urgent thyroid US Pt with documented hx of CHF, unspecified type however on GDMT for HFrEF  - TTE (9/2024): LVEF 55-60%, no diastolic dysfunction, moderate MR  - Evaluated by cardiology during admission at [facility] (9/2024) for acute CHF, started on GDMT  - Pro-BNP on admission 1988  - s/p 1000cc NS bolus x2  - c/w IVF @ 60cc/hr, closely monitor volume status  - Strict Is&Os q6h  - Does not appear clinically volume overloaded  - Cardiology ([place] [facility]) consulted, recs appreciated Pt with documented hx of CHF, unspecified type however on GDMT for HFrEF  - TTE (9/2024): LVEF 55-60%, no diastolic dysfunction, moderate MR  - Evaluated by cardiology during admission at [facility] (9/2024) for acute CHF, started on GDMT  - Pro-BNP on admission 1988  - s/p 1000cc NS bolus x2  - c/w IVF @ 60cc/hr, closely monitor volume status  - Hold home metoprolol, spironolactone and lasix in setting of hypotension  - Strict Is&Os q6h  - Does not appear clinically volume overloaded  - Cardiology ([place] [facility]) consulted, recs appreciated

## 2024-11-20 NOTE — PROGRESS NOTE ADULT - SUBJECTIVE AND OBJECTIVE BOX
Subjective Hx: Patient was BIBA to [facility] from [facility] 11/19/24 with guerrero and nephrostomy tube placed(9/2024 @ [facility]) found to be hypotensive SBP in 80s, UA+ with lactate 3.1 with concerns of urosepsis now s/p 2L NS bolus and on levo gtt.     Interval Events: Patient seen and examined at bedside. Patient continues to be lethargic but orientable to voice. Patient denies any acute complaints at this time. Denies n/v, CP, SOB, abd pain. Patient guerrero in place - draining turbid urine. On levo gtt at 0.03mcg/kg/hr. Downtitrated from NC to room air.     Review of Systems:  per above HPI     ICU Vital Signs Last 24 Hrs  T(C): 36.4 (20 Nov 2024 07:33), Max: 37.9 (19 Nov 2024 22:30)  T(F): 97.5 (20 Nov 2024 07:33), Max: 100.3 (19 Nov 2024 22:30)  HR: 55 (20 Nov 2024 09:36) (51 - 101)  BP: 101/55 (20 Nov 2024 09:36) (76/47 - 126/58)  BP(mean): 75 (20 Nov 2024 09:36) (52 - 84)  ABP: --  ABP(mean): --  RR: 10 (20 Nov 2024 09:36) (10 - 28)  SpO2: 99% (20 Nov 2024 09:36) (96% - 100%)    O2 Parameters below as of 20 Nov 2024 07:30  Patient On (Oxygen Delivery Method): nasal cannula  O2 Flow (L/min): 2              CAPILLARY BLOOD GLUCOSE          I&O's Summary    19 Nov 2024 07:01  -  20 Nov 2024 07:00  --------------------------------------------------------  IN: 0 mL / OUT: 550 mL / NET: -550 mL    20 Nov 2024 07:01  -  20 Nov 2024 10:51  --------------------------------------------------------  IN: 16.4 mL / OUT: 300 mL / NET: -283.6 mL        Physical Exam:   Gen: NAD  Neuro: Alert, orientable to voice  HEENT: NCAT, EOMI   Resp: rrr, no wheezes or rhonchi appreciated  CVS: s1,s2, rrr, no mrg.  Abd: soft, nontender, nondistended + guerrero in place  Ext: no cyanosis, clubbing or edema noted.  Skin: cool, dry.     Meds:  meropenem  IVPB IV Intermittent    midodrine Oral  norepinephrine Infusion IV Continuous    hydrocortisone sodium succinate Injectable IV Push      acetaminophen     Tablet .. Oral PRN  ondansetron Injectable IV Push PRN      apixaban Oral        potassium chloride    Tablet ER Oral      chlorhexidine 2% Cloths Topical                              11.1   19.08 )-----------( 356      ( 20 Nov 2024 03:55 )             34.5       11-20    139  |  109[H]  |  15  ----------------------------<  116[H]  3.2[L]   |  26  |  0.73    Ca    8.5      20 Nov 2024 03:55    TPro  5.6[L]  /  Alb  1.9[L]  /  TBili  0.4  /  DBili  x   /  AST  18  /  ALT  19  /  AlkPhos  59  11-20    Lactate 1.3           11-20 @ 03:55    Lactate 3.1           11-19 @ 22:25      CARDIAC MARKERS ( 20 Nov 2024 03:55 )  x     / x     / x     / x     / 3.3 ng/mL      PT/INR - ( 19 Nov 2024 22:25 )   PT: 17.1 sec;   INR: 1.47 ratio         PTT - ( 19 Nov 2024 22:25 )  PTT:36.1 sec  Urinalysis Basic - ( 20 Nov 2024 03:55 )    Color: x / Appearance: x / SG: x / pH: x  Gluc: 116 mg/dL / Ketone: x  / Bili: x / Urobili: x   Blood: x / Protein: x / Nitrite: x   Leuk Esterase: x / RBC: x / WBC x   Sq Epi: x / Non Sq Epi: x / Bacteria: x          Rapid RVP Result: Detected (11-20 @ 00:00)          Radiology:    Bedside Ultrasound:    Tubes/Lines:      GLOBAL ISSUE/BEST PRACTICE:  Analgesia: N   Sedation: N   HOB elevation: Y  Stress ulcer prophylaxis: N   VTE prophylaxis: Y  Glycemic control: N  Nutrition: Y    CODE STATUS:        Subjective Hx: Patient was BIBA to [facility] from [facility] 11/19/24 with guerrero and nephrostomy tube placed(9/2024 @ [facility]) found to be hypotensive SBP in 80s, UA+ with lactate 3.1 with concerns of urosepsis now s/p 2L NS bolus and on levo gtt.     Interval Events: Patient seen and examined at bedside. Patient continues to be lethargic but orientable to voice. Patient denies any acute complaints at this time. Denies n/v, CP, SOB, abd pain. Patient guerrero in place - draining turbid urine. On levo gtt at 0.03mcg/kg/hr. Downtitrated from NC to room air.     Review of Systems:  per above HPI     ICU Vital Signs Last 24 Hrs  T(C): 36.4 (20 Nov 2024 07:33), Max: 37.9 (19 Nov 2024 22:30)  T(F): 97.5 (20 Nov 2024 07:33), Max: 100.3 (19 Nov 2024 22:30)  HR: 55 (20 Nov 2024 09:36) (51 - 101)  BP: 101/55 (20 Nov 2024 09:36) (76/47 - 126/58)  BP(mean): 75 (20 Nov 2024 09:36) (52 - 84)  ABP: --  ABP(mean): --  RR: 10 (20 Nov 2024 09:36) (10 - 28)  SpO2: 99% (20 Nov 2024 09:36) (96% - 100%)    O2 Parameters below as of 20 Nov 2024 07:30  Patient On (Oxygen Delivery Method): nasal cannula  O2 Flow (L/min): 2              CAPILLARY BLOOD GLUCOSE          I&O's Summary    19 Nov 2024 07:01  -  20 Nov 2024 07:00  --------------------------------------------------------  IN: 0 mL / OUT: 550 mL / NET: -550 mL    20 Nov 2024 07:01  -  20 Nov 2024 10:51  --------------------------------------------------------  IN: 16.4 mL / OUT: 300 mL / NET: -283.6 mL        Physical Exam:   Gen: NAD  Neuro: Alert, orientable to voice  HEENT: NCAT, EOMI   Resp: rrr, no wheezes or rhonchi appreciated  CVS: s1,s2, rrr, no mrg.  Abd: soft, nontender, nondistended + guerrero in place  Ext: no cyanosis, clubbing or edema noted.  Skin: cool, dry.     Meds:  meropenem  IVPB IV Intermittent    midodrine Oral  norepinephrine Infusion IV Continuous    hydrocortisone sodium succinate Injectable IV Push      acetaminophen     Tablet .. Oral PRN  ondansetron Injectable IV Push PRN      apixaban Oral        potassium chloride    Tablet ER Oral      chlorhexidine 2% Cloths Topical                              11.1   19.08 )-----------( 356      ( 20 Nov 2024 03:55 )             34.5       11-20    139  |  109[H]  |  15  ----------------------------<  116[H]  3.2[L]   |  26  |  0.73    Ca    8.5      20 Nov 2024 03:55    TPro  5.6[L]  /  Alb  1.9[L]  /  TBili  0.4  /  DBili  x   /  AST  18  /  ALT  19  /  AlkPhos  59  11-20    Lactate 1.3           11-20 @ 03:55    Lactate 3.1           11-19 @ 22:25      CARDIAC MARKERS ( 20 Nov 2024 03:55 )  x     / x     / x     / x     / 3.3 ng/mL      PT/INR - ( 19 Nov 2024 22:25 )   PT: 17.1 sec;   INR: 1.47 ratio         PTT - ( 19 Nov 2024 22:25 )  PTT:36.1 sec  Urinalysis Basic - ( 20 Nov 2024 03:55 )    Color: x / Appearance: x / SG: x / pH: x  Gluc: 116 mg/dL / Ketone: x  / Bili: x / Urobili: x   Blood: x / Protein: x / Nitrite: x   Leuk Esterase: x / RBC: x / WBC x   Sq Epi: x / Non Sq Epi: x / Bacteria: x          Rapid RVP Result: Detected (11-20 @ 00:00)          Radiology:    Bedside Ultrasound:    Tubes/Lines:      GLOBAL ISSUE/BEST PRACTICE:  Analgesia: N   Sedation: N   HOB elevation: Y  Stress ulcer prophylaxis: N   VTE prophylaxis: Y  Glycemic control: N  Nutrition: Y    CODE STATUS: DNR/Intubate

## 2024-11-20 NOTE — H&P ADULT - PROBLEM SELECTOR PLAN 7
Pt with documented hx of CHF  - Evaluated by cardiology during admission at [facility] (9/2024) for acute CHF, started on GDMT  - TTE (9/2024): LVEF 55-60%, no diastolic dysfunction, moderate MR  - Pro-BNP on admission 1988  - s/p 1000cc NS bolus x2  - c/w IVF @ 60cc/hr, closely monitor volume status  - Strict Is&Os   - Does not appear clinically volume overloaded  - Cardiology ([place] [facility]) consulted, recs appreciated Pt with documented hx of CHF, unspecified type however on GDMT for HFrEF  - TTE (9/2024): LVEF 55-60%, no diastolic dysfunction, moderate MR  - Evaluated by cardiology during admission at [facility] (9/2024) for acute CHF, started on GDMT  - Pro-BNP on admission 1988  - s/p 1000cc NS bolus x2  - c/w IVF @ 60cc/hr, closely monitor volume status  - Strict Is&Os q6h  - Does not appear clinically volume overloaded  - Cardiology ([place] [facility]) consulted, recs appreciated CT Chest:  2.6 cm left lobe thyroid nodule and evidence of granulomatous infection in lungs, liver and spleen  - f/u AM TSH  - Primary team to consider non-urgent thyroid US

## 2024-11-20 NOTE — H&P ADULT - PROBLEM SELECTOR PLAN 4
CT Chest/Ab/Pelv with IV Con: Evidence of granulomatous infection in lungs, liver and spleen  - Per chart review, pt has been aware of this finding  - Has been seen on prior imaging, evaluated by ID on previous admission (11/2023)  - Quant indeterminate, fungitell negative, aspergillus galactomannan negative at that time  - ID (Dr. Mckinley) consulted, recs appreciated Chronic  - Pt presented with hypotension s/p dose of metoprolol 25mg PO at CI  - s/p 1000cc NS bolus x2  - Hold home anti-hypertensives in setting of hypotension on arrival and now with refractory hypotension despite IV fluid resuscitation   - c/w IVF @ 60cc/hr, with close monitoring of volume status in setting of CHF  - Monitor routine hemodynamics  - ICU consulted for potential need for pressors, recs appreciated

## 2024-11-20 NOTE — CONSULT NOTE ADULT - NS ATTEND AMEND GEN_ALL_CORE FT
80 yo M with PMHx HTN, CHF, PE (on eliquis), Myasthenia Gravis, and chronic LE edema, nephrolithiasis (s/p nephrostomy tube placement ), urosepsis, BPH BIBEMS from [facility] for hypertension and elevated WBC count. Cardiology was consulted for hypotension.    - Past echo from 09/2024 showed LVEF 55-60%, no diastolic dysfunction, moderate MR  - f/u repeat TTE  - No clear evidence of acute ischemia, patient denies cardiac symptoms.  - Troponin elevated, but downtrending with no trend consistent with ACS.  likely 2/2 demand ischemia  - Hypotensive on levophed  - Continue weaning levophed   - Continue midodrine 10 mg po q8h to wean off levophed  - To follow.  At risk for abrupt decompensation

## 2024-11-20 NOTE — H&P ADULT - NSHPPHYSICALEXAM_GEN_ALL_CORE
VITALS:   T(C): 37.9 (11-19-24 @ 22:30), Max: 37.9 (11-19-24 @ 22:30)  HR: 81 (11-19-24 @ 22:30) (81 - 100)  BP: 105/59 (11-19-24 @ 22:30) (90/64 - 105/59)  RR: 19 (11-19-24 @ 22:30) (19 - 19)  SpO2: 99% (11-19-24 @ 22:30) (96% - 99%)    GENERAL: NAD, lying in bed comfortably  HEAD:  Atraumatic, normocephalic  EYES: EOMI, PERRLA, conjunctiva and sclera clear  ENT: Moist mucous membranes  NECK: Supple, no JVD  HEART: Regular rate and rhythm, no murmurs, rubs, or gallops  LUNGS: Unlabored respirations.  Clear to auscultation bilaterally, no crackles, wheezing, or rhonchi  ABDOMEN: Soft, nontender, nondistended, +BS  EXTREMITIES: 2+ peripheral pulses bilaterally. No clubbing, cyanosis, or edema  NERVOUS SYSTEM:  A&Ox3, no focal deficits   SKIN: Warm and dry VITALS:   T(C): 37.9 (11-19-24 @ 22:30), Max: 37.9 (11-19-24 @ 22:30)  HR: 81 (11-19-24 @ 22:30) (81 - 100)  BP: 105/59 (11-19-24 @ 22:30) (90/64 - 105/59)  RR: 19 (11-19-24 @ 22:30) (19 - 19)  SpO2: 99% (11-19-24 @ 22:30) (96% - 99%)    GENERAL: Ill-appearing, lying in bed   HEAD:  Atraumatic, normocephalic  EYES: conjunctiva and sclera clear  ENT: Moist mucous membranes  NECK: Supple  HEART: RRR. no murmurs  LUNGS: CTA B/L. no crackles, wheezing, or rhonchi.  Unlabored respirations.    ABDOMEN: Soft, +nonspecific TTP. nondistended, +BS  : +R nephrostomy tube. +guerrero draining milky white urine  EXTREMITIES:  No edema  NERVOUS SYSTEM:  Somnolent, unable to asssess. no focal deficits   SKIN: Warm and dry

## 2024-11-20 NOTE — DIETITIAN INITIAL EVALUATION ADULT - PROBLEM SELECTOR PLAN 3
RVP +rhinoenterovirus  - Pt asymptomatic, less likely cause of sepsis given history and +UTI  - Supportive care  - Tylenol PRN for fever  - c/w IVF @ 60cc/hr, with close monitoring of volume status in setting of CHF

## 2024-11-20 NOTE — PROVIDER CONTACT NOTE (EICU) - ACTION/TREATMENT ORDERED:
IVPB order d/c'ed and PO ordered as requested
cxr ordered as requested  bedside team to follow up images

## 2024-11-20 NOTE — CASE MANAGEMENT PROGRESS NOTE - NSCMPROGRESSNOTE_GEN_ALL_CORE
Chart reviewed from a case management perspective.  Patient presents from [facility], and is acute in ICU on IV pressor, IVAX.  WIll monitor for transition needs and remain available.

## 2024-11-20 NOTE — H&P ADULT - PROBLEM SELECTOR PLAN 2
Troponin 1099.8 on admission  - Pt asymptomatic. Denies chest pain, pressure, palpitations, SOB, nausea  - EKG reviewed, no evidence of ischemic changes, ST/T changes or Q waves  - s/p ASA 324mg x1 in ED  - f/u STAT cardiac enzymes  - f/u repeat EKG  - Continuous cardiac monitoring on telemetry  - Cardiology ([place] [facility]) consulted, recs appreciated

## 2024-11-20 NOTE — CONSULT NOTE ADULT - SUBJECTIVE AND OBJECTIVE BOX
[facility]         Manuel Carter, Brodie, Janel, Peggy, Evan, Jovana        [phone] (office)    Reason for Consult:    Interval HPI: Patient seen and examined at bedside. No acute events overnight.     HPI:  78 yo M with PMHx HTN, CHF, PE (on eliquis), Myasthenia Gravis, and chronic LE edema, nephrolithiasis (s/p nephrostomy tube placement ), urosepsis, BPH BIBEMS from [facility] for hypertension and elevated WBC count. Per paperwork, nursing home paperwork, pt hypertensive to 154/105 and recieved metoprolol 25 mg (8:10 PM), and reported pt with "evelated WBC". Upon arrival, pt found to be hypotensive to 90/64. Arrived to ED with nephrostomy tube, chronic guerrero and PICC line in place. Pt was recieving IV zosyn x7d (start date ) per paperwork review for UTI. Pt with no active complaints, denies fever, chills, chest pain, SOB, abdominal pain, n/v/d/c or dysuria.    Of note, pt recently admitted to [facility] in 2024 and urgently transferred to [facility] for emergent nephrostomy tube placement after failed stent placement for 7mm kidney stone. Managed in ICU for septic shock/urosepsis and Klebsiella Bacteremia, treated with IV rocephin and vasopressors.       ED Course:   Vitals: BP: 90/64--> 105/59, HR: 100, Temp: 97.6F-->100.3F, RR: 19, SpO2: 99% on 2L   Labs: WBC 25.27, Lactate 3.1, Trop 1099.8, pro-BNP   UA: Turbid, large blood, large LEC, positive nitrite, many bacteria, WBC TNTC  CXR:   CT: < from: CT Abdomen and Pelvis No Cont (24 @ 01:49) >  2.6 cm left lobe thyroid nodule. Nonemergent thyroid ultrasound is   recommended.    Subcentimeter granulomas in the right upper lobe. Scattered reticular and   mild airspace opacities throughout both lungs. Calcified right hilar and   mediastinal lymph nodes. The constellation of findings likely represents   granulomatous infection question tuberculosis or sarcoidosis.    Innumerable punctate calcifications throughout the liver and spleen   consistent with prior granulomatous infection.    Dense gallbladder sludge or small stones. No surrounding fluid.    Percutaneous right nephrostomy tube. No hydronephrosis. Unremarkable left   kidney.    Urinary bladder is collapsed around a Guerrero catheter limiting evaluation.   There are at least 3 urinary bladder stone measuring up to 1.0 cm.    EKG: NSR rate 92bpm. LAD. QTc 403ms.  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      Club foot  Surgery at birth for Club Foot Right foot      Pilonidal cyst  Surgery 40 years ago      H/O colonoscopy      Spinal stenosis      H/O prostate biopsy          SOCIAL HISTORY: No active tobacco, alcohol or illicit drug use    FAMILY HISTORY:  Family history of stroke (Father)  Father -  age 62    Family history of kidney disease (Mother)  Mother -  age 67    Family history of diabetes mellitus type II (Sibling)  Brother & Sister        Home Medications:  acetaminophen 325 mg oral capsule: 2 cap(s) orally every 6 hours as needed for  mild pain (2024 03:30)  Eliquis 5 mg oral tablet: 1 tab(s) orally 2 times a day (2024 03:30)  finasteride 5 mg oral tablet: 1 tab(s) orally once a day (2024 03:30)  furosemide 20 mg oral tablet: 1 tab(s) orally once a day (2024 03:30)  [facility] Wound and Burn Dressing topical paste: Apply topically to affected area 2 times a day To sacral wound (2024 04:03)  metoprolol succinate 25 mg oral capsule, extended release: 1 tab(s) orally once a day (2024 03:30)  Multiple Vitamins oral tablet: 1 tab(s) orally once a day (2024 03:30)  omeprazole 40 mg oral delayed release capsule: 1 cap(s) orally once a day (2024 03:30)  Praluent Pen 75 mg/mL subcutaneous solution: 75 milligram(s) subcutaneous every 2 weeks (2024 03:30)  predniSONE 10 mg oral tablet: 1 tab(s) orally once a day (2024 03:30)  risperiDONE 0.25 mg oral tablet: 1 tab(s) orally every 12 hours (2024 03:30)  spironolactone 25 mg oral tablet: 1 tab(s) orally once a day (2024 03:30)  tamsulosin 0.4 mg oral capsule: 1 cap(s) orally once a day (at bedtime) (2024 03:30)      MEDICATIONS  (STANDING):  apixaban 5 milliGRAM(s) Oral every 12 hours  chlorhexidine 2% Cloths 1 Application(s) Topical <User Schedule>  hydrocortisone sodium succinate Injectable 50 milliGRAM(s) IV Push every 6 hours  meropenem  IVPB 1000 milliGRAM(s) IV Intermittent every 8 hours  midodrine 10 milliGRAM(s) Oral every 8 hours  norepinephrine Infusion 0.05 MICROgram(s)/kG/Min (8.5 mL/Hr) IV Continuous <Continuous>  potassium chloride    Tablet ER 40 milliEquivalent(s) Oral every 4 hours    MEDICATIONS  (PRN):  acetaminophen     Tablet .. 650 milliGRAM(s) Oral every 6 hours PRN Temp greater or equal to 38C (100.4F), Mild Pain (1 - 3)  ondansetron Injectable 4 milliGRAM(s) IV Push every 8 hours PRN Nausea and/or Vomiting      Allergies    levofloxacin (Unknown)  ofloxacin (Unknown)  gatifloxacin (Unknown)    Intolerances    fluoroquinolone antibiotics (Other)  Ketek (Other)  Avelox (Other)  telithromycin (Other)      REVIEW OF SYSTEMS: Negative except as per HPI.    VITAL SIGNS:   Vital Signs Last 24 Hrs  T(C): 36.4 (2024 07:33), Max: 37.9 (2024 22:30)  T(F): 97.5 (2024 07:33), Max: 100.3 (2024 22:30)  HR: 55 (2024 09:36) (51 - 101)  BP: 101/55 (2024 09:36) (76/47 - 126/58)  BP(mean): 75 (2024 09:36) (52 - 84)  RR: 10 (2024 09:36) (10 - 28)  SpO2: 99% (2024 09:36) (96% - 100%)    Parameters below as of 2024 07:30  Patient On (Oxygen Delivery Method): nasal cannula  O2 Flow (L/min): 2      I&O's Summary    2024 07:01  -  2024 07:00  --------------------------------------------------------  IN: 0 mL / OUT: 550 mL / NET: -550 mL    2024 07:01  -  2024 10:28  --------------------------------------------------------  IN: 16.4 mL / OUT: 300 mL / NET: -283.6 mL        PHYSICAL EXAM:  Constitutional: NAD, well-developed  HEENT NC/AT, moist mucous membranes  Pulmonary: Non-labored, breath sounds are clear bilaterally, no wheezing, rales or rhonchi  Cardiovascular: +S1, S2, RRR, no murmur  Gastrointestinal: Soft, nontender, nondistended, normoactive bowel sounds  Extremities: No peripheral edema   Neurological: Alert, strength and sensitivity are grossly intact  Skin: No obvious lesions/rashes  Psych: Mood & affect appropriate    LABS: All Labs Reviewed:                        11.1   .08 )-----------( 356      ( 2024 03:55 )             34.5                         13.5   25.27 )-----------( 427      ( 2024 22:25 )             40.9     2024 03:55    139    |  109    |  15     ----------------------------<  116    3.2     |  26     |  0.73   2024 22:25    137    |  101    |  18     ----------------------------<  120    3.9     |  25     |  0.91     Ca    8.5        2024 03:55  Ca    9.2        2024 22:25    TPro  5.6    /  Alb  1.9    /  TBili  0.4    /  DBili  x      /  AST  18     /  ALT  19     /  AlkPhos  59     2024 03:55  TPro  7.2    /  Alb  2.3    /  TBili  0.3    /  DBili  x      /  AST  15     /  ALT  20     /  AlkPhos  77     2024 22:25    PT/INR - ( 2024 22:25 )   PT: 17.1 sec;   INR: 1.47 ratio         PTT - ( 2024 22:25 )  PTT:36.1 sec  CARDIAC MARKERS ( 2024 03:55 )  x     / x     / x     / x     / 3.3 ng/mL      Blood Culture:         EKG:    RADIOLOGY:    CXR:   [facility]         Manuel Carter, Brodie, Janel, Peggy, Evan, Jovana        [phone] (office)    Reason for Consult: hypotension    Interval HPI: Patient seen and examined at bedside. Patient is lethargic but arousable to voice. Patient denies CP, SOB, and palpitations.    HPI:  78 yo M with PMHx HTN, CHF, PE (on eliquis), Myasthenia Gravis, and chronic LE edema, nephrolithiasis (s/p nephrostomy tube placement ), urosepsis, BPH BIBEMS from [facility] for hypertension and elevated WBC count. Per paperwork, nursing home paperwork, pt hypertensive to 154/105 and recieved metoprolol 25 mg (8:10 PM), and reported pt with "evelated WBC". Upon arrival, pt found to be hypotensive to 90/64. Arrived to ED with nephrostomy tube, chronic guerrero and PICC line in place. Pt was recieving IV zosyn x7d (start date ) per paperwork review for UTI. Pt with no active complaints, denies fever, chills, chest pain, SOB, abdominal pain, n/v/d/c or dysuria.    Of note, pt recently admitted to [facility] in 2024 and urgently transferred to [facility] for emergent nephrostomy tube placement after failed stent placement for 7mm kidney stone. Managed in ICU for septic shock/urosepsis and Klebsiella Bacteremia, treated with IV rocephin and vasopressors.     ED Course:   Vitals: BP: 90/64--> 105/59, HR: 100, Temp: 97.6F-->100.3F, RR: 19, SpO2: 99% on 2L   Labs: WBC 25.27, Lactate 3.1, Trop 1099.8, pro-BNP   UA: Turbid, large blood, large LEC, positive nitrite, many bacteria, WBC TNTC  CXR:   CT: < from: CT Abdomen and Pelvis No Cont (24 @ 01:49) >  2.6 cm left lobe thyroid nodule. Nonemergent thyroid ultrasound is   recommended.    Subcentimeter granulomas in the right upper lobe. Scattered reticular and   mild airspace opacities throughout both lungs. Calcified right hilar and   mediastinal lymph nodes. The constellation of findings likely represents   granulomatous infection question tuberculosis or sarcoidosis.    Innumerable punctate calcifications throughout the liver and spleen   consistent with prior granulomatous infection.    Dense gallbladder sludge or small stones. No surrounding fluid.    Percutaneous right nephrostomy tube. No hydronephrosis. Unremarkable left   kidney.    Urinary bladder is collapsed around a Guerrero catheter limiting evaluation.   There are at least 3 urinary bladder stone measuring up to 1.0 cm.    EKG: NSR rate 92bpm. LAD. QTc 403ms.  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 for Club Foot Right foot      Pilonidal cyst  Surgery 40 years ago      H/O colonoscopy      Spinal stenosis      H/O prostate biopsy          SOCIAL HISTORY: No active tobacco, alcohol or illicit drug use    FAMILY HISTORY:  Family history of stroke (Father)  Father -  age 62    Family history of kidney disease (Mother)  Mother -  age 67    Family history of diabetes mellitus type II (Sibling)  Brother & Sister        Home Medications:  acetaminophen 325 mg oral capsule: 2 cap(s) orally every 6 hours as needed for  mild pain (2024 03:30)  Eliquis 5 mg oral tablet: 1 tab(s) orally 2 times a day (2024 03:30)  finasteride 5 mg oral tablet: 1 tab(s) orally once a day (2024 03:30)  furosemide 20 mg oral tablet: 1 tab(s) orally once a day (2024 03:30)  [facility] Wound and Burn Dressing topical paste: Apply topically to affected area 2 times a day To sacral wound (2024 04:03)  metoprolol succinate 25 mg oral capsule, extended release: 1 tab(s) orally once a day (2024 03:30)  Multiple Vitamins oral tablet: 1 tab(s) orally once a day (2024 03:30)  omeprazole 40 mg oral delayed release capsule: 1 cap(s) orally once a day (2024 03:30)  Praluent Pen 75 mg/mL subcutaneous solution: 75 milligram(s) subcutaneous every 2 weeks (2024 03:30)  predniSONE 10 mg oral tablet: 1 tab(s) orally once a day (2024 03:30)  risperiDONE 0.25 mg oral tablet: 1 tab(s) orally every 12 hours (2024 03:30)  spironolactone 25 mg oral tablet: 1 tab(s) orally once a day (2024 03:30)  tamsulosin 0.4 mg oral capsule: 1 cap(s) orally once a day (at bedtime) (2024 03:30)      MEDICATIONS  (STANDING):  apixaban 5 milliGRAM(s) Oral every 12 hours  chlorhexidine 2% Cloths 1 Application(s) Topical <User Schedule>  hydrocortisone sodium succinate Injectable 50 milliGRAM(s) IV Push every 6 hours  meropenem  IVPB 1000 milliGRAM(s) IV Intermittent every 8 hours  midodrine 10 milliGRAM(s) Oral every 8 hours  norepinephrine Infusion 0.05 MICROgram(s)/kG/Min (8.5 mL/Hr) IV Continuous <Continuous>  potassium chloride    Tablet ER 40 milliEquivalent(s) Oral every 4 hours    MEDICATIONS  (PRN):  acetaminophen     Tablet .. 650 milliGRAM(s) Oral every 6 hours PRN Temp greater or equal to 38C (100.4F), Mild Pain (1 - 3)  ondansetron Injectable 4 milliGRAM(s) IV Push every 8 hours PRN Nausea and/or Vomiting      Allergies    levofloxacin (Unknown)  ofloxacin (Unknown)  gatifloxacin (Unknown)    Intolerances    fluoroquinolone antibiotics (Other)  Ketek (Other)  Avelox (Other)  telithromycin (Other)      REVIEW OF SYSTEMS: Negative except as per HPI.    VITAL SIGNS:   Vital Signs Last 24 Hrs  T(C): 36.4 (2024 07:33), Max: 37.9 (2024 22:30)  T(F): 97.5 (2024 07:33), Max: 100.3 (2024 22:30)  HR: 55 (2024 09:36) (51 - 101)  BP: 101/55 (2024 09:36) (76/47 - 126/58)  BP(mean): 75 (2024 09:36) (52 - 84)  RR: 10 (2024 09:36) (10 - 28)  SpO2: 99% (2024 09:36) (96% - 100%)    Parameters below as of 2024 07:30  Patient On (Oxygen Delivery Method): nasal cannula  O2 Flow (L/min): 2      I&O's Summary    2024 07:01  -  2024 07:00  --------------------------------------------------------  IN: 0 mL / OUT: 550 mL / NET: -550 mL    2024 07:01  -  2024 10:28  --------------------------------------------------------  IN: 16.4 mL / OUT: 300 mL / NET: -283.6 mL        PHYSICAL EXAM:  Constitutional: NAD, sleeping but arousable to voice, well-developed  HEENT NC/AT, moist mucous membranes  Pulmonary: Non-labored, breath sounds are clear bilaterally, no wheezing, rales or rhonchi  Cardiovascular: +S1, S2, RRR  Gastrointestinal: Soft, nontender, nondistended, normoactive bowel sounds  Extremities: No peripheral edema       LABS: All Labs Reviewed:                        11.1   19.08 )-----------( 356      ( 2024 03:55 )             34.5                         13.5   25.27 )-----------( 427      ( 2024 22:25 )             40.9     2024 03:55    139    |  109    |  15     ----------------------------<  116    3.2     |  26     |  0.73   :25    137    |  101    |  18     ----------------------------<  120    3.9     |  25     |  0.91     Ca    8.5        2024 03:55  Ca    9.2        2024 22:25    TPro  5.6    /  Alb  1.9    /  TBili  0.4    /  DBili  x      /  AST  18     /  ALT  19     /  AlkPhos  59     2024 03:55  TPro  7.2    /  Alb  2.3    /  TBili  0.3    /  DBili  x      /  AST  15     /  ALT  20     /  AlkPhos  77     2024 22:25    PT/INR - ( 2024 22:25 )   PT: 17.1 sec;   INR: 1.47 ratio         PTT - ( 2024 22:25 )  PTT:36.1 sec  CARDIAC MARKERS ( 2024 03:55 )  x     / x     / x     / x     / 3.3 ng/mL      EKG: Diagnosis Line Sinus rhythm with sinus arrhythmia with occasional premature ventricular complexes; Left anterior fascicular block

## 2024-11-20 NOTE — PROGRESS NOTE ADULT - PROBLEM SELECTOR PLAN 1
- Pt p/w elevated WBC, pt asymptomatic. Hx urosepsis/klebsiella bacteremia treated with IV rocephin, hospital course complicated by Afib with RVR during recent admission (9/2024)  - Per [facility] paperwork, pt s/p 7d course of IV zosyn via PICC (11/4-11/10)  - Met sepsis criteria on admission with leukocytosis, lactate, tachycardia  - WBC 25.27, per chart review baseline around 12-13 (on chronic steroids for MG)  - UA: Turbid, large blood, large LEC, positive nitrite, many bacteria, WBC TNTC  - CT: Urinary bladder collapsed, limiting evaluation, at least 3 stones measuring up to 1.0 cm. R nephrostomy tube.  - Restrepo catheter present on admission, replaced in ED  - Pt admitted to ICU for septic shock 2/2 UTI and rhino/enterovirus  - On Levophed gtt  - Continue Meropenem   - f/u BCx x2, UCx, sputum Cx  Management per ICU

## 2024-11-20 NOTE — PROGRESS NOTE ADULT - ASSESSMENT
80 yo M with PMHx HTN, CHF, PE (on eliquis), Myasthenia Gravis, and chronic LE edema, nephrolithiasis (s/p nephrostomy tube placement ), urosepsis, BPH BIBEMS from [facility] for hypertension and elevated WBC count. Admitted to ICU for septic shock 2/2 UTI and Enter/rhinovirus.

## 2024-11-20 NOTE — H&P ADULT - ATTENDING COMMENTS
78 yo M with PMHx HTN, CHF, PE (on eliquis), Myasthenia Gravis, and chronic LE edema, nephrolithiasis (s/p nephrostomy tube placement ), urosepsis, BPH BIBEMS from [facility] for hypertension and elevated WBC count. Admitted for urosepsis.    #Urosepsis/ RSV +/ Demand Ischemia/ Acute Metabolic Encephalopathy  Requiring Levophed to maintain MAP >65  guerrero replaced in ED; draining pyuria  s/p 2L boluses-> BP still remains in Sysolic 80s  Abx coverage based on prior cx-> klebsiella susceptible to gigi  Initial Troponin-> 1099.8. Repeat 852.9, Likely in setting of severe septic shock picture. Repeat echo ordered.

## 2024-11-20 NOTE — PROVIDER CONTACT NOTE (EICU) - RECOMMENDATIONS
-Continue meropenem. F/u culture data.  -Will need PICC removal if bacteremic.  -POCUS   -Electrolyte repletion  -Stress dose steroids as pt on chronic prednisone at home.  -Wean pressors as tolerated.  -Confirm DNR/DNI status with son and re-fill out MOLST if none present.    Discussed over the phone with ICU PA who assessed patient at bedside.

## 2024-11-20 NOTE — H&P ADULT - HISTORY OF PRESENT ILLNESS
Encounter Date: 8/12/2020       History     Chief Complaint   Patient presents with    Abdominal Pain     Pt c/o epigastric pain, began at 0300 with N/V. Denies diarrhea or fever.      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  It is associated with some left-sided flank pain.  She reports that her tubes have been draining per usual, though the left less so.  No fevers or chills.  She has epigastric abdominal pain that is nonradiating.  Review of patient's allergies indicates:   Allergen Reactions    Bee sting [allergen ext-venom-honey bee]      Rash      Grass pollen-bermuda, standard      rash     Past Medical History:   Diagnosis Date    Anxiety     Cervical cancer 2014    Chronic back pain     Depression     Diarrhea due to malabsorption 11/14/2018    Fibromyalgia     GERD (gastroesophageal reflux disease)     Hiatal hernia 2014    History of cervical cancer 10/11/2018    Hx of psychiatric care     Cymbalta, trazodone    Hypertension     Hypomagnesemia 11/21/2018    Lactose intolerance     Metastatic squamous cell carcinoma to lymph node 10/11/2018    Neuropathy due to chemotherapeutic drug 11/14/2018    Osteoarthritis of back     Psychiatric problem     Stroke 1972     Past Surgical History:   Procedure Laterality Date    BILATERAL OOPHORECTOMY  2015    CHOLECYSTECTOMY  11/09/2016    Done at [facility], path showed chronic cholecystitis and gallstones    cold knife conization  2014    COLONOSCOPY  2014    COLONOSCOPY N/A 10/24/2016    at [facility], Dr Thomas    COLONOSCOPY N/A 5/18/2018    Procedure: COLONOSCOPY;  Surgeon: Arden Gutiérrez MD;  Location: [address]);  Service: Endoscopy;  Laterality: N/A;    COLONOSCOPY N/A 7/28/2020    Procedure: COLONOSCOPY;   Surgeon: Hammad Reynolds MD;  Location: [facility] ENDO (4TH FLR);  Service: Colon and Rectal;  Laterality: N/A;  covid test [place] 7/25    CYSTOSCOPY WITH URETEROSCOPY, RETROGRADE PYELOGRAPHY, AND INSERTION OF STENT Bilateral 3/21/2020    Procedure: CYSTOSCOPY, WITH RETROGRADE PYELOGRAM,;  Surgeon: Leslie Balbuena MD;  Location: [facility] OR 1ST FLR;  Service: Urology;  Laterality: Bilateral;    ESOPHAGOGASTRODUODENOSCOPY  2014    HYSTERECTOMY  2014    TVH for cervical cancer    OOPHORECTOMY      RETROPERITONEAL LYMPHADENECTOMY Right 9/17/2018    Procedure: LYMPHADENECTOMY, RETROPERITONEUM;  Surgeon: Sebas Reed MD;  Location: [facility] OR 2ND FLR;  Service: General;  Laterality: Right;  ILIAC     Family History   Problem Relation Age of Onset    Heart disease Sister     Heart disease Maternal Grandmother     Colon cancer Father     Esophageal cancer Father     Cancer Father         Lung-smoker    Cancer Mother         Cervical    Cervical cancer Mother     Breast cancer Maternal Aunt     Suicide Daughter         jumped from parking structure    Drug abuse Daughter     Drug abuse Daughter     Rectal cancer Neg Hx     Stomach cancer Neg Hx     Crohn's disease Neg Hx     Ulcerative colitis Neg Hx     Diabetes Neg Hx     Hypertension Neg Hx      Social History     Tobacco Use    Smoking status: Former Smoker    Smokeless tobacco: Never Used   Substance Use Topics    Alcohol use: No     Alcohol/week: 0.0 standard drinks    Drug use: No     Review of Systems   Constitutional: Negative for chills, diaphoresis, fatigue and fever.   HENT: Negative for congestion, rhinorrhea and sore throat.    Eyes: Negative for visual disturbance.   Respiratory: Negative for cough, chest tightness and shortness of breath.    Cardiovascular: Negative for chest pain.   Gastrointestinal: Positive for abdominal pain, nausea and vomiting. Negative for blood in stool, constipation and diarrhea.   Genitourinary: Positive for  flank pain. Negative for dysuria, hematuria and urgency.   Musculoskeletal: Negative for back pain.   Skin: Negative for rash.   Neurological: Negative for seizures and syncope.   Hematological: Does not bruise/bleed easily.   Psychiatric/Behavioral: Negative for agitation and hallucinations.       Physical Exam     Initial Vitals [08/12/20 0540]   BP Pulse Resp Temp SpO2   128/70 95 16 97.9 °F (36.6 °C) 96 %      MAP       --         Physical Exam  Gen: AxOx3, NAD, well nourished  Eye: sclera anicteric, EOMI, no conjunctivitis, no periorbital edema  ENT: NCAT, OP clear, neck supple with FROM, no stridor  CVS: RRR, no m/r/g, distal pulses intact/symmetric  Pulm: CTAB, no wheezes, rales or rhonchi, no increased work of breathing  Abd: soft, nontender, nondistended, no organomegaly, the left-sided percutaneous nephrostomy tube appears to be dislodged, the suture holding it in place is no longer attached to the skin.  There is no surrounding erythema or induration or purulent discharge, there is no urine draining around the tube.  There is some yellow urine in the bag.  The right-sided percutaneous nephrostomy tube appears is in position.  She has some left-sided flank pain.  Ext: no edema, lesions, rashes, or deformity  Neuro: GCS15, moving all extremities, gait intact  Psych: normal affect, cooperative  ED Course   Procedures  Labs Reviewed   CBC W/ AUTO DIFFERENTIAL - Abnormal; Notable for the following components:       Result Value    Hemoglobin 11.3 (*)     Mean Corpuscular Hemoglobin 26.8 (*)     Mean Corpuscular Hemoglobin Conc 29.7 (*)     Lymph% 15.8 (*)     All other components within normal limits   URINALYSIS, REFLEX TO URINE CULTURE - Abnormal; Notable for the following components:    Appearance, UA Cloudy (*)     Specific Gravity, UA >=1.030 (*)     Protein, UA 3+ (*)     Glucose, UA 2+ (*)     Occult Blood UA 1+ (*)     Leukocytes, UA 1+ (*)     All other components within normal limits    Narrative:      Specimen Source->Urine   URINALYSIS MICROSCOPIC - Abnormal; Notable for the following components:    RBC, UA >100 (*)     WBC, UA >100 (*)     WBC Clumps, UA Many (*)     All other components within normal limits    Narrative:     Specimen Source->Urine   COMPREHENSIVE METABOLIC PANEL - Abnormal; Notable for the following components:    Albumin 3.0 (*)     eGFR if non  55.9 (*)     All other components within normal limits   ISTAT PROCEDURE - Abnormal; Notable for the following components:    POC Glucose 127 (*)     POC Potassium 5.4 (*)     POC Hematocrit 35 (*)     All other components within normal limits   CULTURE, URINE   LIPASE   SARS-COV-2 RNA AMPLIFICATION, QUAL   ISTAT CHEM8        ECG Results          EKG 12-lead (In process)  Result time 08/12/20 09:20:22    In process by Interface, Lab In [facility] (08/12/20 09:20:22)                 Narrative:    Test Reason : R10.13,    Vent. Rate : 089 BPM     Atrial Rate : 089 BPM     P-R Int : 146 ms          QRS Dur : 080 ms      QT Int : 376 ms       P-R-T Axes : 088 088 041 degrees     QTc Int : 457 ms    Normal sinus rhythm  Normal ECG  When compared with ECG of 27-JUL-2020 00:39,  No significant change was found    Referred By: [email or web address]   SELF           Confirmed By:                             Imaging Results          CT Abdomen Pelvis With Contrast (Final result)  Result time 08/12/20 07:10:33    Final result by Fermín Lin MD (08/12/20 07:10:33)                 Impression:      1. When compared to the 08/02/2020 CT, there is increased left-sided hydronephrosis and hydroureter with urothelial enhancement and surrounding inflammatory changes.  No definite new urolithiasis appreciated.  2. Relatively similar degree of mild dilatation of the proximal right ureter with similar degree of periureteric inflammatory stranding.  3. Otherwise, no significant interval change.      Electronically signed by: Fermín  Riley  Date:    08/12/2020  Time:    07:10             Narrative:    EXAMINATION:  CT ABDOMEN PELVIS WITH CONTRAST    CLINICAL HISTORY:  Abdominal distension;Nausea/vomiting;    TECHNIQUE:  Low dose axial images, sagittal and coronal reformations were obtained from the lung bases to the pubic symphysis following the IV administration of 100 mL of Omnipaque 350 and the oral administration of 30 mL of Omnipaque 350.    COMPARISON:  CT abdomen pelvis performed 08/02/2020    FINDINGS:  Abdomen:    - Lower thorax:Unremarkable    - Lung bases: No infiltrates and no nodules.    - Liver: No focal mass.  Geographic region of hypoattenuation in the region of the falciform ligament felt to represent focal fatty deposition.    - Gallbladder: Status post cholecystectomy.    - Bile Ducts: No evidence of intra or extra hepatic biliary ductal dilation.    - Spleen: Negative.    - Kidneys: Unchanged position of bilateral percutaneous nephrostomy tubes relative to 08/02/2020.  On the left, there is increased proximal hydroureter and hydronephrosis with suggest progressed urothelial enhancement.  Periureteral and perinephric inflammatory stranding may be slightly progressed as well.    On the right, there is a relatively similar degree of relatively mild hydroureter and hydronephrosis with associated periureteric and perinephric inflammatory stranding.    No definite new calculi are identified.    - Adrenals: Unremarkable.    - Pancreas: No mass or peripancreatic fat stranding.    - Retroperitoneum:  No significant adenopathy.  A few mildly prominent retroperitoneal lymph nodes may be reactive.    - Vascular: No abdominal aortic aneurysm.    - Abdominal wall: Small fat containing umbilical hernia.    Pelvis:    Similar degree of dependent free fluid in the pelvis.    Bowel/Mesentery:    No evidence of bowel obstruction or inflammation.  Colonic diverticulosis is noted without evidence of diverticulitis.    Bones:  No acute osseous  abnormality and no suspicious lytic or blastic lesion.                                 Medical Decision Making:   History:   Old Medical Records: I decided to obtain old medical records.  Old Records Summarized: records from clinic visits.  Initial Assessment:   This is a 55-year-old woman with a history of bilateral percutaneous nephrostomy tubes, who presents with acute onset nausea and vomiting epigastric and left-sided flank pain.  The left-sided percutaneous nephrostomy tube has more exposed tube than the right.  I am concerned it is dislodged.  She is also at risk of possible SBO versus gastritis versus pancreatitis.  Plan for broad labs, as well as a CT scan to assess the positioning of her tube.  While I do not suspect that this is acute coronary syndrome, we will obtain an EKG.  Clinical Tests:   Lab Tests: Ordered and Reviewed  Radiological Study: Ordered and Reviewed  ED Management:  CT scan by my independent interpretation shows a migrated percutaneous nephrostomy tube, is still in the kidney, but it has dislodged from the collecting system.  In comparison to prior CT, it has moved.  There is subsequent hydronephrosis and hydroureter.  Her urinalysis is difficult to interpret given that it is from a percutaneous nephrostomy tube but given that I suspect she will be having a procedure to exchange the tube, I have administered ceftriaxone.  I discussed the patient with Interventional Radiology, they will be able to exchange the tube tomorrow.  Given that, I have admitted the patient to the hospital for further management.  Other:   I have discussed this case with another health care provider.                   ED Course as of Aug 12 1108   Wed Aug 12, 2020   0728 EKG byMy independent interpretation is normal sinus rhythm at a rate of 88, there are no obvious ST segment changes in comparison to prior    [EB]      ED Course User Index  [EB] Betty Langston MD                Clinical Impression:        ICD-10-CM ICD-9-CM   1. Nephrostomy tube displaced  T83.022A 997.5   2. Epigastric pain  R10.13 789.06             ED Disposition Condition    Admit                           Betty Langston MD  08/12/20 1135     78 yo M with PMHx HTN, CHF, PE (on eliquis), Myasthenia Gravis, and chronic LE edema, nephrolithiasis (s/p nephrostomy tube placement ) BIBEMS from [facility] for hypertension and elevated WBC count. Per paperwork, nursing home paperwork, pt hypertensive to 154/105 and recieved metoprolol 25 mg (8:10 PM), and reported pt with "evelated WBC". Upon arrival, pt found to be hypotensive to 90/64. Arrived to ED , nephrostomy tube, chronic guerrero and PICC line in place. Pt with no active complaints, denies fever, chills, chest pain, SOB, abdominal pain, n/v/d/c or dysuria.    Pt recently admitted to [facility] in Sept 2024 and urgently transferred to [facility] for emergent nephrostomy tube placement after failed stent placement for 7mm kidney stone. Managed in ICU for septic shock/urosepsis and Klebsiella Bacteremia, treated with IV rocephin and vasopressors.       ED Course:   Vitals: BP: 90/64--> 105/59, HR: 100, Temp: 97.6F-->100.3F, RR: 19, SpO2: 99% on 2L   Labs: WBC 25.27, Lactate 3.1, Trop 1099.8, pro-BNP 1988  UA: Turbid, large blood, large LEC, positive nitrite, many bacteria, WBC TNTC  CXR:   CT:  EKG: NSR rate 92bpm. LAD. QTc 403ms.  Received in the ED: IV vancomycin x1, IV zosyn x1, ofrimev 1g IV x1, 1000cc NS bolus x2, ASA 324mg x1   80 yo M with PMHx HTN, CHF, PE (on eliquis), Myasthenia Gravis, and chronic LE edema, nephrolithiasis (s/p nephrostomy tube placement ), urosepsis, BPH BIBEMS from [facility] for hypertension and elevated WBC count. Per paperwork, nursing home paperwork, pt hypertensive to 154/105 and recieved metoprolol 25 mg (8:10 PM), and reported pt with "evelated WBC". Upon arrival, pt found to be hypotensive to 90/64. Arrived to ED with nephrostomy tube, chronic guerrero and PICC line in place. Pt was recieving IV zosyn x7d (start date 11/04) per paperwork review for UTI. Pt with no active complaints, denies fever, chills, chest pain, SOB, abdominal pain, n/v/d/c or dysuria.    Of note, pt recently admitted to [facility] in Sept 2024 and urgently transferred to [facility] for emergent nephrostomy tube placement after failed stent placement for 7mm kidney stone. Managed in ICU for septic shock/urosepsis and Klebsiella Bacteremia, treated with IV rocephin and vasopressors.       ED Course:   Vitals: BP: 90/64--> 105/59, HR: 100, Temp: 97.6F-->100.3F, RR: 19, SpO2: 99% on 2L   Labs: WBC 25.27, Lactate 3.1, Trop 1099.8, pro-BNP 1988  UA: Turbid, large blood, large LEC, positive nitrite, many bacteria, WBC TNTC  CXR:   CT:  EKG: NSR rate 92bpm. LAD. QTc 403ms.  Received in the ED: IV vancomycin x1, IV zosyn x1, ofrimev 1g IV x1, 1000cc NS bolus x2, ASA 324mg x1   78 yo M with PMHx HTN, CHF, PE (on eliquis), Myasthenia Gravis, and chronic LE edema, nephrolithiasis (s/p nephrostomy tube placement ), urosepsis, BPH BIBEMS from [facility] for hypertension and elevated WBC count. Per paperwork, nursing home paperwork, pt hypertensive to 154/105 and recieved metoprolol 25 mg (8:10 PM), and reported pt with "evelated WBC". Upon arrival, pt found to be hypotensive to 90/64. Arrived to ED with nephrostomy tube, chronic guerrero and PICC line in place. Pt was recieving IV zosyn x7d (start date 11/04) per paperwork review for UTI. Pt with no active complaints, denies fever, chills, chest pain, SOB, abdominal pain, n/v/d/c or dysuria.    Of note, pt recently admitted to [facility] in Sept 2024 and urgently transferred to [facility] for emergent nephrostomy tube placement after failed stent placement for 7mm kidney stone. Managed in ICU for septic shock/urosepsis and Klebsiella Bacteremia, treated with IV rocephin and vasopressors.       ED Course:   Vitals: BP: 90/64--> 105/59, HR: 100, Temp: 97.6F-->100.3F, RR: 19, SpO2: 99% on 2L   Labs: WBC 25.27, Lactate 3.1, Trop 1099.8, pro-BNP 1988  UA: Turbid, large blood, large LEC, positive nitrite, many bacteria, WBC TNTC  CXR:   CT: < from: CT Abdomen and Pelvis No Cont (11.20.24 @ 01:49) >  2.6 cm left lobe thyroid nodule. Nonemergent thyroid ultrasound is   recommended.    Subcentimeter granulomas in the right upper lobe. Scattered reticular and   mild airspace opacities throughout both lungs. Calcified right hilar and   mediastinal lymph nodes. The constellation of findings likely represents   granulomatous infection question tuberculosis or sarcoidosis.    Innumerable punctate calcifications throughout the liver and spleen   consistent with prior granulomatous infection.    Dense gallbladder sludge or small stones. No surrounding fluid.    Percutaneous right nephrostomy tube. No hydronephrosis. Unremarkable left   kidney.    Urinary bladder is collapsed around a Guerrero catheter limiting evaluation.   There are at least 3 urinary bladder stone measuring up to 1.0 cm.    EKG: NSR rate 92bpm. LAD. QTc 403ms.  Received in the ED: IV vancomycin x1, IV zosyn x1, ofrimev 1g IV x1, 1000cc NS bolus x2, ASA 324mg x1

## 2024-11-20 NOTE — H&P ADULT - PROBLEM SELECTOR PLAN 1
Pt p/w elevated WBC, pt asymptomatic. Hx urosepsis/klebsiella bacteremia, complicated by Afib with RVR during recent admission (9/2024)  - Met sepsis criteria on admission with leukocytosis, lactate, tachycardia  - WBC 25.27, per chart review baseline around 12-13 (on chronic steroids for MG)  - Lactate 3.1  - UA: Turbid, large blood, large LEC, positive nitrite, many bacteria, WBC TNTC  - CT: Urinary bladder is collapsed, limiting evaluation. At least 3 stones measuring up to 1.0 cm. Right nephrostomy tube. No hydronephrosis  - IV vancomycin x1, IV zosyn x1, ofirmev 1g IV x1, 1000cc NS bolus x2, ASA 324mg x1 in ED  - Admit to telemetry for sepsis likely urosepsis 2/2 UTI  - Restrepo catheter present on admission, replaced in ED  - c/w ****  - c/w IVF @ 100cc/hr  - f/u repeat lactate in AM  - f/u BCx x2, UCx  - Tylenol PRN for fever  - Monitor WBC, fever curve  - ID (Dr. Mckinley) Consulted, recs appreciated Pt p/w elevated WBC, pt asymptomatic. Hx urosepsis/klebsiella bacteremia, complicated by Afib with RVR during recent admission (9/2024)  - Met sepsis criteria on admission with leukocytosis, lactate, tachycardia  - WBC 25.27, per chart review baseline around 12-13 (on chronic steroids for MG)  - Lactate 3.1  - UA: Turbid, large blood, large LEC, positive nitrite, many bacteria, WBC TNTC  - CT: Urinary bladder is collapsed, limiting evaluation. At least 3 stones measuring up to 1.0 cm. Right nephrostomy tube. No hydronephrosis  - IV vancomycin x1, IV zosyn x1, ofirmev 1g IV x1, 1000cc NS bolus x2, ASA 324mg x1 in ED  - Admit to telemetry for sepsis likely urosepsis 2/2 UTI  - Restrepo catheter present on admission, replaced in ED  - BP still soft ~90 SBP (MAP ~60 per RN) despite volume resuscitation, will consult ICU for potential need for pressor support   - c/w IV abx  - c/w IVF @ 60cc/hr, with close monitoring of volume status in setting of CHF  - f/u repeat lactate in AM  - f/u BCx x2, UCx  - Tylenol PRN for fever  - Monitor WBC, fever curve  - ICU consulted, recs appreciated  - ID (Dr. Mckinley) Consulted, recs appreciated Pt p/w elevated WBC, pt asymptomatic. Hx urosepsis/klebsiella bacteremia treated with IV rocephin, hospital course complicated by Afib with RVR during recent admission (9/2024).  - Per [facility] paperwork, pt s/p 7d course of IV zosyn via PICC (11/4-11/10)  - Met sepsis criteria on admission with leukocytosis, lactate, tachycardia  - WBC 25.27, per chart review baseline around 12-13 (on chronic steroids for MG)  - Lactate 3.1  - UA: Turbid, large blood, large LEC, positive nitrite, many bacteria, WBC TNTC  - CT: Urinary bladder is collapsed, limiting evaluation. At least 3 stones measuring up to 1.0 cm. Right nephrostomy tube. No hydronephrosis  - s/p IV vancomycin x1, IV zosyn x1, ofirmev 1g IV x1, 1000cc NS bolus x2, in ED  - Admit to telemetry for urosepsis 2/2 UTI  - Restrepo catheter present on admission, replaced in ED  - BP still soft ~90 SBP (MAP ~60 per RN) despite volume resuscitation, will consult ICU for potential need for pressor support   - c/w IV abx  - c/w IVF @ 60cc/hr, with close monitoring of volume status in setting of CHF  - f/u repeat lactate in AM  - f/u BCx x2, UCx  - Tylenol PRN for fever  - Monitor WBC, fever curve  - ICU consulted, recs appreciated  - ID (Dr. Mckinley) Consulted, recs appreciated

## 2024-11-21 LAB
ALBUMIN SERPL ELPH-MCNC: 2 G/DL — LOW (ref 3.3–5)
ALP SERPL-CCNC: 63 U/L — SIGNIFICANT CHANGE UP (ref 40–120)
ALT FLD-CCNC: 19 U/L — SIGNIFICANT CHANGE UP (ref 12–78)
ANION GAP SERPL CALC-SCNC: 7 MMOL/L — SIGNIFICANT CHANGE UP (ref 5–17)
AST SERPL-CCNC: 13 U/L — LOW (ref 15–37)
BASOPHILS # BLD AUTO: 0.03 K/UL — SIGNIFICANT CHANGE UP (ref 0–0.2)
BASOPHILS NFR BLD AUTO: 0.2 % — SIGNIFICANT CHANGE UP (ref 0–2)
BILIRUB SERPL-MCNC: 0.3 MG/DL — SIGNIFICANT CHANGE UP (ref 0.2–1.2)
BUN SERPL-MCNC: 16 MG/DL — SIGNIFICANT CHANGE UP (ref 7–23)
CALCIUM SERPL-MCNC: 9.7 MG/DL — SIGNIFICANT CHANGE UP (ref 8.5–10.1)
CHLORIDE SERPL-SCNC: 114 MMOL/L — HIGH (ref 96–108)
CO2 SERPL-SCNC: 23 MMOL/L — SIGNIFICANT CHANGE UP (ref 22–31)
CORTIS AM PEAK SERPL-MCNC: 91.8 UG/DL — HIGH (ref 6–18.4)
CREAT SERPL-MCNC: 0.6 MG/DL — SIGNIFICANT CHANGE UP (ref 0.5–1.3)
EGFR: 97 ML/MIN/1.73M2 — SIGNIFICANT CHANGE UP
EOSINOPHIL # BLD AUTO: 0.01 K/UL — SIGNIFICANT CHANGE UP (ref 0–0.5)
EOSINOPHIL NFR BLD AUTO: 0.1 % — SIGNIFICANT CHANGE UP (ref 0–6)
GLUCOSE SERPL-MCNC: 121 MG/DL — HIGH (ref 70–99)
GRAM STN FLD: ABNORMAL
HCT VFR BLD CALC: 38.4 % — LOW (ref 39–50)
HGB BLD-MCNC: 12.3 G/DL — LOW (ref 13–17)
IMM GRANULOCYTES NFR BLD AUTO: 1.5 % — HIGH (ref 0–0.9)
LYMPHOCYTES # BLD AUTO: 1.02 K/UL — SIGNIFICANT CHANGE UP (ref 1–3.3)
LYMPHOCYTES # BLD AUTO: 5.9 % — LOW (ref 13–44)
MAGNESIUM SERPL-MCNC: 2.2 MG/DL — SIGNIFICANT CHANGE UP (ref 1.6–2.6)
MCHC RBC-ENTMCNC: 31.6 PG — SIGNIFICANT CHANGE UP (ref 27–34)
MCHC RBC-ENTMCNC: 32 G/DL — SIGNIFICANT CHANGE UP (ref 32–36)
MCV RBC AUTO: 98.7 FL — SIGNIFICANT CHANGE UP (ref 80–100)
MONOCYTES # BLD AUTO: 0.52 K/UL — SIGNIFICANT CHANGE UP (ref 0–0.9)
MONOCYTES NFR BLD AUTO: 3 % — SIGNIFICANT CHANGE UP (ref 2–14)
NEUTROPHILS # BLD AUTO: 15.37 K/UL — HIGH (ref 1.8–7.4)
NEUTROPHILS NFR BLD AUTO: 89.3 % — HIGH (ref 43–77)
NIGHT BLUE STAIN TISS: SIGNIFICANT CHANGE UP
NRBC # BLD: 0 /100 WBCS — SIGNIFICANT CHANGE UP (ref 0–0)
PHOSPHATE SERPL-MCNC: 3 MG/DL — SIGNIFICANT CHANGE UP (ref 2.5–4.5)
PLATELET # BLD AUTO: 389 K/UL — SIGNIFICANT CHANGE UP (ref 150–400)
POTASSIUM SERPL-MCNC: 4.6 MMOL/L — SIGNIFICANT CHANGE UP (ref 3.5–5.3)
POTASSIUM SERPL-SCNC: 4.6 MMOL/L — SIGNIFICANT CHANGE UP (ref 3.5–5.3)
PROT SERPL-MCNC: 6.1 G/DL — SIGNIFICANT CHANGE UP (ref 6–8.3)
RBC # BLD: 3.89 M/UL — LOW (ref 4.2–5.8)
RBC # FLD: 17.9 % — HIGH (ref 10.3–14.5)
SODIUM SERPL-SCNC: 144 MMOL/L — SIGNIFICANT CHANGE UP (ref 135–145)
SPECIMEN SOURCE: SIGNIFICANT CHANGE UP
SPECIMEN SOURCE: SIGNIFICANT CHANGE UP
WBC # BLD: 17.2 K/UL — HIGH (ref 3.8–10.5)
WBC # FLD AUTO: 17.2 K/UL — HIGH (ref 3.8–10.5)

## 2024-11-21 PROCEDURE — 99222 1ST HOSP IP/OBS MODERATE 55: CPT | Mod: GC

## 2024-11-21 PROCEDURE — 99233 SBSQ HOSP IP/OBS HIGH 50: CPT | Mod: GC

## 2024-11-21 PROCEDURE — 99291 CRITICAL CARE FIRST HOUR: CPT

## 2024-11-21 RX ORDER — SODIUM CHLORIDE 9 MG/ML
4 INJECTION, SOLUTION INTRAMUSCULAR; INTRAVENOUS; SUBCUTANEOUS EVERY 8 HOURS
Refills: 0 | Status: DISCONTINUED | OUTPATIENT
Start: 2024-11-21 | End: 2024-12-03

## 2024-11-21 RX ORDER — HYDROCORTISONE ACETATE 25 MG/ML
50 VIAL (ML) INJECTION EVERY 8 HOURS
Refills: 0 | Status: DISCONTINUED | OUTPATIENT
Start: 2024-11-21 | End: 2024-11-22

## 2024-11-21 RX ORDER — MIDODRINE HYDROCHLORIDE 5 MG/1
10 TABLET ORAL EVERY 8 HOURS
Refills: 0 | Status: DISCONTINUED | OUTPATIENT
Start: 2024-11-21 | End: 2024-11-22

## 2024-11-21 RX ADMIN — Medication 50 MILLIGRAM(S): at 05:45

## 2024-11-21 RX ADMIN — MEROPENEM 100 MILLIGRAM(S): 500 INJECTION, POWDER, FOR SOLUTION INTRAVENOUS at 05:45

## 2024-11-21 RX ADMIN — APIXABAN 5 MILLIGRAM(S): 2.5 TABLET, FILM COATED ORAL at 18:58

## 2024-11-21 RX ADMIN — MEROPENEM 100 MILLIGRAM(S): 500 INJECTION, POWDER, FOR SOLUTION INTRAVENOUS at 13:51

## 2024-11-21 RX ADMIN — Medication 50 MILLIGRAM(S): at 13:52

## 2024-11-21 RX ADMIN — Medication 50 MILLIGRAM(S): at 21:24

## 2024-11-21 RX ADMIN — MEROPENEM 100 MILLIGRAM(S): 500 INJECTION, POWDER, FOR SOLUTION INTRAVENOUS at 21:25

## 2024-11-21 NOTE — PROGRESS NOTE ADULT - SUBJECTIVE AND OBJECTIVE BOX
[facility] - Janel Jackson, Peggy, Evan, Jovana, Tomas Thibodeaux  Office Number:  [phone]    Patient resting comfortably in bed in NAD.  Laying flat with no respiratory distress.  No complaints of chest pain, dyspnea, palpitations, PND, or orthopnea.      F/U for:  Acute hypoxic resp failure    Telemetry:  SR    MEDICATIONS  (STANDING):  apixaban 5 milliGRAM(s) Oral every 12 hours  chlorhexidine 2% Cloths 1 Application(s) Topical <User Schedule>  hydrocortisone sodium succinate Injectable 50 milliGRAM(s) IV Push every 8 hours  meropenem  IVPB 1000 milliGRAM(s) IV Intermittent every 8 hours    MEDICATIONS  (PRN):  acetaminophen     Tablet .. 650 milliGRAM(s) Oral every 6 hours PRN Temp greater or equal to 38C (100.4F), Mild Pain (1 - 3)  midodrine 10 milliGRAM(s) Oral every 8 hours PRN if SBP <110  ondansetron Injectable 4 milliGRAM(s) IV Push every 8 hours PRN Nausea and/or Vomiting  sodium chloride 3%  Inhalation 4 milliLiter(s) Inhalation every 8 hours PRN sputum induction      Allergies    levofloxacin (Unknown)  ofloxacin (Unknown)  gatifloxacin (Unknown)    Intolerances    fluoroquinolone antibiotics (Other)  Ketek (Other)  Avelox (Other)  telithromycin (Other)      Vital Signs Last 24 Hrs  T(C): 36.5 (21 Nov 2024 11:54), Max: 36.7 (20 Nov 2024 19:57)  T(F): 97.7 (21 Nov 2024 11:54), Max: 98.1 (20 Nov 2024 19:57)  HR: 82 (21 Nov 2024 11:00) (42 - 89)  BP: 121/58 (21 Nov 2024 11:00) (89/50 - 134/60)  BP(mean): 84 (21 Nov 2024 11:00) (65 - 89)  RR: 12 (21 Nov 2024 11:00) (7 - 18)  SpO2: 96% (21 Nov 2024 11:00) (92% - 99%)        I&O's Summary    20 Nov 2024 07:01  -  21 Nov 2024 07:00  --------------------------------------------------------  IN: 134.4 mL / OUT: 2955 mL / NET: -2820.6 mL        ON EXAM:    General: NAD, awake and alert, oriented x 3  HEENT: Mucous membranes are moist, anicteric  Lungs: Non-labored, breath sounds are clear bilaterally, No wheezing, rales or rhonchi.  Decreased breath sounds b/l  Cardiovascular: Regular, S1 and S2, no murmurs, rubs, or gallops.  Distant  Gastrointestinal: Bowel Sounds present, soft, nontender.   Lymph: b/l peripheral edema. No lymphadenopathy.  Skin: No rashes or ulcers  Psych:  Mood & affect appropriate    LABS: All Labs Reviewed:                        12.3   17.20 )-----------( 389      ( 21 Nov 2024 08:50 )             38.4                         11.1   19.08 )-----------( 356      ( 20 Nov 2024 03:55 )             34.5                         13.5   25.27 )-----------( 427      ( 19 Nov 2024 22:25 )             40.9     21 Nov 2024 08:50    144    |  114    |  16     ----------------------------<  121    4.6     |  23     |  0.60   20 Nov 2024 03:55    139    |  109    |  15     ----------------------------<  116    3.2     |  26     |  0.73   19 Nov 2024 22:25    137    |  101    |  18     ----------------------------<  120    3.9     |  25     |  0.91     Ca    9.7        21 Nov 2024 08:50  Ca    8.5        20 Nov 2024 03:55  Ca    9.2        19 Nov 2024 22:25  Phos  3.0       21 Nov 2024 08:50  Mg     2.2       21 Nov 2024 08:50    TPro  6.1    /  Alb  2.0    /  TBili  0.3    /  DBili  x      /  AST  13     /  ALT  19     /  AlkPhos  63     21 Nov 2024 08:50  TPro  5.6    /  Alb  1.9    /  TBili  0.4    /  DBili  x      /  AST  18     /  ALT  19     /  AlkPhos  59     20 Nov 2024 03:55  TPro  7.2    /  Alb  2.3    /  TBili  0.3    /  DBili  x      /  AST  15     /  ALT  20     /  AlkPhos  77     19 Nov 2024 22:25    PT/INR - ( 19 Nov 2024 22:25 )   PT: 17.1 sec;   INR: 1.47 ratio         PTT - ( 19 Nov 2024 22:25 )  PTT:36.1 sec  CARDIAC MARKERS ( 20 Nov 2024 03:55 )  x     / x     / x     / x     / 3.3 ng/mL      Blood Culture: Organism --  Gram Stain Blood -- Gram Stain --  Specimen Source .Blood BLOOD  Culture-Blood --    Organism --  Gram Stain Blood -- Gram Stain --  Specimen Source .Blood BLOOD  Culture-Blood --        11-20 @ 11:36  TSH: 0.58

## 2024-11-21 NOTE — PROGRESS NOTE ADULT - ASSESSMENT
80 yo M with PMHx HTN, CHF, PE (on eliquis), Myasthenia Gravis, and chronic LE edema, nephrolithiasis (s/p nephrostomy tube placement ), urosepsis, BPH BIBEMS from [facility], [place] for hypertension and elevated WBC count. Cardiology was consulted for hypotension.      - Past echo from 09/2024 showed LVEF 55-60%, no diastolic dysfunction, moderate MR  - Repeat TTE with severe LV dysfunction.  Looks to be stress medated  - No evidence of significant volume overload  - Off pressors and on midodrine  - To start GDMT when able and repeat echo in abou one month  - No clear evidence of acute ischemia, patient denies cardiac symptoms.  - Ruling out TB secondary to CT ches  findings  - Monitor and replete lytes, keep K>4, Mg>2.

## 2024-11-21 NOTE — PROGRESS NOTE ADULT - ASSESSMENT
80 y/o Male with PMHx HTN, HFpEF (EF 55-60%), PE (On Eliquis), Myasthenia Gravis, and chronic LE edema, Nephrolithiasis (s/p Nephrostomy Tube), BPH BIBA from [facility] for hypertension (/105) with:     1. Acute Metabolic Encephalopathy   3. Urosepsis 2/2 UTI & Distributive shock    NEURO: - Arousable and agitated overnight until this morning, refusing to answer medications.  Defer delirogenic medications. Pain control PRN.   - Pt w/ hx of myasthenia gravis on prednisone at home - to continue SDS w/ hydrocortisone 2/2 to shock state.    CV:  - hypotensive in the ED s/p 2L NS bolus now s/p levo gtt.  - Maintain MAP>65.  - Continue hydrocortisone  - Continue midodrine 10 q8h.  - Bedside echo showing normal atrial movement, mod MR, minimal ventricular wall movement in L>R apices of heart.   - TTE 11/20 -  LVEF is estimated at 35%, The apex, mid to spical anteroseptal wall and mid to distal anterolateral walls appear severely hypokinetic, trace MR. negative for intracardiac shunt  - f/u AM CMP, Mg, Phos.    PULM:   - Required supplemental O2 w/ NC on admission now downtitrated to room air. Maintain O2 sat >94%.    RENAL:  - Guerrero catheter in place with adequate, although improved turbid output. Trend lytes, maintain K>4, Mg>2, Phos>3. Avoiding nephrotoxic medications.     GI:   -Continue reg diet to assist with recovery from shock state.    ID:  - Patient UA w/ Turbid, large LE and + nitrites coming in with guerrero  - Continue meropenem 1g q8h.   - f/u BCx, UCx.  - Leukocytosis downtrending, continue to monitor along w/ fever curve.    ENDO:  - BG achs, goal Glu 110-180.    HEME:  - Hb 14->~11.1 this AM likely dilutional 2/2 to 1Lx2 NS bolus due to shock state.  - No inteventions performed at this time, OK to resume home Eliquis 5mg BID as patient has hx of PE.     DISPO:  - In ICU, patient stable for transfer to medical floors.    Tubes/Lines: peripheral IV, nephrostomy tube, guerrero.   78 y/o Male with PMHx HTN, HFpEF (EF 55-60%), PE (On Eliquis), Myasthenia Gravis, and chronic LE edema, Nephrolithiasis (s/p Nephrostomy Tube), BPH BIBA from SNF for hypertension (/105) with:     1. Acute Metabolic Encephalopathy   3. Urosepsis 2/2 UTI & Distributive shock    NEURO: - Arousable and agitated overnight until this morning, refusing to answer medications.  Defer delirogenic medications. Pain control PRN.   - Pt w/ hx of myasthenia gravis on prednisone at home - to continue SDS w/ hydrocortisone 2/2 to shock state.    CV:  - hypotensive in the ED s/p 2L NS bolus now s/p levo gtt.  - Maintain MAP>65.  - Continue hydrocortisone  - Continue midodrine 10 q8h.  - Bedside echo showing normal atrial movement, mod MR, minimal ventricular wall movement in L>R apices of heart.   - TTE 11/20 -  LVEF is estimated at 35%, The apex, mid to spical anteroseptal wall and mid to distal anterolateral walls appear severely hypokinetic, trace MR. negative for intracardiac shunt  - f/u AM CMP, Mg, Phos.    PULM:   - Required supplemental O2 w/ NC on admission now downtitrated to room air. Maintain O2 sat >94%.    RENAL:  - Guerrero catheter in place with adequate, although improved turbid output. Trend lytes, maintain K>4, Mg>2, Phos>3. Avoiding nephrotoxic medications.     GI:   -Continue reg diet to assist with recovery from shock state.    ID:  - Patient UA w/ Turbid, large LE and + nitrites coming in with guerrero  - Continue meropenem 1g q8h.   - f/u BCx, UCx.  - Leukocytosis downtrending, continue to monitor along w/ fever curve.  - sputum cultures ordered, f/u results - now on airborne precautions r/o TB.   - f/u quant    ENDO:  - BG achs, goal Glu 110-180.    HEME:  - Hb 14->~11.1 likely dilutional 2/2 to 1Lx2 NS bolus due to shock state. -stable this AM  - No inteventions performed at this time, OK to resume home Eliquis 5mg BID as patient has hx of PE.     DISPO:  - In ICU, patient stable for transfer to medical floors.    Tubes/Lines: peripheral IV, nephrostomy tube, guerrero.

## 2024-11-21 NOTE — CONSULT NOTE ADULT - SUBJECTIVE AND OBJECTIVE BOX
[facility]  INFECTIOUS DISEASES   [address] [place] [address]  Tel: [phone]     Fax: [phone]  ========================================================  MD Nikolas Oquendo Michelle, MD Shah, Kaushal, MD Sunjit, Jaspal, MD Sehrish Shahid, MD   ========================================================    MRN-[id number]  DEION HEATH     CC: Patient is a 79y old  Male who presents with a chief complaint of Urosepsis (2024 11:09)    HPI:  78 yo M with PMHx HTN, CHF, PE (on eliquis), Myasthenia Gravis, and chronic LE edema, nephrolithiasis (s/p nephrostomy tube placement ), urosepsis, BPH BIBEMS from [facility] for hypertension and elevated WBC count. Per paperwork, nursing home paperwork, pt hypertensive to 154/105 and recieved metoprolol 25 mg (8:10 PM), and reported pt with "evelated WBC". Upon arrival, pt found to be hypotensive to 90/64. Arrived to ED with nephrostomy tube, chronic guerrero and PICC line in place. Pt was recieving IV zosyn x7d (start date ) per paperwork review for UTI. Pt with no active complaints, denies fever, chills, chest pain, SOB, abdominal pain, n/v/d/c or dysuria.    Of note, pt recently admitted to [facility] in 2024 and urgently transferred to [facility] for emergent nephrostomy tube placement after failed stent placement for 7mm kidney stone. Managed in ICU for septic shock/urosepsis and Klebsiella Bacteremia, treated with IV rocephin and vasopressors.     Interval HPI:  Pt seen and examined at bedside. No overnight events. Pt denies any complaints at this time.       ED Course:   Vitals: BP: 90/64--> 105/59, HR: 100, Temp: 97.6F-->100.3F, RR: 19, SpO2: 99% on 2L   Labs: WBC 25.27, Lactate 3.1, Trop 1099.8, pro-BNP   UA: Turbid, large blood, large LEC, positive nitrite, many bacteria, WBC TNTC  CXR:   CT: < from: CT Abdomen and Pelvis No Cont (. @ 01:49) >  2.6 cm left lobe thyroid nodule. Nonemergent thyroid ultrasound is   recommended.    Subcentimeter granulomas in the right upper lobe. Scattered reticular and   mild airspace opacities throughout both lungs. Calcified right hilar and   mediastinal lymph nodes. The constellation of findings likely represents   granulomatous infection question tuberculosis or sarcoidosis.    Innumerable punctate calcifications throughout the liver and spleen   consistent with prior granulomatous infection.    Dense gallbladder sludge or small stones. No surrounding fluid.    Percutaneous right nephrostomy tube. No hydronephrosis. Unremarkable left   kidney.    Urinary bladder is collapsed around a Guerrero catheter limiting evaluation.   There are at least 3 urinary bladder stone measuring up to 1.0 cm.    EKG: NSR rate 92bpm. LAD. QTc 403ms.  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 for Club Foot Right foot      Pilonidal cyst  Surgery 40 years ago      H/O colonoscopy      Spinal stenosis      H/O prostate biopsy          Social Hx: No current smoking, EtOH or drugs     FAMILY HISTORY:  Family history of stroke (Father)  Father -  age 62    Family history of kidney disease (Mother)  Mother -  age 67    Family history of diabetes mellitus type II (Sibling)  Brother & Sister    Noncontributory     Allergies    levofloxacin (Unknown)  ofloxacin (Unknown)  gatifloxacin (Unknown)    Intolerances    fluoroquinolone antibiotics (Other)  Ketek (Other)  Avelox (Other)  telithromycin (Other)      MEDICATIONS  (STANDING):  apixaban 5 milliGRAM(s) Oral every 12 hours  chlorhexidine 2% Cloths 1 Application(s) Topical <User Schedule>  hydrocortisone sodium succinate Injectable 50 milliGRAM(s) IV Push every 8 hours  meropenem  IVPB 1000 milliGRAM(s) IV Intermittent every 8 hours    MEDICATIONS  (PRN):  acetaminophen     Tablet .. 650 milliGRAM(s) Oral every 6 hours PRN Temp greater or equal to 38C (100.4F), Mild Pain (1 - 3)  midodrine 10 milliGRAM(s) Oral every 8 hours PRN if SBP <110  ondansetron Injectable 4 milliGRAM(s) IV Push every 8 hours PRN Nausea and/or Vomiting  sodium chloride 3%  Inhalation 4 milliLiter(s) Inhalation every 8 hours PRN sputum induction       REVIEW OF SYSTEMS:  CONSTITUTIONAL:  No Fever or chills  HEENT: No cough  CARDIOVASCULAR:  No chest pain   RESPIRATORY:  No cough, shortness of breath  GASTROINTESTINAL:  No nausea, vomiting or diarrhea.  GENITOURINARY:  No dysuria, frequency or urgency. No Blood in urine    Physical Exam:  Vital Signs Last 24 Hrs  T(C): 36.5 (2024 11:54), Max: 36.7 (2024 19:57)  T(F): 97.7 (2024 11:54), Max: 98.1 (2024 19:57)  HR: 82 (2024 11:00) (42 - 89)  BP: 121/58 (2024 11:00) (89/50 - 134/60)  BP(mean): 84 (2024 11:00) (65 - 89)  RR: 12 (2024 11:00) (7 - 22)  SpO2: 96% (2024 11:00) (92% - 99%)    Parameters below as of 2024 12:30  Patient On (Oxygen Delivery Method): room air        GEN: NAD  HEENT: normocephalic and atraumatic.  LUNGS: Clear to auscultation.  HEART: Regular rate and rhythm without murmur.  ABDOMEN: Soft, nontender, and nondistended.  Positive bowel sounds.    EXTREMITIES: Without edema.      Labs:      144  |  114[H]  |  16  ----------------------------<  121[H]  4.6   |  23  |  0.60    Ca    9.7      2024 08:50  Phos  3.0       Mg     2.2         TPro  6.1  /  Alb  2.0[L]  /  TBili  0.3  /  DBili  x   /  AST  13[L]  /  ALT  19  /  AlkPhos  63                            12.3   17.20 )-----------( 389      ( 2024 08:50 )             38.4     PT/INR - ( 2024 22:25 )   PT: 17.1 sec;   INR: 1.47 ratio         PTT - ( 2024 22:25 )  PTT:36.1 sec  Urinalysis Basic - ( 2024 08:50 )    Color: x / Appearance: x / SG: x / pH: x  Gluc: 121 mg/dL / Ketone: x  / Bili: x / Urobili: x   Blood: x / Protein: x / Nitrite: x   Leuk Esterase: x / RBC: x / WBC x   Sq Epi: x / Non Sq Epi: x / Bacteria: x      LIVER FUNCTIONS - ( 2024 08:50 )  Alb: 2.0 g/dL / Pro: 6.1 g/dL / ALK PHOS: 63 U/L / ALT: 19 U/L / AST: 13 U/L / GGT: x           CARDIAC MARKERS ( 2024 03:55 )  x     / x     / x     / x     / 3.3 ng/mL              SARS-CoV-2: NotDetec (24 @ 00:00)      RECENT CULTURES:   @ 00:00          Detected   @ 22:25 .Blood BLOOD     No growth at 24 hours         @ 22:20 .Blood BLOOD     No growth at 24 hours              All imaging and other data have been reviewed.      Assessment and Plan:   78 yo M with PMHx HTN, CHF, PE (on eliquis), Myasthenia Gravis, and chronic LE edema, nephrolithiasis (s/p nephrostomy tube placement ), urosepsis, BPH BIBEMS from [facility] for hypertension and elevated WBC count. Pt was consulted for urosepsis and r/o TB    #Urosepsis  -Hx of urosepsis of which was found to be Klebsiella bacteremia treated w/ Rocephin  -Was febrile on admission and w/ leukocytosis. Afebrile currently and WBC down-trending  -Urinalysis was turbid, large leukocyte esterase, nitrite positive, and many bacteria  -Bcxs x2 (negative at 24 hrs)  -c/w meropenem 1g q8h until blood cultures and urine culture result    #R/o TB  -CT abdomen and pelvis: Subcentimeter granulomas in the right upper lobe. Scattered reticular and mild airspace opacities throughout both lungs. Calcified right hilar and mediastinal lymph nodes. The constellation of findings likely represents granulomatous infection question tuberculosis or sarcoidosis. Innumerable punctate calcifications throughout the liver and spleen consistent with prior granulomatous infection.  -F/u sputum cultures and quantiferon    #Other  -Rhinovirus (+), COVID-19 (-).   -MRSA (-). Staph aureus (+)              Thank you for courtesy of this consult.     Will follow.  Discussed with the primary team.     Jordin Mckinley MD  Division of Infectious Diseases   Please call ID service at [phone] with any question.    75 minutes spent on total encounter assessing patient, examination, chart review, counseling and coordinating care by the attending physician/nurse/care manager.   [facility]  INFECTIOUS DISEASES   [address] [place] [address]  Tel: [phone]     Fax: [phone]  ========================================================  MD Nikolas Oquendo Michelle, MD Shah, Kaushal, MD Sunjit, Jaspal, MD Sehrish Shahid, MD   ========================================================    MRN-[id number]  DEION HEATH     CC: Patient is a 79y old  Male who presents with a chief complaint of Urosepsis (2024 11:09)    HPI:  78 yo M with PMHx HTN, CHF, PE (on eliquis), Myasthenia Gravis, and chronic LE edema, nephrolithiasis (s/p nephrostomy tube placement ), urosepsis, BPH BIBEMS from [facility] for hypertension and elevated WBC count. Per paperwork, nursing home paperwork, pt hypertensive to 154/105 and recieved metoprolol 25 mg (8:10 PM), and reported pt with "evelated WBC". Upon arrival, pt found to be hypotensive to 90/64. Arrived to ED with nephrostomy tube, chronic guerrero and PICC line in place. Pt was recieving IV zosyn x7d (start date ) per paperwork review for UTI. Pt with no active complaints, denies fever, chills, chest pain, SOB, abdominal pain, n/v/d/c or dysuria.    Of note, pt recently admitted to [facility] in 2024 and urgently transferred to [facility] for emergent nephrostomy tube placement after failed stent placement for 7mm kidney stone. Managed in ICU for septic shock/urosepsis and Klebsiella Bacteremia, treated with IV rocephin and vasopressors.     Interval HPI:  Pt seen and examined at bedside. No overnight events. Pt denies any complaints at this time.       ED Course:   Vitals: BP: 90/64--> 105/59, HR: 100, Temp: 97.6F-->100.3F, RR: 19, SpO2: 99% on 2L   Labs: WBC 25.27, Lactate 3.1, Trop 1099.8, pro-BNP   UA: Turbid, large blood, large LEC, positive nitrite, many bacteria, WBC TNTC  CXR:   CT: < from: CT Abdomen and Pelvis No Cont (. @ 01:49) >  2.6 cm left lobe thyroid nodule. Nonemergent thyroid ultrasound is   recommended.    Subcentimeter granulomas in the right upper lobe. Scattered reticular and   mild airspace opacities throughout both lungs. Calcified right hilar and   mediastinal lymph nodes. The constellation of findings likely represents   granulomatous infection question tuberculosis or sarcoidosis.    Innumerable punctate calcifications throughout the liver and spleen   consistent with prior granulomatous infection.    Dense gallbladder sludge or small stones. No surrounding fluid.    Percutaneous right nephrostomy tube. No hydronephrosis. Unremarkable left   kidney.    Urinary bladder is collapsed around a Guerrero catheter limiting evaluation.   There are at least 3 urinary bladder stone measuring up to 1.0 cm.    EKG: NSR rate 92bpm. LAD. QTc 403ms.  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 for Club Foot Right foot      Pilonidal cyst  Surgery 40 years ago      H/O colonoscopy      Spinal stenosis      H/O prostate biopsy          Social Hx: No current smoking, EtOH or drugs     FAMILY HISTORY:  Family history of stroke (Father)  Father -  age 62    Family history of kidney disease (Mother)  Mother -  age 67    Family history of diabetes mellitus type II (Sibling)  Brother & Sister    Noncontributory     Allergies    levofloxacin (Unknown)  ofloxacin (Unknown)  gatifloxacin (Unknown)    Intolerances    fluoroquinolone antibiotics (Other)  Ketek (Other)  Avelox (Other)  telithromycin (Other)      MEDICATIONS  (STANDING):  apixaban 5 milliGRAM(s) Oral every 12 hours  chlorhexidine 2% Cloths 1 Application(s) Topical <User Schedule>  hydrocortisone sodium succinate Injectable 50 milliGRAM(s) IV Push every 8 hours  meropenem  IVPB 1000 milliGRAM(s) IV Intermittent every 8 hours    MEDICATIONS  (PRN):  acetaminophen     Tablet .. 650 milliGRAM(s) Oral every 6 hours PRN Temp greater or equal to 38C (100.4F), Mild Pain (1 - 3)  midodrine 10 milliGRAM(s) Oral every 8 hours PRN if SBP <110  ondansetron Injectable 4 milliGRAM(s) IV Push every 8 hours PRN Nausea and/or Vomiting  sodium chloride 3%  Inhalation 4 milliLiter(s) Inhalation every 8 hours PRN sputum induction       REVIEW OF SYSTEMS:  CONSTITUTIONAL:  No Fever or chills  HEENT: No cough  CARDIOVASCULAR:  No chest pain   RESPIRATORY:  No cough, shortness of breath  GASTROINTESTINAL:  No nausea, vomiting or diarrhea.  GENITOURINARY:  No dysuria, frequency or urgency. No Blood in urine    Physical Exam:  Vital Signs Last 24 Hrs  T(C): 36.5 (2024 11:54), Max: 36.7 (2024 19:57)  T(F): 97.7 (2024 11:54), Max: 98.1 (2024 19:57)  HR: 82 (2024 11:00) (42 - 89)  BP: 121/58 (2024 11:00) (89/50 - 134/60)  BP(mean): 84 (2024 11:00) (65 - 89)  RR: 12 (2024 11:00) (7 - 22)  SpO2: 96% (2024 11:00) (92% - 99%)    Parameters below as of 2024 12:30  Patient On (Oxygen Delivery Method): room air        GEN: NAD  HEENT: normocephalic and atraumatic.  LUNGS: Clear to auscultation.  HEART: Regular rate and rhythm without murmur.  ABDOMEN: Soft, nontender, and nondistended.  Positive bowel sounds.    EXTREMITIES: Without edema.      Labs:      144  |  114[H]  |  16  ----------------------------<  121[H]  4.6   |  23  |  0.60    Ca    9.7      2024 08:50  Phos  3.0       Mg     2.2         TPro  6.1  /  Alb  2.0[L]  /  TBili  0.3  /  DBili  x   /  AST  13[L]  /  ALT  19  /  AlkPhos  63                            12.3   17.20 )-----------( 389      ( 2024 08:50 )             38.4     PT/INR - ( 2024 22:25 )   PT: 17.1 sec;   INR: 1.47 ratio         PTT - ( 2024 22:25 )  PTT:36.1 sec  Urinalysis Basic - ( 2024 08:50 )    Color: x / Appearance: x / SG: x / pH: x  Gluc: 121 mg/dL / Ketone: x  / Bili: x / Urobili: x   Blood: x / Protein: x / Nitrite: x   Leuk Esterase: x / RBC: x / WBC x   Sq Epi: x / Non Sq Epi: x / Bacteria: x      LIVER FUNCTIONS - ( 2024 08:50 )  Alb: 2.0 g/dL / Pro: 6.1 g/dL / ALK PHOS: 63 U/L / ALT: 19 U/L / AST: 13 U/L / GGT: x           CARDIAC MARKERS ( 2024 03:55 )  x     / x     / x     / x     / 3.3 ng/mL              SARS-CoV-2: NotDetec (24 @ 00:00)      RECENT CULTURES:   @ 00:00          Detected   @ 22:25 .Blood BLOOD     No growth at 24 hours         @ 22:20 .Blood BLOOD     No growth at 24 hours              All imaging and other data have been reviewed.      Assessment and Plan:   78 yo M with PMHx HTN, CHF, PE (on eliquis), Myasthenia Gravis, and chronic LE edema, nephrolithiasis (s/p nephrostomy tube placement ), urosepsis, BPH BIBEMS from [facility] for hypertension and elevated WBC count. Pt was consulted for urosepsis and r/o TB    #Urosepsis  -Hx of urosepsis of which was found to be Klebsiella bacteremia treated w/ Rocephin  -Was febrile on admission and w/ leukocytosis. Afebrile currently and WBC down-trending  -Urinalysis was turbid, large leukocyte esterase, nitrite positive, and many bacteria  -Bcxs x2 (negative at 24 hrs)  -c/w meropenem 1g q8h until blood cultures and urine culture result    #R/o TB  -CT abdomen and pelvis: Subcentimeter granulomas in the right upper lobe. Scattered reticular and mild airspace opacities throughout both lungs. Calcified right hilar and mediastinal lymph nodes. The constellation of findings likely represents granulomatous infection question tuberculosis or sarcoidosis. Innumerable punctate calcifications throughout the liver and spleen consistent with prior granulomatous infection.  -F/u sputum cultures and quantiferon    #Other  -Rhinovirus (+), COVID-19 (-)  -MRSA (-). Staph aureus (+)              Thank you for courtesy of this consult.     Will follow.  Discussed with the primary team.     Jordin Mckinley MD  Division of Infectious Diseases   Please call ID service at [phone] with any question.    75 minutes spent on total encounter assessing patient, examination, chart review, counseling and coordinating care by the attending physician/nurse/care manager.   [facility]  INFECTIOUS DISEASES   [address] [place] [address]  Tel: [phone]     Fax: [phone]  ========================================================  MD Nikolas Oquendo Michelle, MD Shah, Kaushal, MD Sunjit, Jaspal, MD Sehrish Shahid, MD   ========================================================    MRN-[id number]  DEION HEATH     CC: Patient is a 79y old  Male who presents with a chief complaint of Urosepsis (2024 11:09)    HPI:  78 yo M with PMHx HTN, CHF, PE (on eliquis), Myasthenia Gravis, and chronic LE edema, R ureteral obstructing stone s/p R PCN placement on 24, urosepsis, BPH, who presented from [facility] for hypertension and elevated WBC count. Per nursing home paperwork, pt hypertensive to 154/105 and recieved metoprolol 25 mg (8:10 PM), and reported pt with "elevated WBC". Upon arrival, pt found to be hypotensive to 90/64. Arrived to ED with nephrostomy tube, chronic guerrero and PICC line in place. Pt was recieving IV zosyn x7d (start date ) per paperwork review for UTI. Pt with no active complaints, denies fever, chills, chest pain, SOB, abdominal pain, n/v/d/c or dysuria.    Of note, pt recently admitted to [facility] in 2024 and urgently transferred to [facility] for emergent nephrostomy tube placement after failed stent placement for 7mm kidney stone. Managed in ICU for septic shock/urosepsis and Klebsiella Bacteremia, treated with IV rocephin and vasopressors.     Interval HPI:  Pt seen and examined at bedside. No overnight events. Pt denies any complaints at this time. Patient denies any fevers, night sweats, cough, SOB, pain, nausea or diarrhea. Denies any hx of tuberculosis.      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      Club foot  Surgery at birth for Club Foot Right foot      Pilonidal cyst  Surgery 40 years ago      H/O colonoscopy      Spinal stenosis      H/O prostate biopsy          Social Hx: No current smoking, EtOH or drugs     FAMILY HISTORY:  Family history of stroke (Father)  Father -  age 62    Family history of kidney disease (Mother)  Mother -  age 67    Family history of diabetes mellitus type II (Sibling)  Brother & Sister    Noncontributory     Allergies    levofloxacin (Unknown)  ofloxacin (Unknown)  gatifloxacin (Unknown)    Intolerances    fluoroquinolone antibiotics (Other)  Ketek (Other)  Avelox (Other)  telithromycin (Other)      MEDICATIONS  (STANDING):  apixaban 5 milliGRAM(s) Oral every 12 hours  chlorhexidine 2% Cloths 1 Application(s) Topical <User Schedule>  hydrocortisone sodium succinate Injectable 50 milliGRAM(s) IV Push every 8 hours  meropenem  IVPB 1000 milliGRAM(s) IV Intermittent every 8 hours    MEDICATIONS  (PRN):  acetaminophen     Tablet .. 650 milliGRAM(s) Oral every 6 hours PRN Temp greater or equal to 38C (100.4F), Mild Pain (1 - 3)  midodrine 10 milliGRAM(s) Oral every 8 hours PRN if SBP <110  ondansetron Injectable 4 milliGRAM(s) IV Push every 8 hours PRN Nausea and/or Vomiting  sodium chloride 3%  Inhalation 4 milliLiter(s) Inhalation every 8 hours PRN sputum induction       REVIEW OF SYSTEMS:  CONSTITUTIONAL:  No Fever or chills  HEENT: No cough  CARDIOVASCULAR:  No chest pain   RESPIRATORY:  No cough, shortness of breath  GASTROINTESTINAL:  No nausea, vomiting or diarrhea.  GENITOURINARY:  No dysuria  MUSCULOSKELETAL: No back pain  NEURO: No headache    Physical Exam:  Vital Signs Last 24 Hrs  T(C): 36.5 (2024 11:54), Max: 36.7 (2024 19:57)  T(F): 97.7 (2024 11:54), Max: 98.1 (2024 19:57)  HR: 82 (2024 11:00) (42 - 89)  BP: 121/58 (2024 11:00) (89/50 - 134/60)  BP(mean): 84 (2024 11:00) (65 - 89)  RR: 12 (2024 11:00) (7 - 22)  SpO2: 96% (2024 11:00) (92% - 99%)    Parameters below as of 2024 12:30  Patient On (Oxygen Delivery Method): room air        GEN: NAD  HEENT: normocephalic and atraumatic.  LUNGS: Clear to auscultation.  HEART: Regular rate and rhythm   ABDOMEN: Soft, nontender, and nondistended.    : + R PCN  EXTREMITIES: Without edema.  NEURO: Answering simple questions appropriately    Labs:      144  |  114[H]  |  16  ----------------------------<  121[H]  4.6   |  23  |  0.60    Ca    9.7      2024 08:50  Phos  3.0       Mg     2.2         TPro  6.1  /  Alb  2.0[L]  /  TBili  0.3  /  DBili  x   /  AST  13[L]  /  ALT  19  /  AlkPhos  63  -                          12.3   17.20 )-----------( 389      ( 2024 08:50 )             38.4     PT/INR - ( 2024 22:25 )   PT: 17.1 sec;   INR: 1.47 ratio         PTT - ( 2024 22:25 )  PTT:36.1 sec  Urinalysis Basic - ( 2024 08:50 )    Color: x / Appearance: x / SG: x / pH: x  Gluc: 121 mg/dL / Ketone: x  / Bili: x / Urobili: x   Blood: x / Protein: x / Nitrite: x   Leuk Esterase: x / RBC: x / WBC x   Sq Epi: x / Non Sq Epi: x / Bacteria: x      LIVER FUNCTIONS - ( 2024 08:50 )  Alb: 2.0 g/dL / Pro: 6.1 g/dL / ALK PHOS: 63 U/L / ALT: 19 U/L / AST: 13 U/L / GGT: x           CARDIAC MARKERS ( 2024 03:55 )  x     / x     / x     / x     / 3.3 ng/mL              SARS-CoV-2: NotDetec (24 @ 00:00)      RECENT CULTURES:   @ 00:00          Detected   @ 22:25 .Blood BLOOD     No growth at 24 hours         @ :20 .Blood BLOOD     No growth at 24 hours              All imaging and other data have been reviewed.    < from: CT Abdomen and Pelvis No Cont (24 @ 01:49) >  FINDINGS:  CHEST:  LUNGS AND LARGE AIRWAYS: Patent central airways.  Subcentimeter granulomas in the right upper lobe. Scattered reticular and   mild airspace opacities throughout both lungs.  PLEURA: No pleural effusion.  VESSELS: Aortic calcifications. Coronary artery calcifications.  HEART: Heart size is normal. No pericardial effusion.  MEDIASTINUM AND KONRAD: Calcified right hilar and mediastinal lymph nodes.  CHEST WALL AND LOWER NECK: 2.6 cm left lobe thyroid nodule. Nonemergent   thyroid ultrasound is recommended.    ABDOMEN AND PELVIS:  LIVER: Innumerable punctate calcifications throughout the liver.  BILE DUCTS: Normal caliber.  GALLBLADDER: Dense gallbladder sludge or small stones. No surrounding   fluid.  SPLEEN: Innumerable punctate calcifications.  PANCREAS: Within normal limits.  ADRENALS: Within normal limits.  KIDNEYS/URETERS: Percutaneous right nephrostomy tube. No hydronephrosis.   Unremarkable left kidney.    BLADDER: Urinary bladder is collapsed around a Guerrero catheter limiting   evaluation. There are at least 3 urinary bladder stone measuring upto   1.0 cm.  REPRODUCTIVE ORGANS: Prostate is enlarged.    BOWEL: No bowel obstruction. Appendix is normal.  PERITONEUM/RETROPERITONEUM: Within normal limits.  VESSELS: Within normal limits.  LYMPH NODES: No lymphadenopathy.  ABDOMINAL WALL: Within normal limits.  BONES: Degenerative changes. Scoliosis.    IMPRESSION:    2.6 cm left lobe thyroid nodule. Nonemergent thyroid ultrasound is   recommended.    Subcentimeter granulomas in the right upper lobe. Scattered reticular and   mild airspace opacities throughout both lungs. Calcified right hilar and   mediastinal lymph nodes. The constellation of findings likely represents   granulomatous infection question tuberculosis or sarcoidosis.    Innumerable punctate calcifications throughout the liver and spleen   consistent with prior granulomatous infection.    Dense gallbladder sludge or small stones. No surrounding fluid.    Percutaneous right nephrostomy tube. No hydronephrosis. Unremarkable left   kidney.    Urinary bladder is collapsed around a Guerrero catheter limiting evaluation.   There are at least 3 urinary bladder stone measuring up to 1.0 cm.        < end of copied text >      Assessment and Plan:   78 yo M with PMHx HTN, CHF, PE (on eliquis), Myasthenia Gravis, and chronic LE edema, nephrolithiasis (s/p nephrostomy tube placement ), urosepsis, BPH BIBEMS from [facility] for hypertension and elevated WBC count. Pt was consulted for urosepsis and r/o TB    #Urosepsis  -Hx of urosepsis of which was found to be Klebsiella bacteremia treated w/ Rocephin  -Was febrile on admission and w/ leukocytosis. Afebrile currently and WBC down-trending  -Urinalysis was turbid, large leukocyte esterase, nitrite positive, and many bacteria  -Bcxs x2 (negative at 24 hrs)  -c/w meropenem 1g q8h until blood cultures and urine culture result    #R/o TB  -CT abdomen and pelvis: Subcentimeter granulomas in the right upper lobe. Scattered reticular and mild airspace opacities throughout both lungs. Calcified right hilar and mediastinal lymph nodes. The constellation of findings likely represents granulomatous infection question tuberculosis or sarcoidosis. Innumerable punctate calcifications throughout the liver and spleen consistent with prior granulomatous infection.  -F/u sputum cultures and quantiferon    #Other  -Rhinovirus (+), COVID-19 (-)  -MRSA (-). Staph aureus (+)

## 2024-11-21 NOTE — DISCHARGE NOTE PROVIDER - NSDCCPCAREPLAN_GEN_ALL_CORE_FT
PRINCIPAL DISCHARGE DIAGNOSIS  Diagnosis: Recurrent sepsis due to urinary tract infection  Assessment and Plan of Treatment: You were given course of IV antibiotics and clinically improved.  Please follow up with your PMD in 1 week.  Follow up with your urologist in 1 week for further evaluation of bladder stones.      SECONDARY DISCHARGE DIAGNOSES  Diagnosis: Myasthenia gravis  Assessment and Plan of Treatment: Continue prednisone.    Diagnosis: Personal history of pulmonary embolism  Assessment and Plan of Treatment: Continue Eliquis    Diagnosis: Chronic CHF  Assessment and Plan of Treatment: Resume lasix and metoprolol succinate.    Diagnosis: Thyroid nodule  Assessment and Plan of Treatment: TSH was normal.  Please follow up with your PMD for surveillance.    Diagnosis: NSVT (nonsustained ventricular tachycardia)  Assessment and Plan of Treatment: Continue Metoprolol succinate.  Please follow up with Cardiology in 1-2 weeks.     PRINCIPAL DISCHARGE DIAGNOSIS  Diagnosis: Recurrent sepsis due to urinary tract infection  Assessment and Plan of Treatment: You were given course of IV antibiotics and clinically improved.  Please follow up with your PMD in 1 week.  Follow up with urology in 1-2 weeks for further evaluation of bladder stones and arrangement for lithotripsy.      SECONDARY DISCHARGE DIAGNOSES  Diagnosis: Myasthenia gravis  Assessment and Plan of Treatment: Continue prednisone.    Diagnosis: Personal history of pulmonary embolism  Assessment and Plan of Treatment: Continue Eliquis    Diagnosis: Chronic CHF  Assessment and Plan of Treatment: Resume lasix and metoprolol succinate.    Diagnosis: Thyroid nodule  Assessment and Plan of Treatment: TSH was normal.  Please follow up with your PMD for surveillance.    Diagnosis: NSVT (nonsustained ventricular tachycardia)  Assessment and Plan of Treatment: Continue Metoprolol succinate.  Please follow up with Cardiology in 1-2 weeks.

## 2024-11-21 NOTE — DISCHARGE NOTE PROVIDER - CARE PROVIDERS DIRECT ADDRESSES
[email or web address]

## 2024-11-21 NOTE — PROGRESS NOTE ADULT - PROBLEM SELECTOR PLAN 1
- Pt p/w elevated WBC, pt asymptomatic. Hx urosepsis/klebsiella bacteremia treated with IV rocephin, hospital course complicated by Afib with RVR during recent admission (9/2024)  - Per CI paperwork, pt s/p 7d course of IV zosyn via PICC (11/4-11/10)  - Met sepsis criteria on admission with leukocytosis, lactate, tachycardia  - WBC 25.27, per chart review baseline around 12-13 (on chronic steroids for MG)  - UA: Turbid, large blood, large LEC, positive nitrite, many bacteria, WBC TNTC  - CT: Urinary bladder collapsed, limiting evaluation, at least 3 stones measuring up to 1.0 cm. R nephrostomy tube.  - Restrepo catheter present on admission,    - Pt admitted to ICU for septic shock 2/2 UTI and rhino/enterovirus  - s/p  Levophed gtt now midodrine  - Continue Meropenem   -  BCx x2 neg , UCx, gram neg stefani  sputum Cx  Management per ICU

## 2024-11-21 NOTE — DISCHARGE NOTE PROVIDER - HOSPITAL COURSE
FROM ADMISSION H+P:   HPI:  80 yo M with PMHx HTN, CHF, PE (on eliquis), Myasthenia Gravis, and chronic LE edema, nephrolithiasis (s/p nephrostomy tube placement ), urosepsis, BPH BIBEMS from [facility] for hypertension and elevated WBC count. Per paperwork, nursing home paperwork, pt hypertensive to 154/105 and recieved metoprolol 25 mg (8:10 PM), and reported pt with "evelated WBC". Upon arrival, pt found to be hypotensive to 90/64. Arrived to ED with nephrostomy tube, chronic guerrero and PICC line in place. Pt was recieving IV zosyn x7d (start date 11/04) per paperwork review for UTI. Pt with no active complaints, denies fever, chills, chest pain, SOB, abdominal pain, n/v/d/c or dysuria.    Of note, pt recently admitted to [facility] in Sept 2024 and urgently transferred to [facility] for emergent nephrostomy tube placement after failed stent placement for 7mm kidney stone. Managed in ICU for septic shock/urosepsis and Klebsiella Bacteremia, treated with IV rocephin and vasopressors.       ED Course:   Vitals: BP: 90/64--> 105/59, HR: 100, Temp: 97.6F-->100.3F, RR: 19, SpO2: 99% on 2L   Labs: WBC 25.27, Lactate 3.1, Trop 1099.8, pro-BNP 1988  UA: Turbid, large blood, large LEC, positive nitrite, many bacteria, WBC TNTC  CXR:   CT: < from: CT Abdomen and Pelvis No Cont (11.20.24 @ 01:49) >  2.6 cm left lobe thyroid nodule. Nonemergent thyroid ultrasound is   recommended.    Subcentimeter granulomas in the right upper lobe. Scattered reticular and   mild airspace opacities throughout both lungs. Calcified right hilar and   mediastinal lymph nodes. The constellation of findings likely represents   granulomatous infection question tuberculosis or sarcoidosis.    Innumerable punctate calcifications throughout the liver and spleen   consistent with prior granulomatous infection.    Dense gallbladder sludge or small stones. No surrounding fluid.    Percutaneous right nephrostomy tube. No hydronephrosis. Unremarkable left   kidney.    Urinary bladder is collapsed around a Guerrero catheter limiting evaluation.   There are at least 3 urinary bladder stone measuring up to 1.0 cm.    EKG: NSR rate 92bpm. LAD. QTc 403ms.  Received in the ED: IV vancomycin x1, IV zosyn x1, ofrimev 1g IV x1, 1000cc NS bolus x2, ASA 324mg x1   (20 Nov 2024 00:43)      ---  HOSPITAL COURSE:   Patient was admitted for distributive shock and urosepsis 2/2 to UTI. Due to patient's condition and sudden hypotension, patient was admitted to the ICU. Patient placed on levo gt and midodrine. Patient BPs improved, levo gtt downtitrated and eventually discontinued. Trops downtrended likely 2/2 to demand ischemia. Cardiology consulted. Patient restarted on home eliquis as no acute inteventions done as of this time. 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 TTE w/ bubble study confirming LVEF 35%, hypokinetic apices and trace MR. No evidence of intracardiac shunt. Quant showed ___. Sputum cultures showed ___. Cardiology consulted     Patient seen and evaluated on day of discharged. Medically optimized and table for discharge to_  ---  CONSULTANTS:   Cardiology Dr. Woods    ---  TIME SPENT:  I, the attending physician, was physically present for the key portions of the evaluation and management (E/M) service provided. The total amount of time spent reviewing the hospital notes, laboratory values, imaging findings, assessing/counseling the patient, discussing with consultant physicians, social work, nursing staff was -- minutes    ---  Primary care provider was made aware of plan for discharge:      [  ] NO     [  ] YES   FROM ADMISSION H+P:   HPI:  78 yo M with PMHx HTN, CHF, PE (on eliquis), Myasthenia Gravis, and chronic LE edema, nephrolithiasis (s/p nephrostomy tube placement ), urosepsis, BPH BIBEMS from [facility] for hypertension and elevated WBC count. Per paperwork, nursing home paperwork, pt hypertensive to 154/105 and recieved metoprolol 25 mg (8:10 PM), and reported pt with "evelated WBC". Upon arrival, pt found to be hypotensive to 90/64. Arrived to ED with nephrostomy tube, chronic lauren and PICC line in place. Pt was recieving IV zosyn x7d (start date 11/04) per paperwork review for UTI. Pt with no active complaints, denies fever, chills, chest pain, SOB, abdominal pain, n/v/d/c or dysuria.    Of note, pt recently admitted to [facility] in Sept 2024 and urgently transferred to [facility] for emergent nephrostomy tube placement after failed stent placement for 7mm kidney stone. Managed in ICU for septic shock/urosepsis and Klebsiella Bacteremia, treated with IV rocephin and vasopressors.       ED Course:   Vitals: BP: 90/64--> 105/59, HR: 100, Temp: 97.6F-->100.3F, RR: 19, SpO2: 99% on 2L   Labs: WBC 25.27, Lactate 3.1, Trop 1099.8, pro-BNP 1988  UA: Turbid, large blood, large LEC, positive nitrite, many bacteria, WBC TNTC  CXR:   CT: < from: CT Abdomen and Pelvis No Cont (11.20.24 @ 01:49) >  2.6 cm left lobe thyroid nodule. Nonemergent thyroid ultrasound is   recommended.    Subcentimeter granulomas in the right upper lobe. Scattered reticular and   mild airspace opacities throughout both lungs. Calcified right hilar and   mediastinal lymph nodes. The constellation of findings likely represents   granulomatous infection question tuberculosis or sarcoidosis.    Innumerable punctate calcifications throughout the liver and spleen   consistent with prior granulomatous infection.    Dense gallbladder sludge or small stones. No surrounding fluid.    Percutaneous right nephrostomy tube. No hydronephrosis. Unremarkable left   kidney.    Urinary bladder is collapsed around a Lauren catheter limiting evaluation.   There are at least 3 urinary bladder stone measuring up to 1.0 cm.    EKG: NSR rate 92bpm. LAD. QTc 403ms.  Received in the ED: IV vancomycin x1, IV zosyn x1, ofrimev 1g IV x1, 1000cc NS bolus x2, ASA 324mg x1   (20 Nov 2024 00:43)      ---  HOSPITAL COURSE:   Patient was admitted for distributive shock and  sepsis 2/2 to UTI - Met sepsis criteria on admission with leukocytosis, lactate, tachycardia  - WBC appears to be at baseline, per chart review baseline around 12-13 (on chronic steroids for MG)  - UA: Turbid, large blood, large LEC, positive nitrite, many bacteria, WBC TNTC  - CT: Urinary bladder collapsed, limiting evaluation, at least 3 stones measuring up to 1.0 cm. R nephrostomy tube intact .  . Due to patient's condition and sudden hypotension, patient was admitted to the ICU. Patient placed on levo gt and midodrine. Patient BPs improved, levo gtt downtitrated and eventually discontinued. Trops downtrended likely 2/2 to demand ischemia. Cardiology consulted. Patient restarted on home eliquis as no acute inteventions done as of this time. 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 TTE w/ bubble study confirming LVEF 35%, hypokinetic apices and trace MR. No evidence of intracardiac shunt.    septic shock   sepsis  poa  2/2  UTI and rhino/enterovirus with  -IR s/p percutaneous nephrostomy tube placement done 9/6/24  [facility]  and [facility] lauren  , LAUREN CHANGED IN ED .- Pt p/w elevated WBC, pt asymptomatic. Hx uti  sepsis/klebsiella bacteremia treated with IV Rocephin, hospital course complicated by Afib with RVR during recent admission (9/2024)  pt s/p 7d course of IV zosyn via midline  (11/4-11/10)  - s/p Levophed gtt. s/p midodrine s/p hydrocortisone  taper   now  25 mg daily for 2 day  than back Prednisone home dose   - s/p IV Meropenem, -continue   Zebraxa ceftolozane-tazobactam--plan for 5-7 day- till Friday as per id dr wilson   -   called uro consult / [facility] surg pa  as pt still have leucocytosis  this time nephrostomy need to be  changed already month old - IR changed 11/26/24 , also 2 month old midline removed  by perry manzanares.   - Blood cult neg  and sputum Cx negative  - Urine cx + klebsiella, pseudomonas, proteus  sputum AFB x 3 negative      Patient seen and evaluated on day of discharged. Medically optimized and table for discharge to_  ---  CONSULTANTS:   Cardiology Dr. Woods    ---  TIME SPENT:  I, the attending physician, was physically present for the key portions of the evaluation and management (E/M) service provided. The total amount of time spent reviewing the hospital notes, laboratory values, imaging findings, assessing/counseling the patient, discussing with consultant physicians, social work, nursing staff was -- minutes    ---  Primary care provider was made aware of plan for discharge:      [  ] NO     [  ] YES   FROM ADMISSION H+P:   HPI:  78 yo M with PMHx HTN, CHF, PE (on eliquis), Myasthenia Gravis, and chronic LE edema, nephrolithiasis (s/p nephrostomy tube placement ), urosepsis, BPH BIBEMS from [facility] for hypertension and elevated WBC count. Per paperwork, nursing home paperwork, pt hypertensive to 154/105 and recieved metoprolol 25 mg (8:10 PM), and reported pt with "evelated WBC". Upon arrival, pt found to be hypotensive to 90/64. Arrived to ED with nephrostomy tube, chronic lauren and PICC line in place. Pt was recieving IV zosyn x7d (start date 11/04) per paperwork review for UTI. Pt with no active complaints, denies fever, chills, chest pain, SOB, abdominal pain, n/v/d/c or dysuria.    Of note, [facility] and urgently transferred to [facility] for emergent nephrostomy tube placement after failed stent placement for 7mm kidney stone. Managed in ICU for septic shock/urosepsis and Klebsiella Bacteremia, treated with IV rocephin and vasopressors.       ED Course:   Vitals: BP: 90/64--> 105/59, HR: 100, Temp: 97.6F-->100.3F, RR: 19, SpO2: 99% on 2L   Labs: WBC 25.27, Lactate 3.1, Trop 1099.8, pro-BNP 1988  UA: Turbid, large blood, large LEC, positive nitrite, many bacteria, WBC TNTC  CXR:   CT: < from: CT Abdomen and Pelvis No Cont (11.20.24 @ 01:49) >  2.6 cm left lobe thyroid nodule. Nonemergent thyroid ultrasound is   recommended.    Subcentimeter granulomas in the right upper lobe. Scattered reticular and   mild airspace opacities throughout both lungs. Calcified right hilar and   mediastinal lymph nodes. The constellation of findings likely represents   granulomatous infection question tuberculosis or sarcoidosis.    Innumerable punctate calcifications throughout the liver and spleen   consistent with prior granulomatous infection.    Dense gallbladder sludge or small stones. No surrounding fluid.    Percutaneous right nephrostomy tube. No hydronephrosis. Unremarkable left   kidney.    Urinary bladder is collapsed around a Lauren catheter limiting evaluation.   There are at least 3 urinary bladder stone measuring up to 1.0 cm.    EKG: NSR rate 92bpm. LAD. QTc 403ms.  Received in the ED: IV vancomycin x1, IV zosyn x1, ofrimev 1g IV x1, 1000cc NS bolus x2, ASA 324mg x1   (20 Nov 2024 00:43)      ---  HOSPITAL COURSE:   Patient was admitted for distributive shock and  sepsis 2/2 to UTI - Met sepsis criteria on admission with leukocytosis, lactate, tachycardia  - WBC appears to be at baseline, per chart review baseline around 12-13 (on chronic steroids for MG)  - UA: Turbid, large blood, large LEC, positive nitrite, many bacteria, WBC TNTC  - CT: Urinary bladder collapsed, limiting evaluation, at least 3 stones measuring up to 1.0 cm. R nephrostomy tube intact .  . Due to patient's condition and sudden hypotension, patient was admitted to the ICU. Patient placed on levo gt and midodrine. Patient BPs improved, levo gtt downtitrated and eventually discontinued. Trops downtrended likely 2/2 to demand ischemia. Cardiology consulted. Patient restarted on home eliquis as no acute inteventions done as of this time. 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 TTE w/ bubble study confirming LVEF 35%, hypokinetic apices and trace MR. No evidence of intracardiac shunt.    septic shock   sepsis  poa  2/2  UTI and rhino/enterovirus with  -IR s/p percutaneous nephrostomy tube placement done 9/6/24  [facility]  and [facility] lauren  , LAUREN CHANGED IN ED .- Pt p/w elevated WBC, pt asymptomatic. Hx uti  sepsis/klebsiella bacteremia treated with IV Rocephin, hospital course complicated by Afib with RVR during recent admission (9/2024)  pt s/p 7d course of IV zosyn via midline  (11/4-11/10)  - s/p Levophed gtt. s/p midodrine s/p hydrocortisone  taper   now  25 mg daily for 2 day  than back Prednisone home dose   - s/p IV Meropenem, -continue   Zebraxa ceftolozane-tazobactam--plan for 5-7 day- till Friday as per id dr wilson   -   called uro consult / [facility]  as pt still have leucocytosis  this time nephrostomy need to be  changed already month old - IR changed 11/29/24 , also 2 month old midline removed  by perry manzanares.   - Blood cult neg  and sputum Cx negative  - Urine cx + klebsiella, pseudomonas, proteus  sputum AFB x 3 negative      Patient seen and evaluated on day of discharged. Medically optimized and table for discharge to_  ---  CONSULTANTS:   Cardiology Dr. Woods    ---  TIME SPENT:  I, the attending physician, was physically present for the key portions of the evaluation and management (E/M) service provided. The total amount of time spent reviewing the hospital notes, laboratory values, imaging findings, assessing/counseling the patient, discussing with consultant physicians, social work, nursing staff was -- minutes    ---  Primary care provider was made aware of plan for discharge:      [  ] NO     [  ] YES   FROM ADMISSION H+P:   HPI:  78 yo M with PMHx HTN, CHF, PE (on eliquis), Myasthenia Gravis, and chronic LE edema, nephrolithiasis (s/p nephrostomy tube placement ), urosepsis, BPH BIBEMS from [facility] for hypertension and elevated WBC count. Per paperwork, nursing home paperwork, pt hypertensive to 154/105 and recieved metoprolol 25 mg (8:10 PM), and reported pt with "evelated WBC". Upon arrival, pt found to be hypotensive to 90/64. Arrived to ED with nephrostomy tube, chronic lauren and PICC line in place. Pt was recieving IV zosyn x7d (start date 11/04) per paperwork review for UTI. Pt with no active complaints, denies fever, chills, chest pain, SOB, abdominal pain, n/v/d/c or dysuria.    Of note, pt recently admitted to [facility] in Sept 2024 and urgently transferred to [facility] for emergent nephrostomy tube placement after failed stent placement for 7mm kidney stone. Managed in ICU for septic shock/urosepsis and Klebsiella Bacteremia, treated with IV rocephin and vasopressors.       ED Course:   Vitals: BP: 90/64--> 105/59, HR: 100, Temp: 97.6F-->100.3F, RR: 19, SpO2: 99% on 2L   Labs: WBC 25.27, Lactate 3.1, Trop 1099.8, pro-BNP 1988  UA: Turbid, large blood, large LEC, positive nitrite, many bacteria, WBC TNTC  CXR:   CT: < from: CT Abdomen and Pelvis No Cont (11.20.24 @ 01:49) >  2.6 cm left lobe thyroid nodule. Nonemergent thyroid ultrasound is   recommended.    Subcentimeter granulomas in the right upper lobe. Scattered reticular and   mild airspace opacities throughout both lungs. Calcified right hilar and   mediastinal lymph nodes. The constellation of findings likely represents   granulomatous infection question tuberculosis or sarcoidosis.    Innumerable punctate calcifications throughout the liver and spleen   consistent with prior granulomatous infection.    Dense gallbladder sludge or small stones. No surrounding fluid.    Percutaneous right nephrostomy tube. No hydronephrosis. Unremarkable left   kidney.    Urinary bladder is collapsed around a Lauren catheter limiting evaluation.   There are at least 3 urinary bladder stone measuring up to 1.0 cm.    EKG: NSR rate 92bpm. LAD. QTc 403ms.  Received in the ED: IV vancomycin x1, IV zosyn x1, ofrimev 1g IV x1, 1000cc NS bolus x2, ASA 324mg x1   (20 Nov 2024 00:43)      ---  HOSPITAL COURSE:   Patient was admitted for distributive shock and  sepsis 2/2 to UTI - Met sepsis criteria on admission with leukocytosis, lactate, tachycardia  - WBC appears to be at baseline, per chart review baseline around 12-13 (on chronic steroids for MG)  - UA: Turbid, large blood, large LEC, positive nitrite, many bacteria, WBC TNTC  - CT: Urinary bladder collapsed, limiting evaluation, at least 3 stones measuring up to 1.0 cm. R nephrostomy tube intact .  . Due to patient's condition and sudden hypotension, patient was admitted to the ICU. Patient placed on levo gt and midodrine. Patient BPs improved, levo gtt downtitrated and eventually discontinued. Trops downtrended likely 2/2 to demand ischemia. Cardiology consulted. Patient restarted on home eliquis as no acute inteventions done as of this time. 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 TTE w/ bubble study confirming LVEF 35%, hypokinetic apices and trace MR. No evidence of intracardiac shunt.    septic shock   sepsis  poa  2/2  UTI and rhino/enterovirus with  -IR s/p percutaneous nephrostomy tube placement done 9/6/24  [facility]  and [facility] lauren  , LAUREN CHANGED IN ED .- Pt p/w elevated WBC, pt asymptomatic. Hx uti  sepsis/klebsiella bacteremia treated with IV Rocephin, hospital course complicated by Afib with RVR during recent admission (9/2024)  pt s/p 7d course of IV zosyn via midline  (11/4-11/10)  - s/p Levophed gtt. s/p midodrine s/p hydrocortisone  taper   now  25 mg daily for 2 day  than back Prednisone home dose   - s/p IV Meropenem, -continue   Zebraxa ceftolozane-tazobactam--plan for 5-7 day- till Friday as per id dr wilson   -   called uro consult / [facility] surg pa  as pt still have leucocytosis  this time nephrostomy need to be  changed already month old - IR changed 11/29/24 , also 2 month old midline removed  by perry manzanares.   - Blood cult neg  and sputum Cx negative  - Urine cx + klebsiella, pseudomonas, proteus  sputum AFB x 3 negative      Patient seen and evaluated on day of discharged. Medically optimized and stable for discharge to_________.  ---  CONSULTANTS:   Cardiology Dr. Woods    ---  TIME SPENT:  I, the attending physician, was physically present for the key portions of the evaluation and management (E/M) service provided. The total amount of time spent reviewing the hospital notes, laboratory values, imaging findings, assessing/counseling the patient, discussing with consultant physicians, social work, nursing staff was -- minutes    ---  Primary care provider was made aware of plan for discharge:      [  ] NO     [  ] YES   FROM ADMISSION H+P:   HPI:  78 yo M with PMHx HTN, CHF, PE (on eliquis), Myasthenia Gravis, and chronic LE edema, nephrolithiasis (s/p nephrostomy tube placement ), urosepsis, BPH BIBEMS from [facility] for hypertension and elevated WBC count. Per paperwork, nursing home paperwork, pt hypertensive to 154/105 and received metoprolol 25 mg (8:10 PM), and reported pt with "elevated WBC". Upon arrival, pt found to be hypotensive to 90/64. Arrived to ED with nephrostomy tube, chronic lauren and PICC line in place. Pt was receiving IV zosyn x7d (start date 11/04) per paperwork review for UTI. Pt with no active complaints, denies fever, chills, chest pain, SOB, abdominal pain, n/v/d/c or dysuria.    Of note, pt [facility] and urgently transferred to [facility] for emergent nephrostomy tube placement after failed stent placement for 7mm kidney stone. Managed in ICU for septic shock/urosepsis and Klebsiella Bacteremia, treated with IV rocephin and vasopressors.       ED Course:   Vitals: BP: 90/64--> 105/59, HR: 100, Temp: 97.6F-->100.3F, RR: 19, SpO2: 99% on 2L   Labs: WBC 25.27, Lactate 3.1, Trop 1099.8, pro-BNP 1988  UA: Turbid, large blood, large LEC, positive nitrite, many bacteria, WBC TNTC  CXR:   CT: < from: CT Abdomen and Pelvis No Cont (11.20.24 @ 01:49) >  2.6 cm left lobe thyroid nodule. Nonemergent thyroid ultrasound is   recommended.    Subcentimeter granulomas in the right upper lobe. Scattered reticular and   mild airspace opacities throughout both lungs. Calcified right hilar and   mediastinal lymph nodes. The constellation of findings likely represents   granulomatous infection question tuberculosis or sarcoidosis.    Innumerable punctate calcifications throughout the liver and spleen   consistent with prior granulomatous infection.    Dense gallbladder sludge or small stones. No surrounding fluid.    Percutaneous right nephrostomy tube. No hydronephrosis. Unremarkable left   kidney.    Urinary bladder is collapsed around a Lauren catheter limiting evaluation.   There are at least 3 urinary bladder stone measuring up to 1.0 cm.    EKG: NSR rate 92bpm. LAD. QTc 403ms.  Received in the ED: IV vancomycin x1, IV zosyn x1, ofrimev 1g IV x1, 1000cc NS bolus x2, ASA 324mg x1   (20 Nov 2024 00:43)      ---  HOSPITAL COURSE:   Patient was admitted for distributive shock and  sepsis 2/2 to UTI - Met sepsis criteria on admission with leukocytosis, lactate, tachycardia  - WBC appears to be at baseline, per chart review baseline around 12-13 (on chronic steroids for MG)  - UA: Turbid, large blood, large LEC, positive nitrite, many bacteria, WBC TNTC  - CT: Urinary bladder collapsed, limiting evaluation, at least 3 stones measuring up to 1.0 cm. R nephrostomy tube intact .  Due to patient's condition and sudden hypotension, patient was admitted to the ICU. Patient placed on levo gt and midodrine. Patient BPs improved, levo gtt down titrated and eventually discontinued. Trops downtrended likely 2/2 to demand ischemia. Cardiology consulted. Patient restarted on home Eliquis as no acute interventions done as of this time. 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 TTE w/ bubble study confirming LVEF 35%, hypokinetic apices and trace MR. No evidence of intracardiac shunt.    septic shock   sepsis  poa  2/2  UTI and rhino/enterovirus with  -IR s/p percutaneous nephrostomy tube placement done 9/6/24  [facility]  and [facility] lauren  , LAUREN CHANGED IN ED .- Pt p/w elevated WBC, pt asymptomatic. Hx uti  sepsis/klebsiella bacteremia treated with IV Rocephin, hospital course complicated by Afib with RVR during recent admission (9/2024)  pt s/p 7d course of IV zosyn via midline  (11/4-11/10)  - s/p Levophed gtt. s/p midodrine s/p hydrocortisone  taper   now  25 mg daily for 2 day  than back Prednisone home dose   - s/p IV Meropenem, -continue   Zebraxa ceftolozane-tazobactam--plan for 5-7 day- as per id dr wilson   -   called uro consult / [facility] pa  as pt still have leucocytosis  this time nephrostomy need to be  changed already month old - IR changed 11/29/24 , also 2 month old midline removed  by perry manzanares.   - Blood cult neg  and sputum Cx negative  - Urine cx + klebsiella, pseudomonas, proteus  sputum AFB x 3 negative      On day of discharge patient is in no distress.  Afebrile and hemodynamically stable.    FROM ADMISSION H+P:   HPI:  80 yo M with PMHx HTN, CHF, PE (on eliquis), Myasthenia Gravis, and chronic LE edema, nephrolithiasis (s/p nephrostomy tube placement ), urosepsis, BPH BIBEMS from [facility] for hypertension and elevated WBC count. Per paperwork, nursing home paperwork, pt hypertensive to 154/105 and received metoprolol 25 mg (8:10 PM), and reported pt with "elevated WBC". Upon arrival, pt found to be hypotensive to 90/64. Arrived to ED with nephrostomy tube, chronic lauren and PICC line in place. Pt was receiving IV zosyn x7d (start date 11/04) per paperwork review for UTI. Pt with no active complaints, denies fever, chills, chest pain, SOB, abdominal pain, n/v/d/c or dysuria.    Of note, pt recently admitted to [facility] in Sept 2024 and urgently transferred to [facility] for emergent nephrostomy tube placement after failed stent placement for 7mm kidney stone. Managed in ICU for septic shock/urosepsis and Klebsiella Bacteremia, treated with IV rocephin and vasopressors.       ED Course:   Vitals: BP: 90/64--> 105/59, HR: 100, Temp: 97.6F-->100.3F, RR: 19, SpO2: 99% on 2L   Labs: WBC 25.27, Lactate 3.1, Trop 1099.8, pro-BNP 1988  UA: Turbid, large blood, large LEC, positive nitrite, many bacteria, WBC TNTC  CXR:   CT: < from: CT Abdomen and Pelvis No Cont (11.20.24 @ 01:49) >  2.6 cm left lobe thyroid nodule. Nonemergent thyroid ultrasound is   recommended.    Subcentimeter granulomas in the right upper lobe. Scattered reticular and   mild airspace opacities throughout both lungs. Calcified right hilar and   mediastinal lymph nodes. The constellation of findings likely represents   granulomatous infection question tuberculosis or sarcoidosis.    Innumerable punctate calcifications throughout the liver and spleen   consistent with prior granulomatous infection.    Dense gallbladder sludge or small stones. No surrounding fluid.    Percutaneous right nephrostomy tube. No hydronephrosis. Unremarkable left   kidney.    Urinary bladder is collapsed around a Lauren catheter limiting evaluation.   There are at least 3 urinary bladder stone measuring up to 1.0 cm.    EKG: NSR rate 92bpm. LAD. QTc 403ms.  Received in the ED: IV vancomycin x1, IV zosyn x1, ofrimev 1g IV x1, 1000cc NS bolus x2, ASA 324mg x1   (20 Nov 2024 00:43)      ---  HOSPITAL COURSE:   Patient was admitted for distributive shock and  sepsis 2/2 to UTI - Met sepsis criteria on admission with leukocytosis, lactate, tachycardia  - WBC appears to be at baseline, per chart review baseline around 12-13 (on chronic steroids for MG)  - UA: Turbid, large blood, large LEC, positive nitrite, many bacteria, WBC TNTC  - CT: Urinary bladder collapsed, limiting evaluation, at least 3 stones measuring up to 1.0 cm. R nephrostomy tube intact .  Due to patient's condition and sudden hypotension, patient was admitted to the ICU. Patient placed on levo gt and midodrine. Patient BPs improved, levo gtt down titrated and eventually discontinued. Trops downtrended likely 2/2 to demand ischemia. Cardiology consulted. Patient restarted on home Eliquis as no acute interventions done as of this time. 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 TTE w/ bubble study confirming LVEF 35%, hypokinetic apices and trace MR. No evidence of intracardiac shunt.    septic shock   sepsis  poa  2/2  UTI and rhino/enterovirus with  -IR s/p percutaneous nephrostomy tube placement done 9/6/24  [facility]  and [facility] lauren  , LAUREN CHANGED IN ED .- Pt p/w elevated WBC, pt asymptomatic. Hx uti  sepsis/klebsiella bacteremia treated with IV Rocephin, hospital course complicated by Afib with RVR during recent admission (9/2024)  pt s/p 7d course of IV zosyn via midline  (11/4-11/10)  - s/p Levophed gtt. s/p midodrine s/p hydrocortisone  taper   now  25 mg daily for 2 day  than back Prednisone home dose   - s/p IV Meropenem, -completed course of Zebraxa ceftolozane-tazobactam--per id dr wilson   -   called uro consult / [facility] surg pa  as pt still have leucocytosis  this time nephrostomy need to be  changed already month old - IR changed 11/29/24 , also 2 month old midline removed  by perry manzanares.   - Blood cult neg  and sputum Cx negative  - Urine cx + klebsiella, pseudomonas, proteus  sputum AFB x 3 negative  - Urology recommending outpatient follow up visit to arrange for lithotripsy.       On day of discharge patient is in no distress.  Denies having pain or SOB.  Afebrile and hemodynamically stable.

## 2024-11-21 NOTE — DISCHARGE NOTE PROVIDER - PROVIDER TOKENS
PROVIDER:[TOKEN:[[id number]],FOLLOWUP:[1 week]] PROVIDER:[TOKEN:[[id number]],FOLLOWUP:[1 week]],PROVIDER:[TOKEN:[[id number]],FOLLOWUP:[2 weeks]]

## 2024-11-21 NOTE — PROGRESS NOTE ADULT - PROBLEM SELECTOR PLAN 5
Pt with documented hx of CHF, unspecified type however on GDMT for HFrEF  - TTE (9/2024): LVEF 55-60%, no diastolic dysfunction, moderate MR  - Evaluated by cardiology during admission at [facility] (9/2024) for acute CHF, started on GDMT  - Pro-BNP on admission 1988  - Hold home metoprolol, spironolactone and lasix in setting of hypotension  - Strict I&Os   - Does not appear clinically volume overloaded  - Cardiology ([facility]) consulted,

## 2024-11-21 NOTE — PROGRESS NOTE ADULT - TIME BILLING
I have spent 55 minutes of time on the encounter which excludes teaching and separately reported services.

## 2024-11-21 NOTE — PROGRESS NOTE ADULT - SUBJECTIVE AND OBJECTIVE BOX
Subjective Hx: Patient was BIBA to [facility] from [facility] 11/19/24 with guerrero and nephrostomy tube placed(9/2024 @ [facility]) found to be hypotensive SBP in 80s, UA+ with lactate 3.1 with concerns of urosepsis now s/p 2L NS bolus and levo gtt.     Interval Events: Per overnight team, patient found to be agitated refusing multiple medications and examinations. Refused sputum culture overnight. On Airborne precautions.  Patient seen and examined at bedside. Patient refusing to answer questions and examination, adamantly stating to "get out of the room" and leave him alone. Patient guerrero in place - turbid urine improved. Off levo gtt since 3pm 11/20/24. BP stable.    Review of Systems:  unable to obtain 2/2 to agitation.     ICU Vital Signs Last 24 Hrs  T(C): 36.1 (21 Nov 2024 07:40), Max: 36.7 (20 Nov 2024 19:57)  T(F): 97 (21 Nov 2024 07:40), Max: 98.1 (20 Nov 2024 19:57)  HR: 46 (21 Nov 2024 07:00) (42 - 65)  BP: 132/62 (21 Nov 2024 07:00) (89/50 - 134/60)  BP(mean): 89 (21 Nov 2024 07:00) (65 - 89)  ABP: --  ABP(mean): --  RR: 11 (21 Nov 2024 07:00) (10 - 22)  SpO2: 98% (21 Nov 2024 07:00) (92% - 99%)    O2 Parameters below as of 20 Nov 2024 12:30  Patient On (Oxygen Delivery Method): room air                CAPILLARY BLOOD GLUCOSE      POCT Blood Glucose.: 124 mg/dL (20 Nov 2024 12:30)      I&O's Summary    20 Nov 2024 07:01  -  21 Nov 2024 07:00  --------------------------------------------------------  IN: 134.4 mL / OUT: 2955 mL / NET: -2820.6 mL        Physical Exam:   Gen: NAD  Neuro: Alert  HEENT: NCAT, EOMI   Resp: unable to obtain  CVS: unable to obtain   Abd: unable to obtain  Ext: unable to obtain  Skin: unable to obtain     Meds:  meropenem  IVPB IV Intermittent    midodrine Oral PRN    hydrocortisone sodium succinate Injectable IV Push      acetaminophen     Tablet .. Oral PRN  ondansetron Injectable IV Push PRN      apixaban Oral            chlorhexidine 2% Cloths Topical                              11.1   19.08 )-----------( 356      ( 20 Nov 2024 03:55 )             34.5       11-20    139  |  109[H]  |  15  ----------------------------<  116[H]  3.2[L]   |  26  |  0.73    Ca    8.5      20 Nov 2024 03:55    TPro  5.6[L]  /  Alb  1.9[L]  /  TBili  0.4  /  DBili  x   /  AST  18  /  ALT  19  /  AlkPhos  59  11-20      CARDIAC MARKERS ( 20 Nov 2024 03:55 )  x     / x     / x     / x     / 3.3 ng/mL      PT/INR - ( 19 Nov 2024 22:25 )   PT: 17.1 sec;   INR: 1.47 ratio         PTT - ( 19 Nov 2024 22:25 )  PTT:36.1 sec  Urinalysis Basic - ( 20 Nov 2024 03:55 )    Color: x / Appearance: x / SG: x / pH: x  Gluc: 116 mg/dL / Ketone: x  / Bili: x / Urobili: x   Blood: x / Protein: x / Nitrite: x   Leuk Esterase: x / RBC: x / WBC x   Sq Epi: x / Non Sq Epi: x / Bacteria: x      .Blood BLOOD   No growth at 24 hours -- 11-19 @ 22:25  .Blood BLOOD   No growth at 24 hours -- 11-19 @ 22:20      Rapid RVP Result: Detected (11-20 @ 00:00)          Radiology:    Bedside Ultrasound:    Tubes/Lines:      GLOBAL ISSUE/BEST PRACTICE:  Analgesia: N  Sedation: N  HOB elevation: Y  Stress ulcer prophylaxis: N  VTE prophylaxis: Y   Glycemic control: Y  Nutrition: Y    CODE STATUS: DNR/intubate        Subjective Hx: Patient was BIBA to [facility] from [facility] 11/19/24 with guerrero and nephrostomy tube placed(9/2024 @ [facility]) found to be hypotensive SBP in 80s, UA+ with lactate 3.1 with concerns of urosepsis now s/p 2L NS bolus and levo gtt.     Interval Events: Per overnight team, patient found to be agitated refusing multiple medications and examinations. Refused sputum culture overnight. On Airborne precautions.  Patient seen and examined at bedside. Patient refusing to answer questions and examination, adamantly stating to "get out of the room" and leave him alone. Patient guerrero in place - turbid urine improved. Off levo gtt since 3pm 11/20/24. BP stable.    Review of Systems:  unable to obtain 2/2 to agitation.     ICU Vital Signs Last 24 Hrs  T(C): 36.1 (21 Nov 2024 07:40), Max: 36.7 (20 Nov 2024 19:57)  T(F): 97 (21 Nov 2024 07:40), Max: 98.1 (20 Nov 2024 19:57)  HR: 46 (21 Nov 2024 07:00) (42 - 65)  BP: 132/62 (21 Nov 2024 07:00) (89/50 - 134/60)  BP(mean): 89 (21 Nov 2024 07:00) (65 - 89)  ABP: --  ABP(mean): --  RR: 11 (21 Nov 2024 07:00) (10 - 22)  SpO2: 98% (21 Nov 2024 07:00) (92% - 99%)    O2 Parameters below as of 20 Nov 2024 12:30  Patient On (Oxygen Delivery Method): room air                CAPILLARY BLOOD GLUCOSE      POCT Blood Glucose.: 124 mg/dL (20 Nov 2024 12:30)      I&O's Summary    20 Nov 2024 07:01  -  21 Nov 2024 07:00  --------------------------------------------------------  IN: 134.4 mL / OUT: 2955 mL / NET: -2820.6 mL        Physical Exam:   Gen: NAD  Neuro: Alert  HEENT: NCAT, EOMI   Resp: unable to obtain  CVS: unable to obtain   Abd: unable to obtain  Ext: unable to obtain  Skin: unable to obtain     Meds:  meropenem  IVPB IV Intermittent    midodrine Oral PRN    hydrocortisone sodium succinate Injectable IV Push      acetaminophen     Tablet .. Oral PRN  ondansetron Injectable IV Push PRN      apixaban Oral            chlorhexidine 2% Cloths Topical                              11.1   19.08 )-----------( 356      ( 20 Nov 2024 03:55 )             34.5       11-20    139  |  109[H]  |  15  ----------------------------<  116[H]  3.2[L]   |  26  |  0.73    Ca    8.5      20 Nov 2024 03:55    TPro  5.6[L]  /  Alb  1.9[L]  /  TBili  0.4  /  DBili  x   /  AST  18  /  ALT  19  /  AlkPhos  59  11-20      CARDIAC MARKERS ( 20 Nov 2024 03:55 )  x     / x     / x     / x     / 3.3 ng/mL      PT/INR - ( 19 Nov 2024 22:25 )   PT: 17.1 sec;   INR: 1.47 ratio         PTT - ( 19 Nov 2024 22:25 )  PTT:36.1 sec  Urinalysis Basic - ( 20 Nov 2024 03:55 )    Color: x / Appearance: x / SG: x / pH: x  Gluc: 116 mg/dL / Ketone: x  / Bili: x / Urobili: x   Blood: x / Protein: x / Nitrite: x   Leuk Esterase: x / RBC: x / WBC x   Sq Epi: x / Non Sq Epi: x / Bacteria: x      .Blood BLOOD   No growth at 24 hours -- 11-19 @ 22:25  .Blood BLOOD   No growth at 24 hours -- 11-19 @ 22:20      Rapid RVP Result: Detected (11-20 @ 00:00)          Radiology:    Bedside Ultrasound:    Tubes/Lines:      GLOBAL ISSUE/BEST PRACTICE:  Analgesia: N  Sedation: N  HOB elevation: Y  Stress ulcer prophylaxis: N  VTE prophylaxis: Y   Glycemic control: Y  Nutrition: Y    CODE STATUS: DNR/NIV

## 2024-11-21 NOTE — CONSULT NOTE ADULT - ATTENDING COMMENTS
78 yo M with PMHx HTN, CHF, PE (on eliquis), Myasthenia Gravis, and chronic LE edema, R ureteral obstructing stone s/p R PCN placement on 9/6/24, urosepsis, BPH, who presented from [facility] for hypertension and elevated WBC count. Per nursing home paperwork, pt hypertensive to 154/105 and recieved metoprolol 25 mg (8:10 PM), and reported pt with "elevated WBC". Upon arrival, pt found to be hypotensive to 90/64. Arrived to ED with nephrostomy tube, chronic guerrero and PICC line in place. Pt was recieving IV zosyn x7d (start date 11/04) per paperwork review for UTI.    Patient found to have WBC 25 initially, improved to 17 today. Unclear etiology but probably urinary. He has no fever and no current obvious signs/symptoms of infection. CT did not show any obvious signs of acute infection. Urinalysis showed pyuria. Blood cultures currently no growth.     Also incidentally with RUL calcified granulomas, also in liver in spleen. Unclear etiology, patient without symptoms of tuberculosis. However will evaluate further with sputum samples given underlying MG.    #Leukocytosis  #UTI  #Calcified granulomas    -continue meropenem pending urine culture  -follow cultures to completion  -Quantiferon pending  -sputum induction  -suggest sputum AFB x 3  -suggest serum Fungitell, galactomanna, urine histoplasma antigen    Thank you for courtesy of this consult.     Will follow.  Discussed with the primary team.     Rachel Connor MD  Division of Infectious Diseases   Cell [phone] between 8am and 6pm   After 6pm and weekends please call ID service at [phone].     55 minutes spent on total encounter assessing patient, examination, chart review, counseling and coordinating care by the attending physician/nurse/care manager. 80 yo M with PMHx HTN, CHF, PE (on eliquis), Myasthenia Gravis, and chronic LE edema, R ureteral obstructing stone s/p R PCN placement on 9/6/24, urosepsis, BPH, who presented from [facility] for hypertension and elevated WBC count. Per nursing home paperwork, pt hypertensive to 154/105 and recieved metoprolol 25 mg (8:10 PM), and reported pt with "elevated WBC". Upon arrival, pt found to be hypotensive to 90/64. Arrived to ED with nephrostomy tube, chronic guerrero and PICC line in place. Pt was recieving IV zosyn x7d (start date 11/04) per paperwork review for UTI.    Patient found to have WBC 25 initially, improved to 17 today. Unclear etiology but probably urinary. He has no fever and no current obvious signs/symptoms of infection. CT did not show any obvious signs of acute infection. Urinalysis showed pyuria. Blood cultures currently no growth.     Also incidentally with RUL calcified granulomas, also in liver in spleen. Unclear etiology, patient without symptoms of tuberculosis. However will evaluate further with sputum samples given underlying MG.    #Leukocytosis  #UTI  #Rhinovirus positive  #Calcified granulomas  #Hx of fluoroquinolone allergy    -continue meropenem pending urine culture  -follow cultures to completion  -Quantiferon pending  -sputum induction  -suggest sputum AFB x 3  -suggest serum Fungitell, galactomanna, urine histoplasma antigen    Thank you for courtesy of this consult.     Will follow.  Discussed with the primary team.     Rachel Connor MD  Division of Infectious Diseases   Cell [phone] between 8am and 6pm   After 6pm and weekends please call ID service at [phone].     55 minutes spent on total encounter assessing patient, examination, chart review, counseling and coordinating care by the attending physician/nurse/care manager.

## 2024-11-21 NOTE — PROGRESS NOTE ADULT - TIME BILLING
pt seen and examine today -   septic shock sec to  sepsis  poa   sec to   2/2 UTI and rhino/enterovirus.

## 2024-11-21 NOTE — PROGRESS NOTE ADULT - SUBJECTIVE AND OBJECTIVE BOX
Patient is a 79y old  Male who presents with a chief complaint of Urosepsis (21 Nov 2024 13:18)    pt seen and examine today awake  , no fever  , denies any c/o .   INTERVAL HPI/OVERNIGHT EVENTS:     T(C): 36.5 (11-21-24 @ 11:54), Max: 36.7 (11-20-24 @ 19:57)  HR: 82 (11-21-24 @ 11:00) (42 - 89)  BP: 121/58 (11-21-24 @ 11:00) (89/50 - 134/60)  RR: 12 (11-21-24 @ 11:00) (7 - 18)  SpO2: 96% (11-21-24 @ 11:00) (92% - 99%)  Wt(kg): --  I&O's Summary    20 Nov 2024 07:01  -  21 Nov 2024 07:00  --------------------------------------------------------  IN: 134.4 mL / OUT: 2955 mL / NET: -2820.6 mL        REVIEW OF SYSTEMS:  CONSTITUTIONAL: No fever,    EYES: No eye pain, visual disturbances, or discharge  ENMT:  No difficulty hearing, tinnitus, vertigo   NECK: No pain or stiffness  RESPIRATORY: No cough, wheezing, chills or hemoptysis; No shortness of breath  CARDIOVASCULAR: No chest pain, palpitations, dizziness, or leg swelling  GASTROINTESTINAL: No abdominal or epigastric pain. No nausea, vomiting,   No diarrhea or constipation. N   GENITOURINARY: No dysuria, frequency, hematuria, or incontinence  NEUROLOGICAL: No headaches, memory loss, loss of strength, numbness, or tremors  SKIN: No itching, burning, rashes, or lesions   MUSCULOSKELETAL: No joint pain or swelling; No muscle, back, or extremity pain    PHYSICAL EXAM:  GENERAL: NAD,   HEAD:  Atraumatic, Normocephalic  EYES: EOMI, PERRLA, conjunctiva and sclera clear  ENMT:   Moist mucous membranes  NECK: Supple, No JVD  NERVOUS SYSTEM:  Alert & Oriented X3; Motor Strength 5/5 B/L upper and lower extremities; DTRs 2+ intact and symmetric  CHEST/LUNG: Clear to percussion bilaterally; No rales, rhonchi, wheezing,    HEART: Regular rate and rhythm; No murmurs, no tachy   ABDOMEN: Soft, Nontender, Nondistended; Bowel sounds present  EXTREMITIES:  2+ Peripheral Pulses, No clubbing, cyanosis, or edema  SKIN: No rashes or lesions    MEDICATIONS  (STANDING):  apixaban 5 milliGRAM(s) Oral every 12 hours  chlorhexidine 2% Cloths 1 Application(s) Topical <User Schedule>  hydrocortisone sodium succinate Injectable 50 milliGRAM(s) IV Push every 8 hours  meropenem  IVPB 1000 milliGRAM(s) IV Intermittent every 8 hours    MEDICATIONS  (PRN):  acetaminophen     Tablet .. 650 milliGRAM(s) Oral every 6 hours PRN Temp greater or equal to 38C (100.4F), Mild Pain (1 - 3)  midodrine 10 milliGRAM(s) Oral every 8 hours PRN if SBP <110  ondansetron Injectable 4 milliGRAM(s) IV Push every 8 hours PRN Nausea and/or Vomiting  sodium chloride 3%  Inhalation 4 milliLiter(s) Inhalation every 8 hours PRN sputum induction      LABS:                        12.3   17.20 )-----------( 389      ( 21 Nov 2024 08:50 )             38.4     11-21    144  |  114[H]  |  16  ----------------------------<  121[H]  4.6   |  23  |  0.60    Ca    9.7      21 Nov 2024 08:50  Phos  3.0     11-21  Mg     2.2     11-21    TPro  6.1  /  Alb  2.0[L]  /  TBili  0.3  /  DBili  x   /  AST  13[L]  /  ALT  19  /  AlkPhos  63  11-21    PT/INR - ( 19 Nov 2024 22:25 )   PT: 17.1 sec;   INR: 1.47 ratio         PTT - ( 19 Nov 2024 22:25 )  PTT:36.1 sec  Urinalysis Basic - ( 21 Nov 2024 08:50 )    Color: x / Appearance: x / SG: x / pH: x  Gluc: 121 mg/dL / Ketone: x  / Bili: x / Urobili: x   Blood: x / Protein: x / Nitrite: x   Leuk Esterase: x / RBC: x / WBC x   Sq Epi: x / Non Sq Epi: x / Bacteria: x      CAPILLARY BLOOD GLUCOSE          11-20 @ 01:25   >100,000 CFU/ml Gram Negative Rods  --  --  11-19 @ 22:25   No growth at 24 hours  --  --  11-19 @ 22:20   No growth at 24 hours  --  --          RADIOLOGY & ADDITIONAL TESTS:    Imaging Personally Reviewed:

## 2024-11-21 NOTE — DISCHARGE NOTE PROVIDER - ATTENDING DISCHARGE PHYSICAL EXAMINATION:
Vitals: T: 97.6  P: 75  BP: 120/69  RR: 18  SpO2: 96%RA  GENERAL: NAD  HEENT: EOMI, hearing normal, conjunctiva and sclera clear  Chest: CTA bilaterally, no wheezing  CV: S1S2, RRR,   GI: soft, +BS, NT/ND  : +guerrero catheter with clear yellow urine, +right percutaneous nephrostomy tube  Musculoskeletal: no LE edema  Psychiatric: affect nL, mood nL  Skin: warm and dry

## 2024-11-21 NOTE — DISCHARGE NOTE PROVIDER - CARE PROVIDER_API CALL
Vinh Woods  Cardiology  [address] [place] [address]  Phone: [phone]  Fax: [phone]  Follow Up Time: 1 week   Vinh Woods  Cardiology  [address] [place] [address]  Phone: [phone]  Fax: [phone]  Follow Up Time: 1 week    Spencer Ramirez  Urology  [address] [place] [address]  Phone: [phone]  Fax: [phone]  Follow Up Time: 2 weeks

## 2024-11-21 NOTE — DISCHARGE NOTE PROVIDER - NSDCMRMEDTOKEN_GEN_ALL_CORE_FT
acetaminophen 325 mg oral capsule: 2 cap(s) orally every 6 hours as needed for  mild pain  Eliquis 5 mg oral tablet: 1 tab(s) orally 2 times a day  finasteride 5 mg oral tablet: 1 tab(s) orally once a day  furosemide 20 mg oral tablet: 1 tab(s) orally once a day  MediHoney Wound and Burn Dressing topical paste: Apply topically to affected area 2 times a day To sacral wound  metoprolol succinate 25 mg oral capsule, extended release: 1 tab(s) orally once a day  Multiple Vitamins oral tablet: 1 tab(s) orally once a day  omeprazole 40 mg oral delayed release capsule: 1 cap(s) orally once a day  Praluent Pen 75 mg/mL subcutaneous solution: 75 milligram(s) subcutaneous every 2 weeks  predniSONE 10 mg oral tablet: 1 tab(s) orally once a day  risperiDONE 0.25 mg oral tablet: 1 tab(s) orally every 12 hours  spironolactone 25 mg oral tablet: 1 tab(s) orally once a day  tamsulosin 0.4 mg oral capsule: 1 cap(s) orally once a day (at bedtime)   acetaminophen 325 mg oral capsule: 2 cap(s) orally every 6 hours as needed for  mild pain  Eliquis 5 mg oral tablet: 1 tab(s) orally 2 times a day  finasteride 5 mg oral tablet: 1 tab(s) orally once a day  furosemide 20 mg oral tablet: 1 tab(s) orally once a day  metoprolol succinate 25 mg oral capsule, extended release: 1 tab(s) orally once a day  Multiple Vitamins oral tablet: 1 tab(s) orally once a day  omeprazole 40 mg oral delayed release capsule: 1 cap(s) orally once a day  polyethylene glycol 3350 oral powder for reconstitution: 17 gram(s) orally once a day  Praluent Pen 75 mg/mL subcutaneous solution: 75 milligram(s) subcutaneous every 2 weeks  predniSONE 10 mg oral tablet: 1 tab(s) orally once a day  risperiDONE 0.25 mg oral tablet: 1 tab(s) orally every 12 hours  senna leaf extract oral tablet: 1 tab(s) orally once a day (before a meal)  spironolactone 25 mg oral tablet: 1 tab(s) orally once a day  tamsulosin 0.4 mg oral capsule: 1 cap(s) orally once a day (at bedtime)   acetaminophen 325 mg oral capsule: 2 cap(s) orally every 6 hours as needed for  mild pain  Eliquis 5 mg oral tablet: 1 tab(s) orally 2 times a day  finasteride 5 mg oral tablet: 1 tab(s) orally once a day  furosemide 20 mg oral tablet: 1 tab(s) orally once a day  metoprolol succinate 25 mg oral capsule, extended release: 0.5 tab(s) orally every 12 hours  Multiple Vitamins oral tablet: 1 tab(s) orally once a day  omeprazole 40 mg oral delayed release capsule: 1 cap(s) orally once a day  polyethylene glycol 3350 oral powder for reconstitution: 17 gram(s) orally once a day  Praluent Pen 75 mg/mL subcutaneous solution: 75 milligram(s) subcutaneous every 2 weeks  predniSONE 10 mg oral tablet: 1 tab(s) orally once a day  risperiDONE 0.25 mg oral tablet: 1 tab(s) orally every 12 hours  senna leaf extract oral tablet: 1 tab(s) orally once a day (before a meal)  tamsulosin 0.4 mg oral capsule: 1 cap(s) orally once a day (at bedtime)

## 2024-11-21 NOTE — PROGRESS NOTE ADULT - SUBJECTIVE AND OBJECTIVE BOX
Neurology follow up note    DEION HEATH79yMale      Interval History:    Patient feels ok no new complaints.    Allergies    levofloxacin (Unknown)  ofloxacin (Unknown)  gatifloxacin (Unknown)    Intolerances    fluoroquinolone antibiotics (Other)  Ketek (Other)  Avelox (Other)  telithromycin (Other)      MEDICATIONS    acetaminophen     Tablet .. 650 milliGRAM(s) Oral every 6 hours PRN  apixaban 5 milliGRAM(s) Oral every 12 hours  chlorhexidine 2% Cloths 1 Application(s) Topical <User Schedule>  hydrocortisone sodium succinate Injectable 50 milliGRAM(s) IV Push every 8 hours  meropenem  IVPB 1000 milliGRAM(s) IV Intermittent every 8 hours  midodrine 10 milliGRAM(s) Oral every 8 hours PRN  ondansetron Injectable 4 milliGRAM(s) IV Push every 8 hours PRN              Vital Signs Last 24 Hrs  T(C): 36.1 (21 Nov 2024 07:40), Max: 36.7 (20 Nov 2024 19:57)  T(F): 97 (21 Nov 2024 07:40), Max: 98.1 (20 Nov 2024 19:57)  HR: 46 (21 Nov 2024 07:00) (42 - 65)  BP: 132/62 (21 Nov 2024 07:00) (89/50 - 134/60)  BP(mean): 89 (21 Nov 2024 07:00) (65 - 89)  RR: 11 (21 Nov 2024 07:00) (10 - 22)  SpO2: 98% (21 Nov 2024 07:00) (92% - 99%)    Parameters below as of 20 Nov 2024 12:30  Patient On (Oxygen Delivery Method): room air      REVIEW OF SYSTEMS:  CONSTITUTIONAL:  The patient denies fever, chills, night sweats.  HEAD:  No headache.  EYES:  No double vision or blurry vision.  EARS:  No ringing in the ears.  NECK:  No neck pain.  CARDIOVASCULAR:  No chest pain.  RESPIRATORY:  No shortness of breath.  ABDOMEN:  No nausea, vomiting, or abdominal pain.  EXTREMITIES/NEUROLOGICAL:  Does complain of numbness and burning sensation in his legs.  MUSCULOSKELETAL:  Occasional joint pain.  GENERAL:  Does feels weak all over, but no droopy eyes.    PHYSICAL EXAMINATION:  HEAD:  Normocephalic, atraumatic.  EYES:  No scleral icterus.  EARS:  Hearing bilaterally intact.  NECK:  Supple.  CARDIOVASCULAR:  S1 and S2 heard.  RESPIRATORY:  Air entry bilaterally.  ABDOMEN:  Soft, nontender.  EXTREMITIES:  No clubbing or cyanosis were noted.    NEUROLOGIC:  The patient awake, alert, location was hospital, year 2002, month was October, was able to name simple objects.  Recall was 0/3, probably 1 of 3, but does suffer from memory issues.  The patient was able to look up for over 1 minute with no ptosis.  Extraocular movements were intact.  Speech was fluent.  Smile symmetric.  Motor, the patient decreased range of motion of bilateral shoulders, suspect secondary to rotator cuff issues.  When elevated, was able to maintain in the air with slow drops bilaterally.  It is proximally 3+/5, distally was 4/5.  Bilateral lower extremities, slight movement at the feet and knees was 1/5.  Sensory, lower extremities not tested.  The patient said his legs are very sensitive.    LABS:  CBC Full  -  ( 20 Nov 2024 03:55 )  WBC Count : 19.08 K/uL  RBC Count : 3.58 M/uL  Hemoglobin : 11.1 g/dL  Hematocrit : 34.5 %  Platelet Count - Automated : 356 K/uL  Mean Cell Volume : 96.4 fl  Mean Cell Hemoglobin : 31.0 pg  Mean Cell Hemoglobin Concentration : 32.2 g/dL  Auto Neutrophil # : 14.48 K/uL  Auto Lymphocyte # : 2.25 K/uL  Auto Monocyte # : 1.87 K/uL  Auto Eosinophil # : 0.19 K/uL  Auto Basophil # : 0.05 K/uL  Auto Neutrophil % : 75.8 %  Auto Lymphocyte % : 11.8 %  Auto Monocyte % : 9.8 %  Auto Eosinophil % : 1.0 %  Auto Basophil % : 0.3 %    Urinalysis Basic - ( 20 Nov 2024 03:55 )    Color: x / Appearance: x / SG: x / pH: x  Gluc: 116 mg/dL / Ketone: x  / Bili: x / Urobili: x   Blood: x / Protein: x / Nitrite: x   Leuk Esterase: x / RBC: x / WBC x   Sq Epi: x / Non Sq Epi: x / Bacteria: x      11-20    139  |  109[H]  |  15  ----------------------------<  116[H]  3.2[L]   |  26  |  0.73    Ca    8.5      20 Nov 2024 03:55    TPro  5.6[L]  /  Alb  1.9[L]  /  TBili  0.4  /  DBili  x   /  AST  18  /  ALT  19  /  AlkPhos  59  11-20    Hemoglobin A1C:   Lipid Panel 11-20 @ 11:36  Total Cholesterol, Serum 113  LDL --  Triglycerides 61    LIVER FUNCTIONS - ( 20 Nov 2024 03:55 )  Alb: 1.9 g/dL / Pro: 5.6 g/dL / ALK PHOS: 59 U/L / ALT: 19 U/L / AST: 18 U/L / GGT: x           Vitamin B12   PT/INR - ( 19 Nov 2024 22:25 )   PT: 17.1 sec;   INR: 1.47 ratio         PTT - ( 19 Nov 2024 22:25 )  PTT:36.1 sec      RADIOLOGY  ANALYSIS AND PLAN:  This is an 79-year-old male with altered mental status and history of myasthenia gravis.    .Altered mental status, most likely metabolic encephalopathy secondary to underlying infectious type process.  .Infection workup as needed.  Antibiotics as needed.  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  Questionable if any muscle weakness as well.  The patient appears to be at his baseline from my previous notes.  The patient had refused any type of treatment in the past.   For now, we would recommend continue the patient on his prednisone.  For history of infection with myasthenia gravis.  If possible, would recommend to limit the use of aminoglycosides, fluoroquinolones, macrolides, cardiovascular drugs such as beta blockers, procainamide, other medication such as statins.  Monitor the patient's magnesium levels.  If antibiotics are used, always risks versus benefits must be weighed for the specific antibiotics.  .For history of hypertension, monitor systolic blood pressure.  .Attempted to contact son, Olvin, at [phone] in the past     47  minutes of time was spent with patient plan of care, reviewing data, speaking to multidisciplinary healthcare team with greater than 50% of time counseling care and coordination.    Thank you for courtesy of consultation.

## 2024-11-21 NOTE — PROGRESS NOTE ADULT - ASSESSMENT
78 yo M with PMHx HTN, CHF, PE (on eliquis), Myasthenia Gravis, and chronic LE edema, nephrolithiasis (s/p nephrostomy tube placement ), urosepsis, BPH BIBEMS from [facility] for hypertension and elevated WBC count. Admitted to ICU for septic shock 2/2 UTI and Enter/rhinovirus.

## 2024-11-21 NOTE — PROGRESS NOTE ADULT - PROBLEM SELECTOR PLAN 6
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 Mckinley) consulted,

## 2024-11-22 LAB
-  AMIKACIN: SIGNIFICANT CHANGE UP
-  AMOXICILLIN/CLAVULANIC ACID: SIGNIFICANT CHANGE UP
-  AMPICILLIN/SULBACTAM: SIGNIFICANT CHANGE UP
-  AMPICILLIN: SIGNIFICANT CHANGE UP
-  AZTREONAM: SIGNIFICANT CHANGE UP
-  AZTREONAM: SIGNIFICANT CHANGE UP
-  CEFAZOLIN: SIGNIFICANT CHANGE UP
-  CEFEPIME: SIGNIFICANT CHANGE UP
-  CEFEPIME: SIGNIFICANT CHANGE UP
-  CEFOXITIN: SIGNIFICANT CHANGE UP
-  CEFTAZIDIME/AVIBACTAM: SIGNIFICANT CHANGE UP
-  CEFTAZIDIME: SIGNIFICANT CHANGE UP
-  CEFTOLOZANE/TAZOBACTAM: SIGNIFICANT CHANGE UP
-  CEFTRIAXONE: SIGNIFICANT CHANGE UP
-  CEFUROXIME: SIGNIFICANT CHANGE UP
-  CIPROFLOXACIN: SIGNIFICANT CHANGE UP
-  CIPROFLOXACIN: SIGNIFICANT CHANGE UP
-  ERTAPENEM: SIGNIFICANT CHANGE UP
-  GENTAMICIN: SIGNIFICANT CHANGE UP
-  IMIPENEM: SIGNIFICANT CHANGE UP
-  IMIPENEM: SIGNIFICANT CHANGE UP
-  LEVOFLOXACIN: SIGNIFICANT CHANGE UP
-  LEVOFLOXACIN: SIGNIFICANT CHANGE UP
-  MEROPENEM: SIGNIFICANT CHANGE UP
-  MEROPENEM: SIGNIFICANT CHANGE UP
-  NITROFURANTOIN: SIGNIFICANT CHANGE UP
-  PIPERACILLIN/TAZOBACTAM: SIGNIFICANT CHANGE UP
-  PIPERACILLIN/TAZOBACTAM: SIGNIFICANT CHANGE UP
-  TOBRAMYCIN: SIGNIFICANT CHANGE UP
-  TRIMETHOPRIM/SULFAMETHOXAZOLE: SIGNIFICANT CHANGE UP
ALBUMIN SERPL ELPH-MCNC: 2.1 G/DL — LOW (ref 3.3–5)
ALP SERPL-CCNC: 68 U/L — SIGNIFICANT CHANGE UP (ref 40–120)
ALT FLD-CCNC: 23 U/L — SIGNIFICANT CHANGE UP (ref 12–78)
ANION GAP SERPL CALC-SCNC: 5 MMOL/L — SIGNIFICANT CHANGE UP (ref 5–17)
AST SERPL-CCNC: 16 U/L — SIGNIFICANT CHANGE UP (ref 15–37)
BASOPHILS # BLD AUTO: 0.01 K/UL — SIGNIFICANT CHANGE UP (ref 0–0.2)
BASOPHILS NFR BLD AUTO: 0.1 % — SIGNIFICANT CHANGE UP (ref 0–2)
BILIRUB SERPL-MCNC: 0.2 MG/DL — SIGNIFICANT CHANGE UP (ref 0.2–1.2)
BLANDM BLD POS QL PROBE: SIGNIFICANT CHANGE UP
BUN SERPL-MCNC: 25 MG/DL — HIGH (ref 7–23)
CALCIUM SERPL-MCNC: 9.1 MG/DL — SIGNIFICANT CHANGE UP (ref 8.5–10.1)
CHLORIDE SERPL-SCNC: 111 MMOL/L — HIGH (ref 96–108)
CO2 SERPL-SCNC: 25 MMOL/L — SIGNIFICANT CHANGE UP (ref 22–31)
CREAT SERPL-MCNC: 0.74 MG/DL — SIGNIFICANT CHANGE UP (ref 0.5–1.3)
CULTURE RESULTS: ABNORMAL
EGFR: 91 ML/MIN/1.73M2 — SIGNIFICANT CHANGE UP
EOSINOPHIL # BLD AUTO: 0 K/UL — SIGNIFICANT CHANGE UP (ref 0–0.5)
EOSINOPHIL NFR BLD AUTO: 0 % — SIGNIFICANT CHANGE UP (ref 0–6)
GLUCOSE SERPL-MCNC: 135 MG/DL — HIGH (ref 70–99)
HCT VFR BLD CALC: 38.7 % — LOW (ref 39–50)
HGB BLD-MCNC: 12.9 G/DL — LOW (ref 13–17)
IMM GRANULOCYTES NFR BLD AUTO: 1.1 % — HIGH (ref 0–0.9)
LYMPHOCYTES # BLD AUTO: 1.27 K/UL — SIGNIFICANT CHANGE UP (ref 1–3.3)
LYMPHOCYTES # BLD AUTO: 8.3 % — LOW (ref 13–44)
MAGNESIUM SERPL-MCNC: 2.1 MG/DL — SIGNIFICANT CHANGE UP (ref 1.6–2.6)
MCHC RBC-ENTMCNC: 32.1 PG — SIGNIFICANT CHANGE UP (ref 27–34)
MCHC RBC-ENTMCNC: 33.3 G/DL — SIGNIFICANT CHANGE UP (ref 32–36)
MCV RBC AUTO: 96.3 FL — SIGNIFICANT CHANGE UP (ref 80–100)
METHOD TYPE: SIGNIFICANT CHANGE UP
MONOCYTES # BLD AUTO: 1.02 K/UL — HIGH (ref 0–0.9)
MONOCYTES NFR BLD AUTO: 6.7 % — SIGNIFICANT CHANGE UP (ref 2–14)
NEUTROPHILS # BLD AUTO: 12.82 K/UL — HIGH (ref 1.8–7.4)
NEUTROPHILS NFR BLD AUTO: 83.8 % — HIGH (ref 43–77)
NIGHT BLUE STAIN TISS: SIGNIFICANT CHANGE UP
NRBC # BLD: 0 /100 WBCS — SIGNIFICANT CHANGE UP (ref 0–0)
ORGANISM # SPEC MICROSCOPIC CNT: ABNORMAL
ORGANISM # SPEC MICROSCOPIC CNT: SIGNIFICANT CHANGE UP
PHOSPHATE SERPL-MCNC: 3 MG/DL — SIGNIFICANT CHANGE UP (ref 2.5–4.5)
PLATELET # BLD AUTO: 423 K/UL — HIGH (ref 150–400)
POTASSIUM SERPL-MCNC: 3.6 MMOL/L — SIGNIFICANT CHANGE UP (ref 3.5–5.3)
POTASSIUM SERPL-SCNC: 3.6 MMOL/L — SIGNIFICANT CHANGE UP (ref 3.5–5.3)
PROT SERPL-MCNC: 6.2 G/DL — SIGNIFICANT CHANGE UP (ref 6–8.3)
RBC # BLD: 4.02 M/UL — LOW (ref 4.2–5.8)
RBC # FLD: 17.5 % — HIGH (ref 10.3–14.5)
SODIUM SERPL-SCNC: 141 MMOL/L — SIGNIFICANT CHANGE UP (ref 135–145)
SPECIMEN SOURCE: SIGNIFICANT CHANGE UP
SPECIMEN SOURCE: SIGNIFICANT CHANGE UP
WBC # BLD: 15.29 K/UL — HIGH (ref 3.8–10.5)
WBC # FLD AUTO: 15.29 K/UL — HIGH (ref 3.8–10.5)

## 2024-11-22 PROCEDURE — 99232 SBSQ HOSP IP/OBS MODERATE 35: CPT

## 2024-11-22 PROCEDURE — 99233 SBSQ HOSP IP/OBS HIGH 50: CPT

## 2024-11-22 PROCEDURE — 99233 SBSQ HOSP IP/OBS HIGH 50: CPT | Mod: GC

## 2024-11-22 RX ORDER — TAMSULOSIN HYDROCHLORIDE 0.4 MG/1
0.4 CAPSULE ORAL AT BEDTIME
Refills: 0 | Status: DISCONTINUED | OUTPATIENT
Start: 2024-11-22 | End: 2024-12-03

## 2024-11-22 RX ORDER — PANTOPRAZOLE SODIUM 40 MG/1
40 TABLET, DELAYED RELEASE ORAL
Refills: 0 | Status: DISCONTINUED | OUTPATIENT
Start: 2024-11-22 | End: 2024-12-03

## 2024-11-22 RX ORDER — HYDROCORTISONE ACETATE 25 MG/ML
50 VIAL (ML) INJECTION EVERY 12 HOURS
Refills: 0 | Status: DISCONTINUED | OUTPATIENT
Start: 2024-11-22 | End: 2024-11-25

## 2024-11-22 RX ORDER — CEFTOLOZANE AND TAZOBACTAM 1; .5 G/10ML; G/10ML
1500 INJECTION, POWDER, LYOPHILIZED, FOR SOLUTION INTRAVENOUS EVERY 8 HOURS
Refills: 0 | Status: COMPLETED | OUTPATIENT
Start: 2024-11-23 | End: 2024-11-29

## 2024-11-22 RX ORDER — RISPERIDONE 4 MG/1
0.25 TABLET ORAL
Refills: 0 | Status: DISCONTINUED | OUTPATIENT
Start: 2024-11-22 | End: 2024-12-03

## 2024-11-22 RX ORDER — MEROPENEM 500 MG/1
1000 INJECTION, POWDER, FOR SOLUTION INTRAVENOUS EVERY 8 HOURS
Refills: 0 | Status: DISCONTINUED | OUTPATIENT
Start: 2024-11-22 | End: 2024-11-23

## 2024-11-22 RX ORDER — CEFTOLOZANE AND TAZOBACTAM 1; .5 G/10ML; G/10ML
1500 INJECTION, POWDER, LYOPHILIZED, FOR SOLUTION INTRAVENOUS ONCE
Refills: 0 | Status: COMPLETED | OUTPATIENT
Start: 2024-11-22 | End: 2024-11-22

## 2024-11-22 RX ADMIN — Medication 5 MILLIGRAM(S): at 12:43

## 2024-11-22 RX ADMIN — MEROPENEM 100 MILLIGRAM(S): 500 INJECTION, POWDER, FOR SOLUTION INTRAVENOUS at 05:18

## 2024-11-22 RX ADMIN — TAMSULOSIN HYDROCHLORIDE 0.4 MILLIGRAM(S): 0.4 CAPSULE ORAL at 22:19

## 2024-11-22 RX ADMIN — RISPERIDONE 0.25 MILLIGRAM(S): 4 TABLET ORAL at 17:36

## 2024-11-22 RX ADMIN — RISPERIDONE 0.25 MILLIGRAM(S): 4 TABLET ORAL at 12:43

## 2024-11-22 RX ADMIN — Medication 50 MILLIGRAM(S): at 17:14

## 2024-11-22 RX ADMIN — APIXABAN 5 MILLIGRAM(S): 2.5 TABLET, FILM COATED ORAL at 05:18

## 2024-11-22 RX ADMIN — Medication 50 MILLIGRAM(S): at 05:18

## 2024-11-22 RX ADMIN — CEFTOLOZANE AND TAZOBACTAM 100 MILLIGRAM(S): 1; .5 INJECTION, POWDER, LYOPHILIZED, FOR SOLUTION INTRAVENOUS at 22:41

## 2024-11-22 RX ADMIN — MEROPENEM 100 MILLIGRAM(S): 500 INJECTION, POWDER, FOR SOLUTION INTRAVENOUS at 14:30

## 2024-11-22 RX ADMIN — MEROPENEM 100 MILLIGRAM(S): 500 INJECTION, POWDER, FOR SOLUTION INTRAVENOUS at 22:18

## 2024-11-22 RX ADMIN — SODIUM CHLORIDE 4 MILLILITER(S): 9 INJECTION, SOLUTION INTRAMUSCULAR; INTRAVENOUS; SUBCUTANEOUS at 11:06

## 2024-11-22 RX ADMIN — APIXABAN 5 MILLIGRAM(S): 2.5 TABLET, FILM COATED ORAL at 17:13

## 2024-11-22 RX ADMIN — CHLORHEXIDINE GLUCONATE 1 APPLICATION(S): 1.2 RINSE ORAL at 05:17

## 2024-11-22 NOTE — PROGRESS NOTE ADULT - PROBLEM SELECTOR PLAN 5
Pt with documented hx of CHF, unspecified type however on GDMT for HFrEF  - TTE (9/2024): LVEF 55-60%, no diastolic dysfunction, moderate MR  - Evaluated by cardiology during admission at [facility] (9/2024) for acute CHF, started on GDMT  - Pro-BNP on admission 1988  - Hold home metoprolol, spironolactone and lasix in setting of hypotension  - Strict I&Os   - Does not appear clinically volume overloaded  - Cardiology ([facility]) consulted,

## 2024-11-22 NOTE — PHARMACOTHERAPY INTERVENTION NOTE - COMMENTS
Noticed patient experiencing some acute agitation.  On risperidone 0.25mg BID per [facility].  D/w Dr. Ely and home dose was ordered.  Also added home medications: tamsulosin, finasteride and (omeprazole) -> pantoprazole.

## 2024-11-22 NOTE — PROGRESS NOTE ADULT - PROBLEM SELECTOR PLAN 1
- Pt p/w elevated WBC, pt asymptomatic. Hx urosepsis/klebsiella bacteremia treated with IV rocephin, hospital course complicated by Afib with RVR during recent admission (9/2024)  - Per CI paperwork, pt s/p 7d course of IV zosyn via PICC (11/4-11/10)  - Met sepsis criteria on admission with leukocytosis, lactate, tachycardia  - WBC 25.27, per chart review baseline around 12-13 (on chronic steroids for MG)  - UA: Turbid, large blood, large LEC, positive nitrite, many bacteria, WBC TNTC  - CT: Urinary bladder collapsed, limiting evaluation, at least 3 stones measuring up to 1.0 cm. R nephrostomy tube.  - Restrepo catheter present on admission,    - Pt admitted to ICU for septic shock 2/2 UTI and rhino/enterovirus  - s/p  Levophed gtt now midodrine , stress dose hydrocortisone   -  Meropenem Continue  1gm   q8hr   -  BCx x2 neg , UCx, gram neg stefani  sputum Cx neg   Management per ICU

## 2024-11-22 NOTE — PROGRESS NOTE ADULT - ASSESSMENT
78 y/o Male with PMHx HTN, HFpEF (EF 55-60%), PE (On Eliquis), Myasthenia Gravis, and chronic LE edema, Nephrolithiasis (s/p Nephrostomy Tube), BPH BIBA from SNF for hypertension (/105) admitted with:     # Acute Metabolic Encephalopathy   # Urosepsis 2/2 UTI, Kleb, Pseudomonas Carb Resistent  # Septic shock, resolved  # Rhinovirus    Neuro:  - No Active issues  - Avoid Neuro Deliriogenic / sedative medications  - Aspiration Precautions, HOB > 30 degrees  - Risperidone  - Steroids for MG    CV:  - No active issues, Normotensive  - Stress dose steroids  - TTE 11/20: LVEF is estimated at 35%, The apex, mid to apical anteroseptal wall and mid to distal anterolateral walls appear severely hypokinetic, trace MR. negative for intracardiac shunt  - Start GDMT when able    Pulm:  - Supplemental oxygen as needed to maintain SpO2 > 92%,  - Incentive spirometry  - Hypertonic nebs PRN                  GI:  - PPI  - Continue current diet    Renal:  - Continue to monitor Bun/Cr and UOP  - Replacing electrolytes as needed with Goal K> 4, PO> 3, Mg> 2               - Strict I&O's  - Proscar, Flomax    Heme:  - Eliquis     ID:  - trend CBC with diff, CMP  and fever curve  - UCx with klebsiella pneumoniae, proteus, and pseudomonas  - Urine Sensitivities resulted, Continue Jenae for Kleb and Zerbaxa 1.5mg ordered for Pseudomonas as Carbapenem resistant, Pt with Fluoroquinolone allergy, ID to continue Abx regimen   - Sputum AFB x 3 in process  - QuantiFeron pending  - Fungitell negative, galactomannan negative, urine histoplasma antigen    Endo:  - Glycemic control to limit FS glucose to < 180mg/dl.    Time spent on this patient encounter, which includes documenting this note in the electronic medical record, was 40 minutes including assessing the presenting problems with associated risks, reviewing the medical record to prepare for the encounter, and meeting face to face with the patient to obtain additional history. I have also performed an appropriate physical exam, made interventions listed and ordered and interpreted appropriate diagnostic studies as documented. To improve  communication and patient safety, I have coordinated care with the multidisciplinary team including the bedside nurse, appropriate attending of record and consultants as needed.    Date of entry of this note is equal to the date of services rendered

## 2024-11-22 NOTE — PROGRESS NOTE ADULT - ATTENDING COMMENTS
79M h/o HTN, HFpEF, PE on Eliquis, myasthenia gravis on prednisone, nephrolithiasis s/p L nephrostomy tube who presents with septic shock 2/2 UTI and rhino/enterovirus now off pressor support and overall improving.    Neuro: metabolic encephalopthy 2/2 sepsis continues to improve   - when tapering stress dose steroids, will need to continue home 5mg for MG   CV: septic shock resolving, with stress induced cardiomyopathy, now off pressors.; appreciate cardiology recs  - repeat TTE performed 11/20, technically difficulty though noted that in certain views, LVEF appears grossly preserved though the apex appears hypokinetic - will need repeat TTE to monitor progress over time  - start GDMT when able - if BP remains stable off midodrine can likely start beta blocker  - continue to wean stress dose steroids  PULM: CT with: "subcentimeter granulomas in the right upper lobe. Scattered reticular and mild airspace opacities throughout both lungs. Calcified right hilar and mediastinal lymph nodes. The constellation of findings likely represents granulomatous infection question tuberculosis or sarcoidosis." Pt without pulmonary symptoms presently, no risk factors for TB. Quantiferon in 2023 indeterminate, ID consulted, will check sputum cx though low suspicion for TB - if negative x3 will need ongoing Pulm/CT Surg follow-up  GI: reg diet as tolerated  RENAL: trend I&Os nephrostomy in good position - could follow up with urology regarding need to continue percutaneous drainage as there is no evidence of retained stone noted on CT ab/pel this admission  ID: UCx with both pseudomonas and klebsiella, can continue meropenum pending sensitivities   - supportive management for rhino/entero   - neagtive AFB x1, require 2 more to fully remove from isolation for r/o TB  HEME: full AC with Eliquis due to prior PE, no additional DVT ppx required      DNR, trial of intubation  Stable for transfer to floor
79M with HTN, HFpEF, PE on eliquis, myasthenia gravis on prednisone, nephrolithiasis s/p L nephrostomy tube who presents with septic shock 2/2 UTI and rhino/enterovirus    Neuro: metabolic encephalopthy 2/2 sepsis reolving. Taper stress steroids and resume prednisone 5mg when weaned off for MG  Pulm: CT with: "Subcentimeter granulomas in the right upper lobe. Scattered reticular and mild airspace opacities throughout both lungs. Calcified right hilar and mediastinal lymph nodes. The constellation of findings likely represents granulomatous infection question tuberculosis or sarcoidosis." Pt without pulmonary symptoms presently, no risk factors for TB. Quantiferon in 2023 indeterminate, ID consulted, will check sputum cx  CV: septic shock resolving, with stress induced cardiomyopathy, now off pressors.; appreciate cardiology recs  GI: reg diet  /Renal: trend I&Os nephrostomy in good position  Heme/DVT PPX: continue eliquis and trend CBC  Endo: continue stress dose steroids  ID: Continue gigi for UTI, BCx NGTD, UCx pending. Pt also rhino/entero +  Ethics: DNR, trial of intubation, son updated over phone
79M with HTN, HFpEF, PE on eliquis, myasthenia gravis on prednisone, nephrolithiasis s/p L nephrostomy tube who presents with septic shock 2/2 UTI and rhino/enterovirus    Neuro: metabolic encephalopthy 2/2 sepsis, continuing treating underlying causes. Continue stress steroids and resume prednisone 5mg when weaned off for MG  Pulm: CT with: "Subcentimeter granulomas in the right upper lobe. Scattered reticular and mild airspace opacities throughout both lungs. Calcified right hilar and mediastinal lymph nodes. The constellation of findings likely represents granulomatous infection question tuberculosis or sarcoidosis." Pt without pulmonary symptoms presently, no risk factors for TB. Quantiferon in 2023 indeterminate  CV: septic shock with stress induced cardiomyopathy, continue vasopressors to maintain MAP >65; appreciate cardiology recs  GI: reg diet  /Renal: trend I&Os nephrostomy in good position  Heme/DVT PPX: continue eliquis and trend CBC  Endo: continue stress dose steroids  ID: Continue gigi, follow up urine and blood cx  Ethics: DNR, trial of intubation, son updated over phone

## 2024-11-22 NOTE — PROGRESS NOTE ADULT - SUBJECTIVE AND OBJECTIVE BOX
Patient is a 79y old  Male who presents with a chief complaint of Urosepsis (22 Nov 2024 14:12)    pt seen and examine today alert awake ,  no fever  denies any c/o, on reg diet.  INTERVAL HPI/OVERNIGHT EVENTS:     T(C): 36.7 (11-22-24 @ 12:10), Max: 36.9 (11-21-24 @ 20:40)  HR: 80 (11-22-24 @ 14:00) (51 - 115)  BP: 113/52 (11-22-24 @ 14:00) (103/73 - 139/64)  RR: 13 (11-22-24 @ 14:00) (9 - 23)  SpO2: 98% (11-22-24 @ 14:00) (95% - 99%)  Wt(kg): --  I&O's Summary    21 Nov 2024 07:01  -  22 Nov 2024 07:00  --------------------------------------------------------  IN: 870 mL / OUT: 750 mL / NET: 120 mL      I&O's Summary    20 Nov 2024 07:01  -  21 Nov 2024 07:00  --------------------------------------------------------  IN: 134.4 mL / OUT: 2955 mL / NET: -2820.6 mL        REVIEW OF SYSTEMS:  CONSTITUTIONAL: No fever,    EYES: No eye pain, visual disturbances, or discharge  ENMT:  No difficulty hearing, tinnitus, vertigo   NECK: No pain or stiffness  RESPIRATORY: No cough, wheezing, chills No shortness of breath  CARDIOVASCULAR: No chest pain, palpitations, dizziness, or leg swelling  GASTROINTESTINAL: No abdominal or epigastric pain. No nausea, vomiting,   No diarrhea or constipation. N   GENITOURINARY: No dysuria, frequency, hematuria, or incontinence  NEUROLOGICAL: No headaches, memory loss, loss of strength, numbness, or tremors  SKIN: No itching, burning, rashes, or lesions   MUSCULOSKELETAL: No joint pain or swelling; No muscle, back, or extremity pain    PHYSICAL EXAM:  GENERAL: NAD,   HEAD:  Atraumatic, Normocephalic  EYES: EOMI, PERRLA, conjunctiva and sclera clear  ENMT:   Moist mucous membranes  NECK: Supple,   NERVOUS SYSTEM:  Alert & Oriented X3; Motor Strength 5/5 B/L upper and lower extremities; DTRs 2+ intact and symmetric  CHEST/LUNG:  percussion bilaterally; No rales, rhonchi, wheezing,    HEART: Regular rate and rhythm; No murmurs, no tachy   ABDOMEN: Soft, Nontender, Nondistended; Bowel sounds present  EXTREMITIES:  2+ Peripheral Pulses, No clubbing, cyanosis, or edema  SKIN: No rashes or lesions      MEDICATIONS  (STANDING):  apixaban 5 milliGRAM(s) Oral every 12 hours  chlorhexidine 2% Cloths 1 Application(s) Topical <User Schedule>  finasteride 5 milliGRAM(s) Oral daily  hydrocortisone sodium succinate Injectable 50 milliGRAM(s) IV Push every 12 hours  meropenem  IVPB 1000 milliGRAM(s) IV Intermittent every 8 hours  pantoprazole    Tablet 40 milliGRAM(s) Oral before breakfast  risperiDONE   Tablet 0.25 milliGRAM(s) Oral two times a day  tamsulosin 0.4 milliGRAM(s) Oral at bedtime    MEDICATIONS  (PRN):  acetaminophen     Tablet .. 650 milliGRAM(s) Oral every 6 hours PRN Temp greater or equal to 38C (100.4F), Mild Pain (1 - 3)  ondansetron Injectable 4 milliGRAM(s) IV Push every 8 hours PRN Nausea and/or Vomiting  sodium chloride 3%  Inhalation 4 milliLiter(s) Inhalation every 8 hours PRN sputum induction      LABS:                        12.9   15.29 )-----------( 423      ( 22 Nov 2024 06:07 )             38.7     11-22    141  |  111[H]  |  25[H]  ----------------------------<  135[H]  3.6   |  25  |  0.74    Ca    9.1      22 Nov 2024 06:07  Phos  3.0     11-22  Mg     2.1     11-22    TPro  6.2  /  Alb  2.1[L]  /  TBili  0.2  /  DBili  x   /  AST  16  /  ALT  23  /  AlkPhos  68  11-22      Urinalysis Basic - ( 22 Nov 2024 06:07 )    Color: x / Appearance: x / SG: x / pH: x  Gluc: 135 mg/dL / Ketone: x  / Bili: x / Urobili: x   Blood: x / Protein: x / Nitrite: x   Leuk Esterase: x / RBC: x / WBC x   Sq Epi: x / Non Sq Epi: x / Bacteria: x      CAPILLARY BLOOD GLUCOSE          11-21 @ 12:00   Commensal violet consistent with body site  --  --  11-20 @ 14:30   50,000 - 99,000 CFU/mL Pseudomonas aeruginosa  50,000 - 99,000 CFU/mL Proteus mirabilis  50,000 - 99,000 CFU/mL Klebsiella pneumoniae  --  --  11-20 @ 01:25   >100,000 CFU/ml Klebsiella pneumoniae  >100,000 CFU/ml Pseudomonas aeruginosa  --  --  11-19 @ 22:25   No growth at 48 Hours  --  --  11-19 @ 22:20   No growth at 48 Hours  --  --          RADIOLOGY & ADDITIONAL TESTS:    Imaging Personally Reviewed:     no new test

## 2024-11-22 NOTE — PROGRESS NOTE ADULT - ASSESSMENT
78 yo M with PMHx HTN, CHF, PE (on eliquis), Myasthenia Gravis, and chronic LE edema, R ureteral obstructing stone s/p R PCN placement on 9/6/24, urosepsis, BPH, who presented from [facility] for hypertension and elevated WBC count. Per nursing home paperwork, pt hypertensive to 154/105 and recieved metoprolol 25 mg (8:10 PM), and reported pt with "elevated WBC". Upon arrival, pt found to be hypotensive to 90/64. Arrived to ED with nephrostomy tube, chronic guerrero and PICC line in place. Pt was recieving IV zosyn x7d (start date 11/04) per paperwork review for UTI.    Patient being treated for UTI. Guerrero exchanged in the ED. CT did not show any obvious signs of acute infection. He has no fever and WBC improving. Urine culture growing klebsiella, pseudomonas, proteus (also from nephrostomy). Blood cultures remain no growth.     Also incidentally with RUL calcified granulomas, also in liver in spleen. Unclear etiology, patient without symptoms of tuberculosis, no known hx of TB infection or exposure. He was born in [place], and has not lived outside [place].    #Leukocytosis  #UTI  #Rhinovirus positive  #Calcified granulomas  #Hx of fluoroquinolone allergy    -continue meropenem pending urine culture sensitivities  -suggest Urology evaluation regarding nephrostomy  -follow cultures to completion  -sputum AFB x 3 in process  -Quantiferon pending  -suggest serum Fungitell, galactomanna, urine histoplasma antigen  -monitor WBC  -discussed with Dr. Olga Connor MD  Division of Infectious Diseases   Cell [phone] between 8am and 6pm   After 6pm and weekends please call ID service at [phone].     35 minutes spent on total encounter assessing patient, examination, chart review, counseling and coordinating care by the attending physician/nurse/care manager.

## 2024-11-22 NOTE — PROGRESS NOTE ADULT - ASSESSMENT
80 yo M with PMHx HTN, CHF, PE (on eliquis), Myasthenia Gravis, and chronic LE edema, nephrolithiasis (s/p nephrostomy tube placement ), urosepsis, BPH BIBEMS from [facility] for hypertension and elevated WBC count. Cardiology was consulted for hypotension.    - hypotension resolved now that his urosepsis has been treated  - Past echo from 09/2024 showed LVEF 55-60%, no diastolic dysfunction, moderate MR  - Repeat TTE with severe LV dysfunction.  Looks to be stress mediated. Even on repeat study on 11/20, the LV function seems somewhat improved.  - No evidence of significant volume overload  - Off pressors and midodrine  - To start GDMT when able (when bp can tolerate) and repeat echo in about one month. He is on toprol xl at home.  - No clear evidence of acute ischemia, patient denies cardiac symptoms.  - Ruling out TB secondary to CT chest  findings  - cont eliquis for pe history  - Monitor and replete lytes, keep K>4, Mg>2.  - will follow with you

## 2024-11-22 NOTE — PROGRESS NOTE ADULT - ASSESSMENT
78 y/o Male with PMHx HTN, HFpEF (EF 55-60%), PE (On Eliquis), Myasthenia Gravis, and chronic LE edema, Nephrolithiasis (s/p Nephrostomy Tube), BPH BIBA from SNF for hypertension (/105) with:     1. Acute Metabolic Encephalopathy   3. Urosepsis 2/2 UTI & Distributive shock    NEURO: - Arousable and agitated overnight until this morning, refusing to answer medications.  Defer delirogenic medications. Pain control PRN.   - Pt w/ hx of myasthenia gravis on prednisone at home - to continue SDS w/ hydrocortisone 2/2 to shock state.- downtitrate from q8h to q12h. --- goal to transition back to home prednisone dosing.    CV:  - hypotensive in the ED s/p 2L NS bolus now s/p levo gtt.  - Maintain MAP>65.  - Continue hydrocortisone - now q12h  - s/p midodrine.  - Bedside echo showing normal atrial movement, mod MR, minimal ventricular wall movement in L>R apices of heart.   - TTE 11/20 -  LVEF is estimated at 35%, The apex, mid to spical anteroseptal wall and mid to distal anterolateral walls appear severely hypokinetic, trace MR. negative for intracardiac shunt  - f/u AM CMP, Mg, Phos.    PULM:   - Required supplemental O2 w/ NC on admission now downtitrated to room air. Maintain O2 sat >94%.    RENAL:  - Guerrero catheter in place with adequate, although improved turbid output. Trend lytes, maintain K>4, Mg>2, Phos>3. Avoiding nephrotoxic medications.     GI:   -Continue reg diet to assist with recovery from shock state.    ID:  - Patient UA w/ Turbid, large LE and + nitrites coming in with guerrero  - Continue meropenem 1g q8h. for 7 day course(day 3)  - BCx NGTD @ 48hr  - UCx with klebsiella pneumoniae, proteus, and pseudemonas pending sensitivities   - sputum cultures ordered - 1showing unremarkable oral violet - x2 pending. results.   -now on airborne precautions r/o TB.   - f/u quant, pending  - Leukocytosis downtrending, continue to monitor along w/ fever curve.    ENDO:  - BG achs, goal Glu 110-180.    HEME:  - Hb 14->~11.1 likely dilutional 2/2 to 1Lx2 NS bolus due to shock state. - now stable  - No inteventions performed at this time,  - Cont Eliquis 5mg BID    DISPO:  - In ICU, patient stable for transfer to medical floors.    Tubes/Lines: peripheral IV, nephrostomy tube, guerrero.

## 2024-11-22 NOTE — PROGRESS NOTE ADULT - SUBJECTIVE AND OBJECTIVE BOX
[facility] - Janel Jackson, Evan Woods Savella, [place]  Office Number:  [phone]    Patient resting comfortably in bed in NAD.  Laying flat with no respiratory distress.  No complaints of chest pain, dyspnea, palpitations, PND, or orthopnea.  wants to get out of here  on ra    ROS: negative unless otherwise mentioned.    Telemetry:  sb    MEDICATIONS  (STANDING):  apixaban 5 milliGRAM(s) Oral every 12 hours  chlorhexidine 2% Cloths 1 Application(s) Topical <User Schedule>  hydrocortisone sodium succinate Injectable 50 milliGRAM(s) IV Push every 12 hours  meropenem  IVPB 1000 milliGRAM(s) IV Intermittent every 8 hours    MEDICATIONS  (PRN):  acetaminophen     Tablet .. 650 milliGRAM(s) Oral every 6 hours PRN Temp greater or equal to 38C (100.4F), Mild Pain (1 - 3)  ondansetron Injectable 4 milliGRAM(s) IV Push every 8 hours PRN Nausea and/or Vomiting  sodium chloride 3%  Inhalation 4 milliLiter(s) Inhalation every 8 hours PRN sputum induction      Allergies    levofloxacin (Unknown)  ofloxacin (Unknown)  gatifloxacin (Unknown)    Intolerances    fluoroquinolone antibiotics (Other)  Ketek (Other)  Avelox (Other)  telithromycin (Other)      Vital Signs Last 24 Hrs  T(C): 36.5 (22 Nov 2024 08:01), Max: 36.9 (21 Nov 2024 20:40)  T(F): 97.7 (22 Nov 2024 08:01), Max: 98.4 (21 Nov 2024 20:40)  HR: 61 (22 Nov 2024 08:00) (51 - 115)  BP: 111/54 (22 Nov 2024 08:00) (103/73 - 136/60)  BP(mean): 75 (22 Nov 2024 08:00) (75 - 89)  RR: 14 (22 Nov 2024 08:00) (7 - 23)  SpO2: 98% (22 Nov 2024 08:00) (95% - 100%)        I&O's Summary    21 Nov 2024 07:01  -  22 Nov 2024 07:00  --------------------------------------------------------  IN: 870 mL / OUT: 750 mL / NET: 120 mL        ON EXAM:    General: NAD, awake and alert, oriented x 3  HEENT: Mucous membranes are moist, anicteric  Lungs: Non-labored, breath sounds are clear bilaterally, No wheezing, rales or rhonchi.  Decreased breath sounds b/l  Cardiovascular: Regular, S1 and S2, no murmurs, rubs, or gallops.  Distant  Gastrointestinal: Bowel Sounds present, soft, nontender.   Lymph: b/l peripheral edema. No lymphadenopathy.  Skin: No rashes or ulcers  Psych:  Mood & affect appropriate    LABS: All Labs Reviewed:                        12.9   15.29 )-----------( 423      ( 22 Nov 2024 06:07 )             38.7                         12.3   17.20 )-----------( 389      ( 21 Nov 2024 08:50 )             38.4                         11.1   19.08 )-----------( 356      ( 20 Nov 2024 03:55 )             34.5     22 Nov 2024 06:07    141    |  111    |  25     ----------------------------<  135    3.6     |  25     |  0.74   21 Nov 2024 08:50    144    |  114    |  16     ----------------------------<  121    4.6     |  23     |  0.60   20 Nov 2024 03:55    139    |  109    |  15     ----------------------------<  116    3.2     |  26     |  0.73     Ca    9.1        22 Nov 2024 06:07  Ca    9.7        21 Nov 2024 08:50  Ca    8.5        20 Nov 2024 03:55  Phos  3.0       22 Nov 2024 06:07  Phos  3.0       21 Nov 2024 08:50  Mg     2.1       22 Nov 2024 06:07  Mg     2.2       21 Nov 2024 08:50    TPro  6.2    /  Alb  2.1    /  TBili  0.2    /  DBili  x      /  AST  16     /  ALT  23     /  AlkPhos  68     22 Nov 2024 06:07  TPro  6.1    /  Alb  2.0    /  TBili  0.3    /  DBili  x      /  AST  13     /  ALT  19     /  AlkPhos  63     21 Nov 2024 08:50  TPro  5.6    /  Alb  1.9    /  TBili  0.4    /  DBili  x      /  AST  18     /  ALT  19     /  AlkPhos  59     20 Nov 2024 03:55          Blood Culture: Organism --  Gram Stain Blood -- Gram Stain   Few Squamous epithelial cells per low power field  No polymorphonuclear leukocytes per low power field  Few Gram Negative Rods per oil power field  Few Gram Positive Rods per oil power field  Rare Gram positive cocci in pairs per oil power field  Specimen Source .Sputum Sputum  Culture-Blood --    Organism --  Gram Stain Blood -- Gram Stain --  Specimen Source .Urine Nephrostomy - Right  Culture-Blood --    Organism --  Gram Stain Blood -- Gram Stain --  Specimen Source .Urine  Culture-Blood --    Organism --  Gram Stain Blood -- Gram Stain --  Specimen Source .Blood BLOOD  Culture-Blood --    Organism --  Gram Stain Blood -- Gram Stain --  Specimen Source .Blood BLOOD  Culture-Blood --        11-20 @ 11:36  TSH: 0.58

## 2024-11-22 NOTE — PROGRESS NOTE ADULT - SUBJECTIVE AND OBJECTIVE BOX
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  EYES: No eye pain, visual disturbances, or discharge.  ENMT:  No difficulty hearing, tinnitus, or vertigo. No sinus or throat pain.  NECK: No pain or stiffness.  RESPIRATORY: No shortness of breath, cough, or wheezing.  CARDIOVASCULAR: No chest pain, palpitations, dizziness, or leg swelling.  GASTROINTESTINAL: No abdominal or epigastric pain. No nausea, vomiting, diarrhea, or constipation. No hematemesis, melena, or hematochezia.  GENITOURINARY: No dysuria, increased frequency, hematuria, or incontinence.  NEUROLOGICAL: No headaches, memory loss, loss of strength, numbness, or tremors.  SKIN: No itching, burning, rashes, or lesions.  MUSCULOSKELETAL: No joint pain or swelling. No muscle, back, or extremity pain.  PSYCHIATRIC: No depression, anxiety, mood swings, or difficulty sleeping.    Medications:  ceftolozane/tazobactam IVPB 1500 milliGRAM(s) IV Intermittent once  meropenem  IVPB 1000 milliGRAM(s) IV Intermittent every 8 hours      sodium chloride 3%  Inhalation 4 milliLiter(s) Inhalation every 8 hours PRN    acetaminophen     Tablet .. 650 milliGRAM(s) Oral every 6 hours PRN  ondansetron Injectable 4 milliGRAM(s) IV Push every 8 hours PRN  risperiDONE   Tablet 0.25 milliGRAM(s) Oral two times a day      apixaban 5 milliGRAM(s) Oral every 12 hours    pantoprazole    Tablet 40 milliGRAM(s) Oral before breakfast    tamsulosin 0.4 milliGRAM(s) Oral at bedtime    finasteride 5 milliGRAM(s) Oral daily  hydrocortisone sodium succinate Injectable 50 milliGRAM(s) IV Push every 12 hours        chlorhexidine 2% Cloths 1 Application(s) Topical <User Schedule>            ICU Vital Signs Last 24 Hrs  T(C): 36.8 (22 Nov 2024 20:26), Max: 36.8 (22 Nov 2024 00:02)  T(F): 98.2 (22 Nov 2024 20:26), Max: 98.2 (22 Nov 2024 00:02)  HR: 83 (22 Nov 2024 20:00) (51 - 115)  BP: 142/60 (22 Nov 2024 19:00) (101/51 - 142/60)  BP(mean): 87 (22 Nov 2024 19:00) (70 - 92)  ABP: --  ABP(mean): --  RR: 15 (22 Nov 2024 20:00) (9 - 20)  SpO2: 96% (22 Nov 2024 20:00) (95% - 99%)    O2 Parameters below as of 22 Nov 2024 19:00  Patient On (Oxygen Delivery Method): BiPAP/CPAP                I&O's Detail    21 Nov 2024 07:01  -  22 Nov 2024 07:00  --------------------------------------------------------  IN:    IV PiggyBack: 150 mL    Oral Fluid: 720 mL  Total IN: 870 mL    OUT:    Drain (mL): 410 mL    Indwelling Catheter - Urethral (mL): 340 mL  Total OUT: 750 mL    Total NET: 120 mL      22 Nov 2024 07:01  -  22 Nov 2024 21:06  --------------------------------------------------------  IN:    IV PiggyBack: 50 mL  Total IN: 50 mL    OUT:    Drain (mL): 200 mL    Indwelling Catheter - Urethral (mL): 200 mL  Total OUT: 400 mL    Total NET: -350 mL          LABS:                        12.9   15.29 )-----------( 423      ( 22 Nov 2024 06:07 )             38.7     11-22    141  |  111[H]  |  25[H]  ----------------------------<  135[H]  3.6   |  25  |  0.74    Ca    9.1      22 Nov 2024 06:07  Phos  3.0     11-22  Mg     2.1     11-22    TPro  6.2  /  Alb  2.1[L]  /  TBili  0.2  /  DBili  x   /  AST  16  /  ALT  23  /  AlkPhos  68  11-22          CAPILLARY BLOOD GLUCOSE          Urinalysis Basic - ( 22 Nov 2024 06:07 )    Color: x / Appearance: x / SG: x / pH: x  Gluc: 135 mg/dL / Ketone: x  / Bili: x / Urobili: x   Blood: x / Protein: x / Nitrite: x   Leuk Esterase: x / RBC: x / WBC x   Sq Epi: x / Non Sq Epi: x / Bacteria: x      CULTURES:  Culture Results:   Commensal violet consistent with body site (11-21 @ 12:00)  Culture Results:   50,000 - 99,000 CFU/mL Pseudomonas aeruginosa  50,000 - 99,000 CFU/mL Proteus mirabilis  50,000 - 99,000 CFU/mL Klebsiella pneumoniae (11-20 @ 14:30)  Culture Results:   >100,000 CFU/ml Klebsiella pneumoniae  >100,000 CFU/ml Pseudomonas aeruginosa (Carbapenem Resistant) (11-20 @ 01:25)  Rapid RVP Result: Detected (11-20 @ 00:00)  Culture Results:   No growth at 48 Hours (11-19 @ 22:25)  Culture Results:   No growth at 48 Hours (11-19 @ 22:20)      Physical Examination:    VITALS AT TIME OF EXAM:  HR:  BP:  RR:  SPO2:    General: Well appearing, lying in bed in NAD.      HEENT: Pupils equal, reactive to light. Symmetric. No scleral icterus or injection.    PULM: Clear to auscultation B/L. No wheezes, rales, or rhonchi apprecaited. No significant sputum production or increased respiratory effort.    NECK: Supple, no lymphadenopathy, trachea midline.    CVS: Regular rate and rhythm, no murmurs appreciated, +s1/s2.    ABD: Soft, nondistended, nontender, normoactive bowel sounds.    EXT: No edema, nontender.    SKIN: Warm and well perfused, no rashes noted.    NEURO: Alert, oriented, interactive, nonfocal.    RADIOLOGY: < from: CT Chest No Cont (11.20.24 @ 01:48) >    ACC: [id number] EXAM:  CT ABDOMEN AND PELVIS IC   ORDERED BY: JUNIOR ROJAS     ACC: [id number] EXAM:  CT CHEST   ORDERED BY: EDMUNDO SANFORD     ACC: [id number] EXAM:  CT CHEST IC   ORDERED BY: JUNIOR ROJAS     ACC: [id number] EXAM:  CT ABDOMEN AND PELVIS   ORDERED BY: EDMUNDO SANFORD     PROCEDURE DATE:  11/20/2024          INTERPRETATION:  CLINICAL INFORMATION: Sepsis    COMPARISON: None.    CONTRAST/COMPLICATIONS:  IV Contrast: Omnipaque 350 (accession [id number]), NONE (accession   [id number])  90 cc administered   10 cc discarded  Oral Contrast: NONE      PROCEDURE:  CT of the Chest, Abdomen and Pelvis was performed.  Sagittal and coronal reformats were performed.    FINDINGS:  CHEST:  LUNGS AND LARGE AIRWAYS: Patent central airways.  Subcentimeter granulomas in the right upper lobe. Scattered reticular and   mild airspace opacities throughout both lungs.  PLEURA: No pleural effusion.  VESSELS: Aortic calcifications. Coronary artery calcifications.  HEART: Heart size is normal. No pericardial effusion.  MEDIASTINUM AND KONRAD: Calcified right hilar and mediastinal lymph nodes.  CHEST WALL AND LOWER NECK: 2.6 cm left lobe thyroid nodule. Nonemergent   thyroid ultrasound is recommended.    ABDOMEN AND PELVIS:  LIVER: Innumerable punctate calcifications throughout the liver.  BILE DUCTS: Normal caliber.  GALLBLADDER: Dense gallbladder sludge or small stones. No surrounding   fluid.  SPLEEN: Innumerable punctate calcifications.  PANCREAS: Within normal limits.  ADRENALS: Within normal limits.  KIDNEYS/URETERS: Percutaneous right nephrostomy tube. No hydronephrosis.   Unremarkable left kidney.    BLADDER: Urinary bladder is collapsed around a Restrepo catheter limiting   evaluation. There are at least 3 urinary bladder stone measuring upto   1.0 cm.  REPRODUCTIVE ORGANS: Prostate is enlarged.    BOWEL: No bowel obstruction. Appendix is normal.  PERITONEUM/RETROPERITONEUM: Within normal limits.  VESSELS: Within normal limits.  LYMPH NODES: No lymphadenopathy.  ABDOMINAL WALL: Within normal limits.  BONES: Degenerative changes. Scoliosis.    IMPRESSION:    2.6 cm left lobe thyroid nodule. Nonemergent thyroid ultrasound is   recommended.    Subcentimeter granulomas in the right upper lobe. Scattered reticular and   mild airspace opacities throughout both lungs. Calcified right hilar and   mediastinal lymph nodes. The constellation of findings likely represents   granulomatous infection question tuberculosis or sarcoidosis.    Innumerable punctate calcifications throughout the liver and spleen   consistent with prior granulomatous infection.    Dense gallbladder sludge or small stones. No surrounding fluid.    Percutaneous right nephrostomy tube. No hydronephrosis. Unremarkable left   kidney.    Urinary bladder is collapsed around a Restrepo catheter limiting evaluation.   There are at least 3 urinary bladder stone measuring up to 1.0 cm.      --- End of Report ---            MEGAN BOLDEN MD; Attending Radiologist  This document has been electronically signed. Nov 20 2024 2:11AM    < end of copied text >     Patient is a 79y old  Male who presents with a chief complaint of Urosepsis (22 Nov 2024 15:42)      Events last 24 hours: Remains off pressors, off supplemental oxygen, rule out TB in SITU, Urine Cx sensitivities resulted    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  EYES: No eye pain, visual disturbances, or discharge.  ENMT:  No difficulty hearing, tinnitus, or vertigo. No sinus or throat pain.  NECK: No pain or stiffness.  RESPIRATORY: No shortness of breath, cough, or wheezing.  CARDIOVASCULAR: No chest pain, palpitations, dizziness, or leg swelling.  GASTROINTESTINAL: No abdominal or epigastric pain. No nausea, vomiting, diarrhea, or constipation. No hematemesis, melena, or hematochezia.  GENITOURINARY: No dysuria, increased frequency, hematuria, or incontinence.  NEUROLOGICAL: No headaches, memory loss, loss of strength, numbness, or tremors.  SKIN: No itching, burning, rashes, or lesions.  MUSCULOSKELETAL: No joint pain or swelling. No muscle, back, or extremity pain.  PSYCHIATRIC: No depression, anxiety, mood swings, or difficulty sleeping.    Medications:  ceftolozane/tazobactam IVPB 1500 milliGRAM(s) IV Intermittent once  meropenem  IVPB 1000 milliGRAM(s) IV Intermittent every 8 hours      sodium chloride 3%  Inhalation 4 milliLiter(s) Inhalation every 8 hours PRN    acetaminophen     Tablet .. 650 milliGRAM(s) Oral every 6 hours PRN  ondansetron Injectable 4 milliGRAM(s) IV Push every 8 hours PRN  risperiDONE   Tablet 0.25 milliGRAM(s) Oral two times a day      apixaban 5 milliGRAM(s) Oral every 12 hours    pantoprazole    Tablet 40 milliGRAM(s) Oral before breakfast    tamsulosin 0.4 milliGRAM(s) Oral at bedtime    finasteride 5 milliGRAM(s) Oral daily  hydrocortisone sodium succinate Injectable 50 milliGRAM(s) IV Push every 12 hours        chlorhexidine 2% Cloths 1 Application(s) Topical <User Schedule>            ICU Vital Signs Last 24 Hrs  T(C): 36.8 (22 Nov 2024 20:26), Max: 36.8 (22 Nov 2024 00:02)  T(F): 98.2 (22 Nov 2024 20:26), Max: 98.2 (22 Nov 2024 00:02)  HR: 83 (22 Nov 2024 20:00) (51 - 115)  BP: 142/60 (22 Nov 2024 19:00) (101/51 - 142/60)  BP(mean): 87 (22 Nov 2024 19:00) (70 - 92)  ABP: --  ABP(mean): --  RR: 15 (22 Nov 2024 20:00) (9 - 20)  SpO2: 96% (22 Nov 2024 20:00) (95% - 99%)    O2 Parameters below as of 22 Nov 2024 19:00  Patient On (Oxygen Delivery Method): BiPAP/CPAP                I&O's Detail    21 Nov 2024 07:01  -  22 Nov 2024 07:00  --------------------------------------------------------  IN:    IV PiggyBack: 150 mL    Oral Fluid: 720 mL  Total IN: 870 mL    OUT:    Drain (mL): 410 mL    Indwelling Catheter - Urethral (mL): 340 mL  Total OUT: 750 mL    Total NET: 120 mL      22 Nov 2024 07:01  -  22 Nov 2024 21:06  --------------------------------------------------------  IN:    IV PiggyBack: 50 mL  Total IN: 50 mL    OUT:    Drain (mL): 200 mL    Indwelling Catheter - Urethral (mL): 200 mL  Total OUT: 400 mL    Total NET: -350 mL          LABS:                        12.9   15.29 )-----------( 423      ( 22 Nov 2024 06:07 )             38.7     11-22    141  |  111[H]  |  25[H]  ----------------------------<  135[H]  3.6   |  25  |  0.74    Ca    9.1      22 Nov 2024 06:07  Phos  3.0     11-22  Mg     2.1     11-22    TPro  6.2  /  Alb  2.1[L]  /  TBili  0.2  /  DBili  x   /  AST  16  /  ALT  23  /  AlkPhos  68  11-22          CAPILLARY BLOOD GLUCOSE          Urinalysis Basic - ( 22 Nov 2024 06:07 )    Color: x / Appearance: x / SG: x / pH: x  Gluc: 135 mg/dL / Ketone: x  / Bili: x / Urobili: x   Blood: x / Protein: x / Nitrite: x   Leuk Esterase: x / RBC: x / WBC x   Sq Epi: x / Non Sq Epi: x / Bacteria: x      CULTURES:  Culture Results:   Commensal violet consistent with body site (11-21 @ 12:00)  Culture Results:   50,000 - 99,000 CFU/mL Pseudomonas aeruginosa  50,000 - 99,000 CFU/mL Proteus mirabilis  50,000 - 99,000 CFU/mL Klebsiella pneumoniae (11-20 @ 14:30)  Culture Results:   >100,000 CFU/ml Klebsiella pneumoniae  >100,000 CFU/ml Pseudomonas aeruginosa (Carbapenem Resistant) (11-20 @ 01:25)  Rapid RVP Result: Detected (11-20 @ 00:00)  Culture Results:   No growth at 48 Hours (11-19 @ 22:25)  Culture Results:   No growth at 48 Hours (11-19 @ 22:20)      Physical Examination:    General: Well appearing, lying in bed in NAD.      HEENT: Pupils equal, reactive to light. Symmetric. No scleral icterus or injection.    PULM: Clear to auscultation B/L. No wheezes, rales, or rhonchi apprecaited. No significant sputum production or increased respiratory effort.    NECK: Supple, no lymphadenopathy, trachea midline.    CVS: Regular rate and rhythm, no murmurs appreciated, +s1/s2.    ABD: Soft, nondistended, nontender, normoactive bowel sounds.    EXT: No edema, nontender.    SKIN: Warm and well perfused, no rashes noted.    NEURO: Alert, oriented, interactive, nonfocal.    RADIOLOGY: < from: CT Chest No Cont (11.20.24 @ 01:48) >    ACC: [id number] EXAM:  CT ABDOMEN AND PELVIS IC   ORDERED BY: JUNIOR ROJAS     ACC: [id number] EXAM:  CT CHEST   ORDERED BY: EDMUNDO SANFORD     ACC: [id number] EXAM:  CT CHEST IC   ORDERED BY: JUNIOR ROJAS     ACC: [id number] EXAM:  CT ABDOMEN AND PELVIS   ORDERED BY: EDMUNDO SANFORD     PROCEDURE DATE:  11/20/2024          INTERPRETATION:  CLINICAL INFORMATION: Sepsis    COMPARISON: None.    CONTRAST/COMPLICATIONS:  IV Contrast: Omnipaque 350 (accession [id number]), NONE (accession   [id number])  90 cc administered   10 cc discarded  Oral Contrast: NONE      PROCEDURE:  CT of the Chest, Abdomen and Pelvis was performed.  Sagittal and coronal reformats were performed.    FINDINGS:  CHEST:  LUNGS AND LARGE AIRWAYS: Patent central airways.  Subcentimeter granulomas in the right upper lobe. Scattered reticular and   mild airspace opacities throughout both lungs.  PLEURA: No pleural effusion.  VESSELS: Aortic calcifications. Coronary artery calcifications.  HEART: Heart size is normal. No pericardial effusion.  MEDIASTINUM AND KONRAD: Calcified right hilar and mediastinal lymph nodes.  CHEST WALL AND LOWER NECK: 2.6 cm left lobe thyroid nodule. Nonemergent   thyroid ultrasound is recommended.    ABDOMEN AND PELVIS:  LIVER: Innumerable punctate calcifications throughout the liver.  BILE DUCTS: Normal caliber.  GALLBLADDER: Dense gallbladder sludge or small stones. No surrounding   fluid.  SPLEEN: Innumerable punctate calcifications.  PANCREAS: Within normal limits.  ADRENALS: Within normal limits.  KIDNEYS/URETERS: Percutaneous right nephrostomy tube. No hydronephrosis.   Unremarkable left kidney.    BLADDER: Urinary bladder is collapsed around a Restrepo catheter limiting   evaluation. There are at least 3 urinary bladder stone measuring upto   1.0 cm.  REPRODUCTIVE ORGANS: Prostate is enlarged.    BOWEL: No bowel obstruction. Appendix is normal.  PERITONEUM/RETROPERITONEUM: Within normal limits.  VESSELS: Within normal limits.  LYMPH NODES: No lymphadenopathy.  ABDOMINAL WALL: Within normal limits.  BONES: Degenerative changes. Scoliosis.    IMPRESSION:    2.6 cm left lobe thyroid nodule. Nonemergent thyroid ultrasound is   recommended.    Subcentimeter granulomas in the right upper lobe. Scattered reticular and   mild airspace opacities throughout both lungs. Calcified right hilar and   mediastinal lymph nodes. The constellation of findings likely represents   granulomatous infection question tuberculosis or sarcoidosis.    Innumerable punctate calcifications throughout the liver and spleen   consistent with prior granulomatous infection.    Dense gallbladder sludge or small stones. No surrounding fluid.    Percutaneous right nephrostomy tube. No hydronephrosis. Unremarkable left   kidney.    Urinary bladder is collapsed around a Restrepo catheter limiting evaluation.   There are at least 3 urinary bladder stone measuring up to 1.0 cm.      --- End of Report ---            MEGAN BOLDEN MD; Attending Radiologist  This document has been electronically signed. Nov 20 2024 2:11AM    < end of copied text >

## 2024-11-22 NOTE — PROGRESS NOTE ADULT - SUBJECTIVE AND OBJECTIVE BOX
[facility]  INFECTIOUS DISEASES - Lalita M[address], [place] [address]  Tel: [phone]     Fax: [phone]  =======================================================    DEION HEATH [id number]    Follow up: No fevers. Reports feeling fine. Denies any pain, cough or SOB.    Allergies:  levofloxacin (Unknown)  fluoroquinolone antibiotics (Other)  Ketek (Other)  Avelox (Other)  ofloxacin (Unknown)  gatifloxacin (Unknown)  telithromycin (Other)      Antibiotics:  acetaminophen     Tablet .. 650 milliGRAM(s) Oral every 6 hours PRN  apixaban 5 milliGRAM(s) Oral every 12 hours  chlorhexidine 2% Cloths 1 Application(s) Topical <User Schedule>  finasteride 5 milliGRAM(s) Oral daily  hydrocortisone sodium succinate Injectable 50 milliGRAM(s) IV Push every 12 hours  meropenem  IVPB 1000 milliGRAM(s) IV Intermittent every 8 hours  ondansetron Injectable 4 milliGRAM(s) IV Push every 8 hours PRN  pantoprazole    Tablet 40 milliGRAM(s) Oral before breakfast  risperiDONE   Tablet 0.25 milliGRAM(s) Oral two times a day  sodium chloride 3%  Inhalation 4 milliLiter(s) Inhalation every 8 hours PRN  tamsulosin 0.4 milliGRAM(s) Oral at bedtime       REVIEW OF SYSTEMS:  CONSTITUTIONAL:  No Fever or chills  CARDIOVASCULAR:  No chest pain or SOB  RESPIRATORY:  No cough, shortness of breath  GASTROINTESTINAL:  No nausea, vomiting or diarrhea.  GENITOURINARY:  + Restrepo  MUSCULOSKELETAL:  no back pain  NEUROLOGIC:  No headache or dizziness  PSYCHIATRIC:  No disorder of thought or mood.     Physical Exam:  ICU Vital Signs Last 24 Hrs  T(C): 36.7 (22 Nov 2024 12:10), Max: 36.9 (21 Nov 2024 20:40)  T(F): 98.1 (22 Nov 2024 12:10), Max: 98.4 (21 Nov 2024 20:40)  HR: 68 (22 Nov 2024 13:00) (51 - 115)  BP: 118/57 (22 Nov 2024 13:00) (103/73 - 139/64)  BP(mean): 82 (22 Nov 2024 13:00) (70 - 92)  ABP: --  ABP(mean): --  RR: 13 (22 Nov 2024 13:00) (9 - 23)  SpO2: 98% (22 Nov 2024 13:00) (95% - 99%)    O2 Parameters below as of 22 Nov 2024 11:23  Patient On (Oxygen Delivery Method): room air           GEN: NAD  HEENT: normocephalic and atraumatic.  NECK: Supple.   LUNGS: Clear to auscultation.  HEART: Regular rate and rhythm   ABDOMEN: Soft, nontender, and nondistended.    : + R percutaneous nephrostomy. + Restrepo  EXTREMITIES: No leg edema.  NEUROLOGIC: Answering questions appropriately  PSYCHIATRIC: Appropriate affect .      Labs:  11-22    141  |  111[H]  |  25[H]  ----------------------------<  135[H]  3.6   |  25  |  0.74    Ca    9.1      22 Nov 2024 06:07  Phos  3.0     11-22  Mg     2.1     11-22    TPro  6.2  /  Alb  2.1[L]  /  TBili  0.2  /  DBili  x   /  AST  16  /  ALT  23  /  AlkPhos  68  11-22                          12.9   15.29 )-----------( 423      ( 22 Nov 2024 06:07 )             38.7       Urinalysis Basic - ( 22 Nov 2024 06:07 )    Color: x / Appearance: x / SG: x / pH: x  Gluc: 135 mg/dL / Ketone: x  / Bili: x / Urobili: x   Blood: x / Protein: x / Nitrite: x   Leuk Esterase: x / RBC: x / WBC x   Sq Epi: x / Non Sq Epi: x / Bacteria: x      LIVER FUNCTIONS - ( 22 Nov 2024 06:07 )  Alb: 2.1 g/dL / Pro: 6.2 g/dL / ALK PHOS: 68 U/L / ALT: 23 U/L / AST: 16 U/L / GGT: x             RECENT CULTURES:  11-21 @ 12:00 .Sputum Sputum     Commensal violet consistent with body site    Few Squamous epithelial cells per low power field  No polymorphonuclear leukocytes per low power field  Few Gram Negative Rods per oil power field  Few Gram Positive Rods per oil power field  Rare Gram positive cocci in pairs per oil power field      11-20 @ 14:30 .Urine Nephrostomy - Right     50,000 - 99,000 CFU/mL Pseudomonas aeruginosa  50,000 - 99,000 CFU/mL Proteus mirabilis  50,000 - 99,000 CFU/mL Klebsiella pneumoniae        11-20 @ 01:25 .Urine     >100,000 CFU/ml Klebsiella pneumoniae  >100,000 CFU/ml Pseudomonas aeruginosa        11-20 @ 00:00          Detected  11-19 @ 22:25 .Blood BLOOD     No growth at 48 Hours        11-19 @ 22:20 .Blood BLOOD     No growth at 48 Hours              All imaging and data are reviewed.

## 2024-11-22 NOTE — PROGRESS NOTE ADULT - SUBJECTIVE AND OBJECTIVE BOX
Neurology follow up note    DEION [id number]      Interval History:    Patient feels ok no new complaints.    Allergies    levofloxacin (Unknown)  ofloxacin (Unknown)  gatifloxacin (Unknown)    Intolerances    fluoroquinolone antibiotics (Other)  Ketek (Other)  Avelox (Other)  telithromycin (Other)      MEDICATIONS    acetaminophen     Tablet .. 650 milliGRAM(s) Oral every 6 hours PRN  apixaban 5 milliGRAM(s) Oral every 12 hours  chlorhexidine 2% Cloths 1 Application(s) Topical <User Schedule>  hydrocortisone sodium succinate Injectable 50 milliGRAM(s) IV Push every 12 hours  meropenem  IVPB 1000 milliGRAM(s) IV Intermittent every 8 hours  ondansetron Injectable 4 milliGRAM(s) IV Push every 8 hours PRN  sodium chloride 3%  Inhalation 4 milliLiter(s) Inhalation every 8 hours PRN              Vital Signs Last 24 Hrs  T(C): 36.5 (22 Nov 2024 08:01), Max: 36.9 (21 Nov 2024 20:40)  T(F): 97.7 (22 Nov 2024 08:01), Max: 98.4 (21 Nov 2024 20:40)  HR: 61 (22 Nov 2024 08:00) (51 - 115)  BP: 111/54 (22 Nov 2024 08:00) (103/73 - 136/60)  BP(mean): 75 (22 Nov 2024 08:00) (75 - 89)  RR: 14 (22 Nov 2024 08:00) (7 - 23)  SpO2: 98% (22 Nov 2024 08:00) (95% - 100%)      REVIEW OF SYSTEMS:  CONSTITUTIONAL:  The patient denies fever, chills, night sweats.  HEAD:  No headache.  EYES:  No double vision or blurry vision.  EARS:  No ringing in the ears.  NECK:  No neck pain.  CARDIOVASCULAR:  No chest pain.  RESPIRATORY:  No shortness of breath.  ABDOMEN:  No nausea, vomiting, or abdominal pain.  EXTREMITIES/NEUROLOGICAL:  Does complain of numbness and burning sensation in his legs.  MUSCULOSKELETAL:  Occasional joint pain.  GENERAL:  Does feels weak all over, but no droopy eyes.    PHYSICAL EXAMINATION:  HEAD:  Normocephalic, atraumatic.  EYES:  No scleral icterus.  EARS:  Hearing bilaterally intact.  NECK:  Supple.  CARDIOVASCULAR:  S1 and S2 heard.  RESPIRATORY:  Air entry bilaterally.  ABDOMEN:  Soft, nontender.  EXTREMITIES:  No clubbing or cyanosis were noted.    NEUROLOGIC:  The patient awake, alert, location was hospital, year 2002, month was October, was able to name simple objects.  Recall was 0/3, probably 1 of 3, but does suffer from memory issues.  The patient was able to look up for over 1 minute with no ptosis.  Extraocular movements were intact.  Speech was fluent.  Smile symmetric.  Motor, the patient decreased range of motion of bilateral shoulders, suspect secondary to rotator cuff issues.  When elevated, was able to maintain in the air with slow drops bilaterally.  It is proximally 3+/5, distally was 4/5.  Bilateral lower extremities, slight movement at the feet and knees was 1/5.  Sensory, lower extremities not tested.  The patient said his legs are very sensitive.      LABS:  CBC Full  -  ( 22 Nov 2024 06:07 )  WBC Count : 15.29 K/uL  RBC Count : 4.02 M/uL  Hemoglobin : 12.9 g/dL  Hematocrit : 38.7 %  Platelet Count - Automated : 423 K/uL  Mean Cell Volume : 96.3 fl  Mean Cell Hemoglobin : 32.1 pg  Mean Cell Hemoglobin Concentration : 33.3 g/dL  Auto Neutrophil # : 12.82 K/uL  Auto Lymphocyte # : 1.27 K/uL  Auto Monocyte # : 1.02 K/uL  Auto Eosinophil # : 0.00 K/uL  Auto Basophil # : 0.01 K/uL  Auto Neutrophil % : 83.8 %  Auto Lymphocyte % : 8.3 %  Auto Monocyte % : 6.7 %  Auto Eosinophil % : 0.0 %  Auto Basophil % : 0.1 %    Urinalysis Basic - ( 22 Nov 2024 06:07 )    Color: x / Appearance: x / SG: x / pH: x  Gluc: 135 mg/dL / Ketone: x  / Bili: x / Urobili: x   Blood: x / Protein: x / Nitrite: x   Leuk Esterase: x / RBC: x / WBC x   Sq Epi: x / Non Sq Epi: x / Bacteria: x      11-22    141  |  111[H]  |  25[H]  ----------------------------<  135[H]  3.6   |  25  |  0.74    Ca    9.1      22 Nov 2024 06:07  Phos  3.0     11-22  Mg     2.1     11-22    TPro  6.2  /  Alb  2.1[L]  /  TBili  0.2  /  DBili  x   /  AST  16  /  ALT  23  /  AlkPhos  68  11-22    Hemoglobin A1C:     LIVER FUNCTIONS - ( 22 Nov 2024 06:07 )  Alb: 2.1 g/dL / Pro: 6.2 g/dL / ALK PHOS: 68 U/L / ALT: 23 U/L / AST: 16 U/L / GGT: x           Vitamin B12         RADIOLOGY  ANALYSIS AND PLAN:  This is an 79-year-old male with altered mental status and history of myasthenia gravis.    .Altered mental status, most likely metabolic encephalopathy secondary to underlying infectious type process.  .Infection workup as needed.  Antibiotics as needed.  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  Questionable if any muscle weakness as well.  The patient appears to be at his baseline from my previous notes.  The patient had refused any type of treatment in the past.   For now, we would recommend continue the patient on his prednisone.  For history of infection with myasthenia gravis.  If possible, would recommend to limit the use of aminoglycosides, fluoroquinolones, macrolides, cardiovascular drugs such as beta blockers, procainamide, other medication such as statins.  Monitor the patient's magnesium levels.  If antibiotics are used, always risks versus benefits must be weighed for the specific antibiotics.  .For history of hypertension, monitor systolic blood pressure.  .Attempted to contact son, Olvin, at [phone] in the past     47  minutes of time was spent with patient plan of care, reviewing data, speaking to multidisciplinary healthcare team with greater than 50% of time counseling care and coordination.    Thank you for courtesy of consultation.

## 2024-11-22 NOTE — ED ADULT NURSE NOTE - NS ED NURSE LEVEL OF CONSCIOUSNESS MENTAL STATUS
11/22/24        Patient: Cal Cates   YOB: 1936   Date of Visit: 11/22/2024       Dear  Dr. Charly MD,      Thank you for referring Cal Cates to my practice.  Please find my assessment and plan below.      As you know he is a 88-year-old male with a history of adult onset diabetes mellitus, hypertension, whitecoat syndrome, anemia of chronic disease, congestive heart failure secondary to diastolic dysfunction, moderate mitral regurgitation who I now had the pleasure of seeing for follow-up of chronic kidney disease stage IV with proteinuria thought to be secondary to diabetic nephropathy.  Patient is still living independently.  Family watches him closely.  He does note some gradually increasing dyspnea on exertion.  He is followed by Dr. Sellers and there has been discussions about doing a mitral valve clip.  Otherwise he is doing well without any chest pain, GI or urinary tract symptoms.  Nocturia x 2.  Appetite and energy level are stable.  Blood pressures at home show systolics in the 130s.  Here with his son.    On physical exam his blood pressure was 140/80 with a pulse of 66 and he weighed 161 pounds.  His neck was supple without JVD.  Lungs are clear.  Heart revealed a regular rate and rhythm with an S4 and a 2/6 systolic ejection murmur.  Abdomen was soft, flat, nontender without organomegaly, masses or bruits.  Extremities revealed trace edema bilaterally.    I reviewed his most recent labs done on November 19, 2024.  Creatinine had been up to 3.05 back in June 2024.  Now down to 2.83 with an estimated GFR 21 cc/min.  Potassium 5.1.  Hemoglobin 12.1.  Reviewed modest potassium restricted diet.  He is scheduled to see cardiology in January.  They will let me know if they decide to proceed with a mitral valve clip.  Otherwise he will continue to do CBC, renal panels and urine for microalbumin quarterly.  I will see him in 3 to 6 months depending upon clinical course.    Thank you again  for allowing me to participate in the care of your patient.  If you have any questions please feel free to call.           Sincerely,   Trevor Rocha MD   [facility], [facility], [place]  [address]  [facility] [address]    Document electronically generated by:  Trevor Rocha MD   [employer]

## 2024-11-22 NOTE — PROGRESS NOTE ADULT - ASSESSMENT
80 yo M with PMHx HTN, CHF, PE (on eliquis), Myasthenia Gravis, and chronic LE edema, nephrolithiasis (s/p nephrostomy tube placement ), urosepsis, BPH BIBEMS from [facility] for hypertension and elevated WBC count. Admitted to ICU for septic shock 2/2 UTI and Enter/rhinovirus.

## 2024-11-22 NOTE — PROGRESS NOTE ADULT - SUBJECTIVE AND OBJECTIVE BOX
Interval Events: Patient w/ no acute overnight events. Patient has been off levo gtt and has not required pressor support since 11/20/24. Patient WBCs downtrending 17->15. Tolerating PO diet. Restrepo with proper UOP. Patient seen and evaluated at bedside. Denies any fever, n/v, CP, SOB, abd pain, L pain at this time.     Review of Systems:  per above HPI     ICU Vital Signs Last 24 Hrs  T(C): 36.5 (22 Nov 2024 08:01), Max: 36.9 (21 Nov 2024 20:40)  T(F): 97.7 (22 Nov 2024 08:01), Max: 98.4 (21 Nov 2024 20:40)  HR: 61 (22 Nov 2024 08:00) (51 - 115)  BP: 111/54 (22 Nov 2024 08:00) (103/73 - 136/60)  BP(mean): 75 (22 Nov 2024 08:00) (75 - 89)  ABP: --  ABP(mean): --  RR: 14 (22 Nov 2024 08:00) (9 - 23)  SpO2: 98% (22 Nov 2024 08:00) (95% - 100%)            CAPILLARY BLOOD GLUCOSE      POCT Blood Glucose.: 124 mg/dL (20 Nov 2024 12:30)      I&O's Summary    21 Nov 2024 07:01  -  22 Nov 2024 07:00  --------------------------------------------------------  IN: 870 mL / OUT: 750 mL / NET: 120 mL        Physical Exam:   Gen: NAD  Neuro: Alert and oriented, able to follow command  HEENT: EOMI  Resp:CTA b/l, no wheezes or rhonchi  CVS: rrr, s1s2, no mrg  Abd: soft, nontendernondistended, bowel sounds appreciated.  Ext: - defers examination 2/2 to neuropathic pain  Skin: warm, dry    Meds:  meropenem  IVPB IV Intermittent      hydrocortisone sodium succinate Injectable IV Push    sodium chloride 3%  Inhalation Inhalation PRN    acetaminophen     Tablet .. Oral PRN  ondansetron Injectable IV Push PRN      apixaban Oral            chlorhexidine 2% Cloths Topical                              12.9   15.29 )-----------( 423      ( 22 Nov 2024 06:07 )             38.7       11-22    141  |  111[H]  |  25[H]  ----------------------------<  135[H]  3.6   |  25  |  0.74    Ca    9.1      22 Nov 2024 06:07  Phos  3.0     11-22  Mg     2.1     11-22    TPro  6.2  /  Alb  2.1[L]  /  TBili  0.2  /  DBili  x   /  AST  16  /  ALT  23  /  AlkPhos  68  11-22            Urinalysis Basic - ( 22 Nov 2024 06:07 )    Color: x / Appearance: x / SG: x / pH: x  Gluc: 135 mg/dL / Ketone: x  / Bili: x / Urobili: x   Blood: x / Protein: x / Nitrite: x   Leuk Esterase: x / RBC: x / WBC x   Sq Epi: x / Non Sq Epi: x / Bacteria: x      .Sputum Sputum   Commensal violet consistent with body site   Few Squamous epithelial cells per low power field  No polymorphonuclear leukocytes per low power field  Few Gram Negative Rods per oil power field  Few Gram Positive Rods per oil power field  Rare Gram positive cocci in pairs per oil power field 11-21 @ 12:00  .Urine Nephrostomy - Right   50,000 - 99,000 CFU/mL Pseudomonas aeruginosa  50,000 - 99,000 CFU/mL Proteus mirabilis  50,000 - 99,000 CFU/mL Klebsiella pneumoniae -- 11-20 @ 14:30  .Urine   >100,000 CFU/ml Klebsiella pneumoniae  >100,000 CFU/ml Pseudomonas aeruginosa -- 11-20 @ 01:25  .Blood BLOOD   No growth at 48 Hours -- 11-19 @ 22:25  .Blood BLOOD   No growth at 48 Hours -- 11-19 @ 22:20      Rapid RVP Result: Detected (11-20 @ 00:00)          Radiology:    Bedside Ultrasound:    Tubes/Lines:      GLOBAL ISSUE/BEST PRACTICE:  Analgesia: N   Sedation: N  HOB elevation: Y  Stress ulcer prophylaxis: N  VTE prophylaxis: Y  Glycemic control: N   Nutrition: Y    CODE STATUS: DNR/intubate

## 2024-11-23 LAB
-  AMIKACIN: SIGNIFICANT CHANGE UP
-  AMOXICILLIN/CLAVULANIC ACID: SIGNIFICANT CHANGE UP
-  AMPICILLIN/SULBACTAM: SIGNIFICANT CHANGE UP
-  AMPICILLIN/SULBACTAM: SIGNIFICANT CHANGE UP
-  AMPICILLIN: SIGNIFICANT CHANGE UP
-  AMPICILLIN: SIGNIFICANT CHANGE UP
-  AZTREONAM: SIGNIFICANT CHANGE UP
-  CEFAZOLIN: SIGNIFICANT CHANGE UP
-  CEFAZOLIN: SIGNIFICANT CHANGE UP
-  CEFEPIME: SIGNIFICANT CHANGE UP
-  CEFOXITIN: SIGNIFICANT CHANGE UP
-  CEFTAZIDIME/AVIBACTAM: SIGNIFICANT CHANGE UP
-  CEFTAZIDIME: SIGNIFICANT CHANGE UP
-  CEFTOLOZANE/TAZOBACTAM: SIGNIFICANT CHANGE UP
-  CEFTRIAXONE: SIGNIFICANT CHANGE UP
-  CEFTRIAXONE: SIGNIFICANT CHANGE UP
-  CEFUROXIME: SIGNIFICANT CHANGE UP
-  CEFUROXIME: SIGNIFICANT CHANGE UP
-  CIPROFLOXACIN: SIGNIFICANT CHANGE UP
-  ERTAPENEM: SIGNIFICANT CHANGE UP
-  ERTAPENEM: SIGNIFICANT CHANGE UP
-  GENTAMICIN: SIGNIFICANT CHANGE UP
-  GENTAMICIN: SIGNIFICANT CHANGE UP
-  IMIPENEM: SIGNIFICANT CHANGE UP
-  IMIPENEM: SIGNIFICANT CHANGE UP
-  LEVOFLOXACIN: SIGNIFICANT CHANGE UP
-  MEROPENEM: SIGNIFICANT CHANGE UP
-  NITROFURANTOIN: SIGNIFICANT CHANGE UP
-  NITROFURANTOIN: SIGNIFICANT CHANGE UP
-  PIPERACILLIN/TAZOBACTAM: SIGNIFICANT CHANGE UP
-  TOBRAMYCIN: SIGNIFICANT CHANGE UP
-  TOBRAMYCIN: SIGNIFICANT CHANGE UP
-  TRIMETHOPRIM/SULFAMETHOXAZOLE: SIGNIFICANT CHANGE UP
-  TRIMETHOPRIM/SULFAMETHOXAZOLE: SIGNIFICANT CHANGE UP
ALBUMIN SERPL ELPH-MCNC: 2 G/DL — LOW (ref 3.3–5)
ALP SERPL-CCNC: 59 U/L — SIGNIFICANT CHANGE UP (ref 40–120)
ALT FLD-CCNC: 30 U/L — SIGNIFICANT CHANGE UP (ref 12–78)
ANION GAP SERPL CALC-SCNC: 4 MMOL/L — LOW (ref 5–17)
AST SERPL-CCNC: 32 U/L — SIGNIFICANT CHANGE UP (ref 15–37)
BASOPHILS # BLD AUTO: 0.03 K/UL — SIGNIFICANT CHANGE UP (ref 0–0.2)
BASOPHILS NFR BLD AUTO: 0.3 % — SIGNIFICANT CHANGE UP (ref 0–2)
BILIRUB SERPL-MCNC: 0.3 MG/DL — SIGNIFICANT CHANGE UP (ref 0.2–1.2)
BLANDM BLD POS QL PROBE: SIGNIFICANT CHANGE UP
BUN SERPL-MCNC: 28 MG/DL — HIGH (ref 7–23)
CALCIUM SERPL-MCNC: 8.8 MG/DL — SIGNIFICANT CHANGE UP (ref 8.5–10.1)
CHLORIDE SERPL-SCNC: 112 MMOL/L — HIGH (ref 96–108)
CO2 SERPL-SCNC: 25 MMOL/L — SIGNIFICANT CHANGE UP (ref 22–31)
CREAT SERPL-MCNC: 0.58 MG/DL — SIGNIFICANT CHANGE UP (ref 0.5–1.3)
CULTURE RESULTS: ABNORMAL
CULTURE RESULTS: ABNORMAL
EGFR: 98 ML/MIN/1.73M2 — SIGNIFICANT CHANGE UP
EOSINOPHIL # BLD AUTO: 0.03 K/UL — SIGNIFICANT CHANGE UP (ref 0–0.5)
EOSINOPHIL NFR BLD AUTO: 0.3 % — SIGNIFICANT CHANGE UP (ref 0–6)
GLUCOSE SERPL-MCNC: 122 MG/DL — HIGH (ref 70–99)
HCT VFR BLD CALC: 37.4 % — LOW (ref 39–50)
HGB BLD-MCNC: 12.3 G/DL — LOW (ref 13–17)
IMM GRANULOCYTES NFR BLD AUTO: 1.9 % — HIGH (ref 0–0.9)
LYMPHOCYTES # BLD AUTO: 0.92 K/UL — LOW (ref 1–3.3)
LYMPHOCYTES # BLD AUTO: 8 % — LOW (ref 13–44)
MAGNESIUM SERPL-MCNC: 1.9 MG/DL — SIGNIFICANT CHANGE UP (ref 1.6–2.6)
MCHC RBC-ENTMCNC: 31.2 PG — SIGNIFICANT CHANGE UP (ref 27–34)
MCHC RBC-ENTMCNC: 32.9 G/DL — SIGNIFICANT CHANGE UP (ref 32–36)
MCV RBC AUTO: 94.9 FL — SIGNIFICANT CHANGE UP (ref 80–100)
METHOD TYPE: SIGNIFICANT CHANGE UP
MONOCYTES # BLD AUTO: 0.9 K/UL — SIGNIFICANT CHANGE UP (ref 0–0.9)
MONOCYTES NFR BLD AUTO: 7.8 % — SIGNIFICANT CHANGE UP (ref 2–14)
NEUTROPHILS # BLD AUTO: 9.46 K/UL — HIGH (ref 1.8–7.4)
NEUTROPHILS NFR BLD AUTO: 81.7 % — HIGH (ref 43–77)
NIGHT BLUE STAIN TISS: SIGNIFICANT CHANGE UP
NRBC # BLD: 0 /100 WBCS — SIGNIFICANT CHANGE UP (ref 0–0)
ORGANISM # SPEC MICROSCOPIC CNT: ABNORMAL
ORGANISM # SPEC MICROSCOPIC CNT: SIGNIFICANT CHANGE UP
PHOSPHATE SERPL-MCNC: 2.3 MG/DL — LOW (ref 2.5–4.5)
PLATELET # BLD AUTO: 406 K/UL — HIGH (ref 150–400)
POTASSIUM SERPL-MCNC: 4 MMOL/L — SIGNIFICANT CHANGE UP (ref 3.5–5.3)
POTASSIUM SERPL-SCNC: 4 MMOL/L — SIGNIFICANT CHANGE UP (ref 3.5–5.3)
PROT SERPL-MCNC: 5.6 G/DL — LOW (ref 6–8.3)
RBC # BLD: 3.94 M/UL — LOW (ref 4.2–5.8)
RBC # FLD: 17.2 % — HIGH (ref 10.3–14.5)
SODIUM SERPL-SCNC: 141 MMOL/L — SIGNIFICANT CHANGE UP (ref 135–145)
SPECIMEN SOURCE: SIGNIFICANT CHANGE UP
WBC # BLD: 11.56 K/UL — HIGH (ref 3.8–10.5)
WBC # FLD AUTO: 11.56 K/UL — HIGH (ref 3.8–10.5)

## 2024-11-23 PROCEDURE — 99232 SBSQ HOSP IP/OBS MODERATE 35: CPT

## 2024-11-23 RX ORDER — SODIUM,POTASSIUM PHOSPHATES 278-250MG
1 POWDER IN PACKET (EA) ORAL ONCE
Refills: 0 | Status: COMPLETED | OUTPATIENT
Start: 2024-11-23 | End: 2024-11-23

## 2024-11-23 RX ADMIN — Medication 50 MILLIGRAM(S): at 18:10

## 2024-11-23 RX ADMIN — RISPERIDONE 0.25 MILLIGRAM(S): 4 TABLET ORAL at 18:10

## 2024-11-23 RX ADMIN — APIXABAN 5 MILLIGRAM(S): 2.5 TABLET, FILM COATED ORAL at 18:10

## 2024-11-23 RX ADMIN — CEFTOLOZANE AND TAZOBACTAM 100 MILLIGRAM(S): 1; .5 INJECTION, POWDER, LYOPHILIZED, FOR SOLUTION INTRAVENOUS at 05:59

## 2024-11-23 RX ADMIN — Medication 5 MILLIGRAM(S): at 13:01

## 2024-11-23 RX ADMIN — Medication 1 PACKET(S): at 13:01

## 2024-11-23 RX ADMIN — CHLORHEXIDINE GLUCONATE 1 APPLICATION(S): 1.2 RINSE ORAL at 05:15

## 2024-11-23 RX ADMIN — TAMSULOSIN HYDROCHLORIDE 0.4 MILLIGRAM(S): 0.4 CAPSULE ORAL at 21:57

## 2024-11-23 RX ADMIN — Medication 50 MILLIGRAM(S): at 05:15

## 2024-11-23 RX ADMIN — RISPERIDONE 0.25 MILLIGRAM(S): 4 TABLET ORAL at 05:15

## 2024-11-23 RX ADMIN — PANTOPRAZOLE SODIUM 40 MILLIGRAM(S): 40 TABLET, DELAYED RELEASE ORAL at 05:15

## 2024-11-23 RX ADMIN — CEFTOLOZANE AND TAZOBACTAM 100 MILLIGRAM(S): 1; .5 INJECTION, POWDER, LYOPHILIZED, FOR SOLUTION INTRAVENOUS at 21:57

## 2024-11-23 RX ADMIN — APIXABAN 5 MILLIGRAM(S): 2.5 TABLET, FILM COATED ORAL at 05:15

## 2024-11-23 RX ADMIN — MEROPENEM 100 MILLIGRAM(S): 500 INJECTION, POWDER, FOR SOLUTION INTRAVENOUS at 05:15

## 2024-11-23 RX ADMIN — CEFTOLOZANE AND TAZOBACTAM 100 MILLIGRAM(S): 1; .5 INJECTION, POWDER, LYOPHILIZED, FOR SOLUTION INTRAVENOUS at 14:38

## 2024-11-23 RX ADMIN — MEROPENEM 100 MILLIGRAM(S): 500 INJECTION, POWDER, FOR SOLUTION INTRAVENOUS at 13:01

## 2024-11-23 NOTE — PROGRESS NOTE ADULT - SUBJECTIVE AND OBJECTIVE BOX
Neurology follow up note    DEION [id number]      Interval History:    Patient feels ok no new complaints.    Allergies    levofloxacin (Unknown)  ofloxacin (Unknown)  gatifloxacin (Unknown)    Intolerances    fluoroquinolone antibiotics (Other)  Ketek (Other)  Avelox (Other)  telithromycin (Other)      MEDICATIONS    acetaminophen     Tablet .. 650 milliGRAM(s) Oral every 6 hours PRN  apixaban 5 milliGRAM(s) Oral every 12 hours  ceftolozane/tazobactam IVPB 1500 milliGRAM(s) IV Intermittent every 8 hours  chlorhexidine 2% Cloths 1 Application(s) Topical <User Schedule>  finasteride 5 milliGRAM(s) Oral daily  hydrocortisone sodium succinate Injectable 50 milliGRAM(s) IV Push every 12 hours  meropenem  IVPB 1000 milliGRAM(s) IV Intermittent every 8 hours  ondansetron Injectable 4 milliGRAM(s) IV Push every 8 hours PRN  pantoprazole    Tablet 40 milliGRAM(s) Oral before breakfast  risperiDONE   Tablet 0.25 milliGRAM(s) Oral two times a day  sodium chloride 3%  Inhalation 4 milliLiter(s) Inhalation every 8 hours PRN  tamsulosin 0.4 milliGRAM(s) Oral at bedtime              Vital Signs Last 24 Hrs  T(C): 36.4 (23 Nov 2024 04:54), Max: 36.8 (22 Nov 2024 16:04)  T(F): 97.5 (23 Nov 2024 04:54), Max: 98.2 (22 Nov 2024 16:04)  HR: 63 (23 Nov 2024 04:54) (57 - 96)  BP: 123/77 (23 Nov 2024 04:54) (101/51 - 142/60)  BP(mean): 87 (22 Nov 2024 19:00) (70 - 92)  RR: 16 (23 Nov 2024 04:54) (9 - 20)  SpO2: 97% (23 Nov 2024 04:54) (96% - 100%)    Parameters below as of 23 Nov 2024 04:54  Patient On (Oxygen Delivery Method): room air        REVIEW OF SYSTEMS:  CONSTITUTIONAL:  The patient denies fever, chills, night sweats.  HEAD:  No headache.  EYES:  No double vision or blurry vision.  EARS:  No ringing in the ears.  NECK:  No neck pain.  CARDIOVASCULAR:  No chest pain.  RESPIRATORY:  No shortness of breath.  ABDOMEN:  No nausea, vomiting, or abdominal pain.  EXTREMITIES/NEUROLOGICAL:  Does complain of numbness and burning sensation in his legs.  MUSCULOSKELETAL:  Occasional joint pain.  GENERAL:  Does feels weak all over, but no droopy eyes.    PHYSICAL EXAMINATION:  HEAD:  Normocephalic, atraumatic.  EYES:  No scleral icterus.  EARS:  Hearing bilaterally intact.  NECK:  Supple.  CARDIOVASCULAR:  S1 and S2 heard.  RESPIRATORY:  Air entry bilaterally.  ABDOMEN:  Soft, nontender.  EXTREMITIES:  No clubbing or cyanosis were noted.    NEUROLOGIC:  The patient awake, alert, location was hospital, year 2002, month was October, was able to name simple objects.  Recall was 0/3, probably 1 of 3, but does suffer from memory issues.  The patient was able to look up for over 1 minute with no ptosis.  Extraocular movements were intact.  Speech was fluent.  Smile symmetric.  Motor, the patient decreased range of motion of bilateral shoulders, suspect secondary to rotator cuff issues.  When elevated, was able to maintain in the air with slow drops bilaterally.  It is proximally 3+/5, distally was 4/5.  Bilateral lower extremities, slight movement at the feet and knees was 1/5.  Sensory, lower extremities not tested.  The patient said his legs are very sensitive.              LABS:  CBC Full  -  ( 22 Nov 2024 06:07 )  WBC Count : 15.29 K/uL  RBC Count : 4.02 M/uL  Hemoglobin : 12.9 g/dL  Hematocrit : 38.7 %  Platelet Count - Automated : 423 K/uL  Mean Cell Volume : 96.3 fl  Mean Cell Hemoglobin : 32.1 pg  Mean Cell Hemoglobin Concentration : 33.3 g/dL  Auto Neutrophil # : 12.82 K/uL  Auto Lymphocyte # : 1.27 K/uL  Auto Monocyte # : 1.02 K/uL  Auto Eosinophil # : 0.00 K/uL  Auto Basophil # : 0.01 K/uL  Auto Neutrophil % : 83.8 %  Auto Lymphocyte % : 8.3 %  Auto Monocyte % : 6.7 %  Auto Eosinophil % : 0.0 %  Auto Basophil % : 0.1 %    Urinalysis Basic - ( 22 Nov 2024 06:07 )    Color: x / Appearance: x / SG: x / pH: x  Gluc: 135 mg/dL / Ketone: x  / Bili: x / Urobili: x   Blood: x / Protein: x / Nitrite: x   Leuk Esterase: x / RBC: x / WBC x   Sq Epi: x / Non Sq Epi: x / Bacteria: x      11-22    141  |  111[H]  |  25[H]  ----------------------------<  135[H]  3.6   |  25  |  0.74    Ca    9.1      22 Nov 2024 06:07  Phos  3.0     11-22  Mg     2.1     11-22    TPro  6.2  /  Alb  2.1[L]  /  TBili  0.2  /  DBili  x   /  AST  16  /  ALT  23  /  AlkPhos  68  11-22    Hemoglobin A1C:     LIVER FUNCTIONS - ( 22 Nov 2024 06:07 )  Alb: 2.1 g/dL / Pro: 6.2 g/dL / ALK PHOS: 68 U/L / ALT: 23 U/L / AST: 16 U/L / GGT: x           Vitamin B12         RADIOLOGY  ANALYSIS AND PLAN:  This is an 79-year-old male with altered mental status and history of myasthenia gravis.    .Altered mental status, most likely metabolic encephalopathy secondary to underlying infectious type process.  .Infection workup as needed.  Antibiotics as needed.  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  Questionable if any muscle weakness as well.  The patient appears to be at his baseline from my previous notes.  The patient had refused any type of treatment in the past.   For now, we would recommend continue the patient on his prednisone.  For history of infection with myasthenia gravis.  If possible, would recommend to limit the use of aminoglycosides, fluoroquinolones, macrolides, cardiovascular drugs such as beta blockers, procainamide, other medication such as statins.  Monitor the patient's magnesium levels.  If antibiotics are used, always risks versus benefits must be weighed for the specific antibiotics.  .For history of hypertension, monitor systolic blood pressure.  follow up ACH antibodies   .Attempted to contact son, Olvin, at [phone] yesterday     46  minutes of time was spent with patient plan of care, reviewing data, speaking to multidisciplinary healthcare team with greater than 50% of time counseling care and coordination.    Thank you for courtesy of consultation.

## 2024-11-23 NOTE — PROGRESS NOTE ADULT - PROBLEM SELECTOR PLAN 1
- Pt p/w elevated WBC, pt asymptomatic. Hx urosepsis/klebsiella bacteremia treated with IV rocephin, hospital course complicated by Afib with RVR during recent admission (9/2024)  - Per CI paperwork, pt s/p 7d course of IV zosyn via PICC (11/4-11/10)  - Met sepsis criteria on admission with leukocytosis, lactate, tachycardia  - WBC appears to be at baseline, per chart review baseline around 12-13 (on chronic steroids for MG)  - UA: Turbid, large blood, large LEC, positive nitrite, many bacteria, WBC TNTC  - CT: Urinary bladder collapsed, limiting evaluation, at least 3 stones measuring up to 1.0 cm. R nephrostomy tube.  - Restrepo catheter present on admission  - Pt admitted to ICU for septic shock 2/2 UTI and rhino/enterovirus  - s/p Levophed gtt. s/p midodrine  - Continue stress dose hydrocortisone   - Continue IV Meropenem  - Blood and sputum Cx negative  - Urine cx + klebsiella, pseudomonas, proteus  - sputum AFB x 3 in process  - Quantiferon pending  - ID (Dr. Connor) following, recs appreciated

## 2024-11-23 NOTE — PROGRESS NOTE ADULT - ASSESSMENT
78 yo M with PMHx HTN, CHF, PE (on eliquis), Myasthenia Gravis, and chronic LE edema, nephrolithiasis (s/p nephrostomy tube placement ), urosepsis, BPH BIBEMS from [facility] for hypertension and elevated WBC count. Cardiology was consulted for hypotension.    Admitted with Acute Metabolic Encephalopathy,  Urosepsis 2/2 UTI, Kleb, Pseudomonas Carb Resistent Septic shock, resolved,  Rhinovirus    -Downgraded from ICU now on floor   - hypotension resolved now that his urosepsis has been treated  -bp this am 123/77 - Off pressors and midodrine    - Past echo from 09/2024 showed LVEF 55-60%, no diastolic dysfunction, moderate MR  - Repeat TTE with severe LV dysfunction.  Looks to be stress mediated. Even on repeat study on 11/20, the LV function seems somewhat improved.  - No evidence of significant volume overload  - To start GDMT when able (when bp can tolerate) and repeat echo in about one month. He is on toprol xl at home.  - No clear evidence of acute ischemia, patient denies cardiac symptoms.    - Ruling out TB secondary to CT chest  findings  - cont eliquis for pe history    - Monitor and replete lytes, keep K>4, Mg>2.  - will follow with you

## 2024-11-23 NOTE — PROGRESS NOTE ADULT - SUBJECTIVE AND OBJECTIVE BOX
[facility]  INFECTIOUS DISEASES - [address], [place] [address]  Tel: [phone]     Fax: [phone]  =======================================================    DEION HEATH [id number]    Follow up: No fevers. Denies any pain, cough or SOB.    Allergies:  levofloxacin (Unknown)  fluoroquinolone antibiotics (Other)  Ketek (Other)  Avelox (Other)  ofloxacin (Unknown)  gatifloxacin (Unknown)  telithromycin (Other)      Antibiotics:  acetaminophen     Tablet .. 650 milliGRAM(s) Oral every 6 hours PRN  apixaban 5 milliGRAM(s) Oral every 12 hours  ceftolozane/tazobactam IVPB 1500 milliGRAM(s) IV Intermittent every 8 hours  chlorhexidine 2% Cloths 1 Application(s) Topical <User Schedule>  finasteride 5 milliGRAM(s) Oral daily  hydrocortisone sodium succinate Injectable 50 milliGRAM(s) IV Push every 12 hours  meropenem  IVPB 1000 milliGRAM(s) IV Intermittent every 8 hours  ondansetron Injectable 4 milliGRAM(s) IV Push every 8 hours PRN  pantoprazole    Tablet 40 milliGRAM(s) Oral before breakfast  risperiDONE   Tablet 0.25 milliGRAM(s) Oral two times a day  sodium chloride 3%  Inhalation 4 milliLiter(s) Inhalation every 8 hours PRN  tamsulosin 0.4 milliGRAM(s) Oral at bedtime       REVIEW OF SYSTEMS:  CONSTITUTIONAL:  No Fever or chills  CARDIOVASCULAR:  No chest pain or SOB  RESPIRATORY:  No cough, shortness of breath  GASTROINTESTINAL:  No nausea, vomiting or diarrhea.  GENITOURINARY:  + Restrepo  MUSCULOSKELETAL:  no back pain  NEUROLOGIC:  No headache or dizziness  PSYCHIATRIC:  No disorder of thought or mood.       Physical Exam:  ICU Vital Signs Last 24 Hrs  T(C): 36.4 (23 Nov 2024 04:54), Max: 36.8 (22 Nov 2024 20:26)  T(F): 97.5 (23 Nov 2024 04:54), Max: 98.2 (22 Nov 2024 20:26)  HR: 63 (23 Nov 2024 04:54) (61 - 83)  BP: 123/77 (23 Nov 2024 04:54) (101/51 - 142/60)  BP(mean): 87 (22 Nov 2024 19:00) (71 - 87)  ABP: --  ABP(mean): --  RR: 16 (23 Nov 2024 04:54) (9 - 16)  SpO2: 97% (23 Nov 2024 04:54) (96% - 100%)    O2 Parameters below as of 23 Nov 2024 09:28  Patient On (Oxygen Delivery Method): room air           GEN: NAD  HEENT: normocephalic and atraumatic.  NECK: Supple.   LUNGS: Normal respiratory effort  HEART: Regular rate and rhythm   ABDOMEN: Soft, nontender, and nondistended.    : + R percutaneous nephrostomy. + Restrepo  EXTREMITIES: No leg edema.  NEUROLOGIC: Answering questions appropriately  PSYCHIATRIC: Appropriate affect .    Labs:  11-23    141  |  112[H]  |  28[H]  ----------------------------<  122[H]  4.0   |  25  |  0.58    Ca    8.8      23 Nov 2024 07:40  Phos  2.3     11-23  Mg     1.9     11-23    TPro  5.6[L]  /  Alb  2.0[L]  /  TBili  0.3  /  DBili  x   /  AST  32  /  ALT  30  /  AlkPhos  59  11-23                          12.3   11.56 )-----------( 406      ( 23 Nov 2024 07:40 )             37.4       Urinalysis Basic - ( 23 Nov 2024 07:40 )    Color: x / Appearance: x / SG: x / pH: x  Gluc: 122 mg/dL / Ketone: x  / Bili: x / Urobili: x   Blood: x / Protein: x / Nitrite: x   Leuk Esterase: x / RBC: x / WBC x   Sq Epi: x / Non Sq Epi: x / Bacteria: x      LIVER FUNCTIONS - ( 23 Nov 2024 07:40 )  Alb: 2.0 g/dL / Pro: 5.6 g/dL / ALK PHOS: 59 U/L / ALT: 30 U/L / AST: 32 U/L / GGT: x             RECENT CULTURES:  11-22 @ 11:20 .Sputum Sputum           11-21 @ 12:00 .Sputum Sputum     Commensal violet consistent with body site    Few Squamous epithelial cells per low power field  No polymorphonuclear leukocytes per low power field  Few Gram Negative Rods per oil power field  Few Gram Positive Rods per oil power field  Rare Gram positive cocci in pairs per oil power field      11-20 @ 14:30 .Urine Nephrostomy - Right Pseudomonas aeruginosa (Carbapenem Resistant)  Proteus mirabilis ESBL  Klebsiella pneumoniae    50,000 - 99,000 CFU/mL Pseudomonas aeruginosa (Carbapenem Resistant)  50,000 - 99,000 CFU/mL Proteus mirabilis ESBL  50,000 - 99,000 CFU/mL Klebsiella pneumoniae        11-20 @ 01:25 .Urine Klebsiella pneumoniae  Pseudomonas aeruginosa (Carbapenem Resistant)    >100,000 CFU/ml Klebsiella pneumoniae  >100,000 CFU/ml Pseudomonas aeruginosa (Carbapenem Resistant)        11-20 @ 00:00          Detected  11-19 @ 22:25 .Blood BLOOD     No growth at 72 Hours        11-19 @ 22:20 .Blood BLOOD     No growth at 72 Hours              All imaging and data are reviewed.

## 2024-11-23 NOTE — PROGRESS NOTE ADULT - ASSESSMENT
78 yo M with PMHx HTN, CHF, PE (on eliquis), Myasthenia Gravis, and chronic LE edema, R ureteral obstructing stone s/p R PCN placement on 9/6/24, urosepsis, BPH, who presented from [facility] for hypertension and elevated WBC count. Per nursing home paperwork, pt hypertensive to 154/105 and recieved metoprolol 25 mg (8:10 PM), and reported pt with "elevated WBC". Upon arrival, pt found to be hypotensive to 90/64. Arrived to ED with nephrostomy tube, chronic guerrero and PICC line in place. Pt was recieving IV zosyn x7d (start date 11/04) per paperwork review for UTI.    Patient being treated for UTI. Guerrero exchanged in the ED. CT did not show any obvious signs of acute infection. He has no fever and WBC improving. Urine culture growing klebsiella, pseudomonas, proteus. Sensitivities reviewed. Blood cultures remain no growth.     Also incidentally with RUL calcified granulomas, also in liver in spleen. Unclear etiology, patient without symptoms of tuberculosis, no known hx of TB infection or exposure. He was born in [place], and has not lived outside [place]. Sputum AFB smear x 3 are negative,    #Leukocytosis  #UTI  #MDRO pseudomonas and ESBL proteus  #Rhinovirus positive  #Calcified granulomas  #Hx of fluoroquinolone allergy    -suggest ceftolozane-tazobactam  -discontinue meropenem   -suggest Urology evaluation regarding nephrostomy  -follow cultures to completion  -sputum AFB cultures x 3 in process  -Quantiferon pending  -suggest serum Fungitell, galactomanna, urine histoplasma antigen  -monitor WBC  -contact isolation  -can discontinue airborne isolation    Rachel Connor MD  Division of Infectious Diseases   Cell [phone] between 8am and 6pm   After 6pm and weekends please call ID service at [phone].     35 minutes spent on total encounter assessing patient, examination, chart review, counseling and coordinating care by the attending physician/nurse/care manager.

## 2024-11-23 NOTE — PROGRESS NOTE ADULT - ASSESSMENT
78 yo M with PMHx HTN, CHF, PE (on eliquis), Myasthenia Gravis, and chronic LE edema, nephrolithiasis (s/p nephrostomy tube placement ), urosepsis, BPH BIBEMS from [facility] for hypertension and elevated WBC count. Admitted to ICU for septic shock 2/2 UTI and Enter/rhinovirus.

## 2024-11-23 NOTE — PROGRESS NOTE ADULT - SUBJECTIVE AND OBJECTIVE BOX
Patient is a 79y old  Male who presents with a chief complaint of Urosepsis (23 Nov 2024 09:56)      INTERVAL HPI/OVERNIGHT EVENTS: Pt seen and examined at bedside. Denies fevers/chills, cough, congestion, headache, shortness of breath, abd pain or any other complaints.     MEDICATIONS  (STANDING):  apixaban 5 milliGRAM(s) Oral every 12 hours  ceftolozane/tazobactam IVPB 1500 milliGRAM(s) IV Intermittent every 8 hours  chlorhexidine 2% Cloths 1 Application(s) Topical <User Schedule>  finasteride 5 milliGRAM(s) Oral daily  hydrocortisone sodium succinate Injectable 50 milliGRAM(s) IV Push every 12 hours  meropenem  IVPB 1000 milliGRAM(s) IV Intermittent every 8 hours  pantoprazole    Tablet 40 milliGRAM(s) Oral before breakfast  risperiDONE   Tablet 0.25 milliGRAM(s) Oral two times a day  tamsulosin 0.4 milliGRAM(s) Oral at bedtime    MEDICATIONS  (PRN):  acetaminophen     Tablet .. 650 milliGRAM(s) Oral every 6 hours PRN Temp greater or equal to 38C (100.4F), Mild Pain (1 - 3)  ondansetron Injectable 4 milliGRAM(s) IV Push every 8 hours PRN Nausea and/or Vomiting  sodium chloride 3%  Inhalation 4 milliLiter(s) Inhalation every 8 hours PRN sputum induction      Allergies    levofloxacin (Unknown)  ofloxacin (Unknown)  gatifloxacin (Unknown)    Intolerances    fluoroquinolone antibiotics (Other)  Ketek (Other)  Avelox (Other)  telithromycin (Other)      REVIEW OF SYSTEMS:  CONSTITUTIONAL: No fever or chills  HEENT:  No headache, no sore throat  RESPIRATORY: No cough, wheezing, or shortness of breath  CARDIOVASCULAR: No chest pain, palpitations  GASTROINTESTINAL: No abd pain, nausea, vomiting, or diarrhea  GENITOURINARY: No dysuria, frequency, or hematuria  NEUROLOGICAL: no focal weakness or dizziness  MUSCULOSKELETAL: no myalgias     Vital Signs Last 24 Hrs  T(C): 36.4 (23 Nov 2024 04:54), Max: 36.8 (22 Nov 2024 16:04)  T(F): 97.5 (23 Nov 2024 04:54), Max: 98.2 (22 Nov 2024 16:04)  HR: 63 (23 Nov 2024 04:54) (61 - 83)  BP: 123/77 (23 Nov 2024 04:54) (101/51 - 142/60)  BP(mean): 87 (22 Nov 2024 19:00) (71 - 87)  RR: 16 (23 Nov 2024 04:54) (9 - 17)  SpO2: 97% (23 Nov 2024 04:54) (96% - 100%)    Parameters below as of 23 Nov 2024 09:28  Patient On (Oxygen Delivery Method): room air        PHYSICAL EXAM:  GENERAL: NAD  HEENT:  anicteric, moist mucous membranes  CHEST/LUNG:  CTA b/l, no rales, wheezes, or rhonchi  HEART:  RRR, S1, S2  ABDOMEN:  soft, nontender, nondistended  EXTREMITIES: no edema, cyanosis, or calf tenderness  NERVOUS SYSTEM: answers questions and follows commands appropriately    LABS:                        12.3   11.56 )-----------( 406      ( 23 Nov 2024 07:40 )             37.4     CBC Full  -  ( 23 Nov 2024 07:40 )  WBC Count : 11.56 K/uL  Hemoglobin : 12.3 g/dL  Hematocrit : 37.4 %  Platelet Count - Automated : 406 K/uL  Mean Cell Volume : 94.9 fl  Mean Cell Hemoglobin : 31.2 pg  Mean Cell Hemoglobin Concentration : 32.9 g/dL  Auto Neutrophil # : 9.46 K/uL  Auto Lymphocyte # : 0.92 K/uL  Auto Monocyte # : 0.90 K/uL  Auto Eosinophil # : 0.03 K/uL  Auto Basophil # : 0.03 K/uL  Auto Neutrophil % : 81.7 %  Auto Lymphocyte % : 8.0 %  Auto Monocyte % : 7.8 %  Auto Eosinophil % : 0.3 %  Auto Basophil % : 0.3 %    23 Nov 2024 07:40    141    |  112    |  28     ----------------------------<  122    4.0     |  25     |  0.58     Ca    8.8        23 Nov 2024 07:40  Phos  2.3       23 Nov 2024 07:40  Mg     1.9       23 Nov 2024 07:40    TPro  5.6    /  Alb  2.0    /  TBili  0.3    /  DBili  x      /  AST  32     /  ALT  30     /  AlkPhos  59     23 Nov 2024 07:40      Urinalysis Basic - ( 23 Nov 2024 07:40 )    Color: x / Appearance: x / SG: x / pH: x  Gluc: 122 mg/dL / Ketone: x  / Bili: x / Urobili: x   Blood: x / Protein: x / Nitrite: x   Leuk Esterase: x / RBC: x / WBC x   Sq Epi: x / Non Sq Epi: x / Bacteria: x      CAPILLARY BLOOD GLUCOSE            Culture - Acid Fast - Sputum w/Smear (collected 11-21-24 @ 12:00)  Source: .Sputum Sputum    Culture - Sputum (collected 11-21-24 @ 12:00)  Source: .Sputum Sputum  Gram Stain (11-21-24 @ 22:15):    Few Squamous epithelial cells per low power field    No polymorphonuclear leukocytes per low power field    Few Gram Negative Rods per oil power field    Few Gram Positive Rods per oil power field    Rare Gram positive cocci in pairs per oil power field  Final Report (11-23-24 @ 12:25):    Commensal violet consistent with body site    Culture - Acid Fast - Sputum w/Smear (collected 11-21-24 @ 12:00)  Source: .Sputum Sputum    Culture - Urine (collected 11-20-24 @ 14:30)  Source: .Urine Nephrostomy - Right  Final Report (11-23-24 @ 11:09):    50,000 - 99,000 CFU/mL Pseudomonas aeruginosa (Carbapenem Resistant)    50,000 - 99,000 CFU/mL Proteus mirabilis ESBL    50,000 - 99,000 CFU/mL Klebsiella pneumoniae  Organism: Pseudomonas aeruginosa (Carbapenem Resistant)  Proteus mirabilis ESBL  Klebsiella pneumoniae (11-23-24 @ 11:09)  Organism: Klebsiella pneumoniae (11-23-24 @ 11:09)      Method Type: MONI      -  Amoxicillin/Clavulanic Acid: S <=8/4      -  Ampicillin: R 16 These ampicillin results predict results for amoxicillin      -  Ampicillin/Sulbactam: S <=4/2      -  Aztreonam: S <=4      -  Cefazolin: S <=2 For uncomplicated UTI with K. pneumoniae, E. coli, or P. mirablis: MONI <=16 is sensitive and MONI >=32 is resistant. This also predicts results for oral agents cefaclor, cefdinir, cefpodoxime, cefprozil, cefuroxime axetil, cephalexin and locarbef for uncomplicated UTI. Note that some isolates may be susceptible to these agents while testing resistant to cefazolin.      -  Cefepime: S <=2      -  Cefoxitin: S <=8      -  Ceftriaxone: S <=1      -  Cefuroxime: S <=4      -  Ciprofloxacin: S <=0.25      -  Ertapenem: S <=0.5      -  Gentamicin: S <=2      -  Imipenem: S <=1      -  Levofloxacin: S <=0.5      -  Meropenem: S <=1      -  Nitrofurantoin: S <=32 Should not be used to treat pyelonephritis      -  Piperacillin/Tazobactam: S <=8      -  Tobramycin: S <=2      -  Trimethoprim/Sulfamethoxazole: S <=0.5/9.5  Organism: Proteus mirabilis ESBL (11-23-24 @ 11:09)      Method Type: MONI      -  Ampicillin: R >16 These ampicillin results predict results for amoxicillin      -  Ampicillin/Sulbactam: S <=4/2      -  Aztreonam: R <=4      -  Cefazolin: R >16 For uncomplicated UTI with K. pneumoniae, E. coli, or P. mirablis: MONI <=16 is sensitive and MONI >=32 is resistant. This also predicts results for oral agents cefaclor, cefdinir, cefpodoxime, cefprozil, cefuroxime axetil, cephalexin and locarbef for uncomplicated UTI. Note that some isolates may be susceptible to these agents while testing resistant to cefazolin.      -  Cefepime: R <=2      -  Ceftriaxone: R >32      -  Cefuroxime: R >16      -  Ciprofloxacin: R 2      -  Ertapenem: S <=0.5      -  Gentamicin: S <=2      -  Levofloxacin: I 1      -  Meropenem: S <=1      -  Nitrofurantoin: R >64 Should not be used to treat pyelonephritis      -  Piperacillin/Tazobactam: S <=8      -  Tobramycin: S <=2      -  Trimethoprim/Sulfamethoxazole: S <=0.5/9.5  Organism: Pseudomonas aeruginosa (Carbapenem Resistant) (11-23-24 @ 11:09)      Method Type: CarbaR      -  Resistance Gene to Carbapenem: Nondet  Organism: Pseudomonas aeruginosa (Carbapenem Resistant) (11-23-24 @ 11:09)      Method Type: MONI      -  Amikacin: S <=16      -  Aztreonam: R >16      -  Cefepime: R >16      -  Ceftazidime: R >16      -  Ceftazidime/Avibactam: S <=4      -  Ceftolozane/tazobactam: S <=2      -  Ciprofloxacin: S 0.5      -  Imipenem: R >8      -  Levofloxacin: I 2      -  Meropenem: R >8      -  Piperacillin/Tazobactam: R >64    Culture - Urine (collected 11-20-24 @ 01:25)  Source: .Urine  Final Report (11-22-24 @ 19:36):    >100,000 CFU/ml Klebsiella pneumoniae    >100,000 CFU/ml Pseudomonas aeruginosa (Carbapenem Resistant)  Organism: Klebsiella pneumoniae  Pseudomonas aeruginosa (Carbapenem Resistant) (11-22-24 @ 19:36)  Organism: Pseudomonas aeruginosa (Carbapenem Resistant) (11-22-24 @ 19:36)      Method Type: CarbaR      -  Resistance Gene to Carbapenem: Nondet  Organism: Pseudomonas aeruginosa (Carbapenem Resistant) (11-22-24 @ 19:36)      Method Type: MONI      -  Amikacin: S <=16      -  Aztreonam: R >16      -  Cefepime: R >16      -  Ceftazidime: R >16      -  Ceftazidime/Avibactam: S <=4      -  Ceftolozane/tazobactam: S <=2      -  Ciprofloxacin: S 0.5      -  Imipenem: R >8      -  Levofloxacin: I 2      -  Meropenem: R >8      -  Piperacillin/Tazobactam: R >64  Organism: Klebsiella pneumoniae (11-22-24 @ 19:36)      Method Type: MONI      -  Amoxicillin/Clavulanic Acid: S <=8/4      -  Ampicillin: R >16 These ampicillin results predict results for amoxicillin      -  Ampicillin/Sulbactam: S <=4/2      -  Aztreonam: S <=4      -  Cefazolin: S <=2 For uncomplicated UTI with K. pneumoniae, E. coli, or P. mirablis: MONI <=16 is sensitive and MONI >=32 is resistant. This also predicts results for oral agents cefaclor, cefdinir, cefpodoxime, cefprozil, cefuroxime axetil, cephalexin and locarbef for uncomplicated UTI. Note that some isolates may be susceptible to these agents while testing resistant to cefazolin.      -  Cefepime: S <=2      -  Cefoxitin: S <=8      -  Ceftriaxone: S <=1      -  Cefuroxime: S <=4      -  Ciprofloxacin: S <=0.25      -  Ertapenem: S <=0.5      -  Gentamicin: S <=2      -  Imipenem: S <=1      -  Levofloxacin: S <=0.5      -  Meropenem: S <=1      -  Nitrofurantoin: S <=32 Should not be used to treat pyelonephritis      -  Piperacillin/Tazobactam: S <=8      -  Tobramycin: S <=2      -  Trimethoprim/Sulfamethoxazole: S <=0.5/9.5    Urinalysis with Rflx Culture (collected 11-20-24 @ 01:25)    Culture - Blood (collected 11-19-24 @ 22:25)  Source: .Blood BLOOD  Preliminary Report (11-23-24 @ 07:01):    No growth at 72 Hours    Culture - Blood (collected 11-19-24 @ 22:20)  Source: .Blood BLOOD  Preliminary Report (11-23-24 @ 07:01):    No growth at 72 Hours        RADIOLOGY & ADDITIONAL TESTS:    Personally reviewed.     Consultant(s) Notes Reviewed:  [x] YES  [ ] NO

## 2024-11-23 NOTE — PROGRESS NOTE ADULT - SUBJECTIVE AND OBJECTIVE BOX
[facility] -- Janel Jackson Pannella, Patel, Savella Goodger, Cohen  Office # [phone]      Follow Up:  urosepsis     Subjective/Observations:   Patient resting comfortably in bed in NAD.  Laying flat with no respiratory distress.  No complaints of chest pain, dyspnea, palpitations, PND, or orthopnea.  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 for Club Foot Right foot      Pilonidal cyst  Surgery 40 years ago      H/O colonoscopy      Spinal stenosis      H/O prostate biopsy          MEDICATIONS  (STANDING):  apixaban 5 milliGRAM(s) Oral every 12 hours  ceftolozane/tazobactam IVPB 1500 milliGRAM(s) IV Intermittent every 8 hours  chlorhexidine 2% Cloths 1 Application(s) Topical <User Schedule>  finasteride 5 milliGRAM(s) Oral daily  hydrocortisone sodium succinate Injectable 50 milliGRAM(s) IV Push every 12 hours  meropenem  IVPB 1000 milliGRAM(s) IV Intermittent every 8 hours  pantoprazole    Tablet 40 milliGRAM(s) Oral before breakfast  risperiDONE   Tablet 0.25 milliGRAM(s) Oral two times a day  tamsulosin 0.4 milliGRAM(s) Oral at bedtime    MEDICATIONS  (PRN):  acetaminophen     Tablet .. 650 milliGRAM(s) Oral every 6 hours PRN Temp greater or equal to 38C (100.4F), Mild Pain (1 - 3)  ondansetron Injectable 4 milliGRAM(s) IV Push every 8 hours PRN Nausea and/or Vomiting  sodium chloride 3%  Inhalation 4 milliLiter(s) Inhalation every 8 hours PRN sputum induction      Allergies    levofloxacin (Unknown)  ofloxacin (Unknown)  gatifloxacin (Unknown)    Intolerances    fluoroquinolone antibiotics (Other)  Ketek (Other)  Avelox (Other)  telithromycin (Other)      Vital Signs Last 24 Hrs  T(C): 36.4 (2024 04:54), Max: 36.8 (2024 16:04)  T(F): 97.5 (2024 04:54), Max: 98.2 (2024 16:04)  HR: 63 (2024 04:54) (57 - 96)  BP: 123/77 (2024 04:54) (101/51 - 142/60)  BP(mean): 87 (2024 19:00) (70 - 92)  RR: 16 (2024 04:54) (9 - 20)  SpO2: 97% (2024 04:54) (96% - 100%)    Parameters below as of 2024 09:28  Patient On (Oxygen Delivery Method): room air        I&O's Summary    2024 07:01  -  2024 07:00  --------------------------------------------------------  IN: 50 mL / OUT: 900 mL / NET: -850 mL          PHYSICAL EXAM:  TELE: Sr 48-88bpm   Constitutional: NAD, awake and alert, well-developed  HEENT: Moist Mucous Membranes, Anicteric  Pulmonary: Non-labored, breath sounds are clear bilaterally, No wheezing, crackles or rhonchi  Cardiovascular: Regular, S1 and S2 nl, No murmurs, rubs, gallops or clicks  Gastrointestinal: Bowel Sounds present, soft, nontender.   Lymph: No lymphadenopathy. No peripheral edema.  Skin: No visible rashes or ulcers.  Psych:  Mood & affect appropriate    LABS: All Labs Reviewed:                        12.3   11.56 )-----------( 406      ( 2024 07:40 )             37.4                         12.9   15.29 )-----------( 423      ( 2024 06:07 )             38.7                         12.3   17.20 )-----------( 389      ( 2024 08:50 )             38.4     2024 07:40    141    |  112    |  28     ----------------------------<  122    4.0     |  25     |  0.58   2024 06:07    141    |  111    |  25     ----------------------------<  135    3.6     |  25     |  0.74   2024 08:50    144    |  114    |  16     ----------------------------<  121    4.6     |  23     |  0.60     Ca    8.8        2024 07:40  Ca    9.1        2024 06:07  Ca    9.7        2024 08:50  Phos  2.3       2024 07:40  Phos  3.0       2024 06:07  Phos  3.0       2024 08:50  Mg     1.9       2024 07:40  Mg     2.1       2024 06:07  Mg     2.2       2024 08:50    TPro  5.6    /  Alb  2.0    /  TBili  0.3    /  DBili  x      /  AST  32     /  ALT  30     /  AlkPhos  59     2024 07:40  TPro  6.2    /  Alb  2.1    /  TBili  0.2    /  DBili  x      /  AST  16     /  ALT  23     /  AlkPhos  68     2024 06:07  TPro  6.1    /  Alb  2.0    /  TBili  0.3    /  DBili  x      /  AST  13     /  ALT  19     /  AlkPhos  63     2024 08:50             EC Lead ECG:   Ventricular Rate 62 BPM    Atrial Rate 62 BPM    P-R Interval 154 ms    QRS Duration 114 ms    Q-T Interval 488 ms    QTC Calculation(Bazett) 495 ms    P Axis 44 degrees    R Axis -53 degrees    T Axis -29 degrees    Diagnosis Line Sinus rhythm with sinus arrhythmia with occasional premature ventricular complexes  Left anterior fascicular block  Cannot rule out Anterior infarct (cited on or before 2024)    Confirmed by SANDY PAUL ([id number]) on 2024 9:15:32 AM (24 @ 03:52)      TRANSTHORACIC ECHOCARDIOGRAM REPORT  ________________________________________________________________________________                                      _______       Pt. Name:       DEION HEATH Study Date:    2024  MRN:            [id number]       YOB: 1945  Accession #:    [id number]      Age:           79 years  Account#:       [id number]     Gender:        M  Heart Rate:                    Height:        65.75 in (167.00 cm)  Rhythm:                        Weight:   199.00 lb (90.27 kg)  Blood Pressure: 103/58 mmHg    BSA/BMI:       1.99 m² / 32.37 kg/m²  ________________________________________________________________________________________  Referring Physician:    [id number] Curly Byrnes  Interpreting Physician: Lyndsay Marin MD  Primary Sonographer:    Ashli Arana [facility]    CPT:                ECHO TTE WO CON COMP W DOPP - 50194.m  Indication(s):      Abnormal electrocardiogram ECG/EKG - R94.31  Procedure:          Transthoracic echocardiogram with 2-D, M-mode and complete                      spectral and color flow Doppler.  Ordering Location:  ICU1  Admission Status:   Inpatient  Contrast Injection: Verbal consent was obtained for injection of Ultrasonic                      Enhancing Agent following a discussion of risks and                      benefits.                      Endocardial visualization enhanced with Definity Ultrasound                      enhancing agent.  Agitated Saline:    Injection with agitated saline was performed toevaluate for                      intracardiac shunting.  Study Information:  Image quality for this study is technically difficult.    _______________________________________________________________________________________     CONCLUSIONS:      1. Technically difficult image quality.   2. Agitated saline injection was negative for intracardiac shunt (though there is poor visualization of the LV during injection of agitated saline)   3. Despite definity use, the LV endocardium is still not well visualized.   4. In certain views, the LVEF appears grossly preserved though the apex appears hypokinetic.    ________________________________________________________________________________________  FINDINGS:     Interatrial Septum:  Agitated saline injection was negative for intracardiac shunt.  ________________________________________________________________________________________  Electronically signed on 2024 at 2:37:09 PM by Lyndsay Marin MD      *** Final ***      Radiology:

## 2024-11-23 NOTE — PROGRESS NOTE ADULT - PROBLEM SELECTOR PLAN 5
Pt with documented hx of CHF, unspecified type however on GDMT for HFrEF  - TTE (9/2024): LVEF 55-60%, no diastolic dysfunction, moderate MR  - Evaluated by cardiology during admission at [facility] (9/2024) for acute CHF, started on GDMT  - Pro-BNP on admission 1988  - Hold home metoprolol, spironolactone and lasix in setting of hypotension  - Strict I&Os   - Does not appear clinically volume overloaded  - Cardiology ([place] [facility]) consulted, recs appreciated

## 2024-11-24 LAB
ALBUMIN SERPL ELPH-MCNC: 1.8 G/DL — LOW (ref 3.3–5)
ALP SERPL-CCNC: 77 U/L — SIGNIFICANT CHANGE UP (ref 40–120)
ALT FLD-CCNC: 53 U/L — SIGNIFICANT CHANGE UP (ref 12–78)
ANION GAP SERPL CALC-SCNC: 8 MMOL/L — SIGNIFICANT CHANGE UP (ref 5–17)
AST SERPL-CCNC: 51 U/L — HIGH (ref 15–37)
BASOPHILS # BLD AUTO: 0 K/UL — SIGNIFICANT CHANGE UP (ref 0–0.2)
BASOPHILS NFR BLD AUTO: 0 % — SIGNIFICANT CHANGE UP (ref 0–2)
BILIRUB SERPL-MCNC: 0.1 MG/DL — LOW (ref 0.2–1.2)
BUN SERPL-MCNC: 25 MG/DL — HIGH (ref 7–23)
CALCIUM SERPL-MCNC: 8.7 MG/DL — SIGNIFICANT CHANGE UP (ref 8.5–10.1)
CHLORIDE SERPL-SCNC: 112 MMOL/L — HIGH (ref 96–108)
CO2 SERPL-SCNC: 21 MMOL/L — LOW (ref 22–31)
CREAT SERPL-MCNC: 0.46 MG/DL — LOW (ref 0.5–1.3)
EGFR: 105 ML/MIN/1.73M2 — SIGNIFICANT CHANGE UP
EOSINOPHIL # BLD AUTO: 0.17 K/UL — SIGNIFICANT CHANGE UP (ref 0–0.5)
EOSINOPHIL NFR BLD AUTO: 1 % — SIGNIFICANT CHANGE UP (ref 0–6)
GLUCOSE SERPL-MCNC: 110 MG/DL — HIGH (ref 70–99)
HCT VFR BLD CALC: 36.2 % — LOW (ref 39–50)
HGB BLD-MCNC: 11.5 G/DL — LOW (ref 13–17)
LYMPHOCYTES # BLD AUTO: 13 % — SIGNIFICANT CHANGE UP (ref 13–44)
LYMPHOCYTES # BLD AUTO: 2.26 K/UL — SIGNIFICANT CHANGE UP (ref 1–3.3)
MCHC RBC-ENTMCNC: 30.7 PG — SIGNIFICANT CHANGE UP (ref 27–34)
MCHC RBC-ENTMCNC: 31.8 G/DL — LOW (ref 32–36)
MCV RBC AUTO: 96.5 FL — SIGNIFICANT CHANGE UP (ref 80–100)
MONOCYTES # BLD AUTO: 2.09 K/UL — HIGH (ref 0–0.9)
MONOCYTES NFR BLD AUTO: 12 % — SIGNIFICANT CHANGE UP (ref 2–14)
NEUTROPHILS # BLD AUTO: 12.37 K/UL — HIGH (ref 1.8–7.4)
NEUTROPHILS NFR BLD AUTO: 69 % — SIGNIFICANT CHANGE UP (ref 43–77)
NRBC # BLD: SIGNIFICANT CHANGE UP /100 WBCS (ref 0–0)
PLATELET # BLD AUTO: 372 K/UL — SIGNIFICANT CHANGE UP (ref 150–400)
POTASSIUM SERPL-MCNC: 3.9 MMOL/L — SIGNIFICANT CHANGE UP (ref 3.5–5.3)
POTASSIUM SERPL-SCNC: 3.9 MMOL/L — SIGNIFICANT CHANGE UP (ref 3.5–5.3)
PROT SERPL-MCNC: 5.2 G/DL — LOW (ref 6–8.3)
RBC # BLD: 3.75 M/UL — LOW (ref 4.2–5.8)
RBC # FLD: 17.3 % — HIGH (ref 10.3–14.5)
SODIUM SERPL-SCNC: 141 MMOL/L — SIGNIFICANT CHANGE UP (ref 135–145)
WBC # BLD: 17.42 K/UL — HIGH (ref 3.8–10.5)
WBC # FLD AUTO: 17.42 K/UL — HIGH (ref 3.8–10.5)

## 2024-11-24 PROCEDURE — 99232 SBSQ HOSP IP/OBS MODERATE 35: CPT

## 2024-11-24 PROCEDURE — 93971 EXTREMITY STUDY: CPT | Mod: 26,RT

## 2024-11-24 RX ADMIN — RISPERIDONE 0.25 MILLIGRAM(S): 4 TABLET ORAL at 06:22

## 2024-11-24 RX ADMIN — CEFTOLOZANE AND TAZOBACTAM 100 MILLIGRAM(S): 1; .5 INJECTION, POWDER, LYOPHILIZED, FOR SOLUTION INTRAVENOUS at 14:48

## 2024-11-24 RX ADMIN — PANTOPRAZOLE SODIUM 40 MILLIGRAM(S): 40 TABLET, DELAYED RELEASE ORAL at 06:22

## 2024-11-24 RX ADMIN — APIXABAN 5 MILLIGRAM(S): 2.5 TABLET, FILM COATED ORAL at 06:21

## 2024-11-24 RX ADMIN — Medication 50 MILLIGRAM(S): at 17:52

## 2024-11-24 RX ADMIN — Medication 5 MILLIGRAM(S): at 12:50

## 2024-11-24 RX ADMIN — TAMSULOSIN HYDROCHLORIDE 0.4 MILLIGRAM(S): 0.4 CAPSULE ORAL at 22:34

## 2024-11-24 RX ADMIN — CHLORHEXIDINE GLUCONATE 1 APPLICATION(S): 1.2 RINSE ORAL at 06:19

## 2024-11-24 RX ADMIN — Medication 50 MILLIGRAM(S): at 06:22

## 2024-11-24 RX ADMIN — CEFTOLOZANE AND TAZOBACTAM 100 MILLIGRAM(S): 1; .5 INJECTION, POWDER, LYOPHILIZED, FOR SOLUTION INTRAVENOUS at 22:34

## 2024-11-24 RX ADMIN — RISPERIDONE 0.25 MILLIGRAM(S): 4 TABLET ORAL at 17:52

## 2024-11-24 RX ADMIN — APIXABAN 5 MILLIGRAM(S): 2.5 TABLET, FILM COATED ORAL at 17:51

## 2024-11-24 RX ADMIN — CEFTOLOZANE AND TAZOBACTAM 100 MILLIGRAM(S): 1; .5 INJECTION, POWDER, LYOPHILIZED, FOR SOLUTION INTRAVENOUS at 06:21

## 2024-11-24 NOTE — PROGRESS NOTE ADULT - ASSESSMENT
80 yo M with PMHx HTN, CHF, PE (on eliquis), Myasthenia Gravis, and chronic LE edema, nephrolithiasis (s/p nephrostomy tube placement ), urosepsis, BPH BIBEMS from [facility] for hypertension and elevated WBC count. Admitted to ICU for septic shock 2/2 UTI and Enter/rhinovirus.

## 2024-11-24 NOTE — PROGRESS NOTE ADULT - PROBLEM SELECTOR PLAN 1
- Pt p/w elevated WBC, pt asymptomatic. Hx urosepsis/klebsiella bacteremia treated with IV rocephin, hospital course complicated by Afib with RVR during recent admission (9/2024)  - Per CI paperwork, pt s/p 7d course of IV zosyn via PICC (11/4-11/10)  - Met sepsis criteria on admission with leukocytosis, lactate, tachycardia  - WBC appears to be at baseline, per chart review baseline around 12-13 (on chronic steroids for MG)  - UA: Turbid, large blood, large LEC, positive nitrite, many bacteria, WBC TNTC  - CT: Urinary bladder collapsed, limiting evaluation, at least 3 stones measuring up to 1.0 cm. R nephrostomy tube.  - Restrepo catheter present on admission  - Pt admitted to ICU for septic shock 2/2 UTI and rhino/enterovirus  - s/p Levophed gtt. s/p midodrine  - Continue stress dose hydrocortisone   - Continue IV Meropenem  - Blood and sputum Cx negative  - Urine cx + klebsiella, pseudomonas, proteus  - sputum AFB x 3 in process  - Quantiferon pending  - ID (Dr. Connor) following, recs appreciated - Pt p/w elevated WBC, pt asymptomatic. Hx urosepsis/klebsiella bacteremia treated with IV rocephin, hospital course complicated by Afib with RVR during recent admission (9/2024)  - Per CI paperwork, pt s/p 7d course of IV zosyn via PICC (11/4-11/10)  - Met sepsis criteria on admission with leukocytosis, lactate, tachycardia  - WBC appears to be at baseline, per chart review baseline around 12-13 (on chronic steroids for MG)  - UA: Turbid, large blood, large LEC, positive nitrite, many bacteria, WBC TNTC  - CT: Urinary bladder collapsed, limiting evaluation, at least 3 stones measuring up to 1.0 cm. R nephrostomy tube.  - Restrepo catheter present on admission  - Pt admitted to ICU for septic shock 2/2 UTI and rhino/enterovirus  - s/p Levophed gtt. s/p midodrine  - Continue stress dose hydrocortisone   - s/p IV Meropenem  - Continue Zebraxa  - Blood and sputum Cx negative  - Urine cx + klebsiella, pseudomonas, proteus  - sputum AFB x 3 negative  - Fungitell, Histoplasma, Aspergillus, Quantiferon pending  - ID (Dr. Connor) following, recs appreciated

## 2024-11-24 NOTE — PROGRESS NOTE ADULT - PROBLEM SELECTOR PLAN 5
Pt with documented hx of CHF, unspecified type however on GDMT for HFrEF  - TTE (9/2024): LVEF 55-60%, no diastolic dysfunction, moderate MR  - Evaluated by cardiology during admission at [facility] (9/2024) for acute CHF, started on GDMT  - Pro-BNP on admission 1988  - Hold home metoprolol, spironolactone and lasix in setting of hypotension  - Strict I&Os   - Does not appear clinically volume overloaded  - Cardiology ([place] [facility]) consulted, recs appreciated

## 2024-11-24 NOTE — PROGRESS NOTE ADULT - ASSESSMENT
80 yo M with PMHx HTN, CHF, PE (on eliquis), Myasthenia Gravis, and chronic LE edema, R ureteral obstructing stone s/p R PCN placement on 9/6/24, urosepsis, BPH, who presented from [facility] for hypertension and elevated WBC count. Per nursing home paperwork, pt hypertensive to 154/105 and recieved metoprolol 25 mg (8:10 PM), and reported pt with "elevated WBC". Upon arrival, pt found to be hypotensive to 90/64. Arrived to ED with nephrostomy tube, chronic guerrero and PICC line in place. Pt was recieving IV zosyn x7d (start date 11/04) per paperwork review for UTI.    Patient being treated for UTI. Guerrero exchanged in the ED. CT did not show any obvious signs of acute infection. Urine culture grew klebsiella, pseudomonas, proteus. Sensitivities reviewed. Blood cultures remain no growth.     WBC increased 17 today but no fever, no new complaints. Noted to have RUE swelling concerning for phlebitis.    Also incidentally with RUL calcified granulomas, also in liver in spleen. Unclear etiology, patient without symptoms of tuberculosis, no known hx of TB infection or exposure. He was born in [place], and has not lived outside [place]. Sputum AFB smear x 3 are negative,    #Leukocytosis  #UTI  #MDRO pseudomonas and ESBL proteus  #Rhinovirus positive  #Calcified granulomas  #Hx of fluoroquinolone allergy    -continue ceftolozane-tazobactam--plan for 5-7 days  -suggest Urology evaluation regarding nephrostomy  -follow cultures to completion  -sputum AFB cultures x 3 in process  -Quantiferon pending  -serum Fungitell, galactomannan pending  -suggest urine histoplasma antigen  -monitor WBC  -suggest RUE IV removal  -RUE elevation, warm compress  -contact isolation  -can discontinue airborne isolation    Rachel Connor MD  Division of Infectious Diseases   Cell [phone] between 8am and 6pm   After 6pm and weekends please call ID service at [phone].     35 minutes spent on total encounter assessing patient, examination, chart review, counseling and coordinating care by the attending physician/nurse/care manager.

## 2024-11-24 NOTE — PROVIDER CONTACT NOTE (CRITICAL VALUE NOTIFICATION) - TEST AND RESULT REPORTED:
troponin: 1099.8
Lactate 3.1
11/20 urine culture- klebsiella pneumonia, pseudomonas aeruginosa, carbapenem resistant
Urine culture done on 20th November showed 50,000-234017 CFU/ml of Pseudomonas aeruginosa,carbapenem resistant, Proteus mirabilis ESBL and Klebsiella pneumoniae.)

## 2024-11-24 NOTE — PROGRESS NOTE ADULT - SUBJECTIVE AND OBJECTIVE BOX
[facility]  INFECTIOUS DISEASES - [address], [place] [address]  Tel: [phone]     Fax: [phone]  =======================================================    DEION HEATH [id number]    Follow up: No fevers. Denies any pain or SOB. Denies any nausea, vomiting or diarrhea.    Allergies:  levofloxacin (Unknown)  fluoroquinolone antibiotics (Other)  Ketek (Other)  Avelox (Other)  ofloxacin (Unknown)  gatifloxacin (Unknown)  telithromycin (Other)      Antibiotics:  acetaminophen     Tablet .. 650 milliGRAM(s) Oral every 6 hours PRN  apixaban 5 milliGRAM(s) Oral every 12 hours  ceftolozane/tazobactam IVPB 1500 milliGRAM(s) IV Intermittent every 8 hours  chlorhexidine 2% Cloths 1 Application(s) Topical <User Schedule>  finasteride 5 milliGRAM(s) Oral daily  hydrocortisone sodium succinate Injectable 50 milliGRAM(s) IV Push every 12 hours  ondansetron Injectable 4 milliGRAM(s) IV Push every 8 hours PRN  pantoprazole    Tablet 40 milliGRAM(s) Oral before breakfast  risperiDONE   Tablet 0.25 milliGRAM(s) Oral two times a day  sodium chloride 3%  Inhalation 4 milliLiter(s) Inhalation every 8 hours PRN  tamsulosin 0.4 milliGRAM(s) Oral at bedtime       REVIEW OF SYSTEMS:  CONSTITUTIONAL:  No Fever or chills  CARDIOVASCULAR:  No chest pain or SOB  RESPIRATORY:  No cough, shortness of breath  GASTROINTESTINAL:  No nausea, vomiting or diarrhea.  GENITOURINARY:  + Restrepo  MUSCULOSKELETAL:  no back pain  NEUROLOGIC:  No headache or dizziness  PSYCHIATRIC:  No disorder of thought or mood.     Physical Exam:  ICU Vital Signs Last 24 Hrs  T(C): 36.3 (24 Nov 2024 05:17), Max: 36.7 (23 Nov 2024 20:31)  T(F): 97.4 (24 Nov 2024 05:17), Max: 98 (23 Nov 2024 20:31)  HR: 64 (24 Nov 2024 05:17) (64 - 76)  BP: 122/63 (24 Nov 2024 05:17) (122/63 - 129/75)  BP(mean): --  ABP: --  ABP(mean): --  RR: 16 (24 Nov 2024 05:17) (16 - 18)  SpO2: 97% (24 Nov 2024 05:17) (96% - 97%)    O2 Parameters below as of 24 Nov 2024 09:01  Patient On (Oxygen Delivery Method): room air           GEN: NAD  HEENT: normocephalic and atraumatic.  NECK: Supple.   LUNGS: Normal respiratory effort  HEART: Regular rate and rhythm   ABDOMEN: Soft, nontender, and nondistended.    : + R percutaneous nephrostomy. + Restrepo  EXTREMITIES: No leg edema. R forearm swelling, mild erythema, no warmth or tenderness  NEUROLOGIC: Answering questions appropriately, knows he is in the hospital  PSYCHIATRIC: Appropriate affect .      Labs:  11-24    141  |  112[H]  |  25[H]  ----------------------------<  110[H]  3.9   |  21[L]  |  0.46[L]    Ca    8.7      24 Nov 2024 07:20  Phos  2.3     11-23  Mg     1.9     11-23    TPro  5.2[L]  /  Alb  1.8[L]  /  TBili  0.1[L]  /  DBili  x   /  AST  51[H]  /  ALT  53  /  AlkPhos  77  11-24                          11.5   17.42 )-----------( 372      ( 24 Nov 2024 07:20 )             36.2       Urinalysis Basic - ( 24 Nov 2024 07:20 )    Color: x / Appearance: x / SG: x / pH: x  Gluc: 110 mg/dL / Ketone: x  / Bili: x / Urobili: x   Blood: x / Protein: x / Nitrite: x   Leuk Esterase: x / RBC: x / WBC x   Sq Epi: x / Non Sq Epi: x / Bacteria: x      LIVER FUNCTIONS - ( 24 Nov 2024 07:20 )  Alb: 1.8 g/dL / Pro: 5.2 g/dL / ALK PHOS: 77 U/L / ALT: 53 U/L / AST: 51 U/L / GGT: x             RECENT CULTURES:  11-22 @ 11:20 .Sputum Sputum     Culture is being performed.        11-21 @ 12:00 .Sputum Sputum     Culture is being performed.    Few Squamous epithelial cells per low power field  No polymorphonuclear leukocytes per low power field  Few Gram Negative Rods per oil power field  Few Gram Positive Rods per oil power field  Rare Gram positive cocci in pairs per oil power field      11-20 @ 14:30 .Urine Nephrostomy - Right Pseudomonas aeruginosa (Carbapenem Resistant)  Proteus mirabilis ESBL  Klebsiella pneumoniae    50,000 - 99,000 CFU/mL Pseudomonas aeruginosa (Carbapenem Resistant)  50,000 - 99,000 CFU/mL Proteus mirabilis ESBL  50,000 - 99,000 CFU/mL Klebsiella pneumoniae        11-20 @ 01:25 .Urine Klebsiella pneumoniae  Pseudomonas aeruginosa (Carbapenem Resistant)    >100,000 CFU/ml Klebsiella pneumoniae  >100,000 CFU/ml Pseudomonas aeruginosa (Carbapenem Resistant)        11-20 @ 00:00          Detected  11-19 @ 22:25 .Blood BLOOD     No growth at 4 days        11-19 @ 22:20 .Blood BLOOD     No growth at 4 days              All imaging and data are reviewed.

## 2024-11-24 NOTE — PROGRESS NOTE ADULT - SUBJECTIVE AND OBJECTIVE BOX
CHARTING IN PROGRESS      Patient is a 79y old  Male who presents with a chief complaint of Urosepsis (24 Nov 2024 10:08)      INTERVAL HPI/OVERNIGHT EVENTS: No events overnight. Pt seen and examined at bedside. Denies any complaints. RUE edema noted, dopplers negative for DVT.     MEDICATIONS  (STANDING):  apixaban 5 milliGRAM(s) Oral every 12 hours  ceftolozane/tazobactam IVPB 1500 milliGRAM(s) IV Intermittent every 8 hours  chlorhexidine 2% Cloths 1 Application(s) Topical <User Schedule>  finasteride 5 milliGRAM(s) Oral daily  hydrocortisone sodium succinate Injectable 50 milliGRAM(s) IV Push every 12 hours  pantoprazole    Tablet 40 milliGRAM(s) Oral before breakfast  risperiDONE   Tablet 0.25 milliGRAM(s) Oral two times a day  tamsulosin 0.4 milliGRAM(s) Oral at bedtime    MEDICATIONS  (PRN):  acetaminophen     Tablet .. 650 milliGRAM(s) Oral every 6 hours PRN Temp greater or equal to 38C (100.4F), Mild Pain (1 - 3)  ondansetron Injectable 4 milliGRAM(s) IV Push every 8 hours PRN Nausea and/or Vomiting  sodium chloride 3%  Inhalation 4 milliLiter(s) Inhalation every 8 hours PRN sputum induction      Allergies    levofloxacin (Unknown)  ofloxacin (Unknown)  gatifloxacin (Unknown)    Intolerances    fluoroquinolone antibiotics (Other)  Ketek (Other)  Avelox (Other)  telithromycin (Other)      REVIEW OF SYSTEMS:  CONSTITUTIONAL: No fever or chills  HEENT:  No headache, no sore throat  RESPIRATORY: No cough, wheezing, or shortness of breath  CARDIOVASCULAR: No chest pain, palpitations  GASTROINTESTINAL: No abd pain, nausea, vomiting, or diarrhea  GENITOURINARY: No dysuria, frequency, or hematuria  NEUROLOGICAL: no focal weakness or dizziness  MUSCULOSKELETAL: no myalgias     Vital Signs Last 24 Hrs  T(C): 36.3 (24 Nov 2024 05:17), Max: 36.7 (23 Nov 2024 20:31)  T(F): 97.4 (24 Nov 2024 05:17), Max: 98 (23 Nov 2024 20:31)  HR: 64 (24 Nov 2024 05:17) (64 - 76)  BP: 122/63 (24 Nov 2024 05:17) (122/63 - 129/75)  BP(mean): --  RR: 16 (24 Nov 2024 05:17) (16 - 18)  SpO2: 97% (24 Nov 2024 05:17) (96% - 97%)    Parameters below as of 24 Nov 2024 09:01  Patient On (Oxygen Delivery Method): room air        PHYSICAL EXAM:  GENERAL: NAD  HEENT: anicteric, moist mucous membranes  CHEST/LUNG:  CTA b/l, no rales, wheezes, or rhonchi  HEART:  RRR, S1, S2  ABDOMEN:  soft, nontender, nondistended  EXTREMITIES: no edema, cyanosis, or calf tenderness  NERVOUS SYSTEM: answers questions and follows commands appropriately    LABS:                        11.5   17.42 )-----------( 372      ( 24 Nov 2024 07:20 )             36.2     CBC Full  -  ( 24 Nov 2024 07:20 )  WBC Count : 17.42 K/uL  Hemoglobin : 11.5 g/dL  Hematocrit : 36.2 %  Platelet Count - Automated : 372 K/uL  Mean Cell Volume : 96.5 fl  Mean Cell Hemoglobin : 30.7 pg  Mean Cell Hemoglobin Concentration : 31.8 g/dL  Auto Neutrophil # : 12.37 K/uL  Auto Lymphocyte # : 2.26 K/uL  Auto Monocyte # : 2.09 K/uL  Auto Eosinophil # : 0.17 K/uL  Auto Basophil # : 0.00 K/uL  Auto Neutrophil % : 69.0 %  Auto Lymphocyte % : 13.0 %  Auto Monocyte % : 12.0 %  Auto Eosinophil % : 1.0 %  Auto Basophil % : 0.0 %    24 Nov 2024 07:20    141    |  112    |  25     ----------------------------<  110    3.9     |  21     |  0.46     Ca    8.7        24 Nov 2024 07:20    TPro  5.2    /  Alb  1.8    /  TBili  0.1    /  DBili  x      /  AST  51     /  ALT  53     /  AlkPhos  77     24 Nov 2024 07:20      Urinalysis Basic - ( 24 Nov 2024 07:20 )    Color: x / Appearance: x / SG: x / pH: x  Gluc: 110 mg/dL / Ketone: x  / Bili: x / Urobili: x   Blood: x / Protein: x / Nitrite: x   Leuk Esterase: x / RBC: x / WBC x   Sq Epi: x / Non Sq Epi: x / Bacteria: x      CAPILLARY BLOOD GLUCOSE            Culture - Acid Fast - Sputum w/Smear (collected 11-22-24 @ 11:20)  Source: .Sputum Sputum  Preliminary Report (11-23-24 @ 23:06):    Culture is being performed.    Culture - Acid Fast - Sputum w/Smear (collected 11-21-24 @ 12:00)  Source: .Sputum Sputum  Preliminary Report (11-23-24 @ 23:06):    Culture is being performed.    Culture - Sputum (collected 11-21-24 @ 12:00)  Source: .Sputum Sputum  Gram Stain (11-21-24 @ 22:15):    Few Squamous epithelial cells per low power field    No polymorphonuclear leukocytes per low power field    Few Gram Negative Rods per oil power field    Few Gram Positive Rods per oil power field    Rare Gram positive cocci in pairs per oil power field  Final Report (11-23-24 @ 12:25):    Commensal violet consistent with body site    Culture - Acid Fast - Sputum w/Smear (collected 11-21-24 @ 12:00)  Source: .Sputum Sputum  Preliminary Report (11-23-24 @ 23:06):    Culture is being performed.    Culture - Urine (collected 11-20-24 @ 14:30)  Source: .Urine Nephrostomy - Right  Final Report (11-23-24 @ 11:09):    50,000 - 99,000 CFU/mL Pseudomonas aeruginosa (Carbapenem Resistant)    50,000 - 99,000 CFU/mL Proteus mirabilis ESBL    50,000 - 99,000 CFU/mL Klebsiella pneumoniae  Organism: Pseudomonas aeruginosa (Carbapenem Resistant)  Proteus mirabilis ESBL  Klebsiella pneumoniae (11-23-24 @ 11:09)  Organism: Klebsiella pneumoniae (11-23-24 @ 11:09)      Method Type: MONI      -  Amoxicillin/Clavulanic Acid: S <=8/4      -  Ampicillin: R 16 These ampicillin results predict results for amoxicillin      -  Ampicillin/Sulbactam: S <=4/2      -  Aztreonam: S <=4      -  Cefazolin: S <=2 For uncomplicated UTI with K. pneumoniae, E. coli, or P. mirablis: MONI <=16 is sensitive and MONI >=32 is resistant. This also predicts results for oral agents cefaclor, cefdinir, cefpodoxime, cefprozil, cefuroxime axetil, cephalexin and locarbef for uncomplicated UTI. Note that some isolates may be susceptible to these agents while testing resistant to cefazolin.      -  Cefepime: S <=2      -  Cefoxitin: S <=8      -  Ceftriaxone: S <=1      -  Cefuroxime: S <=4      -  Ciprofloxacin: S <=0.25      -  Ertapenem: S <=0.5      -  Gentamicin: S <=2      -  Imipenem: S <=1      -  Levofloxacin: S <=0.5      -  Meropenem: S <=1      -  Nitrofurantoin: S <=32 Should not be used to treat pyelonephritis      -  Piperacillin/Tazobactam: S <=8      -  Tobramycin: S <=2      -  Trimethoprim/Sulfamethoxazole: S <=0.5/9.5  Organism: Proteus mirabilis ESBL (11-23-24 @ 11:09)      Method Type: MONI      -  Ampicillin: R >16 These ampicillin results predict results for amoxicillin      -  Ampicillin/Sulbactam: S <=4/2      -  Aztreonam: R <=4      -  Cefazolin: R >16 For uncomplicated UTI with K. pneumoniae, E. coli, or P. mirablis: MONI <=16 is sensitive and MONI >=32 is resistant. This also predicts results for oral agents cefaclor, cefdinir, cefpodoxime, cefprozil, cefuroxime axetil, cephalexin and locarbef for uncomplicated UTI. Note that some isolates may be susceptible to these agents while testing resistant to cefazolin.      -  Cefepime: R <=2      -  Ceftriaxone: R >32      -  Cefuroxime: R >16      -  Ciprofloxacin: R 2      -  Ertapenem: S <=0.5      -  Gentamicin: S <=2      -  Levofloxacin: I 1      -  Meropenem: S <=1      -  Nitrofurantoin: R >64 Should not be used to treat pyelonephritis      -  Piperacillin/Tazobactam: S <=8      -  Tobramycin: S <=2      -  Trimethoprim/Sulfamethoxazole: S <=0.5/9.5  Organism: Pseudomonas aeruginosa (Carbapenem Resistant) (11-23-24 @ 11:09)      Method Type: CarbaR      -  Resistance Gene to Carbapenem: Nondet  Organism: Pseudomonas aeruginosa (Carbapenem Resistant) (11-23-24 @ 11:09)      Method Type: MONI      -  Amikacin: S <=16      -  Aztreonam: R >16      -  Cefepime: R >16      -  Ceftazidime: R >16      -  Ceftazidime/Avibactam: S <=4      -  Ceftolozane/tazobactam: S <=2      -  Ciprofloxacin: S 0.5      -  Imipenem: R >8      -  Levofloxacin: I 2      -  Meropenem: R >8      -  Piperacillin/Tazobactam: R >64    Culture - Urine (collected 11-20-24 @ 01:25)  Source: .Urine  Final Report (11-22-24 @ 19:36):    >100,000 CFU/ml Klebsiella pneumoniae    >100,000 CFU/ml Pseudomonas aeruginosa (Carbapenem Resistant)  Organism: Klebsiella pneumoniae  Pseudomonas aeruginosa (Carbapenem Resistant) (11-22-24 @ 19:36)  Organism: Pseudomonas aeruginosa (Carbapenem Resistant) (11-22-24 @ 19:36)      Method Type: CarbaR      -  Resistance Gene to Carbapenem: Nondet  Organism: Pseudomonas aeruginosa (Carbapenem Resistant) (11-22-24 @ 19:36)      Method Type: MONI      -  Amikacin: S <=16      -  Aztreonam: R >16      -  Cefepime: R >16      -  Ceftazidime: R >16      -  Ceftazidime/Avibactam: S <=4      -  Ceftolozane/tazobactam: S <=2      -  Ciprofloxacin: S 0.5      -  Imipenem: R >8      -  Levofloxacin: I 2      -  Meropenem: R >8      -  Piperacillin/Tazobactam: R >64  Organism: Klebsiella pneumoniae (11-22-24 @ 19:36)      Method Type: MONI      -  Amoxicillin/Clavulanic Acid: S <=8/4      -  Ampicillin: R >16 These ampicillin results predict results for amoxicillin      -  Ampicillin/Sulbactam: S <=4/2      -  Aztreonam: S <=4      -  Cefazolin: S <=2 For uncomplicated UTI with K. pneumoniae, E. coli, or P. mirablis: MONI <=16 is sensitive and MONI >=32 is resistant. This also predicts results for oral agents cefaclor, cefdinir, cefpodoxime, cefprozil, cefuroxime axetil, cephalexin and locarbef for uncomplicated UTI. Note that some isolates may be susceptible to these agents while testing resistant to cefazolin.      -  Cefepime: S <=2      -  Cefoxitin: S <=8      -  Ceftriaxone: S <=1      -  Cefuroxime: S <=4      -  Ciprofloxacin: S <=0.25      -  Ertapenem: S <=0.5      -  Gentamicin: S <=2      -  Imipenem: S <=1      -  Levofloxacin: S <=0.5      -  Meropenem: S <=1      -  Nitrofurantoin: S <=32 Should not be used to treat pyelonephritis      -  Piperacillin/Tazobactam: S <=8      -  Tobramycin: S <=2      -  Trimethoprim/Sulfamethoxazole: S <=0.5/9.5    Urinalysis with Rflx Culture (collected 11-20-24 @ 01:25)    Culture - Blood (collected 11-19-24 @ 22:25)  Source: .Blood BLOOD  Preliminary Report (11-24-24 @ 07:00):    No growth at 4 days    Culture - Blood (collected 11-19-24 @ 22:20)  Source: .Blood BLOOD  Preliminary Report (11-24-24 @ 07:00):    No growth at 4 days        RADIOLOGY & ADDITIONAL TESTS:    Personally reviewed.     Consultant(s) Notes Reviewed:  [x] YES  [ ] NO     Patient is a 79y old  Male who presents with a chief complaint of Urosepsis (24 Nov 2024 10:08)      INTERVAL HPI/OVERNIGHT EVENTS: No events overnight. Pt seen and examined at bedside. Denies any complaints. RUE edema noted, dopplers negative for DVT.     MEDICATIONS  (STANDING):  apixaban 5 milliGRAM(s) Oral every 12 hours  ceftolozane/tazobactam IVPB 1500 milliGRAM(s) IV Intermittent every 8 hours  chlorhexidine 2% Cloths 1 Application(s) Topical <User Schedule>  finasteride 5 milliGRAM(s) Oral daily  hydrocortisone sodium succinate Injectable 50 milliGRAM(s) IV Push every 12 hours  pantoprazole    Tablet 40 milliGRAM(s) Oral before breakfast  risperiDONE   Tablet 0.25 milliGRAM(s) Oral two times a day  tamsulosin 0.4 milliGRAM(s) Oral at bedtime    MEDICATIONS  (PRN):  acetaminophen     Tablet .. 650 milliGRAM(s) Oral every 6 hours PRN Temp greater or equal to 38C (100.4F), Mild Pain (1 - 3)  ondansetron Injectable 4 milliGRAM(s) IV Push every 8 hours PRN Nausea and/or Vomiting  sodium chloride 3%  Inhalation 4 milliLiter(s) Inhalation every 8 hours PRN sputum induction      Allergies    levofloxacin (Unknown)  ofloxacin (Unknown)  gatifloxacin (Unknown)    Intolerances    fluoroquinolone antibiotics (Other)  Ketek (Other)  Avelox (Other)  telithromycin (Other)      REVIEW OF SYSTEMS:  CONSTITUTIONAL: No fever or chills  HEENT:  No headache, no sore throat  RESPIRATORY: No cough, wheezing, or shortness of breath  CARDIOVASCULAR: No chest pain, palpitations  GASTROINTESTINAL: No abd pain, nausea, vomiting, or diarrhea  GENITOURINARY: No dysuria, frequency, or hematuria  NEUROLOGICAL: no focal weakness or dizziness  MUSCULOSKELETAL: no myalgias     Vital Signs Last 24 Hrs  T(C): 36.3 (24 Nov 2024 05:17), Max: 36.7 (23 Nov 2024 20:31)  T(F): 97.4 (24 Nov 2024 05:17), Max: 98 (23 Nov 2024 20:31)  HR: 64 (24 Nov 2024 05:17) (64 - 76)  BP: 122/63 (24 Nov 2024 05:17) (122/63 - 129/75)  BP(mean): --  RR: 16 (24 Nov 2024 05:17) (16 - 18)  SpO2: 97% (24 Nov 2024 05:17) (96% - 97%)    Parameters below as of 24 Nov 2024 09:01  Patient On (Oxygen Delivery Method): room air        PHYSICAL EXAM:  GENERAL: NAD  HEENT: anicteric, moist mucous membranes  CHEST/LUNG:  CTA b/l, no rales, wheezes, or rhonchi  HEART:  RRR, S1, S2  ABDOMEN:  soft, nontender, nondistended  EXTREMITIES: +RUE edema, areas of ecchymosis of b/l UE, no calf tenderness  NERVOUS SYSTEM: answers questions and follows commands appropriately    LABS:                        11.5   17.42 )-----------( 372      ( 24 Nov 2024 07:20 )             36.2     CBC Full  -  ( 24 Nov 2024 07:20 )  WBC Count : 17.42 K/uL  Hemoglobin : 11.5 g/dL  Hematocrit : 36.2 %  Platelet Count - Automated : 372 K/uL  Mean Cell Volume : 96.5 fl  Mean Cell Hemoglobin : 30.7 pg  Mean Cell Hemoglobin Concentration : 31.8 g/dL  Auto Neutrophil # : 12.37 K/uL  Auto Lymphocyte # : 2.26 K/uL  Auto Monocyte # : 2.09 K/uL  Auto Eosinophil # : 0.17 K/uL  Auto Basophil # : 0.00 K/uL  Auto Neutrophil % : 69.0 %  Auto Lymphocyte % : 13.0 %  Auto Monocyte % : 12.0 %  Auto Eosinophil % : 1.0 %  Auto Basophil % : 0.0 %    24 Nov 2024 07:20    141    |  112    |  25     ----------------------------<  110    3.9     |  21     |  0.46     Ca    8.7        24 Nov 2024 07:20    TPro  5.2    /  Alb  1.8    /  TBili  0.1    /  DBili  x      /  AST  51     /  ALT  53     /  AlkPhos  77     24 Nov 2024 07:20      Urinalysis Basic - ( 24 Nov 2024 07:20 )    Color: x / Appearance: x / SG: x / pH: x  Gluc: 110 mg/dL / Ketone: x  / Bili: x / Urobili: x   Blood: x / Protein: x / Nitrite: x   Leuk Esterase: x / RBC: x / WBC x   Sq Epi: x / Non Sq Epi: x / Bacteria: x      CAPILLARY BLOOD GLUCOSE            Culture - Acid Fast - Sputum w/Smear (collected 11-22-24 @ 11:20)  Source: .Sputum Sputum  Preliminary Report (11-23-24 @ 23:06):    Culture is being performed.    Culture - Acid Fast - Sputum w/Smear (collected 11-21-24 @ 12:00)  Source: .Sputum Sputum  Preliminary Report (11-23-24 @ 23:06):    Culture is being performed.    Culture - Sputum (collected 11-21-24 @ 12:00)  Source: .Sputum Sputum  Gram Stain (11-21-24 @ 22:15):    Few Squamous epithelial cells per low power field    No polymorphonuclear leukocytes per low power field    Few Gram Negative Rods per oil power field    Few Gram Positive Rods per oil power field    Rare Gram positive cocci in pairs per oil power field  Final Report (11-23-24 @ 12:25):    Commensal violet consistent with body site    Culture - Acid Fast - Sputum w/Smear (collected 11-21-24 @ 12:00)  Source: .Sputum Sputum  Preliminary Report (11-23-24 @ 23:06):    Culture is being performed.    Culture - Urine (collected 11-20-24 @ 14:30)  Source: .Urine Nephrostomy - Right  Final Report (11-23-24 @ 11:09):    50,000 - 99,000 CFU/mL Pseudomonas aeruginosa (Carbapenem Resistant)    50,000 - 99,000 CFU/mL Proteus mirabilis ESBL    50,000 - 99,000 CFU/mL Klebsiella pneumoniae  Organism: Pseudomonas aeruginosa (Carbapenem Resistant)  Proteus mirabilis ESBL  Klebsiella pneumoniae (11-23-24 @ 11:09)  Organism: Klebsiella pneumoniae (11-23-24 @ 11:09)      Method Type: MONI      -  Amoxicillin/Clavulanic Acid: S <=8/4      -  Ampicillin: R 16 These ampicillin results predict results for amoxicillin      -  Ampicillin/Sulbactam: S <=4/2      -  Aztreonam: S <=4      -  Cefazolin: S <=2 For uncomplicated UTI with K. pneumoniae, E. coli, or P. mirablis: MONI <=16 is sensitive and MONI >=32 is resistant. This also predicts results for oral agents cefaclor, cefdinir, cefpodoxime, cefprozil, cefuroxime axetil, cephalexin and locarbef for uncomplicated UTI. Note that some isolates may be susceptible to these agents while testing resistant to cefazolin.      -  Cefepime: S <=2      -  Cefoxitin: S <=8      -  Ceftriaxone: S <=1      -  Cefuroxime: S <=4      -  Ciprofloxacin: S <=0.25      -  Ertapenem: S <=0.5      -  Gentamicin: S <=2      -  Imipenem: S <=1      -  Levofloxacin: S <=0.5      -  Meropenem: S <=1      -  Nitrofurantoin: S <=32 Should not be used to treat pyelonephritis      -  Piperacillin/Tazobactam: S <=8      -  Tobramycin: S <=2      -  Trimethoprim/Sulfamethoxazole: S <=0.5/9.5  Organism: Proteus mirabilis ESBL (11-23-24 @ 11:09)      Method Type: MONI      -  Ampicillin: R >16 These ampicillin results predict results for amoxicillin      -  Ampicillin/Sulbactam: S <=4/2      -  Aztreonam: R <=4      -  Cefazolin: R >16 For uncomplicated UTI with K. pneumoniae, E. coli, or P. mirablis: MONI <=16 is sensitive and MONI >=32 is resistant. This also predicts results for oral agents cefaclor, cefdinir, cefpodoxime, cefprozil, cefuroxime axetil, cephalexin and locarbef for uncomplicated UTI. Note that some isolates may be susceptible to these agents while testing resistant to cefazolin.      -  Cefepime: R <=2      -  Ceftriaxone: R >32      -  Cefuroxime: R >16      -  Ciprofloxacin: R 2      -  Ertapenem: S <=0.5      -  Gentamicin: S <=2      -  Levofloxacin: I 1      -  Meropenem: S <=1      -  Nitrofurantoin: R >64 Should not be used to treat pyelonephritis      -  Piperacillin/Tazobactam: S <=8      -  Tobramycin: S <=2      -  Trimethoprim/Sulfamethoxazole: S <=0.5/9.5  Organism: Pseudomonas aeruginosa (Carbapenem Resistant) (11-23-24 @ 11:09)      Method Type: CarbaR      -  Resistance Gene to Carbapenem: Nondet  Organism: Pseudomonas aeruginosa (Carbapenem Resistant) (11-23-24 @ 11:09)      Method Type: MONI      -  Amikacin: S <=16      -  Aztreonam: R >16      -  Cefepime: R >16      -  Ceftazidime: R >16      -  Ceftazidime/Avibactam: S <=4      -  Ceftolozane/tazobactam: S <=2      -  Ciprofloxacin: S 0.5      -  Imipenem: R >8      -  Levofloxacin: I 2      -  Meropenem: R >8      -  Piperacillin/Tazobactam: R >64    Culture - Urine (collected 11-20-24 @ 01:25)  Source: .Urine  Final Report (11-22-24 @ 19:36):    >100,000 CFU/ml Klebsiella pneumoniae    >100,000 CFU/ml Pseudomonas aeruginosa (Carbapenem Resistant)  Organism: Klebsiella pneumoniae  Pseudomonas aeruginosa (Carbapenem Resistant) (11-22-24 @ 19:36)  Organism: Pseudomonas aeruginosa (Carbapenem Resistant) (11-22-24 @ 19:36)      Method Type: CarbaR      -  Resistance Gene to Carbapenem: Nondet  Organism: Pseudomonas aeruginosa (Carbapenem Resistant) (11-22-24 @ 19:36)      Method Type: MONI      -  Amikacin: S <=16      -  Aztreonam: R >16      -  Cefepime: R >16      -  Ceftazidime: R >16      -  Ceftazidime/Avibactam: S <=4      -  Ceftolozane/tazobactam: S <=2      -  Ciprofloxacin: S 0.5      -  Imipenem: R >8      -  Levofloxacin: I 2      -  Meropenem: R >8      -  Piperacillin/Tazobactam: R >64  Organism: Klebsiella pneumoniae (11-22-24 @ 19:36)      Method Type: MONI      -  Amoxicillin/Clavulanic Acid: S <=8/4      -  Ampicillin: R >16 These ampicillin results predict results for amoxicillin      -  Ampicillin/Sulbactam: S <=4/2      -  Aztreonam: S <=4      -  Cefazolin: S <=2 For uncomplicated UTI with K. pneumoniae, E. coli, or P. mirablis: MONI <=16 is sensitive and MONI >=32 is resistant. This also predicts results for oral agents cefaclor, cefdinir, cefpodoxime, cefprozil, cefuroxime axetil, cephalexin and locarbef for uncomplicated UTI. Note that some isolates may be susceptible to these agents while testing resistant to cefazolin.      -  Cefepime: S <=2      -  Cefoxitin: S <=8      -  Ceftriaxone: S <=1      -  Cefuroxime: S <=4      -  Ciprofloxacin: S <=0.25      -  Ertapenem: S <=0.5      -  Gentamicin: S <=2      -  Imipenem: S <=1      -  Levofloxacin: S <=0.5      -  Meropenem: S <=1      -  Nitrofurantoin: S <=32 Should not be used to treat pyelonephritis      -  Piperacillin/Tazobactam: S <=8      -  Tobramycin: S <=2      -  Trimethoprim/Sulfamethoxazole: S <=0.5/9.5    Urinalysis with Rflx Culture (collected 11-20-24 @ 01:25)    Culture - Blood (collected 11-19-24 @ 22:25)  Source: .Blood BLOOD  Preliminary Report (11-24-24 @ 07:00):    No growth at 4 days    Culture - Blood (collected 11-19-24 @ 22:20)  Source: .Blood BLOOD  Preliminary Report (11-24-24 @ 07:00):    No growth at 4 days        RADIOLOGY & ADDITIONAL TESTS:    Personally reviewed.     Consultant(s) Notes Reviewed:  [x] YES  [ ] NO

## 2024-11-24 NOTE — PROGRESS NOTE ADULT - PROBLEM SELECTOR PLAN 11
Continue home Eliquis 5mg BID    Contacted pt's son Olvin. All questions answered and updates provided.

## 2024-11-24 NOTE — PROGRESS NOTE ADULT - SUBJECTIVE AND OBJECTIVE BOX
[facility] -- Janel Jackson Pannella, Patel, Savella Goodger, Cohen  Office # [phone]      Follow Up:  hx htn chf pe     Subjective/Observations:   No events overnight resting comfortably in bed.  No complaints of chest pain, dyspnea, or palpitations reported. No signs of orthopnea or PND.      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 for Club Foot Right foot      Pilonidal cyst  Surgery 40 years ago      H/O colonoscopy      Spinal stenosis      H/O prostate biopsy          MEDICATIONS  (STANDING):  apixaban 5 milliGRAM(s) Oral every 12 hours  ceftolozane/tazobactam IVPB 1500 milliGRAM(s) IV Intermittent every 8 hours  chlorhexidine 2% Cloths 1 Application(s) Topical <User Schedule>  finasteride 5 milliGRAM(s) Oral daily  hydrocortisone sodium succinate Injectable 50 milliGRAM(s) IV Push every 12 hours  pantoprazole    Tablet 40 milliGRAM(s) Oral before breakfast  risperiDONE   Tablet 0.25 milliGRAM(s) Oral two times a day  tamsulosin 0.4 milliGRAM(s) Oral at bedtime    MEDICATIONS  (PRN):  acetaminophen     Tablet .. 650 milliGRAM(s) Oral every 6 hours PRN Temp greater or equal to 38C (100.4F), Mild Pain (1 - 3)  ondansetron Injectable 4 milliGRAM(s) IV Push every 8 hours PRN Nausea and/or Vomiting  sodium chloride 3%  Inhalation 4 milliLiter(s) Inhalation every 8 hours PRN sputum induction      Allergies    levofloxacin (Unknown)  ofloxacin (Unknown)  gatifloxacin (Unknown)    Intolerances    fluoroquinolone antibiotics (Other)  Ketek (Other)  Avelox (Other)  telithromycin (Other)      Vital Signs Last 24 Hrs  T(C): 36.3 (2024 05:17), Max: 36.7 (2024 20:31)  T(F): 97.4 (2024 05:17), Max: 98 (2024 20:31)  HR: 64 (2024 05:17) (64 - 76)  BP: 122/63 (2024 05:17) (122/63 - 129/75)  BP(mean): --  RR: 16 (2024 05:17) (16 - 18)  SpO2: 97% (2024 05:17) (96% - 97%)    Parameters below as of 2024 09:01  Patient On (Oxygen Delivery Method): room air        I&O's Summary    2024 07:01  -  2024 07:00  --------------------------------------------------------  IN: 200 mL / OUT: 1000 mL / NET: -800 mL          PHYSICAL EXAM:  TELE: Sb 52 4 beats VT   Constitutional: NAD, awake and alert, well-developed  HEENT: Moist Mucous Membranes, Anicteric  Pulmonary: Non-labored, breath sounds are clear bilaterally, No wheezing, crackles or rhonchi  Cardiovascular: Regular, S1 and S2 nl, No murmurs, rubs, gallops or clicks  Gastrointestinal: Bowel Sounds present, soft, nontender.   Lymph: No lymphadenopathy. No peripheral edema.  Skin: No visible rashes or ulcers.  Psych:  Mood & affect appropriate    LABS: All Labs Reviewed:                        11.5   17.42 )-----------( 372      ( 2024 07:20 )             36.2                         12.3   11.56 )-----------( 406      ( 2024 07:40 )             37.4                         12.9   15.29 )-----------( 423      ( 2024 06:07 )             38.7     2024 07:20    141    |  112    |  25     ----------------------------<  110    3.9     |  21     |  0.46   2024 07:40    141    |  112    |  28     ----------------------------<  122    4.0     |  25     |  0.58   2024 06:07    141    |  111    |  25     ----------------------------<  135    3.6     |  25     |  0.74     Ca    8.7        2024 07:20  Ca    8.8        2024 07:40  Ca    9.1        2024 06:07  Phos  2.3       2024 07:40  Phos  3.0       2024 06:07  Mg     1.9       2024 07:40  Mg     2.1       2024 06:07    TPro  x      /  Alb  1.8    /  TBili  x      /  DBili  x      /  AST  51     /  ALT  53     /  AlkPhos  x      2024 07:20  TPro  5.6    /  Alb  2.0    /  TBili  0.3    /  DBili  x      /  AST  32     /  ALT  30     /  AlkPhos  59     2024 07:40  TPro  6.2    /  Alb  2.1    /  TBili  0.2    /  DBili  x      /  AST  16     /  ALT  23     /  AlkPhos  68     2024 06:07             EC Lead ECG:   Ventricular Rate 62 BPM    Atrial Rate 62 BPM    P-R Interval 154 ms    QRS Duration 114 ms    Q-T Interval 488 ms    QTC Calculation(Bazett) 495 ms    P Axis 44 degrees    R Axis -53 degrees    T Axis -29 degrees    Diagnosis Line Sinus rhythm with sinus arrhythmia with occasional premature ventricular complexes  Left anterior fascicular block  Cannot rule out Anterior infarct (cited on or before 2024)    Confirmed by SANDY PAUL ([id number]) on 2024 9:15:32 AM (24 @ 03:52)      TRANSTHORACIC ECHOCARDIOGRAM REPORT  ________________________________________________________________________________                                      _______       Pt. Name:       DEION JAYCOB IVANA Study Date:    2024  MRN:            [id number]       YOB: 1945  Accession #:    [id number]      Age:           79 years  Account#:       [id number]     Gender:        M  Heart Rate:                    Height:        65.75 in (167.00 cm)  Rhythm:                        Weight:   199.00 lb (90.27 kg)  Blood Pressure: 103/58 mmHg    BSA/BMI:       1.99 m² / 32.37 kg/m²  ________________________________________________________________________________________  Referring Physician:    [id number] Curly Byrnes  Interpreting Physician: Lyndsay Marin MD  Primary Sonographer:    Ashli Arana [facility]    CPT:                ECHO TTE WO CON COMP W DOPP - 98744.m  Indication(s):      Abnormal electrocardiogram ECG/EKG - R94.31  Procedure:          Transthoracic echocardiogram with 2-D, M-mode and complete                      spectral and color flow Doppler.  Ordering Location:  ICU1  Admission Status:   Inpatient  Contrast Injection: Verbal consent was obtained for injection of Ultrasonic                      Enhancing Agent following a discussion of risks and                      benefits.                      Endocardial visualization enhanced with Definity Ultrasound                      enhancing agent.  Agitated Saline:    Injection with agitated saline was performed toevaluate for                      intracardiac shunting.  Study Information:  Image quality for this study is technically difficult.    _______________________________________________________________________________________     CONCLUSIONS:      1. Technically difficult image quality.   2. Agitated saline injection was negative for intracardiac shunt (though there is poor visualization of the LV during injection of agitated saline)   3. Despite definity use, the LV endocardium is still not well visualized.   4. In certain views, the LVEF appears grossly preserved though the apex appears hypokinetic.    ________________________________________________________________________________________  FINDINGS:     Interatrial Septum:  Agitated saline injection was negative for intracardiac shunt.  ________________________________________________________________________________________  Electronically signed on 2024 at 2:37:09 PM by Lyndsay Marin MD      *** Final ***      Radiology:

## 2024-11-24 NOTE — PROGRESS NOTE ADULT - ASSESSMENT
78 yo M with PMHx HTN, CHF, PE (on eliquis), Myasthenia Gravis, and chronic LE edema, nephrolithiasis (s/p nephrostomy tube placement ), urosepsis, BPH BIBEMS from [facility] for hypertension and elevated WBC count. Cardiology was consulted for hypotension.    Admitted with Acute Metabolic Encephalopathy,  Urosepsis 2/2 UTI, Kleb, Pseudomonas Carb Resistent Septic shock, resolved,  Rhinovirus    -Downgraded from ICU now on floor   - hypotension resolved now that his urosepsis has been treated  -bp this am 122/63 - Off pressors and midodrine    - Past echo from 09/2024 showed LVEF 55-60%, no diastolic dysfunction, moderate MR  - Repeat TTE with severe LV dysfunction.  Looks to be stress mediated. Even on repeat study on 11/20, the LV function seems somewhat improved.  - No evidence of significant volume overload  - To start GDMT when able (when bp can tolerate) and repeat echo in about one month. He is on toprol xl at home.  - No clear evidence of acute ischemia, patient denies cardiac symptoms.    - Ruling out TB secondary to CT chest  findings  - cont eliquis for pe history    - Monitor and replete lytes, keep K>4, Mg>2.  - will follow with you

## 2024-11-24 NOTE — PROVIDER CONTACT NOTE (CRITICAL VALUE NOTIFICATION) - ACTION/TREATMENT ORDERED:
MD made aware. As per MD, infectious disease doctor is following up and pt. is on antibiotic already.
MD Sydnie west

## 2024-11-24 NOTE — PROGRESS NOTE ADULT - SUBJECTIVE AND OBJECTIVE BOX
Neurology follow up note    DEION [id number]      Interval History:    Patient feels ok no new complaints.    Allergies    levofloxacin (Unknown)  ofloxacin (Unknown)  gatifloxacin (Unknown)    Intolerances    fluoroquinolone antibiotics (Other)  Ketek (Other)  Avelox (Other)  telithromycin (Other)      MEDICATIONS    acetaminophen     Tablet .. 650 milliGRAM(s) Oral every 6 hours PRN  apixaban 5 milliGRAM(s) Oral every 12 hours  ceftolozane/tazobactam IVPB 1500 milliGRAM(s) IV Intermittent every 8 hours  chlorhexidine 2% Cloths 1 Application(s) Topical <User Schedule>  finasteride 5 milliGRAM(s) Oral daily  hydrocortisone sodium succinate Injectable 50 milliGRAM(s) IV Push every 12 hours  ondansetron Injectable 4 milliGRAM(s) IV Push every 8 hours PRN  pantoprazole    Tablet 40 milliGRAM(s) Oral before breakfast  risperiDONE   Tablet 0.25 milliGRAM(s) Oral two times a day  sodium chloride 3%  Inhalation 4 milliLiter(s) Inhalation every 8 hours PRN  tamsulosin 0.4 milliGRAM(s) Oral at bedtime              Vital Signs Last 24 Hrs  T(C): 36.3 (24 Nov 2024 05:17), Max: 36.7 (23 Nov 2024 20:31)  T(F): 97.4 (24 Nov 2024 05:17), Max: 98 (23 Nov 2024 20:31)  HR: 64 (24 Nov 2024 05:17) (64 - 76)  BP: 122/63 (24 Nov 2024 05:17) (122/63 - 129/75)  BP(mean): --  RR: 16 (24 Nov 2024 05:17) (16 - 18)  SpO2: 97% (24 Nov 2024 05:17) (96% - 97%)    Parameters below as of 24 Nov 2024 05:17  Patient On (Oxygen Delivery Method): room air        REVIEW OF SYSTEMS:  CONSTITUTIONAL:  The patient denies fever, chills, night sweats.  HEAD:  No headache.  EYES:  No double vision or blurry vision.  EARS:  No ringing in the ears.  NECK:  No neck pain.  CARDIOVASCULAR:  No chest pain.  RESPIRATORY:  No shortness of breath.  ABDOMEN:  No nausea, vomiting, or abdominal pain.  EXTREMITIES/NEUROLOGICAL:  Does complain of numbness and burning sensation in his legs.  MUSCULOSKELETAL:  Occasional joint pain.  GENERAL:  Does feels weak all over, but no droopy eyes.    PHYSICAL EXAMINATION:  HEAD:  Normocephalic, atraumatic.  EYES:  No scleral icterus.  EARS:  Hearing bilaterally intact.  NECK:  Supple.  CARDIOVASCULAR:  S1 and S2 heard.  RESPIRATORY:  Air entry bilaterally.  ABDOMEN:  Soft, nontender.  EXTREMITIES:  No clubbing or cyanosis were noted.    NEUROLOGIC:  The patient awake, alert, location was hospital, year 2002, month was October, was able to name simple objects.  Recall was 0/3, probably 1 of 3, but does suffer from memory issues.  The patient was able to look up for over 1 minute with no ptosis.  Extraocular movements were intact.  Speech was fluent.  Smile symmetric.  Motor, the patient decreased range of motion of bilateral shoulders, suspect secondary to rotator cuff issues.  When elevated, was able to maintain in the air with slow drops bilaterally.  It is proximally 3+/5, distally was 4/5.  Bilateral lower extremities, slight movement at the feet and knees was 1/5.  Sensory, lower extremities not tested.  The patient said his legs are very sensitive.                LABS:  CBC Full  -  ( 23 Nov 2024 07:40 )  WBC Count : 11.56 K/uL  RBC Count : 3.94 M/uL  Hemoglobin : 12.3 g/dL  Hematocrit : 37.4 %  Platelet Count - Automated : 406 K/uL  Mean Cell Volume : 94.9 fl  Mean Cell Hemoglobin : 31.2 pg  Mean Cell Hemoglobin Concentration : 32.9 g/dL  Auto Neutrophil # : 9.46 K/uL  Auto Lymphocyte # : 0.92 K/uL  Auto Monocyte # : 0.90 K/uL  Auto Eosinophil # : 0.03 K/uL  Auto Basophil # : 0.03 K/uL  Auto Neutrophil % : 81.7 %  Auto Lymphocyte % : 8.0 %  Auto Monocyte % : 7.8 %  Auto Eosinophil % : 0.3 %  Auto Basophil % : 0.3 %    Urinalysis Basic - ( 23 Nov 2024 07:40 )    Color: x / Appearance: x / SG: x / pH: x  Gluc: 122 mg/dL / Ketone: x  / Bili: x / Urobili: x   Blood: x / Protein: x / Nitrite: x   Leuk Esterase: x / RBC: x / WBC x   Sq Epi: x / Non Sq Epi: x / Bacteria: x      11-23    141  |  112[H]  |  28[H]  ----------------------------<  122[H]  4.0   |  25  |  0.58    Ca    8.8      23 Nov 2024 07:40  Phos  2.3     11-23  Mg     1.9     11-23    TPro  5.6[L]  /  Alb  2.0[L]  /  TBili  0.3  /  DBili  x   /  AST  32  /  ALT  30  /  AlkPhos  59  11-23    Hemoglobin A1C:     LIVER FUNCTIONS - ( 23 Nov 2024 07:40 )  Alb: 2.0 g/dL / Pro: 5.6 g/dL / ALK PHOS: 59 U/L / ALT: 30 U/L / AST: 32 U/L / GGT: x           Vitamin B12         RADIOLOGY  ANALYSIS AND PLAN:  This is an 79-year-old male with altered mental status and history of myasthenia gravis.    .Altered mental status, most likely metabolic encephalopathy secondary to underlying infectious type process.  .Infection workup as needed.  Antibiotics as needed.  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  Questionable if any muscle weakness as well.  The patient appears to be at his baseline from my previous notes.  The patient had refused any type of treatment in the past.   For now, we would recommend continue the patient on his prednisone.  For history of infection with myasthenia gravis.  If possible, would recommend to limit the use of aminoglycosides, fluoroquinolones, macrolides, cardiovascular drugs such as beta blockers, procainamide, other medication such as statins.  Monitor the patient's magnesium levels.  If antibiotics are used, always risks versus benefits must be weighed for the specific antibiotics.  .For history of hypertension, monitor systolic blood pressure.  follow up ACH antibodies   .Attempted to contact son, Olvin, at [phone] yesterday     46  minutes of time was spent with patient plan of care, reviewing data, speaking to multidisciplinary healthcare team with greater than 50% of time counseling care and coordination.    Thank you for courtesy of consultation.  PATIENT HAS NOT YET BEEN SEEN AND EXAMINED TODAY. NOTE AND CHART REVIEWED IN AM AND EXAM FORM PREVIOUS.  ONCE PATIENT SEEN, CHART WILL BE UPDATE AT PRESENT NOTE IS INCOMPLETE     Neurology follow up note    DEION [id number]      Interval History:    Patient feels ok no new complaints.    Allergies    levofloxacin (Unknown)  ofloxacin (Unknown)  gatifloxacin (Unknown)    Intolerances    fluoroquinolone antibiotics (Other)  Ketek (Other)  Avelox (Other)  telithromycin (Other)      MEDICATIONS    acetaminophen     Tablet .. 650 milliGRAM(s) Oral every 6 hours PRN  apixaban 5 milliGRAM(s) Oral every 12 hours  ceftolozane/tazobactam IVPB 1500 milliGRAM(s) IV Intermittent every 8 hours  chlorhexidine 2% Cloths 1 Application(s) Topical <User Schedule>  finasteride 5 milliGRAM(s) Oral daily  hydrocortisone sodium succinate Injectable 50 milliGRAM(s) IV Push every 12 hours  ondansetron Injectable 4 milliGRAM(s) IV Push every 8 hours PRN  pantoprazole    Tablet 40 milliGRAM(s) Oral before breakfast  risperiDONE   Tablet 0.25 milliGRAM(s) Oral two times a day  sodium chloride 3%  Inhalation 4 milliLiter(s) Inhalation every 8 hours PRN  tamsulosin 0.4 milliGRAM(s) Oral at bedtime              Vital Signs Last 24 Hrs  T(C): 36.3 (24 Nov 2024 05:17), Max: 36.7 (23 Nov 2024 20:31)  T(F): 97.4 (24 Nov 2024 05:17), Max: 98 (23 Nov 2024 20:31)  HR: 64 (24 Nov 2024 05:17) (64 - 76)  BP: 122/63 (24 Nov 2024 05:17) (122/63 - 129/75)  BP(mean): --  RR: 16 (24 Nov 2024 05:17) (16 - 18)  SpO2: 97% (24 Nov 2024 05:17) (96% - 97%)    Parameters below as of 24 Nov 2024 05:17  Patient On (Oxygen Delivery Method): room air        REVIEW OF SYSTEMS:  CONSTITUTIONAL:  The patient denies fever, chills, night sweats.  HEAD:  No headache.  EYES:  No double vision or blurry vision.  EARS:  No ringing in the ears.  NECK:  No neck pain.  CARDIOVASCULAR:  No chest pain.  RESPIRATORY:  No shortness of breath.  ABDOMEN:  No nausea, vomiting, or abdominal pain.  EXTREMITIES/NEUROLOGICAL:  Does complain of numbness and burning sensation in his legs.  MUSCULOSKELETAL:  Occasional joint pain.  GENERAL:  Does feels weak all over, but no droopy eyes.    PHYSICAL EXAMINATION:  HEAD:  Normocephalic, atraumatic.  EYES:  No scleral icterus.  EARS:  Hearing bilaterally intact.  NECK:  Supple.  CARDIOVASCULAR:  S1 and S2 heard.  RESPIRATORY:  Air entry bilaterally.  ABDOMEN:  Soft, nontender.  EXTREMITIES:  No clubbing or cyanosis were noted.    NEUROLOGIC:  The patient awake, alert, location was hospital, year 2002, month was October, was able to name simple objects.  Recall was 0/3, probably 1 of 3, but does suffer from memory issues.  The patient was able to look up for over 1 minute with no ptosis.  Extraocular movements were intact.  Speech was fluent.  Smile symmetric.  Motor, the patient decreased range of motion of bilateral shoulders, suspect secondary to rotator cuff issues.  When elevated, was able to maintain in the air with slow drops bilaterally.  It is proximally 3+/5, distally was 4/5.  Bilateral lower extremities, slight movement at the feet and knees was 1/5.  Sensory, lower extremities not tested.  The patient said his legs are very sensitive.                LABS:  CBC Full  -  ( 23 Nov 2024 07:40 )  WBC Count : 11.56 K/uL  RBC Count : 3.94 M/uL  Hemoglobin : 12.3 g/dL  Hematocrit : 37.4 %  Platelet Count - Automated : 406 K/uL  Mean Cell Volume : 94.9 fl  Mean Cell Hemoglobin : 31.2 pg  Mean Cell Hemoglobin Concentration : 32.9 g/dL  Auto Neutrophil # : 9.46 K/uL  Auto Lymphocyte # : 0.92 K/uL  Auto Monocyte # : 0.90 K/uL  Auto Eosinophil # : 0.03 K/uL  Auto Basophil # : 0.03 K/uL  Auto Neutrophil % : 81.7 %  Auto Lymphocyte % : 8.0 %  Auto Monocyte % : 7.8 %  Auto Eosinophil % : 0.3 %  Auto Basophil % : 0.3 %    Urinalysis Basic - ( 23 Nov 2024 07:40 )    Color: x / Appearance: x / SG: x / pH: x  Gluc: 122 mg/dL / Ketone: x  / Bili: x / Urobili: x   Blood: x / Protein: x / Nitrite: x   Leuk Esterase: x / RBC: x / WBC x   Sq Epi: x / Non Sq Epi: x / Bacteria: x      11-23    141  |  112[H]  |  28[H]  ----------------------------<  122[H]  4.0   |  25  |  0.58    Ca    8.8      23 Nov 2024 07:40  Phos  2.3     11-23  Mg     1.9     11-23    TPro  5.6[L]  /  Alb  2.0[L]  /  TBili  0.3  /  DBili  x   /  AST  32  /  ALT  30  /  AlkPhos  59  11-23    Hemoglobin A1C:     LIVER FUNCTIONS - ( 23 Nov 2024 07:40 )  Alb: 2.0 g/dL / Pro: 5.6 g/dL / ALK PHOS: 59 U/L / ALT: 30 U/L / AST: 32 U/L / GGT: x           Vitamin B12         RADIOLOGY  ANALYSIS AND PLAN:  This is an 79-year-old male with altered mental status and history of myasthenia gravis.    .Altered mental status, most likely metabolic encephalopathy secondary to underlying infectious type process.  .Infection workup as needed.  Antibiotics as needed.  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  Questionable if any muscle weakness as well.  The patient appears to be at his baseline from my previous notes.  The patient had refused any type of treatment in the past.   For now, we would recommend continue the patient on his prednisone.  For history of infection with myasthenia gravis.  If possible, would recommend to limit the use of aminoglycosides, fluoroquinolones, macrolides, cardiovascular drugs such as beta blockers, procainamide, other medication such as statins.  Monitor the patient's magnesium levels.  If antibiotics are used, always risks versus benefits must be weighed for the specific antibiotics.  .For history of hypertension, monitor systolic blood pressure.  follow up ACH antibodies   .Attempted to contact son, Olvin, at [phone] yesterday     46  minutes of time was spent with patient plan of care, reviewing data, speaking to multidisciplinary healthcare team with greater than 50% of time counseling care and coordination.    Thank you for courtesy of consultation.

## 2024-11-25 LAB
ACRM BINDING ANTIBODY: <0.03 NMOL/L — SIGNIFICANT CHANGE UP (ref 0–0.24)
ALBUMIN SERPL ELPH-MCNC: 2.1 G/DL — LOW (ref 3.3–5)
ALP SERPL-CCNC: 73 U/L — SIGNIFICANT CHANGE UP (ref 40–120)
ALT FLD-CCNC: 53 U/L — SIGNIFICANT CHANGE UP (ref 12–78)
ANION GAP SERPL CALC-SCNC: 6 MMOL/L — SIGNIFICANT CHANGE UP (ref 5–17)
AST SERPL-CCNC: 31 U/L — SIGNIFICANT CHANGE UP (ref 15–37)
BASOPHILS # BLD AUTO: 0.05 K/UL — SIGNIFICANT CHANGE UP (ref 0–0.2)
BASOPHILS NFR BLD AUTO: 0.2 % — SIGNIFICANT CHANGE UP (ref 0–2)
BILIRUB SERPL-MCNC: <0.1 MG/DL — LOW (ref 0.2–1.2)
BUN SERPL-MCNC: 26 MG/DL — HIGH (ref 7–23)
CALCIUM SERPL-MCNC: 9.1 MG/DL — SIGNIFICANT CHANGE UP (ref 8.5–10.1)
CHLORIDE SERPL-SCNC: 110 MMOL/L — HIGH (ref 96–108)
CO2 SERPL-SCNC: 27 MMOL/L — SIGNIFICANT CHANGE UP (ref 22–31)
CREAT SERPL-MCNC: 0.53 MG/DL — SIGNIFICANT CHANGE UP (ref 0.5–1.3)
CULTURE RESULTS: SIGNIFICANT CHANGE UP
CULTURE RESULTS: SIGNIFICANT CHANGE UP
EGFR: 101 ML/MIN/1.73M2 — SIGNIFICANT CHANGE UP
EOSINOPHIL # BLD AUTO: 0.02 K/UL — SIGNIFICANT CHANGE UP (ref 0–0.5)
EOSINOPHIL NFR BLD AUTO: 0.1 % — SIGNIFICANT CHANGE UP (ref 0–6)
GAMMA INTERFERON BACKGROUND BLD IA-ACNC: 0.03 IU/ML — SIGNIFICANT CHANGE UP
GLUCOSE SERPL-MCNC: 122 MG/DL — HIGH (ref 70–99)
HCT VFR BLD CALC: 37.8 % — LOW (ref 39–50)
HGB BLD-MCNC: 12.2 G/DL — LOW (ref 13–17)
IMM GRANULOCYTES NFR BLD AUTO: 2.6 % — HIGH (ref 0–0.9)
LYMPHOCYTES # BLD AUTO: 1.07 K/UL — SIGNIFICANT CHANGE UP (ref 1–3.3)
LYMPHOCYTES # BLD AUTO: 5.2 % — LOW (ref 13–44)
M TB IFN-G BLD-IMP: ABNORMAL
M TB IFN-G CD4+ BCKGRND COR BLD-ACNC: 0 IU/ML — SIGNIFICANT CHANGE UP
M TB IFN-G CD4+CD8+ BCKGRND COR BLD-ACNC: 0 IU/ML — SIGNIFICANT CHANGE UP
MCHC RBC-ENTMCNC: 31 PG — SIGNIFICANT CHANGE UP (ref 27–34)
MCHC RBC-ENTMCNC: 32.3 G/DL — SIGNIFICANT CHANGE UP (ref 32–36)
MCV RBC AUTO: 96.2 FL — SIGNIFICANT CHANGE UP (ref 80–100)
MONOCYTES # BLD AUTO: 1.27 K/UL — HIGH (ref 0–0.9)
MONOCYTES NFR BLD AUTO: 6.1 % — SIGNIFICANT CHANGE UP (ref 2–14)
NEUTROPHILS # BLD AUTO: 17.75 K/UL — HIGH (ref 1.8–7.4)
NEUTROPHILS NFR BLD AUTO: 85.8 % — HIGH (ref 43–77)
NRBC # BLD: 0 /100 WBCS — SIGNIFICANT CHANGE UP (ref 0–0)
PLATELET # BLD AUTO: 428 K/UL — HIGH (ref 150–400)
POTASSIUM SERPL-MCNC: 3.7 MMOL/L — SIGNIFICANT CHANGE UP (ref 3.5–5.3)
POTASSIUM SERPL-SCNC: 3.7 MMOL/L — SIGNIFICANT CHANGE UP (ref 3.5–5.3)
PROT SERPL-MCNC: 5.9 G/DL — LOW (ref 6–8.3)
QUANT TB PLUS MITOGEN MINUS NIL: 0.25 IU/ML — SIGNIFICANT CHANGE UP
RBC # BLD: 3.93 M/UL — LOW (ref 4.2–5.8)
RBC # FLD: 17.5 % — HIGH (ref 10.3–14.5)
SODIUM SERPL-SCNC: 143 MMOL/L — SIGNIFICANT CHANGE UP (ref 135–145)
SPECIMEN SOURCE: SIGNIFICANT CHANGE UP
SPECIMEN SOURCE: SIGNIFICANT CHANGE UP
WBC # BLD: 20.7 K/UL — HIGH (ref 3.8–10.5)
WBC # FLD AUTO: 20.7 K/UL — HIGH (ref 3.8–10.5)

## 2024-11-25 PROCEDURE — 99233 SBSQ HOSP IP/OBS HIGH 50: CPT | Mod: GC

## 2024-11-25 PROCEDURE — 99232 SBSQ HOSP IP/OBS MODERATE 35: CPT

## 2024-11-25 RX ORDER — HYDROCORTISONE ACETATE 25 MG/ML
50 VIAL (ML) INJECTION EVERY 24 HOURS
Refills: 0 | Status: DISCONTINUED | OUTPATIENT
Start: 2024-11-26 | End: 2024-11-26

## 2024-11-25 RX ADMIN — PANTOPRAZOLE SODIUM 40 MILLIGRAM(S): 40 TABLET, DELAYED RELEASE ORAL at 06:20

## 2024-11-25 RX ADMIN — CEFTOLOZANE AND TAZOBACTAM 100 MILLIGRAM(S): 1; .5 INJECTION, POWDER, LYOPHILIZED, FOR SOLUTION INTRAVENOUS at 06:20

## 2024-11-25 RX ADMIN — CHLORHEXIDINE GLUCONATE 1 APPLICATION(S): 1.2 RINSE ORAL at 06:20

## 2024-11-25 RX ADMIN — TAMSULOSIN HYDROCHLORIDE 0.4 MILLIGRAM(S): 0.4 CAPSULE ORAL at 21:56

## 2024-11-25 RX ADMIN — RISPERIDONE 0.25 MILLIGRAM(S): 4 TABLET ORAL at 18:03

## 2024-11-25 RX ADMIN — Medication 5 MILLIGRAM(S): at 14:40

## 2024-11-25 RX ADMIN — CEFTOLOZANE AND TAZOBACTAM 100 MILLIGRAM(S): 1; .5 INJECTION, POWDER, LYOPHILIZED, FOR SOLUTION INTRAVENOUS at 14:41

## 2024-11-25 RX ADMIN — APIXABAN 5 MILLIGRAM(S): 2.5 TABLET, FILM COATED ORAL at 18:03

## 2024-11-25 RX ADMIN — APIXABAN 5 MILLIGRAM(S): 2.5 TABLET, FILM COATED ORAL at 06:20

## 2024-11-25 RX ADMIN — CEFTOLOZANE AND TAZOBACTAM 100 MILLIGRAM(S): 1; .5 INJECTION, POWDER, LYOPHILIZED, FOR SOLUTION INTRAVENOUS at 22:29

## 2024-11-25 RX ADMIN — Medication 50 MILLIGRAM(S): at 06:19

## 2024-11-25 RX ADMIN — RISPERIDONE 0.25 MILLIGRAM(S): 4 TABLET ORAL at 06:20

## 2024-11-25 NOTE — PROGRESS NOTE ADULT - SUBJECTIVE AND OBJECTIVE BOX
[facility] -- Janel Jackson Pannella, Patel, Savella, Goodger, Cohen: Office # [phone]    Follow Up:  hx htn chf pe     Subjective/Observations: No events overnight resting comfortably in bed.  No complaints of chest pain, dyspnea, or palpitations reported. No signs of orthopnea or PND. 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      Club foot  Surgery at birth for Club Foot Right foot      Pilonidal cyst  Surgery 40 years ago      H/O colonoscopy      Spinal stenosis      H/O prostate biopsy          MEDICATIONS  (STANDING):  apixaban 5 milliGRAM(s) Oral every 12 hours  ceftolozane/tazobactam IVPB 1500 milliGRAM(s) IV Intermittent every 8 hours  chlorhexidine 2% Cloths 1 Application(s) Topical <User Schedule>  finasteride 5 milliGRAM(s) Oral daily  hydrocortisone sodium succinate Injectable 50 milliGRAM(s) IV Push every 12 hours  pantoprazole    Tablet 40 milliGRAM(s) Oral before breakfast  risperiDONE   Tablet 0.25 milliGRAM(s) Oral two times a day  tamsulosin 0.4 milliGRAM(s) Oral at bedtime    MEDICATIONS  (PRN):  acetaminophen     Tablet .. 650 milliGRAM(s) Oral every 6 hours PRN Temp greater or equal to 38C (100.4F), Mild Pain (1 - 3)  ondansetron Injectable 4 milliGRAM(s) IV Push every 8 hours PRN Nausea and/or Vomiting  sodium chloride 3%  Inhalation 4 milliLiter(s) Inhalation every 8 hours PRN sputum induction    Allergies    levofloxacin (Unknown)  ofloxacin (Unknown)  gatifloxacin (Unknown)    Intolerances    fluoroquinolone antibiotics (Other)  Ketek (Other)  Avelox (Other)  telithromycin (Other)    Vital Signs Last 24 Hrs  T(C): 36.5 (25 Nov 2024 04:56), Max: 36.5 (25 Nov 2024 04:56)  T(F): 97.7 (25 Nov 2024 04:56), Max: 97.7 (25 Nov 2024 04:56)  HR: 58 (25 Nov 2024 04:56) (58 - 65)  BP: 133/79 (25 Nov 2024 04:56) (124/76 - 133/79)  BP(mean): --  RR: 16 (25 Nov 2024 04:56) (16 - 17)  SpO2: 96% (25 Nov 2024 04:56) (96% - 97%)    Parameters below as of 25 Nov 2024 04:56  Patient On (Oxygen Delivery Method): room air      I&O's Summary    24 Nov 2024 07:01  -  25 Nov 2024 07:00  --------------------------------------------------------  IN: 200 mL / OUT: 1051 mL / NET: -851 mL          TELE: SB  PHYSICAL EXAM:  Constitutional: NAD, awake and alert  HEENT: Moist Mucous Membranes, Anicteric  Pulmonary: Non-labored, breath sounds are clear bilaterally, No wheezing, rales or rhonchi  Cardiovascular: Regular, S1 and S2, No No rubs, gallops or clicks +  murmurs,  Gastrointestinal:  soft, nontender, nondistended   Lymph: No peripheral edema. No lymphadenopathy.   Skin: No visible rashes or ulcers.  Psych:  Mood & affect appropriate      LABS: All Labs Reviewed:                        12.2   20.70 )-----------( 428      ( 25 Nov 2024 09:02 )             37.8                         11.5   17.42 )-----------( 372      ( 24 Nov 2024 07:20 )             36.2                         12.3   11.56 )-----------( 406      ( 23 Nov 2024 07:40 )             37.4     25 Nov 2024 09:02    143    |  110    |  26     ----------------------------<  122    3.7     |  27     |  0.53   24 Nov 2024 07:20    141    |  112    |  25     ----------------------------<  110    3.9     |  21     |  0.46   23 Nov 2024 07:40    141    |  112    |  28     ----------------------------<  122    4.0     |  25     |  0.58     Ca    9.1        25 Nov 2024 09:02  Ca    8.7        24 Nov 2024 07:20  Ca    8.8        23 Nov 2024 07:40  Phos  2.3       23 Nov 2024 07:40  Mg     1.9       23 Nov 2024 07:40    TPro  5.9    /  Alb  2.1    /  TBili  <0.1   /  DBili  x      /  AST  31     /  ALT  53     /  AlkPhos  73     25 Nov 2024 09:02  TPro  5.2    /  Alb  1.8    /  TBili  0.1    /  DBili  x      /  AST  51     /  ALT  53     /  AlkPhos  77     24 Nov 2024 07:20  TPro  5.6    /  Alb  2.0    /  TBili  0.3    /  DBili  x      /  AST  32     /  ALT  30     /  AlkPhos  59     23 Nov 2024 07:40   LIVER FUNCTIONS - ( 25 Nov 2024 09:02 )  Alb: 2.1 g/dL / Pro: 5.9 g/dL / ALK PHOS: 73 U/L / ALT: 53 U/L / AST: 31 U/L / GGT: x           Troponin I, High Sensitivity Result: 852.9 ng/L (11-20-24 @ 03:55)  Troponin I, High Sensitivity Result: 1099.8 ng/L (11-19-24 @ 22:25)  Cholesterol: 113 mg/dL (11-20-24 @ 11:36)  HDL Cholesterol: 45 mg/dL (11-20-24 @ 11:36)  Triglycerides, Serum: 61 mg/dL (11-20-24 @ 11:36)    12 Lead ECG:   Ventricular Rate 62 BPM    Atrial Rate 62 BPM    P-R Interval 154 ms    QRS Duration 114 ms    Q-T Interval 488 ms    QTC Calculation(Bazett) 495 ms    P Axis 44 degrees    R Axis -53 degrees    T Axis -29 degrees    Diagnosis Line Sinus rhythm with sinus arrhythmia with occasional premature ventricular complexes  Left anterior fascicular block  Cannot rule out Anterior infarct (cited on or before 19-NOV-2024)    Confirmed by SANDY PAUL ([id number]) on 11/20/2024 9:15:32 AM (11-20-24 @ 03:52)      TRANSTHORACIC ECHOCARDIOGRAM REPORT  ________________________________________________________________________________                                      _______       Pt. Name:       DEION HEATH Study Date:    11/20/2024  MRN:            [id number]       YOB: 1945  Accession #:    [id number]      Age:           79 years  Account#:       [id number]     Gender:        M  Heart Rate:                    Height:        65.75 in (167.00 cm)  Rhythm:                        Weight:   199.00 lb (90.27 kg)  Blood Pressure: 103/58 mmHg    BSA/BMI:       1.99 m² / 32.37 kg/m²  ________________________________________________________________________________________  Referring Physician:    [id number] Curly Byrnes  Interpreting Physician: Lyndsay Marin MD  Primary Sonographer:    Ashli Arana [facility]    CPT:                ECHO TTE WO CON COMP W DOPP - 09716.m  Indication(s):      Abnormal electrocardiogram ECG/EKG - R94.31  Procedure:          Transthoracic echocardiogram with 2-D, M-mode and complete                      spectral and color flow Doppler.  Ordering Location:  ICU1  Admission Status:   Inpatient  Contrast Injection: Verbal consent was obtained for injection of Ultrasonic                      Enhancing Agent following a discussion of risks and                      benefits.                      Endocardial visualization enhanced with Definity Ultrasound                      enhancing agent.  Agitated Saline:    Injection with agitated saline was performed toevaluate for                      intracardiac shunting.  Study Information:  Image quality for this study is technically difficult.    _______________________________________________________________________________________     CONCLUSIONS:      1. Technically difficult image quality.   2. Agitated saline injection was negative for intracardiac shunt (though there is poor visualization of the LV during injection of agitated saline)   3. Despite definity use, the LV endocardium is still not well visualized.   4. In certain views, the LVEF appears grossly preserved though the apex appears hypokinetic.    ________________________________________________________________________________________  FINDINGS:     Interatrial Septum:  Agitated saline injection was negative for intracardiac shunt.  ________________________________________________________________________________________  Electronically signed on 11/20/2024 at 2:37:09 PM by Lyndsay Marin MD      *** Final ***

## 2024-11-25 NOTE — PROGRESS NOTE ADULT - ASSESSMENT
78 yo M with PMHx HTN, CHF, PE (on eliquis), Myasthenia Gravis, and chronic LE edema, R ureteral obstructing stone s/p R PCN placement on 9/6/24, urosepsis, BPH, who presented from [facility] for hypertension and elevated WBC count. Per nursing home paperwork, pt hypertensive to 154/105 and recieved metoprolol 25 mg (8:10 PM), and reported pt with "elevated WBC". Upon arrival, pt found to be hypotensive to 90/64. Arrived to ED with nephrostomy tube, chronic guerrero and PICC line in place. Pt was recieving IV zosyn x7d (start date 11/04) per paperwork review for UTI.    Patient being treated for UTI. Guerrero exchanged in the ED. CT did not show any obvious signs of acute infection. Urine culture grew klebsiella, pseudomonas, proteus. Sensitivities reviewed. Blood cultures remain no growth.     WBC increased 20 today but no fever, no new complaints. Noted to have RUE swelling on 11/24 concerning for phlebitis. IV line improved. US showed no DVT.    Also incidentally with RUL calcified granulomas, also in liver in spleen. Unclear etiology, patient without symptoms of tuberculosis, no known hx of TB infection or exposure. He was born in [place], and has not lived outside [place]. Sputum AFB smear x 3 are negative,    #Leukocytosis  #UTI  #MDRO pseudomonas and ESBL proteus  #Rhinovirus positive  #Calcified granulomas  #Hx of fluoroquinolone allergy    -continue ceftolozane-tazobactam--plan for 5-7 days  -suggest Urology evaluation regarding nephrostomy--? exchange vs removal  -follow cultures to completion  -sputum AFB cultures x 3 in process  -Quantiferon pending  -serum Fungitell, galactomannan pending  -suggest urine histoplasma antigen  -monitor WBC  -RUE elevation, warm compress  -contact isolation    Rachel Connor MD  Division of Infectious Diseases   Cell [phone] between 8am and 6pm   After 6pm and weekends please call ID service at [phone].     35 minutes spent on total encounter assessing patient, examination, chart review, counseling and coordinating care by the attending physician/nurse/care manager.

## 2024-11-25 NOTE — PROGRESS NOTE ADULT - SUBJECTIVE AND OBJECTIVE BOX
Patient is a 79y old  Male who presents with a chief complaint of Urosepsis (25 Nov 2024 10:34)      INTERVAL HPI/OVERNIGHT EVENTS:     T(C): 36.5 (11-25-24 @ 04:56), Max: 36.5 (11-25-24 @ 04:56)  HR: 58 (11-25-24 @ 04:56) (58 - 65)  BP: 133/79 (11-25-24 @ 04:56) (124/76 - 133/79)  RR: 16 (11-25-24 @ 04:56) (16 - 17)  SpO2: 96% (11-25-24 @ 04:56) (96% - 97%)  Wt(kg): --  I&O's Summary    24 Nov 2024 07:01  -  25 Nov 2024 07:00  --------------------------------------------------------  IN: 200 mL / OUT: 1051 mL / NET: -851 mL        REVIEW OF SYSTEMS:  CONSTITUTIONAL: No fever, weight loss, or fatigue  EYES: No eye pain, visual disturbances, or discharge  ENMT:  No difficulty hearing, tinnitus, vertigo; No sinus or throat pain  NECK: No pain or stiffness  BREASTS: No pain, no masses  RESPIRATORY: No cough, wheezing, chills or hemoptysis; No shortness of breath  CARDIOVASCULAR: No chest pain, palpitations, dizziness, or leg swelling  GASTROINTESTINAL: No abdominal or epigastric pain. No nausea, vomiting, or hematemesis; No diarrhea or constipation. No melena or hematochezia.  GENITOURINARY: No dysuria, frequency, hematuria, or incontinence  NEUROLOGICAL: No headaches, memory loss, loss of strength, numbness, or tremors  SKIN: No itching, burning, rashes, or lesions   MUSCULOSKELETAL: No joint pain or swelling; No muscle, back, or extremity pain    PHYSICAL EXAM:  GENERAL: NAD, well-groomed, well-developed  HEAD:  Atraumatic, Normocephalic  EYES: EOMI, PERRLA, conjunctiva and sclera clear  ENMT: No tonsillar erythema, exudates, or enlargement; Moist mucous membranes  NECK: Supple, No JVD  NERVOUS SYSTEM:  Alert & Oriented X3; Motor Strength 5/5 B/L upper and lower extremities; DTRs 2+ intact and symmetric  CHEST/LUNG: Clear to percussion bilaterally; No rales, rhonchi, wheezing, or rubs  HEART: Regular rate and rhythm; No murmurs, rubs, or gallops  ABDOMEN: Soft, Nontender, Nondistended; Bowel sounds present  EXTREMITIES:  2+ Peripheral Pulses, No clubbing, cyanosis, or edema  SKIN: No rashes or lesions    MEDICATIONS  (STANDING):  apixaban 5 milliGRAM(s) Oral every 12 hours  ceftolozane/tazobactam IVPB 1500 milliGRAM(s) IV Intermittent every 8 hours  chlorhexidine 2% Cloths 1 Application(s) Topical <User Schedule>  finasteride 5 milliGRAM(s) Oral daily  hydrocortisone sodium succinate Injectable 50 milliGRAM(s) IV Push every 12 hours  pantoprazole    Tablet 40 milliGRAM(s) Oral before breakfast  risperiDONE   Tablet 0.25 milliGRAM(s) Oral two times a day  tamsulosin 0.4 milliGRAM(s) Oral at bedtime    MEDICATIONS  (PRN):  acetaminophen     Tablet .. 650 milliGRAM(s) Oral every 6 hours PRN Temp greater or equal to 38C (100.4F), Mild Pain (1 - 3)  ondansetron Injectable 4 milliGRAM(s) IV Push every 8 hours PRN Nausea and/or Vomiting  sodium chloride 3%  Inhalation 4 milliLiter(s) Inhalation every 8 hours PRN sputum induction      LABS:                        12.2   20.70 )-----------( 428      ( 25 Nov 2024 09:02 )             37.8     11-25    143  |  110[H]  |  26[H]  ----------------------------<  122[H]  3.7   |  27  |  0.53    Ca    9.1      25 Nov 2024 09:02    TPro  5.9[L]  /  Alb  2.1[L]  /  TBili  <0.1[L]  /  DBili  x   /  AST  31  /  ALT  53  /  AlkPhos  73  11-25      Urinalysis Basic - ( 25 Nov 2024 09:02 )    Color: x / Appearance: x / SG: x / pH: x  Gluc: 122 mg/dL / Ketone: x  / Bili: x / Urobili: x   Blood: x / Protein: x / Nitrite: x   Leuk Esterase: x / RBC: x / WBC x   Sq Epi: x / Non Sq Epi: x / Bacteria: x      CAPILLARY BLOOD GLUCOSE          11-22 @ 11:20   Culture is being performed.  --  --          RADIOLOGY & ADDITIONAL TESTS:    Imaging Personally Reviewed:       Advance Directives:      Palliative Care:  Appropriate     Patient is a 79y old  Male who presents with a chief complaint of Urosepsis (25 Nov 2024 10:34)    pt seen and examine today awake , tolerating po , no fever , distress . room air 96 .   INTERVAL HPI/OVERNIGHT EVENTS:     T(C): 36.5 (11-25-24 @ 04:56), Max: 36.5 (11-25-24 @ 04:56)  HR: 58 (11-25-24 @ 04:56) (58 - 65)  BP: 133/79 (11-25-24 @ 04:56) (124/76 - 133/79)  RR: 16 (11-25-24 @ 04:56) (16 - 17)  SpO2: 96% (11-25-24 @ 04:56) (96% - 97%)  Wt(kg): --  I&O's Summary    24 Nov 2024 07:01  -  25 Nov 2024 07:00  --------------------------------------------------------  IN: 200 mL / OUT: 1051 mL / NET: -851 mL        REVIEW OF SYSTEMS:  CONSTITUTIONAL: No fever, weight loss, or fatigue  EYES: No eye pain, visual disturbances, or discharge  ENMT:  No difficulty hearing, tinnitus, vertigo; No sinus or throat pain  NECK: No pain or stiffness  BREASTS: No pain, no masses  RESPIRATORY: No cough, wheezing, chills  No shortness of breath  CARDIOVASCULAR: No chest pain, palpitations, dizziness, or leg swelling  GASTROINTESTINAL: No abdominal or epigastric pain. No nausea, vomiting, or hematemesis; No diarrhea or constipation.  GENITOURINARY: No dysuria, frequency, hematuria, or incontinence  NEUROLOGICAL: No headaches, memory loss, loss of strength, numbness, or tremors  SKIN: No itching, burning, rashes, or lesions   MUSCULOSKELETAL: No joint pain or swelling; No muscle, back, or extremity pain    PHYSICAL EXAM:  GENERAL: NAD,   HEAD:  Atraumatic, Normocephalic  EYES: EOMI, PERRLA, conjunctiva and sclera clear  ENMT: Moist mucous membranes  NECK: Supple, No JVD  NERVOUS SYSTEM:  Alert & Oriented X3; Motor Strength 5/5 B/L upper and lower extremities; DTRs 2+ intact and symmetric  CHEST/LUNG:  percussion bilaterally; No rales, rhonchi, wheezing,   HEART: Regular rate and rhythm; No murmurs,  no tachy   ABDOMEN: Soft, Nontender, Nondistended; Bowel sounds present  EXTREMITIES:  2+ Peripheral Pulses, No clubbing, cyanosis, or edema  SKIN: No rashes or lesions/ nephrostomy +  gu guerrero   MEDICATIONS  (STANDING):  apixaban 5 milliGRAM(s) Oral every 12 hours  ceftolozane/tazobactam IVPB 1500 milliGRAM(s) IV Intermittent every 8 hours  chlorhexidine 2% Cloths 1 Application(s) Topical <User Schedule>  finasteride 5 milliGRAM(s) Oral daily  hydrocortisone sodium succinate Injectable 50 milliGRAM(s) IV Push every 12 hours  pantoprazole    Tablet 40 milliGRAM(s) Oral before breakfast  risperiDONE   Tablet 0.25 milliGRAM(s) Oral two times a day  tamsulosin 0.4 milliGRAM(s) Oral at bedtime    MEDICATIONS  (PRN):  acetaminophen     Tablet .. 650 milliGRAM(s) Oral every 6 hours PRN Temp greater or equal to 38C (100.4F), Mild Pain (1 - 3)  ondansetron Injectable 4 milliGRAM(s) IV Push every 8 hours PRN Nausea and/or Vomiting  sodium chloride 3%  Inhalation 4 milliLiter(s) Inhalation every 8 hours PRN sputum induction      LABS:                        12.2   20.70 )-----------( 428      ( 25 Nov 2024 09:02 )             37.8     11-25    143  |  110[H]  |  26[H]  ----------------------------<  122[H]  3.7   |  27  |  0.53    Ca    9.1      25 Nov 2024 09:02    TPro  5.9[L]  /  Alb  2.1[L]  /  TBili  <0.1[L]  /  DBili  x   /  AST  31  /  ALT  53  /  AlkPhos  73  11-25      Urinalysis Basic - ( 25 Nov 2024 09:02 )    Color: x / Appearance: x / SG: x / pH: x  Gluc: 122 mg/dL / Ketone: x  / Bili: x / Urobili: x   Blood: x / Protein: x / Nitrite: x   Leuk Esterase: x / RBC: x / WBC x   Sq Epi: x / Non Sq Epi: x / Bacteria: x          11-22 @ 11:20   Culture is being performed.  --  --          RADIOLOGY & ADDITIONAL TESTS:    Imaging Personally Reviewed:   no new test

## 2024-11-25 NOTE — PROGRESS NOTE ADULT - SUBJECTIVE AND OBJECTIVE BOX
Neurology follow up note    DEION [id number]      Interval History:    Patient feels ok no new complaints.    Allergies    levofloxacin (Unknown)  ofloxacin (Unknown)  gatifloxacin (Unknown)    Intolerances    fluoroquinolone antibiotics (Other)  Ketek (Other)  Avelox (Other)  telithromycin (Other)      MEDICATIONS    acetaminophen     Tablet .. 650 milliGRAM(s) Oral every 6 hours PRN  apixaban 5 milliGRAM(s) Oral every 12 hours  ceftolozane/tazobactam IVPB 1500 milliGRAM(s) IV Intermittent every 8 hours  chlorhexidine 2% Cloths 1 Application(s) Topical <User Schedule>  finasteride 5 milliGRAM(s) Oral daily  hydrocortisone sodium succinate Injectable 50 milliGRAM(s) IV Push every 12 hours  ondansetron Injectable 4 milliGRAM(s) IV Push every 8 hours PRN  pantoprazole    Tablet 40 milliGRAM(s) Oral before breakfast  risperiDONE   Tablet 0.25 milliGRAM(s) Oral two times a day  sodium chloride 3%  Inhalation 4 milliLiter(s) Inhalation every 8 hours PRN  tamsulosin 0.4 milliGRAM(s) Oral at bedtime              Vital Signs Last 24 Hrs  T(C): 36.5 (25 Nov 2024 04:56), Max: 36.5 (25 Nov 2024 04:56)  T(F): 97.7 (25 Nov 2024 04:56), Max: 97.7 (25 Nov 2024 04:56)  HR: 58 (25 Nov 2024 04:56) (58 - 65)  BP: 133/79 (25 Nov 2024 04:56) (124/76 - 133/79)  BP(mean): --  RR: 16 (25 Nov 2024 04:56) (16 - 17)  SpO2: 96% (25 Nov 2024 04:56) (96% - 97%)    Parameters below as of 25 Nov 2024 04:56  Patient On (Oxygen Delivery Method): room air    REVIEW OF SYSTEMS:  CONSTITUTIONAL:  The patient denies fever, chills, night sweats.  HEAD:  No headache.  EYES:  No double vision or blurry vision.  EARS:  No ringing in the ears.  NECK:  No neck pain.  CARDIOVASCULAR:  No chest pain.  RESPIRATORY:  No shortness of breath.  ABDOMEN:  No nausea, vomiting, or abdominal pain.  EXTREMITIES/NEUROLOGICAL:  Does complain of numbness and burning sensation in his legs.  MUSCULOSKELETAL:  Occasional joint pain.  GENERAL:  Does feels weak all over, but no droopy eyes.    PHYSICAL EXAMINATION:  HEAD:  Normocephalic, atraumatic.  EYES:  No scleral icterus.  EARS:  Hearing bilaterally intact.  NECK:  Supple.  CARDIOVASCULAR:  S1 and S2 heard.  RESPIRATORY:  Air entry bilaterally.  ABDOMEN:  Soft, nontender.  EXTREMITIES:  No clubbing or cyanosis were noted.    NEUROLOGIC:  The patient awake, alert, location was hospital, year 2002, month was October, was able to name simple objects.  Recall was 0/3, probably 1 of 3, but does suffer from memory issues.  The patient was able to look up for over 1 minute with no ptosis.  Extraocular movements were intact.  Speech was fluent.  Smile symmetric.  Motor, the patient decreased range of motion of bilateral shoulders, suspect secondary to rotator cuff issues.  When elevated, was able to maintain in the air with slow drops bilaterally.  It is proximally 3+/5, distally was 4/5.  Bilateral lower extremities, slight movement at the feet and knees was 1/5.  Sensory, lower extremities not tested.  The patient said his legs are very sensitive.        LABS:  CBC Full  -  ( 24 Nov 2024 07:20 )  WBC Count : 17.42 K/uL  RBC Count : 3.75 M/uL  Hemoglobin : 11.5 g/dL  Hematocrit : 36.2 %  Platelet Count - Automated : 372 K/uL  Mean Cell Volume : 96.5 fl  Mean Cell Hemoglobin : 30.7 pg  Mean Cell Hemoglobin Concentration : 31.8 g/dL  Auto Neutrophil # : 12.37 K/uL  Auto Lymphocyte # : 2.26 K/uL  Auto Monocyte # : 2.09 K/uL  Auto Eosinophil # : 0.17 K/uL  Auto Basophil # : 0.00 K/uL  Auto Neutrophil % : 69.0 %  Auto Lymphocyte % : 13.0 %  Auto Monocyte % : 12.0 %  Auto Eosinophil % : 1.0 %  Auto Basophil % : 0.0 %    Urinalysis Basic - ( 24 Nov 2024 07:20 )    Color: x / Appearance: x / SG: x / pH: x  Gluc: 110 mg/dL / Ketone: x  / Bili: x / Urobili: x   Blood: x / Protein: x / Nitrite: x   Leuk Esterase: x / RBC: x / WBC x   Sq Epi: x / Non Sq Epi: x / Bacteria: x      11-24    141  |  112[H]  |  25[H]  ----------------------------<  110[H]  3.9   |  21[L]  |  0.46[L]    Ca    8.7      24 Nov 2024 07:20  Phos  2.3     11-23  Mg     1.9     11-23    TPro  5.2[L]  /  Alb  1.8[L]  /  TBili  0.1[L]  /  DBili  x   /  AST  51[H]  /  ALT  53  /  AlkPhos  77  11-24    Hemoglobin A1C:     LIVER FUNCTIONS - ( 24 Nov 2024 07:20 )  Alb: 1.8 g/dL / Pro: 5.2 g/dL / ALK PHOS: 77 U/L / ALT: 53 U/L / AST: 51 U/L / GGT: x           Vitamin B12         RADIOLOGY  ANALYSIS AND PLAN:  This is an 79-year-old male with altered mental status and history of myasthenia gravis.    .Altered mental status, most likely metabolic encephalopathy secondary to underlying infectious type process.  .Infection workup as needed.  Antibiotics as needed.  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  Questionable if any muscle weakness as well.  The patient appears to be at his baseline from my previous notes.  The patient had refused any type of treatment in the past.   For now, we would recommend continue the patient on his prednisone.  For history of infection with myasthenia gravis.  If possible, would recommend to limit the use of aminoglycosides, fluoroquinolones, macrolides, cardiovascular drugs such as beta blockers, procainamide, other medication such as statins.  Monitor the patient's magnesium levels.  If antibiotics are used, always risks versus benefits must be weighed for the specific antibiotics.  .For history of hypertension, monitor systolic blood pressure.  follow up [facility] antibodies   .Attempted to contact son, Olvin, at [phone] yesterday     46  minutes of time was spent with patient plan of care, reviewing data, speaking to multidisciplinary healthcare team with greater than 50% of time counseling care and coordination.    Thank you for courtesy of consultation.  PATIENT HAS NOT YET BEEN SEEN AND EXAMINED TODAY. NOTE AND CHART REVIEWED IN AM AND EXAM FORM PREVIOUS.  ONCE PATIENT SEEN, CHART WILL BE UPDATE AT PRESENT NOTE IS INCOMPLETE     Neurology follow up note    DEION [id number]      Interval History:    Patient feels ok no new complaints.    Allergies    levofloxacin (Unknown)  ofloxacin (Unknown)  gatifloxacin (Unknown)    Intolerances    fluoroquinolone antibiotics (Other)  Ketek (Other)  Avelox (Other)  telithromycin (Other)      MEDICATIONS    acetaminophen     Tablet .. 650 milliGRAM(s) Oral every 6 hours PRN  apixaban 5 milliGRAM(s) Oral every 12 hours  ceftolozane/tazobactam IVPB 1500 milliGRAM(s) IV Intermittent every 8 hours  chlorhexidine 2% Cloths 1 Application(s) Topical <User Schedule>  finasteride 5 milliGRAM(s) Oral daily  hydrocortisone sodium succinate Injectable 50 milliGRAM(s) IV Push every 12 hours  ondansetron Injectable 4 milliGRAM(s) IV Push every 8 hours PRN  pantoprazole    Tablet 40 milliGRAM(s) Oral before breakfast  risperiDONE   Tablet 0.25 milliGRAM(s) Oral two times a day  sodium chloride 3%  Inhalation 4 milliLiter(s) Inhalation every 8 hours PRN  tamsulosin 0.4 milliGRAM(s) Oral at bedtime              Vital Signs Last 24 Hrs  T(C): 36.5 (25 Nov 2024 04:56), Max: 36.5 (25 Nov 2024 04:56)  T(F): 97.7 (25 Nov 2024 04:56), Max: 97.7 (25 Nov 2024 04:56)  HR: 58 (25 Nov 2024 04:56) (58 - 65)  BP: 133/79 (25 Nov 2024 04:56) (124/76 - 133/79)  BP(mean): --  RR: 16 (25 Nov 2024 04:56) (16 - 17)  SpO2: 96% (25 Nov 2024 04:56) (96% - 97%)    Parameters below as of 25 Nov 2024 04:56  Patient On (Oxygen Delivery Method): room air    REVIEW OF SYSTEMS:  CONSTITUTIONAL:  The patient denies fever, chills, night sweats.  HEAD:  No headache.  EYES:  No double vision or blurry vision.  EARS:  No ringing in the ears.  NECK:  No neck pain.  CARDIOVASCULAR:  No chest pain.  RESPIRATORY:  No shortness of breath.  ABDOMEN:  No nausea, vomiting, or abdominal pain.  EXTREMITIES/NEUROLOGICAL:  Does complain of numbness and burning sensation in his legs.  MUSCULOSKELETAL:  Occasional joint pain.  GENERAL:  Does feels weak all over, but no droopy eyes.    PHYSICAL EXAMINATION:  HEAD:  Normocephalic, atraumatic.  EYES:  No scleral icterus.  EARS:  Hearing bilaterally intact.  NECK:  Supple.  CARDIOVASCULAR:  S1 and S2 heard.  RESPIRATORY:  Air entry bilaterally.  ABDOMEN:  Soft, nontender.  EXTREMITIES:  No clubbing or cyanosis were noted.    NEUROLOGIC:  The patient awake, alert, location was hospital, year 2002, month was October, was able to name simple objects.  Recall was 0/3, probably 1 of 3, but does suffer from memory issues.  The patient was able to look up for over 1 minute with no ptosis.  Extraocular movements were intact.  Speech was fluent.  Smile symmetric.  Motor, the patient decreased range of motion of bilateral shoulders, suspect secondary to rotator cuff issues.  When elevated, was able to maintain in the air with slow drops bilaterally.  It is proximally 3+/5, distally was 4/5.  Bilateral lower extremities, slight movement at the feet and knees was 1/5.  Sensory, lower extremities not tested.  The patient said his legs are very sensitive.        LABS:  CBC Full  -  ( 24 Nov 2024 07:20 )  WBC Count : 17.42 K/uL  RBC Count : 3.75 M/uL  Hemoglobin : 11.5 g/dL  Hematocrit : 36.2 %  Platelet Count - Automated : 372 K/uL  Mean Cell Volume : 96.5 fl  Mean Cell Hemoglobin : 30.7 pg  Mean Cell Hemoglobin Concentration : 31.8 g/dL  Auto Neutrophil # : 12.37 K/uL  Auto Lymphocyte # : 2.26 K/uL  Auto Monocyte # : 2.09 K/uL  Auto Eosinophil # : 0.17 K/uL  Auto Basophil # : 0.00 K/uL  Auto Neutrophil % : 69.0 %  Auto Lymphocyte % : 13.0 %  Auto Monocyte % : 12.0 %  Auto Eosinophil % : 1.0 %  Auto Basophil % : 0.0 %    Urinalysis Basic - ( 24 Nov 2024 07:20 )    Color: x / Appearance: x / SG: x / pH: x  Gluc: 110 mg/dL / Ketone: x  / Bili: x / Urobili: x   Blood: x / Protein: x / Nitrite: x   Leuk Esterase: x / RBC: x / WBC x   Sq Epi: x / Non Sq Epi: x / Bacteria: x      11-24    141  |  112[H]  |  25[H]  ----------------------------<  110[H]  3.9   |  21[L]  |  0.46[L]    Ca    8.7      24 Nov 2024 07:20  Phos  2.3     11-23  Mg     1.9     11-23    TPro  5.2[L]  /  Alb  1.8[L]  /  TBili  0.1[L]  /  DBili  x   /  AST  51[H]  /  ALT  53  /  AlkPhos  77  11-24    Hemoglobin A1C:     LIVER FUNCTIONS - ( 24 Nov 2024 07:20 )  Alb: 1.8 g/dL / Pro: 5.2 g/dL / ALK PHOS: 77 U/L / ALT: 53 U/L / AST: 51 U/L / GGT: x           Vitamin B12         RADIOLOGY  ANALYSIS AND PLAN:  This is an 79-year-old male with altered mental status and history of myasthenia gravis.    .Altered mental status, most likely metabolic encephalopathy secondary to underlying infectious type process.  .Infection workup as needed.  Antibiotics as needed.  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  Questionable if any muscle weakness as well.  The patient appears to be at his baseline from my previous notes.  The patient had refused any type of treatment in the past.   For now, we would recommend continue the patient on his prednisone.  For history of infection with myasthenia gravis.  If possible, would recommend to limit the use of aminoglycosides, fluoroquinolones, macrolides, cardiovascular drugs such as beta blockers, procainamide, other medication such as statins.  Monitor the patient's magnesium levels.  If antibiotics are used, always risks versus benefits must be weighed for the specific antibiotics.  .For history of hypertension, monitor systolic blood pressure.  follow up [facility] antibodies   .Attempted to contact son, lOvin, at [phone] yesterday     46  minutes of time was spent with patient plan of care, reviewing data, speaking to multidisciplinary healthcare team with greater than 50% of time counseling care and coordination.    Thank you for courtesy of consultation.

## 2024-11-25 NOTE — PROGRESS NOTE ADULT - PROBLEM SELECTOR PLAN 5
Pt with documented hx of CHF, unspecified type however on GDMT for HFrEF  - TTE (9/2024): LVEF 55-60%, no diastolic dysfunction, moderate MR  - Evaluated by cardiology during admission at [facility] (9/2024) for acute CHF, started on GDMT  - Pro-BNP on admission 1988  - Hold home metoprolol, spironolactone and lasix in setting of hypotension  - Strict I&Os   - Does not appear clinically volume overloaded  - Cardiology ([place] [facility]) consulted, recs appreciated Pt with documented hx of CHF, unspecified type however on GDMT for HFrEF  - TTE (9/2024): LVEF 55-60%, no diastolic dysfunction, moderate MR  - Evaluated by cardiology during admission at [facility] (9/2024) for acute CHF, started on GDMT  - Pro-BNP on admission 1988  - Hold home metoprolol, spironolactone and lasix in setting of hypotension  - Strict I&Os   - Does not appear clinically volume overloaded  - Cardiology ([place] [facility]) consulted, Pt with documented hx of CHF, unspecified type however on GDMT for HFrEF  - TTE (9/2024): LVEF 55-60%, no diastolic dysfunction, moderate MR  - Evaluated by cardiology during admission at [facility] (9/2024) for acute CHF, started on GDMT  - Pro-BNP on admission 1988  - Hold home metoprolol, spironolactone and Lasix in setting of hypotension  - Strict I&Os   - Does not appear clinically volume overloaded  - Cardiology ([place] [facility]) consulted,

## 2024-11-25 NOTE — PROGRESS NOTE ADULT - PROBLEM SELECTOR PLAN 6
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 wilson  consulted,

## 2024-11-25 NOTE — PROGRESS NOTE ADULT - PROBLEM SELECTOR PLAN 9
Holding home Prednisone, continue stress dose steroids Holding home Prednisone, continue stress dose steroids- baseline bedbound as per son Olvin

## 2024-11-25 NOTE — CONSULT NOTE ADULT - SUBJECTIVE AND OBJECTIVE BOX
UROLOGY PA CONSULT NOTE:    CHIEF COMPLAINT:  Patient is a 79y old  Male who presents with a chief complaint of Urosepsis (2024 13:43)      HPI:  HPI:  78 yo M with PMHx HTN, CHF, PE (on eliquis), Myasthenia Gravis, and chronic LE edema, nephrolithiasis (s/p nephrostomy tube placement ), urosepsis, BPH BIBEMS from [facility] for hypertension and elevated WBC count. Per paperwork, nursing home paperwork, pt hypertensive to 154/105 and recieved metoprolol 25 mg (8:10 PM), and reported pt with "evelated WBC". Upon arrival, pt found to be hypotensive to 90/64. Arrived to ED with nephrostomy tube, chronic guerrero and PICC line in place. Pt was recieving IV zosyn x7d (start date ) per paperwork review for UTI. Pt with no active complaints, denies fever, chills, chest pain, SOB, abdominal pain, n/v/d/c or dysuria.    Of note, pt recently admitted to [facility] in 2024 and urgently transferred to [facility] for emergent nephrostomy tube placement after failed stent placement for 7mm kidney stone. Managed in ICU for septic shock/urosepsis and Klebsiella Bacteremia, treated with IV rocephin and vasopressors.       ED Course:   Vitals: BP: 90/64--> 105/59, HR: 100, Temp: 97.6F-->100.3F, RR: 19, SpO2: 99% on 2L   Labs: WBC 25.27, Lactate 3.1, Trop 1099.8, pro-BNP   UA: Turbid, large blood, large LEC, positive nitrite, many bacteria, WBC TNTC  CXR:   CT: < from: CT Abdomen and Pelvis No Cont (..24 @ 01:49) >  2.6 cm left lobe thyroid nodule. Nonemergent thyroid ultrasound is   recommended.    Subcentimeter granulomas in the right upper lobe. Scattered reticular and   mild airspace opacities throughout both lungs. Calcified right hilar and   mediastinal lymph nodes. The constellation of findings likely represents   granulomatous infection question tuberculosis or sarcoidosis.    Innumerable punctate calcifications throughout the liver and spleen   consistent with prior granulomatous infection.    Dense gallbladder sludge or small stones. No surrounding fluid.    Percutaneous right nephrostomy tube. No hydronephrosis. Unremarkable left   kidney.    Urinary bladder is collapsed around a Guerrero catheter limiting evaluation.   There are at least 3 urinary bladder stone measuring up to 1.0 cm.    EKG: NSR rate 92bpm. LAD. QTc 403ms.  Received in the ED: IV vancomycin x1, IV zosyn x1, ofrimev 1g IV x1, 1000cc NS bolus x2, ASA 324mg x1   (2024 00:43)      INTERVAL HPI:  HPI as per above.     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 No nausea, vomiting, or hematemesis; No diarrhea or constipation. No melena or hematochezia.  GENITOURINARY: No dysuria, frequency or hematuria  MSK: No joint swelling  NEUROLOGICAL: No numbness or weakness  SKIN: No itching, burning, rashes, or lesions   All other review of systems is negative unless indicated above.    MEDICATIONS:  Home Medications:  acetaminophen 325 mg oral capsule: 2 cap(s) orally every 6 hours as needed for  mild pain (2024 03:30)  Eliquis 5 mg oral tablet: 1 tab(s) orally 2 times a day (2024 03:30)  finasteride 5 mg oral tablet: 1 tab(s) orally once a day (2024 03:30)  furosemide 20 mg oral tablet: 1 tab(s) orally once a day (2024 03:30)  [facility] Wound and Burn Dressing topical paste: Apply topically to affected area 2 times a day To sacral wound (2024 04:03)  metoprolol succinate 25 mg oral capsule, extended release: 1 tab(s) orally once a day (2024 03:30)  Multiple Vitamins oral tablet: 1 tab(s) orally once a day (2024 03:30)  omeprazole 40 mg oral delayed release capsule: 1 cap(s) orally once a day (2024 03:30)  Praluent Pen 75 mg/mL subcutaneous solution: 75 milligram(s) subcutaneous every 2 weeks (2024 03:30)  predniSONE 10 mg oral tablet: 1 tab(s) orally once a day (2024 03:30)  risperiDONE 0.25 mg oral tablet: 1 tab(s) orally every 12 hours (2024 03:30)  spironolactone 25 mg oral tablet: 1 tab(s) orally once a day (2024 03:30)  tamsulosin 0.4 mg oral capsule: 1 cap(s) orally once a day (at bedtime) (2024 03:30)    MEDICATIONS  (STANDING):  apixaban 5 milliGRAM(s) Oral every 12 hours  ceftolozane/tazobactam IVPB 1500 milliGRAM(s) IV Intermittent every 8 hours  chlorhexidine 2% Cloths 1 Application(s) Topical <User Schedule>  finasteride 5 milliGRAM(s) Oral daily  pantoprazole    Tablet 40 milliGRAM(s) Oral before breakfast  risperiDONE   Tablet 0.25 milliGRAM(s) Oral two times a day  tamsulosin 0.4 milliGRAM(s) Oral at bedtime    MEDICATIONS  (PRN):  acetaminophen     Tablet .. 650 milliGRAM(s) Oral every 6 hours PRN Temp greater or equal to 38C (100.4F), Mild Pain (1 - 3)  ondansetron Injectable 4 milliGRAM(s) IV Push every 8 hours PRN Nausea and/or Vomiting  sodium chloride 3%  Inhalation 4 milliLiter(s) Inhalation every 8 hours PRN sputum induction      ALLERGIES:  Allergies    levofloxacin (Unknown)  ofloxacin (Unknown)  gatifloxacin (Unknown)    Intolerances    fluoroquinolone antibiotics (Other)  Ketek (Other)  Avelox (Other)  telithromycin (Other)      SOCIAL HISTORY:  Social History:  Lives at [facility] (2024 00:43)    Smoking: Yes [ ]  No [ ]   ______pk yrs  ETOH  Yes [ ]  No [ ]  Social [ ]  DRUGS:  Yes [ ]  No [ ]  if so what______________    FAMILY HISTORY:  FAMILY HISTORY:  Family history of stroke (Father)  Father -  age 62    Family history of kidney disease (Mother)  Mother -  age 67    Family history of diabetes mellitus type II (Sibling)  Brother & Sister        PHYSICAL EXAM:  Vital Signs Last 24 Hrs  T(C): 36.5 (2024 04:56), Max: 36.5 (2024 04:56)  T(F): 97.7 (2024 04:56), Max: 97.7 (2024 04:56)  HR: 58 (2024 04:56) (58 - 65)  BP: 133/79 (2024 04:56) (124/76 - 133/79)  BP(mean): --  RR: 16 (2024 04:56) (16 - 17)  SpO2: 96% (2024 04:56) (96% - 97%)    Parameters below as of 2024 04:56  Patient On (Oxygen Delivery Method): room air        CONSTITUTIONAL: No apparent distress, lying comfortably in bed  HEAD:  Atraumatic, normocephalic  EYES: EOMI, PERRLA, conjunctiva and sclera clear  ENMT: Oral mucosa with moist membranes. Normal dentition; no pharyngeal injection or exudates  NECK: Supple, symmetric and without tracheal deviation   RESP: No respiratory distress, no use of accessory muscles; CTA b/l, no WRR  CV: RRR, +S1S2, no MRG; no JVD; no peripheral edema  GI: Soft, NT, ND, no rebound, no guarding; no palpable masses; no hepatosplenomegaly; no hernia palpated  LYMPH: No cervical LAD or tenderness; no axillary LAD or tenderness; no inguinal LAD or tenderness  EXTREMITIES: 2+ peripheral pulses, no clubbing, cyanosis, or edema  PSYCH: A&O x3  NEUROLOGY: Non-focal, motor & sensory grossly intact  SKIN: Warm & dry, no rashes or lesions; no subcutaneous nodules or induration palpable  : guerrero in place with pyuric yellow urine noted in reservoir and tubing with sediment collected at bottom of reservoir.    LABS:                        12.2   20.70 )-----------( 428      ( 2024 09:02 )             37.8         143  |  110[H]  |  26[H]  ----------------------------<  122[H]  3.7   |  27  |  0.53    Ca    9.1      2024 09:02    TPro  5.9[L]  /  Alb  2.1[L]  /  TBili  <0.1[L]  /  DBili  x   /  AST  31  /  ALT  53  /  AlkPhos  73          Urinalysis Basic - ( 2024 09:02 )    Color: x / Appearance: x / SG: x / pH: x  Gluc: 122 mg/dL / Ketone: x  / Bili: x / Urobili: x   Blood: x / Protein: x / Nitrite: x   Leuk Esterase: x / RBC: x / WBC x   Sq Epi: x / Non Sq Epi: x / Bacteria: x      LIVER FUNCTIONS - ( 2024 09:02 )  Alb: 2.1 g/dL / Pro: 5.9 g/dL / ALK PHOS: 73 U/L / ALT: 53 U/L / AST: 31 U/L / GGT: x               RADIOLOGY & ADDITIONAL STUDIES:  < from: CT Abdomen and Pelvis No Cont (24 @ 01:49) >    ACC: [id number] EXAM:  CT ABDOMEN AND PELVIS IC   ORDERED BY: JUNIOR ROJAS     ACC: [id number] EXAM:  CT CHEST   ORDERED BY: EDMUNDO SANFORD     ACC: [id number] EXAM:  CT CHEST IC   ORDERED BY: JUNIOR ROJAS     ACC: [id number] EXAM:  CT ABDOMEN AND PELVIS   ORDERED BY: EDMUNDO SANFORD     PROCEDURE DATE:  2024          INTERPRETATION:  CLINICAL INFORMATION: Sepsis    COMPARISON: None.    CONTRAST/COMPLICATIONS:  IV Contrast: Omnipaque 350 (accession [id number]), NONE (accession   [id number])  90 cc administered   10 cc discarded  Oral Contrast: NONE      PROCEDURE:  CT of the Chest, Abdomen and Pelvis was performed.  Sagittal and coronal reformats were performed.    FINDINGS:  CHEST:  LUNGS AND LARGE AIRWAYS: Patent central airways.  Subcentimeter granulomas in the right upper lobe. Scattered reticular and   mild airspace opacities throughout both lungs.  PLEURA: No pleural effusion.  VESSELS: Aortic calcifications. Coronary artery calcifications.  HEART: Heart size is normal. No pericardial effusion.  MEDIASTINUM AND KONRAD: Calcified right hilar and mediastinal lymph nodes.  CHEST WALL AND LOWER NECK: 2.6 cm left lobe thyroid nodule. Nonemergent   thyroid ultrasound is recommended.    ABDOMEN AND PELVIS:  LIVER: Innumerable punctate calcifications throughout the liver.  BILE DUCTS: Normal caliber.  GALLBLADDER: Dense gallbladder sludge or small stones. No surrounding   fluid.  SPLEEN: Innumerable punctate calcifications.  PANCREAS: Within normal limits.  ADRENALS: Within normal limits.  KIDNEYS/URETERS: Percutaneous right nephrostomy tube. No hydronephrosis.   Unremarkable left kidney.    BLADDER: Urinary bladder is collapsed around a Guerrero catheter limiting   evaluation. There are at least 3 urinary bladder stone measuring upto   1.0 cm.  REPRODUCTIVE ORGANS: Prostate is enlarged.    BOWEL: No bowel obstruction. Appendix is normal.  PERITONEUM/RETROPERITONEUM: Within normal limits.  VESSELS: Within normal limits.  LYMPH NODES: No lymphadenopathy.  ABDOMINAL WALL: Within normal limits.  BONES: Degenerative changes. Scoliosis.    IMPRESSION:    2.6 cm left lobe thyroid nodule. Nonemergent thyroid ultrasound is   recommended.    Subcentimeter granulomas in the right upper lobe. Scattered reticular and   mild airspace opacities throughout both lungs. Calcified right hilar and   mediastinal lymph nodes. The constellation of findings likely represents   granulomatous infection question tuberculosis or sarcoidosis.    Innumerable punctate calcifications throughout the liver and spleen   consistent with prior granulomatous infection.    Dense gallbladder sludge or small stones. No surrounding fluid.    Percutaneous right nephrostomy tube. No hydronephrosis. Unremarkable left   kidney.    Urinary bladder is collapsed around a Guerrero catheter limiting evaluation.   There are at least 3 urinary bladder stone measuring up to 1.0 cm.      --- End of Report ---    < end of copied text >    Culture - Urine (.24 @ 14:30)    -  Resistance Gene to Carbapenem: Nondet   -  Trimethoprim/Sulfamethoxazole: S <=0.5/9.5   -  Trimethoprim/Sulfamethoxazole: S <=0.5/9.5   -  Ceftolozane/tazobactam: S <=2   -  Ceftazidime/Avibactam: S <=4   -  Amikacin: S <=16   -  Amoxicillin/Clavulanic Acid: S <=8/4   -  Ampicillin: R 16 These ampicillin results predict results for amoxicillin   -  Ampicillin: R >16 These ampicillin results predict results for amoxicillin   -  Ampicillin/Sulbactam: S <=4/2   -  Ampicillin/Sulbactam: S <=4/2   -  Aztreonam: S <=4   -  Aztreonam: R <=4   -  Aztreonam: R >16   -  Cefazolin: R >16 For uncomplicated UTI with K. pneumoniae, E. coli, or P. mirablis: MONI <=16 is sensitive and MONI >=32 is resistant. This also predicts results for oral agents cefaclor, cefdinir, cefpodoxime, cefprozil, cefuroxime axetil, cephalexin and locarbef for uncomplicated UTI. Note that some isolates may be susceptible to these agents while testing resistant to cefazolin.   -  Cefazolin: S <=2 For uncomplicated UTI with K. pneumoniae, E. coli, or P. mirablis: MONI <=16 is sensitive and MNOI >=32 is resistant. This also predicts results for oral agents cefaclor, cefdinir, cefpodoxime, cefprozil, cefuroxime axetil, cephalexin and locarbef for uncomplicated UTI. Note that some isolates may be susceptible to these agents while testing resistant to cefazolin.   -  Cefepime: R >16   -  Cefepime: S <=2   -  Cefepime: R <=2   -  Cefoxitin: S <=8   -  Ceftriaxone: S <=1   -  Ceftriaxone: R >32   -  Ceftazidime: R >16   -  Cefuroxime: R >16   -  Cefuroxime: S <=4   -  Ciprofloxacin: S <=0.25   -  Ciprofloxacin: R 2   -  Ciprofloxacin: S 0.5   -  Ertapenem: S <=0.5   -  Ertapenem: S <=0.5   -  Gentamicin: S <=2   -  Gentamicin: S <=2   -  Imipenem: R >8   -  Imipenem: S <=1   -  Levofloxacin: S <=0.5   -  Levofloxacin: I 1   -  Levofloxacin: I 2   -  Meropenem: R >8   -  Meropenem: S <=1   -  Meropenem: S <=1   -  Nitrofurantoin: S <=32 Should not be used to treat pyelonephritis   -  Nitrofurantoin: R >64 Should not be used to treat pyelonephritis   -  Piperacillin/Tazobactam: R >64   -  Piperacillin/Tazobactam: S <=8   -  Piperacillin/Tazobactam: S <=8   -  Tobramycin: S <=2   -  Tobramycin: S <=2   Specimen Source: .Urine Nephrostomy - Right   Culture Results:   50,000 - 99,000 CFU/mL Pseudomonas aeruginosa (Carbapenem Resistant)  50,000 - 99,000 CFU/mL Proteus mirabilis ESBL  50,000 - 99,000 CFU/mL Klebsiella pneumoniae   Organism Identification: Pseudomonas aeruginosa (Carbapenem Resistant)  Proteus mirabilis ESBL  Klebsiella pneumoniae   Organism: Pseudomonas aeruginosa (Carbapenem Resistant)   Organism: Pseudomonas aeruginosa (Carbapenem Resistant)   Organism: Proteus mirabilis ESBL   Organism: Klebsiella pneumoniae   Method Type: CarbaR   Method Type: MONI   Method Type: MONI   Method Type: MONI      ASSESSMENT:  78 yo M with PMHx HTN, CHF, PE (on eliquis), Myasthenia Gravis, and chronic LE edema, nephrolithiasis (s/p nephrostomy tube placement ), urosepsis, BPH BIBEMS from [facility] for hypertension and elevated WBC count. Admitted to ICU for septic shock 2/2 UTI and Enter/rhinovirus. Now downgraded to floor for management of urosepsis and UTI. Urology service consulted for recommendation of possible nephrostomy tube replacement/exchange as patient still has persistently elevated white blood cell count, 20k today and there is concern for nephrostomy tube as source of infection. As of now, no acute urological intervention is indicated at this time. Patient is with no urinary complaints/concerns, is voiding adequately into guerrero with no hematuria noted so suspicion for incorrect position/inadequate drainage is low. Cultures of nephrostomy tube resulted in carbapenem resistant pseudomonas, proteus mirabilis ESBL, and klebsiella pneumoniae. Recommend IR evaluation of R nephrostomy tube to determine if replacement/exchange is warranted. Please reconsult urology service PRN.     PLAN:  - Continue medical management  - f/u cultures  - recommend IR evaluation of R nephrostomy tube placed on  at [facility]  - monitor I&Os    Case discussed with Dr. Paulino

## 2024-11-25 NOTE — PROGRESS NOTE ADULT - ASSESSMENT
80 yo M with PMHx HTN, CHF, PE (on eliquis), Myasthenia Gravis, and chronic LE edema, nephrolithiasis (s/p nephrostomy tube placement ), urosepsis, BPH BIBEMS from [facility] for hypertension and elevated WBC count. Admitted to ICU for septic shock 2/2 UTI and Enter/rhinovirus.

## 2024-11-25 NOTE — PROGRESS NOTE ADULT - ASSESSMENT
78 yo M with PMHx HTN, CHF, PE (on eliquis), Myasthenia Gravis, and chronic LE edema, nephrolithiasis (s/p nephrostomy tube placement ), urosepsis, BPH BIBEMS from [facility] for hypertension and elevated WBC count. Cardiology was consulted for hypotension.    Admitted with Acute Metabolic Encephalopathy,  Urosepsis 2/2 UTI, Kleb, Pseudomonas Carb Resistent Septic shock, resolved,  Rhinovirus    -S/P  ICU s/p  Off pressors and midodrinnow on floor   - hypotension resolved now that his urosepsis has been treated      - Past echo from 09/2024 showed LVEF 55-60%, no diastolic dysfunction, moderate MR  - Repeat TTE with severe LV dysfunction.  Looks to be stress mediated. Even on repeat study on 11/20, the LV function seems somewhat improved.  - No evidence of significant volume overload  - To start GDMT when able (when bp can tolerate) and repeat echo in about one month. He is on toprol xl at home.  - No clear evidence of acute ischemia, patient denies cardiac symptoms.    - Ruling out TB secondary to CT chest  findings  - cont eliquis for pe history    - Monitor and replete lytes, keep K>4, Mg>2.  - will follow with you  Art Santana DNP, AGACNP-BC  Cardiology   Call Teams

## 2024-11-25 NOTE — PROGRESS NOTE ADULT - SUBJECTIVE AND OBJECTIVE BOX
[facility]  INFECTIOUS DISEASES - [address], [place] [address]  Tel: [phone]     Fax: [phone]  =======================================================    DEION HEATH [id number]    Follow up: No fevers. Denies any pain or SOB.     Allergies:  levofloxacin (Unknown)  fluoroquinolone antibiotics (Other)  Ketek (Other)  Avelox (Other)  ofloxacin (Unknown)  gatifloxacin (Unknown)  telithromycin (Other)      Antibiotics:  acetaminophen     Tablet .. 650 milliGRAM(s) Oral every 6 hours PRN  apixaban 5 milliGRAM(s) Oral every 12 hours  ceftolozane/tazobactam IVPB 1500 milliGRAM(s) IV Intermittent every 8 hours  chlorhexidine 2% Cloths 1 Application(s) Topical <User Schedule>  finasteride 5 milliGRAM(s) Oral daily  hydrocortisone sodium succinate Injectable 50 milliGRAM(s) IV Push every 12 hours  ondansetron Injectable 4 milliGRAM(s) IV Push every 8 hours PRN  pantoprazole    Tablet 40 milliGRAM(s) Oral before breakfast  risperiDONE   Tablet 0.25 milliGRAM(s) Oral two times a day  sodium chloride 3%  Inhalation 4 milliLiter(s) Inhalation every 8 hours PRN  tamsulosin 0.4 milliGRAM(s) Oral at bedtime       REVIEW OF SYSTEMS:  CONSTITUTIONAL:  No Fever or chills  CARDIOVASCULAR:  No chest pain or SOB  RESPIRATORY:  No cough, shortness of breath  GASTROINTESTINAL:  No nausea, vomiting or diarrhea.  GENITOURINARY:  + Restrepo  MUSCULOSKELETAL:  no back pain  NEUROLOGIC:  No headache or dizziness       Physical Exam:  ICU Vital Signs Last 24 Hrs  T(C): 36.5 (25 Nov 2024 04:56), Max: 36.5 (25 Nov 2024 04:56)  T(F): 97.7 (25 Nov 2024 04:56), Max: 97.7 (25 Nov 2024 04:56)  HR: 58 (25 Nov 2024 04:56) (58 - 65)  BP: 133/79 (25 Nov 2024 04:56) (124/76 - 133/79)  BP(mean): --  ABP: --  ABP(mean): --  RR: 16 (25 Nov 2024 04:56) (16 - 17)  SpO2: 96% (25 Nov 2024 04:56) (96% - 97%)    O2 Parameters below as of 25 Nov 2024 04:56  Patient On (Oxygen Delivery Method): room air      GEN: NAD  HEENT: normocephalic and atraumatic.  NECK: Supple.   LUNGS: Normal respiratory effort  HEART: Regular rate and rhythm   ABDOMEN: Soft, nontender, and nondistended.    : + R percutaneous nephrostomy. + Restrepo  EXTREMITIES: No leg edema. R forearm swelling, mild erythema, no warmth or tenderness  NEUROLOGIC: Answering questions appropriately, knows he is in the hospital  PSYCHIATRIC: Appropriate affect .      Labs:  11-25    143  |  110[H]  |  26[H]  ----------------------------<  122[H]  3.7   |  27  |  0.53    Ca    9.1      25 Nov 2024 09:02    TPro  5.9[L]  /  Alb  2.1[L]  /  TBili  <0.1[L]  /  DBili  x   /  AST  31  /  ALT  53  /  AlkPhos  73  11-25                          12.2   20.70 )-----------( 428      ( 25 Nov 2024 09:02 )             37.8       Urinalysis Basic - ( 25 Nov 2024 09:02 )    Color: x / Appearance: x / SG: x / pH: x  Gluc: 122 mg/dL / Ketone: x  / Bili: x / Urobili: x   Blood: x / Protein: x / Nitrite: x   Leuk Esterase: x / RBC: x / WBC x   Sq Epi: x / Non Sq Epi: x / Bacteria: x      LIVER FUNCTIONS - ( 25 Nov 2024 09:02 )  Alb: 2.1 g/dL / Pro: 5.9 g/dL / ALK PHOS: 73 U/L / ALT: 53 U/L / AST: 31 U/L / GGT: x             RECENT CULTURES:  11-22 @ 11:20 .Sputum Sputum     Culture is being performed.        11-21 @ 12:00 .Sputum Sputum     Culture is being performed.    Few Squamous epithelial cells per low power field  No polymorphonuclear leukocytes per low power field  Few Gram Negative Rods per oil power field  Few Gram Positive Rods per oil power field  Rare Gram positive cocci in pairs per oil power field      11-20 @ 14:30 .Urine Nephrostomy - Right Pseudomonas aeruginosa (Carbapenem Resistant)  Proteus mirabilis ESBL  Klebsiella pneumoniae    50,000 - 99,000 CFU/mL Pseudomonas aeruginosa (Carbapenem Resistant)  50,000 - 99,000 CFU/mL Proteus mirabilis ESBL  50,000 - 99,000 CFU/mL Klebsiella pneumoniae        11-20 @ 01:25 .Urine Klebsiella pneumoniae  Pseudomonas aeruginosa (Carbapenem Resistant)    >100,000 CFU/ml Klebsiella pneumoniae  >100,000 CFU/ml Pseudomonas aeruginosa (Carbapenem Resistant)        11-20 @ 00:00          Detected  11-19 @ 22:25 .Blood BLOOD     No growth at 5 days        11-19 @ 22:20 .Blood BLOOD     No growth at 5 days              All imaging and data are reviewed.     < from: US Duplex Venous Upper Ext Ltd, Right (11.24.24 @ 12:23) >  FINDINGS:    The right internal jugular, subclavian, axillary and brachial veins are   patent and compressible where applicable.  The basilic vein(superficial   vein) is patent and without thrombus.  The cephalic vein (superficial   vein) is patent and without thrombus. Visualized radial vein is patent.   Ulnar vein not seen.    Doppler examination shows normal spontaneous and phasic flow.    IMPRESSION:  No evidence of right upper extremity deep venous thrombosis.      < end of copied text >

## 2024-11-25 NOTE — PROGRESS NOTE ADULT - PROBLEM SELECTOR PLAN 11
Continue home Eliquis 5mg BID    Contacted pt's son Olvin. All questions answered and updates provided. Continue home Eliquis 5mg BID    son Olvin aware tt/plan updated today

## 2024-11-26 LAB
ANION GAP SERPL CALC-SCNC: 8 MMOL/L — SIGNIFICANT CHANGE UP (ref 5–17)
BASOPHILS # BLD AUTO: 0.06 K/UL — SIGNIFICANT CHANGE UP (ref 0–0.2)
BASOPHILS NFR BLD AUTO: 0.3 % — SIGNIFICANT CHANGE UP (ref 0–2)
BUN SERPL-MCNC: 24 MG/DL — HIGH (ref 7–23)
CALCIUM SERPL-MCNC: 8.5 MG/DL — SIGNIFICANT CHANGE UP (ref 8.5–10.1)
CHLORIDE SERPL-SCNC: 112 MMOL/L — HIGH (ref 96–108)
CO2 SERPL-SCNC: 24 MMOL/L — SIGNIFICANT CHANGE UP (ref 22–31)
CREAT SERPL-MCNC: 0.5 MG/DL — SIGNIFICANT CHANGE UP (ref 0.5–1.3)
EGFR: 102 ML/MIN/1.73M2 — SIGNIFICANT CHANGE UP
EOSINOPHIL # BLD AUTO: 0.78 K/UL — HIGH (ref 0–0.5)
EOSINOPHIL NFR BLD AUTO: 4.4 % — SIGNIFICANT CHANGE UP (ref 0–6)
GLUCOSE SERPL-MCNC: 111 MG/DL — HIGH (ref 70–99)
HCT VFR BLD CALC: 35.7 % — LOW (ref 39–50)
HGB BLD-MCNC: 11.4 G/DL — LOW (ref 13–17)
IMM GRANULOCYTES NFR BLD AUTO: 4.5 % — HIGH (ref 0–0.9)
LYMPHOCYTES # BLD AUTO: 18.7 % — SIGNIFICANT CHANGE UP (ref 13–44)
LYMPHOCYTES # BLD AUTO: 3.28 K/UL — SIGNIFICANT CHANGE UP (ref 1–3.3)
MCHC RBC-ENTMCNC: 31.1 PG — SIGNIFICANT CHANGE UP (ref 27–34)
MCHC RBC-ENTMCNC: 31.9 G/DL — LOW (ref 32–36)
MCV RBC AUTO: 97.3 FL — SIGNIFICANT CHANGE UP (ref 80–100)
MONOCYTES # BLD AUTO: 1.71 K/UL — HIGH (ref 0–0.9)
MONOCYTES NFR BLD AUTO: 9.7 % — SIGNIFICANT CHANGE UP (ref 2–14)
NEUTROPHILS # BLD AUTO: 10.96 K/UL — HIGH (ref 1.8–7.4)
NEUTROPHILS NFR BLD AUTO: 62.4 % — SIGNIFICANT CHANGE UP (ref 43–77)
NRBC # BLD: 0 /100 WBCS — SIGNIFICANT CHANGE UP (ref 0–0)
PLATELET # BLD AUTO: 407 K/UL — HIGH (ref 150–400)
POTASSIUM SERPL-MCNC: 3.2 MMOL/L — LOW (ref 3.5–5.3)
POTASSIUM SERPL-SCNC: 3.2 MMOL/L — LOW (ref 3.5–5.3)
RBC # BLD: 3.67 M/UL — LOW (ref 4.2–5.8)
RBC # FLD: 17.5 % — HIGH (ref 10.3–14.5)
SODIUM SERPL-SCNC: 144 MMOL/L — SIGNIFICANT CHANGE UP (ref 135–145)
WBC # BLD: 17.58 K/UL — HIGH (ref 3.8–10.5)
WBC # FLD AUTO: 17.58 K/UL — HIGH (ref 3.8–10.5)

## 2024-11-26 PROCEDURE — 93010 ELECTROCARDIOGRAM REPORT: CPT

## 2024-11-26 PROCEDURE — 99232 SBSQ HOSP IP/OBS MODERATE 35: CPT

## 2024-11-26 PROCEDURE — 99233 SBSQ HOSP IP/OBS HIGH 50: CPT | Mod: GC

## 2024-11-26 RX ORDER — SENNOSIDES 8.6 MG
1 TABLET ORAL
Refills: 0 | Status: DISCONTINUED | OUTPATIENT
Start: 2024-11-26 | End: 2024-12-03

## 2024-11-26 RX ORDER — METOPROLOL TARTRATE 100 MG/1
25 TABLET, FILM COATED ORAL ONCE
Refills: 0 | Status: DISCONTINUED | OUTPATIENT
Start: 2024-11-26 | End: 2024-11-26

## 2024-11-26 RX ORDER — METOPROLOL TARTRATE 100 MG/1
12.5 TABLET, FILM COATED ORAL ONCE
Refills: 0 | Status: COMPLETED | OUTPATIENT
Start: 2024-11-26 | End: 2024-11-26

## 2024-11-26 RX ORDER — POTASSIUM CHLORIDE 600 MG/1
40 TABLET, EXTENDED RELEASE ORAL EVERY 4 HOURS
Refills: 0 | Status: COMPLETED | OUTPATIENT
Start: 2024-11-26 | End: 2024-11-26

## 2024-11-26 RX ORDER — POLYETHYLENE GLYCOL 3350 17 G/17G
17 POWDER, FOR SOLUTION ORAL DAILY
Refills: 0 | Status: DISCONTINUED | OUTPATIENT
Start: 2024-11-26 | End: 2024-12-03

## 2024-11-26 RX ORDER — METOPROLOL TARTRATE 100 MG/1
12.5 TABLET, FILM COATED ORAL EVERY 12 HOURS
Refills: 0 | Status: DISCONTINUED | OUTPATIENT
Start: 2024-11-26 | End: 2024-12-03

## 2024-11-26 RX ORDER — HYDROCORTISONE ACETATE 25 MG/ML
25 VIAL (ML) INJECTION DAILY
Refills: 0 | Status: DISCONTINUED | OUTPATIENT
Start: 2024-11-27 | End: 2024-11-28

## 2024-11-26 RX ADMIN — Medication 50 MILLIGRAM(S): at 05:50

## 2024-11-26 RX ADMIN — CEFTOLOZANE AND TAZOBACTAM 100 MILLIGRAM(S): 1; .5 INJECTION, POWDER, LYOPHILIZED, FOR SOLUTION INTRAVENOUS at 05:50

## 2024-11-26 RX ADMIN — METOPROLOL TARTRATE 12.5 MILLIGRAM(S): 100 TABLET, FILM COATED ORAL at 18:18

## 2024-11-26 RX ADMIN — POLYETHYLENE GLYCOL 3350 17 GRAM(S): 17 POWDER, FOR SOLUTION ORAL at 12:17

## 2024-11-26 RX ADMIN — APIXABAN 5 MILLIGRAM(S): 2.5 TABLET, FILM COATED ORAL at 05:50

## 2024-11-26 RX ADMIN — RISPERIDONE 0.25 MILLIGRAM(S): 4 TABLET ORAL at 18:18

## 2024-11-26 RX ADMIN — POTASSIUM CHLORIDE 40 MILLIEQUIVALENT(S): 600 TABLET, EXTENDED RELEASE ORAL at 12:43

## 2024-11-26 RX ADMIN — Medication 5 MILLIGRAM(S): at 12:17

## 2024-11-26 RX ADMIN — PANTOPRAZOLE SODIUM 40 MILLIGRAM(S): 40 TABLET, DELAYED RELEASE ORAL at 05:50

## 2024-11-26 RX ADMIN — CEFTOLOZANE AND TAZOBACTAM 100 MILLIGRAM(S): 1; .5 INJECTION, POWDER, LYOPHILIZED, FOR SOLUTION INTRAVENOUS at 22:02

## 2024-11-26 RX ADMIN — CEFTOLOZANE AND TAZOBACTAM 100 MILLIGRAM(S): 1; .5 INJECTION, POWDER, LYOPHILIZED, FOR SOLUTION INTRAVENOUS at 14:33

## 2024-11-26 RX ADMIN — APIXABAN 5 MILLIGRAM(S): 2.5 TABLET, FILM COATED ORAL at 18:18

## 2024-11-26 RX ADMIN — Medication 1 TABLET(S): at 10:18

## 2024-11-26 RX ADMIN — TAMSULOSIN HYDROCHLORIDE 0.4 MILLIGRAM(S): 0.4 CAPSULE ORAL at 22:02

## 2024-11-26 RX ADMIN — METOPROLOL TARTRATE 12.5 MILLIGRAM(S): 100 TABLET, FILM COATED ORAL at 16:39

## 2024-11-26 RX ADMIN — RISPERIDONE 0.25 MILLIGRAM(S): 4 TABLET ORAL at 05:50

## 2024-11-26 RX ADMIN — POTASSIUM CHLORIDE 40 MILLIEQUIVALENT(S): 600 TABLET, EXTENDED RELEASE ORAL at 18:17

## 2024-11-26 RX ADMIN — CHLORHEXIDINE GLUCONATE 1 APPLICATION(S): 1.2 RINSE ORAL at 05:51

## 2024-11-26 NOTE — PROGRESS NOTE ADULT - ASSESSMENT
78 yo M with PMHx HTN, CHF, PE (on eliquis), Myasthenia Gravis, and chronic LE edema, nephrolithiasis (s/p nephrostomy tube placement ), urosepsis, BPH BIBEMS from [facility] for hypertension and elevated WBC count.     Hypotension   - a/f Acute Metabolic Encephalopathy,  Urosepsis 2/2 UTI, Kleb, Pseudomonas Carb Resistent Septic shock, resolved,  Rhinovirus, was in ICU s/p Off pressors and midodrine, now on GMF    - hypotension resolved now that his urosepsis has been treated      - Past echo from 09/2024 showed LVEF 55-60%, no diastolic dysfunction, moderate MR  - Repeat TTE with severe LV dysfunction.  Looks to be stress mediated. Even on repeat study on 11/20, the LV function seems somewhat improved.  - No evidence of significant volume overload  - To start GDMT when able (when bp can tolerate) and repeat echo in about one month. He is on toprol xl at home.  - No clear evidence of acute ischemia, patient denies cardiac symptoms.    - Ruling out TB secondary to CT chest  findings  - cont eliquis for pe history   80 yo M with PMHx HTN, CHF, PE (on eliquis), Myasthenia Gravis, and chronic LE edema, nephrolithiasis (s/p nephrostomy tube placement ), urosepsis, BPH BIBEMS from [facility] for hypertension and elevated WBC count.     Hypotension   - a/f Acute Metabolic Encephalopathy,  Urosepsis 2/2 UTI, Kleb, Pseudomonas Carb Resistent Septic shock, resolved,  Rhinovirus, was in ICU s/p Off pressors and midodrine, now on GMF    - hypotension resolved now that his urosepsis has been treated    - TTE (9/2024) showed LVEF 55-60%, no diastolic dysfunction, moderate MR  - Repeat TTE with severe LV dysfunction.  Looks to be stress mediated. Even on repeat study on 11/20, the LV function seems somewhat improved.  - No evidence of significant volume overload  - To start GDMT when able (when bp can tolerate) and repeat echo in about one month. He is on toprol xl at home.  - No clear evidence of acute ischemia, patient denies cardiac symptoms.    - Ruling out TB secondary to CT chest  findings  - cont eliquis for pe history   80 yo M with PMHx HTN, CHF, PE (on Eliquis Myasthenia Gravis, and chronic LE edema, nephrolithiasis (s/p nephrostomy tube placement ), urosepsis, BPH BIBEMS from [facility] for hypertension and elevated WBC count.     Hypotension   - a/f Acute Metabolic Encephalopathy,  Urosepsis 2/2 UTI, Kleb, Pseudomonas Carb Resistent Septic shock, resolved,  Rhinovirus, was in ICU s/p Off pressors and midodrine, now on GMF    - hypotension resolved now that his urosepsis has been treated    - BP stable and controlled, per flow sheet  - tele: SR 60's with PVCs w/ 6-8 bts of NSVT   - would start low dose lopressor 12.5 mg PO BID with hold parameters for arrythmia control (ordered)  - continue to monitor routine hemodynamics  - Monitor and replete Lytes. Keep K > 4 and Mg > 2    - TTE (9/2024) showed LVEF 55-60%, no diastolic dysfunction, moderate MR  - Repeat TTE with severe LV dysfunction.  Looks to be stress mediated. Even on repeat study on 11/20, the LV function seems somewhat improved.  - No evidence of significant volume overload  - To start GDMT when able (when bp can tolerate) and repeat echo in about one month. He is on toprol xl at home.  - No clear evidence of acute ischemia, patient denies cardiac symptoms.    - Ruling out TB secondary to CT chest  findings, ID following   - cont Eliquis (hx of PE)     - Will continue to follow.    April Hutchinson [facility]  Nurse Practitioner - Cardiology   call TEAMS 80 yo M with PMHx HTN, CHF, PE (on Eliquis Myasthenia Gravis, and chronic LE edema, nephrolithiasis (s/p nephrostomy tube placement ), urosepsis, BPH BIBEMS from [facility] for hypertension and elevated WBC count.     Hypotension   - a/f Acute Metabolic Encephalopathy,  Urosepsis 2/2 UTI, Kleb, Pseudomonas Carb Resistent Septic shock, resolved,  Rhinovirus, was in ICU s/p Off pressors and midodrine, now on GMF    - hypotension resolved now that his urosepsis has been treated    - BP stable and controlled, per flow sheet  - tele: SR 60's with PVCs w/ 6-8 bts of NSVT   - would start low dose lopressor 12.5 mg PO BID with hold parameters for arrythmia control (ordered)  - continue to monitor routine hemodynamics  - Monitor and replete Lytes. Keep K > 4 and Mg > 2    - TTE (9/2024) showed LVEF 55-60%, no diastolic dysfunction, moderate MR  - Repeat TTE with severe LV dysfunction.  Looks to be stress mediated. Even on repeat study on 11/20, the LV function seems somewhat improved.  - No evidence of significant volume overload  - To start GDMT when able (when bp can tolerate) and repeat echo in about one month. He is on toprol xl at home.  - No clear evidence of acute ischemia, patient denies cardiac symptoms.    - Ruling out TB secondary to CT chest  findings, ID following   - cont Eliquis (hx of PE)     - Will continue to follow.    April Hutchinson [facility]  Nurse Practitioner - Cardiology   call TEAMS

## 2024-11-26 NOTE — PROGRESS NOTE ADULT - TIME BILLING
pt seen and examine today see above plan - septic shock   sepsis  poa sec to  2/2 UTI  MDRO pseudomonas and ESBL proteus  with  chr guerrero [ changed in ed] / rt nephrostomy  [  going change by IR today as month old    ]   and rhino/enterovirus.

## 2024-11-26 NOTE — PROGRESS NOTE ADULT - SUBJECTIVE AND OBJECTIVE BOX
[facility]  INFECTIOUS DISEASES - [address], [place] [address]  Tel: [phone]     Fax: [phone]  =======================================================    DEION HEATH [id number]    Follow up: No fevers. Denies any pain, nausea or diarrhea. Denies any cough or SOB.    Allergies:  levofloxacin (Unknown)  fluoroquinolone antibiotics (Other)  Ketek (Other)  Avelox (Other)  ofloxacin (Unknown)  gatifloxacin (Unknown)  telithromycin (Other)      Antibiotics:  acetaminophen     Tablet .. 650 milliGRAM(s) Oral every 6 hours PRN  apixaban 5 milliGRAM(s) Oral every 12 hours  ceftolozane/tazobactam IVPB 1500 milliGRAM(s) IV Intermittent every 8 hours  chlorhexidine 2% Cloths 1 Application(s) Topical <User Schedule>  finasteride 5 milliGRAM(s) Oral daily  hydrocortisone sodium succinate Injectable 50 milliGRAM(s) IV Push every 24 hours  metoprolol succinate ER 12.5 milliGRAM(s) Oral every 12 hours  ondansetron Injectable 4 milliGRAM(s) IV Push every 8 hours PRN  pantoprazole    Tablet 40 milliGRAM(s) Oral before breakfast  polyethylene glycol 3350 17 Gram(s) Oral daily  potassium chloride    Tablet ER 40 milliEquivalent(s) Oral every 4 hours  risperiDONE   Tablet 0.25 milliGRAM(s) Oral two times a day  senna 1 Tablet(s) Oral with breakfast  sodium chloride 3%  Inhalation 4 milliLiter(s) Inhalation every 8 hours PRN  tamsulosin 0.4 milliGRAM(s) Oral at bedtime       REVIEW OF SYSTEMS:  CONSTITUTIONAL:  No Fever or chills  CARDIOVASCULAR:  No chest pain or SOB  RESPIRATORY:  No cough, shortness of breath  GASTROINTESTINAL:  No nausea, vomiting or diarrhea.  GENITOURINARY:  + Restrepo  MUSCULOSKELETAL:  no back pain  NEUROLOGIC:  No headache or dizziness       Physical Exam:  ICU Vital Signs Last 24 Hrs  T(C): 36.7 (26 Nov 2024 12:22), Max: 36.7 (26 Nov 2024 12:22)  T(F): 98 (26 Nov 2024 12:22), Max: 98 (26 Nov 2024 12:22)  HR: 100 (26 Nov 2024 12:22) (53 - 100)  BP: 122/81 (26 Nov 2024 12:22) (108/65 - 131/73)  BP(mean): --  ABP: --  ABP(mean): --  RR: 18 (26 Nov 2024 12:22) (17 - 18)  SpO2: 96% (26 Nov 2024 12:22) (96% - 98%)    O2 Parameters below as of 26 Nov 2024 12:22  Patient On (Oxygen Delivery Method): room air          GEN: NAD  HEENT: normocephalic and atraumatic.  NECK: Supple.   LUNGS: Normal respiratory effort  HEART: Regular rate and rhythm   ABDOMEN: Soft, nontender, and nondistended.    : + R percutaneous nephrostomy. + Restrepo  EXTREMITIES: No leg edema. R forearm swelling--improved, LUE midline--no drainage, no surrounding erythema, no swelling  NEUROLOGIC: Answering questions appropriately, knows he is in the hospital  PSYCHIATRIC: Appropriate affect .      Labs:  11-26    144  |  112[H]  |  24[H]  ----------------------------<  111[H]  3.2[L]   |  24  |  0.50    Ca    8.5      26 Nov 2024 06:01    TPro  5.9[L]  /  Alb  2.1[L]  /  TBili  <0.1[L]  /  DBili  x   /  AST  31  /  ALT  53  /  AlkPhos  73  11-25                          11.4   17.58 )-----------( 407      ( 26 Nov 2024 06:01 )             35.7       Urinalysis Basic - ( 26 Nov 2024 06:01 )    Color: x / Appearance: x / SG: x / pH: x  Gluc: 111 mg/dL / Ketone: x  / Bili: x / Urobili: x   Blood: x / Protein: x / Nitrite: x   Leuk Esterase: x / RBC: x / WBC x   Sq Epi: x / Non Sq Epi: x / Bacteria: x      LIVER FUNCTIONS - ( 25 Nov 2024 09:02 )  Alb: 2.1 g/dL / Pro: 5.9 g/dL / ALK PHOS: 73 U/L / ALT: 53 U/L / AST: 31 U/L / GGT: x             RECENT CULTURES:  11-22 @ 11:20 .Sputum Sputum     Culture is being performed.        11-21 @ 12:00 .Sputum Sputum     Culture is being performed.    Few Squamous epithelial cells per low power field  No polymorphonuclear leukocytes per low power field  Few Gram Negative Rods per oil power field  Few Gram Positive Rods per oil power field  Rare Gram positive cocci in pairs per oil power field      11-20 @ 14:30 .Urine Nephrostomy - Right Pseudomonas aeruginosa (Carbapenem Resistant)  Proteus mirabilis ESBL  Klebsiella pneumoniae    50,000 - 99,000 CFU/mL Pseudomonas aeruginosa (Carbapenem Resistant)  50,000 - 99,000 CFU/mL Proteus mirabilis ESBL  50,000 - 99,000 CFU/mL Klebsiella pneumoniae        11-20 @ 01:25 .Urine Klebsiella pneumoniae  Pseudomonas aeruginosa (Carbapenem Resistant)    >100,000 CFU/ml Klebsiella pneumoniae  >100,000 CFU/ml Pseudomonas aeruginosa (Carbapenem Resistant)        11-20 @ 00:00          Detected  11-19 @ 22:25 .Blood BLOOD     No growth at 5 days        11-19 @ 22:20 .Blood BLOOD     No growth at 5 days              All imaging and data are reviewed.     Assessment and Plan:       Rachel Connor MD  Division of infectious Diseases  Cell [phone] between 8am and 6pm  After 6pm and over the weekends please call ID service line at [phone].     55 minutes spent on total encounter assessing patient, examination, chart review, counseling and coordinating care by the attending physician/nurse/care manager.

## 2024-11-26 NOTE — SOCIAL WORK PROGRESS NOTE - NSSWPROGRESSNOTE_GEN_ALL_CORE
Pt admitted from [facility] and was transitioning to long term care at facility. Plan is for pt to return to facility when medically cleared, SW to continue to follow to ensure safe transition upon DC.

## 2024-11-26 NOTE — PROGRESS NOTE ADULT - SUBJECTIVE AND OBJECTIVE BOX
[facility] -- Janel Jackson, Evan Paul Savella, Goodger, Cohen  Office # [phone]    Follow Up:  HTN, CHF, PE     Subjective/Observations:     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      Club foot  Surgery at birth for Club Foot Right foot      Pilonidal cyst  Surgery 40 years ago      H/O colonoscopy      Spinal stenosis      H/O prostate biopsy        MEDICATIONS  (STANDING):  apixaban 5 milliGRAM(s) Oral every 12 hours  ceftolozane/tazobactam IVPB 1500 milliGRAM(s) IV Intermittent every 8 hours  chlorhexidine 2% Cloths 1 Application(s) Topical <User Schedule>  finasteride 5 milliGRAM(s) Oral daily  hydrocortisone sodium succinate Injectable 50 milliGRAM(s) IV Push every 24 hours  pantoprazole    Tablet 40 milliGRAM(s) Oral before breakfast  polyethylene glycol 3350 17 Gram(s) Oral daily  risperiDONE   Tablet 0.25 milliGRAM(s) Oral two times a day  senna 1 Tablet(s) Oral with breakfast  tamsulosin 0.4 milliGRAM(s) Oral at bedtime    MEDICATIONS  (PRN):  acetaminophen     Tablet .. 650 milliGRAM(s) Oral every 6 hours PRN Temp greater or equal to 38C (100.4F), Mild Pain (1 - 3)  ondansetron Injectable 4 milliGRAM(s) IV Push every 8 hours PRN Nausea and/or Vomiting  sodium chloride 3%  Inhalation 4 milliLiter(s) Inhalation every 8 hours PRN sputum induction    Allergies    levofloxacin (Unknown)  ofloxacin (Unknown)  gatifloxacin (Unknown)    Intolerances    fluoroquinolone antibiotics (Other)  Ketek (Other)  Avelox (Other)  telithromycin (Other)    Vital Signs Last 24 Hrs  T(C): 36.4 (26 Nov 2024 05:01), Max: 36.5 (25 Nov 2024 22:39)  T(F): 97.6 (26 Nov 2024 05:01), Max: 97.7 (25 Nov 2024 22:39)  HR: 71 (26 Nov 2024 05:01) (53 - 71)  BP: 121/62 (26 Nov 2024 05:01) (108/65 - 131/73)  BP(mean): --  RR: 17 (26 Nov 2024 05:01) (17 - 18)  SpO2: 97% (26 Nov 2024 05:01) (96% - 98%)    Parameters below as of 26 Nov 2024 05:01  Patient On (Oxygen Delivery Method): room air      I&O's Summary    25 Nov 2024 07:01  -  26 Nov 2024 07:00  --------------------------------------------------------  IN: 0 mL / OUT: 1251 mL / NET: -1251 mL          TELE: SR 60's PVCs   PHYSICAL EXAM:  Constitutional: NAD, awake and alert  HEENT: Moist Mucous Membranes, Anicteric  Pulmonary: Non-labored, breath sounds are clear bilaterally, No wheezing, rales or rhonchi  Cardiovascular: Regular, S1 and S2, + murmurs, rubs, gallops or clicks  Gastrointestinal: Bowel Sounds present, soft, nontender.   Lymph: No peripheral edema. No lymphadenopathy.  Skin: No visible rashes or ulcers.  Psych:  Mood & affect appropriate  LABS: All Labs Reviewed:                        11.4   17.58 )-----------( 407      ( 26 Nov 2024 06:01 )             35.7                         12.2   20.70 )-----------( 428      ( 25 Nov 2024 09:02 )             37.8                         11.5   17.42 )-----------( 372      ( 24 Nov 2024 07:20 )             36.2     25 Nov 2024 09:02    143    |  110    |  26     ----------------------------<  122    3.7     |  27     |  0.53   24 Nov 2024 07:20    141    |  112    |  25     ----------------------------<  110    3.9     |  21     |  0.46     Ca    9.1        25 Nov 2024 09:02  Ca    8.7        24 Nov 2024 07:20    TPro  5.9    /  Alb  2.1    /  TBili  <0.1   /  DBili  x      /  AST  31     /  ALT  53     /  AlkPhos  73     25 Nov 2024 09:02  TPro  5.2    /  Alb  1.8    /  TBili  0.1    /  DBili  x      /  AST  51     /  ALT  53     /  AlkPhos  77     24 Nov 2024 07:20          12 Lead ECG:   Ventricular Rate 62 BPM    Atrial Rate 62 BPM    P-R Interval 154 ms    QRS Duration 114 ms    Q-T Interval 488 ms    QTC Calculation(Bazett) 495 ms    P Axis 44 degrees    R Axis -53 degrees    T Axis -29 degrees    Diagnosis Line Sinus rhythm with sinus arrhythmia with occasional premature ventricular complexes  Left anterior fascicular block  Cannot rule out Anterior infarct (cited on or before 19-NOV-2024)    Confirmed by SANDY PAUL ([id number]) on 11/20/2024 9:15:32 AM (11-20-24 @ 03:52)      TRANSTHORACIC ECHOCARDIOGRAM REPORT  ________________________________________________________________________________                                      _______       Pt. Name:       DEION HEATH Study Date:    11/20/2024  MRN:            [id number]       YOB: 1945  Accession #:    [id number]      Age:           79 years  Account#:       [id number]     Gender:        M  Heart Rate:                    Height:        65.75 in (167.00 cm)  Rhythm:                        Weight:   199.00 lb (90.27 kg)  Blood Pressure: 103/58 mmHg    BSA/BMI:       1.99 m² / 32.37 kg/m²  ________________________________________________________________________________________  Referring Physician:    [id number] Curly Byrnes  Interpreting Physician: Lyndsay Marin MD  Primary Sonographer:    Ashli Arana RDCS    CPT:                ECHO TTE WO CON COMP W DOPP - 13295.m  Indication(s):      Abnormal electrocardiogram ECG/EKG - R94.31  Procedure:          Transthoracic echocardiogram with 2-D, M-mode and complete                      spectral and color flow Doppler.  Ordering Location:  ICU1  Admission Status:   Inpatient  Contrast Injection: Verbal consent was obtained for injection of Ultrasonic                      Enhancing Agent following a discussion of risks and                      benefits.                      Endocardial visualization enhanced with Definity Ultrasound                      enhancing agent.  Agitated Saline:    Injection with agitated saline was performed toevaluate for                      intracardiac shunting.  Study Information:  Image quality for this study is technically difficult.    _______________________________________________________________________________________     CONCLUSIONS:      1. Technically difficult image quality.   2. Agitated saline injection was negative for intracardiac shunt (though there is poor visualization of the LV during injection of agitated saline)   3. Despite definity use, the LV endocardium is still not well visualized.   4. In certain views, the LVEF appears grossly preserved though the apex appears hypokinetic.    ________________________________________________________________________________________  FINDINGS:     Interatrial Septum:  Agitated saline injection was negative for intracardiac shunt.  ________________________________________________________________________________________  Electronically signed on 11/20/2024 at 2:37:09 PM by Lyndsay Marin MD      *** Final ***      [facility] -- Janel Jackson, Evan Paul Savella, Goodger, Cohen  Office # [phone]    Follow Up:  HTN, CHF, PE     Subjective/Observations: seen and examined, awake, alert, eating breakfast in bed, denies chest pain, dyspnea, palpitations. Tolerating room air.     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      Club foot  Surgery at birth for Club Foot Right foot      Pilonidal cyst  Surgery 40 years ago      H/O colonoscopy      Spinal stenosis      H/O prostate biopsy        MEDICATIONS  (STANDING):  apixaban 5 milliGRAM(s) Oral every 12 hours  ceftolozane/tazobactam IVPB 1500 milliGRAM(s) IV Intermittent every 8 hours  chlorhexidine 2% Cloths 1 Application(s) Topical <User Schedule>  finasteride 5 milliGRAM(s) Oral daily  hydrocortisone sodium succinate Injectable 50 milliGRAM(s) IV Push every 24 hours  pantoprazole    Tablet 40 milliGRAM(s) Oral before breakfast  polyethylene glycol 3350 17 Gram(s) Oral daily  risperiDONE   Tablet 0.25 milliGRAM(s) Oral two times a day  senna 1 Tablet(s) Oral with breakfast  tamsulosin 0.4 milliGRAM(s) Oral at bedtime    MEDICATIONS  (PRN):  acetaminophen     Tablet .. 650 milliGRAM(s) Oral every 6 hours PRN Temp greater or equal to 38C (100.4F), Mild Pain (1 - 3)  ondansetron Injectable 4 milliGRAM(s) IV Push every 8 hours PRN Nausea and/or Vomiting  sodium chloride 3%  Inhalation 4 milliLiter(s) Inhalation every 8 hours PRN sputum induction    Allergies    levofloxacin (Unknown)  ofloxacin (Unknown)  gatifloxacin (Unknown)    Intolerances    fluoroquinolone antibiotics (Other)  Ketek (Other)  Avelox (Other)  telithromycin (Other)    Vital Signs Last 24 Hrs  T(C): 36.4 (26 Nov 2024 05:01), Max: 36.5 (25 Nov 2024 22:39)  T(F): 97.6 (26 Nov 2024 05:01), Max: 97.7 (25 Nov 2024 22:39)  HR: 71 (26 Nov 2024 05:01) (53 - 71)  BP: 121/62 (26 Nov 2024 05:01) (108/65 - 131/73)  BP(mean): --  RR: 17 (26 Nov 2024 05:01) (17 - 18)  SpO2: 97% (26 Nov 2024 05:01) (96% - 98%)    Parameters below as of 26 Nov 2024 05:01  Patient On (Oxygen Delivery Method): room air      I&O's Summary    25 Nov 2024 07:01  -  26 Nov 2024 07:00  --------------------------------------------------------  IN: 0 mL / OUT: 1251 mL / NET: -1251 mL          TELE: SR 60's PVCs 6-8bts NSVT   PHYSICAL EXAM:  Constitutional: NAD, awake and alert  HEENT: Moist Mucous Membranes, Anicteric  Pulmonary: Non-labored, breath sounds are clear but dim bilaterally, No wheezing, rales or rhonchi  Cardiovascular: Regular, S1 and S2, + murmurs, rubs, gallops or clicks  Gastrointestinal: Bowel Sounds present, soft, nontender.   Lymph: No peripheral edema. No lymphadenopathy.  Skin: No visible rashes or ulcers.  Psych:  Mood & affect appropriate  LABS: All Labs Reviewed:                        11.4   17.58 )-----------( 407      ( 26 Nov 2024 06:01 )             35.7                         12.2   20.70 )-----------( 428      ( 25 Nov 2024 09:02 )             37.8                         11.5   17.42 )-----------( 372      ( 24 Nov 2024 07:20 )             36.2     25 Nov 2024 09:02    143    |  110    |  26     ----------------------------<  122    3.7     |  27     |  0.53   24 Nov 2024 07:20    141    |  112    |  25     ----------------------------<  110    3.9     |  21     |  0.46     Ca    9.1        25 Nov 2024 09:02  Ca    8.7        24 Nov 2024 07:20    TPro  5.9    /  Alb  2.1    /  TBili  <0.1   /  DBili  x      /  AST  31     /  ALT  53     /  AlkPhos  73     25 Nov 2024 09:02  TPro  5.2    /  Alb  1.8    /  TBili  0.1    /  DBili  x      /  AST  51     /  ALT  53     /  AlkPhos  77     24 Nov 2024 07:20          12 Lead ECG:   Ventricular Rate 62 BPM    Atrial Rate 62 BPM    P-R Interval 154 ms    QRS Duration 114 ms    Q-T Interval 488 ms    QTC Calculation(Bazett) 495 ms    P Axis 44 degrees    R Axis -53 degrees    T Axis -29 degrees    Diagnosis Line Sinus rhythm with sinus arrhythmia with occasional premature ventricular complexes  Left anterior fascicular block  Cannot rule out Anterior infarct (cited on or before 19-NOV-2024)    Confirmed by SANDY PAUL ([id number]) on 11/20/2024 9:15:32 AM (11-20-24 @ 03:52)      TRANSTHORACIC ECHOCARDIOGRAM REPORT  ________________________________________________________________________________                                      _______       Pt. Name:       DEION HEATH Study Date:    11/20/2024  MRN:            [id number]       YOB: 1945  Accession #:    [id number]      Age:           79 years  Account#:       [id number]     Gender:        M  Heart Rate:                    Height:        65.75 in (167.00 cm)  Rhythm:                        Weight:   199.00 lb (90.27 kg)  Blood Pressure: 103/58 mmHg    BSA/BMI:       1.99 m² / 32.37 kg/m²  ________________________________________________________________________________________  Referring Physician:    [id number] Curly Byrnes  Interpreting Physician: Lyndsay Marin MD  Primary Sonographer:    Ashli Arana RDCS    CPT:                ECHO TTE WO CON COMP W DOPP - 60009.m  Indication(s):      Abnormal electrocardiogram ECG/EKG - R94.31  Procedure:          Transthoracic echocardiogram with 2-D, M-mode and complete                      spectral and color flow Doppler.  Ordering Location:  ICU1  Admission Status:   Inpatient  Contrast Injection: Verbal consent was obtained for injection of Ultrasonic                      Enhancing Agent following a discussion of risks and                      benefits.                      Endocardial visualization enhanced with Definity Ultrasound                      enhancing agent.  Agitated Saline:    Injection with agitated saline was performed toevaluate for                      intracardiac shunting.  Study Information:  Image quality for this study is technically difficult.    _______________________________________________________________________________________     CONCLUSIONS:      1. Technically difficult image quality.   2. Agitated saline injection was negative for intracardiac shunt (though there is poor visualization of the LV during injection of agitated saline)   3. Despite definity use, the LV endocardium is still not well visualized.   4. In certain views, the LVEF appears grossly preserved though the apex appears hypokinetic.    ________________________________________________________________________________________  FINDINGS:     Interatrial Septum:  Agitated saline injection was negative for intracardiac shunt.  ________________________________________________________________________________________  Electronically signed on 11/20/2024 at 2:37:09 PM by Lyndsay Marin MD      *** Final ***

## 2024-11-26 NOTE — CHART NOTE - NSCHARTNOTEFT_GEN_A_CORE
78 yo M with PMHx HTN, CHF, PE (on eliquis), Myasthenia Gravis, and chronic LE edema, nephrolithiasis (s/p nephrostomy tube placement ), urosepsis, BPH BIBEMS from [facility] for hypertension and elevated WBC count. Admitted to ICU for septic shock 2/2 UTI and Enter/rhinovirus. ID following.     Patient presented to [facility] with guerrero, midline and right pcn tube in place. WBC elevated, on IV abx. Per ID and IR, plan for Right PCN tube replacement this afternoon. Last dose of Eliquis at 0600 today. Called by tele, patient with ST upto 140's. Patient seen and examined, in NAD, hemodynamically stable, has had episodes of NSVT while admitted. Cardiology following, started on BB, will give stat dose toprol xl 12.5 mg now. EKG done, shows NSR no evidence of ischemia. Per my discussion with Dr. Marshall, patient can go to IR for Right PCN tube replacement. IR called, plan for exchange this afternoon.

## 2024-11-26 NOTE — PROGRESS NOTE ADULT - ASSESSMENT
78 yo M with PMHx HTN, CHF, PE (on eliquis), Myasthenia Gravis, and chronic LE edema, R ureteral obstructing stone s/p R PCN placement on 9/6/24, urosepsis, BPH, who presented from [facility] for hypertension and elevated WBC count. Per nursing home paperwork, pt hypertensive to 154/105 and recieved metoprolol 25 mg (8:10 PM), and reported pt with "elevated WBC". Upon arrival, pt found to be hypotensive to 90/64. Arrived to ED with nephrostomy tube, chronic guerrero and PICC line in place. Pt was recieving IV zosyn x7d (start date 11/04) per paperwork review for UTI.    Patient being treated for UTI. Guerrero exchanged in the ED. Nephrostomy exchanged advised given recurrent UTI. CT did not show any obvious signs of acute infection. Urine culture grew klebsiella, pseudomonas, proteus. Sensitivities reviewed. Blood cultures no growth.     No fever and WBC improved today. Noted to have RUE swelling on 11/24 concerning for phlebitis, IV line removed on 11/24 and swelling has since improved. US showed no DVT.    Also incidentally with RUL calcified granulomas, also in liver in spleen. Unclear etiology, patient without symptoms of tuberculosis, no known hx of TB infection or exposure. He was born in [place], and has not lived outside [place]. Sputum Quantiferon indeterminate, but AFB smear x 3 are negative. Serum Fungitell is low.     #Leukocytosis  #UTI  #MDRO pseudomonas and ESBL proteus  #Rhinovirus positive  #Calcified granulomas  #Hx of fluoroquinolone allergy    -continue ceftolozane-tazobactam--plan for 7 days until 11/29 pending nephrostomy exchange  -can remove midline  -sputum AFB cultures x 3 in process  -serum galactomannan, urine histoplasma antigen pending  -monitor WBC  -RUE elevation, warm compress  -advised outpatient with follow up with Pulmonary and GI/Hepatology  -contact isolation  -discussed with son at bedside  -discussed with Dr. Marshall  -I will be covered by Dr. Mckinley tomorrow, 11/27/24    Rachel Connor MD  Division of Infectious Diseases   Cell [phone] between 8am and 6pm   After 6pm and weekends please call ID service at [phone].     35 minutes spent on total encounter assessing patient, examination, chart review, counseling and coordinating care by the attending physician/nurse/care manager.

## 2024-11-26 NOTE — PROGRESS NOTE ADULT - SUBJECTIVE AND OBJECTIVE BOX
Neurology follow up note    DEION HEATH79yMale      Interval History:    Patient feels ok no new complaints.    Allergies    levofloxacin (Unknown)  ofloxacin (Unknown)  gatifloxacin (Unknown)    Intolerances    fluoroquinolone antibiotics (Other)  Ketek (Other)  Avelox (Other)  telithromycin (Other)      MEDICATIONS    acetaminophen     Tablet .. 650 milliGRAM(s) Oral every 6 hours PRN  apixaban 5 milliGRAM(s) Oral every 12 hours  ceftolozane/tazobactam IVPB 1500 milliGRAM(s) IV Intermittent every 8 hours  chlorhexidine 2% Cloths 1 Application(s) Topical <User Schedule>  finasteride 5 milliGRAM(s) Oral daily  hydrocortisone sodium succinate Injectable 50 milliGRAM(s) IV Push every 24 hours  ondansetron Injectable 4 milliGRAM(s) IV Push every 8 hours PRN  pantoprazole    Tablet 40 milliGRAM(s) Oral before breakfast  polyethylene glycol 3350 17 Gram(s) Oral daily  risperiDONE   Tablet 0.25 milliGRAM(s) Oral two times a day  senna 1 Tablet(s) Oral with breakfast  sodium chloride 3%  Inhalation 4 milliLiter(s) Inhalation every 8 hours PRN  tamsulosin 0.4 milliGRAM(s) Oral at bedtime              Vital Signs Last 24 Hrs  T(C): 36.4 (26 Nov 2024 05:01), Max: 36.5 (25 Nov 2024 22:39)  T(F): 97.6 (26 Nov 2024 05:01), Max: 97.7 (25 Nov 2024 22:39)  HR: 71 (26 Nov 2024 05:01) (53 - 71)  BP: 121/62 (26 Nov 2024 05:01) (108/65 - 131/73)  BP(mean): --  RR: 17 (26 Nov 2024 05:01) (17 - 18)  SpO2: 97% (26 Nov 2024 05:01) (96% - 98%)    Parameters below as of 26 Nov 2024 05:01  Patient On (Oxygen Delivery Method): room air        REVIEW OF SYSTEMS:  CONSTITUTIONAL:  The patient denies fever, chills, night sweats.  HEAD:  No headache.  EYES:  No double vision or blurry vision.  EARS:  No ringing in the ears.  NECK:  No neck pain.  CARDIOVASCULAR:  No chest pain.  RESPIRATORY:  No shortness of breath.  ABDOMEN:  No nausea, vomiting, or abdominal pain.  EXTREMITIES/NEUROLOGICAL:  Does complain of numbness and burning sensation in his legs.  MUSCULOSKELETAL:  Occasional joint pain.  GENERAL:  Does feels weak all over, but no droopy eyes.    PHYSICAL EXAMINATION:  HEAD:  Normocephalic, atraumatic.  EYES:  No scleral icterus.  EARS:  Hearing bilaterally intact.  NECK:  Supple.  CARDIOVASCULAR:  S1 and S2 heard.  RESPIRATORY:  Air entry bilaterally.  ABDOMEN:  Soft, nontender.  EXTREMITIES:  No clubbing or cyanosis were noted.    NEUROLOGIC:  The patient awake, alert, location was hospital, year 2002, month was October, was able to name simple objects.  Recall was 0/3, probably 1 of 3, but does suffer from memory issues.  The patient was able to look up for over 1 minute with no ptosis.  Extraocular movements were intact.  Speech was fluent.  Smile symmetric.  Motor, the patient decreased range of motion of bilateral shoulders, suspect secondary to rotator cuff issues.  When elevated, was able to maintain in the air with slow drops bilaterally.  It is proximally 3+/5, distally was 4/5.  Bilateral lower extremities, slight movement at the feet and knees was 1/5.  Sensory, lower extremities not tested.  The patient said his legs are very sensitive.                    LABS:  CBC Full  -  ( 25 Nov 2024 09:02 )  WBC Count : 20.70 K/uL  RBC Count : 3.93 M/uL  Hemoglobin : 12.2 g/dL  Hematocrit : 37.8 %  Platelet Count - Automated : 428 K/uL  Mean Cell Volume : 96.2 fl  Mean Cell Hemoglobin : 31.0 pg  Mean Cell Hemoglobin Concentration : 32.3 g/dL  Auto Neutrophil # : 17.75 K/uL  Auto Lymphocyte # : 1.07 K/uL  Auto Monocyte # : 1.27 K/uL  Auto Eosinophil # : 0.02 K/uL  Auto Basophil # : 0.05 K/uL  Auto Neutrophil % : 85.8 %  Auto Lymphocyte % : 5.2 %  Auto Monocyte % : 6.1 %  Auto Eosinophil % : 0.1 %  Auto Basophil % : 0.2 %    Urinalysis Basic - ( 25 Nov 2024 09:02 )    Color: x / Appearance: x / SG: x / pH: x  Gluc: 122 mg/dL / Ketone: x  / Bili: x / Urobili: x   Blood: x / Protein: x / Nitrite: x   Leuk Esterase: x / RBC: x / WBC x   Sq Epi: x / Non Sq Epi: x / Bacteria: x      11-25    143  |  110[H]  |  26[H]  ----------------------------<  122[H]  3.7   |  27  |  0.53    Ca    9.1      25 Nov 2024 09:02    TPro  5.9[L]  /  Alb  2.1[L]  /  TBili  <0.1[L]  /  DBili  x   /  AST  31  /  ALT  53  /  AlkPhos  73  11-25    Hemoglobin A1C:     LIVER FUNCTIONS - ( 25 Nov 2024 09:02 )  Alb: 2.1 g/dL / Pro: 5.9 g/dL / ALK PHOS: 73 U/L / ALT: 53 U/L / AST: 31 U/L / GGT: x           Vitamin B12         RADIOLOGY    ANALYSIS AND PLAN:  This is an 79-year-old male with altered mental status and history of myasthenia gravis.    .Altered mental status, most likely metabolic encephalopathy secondary to underlying infectious type process.  .Infection workup as needed.  Antibiotics as needed.  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  Questionable if any muscle weakness as well.  The patient appears to be at his baseline from my previous notes.  The patient had refused any type of treatment in the past.   For now, we would recommend continue the patient on his prednisone.  For history of infection with myasthenia gravis.  If possible, would recommend to limit the use of aminoglycosides, fluoroquinolones, macrolides, cardiovascular drugs such as beta blockers, procainamide, other medication such as statins.  Monitor the patient's magnesium levels.  If antibiotics are used, always risks versus benefits must be weighed for the specific antibiotics.  .For history of hypertension, monitor systolic blood pressure.  follow up ACH antibodies   .Attempted to contact son, Olvin, at [phone] yesterday     46  minutes of time was spent with patient plan of care, reviewing data, speaking to multidisciplinary healthcare team with greater than 50% of time counseling care and coordination.    Thank you for courtesy of consultation.  PATIENT HAS NOT YET BEEN SEEN AND EXAMINED TODAY. NOTE AND CHART REVIEWED IN AM AND EXAM FORM PREVIOUS.  ONCE PATIENT SEEN, CHART WILL BE UPDATE AT PRESENT NOTE IS INCOMPLETE       Neurology follow up note    DEION [id number]      Interval History:    Patient feels ok no new complaints.    Allergies    levofloxacin (Unknown)  ofloxacin (Unknown)  gatifloxacin (Unknown)    Intolerances    fluoroquinolone antibiotics (Other)  Ketek (Other)  Avelox (Other)  telithromycin (Other)      MEDICATIONS    acetaminophen     Tablet .. 650 milliGRAM(s) Oral every 6 hours PRN  apixaban 5 milliGRAM(s) Oral every 12 hours  ceftolozane/tazobactam IVPB 1500 milliGRAM(s) IV Intermittent every 8 hours  chlorhexidine 2% Cloths 1 Application(s) Topical <User Schedule>  finasteride 5 milliGRAM(s) Oral daily  hydrocortisone sodium succinate Injectable 50 milliGRAM(s) IV Push every 24 hours  ondansetron Injectable 4 milliGRAM(s) IV Push every 8 hours PRN  pantoprazole    Tablet 40 milliGRAM(s) Oral before breakfast  polyethylene glycol 3350 17 Gram(s) Oral daily  risperiDONE   Tablet 0.25 milliGRAM(s) Oral two times a day  senna 1 Tablet(s) Oral with breakfast  sodium chloride 3%  Inhalation 4 milliLiter(s) Inhalation every 8 hours PRN  tamsulosin 0.4 milliGRAM(s) Oral at bedtime              Vital Signs Last 24 Hrs  T(C): 36.4 (26 Nov 2024 05:01), Max: 36.5 (25 Nov 2024 22:39)  T(F): 97.6 (26 Nov 2024 05:01), Max: 97.7 (25 Nov 2024 22:39)  HR: 71 (26 Nov 2024 05:01) (53 - 71)  BP: 121/62 (26 Nov 2024 05:01) (108/65 - 131/73)  BP(mean): --  RR: 17 (26 Nov 2024 05:01) (17 - 18)  SpO2: 97% (26 Nov 2024 05:01) (96% - 98%)    Parameters below as of 26 Nov 2024 05:01  Patient On (Oxygen Delivery Method): room air        REVIEW OF SYSTEMS:  CONSTITUTIONAL:  The patient denies fever, chills, night sweats.  HEAD:  No headache.  EYES:  No double vision or blurry vision.  EARS:  No ringing in the ears.  NECK:  No neck pain.  CARDIOVASCULAR:  No chest pain.  RESPIRATORY:  No shortness of breath.  ABDOMEN:  No nausea, vomiting, or abdominal pain.  EXTREMITIES/NEUROLOGICAL:  Does complain of numbness and burning sensation in his legs.  MUSCULOSKELETAL:  Occasional joint pain.  GENERAL:  Does feels weak all over, but no droopy eyes.    PHYSICAL EXAMINATION:  HEAD:  Normocephalic, atraumatic.  EYES:  No scleral icterus.  EARS:  Hearing bilaterally intact.  NECK:  Supple.  CARDIOVASCULAR:  S1 and S2 heard.  RESPIRATORY:  Air entry bilaterally.  ABDOMEN:  Soft, nontender.  EXTREMITIES:  No clubbing or cyanosis were noted.    NEUROLOGIC:  The patient awake, alert, location was hospital, year 2002, month was October, was able to name simple objects.  Recall was 0/3, probably 1 of 3, but does suffer from memory issues.  The patient was able to look up for over 1 minute with no ptosis.  Extraocular movements were intact.  Speech was fluent.  Smile symmetric.  Motor, the patient decreased range of motion of bilateral shoulders, suspect secondary to rotator cuff issues.  When elevated, was able to maintain in the air with slow drops bilaterally.  It is proximally 3+/5, distally was 4/5.  Bilateral lower extremities, slight movement at the feet and knees was 1/5.  Sensory, lower extremities not tested.  The patient said his legs are very sensitive.      LABS:  CBC Full  -  ( 25 Nov 2024 09:02 )  WBC Count : 20.70 K/uL  RBC Count : 3.93 M/uL  Hemoglobin : 12.2 g/dL  Hematocrit : 37.8 %  Platelet Count - Automated : 428 K/uL  Mean Cell Volume : 96.2 fl  Mean Cell Hemoglobin : 31.0 pg  Mean Cell Hemoglobin Concentration : 32.3 g/dL  Auto Neutrophil # : 17.75 K/uL  Auto Lymphocyte # : 1.07 K/uL  Auto Monocyte # : 1.27 K/uL  Auto Eosinophil # : 0.02 K/uL  Auto Basophil # : 0.05 K/uL  Auto Neutrophil % : 85.8 %  Auto Lymphocyte % : 5.2 %  Auto Monocyte % : 6.1 %  Auto Eosinophil % : 0.1 %  Auto Basophil % : 0.2 %    Urinalysis Basic - ( 25 Nov 2024 09:02 )    Color: x / Appearance: x / SG: x / pH: x  Gluc: 122 mg/dL / Ketone: x  / Bili: x / Urobili: x   Blood: x / Protein: x / Nitrite: x   Leuk Esterase: x / RBC: x / WBC x   Sq Epi: x / Non Sq Epi: x / Bacteria: x      11-25    143  |  110[H]  |  26[H]  ----------------------------<  122[H]  3.7   |  27  |  0.53    Ca    9.1      25 Nov 2024 09:02    TPro  5.9[L]  /  Alb  2.1[L]  /  TBili  <0.1[L]  /  DBili  x   /  AST  31  /  ALT  53  /  AlkPhos  73  11-25    Hemoglobin A1C:     LIVER FUNCTIONS - ( 25 Nov 2024 09:02 )  Alb: 2.1 g/dL / Pro: 5.9 g/dL / ALK PHOS: 73 U/L / ALT: 53 U/L / AST: 31 U/L / GGT: x           Vitamin B12         RADIOLOGY    ANALYSIS AND PLAN:  This is an 79-year-old male with altered mental status and history of myasthenia gravis.    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  Questionable if any muscle weakness as well.  The patient appears to be at his baseline from my previous notes.  The patient had refused any type of treatment in the past.   For now, we would recommend continue the patient on his prednisone.  For history of infection with myasthenia gravis.  If possible, would recommend to limit the use of aminoglycosides, fluoroquinolones, macrolides, cardiovascular drugs such as beta blockers, procainamide, other medication such as statins.  Monitor the patient's magnesium levels.  If antibiotics are used, always risks versus benefits must be weighed for the specific antibiotics.  .For history of hypertension, monitor systolic blood pressure.  follow up ACH antibodies   neurologic   wise stable     46  minutes of time was spent with patient plan of care, reviewing data, speaking to multidisciplinary healthcare team with greater than 50% of time counseling care and coordination.    Thank you for courtesy of consultation.

## 2024-11-26 NOTE — PROGRESS NOTE ADULT - ASSESSMENT
80 yo M with PMHx HTN, CHF, PE (on eliquis), Myasthenia Gravis, and chronic LE edema, nephrolithiasis (s/p nephrostomy tube placement ), urosepsis, BPH BIBEMS from [facility] for hypertension and elevated WBC count. Admitted to ICU for septic shock 2/2 UTI and Enter/rhinovirus.

## 2024-11-26 NOTE — PROGRESS NOTE ADULT - SUBJECTIVE AND OBJECTIVE BOX
Patient is a 79y old  Male who presents with a chief complaint of Urosepsis (26 Nov 2024 08:40)    pt seen and examine today  pt feeling better , tolerating po , midline removed as 2 month old ,  going ir to change nephrostomy tube today , no distress ,    guerrero +.   INTERVAL HPI/OVERNIGHT EVENTS:     T(C): 36.7 (11-26-24 @ 12:22), Max: 36.7 (11-26-24 @ 12:22)  HR: 100 (11-26-24 @ 12:22) (53 - 100)  BP: 122/81 (11-26-24 @ 12:22) (108/65 - 131/73)  RR: 18 (11-26-24 @ 12:22) (17 - 18)  SpO2: 96% (11-26-24 @ 12:22) (96% - 98%)  Wt(kg): --  I&O's Summary    25 Nov 2024 07:01  -  26 Nov 2024 07:00  --------------------------------------------------------  IN: 0 mL / OUT: 1251 mL / NET: -1251 mL      REVIEW OF SYSTEMS:  CONSTITUTIONAL: No fever, weight loss, or fatigue  EYES: No eye pain, visual disturbances, or discharge  ENMT:  No difficulty hearing, tinnitus, vertigo; No sinus or throat pain  NECK: No pain or stiffness  RESPIRATORY: No cough, wheezing, chills  No shortness of breath  CARDIOVASCULAR: No chest pain, palpitations, dizziness, or leg swelling  GASTROINTESTINAL: No abdominal or epigastric pain. No nausea, vomiting, or hematemesis; No diarrhea or constipation.  GENITOURINARY: No dysuria, frequency, hematuria, or incontinence  NEUROLOGICAL: No headaches, memory loss, loss of strength, numbness, or tremors  SKIN: No itching, burning, rashes, or lesions   MUSCULOSKELETAL: No joint pain or swelling; No muscle, back, or extremity pain    PHYSICAL EXAM:  GENERAL: NAD,   HEAD:  Atraumatic, Normocephalic  EYES: EOMI, PERRLA, conjunctiva and sclera clear  ENMT: Moist mucous membranes  NECK: Supple, No JVD  NERVOUS SYSTEM:  Alert & Oriented X3; Motor Strength 5/5 B/L upper and lower extremities; DTRs 2+ intact and symmetric  CHEST/LUNG:  percussion bilaterally; No rales, rhonchi, wheezing,   HEART: Regular rate and rhythm; No murmurs,  no tachy   ABDOMEN: Soft, Nontender, Nondistended; Bowel sounds present  EXTREMITIES:  2+ Peripheral Pulses, No clubbing, cyanosis, or edema  SKIN: No rashes or lesions/ nephrostomy + daring clear urine   gu guerrero         MEDICATIONS  (STANDING):  apixaban 5 milliGRAM(s) Oral every 12 hours  ceftolozane/tazobactam IVPB 1500 milliGRAM(s) IV Intermittent every 8 hours  chlorhexidine 2% Cloths 1 Application(s) Topical <User Schedule>  finasteride 5 milliGRAM(s) Oral daily  hydrocortisone sodium succinate Injectable 50 milliGRAM(s) IV Push every 24 hours  metoprolol succinate ER 12.5 milliGRAM(s) Oral every 12 hours  pantoprazole    Tablet 40 milliGRAM(s) Oral before breakfast  polyethylene glycol 3350 17 Gram(s) Oral daily  potassium chloride    Tablet ER 40 milliEquivalent(s) Oral every 4 hours  risperiDONE   Tablet 0.25 milliGRAM(s) Oral two times a day  senna 1 Tablet(s) Oral with breakfast  tamsulosin 0.4 milliGRAM(s) Oral at bedtime    MEDICATIONS  (PRN):  acetaminophen     Tablet .. 650 milliGRAM(s) Oral every 6 hours PRN Temp greater or equal to 38C (100.4F), Mild Pain (1 - 3)  ondansetron Injectable 4 milliGRAM(s) IV Push every 8 hours PRN Nausea and/or Vomiting  sodium chloride 3%  Inhalation 4 milliLiter(s) Inhalation every 8 hours PRN sputum induction      LABS:                        11.4   17.58 )-----------( 407      ( 26 Nov 2024 06:01 )             35.7     11-26    144  |  112[H]  |  24[H]  ----------------------------<  111[H]  3.2[L]   |  24  |  0.50    Ca    8.5      26 Nov 2024 06:01    TPro  5.9[L]  /  Alb  2.1[L]  /  TBili  <0.1[L]  /  DBili  x   /  AST  31  /  ALT  53  /  AlkPhos  73  11-25      Urinalysis Basic - ( 26 Nov 2024 06:01 )    Color: x / Appearance: x / SG: x / pH: x  Gluc: 111 mg/dL / Ketone: x  / Bili: x / Urobili: x   Blood: x / Protein: x / Nitrite: x   Leuk Esterase: x / RBC: x / WBC x   Sq Epi: x / Non Sq Epi: x / Bacteria: x                  RADIOLOGY & ADDITIONAL TESTS:    Imaging Personally Reviewed:   no new test      Patient is a 79y old  Male who presents with a chief complaint of Urosepsis (26 Nov 2024 08:40)    pt seen and examine today  pt feeling better , tolerating po , midline removed today [was  2 month old ],  going ir to change nephrostomy tube today , no distress ,    guerrero +.   INTERVAL HPI/OVERNIGHT EVENTS:     T(C): 36.7 (11-26-24 @ 12:22), Max: 36.7 (11-26-24 @ 12:22)  HR: 100 (11-26-24 @ 12:22) (53 - 100)  BP: 122/81 (11-26-24 @ 12:22) (108/65 - 131/73)  RR: 18 (11-26-24 @ 12:22) (17 - 18)  SpO2: 96% (11-26-24 @ 12:22) (96% - 98%)  Wt(kg): --  I&O's Summary    25 Nov 2024 07:01  -  26 Nov 2024 07:00  --------------------------------------------------------  IN: 0 mL / OUT: 1251 mL / NET: -1251 mL      REVIEW OF SYSTEMS:  CONSTITUTIONAL: No fever, weight loss, or fatigue  EYES: No eye pain, visual disturbances, or discharge  ENMT:  No difficulty hearing, tinnitus, vertigo; No sinus or throat pain  NECK: No pain or stiffness  RESPIRATORY: No cough, wheezing, chills  No shortness of breath  CARDIOVASCULAR: No chest pain, palpitations, dizziness, or leg swelling  GASTROINTESTINAL: No abdominal or epigastric pain. No nausea, vomiting, or hematemesis; No diarrhea or constipation.  GENITOURINARY: No dysuria, frequency, hematuria, or incontinence  NEUROLOGICAL: No headaches, memory loss, loss of strength, numbness, or tremors  SKIN: No itching, burning, rashes, or lesions   MUSCULOSKELETAL: No joint pain or swelling; No muscle, back, or extremity pain    PHYSICAL EXAM:  GENERAL: NAD,   HEAD:  Atraumatic, Normocephalic  EYES: EOMI, PERRLA, conjunctiva and sclera clear  ENMT: Moist mucous membranes  NECK: Supple, No JVD  NERVOUS SYSTEM:  Alert & Oriented X3; Motor Strength 5/5 B/L upper and lower extremities; DTRs 2+ intact and symmetric  CHEST/LUNG:  percussion bilaterally; No rales, rhonchi, wheezing,   HEART: Regular rate and rhythm; No murmurs,  no tachy   ABDOMEN: Soft, Nontender, Nondistended; Bowel sounds present  EXTREMITIES:  2+ Peripheral Pulses, No clubbing, cyanosis, or edema  SKIN: No rashes or lesions/ nephrostomy + daring clear urine   gu guerrero         MEDICATIONS  (STANDING):  apixaban 5 milliGRAM(s) Oral every 12 hours  ceftolozane/tazobactam IVPB 1500 milliGRAM(s) IV Intermittent every 8 hours  chlorhexidine 2% Cloths 1 Application(s) Topical <User Schedule>  finasteride 5 milliGRAM(s) Oral daily  hydrocortisone sodium succinate Injectable 50 milliGRAM(s) IV Push every 24 hours  metoprolol succinate ER 12.5 milliGRAM(s) Oral every 12 hours  pantoprazole    Tablet 40 milliGRAM(s) Oral before breakfast  polyethylene glycol 3350 17 Gram(s) Oral daily  potassium chloride    Tablet ER 40 milliEquivalent(s) Oral every 4 hours  risperiDONE   Tablet 0.25 milliGRAM(s) Oral two times a day  senna 1 Tablet(s) Oral with breakfast  tamsulosin 0.4 milliGRAM(s) Oral at bedtime    MEDICATIONS  (PRN):  acetaminophen     Tablet .. 650 milliGRAM(s) Oral every 6 hours PRN Temp greater or equal to 38C (100.4F), Mild Pain (1 - 3)  ondansetron Injectable 4 milliGRAM(s) IV Push every 8 hours PRN Nausea and/or Vomiting  sodium chloride 3%  Inhalation 4 milliLiter(s) Inhalation every 8 hours PRN sputum induction      LABS:                        11.4   17.58 )-----------( 407      ( 26 Nov 2024 06:01 )             35.7     11-26    144  |  112[H]  |  24[H]  ----------------------------<  111[H]  3.2[L]   |  24  |  0.50    Ca    8.5      26 Nov 2024 06:01    TPro  5.9[L]  /  Alb  2.1[L]  /  TBili  <0.1[L]  /  DBili  x   /  AST  31  /  ALT  53  /  AlkPhos  73  11-25      Urinalysis Basic - ( 26 Nov 2024 06:01 )    Color: x / Appearance: x / SG: x / pH: x  Gluc: 111 mg/dL / Ketone: x  / Bili: x / Urobili: x   Blood: x / Protein: x / Nitrite: x   Leuk Esterase: x / RBC: x / WBC x   Sq Epi: x / Non Sq Epi: x / Bacteria: x                  RADIOLOGY & ADDITIONAL TESTS:    Imaging Personally Reviewed:   no new test

## 2024-11-26 NOTE — PROGRESS NOTE ADULT - PROBLEM SELECTOR PROBLEM 9
Myasthenia gravis

## 2024-11-26 NOTE — PROGRESS NOTE ADULT - PROBLEM SELECTOR PROBLEM 6
Lung granuloma

## 2024-11-26 NOTE — PROGRESS NOTE ADULT - PROBLEM SELECTOR PLAN 5
Pt with documented hx of CHF, unspecified type however on GDMT for HFrEF  - TTE (9/2024): LVEF 55-60%, no diastolic dysfunction, moderate MR  - Evaluated by cardiology during admission at [facility] (9/2024) for acute CHF, started on GDMT  - Pro-BNP on admission 1988  - Hold home metoprolol, spironolactone and Lasix in setting of hypotension  - Strict I&Os   - Does not appear clinically volume overloaded  - Cardiology ([facility]) consulted, Screen#: [id number]  Screen Date: 2022  Screen Comment: N/A    Screen#: [id number]  Screen Date: 2022  Screen Comment: N/A

## 2024-11-26 NOTE — PROGRESS NOTE ADULT - PROBLEM SELECTOR PLAN 7
- CT Chest: 2.6 cm left lobe thyroid nodule and evidence of granulomatous infection in lungs, liver and spleen  - TSH wnl
CT Chest:  2.6 cm left lobe thyroid nodule and evidence of granulomatous infection in lungs, liver and spleen  TSH wnl
CT Chest:  2.6 cm left lobe thyroid nodule and evidence of granulomatous infection in lungs, liver and spleen  TSH wnl
- CT Chest: 2.6 cm left lobe thyroid nodule and evidence of granulomatous infection in lungs, liver and spleen  - TSH wnl
- CT Chest: 2.6 cm left lobe thyroid nodule and evidence of granulomatous infection in lungs, liver and spleen  - TSH wnl
CT Chest:  2.6 cm left lobe thyroid nodule and evidence of granulomatous infection in lungs, liver and spleen  TSH wnl
- CT Chest: 2.6 cm left lobe thyroid nodule and evidence of granulomatous infection in lungs, liver and spleen  - TSH wnl

## 2024-11-26 NOTE — PROGRESS NOTE ADULT - PROBLEM SELECTOR PLAN 4
- Pt hypotensive in the setting of septic shock requiring pressor support, now s/p Levophed gtt and Midodrine  - BP stable
- Pt hypotensive in the setting of septic shock  -  off  Levophed gtt  on midodrine bp stable  Management per ICU
- Pt hypotensive in the setting of septic shock  -  off  Levophed gtt  on midodrine bp stable  Management per ICU
- Pt hypotensive in the setting of septic shock requiring pressor support, now s/p Levophed gtt and Midodrine  - BP stable
- Pt hypotensive in the setting of septic shock  - Levophed gtt   Management per ICU

## 2024-11-26 NOTE — CHART NOTE - NSCHARTNOTEFT_GEN_A_CORE
78 yo M with PMHx HTN, CHF, PE (on eliquis), Myasthenia Gravis, and chronic LE edema, nephrolithiasis (s/p nephrostomy tube placement ), urosepsis, BPH BIBEMS from [facility] for hypertension and elevated WBC count. Admitted to ICU for septic shock 2/2 UTI and Enter/rhinovirus. ID following.     Patient presented to [facility] with guerrero, midline and right pcn tube in place. WBC elevated, on IV abx. Per ID and IR, plan for Right PCN tube replacement this afternoon. Last dose of Eliquis at 0600 today. Patient and son Olvin in agreement. Plan reviewed with Dr. Marshall.

## 2024-11-26 NOTE — PROGRESS NOTE ADULT - PROBLEM SELECTOR PLAN 6
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 wilson  consulted,  afb so far neg

## 2024-11-26 NOTE — PROGRESS NOTE ADULT - PROBLEM SELECTOR PROBLEM 7
Thyroid nodule

## 2024-11-26 NOTE — PROGRESS NOTE ADULT - PROBLEM SELECTOR PLAN 10
Continue home flomax and finasteride

## 2024-11-26 NOTE — PROGRESS NOTE ADULT - PROBLEM SELECTOR PLAN 2
Troponin 1099.8 on admission likely 2/2 demand ischemia  - Pt asymptomatic. Denies chest pain, pressure, palpitations, SOB, nausea  - EKG reviewed, no evidence of ischemic changes, ST/T changes or Q waves  - s/p ASA 324mg x1 in ED  - Trops trended to peak  - Cardio ([facility]) following

## 2024-11-26 NOTE — PROGRESS NOTE ADULT - PROBLEM SELECTOR PLAN 3
- Rhino/enterovirus +  - Pt asymptomatic, less likely cause of sepsis given history and +UTI  - Supportive care  - Tylenol PRN for fever

## 2024-11-26 NOTE — PROGRESS NOTE ADULT - PROBLEM SELECTOR PLAN 1
- Pt admitted to ICU for  septic shock   sepsis  poa  2/2  UTI and rhino/enterovirus with  -IR s/p percutaneous nephrostomy tube placement done 9/6/24  [facility]  and [facility] lauren  , LAUREN CHANGED IN ED .- Pt p/w elevated WBC, pt asymptomatic. Hx uti  sepsis/klebsiella bacteremia treated with IV Rocephin, hospital course complicated by Afib with RVR during recent admission (9/2024)  - Per CI paperwork, pt s/p 7d course of IV zosyn via midline  (11/4-11/10)  - Met sepsis criteria on admission with leukocytosis, lactate, tachycardia  - WBC appears to be at baseline, per chart review baseline around 12-13 (on chronic steroids for MG)  - UA: Turbid, large blood, large LEC, positive nitrite, many bacteria, WBC TNTC  - CT: Urinary bladder collapsed, limiting evaluation, at least 3 stones measuring up to 1.0 cm. R nephrostomy tube intact .  - s/p Levophed gtt. s/p midodrine s/p hydrocortisone  taper   now  25 mg daily for 2 day  than back Prednisone home dose   - s/p IV Meropenem, -continue   Zebraxa ceftolozane-tazobactam--plan for 5-7 day- till Friday as per id dr wilson   -   called uro consult / [facility] surg [facility]  as pt still have leucocytosis  this time nephrostomy need to be  changed already month old - IR will change today , also 2 month old midline removed today by perry manzanares.   - Blood cult neg  and sputum Cx negative  - Urine cx + klebsiella, pseudomonas, proteus  - sputum AFB x 3 negative  - Fungitell, Histoplasma, Aspergillus, Quantiferon pending  - ID (Dr. Wilson) following,   sputum AFB cultures x 3 in process-  so far neg   -Quantiferon pending

## 2024-11-26 NOTE — PROGRESS NOTE ADULT - PROBLEM SELECTOR PLAN 8
Continue home Eliquis 5mg BID

## 2024-11-26 NOTE — PRE PROCEDURE NOTE - PRE PROCEDURE EVALUATION
Interventional Radiology    HPI: 79y Male with PMHx HTN, CHF, PE (on eliquis), Myasthenia Gravis, and chronic LE edema, nephrolithiasis (s/p nephrostomy tube placement 9/6 at [facility]), urosepsis, BPH BIBEMS from [facility] for hypertension and elevated WBC count. Admitted to ICU for septic shock 2/2 UTI and Enter/rhinovirus now downgraded to the floor for management of urosepsis and UTI. Patient with persistent elevated WBC and per ID and urology, recommend R nephrostomy tube exchange in light of cultures growing carbapenem resistant pseudomonas, proteus mirabilis ESBL, and klebsiella pneumoniae.  Patient with recent episode of tachycardia this afternoon into 140s.  EKG done shows NSR and cardiology gave patient toprol XL 12.5mg.  Received eliquis this morning 6am.  Received dose of Zerbaxa at 1430.    Allergies: levofloxacin (Unknown)  Cipro (Unknown)  ofloxacin (Unknown)  gatifloxacin (Unknown)    Medications (Abx/Cardiac/Anticoagulation/Blood Products)  apixaban: 5 milliGRAM(s) Oral (11-26 @ 05:50)  ceftolozane/tazobactam IVPB: 100 mL/Hr IV Intermittent (11-26 @ 14:33)    Data:    T(C): 36.7  HR: 100  BP: 122/81  RR: 18  SpO2: 96%    Exam  General: No acute distress  Chest: Non labored breathing  Abdomen: Non-distended  Extremities: No swelling, warm    -WBC 17.58 / HgB 11.4 / Hct 35.7 / Plt 407  -Na 144 / Cl 112 / BUN 24 / Glucose 111  -K 3.2 / CO2 24 / Cr 0.50  -ALT -- / Alk Phos -- / T.Bili --    Imaging: reviewed    Plan: 79y Male presents for nephrostomy tube exchange  -Risks/Benefits/alternatives explained with the patient and/or healthcare proxy and witnessed informed consent obtained.

## 2024-11-27 LAB
ACHR BLOCK AB SER-ACNC: 17 % — SIGNIFICANT CHANGE UP (ref 0–25)
ANION GAP SERPL CALC-SCNC: 5 MMOL/L — SIGNIFICANT CHANGE UP (ref 5–17)
BASOPHILS # BLD AUTO: 0.09 K/UL — SIGNIFICANT CHANGE UP (ref 0–0.2)
BASOPHILS NFR BLD AUTO: 0.5 % — SIGNIFICANT CHANGE UP (ref 0–2)
BUN SERPL-MCNC: 24 MG/DL — HIGH (ref 7–23)
CALCIUM SERPL-MCNC: 8.7 MG/DL — SIGNIFICANT CHANGE UP (ref 8.5–10.1)
CHLORIDE SERPL-SCNC: 109 MMOL/L — HIGH (ref 96–108)
CO2 SERPL-SCNC: 26 MMOL/L — SIGNIFICANT CHANGE UP (ref 22–31)
CREAT SERPL-MCNC: 0.46 MG/DL — LOW (ref 0.5–1.3)
EGFR: 105 ML/MIN/1.73M2 — SIGNIFICANT CHANGE UP
EOSINOPHIL # BLD AUTO: 0.42 K/UL — SIGNIFICANT CHANGE UP (ref 0–0.5)
EOSINOPHIL NFR BLD AUTO: 2.3 % — SIGNIFICANT CHANGE UP (ref 0–6)
FUNGITELL: 35 PG/ML — SIGNIFICANT CHANGE UP
GLUCOSE SERPL-MCNC: 96 MG/DL — SIGNIFICANT CHANGE UP (ref 70–99)
HCT VFR BLD CALC: 39.8 % — SIGNIFICANT CHANGE UP (ref 39–50)
HGB BLD-MCNC: 12.5 G/DL — LOW (ref 13–17)
IMM GRANULOCYTES NFR BLD AUTO: 3.6 % — HIGH (ref 0–0.9)
LYMPHOCYTES # BLD AUTO: 0.85 K/UL — LOW (ref 1–3.3)
LYMPHOCYTES # BLD AUTO: 4.6 % — LOW (ref 13–44)
MCHC RBC-ENTMCNC: 30.5 PG — SIGNIFICANT CHANGE UP (ref 27–34)
MCHC RBC-ENTMCNC: 31.4 G/DL — LOW (ref 32–36)
MCV RBC AUTO: 97.1 FL — SIGNIFICANT CHANGE UP (ref 80–100)
MONOCYTES # BLD AUTO: 1 K/UL — HIGH (ref 0–0.9)
MONOCYTES NFR BLD AUTO: 5.4 % — SIGNIFICANT CHANGE UP (ref 2–14)
NEUTROPHILS # BLD AUTO: 15.37 K/UL — HIGH (ref 1.8–7.4)
NEUTROPHILS NFR BLD AUTO: 83.6 % — HIGH (ref 43–77)
NRBC # BLD: 0 /100 WBCS — SIGNIFICANT CHANGE UP (ref 0–0)
PLATELET # BLD AUTO: 434 K/UL — HIGH (ref 150–400)
POTASSIUM SERPL-MCNC: 4.3 MMOL/L — SIGNIFICANT CHANGE UP (ref 3.5–5.3)
POTASSIUM SERPL-SCNC: 4.3 MMOL/L — SIGNIFICANT CHANGE UP (ref 3.5–5.3)
RBC # BLD: 4.1 M/UL — LOW (ref 4.2–5.8)
RBC # FLD: 17.6 % — HIGH (ref 10.3–14.5)
SODIUM SERPL-SCNC: 140 MMOL/L — SIGNIFICANT CHANGE UP (ref 135–145)
WBC # BLD: 18.39 K/UL — HIGH (ref 3.8–10.5)
WBC # FLD AUTO: 18.39 K/UL — HIGH (ref 3.8–10.5)

## 2024-11-27 PROCEDURE — 99232 SBSQ HOSP IP/OBS MODERATE 35: CPT

## 2024-11-27 PROCEDURE — 99233 SBSQ HOSP IP/OBS HIGH 50: CPT

## 2024-11-27 RX ADMIN — RISPERIDONE 0.25 MILLIGRAM(S): 4 TABLET ORAL at 06:09

## 2024-11-27 RX ADMIN — PANTOPRAZOLE SODIUM 40 MILLIGRAM(S): 40 TABLET, DELAYED RELEASE ORAL at 06:09

## 2024-11-27 RX ADMIN — Medication 25 MILLIGRAM(S): at 06:11

## 2024-11-27 RX ADMIN — Medication 1 TABLET(S): at 08:13

## 2024-11-27 RX ADMIN — CHLORHEXIDINE GLUCONATE 1 APPLICATION(S): 1.2 RINSE ORAL at 06:15

## 2024-11-27 RX ADMIN — APIXABAN 5 MILLIGRAM(S): 2.5 TABLET, FILM COATED ORAL at 18:43

## 2024-11-27 RX ADMIN — Medication 5 MILLIGRAM(S): at 13:18

## 2024-11-27 RX ADMIN — METOPROLOL TARTRATE 12.5 MILLIGRAM(S): 100 TABLET, FILM COATED ORAL at 18:43

## 2024-11-27 RX ADMIN — RISPERIDONE 0.25 MILLIGRAM(S): 4 TABLET ORAL at 18:43

## 2024-11-27 RX ADMIN — CEFTOLOZANE AND TAZOBACTAM 100 MILLIGRAM(S): 1; .5 INJECTION, POWDER, LYOPHILIZED, FOR SOLUTION INTRAVENOUS at 17:54

## 2024-11-27 RX ADMIN — APIXABAN 5 MILLIGRAM(S): 2.5 TABLET, FILM COATED ORAL at 06:11

## 2024-11-27 RX ADMIN — TAMSULOSIN HYDROCHLORIDE 0.4 MILLIGRAM(S): 0.4 CAPSULE ORAL at 21:40

## 2024-11-27 RX ADMIN — CEFTOLOZANE AND TAZOBACTAM 100 MILLIGRAM(S): 1; .5 INJECTION, POWDER, LYOPHILIZED, FOR SOLUTION INTRAVENOUS at 06:12

## 2024-11-27 RX ADMIN — METOPROLOL TARTRATE 12.5 MILLIGRAM(S): 100 TABLET, FILM COATED ORAL at 06:14

## 2024-11-27 NOTE — PROGRESS NOTE ADULT - ASSESSMENT
80 yo M with PMHx HTN, CHF, PE (on eliquis), Myasthenia Gravis, and chronic LE edema, nephrolithiasis (s/p nephrostomy tube placement ), urosepsis, BPH BIBEMS from [facility] for hypertension and elevated WBC count. Admitted to ICU for septic shock 2/2 UTI and Enter/rhinovirus.        Problem/Plan - 1:  ·  Problem: Sepsis, unspecified organism.   ·  Plan: - Pt admitted to ICU for  septic shock   sepsis  poa  2/2  UTI and rhino/enterovirus with    -IR s/p percutaneous nephrostomy tube placement done 9/6/24  [facility]  and [facility] lauren  , LAUREN CHANGED IN ED .  - Pt p/w elevated WBC, pt asymptomatic. Hx uti  sepsis/klebsiella bacteremia treated with IV Rocephin, hospital course complicated by Afib with RVR during recent admission (9/2024)  - Per CI paperwork, pt s/p 7d course of IV zosyn via midline  (11/4-11/10)  - Met sepsis criteria on admission with leukocytosis, lactate, tachycardia  - WBC appears to be at baseline, per chart review baseline around 12-13 (on chronic steroids for MG)  - UA: Turbid, large blood, large LEC, positive nitrite, many bacteria, WBC TNTC  - CT: Urinary bladder collapsed, limiting evaluation, at least 3 stones measuring up to 1.0 cm. R nephrostomy tube intact .  - s/p Levophed gtt. s/p midodrine s/p hydrocortisone  taper   now  25 mg daily for 2 day  than back Prednisone home dose   - s/p IV Meropenem, -continue   Zebraxa ceftolozane-tazobactam--plan for 5-7 day- till Friday as per id dr wilson   -   called uro consult / [facility] surg pa  as pt still have leucocytosis  this time nephrostomy need to be  changed already month old - IR will change today , also 2 month old midline removed by perry manzanares.   - Blood cult neg  and sputum Cx negative  - Urine cx + klebsiella, pseudomonas, proteus  - sputum AFB x 3 negative  - Fungitell, Histoplasma, Aspergillus, Quantiferon pending  - ID (Dr. Wilson) following,   sputum AFB cultures negative x 3   -Quantiferon pending.     Problem/Plan - 2:  ·  Problem: Elevated troponin.   ·  Plan: Troponin 1099.8 on admission likely 2/2 demand ischemia  - Pt asymptomatic. Denies chest pain, pressure, palpitations, SOB, nausea  - EKG reviewed, no evidence of ischemic changes, ST/T changes or Q waves  - s/p ASA 324mg x1 in ED  - Trops trended to peak  - Cardio ([facility]) following.     Problem/Plan - 3:  ·  Problem: Rhinovirus.   ·  Plan: - Rhino/enterovirus +  - Pt asymptomatic, less likely cause of sepsis given history and +UTI  - Supportive care  - Tylenol PRN for fever.     Problem/Plan - 4:  ·  Problem: HTN (hypertension).   ·  Plan: - Pt hypotensive in the setting of septic shock requiring pressor support, now s/p Levophed gtt and Midodrine  - BP stable.     Problem/Plan - 5:  ·  Problem: Chronic CHF.   ·  Plan: Pt with documented hx of CHF, unspecified type however on GDMT for HFrEF  - TTE (9/2024): LVEF 55-60%, no diastolic dysfunction, moderate MR  - Evaluated by cardiology during admission at [facility] (9/2024) for acute CHF, started on GDMT  - Pro-BNP on admission 1988  - Hold home spironolactone and Lasix in setting of hypotension.  Restarted on Toprol XL 12.5mg PO Q12h.   - Strict I&Os   - Does not appear clinically volume overloaded  - Cardiology ([facility]) consulted,     Problem/Plan - 6:  ·  Problem: Lung granuloma.   ·  Plan: CT c/a/p w IV contrast: evidence of granulomatous infection in lungs, liver and spleen  - Per chart review, pt has been aware of this finding  - Has been seen on prior imaging, evaluated by ID on previous admission (11/2023)  - Quant indeterminate, fungitell negative, aspergillus galactomannan negative at that time  - ID (Dr. wilson  consulted,  afb so far neg.     Problem/Plan - 7:  ·  Problem: Thyroid nodule.   ·  Plan: - CT Chest: 2.6 cm left lobe thyroid nodule and evidence of granulomatous infection in lungs, liver and spleen  - TSH wnl.     Problem/Plan - 8:  ·  Problem: Personal history of pulmonary embolism.   ·  Plan: Continue home Eliquis 5mg BID.     Problem/Plan - 9:  ·  Problem: Myasthenia gravis.   ·  Plan: Holding home Prednisone, continue stress dose steroids- baseline bedbound as per son Olvin.     Problem/Plan - 10:  ·  Problem: BPH (benign prostatic hyperplasia).   ·  Plan; Continue home Flomax and finasteride.     Problem/Plan - 11:  ·  Problem: Need for prophylactic measure.   ·  Plan: Continue home Eliquis 5mg BID    ---  TIME SPENT:  51 minutes spent on total encounter. The necessity of the time spent during the encounter on this date of service was due to:     direct patient care including but not limited to reviewing chart, medications ,laboratory data, imaging reports, discussion of plan of care with consultants on the case, coordination of care with multidisciplinary team involved in the case and discussion of plan with patient.  Patient agreeable to plan of care and verbalized understanding the anticipated hospital course and treatment plan.    ---     78 yo M with PMHx HTN, CHF, PE (on eliquis), Myasthenia Gravis, and chronic LE edema, nephrolithiasis (s/p nephrostomy tube placement ), urosepsis, BPH BIBEMS from [facility] for hypertension and elevated WBC count. Admitted to ICU for septic shock 2/2 UTI and Enter/rhinovirus.        Problem/Plan - 1:  ·  Problem: Sepsis, unspecified organism.   ·  Plan: - Pt admitted to ICU for  septic shock   sepsis  poa  2/2  UTI and rhino/enterovirus with    -IR s/p percutaneous nephrostomy tube placement done 9/6/24  [facility]  and [facility] lauren  , LAUREN CHANGED IN ED .  - Pt p/w elevated WBC, pt asymptomatic. Hx uti  sepsis/klebsiella bacteremia treated with IV Rocephin, hospital course complicated by Afib with RVR during recent admission (9/2024)  - Per CI paperwork, pt s/p 7d course of IV zosyn via midline  (11/4-11/10)  - Met sepsis criteria on admission with leukocytosis, lactate, tachycardia  - WBC appears to be at baseline, per chart review baseline around 12-13 (on chronic steroids for MG)  - UA: Turbid, large blood, large LEC, positive nitrite, many bacteria, WBC TNTC  - CT: Urinary bladder collapsed, limiting evaluation, at least 3 stones measuring up to 1.0 cm. R nephrostomy tube intact .  - s/p Levophed gtt. s/p midodrine s/p hydrocortisone  taper   now  25 mg daily for 2 day  than back Prednisone home dose   - s/p IV Meropenem, -continue   Zebraxa ceftolozane-tazobactam--plan for 5-7 day- till Friday as per id dr wilson   -   called uro consult / [facility] surg pa  as pt still have leucocytosis  this time nephrostomy need to be  changed already month old - IR will change today , also 2 month old midline removed by perry manzanares.   - Blood cult neg  and sputum Cx negative  - Urine cx + klebsiella, pseudomonas, proteus  - sputum AFB x 3 negative  - Fungitell, Histoplasma, Aspergillus, Quantiferon pending  - ID (Dr. Wilson) following,   sputum AFB cultures negative x 3   -Quantiferon pending.     Problem/Plan - 2:  ·  Problem: Elevated troponin.   ·  Plan: Troponin 1099.8 on admission likely 2/2 demand ischemia  - Pt asymptomatic. Denies chest pain, pressure, palpitations, SOB, nausea  - EKG reviewed, no evidence of ischemic changes, ST/T changes or Q waves  - s/p ASA 324mg x1 in ED  - Trops trended to peak  - Cardio ([facility]) following.     Problem/Plan - 3:  ·  Problem: Rhinovirus.   ·  Plan: - Rhino/enterovirus +  - Pt asymptomatic, less likely cause of sepsis given history and +UTI  - Supportive care  - Tylenol PRN for fever.     Problem/Plan - 4:  ·  Problem: HTN (hypertension).   ·  Plan: - Pt hypotensive in the setting of septic shock requiring pressor support, now s/p Levophed gtt and Midodrine  - BP stable.  - Started on Toprol XL 12.5mg PO Q12h for NSVT.     Problem/Plan - 5:  ·  Problem: Chronic CHF.   ·  Plan: Pt with documented hx of CHF, unspecified type however on GDMT for HFrEF  - TTE (9/2024): LVEF 55-60%, no diastolic dysfunction, moderate MR  - Evaluated by cardiology during admission at [facility] (9/2024) for acute CHF, started on GDMT  - Pro-BNP on admission 1988  - Hold home spironolactone and Lasix in setting of hypotension.  Started on Toprol XL 12.5mg PO Q12h.   - Strict I&Os   - Does not appear clinically volume overloaded  - Cardiology ([facility]) consulted,     Problem/Plan - 6:  ·  Problem: Lung granuloma.   ·  Plan: CT c/a/p w IV contrast: evidence of granulomatous infection in lungs, liver and spleen  - Per chart review, pt has been aware of this finding  - Has been seen on prior imaging, evaluated by ID on previous admission (11/2023)  - Quant indeterminate, fungitell negative, aspergillus galactomannan negative at that time  - ID (Dr. wilson  consulted,  afb so far neg.     Problem/Plan - 7:  ·  Problem: Thyroid nodule.   ·  Plan: - CT Chest: 2.6 cm left lobe thyroid nodule and evidence of granulomatous infection in lungs, liver and spleen  - TSH wnl.     Problem/Plan - 8:  ·  Problem: Personal history of pulmonary embolism.   ·  Plan: Continue home Eliquis 5mg BID.     Problem/Plan - 9:  ·  Problem: Myasthenia gravis.   ·  Plan: Holding home Prednisone, continue stress dose steroids- baseline bedbound as per son Olvin.     Problem/Plan - 10:  ·  Problem: BPH (benign prostatic hyperplasia).   ·  Plan; Continue home Flomax and finasteride.     Problem/Plan - 11:  ·  Problem: Need for prophylactic measure.   ·  Plan: Continue home Eliquis 5mg BID    ---  TIME SPENT:  51 minutes spent on total encounter. The necessity of the time spent during the encounter on this date of service was due to:     direct patient care including but not limited to reviewing chart, medications ,laboratory data, imaging reports, discussion of plan of care with consultants on the case, coordination of care with multidisciplinary team involved in the case and discussion of plan with patient.  Patient agreeable to plan of care and verbalized understanding the anticipated hospital course and treatment plan.    ---

## 2024-11-27 NOTE — PROGRESS NOTE ADULT - SUBJECTIVE AND OBJECTIVE BOX
[facility] -- Janel Jackson, Evan Paul, Jovana, Tomas Thibodeaux: Office # [phone]    Follow Up:  HTN, CHF, PE     Subjective/Observations: No events overnight resting comfortably in bed.  No complaints of chest pain, dyspnea, or palpitations reported. No signs of orthopnea or PND.     REVIEW OF SYSTEMS: All other review of systems are negative unless indicated above    PAST MEDICAL & SURGICAL HISTORY:  Calculus of kidney      Club foot  Born Right Foot    Myasthenia gravis    Hypertension    Diabetes  Type 2 - does not take medications - monitors Blood Glucose at home - diet controlled    Urinary tract infection  notes h/o UTI's    Hyperlipidemia    Elective surgery  1956 age 11 @ [facility] - cut under Patella secondary 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    Club foot  Surgery at birth for Club Foot Right foot    Pilonidal cyst  Surgery 40 years ago    H/O colonoscopy    Spinal stenosis    H/O prostate biopsy        MEDICATIONS  (STANDING):  apixaban 5 milliGRAM(s) Oral every 12 hours  ceftolozane/tazobactam IVPB 1500 milliGRAM(s) IV Intermittent every 8 hours  chlorhexidine 2% Cloths 1 Application(s) Topical <User Schedule>  finasteride 5 milliGRAM(s) Oral daily  hydrocortisone sodium succinate Injectable 25 milliGRAM(s) IV Push daily  metoprolol succinate ER 12.5 milliGRAM(s) Oral every 12 hours  pantoprazole    Tablet 40 milliGRAM(s) Oral before breakfast  polyethylene glycol 3350 17 Gram(s) Oral daily  risperiDONE   Tablet 0.25 milliGRAM(s) Oral two times a day  senna 1 Tablet(s) Oral with breakfast  tamsulosin 0.4 milliGRAM(s) Oral at bedtime    MEDICATIONS  (PRN):  acetaminophen     Tablet .. 650 milliGRAM(s) Oral every 6 hours PRN Temp greater or equal to 38C (100.4F), Mild Pain (1 - 3)  ondansetron Injectable 4 milliGRAM(s) IV Push every 8 hours PRN Nausea and/or Vomiting  sodium chloride 3%  Inhalation 4 milliLiter(s) Inhalation every 8 hours PRN sputum induction    Allergies    levofloxacin (Unknown)  Cipro (Unknown)  ofloxacin (Unknown)  gatifloxacin (Unknown)    Intolerances    fluoroquinolone antibiotics (Other)  Ketek (Other)  Avelox (Other)  telithromycin (Other)    Vital Signs Last 24 Hrs  T(C): 36.5 (27 Nov 2024 05:45), Max: 36.9 (26 Nov 2024 15:59)  T(F): 97.7 (27 Nov 2024 05:45), Max: 98.4 (26 Nov 2024 15:59)  HR: 59 (27 Nov 2024 05:45) (59 - 143)  BP: 127/73 (27 Nov 2024 05:45) (111/79 - 147/81)  BP(mean): --  RR: 18 (27 Nov 2024 05:45) (18 - 22)  SpO2: 97% (27 Nov 2024 05:45) (96% - 99%)    Parameters below as of 27 Nov 2024 05:45  Patient On (Oxygen Delivery Method): room air      I&O's Summary    26 Nov 2024 07:01  -  27 Nov 2024 07:00  --------------------------------------------------------  IN: 0 mL / OUT: 1550 mL / NET: -1550 mL      Weight (kg): 167.6 (11-27 @ 05:45)    TELE: SR  PHYSICAL EXAM:  Constitutional: NAD, awake and alert  HEENT: Moist Mucous Membranes, Anicteric  Pulmonary: Non-labored, breath sounds are clear bilaterally, No wheezing, rales or rhonchi  Cardiovascular: Regular, S1 and S2,  No rubs, gallops or clicks, + murmur  Gastrointestinal:  soft, nontender, nondistended   Lymph: No peripheral edema. No lymphadenopathy.   Skin: No visible rashes or ulcers.  Psych:  Mood & affect appropriate      LABS: All Labs Reviewed:                        11.4   17.58 )-----------( 407      ( 26 Nov 2024 06:01 )             35.7                         12.2   20.70 )-----------( 428      ( 25 Nov 2024 09:02 )             37.8     26 Nov 2024 06:01    144    |  112    |  24     ----------------------------<  111    3.2     |  24     |  0.50   25 Nov 2024 09:02    143    |  110    |  26     ----------------------------<  122    3.7     |  27     |  0.53     Ca    8.5        26 Nov 2024 06:01  Ca    9.1        25 Nov 2024 09:02    TPro  5.9    /  Alb  2.1    /  TBili  <0.1   /  DBili  x      /  AST  31     /  ALT  53     /  AlkPhos  73     25 Nov 2024 09:02   LIVER FUNCTIONS - ( 25 Nov 2024 09:02 )  Alb: 2.1 g/dL / Pro: 5.9 g/dL / ALK PHOS: 73 U/L / ALT: 53 U/L / AST: 31 U/L / GGT: x           Troponin I, High Sensitivity Result: 852.9 ng/L (11-20-24 @ 03:55)  Troponin I, High Sensitivity Result: 1099.8 ng/L (11-19-24 @ 22:25)  Cholesterol: 113 mg/dL (11-20-24 @ 11:36)  HDL Cholesterol: 45 mg/dL (11-20-24 @ 11:36)  Triglycerides, Serum: 61 mg/dL (11-20-24 @ 11:36)    12 Lead ECG:   Ventricular Rate 62 BPM    Atrial Rate 62 BPM    P-R Interval 154 ms    QRS Duration 114 ms    Q-T Interval 488 ms    QTC Calculation(Bazett) 495 ms    P Axis 44 degrees    R Axis -53 degrees    T Axis -29 degrees    Diagnosis Line Sinus rhythm with sinus arrhythmia with occasional premature ventricular complexes  Left anterior fascicular block  Cannot rule out Anterior infarct (cited on or before 19-NOV-2024)    Confirmed by SANDY PAUL ([id number]) on 11/20/2024 9:15:32 AM (11-20-24 @ 03:52)      TRANSTHORACIC ECHOCARDIOGRAM REPORT  ________________________________________________________________________________                                      _______       Pt. Name:       DEION HEATH Study Date:    11/20/2024  MRN:            [id number]       YOB: 1945  Accession #:    [id number]      Age:           79 years  Account#:       [id number]     Gender:        M  Heart Rate:                    Height:        65.75 in (167.00 cm)  Rhythm:                        Weight:   199.00 lb (90.27 kg)  Blood Pressure: 103/58 mmHg    BSA/BMI:       1.99 m² / 32.37 kg/m²  ________________________________________________________________________________________  Referring Physician:    [id number] Curly Byrnes  Interpreting Physician: Lyndsay Marin MD  Primary Sonographer:    Ashli Arana [facility]    CPT:                ECHO TTE WO CON COMP W DOPP - 11269.m  Indication(s):      Abnormal electrocardiogram ECG/EKG - R94.31  Procedure:          Transthoracic echocardiogram with 2-D, M-mode and complete                      spectral and color flow Doppler.  Ordering Location:  ICU1  Admission Status:   Inpatient  Contrast Injection: Verbal consent was obtained for injection of Ultrasonic                      Enhancing Agent following a discussion of risks and                      benefits.                      Endocardial visualization enhanced with Definity Ultrasound                      enhancing agent.  Agitated Saline:    Injection with agitated saline was performed toevaluate for                      intracardiac shunting.  Study Information:  Image quality for this study is technically difficult.    _______________________________________________________________________________________     CONCLUSIONS:      1. Technically difficult image quality.   2. Agitated saline injection was negative for intracardiac shunt (though there is poor visualization of the LV during injection of agitated saline)   3. Despite definity use, the LV endocardium is still not well visualized.   4. In certain views, the LVEF appears grossly preserved though the apex appears hypokinetic.    ________________________________________________________________________________________  FINDINGS:     Interatrial Septum:  Agitated saline injection was negative for intracardiac shunt.  ________________________________________________________________________________________  Electronically signed on 11/20/2024 at 2:37:09 PM by Lyndsay Marin MD      *** Final ***

## 2024-11-27 NOTE — PROGRESS NOTE ADULT - SUBJECTIVE AND OBJECTIVE BOX
[facility]   INFECTIOUS DISEASES   [address] [place] [address]  Tel: [phone]     Fax: [phone]  =========================================================  MD Nikolas Oquendo Michelle, MD Shah, Kaushal, MD Sunjit, Jaspal, MD Sehrish Shahid, MD   =========================================================  DEION HEATH [id number]    Follow up: No fevers. Denies any pain, or any other complaint.     Allergies:  levofloxacin (Unknown)  fluoroquinolone antibiotics (Other)  Ketek (Other)  Avelox (Other)  ofloxacin (Unknown)  gatifloxacin (Unknown)  telithromycin (Other)    Antibiotics:  acetaminophen     Tablet .. 650 milliGRAM(s) Oral every 6 hours PRN  apixaban 5 milliGRAM(s) Oral every 12 hours  ceftolozane/tazobactam IVPB 1500 milliGRAM(s) IV Intermittent every 8 hours  chlorhexidine 2% Cloths 1 Application(s) Topical <User Schedule>  finasteride 5 milliGRAM(s) Oral daily  hydrocortisone sodium succinate Injectable 50 milliGRAM(s) IV Push every 24 hours  metoprolol succinate ER 12.5 milliGRAM(s) Oral every 12 hours  ondansetron Injectable 4 milliGRAM(s) IV Push every 8 hours PRN  pantoprazole    Tablet 40 milliGRAM(s) Oral before breakfast  polyethylene glycol 3350 17 Gram(s) Oral daily  potassium chloride    Tablet ER 40 milliEquivalent(s) Oral every 4 hours  risperiDONE   Tablet 0.25 milliGRAM(s) Oral two times a day  senna 1 Tablet(s) Oral with breakfast  sodium chloride 3%  Inhalation 4 milliLiter(s) Inhalation every 8 hours PRN  tamsulosin 0.4 milliGRAM(s) Oral at bedtime     REVIEW OF SYSTEMS:  CONSTITUTIONAL:  No Fever or chills  CARDIOVASCULAR:  No chest pain or SOB  RESPIRATORY:  No cough, shortness of breath  GASTROINTESTINAL:  No nausea, vomiting or diarrhea.  GENITOURINARY:  + Restrepo  MUSCULOSKELETAL:  no back pain  NEUROLOGIC:  No headache or dizziness     Physical Exam:  Vital Signs Last 24 Hrs  T(C): 36.3 (27 Nov 2024 11:11), Max: 36.9 (26 Nov 2024 15:59)  T(F): 97.4 (27 Nov 2024 11:11), Max: 98.4 (26 Nov 2024 15:59)  HR: 70 (27 Nov 2024 11:11) (59 - 143)  BP: 106/62 (27 Nov 2024 11:11) (106/62 - 147/81)  BP(mean): --  RR: 18 (27 Nov 2024 11:11) (18 - 22)  SpO2: 96% (27 Nov 2024 11:11) (96% - 99%)  Parameters below as of 27 Nov 2024 11:11  Patient On (Oxygen Delivery Method): room air  GEN: NAD  HEENT: normocephalic and atraumatic.  NECK: Supple.   LUNGS: Normal respiratory effort  HEART: Regular rate and rhythm   ABDOMEN: Soft, nontender, and nondistended.    : + R percutaneous nephrostomy. + Restrepo  EXTREMITIES: No leg edema. R forearm swelling--improved, LUE midline--no drainage, no surrounding erythema, no swelling  NEUROLOGIC: Answering questions appropriately, knows he is in the hospital  PSYCHIATRIC: Appropriate affect .    Labs:  11-27    140  |  109[H]  |  24[H]  ----------------------------<  96  4.3   |  26  |  0.46[L]    Ca    8.7      27 Nov 2024 08:50                         12.5   18.39 )-----------( 434      ( 27 Nov 2024 08:50 )             39.8     Urinalysis Basic - ( 27 Nov 2024 08:50 )    Color: x / Appearance: x / SG: x / pH: x  Gluc: 96 mg/dL / Ketone: x  / Bili: x / Urobili: x   Blood: x / Protein: x / Nitrite: x   Leuk Esterase: x / RBC: x / WBC x   Sq Epi: x / Non Sq Epi: x / Bacteria: x    RECENT CULTURES:  11-22 @ 11:20 .Sputum Sputum     Culture is being performed.    11-21 @ 12:00 .Sputum Sputum     Culture is being performed.    Few Squamous epithelial cells per low power field  No polymorphonuclear leukocytes per low power field  Few Gram Negative Rods per oil power field  Few Gram Positive Rods per oil power field  Rare Gram positive cocci in pairs per oil power field      11-20 @ 14:30 .Urine Nephrostomy - Right Pseudomonas aeruginosa (Carbapenem Resistant)  Proteus mirabilis ESBL  Klebsiella pneumoniae    50,000 - 99,000 CFU/mL Pseudomonas aeruginosa (Carbapenem Resistant)  50,000 - 99,000 CFU/mL Proteus mirabilis ESBL  50,000 - 99,000 CFU/mL Klebsiella pneumoniae    11-20 @ 01:25 .Urine Klebsiella pneumoniae  Pseudomonas aeruginosa (Carbapenem Resistant)    >100,000 CFU/ml Klebsiella pneumoniae  >100,000 CFU/ml Pseudomonas aeruginosa (Carbapenem Resistant)    11-20 @ 00:00      Detected    11-19 @ 22:25 .Blood BLOOD     No growth at 5 days    11-19 @ 22:20 .Blood BLOOD     No growth at 5 days    All imaging and data are reviewed.   < from: CT Abdomen and Pelvis No Cont (11.20.24 @ 01:49) >  IMPRESSION:  2.6 cm left lobe thyroid nodule. Nonemergent thyroid ultrasound is   recommended.  Subcentimeter granulomas in the right upper lobe. Scattered reticular and   mild airspace opacities throughout both lungs. Calcified right hilar and   mediastinal lymph nodes. The constellation of findings likely represents   granulomatous infection question tuberculosis or sarcoidosis.  Innumerable punctate calcifications throughout the liver and spleen   consistent with prior granulomatous infection.  Dense gallbladder sludge or small stones. No surrounding fluid.  Percutaneous right nephrostomy tube. No hydronephrosis. Unremarkable left   kidney.  Urinary bladder is collapsed around a Restrepo catheter limiting evaluation.   There are at least 3 urinary bladder stone measuring up to 1.0 cm.

## 2024-11-27 NOTE — PROGRESS NOTE ADULT - SUBJECTIVE AND OBJECTIVE BOX
CHIEF COMPLAINT/INTERVAL HISTORY:  Pt. seen and evaluated for sepsis 2/2 UTI.  Pt. is in no distress.  Denies having CP or SOB.  Tolerating IV antibiotic.      REVIEW OF SYSTEMS:  No fever, CP, SOB, or abdominal pain.     Vital Signs Last 24 Hrs  T(C): 36.5 (27 Nov 2024 05:45), Max: 36.9 (26 Nov 2024 15:59)  T(F): 97.7 (27 Nov 2024 05:45), Max: 98.4 (26 Nov 2024 15:59)  HR: 59 (27 Nov 2024 05:45) (59 - 143)  BP: 127/73 (27 Nov 2024 05:45) (111/79 - 147/81)  BP(mean): --  RR: 18 (27 Nov 2024 05:45) (18 - 22)  SpO2: 97% (27 Nov 2024 05:45) (96% - 99%)    Parameters below as of 27 Nov 2024 05:45  Patient On (Oxygen Delivery Method): room air        PHYSICAL EXAM:  GENERAL: NAD  HEENT: EOMI, hearing normal, conjunctiva and sclera clear  Chest: CTA bilaterally, no wheezing  CV: S1S2, RRR,   GI: soft, +BS, NT/ND  : +guerrero, +R. nephrostomy tube  Musculoskeletal: no LE edema  Psychiatric: affect nL, mood nL  Skin: warm and dry    LABS:                        12.5   18.39 )-----------( 434      ( 27 Nov 2024 08:50 )             39.8     11-27    140  |  109[H]  |  24[H]  ----------------------------<  96  4.3   |  26  |  0.46[L]    Ca    8.7      27 Nov 2024 08:50        Urinalysis Basic - ( 27 Nov 2024 08:50 )    Color: x / Appearance: x / SG: x / pH: x  Gluc: 96 mg/dL / Ketone: x  / Bili: x / Urobili: x   Blood: x / Protein: x / Nitrite: x   Leuk Esterase: x / RBC: x / WBC x   Sq Epi: x / Non Sq Epi: x / Bacteria: x

## 2024-11-27 NOTE — PROGRESS NOTE ADULT - ASSESSMENT
80 yo M with PMHx HTN, CHF, PE (on eliquis), Myasthenia Gravis, and chronic LE edema, R ureteral obstructing stone s/p R PCN placement on 9/6/24, urosepsis, BPH, who presented from [facility] for hypertension and elevated WBC count. Per nursing home paperwork, pt hypertensive to 154/105 and recieved metoprolol 25 mg (8:10 PM), and reported pt with "elevated WBC". Upon arrival, pt found to be hypotensive to 90/64. Arrived to ED with nephrostomy tube, chronic guerrero and PICC line in place. Pt was recieving IV zosyn x7d (start date 11/04) per paperwork review for UTI.    Patient being treated for UTI. Guerrero exchanged in the ED. Nephrostomy exchanged advised given recurrent UTI. CT did not show any obvious signs of acute infection. Urine culture grew klebsiella, pseudomonas, proteus. Sensitivities reviewed. Blood cultures no growth.     No fever and WBC 18 today, not much changes since yesterday. Noted to have RUE swelling on 11/24 concerning for phlebitis, IV line removed on 11/24 and swelling has since improved. US showed no DVT.    Also incidentally with RUL calcified granulomas, also in liver in spleen. Unclear etiology, patient without symptoms of tuberculosis, no known hx of TB infection or exposure. He was [place] in [place], and has not lived outside [place]. Sputum Quantiferon indeterminate, but AFB smear x 3 are negative. Serum Fungitell is low.     Son wants to wait 11/29 for nephrostomy tube change.     #Leukocytosis  #UTI  #MDRO pseudomonas and ESBL proteus  #Rhinovirus positive  #Calcified granulomas  #Hx of fluoroquinolone allergy    -continue ceftolozane-tazobactam--plan for 7 days until 11/29, same day will exchange nephrostomy   -sputum AFB cultures x 3 in process  -serum galactomannan, urine histoplasma antigen pending  -monitor WBC  -RUE elevation, warm compress  -advised outpatient with follow up with Pulmonary and GI/Hepatology  -contact isolation  -discussed with son Olvin     Dr. Connor will resume care tomorrow.     Jordin Mckinley MD  Division of Infectious Diseases   Please call ID service at [phone] with any question.      50 minutes spent on total encounter assessing patient, examination, chart review, counseling and coordinating care by the attending physician/nurse/care manager.

## 2024-11-27 NOTE — PROGRESS NOTE ADULT - SUBJECTIVE AND OBJECTIVE BOX
Neurology follow up note    DEION HEATH79yMale      Interval History:    Patient feels ok no new complaints.    Allergies    levofloxacin (Unknown)  Cipro (Unknown)  ofloxacin (Unknown)  gatifloxacin (Unknown)    Intolerances    fluoroquinolone antibiotics (Other)  Ketek (Other)  Avelox (Other)  telithromycin (Other)      MEDICATIONS    acetaminophen     Tablet .. 650 milliGRAM(s) Oral every 6 hours PRN  apixaban 5 milliGRAM(s) Oral every 12 hours  ceftolozane/tazobactam IVPB 1500 milliGRAM(s) IV Intermittent every 8 hours  chlorhexidine 2% Cloths 1 Application(s) Topical <User Schedule>  finasteride 5 milliGRAM(s) Oral daily  hydrocortisone sodium succinate Injectable 25 milliGRAM(s) IV Push daily  metoprolol succinate ER 12.5 milliGRAM(s) Oral every 12 hours  ondansetron Injectable 4 milliGRAM(s) IV Push every 8 hours PRN  pantoprazole    Tablet 40 milliGRAM(s) Oral before breakfast  polyethylene glycol 3350 17 Gram(s) Oral daily  risperiDONE   Tablet 0.25 milliGRAM(s) Oral two times a day  senna 1 Tablet(s) Oral with breakfast  sodium chloride 3%  Inhalation 4 milliLiter(s) Inhalation every 8 hours PRN  tamsulosin 0.4 milliGRAM(s) Oral at bedtime            Weight (kg): 167.6 (11-27 @ 05:45)    Vital Signs Last 24 Hrs  T(C): 36.5 (27 Nov 2024 05:45), Max: 36.9 (26 Nov 2024 15:59)  T(F): 97.7 (27 Nov 2024 05:45), Max: 98.4 (26 Nov 2024 15:59)  HR: 59 (27 Nov 2024 05:45) (59 - 143)  BP: 127/73 (27 Nov 2024 05:45) (111/79 - 147/81)  BP(mean): --  RR: 18 (27 Nov 2024 05:45) (18 - 22)  SpO2: 97% (27 Nov 2024 05:45) (96% - 99%)    Parameters below as of 27 Nov 2024 05:45  Patient On (Oxygen Delivery Method): room air      REVIEW OF SYSTEMS:  CONSTITUTIONAL:  The patient denies fever, chills, night sweats.  HEAD:  No headache.  EYES:  No double vision or blurry vision.  EARS:  No ringing in the ears.  NECK:  No neck pain.  CARDIOVASCULAR:  No chest pain.  RESPIRATORY:  No shortness of breath.  ABDOMEN:  No nausea, vomiting, or abdominal pain.  EXTREMITIES/NEUROLOGICAL:  Does complain of numbness and burning sensation in his legs.  MUSCULOSKELETAL:  Occasional joint pain.  GENERAL:  Does feels weak all over, but no droopy eyes.    PHYSICAL EXAMINATION:  HEAD:  Normocephalic, atraumatic.  EYES:  No scleral icterus.  EARS:  Hearing bilaterally intact.  NECK:  Supple.  CARDIOVASCULAR:  S1 and S2 heard.  RESPIRATORY:  Air entry bilaterally.  ABDOMEN:  Soft, nontender.  EXTREMITIES:  No clubbing or cyanosis were noted.    NEUROLOGIC:  The patient awake, alert, location was hospital, year 2002, month was October, was able to name simple objects.  Recall was 0/3, probably 1 of 3, but does suffer from memory issues.  The patient was able to look up for over 1 minute with no ptosis.  Extraocular movements were intact.  Speech was fluent.  Smile symmetric.  Motor, the patient decreased range of motion of bilateral shoulders, suspect secondary to rotator cuff issues.  When elevated, was able to maintain in the air with slow drops bilaterally.  It is proximally 3+/5, distally was 4/5.  Bilateral lower extremities, slight movement at the feet and knees was 1/5.  Sensory, lower extremities not tested.  The patient said his legs are very sensitive.                  LABS:  CBC Full  -  ( 26 Nov 2024 06:01 )  WBC Count : 17.58 K/uL  RBC Count : 3.67 M/uL  Hemoglobin : 11.4 g/dL  Hematocrit : 35.7 %  Platelet Count - Automated : 407 K/uL  Mean Cell Volume : 97.3 fl  Mean Cell Hemoglobin : 31.1 pg  Mean Cell Hemoglobin Concentration : 31.9 g/dL  Auto Neutrophil # : 10.96 K/uL  Auto Lymphocyte # : 3.28 K/uL  Auto Monocyte # : 1.71 K/uL  Auto Eosinophil # : 0.78 K/uL  Auto Basophil # : 0.06 K/uL  Auto Neutrophil % : 62.4 %  Auto Lymphocyte % : 18.7 %  Auto Monocyte % : 9.7 %  Auto Eosinophil % : 4.4 %  Auto Basophil % : 0.3 %    Urinalysis Basic - ( 26 Nov 2024 06:01 )    Color: x / Appearance: x / SG: x / pH: x  Gluc: 111 mg/dL / Ketone: x  / Bili: x / Urobili: x   Blood: x / Protein: x / Nitrite: x   Leuk Esterase: x / RBC: x / WBC x   Sq Epi: x / Non Sq Epi: x / Bacteria: x      11-26    144  |  112[H]  |  24[H]  ----------------------------<  111[H]  3.2[L]   |  24  |  0.50    Ca    8.5      26 Nov 2024 06:01    TPro  5.9[L]  /  Alb  2.1[L]  /  TBili  <0.1[L]  /  DBili  x   /  AST  31  /  ALT  53  /  AlkPhos  73  11-25    Hemoglobin A1C:     LIVER FUNCTIONS - ( 25 Nov 2024 09:02 )  Alb: 2.1 g/dL / Pro: 5.9 g/dL / ALK PHOS: 73 U/L / ALT: 53 U/L / AST: 31 U/L / GGT: x           Vitamin B12         RADIOLOGY  ANALYSIS AND PLAN:  This is an 79-year-old male with altered mental status and history of myasthenia gravis.    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  Questionable if any muscle weakness as well.  The patient appears to be at his baseline from my previous notes.  The patient had refused any type of treatment in the past.   For now, we would recommend continue the patient on his prednisone.  For history of infection with myasthenia gravis.  If possible, would recommend to limit the use of aminoglycosides, fluoroquinolones, macrolides, cardiovascular drugs such as beta blockers, procainamide, other medication such as statins.  Monitor the patient's magnesium levels.  If antibiotics are used, always risks versus benefits must be weighed for the specific antibiotics.  .For history of hypertension, monitor systolic blood pressure.  follow up ACH antibodies   neurologic   wise stable     46  minutes of time was spent with patient plan of care, reviewing data, speaking to multidisciplinary healthcare team with greater than 50% of time counseling care and coordination.    Thank you for courtesy of consultation.  PATIENT HAS NOT YET BEEN SEEN AND EXAMINED TODAY. NOTE AND CHART REVIEWED IN AM AND EXAM FORM PREVIOUS.  ONCE PATIENT SEEN, CHART WILL BE UPDATE AT PRESENT NOTE IS INCOMPLETE     Neurology follow up note    DEION HEATH79yMale      Interval History:    Patient feels ok no new complaints.    Allergies    levofloxacin (Unknown)  Cipro (Unknown)  ofloxacin (Unknown)  gatifloxacin (Unknown)    Intolerances    fluoroquinolone antibiotics (Other)  Ketek (Other)  Avelox (Other)  telithromycin (Other)      MEDICATIONS    acetaminophen     Tablet .. 650 milliGRAM(s) Oral every 6 hours PRN  apixaban 5 milliGRAM(s) Oral every 12 hours  ceftolozane/tazobactam IVPB 1500 milliGRAM(s) IV Intermittent every 8 hours  chlorhexidine 2% Cloths 1 Application(s) Topical <User Schedule>  finasteride 5 milliGRAM(s) Oral daily  hydrocortisone sodium succinate Injectable 25 milliGRAM(s) IV Push daily  metoprolol succinate ER 12.5 milliGRAM(s) Oral every 12 hours  ondansetron Injectable 4 milliGRAM(s) IV Push every 8 hours PRN  pantoprazole    Tablet 40 milliGRAM(s) Oral before breakfast  polyethylene glycol 3350 17 Gram(s) Oral daily  risperiDONE   Tablet 0.25 milliGRAM(s) Oral two times a day  senna 1 Tablet(s) Oral with breakfast  sodium chloride 3%  Inhalation 4 milliLiter(s) Inhalation every 8 hours PRN  tamsulosin 0.4 milliGRAM(s) Oral at bedtime            Weight (kg): 167.6 (11-27 @ 05:45)    Vital Signs Last 24 Hrs  T(C): 36.5 (27 Nov 2024 05:45), Max: 36.9 (26 Nov 2024 15:59)  T(F): 97.7 (27 Nov 2024 05:45), Max: 98.4 (26 Nov 2024 15:59)  HR: 59 (27 Nov 2024 05:45) (59 - 143)  BP: 127/73 (27 Nov 2024 05:45) (111/79 - 147/81)  BP(mean): --  RR: 18 (27 Nov 2024 05:45) (18 - 22)  SpO2: 97% (27 Nov 2024 05:45) (96% - 99%)    Parameters below as of 27 Nov 2024 05:45  Patient On (Oxygen Delivery Method): room air      REVIEW OF SYSTEMS:  CONSTITUTIONAL:  The patient denies fever, chills, night sweats.  HEAD:  No headache.  EYES:  No double vision or blurry vision.  EARS:  No ringing in the ears.  NECK:  No neck pain.  CARDIOVASCULAR:  No chest pain.  RESPIRATORY:  No shortness of breath.  ABDOMEN:  No nausea, vomiting, or abdominal pain.  EXTREMITIES/NEUROLOGICAL:  Does complain of numbness and burning sensation in his legs.  MUSCULOSKELETAL:  Occasional joint pain.  GENERAL:  Does feels weak all over, but no droopy eyes.    PHYSICAL EXAMINATION:  HEAD:  Normocephalic, atraumatic.  EYES:  No scleral icterus.  EARS:  Hearing bilaterally intact.  NECK:  Supple.  CARDIOVASCULAR:  S1 and S2 heard.  RESPIRATORY:  Air entry bilaterally.  ABDOMEN:  Soft, nontender.  EXTREMITIES:  No clubbing or cyanosis were noted.    NEUROLOGIC:  The patient awake, alert, location was hospital, year 2002, month was October, was able to name simple objects.  Recall was 0/3, probably 1 of 3, but does suffer from memory issues.  The patient was able to look up for over 1 minute with no ptosis.  Extraocular movements were intact.  Speech was fluent.  Smile symmetric.  Motor, the patient decreased range of motion of bilateral shoulders, suspect secondary to rotator cuff issues.  When elevated, was able to maintain in the air with slow drops bilaterally.  It is proximally 3+/5, distally was 4/5.  Bilateral lower extremities, slight movement at the feet and knees was 1/5.  Sensory, lower extremities not tested.  The patient said his legs are very sensitive.                  LABS:  CBC Full  -  ( 26 Nov 2024 06:01 )  WBC Count : 17.58 K/uL  RBC Count : 3.67 M/uL  Hemoglobin : 11.4 g/dL  Hematocrit : 35.7 %  Platelet Count - Automated : 407 K/uL  Mean Cell Volume : 97.3 fl  Mean Cell Hemoglobin : 31.1 pg  Mean Cell Hemoglobin Concentration : 31.9 g/dL  Auto Neutrophil # : 10.96 K/uL  Auto Lymphocyte # : 3.28 K/uL  Auto Monocyte # : 1.71 K/uL  Auto Eosinophil # : 0.78 K/uL  Auto Basophil # : 0.06 K/uL  Auto Neutrophil % : 62.4 %  Auto Lymphocyte % : 18.7 %  Auto Monocyte % : 9.7 %  Auto Eosinophil % : 4.4 %  Auto Basophil % : 0.3 %    Urinalysis Basic - ( 26 Nov 2024 06:01 )    Color: x / Appearance: x / SG: x / pH: x  Gluc: 111 mg/dL / Ketone: x  / Bili: x / Urobili: x   Blood: x / Protein: x / Nitrite: x   Leuk Esterase: x / RBC: x / WBC x   Sq Epi: x / Non Sq Epi: x / Bacteria: x      11-26    144  |  112[H]  |  24[H]  ----------------------------<  111[H]  3.2[L]   |  24  |  0.50    Ca    8.5      26 Nov 2024 06:01    TPro  5.9[L]  /  Alb  2.1[L]  /  TBili  <0.1[L]  /  DBili  x   /  AST  31  /  ALT  53  /  AlkPhos  73  11-25    Hemoglobin A1C:     LIVER FUNCTIONS - ( 25 Nov 2024 09:02 )  Alb: 2.1 g/dL / Pro: 5.9 g/dL / ALK PHOS: 73 U/L / ALT: 53 U/L / AST: 31 U/L / GGT: x           Vitamin B12         RADIOLOGY  ANALYSIS AND PLAN:  This is an 79-year-old male with altered mental status and history of myasthenia gravis.    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  Questionable if any muscle weakness as well.  The patient appears to be at his baseline from my previous notes.  The patient had refused any type of treatment in the past.   For now, we would recommend continue the patient on his prednisone.  For history of infection with myasthenia gravis.  If possible, would recommend to limit the use of aminoglycosides, fluoroquinolones, macrolides, cardiovascular drugs such as beta blockers, procainamide, other medication such as statins.  Monitor the patient's magnesium levels.  If antibiotics are used, always risks versus benefits must be weighed for the specific antibiotics.  .For history of hypertension, monitor systolic blood pressure.  follow up ACH antibodies   neurologic   wise stable     46  minutes of time was spent with patient plan of care, reviewing data, speaking to multidisciplinary healthcare team with greater than 50% of time counseling care and coordination.

## 2024-11-27 NOTE — PROGRESS NOTE ADULT - ASSESSMENT
78 yo M with PMHx HTN, CHF, PE (on Eliquis Myasthenia Gravis, and chronic LE edema, nephrolithiasis (s/p nephrostomy tube placement ), urosepsis, BPH BIBEMS from [facility] for hypertension and elevated WBC count.     Hypotension   - a/f Acute Metabolic Encephalopathy,  Urosepsis 2/2 UTI, Kleb, Pseudomonas Carb Resistent Septic shock, resolved,  Rhinovirus, was in ICU s/p Off pressors and midodrine, now on GMF    - hypotension resolved now that his urosepsis has been treated    - BP stable and controlled, per flow sheet  - tele: SR 60's with PVCs w/ 6-8 bts of NSVT   - CW toprol xl 12.5 mg PO BID with hold parameters for arrythmia control    - continue to monitor routine hemodynamics  - Monitor and replete Lytes. Keep K > 4 and Mg > 2    - TTE (9/2024) showed LVEF 55-60%, no diastolic dysfunction, moderate MR  - Repeat TTE with severe LV dysfunction.  Looks to be stress mediated. Even on repeat study on 11/20, the LV function seems somewhat improved.  - No evidence of significant volume overload  - To start GDMT when able (when bp can tolerate) and repeat echo in about one month. He is on toprol xl at home.  - No clear evidence of acute ischemia, patient denies cardiac symptoms.    - Ruling out TB secondary to CT chest  findings, ID following   - cont Eliquis (hx of PE)     - Will continue to follow.    Art Santana DNP, AGACNP-BC  Cardiology   Call Teams TEAMS

## 2024-11-28 LAB
ANION GAP SERPL CALC-SCNC: 3 MMOL/L — LOW (ref 5–17)
BASOPHILS # BLD AUTO: 0.08 K/UL — SIGNIFICANT CHANGE UP (ref 0–0.2)
BASOPHILS NFR BLD AUTO: 0.4 % — SIGNIFICANT CHANGE UP (ref 0–2)
BUN SERPL-MCNC: 34 MG/DL — HIGH (ref 7–23)
CALCIUM SERPL-MCNC: 9 MG/DL — SIGNIFICANT CHANGE UP (ref 8.5–10.1)
CHLORIDE SERPL-SCNC: 111 MMOL/L — HIGH (ref 96–108)
CO2 SERPL-SCNC: 27 MMOL/L — SIGNIFICANT CHANGE UP (ref 22–31)
CREAT SERPL-MCNC: 0.42 MG/DL — LOW (ref 0.5–1.3)
EGFR: 108 ML/MIN/1.73M2 — SIGNIFICANT CHANGE UP
EOSINOPHIL # BLD AUTO: 0.43 K/UL — SIGNIFICANT CHANGE UP (ref 0–0.5)
EOSINOPHIL NFR BLD AUTO: 2.2 % — SIGNIFICANT CHANGE UP (ref 0–6)
GLUCOSE SERPL-MCNC: 108 MG/DL — HIGH (ref 70–99)
HCT VFR BLD CALC: 37.9 % — LOW (ref 39–50)
HGB BLD-MCNC: 12.1 G/DL — LOW (ref 13–17)
IMM GRANULOCYTES NFR BLD AUTO: 3.9 % — HIGH (ref 0–0.9)
LYMPHOCYTES # BLD AUTO: 1.04 K/UL — SIGNIFICANT CHANGE UP (ref 1–3.3)
LYMPHOCYTES # BLD AUTO: 5.3 % — LOW (ref 13–44)
MAGNESIUM SERPL-MCNC: 2.3 MG/DL — SIGNIFICANT CHANGE UP (ref 1.6–2.6)
MCHC RBC-ENTMCNC: 31.1 PG — SIGNIFICANT CHANGE UP (ref 27–34)
MCHC RBC-ENTMCNC: 31.9 G/DL — LOW (ref 32–36)
MCV RBC AUTO: 97.4 FL — SIGNIFICANT CHANGE UP (ref 80–100)
MONOCYTES # BLD AUTO: 1.57 K/UL — HIGH (ref 0–0.9)
MONOCYTES NFR BLD AUTO: 8 % — SIGNIFICANT CHANGE UP (ref 2–14)
NEUTROPHILS # BLD AUTO: 15.67 K/UL — HIGH (ref 1.8–7.4)
NEUTROPHILS NFR BLD AUTO: 80.2 % — HIGH (ref 43–77)
NRBC # BLD: 0 /100 WBCS — SIGNIFICANT CHANGE UP (ref 0–0)
PHOSPHATE SERPL-MCNC: 2.3 MG/DL — LOW (ref 2.5–4.5)
PLATELET # BLD AUTO: 408 K/UL — HIGH (ref 150–400)
POTASSIUM SERPL-MCNC: 4.4 MMOL/L — SIGNIFICANT CHANGE UP (ref 3.5–5.3)
POTASSIUM SERPL-SCNC: 4.4 MMOL/L — SIGNIFICANT CHANGE UP (ref 3.5–5.3)
RBC # BLD: 3.89 M/UL — LOW (ref 4.2–5.8)
RBC # FLD: 17.5 % — HIGH (ref 10.3–14.5)
SODIUM SERPL-SCNC: 141 MMOL/L — SIGNIFICANT CHANGE UP (ref 135–145)
WBC # BLD: 19.55 K/UL — HIGH (ref 3.8–10.5)
WBC # FLD AUTO: 19.55 K/UL — HIGH (ref 3.8–10.5)

## 2024-11-28 PROCEDURE — 99232 SBSQ HOSP IP/OBS MODERATE 35: CPT

## 2024-11-28 RX ORDER — PREDNISONE 20 MG/1
10 TABLET ORAL DAILY
Refills: 0 | Status: DISCONTINUED | OUTPATIENT
Start: 2024-11-29 | End: 2024-12-03

## 2024-11-28 RX ORDER — HEPARIN SODIUM 5000 [USP'U]/.5ML
15 INJECTION, SOLUTION INTRAVENOUS; SUBCUTANEOUS ONCE
Refills: 0 | Status: COMPLETED | OUTPATIENT
Start: 2024-11-28 | End: 2024-11-28

## 2024-11-28 RX ADMIN — PANTOPRAZOLE SODIUM 40 MILLIGRAM(S): 40 TABLET, DELAYED RELEASE ORAL at 05:53

## 2024-11-28 RX ADMIN — APIXABAN 5 MILLIGRAM(S): 2.5 TABLET, FILM COATED ORAL at 05:52

## 2024-11-28 RX ADMIN — METOPROLOL TARTRATE 12.5 MILLIGRAM(S): 100 TABLET, FILM COATED ORAL at 17:47

## 2024-11-28 RX ADMIN — APIXABAN 5 MILLIGRAM(S): 2.5 TABLET, FILM COATED ORAL at 17:47

## 2024-11-28 RX ADMIN — POLYETHYLENE GLYCOL 3350 17 GRAM(S): 17 POWDER, FOR SOLUTION ORAL at 11:01

## 2024-11-28 RX ADMIN — TAMSULOSIN HYDROCHLORIDE 0.4 MILLIGRAM(S): 0.4 CAPSULE ORAL at 21:38

## 2024-11-28 RX ADMIN — HEPARIN SODIUM 63.75 MILLIMOLE(S): 5000 INJECTION, SOLUTION INTRAVENOUS; SUBCUTANEOUS at 18:32

## 2024-11-28 RX ADMIN — CEFTOLOZANE AND TAZOBACTAM 100 MILLIGRAM(S): 1; .5 INJECTION, POWDER, LYOPHILIZED, FOR SOLUTION INTRAVENOUS at 10:53

## 2024-11-28 RX ADMIN — CEFTOLOZANE AND TAZOBACTAM 100 MILLIGRAM(S): 1; .5 INJECTION, POWDER, LYOPHILIZED, FOR SOLUTION INTRAVENOUS at 02:40

## 2024-11-28 RX ADMIN — Medication 25 MILLIGRAM(S): at 05:54

## 2024-11-28 RX ADMIN — Medication 1 TABLET(S): at 07:59

## 2024-11-28 RX ADMIN — RISPERIDONE 0.25 MILLIGRAM(S): 4 TABLET ORAL at 17:47

## 2024-11-28 RX ADMIN — RISPERIDONE 0.25 MILLIGRAM(S): 4 TABLET ORAL at 05:53

## 2024-11-28 RX ADMIN — CEFTOLOZANE AND TAZOBACTAM 100 MILLIGRAM(S): 1; .5 INJECTION, POWDER, LYOPHILIZED, FOR SOLUTION INTRAVENOUS at 17:46

## 2024-11-28 RX ADMIN — METOPROLOL TARTRATE 12.5 MILLIGRAM(S): 100 TABLET, FILM COATED ORAL at 05:53

## 2024-11-28 RX ADMIN — Medication 5 MILLIGRAM(S): at 11:00

## 2024-11-28 NOTE — PROGRESS NOTE ADULT - SUBJECTIVE AND OBJECTIVE BOX
CHIEF COMPLAINT/INTERVAL HISTORY:  Pt. seen and evaluated for sepsis 2/2 UTI.  Pt. is in no distress.  Tolerating IV antibiotics.  Denies having CP or SOB.      REVIEW OF SYSTEMS:  No fever, CP, SOB, or abdominal pain     Vital Signs Last 24 Hrs  T(C): 36.5 (28 Nov 2024 11:17), Max: 36.5 (28 Nov 2024 05:20)  T(F): 97.7 (28 Nov 2024 11:17), Max: 97.7 (28 Nov 2024 05:20)  HR: 72 (28 Nov 2024 11:17) (63 - 83)  BP: 129/73 (28 Nov 2024 11:17) (124/60 - 129/73)  BP(mean): --  RR: 17 (28 Nov 2024 11:17) (17 - 18)  SpO2: 99% (28 Nov 2024 11:17) (94% - 99%)    Parameters below as of 28 Nov 2024 11:17  Patient On (Oxygen Delivery Method): room air        PHYSICAL EXAM:  GENERAL: NAD  HEENT: EOMI, hearing normal, conjunctiva and sclera clear  Chest: CTA bilaterally, no wheezing  CV: S1S2, RRR,   GI: soft, +BS, NT/ND  : +guerrero catheter draining pale yellow urine, +R. nephrostomy tube.   Musculoskeletal: no edema  Psychiatric: affect nL, mood nL  Skin: warm and dry    LABS:                        12.1   19.55 )-----------( 408      ( 28 Nov 2024 08:27 )             37.9     11-28    141  |  111[H]  |  34[H]  ----------------------------<  108[H]  4.4   |  27  |  0.42[L]    Ca    9.0      28 Nov 2024 08:27  Phos  2.3     11-28  Mg     2.3     11-28        Urinalysis Basic - ( 28 Nov 2024 08:27 )    Color: x / Appearance: x / SG: x / pH: x  Gluc: 108 mg/dL / Ketone: x  / Bili: x / Urobili: x   Blood: x / Protein: x / Nitrite: x   Leuk Esterase: x / RBC: x / WBC x   Sq Epi: x / Non Sq Epi: x / Bacteria: x

## 2024-11-28 NOTE — PROGRESS NOTE ADULT - SUBJECTIVE AND OBJECTIVE BOX
Neurology follow up note    DEION [id number]      Interval History:    Patient feels ok no new complaints.    Allergies    levofloxacin (Unknown)  Cipro (Unknown)  ofloxacin (Unknown)  gatifloxacin (Unknown)    Intolerances    fluoroquinolone antibiotics (Other)  Ketek (Other)  Avelox (Other)  telithromycin (Other)      MEDICATIONS    acetaminophen     Tablet .. 650 milliGRAM(s) Oral every 6 hours PRN  apixaban 5 milliGRAM(s) Oral every 12 hours  ceftolozane/tazobactam IVPB 1500 milliGRAM(s) IV Intermittent every 8 hours  finasteride 5 milliGRAM(s) Oral daily  hydrocortisone sodium succinate Injectable 25 milliGRAM(s) IV Push daily  metoprolol succinate ER 12.5 milliGRAM(s) Oral every 12 hours  ondansetron Injectable 4 milliGRAM(s) IV Push every 8 hours PRN  pantoprazole    Tablet 40 milliGRAM(s) Oral before breakfast  polyethylene glycol 3350 17 Gram(s) Oral daily  risperiDONE   Tablet 0.25 milliGRAM(s) Oral two times a day  senna 1 Tablet(s) Oral with breakfast  sodium chloride 3%  Inhalation 4 milliLiter(s) Inhalation every 8 hours PRN  tamsulosin 0.4 milliGRAM(s) Oral at bedtime              Vital Signs Last 24 Hrs  T(C): 36.5 (28 Nov 2024 05:20), Max: 36.5 (28 Nov 2024 05:20)  T(F): 97.7 (28 Nov 2024 05:20), Max: 97.7 (28 Nov 2024 05:20)  HR: 63 (28 Nov 2024 05:20) (63 - 83)  BP: 124/60 (28 Nov 2024 05:20) (106/62 - 127/77)  BP(mean): --  RR: 18 (28 Nov 2024 05:20) (18 - 18)  SpO2: 96% (28 Nov 2024 05:20) (94% - 96%)    Parameters below as of 28 Nov 2024 05:20  Patient On (Oxygen Delivery Method): room air      REVIEW OF SYSTEMS:  CONSTITUTIONAL:  The patient denies fever, chills, night sweats.  HEAD:  No headache.  EYES:  No double vision or blurry vision.  EARS:  No ringing in the ears.  NECK:  No neck pain.  CARDIOVASCULAR:  No chest pain.  RESPIRATORY:  No shortness of breath.  ABDOMEN:  No nausea, vomiting, or abdominal pain.  EXTREMITIES/NEUROLOGICAL:  Does complain of numbness and burning sensation in his legs.  MUSCULOSKELETAL:  Occasional joint pain.  GENERAL:  Does feels weak all over, but no droopy eyes.    PHYSICAL EXAMINATION:  HEAD:  Normocephalic, atraumatic.  EYES:  No scleral icterus.  EARS:  Hearing bilaterally intact.  NECK:  Supple.  CARDIOVASCULAR:  S1 and S2 heard.  RESPIRATORY:  Air entry bilaterally.  ABDOMEN:  Soft, nontender.  EXTREMITIES:  No clubbing or cyanosis were noted.    NEUROLOGIC:  The patient awake, alert, location was hospital, year 2002, month was October, was able to name simple objects.  Recall was 0/3, probably 1 of 3, but does suffer from memory issues.  The patient was able to look up for over 1 minute with no ptosis.  Extraocular movements were intact.  Speech was fluent.  Smile symmetric.  Motor, the patient decreased range of motion of bilateral shoulders, suspect secondary to rotator cuff issues.  When elevated, was able to maintain in the air with slow drops bilaterally.  It is proximally 3+/5, distally was 4/5.  Bilateral lower extremities, slight movement at the feet and knees was 1/5.  Sensory, lower extremities not tested.  The patient said his legs are very sensitive.      LABS:  CBC Full  -  ( 27 Nov 2024 08:50 )  WBC Count : 18.39 K/uL  RBC Count : 4.10 M/uL  Hemoglobin : 12.5 g/dL  Hematocrit : 39.8 %  Platelet Count - Automated : 434 K/uL  Mean Cell Volume : 97.1 fl  Mean Cell Hemoglobin : 30.5 pg  Mean Cell Hemoglobin Concentration : 31.4 g/dL  Auto Neutrophil # : 15.37 K/uL  Auto Lymphocyte # : 0.85 K/uL  Auto Monocyte # : 1.00 K/uL  Auto Eosinophil # : 0.42 K/uL  Auto Basophil # : 0.09 K/uL  Auto Neutrophil % : 83.6 %  Auto Lymphocyte % : 4.6 %  Auto Monocyte % : 5.4 %  Auto Eosinophil % : 2.3 %  Auto Basophil % : 0.5 %    Urinalysis Basic - ( 27 Nov 2024 08:50 )    Color: x / Appearance: x / SG: x / pH: x  Gluc: 96 mg/dL / Ketone: x  / Bili: x / Urobili: x   Blood: x / Protein: x / Nitrite: x   Leuk Esterase: x / RBC: x / WBC x   Sq Epi: x / Non Sq Epi: x / Bacteria: x      11-27    140  |  109[H]  |  24[H]  ----------------------------<  96  4.3   |  26  |  0.46[L]    Ca    8.7      27 Nov 2024 08:50      Hemoglobin A1C:       Vitamin B12         RADIOLOGY  ANALYSIS AND PLAN:  This is an 79-year-old male with altered mental status and history of myasthenia gravis.    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  Questionable if any muscle weakness as well.  The patient appears to be at his baseline from my previous notes.  The patient had refused any type of treatment in the past.   For now, we would recommend continue the patient on his prednisone.  For history of infection with myasthenia gravis.  If possible, would recommend to limit the use of aminoglycosides, fluoroquinolones, macrolides, cardiovascular drugs such as beta blockers, procainamide, other medication such as statins.  Monitor the patient's magnesium levels.  If antibiotics are used, always risks versus benefits must be weighed for the specific antibiotics.  .For history of hypertension, monitor systolic blood pressure.  follow up ACH antibodies   neurologic   wise stable     46  minutes of time was spent with patient plan of care, reviewing data, speaking to multidisciplinary healthcare team with greater than 50% of time counseling care and coordination.    Thank you for courtesy of consultation.  PATIENT HAS NOT YET BEEN SEEN AND EXAMINED TODAY. NOTE AND CHART REVIEWED IN AM AND EXAM FORM PREVIOUS.  ONCE PATIENT SEEN, CHART WILL BE UPDATE AT PRESENT NOTE IS INCOMPLETE   Neurology follow up note    DEION [id number]      Interval History:    Patient feels ok no new complaints.    Allergies    levofloxacin (Unknown)  Cipro (Unknown)  ofloxacin (Unknown)  gatifloxacin (Unknown)    Intolerances    fluoroquinolone antibiotics (Other)  Ketek (Other)  Avelox (Other)  telithromycin (Other)      MEDICATIONS    acetaminophen     Tablet .. 650 milliGRAM(s) Oral every 6 hours PRN  apixaban 5 milliGRAM(s) Oral every 12 hours  ceftolozane/tazobactam IVPB 1500 milliGRAM(s) IV Intermittent every 8 hours  finasteride 5 milliGRAM(s) Oral daily  hydrocortisone sodium succinate Injectable 25 milliGRAM(s) IV Push daily  metoprolol succinate ER 12.5 milliGRAM(s) Oral every 12 hours  ondansetron Injectable 4 milliGRAM(s) IV Push every 8 hours PRN  pantoprazole    Tablet 40 milliGRAM(s) Oral before breakfast  polyethylene glycol 3350 17 Gram(s) Oral daily  risperiDONE   Tablet 0.25 milliGRAM(s) Oral two times a day  senna 1 Tablet(s) Oral with breakfast  sodium chloride 3%  Inhalation 4 milliLiter(s) Inhalation every 8 hours PRN  tamsulosin 0.4 milliGRAM(s) Oral at bedtime              Vital Signs Last 24 Hrs  T(C): 36.5 (28 Nov 2024 05:20), Max: 36.5 (28 Nov 2024 05:20)  T(F): 97.7 (28 Nov 2024 05:20), Max: 97.7 (28 Nov 2024 05:20)  HR: 63 (28 Nov 2024 05:20) (63 - 83)  BP: 124/60 (28 Nov 2024 05:20) (106/62 - 127/77)  BP(mean): --  RR: 18 (28 Nov 2024 05:20) (18 - 18)  SpO2: 96% (28 Nov 2024 05:20) (94% - 96%)    Parameters below as of 28 Nov 2024 05:20  Patient On (Oxygen Delivery Method): room air      REVIEW OF SYSTEMS:  CONSTITUTIONAL:  The patient denies fever, chills, night sweats.  HEAD:  No headache.  EYES:  No double vision or blurry vision.  EARS:  No ringing in the ears.  NECK:  No neck pain.  CARDIOVASCULAR:  No chest pain.  RESPIRATORY:  No shortness of breath.  ABDOMEN:  No nausea, vomiting, or abdominal pain.  EXTREMITIES/NEUROLOGICAL:  Does complain of numbness and burning sensation in his legs.  MUSCULOSKELETAL:  Occasional joint pain.  GENERAL:  Does feels weak all over, but no droopy eyes.    PHYSICAL EXAMINATION:  HEAD:  Normocephalic, atraumatic.  EYES:  No scleral icterus.  EARS:  Hearing bilaterally intact.  NECK:  Supple.  CARDIOVASCULAR:  S1 and S2 heard.  RESPIRATORY:  Air entry bilaterally.  ABDOMEN:  Soft, nontender.  EXTREMITIES:  No clubbing or cyanosis were noted.    NEUROLOGIC:  The patient awake, alert, location was hospital, year 2002, month was October, was able to name simple objects.  Recall was 0/3, probably 1 of 3, but does suffer from memory issues.  The patient was able to look up for over 1 minute with no ptosis.  Extraocular movements were intact.  Speech was fluent.  Smile symmetric.  Motor, the patient decreased range of motion of bilateral shoulders, suspect secondary to rotator cuff issues.  When elevated, was able to maintain in the air with slow drops bilaterally.  It is proximally 3+/5, distally was 4/5.  Bilateral lower extremities, slight movement at the feet and knees was 1/5.  Sensory, lower extremities not tested.  The patient said his legs are very sensitive.      LABS:  CBC Full  -  ( 27 Nov 2024 08:50 )  WBC Count : 18.39 K/uL  RBC Count : 4.10 M/uL  Hemoglobin : 12.5 g/dL  Hematocrit : 39.8 %  Platelet Count - Automated : 434 K/uL  Mean Cell Volume : 97.1 fl  Mean Cell Hemoglobin : 30.5 pg  Mean Cell Hemoglobin Concentration : 31.4 g/dL  Auto Neutrophil # : 15.37 K/uL  Auto Lymphocyte # : 0.85 K/uL  Auto Monocyte # : 1.00 K/uL  Auto Eosinophil # : 0.42 K/uL  Auto Basophil # : 0.09 K/uL  Auto Neutrophil % : 83.6 %  Auto Lymphocyte % : 4.6 %  Auto Monocyte % : 5.4 %  Auto Eosinophil % : 2.3 %  Auto Basophil % : 0.5 %    Urinalysis Basic - ( 27 Nov 2024 08:50 )    Color: x / Appearance: x / SG: x / pH: x  Gluc: 96 mg/dL / Ketone: x  / Bili: x / Urobili: x   Blood: x / Protein: x / Nitrite: x   Leuk Esterase: x / RBC: x / WBC x   Sq Epi: x / Non Sq Epi: x / Bacteria: x      11-27    140  |  109[H]  |  24[H]  ----------------------------<  96  4.3   |  26  |  0.46[L]    Ca    8.7      27 Nov 2024 08:50      Hemoglobin A1C:       Vitamin B12         RADIOLOGY  ANALYSIS AND PLAN:  This is an 79-year-old male with altered mental status and history of myasthenia gravis.    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  Questionable if any muscle weakness as well.  The patient appears to be at his baseline from my previous notes.  The patient had refused any type of treatment in the past.   For now, we would recommend continue the patient on his prednisone.  For history of infection with myasthenia gravis.  If possible, would recommend to limit the use of aminoglycosides, fluoroquinolones, macrolides, cardiovascular drugs such as beta blockers, procainamide, other medication such as statins.  Monitor the patient's magnesium levels.  If antibiotics are used, always risks versus benefits must be weighed for the specific antibiotics.  .For history of hypertension, monitor systolic blood pressure.  follow up ACH antibodies   fall precautions   neurologic   wise stable     46  minutes of time was spent with patient plan of care, reviewing data, speaking to multidisciplinary healthcare team with greater than 50% of time counseling care and coordination.    Thank you for courtesy of consultation.

## 2024-11-28 NOTE — PROGRESS NOTE ADULT - SUBJECTIVE AND OBJECTIVE BOX
[facility] --  Janel Jackson Pannella, Patel, Savella, Goodger, Cohen  Office # [phone]    Follow Up:  HTN, CHF, PE     Subjective/Observations:  No events overnight resting comfortably in bed.  No complaints of chest pain, dyspnea, or palpitations reported. No signs of orthopnea or PND.     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      Club foot  Surgery at birth for Club Foot Right foot      Pilonidal cyst  Surgery 40 years ago      H/O colonoscopy      Spinal stenosis      H/O prostate biopsy        MEDICATIONS  (STANDING):  apixaban 5 milliGRAM(s) Oral every 12 hours  ceftolozane/tazobactam IVPB 1500 milliGRAM(s) IV Intermittent every 8 hours  finasteride 5 milliGRAM(s) Oral daily  hydrocortisone sodium succinate Injectable 25 milliGRAM(s) IV Push daily  metoprolol succinate ER 12.5 milliGRAM(s) Oral every 12 hours  pantoprazole    Tablet 40 milliGRAM(s) Oral before breakfast  polyethylene glycol 3350 17 Gram(s) Oral daily  risperiDONE   Tablet 0.25 milliGRAM(s) Oral two times a day  senna 1 Tablet(s) Oral with breakfast  sodium phosphate 15 milliMole(s)/250 mL IVPB 15 milliMole(s) IV Intermittent once  tamsulosin 0.4 milliGRAM(s) Oral at bedtime    MEDICATIONS  (PRN):  acetaminophen     Tablet .. 650 milliGRAM(s) Oral every 6 hours PRN Temp greater or equal to 38C (100.4F), Mild Pain (1 - 3)  ondansetron Injectable 4 milliGRAM(s) IV Push every 8 hours PRN Nausea and/or Vomiting  sodium chloride 3%  Inhalation 4 milliLiter(s) Inhalation every 8 hours PRN sputum induction    Allergies    levofloxacin (Unknown)  Cipro (Unknown)  ofloxacin (Unknown)  gatifloxacin (Unknown)    Intolerances    fluoroquinolone antibiotics (Other)  Ketek (Other)  Avelox (Other)  telithromycin (Other)    Vital Signs Last 24 Hrs  T(C): 36.5 (28 Nov 2024 11:17), Max: 36.5 (28 Nov 2024 05:20)  T(F): 97.7 (28 Nov 2024 11:17), Max: 97.7 (28 Nov 2024 05:20)  HR: 72 (28 Nov 2024 11:17) (63 - 83)  BP: 129/73 (28 Nov 2024 11:17) (124/60 - 129/73)  BP(mean): --  RR: 17 (28 Nov 2024 11:17) (17 - 18)  SpO2: 99% (28 Nov 2024 11:17) (94% - 99%)    Parameters below as of 28 Nov 2024 11:17  Patient On (Oxygen Delivery Method): room air      I&O's Summary    27 Nov 2024 07:01  -  28 Nov 2024 07:00  --------------------------------------------------------  IN: 0 mL / OUT: 2925 mL / NET: -2925 mL        TELE: SR  PHYSICAL EXAM:  Constitutional: NAD, awake and alert  HEENT: Moist Mucous Membranes, Anicteric  Pulmonary: Non-labored, breath sounds are clear bilaterally, No wheezing, rales or rhonchi  Cardiovascular: Regular, S1 and S2,  No rubs, gallops or clicks, + murmur  Gastrointestinal:  soft, nontender, nondistended   Lymph: No peripheral edema. No lymphadenopathy.   Skin: No visible rashes or ulcers.  Psych:  agitated, restless  LABS: All Labs Reviewed:                        12.1   19.55 )-----------( 408      ( 28 Nov 2024 08:27 )             37.9                         12.5   18.39 )-----------( 434      ( 27 Nov 2024 08:50 )             39.8                         11.4   17.58 )-----------( 407      ( 26 Nov 2024 06:01 )             35.7     28 Nov 2024 08:27    141    |  111    |  34     ----------------------------<  108    4.4     |  27     |  0.42   27 Nov 2024 08:50    140    |  109    |  24     ----------------------------<  96     4.3     |  26     |  0.46   26 Nov 2024 06:01    144    |  112    |  24     ----------------------------<  111    3.2     |  24     |  0.50     Ca    9.0        28 Nov 2024 08:27  Ca    8.7        27 Nov 2024 08:50  Ca    8.5        26 Nov 2024 06:01  Phos  2.3       28 Nov 2024 08:27  Mg     2.3       28 Nov 2024 08:27            12 Lead ECG:   Ventricular Rate 107 BPM    Atrial Rate 107 BPM    P-R Interval 148 ms    QRS Duration 100 ms    Q-T Interval 320 ms    QTC Calculation(Bazett) 427 ms    P Axis 36 degrees    R Axis -44 degrees    T Axis 137 degrees    Diagnosis Line Sinus tachycardia with fusion complexes  Left axis deviation  ST & T wave abnormality, consider lateral ischemia  Incomplete left bundle branch block  Abnormal ECG    Confirmed by renée Whaley ([id number]) on 11/27/2024 2:32:21 PM (11-26-24 @ 15:24)       EXAM:  ECHO TTE WO CON COMP W DOPP         PROCEDURE DATE:  06/01/2021        INTERPRETATION:  INDICATION: Bilateral pulmonary edema  Sonographer AS    Blood Pressure 130/64    Height 167.6 cm     Weight 113.4 kg       BSA 2.2 sq m    Dimensions:  LA 2.8       Normal Values: 2.0 - 4.0 cm  Ao 2.8        Normal Values: 2.0 - 3.8 cm  SEPTUM 1.0       Normal Values: 0.6 - 1.2 cm  PWT 0.9       Normal Values: 0.6 - 1.1 cm  LVIDd 3.6         Normal Values: 3.0 - 5.6 cm  LVIDs 2.4         Normal Values: 1.8 - 4.0 cm      OBSERVATIONS:  Technically difficult and limited study  Mitral Valve: normal, trace physiologic MR.  Aortic Valve/Aorta: Not well-visualized  Tricuspid Valve: Not well-visualized  Pulmonic Valve: Not well-visualized  Left Atrium:normal  Right Atrium: Not well-visualized  Left Ventricle: Left ventricle is not well-visualized. Overall grossly normal left ventricular systolic function, estimated LVEF of 55-60%.  Right Ventricle: Grossly normal size and systolic function.  Pericardium: no significant pericardial effusion.  Pulmonary/RV Pressure: estimated PA systolic pressure of 9.5 mmHg assuming an RA pressure of 3 mmHg.  LV diastolic dysfunction is present        IMPRESSION:  Technically difficult and limited study  Overall grossly normal left ventricular systolic function, estimated LVEF of 55-60%.  Grossly normal RV size and systolic function.  The aortic valve is not well-visualized  Trace physiologic MR                GULSHAN BECKER MD; Attending Cardiologist  This document has been electronically signed. Jun 2 2021  4:21PM

## 2024-11-28 NOTE — PROGRESS NOTE ADULT - ASSESSMENT
80 yo M with PMHx HTN, CHF, PE (on eliquis), Myasthenia Gravis, and chronic LE edema, nephrolithiasis (s/p nephrostomy tube placement ), urosepsis, BPH BIBEMS from [facility] for hypertension and elevated WBC count. Admitted to ICU for septic shock 2/2 UTI and Enter/rhinovirus.        Problem/Plan - 1:  ·  Problem: Sepsis, unspecified organism.   ·  Plan: - Pt admitted to ICU for  septic shock   sepsis  poa  2/2  UTI and rhino/enterovirus with    -IR s/p percutaneous nephrostomy tube placement done 9/6/24  [facility]  and [facility] guerrero  , GUERRERO CHANGED IN ED .  - Pt p/w elevated WBC, pt asymptomatic. Hx uti  sepsis/klebsiella bacteremia treated with IV Rocephin, hospital course complicated by Afib with RVR during recent admission (9/2024)  - Per CI paperwork, pt s/p 7d course of IV zosyn via midline  (11/4-11/10)  - Met sepsis criteria on admission with leukocytosis, lactate, tachycardia  - WBC appears to be at baseline, per chart review baseline around 12-13 (on chronic steroids for MG)  - UA: Turbid, large blood, large LEC, positive nitrite, many bacteria, WBC TNTC  - CT: Urinary bladder collapsed, limiting evaluation, at least 3 stones measuring up to 1.0 cm. R nephrostomy tube intact .  - s/p Levophed gtt. s/p midodrine s/p hydrocortisone  taper   now  25 mg daily for 2 day  than back Prednisone home dose   - s/p IV Meropenem, -continue   Zebraxa ceftolozane-tazobactam--plan for 5-7 day- till Friday as per id dr wilson   -   called uro consult / [facility] surg pa  as pt still have leucocytosis  this time nephrostomy need to be  changed already month old - IR will change today , also 2 month old midline removed by perry np.   - Blood cult neg  and sputum Cx negative  - Urine cx + klebsiella, pseudomonas, proteus  - Quantinferon indeterminate.  sputum AFB x 3 negative  - Fungitell, Histoplasma, Aspergillus  - ID (Dr. Wilson) following,      Problem/Plan - 2:  ·  Problem: Elevated troponin.   ·  Plan: Troponin 1099.8 on admission likely 2/2 demand ischemia  - Pt asymptomatic. Denies chest pain, pressure, palpitations, SOB, nausea  - EKG reviewed, no evidence of ischemic changes, ST/T changes or Q waves  - s/p ASA 324mg x1 in ED  - Trops trended to peak  - Cardio ([facility]) following.     Problem/Plan - 3:  ·  Problem: Rhinovirus.   ·  Plan: - Rhino/enterovirus +  - Pt asymptomatic, less likely cause of sepsis given history and +UTI  - Supportive care  - Tylenol PRN for fever.     Problem/Plan - 4:  ·  Problem: HTN (hypertension).   ·  Plan: - Pt hypotensive in the setting of septic shock requiring pressor support, now s/p Levophed gtt and Midodrine  - BP stable.  - Started on Toprol XL 12.5mg PO Q12h for NSVT.     Problem/Plan - 5:  ·  Problem: Chronic CHF.   ·  Plan: Pt with documented hx of CHF, unspecified type however on GDMT for HFrEF  - TTE (9/2024): LVEF 55-60%, no diastolic dysfunction, moderate MR  - Evaluated by cardiology during admission at [facility] (9/2024) for acute CHF, started on GDMT  - Pro-BNP on admission 1988  - Hold home spironolactone and Lasix in setting of hypotension.  Started on Toprol XL 12.5mg PO Q12h.   - Strict I&Os   - Does not appear clinically volume overloaded  - Cardiology ([facility]) consulted,     Problem/Plan - 6:  ·  Problem: Lung granuloma.   ·  Plan: CT c/a/p w IV contrast: evidence of granulomatous infection in lungs, liver and spleen  - Per chart review, pt has been aware of this finding  - Has been seen on prior imaging, evaluated by ID on previous admission (11/2023)  - Quant indeterminate, fungitell negative, aspergillus galactomannan negative at that time  - ID (Dr. wilson  consulted,  afb so far neg.     Problem/Plan - 7:  ·  Problem: Thyroid nodule.   ·  Plan: - CT Chest: 2.6 cm left lobe thyroid nodule and evidence of granulomatous infection in lungs, liver and spleen  - TSH wnl.     Problem/Plan - 8:  ·  Problem: Personal history of pulmonary embolism.   ·  Plan: Continue home Eliquis 5mg BID.     Problem/Plan - 9:  ·  Problem: Myasthenia gravis.   ·  Plan: s/p stress dose steroids- baseline bedbound as per son Olvin.  Restart prednisone 10mg PO daily.      Problem/Plan - 10:  ·  Problem: BPH (benign prostatic hyperplasia).   ·  Plan; Continue home Flomax and finasteride.     Problem/Plan - 11:  ·  Problem: Need for prophylactic measure.   ·  Plan: Continue home Eliquis 5mg BID

## 2024-11-28 NOTE — PROGRESS NOTE ADULT - ASSESSMENT
80 yo M with PMHx HTN, CHF, PE (on Eliquis Myasthenia Gravis, and chronic LE edema, nephrolithiasis (s/p nephrostomy tube placement ), urosepsis, BPH BIBEMS from [facility] for hypertension and elevated WBC count.     Hypotension   - a/f Acute Metabolic Encephalopathy,  Urosepsis 2/2 UTI, Kleb, Pseudomonas Carb Resistent Septic shock, resolved,  Rhinovirus, was in ICU s/p Off pressors and midodrine, now on GMF    - hypotension resolved now that his urosepsis has been treated  - BP stable and controlled, per flow sheet  - tele SB- SR  - TTE (9/2024) showed LVEF 55-60%, no diastolic dysfunction, moderate MR  - Repeat TTE with severe LV dysfunction.  Looks to be stress mediated. Even on repeat study on 11/20, the LV function seems somewhat improved.  - No evidence of significant volume overload  - limited GDMT due to hypotension, tolerating toprol bid  -Will need outpatient ischemic eval  - No clear evidence of acute ischemia, patient denies cardiac symptoms.    - Ruling out TB secondary to CT chest  findings, ID following   - cont Eliquis (hx of PE)     - Monitor and replete lytes, keep K>4, Mg>2.  - Will continue to follow.    Levy Nieves NP  Nurse Practitioner- Cardiology   Call TEAMS 78 yo M with PMHx HTN, CHF, PE (on Eliquis Myasthenia Gravis, and chronic LE edema, nephrolithiasis (s/p nephrostomy tube placement ), urosepsis, BPH BIBEMS from [facility] for hypertension and elevated WBC count.     Hypotension   - a/f Acute Metabolic Encephalopathy,  Urosepsis 2/2 UTI, Kleb, Pseudomonas Carb Resistent Septic shock, resolved,  Rhinovirus, was in ICU s/p Off pressors and midodrine, now on GMF    - hypotension resolved now that his urosepsis has been treated  - BP stable and controlled, per flow sheet  - tele SB- SR  - TTE (9/2024) showed LVEF 55-60%, no diastolic dysfunction, moderate MR  - Repeat TTE with severe LV dysfunction.  Looks to be stress mediated. Even on repeat study on 11/20, the LV function seems somewhat improved.  - No evidence of significant volume overload  - limited GDMT due to hypotension, tolerating toprol bid  -Will need outpatient ischemic eval  - No clear evidence of acute ischemia, patient denies cardiac symptoms.    - cont Eliquis (hx of PE)     - Monitor and replete lytes, keep K>4, Mg>2.  - Will continue to follow.    Levy Nieves NP  Nurse Practitioner- Cardiology   Call TEAMS

## 2024-11-29 LAB
ANION GAP SERPL CALC-SCNC: 7 MMOL/L — SIGNIFICANT CHANGE UP (ref 5–17)
APTT BLD: 30.7 SEC — SIGNIFICANT CHANGE UP (ref 24.5–35.6)
BASOPHILS # BLD AUTO: 0.08 K/UL — SIGNIFICANT CHANGE UP (ref 0–0.2)
BASOPHILS NFR BLD AUTO: 0.4 % — SIGNIFICANT CHANGE UP (ref 0–2)
BUN SERPL-MCNC: 26 MG/DL — HIGH (ref 7–23)
CALCIUM SERPL-MCNC: 8.6 MG/DL — SIGNIFICANT CHANGE UP (ref 8.5–10.1)
CHLORIDE SERPL-SCNC: 108 MMOL/L — SIGNIFICANT CHANGE UP (ref 96–108)
CO2 SERPL-SCNC: 28 MMOL/L — SIGNIFICANT CHANGE UP (ref 22–31)
CREAT SERPL-MCNC: 0.44 MG/DL — LOW (ref 0.5–1.3)
EGFR: 106 ML/MIN/1.73M2 — SIGNIFICANT CHANGE UP
EOSINOPHIL # BLD AUTO: 0.8 K/UL — HIGH (ref 0–0.5)
EOSINOPHIL NFR BLD AUTO: 4.3 % — SIGNIFICANT CHANGE UP (ref 0–6)
GLUCOSE SERPL-MCNC: 96 MG/DL — SIGNIFICANT CHANGE UP (ref 70–99)
HCT VFR BLD CALC: 37.3 % — LOW (ref 39–50)
HGB BLD-MCNC: 12 G/DL — LOW (ref 13–17)
IMM GRANULOCYTES NFR BLD AUTO: 3 % — HIGH (ref 0–0.9)
INR BLD: 1.22 RATIO — HIGH (ref 0.85–1.16)
LYMPHOCYTES # BLD AUTO: 1.55 K/UL — SIGNIFICANT CHANGE UP (ref 1–3.3)
LYMPHOCYTES # BLD AUTO: 8.3 % — LOW (ref 13–44)
MAGNESIUM SERPL-MCNC: 1.9 MG/DL — SIGNIFICANT CHANGE UP (ref 1.6–2.6)
MCHC RBC-ENTMCNC: 31.1 PG — SIGNIFICANT CHANGE UP (ref 27–34)
MCHC RBC-ENTMCNC: 32.2 G/DL — SIGNIFICANT CHANGE UP (ref 32–36)
MCV RBC AUTO: 96.6 FL — SIGNIFICANT CHANGE UP (ref 80–100)
MONOCYTES # BLD AUTO: 1.33 K/UL — HIGH (ref 0–0.9)
MONOCYTES NFR BLD AUTO: 7.1 % — SIGNIFICANT CHANGE UP (ref 2–14)
NEUTROPHILS # BLD AUTO: 14.4 K/UL — HIGH (ref 1.8–7.4)
NEUTROPHILS NFR BLD AUTO: 76.9 % — SIGNIFICANT CHANGE UP (ref 43–77)
NRBC # BLD: 0 /100 WBCS — SIGNIFICANT CHANGE UP (ref 0–0)
PHOSPHATE SERPL-MCNC: 2.8 MG/DL — SIGNIFICANT CHANGE UP (ref 2.5–4.5)
PLATELET # BLD AUTO: 418 K/UL — HIGH (ref 150–400)
POTASSIUM SERPL-MCNC: 4.1 MMOL/L — SIGNIFICANT CHANGE UP (ref 3.5–5.3)
POTASSIUM SERPL-SCNC: 4.1 MMOL/L — SIGNIFICANT CHANGE UP (ref 3.5–5.3)
PROTHROM AB SERPL-ACNC: 14.2 SEC — HIGH (ref 9.9–13.4)
RBC # BLD: 3.86 M/UL — LOW (ref 4.2–5.8)
RBC # FLD: 17.4 % — HIGH (ref 10.3–14.5)
SODIUM SERPL-SCNC: 143 MMOL/L — SIGNIFICANT CHANGE UP (ref 135–145)
WBC # BLD: 18.73 K/UL — HIGH (ref 3.8–10.5)
WBC # FLD AUTO: 18.73 K/UL — HIGH (ref 3.8–10.5)

## 2024-11-29 PROCEDURE — 99232 SBSQ HOSP IP/OBS MODERATE 35: CPT

## 2024-11-29 PROCEDURE — 50435 EXCHANGE NEPHROSTOMY CATH: CPT | Mod: RT

## 2024-11-29 RX ORDER — CEFTOLOZANE AND TAZOBACTAM 1; .5 G/10ML; G/10ML
1500 INJECTION, POWDER, LYOPHILIZED, FOR SOLUTION INTRAVENOUS EVERY 8 HOURS
Refills: 0 | Status: DISCONTINUED | OUTPATIENT
Start: 2024-11-30 | End: 2024-12-02

## 2024-11-29 RX ADMIN — Medication 1 TABLET(S): at 09:40

## 2024-11-29 RX ADMIN — CEFTOLOZANE AND TAZOBACTAM 100 MILLIGRAM(S): 1; .5 INJECTION, POWDER, LYOPHILIZED, FOR SOLUTION INTRAVENOUS at 11:44

## 2024-11-29 RX ADMIN — Medication 5 MILLIGRAM(S): at 11:44

## 2024-11-29 RX ADMIN — CEFTOLOZANE AND TAZOBACTAM 100 MILLIGRAM(S): 1; .5 INJECTION, POWDER, LYOPHILIZED, FOR SOLUTION INTRAVENOUS at 01:22

## 2024-11-29 RX ADMIN — RISPERIDONE 0.25 MILLIGRAM(S): 4 TABLET ORAL at 05:37

## 2024-11-29 RX ADMIN — METOPROLOL TARTRATE 12.5 MILLIGRAM(S): 100 TABLET, FILM COATED ORAL at 18:53

## 2024-11-29 RX ADMIN — CEFTOLOZANE AND TAZOBACTAM 100 MILLIGRAM(S): 1; .5 INJECTION, POWDER, LYOPHILIZED, FOR SOLUTION INTRAVENOUS at 18:53

## 2024-11-29 RX ADMIN — APIXABAN 5 MILLIGRAM(S): 2.5 TABLET, FILM COATED ORAL at 18:53

## 2024-11-29 RX ADMIN — METOPROLOL TARTRATE 12.5 MILLIGRAM(S): 100 TABLET, FILM COATED ORAL at 05:37

## 2024-11-29 RX ADMIN — TAMSULOSIN HYDROCHLORIDE 0.4 MILLIGRAM(S): 0.4 CAPSULE ORAL at 21:43

## 2024-11-29 RX ADMIN — PANTOPRAZOLE SODIUM 40 MILLIGRAM(S): 40 TABLET, DELAYED RELEASE ORAL at 05:37

## 2024-11-29 RX ADMIN — PREDNISONE 10 MILLIGRAM(S): 20 TABLET ORAL at 05:37

## 2024-11-29 RX ADMIN — RISPERIDONE 0.25 MILLIGRAM(S): 4 TABLET ORAL at 18:53

## 2024-11-29 RX ADMIN — POLYETHYLENE GLYCOL 3350 17 GRAM(S): 17 POWDER, FOR SOLUTION ORAL at 11:44

## 2024-11-29 NOTE — PROGRESS NOTE ADULT - ASSESSMENT
80 yo M with PMHx HTN, CHF, PE (on eliquis), Myasthenia Gravis, and chronic LE edema, nephrolithiasis (s/p nephrostomy tube placement ), urosepsis, BPH BIBEMS from [facility] for hypertension and elevated WBC count. Admitted to ICU for septic shock 2/2 UTI and Enter/rhinovirus.        Problem/Plan - 1:  ·  Problem: Sepsis, unspecified organism.   ·  Plan: - Pt admitted to ICU for  septic shock   sepsis  poa  2/2  UTI and rhino/enterovirus with    -IR s/p percutaneous nephrostomy tube placement done 9/6/24  [facility]  and [facility] guerrero  , GUERRERO CHANGED IN ED .  - Pt p/w elevated WBC, pt asymptomatic. Hx uti  sepsis/klebsiella bacteremia treated with IV Rocephin, hospital course complicated by Afib with RVR during recent admission (9/2024)  - Per CI paperwork, pt s/p 7d course of IV zosyn via midline  (11/4-11/10)  - Met sepsis criteria on admission with leukocytosis, lactate, tachycardia  - WBC appears to be at baseline, per chart review baseline around 12-13 (on chronic steroids for MG)  - UA: Turbid, large blood, large LEC, positive nitrite, many bacteria, WBC TNTC  - CT: Urinary bladder collapsed, limiting evaluation, at least 3 stones measuring up to 1.0 cm. R nephrostomy tube intact .  - s/p Levophed gtt. s/p midodrine s/p hydrocortisone  taper,   now back on Prednisone home dose   - s/p IV Meropenem, -continue   Zebraxa ceftolozane-tazobactam--plan for 5-7 day- till Friday as per id dr wilson   -   called uro consult / [facility] surg pa  as pt still have leucocytosis  this time nephrostomy need to be  changed already month old - IR will change today , also 2 month old midline removed by perry np.   - Blood cult neg  and sputum Cx negative  - Urine cx + klebsiella, pseudomonas, proteus  - Quantinferon indeterminate.  sputum AFB x 3 negative  - Fungitell wnl, Histoplasma, Aspergillus  - ID (Dr. Wilson) following,      Problem/Plan - 2:  ·  Problem: Elevated troponin.   ·  Plan: Troponin 1099.8 on admission likely 2/2 demand ischemia  - Pt asymptomatic. Denies chest pain, pressure, palpitations, SOB, nausea  - EKG reviewed, no evidence of ischemic changes, ST/T changes or Q waves  - s/p ASA 324mg x1 in ED  - Trops trended to peak  - Cardio ([facility]) following.     Problem/Plan - 3:  ·  Problem: Rhinovirus.   ·  Plan: - Rhino/enterovirus +  - Pt asymptomatic, less likely cause of sepsis given history and +UTI  - Supportive care  - Tylenol PRN for fever.     Problem/Plan - 4:  ·  Problem: HTN (hypertension).   ·  Plan: - Pt hypotensive in the setting of septic shock requiring pressor support, now s/p Levophed gtt and Midodrine  - BP stable.  - Started on Toprol XL 12.5mg PO Q12h for NSVT.     Problem/Plan - 5:  ·  Problem: Chronic CHF.   ·  Plan: Pt with documented hx of CHF, unspecified type however on GDMT for HFrEF  - TTE (9/2024): LVEF 55-60%, no diastolic dysfunction, moderate MR  - Evaluated by cardiology during admission at [facility] (9/2024) for acute CHF, started on GDMT  - Pro-BNP on admission 1988  - Hold home spironolactone and Lasix in setting of hypotension.  Started on Toprol XL 12.5mg PO Q12h.   - Strict I&Os   - Does not appear clinically volume overloaded  - Cardiology ([facility]) consulted,     Problem/Plan - 6:  ·  Problem: Lung granuloma.   ·  Plan: CT c/a/p w IV contrast: evidence of granulomatous infection in lungs, liver and spleen  - Per chart review, pt has been aware of this finding  - Has been seen on prior imaging, evaluated by ID on previous admission (11/2023)  - Quant indeterminate, fungitell negative, aspergillus galactomannan negative at that time  - ID (Dr. wilson  consulted,  afb so far neg.     Problem/Plan - 7:  ·  Problem: Thyroid nodule.   ·  Plan: - CT Chest: 2.6 cm left lobe thyroid nodule and evidence of granulomatous infection in lungs, liver and spleen  - TSH wnl.     Problem/Plan - 8:  ·  Problem: Personal history of pulmonary embolism.   ·  Plan: Continue home Eliquis 5mg BID.     Problem/Plan - 9:  ·  Problem: Myasthenia gravis.   ·  Plan: s/p stress dose steroids- baseline bedbound as per son Olvin.  Restart prednisone 10mg PO daily.      Problem/Plan - 10:  ·  Problem: BPH (benign prostatic hyperplasia).   ·  Plan; Continue home Flomax and finasteride.     Problem/Plan - 11:  ·  Problem: Need for prophylactic measure.   ·  Plan: Continue home Eliquis 5mg BID

## 2024-11-29 NOTE — PROGRESS NOTE ADULT - ASSESSMENT
80 yo M with PMHx HTN, CHF, PE (on eliquis), Myasthenia Gravis, and chronic LE edema, R ureteral obstructing stone s/p R PCN placement on 9/6/24, urosepsis, BPH, who presented from [facility] for hypertension and elevated WBC count. Per nursing home paperwork, pt hypertensive to 154/105 and recieved metoprolol 25 mg (8:10 PM), and reported pt with "elevated WBC". Upon arrival, pt found to be hypotensive to 90/64. Arrived to ED with nephrostomy tube, chronic guerrero and PICC line in place. Pt was recieving IV zosyn x7d (start date 11/04) per paperwork review for UTI.    Patient being treated for UTI. Guerrero exchanged in the ED. Nephrostomy exchanged advised given recurrent UTI, s/p nephrostomy exchange today. CT did not show any obvious signs of acute infection. Urine culture grew klebsiella, pseudomonas, proteus. Sensitivities reviewed. Blood cultures no growth.     Also incidentally with RUL calcified granulomas, also in liver in spleen. Unclear etiology, patient without symptoms of tuberculosis, no known hx of TB infection or exposure. He was born in [place], and has not lived outside [place]. Sputum Quantiferon indeterminate, but AFB smear x 3 are negative. Serum Fungitell is low.     #Leukocytosis  #UTI  #MDRO pseudomonas and ESBL proteus  #Rhinovirus positive  #Calcified granulomas  #Hx of fluoroquinolone allergy    -continue ceftolozane-tazobactam--if doing fine, can possibly stop 1-2 days after nephrostomy exchange  -sputum AFB cultures x 3 in process  -serum galactomannan, urine histoplasma antigen pending  -monitor WBC  -advised outpatient with follow up with Pulmonary and GI/Hepatology  -contact isolation  -I will be covered by Dr. Terri Celis this weekend, 11/30-12/1/24.    Rachel Connor MD  Division of Infectious Diseases   Cell [phone] between 8am and 6pm   After 6pm and weekends please call ID service at [phone].     35 minutes spent on total encounter assessing patient, examination, chart review, counseling and coordinating care by the attending physician/nurse/care manager.

## 2024-11-29 NOTE — SOCIAL WORK PROGRESS NOTE - NSSWPROGRESSNOTE_GEN_ALL_CORE
Per Md, No weekend DC. patient to be getting Nephrostomy Tube placement. Pt will be Transitions from [facility] to LTC at [facility] when he is medically ready for DC. Sw will cont to follow for safe DC planning.

## 2024-11-29 NOTE — PROGRESS NOTE ADULT - SUBJECTIVE AND OBJECTIVE BOX
CHIEF COMPLAINT/INTERVAL HISTORY:  Pt. seen and evaluated for sepsis 2/2 UTI.  Pt. is in no distress.  Denies having CP or SOB.  Tolerating IV antibiotics.     REVIEW OF SYSTEMS:  No fever, CP, SOB, or abdominal pain    Vital Signs Last 24 Hrs  T(C): 36.8 (29 Nov 2024 05:40), Max: 36.8 (29 Nov 2024 05:40)  T(F): 98.2 (29 Nov 2024 05:40), Max: 98.2 (29 Nov 2024 05:40)  HR: 74 (29 Nov 2024 05:40) (74 - 85)  BP: 139/78 (29 Nov 2024 05:40) (139/78 - 156/69)  BP(mean): --  RR: 16 (29 Nov 2024 05:40) (16 - 17)  SpO2: 95% (29 Nov 2024 05:40) (95% - 98%)    Parameters below as of 29 Nov 2024 05:40  Patient On (Oxygen Delivery Method): room air        PHYSICAL EXAM:  GENERAL: NAD  HEENT: EOMI, hearing normal, conjunctiva and sclera clear  Chest: CTA bilaterally, no wheezing  CV: S1S2, RRR,   GI: soft, +BS, NT/ND   + guerrero catheter, +right nephrostomy tube  Musculoskeletal: no edema  Psychiatric: affect nL, mood nL  Skin: warm and dry    LABS:                        12.0   18.73 )-----------( 418      ( 29 Nov 2024 07:50 )             37.3     11-29    143  |  108  |  26[H]  ----------------------------<  96  4.1   |  28  |  0.44[L]    Ca    8.6      29 Nov 2024 07:50  Phos  2.8     11-29  Mg     1.9     11-29      PT/INR - ( 29 Nov 2024 07:50 )   PT: 14.2 sec;   INR: 1.22 ratio         PTT - ( 29 Nov 2024 07:50 )  PTT:30.7 sec  Urinalysis Basic - ( 29 Nov 2024 07:50 )    Color: x / Appearance: x / SG: x / pH: x  Gluc: 96 mg/dL / Ketone: x  / Bili: x / Urobili: x   Blood: x / Protein: x / Nitrite: x   Leuk Esterase: x / RBC: x / WBC x   Sq Epi: x / Non Sq Epi: x / Bacteria: x

## 2024-11-29 NOTE — CASE MANAGEMENT PROGRESS NOTE - NSCMPROGRESSNOTE_GEN_ALL_CORE
Patient discussed in rounds and remains acute for exchange of nephrostomy tube today and possible PICC line.  Per MD, no weekend dC.  Discharge disposition is [facility] to LTC.  SW involved.  CM remains available throughout hospital stay.

## 2024-11-29 NOTE — PROGRESS NOTE ADULT - SUBJECTIVE AND OBJECTIVE BOX
Neurology follow up note    DEION [id number]      Interval History:    Patient feels ok no new complaints.    Allergies    levofloxacin (Unknown)  Cipro (Unknown)  ofloxacin (Unknown)  gatifloxacin (Unknown)    Intolerances    fluoroquinolone antibiotics (Other)  Ketek (Other)  Avelox (Other)  telithromycin (Other)      MEDICATIONS    acetaminophen     Tablet .. 650 milliGRAM(s) Oral every 6 hours PRN  apixaban 5 milliGRAM(s) Oral every 12 hours  ceftolozane/tazobactam IVPB 1500 milliGRAM(s) IV Intermittent every 8 hours  finasteride 5 milliGRAM(s) Oral daily  metoprolol succinate ER 12.5 milliGRAM(s) Oral every 12 hours  ondansetron Injectable 4 milliGRAM(s) IV Push every 8 hours PRN  pantoprazole    Tablet 40 milliGRAM(s) Oral before breakfast  polyethylene glycol 3350 17 Gram(s) Oral daily  predniSONE   Tablet 10 milliGRAM(s) Oral daily  risperiDONE   Tablet 0.25 milliGRAM(s) Oral two times a day  senna 1 Tablet(s) Oral with breakfast  sodium chloride 3%  Inhalation 4 milliLiter(s) Inhalation every 8 hours PRN  tamsulosin 0.4 milliGRAM(s) Oral at bedtime              Vital Signs Last 24 Hrs  T(C): 36.8 (29 Nov 2024 05:40), Max: 36.8 (29 Nov 2024 05:40)  T(F): 98.2 (29 Nov 2024 05:40), Max: 98.2 (29 Nov 2024 05:40)  HR: 74 (29 Nov 2024 05:40) (72 - 85)  BP: 139/78 (29 Nov 2024 05:40) (129/73 - 156/69)  BP(mean): --  RR: 16 (29 Nov 2024 05:40) (16 - 17)  SpO2: 95% (29 Nov 2024 05:40) (95% - 99%)    Parameters below as of 29 Nov 2024 05:40  Patient On (Oxygen Delivery Method): room air      REVIEW OF SYSTEMS:  CONSTITUTIONAL:  The patient denies fever, chills, night sweats.  HEAD:  No headache.  EYES:  No double vision or blurry vision.  EARS:  No ringing in the ears.  NECK:  No neck pain.  CARDIOVASCULAR:  No chest pain.  RESPIRATORY:  No shortness of breath.  ABDOMEN:  No nausea, vomiting, or abdominal pain.  EXTREMITIES/NEUROLOGICAL:  Does complain of numbness and burning sensation in his legs.  MUSCULOSKELETAL:  Occasional joint pain.  GENERAL:  Does feels weak all over, but no droopy eyes.    PHYSICAL EXAMINATION:  HEAD:  Normocephalic, atraumatic.  EYES:  No scleral icterus.  EARS:  Hearing bilaterally intact.  NECK:  Supple.  CARDIOVASCULAR:  S1 and S2 heard.  RESPIRATORY:  Air entry bilaterally.  ABDOMEN:  Soft, nontender.  EXTREMITIES:  No clubbing or cyanosis were noted.    NEUROLOGIC:  The patient awake, alert, location was hospital, was able to name simple objects., but does suffer from memory issues.  The patient was able to look up for over 1 minute with no ptosis.  Extraocular movements were intact.  Speech was fluent.  Smile symmetric.  Motor, the patient decreased range of motion of bilateral shoulders, suspect secondary to rotator cuff issues.  When elevated, was able to maintain in the air with slow drops bilaterally.  It is proximally 3+/5, distally was 4/5.  Bilateral lower extremities, slight movement at the feet and knees was 1/5.  Sensory, lower extremities not tested.  The patient said his legs are very sensitive.         LABS:  CBC Full  -  ( 28 Nov 2024 08:27 )  WBC Count : 19.55 K/uL  RBC Count : 3.89 M/uL  Hemoglobin : 12.1 g/dL  Hematocrit : 37.9 %  Platelet Count - Automated : 408 K/uL  Mean Cell Volume : 97.4 fl  Mean Cell Hemoglobin : 31.1 pg  Mean Cell Hemoglobin Concentration : 31.9 g/dL  Auto Neutrophil # : 15.67 K/uL  Auto Lymphocyte # : 1.04 K/uL  Auto Monocyte # : 1.57 K/uL  Auto Eosinophil # : 0.43 K/uL  Auto Basophil # : 0.08 K/uL  Auto Neutrophil % : 80.2 %  Auto Lymphocyte % : 5.3 %  Auto Monocyte % : 8.0 %  Auto Eosinophil % : 2.2 %  Auto Basophil % : 0.4 %    Urinalysis Basic - ( 28 Nov 2024 08:27 )    Color: x / Appearance: x / SG: x / pH: x  Gluc: 108 mg/dL / Ketone: x  / Bili: x / Urobili: x   Blood: x / Protein: x / Nitrite: x   Leuk Esterase: x / RBC: x / WBC x   Sq Epi: x / Non Sq Epi: x / Bacteria: x      11-28    141  |  111[H]  |  34[H]  ----------------------------<  108[H]  4.4   |  27  |  0.42[L]    Ca    9.0      28 Nov 2024 08:27  Phos  2.3     11-28  Mg     2.3     11-28      Hemoglobin A1C:       Vitamin B12           RADIOLOGY  ANALYSIS AND PLAN:  This is an 79-year-old male with altered mental status and history of myasthenia gravis.    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  Questionable if any muscle weakness as well.  The patient appears to be at his baseline from my previous notes.  The patient had refused any type of treatment in the past.   For now, we would recommend continue the patient on his prednisone.  For history of infection with myasthenia gravis.  If possible, would recommend to limit the use of aminoglycosides, fluoroquinolones, macrolides, cardiovascular drugs such as beta blockers, procainamide, other medication such as statins.  Monitor the patient's magnesium levels.  If antibiotics are used, always risks versus benefits must be weighed for the specific antibiotics.  .For history of hypertension, monitor systolic blood pressure.  follow up ACH antibodies   neurologic   wise stable     46  minutes of time was spent with patient plan of care, reviewing data, speaking to multidisciplinary healthcare team with greater than 50% of time counseling care and coordination.    Thank you for courtesy of consultation.  PATIENT HAS NOT YET BEEN SEEN AND EXAMINED TODAY. NOTE AND CHART REVIEWED IN AM AND EXAM FORM PREVIOUS.  ONCE PATIENT SEEN, CHART WILL BE UPDATE AT PRESENT NOTE IS INCOMPLETE     Neurology follow up note    DEION [id number]      Interval History:    Patient feels ok no new complaints.    Allergies    levofloxacin (Unknown)  Cipro (Unknown)  ofloxacin (Unknown)  gatifloxacin (Unknown)    Intolerances    fluoroquinolone antibiotics (Other)  Ketek (Other)  Avelox (Other)  telithromycin (Other)      MEDICATIONS    acetaminophen     Tablet .. 650 milliGRAM(s) Oral every 6 hours PRN  apixaban 5 milliGRAM(s) Oral every 12 hours  ceftolozane/tazobactam IVPB 1500 milliGRAM(s) IV Intermittent every 8 hours  finasteride 5 milliGRAM(s) Oral daily  metoprolol succinate ER 12.5 milliGRAM(s) Oral every 12 hours  ondansetron Injectable 4 milliGRAM(s) IV Push every 8 hours PRN  pantoprazole    Tablet 40 milliGRAM(s) Oral before breakfast  polyethylene glycol 3350 17 Gram(s) Oral daily  predniSONE   Tablet 10 milliGRAM(s) Oral daily  risperiDONE   Tablet 0.25 milliGRAM(s) Oral two times a day  senna 1 Tablet(s) Oral with breakfast  sodium chloride 3%  Inhalation 4 milliLiter(s) Inhalation every 8 hours PRN  tamsulosin 0.4 milliGRAM(s) Oral at bedtime              Vital Signs Last 24 Hrs  T(C): 36.8 (29 Nov 2024 05:40), Max: 36.8 (29 Nov 2024 05:40)  T(F): 98.2 (29 Nov 2024 05:40), Max: 98.2 (29 Nov 2024 05:40)  HR: 74 (29 Nov 2024 05:40) (72 - 85)  BP: 139/78 (29 Nov 2024 05:40) (129/73 - 156/69)  BP(mean): --  RR: 16 (29 Nov 2024 05:40) (16 - 17)  SpO2: 95% (29 Nov 2024 05:40) (95% - 99%)    Parameters below as of 29 Nov 2024 05:40  Patient On (Oxygen Delivery Method): room air      REVIEW OF SYSTEMS:  CONSTITUTIONAL:  The patient denies fever, chills, night sweats.  HEAD:  No headache.  EYES:  No double vision or blurry vision.  EARS:  No ringing in the ears.  NECK:  No neck pain.  CARDIOVASCULAR:  No chest pain.  RESPIRATORY:  No shortness of breath.  ABDOMEN:  No nausea, vomiting, or abdominal pain.  EXTREMITIES/NEUROLOGICAL:  Does complain of numbness and burning sensation in his legs.  MUSCULOSKELETAL:  Occasional joint pain.  GENERAL:  Does feels weak all over, but no droopy eyes.    PHYSICAL EXAMINATION:  HEAD:  Normocephalic, atraumatic.  EYES:  No scleral icterus.  EARS:  Hearing bilaterally intact.  NECK:  Supple.  CARDIOVASCULAR:  S1 and S2 heard.  RESPIRATORY:  Air entry bilaterally.  ABDOMEN:  Soft, nontender.  EXTREMITIES:  No clubbing or cyanosis were noted.    NEUROLOGIC:  The patient awake, alert, location was hospital, was able to name simple objects., but does suffer from memory issues.  The patient was able to look up for over 1 minute with no ptosis.  Extraocular movements were intact.  Speech was fluent.  Smile symmetric.  Motor, the patient decreased range of motion of bilateral shoulders, suspect secondary to rotator cuff issues.  When elevated, was able to maintain in the air with slow drops bilaterally.  It is proximally 3+/5, distally was 4/5.  Bilateral lower extremities, slight movement at the feet and knees was 1/5.  Sensory, lower extremities not tested.  The patient said his legs are very sensitive.         LABS:  CBC Full  -  ( 28 Nov 2024 08:27 )  WBC Count : 19.55 K/uL  RBC Count : 3.89 M/uL  Hemoglobin : 12.1 g/dL  Hematocrit : 37.9 %  Platelet Count - Automated : 408 K/uL  Mean Cell Volume : 97.4 fl  Mean Cell Hemoglobin : 31.1 pg  Mean Cell Hemoglobin Concentration : 31.9 g/dL  Auto Neutrophil # : 15.67 K/uL  Auto Lymphocyte # : 1.04 K/uL  Auto Monocyte # : 1.57 K/uL  Auto Eosinophil # : 0.43 K/uL  Auto Basophil # : 0.08 K/uL  Auto Neutrophil % : 80.2 %  Auto Lymphocyte % : 5.3 %  Auto Monocyte % : 8.0 %  Auto Eosinophil % : 2.2 %  Auto Basophil % : 0.4 %    Urinalysis Basic - ( 28 Nov 2024 08:27 )    Color: x / Appearance: x / SG: x / pH: x  Gluc: 108 mg/dL / Ketone: x  / Bili: x / Urobili: x   Blood: x / Protein: x / Nitrite: x   Leuk Esterase: x / RBC: x / WBC x   Sq Epi: x / Non Sq Epi: x / Bacteria: x      11-28    141  |  111[H]  |  34[H]  ----------------------------<  108[H]  4.4   |  27  |  0.42[L]    Ca    9.0      28 Nov 2024 08:27  Phos  2.3     11-28  Mg     2.3     11-28      Hemoglobin A1C:       Vitamin B12           RADIOLOGY  ANALYSIS AND PLAN:  This is an 79-year-old male with altered mental status and history of myasthenia gravis.    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  Questionable if any muscle weakness as well.  The patient appears to be at his baseline from my previous notes.  The patient had refused any type of treatment in the past.   For now, we would recommend continue the patient on his prednisone.  For history of infection with myasthenia gravis.  If possible, would recommend to limit the use of aminoglycosides, fluoroquinolones, macrolides, cardiovascular drugs such as beta blockers, procainamide, other medication such as statins.  Monitor the patient's magnesium levels.  If antibiotics are used, always risks versus benefits must be weighed for the specific antibiotics.  .For history of hypertension, monitor systolic blood pressure.  follow up ACH antibodies   neurologic   wise stable     46  minutes of time was spent with patient plan of care, reviewing data, speaking to multidisciplinary healthcare team with greater than 50% of time counseling care and coordination.    Thank you for courtesy of consultation.

## 2024-11-29 NOTE — PROGRESS NOTE ADULT - SUBJECTIVE AND OBJECTIVE BOX
[facility]  INFECTIOUS DISEASES - [address]in[address], [place] [address]  Tel: [phone]     Fax: [phone]  =======================================================    DEION HEATH [id number]    Follow up: No fevers. Denies any pain or SOB.    Allergies:  levofloxacin (Unknown)  Cipro (Unknown)  fluoroquinolone antibiotics (Other)  Ketek (Other)  Avelox (Other)  ofloxacin (Unknown)  gatifloxacin (Unknown)  telithromycin (Other)      Antibiotics:  acetaminophen     Tablet .. 650 milliGRAM(s) Oral every 6 hours PRN  apixaban 5 milliGRAM(s) Oral every 12 hours  ceftolozane/tazobactam IVPB 1500 milliGRAM(s) IV Intermittent every 8 hours  finasteride 5 milliGRAM(s) Oral daily  metoprolol succinate ER 12.5 milliGRAM(s) Oral every 12 hours  ondansetron Injectable 4 milliGRAM(s) IV Push every 8 hours PRN  pantoprazole    Tablet 40 milliGRAM(s) Oral before breakfast  polyethylene glycol 3350 17 Gram(s) Oral daily  predniSONE   Tablet 10 milliGRAM(s) Oral daily  risperiDONE   Tablet 0.25 milliGRAM(s) Oral two times a day  senna 1 Tablet(s) Oral with breakfast  sodium chloride 3%  Inhalation 4 milliLiter(s) Inhalation every 8 hours PRN  tamsulosin 0.4 milliGRAM(s) Oral at bedtime       REVIEW OF SYSTEMS:  CONSTITUTIONAL:  No Fever or chills  CARDIOVASCULAR:  No chest pain or SOB  RESPIRATORY:  No cough, shortness of breath  GASTROINTESTINAL:  No nausea, vomiting or diarrhea.  GENITOURINARY:  + Restrepo  MUSCULOSKELETAL:  no back pain  NEUROLOGIC:  No headache or dizziness       Physical Exam:  ICU Vital Signs Last 24 Hrs  T(C): 36.4 (29 Nov 2024 11:54), Max: 36.8 (29 Nov 2024 05:40)  T(F): 97.5 (29 Nov 2024 11:54), Max: 98.2 (29 Nov 2024 05:40)  HR: 90 (29 Nov 2024 14:00) (65 - 102)  BP: 155/79 (29 Nov 2024 14:00) (113/60 - 156/69)  BP(mean): --  ABP: --  ABP(mean): --  RR: 17 (29 Nov 2024 14:00) (15 - 20)  SpO2: 100% (29 Nov 2024 14:00) (95% - 100%)    O2 Parameters below as of 29 Nov 2024 14:00  Patient On (Oxygen Delivery Method): room air      GEN: NAD  HEENT: normocephalic and atraumatic.  NECK: Supple.   LUNGS: Normal respiratory effort  HEART: Regular rate and rhythm   ABDOMEN: Soft, nontender, and nondistended.    : + R percutaneous nephrostomy. + Restrepo  NEUROLOGIC: Answering questions appropriately  PSYCHIATRIC: Appropriate affect .    Labs:  11-29    143  |  108  |  26[H]  ----------------------------<  96  4.1   |  28  |  0.44[L]    Ca    8.6      29 Nov 2024 07:50  Phos  2.8     11-29  Mg     1.9     11-29                            12.0   18.73 )-----------( 418      ( 29 Nov 2024 07:50 )             37.3     PT/INR - ( 29 Nov 2024 07:50 )   PT: 14.2 sec;   INR: 1.22 ratio         PTT - ( 29 Nov 2024 07:50 )  PTT:30.7 sec  Urinalysis Basic - ( 29 Nov 2024 07:50 )    Color: x / Appearance: x / SG: x / pH: x  Gluc: 96 mg/dL / Ketone: x  / Bili: x / Urobili: x   Blood: x / Protein: x / Nitrite: x   Leuk Esterase: x / RBC: x / WBC x   Sq Epi: x / Non Sq Epi: x / Bacteria: x          RECENT CULTURES:  11-22 @ 11:20 .Sputum Sputum     Culture is being performed.        11-21 @ 12:00 .Sputum Sputum     Culture is being performed.    Few Squamous epithelial cells per low power field  No polymorphonuclear leukocytes per low power field  Few Gram Negative Rods per oil power field  Few Gram Positive Rods per oil power field  Rare Gram positive cocci in pairs per oil power field      11-20 @ 14:30 .Urine Nephrostomy - Right Pseudomonas aeruginosa (Carbapenem Resistant)  Proteus mirabilis ESBL  Klebsiella pneumoniae    50,000 - 99,000 CFU/mL Pseudomonas aeruginosa (Carbapenem Resistant)  50,000 - 99,000 CFU/mL Proteus mirabilis ESBL  50,000 - 99,000 CFU/mL Klebsiella pneumoniae        11-20 @ 01:25 .Urine Klebsiella pneumoniae  Pseudomonas aeruginosa (Carbapenem Resistant)    >100,000 CFU/ml Klebsiella pneumoniae  >100,000 CFU/ml Pseudomonas aeruginosa (Carbapenem Resistant)        11-20 @ 00:00          Detected  11-19 @ 22:25 .Blood BLOOD     No growth at 5 days        11-19 @ 22:20 .Blood BLOOD     No growth at 5 days              All imaging and data are reviewed.

## 2024-11-29 NOTE — PROGRESS NOTE ADULT - SUBJECTIVE AND OBJECTIVE BOX
[facility] -- Janel Jackson, Evan Woods Savella, [place], [place]: Office # [phone]    Follow Up:  HTN, CHF, PE     Subjective/Observations: No events overnight resting comfortably in bed.  No complaints of chest pain, dyspnea, or palpitations reported. No signs of orthopnea or PND.     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      Club foot  Surgery at birth for Club Foot Right foot      Pilonidal cyst  Surgery 40 years ago      H/O colonoscopy      Spinal stenosis      H/O prostate biopsy          MEDICATIONS  (STANDING):  apixaban 5 milliGRAM(s) Oral every 12 hours  ceftolozane/tazobactam IVPB 1500 milliGRAM(s) IV Intermittent every 8 hours  finasteride 5 milliGRAM(s) Oral daily  metoprolol succinate ER 12.5 milliGRAM(s) Oral every 12 hours  pantoprazole    Tablet 40 milliGRAM(s) Oral before breakfast  polyethylene glycol 3350 17 Gram(s) Oral daily  predniSONE   Tablet 10 milliGRAM(s) Oral daily  risperiDONE   Tablet 0.25 milliGRAM(s) Oral two times a day  senna 1 Tablet(s) Oral with breakfast  tamsulosin 0.4 milliGRAM(s) Oral at bedtime    MEDICATIONS  (PRN):  acetaminophen     Tablet .. 650 milliGRAM(s) Oral every 6 hours PRN Temp greater or equal to 38C (100.4F), Mild Pain (1 - 3)  ondansetron Injectable 4 milliGRAM(s) IV Push every 8 hours PRN Nausea and/or Vomiting  sodium chloride 3%  Inhalation 4 milliLiter(s) Inhalation every 8 hours PRN sputum induction    Allergies    levofloxacin (Unknown)  Cipro (Unknown)  ofloxacin (Unknown)  gatifloxacin (Unknown)    Intolerances    fluoroquinolone antibiotics (Other)  Ketek (Other)  Avelox (Other)  telithromycin (Other)    Vital Signs Last 24 Hrs  T(C): 36.8 (29 Nov 2024 05:40), Max: 36.8 (29 Nov 2024 05:40)  T(F): 98.2 (29 Nov 2024 05:40), Max: 98.2 (29 Nov 2024 05:40)  HR: 74 (29 Nov 2024 05:40) (72 - 85)  BP: 139/78 (29 Nov 2024 05:40) (129/73 - 156/69)  BP(mean): --  RR: 16 (29 Nov 2024 05:40) (16 - 17)  SpO2: 95% (29 Nov 2024 05:40) (95% - 99%)    Parameters below as of 29 Nov 2024 05:40  Patient On (Oxygen Delivery Method): room air      I&O's Summary    28 Nov 2024 07:01  -  29 Nov 2024 07:00  --------------------------------------------------------  IN: 0 mL / OUT: 1761 mL / NET: -1761 mL          TELE: SR w/ 3 beats VT  PHYSICAL EXAM:  Constitutional: NAD, awake and alert  HEENT: Moist Mucous Membranes, Anicteric  Pulmonary: Non-labored, breath sounds are clear bilaterally, No wheezing, rales or rhonchi  Cardiovascular: Regular, S1 and S2, + murmur No rubs, gallops or clicks   Gastrointestinal:  soft, nontender, nondistended   Lymph: No peripheral edema. No lymphadenopathy.   Skin: No visible rashes or ulcers.  Psych:  Mood & affect appropriate      LABS: All Labs Reviewed:                        12.0   18.73 )-----------( 418      ( 29 Nov 2024 07:50 )             37.3                         12.1   19.55 )-----------( 408      ( 28 Nov 2024 08:27 )             37.9                         12.5   18.39 )-----------( 434      ( 27 Nov 2024 08:50 )             39.8     29 Nov 2024 07:50    143    |  108    |  26     ----------------------------<  96     4.1     |  28     |  0.44   28 Nov 2024 08:27    141    |  111    |  34     ----------------------------<  108    4.4     |  27     |  0.42   27 Nov 2024 08:50    140    |  109    |  24     ----------------------------<  96     4.3     |  26     |  0.46     Ca    8.6        29 Nov 2024 07:50  Ca    9.0        28 Nov 2024 08:27  Ca    8.7        27 Nov 2024 08:50  Phos  2.8       29 Nov 2024 07:50  Phos  2.3       28 Nov 2024 08:27  Mg     1.9       29 Nov 2024 07:50  Mg     2.3       28 Nov 2024 08:27       PT/INR - ( 29 Nov 2024 07:50 )   PT: 14.2 sec;   INR: 1.22 ratio         PTT - ( 29 Nov 2024 07:50 )  PTT:30.7 secTroponin I, High Sensitivity Result: 852.9 ng/L (11-20-24 @ 03:55)  Troponin I, High Sensitivity Result: 1099.8 ng/L (11-19-24 @ 22:25)  Cholesterol: 113 mg/dL (11-20-24 @ 11:36)  HDL Cholesterol: 45 mg/dL (11-20-24 @ 11:36)  Triglycerides, Serum: 61 mg/dL (11-20-24 @ 11:36)    12 Lead ECG:   Ventricular Rate 107 BPM    Atrial Rate 107 BPM    P-R Interval 148 ms    QRS Duration 100 ms    Q-T Interval 320 ms    QTC Calculation(Bazett) 427 ms    P Axis 36 degrees    R Axis -44 degrees    T Axis 137 degrees    Diagnosis Line Sinus tachycardia with fusion complexes  Left axis deviation  ST & T wave abnormality, consider lateral ischemia  Incomplete left bundle branch block  Abnormal ECG    Confirmed by renée Whaley ([id number]) on 11/27/2024 2:32:21 PM (11-26-24 @ 15:24)      TRANSTHORACIC ECHOCARDIOGRAM REPORT  ________________________________________________________________________________                                      _______       Pt. Name:       DEION HEATH Study Date:    11/20/2024  MRN:            [id number]       YOB: 1945  Accession #:    [id number]      Age:           79 years  Account#:       [id number]     Gender:        M  Heart Rate:                    Height:        65.75 in (167.00 cm)  Rhythm:                        Weight:   199.00 lb (90.27 kg)  Blood Pressure: 103/58 mmHg    BSA/BMI:       1.99 m² / 32.37 kg/m²  ________________________________________________________________________________________  Referring Physician:    [id number] Curly Byrnes  Interpreting Physician: Lyndsay Marin MD  Primary Sonographer:    Ashli Arana MARCELINO    CPT:                ECHO TTE WO CON COMP W DOPP - 55726.m  Indication(s):      Abnormal electrocardiogram ECG/EKG - R94.31  Procedure:          Transthoracic echocardiogram with 2-D, M-mode and complete                      spectral and color flow Doppler.  Ordering Location:  ICU1  Admission Status:   Inpatient  Contrast Injection: Verbal consent was obtained for injection of Ultrasonic                      Enhancing Agent following a discussion of risks and                      benefits.                      Endocardial visualization enhanced with Definity Ultrasound                      enhancing agent.  Agitated Saline:    Injection with agitated saline was performed toevaluate for                      intracardiac shunting.  Study Information:  Image quality for this study is technically difficult.    _______________________________________________________________________________________     CONCLUSIONS:      1. Technically difficult image quality.   2. Agitated saline injection was negative for intracardiac shunt (though there is poor visualization of the LV during injection of agitated saline)   3. Despite definity use, the LV endocardium is still not well visualized.   4. In certain views, the LVEF appears grossly preserved though the apex appears hypokinetic.    ________________________________________________________________________________________  FINDINGS:     Interatrial Septum:  Agitated saline injection was negative for intracardiac shunt.  ________________________________________________________________________________________  Electronically signed on 11/20/2024 at 2:37:09 PM by Lyndsay Marin MD      *** Final ***

## 2024-11-29 NOTE — PROGRESS NOTE ADULT - ASSESSMENT
78 yo M with PMHx HTN, CHF, PE (on Eliquis Myasthenia Gravis, and chronic LE edema, nephrolithiasis (s/p nephrostomy tube placement ), urosepsis, BPH BIBEMS from [facility] for hypertension and elevated WBC count.     Hypotension   - a/f Acute Metabolic Encephalopathy,  Urosepsis 2/2 UTI, Kleb, Pseudomonas Carb Resistent Septic shock, resolved,  Rhinovirus, was in ICU s/p Off pressors and midodrine now downgraded  - hypotension resolved now that his urosepsis has been treated  - BP stable and controlled, per flow sheet  - TTE (9/2024) showed LVEF 55-60%, no diastolic dysfunction, moderate MR  - Repeat TTE with severe LV dysfunction.  Looks to be stress mediated. Even on repeat study on 11/20, the LV function seems somewhat improved.  - No evidence of significant volume overload  - limited GDMT due to hypotension, tolerating toprol bid  -Will need outpatient ischemic eval  - No clear evidence of acute ischemia, patient denies cardiac symptoms.    - cont Eliquis (hx of PE)     - Monitor and replete lytes, keep K>4, Mg>2.  - Will continue to follow.    Art Santana DNP, AGACNP-BC  Cardiology   Call Teams

## 2024-11-30 LAB
ANION GAP SERPL CALC-SCNC: 8 MMOL/L — SIGNIFICANT CHANGE UP (ref 5–17)
BASOPHILS # BLD AUTO: 0.1 K/UL — SIGNIFICANT CHANGE UP (ref 0–0.2)
BASOPHILS NFR BLD AUTO: 0.5 % — SIGNIFICANT CHANGE UP (ref 0–2)
BUN SERPL-MCNC: 21 MG/DL — SIGNIFICANT CHANGE UP (ref 7–23)
CALCIUM SERPL-MCNC: 9 MG/DL — SIGNIFICANT CHANGE UP (ref 8.5–10.1)
CHLORIDE SERPL-SCNC: 107 MMOL/L — SIGNIFICANT CHANGE UP (ref 96–108)
CO2 SERPL-SCNC: 25 MMOL/L — SIGNIFICANT CHANGE UP (ref 22–31)
CREAT SERPL-MCNC: 0.54 MG/DL — SIGNIFICANT CHANGE UP (ref 0.5–1.3)
EGFR: 100 ML/MIN/1.73M2 — SIGNIFICANT CHANGE UP
EOSINOPHIL # BLD AUTO: 0.74 K/UL — HIGH (ref 0–0.5)
EOSINOPHIL NFR BLD AUTO: 3.7 % — SIGNIFICANT CHANGE UP (ref 0–6)
GLUCOSE SERPL-MCNC: 78 MG/DL — SIGNIFICANT CHANGE UP (ref 70–99)
HCT VFR BLD CALC: 38.7 % — LOW (ref 39–50)
HGB BLD-MCNC: 12.1 G/DL — LOW (ref 13–17)
IMM GRANULOCYTES NFR BLD AUTO: 2.4 % — HIGH (ref 0–0.9)
LYMPHOCYTES # BLD AUTO: 13.5 % — SIGNIFICANT CHANGE UP (ref 13–44)
LYMPHOCYTES # BLD AUTO: 2.7 K/UL — SIGNIFICANT CHANGE UP (ref 1–3.3)
MAGNESIUM SERPL-MCNC: 2.2 MG/DL — SIGNIFICANT CHANGE UP (ref 1.6–2.6)
MCHC RBC-ENTMCNC: 30.5 PG — SIGNIFICANT CHANGE UP (ref 27–34)
MCHC RBC-ENTMCNC: 31.3 G/DL — LOW (ref 32–36)
MCV RBC AUTO: 97.5 FL — SIGNIFICANT CHANGE UP (ref 80–100)
MONOCYTES # BLD AUTO: 2.02 K/UL — HIGH (ref 0–0.9)
MONOCYTES NFR BLD AUTO: 10.1 % — SIGNIFICANT CHANGE UP (ref 2–14)
NEUTROPHILS # BLD AUTO: 14.01 K/UL — HIGH (ref 1.8–7.4)
NEUTROPHILS NFR BLD AUTO: 69.8 % — SIGNIFICANT CHANGE UP (ref 43–77)
NRBC # BLD: 0 /100 WBCS — SIGNIFICANT CHANGE UP (ref 0–0)
PLATELET # BLD AUTO: 467 K/UL — HIGH (ref 150–400)
POTASSIUM SERPL-MCNC: 4.3 MMOL/L — SIGNIFICANT CHANGE UP (ref 3.5–5.3)
POTASSIUM SERPL-MCNC: 5.7 MMOL/L — HIGH (ref 3.5–5.3)
POTASSIUM SERPL-SCNC: 4.3 MMOL/L — SIGNIFICANT CHANGE UP (ref 3.5–5.3)
POTASSIUM SERPL-SCNC: 5.7 MMOL/L — HIGH (ref 3.5–5.3)
RBC # BLD: 3.97 M/UL — LOW (ref 4.2–5.8)
RBC # FLD: 17.5 % — HIGH (ref 10.3–14.5)
SODIUM SERPL-SCNC: 140 MMOL/L — SIGNIFICANT CHANGE UP (ref 135–145)
WBC # BLD: 20.05 K/UL — HIGH (ref 3.8–10.5)
WBC # FLD AUTO: 20.05 K/UL — HIGH (ref 3.8–10.5)

## 2024-11-30 PROCEDURE — 99232 SBSQ HOSP IP/OBS MODERATE 35: CPT

## 2024-11-30 RX ADMIN — METOPROLOL TARTRATE 12.5 MILLIGRAM(S): 100 TABLET, FILM COATED ORAL at 06:05

## 2024-11-30 RX ADMIN — APIXABAN 5 MILLIGRAM(S): 2.5 TABLET, FILM COATED ORAL at 06:05

## 2024-11-30 RX ADMIN — PREDNISONE 10 MILLIGRAM(S): 20 TABLET ORAL at 06:06

## 2024-11-30 RX ADMIN — RISPERIDONE 0.25 MILLIGRAM(S): 4 TABLET ORAL at 18:21

## 2024-11-30 RX ADMIN — METOPROLOL TARTRATE 12.5 MILLIGRAM(S): 100 TABLET, FILM COATED ORAL at 18:21

## 2024-11-30 RX ADMIN — CEFTOLOZANE AND TAZOBACTAM 100 MILLIGRAM(S): 1; .5 INJECTION, POWDER, LYOPHILIZED, FOR SOLUTION INTRAVENOUS at 11:48

## 2024-11-30 RX ADMIN — POLYETHYLENE GLYCOL 3350 17 GRAM(S): 17 POWDER, FOR SOLUTION ORAL at 11:47

## 2024-11-30 RX ADMIN — PANTOPRAZOLE SODIUM 40 MILLIGRAM(S): 40 TABLET, DELAYED RELEASE ORAL at 06:06

## 2024-11-30 RX ADMIN — Medication 1 TABLET(S): at 08:29

## 2024-11-30 RX ADMIN — Medication 5 MILLIGRAM(S): at 11:49

## 2024-11-30 RX ADMIN — CEFTOLOZANE AND TAZOBACTAM 100 MILLIGRAM(S): 1; .5 INJECTION, POWDER, LYOPHILIZED, FOR SOLUTION INTRAVENOUS at 03:40

## 2024-11-30 RX ADMIN — APIXABAN 5 MILLIGRAM(S): 2.5 TABLET, FILM COATED ORAL at 18:21

## 2024-11-30 RX ADMIN — RISPERIDONE 0.25 MILLIGRAM(S): 4 TABLET ORAL at 06:06

## 2024-11-30 RX ADMIN — CEFTOLOZANE AND TAZOBACTAM 100 MILLIGRAM(S): 1; .5 INJECTION, POWDER, LYOPHILIZED, FOR SOLUTION INTRAVENOUS at 18:20

## 2024-11-30 RX ADMIN — TAMSULOSIN HYDROCHLORIDE 0.4 MILLIGRAM(S): 0.4 CAPSULE ORAL at 21:32

## 2024-11-30 NOTE — PROGRESS NOTE ADULT - SUBJECTIVE AND OBJECTIVE BOX
CHIEF COMPLAINT/INTERVAL HISTORY:  Pt. seen and evaluated for sepsis 2/2 UTI.  Pt. is in no distress.  s/p percutaneous nephrostomy change with IR yesterday.  Denies having pain or SOB.  Tolerating IV antibiotics.     REVIEW OF SYSTEMS:  No fever, CP, SOB, or abdominal pain     Vital Signs Last 24 Hrs  T(C): 36.8 (30 Nov 2024 05:00), Max: 36.8 (30 Nov 2024 05:00)  T(F): 98.2 (30 Nov 2024 05:00), Max: 98.2 (30 Nov 2024 05:00)  HR: 64 (30 Nov 2024 08:00) (64 - 102)  BP: 109/67 (30 Nov 2024 05:00) (109/67 - 155/79)  BP(mean): --  RR: 16 (30 Nov 2024 05:00) (15 - 20)  SpO2: 97% (30 Nov 2024 05:00) (96% - 100%)    Parameters below as of 30 Nov 2024 05:00  Patient On (Oxygen Delivery Method): room air        PHYSICAL EXAM:  GENERAL: NAD  HEENT: EOMI, hearing normal, conjunctiva and sclera clear  Chest: CTA bilaterally, no wheezing  CV: S1S2, RRR,   GI: soft, +BS, NT/ND  : +guerrero catheter, +right percutaneous nephrostomy tube  Musculoskeletal: no LE edema  Psychiatric: affect nL, mood nL  Skin: warm and dry    LABS:                        12.1   20.05 )-----------( 467      ( 30 Nov 2024 07:26 )             38.7     11-29    143  |  108  |  26[H]  ----------------------------<  96  4.1   |  28  |  0.44[L]    Ca    8.6      29 Nov 2024 07:50  Phos  2.8     11-29  Mg     1.9     11-29      PT/INR - ( 29 Nov 2024 07:50 )   PT: 14.2 sec;   INR: 1.22 ratio         PTT - ( 29 Nov 2024 07:50 )  PTT:30.7 sec  Urinalysis Basic - ( 29 Nov 2024 07:50 )    Color: x / Appearance: x / SG: x / pH: x  Gluc: 96 mg/dL / Ketone: x  / Bili: x / Urobili: x   Blood: x / Protein: x / Nitrite: x   Leuk Esterase: x / RBC: x / WBC x   Sq Epi: x / Non Sq Epi: x / Bacteria: x

## 2024-11-30 NOTE — PROGRESS NOTE ADULT - SUBJECTIVE AND OBJECTIVE BOX
Neurology follow up note    DEION [id number]      Interval History:    Patient feels ok no new complaints.    Allergies    levofloxacin (Unknown)  Cipro (Unknown)  ofloxacin (Unknown)  gatifloxacin (Unknown)    Intolerances    fluoroquinolone antibiotics (Other)  Ketek (Other)  Avelox (Other)  telithromycin (Other)      MEDICATIONS    acetaminophen     Tablet .. 650 milliGRAM(s) Oral every 6 hours PRN  apixaban 5 milliGRAM(s) Oral every 12 hours  ceftolozane/tazobactam IVPB 1500 milliGRAM(s) IV Intermittent every 8 hours  finasteride 5 milliGRAM(s) Oral daily  metoprolol succinate ER 12.5 milliGRAM(s) Oral every 12 hours  ondansetron Injectable 4 milliGRAM(s) IV Push every 8 hours PRN  pantoprazole    Tablet 40 milliGRAM(s) Oral before breakfast  polyethylene glycol 3350 17 Gram(s) Oral daily  predniSONE   Tablet 10 milliGRAM(s) Oral daily  risperiDONE   Tablet 0.25 milliGRAM(s) Oral two times a day  senna 1 Tablet(s) Oral with breakfast  sodium chloride 3%  Inhalation 4 milliLiter(s) Inhalation every 8 hours PRN  tamsulosin 0.4 milliGRAM(s) Oral at bedtime              Vital Signs Last 24 Hrs  T(C): 36.8 (30 Nov 2024 05:00), Max: 36.8 (30 Nov 2024 05:00)  T(F): 98.2 (30 Nov 2024 05:00), Max: 98.2 (30 Nov 2024 05:00)  HR: 72 (30 Nov 2024 05:00) (65 - 102)  BP: 109/67 (30 Nov 2024 05:00) (109/67 - 155/79)  BP(mean): --  RR: 16 (30 Nov 2024 05:00) (15 - 20)  SpO2: 97% (30 Nov 2024 05:00) (96% - 100%)    Parameters below as of 30 Nov 2024 05:00  Patient On (Oxygen Delivery Method): room air    REVIEW OF SYSTEMS:  CONSTITUTIONAL:  The patient denies fever, chills, night sweats.  HEAD:  No headache.  EYES:  No double vision or blurry vision.  EARS:  No ringing in the ears.  NECK:  No neck pain.  CARDIOVASCULAR:  No chest pain.  RESPIRATORY:  No shortness of breath.  ABDOMEN:  No nausea, vomiting, or abdominal pain.  EXTREMITIES/NEUROLOGICAL:  Does complain of numbness and burning sensation in his legs.  MUSCULOSKELETAL:  Occasional joint pain.  GENERAL:  Does feels weak all over, but no droopy eyes.    PHYSICAL EXAMINATION:  HEAD:  Normocephalic, atraumatic.  EYES:  No scleral icterus.  EARS:  Hearing bilaterally intact.  NECK:  Supple.  CARDIOVASCULAR:  S1 and S2 heard.  RESPIRATORY:  Air entry bilaterally.  ABDOMEN:  Soft, nontender.  EXTREMITIES:  No clubbing or cyanosis were noted.    NEUROLOGIC:  The patient awake, alert, location was hospital, was able to name simple objects., but does suffer from memory issues.  The patient was able to look up for over 1 minute with no ptosis.  Extraocular movements were intact.  Speech was fluent.  Smile symmetric.  Motor, the patient decreased range of motion of bilateral shoulders, suspect secondary to rotator cuff issues.  When elevated, was able to maintain in the air with slow drops bilaterally.  It is proximally 3+/5, distally was 4/5.  Bilateral lower extremities, slight movement at the feet and knees was 1/5.  Sensory, lower extremities not tested.  The patient said his legs are very sensitive.      LABS:  CBC Full  -  ( 29 Nov 2024 07:50 )  WBC Count : 18.73 K/uL  RBC Count : 3.86 M/uL  Hemoglobin : 12.0 g/dL  Hematocrit : 37.3 %  Platelet Count - Automated : 418 K/uL  Mean Cell Volume : 96.6 fl  Mean Cell Hemoglobin : 31.1 pg  Mean Cell Hemoglobin Concentration : 32.2 g/dL  Auto Neutrophil # : 14.40 K/uL  Auto Lymphocyte # : 1.55 K/uL  Auto Monocyte # : 1.33 K/uL  Auto Eosinophil # : 0.80 K/uL  Auto Basophil # : 0.08 K/uL  Auto Neutrophil % : 76.9 %  Auto Lymphocyte % : 8.3 %  Auto Monocyte % : 7.1 %  Auto Eosinophil % : 4.3 %  Auto Basophil % : 0.4 %    Urinalysis Basic - ( 29 Nov 2024 07:50 )    Color: x / Appearance: x / SG: x / pH: x  Gluc: 96 mg/dL / Ketone: x  / Bili: x / Urobili: x   Blood: x / Protein: x / Nitrite: x   Leuk Esterase: x / RBC: x / WBC x   Sq Epi: x / Non Sq Epi: x / Bacteria: x      11-29    143  |  108  |  26[H]  ----------------------------<  96  4.1   |  28  |  0.44[L]    Ca    8.6      29 Nov 2024 07:50  Phos  2.8     11-29  Mg     1.9     11-29      Hemoglobin A1C:       Vitamin B12   PT/INR - ( 29 Nov 2024 07:50 )   PT: 14.2 sec;   INR: 1.22 ratio         PTT - ( 29 Nov 2024 07:50 )  PTT:30.7 sec      RADIOLOGY  .Altered mental status, most likely metabolic encephalopathy secondary to underlying infectious type process resolved   .For history of myasthenia gravis, 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  Questionable if any muscle weakness as well.  The patient appears to be at his baseline from my previous notes.  The patient had refused any type of treatment in the past.   For now, we would recommend continue the patient on his prednisone.  For history of infection with myasthenia gravis.  If possible, would recommend to limit the use of aminoglycosides, fluoroquinolones, macrolides, cardiovascular drugs such as beta blockers, procainamide, other medication such as statins.  Monitor the patient's magnesium levels.  If antibiotics are used, always risks versus benefits must be weighed for the specific antibiotics.  .For history of hypertension, monitor systolic blood pressure.  follow up ACH antibodies   neurologic   wise stable     46  minutes of time was spent with patient plan of care, reviewing data, speaking to multidisciplinary healthcare team with greater than 50% of time counseling care and coordination.    Thank you for courtesy of consultation.  ET BEEN SEEN AND EXAMINED TODAY. NOTE AND CHART REVIEWED IN AM AND EXAM FORM PREVIOUS.  ONCE PATIENT SEEN, CHART WILL BE UPDATE AT PRESENT NOTE IS INCOMPLETE     Neurology follow up note    DEION [id number]      Interval History:    Patient feels ok no new complaints.    Allergies    levofloxacin (Unknown)  Cipro (Unknown)  ofloxacin (Unknown)  gatifloxacin (Unknown)    Intolerances    fluoroquinolone antibiotics (Other)  Ketek (Other)  Avelox (Other)  telithromycin (Other)      MEDICATIONS    acetaminophen     Tablet .. 650 milliGRAM(s) Oral every 6 hours PRN  apixaban 5 milliGRAM(s) Oral every 12 hours  ceftolozane/tazobactam IVPB 1500 milliGRAM(s) IV Intermittent every 8 hours  finasteride 5 milliGRAM(s) Oral daily  metoprolol succinate ER 12.5 milliGRAM(s) Oral every 12 hours  ondansetron Injectable 4 milliGRAM(s) IV Push every 8 hours PRN  pantoprazole    Tablet 40 milliGRAM(s) Oral before breakfast  polyethylene glycol 3350 17 Gram(s) Oral daily  predniSONE   Tablet 10 milliGRAM(s) Oral daily  risperiDONE   Tablet 0.25 milliGRAM(s) Oral two times a day  senna 1 Tablet(s) Oral with breakfast  sodium chloride 3%  Inhalation 4 milliLiter(s) Inhalation every 8 hours PRN  tamsulosin 0.4 milliGRAM(s) Oral at bedtime              Vital Signs Last 24 Hrs  T(C): 36.8 (30 Nov 2024 05:00), Max: 36.8 (30 Nov 2024 05:00)  T(F): 98.2 (30 Nov 2024 05:00), Max: 98.2 (30 Nov 2024 05:00)  HR: 72 (30 Nov 2024 05:00) (65 - 102)  BP: 109/67 (30 Nov 2024 05:00) (109/67 - 155/79)  BP(mean): --  RR: 16 (30 Nov 2024 05:00) (15 - 20)  SpO2: 97% (30 Nov 2024 05:00) (96% - 100%)    Parameters below as of 30 Nov 2024 05:00  Patient On (Oxygen Delivery Method): room air    REVIEW OF SYSTEMS:  CONSTITUTIONAL:  The patient denies fever, chills, night sweats.  HEAD:  No headache.  EYES:  No double vision or blurry vision.  EARS:  No ringing in the ears.  NECK:  No neck pain.  CARDIOVASCULAR:  No chest pain.  RESPIRATORY:  No shortness of breath.  ABDOMEN:  No nausea, vomiting, or abdominal pain.  EXTREMITIES/NEUROLOGICAL:  Does complain of numbness and burning sensation in his legs.  MUSCULOSKELETAL:  Occasional joint pain.  GENERAL:  Does feels weak all over, but no droopy eyes.    PHYSICAL EXAMINATION:  HEAD:  Normocephalic, atraumatic.  EYES:  No scleral icterus.  EARS:  Hearing bilaterally intact.  NECK:  Supple.  CARDIOVASCULAR:  S1 and S2 heard.  RESPIRATORY:  Air entry bilaterally.  ABDOMEN:  Soft, nontender.  EXTREMITIES:  No clubbing or cyanosis were noted.    NEUROLOGIC:  The patient awake, alert, location was hospital, was able to name simple objects., but does suffer from memory issues.  The patient was able to look up for over 1 minute with no ptosis.  Extraocular movements were intact.  Speech was fluent.  Smile symmetric.  Motor, the patient decreased range of motion of bilateral shoulders, suspect secondary to rotator cuff issues.  When elevated, was able to maintain in the air with slow drops bilaterally.  It is proximally 3+/5, distally was 4/5.  Bilateral lower extremities, slight movement at the feet and knees was 1/5.  Sensory, lower extremities not tested.  The patient said his legs are very sensitive.      LABS:  CBC Full  -  ( 29 Nov 2024 07:50 )  WBC Count : 18.73 K/uL  RBC Count : 3.86 M/uL  Hemoglobin : 12.0 g/dL  Hematocrit : 37.3 %  Platelet Count - Automated : 418 K/uL  Mean Cell Volume : 96.6 fl  Mean Cell Hemoglobin : 31.1 pg  Mean Cell Hemoglobin Concentration : 32.2 g/dL  Auto Neutrophil # : 14.40 K/uL  Auto Lymphocyte # : 1.55 K/uL  Auto Monocyte # : 1.33 K/uL  Auto Eosinophil # : 0.80 K/uL  Auto Basophil # : 0.08 K/uL  Auto Neutrophil % : 76.9 %  Auto Lymphocyte % : 8.3 %  Auto Monocyte % : 7.1 %  Auto Eosinophil % : 4.3 %  Auto Basophil % : 0.4 %    Urinalysis Basic - ( 29 Nov 2024 07:50 )    Color: x / Appearance: x / SG: x / pH: x  Gluc: 96 mg/dL / Ketone: x  / Bili: x / Urobili: x   Blood: x / Protein: x / Nitrite: x   Leuk Esterase: x / RBC: x / WBC x   Sq Epi: x / Non Sq Epi: x / Bacteria: x      11-29    143  |  108  |  26[H]  ----------------------------<  96  4.1   |  28  |  0.44[L]    Ca    8.6      29 Nov 2024 07:50  Phos  2.8     11-29  Mg     1.9     11-29      Hemoglobin A1C:       Vitamin B12   PT/INR - ( 29 Nov 2024 07:50 )   PT: 14.2 sec;   INR: 1.22 ratio         PTT - ( 29 Nov 2024 07:50 )  PTT:30.7 sec      RADIOLOGY  .Altered mental status, most likely metabolic encephalopathy secondary to underlying infectious type process resolved   .For history of myasthenia gravis, 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  Questionable if any muscle weakness as well.  The patient appears to be at his baseline from my previous notes.  The patient had refused any type of treatment in the past.   For now, we would recommend continue the patient on his prednisone.  For history of infection with myasthenia gravis.  If possible, would recommend to limit the use of aminoglycosides, fluoroquinolones, macrolides, cardiovascular drugs such as beta blockers, procainamide, other medication such as statins.  Monitor the patient's magnesium levels.  If antibiotics are used, always risks versus benefits must be weighed for the specific antibiotics.  .For history of hypertension, monitor systolic blood pressure.  follow up ACH antibodies   neurologic   wise stable     46  minutes of time was spent with patient plan of care, reviewing data, speaking to multidisciplinary healthcare team with greater than 50% of time counseling care and coordination.    Thank you for courtesy of consultation.

## 2024-11-30 NOTE — PROGRESS NOTE ADULT - ASSESSMENT
80 yo M with PMHx HTN, CHF, PE (on eliquis), Myasthenia Gravis, and chronic LE edema, nephrolithiasis (s/p nephrostomy tube placement ), urosepsis, BPH BIBEMS from [facility] for hypertension and elevated WBC count. Admitted to ICU for septic shock 2/2 UTI and Enter/rhinovirus.        Problem/Plan - 1:  ·  Problem: Sepsis, unspecified organism.   ·  Plan: - Pt admitted to ICU for  septic shock   sepsis  poa  2/2  UTI and rhino/enterovirus with    -IR s/p percutaneous nephrostomy tube placement done 9/6/24  [facility]  and [facility] gu[facility]ro  , GUERRERO CHANGED IN ED .  - Pt p/w elevated WBC, pt asymptomatic. Hx uti  sepsis/klebsiella bacteremia treated with IV Rocephin, hospital course complicated by Afib with RVR during recent admission (9/2024)  - Per CI paperwork, pt s/p 7d course of IV zosyn via midline  (11/4-11/10)  - Met sepsis criteria on admission with leukocytosis, lactate, tachycardia  - WBC appears to be at baseline, per chart review baseline around 12-13 (on chronic steroids for MG)  - UA: Turbid, large blood, large LEC, positive nitrite, many bacteria, WBC TNTC  - CT: Urinary bladder collapsed, limiting evaluation, at least 3 stones measuring up to 1.0 cm. R nephrostomy tube intact .  - s/p Levophed gtt. s/p midodrine s/p hydrocortisone  taper,   now back on Prednisone home dose   - s/p IV Meropenem, -continue   Zebraxa ceftolozane-tazobactam--plan for 1-2 days post nephrostomy tube exchange per id dr wilson   -   called uro consult / [facility] surg pa  as pt still have leucocytosis  this time nephrostomy need to be  changed already month old - s/p percutaneous nephrostomy change with IR on 11/29 , also 2 month old midline removed by perry np.   - Blood cult neg  and sputum Cx negative  - Urine cx + klebsiella, pseudomonas, proteus  - Quantinferon indeterminate.  sputum AFB x 3 negative  - Fungitell wnl, Histoplasma, Aspergillus  - ID (Dr. Wilson) following,      Problem/Plan - 2:  ·  Problem: Elevated troponin.   ·  Plan: Troponin 1099.8 on admission likely 2/2 demand ischemia  - Pt asymptomatic. Denies chest pain, pressure, palpitations, SOB, nausea  - EKG reviewed, no evidence of ischemic changes, ST/T changes or Q waves  - s/p ASA 324mg x1 in ED  - Trops trended to peak  - Cardio ([facility]) following.     Problem/Plan - 3:  ·  Problem: Rhinovirus.   ·  Plan: - Rhino/enterovirus +  - Pt asymptomatic, less likely cause of sepsis given history and +UTI  - Supportive care  - Tylenol PRN for fever.     Problem/Plan - 4:  ·  Problem: HTN (hypertension).   ·  Plan: - Pt hypotensive in the setting of septic shock requiring pressor support, now s/p Levophed gtt and Midodrine  - BP stable.  - Started on Toprol XL 12.5mg PO Q12h for NSVT.     Problem/Plan - 5:  ·  Problem: Chronic CHF.   ·  Plan: Pt with documented hx of CHF, unspecified type however on GDMT for HFrEF  - TTE (9/2024): LVEF 55-60%, no diastolic dysfunction, moderate MR  - Evaluated by cardiology during admission at [facility] (9/2024) for acute CHF, started on GDMT  - Pro-BNP on admission 1988  - Hold home spironolactone and Lasix in setting of hypotension.  Started on Toprol XL 12.5mg PO Q12h.   - Strict I&Os   - Does not appear clinically volume overloaded  - Cardiology ([place] [facility]) consulted,     Problem/Plan - 6:  ·  Problem: Lung granuloma.   ·  Plan: CT c/a/p w IV contrast: evidence of granulomatous infection in lungs, liver and spleen  - Per chart review, pt has been aware of this finding  - Has been seen on prior imaging, evaluated by ID on previous admission (11/2023)  - Quant indeterminate, fungitell negative, aspergillus galactomannan negative at that time  - ID (Dr. wilson  consulted,  afb so far neg.     Problem/Plan - 7:  ·  Problem: Thyroid nodule.   ·  Plan: - CT Chest: 2.6 cm left lobe thyroid nodule and evidence of granulomatous infection in lungs, liver and spleen  - TSH wnl.     Problem/Plan - 8:  ·  Problem: Personal history of pulmonary embolism.   ·  Plan: Continue home Eliquis 5mg BID.     Problem/Plan - 9:  ·  Problem: Myasthenia gravis.   ·  Plan: s/p stress dose steroids- baseline bedbound as per son Olvin.  Restarted on prednisone 10mg PO daily.      Problem/Plan - 10:  ·  Problem: BPH (benign prostatic hyperplasia).   ·  Plan; Continue home Flomax and finasteride.     Problem/Plan - 11:  ·  Problem: Need for prophylactic measure.   ·  Plan: Continue home Eliquis 5mg BID

## 2024-12-01 LAB
ANION GAP SERPL CALC-SCNC: 6 MMOL/L — SIGNIFICANT CHANGE UP (ref 5–17)
BASOPHILS # BLD AUTO: 0.08 K/UL — SIGNIFICANT CHANGE UP (ref 0–0.2)
BASOPHILS NFR BLD AUTO: 0.5 % — SIGNIFICANT CHANGE UP (ref 0–2)
BUN SERPL-MCNC: 23 MG/DL — SIGNIFICANT CHANGE UP (ref 7–23)
CALCIUM SERPL-MCNC: 9.4 MG/DL — SIGNIFICANT CHANGE UP (ref 8.5–10.1)
CHLORIDE SERPL-SCNC: 108 MMOL/L — SIGNIFICANT CHANGE UP (ref 96–108)
CO2 SERPL-SCNC: 28 MMOL/L — SIGNIFICANT CHANGE UP (ref 22–31)
CREAT SERPL-MCNC: 0.46 MG/DL — LOW (ref 0.5–1.3)
EGFR: 105 ML/MIN/1.73M2 — SIGNIFICANT CHANGE UP
EOSINOPHIL # BLD AUTO: 0.7 K/UL — HIGH (ref 0–0.5)
EOSINOPHIL NFR BLD AUTO: 4 % — SIGNIFICANT CHANGE UP (ref 0–6)
GLUCOSE SERPL-MCNC: 104 MG/DL — HIGH (ref 70–99)
HCT VFR BLD CALC: 38.1 % — LOW (ref 39–50)
HGB BLD-MCNC: 12.1 G/DL — LOW (ref 13–17)
IMM GRANULOCYTES NFR BLD AUTO: 2.2 % — HIGH (ref 0–0.9)
LYMPHOCYTES # BLD AUTO: 1.72 K/UL — SIGNIFICANT CHANGE UP (ref 1–3.3)
LYMPHOCYTES # BLD AUTO: 9.8 % — LOW (ref 13–44)
MCHC RBC-ENTMCNC: 30.9 PG — SIGNIFICANT CHANGE UP (ref 27–34)
MCHC RBC-ENTMCNC: 31.8 G/DL — LOW (ref 32–36)
MCV RBC AUTO: 97.2 FL — SIGNIFICANT CHANGE UP (ref 80–100)
MONOCYTES # BLD AUTO: 1.93 K/UL — HIGH (ref 0–0.9)
MONOCYTES NFR BLD AUTO: 11 % — SIGNIFICANT CHANGE UP (ref 2–14)
NEUTROPHILS # BLD AUTO: 12.78 K/UL — HIGH (ref 1.8–7.4)
NEUTROPHILS NFR BLD AUTO: 72.5 % — SIGNIFICANT CHANGE UP (ref 43–77)
NRBC # BLD: 0 /100 WBCS — SIGNIFICANT CHANGE UP (ref 0–0)
PLATELET # BLD AUTO: 443 K/UL — HIGH (ref 150–400)
POTASSIUM SERPL-MCNC: 4.2 MMOL/L — SIGNIFICANT CHANGE UP (ref 3.5–5.3)
POTASSIUM SERPL-MCNC: 5.5 MMOL/L — HIGH (ref 3.5–5.3)
POTASSIUM SERPL-SCNC: 4.2 MMOL/L — SIGNIFICANT CHANGE UP (ref 3.5–5.3)
POTASSIUM SERPL-SCNC: 5.5 MMOL/L — HIGH (ref 3.5–5.3)
RBC # BLD: 3.92 M/UL — LOW (ref 4.2–5.8)
RBC # FLD: 17.2 % — HIGH (ref 10.3–14.5)
SODIUM SERPL-SCNC: 142 MMOL/L — SIGNIFICANT CHANGE UP (ref 135–145)
WBC # BLD: 17.6 K/UL — HIGH (ref 3.8–10.5)
WBC # FLD AUTO: 17.6 K/UL — HIGH (ref 3.8–10.5)

## 2024-12-01 PROCEDURE — 99232 SBSQ HOSP IP/OBS MODERATE 35: CPT

## 2024-12-01 RX ORDER — POLYETHYLENE GLYCOL 3350 17 G/17G
17 POWDER, FOR SOLUTION ORAL
Qty: 0 | Refills: 0 | DISCHARGE
Start: 2024-12-01

## 2024-12-01 RX ORDER — SENNOSIDES 8.6 MG
1 TABLET ORAL
Qty: 0 | Refills: 0 | DISCHARGE
Start: 2024-12-01

## 2024-12-01 RX ADMIN — APIXABAN 5 MILLIGRAM(S): 2.5 TABLET, FILM COATED ORAL at 06:29

## 2024-12-01 RX ADMIN — PREDNISONE 10 MILLIGRAM(S): 20 TABLET ORAL at 06:18

## 2024-12-01 RX ADMIN — TAMSULOSIN HYDROCHLORIDE 0.4 MILLIGRAM(S): 0.4 CAPSULE ORAL at 21:17

## 2024-12-01 RX ADMIN — METOPROLOL TARTRATE 12.5 MILLIGRAM(S): 100 TABLET, FILM COATED ORAL at 18:07

## 2024-12-01 RX ADMIN — RISPERIDONE 0.25 MILLIGRAM(S): 4 TABLET ORAL at 18:07

## 2024-12-01 RX ADMIN — APIXABAN 5 MILLIGRAM(S): 2.5 TABLET, FILM COATED ORAL at 18:07

## 2024-12-01 RX ADMIN — Medication 5 MILLIGRAM(S): at 11:22

## 2024-12-01 RX ADMIN — RISPERIDONE 0.25 MILLIGRAM(S): 4 TABLET ORAL at 06:18

## 2024-12-01 RX ADMIN — CEFTOLOZANE AND TAZOBACTAM 100 MILLIGRAM(S): 1; .5 INJECTION, POWDER, LYOPHILIZED, FOR SOLUTION INTRAVENOUS at 11:22

## 2024-12-01 RX ADMIN — PANTOPRAZOLE SODIUM 40 MILLIGRAM(S): 40 TABLET, DELAYED RELEASE ORAL at 06:18

## 2024-12-01 RX ADMIN — POLYETHYLENE GLYCOL 3350 17 GRAM(S): 17 POWDER, FOR SOLUTION ORAL at 11:23

## 2024-12-01 RX ADMIN — METOPROLOL TARTRATE 12.5 MILLIGRAM(S): 100 TABLET, FILM COATED ORAL at 06:18

## 2024-12-01 RX ADMIN — CEFTOLOZANE AND TAZOBACTAM 100 MILLIGRAM(S): 1; .5 INJECTION, POWDER, LYOPHILIZED, FOR SOLUTION INTRAVENOUS at 18:26

## 2024-12-01 RX ADMIN — CEFTOLOZANE AND TAZOBACTAM 100 MILLIGRAM(S): 1; .5 INJECTION, POWDER, LYOPHILIZED, FOR SOLUTION INTRAVENOUS at 03:03

## 2024-12-01 RX ADMIN — Medication 1 TABLET(S): at 08:36

## 2024-12-01 NOTE — PROGRESS NOTE ADULT - SUBJECTIVE AND OBJECTIVE BOX
CHIEF COMPLAINT/INTERVAL HISTORY:  Pt. seen and evaluated for sepsis 2/2 UTI.  Pt. is in no distress.  Denies having CP or SOB.  Tolerating IV antibiotics.      REVIEW OF SYSTEMS:  No fever, CP, SOB, or abdominal pain     Vital Signs Last 24 Hrs  T(C): 36.4 (01 Dec 2024 04:58), Max: 36.7 (30 Nov 2024 11:59)  T(F): 97.6 (01 Dec 2024 04:58), Max: 98.1 (30 Nov 2024 11:59)  HR: 72 (01 Dec 2024 04:58) (70 - 85)  BP: 124/76 (01 Dec 2024 04:58) (124/76 - 143/77)  BP(mean): --  RR: 17 (01 Dec 2024 04:58) (17 - 17)  SpO2: 97% (01 Dec 2024 04:58) (94% - 97%)    Parameters below as of 01 Dec 2024 09:56  Patient On (Oxygen Delivery Method): room air        PHYSICAL EXAM:  GENERAL: NAD  HEENT: EOMI, hearing normal, conjunctiva and sclera clear  Chest: CTA bilaterally, no wheezing  CV: S1S2, RRR,   GI: soft, +BS, NT/ND  : +guerrero catheter, +right percutaneous nephrostomy tube  Musculoskeletal: no LE edema  Psychiatric: affect nL, mood nL  Skin: warm and dry    LABS:                        12.1   17.60 )-----------( 443      ( 01 Dec 2024 08:20 )             38.1     12-01    142  |  108  |  23  ----------------------------<  104[H]  5.5[H]   |  28  |  0.46[L]    Ca    9.4      01 Dec 2024 08:20  Mg     2.2     11-30        Urinalysis Basic - ( 01 Dec 2024 08:20 )    Color: x / Appearance: x / SG: x / pH: x  Gluc: 104 mg/dL / Ketone: x  / Bili: x / Urobili: x   Blood: x / Protein: x / Nitrite: x   Leuk Esterase: x / RBC: x / WBC x   Sq Epi: x / Non Sq Epi: x / Bacteria: x

## 2024-12-01 NOTE — PROGRESS NOTE ADULT - ASSESSMENT
80 yo M with PMHx HTN, CHF, PE (on eliquis), Myasthenia Gravis, and chronic LE edema, nephrolithiasis (s/p nephrostomy tube placement ), urosepsis, BPH BIBEMS from [facility] for hypertension and elevated WBC count. Admitted to ICU for septic shock 2/2 UTI and Enter/rhinovirus.        Problem/Plan - 1:  ·  Problem: Sepsis, unspecified organism.   ·  Plan: - Pt admitted to ICU for  septic shock   sepsis  poa  2/2  UTI and rhino/enterovirus with    -IR s/p percutaneous nephrostomy tube placement done 9/6/24  [facility]  and [facility] gu[facility]  , GUERRREO CHANGED IN ED .  - Pt p/w elevated WBC, pt asymptomatic. Hx uti  sepsis/klebsiella bacteremia treated with IV Rocephin, hospital course complicated by Afib with RVR during recent admission (9/2024)  - Per CI paperwork, pt s/p 7d course of IV zosyn via midline  (11/4-11/10)  - Met sepsis criteria on admission with leukocytosis, lactate, tachycardia  - WBC appears to be at baseline, per chart review baseline around 12-13 (on chronic steroids for MG)  - UA: Turbid, large blood, large LEC, positive nitrite, many bacteria, WBC TNTC  - CT: Urinary bladder collapsed, limiting evaluation, at least 3 stones measuring up to 1.0 cm. R nephrostomy tube intact .  - s/p Levophed gtt. s/p midodrine s/p hydrocortisone  taper,   now back on Prednisone home dose   - s/p IV Meropenem, -continue   Zebraxa ceftolozane-tazobactam--plan for 1-2 days post nephrostomy tube exchange per id dr wilson   -   called uro consult / [facility] surg pa  as pt still have leucocytosis  this time nephrostomy need to be  changed already month old - s/p percutaneous nephrostomy change with IR on 11/29 , also 2 month old midline removed by perry np.   - Blood cult neg  and sputum Cx negative  - Urine cx + klebsiella, pseudomonas, proteus  - Quantinferon indeterminate.  sputum AFB x 3 negative  - Fungitell wnl, Histoplasma, Aspergillus  - ID (Dr. Wilson) following,      Problem/Plan - 2:  ·  Problem: Elevated troponin.   ·  Plan: Troponin 1099.8 on admission likely 2/2 demand ischemia  - Pt asymptomatic. Denies chest pain, pressure, palpitations, SOB, nausea  - EKG reviewed, no evidence of ischemic changes, ST/T changes or Q waves  - s/p ASA 324mg x1 in ED  - Trops trended to peak  - Cardio ([facility]) following.     Problem/Plan - 3:  ·  Problem: Rhinovirus.   ·  Plan: - Rhino/enterovirus +  - Pt asymptomatic, less likely cause of sepsis given history and +UTI  - Supportive care  - Tylenol PRN for fever.     Problem/Plan - 4:  ·  Problem: HTN (hypertension).   ·  Plan: - Pt hypotensive in the setting of septic shock requiring pressor support, now s/p Levophed gtt and Midodrine  - BP stable.  - Started on Toprol XL 12.5mg PO Q12h for NSVT.     Problem/Plan - 5:  ·  Problem: Chronic CHF.   ·  Plan: Pt with documented hx of CHF, unspecified type however on GDMT for HFrEF  - TTE (9/2024): LVEF 55-60%, no diastolic dysfunction, moderate MR  - Evaluated by cardiology during admission at [facility] (9/2024) for acute CHF, started on GDMT  - Pro-BNP on admission 1988  - Hold home spironolactone and Lasix in setting of hypotension.  Started on Toprol XL 12.5mg PO Q12h.   - Strict I&Os   - Does not appear clinically volume overloaded  - Cardiology ([place] [facility]) consulted,     Problem/Plan - 6:  ·  Problem: Lung granuloma.   ·  Plan: CT c/a/p w IV contrast: evidence of granulomatous infection in lungs, liver and spleen  - Per chart review, pt has been aware of this finding  - Has been seen on prior imaging, evaluated by ID on previous admission (11/2023)  - Quant indeterminate, fungitell negative, aspergillus galactomannan negative at that time  - AFB negative x 3  - ID (Dr. wilson  consulted,  afb so far neg.     Problem/Plan - 7:  ·  Problem: Thyroid nodule.   ·  Plan: - CT Chest: 2.6 cm left lobe thyroid nodule and evidence of granulomatous infection in lungs, liver and spleen  - TSH wnl.     Problem/Plan - 8:  ·  Problem: Personal history of pulmonary embolism.   ·  Plan: Continue home Eliquis 5mg BID.     Problem/Plan - 9:  ·  Problem: Myasthenia gravis.   ·  Plan: s/p stress dose steroids- baseline bedbound as per son Olvin.  Restarted on prednisone 10mg PO daily.      Problem/Plan - 10:  ·  Problem: BPH (benign prostatic hyperplasia).   ·  Plan; Continue home Flomax and finasteride.     Problem/Plan - 11:  ·  Problem: Need for prophylactic measure.   ·  Plan: Continue home Eliquis 5mg BID

## 2024-12-01 NOTE — PROGRESS NOTE ADULT - SUBJECTIVE AND OBJECTIVE BOX
Neurology follow up note    DEION [id number]      Interval History:    Patient feels ok no new complaints.    Allergies    levofloxacin (Unknown)  Cipro (Unknown)  ofloxacin (Unknown)  gatifloxacin (Unknown)    Intolerances    fluoroquinolone antibiotics (Other)  Ketek (Other)  Avelox (Other)  telithromycin (Other)      MEDICATIONS    acetaminophen     Tablet .. 650 milliGRAM(s) Oral every 6 hours PRN  apixaban 5 milliGRAM(s) Oral every 12 hours  ceftolozane/tazobactam IVPB 1500 milliGRAM(s) IV Intermittent every 8 hours  finasteride 5 milliGRAM(s) Oral daily  metoprolol succinate ER 12.5 milliGRAM(s) Oral every 12 hours  ondansetron Injectable 4 milliGRAM(s) IV Push every 8 hours PRN  pantoprazole    Tablet 40 milliGRAM(s) Oral before breakfast  polyethylene glycol 3350 17 Gram(s) Oral daily  predniSONE   Tablet 10 milliGRAM(s) Oral daily  risperiDONE   Tablet 0.25 milliGRAM(s) Oral two times a day  senna 1 Tablet(s) Oral with breakfast  sodium chloride 3%  Inhalation 4 milliLiter(s) Inhalation every 8 hours PRN  tamsulosin 0.4 milliGRAM(s) Oral at bedtime              Vital Signs Last 24 Hrs  T(C): 36.4 (01 Dec 2024 04:58), Max: 36.7 (30 Nov 2024 11:59)  T(F): 97.6 (01 Dec 2024 04:58), Max: 98.1 (30 Nov 2024 11:59)  HR: 72 (01 Dec 2024 04:58) (64 - 85)  BP: 124/76 (01 Dec 2024 04:58) (124/76 - 143/77)  BP(mean): --  RR: 17 (01 Dec 2024 04:58) (17 - 17)  SpO2: 97% (01 Dec 2024 04:58) (94% - 97%)    Parameters below as of 01 Dec 2024 04:58  Patient On (Oxygen Delivery Method): room air      REVIEW OF SYSTEMS:  CONSTITUTIONAL:  The patient denies fever, chills, night sweats.  HEAD:  No headache.  EYES:  No double vision or blurry vision.  EARS:  No ringing in the ears.  NECK:  No neck pain.  CARDIOVASCULAR:  No chest pain.  RESPIRATORY:  No shortness of breath.  ABDOMEN:  No nausea, vomiting, or abdominal pain.  EXTREMITIES/NEUROLOGICAL:  Does complain of numbness and burning sensation in his legs.  MUSCULOSKELETAL:  Occasional joint pain.  GENERAL:  Does feels weak all over, but no droopy eyes.    PHYSICAL EXAMINATION:  HEAD:  Normocephalic, atraumatic.  EYES:  No scleral icterus.  EARS:  Hearing bilaterally intact.  NECK:  Supple.  CARDIOVASCULAR:  S1 and S2 heard.  RESPIRATORY:  Air entry bilaterally.  ABDOMEN:  Soft, nontender.  EXTREMITIES:  No clubbing or cyanosis were noted.    NEUROLOGIC:  The patient awake, alert, location was hospital, was able to name simple objects., but does suffer from memory issues.  The patient was able to look up for over 1 minute with no ptosis.  Extraocular movements were intact.  Speech was fluent.  Smile symmetric.  Motor, the patient decreased range of motion of bilateral shoulders, suspect secondary to rotator cuff issues.  When elevated, was able to maintain in the air with slow drops bilaterally.  It is proximally 3+/5, distally was 4/5.  Bilateral lower extremities, slight movement at the feet and knees was 1/5.  Sensory, lower extremities not tested.  The patient said his legs are very sensitive.      LABS:  CBC Full  -  ( 30 Nov 2024 07:26 )  WBC Count : 20.05 K/uL  RBC Count : 3.97 M/uL  Hemoglobin : 12.1 g/dL  Hematocrit : 38.7 %  Platelet Count - Automated : 467 K/uL  Mean Cell Volume : 97.5 fl  Mean Cell Hemoglobin : 30.5 pg  Mean Cell Hemoglobin Concentration : 31.3 g/dL  Auto Neutrophil # : 14.01 K/uL  Auto Lymphocyte # : 2.70 K/uL  Auto Monocyte # : 2.02 K/uL  Auto Eosinophil # : 0.74 K/uL  Auto Basophil # : 0.10 K/uL  Auto Neutrophil % : 69.8 %  Auto Lymphocyte % : 13.5 %  Auto Monocyte % : 10.1 %  Auto Eosinophil % : 3.7 %  Auto Basophil % : 0.5 %    Urinalysis Basic - ( 30 Nov 2024 07:26 )    Color: x / Appearance: x / SG: x / pH: x  Gluc: 78 mg/dL / Ketone: x  / Bili: x / Urobili: x   Blood: x / Protein: x / Nitrite: x   Leuk Esterase: x / RBC: x / WBC x   Sq Epi: x / Non Sq Epi: x / Bacteria: x      11-30    x   |  x   |  x   ----------------------------<  x   4.3   |  x   |  x     Ca    9.0      30 Nov 2024 07:26  Phos  2.8     11-29  Mg     2.2     11-30      Hemoglobin A1C:       Vitamin B12   PT/INR - ( 29 Nov 2024 07:50 )   PT: 14.2 sec;   INR: 1.22 ratio         PTT - ( 29 Nov 2024 07:50 )  PTT:30.7 sec      RADIOLOGY  .Altered mental status, most likely metabolic encephalopathy secondary to underlying infectious type process resolved   .For history of myasthenia gravis, 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  Questionable if any muscle weakness as well.  The patient appears to be at his baseline from my previous notes.  The patient had refused any type of treatment in the past.   For now, we would recommend continue the patient on his prednisone.  For history of infection with myasthenia gravis.  If possible, would recommend to limit the use of aminoglycosides, fluoroquinolones, macrolides, cardiovascular drugs such as beta blockers, procainamide, other medication such as statins.  Monitor the patient's magnesium levels.  If antibiotics are used, always risks versus benefits must be weighed for the specific antibiotics.  .For history of hypertension, monitor systolic blood pressure.  follow up ACH antibodies   neurologic   wise stable     46  minutes of time was spent with patient plan of care, reviewing data, speaking to multidisciplinary healthcare team with greater than 50% of time counseling care and coordination.    Thank you for courtesy of consultation.  PATIENT HAS NOT YET BEEN SEEN AND EXAMINED TODAY. NOTE AND CHART REVIEWED IN AM AND EXAM FORM PREVIOUS.  ONCE PATIENT SEEN, CHART WILL BE UPDATE AT PRESENT NOTE IS INCOMPLETE     Neurology follow up note    DEION [id number]      Interval History:    Patient feels ok no new complaints.    Allergies    levofloxacin (Unknown)  Cipro (Unknown)  ofloxacin (Unknown)  gatifloxacin (Unknown)    Intolerances    fluoroquinolone antibiotics (Other)  Ketek (Other)  Avelox (Other)  telithromycin (Other)      MEDICATIONS    acetaminophen     Tablet .. 650 milliGRAM(s) Oral every 6 hours PRN  apixaban 5 milliGRAM(s) Oral every 12 hours  ceftolozane/tazobactam IVPB 1500 milliGRAM(s) IV Intermittent every 8 hours  finasteride 5 milliGRAM(s) Oral daily  metoprolol succinate ER 12.5 milliGRAM(s) Oral every 12 hours  ondansetron Injectable 4 milliGRAM(s) IV Push every 8 hours PRN  pantoprazole    Tablet 40 milliGRAM(s) Oral before breakfast  polyethylene glycol 3350 17 Gram(s) Oral daily  predniSONE   Tablet 10 milliGRAM(s) Oral daily  risperiDONE   Tablet 0.25 milliGRAM(s) Oral two times a day  senna 1 Tablet(s) Oral with breakfast  sodium chloride 3%  Inhalation 4 milliLiter(s) Inhalation every 8 hours PRN  tamsulosin 0.4 milliGRAM(s) Oral at bedtime              Vital Signs Last 24 Hrs  T(C): 36.4 (01 Dec 2024 04:58), Max: 36.7 (30 Nov 2024 11:59)  T(F): 97.6 (01 Dec 2024 04:58), Max: 98.1 (30 Nov 2024 11:59)  HR: 72 (01 Dec 2024 04:58) (64 - 85)  BP: 124/76 (01 Dec 2024 04:58) (124/76 - 143/77)  BP(mean): --  RR: 17 (01 Dec 2024 04:58) (17 - 17)  SpO2: 97% (01 Dec 2024 04:58) (94% - 97%)    Parameters below as of 01 Dec 2024 04:58  Patient On (Oxygen Delivery Method): room air      REVIEW OF SYSTEMS:  CONSTITUTIONAL:  The patient denies fever, chills, night sweats.  HEAD:  No headache.  EYES:  No double vision or blurry vision.  EARS:  No ringing in the ears.  NECK:  No neck pain.  CARDIOVASCULAR:  No chest pain.  RESPIRATORY:  No shortness of breath.  ABDOMEN:  No nausea, vomiting, or abdominal pain.  EXTREMITIES/NEUROLOGICAL:  Does complain of numbness and burning sensation in his legs.  MUSCULOSKELETAL:  Occasional joint pain.  GENERAL:  Does feels weak all over, but no droopy eyes.    PHYSICAL EXAMINATION:  HEAD:  Normocephalic, atraumatic.  EYES:  No scleral icterus.  EARS:  Hearing bilaterally intact.  NECK:  Supple.  CARDIOVASCULAR:  S1 and S2 heard.  RESPIRATORY:  Air entry bilaterally.  ABDOMEN:  Soft, nontender.  EXTREMITIES:  No clubbing or cyanosis were noted.    NEUROLOGIC:  The patient awake, alert, location was hospital, was able to name simple objects., but does suffer from memory issues.  The patient was able to look up for over 1 minute with no ptosis.  Extraocular movements were intact.  Speech was fluent.  Smile symmetric.  Motor, the patient decreased range of motion of bilateral shoulders, suspect secondary to rotator cuff issues.  When elevated, was able to maintain in the air with slow drops bilaterally.  It is proximally 3+/5, distally was 4/5.  Bilateral lower extremities, slight movement at the feet and knees was 1/5.  Sensory, lower extremities not tested.  The patient said his legs are very sensitive.      LABS:  CBC Full  -  ( 30 Nov 2024 07:26 )  WBC Count : 20.05 K/uL  RBC Count : 3.97 M/uL  Hemoglobin : 12.1 g/dL  Hematocrit : 38.7 %  Platelet Count - Automated : 467 K/uL  Mean Cell Volume : 97.5 fl  Mean Cell Hemoglobin : 30.5 pg  Mean Cell Hemoglobin Concentration : 31.3 g/dL  Auto Neutrophil # : 14.01 K/uL  Auto Lymphocyte # : 2.70 K/uL  Auto Monocyte # : 2.02 K/uL  Auto Eosinophil # : 0.74 K/uL  Auto Basophil # : 0.10 K/uL  Auto Neutrophil % : 69.8 %  Auto Lymphocyte % : 13.5 %  Auto Monocyte % : 10.1 %  Auto Eosinophil % : 3.7 %  Auto Basophil % : 0.5 %    Urinalysis Basic - ( 30 Nov 2024 07:26 )    Color: x / Appearance: x / SG: x / pH: x  Gluc: 78 mg/dL / Ketone: x  / Bili: x / Urobili: x   Blood: x / Protein: x / Nitrite: x   Leuk Esterase: x / RBC: x / WBC x   Sq Epi: x / Non Sq Epi: x / Bacteria: x      11-30    x   |  x   |  x   ----------------------------<  x   4.3   |  x   |  x     Ca    9.0      30 Nov 2024 07:26  Phos  2.8     11-29  Mg     2.2     11-30      Hemoglobin A1C:       Vitamin B12   PT/INR - ( 29 Nov 2024 07:50 )   PT: 14.2 sec;   INR: 1.22 ratio         PTT - ( 29 Nov 2024 07:50 )  PTT:30.7 sec      RADIOLOGY  .Altered mental status, most likely metabolic encephalopathy secondary to underlying infectious type process resolved   .For history of myasthenia gravis, 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  Questionable if any muscle weakness as well.  The patient appears to be at his baseline from my previous notes.  The patient had refused any type of treatment in the past.   For now, we would recommend continue the patient on his prednisone.  For history of infection with myasthenia gravis.  If possible, would recommend to limit the use of aminoglycosides, fluoroquinolones, macrolides, cardiovascular drugs such as beta blockers, procainamide, other medication such as statins.  Monitor the patient's magnesium levels.  If antibiotics are used, always risks versus benefits must be weighed for the specific antibiotics.  .For history of hypertension, monitor systolic blood pressure.  follow up ACH antibodies   neurologic  wise stable     46  minutes of time was spent with patient plan of care, reviewing data, speaking to multidisciplinary healthcare team with greater than 50% of time counseling care and coordination.    Thank you for courtesy of consultation.

## 2024-12-02 LAB
ACHR MOD AB SER-ACNC: 0 % — SIGNIFICANT CHANGE UP (ref 0–45)
ANION GAP SERPL CALC-SCNC: 7 MMOL/L — SIGNIFICANT CHANGE UP (ref 5–17)
BASOPHILS # BLD AUTO: 0 K/UL — SIGNIFICANT CHANGE UP (ref 0–0.2)
BASOPHILS NFR BLD AUTO: 0 % — SIGNIFICANT CHANGE UP (ref 0–2)
BUN SERPL-MCNC: 30 MG/DL — HIGH (ref 7–23)
CALCIUM SERPL-MCNC: 8.8 MG/DL — SIGNIFICANT CHANGE UP (ref 8.5–10.1)
CHLORIDE SERPL-SCNC: 108 MMOL/L — SIGNIFICANT CHANGE UP (ref 96–108)
CO2 SERPL-SCNC: 24 MMOL/L — SIGNIFICANT CHANGE UP (ref 22–31)
CREAT SERPL-MCNC: 0.59 MG/DL — SIGNIFICANT CHANGE UP (ref 0.5–1.3)
EGFR: 97 ML/MIN/1.73M2 — SIGNIFICANT CHANGE UP
EOSINOPHIL # BLD AUTO: 0.58 K/UL — HIGH (ref 0–0.5)
EOSINOPHIL NFR BLD AUTO: 3 % — SIGNIFICANT CHANGE UP (ref 0–6)
GLUCOSE SERPL-MCNC: 83 MG/DL — SIGNIFICANT CHANGE UP (ref 70–99)
HCT VFR BLD CALC: 37.6 % — LOW (ref 39–50)
HGB BLD-MCNC: 11.9 G/DL — LOW (ref 13–17)
LYMPHOCYTES # BLD AUTO: 22 % — SIGNIFICANT CHANGE UP (ref 13–44)
LYMPHOCYTES # BLD AUTO: 4.27 K/UL — HIGH (ref 1–3.3)
MCHC RBC-ENTMCNC: 30.5 PG — SIGNIFICANT CHANGE UP (ref 27–34)
MCHC RBC-ENTMCNC: 31.6 G/DL — LOW (ref 32–36)
MCV RBC AUTO: 96.4 FL — SIGNIFICANT CHANGE UP (ref 80–100)
MONOCYTES # BLD AUTO: 2.53 K/UL — HIGH (ref 0–0.9)
MONOCYTES NFR BLD AUTO: 13 % — SIGNIFICANT CHANGE UP (ref 2–14)
NEUTROPHILS # BLD AUTO: 12.05 K/UL — HIGH (ref 1.8–7.4)
NEUTROPHILS NFR BLD AUTO: 62 % — SIGNIFICANT CHANGE UP (ref 43–77)
NRBC # BLD: SIGNIFICANT CHANGE UP /100 WBCS (ref 0–0)
PLATELET # BLD AUTO: 499 K/UL — HIGH (ref 150–400)
POTASSIUM SERPL-MCNC: 4.4 MMOL/L — SIGNIFICANT CHANGE UP (ref 3.5–5.3)
POTASSIUM SERPL-SCNC: 4.4 MMOL/L — SIGNIFICANT CHANGE UP (ref 3.5–5.3)
RBC # BLD: 3.9 M/UL — LOW (ref 4.2–5.8)
RBC # FLD: 17.2 % — HIGH (ref 10.3–14.5)
SODIUM SERPL-SCNC: 139 MMOL/L — SIGNIFICANT CHANGE UP (ref 135–145)
WBC # BLD: 19.43 K/UL — HIGH (ref 3.8–10.5)
WBC # FLD AUTO: 19.43 K/UL — HIGH (ref 3.8–10.5)

## 2024-12-02 PROCEDURE — 99232 SBSQ HOSP IP/OBS MODERATE 35: CPT

## 2024-12-02 PROCEDURE — 99233 SBSQ HOSP IP/OBS HIGH 50: CPT

## 2024-12-02 RX ADMIN — RISPERIDONE 0.25 MILLIGRAM(S): 4 TABLET ORAL at 17:43

## 2024-12-02 RX ADMIN — APIXABAN 5 MILLIGRAM(S): 2.5 TABLET, FILM COATED ORAL at 05:24

## 2024-12-02 RX ADMIN — PREDNISONE 10 MILLIGRAM(S): 20 TABLET ORAL at 05:24

## 2024-12-02 RX ADMIN — CEFTOLOZANE AND TAZOBACTAM 100 MILLIGRAM(S): 1; .5 INJECTION, POWDER, LYOPHILIZED, FOR SOLUTION INTRAVENOUS at 03:05

## 2024-12-02 RX ADMIN — RISPERIDONE 0.25 MILLIGRAM(S): 4 TABLET ORAL at 05:23

## 2024-12-02 RX ADMIN — METOPROLOL TARTRATE 12.5 MILLIGRAM(S): 100 TABLET, FILM COATED ORAL at 17:43

## 2024-12-02 RX ADMIN — APIXABAN 5 MILLIGRAM(S): 2.5 TABLET, FILM COATED ORAL at 17:43

## 2024-12-02 RX ADMIN — TAMSULOSIN HYDROCHLORIDE 0.4 MILLIGRAM(S): 0.4 CAPSULE ORAL at 21:40

## 2024-12-02 RX ADMIN — CEFTOLOZANE AND TAZOBACTAM 100 MILLIGRAM(S): 1; .5 INJECTION, POWDER, LYOPHILIZED, FOR SOLUTION INTRAVENOUS at 11:24

## 2024-12-02 RX ADMIN — Medication 5 MILLIGRAM(S): at 11:24

## 2024-12-02 RX ADMIN — METOPROLOL TARTRATE 12.5 MILLIGRAM(S): 100 TABLET, FILM COATED ORAL at 05:24

## 2024-12-02 RX ADMIN — Medication 1 TABLET(S): at 09:12

## 2024-12-02 RX ADMIN — POLYETHYLENE GLYCOL 3350 17 GRAM(S): 17 POWDER, FOR SOLUTION ORAL at 11:24

## 2024-12-02 RX ADMIN — PANTOPRAZOLE SODIUM 40 MILLIGRAM(S): 40 TABLET, DELAYED RELEASE ORAL at 05:24

## 2024-12-02 NOTE — PROGRESS NOTE ADULT - ASSESSMENT
80 yo M with PMHx HTN, CHF, PE (on eliquis), Myasthenia Gravis, and chronic LE edema, nephrolithiasis (s/p nephrostomy tube placement ), urosepsis, BPH BIBEMS from [facility] for hypertension and elevated WBC count. Admitted to ICU for septic shock 2/2 UTI and Enter/rhinovirus.        Problem/Plan - 1:  ·  Problem: Sepsis, unspecified organism.   ·  Plan: - Pt admitted to ICU for  septic shock   sepsis  poa  2/2  UTI and rhino/enterovirus with    -IR s/p percutaneous nephrostomy tube placement done 9/6/24  [facility]  and [facility] gu[facility]ro  , GUERRERO CHANGED IN ED .  - Pt p/w elevated WBC, pt asymptomatic. Hx uti  sepsis/klebsiella bacteremia treated with IV Rocephin, hospital course complicated by Afib with RVR during recent admission (9/2024)  - Per CI paperwork, pt s/p 7d course of IV zosyn via midline  (11/4-11/10)  - Met sepsis criteria on admission with leukocytosis, lactate, tachycardia  - WBC appears to be at baseline, per chart review baseline around 12-13 (on chronic steroids for MG)  - UA: Turbid, large blood, large LEC, positive nitrite, many bacteria, WBC TNTC  - CT: Urinary bladder collapsed, limiting evaluation, at least 3 stones measuring up to 1.0 cm. R nephrostomy tube intact .  - s/p Levophed gtt. s/p midodrine s/p hydrocortisone  taper,   now back on Prednisone home dose   - s/p IV Meropenem, -continue   Zebraxa ceftolozane-tazobactam--plan for 1-2 days post nephrostomy tube exchange per id dr wilson   -   called uro consult / [facility] surg pa  as pt still have leucocytosis  this time nephrostomy need to be  changed already month old - s/p percutaneous nephrostomy change with IR on 11/29 , also 2 month old midline removed by perry np.   - Blood cult neg  and sputum Cx negative  - Urine cx + klebsiella, pseudomonas, proteus  - Quantinferon indeterminate.  sputum AFB x 3 negative  - Fungitell wnl, Histoplasma, Aspergillus  - ID (Dr. Wilson) following,      Problem/Plan - 2:  ·  Problem: Elevated troponin.   ·  Plan: Troponin 1099.8 on admission likely 2/2 demand ischemia  - Pt asymptomatic. Denies chest pain, pressure, palpitations, SOB, nausea  - EKG reviewed, no evidence of ischemic changes, ST/T changes or Q waves  - s/p ASA 324mg x1 in ED  - Trops trended to peak  - Cardio ([facility]) following.     Problem/Plan - 3:  ·  Problem: Rhinovirus.   ·  Plan: - Rhino/enterovirus +  - Pt asymptomatic, less likely cause of sepsis given history and +UTI  - Supportive care  - Tylenol PRN for fever.     Problem/Plan - 4:  ·  Problem: HTN (hypertension).   ·  Plan: - Pt hypotensive in the setting of septic shock requiring pressor support, now s/p Levophed gtt and Midodrine  - BP stable.  - Started on Toprol XL 12.5mg PO Q12h for NSVT.     Problem/Plan - 5:  ·  Problem: Chronic CHF.   ·  Plan: Pt with documented hx of CHF, unspecified type however on GDMT for HFrEF  - TTE (9/2024): LVEF 55-60%, no diastolic dysfunction, moderate MR  - Evaluated by cardiology during admission at [facility] (9/2024) for acute CHF, started on GDMT  - Pro-BNP on admission 1988  - Hold home spironolactone and Lasix in setting of hypotension.  Started on Toprol XL 12.5mg PO Q12h.   - Strict I&Os   - Does not appear clinically volume overloaded  - Cardiology ([facility]) consulted,     Problem/Plan - 6:  ·  Problem: Lung granuloma.   ·  Plan: CT c/a/p w IV contrast: evidence of granulomatous infection in lungs, liver and spleen  - Per chart review, pt has been aware of this finding  - Has been seen on prior imaging, evaluated by ID on previous admission (11/2023)  - Quant indeterminate, fungitell negative, aspergillus galactomannan negative at that time  - AFB negative x 3  - ID (Dr. wilson  consulted,  afb so far neg.     Problem/Plan - 7:  ·  Problem: Thyroid nodule.   ·  Plan: - CT Chest: 2.6 cm left lobe thyroid nodule and evidence of granulomatous infection in lungs, liver and spleen  - TSH wnl.     Problem/Plan - 8:  ·  Problem: Personal history of pulmonary embolism.   ·  Plan: Continue home Eliquis 5mg BID.     Problem/Plan - 9:  ·  Problem: Myasthenia gravis.   ·  Plan: s/p stress dose steroids- baseline bedbound as per son Olvin.  Restarted on prednisone 10mg PO daily.      Problem/Plan - 10:  ·  Problem: BPH (benign prostatic hyperplasia).   ·  Plan; Continue home Flomax and finasteride.     Problem/Plan - 11:  ·  Problem: Need for prophylactic measure.   ·  Plan: Continue home Eliquis 5mg BID

## 2024-12-02 NOTE — PROGRESS NOTE ADULT - SUBJECTIVE AND OBJECTIVE BOX
[facility]  INFECTIOUS DISEASES - [address], [place] [address]  Tel: [phone]     Fax: [phone]  =======================================================    DEION HEATH [id number]    Follow up: No fevers. Denies any pain, cough, SOB, nausea or diarrhea.    Allergies:  levofloxacin (Unknown)  Cipro (Unknown)  fluoroquinolone antibiotics (Other)  Ketek (Other)  Avelox (Other)  ofloxacin (Unknown)  gatifloxacin (Unknown)  telithromycin (Other)      Antibiotics:  acetaminophen     Tablet .. 650 milliGRAM(s) Oral every 6 hours PRN  apixaban 5 milliGRAM(s) Oral every 12 hours  finasteride 5 milliGRAM(s) Oral daily  metoprolol succinate ER 12.5 milliGRAM(s) Oral every 12 hours  ondansetron Injectable 4 milliGRAM(s) IV Push every 8 hours PRN  pantoprazole    Tablet 40 milliGRAM(s) Oral before breakfast  polyethylene glycol 3350 17 Gram(s) Oral daily  predniSONE   Tablet 10 milliGRAM(s) Oral daily  risperiDONE   Tablet 0.25 milliGRAM(s) Oral two times a day  senna 1 Tablet(s) Oral with breakfast  sodium chloride 3%  Inhalation 4 milliLiter(s) Inhalation every 8 hours PRN  tamsulosin 0.4 milliGRAM(s) Oral at bedtime       REVIEW OF SYSTEMS:  CONSTITUTIONAL:  No Fever or chills  CARDIOVASCULAR:  No chest pain or SOB  RESPIRATORY:  No cough, shortness of breath  GASTROINTESTINAL:  No nausea, vomiting or diarrhea.  GENITOURINARY:  + Restrepo  MUSCULOSKELETAL:  no back pain  NEUROLOGIC:  No headache or dizziness     Physical Exam:  ICU Vital Signs Last 24 Hrs  T(C): 36.7 (02 Dec 2024 11:30), Max: 36.7 (01 Dec 2024 21:19)  T(F): 98 (02 Dec 2024 11:30), Max: 98.1 (01 Dec 2024 21:19)  HR: 77 (02 Dec 2024 11:30) (77 - 87)  BP: 127/76 (02 Dec 2024 11:30) (127/76 - 137/74)  BP(mean): --  ABP: --  ABP(mean): --  RR: 18 (02 Dec 2024 11:30) (17 - 18)  SpO2: 97% (02 Dec 2024 11:30) (95% - 99%)    O2 Parameters below as of 02 Dec 2024 11:30  Patient On (Oxygen Delivery Method): room air        GEN: NAD  HEENT: normocephalic and atraumatic.  NECK: Supple.   LUNGS: Normal respiratory effort  HEART: Regular rate and rhythm   ABDOMEN: Soft, nontender, and nondistended.    : + R percutaneous nephrostomy. + Restrepo  NEUROLOGIC: Answering simple questions       Labs:  12-02    139  |  108  |  30[H]  ----------------------------<  83  4.4   |  24  |  0.59    Ca    8.8      02 Dec 2024 06:45                            11.9   19.43 )-----------( 499      ( 02 Dec 2024 06:45 )             37.6       Urinalysis Basic - ( 02 Dec 2024 06:45 )    Color: x / Appearance: x / SG: x / pH: x  Gluc: 83 mg/dL / Ketone: x  / Bili: x / Urobili: x   Blood: x / Protein: x / Nitrite: x   Leuk Esterase: x / RBC: x / WBC x   Sq Epi: x / Non Sq Epi: x / Bacteria: x          RECENT CULTURES:  11-22 @ 11:20 .Sputum Sputum     No acid-fast bacilli isolated at 1 week. ****Acid-fast cultures are held  for 6 weeks.****        11-21 @ 12:00 .Sputum Sputum     No acid-fast bacilli isolated at 1 week. ****Acid-fast cultures are held  for 6 weeks.****    Few Squamous epithelial cells per low power field  No polymorphonuclear leukocytes per low power field  Few Gram Negative Rods per oil power field  Few Gram Positive Rods per oil power field  Rare Gram positive cocci in pairs per oil power field      11-20 @ 14:30 .Urine Nephrostomy - Right Pseudomonas aeruginosa (Carbapenem Resistant)  Proteus mirabilis ESBL  Klebsiella pneumoniae    50,000 - 99,000 CFU/mL Pseudomonas aeruginosa (Carbapenem Resistant)  50,000 - 99,000 CFU/mL Proteus mirabilis ESBL  50,000 - 99,000 CFU/mL Klebsiella pneumoniae        11-20 @ 01:25 .Urine Klebsiella pneumoniae  Pseudomonas aeruginosa (Carbapenem Resistant)    >100,000 CFU/ml Klebsiella pneumoniae  >100,000 CFU/ml Pseudomonas aeruginosa (Carbapenem Resistant)        11-20 @ 00:00          Detected  11-19 @ 22:25 .Blood BLOOD     No growth at 5 days        11-19 @ 22:20 .Blood BLOOD     No growth at 5 days              All imaging and data are reviewed.

## 2024-12-02 NOTE — PROGRESS NOTE ADULT - SUBJECTIVE AND OBJECTIVE BOX
Neurology follow up note    DEION HEATH79yMale      Interval History:    Patient feels ok no new complaints.    Allergies    levofloxacin (Unknown)  Cipro (Unknown)  ofloxacin (Unknown)  gatifloxacin (Unknown)    Intolerances    fluoroquinolone antibiotics (Other)  Ketek (Other)  Avelox (Other)  telithromycin (Other)      MEDICATIONS    acetaminophen     Tablet .. 650 milliGRAM(s) Oral every 6 hours PRN  apixaban 5 milliGRAM(s) Oral every 12 hours  ceftolozane/tazobactam IVPB 1500 milliGRAM(s) IV Intermittent every 8 hours  finasteride 5 milliGRAM(s) Oral daily  metoprolol succinate ER 12.5 milliGRAM(s) Oral every 12 hours  ondansetron Injectable 4 milliGRAM(s) IV Push every 8 hours PRN  pantoprazole    Tablet 40 milliGRAM(s) Oral before breakfast  polyethylene glycol 3350 17 Gram(s) Oral daily  predniSONE   Tablet 10 milliGRAM(s) Oral daily  risperiDONE   Tablet 0.25 milliGRAM(s) Oral two times a day  senna 1 Tablet(s) Oral with breakfast  sodium chloride 3%  Inhalation 4 milliLiter(s) Inhalation every 8 hours PRN  tamsulosin 0.4 milliGRAM(s) Oral at bedtime              Vital Signs Last 24 Hrs  T(C): 36.5 (02 Dec 2024 05:17), Max: 36.7 (01 Dec 2024 21:19)  T(F): 97.7 (02 Dec 2024 05:17), Max: 98.1 (01 Dec 2024 21:19)  HR: 84 (02 Dec 2024 05:17) (61 - 87)  BP: 136/72 (02 Dec 2024 05:17) (131/78 - 137/74)  BP(mean): --  RR: 17 (02 Dec 2024 05:17) (17 - 17)  SpO2: 95% (02 Dec 2024 05:17) (95% - 99%)    Parameters below as of 02 Dec 2024 05:17  Patient On (Oxygen Delivery Method): room air      REVIEW OF SYSTEMS:  CONSTITUTIONAL:  The patient denies fever, chills, night sweats.  HEAD:  No headache.  EYES:  No double vision or blurry vision.  EARS:  No ringing in the ears.  NECK:  No neck pain.  CARDIOVASCULAR:  No chest pain.  RESPIRATORY:  No shortness of breath.  ABDOMEN:  No nausea, vomiting, or abdominal pain.  EXTREMITIES/NEUROLOGICAL:  Does complain of numbness and burning sensation in his legs.  MUSCULOSKELETAL:  Occasional joint pain.  GENERAL:  Does feels weak all over, but no droopy eyes.    PHYSICAL EXAMINATION:  HEAD:  Normocephalic, atraumatic.  EYES:  No scleral icterus.  EARS:  Hearing bilaterally intact.  NECK:  Supple.  CARDIOVASCULAR:  S1 and S2 heard.  RESPIRATORY:  Air entry bilaterally.  ABDOMEN:  Soft, nontender.  EXTREMITIES:  No clubbing or cyanosis were noted.    NEUROLOGIC:  The patient awake, alert, location was hospital, was able to name simple objects., but does suffer from memory issues.  The patient was able to look up for over 1 minute with no ptosis.  Extraocular movements were intact.  Speech was fluent.  Smile symmetric.  Motor, the patient decreased range of motion of bilateral shoulders, suspect secondary to rotator cuff issues.  When elevated, was able to maintain in the air with slow drops bilaterally.  It is proximally 3+/5, distally was 4/5.  Bilateral lower extremities, slight movement at the feet and knees was 1/5.  Sensory, lower extremities not tested.  The patient said his legs are very sensitive.      LABS:  CBC Full  -  ( 01 Dec 2024 08:20 )  WBC Count : 17.60 K/uL  RBC Count : 3.92 M/uL  Hemoglobin : 12.1 g/dL  Hematocrit : 38.1 %  Platelet Count - Automated : 443 K/uL  Mean Cell Volume : 97.2 fl  Mean Cell Hemoglobin : 30.9 pg  Mean Cell Hemoglobin Concentration : 31.8 g/dL  Auto Neutrophil # : 12.78 K/uL  Auto Lymphocyte # : 1.72 K/uL  Auto Monocyte # : 1.93 K/uL  Auto Eosinophil # : 0.70 K/uL  Auto Basophil # : 0.08 K/uL  Auto Neutrophil % : 72.5 %  Auto Lymphocyte % : 9.8 %  Auto Monocyte % : 11.0 %  Auto Eosinophil % : 4.0 %  Auto Basophil % : 0.5 %    Urinalysis Basic - ( 01 Dec 2024 08:20 )    Color: x / Appearance: x / SG: x / pH: x  Gluc: 104 mg/dL / Ketone: x  / Bili: x / Urobili: x   Blood: x / Protein: x / Nitrite: x   Leuk Esterase: x / RBC: x / WBC x   Sq Epi: x / Non Sq Epi: x / Bacteria: x      12-01    x   |  x   |  x   ----------------------------<  x   4.2   |  x   |  x     Ca    9.4      01 Dec 2024 08:20  Mg     2.2     11-30      Hemoglobin A1C:       Vitamin B12         RADIOLOGY    RADIOLOGY  .Altered mental status, most likely metabolic encephalopathy secondary to underlying infectious type process resolved   .For history of myasthenia gravis, 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  Questionable if any muscle weakness as well.  The patient appears to be at his baseline from my previous notes.  The patient had refused any type of treatment in the past.   For now, we would recommend continue the patient on his prednisone.  For history of infection with myasthenia gravis.  If possible, would recommend to limit the use of aminoglycosides, fluoroquinolones, macrolides, cardiovascular drugs such as beta blockers, procainamide, other medication such as statins.  Monitor the patient's magnesium levels.  If antibiotics are used, always risks versus benefits must be weighed for the specific antibiotics.  .For history of hypertension, monitor systolic blood pressure.  follow up ACH antibodies so far negative   neurologic  wise stable     46  minutes of time was spent with patient plan of care, reviewing data, speaking to multidisciplinary healthcare team with greater than 50% of time counseling care and coordination.    Thank you for courtesy of consultation.    PATIENT HAS NOT YET BEEN SEEN AND EXAMINED TODAY. NOTE AND CHART REVIEWED IN AM AND EXAM FORM PREVIOUS.  ONCE PATIENT SEEN, CHART WILL BE UPDATE AT PRESENT NOTE IS INCOMPLETE     Neurology follow up note    DEION HEATH79yMale      Interval History:    Patient feels ok no new complaints.    Allergies    levofloxacin (Unknown)  Cipro (Unknown)  ofloxacin (Unknown)  gatifloxacin (Unknown)    Intolerances    fluoroquinolone antibiotics (Other)  Ketek (Other)  Avelox (Other)  telithromycin (Other)      MEDICATIONS    acetaminophen     Tablet .. 650 milliGRAM(s) Oral every 6 hours PRN  apixaban 5 milliGRAM(s) Oral every 12 hours  ceftolozane/tazobactam IVPB 1500 milliGRAM(s) IV Intermittent every 8 hours  finasteride 5 milliGRAM(s) Oral daily  metoprolol succinate ER 12.5 milliGRAM(s) Oral every 12 hours  ondansetron Injectable 4 milliGRAM(s) IV Push every 8 hours PRN  pantoprazole    Tablet 40 milliGRAM(s) Oral before breakfast  polyethylene glycol 3350 17 Gram(s) Oral daily  predniSONE   Tablet 10 milliGRAM(s) Oral daily  risperiDONE   Tablet 0.25 milliGRAM(s) Oral two times a day  senna 1 Tablet(s) Oral with breakfast  sodium chloride 3%  Inhalation 4 milliLiter(s) Inhalation every 8 hours PRN  tamsulosin 0.4 milliGRAM(s) Oral at bedtime              Vital Signs Last 24 Hrs  T(C): 36.5 (02 Dec 2024 05:17), Max: 36.7 (01 Dec 2024 21:19)  T(F): 97.7 (02 Dec 2024 05:17), Max: 98.1 (01 Dec 2024 21:19)  HR: 84 (02 Dec 2024 05:17) (61 - 87)  BP: 136/72 (02 Dec 2024 05:17) (131/78 - 137/74)  BP(mean): --  RR: 17 (02 Dec 2024 05:17) (17 - 17)  SpO2: 95% (02 Dec 2024 05:17) (95% - 99%)    Parameters below as of 02 Dec 2024 05:17  Patient On (Oxygen Delivery Method): room air      REVIEW OF SYSTEMS:  CONSTITUTIONAL:  The patient denies fever, chills, night sweats.  HEAD:  No headache.  EYES:  No double vision or blurry vision.  EARS:  No ringing in the ears.  NECK:  No neck pain.  CARDIOVASCULAR:  No chest pain.  RESPIRATORY:  No shortness of breath.  ABDOMEN:  No nausea, vomiting, or abdominal pain.  EXTREMITIES/NEUROLOGICAL:  Does complain of numbness and burning sensation in his legs.  MUSCULOSKELETAL:  Occasional joint pain.  GENERAL:  Does feels weak all over, but no droopy eyes.    PHYSICAL EXAMINATION:  HEAD:  Normocephalic, atraumatic.  EYES:  No scleral icterus.  EARS:  Hearing bilaterally intact.  NECK:  Supple.  CARDIOVASCULAR:  S1 and S2 heard.  RESPIRATORY:  Air entry bilaterally.  ABDOMEN:  Soft, nontender.  EXTREMITIES:  No clubbing or cyanosis were noted.    NEUROLOGIC:  The patient awake, alert, location was hospital, was able to name simple objects., but does suffer from memory issues.  The patient was able to look up for over 1 minute with no ptosis.  Extraocular movements were intact.  Speech was fluent.  Smile symmetric.  Motor, the patient decreased range of motion of bilateral shoulders, suspect secondary to rotator cuff issues.  When elevated, was able to maintain in the air with slow drops bilaterally.  It is proximally 3+/5, distally was 4/5.  Bilateral lower extremities, slight movement at the feet and knees was 1/5.  Sensory, lower extremities not tested.  The patient said his legs are very sensitive.      LABS:  CBC Full  -  ( 01 Dec 2024 08:20 )  WBC Count : 17.60 K/uL  RBC Count : 3.92 M/uL  Hemoglobin : 12.1 g/dL  Hematocrit : 38.1 %  Platelet Count - Automated : 443 K/uL  Mean Cell Volume : 97.2 fl  Mean Cell Hemoglobin : 30.9 pg  Mean Cell Hemoglobin Concentration : 31.8 g/dL  Auto Neutrophil # : 12.78 K/uL  Auto Lymphocyte # : 1.72 K/uL  Auto Monocyte # : 1.93 K/uL  Auto Eosinophil # : 0.70 K/uL  Auto Basophil # : 0.08 K/uL  Auto Neutrophil % : 72.5 %  Auto Lymphocyte % : 9.8 %  Auto Monocyte % : 11.0 %  Auto Eosinophil % : 4.0 %  Auto Basophil % : 0.5 %    Urinalysis Basic - ( 01 Dec 2024 08:20 )    Color: x / Appearance: x / SG: x / pH: x  Gluc: 104 mg/dL / Ketone: x  / Bili: x / Urobili: x   Blood: x / Protein: x / Nitrite: x   Leuk Esterase: x / RBC: x / WBC x   Sq Epi: x / Non Sq Epi: x / Bacteria: x      12-01    x   |  x   |  x   ----------------------------<  x   4.2   |  x   |  x     Ca    9.4      01 Dec 2024 08:20  Mg     2.2     11-30      Hemoglobin A1C:       Vitamin B12         RADIOLOGY    RADIOLOGY  .Altered mental status, most likely metabolic encephalopathy secondary to underlying infectious type process resolved   .For history of myasthenia gravis, 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  Questionable if any muscle weakness as well.  The patient appears to be at his baseline from my previous notes.  The patient had refused any type of treatment in the past.   For now, we would recommend continue the patient on his prednisone.  For history of infection with myasthenia gravis.  If possible, would recommend to limit the use of aminoglycosides, fluoroquinolones, macrolides, cardiovascular drugs such as beta blockers, procainamide, other medication such as statins.  Monitor the patient's magnesium levels.  If antibiotics are used, always risks versus benefits must be weighed for the specific antibiotics.  .For history of hypertension, monitor systolic blood pressure.  follow up ACH antibodies so far negative   neurologic  wise stable     46  minutes of time was spent with patient plan of care, reviewing data, speaking to multidisciplinary healthcare team with greater than 50% of time counseling care and coordination.    Thank you for courtesy of consultation.

## 2024-12-02 NOTE — PROGRESS NOTE ADULT - ASSESSMENT
78 yo M with PMHx HTN, CHF, PE (on eliquis), Myasthenia Gravis, and chronic LE edema, nephrolithiasis (s/p right nephrostomy tube placement on 9/6/24 emergently for septic stone at [facility]), BPH initially admitted to [facility] for urosepsis now s/p perc nephrostomy exchange on 11/29 for potential source of sepsis.  Now patient draining clear yellow urine into nephrostomy bag and clear yellow urine draining into guerrero catheter.  Patient asymptomatic.  VSS.  Leukocytosis (19k), appears chronic for patient as 2/2 corticosteroid use.

## 2024-12-02 NOTE — SOCIAL WORK PROGRESS NOTE - NSSWPROGRESSNOTE_GEN_ALL_CORE
CLEM spoke with pts son Olvin- pt is cleared for DC today but family requesting DC tomorrow 12/3. 24 hr notice given, family requesting 9AM  Tuesday 12/3. CLEM to follow.

## 2024-12-02 NOTE — PROGRESS NOTE ADULT - SUBJECTIVE AND OBJECTIVE BOX
UROLOGY PA NOTE:    S: Patient seen and examined at bedside.   No acute events overnight.  Patient states he is voiding into catheter without difficulty.  Nephrostomy tube is draining clear yellow urine.  Denies fevers, chills, chest pain, SOB, palpitations, calf pain.      MEDICATIONS:  acetaminophen     Tablet .. 650 milliGRAM(s) Oral every 6 hours PRN  apixaban 5 milliGRAM(s) Oral every 12 hours  finasteride 5 milliGRAM(s) Oral daily  metoprolol succinate ER 12.5 milliGRAM(s) Oral every 12 hours  ondansetron Injectable 4 milliGRAM(s) IV Push every 8 hours PRN  pantoprazole    Tablet 40 milliGRAM(s) Oral before breakfast  polyethylene glycol 3350 17 Gram(s) Oral daily  predniSONE   Tablet 10 milliGRAM(s) Oral daily  risperiDONE   Tablet 0.25 milliGRAM(s) Oral two times a day  senna 1 Tablet(s) Oral with breakfast  sodium chloride 3%  Inhalation 4 milliLiter(s) Inhalation every 8 hours PRN  tamsulosin 0.4 milliGRAM(s) Oral at bedtime      O:  Vital Signs Last 24 Hrs  T(C): 36.7 (02 Dec 2024 11:30), Max: 36.7 (01 Dec 2024 21:19)  T(F): 98 (02 Dec 2024 11:30), Max: 98.1 (01 Dec 2024 21:19)  HR: 78 (02 Dec 2024 18:15) (77 - 87)  BP: 123/78 (02 Dec 2024 18:15) (123/78 - 136/72)  BP(mean): --  RR: 18 (02 Dec 2024 11:30) (17 - 18)  SpO2: 97% (02 Dec 2024 11:30) (95% - 99%)    Parameters below as of 02 Dec 2024 11:30  Patient On (Oxygen Delivery Method): room air        I&O SUMMARY:    12-01-24 @ 07:01  -  12-02-24 @ 07:00  --------------------------------------------------------  IN: 340 mL / OUT: 3400 mL / NET: -3060 mL    12-02-24 @ 07:01  -  12-02-24 @ 19:14  --------------------------------------------------------  IN: 0 mL / OUT: 850 mL / NET: -850 mL        PHYSICAL EXAM:  Lungs: CTA bilat without W/R/R  Card: S1S2  Abd: Soft, NT, ND.  +BS x 4.  No rebound/guarding.    :  Catheter in place.  Slightly sensitive to palpation of scrotum, appears chronic for patient.  +Nephrostomy tube in place, draining clear yellow urine.    Ext: Calves soft, NT, without edema bilat    LABS:                        11.9   19.43 )-----------( 499      ( 02 Dec 2024 06:45 )             37.6     12-02    139  |  108  |  30[H]  ----------------------------<  83  4.4   |  24  |  0.59    Ca    8.8      02 Dec 2024 06:45

## 2024-12-02 NOTE — PROGRESS NOTE ADULT - SUBJECTIVE AND OBJECTIVE BOX
Group Therapy Documentation    PATIENT'S NAME: Bre Cruz  MRN:   [id number]  :   2008  ACCT. NUMBER: [id number]  DATE OF SERVICE: 21  START TIME:  8:30 AM  END TIME:  9:30 AM  FACILITATOR(S): Ann Willis  TOPIC: Child/Adol Group Therapy  Number of patients attending the group:  6  Group Length:  1 Hours    Summary of Group / Topics Discussed:    Song Discussion:    Objective(s):      Identify and express emotion    Identify significance in music and relate to real-life scenarios    Increase intrapersonal and interpersonal awareness     Develop social skills    Increase self-esteem    Encourage positive peer feedback    Build group cohesion    Music Therapy Overview:  Music Therapy is the clinical and evidence-based use of music interventions to accomplish individualized goals within a therapeutic relationship by a credentialed professional (MEGHAN).  Music therapy in the adolescent day treatment setting incorporates a variety of music interventions and musical interaction designed for patients to learn new coping skills, identify and express emotion, develop social skills, and develop intrapersonal understanding. Music therapy in this context is meant to help patients develop relationships and address issues that they may not be able to using words alone. In addition, music therapy sessions are designed to educate patients about mental health diagnoses and symptom management.       Group Attendance:  Attended group session    Patient's response to the group topic/interactions:  cooperative with task    Patient appeared to be Actively participating, Attentive and Engaged.       Client specific details:  Participated with enthusiasm in group song discussion surrounding significant songs and thoughtfully described the songs' importance in their life.     [facility] --  Janel Jackson Pannella, Patel, Savella, Goodger, Cohen  Office # [phone]    Follow Up:  HTN, CHF, PE     Subjective/Observations: in progress    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      Club foot  Surgery at birth for Club Foot Right foot      Pilonidal cyst  Surgery 40 years ago      H/O colonoscopy      Spinal stenosis      H/O prostate biopsy        MEDICATIONS  (STANDING):  apixaban 5 milliGRAM(s) Oral every 12 hours  ceftolozane/tazobactam IVPB 1500 milliGRAM(s) IV Intermittent every 8 hours  finasteride 5 milliGRAM(s) Oral daily  metoprolol succinate ER 12.5 milliGRAM(s) Oral every 12 hours  pantoprazole    Tablet 40 milliGRAM(s) Oral before breakfast  polyethylene glycol 3350 17 Gram(s) Oral daily  predniSONE   Tablet 10 milliGRAM(s) Oral daily  risperiDONE   Tablet 0.25 milliGRAM(s) Oral two times a day  senna 1 Tablet(s) Oral with breakfast  tamsulosin 0.4 milliGRAM(s) Oral at bedtime    MEDICATIONS  (PRN):  acetaminophen     Tablet .. 650 milliGRAM(s) Oral every 6 hours PRN Temp greater or equal to 38C (100.4F), Mild Pain (1 - 3)  ondansetron Injectable 4 milliGRAM(s) IV Push every 8 hours PRN Nausea and/or Vomiting  sodium chloride 3%  Inhalation 4 milliLiter(s) Inhalation every 8 hours PRN sputum induction    Allergies    levofloxacin (Unknown)  Cipro (Unknown)  ofloxacin (Unknown)  gatifloxacin (Unknown)    Intolerances    fluoroquinolone antibiotics (Other)  Ketek (Other)  Avelox (Other)  telithromycin (Other)    Vital Signs Last 24 Hrs  T(C): 36.5 (02 Dec 2024 05:17), Max: 36.7 (01 Dec 2024 21:19)  T(F): 97.7 (02 Dec 2024 05:17), Max: 98.1 (01 Dec 2024 21:19)  HR: 84 (02 Dec 2024 05:17) (61 - 87)  BP: 136/72 (02 Dec 2024 05:17) (131/78 - 137/74)  BP(mean): --  RR: 17 (02 Dec 2024 05:17) (17 - 17)  SpO2: 95% (02 Dec 2024 05:17) (95% - 99%)    Parameters below as of 02 Dec 2024 05:17  Patient On (Oxygen Delivery Method): room air      I&O's Summary    01 Dec 2024 07:01  -  02 Dec 2024 07:00  --------------------------------------------------------  IN: 340 mL / OUT: 3400 mL / NET: -3060 mL        TELE:  PHYSICAL EXAM:  Constitutional: NAD, awake and alert  HEENT: Moist Mucous Membranes, Anicteric  Pulmonary: Non-labored, breath sounds are clear bilaterally, No wheezing, rales or rhonchi  Cardiovascular: Regular, S1 and S2, + murmur No rubs, gallops or clicks   Gastrointestinal:  soft, nontender, nondistended   Lymph: No peripheral edema. No lymphadenopathy.   Skin: No visible rashes or ulcers.  Psych:  Mood & affect appropriate      LABS: All Labs Reviewed:                        11.9   19.43 )-----------( 499      ( 02 Dec 2024 06:45 )             37.6                         12.1   17.60 )-----------( 443      ( 01 Dec 2024 08:20 )             38.1                         12.1   20.05 )-----------( 467      ( 30 Nov 2024 07:26 )             38.7     01 Dec 2024 12:12    x      |  x      |  x      ----------------------------<  x      4.2     |  x      |  x      01 Dec 2024 08:20    142    |  108    |  23     ----------------------------<  104    5.5     |  28     |  0.46   30 Nov 2024 12:35    x      |  x      |  x      ----------------------------<  x      4.3     |  x      |  x        Ca    9.4        01 Dec 2024 08:20  Ca    9.0        30 Nov 2024 07:26  Mg     2.2       30 Nov 2024 07:26            12 Lead ECG:   Ventricular Rate 107 BPM    Atrial Rate 107 BPM    P-R Interval 148 ms    QRS Duration 100 ms    Q-T Interval 320 ms    QTC Calculation(Bazett) 427 ms    P Axis 36 degrees    R Axis -44 degrees    T Axis 137 degrees    Diagnosis Line Sinus tachycardia with fusion complexes  Left axis deviation  ST & T wave abnormality, consider lateral ischemia  Incomplete left bundle branch block  Abnormal ECG    Confirmed by renée Whaley ([id number]) on 11/27/2024 2:32:21 PM (11-26-24 @ 15:24)       EXAM:  ECHO TTE WO CON COMP W DOPP         PROCEDURE DATE:  06/01/2021        INTERPRETATION:  INDICATION: Bilateral pulmonary edema  Sonographer AS    Blood Pressure 130/64    Height 167.6 cm     Weight 113.4 kg       BSA 2.2 sq m    Dimensions:  LA 2.8       Normal Values: 2.0 - 4.0 cm  Ao 2.8        Normal Values: 2.0 - 3.8 cm  SEPTUM 1.0       Normal Values: 0.6 - 1.2 cm  PWT 0.9       Normal Values: 0.6 - 1.1 cm  LVIDd 3.6         Normal Values: 3.0 - 5.6 cm  LVIDs 2.4         Normal Values: 1.8 - 4.0 cm      OBSERVATIONS:  Technically difficult and limited study  Mitral Valve: normal, trace physiologic MR.  Aortic Valve/Aorta: Not well-visualized  Tricuspid Valve: Not well-visualized  Pulmonic Valve: Not well-visualized  Left Atrium:normal  Right Atrium: Not well-visualized  Left Ventricle: Left ventricle is not well-visualized. Overall grossly normal left ventricular systolic function, estimated LVEF of 55-60%.  Right Ventricle: Grossly normal size and systolic function.  Pericardium: no significant pericardial effusion.  Pulmonary/RV Pressure: estimated PA systolic pressure of 9.5 mmHg assuming an RA pressure of 3 mmHg.  LV diastolic dysfunction is present        IMPRESSION:  Technically difficult and limited study  Overall grossly normal left ventricular systolic function, estimated LVEF of 55-60%.  Grossly normal RV size and systolic function.  The aortic valve is not well-visualized  Trace physiologic MR                GULSHAN BECKER MD; Attending Cardiologist  This document has been electronically signed. Jun 2 2021  4:21PM      [facility] --  Janel Jackson Pannella, Patel, Savella, Goodger, Cohen  Office # [phone]    Follow Up:  HTN, CHF, PE     Subjective/Observations: No events overnight resting comfortably in bed.  No complaints of chest pain, dyspnea, or palpitations reported. No signs of orthopnea or PND.      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      Club foot  Surgery at birth for Club Foot Right foot      Pilonidal cyst  Surgery 40 years ago      H/O colonoscopy      Spinal stenosis      H/O prostate biopsy        MEDICATIONS  (STANDING):  apixaban 5 milliGRAM(s) Oral every 12 hours  ceftolozane/tazobactam IVPB 1500 milliGRAM(s) IV Intermittent every 8 hours  finasteride 5 milliGRAM(s) Oral daily  metoprolol succinate ER 12.5 milliGRAM(s) Oral every 12 hours  pantoprazole    Tablet 40 milliGRAM(s) Oral before breakfast  polyethylene glycol 3350 17 Gram(s) Oral daily  predniSONE   Tablet 10 milliGRAM(s) Oral daily  risperiDONE   Tablet 0.25 milliGRAM(s) Oral two times a day  senna 1 Tablet(s) Oral with breakfast  tamsulosin 0.4 milliGRAM(s) Oral at bedtime    MEDICATIONS  (PRN):  acetaminophen     Tablet .. 650 milliGRAM(s) Oral every 6 hours PRN Temp greater or equal to 38C (100.4F), Mild Pain (1 - 3)  ondansetron Injectable 4 milliGRAM(s) IV Push every 8 hours PRN Nausea and/or Vomiting  sodium chloride 3%  Inhalation 4 milliLiter(s) Inhalation every 8 hours PRN sputum induction    Allergies    levofloxacin (Unknown)  Cipro (Unknown)  ofloxacin (Unknown)  gatifloxacin (Unknown)    Intolerances    fluoroquinolone antibiotics (Other)  Ketek (Other)  Avelox (Other)  telithromycin (Other)    Vital Signs Last 24 Hrs  T(C): 36.5 (02 Dec 2024 05:17), Max: 36.7 (01 Dec 2024 21:19)  T(F): 97.7 (02 Dec 2024 05:17), Max: 98.1 (01 Dec 2024 21:19)  HR: 84 (02 Dec 2024 05:17) (61 - 87)  BP: 136/72 (02 Dec 2024 05:17) (131/78 - 137/74)  BP(mean): --  RR: 17 (02 Dec 2024 05:17) (17 - 17)  SpO2: 95% (02 Dec 2024 05:17) (95% - 99%)    Parameters below as of 02 Dec 2024 05:17  Patient On (Oxygen Delivery Method): room air      I&O's Summary    01 Dec 2024 07:01  -  02 Dec 2024 07:00  --------------------------------------------------------  IN: 340 mL / OUT: 3400 mL / NET: -3060 mL        TELE: SE  PHYSICAL EXAM:  Constitutional: NAD, awake and alert  HEENT: Moist Mucous Membranes, Anicteric  Pulmonary: Non-labored, breath sounds are clear bilaterally, No wheezing, rales or rhonchi  Cardiovascular: Regular, S1 and S2, + murmur No rubs, gallops or clicks   Gastrointestinal:  soft, nontender, nondistended   Lymph: No peripheral edema. No lymphadenopathy.   Skin: No visible rashes or ulcers.  Psych:  Mood & affect appropriate      LABS: All Labs Reviewed:                        11.9   19.43 )-----------( 499      ( 02 Dec 2024 06:45 )             37.6                         12.1   17.60 )-----------( 443      ( 01 Dec 2024 08:20 )             38.1                         12.1   20.05 )-----------( 467      ( 30 Nov 2024 07:26 )             38.7     01 Dec 2024 12:12    x      |  x      |  x      ----------------------------<  x      4.2     |  x      |  x      01 Dec 2024 08:20    142    |  108    |  23     ----------------------------<  104    5.5     |  28     |  0.46   30 Nov 2024 12:35    x      |  x      |  x      ----------------------------<  x      4.3     |  x      |  x        Ca    9.4        01 Dec 2024 08:20  Ca    9.0        30 Nov 2024 07:26  Mg     2.2       30 Nov 2024 07:26            12 Lead ECG:   Ventricular Rate 107 BPM    Atrial Rate 107 BPM    P-R Interval 148 ms    QRS Duration 100 ms    Q-T Interval 320 ms    QTC Calculation(Bazett) 427 ms    P Axis 36 degrees    R Axis -44 degrees    T Axis 137 degrees    Diagnosis Line Sinus tachycardia with fusion complexes  Left axis deviation  ST & T wave abnormality, consider lateral ischemia  Incomplete left bundle branch block  Abnormal ECG    Confirmed by renée Whaley ([id number]) on 11/27/2024 2:32:21 PM (11-26-24 @ 15:24)       EXAM:  ECHO TTE WO CON COMP W DOPP         PROCEDURE DATE:  06/01/2021        INTERPRETATION:  INDICATION: Bilateral pulmonary edema  Sonographer AS    Blood Pressure 130/64    Height 167.6 cm     Weight 113.4 kg       BSA 2.2 sq m    Dimensions:  LA 2.8       Normal Values: 2.0 - 4.0 cm  Ao 2.8        Normal Values: 2.0 - 3.8 cm  SEPTUM 1.0       Normal Values: 0.6 - 1.2 cm  PWT 0.9       Normal Values: 0.6 - 1.1 cm  LVIDd 3.6         Normal Values: 3.0 - 5.6 cm  LVIDs 2.4         Normal Values: 1.8 - 4.0 cm      OBSERVATIONS:  Technically difficult and limited study  Mitral Valve: normal, trace physiologic MR.  Aortic Valve/Aorta: Not well-visualized  Tricuspid Valve: Not well-visualized  Pulmonic Valve: Not well-visualized  Left Atrium:normal  Right Atrium: Not well-visualized  Left Ventricle: Left ventricle is not well-visualized. Overall grossly normal left ventricular systolic function, estimated LVEF of 55-60%.  Right Ventricle: Grossly normal size and systolic function.  Pericardium: no significant pericardial effusion.  Pulmonary/RV Pressure: estimated PA systolic pressure of 9.5 mmHg assuming an RA pressure of 3 mmHg.  LV diastolic dysfunction is present        IMPRESSION:  Technically difficult and limited study  Overall grossly normal left ventricular systolic function, estimated LVEF of 55-60%.  Grossly normal RV size and systolic function.  The aortic valve is not well-visualized  Trace physiologic MR                GULSHAN BECKER MD; Attending Cardiologist  This document has been electronically signed. Jun 2 2021  4:21PM      [facility] --  Janel Jackson Pannella, Patel, Savella, Goodger, Cohen  Office # [phone]    Follow Up:  HTN, CHF, PE     Subjective/Observations: No events overnight resting comfortably in bed.  No complaints of chest pain, dyspnea, or palpitations reported. No signs of orthopnea or PND.      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      Club foot  Surgery at birth for Club Foot Right foot      Pilonidal cyst  Surgery 40 years ago      H/O colonoscopy      Spinal stenosis      H/O prostate biopsy        MEDICATIONS  (STANDING):  apixaban 5 milliGRAM(s) Oral every 12 hours  ceftolozane/tazobactam IVPB 1500 milliGRAM(s) IV Intermittent every 8 hours  finasteride 5 milliGRAM(s) Oral daily  metoprolol succinate ER 12.5 milliGRAM(s) Oral every 12 hours  pantoprazole    Tablet 40 milliGRAM(s) Oral before breakfast  polyethylene glycol 3350 17 Gram(s) Oral daily  predniSONE   Tablet 10 milliGRAM(s) Oral daily  risperiDONE   Tablet 0.25 milliGRAM(s) Oral two times a day  senna 1 Tablet(s) Oral with breakfast  tamsulosin 0.4 milliGRAM(s) Oral at bedtime    MEDICATIONS  (PRN):  acetaminophen     Tablet .. 650 milliGRAM(s) Oral every 6 hours PRN Temp greater or equal to 38C (100.4F), Mild Pain (1 - 3)  ondansetron Injectable 4 milliGRAM(s) IV Push every 8 hours PRN Nausea and/or Vomiting  sodium chloride 3%  Inhalation 4 milliLiter(s) Inhalation every 8 hours PRN sputum induction    Allergies    levofloxacin (Unknown)  Cipro (Unknown)  ofloxacin (Unknown)  gatifloxacin (Unknown)    Intolerances    fluoroquinolone antibiotics (Other)  Ketek (Other)  Avelox (Other)  telithromycin (Other)    Vital Signs Last 24 Hrs  T(C): 36.5 (02 Dec 2024 05:17), Max: 36.7 (01 Dec 2024 21:19)  T(F): 97.7 (02 Dec 2024 05:17), Max: 98.1 (01 Dec 2024 21:19)  HR: 84 (02 Dec 2024 05:17) (61 - 87)  BP: 136/72 (02 Dec 2024 05:17) (131/78 - 137/74)  BP(mean): --  RR: 17 (02 Dec 2024 05:17) (17 - 17)  SpO2: 95% (02 Dec 2024 05:17) (95% - 99%)    Parameters below as of 02 Dec 2024 05:17  Patient On (Oxygen Delivery Method): room air      I&O's Summary    01 Dec 2024 07:01  -  02 Dec 2024 07:00  --------------------------------------------------------  IN: 340 mL / OUT: 3400 mL / NET: -3060 mL        TELE: SE  PHYSICAL EXAM:  Constitutional: NAD, awake and alert  HEENT: Moist Mucous Membranes, Anicteric  Pulmonary: Non-labored, breath sounds are clear bilaterally, No wheezing, rales or rhonchi  Cardiovascular: Regular, S1 and S2, + murmur No rubs, gallops or clicks   Gastrointestinal:  soft, nontender, nondistended   Lymph: No peripheral edema. No lymphadenopathy.   Skin: No visible rashes or ulcers.  Psych:  Mood & affect appropriate      LABS: All Labs Reviewed:                        11.9   19.43 )-----------( 499      ( 02 Dec 2024 06:45 )             37.6                         12.1   17.60 )-----------( 443      ( 01 Dec 2024 08:20 )             38.1                         12.1   20.05 )-----------( 467      ( 30 Nov 2024 07:26 )             38.7     01 Dec 2024 12:12    x      |  x      |  x      ----------------------------<  x      4.2     |  x      |  x      01 Dec 2024 08:20    142    |  108    |  23     ----------------------------<  104    5.5     |  28     |  0.46   30 Nov 2024 12:35    x      |  x      |  x      ----------------------------<  x      4.3     |  x      |  x        Ca    9.4        01 Dec 2024 08:20  Ca    9.0        30 Nov 2024 07:26  Mg     2.2       30 Nov 2024 07:26            12 Lead ECG:   Ventricular Rate 107 BPM    Atrial Rate 107 BPM    P-R Interval 148 ms    QRS Duration 100 ms    Q-T Interval 320 ms    QTC Calculation(Bazett) 427 ms    P Axis 36 degrees    R Axis -44 degrees    T Axis 137 degrees    Diagnosis Line Sinus tachycardia with fusion complexes  Left axis deviation  ST & T wave abnormality, consider lateral ischemia  Incomplete left bundle branch block  Abnormal ECG    Confirmed by renée Whaley ([id number]) on 11/27/2024 2:32:21 PM (11-26-24 @ 15:24)       EXAM:  ECHO TTE WO CON COMP W DOPP         PROCEDURE DATE:  06/01/2021        INTERPRETATION:  INDICATION: Bilateral pulmonary edema  Sonographer AS    Blood Pressure 130/64    Height 167.6 cm     Weight 113.4 kg       BSA 2.2 sq m    Dimensions:  LA 2.8       Normal Values: 2.0 - 4.0 cm  Ao 2.8        Normal Values: 2.0 - 3.8 cm  SEPTUM 1.0       Normal Values: 0.6 - 1.2 cm  PWT 0.9       Normal Values: 0.6 - 1.1 cm  LVIDd 3.6         Normal Values: 3.0 - 5.6 cm  LVIDs 2.4         Normal Values: 1.8 - 4.0 cm      OBSERVATIONS:  Technically difficult and limited study  Mitral Valve: normal, trace physiologic MR.  Aortic Valve/Aorta: Not well-visualized  Tricuspid Valve: Not well-visualized  Pulmonic Valve: Not well-visualized  Left Atrium:normal  Right Atrium: Not well-visualized  Left Ventricle: Left ventricle is not well-visualized. Overall grossly normal left ventricular systolic function, estimated LVEF of 55-60%.  Right Ventricle: Grossly normal size and systolic function.  Pericardium: no significant pericardial effusion.  Pulmonary/RV Pressure: estimated PA systolic pressure of 9.5 mmHg assuming an RA pressure of 3 mmHg.  LV diastolic dysfunction is present        IMPRESSION:  Technically difficult and limited study  Overall grossly normal left ventricular systolic function, estimated LVEF of 55-60%.  Grossly normal RV size and systolic function.  The aortic valve is not well-visualized  Trace physiologic MR                GULSHAN BECKER MD; Attending Cardiologist  This document has been electronically signed. Jun 2 2021  4:21PM

## 2024-12-02 NOTE — PROGRESS NOTE ADULT - ASSESSMENT
80 yo M with PMHx HTN, CHF, PE (on Eliquis Myasthenia Gravis, and chronic LE edema, nephrolithiasis (s/p nephrostomy tube placement ), urosepsis, BPH BIBEMS from [facility] for hypertension and elevated WBC count.     Hypotension   - a/f Acute Metabolic Encephalopathy,  Urosepsis 2/2 UTI, Kleb, Pseudomonas Carb Resistent Septic shock, resolved,  Rhinovirus, was in ICU s/p Off pressors and midodrine now downgraded  - hypotension resolved now that his urosepsis has been treated  - BP stable and controlled, per flow sheet    - TTE (9/2024) showed LVEF 55-60%, no diastolic dysfunction, moderate MR  - Repeat TTE with severe LV dysfunction.  Looks to be stress mediated. Even on repeat study on 11/20, the LV function seems somewhat improved.  - No evidence of significant volume overload  - limited GDMT due to hypotension, tolerating toprol bid  - No clear evidence of acute ischemia, patient denies cardiac symptoms.    - cont Eliquis (hx of PE)   - Monitor and replete lytes, keep K>4, Mg>2.  - Will continue to follow.    Levy Nieves NP  Nurse Practitioner- Cardiology   Call TEAMS 78 yo M with PMHx HTN, CHF, PE (on Eliquis Myasthenia Gravis, and chronic LE edema, nephrolithiasis (s/p nephrostomy tube placement ), urosepsis, BPH BIBEMS from [facility] for hypertension and elevated WBC count.     Hypotension   - a/f Acute Metabolic Encephalopathy,  Urosepsis 2/2 UTI, Kleb, Pseudomonas Carb Resistent Septic shock, resolved,  Rhinovirus, was in ICU s/p Off pressors and midodrine now downgraded  - hypotension resolved now that his urosepsis has been treated  - BP stable and controlled, per flow sheet  - tele SR, no significant events; DC tele    - TTE (9/2024) showed LVEF 55-60%, no diastolic dysfunction, moderate MR  - Repeat TTE with severe LV dysfunction.  Looks to be stress mediated. Even on repeat study on 11/20, the LV function seems somewhat improved.  - No evidence of significant volume overload  - limited GDMT due to hypotension, tolerating toprol bid  - No clear evidence of acute ischemia, patient denies cardiac symptoms.    - cont Eliquis (hx of PE)   - Monitor and replete lytes, keep K>4, Mg>2.  - Will continue to follow.    Levy Nieves NP  Nurse Practitioner- Cardiology   Call TEAMS 78 yo M with PMHx HTN, CHF, PE (on Eliquis Myasthenia Gravis, and chronic LE edema, nephrolithiasis (s/p nephrostomy tube placement ), urosepsis, BPH BIBEMS from [facility] for hypertension and elevated WBC count.     Hypotension   - a/f Acute Metabolic Encephalopathy,  Urosepsis 2/2 UTI, Kleb, Pseudomonas Carb Resistent Septic shock, resolved,  Rhinovirus, was in ICU s/p Off pressors and midodrine now downgraded  - hypotension resolved now that his urosepsis has been treated  - BP stable and controlled, per flow sheet  - tele SR, no significant events; DC tele    - TTE (9/2024) showed LVEF 55-60%, no diastolic dysfunction, moderate MR  - Repeat TTE with severe LV dysfunction.  Looks to be stress mediated. Even on repeat study on 11/20, the LV function seems somewhat improved.  - No evidence of significant volume overload  - Continue Metoprolol Succinate 12.5mg BID, increase to 25mg BID  - No clear evidence of acute ischemia, patient denies cardiac symptoms.    - cont Eliquis (hx of PE)   - Monitor and replete lytes, keep K>4, Mg>2.  - Will continue to follow.    Levy Nieves NP  Nurse Practitioner- Cardiology   Call TEAMS

## 2024-12-02 NOTE — PROGRESS NOTE ADULT - PROBLEM SELECTOR PLAN 1
- Images discussed and case reviewed with Dr. Antoine  - No indication for acute Urologic intervention at this time  - Dsicussed with Patient and patient's son outpatient evaluation for lithotripsy with Dr. Antoine, who are in agreement.    - Stable for discharge from Urologic perspective  - above discussed with Dr. Antoine

## 2024-12-02 NOTE — PROGRESS NOTE ADULT - SUBJECTIVE AND OBJECTIVE BOX
CHIEF COMPLAINT/INTERVAL HISTORY:  Pt. seen and evaluated for sepsis 2/2 UTI.  Pt. is in no distress.  Denies having any pain or SOB.  Tolerating IV antibiotics.     REVIEW OF SYSTEMS:  No fever, CP, SOB, or abdominal pain      Vital Signs Last 24 Hrs  T(C): 36.7 (02 Dec 2024 11:30), Max: 36.7 (01 Dec 2024 21:19)  T(F): 98 (02 Dec 2024 11:30), Max: 98.1 (01 Dec 2024 21:19)  HR: 77 (02 Dec 2024 11:30) (61 - 87)  BP: 127/76 (02 Dec 2024 11:30) (127/76 - 137/74)  BP(mean): --  RR: 18 (02 Dec 2024 11:30) (17 - 18)  SpO2: 97% (02 Dec 2024 11:30) (95% - 99%)    Parameters below as of 02 Dec 2024 11:30  Patient On (Oxygen Delivery Method): room air        PHYSICAL EXAM:  GENERAL: NAD  HEENT: EOMI, hearing normal, conjunctiva and sclera clear  Chest: CTA bilaterally, no wheezing  CV: S1S2, RRR,   GI: soft, +BS, NT/ND  : +guerrero catheter with clear yellow urine, +right percutaneous nephrostomy tube  Musculoskeletal: no LE edema  Psychiatric: affect nL, mood nL  Skin: warm and dry    LABS:                        11.9   19.43 )-----------( 499      ( 02 Dec 2024 06:45 )             37.6     12-02    139  |  108  |  30[H]  ----------------------------<  83  4.4   |  24  |  0.59    Ca    8.8      02 Dec 2024 06:45        Urinalysis Basic - ( 02 Dec 2024 06:45 )    Color: x / Appearance: x / SG: x / pH: x  Gluc: 83 mg/dL / Ketone: x  / Bili: x / Urobili: x   Blood: x / Protein: x / Nitrite: x   Leuk Esterase: x / RBC: x / WBC x   Sq Epi: x / Non Sq Epi: x / Bacteria: x

## 2024-12-02 NOTE — PROGRESS NOTE ADULT - ASSESSMENT
78 yo M with PMHx HTN, CHF, PE (on eliquis), Myasthenia Gravis, and chronic LE edema, R ureteral obstructing stone s/p R PCN placement on 9/6/24, urosepsis, BPH, who presented from [facility] for hypertension and elevated WBC count. Per nursing home paperwork, pt hypertensive to 154/105 and recieved metoprolol 25 mg (8:10 PM), and reported pt with "elevated WBC". Upon arrival, pt found to be hypotensive to 90/64. Arrived to ED with nephrostomy tube, chronic guerrero and PICC line in place. Pt was recieving IV zosyn x7d (start date 11/04) per paperwork review for UTI.    Patient being treated for UTI. Guerrero exchanged in the ED. s/p nephrostomy exchange on 11/29. CT did not show any obvious signs of acute infection. Urine culture grew klebsiella, pseudomonas, proteus. Blood cultures no growth. Has persistent leukocytosis, seems chronic could be non infectious.     Also incidentally with RUL calcified granulomas, also in liver in spleen. Unclear etiology, patient without symptoms of tuberculosis, no known hx of TB infection or exposure. He was born in [place], and has not lived outside [place]. Sputum Quantiferon indeterminate, but AFB smear x 3 are negative. Serum Fungitell is low.     #Leukocytosis  #UTI  #MDRO pseudomonas and ESBL proteus  #Rhinovirus positive  #Calcified granulomas  #Hx of fluoroquinolone allergy    -discontinue ceftolozane-tazobactam, observe off antibiotics  -sputum AFB cultures x 3 in process  -serum galactomannan, urine histoplasma antigen pending  -advised outpatient with follow up with Pulmonary and GI/Hepatology  -contact isolation   -discussed with Dr. Remi Connor MD  Division of Infectious Diseases   Cell [phone] between 8am and 6pm   After 6pm and weekends please call ID service at [phone].     35 minutes spent on total encounter assessing patient, examination, chart review, counseling and coordinating care by the attending physician/nurse/care manager.

## 2024-12-02 NOTE — PROGRESS NOTE ADULT - PROBLEM SELECTOR PROBLEM 1
Sepsis, unspecified organism

## 2024-12-03 ENCOUNTER — TRANSCRIPTION ENCOUNTER (OUTPATIENT)
Age: 79
End: 2024-12-03

## 2024-12-03 VITALS
DIASTOLIC BLOOD PRESSURE: 69 MMHG | RESPIRATION RATE: 18 BRPM | WEIGHT: 213.19 LBS | SYSTOLIC BLOOD PRESSURE: 120 MMHG | OXYGEN SATURATION: 96 % | TEMPERATURE: 98 F | HEART RATE: 75 BPM

## 2024-12-03 LAB — GALACTOMANNAN AG SERPL-ACNC: 0.04 INDEX — SIGNIFICANT CHANGE UP (ref 0–0.49)

## 2024-12-03 PROCEDURE — 86850 RBC ANTIBODY SCREEN: CPT

## 2024-12-03 PROCEDURE — 87186 SC STD MICRODIL/AGAR DIL: CPT

## 2024-12-03 PROCEDURE — 87385 HISTOPLASMA CAPSUL AG IA: CPT

## 2024-12-03 PROCEDURE — 83605 ASSAY OF LACTIC ACID: CPT

## 2024-12-03 PROCEDURE — 71250 CT THORAX DX C-: CPT | Mod: MC

## 2024-12-03 PROCEDURE — 86043 ACETYLCHOLN RCPTR MODLG ANTB: CPT

## 2024-12-03 PROCEDURE — 80061 LIPID PANEL: CPT

## 2024-12-03 PROCEDURE — 84132 ASSAY OF SERUM POTASSIUM: CPT

## 2024-12-03 PROCEDURE — 84449 ASSAY OF TRANSCORTIN: CPT

## 2024-12-03 PROCEDURE — 85025 COMPLETE CBC W/AUTO DIFF WBC: CPT

## 2024-12-03 PROCEDURE — 86041 ACETYLCHOLN RCPTR BNDNG ANTB: CPT

## 2024-12-03 PROCEDURE — 93005 ELECTROCARDIOGRAM TRACING: CPT

## 2024-12-03 PROCEDURE — 87015 SPECIMEN INFECT AGNT CONCNTJ: CPT

## 2024-12-03 PROCEDURE — 86042 ACETYLCHOLN RCPTR BLCKG ANTB: CPT

## 2024-12-03 PROCEDURE — 86901 BLOOD TYPING SEROLOGIC RH(D): CPT

## 2024-12-03 PROCEDURE — 82962 GLUCOSE BLOOD TEST: CPT

## 2024-12-03 PROCEDURE — C1729: CPT

## 2024-12-03 PROCEDURE — 76705 ECHO EXAM OF ABDOMEN: CPT

## 2024-12-03 PROCEDURE — 85730 THROMBOPLASTIN TIME PARTIAL: CPT

## 2024-12-03 PROCEDURE — 94640 AIRWAY INHALATION TREATMENT: CPT

## 2024-12-03 PROCEDURE — 83880 ASSAY OF NATRIURETIC PEPTIDE: CPT

## 2024-12-03 PROCEDURE — 86900 BLOOD TYPING SEROLOGIC ABO: CPT

## 2024-12-03 PROCEDURE — 50435 EXCHANGE NEPHROSTOMY CATH: CPT

## 2024-12-03 PROCEDURE — 80048 BASIC METABOLIC PNL TOTAL CA: CPT

## 2024-12-03 PROCEDURE — 96374 THER/PROPH/DIAG INJ IV PUSH: CPT

## 2024-12-03 PROCEDURE — 99239 HOSP IP/OBS DSCHRG MGMT >30: CPT

## 2024-12-03 PROCEDURE — 74176 CT ABD & PELVIS W/O CONTRAST: CPT | Mod: MC

## 2024-12-03 PROCEDURE — 99285 EMERGENCY DEPT VISIT HI MDM: CPT | Mod: 25

## 2024-12-03 PROCEDURE — 93306 TTE W/DOPPLER COMPLETE: CPT

## 2024-12-03 PROCEDURE — C1769: CPT

## 2024-12-03 PROCEDURE — 85610 PROTHROMBIN TIME: CPT

## 2024-12-03 PROCEDURE — 36415 COLL VENOUS BLD VENIPUNCTURE: CPT

## 2024-12-03 PROCEDURE — 83036 HEMOGLOBIN GLYCOSYLATED A1C: CPT

## 2024-12-03 PROCEDURE — 87070 CULTURE OTHR SPECIMN AEROBIC: CPT

## 2024-12-03 PROCEDURE — 0225U NFCT DS DNA&RNA 21 SARSCOV2: CPT

## 2024-12-03 PROCEDURE — 86480 TB TEST CELL IMMUN MEASURE: CPT

## 2024-12-03 PROCEDURE — 71045 X-RAY EXAM CHEST 1 VIEW: CPT

## 2024-12-03 PROCEDURE — 82533 TOTAL CORTISOL: CPT

## 2024-12-03 PROCEDURE — 84484 ASSAY OF TROPONIN QUANT: CPT

## 2024-12-03 PROCEDURE — 81001 URINALYSIS AUTO W/SCOPE: CPT

## 2024-12-03 PROCEDURE — 87206 SMEAR FLUORESCENT/ACID STAI: CPT

## 2024-12-03 PROCEDURE — 82550 ASSAY OF CK (CPK): CPT

## 2024-12-03 PROCEDURE — 87205 SMEAR GRAM STAIN: CPT

## 2024-12-03 PROCEDURE — 82480 ASSAY SERUM CHOLINESTERASE: CPT

## 2024-12-03 PROCEDURE — 82553 CREATINE MB FRACTION: CPT

## 2024-12-03 PROCEDURE — 99232 SBSQ HOSP IP/OBS MODERATE 35: CPT

## 2024-12-03 PROCEDURE — 80053 COMPREHEN METABOLIC PANEL: CPT

## 2024-12-03 PROCEDURE — 93971 EXTREMITY STUDY: CPT

## 2024-12-03 PROCEDURE — 87040 BLOOD CULTURE FOR BACTERIA: CPT

## 2024-12-03 PROCEDURE — 87077 CULTURE AEROBIC IDENTIFY: CPT

## 2024-12-03 PROCEDURE — 87449 NOS EACH ORGANISM AG IA: CPT

## 2024-12-03 PROCEDURE — 87116 MYCOBACTERIA CULTURE: CPT

## 2024-12-03 PROCEDURE — 83735 ASSAY OF MAGNESIUM: CPT

## 2024-12-03 PROCEDURE — 84100 ASSAY OF PHOSPHORUS: CPT

## 2024-12-03 PROCEDURE — 87640 STAPH A DNA AMP PROBE: CPT

## 2024-12-03 PROCEDURE — 84443 ASSAY THYROID STIM HORMONE: CPT

## 2024-12-03 PROCEDURE — 87305 ASPERGILLUS AG IA: CPT

## 2024-12-03 PROCEDURE — 87086 URINE CULTURE/COLONY COUNT: CPT

## 2024-12-03 PROCEDURE — 87641 MR-STAPH DNA AMP PROBE: CPT

## 2024-12-03 RX ADMIN — METOPROLOL TARTRATE 12.5 MILLIGRAM(S): 100 TABLET, FILM COATED ORAL at 06:03

## 2024-12-03 RX ADMIN — APIXABAN 5 MILLIGRAM(S): 2.5 TABLET, FILM COATED ORAL at 06:03

## 2024-12-03 RX ADMIN — PREDNISONE 10 MILLIGRAM(S): 20 TABLET ORAL at 06:04

## 2024-12-03 RX ADMIN — PANTOPRAZOLE SODIUM 40 MILLIGRAM(S): 40 TABLET, DELAYED RELEASE ORAL at 06:03

## 2024-12-03 RX ADMIN — RISPERIDONE 0.25 MILLIGRAM(S): 4 TABLET ORAL at 06:04

## 2024-12-03 NOTE — PROGRESS NOTE ADULT - NS ATTEND AMEND GEN_ALL_CORE FT
78 yo M with PMHx HTN, CHF, PE (on eliquis), Myasthenia Gravis, and chronic LE edema, nephrolithiasis (s/p nephrostomy tube placement ), urosepsis, BPH BIBEMS from [facility] for hypertension and elevated WBC count. Cardiology was consulted for hypotension.    - hypotension resolved now that his urosepsis has been treated  - Past echo from 09/2024 showed LVEF 55-60%, no diastolic dysfunction, moderate MR  - Repeat TTE with severe LV dysfunction.  Looks to be stress mediated. Even on repeat study on 11/20, the LV function seems somewhat improved.  - No evidence of significant volume overload  - Off pressors and midodrine  - To start GDMT when able (when bp can tolerate) and repeat echo in about one month. He is on toprol xl at home.  - No clear evidence of acute ischemia, patient denies cardiac symptoms.  - TB ruled out  - cont eliquis for pe history  - Monitor and replete lytes, keep K>4, Mg>2.  - will follow with you.
80 yo M with PMHx HTN, CHF, PE (on Eliquis Myasthenia Gravis, and chronic LE edema, nephrolithiasis (s/p nephrostomy tube placement ), urosepsis, BPH BIBEMS from [facility] for hypertension and elevated WBC count.     - a/f Acute Metabolic Encephalopathy,  Urosepsis 2/2 UTI, Kleb, Pseudomonas Carb Resistent Septic shock, resolved,  Rhinovirus, was in ICU s/p Off pressors and midodrine now downgraded  - hypotension resolved now that his urosepsis has been treated  - BP stable and controlled, per flow sheet    - TTE (9/2024) showed LVEF 55-60%, no diastolic dysfunction, moderate MR  - Repeat TTE with severe LV dysfunction.  Looks to be stress mediated. Even on repeat study on 11/20, the LV function seems somewhat improved.  - No evidence of significant volume overload  - limited GDMT due to hypotension, tolerating Toprol bid       - No clear evidence of acute ischemia, patient denies cardiac symptoms.  - cont Eliquis (hx of PE)   - will need nephrostomy exchange today. optimized for procedure.
80 yo M with PMHx HTN, CHF, PE (on Eliquis Myasthenia Gravis, and chronic LE edema, nephrolithiasis (s/p nephrostomy tube placement ), urosepsis, BPH BIBEMS from [facility] for hypertension and elevated WBC count.      - a/f Acute Metabolic Encephalopathy,  Urosepsis 2/2 UTI, Kleb, Pseudomonas Carb Resistent Septic shock, resolved,  Rhinovirus, was in ICU s/p Off pressors and midodrine, now on GMF    - hypotension resolved now that his urosepsis has been treated  - BP stable and controlled, per flow sheet  - tele SB- SR  - TTE (9/2024) showed LVEF 55-60%, no diastolic dysfunction, moderate MR  - Repeat TTE with severe LV dysfunction.  Looks to be stress mediated. Even on repeat study on 11/20, the LV function seems somewhat improved.  - No evidence of significant volume overload  - limited GDMT due to hypotension, tolerating toprol bid  -Will need outpatient ischemic eval  - No clear evidence of acute ischemia, patient denies cardiac symptoms.    - cont Eliquis (hx of PE)
80 yo M with PMHx HTN, CHF, PE (on eliquis), Myasthenia Gravis, and chronic LE edema, nephrolithiasis (s/p nephrostomy tube placement ), urosepsis, BPH BIBEMS from [facility] for hypertension and elevated WBC count. Cardiology was consulted for hypotension.    - hypotension resolved now that his urosepsis has been treated  - Repeat TTE with severe LV dysfunction.  Looks to be stress mediated. Even on repeat study on 11/20, the LV function seems somewhat improved though with hypokinetic apex  - cont low dose Metoprolol succinate,   - Will need outpatient ischemic eval  - No evidence of significant volume overload  - cont eliquis for pe history.  - will follow with you
80 yo M with PMHx HTN, CHF, PE (on eliquis), Myasthenia Gravis, and chronic LE edema, nephrolithiasis (s/p nephrostomy tube placement ), urosepsis, BPH BIBEMS from [facility] for hypertension and elevated WBC count. Cardiology was consulted for hypotension.      - hypotension resolved now that his urosepsis has been treated  - Repeat TTE with severe LV dysfunction.  Looks to be stress mediated. Even on repeat study on 11/20, the LV function seems somewhat improved.  - No evidence of significant volume overload  - To start GDMT when able (when bp can tolerate) and repeat echo in about one month. He is on toprol xl at home.  - cont eliquis for pe history
80 yo M with PMHx HTN, CHF, PE (on eliquis), Myasthenia Gravis, and chronic LE edema, nephrolithiasis (s/p nephrostomy tube placement ), urosepsis, BPH BIBEMS from [facility] for hypertension and elevated WBC count. Cardiology was consulted for hypotension.    - hypotension resolved now that his urosepsis has been treated  - Past echo from 09/2024 showed LVEF 55-60%, no diastolic dysfunction, moderate MR  - Repeat TTE with severe LV dysfunction.  Looks to be stress mediated. Even on repeat study on 11/20, the LV function seems somewhat improved.  - No evidence of significant volume overload  - Off pressors and midodrine  - To start GDMT when able (when bp can tolerate) and repeat echo in about one month. He is on toprol xl at home.  - No clear evidence of acute ischemia, patient denies cardiac symptoms.  - Ruling out TB secondary to CT chest  findings  - cont eliquis for pe history  - Monitor and replete lytes, keep K>4, Mg>2.  - will follow with you
80 yo M with PMHx HTN, CHF, PE (on Eliquis Myasthenia Gravis, and chronic LE edema, nephrolithiasis (s/p nephrostomy tube placement ), urosepsis, BPH BIBEMS from [facility] for hypertension and elevated WBC count.     Hypotension   - a/f Acute Metabolic Encephalopathy,  Urosepsis 2/2 UTI, Kleb, Pseudomonas Carb Resistent Septic shock, resolved,  Rhinovirus, was in ICU s/p Off pressors and midodrine now downgraded  - hypotension resolved now that his urosepsis has been treated  - BP stable and controlled, per flow sheet    - TTE (9/2024) showed LVEF 55-60%, no diastolic dysfunction, moderate MR  - Repeat TTE with severe LV dysfunction.  Looks to be stress mediated. Even on repeat study on 11/20, the LV function seems somewhat improved.  - No evidence of significant volume overload  - Continue Metoprolol Succinate 12.5mg BID  - No clear evidence of acute ischemia, patient denies cardiac symptoms.    - cont Eliquis (hx of PE)   - dc planning per primary team  - Monitor and replete lytes, keep K>4, Mg>2.
78 yo M with PMHx HTN, CHF, PE (on eliquis), Myasthenia Gravis, and chronic LE edema, nephrolithiasis (s/p nephrostomy tube placement ), urosepsis, BPH BIBEMS from [facility] for hypertension and elevated WBC count. Cardiology was consulted for hypotension.    - hypotension resolved now that his urosepsis has been treated  - Repeat TTE with severe LV dysfunction.  Looks to be stress mediated. Even on repeat study on 11/20, the LV function seems somewhat improved though with hypokinetic apex  - Would start low dose Metoprolol succinate, NSVT on tele  - Will need outpatient ischemic eval  - No evidence of significant volume overload  - cont eliquis for pe history.
80 yo M with PMHx HTN, CHF, PE (on Eliquis Myasthenia Gravis, and chronic LE edema, nephrolithiasis (s/p nephrostomy tube placement ), urosepsis, BPH BIBEMS from [facility] for hypertension and elevated WBC count.     - a/f Acute Metabolic Encephalopathy,  Urosepsis 2/2 UTI, Kleb, Pseudomonas Carb Resistent Septic shock, resolved,  Rhinovirus, was in ICU s/p Off pressors and midodrine now downgraded  - hypotension resolved now that his urosepsis has been treated  - BP stable and controlled    - TTE (9/2024) showed LVEF 55-60%, no diastolic dysfunction, moderate MR  - Repeat TTE with severe LV dysfunction.  Looks to be stress mediated. Even on repeat study on 11/20, the LV function seems somewhat improved with hypokinetic apex.   - No evidence of significant volume overload  - BPs are stable, increase Metoprolol Succinate to 25mg BID  - On 12/3 if BPs still stable, initiate low dose ACE/ARB       - No clear evidence of acute ischemia, patient denies cardiac symptoms.  - cont Eliquis (hx of PE)

## 2024-12-03 NOTE — PROGRESS NOTE ADULT - ASSESSMENT
in progress    78 yo M with PMHx HTN, CHF, PE (on Eliquis Myasthenia Gravis, and chronic LE edema, nephrolithiasis (s/p nephrostomy tube placement ), urosepsis, BPH BIBEMS from [facility] for hypertension and elevated WBC count.     Hypotension   - a/f Acute Metabolic Encephalopathy,  Urosepsis 2/2 UTI, Kleb, Pseudomonas Carb Resistent Septic shock, resolved,  Rhinovirus, was in ICU s/p Off pressors and midodrine now downgraded  - hypotension resolved now that his urosepsis has been treated  - BP stable and controlled, per flow sheet    - TTE (9/2024) showed LVEF 55-60%, no diastolic dysfunction, moderate MR  - Repeat TTE with severe LV dysfunction.  Looks to be stress mediated. Even on repeat study on 11/20, the LV function seems somewhat improved.  - No evidence of significant volume overload  - Continue Metoprolol Succinate 12.5mg BID  - No clear evidence of acute ischemia, patient denies cardiac symptoms.    - cont Eliquis (hx of PE)   - Monitor and replete lytes, keep K>4, Mg>2.  - Will continue to follow.    Levy Nieves NP  Nurse Practitioner- Cardiology   Call TEAMS 80 yo M with PMHx HTN, CHF, PE (on Eliquis Myasthenia Gravis, and chronic LE edema, nephrolithiasis (s/p nephrostomy tube placement ), urosepsis, BPH BIBEMS from [facility] for hypertension and elevated WBC count.     Hypotension   - a/f Acute Metabolic Encephalopathy,  Urosepsis 2/2 UTI, Kleb, Pseudomonas Carb Resistent Septic shock, resolved,  Rhinovirus, was in ICU s/p Off pressors and midodrine now downgraded  - hypotension resolved now that his urosepsis has been treated  - BP stable and controlled, per flow sheet    - TTE (9/2024) showed LVEF 55-60%, no diastolic dysfunction, moderate MR  - Repeat TTE with severe LV dysfunction.  Looks to be stress mediated. Even on repeat study on 11/20, the LV function seems somewhat improved.  - No evidence of significant volume overload  - Continue Metoprolol Succinate 12.5mg BID  - No clear evidence of acute ischemia, patient denies cardiac symptoms.    - cont Eliquis (hx of PE)   - dc planning per primary team  - Monitor and replete lytes, keep K>4, Mg>2.  - Will continue to follow.    Levy Nieves NP  Nurse Practitioner- Cardiology   Call TEAMS

## 2024-12-03 NOTE — PROGRESS NOTE ADULT - REASON FOR ADMISSION
Urosepsis

## 2024-12-03 NOTE — PROGRESS NOTE ADULT - NS ATTEND BILL GEN_ALL_CORE
Attempted to contact patient via phone.  Left message to call back.  
Attending to bill

## 2024-12-03 NOTE — PROGRESS NOTE ADULT - PROVIDER SPECIALTY LIST ADULT
Cardiology
Cardiology
Critical Care
Critical Care
Hospitalist
Neurology
Cardiology
Cardiology
Critical Care
Hospitalist
Hospitalist
Infectious Disease
Neurology
Cardiology
Critical Care
Hospitalist
Hospitalist
Infectious Disease
Neurology
Urology
Cardiology
Hospitalist
Hospitalist
Infectious Disease
Hospitalist

## 2024-12-03 NOTE — DISCHARGE NOTE NURSING/CASE MANAGEMENT/SOCIAL WORK - PATIENT PORTAL LINK FT
You can access the FollowMyHealth Patient Portal offered by [facility] by registering at the following website: [email or web address]. By joining [facility]’s FollowMyHealth portal, you will also be able to view your health information using other applications (apps) compatible with our system.

## 2024-12-03 NOTE — PROGRESS NOTE ADULT - SUBJECTIVE AND OBJECTIVE BOX
[facility] --  Janel Jackson, Evan Woods Savella, Goodger, Cohen  Office # [phone]    Follow Up:   HTN, CHF, PE     Subjective/Observations: in progress    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      Club foot  Surgery at birth for Club Foot Right foot      Pilonidal cyst  Surgery 40 years ago      H/O colonoscopy      Spinal stenosis      H/O prostate biopsy        MEDICATIONS  (STANDING):  apixaban 5 milliGRAM(s) Oral every 12 hours  finasteride 5 milliGRAM(s) Oral daily  metoprolol succinate ER 12.5 milliGRAM(s) Oral every 12 hours  pantoprazole    Tablet 40 milliGRAM(s) Oral before breakfast  polyethylene glycol 3350 17 Gram(s) Oral daily  predniSONE   Tablet 10 milliGRAM(s) Oral daily  risperiDONE   Tablet 0.25 milliGRAM(s) Oral two times a day  senna 1 Tablet(s) Oral with breakfast  tamsulosin 0.4 milliGRAM(s) Oral at bedtime    MEDICATIONS  (PRN):  acetaminophen     Tablet .. 650 milliGRAM(s) Oral every 6 hours PRN Temp greater or equal to 38C (100.4F), Mild Pain (1 - 3)  ondansetron Injectable 4 milliGRAM(s) IV Push every 8 hours PRN Nausea and/or Vomiting  sodium chloride 3%  Inhalation 4 milliLiter(s) Inhalation every 8 hours PRN sputum induction    Allergies    levofloxacin (Unknown)  Cipro (Unknown)  ofloxacin (Unknown)  gatifloxacin (Unknown)    Intolerances    fluoroquinolone antibiotics (Other)  Ketek (Other)  Avelox (Other)  telithromycin (Other)    Vital Signs Last 24 Hrs  T(C): 36.4 (03 Dec 2024 05:48), Max: 36.7 (02 Dec 2024 11:30)  T(F): 97.6 (03 Dec 2024 05:48), Max: 98 (02 Dec 2024 11:30)  HR: 75 (03 Dec 2024 05:48) (72 - 78)  BP: 120/69 (03 Dec 2024 05:48) (120/69 - 136/91)  BP(mean): --  RR: 18 (03 Dec 2024 05:48) (18 - 18)  SpO2: 96% (03 Dec 2024 05:48) (96% - 98%)    Parameters below as of 03 Dec 2024 05:48  Patient On (Oxygen Delivery Method): room air      I&O's Summary    02 Dec 2024 07:01  -  03 Dec 2024 07:00  --------------------------------------------------------  IN: 0 mL / OUT: 852 mL / NET: -852 mL        TELE:     PHYSICAL EXAM:  Constitutional: NAD, awake and alert  HEENT: Moist Mucous Membranes, Anicteric  Pulmonary: Non-labored, breath sounds are clear bilaterally, No wheezing, rales or rhonchi  Cardiovascular: Regular, S1 and S2, + murmur No rubs, gallops or clicks   Gastrointestinal:  soft, nontender, nondistended   Lymph: No peripheral edema. No lymphadenopathy.   Skin: No visible rashes or ulcers.  Psych:  Mood & affect appropriate      LABS: All Labs Reviewed:                        11.9   19.43 )-----------( 499      ( 02 Dec 2024 06:45 )             37.6                         12.1   17.60 )-----------( 443      ( 01 Dec 2024 08:20 )             38.1     02 Dec 2024 06:45    139    |  108    |  30     ----------------------------<  83     4.4     |  24     |  0.59   01 Dec 2024 12:12    x      |  x      |  x      ----------------------------<  x      4.2     |  x      |  x      01 Dec 2024 08:20    142    |  108    |  23     ----------------------------<  104    5.5     |  28     |  0.46     Ca    8.8        02 Dec 2024 06:45  Ca    9.4        01 Dec 2024 08:20            12 Lead ECG:   Ventricular Rate 107 BPM    Atrial Rate 107 BPM    P-R Interval 148 ms    QRS Duration 100 ms    Q-T Interval 320 ms    QTC Calculation(Bazett) 427 ms    P Axis 36 degrees    R Axis -44 degrees    T Axis 137 degrees    Diagnosis Line Sinus tachycardia with fusion complexes  Left axis deviation  ST & T wave abnormality, consider lateral ischemia  Incomplete left bundle branch block  Abnormal ECG    Confirmed by renée Whaley ([id number]) on 11/27/2024 2:32:21 PM (11-26-24 @ 15:24)       EXAM:  ECHO TTE WO CON COMP W DOPP         PROCEDURE DATE:  06/01/2021        INTERPRETATION:  INDICATION: Bilateral pulmonary edema  Sonographer AS    Blood Pressure 130/64    Height 167.6 cm     Weight 113.4 kg       BSA 2.2 sq m    Dimensions:  LA 2.8       Normal Values: 2.0 - 4.0 cm  Ao 2.8        Normal Values: 2.0 - 3.8 cm  SEPTUM 1.0       Normal Values: 0.6 - 1.2 cm  PWT 0.9       Normal Values: 0.6 - 1.1 cm  LVIDd 3.6         Normal Values: 3.0 - 5.6 cm  LVIDs 2.4         Normal Values: 1.8 - 4.0 cm      OBSERVATIONS:  Technically difficult and limited study  Mitral Valve: normal, trace physiologic MR.  Aortic Valve/Aorta: Not well-visualized  Tricuspid Valve: Not well-visualized  Pulmonic Valve: Not well-visualized  Left Atrium:normal  Right Atrium: Not well-visualized  Left Ventricle: Left ventricle is not well-visualized. Overall grossly normal left ventricular systolic function, estimated LVEF of 55-60%.  Right Ventricle: Grossly normal size and systolic function.  Pericardium: no significant pericardial effusion.  Pulmonary/RV Pressure: estimated PA systolic pressure of 9.5 mmHg assuming an RA pressure of 3 mmHg.  LV diastolic dysfunction is present        IMPRESSION:  Technically difficult and limited study  Overall grossly normal left ventricular systolic function, estimated LVEF of 55-60%.  Grossly normal RV size and systolic function.  The aortic valve is not well-visualized  Trace physiologic MR                GULSHAN BECKER MD; Attending Cardiologist  This document has been electronically signed. Jun 2 2021  4:21PM      [facility] --  Janel Jackson Pannella, Patel, Savella, Goodger, Cohen  Office # [phone]    Follow Up:   HTN, CHF, PE     Subjective/Observations: plan for DC today.  No events overnight resting comfortably in bed.  No complaints of chest pain, dyspnea, or palpitations reported. No signs of orthopnea or PND.      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      Club foot  Surgery at birth for Club Foot Right foot      Pilonidal cyst  Surgery 40 years ago      H/O colonoscopy      Spinal stenosis      H/O prostate biopsy        MEDICATIONS  (STANDING):  apixaban 5 milliGRAM(s) Oral every 12 hours  finasteride 5 milliGRAM(s) Oral daily  metoprolol succinate ER 12.5 milliGRAM(s) Oral every 12 hours  pantoprazole    Tablet 40 milliGRAM(s) Oral before breakfast  polyethylene glycol 3350 17 Gram(s) Oral daily  predniSONE   Tablet 10 milliGRAM(s) Oral daily  risperiDONE   Tablet 0.25 milliGRAM(s) Oral two times a day  senna 1 Tablet(s) Oral with breakfast  tamsulosin 0.4 milliGRAM(s) Oral at bedtime    MEDICATIONS  (PRN):  acetaminophen     Tablet .. 650 milliGRAM(s) Oral every 6 hours PRN Temp greater or equal to 38C (100.4F), Mild Pain (1 - 3)  ondansetron Injectable 4 milliGRAM(s) IV Push every 8 hours PRN Nausea and/or Vomiting  sodium chloride 3%  Inhalation 4 milliLiter(s) Inhalation every 8 hours PRN sputum induction    Allergies    levofloxacin (Unknown)  Cipro (Unknown)  ofloxacin (Unknown)  gatifloxacin (Unknown)    Intolerances    fluoroquinolone antibiotics (Other)  Ketek (Other)  Avelox (Other)  telithromycin (Other)    Vital Signs Last 24 Hrs  T(C): 36.4 (03 Dec 2024 05:48), Max: 36.7 (02 Dec 2024 11:30)  T(F): 97.6 (03 Dec 2024 05:48), Max: 98 (02 Dec 2024 11:30)  HR: 75 (03 Dec 2024 05:48) (72 - 78)  BP: 120/69 (03 Dec 2024 05:48) (120/69 - 136/91)  BP(mean): --  RR: 18 (03 Dec 2024 05:48) (18 - 18)  SpO2: 96% (03 Dec 2024 05:48) (96% - 98%)    Parameters below as of 03 Dec 2024 05:48  Patient On (Oxygen Delivery Method): room air      I&O's Summary    02 Dec 2024 07:01  -  03 Dec 2024 07:00  --------------------------------------------------------  IN: 0 mL / OUT: 852 mL / NET: -852 mL        TELE: off    PHYSICAL EXAM:  Constitutional: NAD, awake and alert  HEENT: Moist Mucous Membranes, Anicteric  Pulmonary: Non-labored, breath sounds are clear bilaterally, No wheezing, rales or rhonchi  Cardiovascular: Regular, S1 and S2, + murmur No rubs, gallops or clicks   Gastrointestinal:  soft, nontender, nondistended   Lymph: No peripheral edema. No lymphadenopathy.   Skin: No visible rashes or ulcers.  Psych:  Mood & affect appropriate      LABS: All Labs Reviewed:                        11.9   19.43 )-----------( 499      ( 02 Dec 2024 06:45 )             37.6                         12.1   17.60 )-----------( 443      ( 01 Dec 2024 08:20 )             38.1     02 Dec 2024 06:45    139    |  108    |  30     ----------------------------<  83     4.4     |  24     |  0.59   01 Dec 2024 12:12    x      |  x      |  x      ----------------------------<  x      4.2     |  x      |  x      01 Dec 2024 08:20    142    |  108    |  23     ----------------------------<  104    5.5     |  28     |  0.46     Ca    8.8        02 Dec 2024 06:45  Ca    9.4        01 Dec 2024 08:20            12 Lead ECG:   Ventricular Rate 107 BPM    Atrial Rate 107 BPM    P-R Interval 148 ms    QRS Duration 100 ms    Q-T Interval 320 ms    QTC Calculation(Bazett) 427 ms    P Axis 36 degrees    R Axis -44 degrees    T Axis 137 degrees    Diagnosis Line Sinus tachycardia with fusion complexes  Left axis deviation  ST & T wave abnormality, consider lateral ischemia  Incomplete left bundle branch block  Abnormal ECG    Confirmed by renée Whaley ([id number]) on 11/27/2024 2:32:21 PM (11-26-24 @ 15:24)       EXAM:  ECHO TTE WO CON COMP W DOPP         PROCEDURE DATE:  06/01/2021        INTERPRETATION:  INDICATION: Bilateral pulmonary edema  Sonographer AS    Blood Pressure 130/64    Height 167.6 cm     Weight 113.4 kg       BSA 2.2 sq m    Dimensions:  LA 2.8       Normal Values: 2.0 - 4.0 cm  Ao 2.8        Normal Values: 2.0 - 3.8 cm  SEPTUM 1.0       Normal Values: 0.6 - 1.2 cm  PWT 0.9       Normal Values: 0.6 - 1.1 cm  LVIDd 3.6         Normal Values: 3.0 - 5.6 cm  LVIDs 2.4         Normal Values: 1.8 - 4.0 cm      OBSERVATIONS:  Technically difficult and limited study  Mitral Valve: normal, trace physiologic MR.  Aortic Valve/Aorta: Not well-visualized  Tricuspid Valve: Not well-visualized  Pulmonic Valve: Not well-visualized  Left Atrium:normal  Right Atrium: Not well-visualized  Left Ventricle: Left ventricle is not well-visualized. Overall grossly normal left ventricular systolic function, estimated LVEF of 55-60%.  Right Ventricle: Grossly normal size and systolic function.  Pericardium: no significant pericardial effusion.  Pulmonary/RV Pressure: estimated PA systolic pressure of 9.5 mmHg assuming an RA pressure of 3 mmHg.  LV diastolic dysfunction is present        IMPRESSION:  Technically difficult and limited study  Overall grossly normal left ventricular systolic function, estimated LVEF of 55-60%.  Grossly normal RV size and systolic function.  The aortic valve is not well-visualized  Trace physiologic MR                GULSHAN BECKER MD; Attending Cardiologist  This document has been electronically signed. Jun 2 2021  4:21PM

## 2024-12-03 NOTE — SOCIAL WORK PROGRESS NOTE - NSSWPROGRESSNOTE_GEN_ALL_CORE
CLEM spoke with this pts son and pt at bedside- pt for DC today to [facility] arranged for 9am. Pt, family, team in agreement with dc. SW remains available.

## 2024-12-03 NOTE — PROGRESS NOTE ADULT - SUBJECTIVE AND OBJECTIVE BOX
Neurology follow up note    DEION [id number]      Interval History:    Patient feels ok no new complaints.    Allergies    levofloxacin (Unknown)  Cipro (Unknown)  ofloxacin (Unknown)  gatifloxacin (Unknown)    Intolerances    fluoroquinolone antibiotics (Other)  Ketek (Other)  Avelox (Other)  telithromycin (Other)      MEDICATIONS    acetaminophen     Tablet .. 650 milliGRAM(s) Oral every 6 hours PRN  apixaban 5 milliGRAM(s) Oral every 12 hours  finasteride 5 milliGRAM(s) Oral daily  metoprolol succinate ER 12.5 milliGRAM(s) Oral every 12 hours  ondansetron Injectable 4 milliGRAM(s) IV Push every 8 hours PRN  pantoprazole    Tablet 40 milliGRAM(s) Oral before breakfast  polyethylene glycol 3350 17 Gram(s) Oral daily  predniSONE   Tablet 10 milliGRAM(s) Oral daily  risperiDONE   Tablet 0.25 milliGRAM(s) Oral two times a day  senna 1 Tablet(s) Oral with breakfast  sodium chloride 3%  Inhalation 4 milliLiter(s) Inhalation every 8 hours PRN  tamsulosin 0.4 milliGRAM(s) Oral at bedtime              Vital Signs Last 24 Hrs  T(C): 36.4 (03 Dec 2024 05:48), Max: 36.7 (02 Dec 2024 11:30)  T(F): 97.6 (03 Dec 2024 05:48), Max: 98 (02 Dec 2024 11:30)  HR: 75 (03 Dec 2024 05:48) (72 - 78)  BP: 120/69 (03 Dec 2024 05:48) (120/69 - 136/91)  BP(mean): --  RR: 18 (03 Dec 2024 05:48) (18 - 18)  SpO2: 96% (03 Dec 2024 05:48) (96% - 98%)    Parameters below as of 03 Dec 2024 05:48  Patient On (Oxygen Delivery Method): room air      REVIEW OF SYSTEMS:  CONSTITUTIONAL:  The patient denies fever, chills, night sweats.  HEAD:  No headache.  EYES:  No double vision or blurry vision.  EARS:  No ringing in the ears.  NECK:  No neck pain.  CARDIOVASCULAR:  No chest pain.  RESPIRATORY:  No shortness of breath.  ABDOMEN:  No nausea, vomiting, or abdominal pain.  EXTREMITIES/NEUROLOGICAL:  Does complain of numbness and burning sensation in his legs.  MUSCULOSKELETAL:  Occasional joint pain.  GENERAL:  Does feels weak all over, but no droopy eyes.    PHYSICAL EXAMINATION:  HEAD:  Normocephalic, atraumatic.  EYES:  No scleral icterus.  EARS:  Hearing bilaterally intact.  NECK:  Supple.  CARDIOVASCULAR:  S1 and S2 heard.  RESPIRATORY:  Air entry bilaterally.  ABDOMEN:  Soft, nontender.  EXTREMITIES:  No clubbing or cyanosis were noted.    NEUROLOGIC:  The patient awake, alert, location was hospital, was able to name simple objects., but does suffer from memory issues.  The patient was able to look up for over 1 minute with no ptosis.  Extraocular movements were intact.  Speech was fluent.  Smile symmetric.  Motor, the patient decreased range of motion of bilateral shoulders, suspect secondary to rotator cuff issues.  When elevated, was able to maintain in the air with slow drops bilaterally.  It is proximally 3+/5, distally was 4/5.  Bilateral lower extremities, slight movement at the feet and knees was 1/5.  Sensory, lower extremities not tested.  The patient said his legs are very sensitive.      LABS:  CBC Full  -  ( 02 Dec 2024 06:45 )  WBC Count : 19.43 K/uL  RBC Count : 3.90 M/uL  Hemoglobin : 11.9 g/dL  Hematocrit : 37.6 %  Platelet Count - Automated : 499 K/uL  Mean Cell Volume : 96.4 fl  Mean Cell Hemoglobin : 30.5 pg  Mean Cell Hemoglobin Concentration : 31.6 g/dL  Auto Neutrophil # : 12.05 K/uL  Auto Lymphocyte # : 4.27 K/uL  Auto Monocyte # : 2.53 K/uL  Auto Eosinophil # : 0.58 K/uL  Auto Basophil # : 0.00 K/uL  Auto Neutrophil % : 62.0 %  Auto Lymphocyte % : 22.0 %  Auto Monocyte % : 13.0 %  Auto Eosinophil % : 3.0 %  Auto Basophil % : 0.0 %    Urinalysis Basic - ( 02 Dec 2024 06:45 )    Color: x / Appearance: x / SG: x / pH: x  Gluc: 83 mg/dL / Ketone: x  / Bili: x / Urobili: x   Blood: x / Protein: x / Nitrite: x   Leuk Esterase: x / RBC: x / WBC x   Sq Epi: x / Non Sq Epi: x / Bacteria: x      12-02    139  |  108  |  30[H]  ----------------------------<  83  4.4   |  24  |  0.59    Ca    8.8      02 Dec 2024 06:45      Hemoglobin A1C:       Vitamin B12         RADIOLOGY  .Altered mental status, most likely metabolic encephalopathy secondary to underlying infectious type process resolved   .For history of myasthenia gravis, 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  Questionable if any muscle weakness as well.  The patient appears to be at his baseline from my previous notes.  The patient had refused any type of treatment in the past.   For now, we would recommend continue the patient on his prednisone.  For history of infection with myasthenia gravis.  If possible, would recommend to limit the use of aminoglycosides, fluoroquinolones, macrolides, cardiovascular drugs such as beta blockers, procainamide, other medication such as statins.  Monitor the patient's magnesium levels.  If antibiotics are used, always risks versus benefits must be weighed for the specific antibiotics.  .For history of hypertension, monitor systolic blood pressure.  follow up ACH antibodies so far negative   neurologic  wise stable     46  minutes of time was spent with patient plan of care, reviewing data, speaking to multidisciplinary healthcare team with greater than 50% of time counseling care and coordination.    Thank you for courtesy of consultation.    PATIENT HAS NOT YET BEEN SEEN AND EXAMINED TODAY. NOTE AND CHART REVIEWED IN AM AND EXAM FORM PREVIOUS.  ONCE PATIENT SEEN, CHART WILL BE UPDATE AT PRESENT NOTE IS INCOMPLETE     Neurology follow up note    DEION HERNANDEZYPLAB56pGopz      Interval History:    Patient feels ok no new complaints.    Allergies    levofloxacin (Unknown)  Cipro (Unknown)  ofloxacin (Unknown)  gatifloxacin (Unknown)    Intolerances    fluoroquinolone antibiotics (Other)  Ketek (Other)  Avelox (Other)  telithromycin (Other)      MEDICATIONS    acetaminophen     Tablet .. 650 milliGRAM(s) Oral every 6 hours PRN  apixaban 5 milliGRAM(s) Oral every 12 hours  finasteride 5 milliGRAM(s) Oral daily  metoprolol succinate ER 12.5 milliGRAM(s) Oral every 12 hours  ondansetron Injectable 4 milliGRAM(s) IV Push every 8 hours PRN  pantoprazole    Tablet 40 milliGRAM(s) Oral before breakfast  polyethylene glycol 3350 17 Gram(s) Oral daily  predniSONE   Tablet 10 milliGRAM(s) Oral daily  risperiDONE   Tablet 0.25 milliGRAM(s) Oral two times a day  senna 1 Tablet(s) Oral with breakfast  sodium chloride 3%  Inhalation 4 milliLiter(s) Inhalation every 8 hours PRN  tamsulosin 0.4 milliGRAM(s) Oral at bedtime              Vital Signs Last 24 Hrs  T(C): 36.4 (03 Dec 2024 05:48), Max: 36.7 (02 Dec 2024 11:30)  T(F): 97.6 (03 Dec 2024 05:48), Max: 98 (02 Dec 2024 11:30)  HR: 75 (03 Dec 2024 05:48) (72 - 78)  BP: 120/69 (03 Dec 2024 05:48) (120/69 - 136/91)  BP(mean): --  RR: 18 (03 Dec 2024 05:48) (18 - 18)  SpO2: 96% (03 Dec 2024 05:48) (96% - 98%)    Parameters below as of 03 Dec 2024 05:48  Patient On (Oxygen Delivery Method): room air      REVIEW OF SYSTEMS:  CONSTITUTIONAL:  The patient denies fever, chills, night sweats.  HEAD:  No headache.  EYES:  No double vision or blurry vision.  EARS:  No ringing in the ears.  NECK:  No neck pain.  CARDIOVASCULAR:  No chest pain.  RESPIRATORY:  No shortness of breath.  ABDOMEN:  No nausea, vomiting, or abdominal pain.  EXTREMITIES/NEUROLOGICAL:  Does complain of numbness and burning sensation in his legs.  MUSCULOSKELETAL:  Occasional joint pain.  GENERAL:  Does feels weak all over, but no droopy eyes.    PHYSICAL EXAMINATION:  HEAD:  Normocephalic, atraumatic.  EYES:  No scleral icterus.  EARS:  Hearing bilaterally intact.  NECK:  Supple.  CARDIOVASCULAR:  S1 and S2 heard.  RESPIRATORY:  Air entry bilaterally.  ABDOMEN:  Soft, nontender.  EXTREMITIES:  No clubbing or cyanosis were noted.    NEUROLOGIC:  The patient awake, alert, location was hospital, was able to name simple objects., but does suffer from memory issues.  The patient was able to look up for over 1 minute with no ptosis.  Extraocular movements were intact.  Speech was fluent.  Smile symmetric.  Motor, the patient decreased range of motion of bilateral shoulders, suspect secondary to rotator cuff issues.  When elevated, was able to maintain in the air with slow drops bilaterally.  It is proximally 3+/5, distally was 4/5.  Bilateral lower extremities, slight movement at the feet and knees was 1/5.  Sensory, lower extremities not tested.  The patient said his legs are very sensitive.      LABS:  CBC Full  -  ( 02 Dec 2024 06:45 )  WBC Count : 19.43 K/uL  RBC Count : 3.90 M/uL  Hemoglobin : 11.9 g/dL  Hematocrit : 37.6 %  Platelet Count - Automated : 499 K/uL  Mean Cell Volume : 96.4 fl  Mean Cell Hemoglobin : 30.5 pg  Mean Cell Hemoglobin Concentration : 31.6 g/dL  Auto Neutrophil # : 12.05 K/uL  Auto Lymphocyte # : 4.27 K/uL  Auto Monocyte # : 2.53 K/uL  Auto Eosinophil # : 0.58 K/uL  Auto Basophil # : 0.00 K/uL  Auto Neutrophil % : 62.0 %  Auto Lymphocyte % : 22.0 %  Auto Monocyte % : 13.0 %  Auto Eosinophil % : 3.0 %  Auto Basophil % : 0.0 %    Urinalysis Basic - ( 02 Dec 2024 06:45 )    Color: x / Appearance: x / SG: x / pH: x  Gluc: 83 mg/dL / Ketone: x  / Bili: x / Urobili: x   Blood: x / Protein: x / Nitrite: x   Leuk Esterase: x / RBC: x / WBC x   Sq Epi: x / Non Sq Epi: x / Bacteria: x      12-02    139  |  108  |  30[H]  ----------------------------<  83  4.4   |  24  |  0.59    Ca    8.8      02 Dec 2024 06:45      Hemoglobin A1C:       Vitamin B12         RADIOLOGY  .Altered mental status, most likely metabolic encephalopathy secondary to underlying infectious type process resolved   .For history of myasthenia gravis, 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  Questionable if any muscle weakness as well.  The patient appears to be at his baseline from my previous notes.  The patient had refused any type of treatment in the past.   For now, we would recommend continue the patient on his prednisone.  For history of infection with myasthenia gravis.  If possible, would recommend to limit the use of aminoglycosides, fluoroquinolones, macrolides, cardiovascular drugs such as beta blockers, procainamide, other medication such as statins.  Monitor the patient's magnesium levels.  If antibiotics are used, always risks versus benefits must be weighed for the specific antibiotics.  .For history of hypertension, monitor systolic blood pressure.  follow up ACH antibodies so far negative   neurologic  wise stable   fall precautions     46  minutes of time was spent with patient plan of care, reviewing data, speaking to multidisciplinary healthcare team with greater than 50% of time counseling care and coordination.    Thank you for courtesy of consultation.

## 2024-12-03 NOTE — DISCHARGE NOTE NURSING/CASE MANAGEMENT/SOCIAL WORK - FINANCIAL ASSISTANCE
[facility] provides services at a reduced cost to those who are determined to be eligible through [facility]’s financial assistance program. Information regarding [facility]’s financial assistance program can be found by going to [email or web address] or by calling [phone].

## 2024-12-04 LAB
CHOLINESTERASE BLD-CCNC: 2712 IU/L — LOW (ref 3334–7031)
H CAPSUL AG SPEC-ACNC: SIGNIFICANT CHANGE UP
H CAPSUL AG UR QL IA: SIGNIFICANT CHANGE UP

## 2024-12-05 LAB
CORTICOSTEROID BINDING GLOBULIN RESULT: 1.7 MG/DL — SIGNIFICANT CHANGE UP
CORTIS F/TOTAL MFR SERPL: 3.7 % — SIGNIFICANT CHANGE UP
CORTIS SERPL-MCNC: 2.4 UG/DL — LOW
CORTISOL, FREE RESULT: 0.09 UG/DL — LOW

## 2024-12-12 ENCOUNTER — APPOINTMENT (OUTPATIENT)
Dept: UROLOGY | Facility: CLINIC | Age: 79
End: 2024-12-12
Payer: MEDICAID

## 2024-12-12 VITALS
BODY MASS INDEX: 38.57 KG/M2 | WEIGHT: 240 LBS | HEIGHT: 66 IN | HEART RATE: 104 BPM | TEMPERATURE: 97.5 F | SYSTOLIC BLOOD PRESSURE: 133 MMHG | OXYGEN SATURATION: 99 % | DIASTOLIC BLOOD PRESSURE: 84 MMHG

## 2024-12-12 DIAGNOSIS — N21.0 CALCULUS IN BLADDER: ICD-10-CM

## 2024-12-12 DIAGNOSIS — R73.03 PREDIABETES.: ICD-10-CM

## 2024-12-12 DIAGNOSIS — R33.9 RETENTION OF URINE, UNSPECIFIED: ICD-10-CM

## 2024-12-12 DIAGNOSIS — Z86.79 PERSONAL HISTORY OF OTHER DISEASES OF THE CIRCULATORY SYSTEM: ICD-10-CM

## 2024-12-12 DIAGNOSIS — N40.0 BENIGN PROSTATIC HYPERPLASIA WITHOUT LOWER URINARY TRACT SYMPMS: ICD-10-CM

## 2024-12-12 DIAGNOSIS — N40.1 BENIGN PROSTATIC HYPERPLASIA WITH LOWER URINARY TRACT SYMPMS: ICD-10-CM

## 2024-12-12 DIAGNOSIS — N13.8 BENIGN PROSTATIC HYPERPLASIA WITH LOWER URINARY TRACT SYMPMS: ICD-10-CM

## 2024-12-12 DIAGNOSIS — N20.0 CALCULUS OF KIDNEY: ICD-10-CM

## 2024-12-12 PROCEDURE — 99215 OFFICE O/P EST HI 40 MIN: CPT

## 2024-12-12 PROCEDURE — 99205 OFFICE O/P NEW HI 60 MIN: CPT

## 2024-12-12 RX ORDER — FINASTERIDE 5 MG/1
5 TABLET, FILM COATED ORAL
Refills: 0 | Status: ACTIVE | COMMUNITY

## 2024-12-12 RX ORDER — OMEPRAZOLE 40 MG/1
40 CAPSULE, DELAYED RELEASE ORAL
Refills: 0 | Status: ACTIVE | COMMUNITY

## 2024-12-12 RX ORDER — SENNOSIDES 8.6 MG TABLETS 8.6 MG/1
8.6 TABLET ORAL
Refills: 0 | Status: ACTIVE | COMMUNITY

## 2024-12-12 RX ORDER — ALIROCUMAB 75 MG/ML
75 INJECTION, SOLUTION SUBCUTANEOUS
Refills: 0 | Status: ACTIVE | COMMUNITY

## 2024-12-12 RX ORDER — APIXABAN 5 MG/1
5 TABLET, FILM COATED ORAL
Refills: 0 | Status: ACTIVE | COMMUNITY

## 2024-12-12 RX ORDER — TAMSULOSIN HYDROCHLORIDE 0.4 MG/1
0.4 CAPSULE ORAL
Refills: 0 | Status: ACTIVE | COMMUNITY

## 2024-12-12 RX ORDER — METOPROLOL SUCCINATE 25 MG/1
25 TABLET, EXTENDED RELEASE ORAL
Refills: 0 | Status: ACTIVE | COMMUNITY

## 2024-12-12 RX ORDER — PNV NO.95/FERROUS FUM/FOLIC AC 28MG-0.8MG
TABLET ORAL
Refills: 0 | Status: ACTIVE | COMMUNITY

## 2024-12-12 RX ORDER — ACETAMINOPHEN 325 MG/1
325 TABLET ORAL
Refills: 0 | Status: ACTIVE | COMMUNITY

## 2024-12-12 RX ORDER — PREDNISONE 10 MG/1
10 TABLET ORAL
Refills: 0 | Status: ACTIVE | COMMUNITY

## 2024-12-12 RX ORDER — RISPERIDONE 0.25 MG/1
0.25 TABLET, ORALLY DISINTEGRATING ORAL
Refills: 0 | Status: ACTIVE | COMMUNITY

## 2024-12-12 RX ORDER — POLYETHYLENE GLYCOL 3350 17 G/17G
17 POWDER, FOR SOLUTION ORAL
Refills: 0 | Status: ACTIVE | COMMUNITY

## 2024-12-12 RX ORDER — SULFAMETHOXAZOLE AND TRIMETHOPRIM 800; 160 MG/1; MG/1
800-160 TABLET ORAL TWICE DAILY
Qty: 42 | Refills: 0 | Status: ACTIVE | COMMUNITY
Start: 2024-12-12 | End: 1900-01-01

## 2024-12-12 RX ORDER — FUROSEMIDE 20 MG/1
20 TABLET ORAL
Refills: 0 | Status: ACTIVE | COMMUNITY

## 2024-12-27 ENCOUNTER — OUTPATIENT (OUTPATIENT)
Dept: OUTPATIENT SERVICES | Facility: HOSPITAL | Age: 79
LOS: 1 days | End: 2024-12-27
Payer: MEDICARE

## 2024-12-27 VITALS
DIASTOLIC BLOOD PRESSURE: 66 MMHG | OXYGEN SATURATION: 96 % | HEART RATE: 76 BPM | RESPIRATION RATE: 16 BRPM | TEMPERATURE: 98 F | WEIGHT: 240.08 LBS | HEIGHT: 66 IN | SYSTOLIC BLOOD PRESSURE: 120 MMHG

## 2024-12-27 DIAGNOSIS — I48.0 PAROXYSMAL ATRIAL FIBRILLATION: ICD-10-CM

## 2024-12-27 DIAGNOSIS — Z98.89 OTHER SPECIFIED POSTPROCEDURAL STATES: Chronic | ICD-10-CM

## 2024-12-27 DIAGNOSIS — I50.9 HEART FAILURE, UNSPECIFIED: ICD-10-CM

## 2024-12-27 DIAGNOSIS — M48.00 SPINAL STENOSIS, SITE UNSPECIFIED: Chronic | ICD-10-CM

## 2024-12-27 DIAGNOSIS — Q66.89 OTHER SPECIFIED CONGENITAL DEFORMITIES OF FEET: Chronic | ICD-10-CM

## 2024-12-27 DIAGNOSIS — N20.1 CALCULUS OF URETER: ICD-10-CM

## 2024-12-27 DIAGNOSIS — N21.0 CALCULUS IN BLADDER: ICD-10-CM

## 2024-12-27 DIAGNOSIS — N40.0 BENIGN PROSTATIC HYPERPLASIA WITHOUT LOWER URINARY TRACT SYMPTOMS: ICD-10-CM

## 2024-12-27 DIAGNOSIS — I10 ESSENTIAL (PRIMARY) HYPERTENSION: ICD-10-CM

## 2024-12-27 DIAGNOSIS — Z41.9 ENCOUNTER FOR PROCEDURE FOR PURPOSES OTHER THAN REMEDYING HEALTH STATE, UNSPECIFIED: Chronic | ICD-10-CM

## 2024-12-27 DIAGNOSIS — Z98.890 OTHER SPECIFIED POSTPROCEDURAL STATES: Chronic | ICD-10-CM

## 2024-12-27 DIAGNOSIS — Z01.818 ENCOUNTER FOR OTHER PREPROCEDURAL EXAMINATION: ICD-10-CM

## 2024-12-27 DIAGNOSIS — L05.91 PILONIDAL CYST WITHOUT ABSCESS: Chronic | ICD-10-CM

## 2024-12-27 DIAGNOSIS — Z93.6 OTHER ARTIFICIAL OPENINGS OF URINARY TRACT STATUS: Chronic | ICD-10-CM

## 2024-12-27 LAB
ALBUMIN SERPL ELPH-MCNC: 2.9 G/DL — LOW (ref 3.3–5)
ALP SERPL-CCNC: 79 U/L — SIGNIFICANT CHANGE UP (ref 40–120)
ALT FLD-CCNC: 31 U/L — SIGNIFICANT CHANGE UP (ref 12–78)
ANION GAP SERPL CALC-SCNC: 7 MMOL/L — SIGNIFICANT CHANGE UP (ref 5–17)
APPEARANCE UR: CLEAR — SIGNIFICANT CHANGE UP
AST SERPL-CCNC: 24 U/L — SIGNIFICANT CHANGE UP (ref 15–37)
BILIRUB SERPL-MCNC: 0.1 MG/DL — LOW (ref 0.2–1.2)
BILIRUB UR-MCNC: NEGATIVE — SIGNIFICANT CHANGE UP
BUN SERPL-MCNC: 17 MG/DL — SIGNIFICANT CHANGE UP (ref 7–23)
CALCIUM SERPL-MCNC: 9.7 MG/DL — SIGNIFICANT CHANGE UP (ref 8.5–10.1)
CHLORIDE SERPL-SCNC: 107 MMOL/L — SIGNIFICANT CHANGE UP (ref 96–108)
CO2 SERPL-SCNC: 25 MMOL/L — SIGNIFICANT CHANGE UP (ref 22–31)
COLOR SPEC: YELLOW — SIGNIFICANT CHANGE UP
CREAT SERPL-MCNC: 0.93 MG/DL — SIGNIFICANT CHANGE UP (ref 0.5–1.3)
DIFF PNL FLD: ABNORMAL
EGFR: 82 ML/MIN/1.73M2 — SIGNIFICANT CHANGE UP
GLUCOSE SERPL-MCNC: 154 MG/DL — HIGH (ref 70–99)
GLUCOSE UR QL: NEGATIVE MG/DL — SIGNIFICANT CHANGE UP
HCT VFR BLD CALC: 41.4 % — SIGNIFICANT CHANGE UP (ref 39–50)
HGB BLD-MCNC: 13.2 G/DL — SIGNIFICANT CHANGE UP (ref 13–17)
KETONES UR-MCNC: NEGATIVE MG/DL — SIGNIFICANT CHANGE UP
LEUKOCYTE ESTERASE UR-ACNC: ABNORMAL
MCHC RBC-ENTMCNC: 29.3 PG — SIGNIFICANT CHANGE UP (ref 27–34)
MCHC RBC-ENTMCNC: 31.9 G/DL — LOW (ref 32–36)
MCV RBC AUTO: 91.8 FL — SIGNIFICANT CHANGE UP (ref 80–100)
NITRITE UR-MCNC: NEGATIVE — SIGNIFICANT CHANGE UP
NRBC # BLD: 0 /100 WBCS — SIGNIFICANT CHANGE UP (ref 0–0)
PH UR: 6.5 — SIGNIFICANT CHANGE UP (ref 5–8)
PLATELET # BLD AUTO: 470 K/UL — HIGH (ref 150–400)
POTASSIUM SERPL-MCNC: 4 MMOL/L — SIGNIFICANT CHANGE UP (ref 3.5–5.3)
POTASSIUM SERPL-SCNC: 4 MMOL/L — SIGNIFICANT CHANGE UP (ref 3.5–5.3)
PROT SERPL-MCNC: 7.2 G/DL — SIGNIFICANT CHANGE UP (ref 6–8.3)
PROT UR-MCNC: NEGATIVE MG/DL — SIGNIFICANT CHANGE UP
RBC # BLD: 4.51 M/UL — SIGNIFICANT CHANGE UP (ref 4.2–5.8)
RBC # FLD: 15.9 % — HIGH (ref 10.3–14.5)
SODIUM SERPL-SCNC: 139 MMOL/L — SIGNIFICANT CHANGE UP (ref 135–145)
SP GR SPEC: 1.01 — SIGNIFICANT CHANGE UP (ref 1–1.03)
UROBILINOGEN FLD QL: 0.2 MG/DL — SIGNIFICANT CHANGE UP (ref 0.2–1)
WBC # BLD: 15.21 K/UL — HIGH (ref 3.8–10.5)
WBC # FLD AUTO: 15.21 K/UL — HIGH (ref 3.8–10.5)

## 2024-12-27 PROCEDURE — G0463: CPT

## 2024-12-27 PROCEDURE — 36415 COLL VENOUS BLD VENIPUNCTURE: CPT

## 2024-12-27 PROCEDURE — 85027 COMPLETE CBC AUTOMATED: CPT

## 2024-12-27 PROCEDURE — 87086 URINE CULTURE/COLONY COUNT: CPT

## 2024-12-27 PROCEDURE — 81001 URINALYSIS AUTO W/SCOPE: CPT

## 2024-12-27 PROCEDURE — 80053 COMPREHEN METABOLIC PANEL: CPT

## 2024-12-27 NOTE — H&P PST ADULT - ASSESSMENT
78 yo M scheduled for cystoscopy- TUR prostate, bladder cysto litholapaxy- right ureteroscopy on 01/06/24

## 2024-12-27 NOTE — H&P PST ADULT - NSANTHOSAYNRD_GEN_A_CORE
No. ATUL screening performed.  STOP BANG Legend: 0-2 = LOW Risk; 3-4 = INTERMEDIATE Risk; 5-8 = HIGH Risk

## 2024-12-27 NOTE — H&P PST ADULT - NSICDXPASTSURGICALHX_GEN_ALL_CORE_FT
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

## 2024-12-27 NOTE — H&P PST ADULT - NEGATIVE GENERAL GENITOURINARY SYMPTOMS
Restrepo cath, Right nephrotomy/normal urinary frequency Restrepo cath, Right nephrotomy/no hematuria/normal urinary frequency

## 2024-12-27 NOTE — H&P PST ADULT - NSICDXPASTMEDICALHX_GEN_ALL_CORE_FT
PAST MEDICAL HISTORY:  Calculus of kidney     Club foot Born Right Foot    Diabetes Type 2 - does not take medications - monitors Blood Glucose at home - diet controlled    H/O spinal stenosis     History of thrombocytopenia     Hyperlipidemia     Hypertension     Myasthenia gravis     Other muscle wasting and atrophy     Urinary tract infection notes h/o UTI's     PAST MEDICAL HISTORY:  Calculus of kidney     Club foot Born Right Foot    Diabetes Type 2 - does not take medications - monitors Blood Glucose at home - diet controlled    H/O CHF     H/O spinal stenosis     History of thrombocytopenia     Hyperlipidemia     Hypertension     Myasthenia gravis     Other muscle wasting and atrophy     Urinary tract infection notes h/o UTI's

## 2024-12-27 NOTE — H&P PST ADULT - PROBLEM SELECTOR PLAN 1
cystoscopy- TUR prostate, bladder cysto litholapaxy- right ureteroscopy on 01/06/24  Labs- CBC, CMP, UA, Urine C&S  Pre op instructions discussed with family & pt - verbalized understanding  Pre op cardiology eval in chart

## 2024-12-27 NOTE — H&P PST ADULT - GENITOURINARY COMMENTS
Restrepo cath to leg bag - clear urine, Right nephrostomy yellowish drainage- incision site intact guerrero cath & right nephrostomy cath

## 2024-12-27 NOTE — H&P PST ADULT - HISTORY OF PRESENT ILLNESS
78 yo M with h/o CHF, HTN, urosepsis (11/2024) , A.Fib -on Eliquis, anxiety disorder, BPH w/LUTS, Myasthenia gravis without acute exacerbation, muscle wasting and atrophy was admitted to [facility] 11/2024 with urosepsis. Had f/u urology evaluation scheduled for cystoscopy- TUR prostate, bladder cysto litholapaxy- right ureteroscopy on 01/06/24 78 yo M with h/o CHF, HTN, urosepsis (11/2024) , A.Fib -on Eliquis, anxiety & depression , CHF, BPH w/LUTS, Myasthenia gravis without acute exacerbation, muscle wasting and atrophy was admitted to [facility] 11/2024 with urosepsis. Had f/u urology evaluation scheduled for cystoscopy- TUR prostate, bladder cysto litholapaxy- right ureteroscopy on 01/06/24  **Denies any fever, CP, SOB or sick contacts  **Case discussed with -

## 2024-12-28 LAB
CULTURE RESULTS: SIGNIFICANT CHANGE UP
SPECIMEN SOURCE: SIGNIFICANT CHANGE UP

## 2025-01-03 ENCOUNTER — INPATIENT (INPATIENT)
Facility: HOSPITAL | Age: 80
LOS: 10 days | Discharge: NURSING, REHAB OR LONG-TERM CARE | DRG: 690 | End: 2025-01-14
Attending: FAMILY MEDICINE | Admitting: INTERNAL MEDICINE
Payer: MEDICARE

## 2025-01-03 VITALS
OXYGEN SATURATION: 98 % | SYSTOLIC BLOOD PRESSURE: 126 MMHG | WEIGHT: 259.93 LBS | HEART RATE: 95 BPM | HEIGHT: 66 IN | TEMPERATURE: 98 F | DIASTOLIC BLOOD PRESSURE: 83 MMHG | RESPIRATION RATE: 18 BRPM

## 2025-01-03 DIAGNOSIS — J84.10 PULMONARY FIBROSIS, UNSPECIFIED: ICD-10-CM

## 2025-01-03 DIAGNOSIS — Z98.89 OTHER SPECIFIED POSTPROCEDURAL STATES: Chronic | ICD-10-CM

## 2025-01-03 DIAGNOSIS — Z29.9 ENCOUNTER FOR PROPHYLACTIC MEASURES, UNSPECIFIED: ICD-10-CM

## 2025-01-03 DIAGNOSIS — Z86.711 PERSONAL HISTORY OF PULMONARY EMBOLISM: ICD-10-CM

## 2025-01-03 DIAGNOSIS — N20.9 URINARY CALCULUS, UNSPECIFIED: ICD-10-CM

## 2025-01-03 DIAGNOSIS — Z41.9 ENCOUNTER FOR PROCEDURE FOR PURPOSES OTHER THAN REMEDYING HEALTH STATE, UNSPECIFIED: Chronic | ICD-10-CM

## 2025-01-03 DIAGNOSIS — E04.1 NONTOXIC SINGLE THYROID NODULE: ICD-10-CM

## 2025-01-03 DIAGNOSIS — Z93.6 OTHER ARTIFICIAL OPENINGS OF URINARY TRACT STATUS: Chronic | ICD-10-CM

## 2025-01-03 DIAGNOSIS — I10 ESSENTIAL (PRIMARY) HYPERTENSION: ICD-10-CM

## 2025-01-03 DIAGNOSIS — N40.0 BENIGN PROSTATIC HYPERPLASIA WITHOUT LOWER URINARY TRACT SYMPTOMS: ICD-10-CM

## 2025-01-03 DIAGNOSIS — L05.91 PILONIDAL CYST WITHOUT ABSCESS: Chronic | ICD-10-CM

## 2025-01-03 DIAGNOSIS — N39.0 URINARY TRACT INFECTION, SITE NOT SPECIFIED: ICD-10-CM

## 2025-01-03 DIAGNOSIS — Z98.890 OTHER SPECIFIED POSTPROCEDURAL STATES: Chronic | ICD-10-CM

## 2025-01-03 DIAGNOSIS — G70.00 MYASTHENIA GRAVIS WITHOUT (ACUTE) EXACERBATION: ICD-10-CM

## 2025-01-03 DIAGNOSIS — I50.9 HEART FAILURE, UNSPECIFIED: ICD-10-CM

## 2025-01-03 DIAGNOSIS — Q66.89 OTHER SPECIFIED CONGENITAL DEFORMITIES OF FEET: Chronic | ICD-10-CM

## 2025-01-03 PROBLEM — M62.50 MUSCLE WASTING AND ATROPHY, NOT ELSEWHERE CLASSIFIED, UNSPECIFIED SITE: Chronic | Status: ACTIVE | Noted: 2024-12-27

## 2025-01-03 PROBLEM — Z86.79 PERSONAL HISTORY OF OTHER DISEASES OF THE CIRCULATORY SYSTEM: Chronic | Status: ACTIVE | Noted: 2024-12-27

## 2025-01-03 PROBLEM — Z87.39 PERSONAL HISTORY OF OTHER DISEASES OF THE MUSCULOSKELETAL SYSTEM AND CONNECTIVE TISSUE: Chronic | Status: ACTIVE | Noted: 2024-12-27

## 2025-01-03 PROBLEM — Z86.2 PERSONAL HISTORY OF DISEASES OF THE BLOOD AND BLOOD-FORMING ORGANS AND CERTAIN DISORDERS INVOLVING THE IMMUNE MECHANISM: Chronic | Status: ACTIVE | Noted: 2024-12-27

## 2025-01-03 LAB
ALBUMIN SERPL ELPH-MCNC: 2.8 G/DL — LOW (ref 3.3–5)
ALP SERPL-CCNC: 69 U/L — SIGNIFICANT CHANGE UP (ref 40–120)
ALT FLD-CCNC: 27 U/L — SIGNIFICANT CHANGE UP (ref 12–78)
ANION GAP SERPL CALC-SCNC: 9 MMOL/L — SIGNIFICANT CHANGE UP (ref 5–17)
APPEARANCE UR: ABNORMAL
APTT BLD: 34.6 SEC — SIGNIFICANT CHANGE UP (ref 24.5–35.6)
AST SERPL-CCNC: 25 U/L — SIGNIFICANT CHANGE UP (ref 15–37)
BACTERIA # UR AUTO: ABNORMAL /HPF
BASOPHILS # BLD AUTO: 0.08 K/UL — SIGNIFICANT CHANGE UP (ref 0–0.2)
BASOPHILS NFR BLD AUTO: 0.5 % — SIGNIFICANT CHANGE UP (ref 0–2)
BILIRUB SERPL-MCNC: <0.1 MG/DL — LOW (ref 0.2–1.2)
BILIRUB UR-MCNC: NEGATIVE — SIGNIFICANT CHANGE UP
BUN SERPL-MCNC: 14 MG/DL — SIGNIFICANT CHANGE UP (ref 7–23)
CALCIUM SERPL-MCNC: 9.7 MG/DL — SIGNIFICANT CHANGE UP (ref 8.5–10.1)
CHLORIDE SERPL-SCNC: 106 MMOL/L — SIGNIFICANT CHANGE UP (ref 96–108)
CO2 SERPL-SCNC: 25 MMOL/L — SIGNIFICANT CHANGE UP (ref 22–31)
COLOR SPEC: YELLOW — SIGNIFICANT CHANGE UP
CREAT SERPL-MCNC: 0.87 MG/DL — SIGNIFICANT CHANGE UP (ref 0.5–1.3)
DIFF PNL FLD: ABNORMAL
EGFR: 87 ML/MIN/1.73M2 — SIGNIFICANT CHANGE UP
EOSINOPHIL # BLD AUTO: 0.56 K/UL — HIGH (ref 0–0.5)
EOSINOPHIL NFR BLD AUTO: 3.8 % — SIGNIFICANT CHANGE UP (ref 0–6)
EPI CELLS # UR: PRESENT
GLUCOSE SERPL-MCNC: 125 MG/DL — HIGH (ref 70–99)
GLUCOSE UR QL: NEGATIVE MG/DL — SIGNIFICANT CHANGE UP
HCT VFR BLD CALC: 41.9 % — SIGNIFICANT CHANGE UP (ref 39–50)
HGB BLD-MCNC: 13.3 G/DL — SIGNIFICANT CHANGE UP (ref 13–17)
IMM GRANULOCYTES NFR BLD AUTO: 0.8 % — SIGNIFICANT CHANGE UP (ref 0–0.9)
INR BLD: 1.11 RATIO — SIGNIFICANT CHANGE UP (ref 0.85–1.16)
KETONES UR-MCNC: NEGATIVE MG/DL — SIGNIFICANT CHANGE UP
LACTATE SERPL-SCNC: 1.7 MMOL/L — SIGNIFICANT CHANGE UP (ref 0.7–2)
LACTATE SERPL-SCNC: 3.4 MMOL/L — HIGH (ref 0.7–2)
LEUKOCYTE ESTERASE UR-ACNC: ABNORMAL
LYMPHOCYTES # BLD AUTO: 18.5 % — SIGNIFICANT CHANGE UP (ref 13–44)
LYMPHOCYTES # BLD AUTO: 2.7 K/UL — SIGNIFICANT CHANGE UP (ref 1–3.3)
MCHC RBC-ENTMCNC: 29.4 PG — SIGNIFICANT CHANGE UP (ref 27–34)
MCHC RBC-ENTMCNC: 31.7 G/DL — LOW (ref 32–36)
MCV RBC AUTO: 92.7 FL — SIGNIFICANT CHANGE UP (ref 80–100)
MONOCYTES # BLD AUTO: 1.38 K/UL — HIGH (ref 0–0.9)
MONOCYTES NFR BLD AUTO: 9.5 % — SIGNIFICANT CHANGE UP (ref 2–14)
NEUTROPHILS # BLD AUTO: 9.74 K/UL — HIGH (ref 1.8–7.4)
NEUTROPHILS NFR BLD AUTO: 66.9 % — SIGNIFICANT CHANGE UP (ref 43–77)
NITRITE UR-MCNC: POSITIVE
NRBC # BLD: 0 /100 WBCS — SIGNIFICANT CHANGE UP (ref 0–0)
PH UR: 6 — SIGNIFICANT CHANGE UP (ref 5–8)
PLATELET # BLD AUTO: 396 K/UL — SIGNIFICANT CHANGE UP (ref 150–400)
POTASSIUM SERPL-MCNC: 4.1 MMOL/L — SIGNIFICANT CHANGE UP (ref 3.5–5.3)
POTASSIUM SERPL-SCNC: 4.1 MMOL/L — SIGNIFICANT CHANGE UP (ref 3.5–5.3)
PROT SERPL-MCNC: 6.9 G/DL — SIGNIFICANT CHANGE UP (ref 6–8.3)
PROT UR-MCNC: 100 MG/DL
PROTHROM AB SERPL-ACNC: 13 SEC — SIGNIFICANT CHANGE UP (ref 9.9–13.4)
RBC # BLD: 4.52 M/UL — SIGNIFICANT CHANGE UP (ref 4.2–5.8)
RBC # FLD: 16.3 % — HIGH (ref 10.3–14.5)
RBC CASTS # UR COMP ASSIST: 35 /HPF — HIGH (ref 0–4)
SODIUM SERPL-SCNC: 140 MMOL/L — SIGNIFICANT CHANGE UP (ref 135–145)
SP GR SPEC: 1.02 — SIGNIFICANT CHANGE UP (ref 1–1.03)
UROBILINOGEN FLD QL: 0.2 MG/DL — SIGNIFICANT CHANGE UP (ref 0.2–1)
WBC # BLD: 14.58 K/UL — HIGH (ref 3.8–10.5)
WBC # FLD AUTO: 14.58 K/UL — HIGH (ref 3.8–10.5)
WBC UR QL: ABNORMAL /HPF (ref 0–5)

## 2025-01-03 PROCEDURE — 99285 EMERGENCY DEPT VISIT HI MDM: CPT

## 2025-01-03 PROCEDURE — 93010 ELECTROCARDIOGRAM REPORT: CPT

## 2025-01-03 PROCEDURE — 99222 1ST HOSP IP/OBS MODERATE 55: CPT

## 2025-01-03 PROCEDURE — 71045 X-RAY EXAM CHEST 1 VIEW: CPT | Mod: 26

## 2025-01-03 PROCEDURE — G0545: CPT

## 2025-01-03 PROCEDURE — 99222 1ST HOSP IP/OBS MODERATE 55: CPT | Mod: GC

## 2025-01-03 RX ORDER — MAG HYDROX/ALUMINUM HYD/SIMETH 200-200-20
30 SUSPENSION, ORAL (FINAL DOSE FORM) ORAL EVERY 4 HOURS
Refills: 0 | Status: DISCONTINUED | OUTPATIENT
Start: 2025-01-03 | End: 2025-01-09

## 2025-01-03 RX ORDER — RISPERIDONE 0.5 MG/1
0.25 TABLET ORAL AT BEDTIME
Refills: 0 | Status: DISCONTINUED | OUTPATIENT
Start: 2025-01-03 | End: 2025-01-09

## 2025-01-03 RX ORDER — SODIUM CHLORIDE 9 MG/ML
2000 INJECTION, SOLUTION INTRAMUSCULAR; INTRAVENOUS; SUBCUTANEOUS ONCE
Refills: 0 | Status: COMPLETED | OUTPATIENT
Start: 2025-01-03 | End: 2025-01-03

## 2025-01-03 RX ORDER — APIXABAN 5 MG/1
5 TABLET, FILM COATED ORAL
Refills: 0 | Status: DISCONTINUED | OUTPATIENT
Start: 2025-01-03 | End: 2025-01-04

## 2025-01-03 RX ORDER — TAMSULOSIN HYDROCHLORIDE 0.4 MG/1
0.4 CAPSULE ORAL AT BEDTIME
Refills: 0 | Status: DISCONTINUED | OUTPATIENT
Start: 2025-01-03 | End: 2025-01-09

## 2025-01-03 RX ORDER — METOPROLOL TARTRATE 50 MG
25 TABLET ORAL DAILY
Refills: 0 | Status: DISCONTINUED | OUTPATIENT
Start: 2025-01-03 | End: 2025-01-09

## 2025-01-03 RX ORDER — PREDNISONE 5 MG
10 TABLET ORAL DAILY
Refills: 0 | Status: DISCONTINUED | OUTPATIENT
Start: 2025-01-03 | End: 2025-01-09

## 2025-01-03 RX ORDER — PIPERACILLIN AND TAZOBACTAM 3; .375 G/15ML; G/15ML
3.38 INJECTION, POWDER, LYOPHILIZED, FOR SOLUTION INTRAVENOUS ONCE
Refills: 0 | Status: DISCONTINUED | OUTPATIENT
Start: 2025-01-03 | End: 2025-01-03

## 2025-01-03 RX ORDER — MEROPENEM 1 G/20ML
1000 INJECTION, POWDER, FOR SOLUTION INTRAVENOUS ONCE
Refills: 0 | Status: DISCONTINUED | OUTPATIENT
Start: 2025-01-03 | End: 2025-01-03

## 2025-01-03 RX ORDER — SENNOSIDES 8.6 MG/1
1 TABLET, FILM COATED ORAL AT BEDTIME
Refills: 0 | Status: DISCONTINUED | OUTPATIENT
Start: 2025-01-03 | End: 2025-01-09

## 2025-01-03 RX ORDER — SULFAMETHOXAZOLE/TRIMETHOPRIM 800-160 MG
1 TABLET ORAL
Refills: 0 | DISCHARGE

## 2025-01-03 RX ORDER — ONDANSETRON 4 MG/1
4 TABLET ORAL EVERY 8 HOURS
Refills: 0 | Status: DISCONTINUED | OUTPATIENT
Start: 2025-01-03 | End: 2025-01-09

## 2025-01-03 RX ORDER — FINASTERIDE 5 MG
5 TABLET ORAL DAILY
Refills: 0 | Status: DISCONTINUED | OUTPATIENT
Start: 2025-01-03 | End: 2025-01-09

## 2025-01-03 RX ORDER — MEROPENEM 1 G/20ML
1000 INJECTION, POWDER, FOR SOLUTION INTRAVENOUS EVERY 8 HOURS
Refills: 0 | Status: DISCONTINUED | OUTPATIENT
Start: 2025-01-03 | End: 2025-01-04

## 2025-01-03 RX ORDER — FUROSEMIDE 20 MG
20 TABLET ORAL DAILY
Refills: 0 | Status: DISCONTINUED | OUTPATIENT
Start: 2025-01-03 | End: 2025-01-06

## 2025-01-03 RX ORDER — SENNOSIDES 8.6 MG/1
1 TABLET, FILM COATED ORAL
Refills: 0 | DISCHARGE

## 2025-01-03 RX ORDER — B COMPLEX, C NO.20/FOLIC ACID 1 MG
1 CAPSULE ORAL DAILY
Refills: 0 | Status: DISCONTINUED | OUTPATIENT
Start: 2025-01-03 | End: 2025-01-09

## 2025-01-03 RX ORDER — POLYETHYLENE GLYCOL 3350 17 G/DOSE
17 POWDER (GRAM) ORAL DAILY
Refills: 0 | Status: DISCONTINUED | OUTPATIENT
Start: 2025-01-03 | End: 2025-01-09

## 2025-01-03 RX ORDER — ACETAMINOPHEN 80 MG/.8ML
650 SOLUTION/ DROPS ORAL EVERY 6 HOURS
Refills: 0 | Status: DISCONTINUED | OUTPATIENT
Start: 2025-01-03 | End: 2025-01-09

## 2025-01-03 RX ORDER — GINKGO BILOBA 40 MG
3 CAPSULE ORAL AT BEDTIME
Refills: 0 | Status: DISCONTINUED | OUTPATIENT
Start: 2025-01-03 | End: 2025-01-09

## 2025-01-03 RX ORDER — PANTOPRAZOLE 40 MG/1
40 TABLET, DELAYED RELEASE ORAL
Refills: 0 | Status: DISCONTINUED | OUTPATIENT
Start: 2025-01-03 | End: 2025-01-09

## 2025-01-03 RX ADMIN — APIXABAN 5 MILLIGRAM(S): 5 TABLET, FILM COATED ORAL at 20:14

## 2025-01-03 RX ADMIN — SENNOSIDES 1 TABLET(S): 8.6 TABLET, FILM COATED ORAL at 22:38

## 2025-01-03 RX ADMIN — RISPERIDONE 0.25 MILLIGRAM(S): 0.5 TABLET ORAL at 22:38

## 2025-01-03 RX ADMIN — TAMSULOSIN HYDROCHLORIDE 0.4 MILLIGRAM(S): 0.4 CAPSULE ORAL at 22:38

## 2025-01-03 RX ADMIN — MEROPENEM 100 MILLIGRAM(S): 1 INJECTION, POWDER, FOR SOLUTION INTRAVENOUS at 22:38

## 2025-01-03 RX ADMIN — SODIUM CHLORIDE 2000 MILLILITER(S): 9 INJECTION, SOLUTION INTRAMUSCULAR; INTRAVENOUS; SUBCUTANEOUS at 10:48

## 2025-01-03 NOTE — H&P ADULT - PROBLEM SELECTOR PLAN 2
Chronic CHF.   ·  Plan: Pt with documented hx of CHF, unspecified type however on GDMT for HFrEF  - TTE (9/2024): LVEF 55-60%, no diastolic dysfunction, moderate MR  - Evaluated by cardiology during admission at [facility] (9/2024) for acute CHF, started on GDMT  - Pro-BNP on admission 1988  - s/p 1000cc NS bolus x2  - c/w IVF @ 60cc/hr, closely monitor volume status  - Hold home metoprolol, spironolactone and lasix in setting of hypotension  - Strict Is&Os q6h  - Does not appear clinically volume overloaded  - Cardiology ([facility]) consulted, recs appreciated. CT A/P 11/20/24: Percutaneous R nephrostomy tube. No hydronephrosis. Unremarkable L kidney. UB collapsed around guerrero. UB stone x3 measuring up to 1.0 cm.  - scheduled for cystoscopy- TURP, bladder cysto litholapaxy- R ureteroscopy on 01/06/24.   - care plan per Urology, f/u recs

## 2025-01-03 NOTE — H&P ADULT - PROBLEM SELECTOR PLAN 6
Chronic  - c/w home prednisone. CT Chest:  2.6 cm left lobe thyroid nodule and evidence of granulomatous infection in lungs, liver and spleen  - f/u AM TSH  - Primary team to consider non-urgent thyroid US.    ??? CT Chest 11/20/24:  2.6 cm left lobe thyroid nodule and evidence of granulomatous infection in lungs, liver and spleen  - TSH 0.58 11/20/24  - Primary team to consider non-urgent thyroid US.

## 2025-01-03 NOTE — CONSULT NOTE ADULT - ASSESSMENT
80 yo M with PMHx HTN, CHF, PE (on eliquis), Myasthenia Gravis, and chronic LE edema, nephrolithiasis (s/p nephrostomy tube placement), urosepsis, BPH admitted for UTI treatment prior to scheduled for cystoscopy- TURP, bladder cysto litholapaxy- R ureteroscopy.    Plan:  - UTI treatment with abx per ID  - f/u urine culture  - f/u BCx  - OR monday  - rest of care per primary team  78 yo M with PMHx HTN, CHF, PE (on eliquis), Myasthenia Gravis, and chronic LE edema, nephrolithiasis (s/p nephrostomy tube placement), urosepsis, BPH admitted for UTI treatment prior to scheduled for cystoscopy- TURP, bladder cysto litholapaxy- R ureteroscopy.    Plan:  - UTI treatment with abx per ID  - f/u urine culture  - f/u BCx  - OR monday  - Hold AC  - rest of care per primary team

## 2025-01-03 NOTE — ED ADULT NURSE NOTE - NSFALLHARMRISKINTERV_ED_ALL_ED
Assistance OOB with selected safe patient handling equipment if applicable/Assistance with ambulation/Communicate risk of Fall with Harm to all staff, patient, and family/Monitor gait and stability/Provide patient with walking aids/Provide visual cue: red socks, yellow wristband, yellow gown, etc/Reinforce activity limits and safety measures with patient and family/Toileting schedule using arm’s reach rule for commode and bathroom/Bed in lowest position, wheels locked, appropriate side rails in place/Call bell, personal items and telephone in reach/Instruct patient to call for assistance before getting out of bed/chair/stretcher/Non-slip footwear applied when patient is off stretcher/[place] to call system/Physically safe environment - no spills, clutter or unnecessary equipment/Purposeful Proactive Rounding/Room/bathroom lighting operational, light cord in reach

## 2025-01-03 NOTE — CONSULT NOTE ADULT - SUBJECTIVE AND OBJECTIVE BOX
[facility]  INFECTIOUS DISEASES   [address] [place] [address]  Tel: [phone]     Fax: [phone]  ========================================================  MD Nikolas Oquendo Michelle, MD Shah, Kaushal, MD Sunjit, Jaspal, MD Sehrish Shahid, MD   ========================================================    MRN-[id number]  DEION HEATH     CC: Patient is a 79y old  Male who presents with a chief complaint of UTI (2025 13:33)    HPI:  80 yo M with PMHx HTN, CHF, PE (on eliquis), Myasthenia Gravis, and chronic LE edema, nephrolithiasis (s/p nephrostomy tube placement ), urosepsis, BPH BIBEMS from [facility] for hypertension and elevated WBC count. Per paperwork, nursing home paperwork, pt hypertensive to 154/105 and recieved metoprolol 25 mg (8:10 PM), and reported pt with "evelated WBC". Upon arrival, pt found to be hypotensive to 90/64. Arrived to ED with nephrostomy tube, chronic guerrero and PICC line in place. Pt was recieving IV zosyn x7d (start date ) per paperwork review for UTI. Pt with no active complaints, denies fever, chills, chest pain, SOB, abdominal pain, n/v/d/c or dysuria.    Of note, pt recently admitted to [facility] in 2024 and urgently transferred to [facility] for emergent nephrostomy tube placement after failed stent placement for 7mm kidney stone. Managed in ICU for septic shock/urosepsis and Klebsiella Bacteremia, treated with IV rocephin and vasopressors. 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  Club foot  Surgery at birth for Club Foot Right foot  Pilonidal cyst  Surgery 40 years ago  H/O colonoscopy  H/O prostate biopsy  Nephrostomy present    Social Hx: No current smoking, EtOH or drugs     FAMILY HISTORY:  Family history of stroke (Father)  Father -  age 62    Family history of kidney disease (Mother)  Mother -  age 67    Family history of diabetes mellitus type II (Sibling)  Brother & Sister    Allergie  ofloxacin (Unknown)  gatifloxacin (Unknown)  Cipro (Unknown)  levofloxacin (Unknown)    Avelox (Other)  telithromycin (Other)  fluoroquinolone antibiotics (Other)  Ketek (Other)    MEDICATIONS  (STANDING):  meropenem  IVPB 1000 milliGRAM(s) IV Intermittent Once     REVIEW OF SYSTEMS:  As per HPI. He states that he doesn't know why he is here but no pain or any complaint at this time     Physical Exam:  Vital Signs Last 24 Hrs  T(C): 36.4 (2025 09:08), Max: 36.4 (2025 09:08)  T(F): 97.5 (2025 09:08), Max: 97.5 (2025 09:08)  HR: 95 (2025 09:08) (95 - 95)  BP: 126/83 (2025 09:08) (126/83 - 126/83)  BP(mean): --  RR: 18 (2025 09:08) (18 - 18)  SpO2: 98% (2025 09:08) (98% - 98%)  Parameters below as of 2025 09:08  Patient On (Oxygen Delivery Method): room air  Height (cm): 167.6 ( @ 09:08)  Weight (kg): 117.9 ( @ 09:08)  BMI (kg/m2): 42 ( @ 09:08)  BSA (m2): 2.24 ( @ 09:08)  GEN: NAD  HEENT: normocephalic and atraumatic. EOMI. PERRL.    NECK: Supple.  No lymphadenopathy   LUNGS: Clear to auscultation.  HEART: Regular rate and rhythm   ABDOMEN: Soft, nontender, and nondistended.   Guerrero in place, no CVA tenderness, R nephrostomy  EXTREMITIES: Without edema. PICC looks fine  PSYCHIATRIC: Awake and alert but not oriented   SKIN: No rash     Labs:      140  |  106  |  14  ----------------------------<  125[H]  4.1   |  25  |  0.87    Ca    9.7      2025 10:45    TPro  6.9  /  Alb  2.8[L]  /  TBili  <0.1[L]  /  DBili  x   /  AST  25  /  ALT  27  /  AlkPhos  69                          13.3   14.58 )-----------( 396      ( 2025 10:45 )             41.9     PT/INR - ( 2025 10:45 )   PT: 13.0 sec;   INR: 1.11 ratio    PTT - ( 2025 10:45 )  PTT:34.6 sec  Urinalysis Basic - ( 2025 10:45 )    Color: x / Appearance: x / SG: x / pH: x  Gluc: 125 mg/dL / Ketone: x  / Bili: x / Urobili: x   Blood: x / Protein: x / Nitrite: x   Leuk Esterase: x / RBC: x / WBC x   Sq Epi: x / Non Sq Epi: x / Bacteria: x    LIVER FUNCTIONS - ( 2025 10:45 )  Alb: 2.8 g/dL / Pro: 6.9 g/dL / ALK PHOS: 69 U/L / ALT: 27 U/L / AST: 25 U/L / GGT: x           RECENT CULTURES:  12- @ 16:20 Catheterized Catheterized     Culture grew 3 or more types of organisms which indicate  collection contamination; consider recollection only if clinically  indicated.    All imaging and other data have been reviewed.  < from: Xray Chest 1 View-PORTABLE IMMEDIATE (25 @ 10:47) >  IMPRESSION: Scarring with small granuloma in the right midlung again   noted. Small hilar calcified lymph nodes again seen.      Assessment and Plan:   80 yo man with PMH of HTN, CHF, PE (on eliquis), Myasthenia Gravis, and chronic LE edema, nephrolithiasis (s/p nephrostomy tube placement), urosepsis, BPH was transferred from [facility] for hypertension and elevated WBC.  No complaints, no fever, chills, chest pain, SOB, abdominal or flank pain, n/v/d/c or dysuria.    In last admission had some work up done for calcified granulomas in chest CT. Negative sputum for AFB x3 and negative fungal markers.   He had CR pseudomonas for which completed zerbaxa.   Leukocytosis seems chronic he had numbers around 20 most days.     # UTI?  # Chronic guerrero and nephrostomy   # Leukocytosis     - Will follow cultures   - Stop antibiotics and watch closely  - Monitor CBC   - Possibly benefits from hem/onc consult   - If febrile or hemodynamically unstable will restart ABx (last time had Carbapenem resistant infection)     Thank you for courtesy of this consult.     Will follow.  Discussed with the primary team.     Jordin Mckinley MD  Division of Infectious Diseases   Please call ID service at [phone] with any question.    75 minutes spent on total encounter assessing patient, examination, chart review, counseling and coordinating care by the attending physician/nurse/care manager.   [facility]  INFECTIOUS DISEASES   [address] [place] [address]  Tel: [phone]     Fax: [phone]  ========================================================  MD Nikolas Oquendo Michelle, MD Shah, Kaushal, MD Sunjit, Jaspal, MD Sehrish Shahid, MD   ========================================================    MRN-[id number]  DEION HEATH     CC: Patient is a 79y old  Male who presents with a chief complaint of UTI (2025 13:33)    HPI:  78 yo M with PMHx HTN, CHF, PE (on eliquis), Myasthenia Gravis, and chronic LE edema, nephrolithiasis (s/p nephrostomy tube placement ), urosepsis, BPH BIBEMS from [facility] for hypertension and elevated WBC count. Per paperwork, nursing home paperwork, pt hypertensive to 154/105 and recieved metoprolol 25 mg (8:10 PM), and reported pt with "evelated WBC". Upon arrival, pt found to be hypotensive to 90/64. Arrived to ED with nephrostomy tube, chronic guerrero and PICC line in place. Pt was recieving IV zosyn x7d (start date ) per paperwork review for UTI. Pt with no active complaints, denies fever, chills, chest pain, SOB, abdominal pain, n/v/d/c or dysuria.    Of note, pt recently admitted to [facility] in 2024 and urgently transferred to [facility] for emergent nephrostomy tube placement after failed stent placement for 7mm kidney stone. Managed in ICU for septic shock/urosepsis and Klebsiella Bacteremia, treated with IV rocephin and vasopressors. 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  Club foot  Surgery at birth for Club Foot Right foot  Pilonidal cyst  Surgery 40 years ago  H/O colonoscopy  H/O prostate biopsy  Nephrostomy present    Social Hx: No current smoking, EtOH or drugs     FAMILY HISTORY:  Family history of stroke (Father)  Father -  age 62    Family history of kidney disease (Mother)  Mother -  age 67    Family history of diabetes mellitus type II (Sibling)  Brother & Sister    Allergie  ofloxacin (Unknown)  gatifloxacin (Unknown)  Cipro (Unknown)  levofloxacin (Unknown)    Avelox (Other)  telithromycin (Other)  fluoroquinolone antibiotics (Other)  Ketek (Other)    MEDICATIONS  (STANDING):  meropenem  IVPB 1000 milliGRAM(s) IV Intermittent Once     REVIEW OF SYSTEMS:  As per HPI. He states that he doesn't know why he is here but no pain or any complaint at this time     Physical Exam:  Vital Signs Last 24 Hrs  T(C): 36.4 (2025 09:08), Max: 36.4 (2025 09:08)  T(F): 97.5 (2025 09:08), Max: 97.5 (2025 09:08)  HR: 95 (2025 09:08) (95 - 95)  BP: 126/83 (2025 09:08) (126/83 - 126/83)  BP(mean): --  RR: 18 (2025 09:08) (18 - 18)  SpO2: 98% (2025 09:08) (98% - 98%)  Parameters below as of 2025 09:08  Patient On (Oxygen Delivery Method): room air  Height (cm): 167.6 ( @ 09:08)  Weight (kg): 117.9 ( @ 09:08)  BMI (kg/m2): 42 ( @ 09:08)  BSA (m2): 2.24 ( @ 09:08)  GEN: NAD  HEENT: normocephalic and atraumatic. EOMI. PERRL.    NECK: Supple.  No lymphadenopathy   LUNGS: Clear to auscultation.  HEART: Regular rate and rhythm   ABDOMEN: Soft, nontender, and nondistended.   Guerrero in place, no CVA tenderness, R nephrostomy  EXTREMITIES: Without edema. PICC looks fine  PSYCHIATRIC: Awake and alert but not oriented   SKIN: No rash     Labs:      140  |  106  |  14  ----------------------------<  125[H]  4.1   |  25  |  0.87    Ca    9.7      2025 10:45    TPro  6.9  /  Alb  2.8[L]  /  TBili  <0.1[L]  /  DBili  x   /  AST  25  /  ALT  27  /  AlkPhos  69                          13.3   14.58 )-----------( 396      ( 2025 10:45 )             41.9     PT/INR - ( 2025 10:45 )   PT: 13.0 sec;   INR: 1.11 ratio    PTT - ( 2025 10:45 )  PTT:34.6 sec  Urinalysis Basic - ( 2025 10:45 )    Color: x / Appearance: x / SG: x / pH: x  Gluc: 125 mg/dL / Ketone: x  / Bili: x / Urobili: x   Blood: x / Protein: x / Nitrite: x   Leuk Esterase: x / RBC: x / WBC x   Sq Epi: x / Non Sq Epi: x / Bacteria: x    LIVER FUNCTIONS - ( 2025 10:45 )  Alb: 2.8 g/dL / Pro: 6.9 g/dL / ALK PHOS: 69 U/L / ALT: 27 U/L / AST: 25 U/L / GGT: x           RECENT CULTURES:  12- @ 16:20 Catheterized Catheterized     Culture grew 3 or more types of organisms which indicate  collection contamination; consider recollection only if clinically  indicated.    All imaging and other data have been reviewed.  < from: Xray Chest 1 View-PORTABLE IMMEDIATE (25 @ 10:47) >  IMPRESSION: Scarring with small granuloma in the right midlung again   noted. Small hilar calcified lymph nodes again seen.      Assessment and Plan:   78 yo man with PMH of HTN, CHF, PE (on eliquis), Myasthenia Gravis, and chronic LE edema, nephrolithiasis (s/p nephrostomy tube placement), urosepsis, BPH was transferred from [facility] for hypertension and elevated WBC.  No complaints, no fever, chills, chest pain, SOB, abdominal or flank pain, n/v/d/c or dysuria.    In last admission had some work up done for calcified granulomas in chest CT. Negative sputum for AFB x3 and negative fungal markers.   He had CR pseudomonas for which completed zerbaxa.   Leukocytosis seems chronic he had numbers around 20 most days.     # UTI?  # Chronic guerrero and nephrostomy   # Leukocytosis     - Will follow cultures   - Stop antibiotics and watch closely  - Monitor CBC   - Possibly benefits from hem/onc consult   - If febrile or hemodynamically unstable will restart ABx (last time had Carbapenem resistant infection)     Thank you for courtesy of this consult.     Will follow.  Dr. Bret Echevarria is covering for me this weekend, can be reached in teams.  Discussed with the primary team.     Jordin Mckinley MD  Division of Infectious Diseases   Please call ID service at [phone] with any question.    75 minutes spent on total encounter assessing patient, examination, chart review, counseling and coordinating care by the attending physician/nurse/care manager.

## 2025-01-03 NOTE — ED ADULT NURSE NOTE - OBJECTIVE STATEMENT
sent from [facility] for surgical meds, scheduled for surgical procedure for her foot 1/6/25, noted unstageable ulcer to left heel and redness to sacral area,blanchable, noted right side sidee catheter and guerrero catheter on arrival, guerrero catherter chaged aseptically with f-16 to gravity and procedure well tolerated sent from [facility] for surgical meds, scheduled for surgical procedure for her foot 1/6/25, noted unstageable ulcer to left heel and redness to sacral area,blanchable, noted right side nephrostomy catheter and guerrero catheter on arrival, guerrero catherter chaged aseptically with f-16 to gravity and procedure well tolerated

## 2025-01-03 NOTE — H&P ADULT - ASSESSMENT
Joesph Gaxiola is an 78 yo M with PMHx HTN, CHF, PE (on eliquis), Myasthenia Gravis, and chronic LE edema, nephrolithiasis (s/p nephrostomy tube placement), urosepsis, BPH admitted for UTI treatment prior to scheduled for cystoscopy- TURP, bladder cysto litholapaxy- R ureteroscopy on 01/06/24.

## 2025-01-03 NOTE — CONSULT NOTE ADULT - SUBJECTIVE AND OBJECTIVE BOX
HPI:  Joesph Gaxiola is an 78 yo M with PMHx HTN, CHF, PE (on eliquis), Myasthenia Gravis, and chronic LE edema, nephrolithiasis (s/p nephrostomy tube placement), urosepsis, BPH presented to the ED 2/2 urology referral for an admission for IV antibiotics prior to procedure on 25. Patient is poor historian, unsure of the nature of hospitalization. But denies denies fever, chills, chest pain, SOB, abdominal pain, n/v/d/c or dysuria, or any other complaints.     Of note, pt recently admitted to [facility] 24 with urosepsis, required nephrostomy tube exchange 24, was scheduled for a F/U urology evaluation scheduled for cystoscopy- TURP, bladder cysto litholapaxy- R ureteroscopy on 24. Previously, was also admitted to [facility] in 2024 and urgently transferred to [facility] for emergent nephrostomy tube placement after failed stent placement for 7mm kidney stone. Managed in ICU for septic shock/urosepsis and Klebsiella Bacteremia, treated with IV rocephin and vasopressors.     Per Nursing home paperwork, pt was stable for a transfer with an RCRI class II risk - 6%. Arrived to ED with nephrostomy tube, chronic guerrero and PICC line in place. Pt was completed  a 7 days course of Zosyn IV for UTI 2/2 proteus and pseudomonas per paperwork review.       ED Course:   Vitals: T 97.5, HR 95, /83, RR 18 with SpO2 of 98% on RA   Labs: WBC 14.58, Lac 3.4   UA pending  CXR: Scarring with small granuloma in the right midlung again noted. Small hilar calcified lymph nodes again seen.  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      Club foot  Surgery at birth for Club Foot Right foot      Pilonidal cyst  Surgery 40 years ago      H/O colonoscopy      H/O prostate biopsy      Nephrostomy present          REVIEW OF SYSTEMS      General:	    Skin/Breast:  	  Ophthalmologic:  	  ENMT:	    Respiratory and Thorax:  	  Cardiovascular:	    Gastrointestinal:	    Genitourinary:	    Musculoskeletal:	    Neurological:	    Psychiatric:	    Hematology/Lymphatics:	    Endocrine:	    Allergic/Immunologic:	    MEDICATIONS  (STANDING):  apixaban 5 milliGRAM(s) Oral two times a day  finasteride 5 milliGRAM(s) Oral daily  furosemide    Tablet 20 milliGRAM(s) Oral daily  meropenem  IVPB 1000 milliGRAM(s) IV Intermittent every 8 hours  metoprolol succinate ER 25 milliGRAM(s) Oral daily  multivitamin 1 Tablet(s) Oral daily  pantoprazole    Tablet 40 milliGRAM(s) Oral before breakfast  polyethylene glycol 3350 17 Gram(s) Oral daily  predniSONE   Tablet 10 milliGRAM(s) Oral daily  risperiDONE   Tablet 0.25 milliGRAM(s) Oral at bedtime  senna 1 Tablet(s) Oral at bedtime  tamsulosin 0.4 milliGRAM(s) Oral at bedtime    MEDICATIONS  (PRN):  acetaminophen     Tablet .. 650 milliGRAM(s) Oral every 6 hours PRN Mild Pain (1 - 3)  aluminum hydroxide/magnesium hydroxide/simethicone Suspension 30 milliLiter(s) Oral every 4 hours PRN Dyspepsia  melatonin 3 milliGRAM(s) Oral at bedtime PRN Insomnia  ondansetron Injectable 4 milliGRAM(s) IV Push every 8 hours PRN Nausea and/or Vomiting      Allergies    ofloxacin (Unknown)  gatifloxacin (Unknown)  Cipro (Unknown)  levofloxacin (Unknown)    Intolerances    Avelox (Other)  telithromycin (Other)  fluoroquinolone antibiotics (Other)  Ketek (Other)      SOCIAL HISTORY:    FAMILY HISTORY:  Family history of stroke (Father)  Father -  age 62    Family history of kidney disease (Mother)  Mother -  age 67    Family history of diabetes mellitus type II (Sibling)  Brother & Sister        Vital Signs Last 24 Hrs  T(C): 36.7 (2025 17:40), Max: 36.9 (2025 16:21)  T(F): 98.1 (2025 17:40), Max: 98.4 (2025 16:21)  HR: 83 (2025 17:40) (76 - 95)  BP: 101/63 (2025 17:40) (101/63 - 135/85)  BP(mean): --  RR: 17 (2025 17:40) (16 - 18)  SpO2: 97% (2025 17:40) (97% - 98%)    Parameters below as of 2025 17:40  Patient On (Oxygen Delivery Method): room air        PHYSICAL EXAM:      Constitutional:    Eyes:    ENMT:    Neck:    Breasts:    Back:    Respiratory:    Cardiovascular:    Gastrointestinal:    Genitourinary:    Rectal:    Extremities:    Vascular:    Neurological:    Skin:    Lymph Nodes:    Musculoskeletal:    Psychiatric:        LABS:                        13.3   14.58 )-----------( 396      ( 2025 10:45 )             41.9     01-03    140  |  106  |  14  ----------------------------<  125[H]  4.1   |  25  |  0.87    Ca    9.7      2025 10:45    TPro  6.9  /  Alb  2.8[L]  /  TBili  <0.1[L]  /  DBili  x   /  AST  25  /  ALT  27  /  AlkPhos  69  01-03    PT/INR - ( 2025 10:45 )   PT: 13.0 sec;   INR: 1.11 ratio         PTT - ( 2025 10:45 )  PTT:34.6 sec  Urinalysis Basic - ( 2025 10:45 )    Color: x / Appearance: x / SG: x / pH: x  Gluc: 125 mg/dL / Ketone: x  / Bili: x / Urobili: x   Blood: x / Protein: x / Nitrite: x   Leuk Esterase: x / RBC: x / WBC x   Sq Epi: x / Non Sq Epi: x / Bacteria: x        RADIOLOGY & ADDITIONAL STUDIES: HPI:  Joesph Gaxiola is an 78 yo M with PMHx HTN, CHF, PE (on eliquis), Myasthenia Gravis, and chronic LE edema, nephrolithiasis (s/p nephrostomy tube placement), urosepsis, BPH presented to the ED 2/2 urology referral for an admission for IV antibiotics prior to procedure on 25. Patient is poor historian, unsure of the nature of hospitalization. But denies denies fever, chills, chest pain, SOB, abdominal pain, n/v/d/c or dysuria, or any other complaints.     Of note, pt recently admitted to [facility] 24 with urosepsis, required nephrostomy tube exchange 24, was scheduled for a F/U urology evaluation scheduled for cystoscopy- TURP, bladder cysto litholapaxy- R ureteroscopy on 24. Previously, was also admitted to [facility] in 2024 and urgently transferred to [facility] for emergent nephrostomy tube placement after failed stent placement for 7mm kidney stone. Managed in ICU for septic shock/urosepsis and Klebsiella Bacteremia, treated with IV rocephin and vasopressors.     Per Nursing home paperwork, pt was stable for a transfer with an RCRI class II risk - 6%. Arrived to ED with nephrostomy tube, chronic guerrero and PICC line in place. Pt was completed  a 7 days course of Zosyn IV for UTI 2/2 proteus and pseudomonas per paperwork review.     Interval HPI:  Patient seen and examined at bedside denies any acute complaints, presents with a UTI and a planned cysto/TURP on . Guerrero and nephrostomy in place, denies any abdominal pain, fever, chills, chest pain or SOB.       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    Club foot  Surgery at birth for Club Foot Right foot    Pilonidal cyst  Surgery 40 years ago    H/O colonoscopy    H/O prostate biopsy    Nephrostomy present        REVIEW OF SYSTEMS  Head: denies headaches, dizziness & lightheadedness  Eyes: denies changes in vision, eye pain, double vision & eye discharge  Ears: denies changes in hearing & ear discharge  Nose: denies rhinorrhea  Mouth: denies bleeding gums & sore tongue & sore throat  Neck: denies swollen lymph nodes   Respiratory: denies SOB, cough, sputum production, wheezing  Cardiac: denies CP & irregular heart beat  Abdominal: denies abdominal pain, + in bowel movements  : denies dysuria, frequent urination, hematuria  Musculoskeletal: denies joint pain & muscle pain  Neuro: denies involuntary muscle movements  Psych: no depression, no anxiety     MEDICATIONS  (STANDING):  apixaban 5 milliGRAM(s) Oral two times a day  finasteride 5 milliGRAM(s) Oral daily  furosemide    Tablet 20 milliGRAM(s) Oral daily  meropenem  IVPB 1000 milliGRAM(s) IV Intermittent every 8 hours  metoprolol succinate ER 25 milliGRAM(s) Oral daily  multivitamin 1 Tablet(s) Oral daily  pantoprazole    Tablet 40 milliGRAM(s) Oral before breakfast  polyethylene glycol 3350 17 Gram(s) Oral daily  predniSONE   Tablet 10 milliGRAM(s) Oral daily  risperiDONE   Tablet 0.25 milliGRAM(s) Oral at bedtime  senna 1 Tablet(s) Oral at bedtime  tamsulosin 0.4 milliGRAM(s) Oral at bedtime    MEDICATIONS  (PRN):  acetaminophen     Tablet .. 650 milliGRAM(s) Oral every 6 hours PRN Mild Pain (1 - 3)  aluminum hydroxide/magnesium hydroxide/simethicone Suspension 30 milliLiter(s) Oral every 4 hours PRN Dyspepsia  melatonin 3 milliGRAM(s) Oral at bedtime PRN Insomnia  ondansetron Injectable 4 milliGRAM(s) IV Push every 8 hours PRN Nausea and/or Vomiting      Allergies  ofloxacin (Unknown)  gatifloxacin (Unknown)  Cipro (Unknown)  levofloxacin (Unknown)    Intolerances  Avelox (Other)  telithromycin (Other)  fluoroquinolone antibiotics (Other)  Ketek (Other)      FAMILY HISTORY:  Family history of stroke (Father)  Father -  age 62    Family history of kidney disease (Mother)  Mother -  age 67    Family history of diabetes mellitus type II (Sibling)  Brother & Sister        Vital Signs Last 24 Hrs  T(C): 36.7 (2025 17:40), Max: 36.9 (2025 16:21)  T(F): 98.1 (2025 17:40), Max: 98.4 (2025 16:21)  HR: 83 (2025 17:40) (76 - 95)  BP: 101/63 (2025 17:40) (101/63 - 135/85)  BP(mean): --  RR: 17 (2025 17:40) (16 - 18)  SpO2: 97% (2025 17:40) (97% - 98%)    Parameters below as of 2025 17:40  Patient On (Oxygen Delivery Method): room air        PHYSICAL EXAM:  Constitutional: AAOx3, no acute distress  HEENT: NCAT, airway patent  Cardiovascular: RRR, pulses present bilaterally  Respiratory: nonlabored breathing  Gastrointestinal: abdomen soft, nontender, non distended, no rebound or guarding  Neuro: no focal deficits  Extremities: non edematous, no calf pain bilaterally   : guerrero in place with clear yellow drainage, nephrostomy in place with light yellow output     LABS:                        13.3   14.58 )-----------( 396      ( 2025 10:45 )             41.9     01-03    140  |  106  |  14  ----------------------------<  125[H]  4.1   |  25  |  0.87    Ca    9.7      2025 10:45    TPro  6.9  /  Alb  2.8[L]  /  TBili  <0.1[L]  /  DBili  x   /  AST  25  /  ALT  27  /  AlkPhos  69  01-03    PT/INR - ( 2025 10:45 )   PT: 13.0 sec;   INR: 1.11 ratio         PTT - ( 2025 10:45 )  PTT:34.6 sec  Urinalysis Basic - ( 2025 10:45 )    Color: x / Appearance: x / SG: x / pH: x  Gluc: 125 mg/dL / Ketone: x  / Bili: x / Urobili: x   Blood: x / Protein: x / Nitrite: x   Leuk Esterase: x / RBC: x / WBC x   Sq Epi: x / Non Sq Epi: x / Bacteria: x

## 2025-01-03 NOTE — H&P ADULT - HISTORY OF PRESENT ILLNESS
80 yo M with PMHx HTN, CHF, PE (on eliquis), Myasthenia Gravis, and chronic LE edema, nephrolithiasis (s/p nephrostomy tube placement ), urosepsis, BPH BIBEMS from [facility] for hypertension and elevated WBC count. Per paperwork, nursing home paperwork, pt hypertensive to 154/105 and recieved metoprolol 25 mg (8:10 PM), and reported pt with "evelated WBC". Upon arrival, pt found to be hypotensive to 90/64. Arrived to ED with nephrostomy tube, chronic guerrero and PICC line in place. Pt was recieving IV zosyn x7d (start date 11/04) per paperwork review for UTI. Pt with no active complaints, denies fever, chills, chest pain, SOB, abdominal pain, n/v/d/c or dysuria.    Of note, pt recently admitted to [facility] in Sept 2024 and urgently transferred to [facility] for emergent nephrostomy tube placement after failed stent placement for 7mm kidney stone. Managed in ICU for septic shock/urosepsis and Klebsiella Bacteremia, treated with IV rocephin and vasopressors. Joesph Gaxiola is an 78 yo M with PMHx HTN, CHF, PE (on eliquis), Myasthenia Gravis, and chronic LE edema, nephrolithiasis (s/p nephrostomy tube placement), urosepsis, BPH presented to the ED 2/2 urology referral for admission for IV antibiotics prior to procedure on 1/6/25. Patient is poor historian, unsure of the nature of hospitalization. But denies denies fever, chills, chest pain, SOB, abdominal pain, n/v/d/c or dysuria, or any other complaints.     Of note, pt recently admitted to [facility] 11/20/24 with urosepsis, required nephrostomy tube exchange 11/29/24, was scheduled for a F/U urology evaluation scheduled for cystoscopy- TURP, bladder cysto litholapaxy- R ureteroscopy on 01/06/24. Previously, was also admitted to [facility] in 9/2024 and urgently transferred to [facility] for emergent nephrostomy tube placement after failed stent placement for 7mm kidney stone. Managed in ICU for septic shock/urosepsis and Klebsiella Bacteremia, treated with IV rocephin and vasopressors.     Per Nursing home paperwork, vital signs before transfer:  ____________. Arrived to ED with nephrostomy tube, chronic guerrero and PICC line in place. Pt was recieving ABX___ (start date ______) per paperwork review.       ED Course:   Vitals: BP: , HR: , Temp: , RR: , SpO2: % on   Labs:    ABG: pH: , PO2: , PCO2: , HCO3: , SpO2: %  UA:   CXR: as per personal read, official read pending   CT:  EKG:   Received in the ED    Joesph Gaxiola is an 78 yo M with PMHx HTN, CHF, PE (on eliquis), Myasthenia Gravis, and chronic LE edema, nephrolithiasis (s/p nephrostomy tube placement), urosepsis, BPH presented to the ED 2/2 urology referral for an admission for IV antibiotics prior to procedure on 1/6/25. Patient is poor historian, unsure of the nature of hospitalization. But denies denies fever, chills, chest pain, SOB, abdominal pain, n/v/d/c or dysuria, or any other complaints.     Of note, pt recently admitted to [facility] 11/20/24 with urosepsis, required nephrostomy tube exchange 11/29/24, was scheduled for a F/U urology evaluation scheduled for cystoscopy- TURP, bladder cysto litholapaxy- R ureteroscopy on 01/06/24. Previously, was also admitted to [facility] in 9/2024 and urgently transferred to [facility] for emergent nephrostomy tube placement after failed stent placement for 7mm kidney stone. Managed in ICU for septic shock/urosepsis and Klebsiella Bacteremia, treated with IV rocephin and vasopressors.     Per Nursing home paperwork, vital signs before transfer:  ____________. Arrived to ED with nephrostomy tube, chronic guerrero and PICC line in place. Pt was recieving ABX___ (start date ______) per paperwork review.       ED Course:   Vitals: T 97.5, HR 95, /83, RR 18 with SpO2 of 98% on RA   Labs: WBC 14.58, Lac 3.4   UA pending  CXR: Scarring with small granuloma in the right midlung again noted. Small hilar calcified lymph nodes again seen.  EKG: NSR @ 92  Received in the ED:  2L NS IV bolus,    Joesph Gaxiola is an 78 yo M with PMHx HTN, CHF, PE (on eliquis), Myasthenia Gravis, and chronic LE edema, nephrolithiasis (s/p nephrostomy tube placement), urosepsis, BPH presented to the ED 2/2 urology referral for an admission for IV antibiotics prior to procedure on 1/6/25. Patient is poor historian, unsure of the nature of hospitalization. But denies denies fever, chills, chest pain, SOB, abdominal pain, n/v/d/c or dysuria, or any other complaints.     Of note, pt recently admitted to [facility] 11/20/24 with urosepsis, required nephrostomy tube exchange 11/29/24, was scheduled for a F/U urology evaluation scheduled for cystoscopy- TURP, bladder cysto litholapaxy- R ureteroscopy on 01/06/24. Previously, was also admitted to [facility] in 9/2024 and urgently transferred to [facility] for emergent nephrostomy tube placement after failed stent placement for 7mm kidney stone. Managed in ICU for septic shock/urosepsis and Klebsiella Bacteremia, treated with IV rocephin and vasopressors.     Per Nursing home paperwork, pt was stable for a transfer with an RCRI of 6. Arrived to ED with nephrostomy tube, chronic guerrero and PICC line in place. Pt was completed  a 7 days course of Zosyn IV for UTI 2/2 Klebsiella and pseudomonas per paperwork review.       ED Course:   Vitals: T 97.5, HR 95, /83, RR 18 with SpO2 of 98% on RA   Labs: WBC 14.58, Lac 3.4   UA pending  CXR: Scarring with small granuloma in the right midlung again noted. Small hilar calcified lymph nodes again seen.  EKG: NSR @ 92  Received in the ED:  2L NS IV bolus,    Joesph Gaxiola is an 80 yo M with PMHx HTN, CHF, PE (on eliquis), Myasthenia Gravis, and chronic LE edema, nephrolithiasis (s/p nephrostomy tube placement), urosepsis, BPH presented to the ED 2/2 urology referral for an admission for IV antibiotics prior to procedure on 1/6/25. Patient is poor historian, unsure of the nature of hospitalization. But denies denies fever, chills, chest pain, SOB, abdominal pain, n/v/d/c or dysuria, or any other complaints.     Of note, pt recently admitted to [facility] 11/20/24 with urosepsis, required nephrostomy tube exchange 11/29/24, was scheduled for a F/U urology evaluation scheduled for cystoscopy- TURP, bladder cysto litholapaxy- R ureteroscopy on 01/06/24. Previously, was also admitted to [facility] in 9/2024 and urgently transferred to [facility] for emergent nephrostomy tube placement after failed stent placement for 7mm kidney stone. Managed in ICU for septic shock/urosepsis and Klebsiella Bacteremia, treated with IV rocephin and vasopressors.     Per Nursing home paperwork, pt was stable for a transfer with an RCRI class II risk - 6%. Arrived to ED with nephrostomy tube, chronic guerrero and PICC line in place. Pt was completed  a 7 days course of Zosyn IV for UTI 2/2 proteus and pseudomonas per paperwork review.       ED Course:   Vitals: T 97.5, HR 95, /83, RR 18 with SpO2 of 98% on RA   Labs: WBC 14.58, Lac 3.4   UA pending  CXR: Scarring with small granuloma in the right midlung again noted. Small hilar calcified lymph nodes again seen.  EKG: NSR @ 92  Received in the ED:  2L NS IV bolus,

## 2025-01-03 NOTE — ED ADULT NURSE NOTE - CHIEF COMPLAINT QUOTE
Sent from [facility] by Dr. zimmer for Surgical meds before surgery on his ankle scheduled on jauary 6,2025

## 2025-01-03 NOTE — ED ADULT TRIAGE NOTE - CHIEF COMPLAINT QUOTE
Sent from [facility] by Dr. izmmer for Surgical meds before surgery on his ankle scheduled on jauary 6,2025

## 2025-01-03 NOTE — H&P ADULT - NSHPREVIEWOFSYSTEMS_GEN_ALL_CORE
CONSTITUTIONAL: denies fever, chills, fatigue, weakness  HEENT: denies  sore throat  SKIN: denies rash  CARDIOVASCULAR: denies chest pain, chest pressure, palpitations  RESPIRATORY: denies shortness of breath, cough  GASTROINTESTINAL: denies nausea, vomiting, diarrhea, abdominal pain  GENITOURINARY: denies dysuria  NEUROLOGICAL: denies numbness, headache, focal weakness  MUSCULOSKELETAL: denies muscle aches  HEMATOLOGIC: denies gross bleeding  LYMPHATICS: denies enlarged lymph nodes  PSYCHIATRIC: denies recent changes in anxiety, depression  ENDOCRINOLOGIC: denies sweating, cold or heat intolerance CONSTITUTIONAL: denies fever, chills, fatigue, weakness  HEENT: denies  sore throat  SKIN: denies rash  CARDIOVASCULAR: denies chest pain, chest pressure, palpitations  RESPIRATORY: denies shortness of breath, cough  GASTROINTESTINAL: denies nausea, vomiting, diarrhea, abdominal pain, wears diaper  GENITOURINARY: no hematuria; normal urinary frequency; Restrepo cath, Right nephrotomy  NEUROLOGICAL: denies numbness, headache, focal weakness  MUSCULOSKELETAL: denies muscle aches  HEMATOLOGIC: denies gross bleeding  LYMPHATICS: denies enlarged lymph nodes  PSYCHIATRIC: denies recent changes in anxiety, depression  ENDOCRINOLOGIC: denies sweating, cold or heat intolerance CONSTITUTIONAL: denies fever, chills, fatigue, weakness  HEENT: denies  sore throat  SKIN: denies rash  CARDIOVASCULAR: denies chest pain, chest pressure, palpitations  RESPIRATORY: denies shortness of breath, cough  GASTROINTESTINAL: denies nausea, vomiting, diarrhea, abdominal pain, wears diaper  GENITOURINARY: no hematuria; normal urinary frequency; Restrepo cath, Right nephrotomy  NEUROLOGICAL: denies numbness, headache, focal weakness  MUSCULOSKELETAL: denies muscle aches. + unbale to ambulate and bed-dound 2/2 leg atrophy  SKIN: +decubitus ulcer  HEMATOLOGIC: denies gross bleeding  LYMPHATICS: denies enlarged lymph nodes  PSYCHIATRIC: denies recent changes in anxiety, depression  ENDOCRINOLOGIC: denies sweating, cold or heat intolerance

## 2025-01-03 NOTE — ED PROVIDER NOTE - CLINICAL SUMMARY MEDICAL DECISION MAKING FREE TEXT BOX
Patient referred by urology for admission for IV antibiotics prior to procedure on January 6.  Patient poor historian unsure the exact procedure or why he needs antibiotics beforehand.  Patient denies any pain nausea vomiting fevers or any other complaints.  PMD Jevon    Plan EKG chest x-ray labs IV fluids urology consult  Discussed with Dr. Antoine (attending urologist) he relates patient has resistant infection on his culture needs meropenem

## 2025-01-03 NOTE — PATIENT PROFILE ADULT - FALL HARM RISK - HARM RISK INTERVENTIONS

## 2025-01-03 NOTE — ED PROVIDER NOTE - NSICDXPASTMEDICALHX_GEN_ALL_CORE_FT
PAST MEDICAL HISTORY:  Calculus of kidney     Club foot Born Right Foot    Diabetes Type 2 - does not take medications - monitors Blood Glucose at home - diet controlled    H/O CHF     H/O spinal stenosis     History of thrombocytopenia     Hyperlipidemia     Hypertension     Myasthenia gravis     Other muscle wasting and atrophy     Urinary tract infection notes h/o UTI's

## 2025-01-03 NOTE — CHART NOTE - NSCHARTNOTEFT_GEN_A_CORE
Called by RN as pt with purulent discharge at the penis around the guererro  - Assessed at bedside, denies any fevers, chills, dysuria at this time  - On exam, purulent discharge noted around site of guerrero  - Urine is clear, light yellow in color, draining well   - Per bedside RN, indwelling guerrero was changed in the ED  - Pt presentation was discussed with Dr. Mckinley, will initiate meropenem for now    - RN to call with any changes

## 2025-01-03 NOTE — H&P ADULT - PROBLEM SELECTOR PLAN 1
Chronic  - Pt presented with hypotension s/p dose of metoprolol 25mg PO at CI  - s/p 1000cc NS bolus x2  - Hold home anti-hypertensives in setting of hypotension on arrival and now with refractory hypotension despite IV fluid resuscitation   - c/w IVF @ 60cc/hr, with close monitoring of volume status in setting of CHF  - Monitor routine hemodynamics  - ICU consulted for potential need for pressors, recs appreciated. known for recurrent UTI presented to the ED 2/2 urology referral for IV antibiotics prior to procedure on 1/6/25.  - H/O hosp in 9/2024, required emergent nephrostomy tube placement after failed stent placement for 7mm kidney stone. followed by ICU care for Klebsiella Bacteremia, urosepsis/ septic shock.    - H/O hosp with urosepsis on 11/20/24 required nephrostomy tube exchange 11/29/24,   - scheduled for cystoscopy- TURP, bladder cysto litholapaxy- R ureteroscopy on 01/06/24.   - in ED WBC 14.58, Lac 3.4   - S/P 2L NS bolus   - UA -> UCx pending, f/u results  - BCx x2, f/u results   - ID (Dr. Mckinley) consulted, f/u recs known for recurrent UTI presented to the ED 2/2 urology referral for IV antibiotics prior to procedure on 1/6/25.  - H/O hosp in 9/2024, required emergent nephrostomy tube placement after failed stent placement for 7mm kidney stone. followed by ICU care for Klebsiella Bacteremia, urosepsis/ septic shock.    - H/O hosp with urosepsis on 11/20/24 required nephrostomy tube exchange 11/29/24,   - scheduled for cystoscopy- TURP, bladder cysto litholapaxy- R ureteroscopy on 01/06/24.   - completed 7 day course of IV zosyn in NH before transfer  - in ED WBC 14.58, Lac 3.4   - S/P 2L NS bolus   - monitor off antibiotics per ID recs  - UA -> UCx pending, f/u results  - BCx x2, f/u results   - ID (Dr. Mckinley) consulted, recs appreciated

## 2025-01-03 NOTE — ED PROVIDER NOTE - OBJECTIVE STATEMENT
Patient referred by urology for admission for IV antibiotics prior to procedure on January 6.  Patient poor historian unsure the exact procedure or why he needs antibiotics beforehand.  Patient denies any pain nausea vomiting fevers or any other complaints.  PMD Jevon

## 2025-01-03 NOTE — H&P ADULT - PROBLEM SELECTOR PLAN 3
Lung granuloma.   ·  Plan: CT Chest/Ab/Pelv with IV Con: Evidence of granulomatous infection in lungs, liver and spleen  - Per chart review, pt has been aware of this finding  - Has been seen on prior imaging, evaluated by ID on previous admission (11/2023)  - Quant indeterminate, fungitell negative, aspergillus galactomannan negative at that time  - ID (Dr. Mckinley) consulted, recs appreciated.     ??? Chronic  - Pt presented with hypotension s/p dose of metoprolol 25mg PO at CI  - s/p 1000cc NS bolus x2  - Hold home anti-hypertensives in setting of hypotension on arrival and now with refractory hypotension despite IV fluid resuscitation   - c/w IVF @ 60cc/hr, with close monitoring of volume status in setting of CHF  - Monitor routine hemodynamics  - ICU consulted for potential need for pressors, recs appreciated. Chronic  - on home metoprolol 25mg PO   - continue with hold parameters   - Monitor routine hemodynamics   - DASH diet

## 2025-01-03 NOTE — ED PROVIDER NOTE - PROGRESS NOTE DETAILS
Discussed with Dr. Antoine (attending urologist) he relates patient has resistant infection on his culture needs meropenem Discussed with Dr. Deluca (attending hospitalist) he will admit patient

## 2025-01-03 NOTE — H&P ADULT - ATTENDING COMMENTS
78 yo M with PMHx HTN, CHF, PE (on eliquis), Myasthenia Gravis, and chronic LE edema, nephrolithiasis (s/p nephrostomy tube placement), urosepsis, BPH admitted for UTI treatment prior to scheduled for cystoscopy- TURP, bladder cysto litholapaxy- R ureteroscopy on 01/06/24.     ID consulted, at this time ID would like to monitor pt off abx, will f/u cultures, monitor WBC and fever curve.    Shubham Deluca, Attending Physician

## 2025-01-03 NOTE — H&P ADULT - NSHPPHYSICALEXAM_GEN_ALL_CORE
VITALS:   T(C): 36.4 (01-03-25 @ 09:08), Max: 36.4 (01-03-25 @ 09:08)  HR: 95 (01-03-25 @ 09:08) (95 - 95)  BP: 126/83 (01-03-25 @ 09:08) (126/83 - 126/83)  RR: 18 (01-03-25 @ 09:08) (18 - 18)  SpO2: 98% (01-03-25 @ 09:08) (98% - 98%)    GENERAL: NAD, appears stated age  HEENT: PERRLA, EOMI,  conjunctiva clear, no nasal discharge, MMM.   NECK: Supple, No JVD.  NERVOUS SYSTEM:  Alert & oriented X3, neurologically intact grossly.  CHEST/LUNG: CTA B/L, diminished @ bases, no rales, rhonchi, or wheezing.  HEART: Normal S1 & S2, no murmurs, or extra sounds.  ABDOMEN: Soft, non-tender, non-distended; bowel sounds present, no palpable masses.   Restrepo cath to leg bag - clear urine, Right nephrostomy yellowish drainage- incision site intact   EXTREMITIES:  No clubbing, cyanosis, or edema, palpable distal pulses B/L.   unable to stand- lower legs atrophy  SKIN: No rashes or lesions.  PSYCH: normal affect & behavior.

## 2025-01-03 NOTE — H&P ADULT - PROBLEM SELECTOR PLAN 5
Chronic  - c/w home eliquis 5mg BID. Lung granuloma.   ·  Plan: CT Chest/Ab/Pelv with IV Con: Evidence of granulomatous infection in lungs, liver and spleen  - Per chart review, pt has been aware of this finding  - Has been seen on prior imaging, evaluated by ID on previous admission (11/2023)  - Quant indeterminate, fungitell negative, aspergillus galactomannan negative at that time  - ID (Dr. Mckinley) consulted, recs appreciated.     ??? Lung granuloma.   CT Chest/Ab/Pelv with IV Con 11/2024: Evidence of granulomatous infection in lungs, liver and spleen. Consistent with previous imaging in 11/2023  - Per chart review, pt has been aware of this finding  - CXR consistent on this admission  - Quant indeterminate, fungitell negative, aspergillus galactomannan negative at that time  - ID (Dr. Mckinley) consulted, f/u recs

## 2025-01-03 NOTE — ED PROVIDER NOTE - GASTROINTESTINAL NEGATIVE STATEMENT, MLM
Chip Perez  10/28/2020    Registered Dietitian Progress Note for Care Coordination    Assessment: Sosa Taylor is a 42 y.o. female. RD referred for Diabetes. RD spoke with patient for initial nutrition assessment on 10/14. RD called to follow up with pt today 10/28. Pt states she received the educational handouts in the mail. RD discussed previous goals with pt. Reviewed the importance of eating balanced meals and snacks consistently throughout the day. Discussed the components of a balanced meal and snack. Pt continues to eat 3 small meals/day with snacks in between if hungry. RD reiterated the importance of making these meals balanced and having each component of the plate complete. Reiterated importance of monitoring BS and taking medicine as directed. Pt states this AM . Pt explains her BS has been dropping lately after she takes her insulin and eats- per pt recently BS 60 and 71. RD reviewed if hypoglycemia (<70): rule of 15-consume 15 g of CHO (rapid acting CHO- 3 to 4 glucose tablets, 4 oz fruit juice/regular soda or candy), wait 15 minutes then recheck blood sugar, if still low then repeat rule. Pt has an appointment with her PCP today 10/28 to discuss hypoglycemia. RD reviewed the importance of monitoring daily sodium intake and reading food labels to help choose lower sodium options. Patient has no nutrition related questions at this time. Barriers to meeting goals: overwhelmed by complexity of regimen and stress    Action:  Discussed the importance of eating balanced meals, monitoring BS, taking medicine and incorporating physical activity to help manage diabetes. Explained the Rule of 15 if hypoglycemia. Reiterated importance of watching daily sodium intake. Pt will focus on making meals and snacks more balanced. Pt will monitor BS and correct appropriately if needed. See assessment note above.        Nutrition Monitoring and Evaluation  Indicator/Goal Criteria Progress   #1 Eat balanced meals consistently throughout the day. #1 Focus on making meals more balanced using the MyPlate [id number]-0/3 plate fruits and/or vegetables, 1/4 plate protein and 1/4 plate starchy carbohydrates with 8 oz glass of low fat milk if desired. #1 Pt continues to eat 3 small meals/day with balanced snacks in between if hungry. #2  Monitor daily sodium intake. Keep sodium from food and beverages to no more than 2000 mg/day. #2  Avoid the salt shaker. Read food labels and choose lower sodium options #2 Pt is monitoring sodium and focusing on choosing lower sodium options. #3  Monitor BS and keep a log. #3 Monitor BS daily and take medicine as directed. #3 Pt states she has been having hypoglycemia episodes. Pt has a PCP appointment today 10/28. Pt will use the rule of 15 to correct appropriately if needed. Plan of Care:  RD encouraged pt to keep working toward goals set. RD will follow up with pt to discuss any questions pt has and check the progress toward goals. Follow Up:    RD will call pt in 2 weeks to follow up and answer any nutrition related questions at that time.      [address] [facility], [address] [facility]  no abdominal pain, no nausea and no vomiting.

## 2025-01-04 LAB
A1C WITH ESTIMATED AVERAGE GLUCOSE RESULT: 5.9 % — HIGH (ref 4–5.6)
ALBUMIN SERPL ELPH-MCNC: 2.7 G/DL — LOW (ref 3.3–5)
ALP SERPL-CCNC: 66 U/L — SIGNIFICANT CHANGE UP (ref 40–120)
ALT FLD-CCNC: 23 U/L — SIGNIFICANT CHANGE UP (ref 12–78)
ANION GAP SERPL CALC-SCNC: 4 MMOL/L — LOW (ref 5–17)
AST SERPL-CCNC: 23 U/L — SIGNIFICANT CHANGE UP (ref 15–37)
BASOPHILS # BLD AUTO: 0.07 K/UL — SIGNIFICANT CHANGE UP (ref 0–0.2)
BASOPHILS NFR BLD AUTO: 0.5 % — SIGNIFICANT CHANGE UP (ref 0–2)
BILIRUB SERPL-MCNC: 0.4 MG/DL — SIGNIFICANT CHANGE UP (ref 0.2–1.2)
BUN SERPL-MCNC: 12 MG/DL — SIGNIFICANT CHANGE UP (ref 7–23)
CALCIUM SERPL-MCNC: 9.2 MG/DL — SIGNIFICANT CHANGE UP (ref 8.5–10.1)
CHLORIDE SERPL-SCNC: 108 MMOL/L — SIGNIFICANT CHANGE UP (ref 96–108)
CHOLEST SERPL-MCNC: 161 MG/DL — SIGNIFICANT CHANGE UP
CO2 SERPL-SCNC: 27 MMOL/L — SIGNIFICANT CHANGE UP (ref 22–31)
CREAT SERPL-MCNC: 0.85 MG/DL — SIGNIFICANT CHANGE UP (ref 0.5–1.3)
EGFR: 87 ML/MIN/1.73M2 — SIGNIFICANT CHANGE UP
EOSINOPHIL # BLD AUTO: 0.49 K/UL — SIGNIFICANT CHANGE UP (ref 0–0.5)
EOSINOPHIL NFR BLD AUTO: 3.6 % — SIGNIFICANT CHANGE UP (ref 0–6)
ESTIMATED AVERAGE GLUCOSE: 123 MG/DL — HIGH (ref 68–114)
GLUCOSE SERPL-MCNC: 101 MG/DL — HIGH (ref 70–99)
HCT VFR BLD CALC: 39.1 % — SIGNIFICANT CHANGE UP (ref 39–50)
HDLC SERPL-MCNC: 56 MG/DL — SIGNIFICANT CHANGE UP
HGB BLD-MCNC: 12.7 G/DL — LOW (ref 13–17)
IMM GRANULOCYTES NFR BLD AUTO: 1 % — HIGH (ref 0–0.9)
LIPID PNL WITH DIRECT LDL SERPL: 87 MG/DL — SIGNIFICANT CHANGE UP
LYMPHOCYTES # BLD AUTO: 1.01 K/UL — SIGNIFICANT CHANGE UP (ref 1–3.3)
LYMPHOCYTES # BLD AUTO: 7.5 % — LOW (ref 13–44)
MCHC RBC-ENTMCNC: 29.7 PG — SIGNIFICANT CHANGE UP (ref 27–34)
MCHC RBC-ENTMCNC: 32.5 G/DL — SIGNIFICANT CHANGE UP (ref 32–36)
MCV RBC AUTO: 91.6 FL — SIGNIFICANT CHANGE UP (ref 80–100)
MONOCYTES # BLD AUTO: 0.95 K/UL — HIGH (ref 0–0.9)
MONOCYTES NFR BLD AUTO: 7 % — SIGNIFICANT CHANGE UP (ref 2–14)
NEUTROPHILS # BLD AUTO: 10.89 K/UL — HIGH (ref 1.8–7.4)
NEUTROPHILS NFR BLD AUTO: 80.4 % — HIGH (ref 43–77)
NON HDL CHOLESTEROL: 105 MG/DL — SIGNIFICANT CHANGE UP
NRBC # BLD: 0 /100 WBCS — SIGNIFICANT CHANGE UP (ref 0–0)
PLATELET # BLD AUTO: 401 K/UL — HIGH (ref 150–400)
POTASSIUM SERPL-MCNC: 4.1 MMOL/L — SIGNIFICANT CHANGE UP (ref 3.5–5.3)
POTASSIUM SERPL-SCNC: 4.1 MMOL/L — SIGNIFICANT CHANGE UP (ref 3.5–5.3)
PROT SERPL-MCNC: 6.5 G/DL — SIGNIFICANT CHANGE UP (ref 6–8.3)
RBC # BLD: 4.27 M/UL — SIGNIFICANT CHANGE UP (ref 4.2–5.8)
RBC # FLD: 16.4 % — HIGH (ref 10.3–14.5)
SODIUM SERPL-SCNC: 139 MMOL/L — SIGNIFICANT CHANGE UP (ref 135–145)
TRIGL SERPL-MCNC: 97 MG/DL — SIGNIFICANT CHANGE UP
WBC # BLD: 13.54 K/UL — HIGH (ref 3.8–10.5)
WBC # FLD AUTO: 13.54 K/UL — HIGH (ref 3.8–10.5)

## 2025-01-04 PROCEDURE — 99233 SBSQ HOSP IP/OBS HIGH 50: CPT | Mod: GC

## 2025-01-04 PROCEDURE — 93010 ELECTROCARDIOGRAM REPORT: CPT

## 2025-01-04 PROCEDURE — 99222 1ST HOSP IP/OBS MODERATE 55: CPT

## 2025-01-04 RX ORDER — RISPERIDONE 0.5 MG/1
1 TABLET ORAL
Refills: 0 | DISCHARGE

## 2025-01-04 RX ADMIN — Medication 1 TABLET(S): at 11:20

## 2025-01-04 RX ADMIN — Medication 25 MILLIGRAM(S): at 05:45

## 2025-01-04 RX ADMIN — Medication 17 GRAM(S): at 11:21

## 2025-01-04 RX ADMIN — MEROPENEM 100 MILLIGRAM(S): 1 INJECTION, POWDER, FOR SOLUTION INTRAVENOUS at 05:45

## 2025-01-04 RX ADMIN — Medication 10 MILLIGRAM(S): at 05:45

## 2025-01-04 RX ADMIN — PANTOPRAZOLE 40 MILLIGRAM(S): 40 TABLET, DELAYED RELEASE ORAL at 05:45

## 2025-01-04 RX ADMIN — Medication 5 MILLIGRAM(S): at 11:20

## 2025-01-04 RX ADMIN — APIXABAN 5 MILLIGRAM(S): 5 TABLET, FILM COATED ORAL at 05:45

## 2025-01-04 RX ADMIN — SENNOSIDES 1 TABLET(S): 8.6 TABLET, FILM COATED ORAL at 21:37

## 2025-01-04 RX ADMIN — TAMSULOSIN HYDROCHLORIDE 0.4 MILLIGRAM(S): 0.4 CAPSULE ORAL at 21:37

## 2025-01-04 RX ADMIN — Medication 20 MILLIGRAM(S): at 05:44

## 2025-01-04 RX ADMIN — RISPERIDONE 0.25 MILLIGRAM(S): 0.5 TABLET ORAL at 21:37

## 2025-01-04 NOTE — CONSULT NOTE ADULT - SUBJECTIVE AND OBJECTIVE BOX
Patient is a 79y old  Male who presents with a chief complaint of UTI (2025 07:53)      HPI:  Joesph Gaxiola is an 80 yo M with PMHx HTN, CHF, PE (on eliquis), Myasthenia Gravis, and chronic LE edema, nephrolithiasis (s/p nephrostomy tube placement), urosepsis, BPH presented to the ED 2/2 urology referral for an admission for IV antibiotics prior to procedure on 25. Patient is poor historian, unsure of the nature of hospitalization. But denies denies fever, chills, chest pain, SOB, abdominal pain, n/v/d/c or dysuria, or any other complaints.     Of note, pt recently admitted to [facility] 24 with urosepsis, required nephrostomy tube exchange 24, was scheduled for a F/U urology evaluation scheduled for cystoscopy- TURP, bladder cysto litholapaxy- R ureteroscopy on 24. Previously, was also admitted to [facility] in 2024 and urgently transferred to [facility] for emergent nephrostomy tube placement after failed stent placement for 7mm kidney stone. Managed in ICU for septic shock/urosepsis and Klebsiella Bacteremia, treated with IV rocephin and vasopressors.     Per Nursing home paperwork, pt was stable for a transfer with an RCRI class II risk - 6%. Arrived to ED with nephrostomy tube, chronic guerrero and PICC line in place. Pt was completed  a 7 days course of Zosyn IV for UTI 2/2 proteus and pseudomonas per paperwork review.       ED Course:   Vitals: T 97.5, HR 95, /83, RR 18 with SpO2 of 98% on RA   Labs: WBC 14.58, Lac 3.4   UA pending  CXR: Scarring with small granuloma in the right midlung again noted. Small hilar calcified lymph nodes again seen.  EKG: NSR @ 92  Received in the ED:  2L NS IV bolus,    (2025 13:33)      PAST MEDICAL & SURGICAL HISTORY:  Calculus of kidney      Club foot  Born Right Foot      Myasthenia gravis      Hypertension      Diabetes  Type 2 - does not take medications - monitors Blood Glucose at home - diet controlled      Urinary tract infection  notes h/o UTI's      Hyperlipidemia      Other muscle wasting and atrophy      H/O spinal stenosis      History of thrombocytopenia      H/O CHF      Elective surgery  4 age 11 @ [facility] - cut under Patella secondary 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      Club foot  Surgery at birth for Club Foot Right foot      Pilonidal cyst  Surgery 40 years ago      H/O colonoscopy      H/O prostate biopsy      Nephrostomy present                MEDICATIONS  (STANDING):  finasteride 5 milliGRAM(s) Oral daily  furosemide    Tablet 20 milliGRAM(s) Oral daily  metoprolol succinate ER 25 milliGRAM(s) Oral daily  multivitamin 1 Tablet(s) Oral daily  pantoprazole    Tablet 40 milliGRAM(s) Oral before breakfast  polyethylene glycol 3350 17 Gram(s) Oral daily  predniSONE   Tablet 10 milliGRAM(s) Oral daily  risperiDONE   Tablet 0.25 milliGRAM(s) Oral at bedtime  senna 1 Tablet(s) Oral at bedtime  tamsulosin 0.4 milliGRAM(s) Oral at bedtime    MEDICATIONS  (PRN):  acetaminophen     Tablet .. 650 milliGRAM(s) Oral every 6 hours PRN Mild Pain (1 - 3)  aluminum hydroxide/magnesium hydroxide/simethicone Suspension 30 milliLiter(s) Oral every 4 hours PRN Dyspepsia  melatonin 3 milliGRAM(s) Oral at bedtime PRN Insomnia  ondansetron Injectable 4 milliGRAM(s) IV Push every 8 hours PRN Nausea and/or Vomiting      FAMILY HISTORY:  Family history of stroke (Father)  Father -  age 62    Family history of kidney disease (Mother)  Mother -  age 67    Family history of diabetes mellitus type II (Sibling)  Brother & Sister      Denies Family history of CAD or early MI      Constitutional: denies fever, chills  HEENT: denies blurry vision, difficulty hearing  Respiratory: denies SOB, FAUSTIN, cough  Cardiovascular: denies CP, palpitations, orthopnea, PND, LE edema  Gastrointestinal: denies nausea, vomiting, abdominal pain  Genitourinary: denies urinary changes  Skin: Denies rashes, itching  Neurologic: denies headache, weakness, dizziness  MSK: denies leg swelling, varicose veins  Hematology/Oncology: denies bleeding, easy bruising  ROS negative except as noted above      SOCIAL HISTORY:    No tobacco, alcohol or recreational drug use    Vital Signs Last 24 Hrs  T(C): 36.5 (2025 11:39), Max: 36.8 (2025 21:18)  T(F): 97.7 (2025 11:39), Max: 98.3 (2025 21:18)  HR: 74 (2025 11:39) (69 - 83)  BP: 126/81 (2025 11:39) (101/63 - 126/81)  BP(mean): --  RR: 18 (2025 11:39) (17 - 18)  SpO2: 95% (2025 11:39) (95% - 97%)    Parameters below as of 2025 11:39  Patient On (Oxygen Delivery Method): room air        Physical Exam:  General: Well developed, well nourished, NAD, guerrero in place  HEENT: moist mucous membranes   Neurology: A&Ox3  Respiratory: CTA B/L, No W/R/R  CV: RRR, +S1/S2, no murmurs, rubs or gallops  Abdominal: Soft, NT, ND  Extremities: No C/C/E, + peripheral pulses  MSK: Normal ROM, no joint erythema or warmth, no joint swelling   Heme: No obvious ecchymosis or petechiae   Skin: warm, dry, normal color        I&O's Detail    2025 07:01  -  2025 07:00  --------------------------------------------------------  IN:  Total IN: 0 mL    OUT:    Indwelling Catheter - Urethral (mL): 950 mL  Total OUT: 950 mL    Total NET: -950 mL      2025 07:01  -  2025 16:24  --------------------------------------------------------  IN:  Total IN: 0 mL    OUT:    Voided (mL): 1800 mL  Total OUT: 1800 mL    Total NET: -1800 mL          LABS:                        12.7   13.54 )-----------( 401      ( 2025 07:18 )             39.1     01-04    139  |  108  |  12  ----------------------------<  101[H]  4.1   |  27  |  0.85    Ca    9.2      2025 07:18    TPro  6.5  /  Alb  2.7[L]  /  TBili  0.4  /  DBili  x   /  AST  23  /  ALT  23  /  AlkPhos  66  -04        PT/INR - ( 2025 10:45 )   PT: 13.0 sec;   INR: 1.11 ratio         PTT - ( 2025 10:45 )  PTT:34.6 sec  Urinalysis Basic - ( 2025 07:18 )    Color: x / Appearance: x / SG: x / pH: x  Gluc: 101 mg/dL / Ketone: x  / Bili: x / Urobili: x   Blood: x / Protein: x / Nitrite: x   Leuk Esterase: x / RBC: x / WBC x   Sq Epi: x / Non Sq Epi: x / Bacteria: x      I&O's Summary    2025 07:01  -  2025 07:00  --------------------------------------------------------  IN: 0 mL / OUT: 950 mL / NET: -950 mL    2025 07:01  -  2025 16:24  --------------------------------------------------------  IN: 0 mL / OUT: 1800 mL / NET: -1800 mL      BNP    RADIOLOGY & ADDITIONAL STUDIES:      ECG:    ECHO  FINDINGS:

## 2025-01-04 NOTE — PHYSICAL THERAPY INITIAL EVALUATION ADULT - PERTINENT HX OF CURRENT PROBLEM, REHAB EVAL
Per H&P: Pt "is an 80 yo M with PMHx HTN, CHF, PE (on eliquis), Myasthenia Gravis, and chronic LE edema, nephrolithiasis (s/p nephrostomy tube placement), urosepsis, BPH presented to the ED 2/2 urology referral for an admission for IV antibiotics prior to procedure on 1/6/25. Patient is poor historian, unsure of the nature of hospitalization. But denies fever, chills, chest pain, SOB, abdominal pain, n/v/d/c or dysuria, or any other complaints.     Of note, pt recently admitted to [facility] 11/20/24 with urosepsis, required nephrostomy tube exchange 11/29/24, was scheduled for a F/U urology evaluation scheduled for cystoscopy- TURP, bladder cysto litholapaxy- R ureteroscopy on 01/06/24. Previously, was also admitted to [facility] in 9/2024 and urgently transferred to [facility] for emergent nephrostomy tube placement after failed stent placement for 7mm kidney stone. Managed in ICU for septic shock/urosepsis and Klebsiella Bacteremia, treated with IV rocephin and vasopressors."

## 2025-01-04 NOTE — PROGRESS NOTE ADULT - PROBLEM SELECTOR PLAN 1
known for recurrent UTI presented to the ED 2/2 urology referral for IV antibiotics prior to procedure on 1/6/25.  - H/O hosp in 9/2024, required emergent nephrostomy tube placement after failed stent placement for 7mm kidney stone. followed by ICU care for Klebsiella Bacteremia, urosepsis/ septic shock.    - H/O hosp with urosepsis on 11/20/24 required nephrostomy tube exchange 11/29/24,   - scheduled for cystoscopy- TURP, bladder cysto litholapaxy- R ureteroscopy on 01/06/24.   - completed 7 day course of IV zosyn in NH before transfer  - in ED WBC 14.58, Lac 3.4   - S/P 2L NS bolus   - monitor off antibiotics per ID recs  - UA -> UCx pending, f/u results  - BCx x2, f/u results   - ID (Dr. Mckinley) consulted, known for recurrent UTI presented to the ED 2/2 urology referral for IV antibiotics prior to procedure on 1/6/25.  - H/O hosp in 9/2024, required emergent nephrostomy tube placement after failed stent placement for 7mm kidney stone. followed by ICU care for Klebsiella Bacteremia, urosepsis/ septic shock.    - H/O hosp with urosepsis on 11/20/24 required nephrostomy tube exchange 11/29/24,   - scheduled for cystoscopy- TURP, bladder cysto litholapaxy- R ureteroscopy on 01/06/25.   - completed 7 day course of IV zosyn in NH before transfer  - in ED WBC 14.58, Lac 3.4   - S/P 2L NS bolus   - monitor off antibiotics per ID recs so dc meropenem   - UA -> UCx pending, f/u results  - BCx x2, f/u results   - ID (Dr. Mckinley) consulted, known for recurrent UTI presented to the ED 2/2 urology referral for IV antibiotics prior to procedure on 1/6/25.  - H/O hosp in 9/2024, required emergent nephrostomy tube placement after failed stent placement for 7mm kidney stone. followed by ICU care for Klebsiella Bacteremia, urosepsis/ septic shock.    - H/O hosp with urosepsis on 11/20/24 required nephrostomy tube exchange 11/29/24,   - scheduled for cystoscopy- TURP, bladder cysto litholapaxy- R ureteroscopy on 01/06/25.   - completed 7 day course of IV zosyn in NH before transfer  - in ED WBC 14.58, Lac 3.4   - S/P 2L NS bolus   - monitor off antibiotics per ID recs   dr kim so dc meropenem   - UA -> UCx pending, f/u results  - BCx x2, f/u results   - ID (Dr. Kim) consulted,

## 2025-01-04 NOTE — PHYSICAL THERAPY INITIAL EVALUATION ADULT - PATIENT PROFILE REVIEW, REHAB EVAL
PT orders received: ambulate with assistance. Consult with RN Art, pt may participate in PT evaluation./yes

## 2025-01-04 NOTE — PHYSICAL THERAPY INITIAL EVALUATION ADULT - ADDITIONAL COMMENTS
Pt is an inconsistent historian. Pt reports he was admitted from [facility] for rehab x "a few months", prior to lived with his son. Pt reports he has been "confined to the bed for several months", states he is not sure if he is participating with PT at rehab. Pt becoming tearful/withdrawn at end of PT evaluation and asking therapist to leave and "leave him alone." Pt is an inconsistent historian. Pt reports he was admitted from [facility] for rehab x "a few months", prior to lived with his son. Pt reports he has been "confined to the bed for several months", and is uable to elaborate further on his POC at rehab. Unable to obtain further information.

## 2025-01-04 NOTE — PROGRESS NOTE ADULT - TIME BILLING
78 yo M with PMHx HTN, CHF, PE (on eliquis), Myasthenia Gravis, and chronic LE edema, nephrolithiasis (s/p nephrostomy tube placement), urosepsis, BPH admitted for UTI treatment prior to scheduled for cystoscopy- TURP, bladder cysto litholapaxy- R ureteroscopy on 01/06/24.,  Chronic guerrero and nephrostomy  follow cult off abx observe as per id dr kim. 78 yo M with PMHx HTN, CHF, PE (on eliquis), Myasthenia Gravis, and chronic LE edema, nephrolithiasis (s/p nephrostomy tube placement), urosepsis, BPH admitted for UTI treatment prior to scheduled for cystoscopy- TURP, bladder cysto litholapaxy- R ureteroscopy on 01/06/25.,  Chronic guerrero and nephrostomy  follow cult off abx observe as per id dr kim.

## 2025-01-04 NOTE — PROGRESS NOTE ADULT - ASSESSMENT
Joesph Gaxiola is an 80 yo M with PMHx HTN, CHF, PE (on eliquis), Myasthenia Gravis, and chronic LE edema, nephrolithiasis (s/p nephrostomy tube placement), urosepsis, BPH admitted for UTI treatment prior to scheduled for cystoscopy- TURP, bladder cysto litholapaxy- R ureteroscopy on 01/06/24.

## 2025-01-04 NOTE — PROGRESS NOTE ADULT - PROBLEM SELECTOR PLAN 2
CT A/P 11/20/24: Percutaneous R nephrostomy tube. No hydronephrosis. Unremarkable L kidney. UB collapsed around guerrero. UB stone x3 measuring up to 1.0 cm.  - scheduled for cystoscopy- TURP, bladder cysto litholapaxy- R ureteroscopy on 01/06/24.   - care plan per Urology CT A/P 11/20/24: Percutaneous R nephrostomy tube. No hydronephrosis. Unremarkable L kidney. UB collapsed around guerrero. UB stone x3 measuring up to 1.0 cm.  - scheduled for cystoscopy- TURP, bladder cysto litholapaxy- R ureteroscopy on 01/06/24.   - care plan per Urology dr solorzano

## 2025-01-04 NOTE — PROGRESS NOTE ADULT - SUBJECTIVE AND OBJECTIVE BOX
Patient is a 79y old  Male who presents with a chief complaint of UTI (2025 19:33)      INTERVAL HPI/OVERNIGHT EVENTS:     T(C): 36.3 (25 @ 05:10), Max: 36.9 (25 @ 16:21)  HR: 69 (25 @ 05:10) (69 - 95)  BP: 126/79 (25 @ 05:10) (101/63 - 135/85)  RR: 18 (25 @ 05:10) (16 - 18)  SpO2: 95% (25 @ 05:10) (95% - 98%)  Wt(kg): --  I&O's Summary    2025 07:01  -  2025 07:00  --------------------------------------------------------  IN: 0 mL / OUT: 950 mL / NET: -950 mL        REVIEW OF SYSTEMS:  CONSTITUTIONAL: No fever, weight loss, or fatigue  EYES: No eye pain, visual disturbances, or discharge  ENMT:  No difficulty hearing, tinnitus, vertigo; No sinus or throat pain  NECK: No pain or stiffness  BREASTS: No pain, no masses  RESPIRATORY: No cough, wheezing, chills or hemoptysis; No shortness of breath  CARDIOVASCULAR: No chest pain, palpitations, dizziness, or leg swelling  GASTROINTESTINAL: No abdominal or epigastric pain. No nausea, vomiting, or hematemesis; No diarrhea or constipation. No melena or hematochezia.  GENITOURINARY: No dysuria, frequency, hematuria, or incontinence  NEUROLOGICAL: No headaches, memory loss, loss of strength, numbness, or tremors  SKIN: No itching, burning, rashes, or lesions   MUSCULOSKELETAL: No joint pain or swelling; No muscle, back, or extremity pain    PHYSICAL EXAM:  GENERAL: NAD, well-groomed, well-developed  HEAD:  Atraumatic, Normocephalic  EYES: EOMI, PERRLA, conjunctiva and sclera clear  ENMT: No tonsillar erythema, exudates, or enlargement; Moist mucous membranes  NECK: Supple, No JVD  NERVOUS SYSTEM:  Alert & Oriented X3; Motor Strength 5/5 B/L upper and lower extremities; DTRs 2+ intact and symmetric  CHEST/LUNG: Clear to percussion bilaterally; No rales, rhonchi, wheezing, or rubs  HEART: Regular rate and rhythm; No murmurs, rubs, or gallops  ABDOMEN: Soft, Nontender, Nondistended; Bowel sounds present  EXTREMITIES:  2+ Peripheral Pulses, No clubbing, cyanosis, or edema  SKIN: No rashes or lesions    MEDICATIONS  (STANDING):  apixaban 5 milliGRAM(s) Oral two times a day  finasteride 5 milliGRAM(s) Oral daily  furosemide    Tablet 20 milliGRAM(s) Oral daily  meropenem  IVPB 1000 milliGRAM(s) IV Intermittent every 8 hours  metoprolol succinate ER 25 milliGRAM(s) Oral daily  multivitamin 1 Tablet(s) Oral daily  pantoprazole    Tablet 40 milliGRAM(s) Oral before breakfast  polyethylene glycol 3350 17 Gram(s) Oral daily  predniSONE   Tablet 10 milliGRAM(s) Oral daily  risperiDONE   Tablet 0.25 milliGRAM(s) Oral at bedtime  senna 1 Tablet(s) Oral at bedtime  tamsulosin 0.4 milliGRAM(s) Oral at bedtime    MEDICATIONS  (PRN):  acetaminophen     Tablet .. 650 milliGRAM(s) Oral every 6 hours PRN Mild Pain (1 - 3)  aluminum hydroxide/magnesium hydroxide/simethicone Suspension 30 milliLiter(s) Oral every 4 hours PRN Dyspepsia  melatonin 3 milliGRAM(s) Oral at bedtime PRN Insomnia  ondansetron Injectable 4 milliGRAM(s) IV Push every 8 hours PRN Nausea and/or Vomiting      LABS:                        13.3   14.58 )-----------( 396      ( 2025 10:45 )             41.9     01-03    140  |  106  |  14  ----------------------------<  125[H]  4.1   |  25  |  0.87    Ca    9.7      2025 10:45    TPro  6.9  /  Alb  2.8[L]  /  TBili  <0.1[L]  /  DBili  x   /  AST  25  /  ALT  27  /  AlkPhos  69  -03    PT/INR - ( 2025 10:45 )   PT: 13.0 sec;   INR: 1.11 ratio         PTT - ( 2025 10:45 )  PTT:34.6 sec  Urinalysis Basic - ( 2025 22:20 )    Color: Yellow / Appearance: Turbid / S.017 / pH: x  Gluc: x / Ketone: Negative mg/dL  / Bili: Negative / Urobili: 0.2 mg/dL   Blood: x / Protein: 100 mg/dL / Nitrite: Positive   Leuk Esterase: Large / RBC: 35 /HPF / WBC Too Numerous to count /HPF   Sq Epi: x / Non Sq Epi: x / Bacteria: Moderate /HPF      CAPILLARY BLOOD GLUCOSE                  RADIOLOGY & ADDITIONAL TESTS:    Imaging Personally Reviewed:       Advance Directives:      Palliative Care:  Appropriate     Patient is a 79y old  Male who presents with a chief complaint of UTI (2025 19:33)    pt seen and examine today awake   , no fever , aware procedure monday , nephrostomy / guerrero +  INTERVAL HPI/OVERNIGHT EVENTS:     T(C): 36.3 (25 @ 05:10), Max: 36.9 (25 @ 16:21)  HR: 69 (25 @ 05:10) (69 - 95)  BP: 126/79 (25 @ 05:10) (101/63 - 135/85)  RR: 18 (25 @ 05:10) (16 - 18)  SpO2: 95% (25 @ 05:10) (95% - 98%)  Wt(kg): --  I&O's Summary    2025 07:01  -  2025 07:00  --------------------------------------------------------  IN: 0 mL / OUT: 950 mL / NET: -950 mL        REVIEW OF SYSTEMS:  CONSTITUTIONAL: No fever, weight loss, or fatigue  EYES: No eye pain, visual disturbances, or discharge  ENMT:  No difficulty hearing, tinnitus, vertigo; No sinus or throat pain  NECK: No pain or stiffness    RESPIRATORY: No cough, wheezing, chills or hemoptysis; No shortness of breath  CARDIOVASCULAR: No chest pain, palpitations, dizziness, or leg swelling  GASTROINTESTINAL: No abdominal or epigastric pain. No nausea, vomiting, ; No diarrhea or constipation.  GENITOURINARY: No dysuria, frequency, hematuria, or incontinence  NEUROLOGICAL: No headaches, memory loss, loss of strength, numbness, or tremors  SKIN: No itching, burning, rashes, or lesions   MUSCULOSKELETAL: No joint pain or swelling; No muscle, back, or extremity pain    PHYSICAL EXAM:  GENERAL: NAD,   HEAD:  Atraumatic, Normocephalic  EYES: EOMI, PERRLA, conjunctiva and sclera clear  ENMT:   Moist mucous membranes  NECK: Supple, No JVD  NERVOUS SYSTEM:  Alert & Oriented X3; Motor Strength 5/5 B/L upper and lower extremities; DTRs 2+ intact and symmetric  CHEST/LUNG:  percussion bilaterally; No rales, rhonchi, wheezing,   HEART: Regular rate and rhythm; No murmur  ABDOMEN: Soft, Nontender, Nondistended; Bowel sounds present  EXTREMITIES:  2+ Peripheral Pulses, No clubbing, cyanosis, or edema  SKIN: No rashes or lesions , nephrostomy +  gu guerrero   MEDICATIONS  (STANDING):  apixaban 5 milliGRAM(s) Oral two times a day  finasteride 5 milliGRAM(s) Oral daily  furosemide    Tablet 20 milliGRAM(s) Oral daily  meropenem  IVPB 1000 milliGRAM(s) IV Intermittent every 8 hours  metoprolol succinate ER 25 milliGRAM(s) Oral daily  multivitamin 1 Tablet(s) Oral daily  pantoprazole    Tablet 40 milliGRAM(s) Oral before breakfast  polyethylene glycol 3350 17 Gram(s) Oral daily  predniSONE   Tablet 10 milliGRAM(s) Oral daily  risperiDONE   Tablet 0.25 milliGRAM(s) Oral at bedtime  senna 1 Tablet(s) Oral at bedtime  tamsulosin 0.4 milliGRAM(s) Oral at bedtime    MEDICATIONS  (PRN):  acetaminophen     Tablet .. 650 milliGRAM(s) Oral every 6 hours PRN Mild Pain (1 - 3)  aluminum hydroxide/magnesium hydroxide/simethicone Suspension 30 milliLiter(s) Oral every 4 hours PRN Dyspepsia  melatonin 3 milliGRAM(s) Oral at bedtime PRN Insomnia  ondansetron Injectable 4 milliGRAM(s) IV Push every 8 hours PRN Nausea and/or Vomiting      LABS:                        13.3   14.58 )-----------( 396      ( 2025 10:45 )             41.9     -    140  |  106  |  14  ----------------------------<  125[H]  4.1   |  25  |  0.87    Ca    9.7      2025 10:45    TPro  6.9  /  Alb  2.8[L]  /  TBili  <0.1[L]  /  DBili  x   /  AST  25  /  ALT  27  /  AlkPhos  69  01-03    PT/INR - ( 2025 10:45 )   PT: 13.0 sec;   INR: 1.11 ratio         PTT - ( 2025 10:45 )  PTT:34.6 sec  Urinalysis Basic - ( 2025 22:20 )    Color: Yellow / Appearance: Turbid / S.017 / pH: x  Gluc: x / Ketone: Negative mg/dL  / Bili: Negative / Urobili: 0.2 mg/dL   Blood: x / Protein: 100 mg/dL / Nitrite: Positive   Leuk Esterase: Large / RBC: 35 /HPF / WBC Too Numerous to count /HPF   Sq Epi: x / Non Sq Epi: x / Bacteria: Moderate /HPF                      RADIOLOGY & ADDITIONAL TESTS:    Imaging Personally Reviewed:   no new test    Advance Directives:  dnr /dni    Palliative Care:  Appropriate

## 2025-01-04 NOTE — CONSULT NOTE ADULT - ASSESSMENT
Joesph Gaxiola is an 80 yo M with PMHx HTN, CHF, PE (on eliquis), Myasthenia Gravis, and chronic LE edema, nephrolithiasis (s/p nephrostomy tube placement), urosepsis, BPH admitted for abx and cystoscopy procedure    #chronic CHF   hx of HFpEF  - Echo from 09/2024: EF 55-60% moderate MR  - repeat TTE  - continue home furosemide 20mg QD  - continue home metoprolol  - Strict I/Os, daily weights    #Ischemia   - No clear evidence of acute ischemia  - Denies CP, SOB  - EKG: NSR  - Continue eliquis for PE  - Continue to monitor for signs or symptoms of ischemia     #Arrhythmia  - EKG: NSR, unchanged from prior EKG  - Monitor and replete lytes, keep K>4, Mg>2.    #HTN  - BP stable currently  - Continue to monitor hemodynamics     #Cardiac clearance  - plan for surgery on Monday OR  - patient is a moderate risk patient for low risk procedure  - would hold eliquis night before and day of procedure. Resume when able  - Patient appears euvolemic  - No hx of AS  - Patient is cleared from cardiac perspective for procedure    - Other cardiovascular workup will depend on clinical course.  - All other workup per primary team.  - Will continue to follow.       Joesph Gaxiola is an 78 yo M with PMHx HTN, CHF, PE (on eliquis), Myasthenia Gravis, and chronic LE edema, nephrolithiasis (s/p nephrostomy tube placement), urosepsis, BPH admitted for abx and cystoscopy procedure    #chronic CHF   hx of HFpEF  - Echo from 09/2024: EF 55-60% moderate MR  - repeat TTE  - continue home furosemide 20mg QD  - continue home metoprolol  - Strict I/Os, daily weights    #Ischemia   - No clear evidence of acute ischemia  - Denies CP, SOB  - EKG: NSR  - hold four doses of eliquis prior to surgery  - Continue to monitor for signs or symptoms of ischemia     #Arrhythmia  - EKG: NSR, unchanged from prior EKG  - Monitor and replete lytes, keep K>4, Mg>2.    #HTN  - BP stable currently  - Continue to monitor hemodynamics     #Cardiac clearance  - plan for surgery on Monday OR  - patient is a moderate risk patient for low risk procedure  - would hold eliquis at least 4 doses prior to  procedure. Resume when able  - Patient appears euvolemic  - No hx of AS  - Patient is cleared from cardiac perspective for procedure    - Other cardiovascular workup will depend on clinical course.  - All other workup per primary team.  - Will continue to follow.       Joesph Gaxiola is an 80 yo M with PMHx HTN, CHF, PE (on eliquis), Myasthenia Gravis, and chronic LE edema, nephrolithiasis (s/p nephrostomy tube placement), urosepsis, BPH admitted for abx and cystoscopy procedure    #chronic CHF   hx of HFpEF  - Echo from 09/2024: EF 55-60% moderate MR  - continue home furosemide 20mg QD  - continue home metoprolol  - Strict I/Os, daily weights    #Ischemia   - No clear evidence of acute ischemia  - Denies CP, SOB  - EKG: NSR  - hold four doses of eliquis prior to surgery  - Continue to monitor for signs or symptoms of ischemia     #Arrhythmia  - EKG: NSR, unchanged from prior EKG  - Monitor and replete lytes, keep K>4, Mg>2.    #HTN  - BP stable currently  - Continue to monitor hemodynamics     #Cardiac clearance  - plan for surgery on Monday OR  - patient is a moderate risk patient for low risk procedure  - would hold eliquis at least 4 doses prior to  procedure. Resume when able  - Patient appears euvolemic  - No hx of AS  - Patient is cleared from cardiac perspective for procedure    - Other cardiovascular workup will depend on clinical course.  - All other workup per primary team.  - Will continue to follow.

## 2025-01-04 NOTE — PHYSICAL THERAPY INITIAL EVALUATION ADULT - RANGE OF MOTION EXAMINATION, REHAB EVAL
b/l shoulder flexion 0-90 degrees actively, otherwise WFL; pt unwilling to participate in LE ROM assessment/bilateral upper extremity ROM was WFL (within functional limits)

## 2025-01-04 NOTE — PHYSICAL THERAPY INITIAL EVALUATION ADULT - LEVEL OF INDEPENDENCE, REHAB EVAL
Pt becoming tearful/withdrawn and asking therapist to leave and "leave him alone."/unable to perform during assessment pt becoming tearful/withdrawn asking therapist to "stop with all of this" and "leave him alone." Pt's wishes respected./unable to perform

## 2025-01-04 NOTE — PROGRESS NOTE ADULT - PROBLEM SELECTOR PLAN 7
Chronic  - c/w home eliquis 5mg BID- will hold Chronic  - c/w home eliquis 5mg BID- will hold for or Chronic  - c/w home eliquis 5mg BID- hold for or monday

## 2025-01-05 LAB
ANION GAP SERPL CALC-SCNC: 6 MMOL/L — SIGNIFICANT CHANGE UP (ref 5–17)
BASOPHILS # BLD AUTO: 0.07 K/UL — SIGNIFICANT CHANGE UP (ref 0–0.2)
BASOPHILS NFR BLD AUTO: 0.5 % — SIGNIFICANT CHANGE UP (ref 0–2)
BUN SERPL-MCNC: 14 MG/DL — SIGNIFICANT CHANGE UP (ref 7–23)
CALCIUM SERPL-MCNC: 9.2 MG/DL — SIGNIFICANT CHANGE UP (ref 8.5–10.1)
CHLORIDE SERPL-SCNC: 108 MMOL/L — SIGNIFICANT CHANGE UP (ref 96–108)
CO2 SERPL-SCNC: 27 MMOL/L — SIGNIFICANT CHANGE UP (ref 22–31)
CREAT SERPL-MCNC: 0.73 MG/DL — SIGNIFICANT CHANGE UP (ref 0.5–1.3)
CULTURE RESULTS: SIGNIFICANT CHANGE UP
EGFR: 91 ML/MIN/1.73M2 — SIGNIFICANT CHANGE UP
EOSINOPHIL # BLD AUTO: 0.86 K/UL — HIGH (ref 0–0.5)
EOSINOPHIL NFR BLD AUTO: 6.4 % — HIGH (ref 0–6)
GLUCOSE SERPL-MCNC: 107 MG/DL — HIGH (ref 70–99)
HCT VFR BLD CALC: 39.6 % — SIGNIFICANT CHANGE UP (ref 39–50)
HGB BLD-MCNC: 12.6 G/DL — LOW (ref 13–17)
IMM GRANULOCYTES NFR BLD AUTO: 1 % — HIGH (ref 0–0.9)
LYMPHOCYTES # BLD AUTO: 18.1 % — SIGNIFICANT CHANGE UP (ref 13–44)
LYMPHOCYTES # BLD AUTO: 2.42 K/UL — SIGNIFICANT CHANGE UP (ref 1–3.3)
MCHC RBC-ENTMCNC: 29.2 PG — SIGNIFICANT CHANGE UP (ref 27–34)
MCHC RBC-ENTMCNC: 31.8 G/DL — LOW (ref 32–36)
MCV RBC AUTO: 91.7 FL — SIGNIFICANT CHANGE UP (ref 80–100)
MONOCYTES # BLD AUTO: 1.43 K/UL — HIGH (ref 0–0.9)
MONOCYTES NFR BLD AUTO: 10.7 % — SIGNIFICANT CHANGE UP (ref 2–14)
NEUTROPHILS # BLD AUTO: 8.42 K/UL — HIGH (ref 1.8–7.4)
NEUTROPHILS NFR BLD AUTO: 63.3 % — SIGNIFICANT CHANGE UP (ref 43–77)
NRBC # BLD: 0 /100 WBCS — SIGNIFICANT CHANGE UP (ref 0–0)
PLATELET # BLD AUTO: 377 K/UL — SIGNIFICANT CHANGE UP (ref 150–400)
POTASSIUM SERPL-MCNC: 3.5 MMOL/L — SIGNIFICANT CHANGE UP (ref 3.5–5.3)
POTASSIUM SERPL-SCNC: 3.5 MMOL/L — SIGNIFICANT CHANGE UP (ref 3.5–5.3)
RBC # BLD: 4.32 M/UL — SIGNIFICANT CHANGE UP (ref 4.2–5.8)
RBC # FLD: 16.4 % — HIGH (ref 10.3–14.5)
SODIUM SERPL-SCNC: 141 MMOL/L — SIGNIFICANT CHANGE UP (ref 135–145)
SPECIMEN SOURCE: SIGNIFICANT CHANGE UP
WBC # BLD: 13.34 K/UL — HIGH (ref 3.8–10.5)
WBC # FLD AUTO: 13.34 K/UL — HIGH (ref 3.8–10.5)

## 2025-01-05 PROCEDURE — 99233 SBSQ HOSP IP/OBS HIGH 50: CPT | Mod: GC

## 2025-01-05 PROCEDURE — 99232 SBSQ HOSP IP/OBS MODERATE 35: CPT

## 2025-01-05 RX ADMIN — Medication 25 MILLIGRAM(S): at 08:58

## 2025-01-05 RX ADMIN — RISPERIDONE 0.25 MILLIGRAM(S): 0.5 TABLET ORAL at 21:15

## 2025-01-05 RX ADMIN — TAMSULOSIN HYDROCHLORIDE 0.4 MILLIGRAM(S): 0.4 CAPSULE ORAL at 21:15

## 2025-01-05 RX ADMIN — Medication 5 MILLIGRAM(S): at 11:38

## 2025-01-05 RX ADMIN — Medication 10 MILLIGRAM(S): at 08:57

## 2025-01-05 RX ADMIN — Medication 20 MILLIGRAM(S): at 08:58

## 2025-01-05 RX ADMIN — Medication 1 TABLET(S): at 11:37

## 2025-01-05 RX ADMIN — SENNOSIDES 1 TABLET(S): 8.6 TABLET, FILM COATED ORAL at 21:15

## 2025-01-05 NOTE — PROGRESS NOTE ADULT - PROBLEM SELECTOR PLAN 10
- c/w home eliquis 5mg BID for hx pe .- will hold for or / discuss uro team- Pre-op Sunday night for procedure Monday morning, 1/7.

## 2025-01-05 NOTE — PROGRESS NOTE ADULT - PROBLEM SELECTOR PLAN 2
CT A/P 11/20/24: Percutaneous R nephrostomy tube. No hydronephrosis. Unremarkable L kidney. UB collapsed around guerrero. UB stone x3 measuring up to 1.0 cm.  - scheduled for cystoscopy- TURP, bladder cysto litholapaxy- R ureteroscopy on 01/06/24.   - care plan per Urology dr solorzano

## 2025-01-05 NOTE — PROGRESS NOTE ADULT - PROBLEM SELECTOR PLAN 1
known for recurrent UTI presented to the ED 2/2 urology referral for IV antibiotics prior to procedure on 1/6/25.  - H/O hosp in 9/2024, required emergent nephrostomy tube placement after failed stent placement for 7mm kidney stone. followed by ICU care for Klebsiella Bacteremia, urosepsis/ septic shock.    - H/O hosp with urosepsis on 11/20/24 required nephrostomy tube exchange 11/29/24,   - scheduled for cystoscopy- TURP, bladder cysto litholapaxy- R ureteroscopy on 01/06/25.   - completed 7 day course of IV zosyn in NH before transfer  - in ED WBC 14.58, Lac 3.4   - S/P 2L NS bolus   - monitor off antibiotics per ID recs   dr kim so dc meropenem   - UA -> UCx pending, f/u results  - BCx x2, f/u results   - ID (Dr. Kim) consulted, known for recurrent UTI presented to the ED 2/2 urology referral for IV antibiotics prior to procedure on 1/6/25.  - H/O hosp in 9/2024, required emergent nephrostomy tube placement after failed stent placement for 7mm kidney stone. followed by ICU care for Klebsiella Bacteremia, urosepsis/ septic shock.    - H/O hosp with urosepsis on 11/20/24 required nephrostomy tube exchange 11/29/24,   - scheduled for cystoscopy- TURP, bladder cysto litholapaxy- R ureteroscopy on 01/06/25.   - completed 7 day course of IV zosyn in NH before transfer  - in ED WBC 14.58, Lac 3.4   - S/P 2L NS bolus - monitor off antibiotics per ID recs   dr kim so dc meropenem   - leucocytosis pt on steroid prednisone   - UA -> UCx pending, f/u results  - BCx x2, f/u results   - ID (Dr. Kim) consulted,

## 2025-01-05 NOTE — PROGRESS NOTE ADULT - SUBJECTIVE AND OBJECTIVE BOX
[facility] -- Janel Jackson Pannella, Patel, Savella Goodger, Cohen  Office # [phone]      Follow Up:    Chf pe htn   Subjective/Observations:   Seen bedside in NAD    REVIEW OF SYSTEMS: All other review of systems is negative unless indicated above    PAST MEDICAL & SURGICAL HISTORY:  Calculus of kidney      Club foot  Born Right Foot      Myasthenia gravis      Hypertension      Diabetes  Type 2 - does not take medications - monitors Blood Glucose at home - diet controlled      Urinary tract infection  notes h/o UTI's      Hyperlipidemia      Other muscle wasting and atrophy      H/O spinal stenosis      History of thrombocytopenia      H/O CHF      Elective surgery   age 11 @ [facility] - cut under Patella secondary 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      Club foot  Surgery at birth for Club Foot Right foot      Pilonidal cyst  Surgery 40 years ago      H/O colonoscopy      H/O prostate biopsy      Nephrostomy present          MEDICATIONS  (STANDING):  finasteride 5 milliGRAM(s) Oral daily  furosemide    Tablet 20 milliGRAM(s) Oral daily  metoprolol succinate ER 25 milliGRAM(s) Oral daily  multivitamin 1 Tablet(s) Oral daily  pantoprazole    Tablet 40 milliGRAM(s) Oral before breakfast  polyethylene glycol 3350 17 Gram(s) Oral daily  predniSONE   Tablet 10 milliGRAM(s) Oral daily  risperiDONE   Tablet 0.25 milliGRAM(s) Oral at bedtime  senna 1 Tablet(s) Oral at bedtime  tamsulosin 0.4 milliGRAM(s) Oral at bedtime    MEDICATIONS  (PRN):  acetaminophen     Tablet .. 650 milliGRAM(s) Oral every 6 hours PRN Mild Pain (1 - 3)  aluminum hydroxide/magnesium hydroxide/simethicone Suspension 30 milliLiter(s) Oral every 4 hours PRN Dyspepsia  melatonin 3 milliGRAM(s) Oral at bedtime PRN Insomnia  ondansetron Injectable 4 milliGRAM(s) IV Push every 8 hours PRN Nausea and/or Vomiting      Allergies    ofloxacin (Unknown)  gatifloxacin (Unknown)  Cipro (Unknown)  levofloxacin (Unknown)    Intolerances    Avelox (Other)  telithromycin (Other)  fluoroquinolone antibiotics (Other)  Ketek (Other)      Vital Signs Last 24 Hrs  T(C): 36.4 (2025 11:26), Max: 36.6 (2025 21:06)  T(F): 97.6 (2025 11:26), Max: 97.8 (2025 21:06)  HR: 66 (:26) (66 - 75)  BP: 110/69 (:26) (110/69 - 131/76)  BP(mean): --  RR: 18 (2025 11:26) (18 - 18)  SpO2: 97% (:) (94% - 97%)    Parameters below as of 2025 11:26  Patient On (Oxygen Delivery Method): room air        I&O's Summary    2025 07:01  -  2025 07:00  --------------------------------------------------------  IN: 0 mL / OUT: 2295 mL / NET: -2295 mL          PHYSICAL EXAM:    Constitutional: NAD, awake and alert, well-developed  HEENT: Moist Mucous Membranes, Anicteric  Pulmonary: Non-labored, breath sounds are clear bilaterally, No wheezing, crackles or rhonchi  Cardiovascular: Regular, S1 and S2 nl, murmur   Gastrointestinal: Bowel Sounds present, soft, nontender.   Lymph: No lymphadenopathy. No peripheral edema.  Skin: No visible rashes or ulcers.      LABS: All Labs Reviewed:                        12.6   13.34 )-----------( 377      ( 2025 07:55 )             39.6                         12.7   13.54 )-----------( 401      ( 2025 07:18 )             39.1                         13.3   14.58 )-----------( 396      ( 2025 10:45 )             41.9     2025 07:55    141    |  108    |  14     ----------------------------<  107    3.5     |  27     |  0.73   2025 07:18    139    |  108    |  12     ----------------------------<  101    4.1     |  27     |  0.85   2025 10:45    140    |  106    |  14     ----------------------------<  125    4.1     |  25     |  0.87     Ca    9.2        2025 07:55  Ca    9.2        2025 07:18  Ca    9.7        2025 10:45    TPro  6.5    /  Alb  2.7    /  TBili  0.4    /  DBili  x      /  AST  23     /  ALT  23     /  AlkPhos  66     2025 07:18  TPro  6.9    /  Alb  2.8    /  TBili  <0.1   /  DBili  x      /  AST  25     /  ALT  27     /  AlkPhos  69     2025 10:45             EC Lead ECG:   Ventricular Rate 92 BPM    Atrial Rate 92 BPM    P-R Interval 150 ms    QRS Duration 112 ms    Q-T Interval 372 ms    QTC Calculation(Bazett) 460 ms    P Axis 60 degrees    R Axis -50 degrees    T Axis 89 degrees    Diagnosis Line Normal sinus rhythm  Left anterior fascicular block  Abnormal ECG  When compared with ECG of 2024 15:24,  fusion complexes are no longer present  T wave inversion no longer evident in Anterior leads  Confirmed by BRENDAN GONZALEZ ([id number]) on 1/3/2025 9:49:42 PM (25 @ 10:38)      TRANSTHORACIC ECHOCARDIOGRAM REPORT  ________________________________________________________________________________                                      _______       Pt. Name:       DEION HEATH Study Date:    2024  MRN:            [id number]       YOB: 1945  Accession #:    [id number]      Age:           79 years  Account#:       [id number]     Gender:        M  Heart Rate:                    Height:        65.75 in (167.00 cm)  Rhythm:                        Weight:   199.00 lb (90.27 kg)  Blood Pressure: 103/58 mmHg    BSA/BMI:       1.99 m² / 32.37 kg/m²  ________________________________________________________________________________________  Referring Physician:    [id number] Curly Byrnes  Interpreting Physician: Lyndsay Marin MD  Primary Sonographer:    Ashli Arana MARCELINO    CPT:                ECHO TTE WO CON COMP W DOPP - 11483.m  Indication(s):      Abnormal electrocardiogram ECG/EKG - R94.31  Procedure:          Transthoracic echocardiogram with 2-D, M-mode and complete                      spectral and color flow Doppler.  Ordering Location:  ICU1  Admission Status:   Inpatient  Contrast Injection: Verbal consent was obtained for injection of Ultrasonic                      Enhancing Agent following a discussion of risks and                      benefits.                      Endocardial visualization enhanced with Definity Ultrasound                      enhancing agent.  Agitated Saline:    Injection with agitated saline was performed toevaluate for                      intracardiac shunting.  Study Information:  Image quality for this study is technically difficult.    _______________________________________________________________________________________     CONCLUSIONS:      1. Technically difficult image quality.   2. Agitated saline injection was negative for intracardiac shunt (though there is poor visualization of the LV during injection of agitated saline)   3. Despite definity use, the LV endocardium is still not well visualized.   4. In certain views, the LVEF appears grossly preserved though the apex appears hypokinetic.    ________________________________________________________________________________________  FINDINGS:     Interatrial Septum:  Agitated saline injection was negative for intracardiac shunt.  ________________________________________________________________________________________  Electronically signed on 2024 at 2:37:09 PM by Lyndsay Marin MD      *** Final ***      Radiology:

## 2025-01-05 NOTE — PROGRESS NOTE ADULT - SUBJECTIVE AND OBJECTIVE BOX
S: Patient seen and examined at bedside.  No acute overnight events.  Patient reports no new complaints at this time. Patient with guerrero and nephrostomy tubes. Aware of TURP procedure on Monday, 1/7. Patient denies any fever, chills, chest pain, shortness of breath, nausea, vomiting, or urinary complaints.    MEDICATIONS:  acetaminophen     Tablet .. 650 milliGRAM(s) Oral every 6 hours PRN  aluminum hydroxide/magnesium hydroxide/simethicone Suspension 30 milliLiter(s) Oral every 4 hours PRN  finasteride 5 milliGRAM(s) Oral daily  furosemide    Tablet 20 milliGRAM(s) Oral daily  melatonin 3 milliGRAM(s) Oral at bedtime PRN  metoprolol succinate ER 25 milliGRAM(s) Oral daily  multivitamin 1 Tablet(s) Oral daily  ondansetron Injectable 4 milliGRAM(s) IV Push every 8 hours PRN  pantoprazole    Tablet 40 milliGRAM(s) Oral before breakfast  polyethylene glycol 3350 17 Gram(s) Oral daily  predniSONE   Tablet 10 milliGRAM(s) Oral daily  risperiDONE   Tablet 0.25 milliGRAM(s) Oral at bedtime  senna 1 Tablet(s) Oral at bedtime  tamsulosin 0.4 milliGRAM(s) Oral at bedtime      O:  Vital Signs Last 24 Hrs  T(C): 36.6 (05 Jan 2025 04:45), Max: 36.6 (04 Jan 2025 21:06)  T(F): 97.8 (05 Jan 2025 04:45), Max: 97.8 (04 Jan 2025 21:06)  HR: 73 (05 Jan 2025 04:45) (73 - 75)  BP: 131/76 (05 Jan 2025 04:45) (126/81 - 131/76)  BP(mean): --  RR: 18 (05 Jan 2025 04:45) (18 - 18)  SpO2: 96% (05 Jan 2025 04:45) (94% - 96%)    Parameters below as of 05 Jan 2025 04:45  Patient On (Oxygen Delivery Method): room air        PHYSICAL EXAM:  GENERAL: No acute distress, lying comfortably in bed  HEAD:  Atraumatic, Normocephalic  CHEST/LUNG: Non labored respirations, no accessory muscle use. CTAB; No wheezes, rales, or rhonchi  HEART: Regular rate and rhythm; No murmurs, rubs, or gallops  ABDOMEN: Soft, non-tender, non-distended; bowel sounds+  EXT: calves non-tender b/l, no edema  NEUROLOGY: A&O x 3, no focal deficits  : guerrero in place with clear yellow urine noted, +nephrostomy tubes  I&O SUMMARY:    01-03-25 @ 07:01  -  01-04-25 @ 07:00  --------------------------------------------------------  IN:  Total IN: 0 mL    OUT:    Indwelling Catheter - Urethral (mL): 950 mL  Total OUT: 950 mL    Total NET: -950 mL      01-04-25 @ 07:01  -  01-05-25 @ 05:47  --------------------------------------------------------  IN:  Total IN: 0 mL    OUT:    Indwelling Catheter - Urethral (mL): 450 mL    Nephrostomy Tube (mL): 45 mL    Voided (mL): 1800 mL  Total OUT: 2295 mL    Total NET: -2295 mL          LABS:                        12.7   13.54 )-----------( 401      ( 04 Jan 2025 07:18 )             39.1     01-04    139  |  108  |  12  ----------------------------<  101[H]  4.1   |  27  |  0.85    Ca    9.2      04 Jan 2025 07:18    TPro  6.5  /  Alb  2.7[L]  /  TBili  0.4  /  DBili  x   /  AST  23  /  ALT  23  /  AlkPhos  66  01-04    PT/INR - ( 03 Jan 2025 10:45 )   PT: 13.0 sec;   INR: 1.11 ratio         PTT - ( 03 Jan 2025 10:45 )  PTT:34.6 sec      Lactate, Blood: 1.7 mmol/L (01-03 @ 14:30)  Lactate, Blood: 3.4 mmol/L (01-03 @ 10:45)        RADIOLOGY:    ASSESSMENT: 78 y/o M with PMHx HTN, CHF, PE (on eliquis), Myasthenia Gravis, and chronic LE edema, nephrolithiasis (s/p nephrostomy tube placement), urosepsis, BPH admitted for UTI treatment prior to scheduled procedure for cystoscopy- TURP, bladder cysto litholapaxy- R ureteroscopy on 01/07/24. VSSAF. Labs with downtrending leukocytosis and stable Cr.    PLAN:  - Pre-op Sunday night for procedure Monday morning, 1/7.  - Patient cleared by medicine/cardio  - As per ID will continue to monitor off abx for now, dced Meropenem  - f/u urine and blood cx  - Pain control as needed  - I&Os  - DVT ppx with Eliquis (currently held in anticipation of procedure)    To be discussed with Dr. Antoine    Surgical Team Spectralink: [id number]

## 2025-01-05 NOTE — PROGRESS NOTE ADULT - TIME BILLING
78 yo M with PMHx HTN, CHF, PE (on eliquis), Myasthenia Gravis, and chronic LE edema, nephrolithiasis (s/p nephrostomy tube placement), urosepsis, BPH admitted for UTI treatment prior to scheduled for cystoscopy- TURP, bladder cysto litholapaxy- R ureteroscopy on 01/06/25.,  Chronic guerrero and nephrostomy  follow cult off abx observe as per id dr kim. 80 yo M with PMHx HTN, CHF, PE (on eliquis), Myasthenia Gravis, and chronic LE edema, nephrolithiasis (s/p nephrostomy tube placement), urosepsis, BPH admitted for UTI treatment prior to scheduled for cystoscopy- TURP, bladder cysto litholapaxy-Chronic guerrero and nephrostomy  follow cult off abx observe as per id dr kim.   npo  /mid night  going am  Rt ureteroscopy on 01/06/25  hold full ac -     medically optimize   for  or   moderate risk patient for low risk procedure  this time , cardiac clearance obtained by dr rey   son mr Bah aware or plan for am .

## 2025-01-05 NOTE — PROGRESS NOTE ADULT - ASSESSMENT
80 yo M with PMHx HTN, CHF, PE (on eliquis), Myasthenia Gravis, and chronic LE edema, nephrolithiasis (s/p nephrostomy tube placement), urosepsis, BPH admitted for abx and cystoscopy procedure    Admitted for cystoscopy cardiac optimization   - Echo from 09/2024: EF 55-60% moderate MR  - continue home furosemide 20mg QD  - no sign fluid overload   - Strict I/Os, daily weights    - EKG: NSR    - plan for surgery on Monday OR  - patient is a moderate risk patient for low risk procedure  - hx pe on eliquis would hold eliquis at least 4 doses prior, can transition to iv heparin or fd lovenox   Currently no active cardiac conditions. No signs of ischemia, ADHF, clinical exam not consistent with severe stenotic valvular disease, no unstable arrhythmias noted. Therefore able to proceed with this intermediate risk  without any further cardiac workup. Routine hemodynamic monitoring is suggested during the proce    - Other cardiovascular workup will depend on clinical course.  - All other workup per primary team.  - Will continue to follow.

## 2025-01-05 NOTE — PROGRESS NOTE ADULT - SUBJECTIVE AND OBJECTIVE BOX
[facility]               Fawad Maya MD        [address] 78 [phone] Fax [phone]               Patient Information  Date:8/22/2017  Name : David Serrano 46 y.o.     YOB: 1968         Referred by: Deanna Parkinson MD         Chief Complaint   Patient presents with    Diabetes    Cholesterol Problem       History of Present Illness: David Serrano is a 46 y.o. male here for fu  of  Type 2 Diabetes Mellitus. Type 2 Diabetes was diagnosed in 2016 . End organ effects of diabetes: none. Cardiovascular risk factors: dyslipidemia, diabetes mellitus, male gender   Monitoring frequency:1 /day and readings run < 120  Hypoglycemia: no  Eye exam :  yes     taking one metformin a day     Wt Readings from Last 3 Encounters:   08/21/17 182 lb 3.2 oz (82.6 kg)   05/09/17 172 lb 9.6 oz (78.3 kg)   02/13/17 171 lb (77.6 kg)       BP Readings from Last 3 Encounters:   08/21/17 (!) 133/95   05/09/17 127/83   02/13/17 112/71           Past Medical History:   Diagnosis Date    Allergic rhinitis 2/21/2014    Asthma     Diabetes ([address]     History of seasonal allergies     Spots in front of the eye      Current Outpatient Prescriptions   Medication Sig    gabapentin (NEURONTIN) 300 mg capsule     meloxicam (MOBIC) 15 mg tablet TK 1 T PO QD WF    SOOTHE XP 1-4.5 % drop ophthalmic solution INT ONE GTT IN OU QID PRN    atorvastatin (LIPITOR) 40 mg tablet Take 1 Tab by mouth daily.  benzonatate (TESSALON) 200 mg capsule Take 1 Cap by mouth three (3) times daily as needed for Cough.  fluticasone (FLONASE) 50 mcg/actuation nasal spray USE 2 SPRAYS IN BOTH NOSTRILS EVERY DAY    albuterol (VENTOLIN HFA) 90 mcg/actuation inhaler INHALE 1 TO 2 PUFFS BY MOUTH EVERY 4 HOURS AS NEEDED FOR WHEEZING    albuterol (PROVENTIL VENTOLIN) 2.5 mg /3 mL (0.083 %) nebulizer solution 3 mL by Nebulization route every four (4) hours as needed for Wheezing.  beclomethasone (QVAR) 80 mcg/actuation aero Take 1 Puff by inhalation two (2) times a day.  ibuprofen (MOTRIN) 800 mg tablet Take 1 Tab by mouth every eight (8) hours as needed for Pain.  metFORMIN ER (GLUCOPHAGE XR) 500 mg tablet Take 1 Tab by mouth two (2) times a day. For DM2    traMADol (ULTRAM) 50 mg tablet as needed. No current facility-administered medications for this visit. Allergies   Allergen Reactions    Pcn [Penicillins] Other (comments)     Father told him as child he was allergic to PCN. He does not know if he has every been on a cephalosporin that he has tolerated         Review of Systems:  -   - Cardiovascular: no chest pain ,no palpitations  - Respiratory: no cough no shortness of breath  - Gastrointestinal: no dysphagia no  abdominal pain  - Musculoskeletal: no joint pains no  weakness  - Integumentary: no rashes  - Neurological: no numbness, tingling, no  headaches  - Psychiatric: no depression no  anxiety  - Endocrine: no heat or cold intolerance    Physical Examination:   Blood pressure (!) 133/95, pulse 87, temperature 96.1 °F (35.6 °C), temperature source Oral, resp. rate 18, height 5' 4\" (1.626 m), weight 182 lb 3.2 oz (82.6 kg), SpO2 98 %. Estimated body mass index is 31.27 kg/(m^2) as calculated from the following:    Height as of this encounter: 5' 4\" (1.626 m). -   Weight as of this encounter: 182 lb 3.2 oz (82.6 kg).   - General: pleasant, no distress, good eye contact  - HEENT: no pallor, no periorbital edema, EOMI  - Neck: supple, no thyromegaly, no nodules  - Cardiovascular: regular, normal rate, normal S1 and S2  - Respiratory: clear to auscultation bilaterally  - Gastrointestinal: soft, nontender, nondistended,  BS +  - Musculoskeletal: no proximal muscle weakness in upper or lower extremities  -   - Neurological:alert and oriented  - Psychiatric: normal mood and affect  - Skin: color, texture, turgor normal.       Data Reviewed:     [] Glucose records reviewed. [] See glucose records for details (to be scanned). [] A1C  [] Reviewed labs    Lab Results   Component Value Date/Time    Hemoglobin A1c 6.0 02/17/2017 08:42 AM    Hemoglobin A1c 7.6 09/30/2016 10:10 AM    Hemoglobin A1c, External 7.4 09/23/2016    Glucose 106 02/17/2017 08:42 AM    Glucose (POC) 124 12/31/2013 10:32 PM    Microalb/Creat ratio (ug/mg creat.) 2.1 02/17/2017 08:42 AM    LDL, calculated 128 08/14/2017 09:08 AM    Creatinine (POC) 1.3 12/31/2013 10:32 PM    Creatinine 1.16 02/17/2017 08:42 AM      Lab Results   Component Value Date/Time    Cholesterol, total 189 08/14/2017 09:08 AM    HDL Cholesterol 41 08/14/2017 09:08 AM    LDL, calculated 128 08/14/2017 09:08 AM    Triglyceride 102 08/14/2017 09:08 AM       Lab Results   Component Value Date/Time    ALT (SGPT) 21 08/14/2017 09:08 AM    AST (SGOT) 18 08/14/2017 09:08 AM    Alk. phosphatase 70 08/14/2017 09:08 AM    Bilirubin, direct 0.27 08/14/2017 09:08 AM    Bilirubin, total 1.2 08/14/2017 09:08 AM       Lab Results   Component Value Date/Time    GFR est AA 84 02/17/2017 08:42 AM    GFR est non-AA 73 02/17/2017 08:42 AM    Creatinine (POC) 1.3 12/31/2013 10:32 PM    Creatinine 1.16 02/17/2017 08:42 AM    BUN 15 02/17/2017 08:42 AM    BUN (POC) 13 12/31/2013 10:32 PM    Sodium (POC) 140 12/31/2013 10:32 PM    Sodium 139 02/17/2017 08:42 AM    Potassium 4.8 02/17/2017 08:42 AM    Potassium (POC) 3.2 12/31/2013 10:32 PM    Chloride (POC) 105 12/31/2013 10:32 PM    Chloride 102 02/17/2017 08:42 AM    CO2 21 02/17/2017 08:42 AM        Assessment/Plan:     1. Type 2 diabetes mellitus with hyperglycemia, without long-term current use of insulin ([address]    2. Hypercholesterolemia        1.  Type 2 Diabetes Mellitus with no nephropathy,neuropathy,retinopathy  Lab Results   Component Value Date/Time    Hemoglobin A1c 6.0 02/17/2017 08:42 AM    Hemoglobin A1c (POC) 5.9 08/21/2017 12:33 PM    Hemoglobin A1c, External 7.4 09/23/2016     He is on Metformin   Controlled     Diabetic issues reviewed : glycemic goals ,  low carbohydrate diet, weight control , home glucose monitoring emphasized,      2. Hyperlipidemia : lost weight and TLC    3 HTN     4.Obesity:Body mass index is 31.27 kg/(m^2). Discussed about the importance of exercise and carbohydrate portion control. There are no Patient Instructions on file for this visit. Follow-up Disposition:  Return in about 6 months (around 2/21/2018). Thank you for allowing me to participate in the care of this patient.     Jeanine Blizzard, MD      Patient verbalized understanding Patient is a 79y old  Male who presents with a chief complaint of UTI (05 Jan 2025 05:47)      INTERVAL HPI/OVERNIGHT EVENTS:     T(C): 36.6 (01-05-25 @ 04:45), Max: 36.6 (01-04-25 @ 21:06)  HR: 73 (01-05-25 @ 04:45) (73 - 75)  BP: 131/76 (01-05-25 @ 04:45) (126/81 - 131/76)  RR: 18 (01-05-25 @ 04:45) (18 - 18)  SpO2: 96% (01-05-25 @ 04:45) (94% - 96%)  Wt(kg): --  I&O's Summary    04 Jan 2025 07:01  -  05 Jan 2025 07:00  --------------------------------------------------------  IN: 0 mL / OUT: 2295 mL / NET: -2295 mL        REVIEW OF SYSTEMS:  CONSTITUTIONAL: No fever, weight loss, or fatigue  EYES: No eye pain, visual disturbances, or discharge  ENMT:  No difficulty hearing, tinnitus, vertigo; No sinus or throat pain  NECK: No pain or stiffness  BREASTS: No pain, no masses  RESPIRATORY: No cough, wheezing, chills or hemoptysis; No shortness of breath  CARDIOVASCULAR: No chest pain, palpitations, dizziness, or leg swelling  GASTROINTESTINAL: No abdominal or epigastric pain. No nausea, vomiting, or hematemesis; No diarrhea or constipation. No melena or hematochezia.  GENITOURINARY: No dysuria, frequency, hematuria, or incontinence  NEUROLOGICAL: No headaches, memory loss, loss of strength, numbness, or tremors  SKIN: No itching, burning, rashes, or lesions   MUSCULOSKELETAL: No joint pain or swelling; No muscle, back, or extremity pain    PHYSICAL EXAM:  GENERAL: NAD, well-groomed, well-developed  HEAD:  Atraumatic, Normocephalic  EYES: EOMI, PERRLA, conjunctiva and sclera clear  ENMT: No tonsillar erythema, exudates, or enlargement; Moist mucous membranes  NECK: Supple, No JVD  NERVOUS SYSTEM:  Alert & Oriented X3; Motor Strength 5/5 B/L upper and lower extremities; DTRs 2+ intact and symmetric  CHEST/LUNG: Clear to percussion bilaterally; No rales, rhonchi, wheezing, or rubs  HEART: Regular rate and rhythm; No murmurs, rubs, or gallops  ABDOMEN: Soft, Nontender, Nondistended; Bowel sounds present  EXTREMITIES:  2+ Peripheral Pulses, No clubbing, cyanosis, or edema  SKIN: No rashes or lesions    MEDICATIONS  (STANDING):  finasteride 5 milliGRAM(s) Oral daily  furosemide    Tablet 20 milliGRAM(s) Oral daily  metoprolol succinate ER 25 milliGRAM(s) Oral daily  multivitamin 1 Tablet(s) Oral daily  pantoprazole    Tablet 40 milliGRAM(s) Oral before breakfast  polyethylene glycol 3350 17 Gram(s) Oral daily  predniSONE   Tablet 10 milliGRAM(s) Oral daily  risperiDONE   Tablet 0.25 milliGRAM(s) Oral at bedtime  senna 1 Tablet(s) Oral at bedtime  tamsulosin 0.4 milliGRAM(s) Oral at bedtime    MEDICATIONS  (PRN):  acetaminophen     Tablet .. 650 milliGRAM(s) Oral every 6 hours PRN Mild Pain (1 - 3)  aluminum hydroxide/magnesium hydroxide/simethicone Suspension 30 milliLiter(s) Oral every 4 hours PRN Dyspepsia  melatonin 3 milliGRAM(s) Oral at bedtime PRN Insomnia  ondansetron Injectable 4 milliGRAM(s) IV Push every 8 hours PRN Nausea and/or Vomiting      LABS:                        12.6   13.34 )-----------( 377      ( 05 Jan 2025 07:55 )             39.6     01-05    141  |  108  |  14  ----------------------------<  107[H]  3.5   |  27  |  0.73    Ca    9.2      05 Jan 2025 07:55    TPro  6.5  /  Alb  2.7[L]  /  TBili  0.4  /  DBili  x   /  AST  23  /  ALT  23  /  AlkPhos  66  01-04    PT/INR - ( 03 Jan 2025 10:45 )   PT: 13.0 sec;   INR: 1.11 ratio         PTT - ( 03 Jan 2025 10:45 )  PTT:34.6 sec  Urinalysis Basic - ( 05 Jan 2025 07:55 )    Color: x / Appearance: x / SG: x / pH: x  Gluc: 107 mg/dL / Ketone: x  / Bili: x / Urobili: x   Blood: x / Protein: x / Nitrite: x   Leuk Esterase: x / RBC: x / WBC x   Sq Epi: x / Non Sq Epi: x / Bacteria: x      CAPILLARY BLOOD GLUCOSE          01-03 @ 10:31   No growth at 24 hours  --  --  01-03 @ 10:25   No growth at 24 hours  --  --          RADIOLOGY & ADDITIONAL TESTS:    Imaging Personally Reviewed:       Advance Directives:      Palliative Care:  Appropriate     Patient is a 79y old  Male who presents with a chief complaint of UTI (05 Jan 2025 05:47)    pt seen and examine today alert awake  plan or in am , no fever , npo mid night.   INTERVAL HPI/OVERNIGHT EVENTS:     T(C): 36.6 (01-05-25 @ 04:45), Max: 36.6 (01-04-25 @ 21:06)  HR: 73 (01-05-25 @ 04:45) (73 - 75)  BP: 131/76 (01-05-25 @ 04:45) (126/81 - 131/76)  RR: 18 (01-05-25 @ 04:45) (18 - 18)  SpO2: 96% (01-05-25 @ 04:45) (94% - 96%)  Wt(kg): --  I&O's Summary    04 Jan 2025 07:01  -  05 Jan 2025 07:00  --------------------------------------------------------  IN: 0 mL / OUT: 2295 mL / NET: -2295 mL      REVIEW OF SYSTEMS:  CONSTITUTIONAL: No fever, weight loss, or fatigue  EYES: No eye pain, visual disturbances, or discharge  ENMT:  No difficulty hearing, tinnitus, vertigo; No sinus or throat pain  NECK: No pain or stiffness    RESPIRATORY: No cough, wheezing, chills or hemoptysis; No shortness of breath  CARDIOVASCULAR: No chest pain, palpitations, dizziness, or leg swelling  GASTROINTESTINAL: No abdominal or epigastric pain. No nausea, vomiting, ; No diarrhea or constipation.  GENITOURINARY: No dysuria, frequency, hematuria, or incontinence  NEUROLOGICAL: No headaches, memory loss, loss of strength, numbness, or tremors  SKIN: No itching, burning, rashes, or lesions   MUSCULOSKELETAL: No joint pain or swelling; No muscle, back, or extremity pain    PHYSICAL EXAM:  GENERAL: NAD,   HEAD:  Atraumatic, Normocephalic  EYES: EOMI, PERRLA, conjunctiva and sclera clear  ENMT:   Moist mucous membranes  NECK: Supple, No JVD  NERVOUS SYSTEM:  Alert & Oriented X3; Motor Strength 5/5 B/L upper and lower extremities; DTRs 2+ intact and symmetric  CHEST/LUNG:  percussion bilaterally; No rales, rhonchi, wheezing,   HEART: Regular rate and rhythm; No murmur  ABDOMEN: Soft, Nontender, Nondistended; Bowel sounds present  EXTREMITIES:  2+ Peripheral Pulses, No clubbing, cyanosis, or edema  SKIN: No rashes or lesions , nephrostomy +  gu guerrero       MEDICATIONS  (STANDING):  finasteride 5 milliGRAM(s) Oral daily  furosemide    Tablet 20 milliGRAM(s) Oral daily  metoprolol succinate ER 25 milliGRAM(s) Oral daily  multivitamin 1 Tablet(s) Oral daily  pantoprazole    Tablet 40 milliGRAM(s) Oral before breakfast  polyethylene glycol 3350 17 Gram(s) Oral daily  predniSONE   Tablet 10 milliGRAM(s) Oral daily  risperiDONE   Tablet 0.25 milliGRAM(s) Oral at bedtime  senna 1 Tablet(s) Oral at bedtime  tamsulosin 0.4 milliGRAM(s) Oral at bedtime    MEDICATIONS  (PRN):  acetaminophen     Tablet .. 650 milliGRAM(s) Oral every 6 hours PRN Mild Pain (1 - 3)  aluminum hydroxide/magnesium hydroxide/simethicone Suspension 30 milliLiter(s) Oral every 4 hours PRN Dyspepsia  melatonin 3 milliGRAM(s) Oral at bedtime PRN Insomnia  ondansetron Injectable 4 milliGRAM(s) IV Push every 8 hours PRN Nausea and/or Vomiting      LABS:                        12.6   13.34 )-----------( 377      ( 05 Jan 2025 07:55 )             39.6     01-05    141  |  108  |  14  ----------------------------<  107[H]  3.5   |  27  |  0.73    Ca    9.2      05 Jan 2025 07:55    TPro  6.5  /  Alb  2.7[L]  /  TBili  0.4  /  DBili  x   /  AST  23  /  ALT  23  /  AlkPhos  66  01-04    PT/INR - ( 03 Jan 2025 10:45 )   PT: 13.0 sec;   INR: 1.11 ratio         PTT - ( 03 Jan 2025 10:45 )  PTT:34.6 sec  Urinalysis Basic - ( 05 Jan 2025 07:55 )    Color: x / Appearance: x / SG: x / pH: x  Gluc: 107 mg/dL / Ketone: x  / Bili: x / Urobili: x   Blood: x / Protein: x / Nitrite: x   Leuk Esterase: x / RBC: x / WBC x   Sq Epi: x / Non Sq Epi: x / Bacteria: x      CAPILLARY BLOOD GLUCOSE          01-03 @ 10:31   No growth at 24 hours  --  --  01-03 @ 10:25   No growth at 24 hours  --  --          RADIOLOGY & ADDITIONAL TESTS:    Imaging Personally Reviewed:   no new test

## 2025-01-06 DIAGNOSIS — L89.90 PRESSURE ULCER OF UNSPECIFIED SITE, UNSPECIFIED STAGE: ICD-10-CM

## 2025-01-06 DIAGNOSIS — I96 GANGRENE, NOT ELSEWHERE CLASSIFIED: ICD-10-CM

## 2025-01-06 LAB
ANION GAP SERPL CALC-SCNC: 6 MMOL/L — SIGNIFICANT CHANGE UP (ref 5–17)
APTT BLD: 30.5 SEC — SIGNIFICANT CHANGE UP (ref 24.5–35.6)
BASOPHILS # BLD AUTO: 0.07 K/UL — SIGNIFICANT CHANGE UP (ref 0–0.2)
BASOPHILS NFR BLD AUTO: 0.5 % — SIGNIFICANT CHANGE UP (ref 0–2)
BUN SERPL-MCNC: 18 MG/DL — SIGNIFICANT CHANGE UP (ref 7–23)
CALCIUM SERPL-MCNC: 9.5 MG/DL — SIGNIFICANT CHANGE UP (ref 8.5–10.1)
CHLORIDE SERPL-SCNC: 107 MMOL/L — SIGNIFICANT CHANGE UP (ref 96–108)
CO2 SERPL-SCNC: 27 MMOL/L — SIGNIFICANT CHANGE UP (ref 22–31)
CREAT SERPL-MCNC: 0.81 MG/DL — SIGNIFICANT CHANGE UP (ref 0.5–1.3)
CULTURE RESULTS: ABNORMAL
EGFR: 89 ML/MIN/1.73M2 — SIGNIFICANT CHANGE UP
EOSINOPHIL # BLD AUTO: 0.84 K/UL — HIGH (ref 0–0.5)
EOSINOPHIL NFR BLD AUTO: 6 % — SIGNIFICANT CHANGE UP (ref 0–6)
GLUCOSE SERPL-MCNC: 101 MG/DL — HIGH (ref 70–99)
HCT VFR BLD CALC: 39.5 % — SIGNIFICANT CHANGE UP (ref 39–50)
HGB BLD-MCNC: 12.7 G/DL — LOW (ref 13–17)
IMM GRANULOCYTES NFR BLD AUTO: 0.9 % — SIGNIFICANT CHANGE UP (ref 0–0.9)
INR BLD: 1.03 RATIO — SIGNIFICANT CHANGE UP (ref 0.85–1.16)
LYMPHOCYTES # BLD AUTO: 18.3 % — SIGNIFICANT CHANGE UP (ref 13–44)
LYMPHOCYTES # BLD AUTO: 2.57 K/UL — SIGNIFICANT CHANGE UP (ref 1–3.3)
MAGNESIUM SERPL-MCNC: 2.3 MG/DL — SIGNIFICANT CHANGE UP (ref 1.6–2.6)
MCHC RBC-ENTMCNC: 29.3 PG — SIGNIFICANT CHANGE UP (ref 27–34)
MCHC RBC-ENTMCNC: 32.2 G/DL — SIGNIFICANT CHANGE UP (ref 32–36)
MCV RBC AUTO: 91.2 FL — SIGNIFICANT CHANGE UP (ref 80–100)
MONOCYTES # BLD AUTO: 1.46 K/UL — HIGH (ref 0–0.9)
MONOCYTES NFR BLD AUTO: 10.4 % — SIGNIFICANT CHANGE UP (ref 2–14)
NEUTROPHILS # BLD AUTO: 8.97 K/UL — HIGH (ref 1.8–7.4)
NEUTROPHILS NFR BLD AUTO: 63.9 % — SIGNIFICANT CHANGE UP (ref 43–77)
NRBC # BLD: 0 /100 WBCS — SIGNIFICANT CHANGE UP (ref 0–0)
PHOSPHATE SERPL-MCNC: 2.7 MG/DL — SIGNIFICANT CHANGE UP (ref 2.5–4.5)
PLATELET # BLD AUTO: 401 K/UL — HIGH (ref 150–400)
POTASSIUM SERPL-MCNC: 3.1 MMOL/L — LOW (ref 3.5–5.3)
POTASSIUM SERPL-MCNC: 3.9 MMOL/L — SIGNIFICANT CHANGE UP (ref 3.5–5.3)
POTASSIUM SERPL-SCNC: 3.1 MMOL/L — LOW (ref 3.5–5.3)
POTASSIUM SERPL-SCNC: 3.9 MMOL/L — SIGNIFICANT CHANGE UP (ref 3.5–5.3)
PROTHROM AB SERPL-ACNC: 12.2 SEC — SIGNIFICANT CHANGE UP (ref 9.9–13.4)
RBC # BLD: 4.33 M/UL — SIGNIFICANT CHANGE UP (ref 4.2–5.8)
RBC # FLD: 16.2 % — HIGH (ref 10.3–14.5)
SODIUM SERPL-SCNC: 140 MMOL/L — SIGNIFICANT CHANGE UP (ref 135–145)
WBC # BLD: 14.04 K/UL — HIGH (ref 3.8–10.5)
WBC # FLD AUTO: 14.04 K/UL — HIGH (ref 3.8–10.5)

## 2025-01-06 PROCEDURE — 99221 1ST HOSP IP/OBS SF/LOW 40: CPT

## 2025-01-06 PROCEDURE — G0545: CPT

## 2025-01-06 PROCEDURE — 99233 SBSQ HOSP IP/OBS HIGH 50: CPT | Mod: GC

## 2025-01-06 PROCEDURE — 99232 SBSQ HOSP IP/OBS MODERATE 35: CPT

## 2025-01-06 RX ORDER — POTASSIUM CHLORIDE 600 MG/1
10 TABLET, FILM COATED, EXTENDED RELEASE ORAL ONCE
Refills: 0 | Status: COMPLETED | OUTPATIENT
Start: 2025-01-06 | End: 2025-01-06

## 2025-01-06 RX ORDER — ENOXAPARIN SODIUM 60 MG/.6ML
120 INJECTION INTRAVENOUS; SUBCUTANEOUS EVERY 12 HOURS
Refills: 0 | Status: DISCONTINUED | OUTPATIENT
Start: 2025-01-06 | End: 2025-01-08

## 2025-01-06 RX ORDER — SODIUM CHLORIDE, SODIUM GLUCONATE, SODIUM ACETATE, POTASSIUM CHLORIDE AND MAGNESIUM CHLORIDE 30; 37; 368; 526; 502 MG/100ML; MG/100ML; MG/100ML; MG/100ML; MG/100ML
1000 INJECTION, SOLUTION INTRAVENOUS
Refills: 0 | Status: DISCONTINUED | OUTPATIENT
Start: 2025-01-06 | End: 2025-01-06

## 2025-01-06 RX ORDER — MEROPENEM 1 G/20ML
1000 INJECTION, POWDER, FOR SOLUTION INTRAVENOUS EVERY 8 HOURS
Refills: 0 | Status: DISCONTINUED | OUTPATIENT
Start: 2025-01-06 | End: 2025-01-06

## 2025-01-06 RX ORDER — POTASSIUM CHLORIDE 600 MG/1
10 TABLET, FILM COATED, EXTENDED RELEASE ORAL ONCE
Refills: 0 | Status: DISCONTINUED | OUTPATIENT
Start: 2025-01-06 | End: 2025-01-06

## 2025-01-06 RX ORDER — POTASSIUM CHLORIDE 600 MG/1
40 TABLET, FILM COATED, EXTENDED RELEASE ORAL ONCE
Refills: 0 | Status: COMPLETED | OUTPATIENT
Start: 2025-01-06 | End: 2025-01-06

## 2025-01-06 RX ORDER — MEROPENEM 1 G/20ML
1000 INJECTION, POWDER, FOR SOLUTION INTRAVENOUS EVERY 8 HOURS
Refills: 0 | Status: DISCONTINUED | OUTPATIENT
Start: 2025-01-07 | End: 2025-01-08

## 2025-01-06 RX ORDER — FUROSEMIDE 20 MG
20 TABLET ORAL DAILY
Refills: 0 | Status: DISCONTINUED | OUTPATIENT
Start: 2025-01-07 | End: 2025-01-09

## 2025-01-06 RX ADMIN — Medication 10 MILLIGRAM(S): at 06:26

## 2025-01-06 RX ADMIN — POTASSIUM CHLORIDE 40 MILLIEQUIVALENT(S): 600 TABLET, FILM COATED, EXTENDED RELEASE ORAL at 11:33

## 2025-01-06 RX ADMIN — SENNOSIDES 1 TABLET(S): 8.6 TABLET, FILM COATED ORAL at 21:11

## 2025-01-06 RX ADMIN — Medication 25 MILLIGRAM(S): at 06:26

## 2025-01-06 RX ADMIN — POTASSIUM CHLORIDE 100 MILLIEQUIVALENT(S): 600 TABLET, FILM COATED, EXTENDED RELEASE ORAL at 13:29

## 2025-01-06 RX ADMIN — PANTOPRAZOLE 40 MILLIGRAM(S): 40 TABLET, DELAYED RELEASE ORAL at 06:26

## 2025-01-06 RX ADMIN — ACETAMINOPHEN 650 MILLIGRAM(S): 80 SOLUTION/ DROPS ORAL at 03:20

## 2025-01-06 RX ADMIN — POTASSIUM CHLORIDE 100 MILLIEQUIVALENT(S): 600 TABLET, FILM COATED, EXTENDED RELEASE ORAL at 11:34

## 2025-01-06 RX ADMIN — Medication 17 GRAM(S): at 11:34

## 2025-01-06 RX ADMIN — Medication 5 MILLIGRAM(S): at 11:34

## 2025-01-06 RX ADMIN — ACETAMINOPHEN 650 MILLIGRAM(S): 80 SOLUTION/ DROPS ORAL at 04:20

## 2025-01-06 RX ADMIN — Medication 20 MILLIGRAM(S): at 06:26

## 2025-01-06 RX ADMIN — RISPERIDONE 0.25 MILLIGRAM(S): 0.5 TABLET ORAL at 21:11

## 2025-01-06 RX ADMIN — TAMSULOSIN HYDROCHLORIDE 0.4 MILLIGRAM(S): 0.4 CAPSULE ORAL at 21:12

## 2025-01-06 RX ADMIN — ENOXAPARIN SODIUM 120 MILLIGRAM(S): 60 INJECTION INTRAVENOUS; SUBCUTANEOUS at 18:21

## 2025-01-06 RX ADMIN — Medication 1 TABLET(S): at 11:34

## 2025-01-06 NOTE — CONSULT NOTE ADULT - ATTENDING COMMENTS
80 yo M with PMHx HTN, CHF, PE (on eliquis), Myasthenia Gravis, and chronic LE edema, nephrolithiasis (s/p nephrostomy tube placement), urosepsis, BPH admitted for abx and cystoscopy procedure    - pt is a poor historian   - No signs of significant ischemia or volume overload.   - ekg is nonischemic  - Echo from 09/2024: EF 55-60% moderate MR  - continue home furosemide 20mg QD  - continue home metoprolol  - Please continue to maintain strict I/Os, monitor daily weights, Cr, and K.   - Hx of PE  - can transition to FD hep or Lovenox  - hold four doses of eliquis prior to surgery  - BP stable currently  - Currently no active cardiac conditions. No signs of ischemia, ADHF, clinical exam not consistent with severe stenotic valvular disease, no unstable arrhythmias noted. Therefore able to proceed with this intermediate risk  without any further cardiac workup. Routine hemodynamic monitoring is suggested during the procedure.
Agree with above findings and plan

## 2025-01-06 NOTE — PROGRESS NOTE ADULT - TIME BILLING
78 yo M with PMHx HTN, CHF, PE (on eliquis), Myasthenia Gravis, and chronic LE edema, nephrolithiasis (s/p nephrostomy tube placement), urosepsis, BPH admitted for UTI treatment prior to scheduled for cystoscopy- TURP, bladder cysto litholapaxy-Chronic guerrero and nephrostomy  follow cult off abx observe as per id dr kim.   npo  /mid night  going am  Rt ureteroscopy on 01/06/25  hold full ac  prophy iv  abx meropenem  for restarted    today  ucult contamination  -     medically optimize   for  or   moderate risk patient for low risk procedure  this time , cardiac clearance obtained by dr rey   son mr Olvin aware or plan for am   hypokalemia  - replaced /  with krider one add 20 kcl iv fluid  /  ns 65 cc/hr . 80 yo M with PMHx HTN, CHF, PE (on eliquis), Myasthenia Gravis, and chronic LE edema, nephrolithiasis (s/p nephrostomy tube placement), urosepsis, BPH admitted for UTI treatment prior to scheduled for cystoscopy- TURP, bladder cysto litholapaxy-Chronic guerrero and nephrostomy  follow cult off abx observe as per id dr kim.   npo  /mid night  going am  Rt ureteroscopy on 01/06/25  and turp  - was  hold full  Eliquis ac  prophy iv  abx meropenem  48 hr   today started  back   ucult contamination  -     medically optimize   for  or   moderate risk patient for low risk procedure  this time , cardiac clearance obtained by dr rey   hypokalemia  - replaced /  with krider   and po   started full  Lovenox as   pt need 48 hr  coverage of iv abx  prior to or as per urologist  - reschedule or for Thursday   son mr Bah aware or plan   .

## 2025-01-06 NOTE — PROGRESS NOTE ADULT - SUBJECTIVE AND OBJECTIVE BOX
[facility]  INFECTIOUS DISEASES   [address] [place] [address]  Tel: [phone]     Fax: [phone]  ========================================================  MD Nikolas Oquendo Michelle, MD Shah, Kaushal, MD Sunjit, Jaspal, MD Sehrish Shahid, MD   ========================================================    [id number]  DEION HEATH     Follow up: No complaint, no fever, no new overnight event.     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  Club foot  Surgery at birth for Club Foot Right foot  Pilonidal cyst  Surgery 40 years ago  H/O colonoscopy  H/O prostate biopsy  Nephrostomy present    Social Hx: No current smoking, EtOH or drugs     FAMILY HISTORY:  Family history of stroke (Father)  Father -  age 62    Family history of kidney disease (Mother)  Mother -  age 67    Family history of diabetes mellitus type II (Sibling)  Brother & Sister    Allergie  ofloxacin (Unknown)  gatifloxacin (Unknown)  Cipro (Unknown)  levofloxacin (Unknown)    Avelox (Other)  telithromycin (Other)  fluoroquinolone antibiotics (Other)  Ketek (Other)    MEDICATIONS  (STANDING):  meropenem  IVPB 1000 milliGRAM(s) IV Intermittent Once     REVIEW OF SYSTEMS:  As per HPI. He states that he doesn't know why he is here but no pain or any complaint at this time     Physical Exam:  Vital Signs Last 24 Hrs  T(C): 36.6 (2025 11:16), Max: 36.8 (2025 20:42)  T(F): 97.8 (2025 11:16), Max: 98.2 (2025 20:42)  HR: 55 (2025 11:16) (55 - 65)  BP: 135/80 (2025 11:16) (112/69 - 135/80)  BP(mean): --  RR: 18 (2025 11:16) (17 - 18)  SpO2: 94% (2025 11:16) (94% - 94%)  Parameters below as of 2025 11:16  Patient On (Oxygen Delivery Method): room air  GEN: NAD  HEENT: normocephalic and atraumatic. EOMI. PERRL.    NECK: Supple.  No lymphadenopathy   LUNGS: Clear to auscultation.  HEART: Regular rate and rhythm   ABDOMEN: Soft, nontender, and nondistended.   Guerrero in place, no CVA tenderness, R nephrostomy  EXTREMITIES: Without edema. PICC looks fine  PSYCHIATRIC: Awake and alert but not oriented   SKIN: No rash     Labs:      140  |  107  |  18  ----------------------------<  101[H]  3.1[L]   |  27  |  0.81    Ca    9.5      2025 07:30  Phos  2.7       Mg     2.3                             12.7   14.04 )-----------( 401      ( 2025 07:30 )             39.5     PT/INR - ( 2025 07:30 )   PT: 12.2 sec;   INR: 1.03 ratio    PTT - ( 2025 07:30 )  PTT:30.5 sec  Urinalysis Basic - ( 2025 07:30 )    Color: x / Appearance: x / SG: x / pH: x  Gluc: 101 mg/dL / Ketone: x  / Bili: x / Urobili: x   Blood: x / Protein: x / Nitrite: x   Leuk Esterase: x / RBC: x / WBC x   Sq Epi: x / Non Sq Epi: x / Bacteria: x    RECENT CULTURES:   @ 22:20 Clean Catch     >=3 organisms. Probable collection contamination.     @ 10:31 .Blood BLOOD     No growth at 48 Hours     @ 10:25 .Blood BLOOD     No growth at 48 Hours    12-27 @ 16:20 Catheterized Catheterized     Culture grew 3 or more types of organisms which indicate  collection contamination; consider recollection only if clinically  indicated.    All imaging and other data have been reviewed.  < from: Xray Chest 1 View-PORTABLE IMMEDIATE (25 @ 10:47) >  IMPRESSION: Scarring with small granuloma in the right midlung again   noted. Small hilar calcified lymph nodes again seen.      Assessment and Plan:   80 yo man with PMH of HTN, CHF, PE (on eliquis), Myasthenia Gravis, and chronic LE edema, nephrolithiasis (s/p nephrostomy tube placement), urosepsis, BPH was transferred from [facility] for hypertension and elevated WBC.  No complaints, no fever, chills, chest pain, SOB, abdominal or flank pain, n/v/d/c or dysuria.    In last admission had some work up done for calcified granulomas in chest CT. Negative sputum for AFB x3 and negative fungal markers.   Also last time had CR pseudomonas for which completed zerbaxa.   Leukocytosis seems chronic he had numbers around 20 most days.   Since going for  procedure will treat him with ABx prior to surgery due to bacteriuria.     # UTI?  # Chronic guerrero and nephrostomy   # Leukocytosis     - Blood cultures NGTD   - UCx with more than 3 different bacteria could be real or colonization   - Monitor CBC   - Has been scheduled for cystoscopy- TURP, bladder cysto litholapaxy- R ureteroscopy  - Can restart meropenem 48hrs prior to surgery to decrease possible complications      Will follow.  Discussed with urology.     Jordin Mckinley MD  Division of Infectious Diseases   Please call ID service at [phone] with any question.    50 minutes spent on total encounter assessing patient, examination, chart review, counseling and coordinating care by the attending physician/nurse/care manager.

## 2025-01-06 NOTE — CONSULT NOTE ADULT - ASSESSMENT
Hypokalemia  H/o Nephrolithiasis , Chronic guerrero, Rt PCN  h/o MS  Hypertension  UTI  For cystoscopy    Potassium supplementation. Stable renal indices.  follow up. Trend BP and titrate BP meds as needed.   Will follow electrolytes and renal function trend.     Further recommendations pending clinical course. Thank you for the courtesy of this referral.

## 2025-01-06 NOTE — PROGRESS NOTE ADULT - ASSESSMENT
80 yo M with PMHx HTN, CHF, PE (on eliquis), Myasthenia Gravis, and chronic LE edema, nephrolithiasis (s/p nephrostomy tube placement), urosepsis, BPH admitted for abx and cystoscopy procedure    Admitted for cystoscopy cardiac optimization   - Echo from 09/2024: EF 55-60% moderate MR  - continue home furosemide 20mg QD  - no sign fluid overload   - Strict I/Os, daily weights    - EKG: NSR    - plan for surgery on 1/9 OR  - patient is a moderate risk patient for low risk procedure  - hx pe on eliquis would hold eliquis at least 4 doses prior, can transition to iv heparin or fd lovenox   Currently no active cardiac conditions. No signs of ischemia, ADHF, clinical exam not consistent with severe stenotic valvular disease, no unstable arrhythmias noted. Therefore able to proceed with this intermediate risk  without any further cardiac workup. Routine hemodynamic monitoring is suggested during the proce    - Other cardiovascular workup will depend on clinical course.  - All other workup per primary team.  - Will continue to follow.  Art Santana DNP, AGACNP-BC  Cardiology   Call Teams   78 yo M with PMHx HTN, CHF, PE (on eliquis), Myasthenia Gravis, and chronic LE edema, nephrolithiasis (s/p nephrostomy tube placement), urosepsis, BPH admitted for abx and cystoscopy procedure    Admitted for cystoscopy cardiac optimization   - Echo from 09/2024: EF 55-60% moderate MR  - continue home furosemide 20mg QD  - no sign fluid overload   - Strict I/Os, daily weights    - EKG: NSR    - plan for surgery on 1/9 OR  - patient is a moderate risk patient for low risk procedure  - hx pe on eliquis would hold eliquis at least 4 doses prior, can transition to iv heparin or fd lovenox   Currently no active cardiac conditions. No signs of ischemia, ADHF, clinical exam not consistent with severe stenotic valvular disease, no unstable arrhythmias noted. Therefore able to proceed with this intermediate risk  without any further cardiac workup. Routine hemodynamic monitoring is suggested during the procedure    - Other cardiovascular workup will depend on clinical course.  - All other workup per primary team.  - Will continue to follow.  Art Santana DNP, AGACNP-BC  Cardiology   Call Teams

## 2025-01-06 NOTE — PROGRESS NOTE ADULT - SUBJECTIVE AND OBJECTIVE BOX
[facility] -- Janel Jackson Pannella, Patel, Savella, Goodger, Cohen: Office # [phone]    Follow Up:  Chf pe htn    Subjective/Observations: No events overnight resting comfortably in bed.  No complaints of chest pain, dyspnea, or palpitations reported. No signs of orthopnea or PND.     REVIEW OF SYSTEMS: All other review of systems are negative unless indicated above    PAST MEDICAL & SURGICAL HISTORY:  Calculus of kidney      Club foot  Born Right Foot      Myasthenia gravis      Hypertension      Diabetes  Type 2 - does not take medications - monitors Blood Glucose at home - diet controlled      Urinary tract infection  notes h/o UTI's      Hyperlipidemia      Other muscle wasting and atrophy      H/O spinal stenosis      History of thrombocytopenia      H/O CHF      Elective surgery  1956 age 11 @ [facility] - cut under Patella secondary 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      Club foot  Surgery at birth for Club Foot Right foot      Pilonidal cyst  Surgery 40 years ago      H/O colonoscopy      H/O prostate biopsy      Nephrostomy present          MEDICATIONS  (STANDING):  finasteride 5 milliGRAM(s) Oral daily  furosemide    Tablet 20 milliGRAM(s) Oral daily  metoprolol succinate ER 25 milliGRAM(s) Oral daily  multivitamin 1 Tablet(s) Oral daily  pantoprazole    Tablet 40 milliGRAM(s) Oral before breakfast  polyethylene glycol 3350 17 Gram(s) Oral daily  potassium chloride  10 mEq/50 mL IVPB 10 milliEquivalent(s) IV Intermittent once  predniSONE   Tablet 10 milliGRAM(s) Oral daily  risperiDONE   Tablet 0.25 milliGRAM(s) Oral at bedtime  senna 1 Tablet(s) Oral at bedtime  sodium chloride 0.9% with potassium chloride 20 mEq/L 1000 milliLiter(s) (70 mL/Hr) IV Continuous <Continuous>  tamsulosin 0.4 milliGRAM(s) Oral at bedtime    MEDICATIONS  (PRN):  acetaminophen     Tablet .. 650 milliGRAM(s) Oral every 6 hours PRN Mild Pain (1 - 3)  aluminum hydroxide/magnesium hydroxide/simethicone Suspension 30 milliLiter(s) Oral every 4 hours PRN Dyspepsia  melatonin 3 milliGRAM(s) Oral at bedtime PRN Insomnia  ondansetron Injectable 4 milliGRAM(s) IV Push every 8 hours PRN Nausea and/or Vomiting    Allergies    ofloxacin (Unknown)  gatifloxacin (Unknown)  Cipro (Unknown)  levofloxacin (Unknown)    Intolerances    Avelox (Other)  telithromycin (Other)  fluoroquinolone antibiotics (Other)  Ketek (Other)    Vital Signs Last 24 Hrs  T(C): 36.8 (06 Jan 2025 06:30), Max: 36.8 (05 Jan 2025 20:42)  T(F): 98.2 (06 Jan 2025 06:30), Max: 98.2 (05 Jan 2025 20:42)  HR: 62 (06 Jan 2025 06:30) (62 - 66)  BP: 130/78 (06 Jan 2025 06:30) (110/69 - 130/78)  BP(mean): --  RR: 17 (06 Jan 2025 06:30) (17 - 18)  SpO2: 94% (06 Jan 2025 06:30) (94% - 97%)    Parameters below as of 06 Jan 2025 06:30  Patient On (Oxygen Delivery Method): room air      I&O's Summary    05 Jan 2025 07:01  -  06 Jan 2025 07:00  --------------------------------------------------------  IN: 0 mL / OUT: 1861 mL / NET: -1861 mL          TELE: Off tele   PHYSICAL EXAM:  Constitutional: NAD, awake and alert  HEENT: Moist Mucous Membranes, Anicteric  Pulmonary: Non-labored, breath sounds are clear bilaterally, No wheezing, rales or rhonchi  Cardiovascular: Regular, S1 and S2, No murmurs, No rubs, gallops or clicks  Gastrointestinal:  soft, nontender, nondistended   Lymph: No peripheral edema. No lymphadenopathy.   Skin: No visible rashes or ulcers.  Psych:  Mood & affect appropriate      LABS: All Labs Reviewed:                        12.7   14.04 )-----------( 401      ( 06 Jan 2025 07:30 )             39.5                         12.6   13.34 )-----------( 377      ( 05 Jan 2025 07:55 )             39.6                         12.7   13.54 )-----------( 401      ( 04 Jan 2025 07:18 )             39.1     06 Jan 2025 07:30    140    |  107    |  18     ----------------------------<  101    3.1     |  27     |  0.81   05 Jan 2025 07:55    141    |  108    |  14     ----------------------------<  107    3.5     |  27     |  0.73   04 Jan 2025 07:18    139    |  108    |  12     ----------------------------<  101    4.1     |  27     |  0.85     Ca    9.5        06 Jan 2025 07:30  Ca    9.2        05 Jan 2025 07:55  Ca    9.2        04 Jan 2025 07:18  Phos  2.7       06 Jan 2025 07:30  Mg     2.3       06 Jan 2025 07:30    TPro  6.5    /  Alb  2.7    /  TBili  0.4    /  DBili  x      /  AST  23     /  ALT  23     /  AlkPhos  66     04 Jan 2025 07:18  TPro  6.9    /  Alb  2.8    /  TBili  <0.1   /  DBili  x      /  AST  25     /  ALT  27     /  AlkPhos  69     03 Jan 2025 10:45     PT/INR - ( 06 Jan 2025 07:30 )   PT: 12.2 sec;   INR: 1.03 ratio         PTT - ( 06 Jan 2025 07:30 )  PTT:30.5 secCholesterol: 161 mg/dL (01-04-25 @ 07:18)  HDL Cholesterol: 56 mg/dL (01-04-25 @ 07:18)  Triglycerides, Serum: 97 mg/dL (01-04-25 @ 07:18)  Cholesterol: 113 mg/dL (11-20-24 @ 11:36)  HDL Cholesterol: 45 mg/dL (11-20-24 @ 11:36)  Triglycerides, Serum: 61 mg/dL (11-20-24 @ 11:36)  Lactate, Blood: 1.7 mmol/L (01-03-25 @ 14:30)  Lactate, Blood: 3.4 mmol/L (01-03-25 @ 10:45)    12 Lead ECG:   Ventricular Rate 67 BPM    Atrial Rate 67 BPM    P-R Interval 152 ms    QRS Duration 114 ms    Q-T Interval 362 ms    QTC Calculation(Bazett) 382 ms    P Axis 41 degrees    R Axis -52 degrees    T Axis 69 degrees    Diagnosis Line Sinus rhythm withsinus arrhythmia with occasional premature ventricular complexes  Left anterior fascicular block  Nonspecific T wave abnormality  Abnormal ECG  When compared with ECG of 03-JAN-2025 10:38,  premature ventricular complexes are now present  T wave inversion more evident in Lateral leads  QT has shortened  Confirmed by BRENDAN GONZALEZ ([id number]) on 1/5/2025 5:48:12 PM (01-04-25 @ 07:40)      TRANSTHORACIC ECHOCARDIOGRAM REPORT  ________________________________________________________________________________                                      _______       Pt. Name:       DEION HEATH Study Date:    11/20/2024  MRN:            [id number]       YOB: 1945  Accession #:    [id number]      Age:           79 years  Account#:       [id number]     Gender:        M  Heart Rate:                    Height:        65.75 in (167.00 cm)  Rhythm:                        Weight:   199.00 lb (90.27 kg)  Blood Pressure: 103/58 mmHg    BSA/BMI:       1.99 m² / 32.37 kg/m²  ________________________________________________________________________________________  Referring Physician:    [id number] Curly yBrnes  Interpreting Physician: Lyndsay Marin MD  Primary Sonographer:    Ashli Arana RDCS    CPT:                ECHO TTE WO CON COMP W DOPP - 37651.m  Indication(s):      Abnormal electrocardiogram ECG/EKG - R94.31  Procedure:          Transthoracic echocardiogram with 2-D, M-mode and complete                      spectral and color flow Doppler.  Ordering Location:  ICU1  Admission Status:   Inpatient  Contrast Injection: Verbal consent was obtained for injection of Ultrasonic                      Enhancing Agent following a discussion of risks and                      benefits.                      Endocardial visualization enhanced with Definity Ultrasound                      enhancing agent.  Agitated Saline:    Injection with agitated saline was performed toevaluate for                      intracardiac shunting.  Study Information:  Image quality for this study is technically difficult.    _______________________________________________________________________________________     CONCLUSIONS:      1. Technically difficult image quality.   2. Agitated saline injection was negative for intracardiac shunt (though there is poor visualization of the LV during injection of agitated saline)   3. Despite definity use, the LV endocardium is still not well visualized.   4. In certain views, the LVEF appears grossly preserved though the apex appears hypokinetic.    ________________________________________________________________________________________  FINDINGS:     Interatrial Septum:  Agitated saline injection was negative for intracardiac shunt.  ________________________________________________________________________________________  Electronically signed on 11/20/2024 at 2:37:09 PM by Lyndsay Marin MD      *** Final ***

## 2025-01-06 NOTE — PROGRESS NOTE ADULT - PROBLEM SELECTOR PLAN 10
- c/w home eliquis 5mg BID for hx pe .- will hold for or / discuss uro team- Pre-op Sunday night for procedure Monday morning, 1/7. - c/w home eliquis 5mg BID for hx pe .- will hold for or / discuss uro team- Pre-op Sunday night for procedure Monday morning, 1/7. but now reschedule for thursday so started full dose Lovenox

## 2025-01-06 NOTE — CONSULT NOTE ADULT - SUBJECTIVE AND OBJECTIVE BOX
[facility]  Dr. Linares, Dr. Collado, Dr. Tian, Dr. Brown, Dr. Vallejo, Dr. Manzo                                        [facility], [facility], [address]                                                 [address] [place], [place] [id number] [place] [id number]  Ph: [phone]  Fax: [phone]                                         Ph: [phone]  Fax: [phone]      Patient is a 79y old  Male who presents with a chief complaint of UTI (2025 10:34)    HPI:  Joesph Gaxiola is an 80 yo M with PMHx HTN, CHF, PE (on eliquis), Myasthenia Gravis, and chronic LE edema, nephrolithiasis (s/p nephrostomy tube placement), urosepsis, BPH presented to the ED 2/2 urology referral for an admission for IV antibiotics prior to procedure on 25. Patient is poor historian, unsure of the nature of hospitalization. But denies denies fever, chills, chest pain, SOB, abdominal pain, n/v/d/c or dysuria, or any other complaints.     Of note, pt recently admitted to [facility] 24 with urosepsis, required nephrostomy tube exchange 24, was scheduled for a F/U urology evaluation scheduled for cystoscopy- TURP, bladder cysto litholapaxy- R ureteroscopy on 24. Previously, was also admitted to [facility] in 2024 and urgently transferred to [facility] for emergent nephrostomy tube placement after failed stent placement for 7mm kidney stone. Managed in ICU for septic shock/urosepsis and Klebsiella Bacteremia, treated with IV rocephin and vasopressors.     Per Nursing home paperwork, pt was stable for a transfer with an RCRI class II risk - 6%. Arrived to ED with nephrostomy tube, chronic guerrero and PICC line in place. Pt was completed  a 7 days course of Zosyn IV for UTI 2/2 proteus and pseudomonas per paperwork review.       ED Course:   Vitals: T 97.5, HR 95, /83, RR 18 with SpO2 of 98% on RA   Labs: WBC 14.58, Lac 3.4   UA pending  CXR: Scarring with small granuloma in the right midlung again noted. Small hilar calcified lymph nodes again seen.  EKG: NSR @ 92  Received in the ED:  2L NS IV bolus,    (2025 13:33)    Renal consult called for hypokalemia, Nephrolithiasis. History obtained from chart.       PAST MEDICAL HISTORY:  Calculus of kidney    Club foot    Myasthenia gravis    Hypertension    Diabetes    Urinary tract infection    Hyperlipidemia    Other muscle wasting and atrophy    H/O spinal stenosis    History of thrombocytopenia    H/O CHF        PAST SURGICAL HISTORY:  Elective surgery    Club foot    Pilonidal cyst    H/O colonoscopy    Spinal stenosis    H/O prostate biopsy    Nephrostomy present        FAMILY HISTORY:  Family history of stroke (Father)  Father -  age 62    Family history of kidney disease (Mother)  Mother -  age 67    Family history of diabetes mellitus type II (Sibling)  Brother & Sister        SOCIAL HISTORY: No smoking or alcohol use     Allergies    ofloxacin (Unknown)  gatifloxacin (Unknown)  Cipro (Unknown)  levofloxacin (Unknown)    Intolerances    Avelox (Other)  telithromycin (Other)  fluoroquinolone antibiotics (Other)  Ketek (Other)    Home Medications:  acetaminophen 325 mg oral capsule: 2 cap(s) orally every 6 hours as needed for  mild pain (2025 14:37)  Eliquis 5 mg oral tablet: 1 tab(s) orally 2 times a day (2025 14:37)  finasteride 5 mg oral tablet: 1 tab(s) orally once a day (2025 14:37)  furosemide 20 mg oral tablet: 1 tab(s) orally once a day (2025 14:37)  Metoprolol Succinate ER 25 mg oral tablet, extended release: 1 tab(s) orally once a day (2025 14:37)  Multiple Vitamins oral tablet: 1 tab(s) orally once a day (2025 14:37)  omeprazole 40 mg oral delayed release capsule: 1 cap(s) orally once a day (2025 14:37)  polyethylene glycol 3350 oral powder for reconstitution: 17 gram(s) orally once a day (2025 14:37)  Praluent Pen 75 mg/mL subcutaneous solution: 75 milligram(s) subcutaneous every 2 weeks (2025 14:37)  predniSONE 10 mg oral tablet: 1 tab(s) orally once a day (2025 14:37)  RisperDAL 0.25 mg oral tablet: 1 tab(s) orally once a day (at bedtime) (2025 14:37)  senna leaf extract oral tablet: 1 tab(s) orally once a day (before a meal) (2025 14:37)  tamsulosin 0.4 mg oral capsule: 1 cap(s) orally once a day (at bedtime) (2025 14:37)    MEDICATIONS  (STANDING):  enoxaparin Injectable 120 milliGRAM(s) SubCutaneous every 12 hours  finasteride 5 milliGRAM(s) Oral daily  metoprolol succinate ER 25 milliGRAM(s) Oral daily  multivitamin 1 Tablet(s) Oral daily  pantoprazole    Tablet 40 milliGRAM(s) Oral before breakfast  polyethylene glycol 3350 17 Gram(s) Oral daily  predniSONE   Tablet 10 milliGRAM(s) Oral daily  risperiDONE   Tablet 0.25 milliGRAM(s) Oral at bedtime  senna 1 Tablet(s) Oral at bedtime  tamsulosin 0.4 milliGRAM(s) Oral at bedtime    MEDICATIONS  (PRN):  acetaminophen     Tablet .. 650 milliGRAM(s) Oral every 6 hours PRN Mild Pain (1 - 3)  aluminum hydroxide/magnesium hydroxide/simethicone Suspension 30 milliLiter(s) Oral every 4 hours PRN Dyspepsia  melatonin 3 milliGRAM(s) Oral at bedtime PRN Insomnia  ondansetron Injectable 4 milliGRAM(s) IV Push every 8 hours PRN Nausea and/or Vomiting      REVIEW OF SYSTEMS:  General: no distress  Respiratory: No cough, SOB  Cardiovascular: No CP or Palpitations	  Gastrointestinal: No nausea, Vomiting. No diarrhea  Genitourinary: No urinary complaints	  Musculoskeletal: No new rash or lesions	  all other systems negative    T(F): 97.8 (25 @ 11:16), Max: 98.2 (25 @ 20:42)  HR: 55 (25 @ 11:16) (55 - 65)  BP: 135/80 (25 @ 11:16) (112/69 - 135/80)  RR: 18 (25 @ 11:16) (17 - 18)  SpO2: 94% (25 @ 11:16) (94% - 94%)  Wt(kg): --    PHYSICAL EXAM:  General: NAD  Respiratory: b/l air entry  Cardiovascular: S1 S2  Gastrointestinal: soft, Rt PCN  Extremities: + edema            140  |  107  |  18  ----------------------------<  101[H]  3.1[L]   |  27  |  0.81    Ca    9.5      2025 07:30  Phos  2.7       Mg     2.3                                 12.7   14.04 )-----------( 401      ( 2025 07:30 )             39.5       Hemoglobin: 12.7 g/dL ( @ 07:30)  Hematocrit: 39.5 % ( @ 07:30)  Potassium: 3.1 mmol/L ( @ 07:30)  Blood Urea Nitrogen: 18 mg/dL ( @ 07:30)      Creatinine, Serum: 0.81 ( @ 07:30)  Creatinine, Serum: 0.73 ( @ 07:55)  Creatinine, Serum: 0.85 ( @ 07:18)      Urinalysis Basic - ( 2025 07:30 )    Color: x / Appearance: x / SG: x / pH: x  Gluc: 101 mg/dL / Ketone: x  / Bili: x / Urobili: x   Blood: x / Protein: x / Nitrite: x   Leuk Esterase: x / RBC: x / WBC x   Sq Epi: x / Non Sq Epi: x / Bacteria: x                    I&O's Detail    2025 07:01  -  2025 07:00  --------------------------------------------------------  IN:  Total IN: 0 mL    OUT:    Indwelling Catheter - Urethral (mL): 750 mL    Nephrostomy Tube (mL): 10 mL    Stool (mL): 1 mL    Voided (mL): 1100 mL  Total OUT: 1861 mL    Total NET: -1861 mL      2025 07:01  -  2025 14:02  --------------------------------------------------------  IN:    IV PiggyBack: 25 mL    IV PiggyBack: 100 mL  Total IN: 125 mL    OUT:  Total OUT: 0 mL    Total NET: 125 mL            Culture - Urine (collected 2025 22:20)  Source: Clean Catch  Final Report (2025 15:22):    >=3 organisms. Probable collection contamination.    Urinalysis with Rflx Culture (collected 2025 22:20)    Culture - Blood (collected 2025 10:31)  Source: .Blood BLOOD  Preliminary Report (2025 17:01):    No growth at 48 Hours    Culture - Blood (collected 2025 10:25)  Source: .Blood BLOOD  Preliminary Report (2025 17:01):    No growth at 48 Hours      < from: Xray Chest 1 View-PORTABLE IMMEDIATE (25 @ 10:47) >    ACC: [id number] EXAM:  XR CHEST PORTABLE IMMED 1V   ORDERED BY: MAURI GIORDANO     PROCEDURE DATE:  2025          INTERPRETATION:  AP semierect chest on January 3, 2025 at 10:41 AM.   Patient has sepsis.    Heart size normal.    Small scarring with associated small calcified granuloma in the right   midlung again noted. There is some calcified hilar lymph nodes right   greater than left.    No acute finding or change from 2024.    IMPRESSION: Scarring with small granuloma in the right midlung again   noted. Small hilar calcified lymph nodes again seen.    --- End of Report ---            RUKHSANA AGUIRRE MD; Attending Radiologist  This document has been electronically signed. Kan  3 2025 11:53AM    < end of copied text >

## 2025-01-06 NOTE — PROGRESS NOTE ADULT - SUBJECTIVE AND OBJECTIVE BOX
Patient is a 79y old  Male who presents with a chief complaint of UTI (06 Jan 2025 09:07)    pt seen and examine today  alert   , awake , no fever , npo , remain room air 94.   INTERVAL HPI/OVERNIGHT EVENTS:     T(C): 36.8 (01-06-25 @ 06:30), Max: 36.8 (01-05-25 @ 20:42)  HR: 62 (01-06-25 @ 06:30) (62 - 66)  BP: 130/78 (01-06-25 @ 06:30) (110/69 - 130/78)  RR: 17 (01-06-25 @ 06:30) (17 - 18)  SpO2: 94% (01-06-25 @ 06:30) (94% - 97%)  Wt(kg): --  I&O's Summary    05 Jan 2025 07:01  -  06 Jan 2025 07:00  --------------------------------------------------------  IN: 0 mL / OUT: 1861 mL / NET: -1861 mL      REVIEW OF SYSTEMS:  CONSTITUTIONAL: No fever, weight loss, or fatigue  EYES: No eye pain, visual disturbances, or discharge  ENMT:  No difficulty hearing, tinnitus, vertigo; No sinus or throat pain  NECK: No pain or stiffness   ; No shortness of breath  CARDIOVASCULAR: No chest pain, palpitations, dizziness, or leg swelling  GASTROINTESTINAL: No abdominal or epigastric pain. No nausea, vomiting, ; No diarrhea or constipation.  GENITOURINARY: No dysuria, frequency, hematuria, or incontinence  NEUROLOGICAL: No headaches, memory loss, loss of strength, numbness, or tremors  SKIN: No itching, burning, rashes, or lesions   MUSCULOSKELETAL: No joint pain or swelling; No muscle, back, or extremity pain    PHYSICAL EXAM:  GENERAL: NAD,   HEAD:  Atraumatic, Normocephalic  EYES: EOMI, PERRLA, conjunctiva and sclera clear  ENMT:   Moist mucous membranes  NECK: Supple, No JVD  NERVOUS SYSTEM:  Alert & Oriented X3; Motor Strength 5/5 B/L upper and lower extremities; DTRs 2+ intact and symmetric  CHEST/LUNG:  percussion bilaterally; No rales, rhonchi, wheezing,   HEART: Regular rate and rhythm; No murmur  ABDOMEN: Soft, Nontender, Nondistended; Bowel sounds present  EXTREMITIES:  2+ Peripheral Pulses, No clubbing, cyanosis, or edema  SKIN: No rashes or lesions , nephrostomy +  gu guerrero     MEDICATIONS  (STANDING):  finasteride 5 milliGRAM(s) Oral daily  furosemide    Tablet 20 milliGRAM(s) Oral daily  metoprolol succinate ER 25 milliGRAM(s) Oral daily  multivitamin 1 Tablet(s) Oral daily  pantoprazole    Tablet 40 milliGRAM(s) Oral before breakfast  polyethylene glycol 3350 17 Gram(s) Oral daily  potassium chloride  10 mEq/50 mL IVPB 10 milliEquivalent(s) IV Intermittent once  predniSONE   Tablet 10 milliGRAM(s) Oral daily  risperiDONE   Tablet 0.25 milliGRAM(s) Oral at bedtime  senna 1 Tablet(s) Oral at bedtime  sodium chloride 0.9% with potassium chloride 20 mEq/L 1000 milliLiter(s) (70 mL/Hr) IV Continuous <Continuous>  tamsulosin 0.4 milliGRAM(s) Oral at bedtime    MEDICATIONS  (PRN):  acetaminophen     Tablet .. 650 milliGRAM(s) Oral every 6 hours PRN Mild Pain (1 - 3)  aluminum hydroxide/magnesium hydroxide/simethicone Suspension 30 milliLiter(s) Oral every 4 hours PRN Dyspepsia  melatonin 3 milliGRAM(s) Oral at bedtime PRN Insomnia  ondansetron Injectable 4 milliGRAM(s) IV Push every 8 hours PRN Nausea and/or Vomiting      LABS:                        12.7   14.04 )-----------( 401      ( 06 Jan 2025 07:30 )             39.5     01-06    140  |  107  |  18  ----------------------------<  101[H]  3.1[L]   |  27  |  0.81    Ca    9.5      06 Jan 2025 07:30  Phos  2.7     01-06  Mg     2.3     01-06      PT/INR - ( 06 Jan 2025 07:30 )   PT: 12.2 sec;   INR: 1.03 ratio         PTT - ( 06 Jan 2025 07:30 )  PTT:30.5 sec  Urinalysis Basic - ( 06 Jan 2025 07:30 )    Color: x / Appearance: x / SG: x / pH: x  Gluc: 101 mg/dL / Ketone: x  / Bili: x / Urobili: x   Blood: x / Protein: x / Nitrite: x   Leuk Esterase: x / RBC: x / WBC x   Sq Epi: x / Non Sq Epi: x / Bacteria: x      CAPILLARY BLOOD GLUCOSE          01-03 @ 22:20   >=3 organisms. Probable collection contamination.  --  --  01-03 @ 10:31   No growth at 48 Hours  --  --  01-03 @ 10:25   No growth at 48 Hours  --  --          RADIOLOGY & ADDITIONAL TESTS:    Imaging Personally Reviewed:     no new test   Advance Directives:  dnr / dni    Palliative Care:  Appropriate

## 2025-01-06 NOTE — PROGRESS NOTE ADULT - SUBJECTIVE AND OBJECTIVE BOX
SUBJECTIVE:  Patient seen and examined at bedside.  No overnight events.  Patient reports no new complaints at this time.    VITALS  Vital Signs Last 24 Hrs  T(C): 36.8 (06 Jan 2025 06:30), Max: 36.8 (05 Jan 2025 20:42)  T(F): 98.2 (06 Jan 2025 06:30), Max: 98.2 (05 Jan 2025 20:42)  HR: 62 (06 Jan 2025 06:30) (62 - 66)  BP: 130/78 (06 Jan 2025 06:30) (110/69 - 130/78)  RR: 17 (06 Jan 2025 06:30) (17 - 18)  SpO2: 94% (06 Jan 2025 06:30) (94% - 97%)    Parameters below as of 06 Jan 2025 06:30  Patient On (Oxygen Delivery Method): room air    PHYSICAL EXAM  GENERAL: NAD.  HEENT:  NC/AT. Sclera white.   CARDIO:  Regular rate and rhythm.   RESPIRATORY:  Nonlabored breathing, no accessory muscle use.   ABDOMEN:  Soft, nondistended, nontender. No rebound tenderness or guarding.  : Guerrero in place approximately 700cc clear yellow urine. Right nephrostomy tube with scant yellow urine.  SKIN:  No jaundice, pallor, or cyanosis  NEURO:  A&O x 3    INTAKE & OUTPUT  I&O's Summary    05 Jan 2025 07:01  -  06 Jan 2025 07:00  --------------------------------------------------------  IN: 0 mL / OUT: 1861 mL / NET: -1861 mL    I&O's Detail    05 Jan 2025 07:01  -  06 Jan 2025 07:00  --------------------------------------------------------  IN:  Total IN: 0 mL    OUT:    Indwelling Catheter - Urethral (mL): 750 mL    Nephrostomy Tube (mL): 10 mL    Stool (mL): 1 mL    Voided (mL): 1100 mL  Total OUT: 1861 mL    Total NET: -1861 mL    MEDICATIONS  MEDICATIONS  (STANDING):  finasteride 5 milliGRAM(s) Oral daily  furosemide    Tablet 20 milliGRAM(s) Oral daily  metoprolol succinate ER 25 milliGRAM(s) Oral daily  multivitamin 1 Tablet(s) Oral daily  pantoprazole    Tablet 40 milliGRAM(s) Oral before breakfast  polyethylene glycol 3350 17 Gram(s) Oral daily  predniSONE   Tablet 10 milliGRAM(s) Oral daily  risperiDONE   Tablet 0.25 milliGRAM(s) Oral at bedtime  senna 1 Tablet(s) Oral at bedtime  tamsulosin 0.4 milliGRAM(s) Oral at bedtime    MEDICATIONS  (PRN):  acetaminophen     Tablet .. 650 milliGRAM(s) Oral every 6 hours PRN Mild Pain (1 - 3)  aluminum hydroxide/magnesium hydroxide/simethicone Suspension 30 milliLiter(s) Oral every 4 hours PRN Dyspepsia  melatonin 3 milliGRAM(s) Oral at bedtime PRN Insomnia  ondansetron Injectable 4 milliGRAM(s) IV Push every 8 hours PRN Nausea and/or Vomiting    LABS:                        12.6   13.34 )-----------( 377      ( 05 Jan 2025 07:55 )             39.6     01-05    141  |  108  |  14  ----------------------------<  107[H]  3.5   |  27  |  0.73    Ca    9.2      05 Jan 2025 07:55    Culture - Urine (collected 03 Jan 2025 22:20)  Source: Clean Catch  Final Report (05 Jan 2025 15:22):    >=3 organisms. Probable collection contamination.    Urinalysis with Rflx Culture (collected 03 Jan 2025 22:20)    Culture - Blood (collected 03 Jan 2025 10:31)  Source: .Blood BLOOD  Preliminary Report (05 Jan 2025 17:01):    No growth at 48 Hours    Culture - Blood (collected 03 Jan 2025 10:25)  Source: .Blood BLOOD  Preliminary Report (05 Jan 2025 17:01):    No growth at 48 Hours    ASSESSMENT & PLAN  79 year old male with PMH HTN, CHF, PE (on eliquis), Myasthenia Gravis, and chronic LE edema, BPH, nephrolithiasis (s/p nephrostomy tube placement), urosepsis, admitted for UTI treatment prior to scheduled for cystoscopy- TURP, bladder cysto litholapaxy- R ureteroscopy 1/6/25.    - OR today; cleared by med/cardio  - Monitor off antibiotics per ID  - f/u blood culture (negative to date), and urine culture (likely contaminated)  - Continue guerrero/nephrostomy; monitor I&Os  - Eliquis currently held in anticipation of procedure  - Pain control PRN SUBJECTIVE:  Patient seen and examined at bedside.  No overnight events.  Patient reports no new complaints at this time.    VITALS  Vital Signs Last 24 Hrs  T(C): 36.8 (06 Jan 2025 06:30), Max: 36.8 (05 Jan 2025 20:42)  T(F): 98.2 (06 Jan 2025 06:30), Max: 98.2 (05 Jan 2025 20:42)  HR: 62 (06 Jan 2025 06:30) (62 - 66)  BP: 130/78 (06 Jan 2025 06:30) (110/69 - 130/78)  RR: 17 (06 Jan 2025 06:30) (17 - 18)  SpO2: 94% (06 Jan 2025 06:30) (94% - 97%)    Parameters below as of 06 Jan 2025 06:30  Patient On (Oxygen Delivery Method): room air    PHYSICAL EXAM  GENERAL: NAD.  HEENT:  NC/AT. Sclera white.   CARDIO:  Regular rate and rhythm.   RESPIRATORY:  Nonlabored breathing, no accessory muscle use.   ABDOMEN:  Soft, nondistended, nontender. No rebound tenderness or guarding.  : Guerrero in place approximately 700cc clear yellow urine. Right nephrostomy tube with scant yellow urine.  SKIN:  No jaundice, pallor, or cyanosis  NEURO:  A&O x 3    INTAKE & OUTPUT  I&O's Summary    05 Jan 2025 07:01  -  06 Jan 2025 07:00  --------------------------------------------------------  IN: 0 mL / OUT: 1861 mL / NET: -1861 mL    I&O's Detail    05 Jan 2025 07:01  -  06 Jan 2025 07:00  --------------------------------------------------------  IN:  Total IN: 0 mL    OUT:    Indwelling Catheter - Urethral (mL): 750 mL    Nephrostomy Tube (mL): 10 mL    Stool (mL): 1 mL    Voided (mL): 1100 mL  Total OUT: 1861 mL    Total NET: -1861 mL    MEDICATIONS  MEDICATIONS  (STANDING):  finasteride 5 milliGRAM(s) Oral daily  furosemide    Tablet 20 milliGRAM(s) Oral daily  metoprolol succinate ER 25 milliGRAM(s) Oral daily  multivitamin 1 Tablet(s) Oral daily  pantoprazole    Tablet 40 milliGRAM(s) Oral before breakfast  polyethylene glycol 3350 17 Gram(s) Oral daily  predniSONE   Tablet 10 milliGRAM(s) Oral daily  risperiDONE   Tablet 0.25 milliGRAM(s) Oral at bedtime  senna 1 Tablet(s) Oral at bedtime  tamsulosin 0.4 milliGRAM(s) Oral at bedtime    MEDICATIONS  (PRN):  acetaminophen     Tablet .. 650 milliGRAM(s) Oral every 6 hours PRN Mild Pain (1 - 3)  aluminum hydroxide/magnesium hydroxide/simethicone Suspension 30 milliLiter(s) Oral every 4 hours PRN Dyspepsia  melatonin 3 milliGRAM(s) Oral at bedtime PRN Insomnia  ondansetron Injectable 4 milliGRAM(s) IV Push every 8 hours PRN Nausea and/or Vomiting    LABS:                        12.6   13.34 )-----------( 377      ( 05 Jan 2025 07:55 )             39.6     01-05    141  |  108  |  14  ----------------------------<  107[H]  3.5   |  27  |  0.73    Ca    9.2      05 Jan 2025 07:55    Culture - Urine (collected 03 Jan 2025 22:20)  Source: Clean Catch  Final Report (05 Jan 2025 15:22):    >=3 organisms. Probable collection contamination.    Urinalysis with Rflx Culture (collected 03 Jan 2025 22:20)    Culture - Blood (collected 03 Jan 2025 10:31)  Source: .Blood BLOOD  Preliminary Report (05 Jan 2025 17:01):    No growth at 48 Hours    Culture - Blood (collected 03 Jan 2025 10:25)  Source: .Blood BLOOD  Preliminary Report (05 Jan 2025 17:01):    No growth at 48 Hours    ASSESSMENT & PLAN  79 year old male with PMH HTN, CHF, PE (on eliquis), Myasthenia Gravis, and chronic LE edema, BPH, nephrolithiasis (s/p nephrostomy tube placement), urosepsis, admitted for UTI treatment prior to scheduled for cystoscopy- TURP, bladder cysto litholapaxy- R ureteroscopy.    - Plan for OR Thursday. Patient needs to be on antibiotics 48 hours prior to surgery  - Follow up blood culture (negative to date), and urine culture (likely contaminated)  - Continue guerrero/nephrostomy; monitor I&Os  - Eliquis currently held in anticipation of procedure  - Pain control PRN

## 2025-01-06 NOTE — CONSULT NOTE ADULT - SUBJECTIVE AND OBJECTIVE BOX
S : 79y year old Male seen at bedside for Right/Left foot ulceration.  Patient relates to having the ulceration for ----.  Patient has had no previous treatment/has seen a ------ and putting on ----- daily with dry sterile dressing.    Patient states the last saw  ----, a Podiatrist about ----.    Chief Complaint : Patient is a 79y old  Male who presents with a chief complaint of UTI (06 Jan 2025 14:02)    HPI : HPI:  Joesph Gaxiola is an 80 yo M with PMHx HTN, CHF, PE (on eliquis), Myasthenia Gravis, and chronic LE edema, nephrolithiasis (s/p nephrostomy tube placement), urosepsis, BPH presented to the ED 2/2 urology referral for an admission for IV antibiotics prior to procedure on 1/6/25. Patient is poor historian, unsure of the nature of hospitalization. But denies denies fever, chills, chest pain, SOB, abdominal pain, n/v/d/c or dysuria, or any other complaints.     Of note, pt recently admitted to [facility] 11/20/24 with urosepsis, required nephrostomy tube exchange 11/29/24, was scheduled for a F/U urology evaluation scheduled for cystoscopy- TURP, bladder cysto litholapaxy- R ureteroscopy on 01/06/24. Previously, was also admitted to [facility] in 9/2024 and urgently transferred to [facility] for emergent nephrostomy tube placement after failed stent placement for 7mm kidney stone. Managed in ICU for septic shock/urosepsis and Klebsiella Bacteremia, treated with IV rocephin and vasopressors.     Per Nursing home paperwork, pt was stable for a transfer with an RCRI class II risk - 6%. Arrived to ED with nephrostomy tube, chronic guerrero and PICC line in place. Pt was completed  a 7 days course of Zosyn IV for UTI 2/2 proteus and pseudomonas per paperwork review.       ED Course:   Vitals: T 97.5, HR 95, /83, RR 18 with SpO2 of 98% on RA   Labs: WBC 14.58, Lac 3.4   UA pending  CXR: Scarring with small granuloma in the right midlung again noted. Small hilar calcified lymph nodes again seen.  EKG: NSR @ 92  Received in the ED:  2L NS IV bolus,    (03 Jan 2025 13:33)      Patient admits to  (-) Fevers, (-) Chills, (-) Nausea, (-) Vomiting, (-) Shortness of Breath      PMH: Calculus of kidney    Club foot    Myasthenia gravis    Hypertension    Diabetes    Urinary tract infection    Hyperlipidemia    Other muscle wasting and atrophy    H/O spinal stenosis    History of thrombocytopenia    H/O CHF      PSH:Elective surgery    Club foot    Pilonidal cyst    H/O colonoscopy    Spinal stenosis    H/O prostate biopsy    Nephrostomy present        Allergies:Avelox (Other)  telithromycin (Other)  ofloxacin (Unknown)  gatifloxacin (Unknown)  Cipro (Unknown)  levofloxacin (Unknown)  fluoroquinolone antibiotics (Other)  Ketek (Other)      Labs:                          12.7   14.04 )-----------( 401      ( 06 Jan 2025 07:30 )             39.5     WBC Trend  14.04[H] Date (01-06 @ 07:30)  13.34[H] Date (01-05 @ 07:55)  13.54[H] Date (01-04 @ 07:18)  14.58[H] Date (01-03 @ 10:45)  15.21[H] Date (12-27 @ 16:20)      Chem  01-06    140  |  107  |  18  ----------------------------<  101[H]  3.1[L]   |  27  |  0.81    Ca    9.5      06 Jan 2025 07:30  Phos  2.7     01-06  Mg     2.3     01-06            T(F): 97.8 (01-06-25 @ 11:16), Max: 98.2 (01-05-25 @ 20:42)  HR: 55 (01-06-25 @ 11:16) (55 - 65)  BP: 135/80 (01-06-25 @ 11:16) (112/69 - 135/80)  RR: 18 (01-06-25 @ 11:16) (17 - 18)  SpO2: 94% (01-06-25 @ 11:16) (94% - 94%)  Wt(kg): --    O:   General: Pleasant  male NAD & AOX3.    Integument:  Skin warm, dry and supple bilateral. DTI noted to left lateral heel without any break in the integument noted.   Vascular: Dorsalis Pedis and Posterior Tibial pulses 2/4.  Capillary re-fill time less then 3 seconds digits 1-5 bilateral.    Neuro: Protective sensation intact to the level of the digits bilateral.  MSK: Muscle strength 3/5 all major muscle groups bilateral.     S : 79y year old Male seen at bedside for DTI noted to left lateral heel.    Chief Complaint : Patient is a 79y old  Male who presents with a chief complaint of UTI (06 Jan 2025 14:02)    HPI : HPI:  Joesph Gaxiola is an 80 yo M with PMHx HTN, CHF, PE (on eliquis), Myasthenia Gravis, and chronic LE edema, nephrolithiasis (s/p nephrostomy tube placement), urosepsis, BPH presented to the ED 2/2 urology referral for an admission for IV antibiotics prior to procedure on 1/6/25. Patient is poor historian, unsure of the nature of hospitalization. But denies denies fever, chills, chest pain, SOB, abdominal pain, n/v/d/c or dysuria, or any other complaints.     Of note, pt recently admitted to [facility] 11/20/24 with urosepsis, required nephrostomy tube exchange 11/29/24, was scheduled for a F/U urology evaluation scheduled for cystoscopy- TURP, bladder cysto litholapaxy- R ureteroscopy on 01/06/24. Previously, was also admitted to [facility] in 9/2024 and urgently transferred to [facility] for emergent nephrostomy tube placement after failed stent placement for 7mm kidney stone. Managed in ICU for septic shock/urosepsis and Klebsiella Bacteremia, treated with IV rocephin and vasopressors.     Per Nursing home paperwork, pt was stable for a transfer with an RCRI class II risk - 6%. Arrived to ED with nephrostomy tube, chronic guerrero and PICC line in place. Pt was completed  a 7 days course of Zosyn IV for UTI 2/2 proteus and pseudomonas per paperwork review.       ED Course:   Vitals: T 97.5, HR 95, /83, RR 18 with SpO2 of 98% on RA   Labs: WBC 14.58, Lac 3.4   UA pending  CXR: Scarring with small granuloma in the right midlung again noted. Small hilar calcified lymph nodes again seen.  EKG: NSR @ 92  Received in the ED:  2L NS IV bolus,    (03 Jan 2025 13:33)      Patient admits to  (-) Fevers, (-) Chills, (-) Nausea, (-) Vomiting, (-) Shortness of Breath      PMH: Calculus of kidney    Club foot    Myasthenia gravis    Hypertension    Diabetes    Urinary tract infection    Hyperlipidemia    Other muscle wasting and atrophy    H/O spinal stenosis    History of thrombocytopenia    H/O CHF      PSH:Elective surgery    Club foot    Pilonidal cyst    H/O colonoscopy    Spinal stenosis    H/O prostate biopsy    Nephrostomy present        Allergies:Avelox (Other)  telithromycin (Other)  ofloxacin (Unknown)  gatifloxacin (Unknown)  Cipro (Unknown)  levofloxacin (Unknown)  fluoroquinolone antibiotics (Other)  Ketek (Other)      Labs:                          12.7   14.04 )-----------( 401      ( 06 Jan 2025 07:30 )             39.5     WBC Trend  14.04[H] Date (01-06 @ 07:30)  13.34[H] Date (01-05 @ 07:55)  13.54[H] Date (01-04 @ 07:18)  14.58[H] Date (01-03 @ 10:45)  15.21[H] Date (12-27 @ 16:20)      Chem  01-06    140  |  107  |  18  ----------------------------<  101[H]  3.1[L]   |  27  |  0.81    Ca    9.5      06 Jan 2025 07:30  Phos  2.7     01-06  Mg     2.3     01-06            T(F): 97.8 (01-06-25 @ 11:16), Max: 98.2 (01-05-25 @ 20:42)  HR: 55 (01-06-25 @ 11:16) (55 - 65)  BP: 135/80 (01-06-25 @ 11:16) (112/69 - 135/80)  RR: 18 (01-06-25 @ 11:16) (17 - 18)  SpO2: 94% (01-06-25 @ 11:16) (94% - 94%)  Wt(kg): --    O:   General: Pleasant  male NAD & AOX3.    Integument:  Skin warm, dry and supple bilateral. DTI noted to left lateral heel without any break in the integument noted.   Vascular: Dorsalis Pedis and Posterior Tibial pulses 2/4.  Capillary re-fill time less then 3 seconds digits 1-5 bilateral.    Neuro: Protective sensation intact to the level of the digits bilateral.  MSK: Muscle strength 3/5 all major muscle groups bilateral.

## 2025-01-06 NOTE — CONSULT NOTE ADULT - PROBLEM SELECTOR RECOMMENDATION 9
Chart reviewed and Patient evaluated.  Discussed diagnosis and treatment with patient.  Concern for left lateral heel DTI.  Applied offloading dressing.  Continue using blue offloading pillow and monitoring DTI.  Offloading to bilateral Heels.   Podiatry will follow while in house.  Will discuss care plan with attending.

## 2025-01-06 NOTE — PROGRESS NOTE ADULT - PROBLEM SELECTOR PLAN 2
CT A/P 11/20/24: Percutaneous R nephrostomy tube. No hydronephrosis. Unremarkable L kidney. UB collapsed around guerrero. UB stone x3 measuring up to 1.0 cm.  - scheduled for cystoscopy- TURP, bladder cysto litholapaxy- R ureteroscopy on 01/06/24.   - care plan per Urology dr solorzano CT A/P 11/20/24: Percutaneous R nephrostomy tube. No hydronephrosis. Unremarkable L kidney. UB collapsed around guerrero. UB stone x3 measuring up to 1.0 cm.  - scheduled for cystoscopy- TURP, bladder cysto litholapaxy- R ureteroscopy on 01/06/24.   - care plan per Urology dr solorzano reschedule for or Thursday as need 48 hr  iv abx

## 2025-01-06 NOTE — PROGRESS NOTE ADULT - PROBLEM SELECTOR PLAN 1
known for recurrent UTI presented to the ED 2/2 urology referral for IV antibiotics prior to procedure on 1/6/25.  - H/O hosp in 9/2024, required emergent nephrostomy tube placement after failed stent placement for 7mm kidney stone. followed by ICU care for Klebsiella Bacteremia, urosepsis/ septic shock.    - H/O hosp with urosepsis on 11/20/24 required nephrostomy tube exchange 11/29/24,   - scheduled for cystoscopy- TURP, bladder cysto litholapaxy- R ureteroscopy on 01/06/25.   - completed 7 day course of IV zosyn in NH before transfer  - in ED WBC 14.58, Lac 3.4   - S/P 2L NS bolus - monitor off antibiotics per ID recs   dr kim so dc meropenem   - leucocytosis pt on steroid prednisone   - UA -> UCx  contamination   - BCx x2, neg    - ID (Dr. Kim) consulted, restarted iv abx meropenem for or known for recurrent UTI presented to the ED 2/2 urology referral for IV antibiotics prior to procedure on 1/6/25.  - H/O hosp in 9/2024, required emergent nephrostomy tube placement after failed stent placement for 7mm kidney stone. followed by ICU care for Klebsiella Bacteremia, urosepsis/ septic shock.    - H/O hosp with urosepsis on 11/20/24 required nephrostomy tube exchange 11/29/24,   - scheduled for cystoscopy- TURP, bladder cysto litholapaxy- R ureteroscopy on 01/06/25.   - completed 7 day course of IV zosyn in NH before transfer  - in ED WBC 14.58, Lac 3.4   - S/P 2L NS bolus - monitor off antibiotics per ID recs   dr kim so dc meropenem   - leucocytosis pt on steroid prednisone   - UA -> UCx  contamination   - BCx x2, neg    - ID (Dr. Kim) consulted, restarted iv abx meropenem  48 hr -  reschedule Thursday

## 2025-01-07 LAB
ANION GAP SERPL CALC-SCNC: 7 MMOL/L — SIGNIFICANT CHANGE UP (ref 5–17)
BASOPHILS # BLD AUTO: 0.1 K/UL — SIGNIFICANT CHANGE UP (ref 0–0.2)
BASOPHILS NFR BLD AUTO: 0.6 % — SIGNIFICANT CHANGE UP (ref 0–2)
BLANDM BLD POS QL PROBE: SIGNIFICANT CHANGE UP
BUN SERPL-MCNC: 18 MG/DL — SIGNIFICANT CHANGE UP (ref 7–23)
CALCIUM SERPL-MCNC: 9.2 MG/DL — SIGNIFICANT CHANGE UP (ref 8.5–10.1)
CHLORIDE SERPL-SCNC: 106 MMOL/L — SIGNIFICANT CHANGE UP (ref 96–108)
CO2 SERPL-SCNC: 27 MMOL/L — SIGNIFICANT CHANGE UP (ref 22–31)
CREAT SERPL-MCNC: 0.8 MG/DL — SIGNIFICANT CHANGE UP (ref 0.5–1.3)
CULTURE RESULTS: ABNORMAL
EGFR: 89 ML/MIN/1.73M2 — SIGNIFICANT CHANGE UP
EOSINOPHIL # BLD AUTO: 0.95 K/UL — HIGH (ref 0–0.5)
EOSINOPHIL NFR BLD AUTO: 5.9 % — SIGNIFICANT CHANGE UP (ref 0–6)
GLUCOSE SERPL-MCNC: 118 MG/DL — HIGH (ref 70–99)
HCT VFR BLD CALC: 40.3 % — SIGNIFICANT CHANGE UP (ref 39–50)
HGB BLD-MCNC: 12.8 G/DL — LOW (ref 13–17)
IMM GRANULOCYTES NFR BLD AUTO: 1.2 % — HIGH (ref 0–0.9)
LYMPHOCYTES # BLD AUTO: 12.6 % — LOW (ref 13–44)
LYMPHOCYTES # BLD AUTO: 2.03 K/UL — SIGNIFICANT CHANGE UP (ref 1–3.3)
MCHC RBC-ENTMCNC: 29.3 PG — SIGNIFICANT CHANGE UP (ref 27–34)
MCHC RBC-ENTMCNC: 31.8 G/DL — LOW (ref 32–36)
MCV RBC AUTO: 92.2 FL — SIGNIFICANT CHANGE UP (ref 80–100)
METHOD TYPE: SIGNIFICANT CHANGE UP
MONOCYTES # BLD AUTO: 1.44 K/UL — HIGH (ref 0–0.9)
MONOCYTES NFR BLD AUTO: 9 % — SIGNIFICANT CHANGE UP (ref 2–14)
NEUTROPHILS # BLD AUTO: 11.33 K/UL — HIGH (ref 1.8–7.4)
NEUTROPHILS NFR BLD AUTO: 70.7 % — SIGNIFICANT CHANGE UP (ref 43–77)
NRBC # BLD: 0 /100 WBCS — SIGNIFICANT CHANGE UP (ref 0–0)
PLATELET # BLD AUTO: 427 K/UL — HIGH (ref 150–400)
POTASSIUM SERPL-MCNC: 3.4 MMOL/L — LOW (ref 3.5–5.3)
POTASSIUM SERPL-SCNC: 3.4 MMOL/L — LOW (ref 3.5–5.3)
RBC # BLD: 4.37 M/UL — SIGNIFICANT CHANGE UP (ref 4.2–5.8)
RBC # FLD: 16.3 % — HIGH (ref 10.3–14.5)
SODIUM SERPL-SCNC: 140 MMOL/L — SIGNIFICANT CHANGE UP (ref 135–145)
WBC # BLD: 16.05 K/UL — HIGH (ref 3.8–10.5)
WBC # FLD AUTO: 16.05 K/UL — HIGH (ref 3.8–10.5)

## 2025-01-07 PROCEDURE — 99232 SBSQ HOSP IP/OBS MODERATE 35: CPT

## 2025-01-07 PROCEDURE — G0545: CPT

## 2025-01-07 PROCEDURE — 99231 SBSQ HOSP IP/OBS SF/LOW 25: CPT

## 2025-01-07 PROCEDURE — 99233 SBSQ HOSP IP/OBS HIGH 50: CPT

## 2025-01-07 RX ORDER — POTASSIUM CHLORIDE 600 MG/1
40 TABLET, FILM COATED, EXTENDED RELEASE ORAL ONCE
Refills: 0 | Status: COMPLETED | OUTPATIENT
Start: 2025-01-07 | End: 2025-01-07

## 2025-01-07 RX ADMIN — SENNOSIDES 1 TABLET(S): 8.6 TABLET, FILM COATED ORAL at 21:42

## 2025-01-07 RX ADMIN — MEROPENEM 100 MILLIGRAM(S): 1 INJECTION, POWDER, FOR SOLUTION INTRAVENOUS at 06:24

## 2025-01-07 RX ADMIN — Medication 20 MILLIGRAM(S): at 05:45

## 2025-01-07 RX ADMIN — Medication 5 MILLIGRAM(S): at 12:12

## 2025-01-07 RX ADMIN — MEROPENEM 100 MILLIGRAM(S): 1 INJECTION, POWDER, FOR SOLUTION INTRAVENOUS at 21:43

## 2025-01-07 RX ADMIN — Medication 17 GRAM(S): at 12:11

## 2025-01-07 RX ADMIN — Medication 10 MILLIGRAM(S): at 05:45

## 2025-01-07 RX ADMIN — PANTOPRAZOLE 40 MILLIGRAM(S): 40 TABLET, DELAYED RELEASE ORAL at 05:44

## 2025-01-07 RX ADMIN — MEROPENEM 100 MILLIGRAM(S): 1 INJECTION, POWDER, FOR SOLUTION INTRAVENOUS at 15:12

## 2025-01-07 RX ADMIN — Medication 1 TABLET(S): at 12:12

## 2025-01-07 RX ADMIN — ACETAMINOPHEN 650 MILLIGRAM(S): 80 SOLUTION/ DROPS ORAL at 22:20

## 2025-01-07 RX ADMIN — RISPERIDONE 0.25 MILLIGRAM(S): 0.5 TABLET ORAL at 21:43

## 2025-01-07 RX ADMIN — ENOXAPARIN SODIUM 120 MILLIGRAM(S): 60 INJECTION INTRAVENOUS; SUBCUTANEOUS at 17:53

## 2025-01-07 RX ADMIN — TAMSULOSIN HYDROCHLORIDE 0.4 MILLIGRAM(S): 0.4 CAPSULE ORAL at 21:42

## 2025-01-07 RX ADMIN — POTASSIUM CHLORIDE 40 MILLIEQUIVALENT(S): 600 TABLET, FILM COATED, EXTENDED RELEASE ORAL at 12:12

## 2025-01-07 RX ADMIN — ENOXAPARIN SODIUM 120 MILLIGRAM(S): 60 INJECTION INTRAVENOUS; SUBCUTANEOUS at 05:45

## 2025-01-07 RX ADMIN — Medication 25 MILLIGRAM(S): at 05:44

## 2025-01-07 RX ADMIN — ACETAMINOPHEN 650 MILLIGRAM(S): 80 SOLUTION/ DROPS ORAL at 21:41

## 2025-01-07 NOTE — PROGRESS NOTE ADULT - ASSESSMENT
80 yo M with PMHx HTN, CHF, PE (on eliquis), Myasthenia Gravis, and chronic LE edema, nephrolithiasis (s/p nephrostomy tube placement), urosepsis, BPH admitted for abx and cystoscopy procedure    Admitted for cystoscopy cardiac optimization   - Echo from 09/2024: EF 55-60% moderate MR  - continue home furosemide 20mg QD  - no sign fluid overload   - Strict I/Os, daily weights    - EKG: NSR    - plan for surgery on 1/9 OR  - patient is a moderate risk patient for low risk procedure  - hx pe on eliquis would hold eliquis at least 4 doses prior, can transition to iv heparin or fd lovenox   Currently no active cardiac conditions. No signs of ischemia, ADHF, clinical exam not consistent with severe stenotic valvular disease, no unstable arrhythmias noted. Therefore able to proceed with this intermediate risk  without any further cardiac workup. Routine hemodynamic monitoring is suggested during the procedure    - Other cardiovascular workup will depend on clinical course.  - All other workup per primary team.  - Will continue to follow.  Art Santana DNP, AGACNP-BC  Cardiology   Call Teams

## 2025-01-07 NOTE — PROGRESS NOTE ADULT - PROBLEM SELECTOR PLAN 10
- c/w home eliquis 5mg BID for hx pe .- will hold for or / discuss uro team- Pre-op Sunday night for procedure Monday morning, 1/7. but now reschedule for thursday so started full dose Lovenox

## 2025-01-07 NOTE — PROGRESS NOTE ADULT - ASSESSMENT
Hypokalemia  H/o Nephrolithiasis , Chronic guerrero, Rt PCN  h/o MS  Hypertension  UTI  For cystoscopy    Potassium supplementation. Stable renal indices. On low dose lasix.  follow up. Trend BP and titrate BP meds as needed.   Will follow electrolytes and renal function trend.

## 2025-01-07 NOTE — CARE COORDINATION ASSESSMENT. - OTHER PERTINENT DISCHARGE PLANNING INFORMATION:
SWI met with pt's son Olvin in the hallway of the unit as the pt is on contact. Olvin is HCP and POA. Olvin stated that the patient is from [facility] term [facility] and has been there since his September hospitalization at [facility]. Prior to going to [facility], the pt was living in a house with son Olvin. Olvin stated that the pt can not mobilize and uses a wheelchair. Pt utilizes the pharmacy, PCP, and transportation provided by [facility]. SWI to follow and remain available for any needs. SWI met with pt's son Olvin on unit, pt +contact prec. Olvin is HCP and POA. Olvin stated that the patient is from [facility] term [facility] and has been there since his September following his hospitalization at [facility]. Prior to going to [facility], the pt was living in a house with son Olvin. Olvin stated that the pt can not mobilize and uses a wheelchair. Pt utilizes the pharmacy, PCP, and transportation provided by [facility]. SWI to follow and remain available for any needs.

## 2025-01-07 NOTE — CARE COORDINATION ASSESSMENT. - FACILITY OF RESIDENCE YN
[facility]    December Barges Patient Status:  Hospital Outpatient Surgery   Age/Gender 22year old female MRN [id number]   [facility] SURGERY Attending Aida Tesfaye [facility] Day # 0 PCP Amol Keenan MD       Anesthesia Post-op Note    CHEVRON   BUNIONECTOMY WITH SCREW FIXATION, LEFT FOOT    Procedure Summary     Date: 12/01/22 Room / Location: [address] [place] [address] MAIN OR 05 / 1404 [facility] OR    Anesthesia Start: 3368 Anesthesia Stop: 0267    Procedure: CHEVRON   BUNIONECTOMY WITH SCREW FIXATION, LEFT FOOT (Left: Foot) Diagnosis: (LEFT FOOT PAIN, HALLUX VALGUS OF LEFT FOOT)    Surgeons: Aida Hinojosa DPM Anesthesiologist: Андрей Aleman MD    Anesthesia Type: MAC ASA Status: 1          Anesthesia Type: MAC    Vitals Value Taken Time   /61 12/01/22 1045   Temp 97.9 12/01/22 1045   Pulse 68 12/01/22 1045   Resp 18 12/01/22 1045   SpO2 98% 12/01/22 1045       Patient Location: Same Day Surgery    Anesthesia Type: MAC    Airway Patency: patent    Postop Pain Control: adequate    Mental Status: mildly sedated but able to meaningfully participate in the post-anesthesia evaluation    Nausea/Vomiting: none    Cardiopulmonary/Hydration status: stable euvolemic    Complications: no apparent anesthesia related complications    Postop vital signs: stable    Dental Exam: Unchanged from Preop    Patient to be discharged home when criteria met.
[facility], long term/Yes

## 2025-01-07 NOTE — PROGRESS NOTE ADULT - PROBLEM SELECTOR PLAN 8
Chronic  - c/w home prednisone.

## 2025-01-07 NOTE — PROGRESS NOTE ADULT - PROBLEM SELECTOR PLAN 5
Lung granuloma.   CT Chest/Ab/Pelv with IV Con 11/2024: Evidence of granulomatous infection in lungs, liver and spleen. Consistent with previous imaging in 11/2023  - Per chart review, pt has been aware of this finding  - CXR consistent on this admission  - Quant indeterminate, fungitell negative, aspergillus galactomannan negative at that time  - ID (Dr. Mckinley) consulted,
Lung granuloma.   CT Chest/Ab/Pelv with IV Con 11/2024: Evidence of granulomatous infection in lungs, liver and spleen. Consistent with previous imaging in 11/2023  - Per chart review, pt has been aware of this finding  - CXR consistent on this admission  - Quant indeterminate, fungitell negative, aspergillus galactomannan negative at that time  - ID (Dr. Mckinley) consulted,
Lung granuloma.   CT Chest/Ab/Pelv with IV Con 11/2024: Evidence of granulomatous infection in lungs, liver and spleen. Consistent with previous imaging in 11/2023  - Per chart review, pt has been aware of this finding  - CXR consistent on this admission  - Quant indeterminate, fungitell negative, aspergillus galactomannan negative at that time  - ID (Dr. Mckinley) consulted, -
Lung granuloma.   CT Chest/Ab/Pelv with IV Con 11/2024: Evidence of granulomatous infection in lungs, liver and spleen. Consistent with previous imaging in 11/2023  - Per chart review, pt has been aware of this finding  - CXR consistent on this admission  - Quant indeterminate, fungitell negative, aspergillus galactomannan negative at that time  - ID (Dr. Mckinley) consulted, -

## 2025-01-07 NOTE — CARE COORDINATION ASSESSMENT. - NSPASTMEDSURGHISTORY_GEN_ALL_CORE_FT
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

## 2025-01-07 NOTE — PROGRESS NOTE ADULT - TIME BILLING
direct patient care including but not limited to reviewing chart, medications ,laboratory data, imaging reports, discussion of plan of care with consultants on the case, coordination of care with multidisciplinary team involved in the case and discussion of plan with patient who is agreeable to plan of care and verbalized understanding the anticipated hospital course and treatment plan.      Time spent excludes teaching and separately reported services.

## 2025-01-07 NOTE — PROGRESS NOTE ADULT - PROBLEM SELECTOR PLAN 4
Chronic CHF.   H/O of CHF, unspecified type however on GDMT for HFrEF  - TTE (9/2024): LVEF 55-60%, no diastolic dysfunction, moderate MR  - Evaluated by cardiology during admission at [facility] (9/2024) for acute CHF, started on GDMT  - c/w home metoprolol, spironolactone and lasix with hold parameters   - Strict Is&Os q6h  - Does not appear clinically volume overloaded  - Cardiology ([place] [facility]) consulted,
Chronic CHF.   H/O of CHF, unspecified type however on GDMT for HFrEF  - TTE (9/2024): LVEF 55-60%, no diastolic dysfunction, moderate MR  - Evaluated by cardiology during admission at [facility] (9/2024) for acute CHF, started on GDMT  - c/w home metoprolol, spironolactone and lasix with hold parameters   - Strict Is&Os q6h  - Does not appear clinically volume overloaded  - Cardiology ([place] [facility]) consulted,
Chronic CHF.   H/O of CHF, unspecified type however on GDMT for HFrEF  - TTE (9/2024): LVEF 55-60%, no diastolic dysfunction, moderate MR  - Evaluated by cardiology during admission at [facility] (9/2024) for acute CHF, started on GDMT  - c/w home metoprolol, spironolactone and lasix with hold parameters   - Strict Is&Os q6h  - Does not appear clinically volume overloaded  - Cardiology ([place] [facility]) consulted,
Chronic CHF.   H/O of CHF, unspecified type however on GDMT for HFrEF  - TTE (9/2024): LVEF 55-60%, no diastolic dysfunction, moderate MR  - Evaluated by cardiology during admission at [facility] (9/2024) for acute CHF, started on GDMT  - c/w home metoprolol, spironolactone and lasix with hold parameters   - Strict Is&Os q6h  - Does not appear clinically volume overloaded  - Cardiology ([place] [facility]) consulted,

## 2025-01-07 NOTE — PROGRESS NOTE ADULT - SUBJECTIVE AND OBJECTIVE BOX
[facility] -- Janel Jackson Pannella, Patel, Savella, Goodger, Cohen: Office # [phone]    Follow Up:  Chf pe htn    Subjective/Observations: No events overnight resting comfortably in bed.  No complaints of chest pain, dyspnea, or palpitations reported. No signs of orthopnea or PND. Tolerating room air     REVIEW OF SYSTEMS: All other review of systems are negative unless indicated above    PAST MEDICAL & SURGICAL HISTORY:  Calculus of kidney      Club foot  Born Right Foot      Myasthenia gravis      Hypertension      Diabetes  Type 2 - does not take medications - monitors Blood Glucose at home - diet controlled      Urinary tract infection  notes h/o UTI's      Hyperlipidemia      Other muscle wasting and atrophy      H/O spinal stenosis      History of thrombocytopenia      H/O CHF      Elective surgery  1956 age 11 @ [facility] - cut under Patella secondary 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      Club foot  Surgery at birth for Club Foot Right foot      Pilonidal cyst  Surgery 40 years ago      H/O colonoscopy      H/O prostate biopsy      Nephrostomy present          MEDICATIONS  (STANDING):  enoxaparin Injectable 120 milliGRAM(s) SubCutaneous every 12 hours  finasteride 5 milliGRAM(s) Oral daily  furosemide    Tablet 20 milliGRAM(s) Oral daily  meropenem  IVPB 1000 milliGRAM(s) IV Intermittent every 8 hours  metoprolol succinate ER 25 milliGRAM(s) Oral daily  multivitamin 1 Tablet(s) Oral daily  pantoprazole    Tablet 40 milliGRAM(s) Oral before breakfast  polyethylene glycol 3350 17 Gram(s) Oral daily  potassium chloride    Tablet ER 40 milliEquivalent(s) Oral once  predniSONE   Tablet 10 milliGRAM(s) Oral daily  risperiDONE   Tablet 0.25 milliGRAM(s) Oral at bedtime  senna 1 Tablet(s) Oral at bedtime  tamsulosin 0.4 milliGRAM(s) Oral at bedtime    MEDICATIONS  (PRN):  acetaminophen     Tablet .. 650 milliGRAM(s) Oral every 6 hours PRN Mild Pain (1 - 3)  aluminum hydroxide/magnesium hydroxide/simethicone Suspension 30 milliLiter(s) Oral every 4 hours PRN Dyspepsia  melatonin 3 milliGRAM(s) Oral at bedtime PRN Insomnia  ondansetron Injectable 4 milliGRAM(s) IV Push every 8 hours PRN Nausea and/or Vomiting    Allergies    ofloxacin (Unknown)  gatifloxacin (Unknown)  Cipro (Unknown)  levofloxacin (Unknown)    Intolerances    Avelox (Other)  telithromycin (Other)  fluoroquinolone antibiotics (Other)  Ketek (Other)    Vital Signs Last 24 Hrs  T(C): 36.4 (07 Jan 2025 05:04), Max: 36.4 (07 Jan 2025 05:04)  T(F): 97.6 (07 Jan 2025 05:04), Max: 97.6 (07 Jan 2025 05:04)  HR: 65 (07 Jan 2025 05:04) (65 - 65)  BP: 131/82 (07 Jan 2025 05:04) (131/82 - 131/82)  BP(mean): --  RR: 19 (07 Jan 2025 05:04) (19 - 19)  SpO2: 95% (07 Jan 2025 05:04) (95% - 95%)    Parameters below as of 07 Jan 2025 05:04  Patient On (Oxygen Delivery Method): room air      I&O's Summary    06 Jan 2025 07:01  -  07 Jan 2025 07:00  --------------------------------------------------------  IN: 850 mL / OUT: 1050 mL / NET: -200 mL    07 Jan 2025 07:01  -  07 Jan 2025 11:38  --------------------------------------------------------  IN: 0 mL / OUT: 1075 mL / NET: -1075 mL          TELE: Off tele   PHYSICAL EXAM:  Constitutional: NAD, awake and alert  HEENT: Moist Mucous Membranes, Anicteric  Pulmonary: Non-labored, breath sounds are clear bilaterally, No wheezing, rales or rhonchi  Cardiovascular: Regular, S1 and S2, No murmurs, No rubs, gallops or clicks  Gastrointestinal:  soft, nontender, nondistended   Lymph: No peripheral edema. No lymphadenopathy.   Skin: No visible rashes or ulcers.  Psych:  Mood & affect appropriate      LABS: All Labs Reviewed:                        12.8   16.05 )-----------( 427      ( 07 Jan 2025 08:35 )             40.3                         12.7   14.04 )-----------( 401      ( 06 Jan 2025 07:30 )             39.5                         12.6   13.34 )-----------( 377      ( 05 Jan 2025 07:55 )             39.6     07 Jan 2025 08:35    140    |  106    |  18     ----------------------------<  118    3.4     |  27     |  0.80   06 Jan 2025 20:11    x      |  x      |  x      ----------------------------<  x      3.9     |  x      |  x      06 Jan 2025 07:30    140    |  107    |  18     ----------------------------<  101    3.1     |  27     |  0.81     Ca    9.2        07 Jan 2025 08:35  Ca    9.5        06 Jan 2025 07:30  Ca    9.2        05 Jan 2025 07:55  Phos  2.7       06 Jan 2025 07:30  Mg     2.3       06 Jan 2025 07:30       PT/INR - ( 06 Jan 2025 07:30 )   PT: 12.2 sec;   INR: 1.03 ratio         PTT - ( 06 Jan 2025 07:30 )  PTT:30.5 secCholesterol: 161 mg/dL (01-04-25 @ 07:18)  HDL Cholesterol: 56 mg/dL (01-04-25 @ 07:18)  Triglycerides, Serum: 97 mg/dL (01-04-25 @ 07:18)  Cholesterol: 113 mg/dL (11-20-24 @ 11:36)  HDL Cholesterol: 45 mg/dL (11-20-24 @ 11:36)  Triglycerides, Serum: 61 mg/dL (11-20-24 @ 11:36)    12 Lead ECG:   Ventricular Rate 67 BPM    Atrial Rate 67 BPM    P-R Interval 152 ms    QRS Duration 114 ms    Q-T Interval 362 ms    QTC Calculation(Bazett) 382 ms    P Axis 41 degrees    R Axis -52 degrees    T Axis 69 degrees    Diagnosis Line Sinus rhythm withsinus arrhythmia with occasional premature ventricular complexes  Left anterior fascicular block  Nonspecific T wave abnormality  Abnormal ECG  When compared with ECG of 03-JAN-2025 10:38,  premature ventricular complexes are now present  T wave inversion more evident in Lateral leads  QT has shortened  Confirmed by BRENDAN GONZALEZ ([id number]) on 1/5/2025 5:48:12 PM (01-04-25 @ 07:40)      TRANSTHORACIC ECHOCARDIOGRAM REPORT  ________________________________________________________________________________                                      _______       Pt. Name:       DEION HEATH Study Date:    11/20/2024  MRN:            [id number]       YOB: 1945  Accession #:    [id number]      Age:           79 years  Account#:       [id number]     Gender:        M  Heart Rate:                    Height:        65.75 in (167.00 cm)  Rhythm:                        Weight:   199.00 lb (90.27 kg)  Blood Pressure: 103/58 mmHg    BSA/BMI:       1.99 m² / 32.37 kg/m²  ________________________________________________________________________________________  Referring Physician:    [id number] Curly Byrnes  Interpreting Physician: Lyndsay Marin MD  Primary Sonographer:    Ashli Arana [facility]    CPT:                ECHO TTE WO CON COMP W DOPP - 74100.m  Indication(s):      Abnormal electrocardiogram ECG/EKG - R94.31  Procedure:          Transthoracic echocardiogram with 2-D, M-mode and complete                      spectral and color flow Doppler.  Ordering Location:  ICU1  Admission Status:   Inpatient  Contrast Injection: Verbal consent was obtained for injection of Ultrasonic                      Enhancing Agent following a discussion of risks and                      benefits.                      Endocardial visualization enhanced with Definity Ultrasound                      enhancing agent.  Agitated Saline:    Injection with agitated saline was performed toevaluate for                      intracardiac shunting.  Study Information:  Image quality for this study is technically difficult.    _______________________________________________________________________________________     CONCLUSIONS:      1. Technically difficult image quality.   2. Agitated saline injection was negative for intracardiac shunt (though there is poor visualization of the LV during injection of agitated saline)   3. Despite definity use, the LV endocardium is still not well visualized.   4. In certain views, the LVEF appears grossly preserved though the apex appears hypokinetic.    ________________________________________________________________________________________  FINDINGS:     Interatrial Septum:  Agitated saline injection was negative for intracardiac shunt.  ________________________________________________________________________________________  Electronically signed on 11/20/2024 at 2:37:09 PM by Lyndsay Marin MD      *** Final ***

## 2025-01-07 NOTE — PROGRESS NOTE ADULT - PROBLEM SELECTOR PLAN 9
- c/w home flomax and finasteride.

## 2025-01-07 NOTE — PROGRESS NOTE ADULT - ASSESSMENT
78 yo M with PMHx HTN, CHF, PE (on eliquis), Myasthenia Gravis, and chronic LE edema, nephrolithiasis (s/p nephrostomy tube placement), urosepsis, BPH admitted for UTI treatment prior to scheduled for cystoscopy- TURP, bladder cysto litholapaxy- R ureteroscopy on 01/09/24.

## 2025-01-07 NOTE — CARE COORDINATION ASSESSMENT. - NSCAREPROVIDERS_GEN_ALL_CORE_FT
CARE PROVIDERS:  Accepting Physician: Shubham Deluca  Administration: Gisella Vera  Administration: Aren Pina  Administration: Deny Escalante  Administration: Ludwig Cortes  Admitting: Shubham Deluca  Attending: Mary Jang  Consultant: Jordin Mckinley  Consultant: Mariposa Blake  Consultant: Janet Loo  Consultant: Randell Herring  Consultant: Patricia Gordon  Consultant: Vitaliy Whaley  Consultant: Florina Galicia  Consultant: Ciro Linares  Consultant: Art Santana  Consultant: Spencer Driscoll  Consultant: Lyndsay Marin  Consultant: Polly Anthony  Consultant: Sha Royal  Consultant: Jim Forte  Consultant: Ana Philip  ED Attending: Robbie Sales  ED Nurse: Trey Gonzalez  Emergency Medicine: Robbie Sales  HIM/Billing & Coding: Carolina Ferraro  Intern: Abiola Marcus  Nurse: Stephanie Weston  Nurse: Tila Rodriguez  Nurse: Myranda Cortes  Oncology: DickeyJulieta Oconnell  Ordered: ServiceAccount, SCMMLM  Ordered: Doctor, Unknown  Outpatient Provider: Lloyd Kay  Outpatient Provider: Zay Cohen  Outpatient Provider: Ciro Linares  Outpatient Provider: Yoni Castellon  Outpatient Provider: Vinh Woods  Outpatient Provider: Colt Wade  Override: Tila Rodriguez  Override: Nicolette Pink  Override: Afia Cline  Override: Jacinto Neal  PCA/Nursing Assistant: Lucero Walker  Primary Team: Michael Rocha  Primary Team: Mary Jang  Registered Dietitian: Latesha Jarquin  Respiratory Therapy: Kelli Romero  Team: [facility], Team  UR// Supp. Assoc.: Saravanan Morales   CARE PROVIDERS:  Accepting Physician: Shubham Deluca  Administration: Gisella Vera  Administration: Aren Pina  Administration: Deny Escalante  Administration: Ludwig Cortes  Admitting: Shubham Deluca  Attending: Mary Jang  Consultant: Lyndsay Marin  Consultant: Spencer Driscoll  Consultant: Polly Anthony  Consultant: Sha Royal  Consultant: Jim Forte  Consultant: Ana Philip  Consultant: Patricia Gordon  Consultant: Vitaliy Whaley  Consultant: Florina Galicia  Consultant: Ciro Linares  Consultant: Art Santana  Consultant: Jordin Mckinley  Consultant: Mariposa Blake  Consultant: Janet Loo  Consultant: Randell Herring  ED Attending: Robbie Sales  ED Nurse: Trey Gonzalez  Emergency Medicine: Robbie Sales  HIM/Billing & Coding: Carolina Ferraro  Intern: Abiola Marcus  Nurse: Stephanie Weston  Nurse: Tila Rodriguez  Nurse: Myranda Cortes  Oncology: DickeyJulieta Oconnell  Ordered: ServiceAccount, SCMMLM  Ordered: Doctor, Unknown  Outpatient Provider: Lloyd Kay  Outpatient Provider: Zay Cohen  Outpatient Provider: Ciro Linares  Outpatient Provider: Yoni Castellon  Outpatient Provider: Vinh Woods  Outpatient Provider: Colt Wade  Override: Tila Rodriguez  Override: Nicolette Pink  Override: Afia Cline  Override: Jacinto Neal  PCA/Nursing Assistant: Lucero Walker  Primary Team: Michael Rocha  Primary Team: Mary Jang  Registered Dietitian: Latesha Jarquin  Respiratory Therapy: Kelli Romero  : Shani Fried  Team: [facility], Team  UR// Supp. Assoc.: Saravanan Morales

## 2025-01-07 NOTE — PROGRESS NOTE ADULT - PROBLEM SELECTOR PLAN 6
CT Chest 11/20/24:  2.6 cm left lobe thyroid nodule and evidence of granulomatous infection in lungs, liver and spleen  - TSH 0.58 11/20/24  -non-urgent thyroid US. out pt.

## 2025-01-07 NOTE — PROGRESS NOTE ADULT - PROBLEM SELECTOR PLAN 3
Chronic  - on home metoprolol 25mg PO   - DASH diet

## 2025-01-07 NOTE — PROGRESS NOTE ADULT - PROBLEM SELECTOR PROBLEM 7
Personal history of pulmonary embolism

## 2025-01-07 NOTE — PROGRESS NOTE ADULT - SUBJECTIVE AND OBJECTIVE BOX
[facility]  INFECTIOUS DISEASES   [address] [place] [address]  Tel: [phone]     Fax: [phone]  ========================================================  MD Nikolas Oquendo Michelle, MD Shah, Kaushal, MD Sunjit, Jaspal, MD Sehrish Shahid, MD   ========================================================    [id number]  DEION HEATH     Follow up: No complaint, no fever, no new overnight event.     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  Club foot  Surgery at birth for Club Foot Right foot  Pilonidal cyst  Surgery 40 years ago  H/O colonoscopy  H/O prostate biopsy  Nephrostomy present    Social Hx: No current smoking, EtOH or drugs     FAMILY HISTORY:  Family history of stroke (Father)  Father -  age 62    Family history of kidney disease (Mother)  Mother -  age 67    Family history of diabetes mellitus type II (Sibling)  Brother & Sister    Allergie  ofloxacin (Unknown)  gatifloxacin (Unknown)  Cipro (Unknown)  levofloxacin (Unknown)    Avelox (Other)  telithromycin (Other)  fluoroquinolone antibiotics (Other)  Ketek (Other)    MEDICATIONS  (STANDING):  meropenem  IVPB 1000 milliGRAM(s) IV Intermittent Once     REVIEW OF SYSTEMS:  CONSTITUTIONAL:  No Fever or chills  HEENT:   No diplopia or blurred vision.  No earache, sore throat or runny nose.  CARDIOVASCULAR:  No chest pain or SOB  RESPIRATORY:  No cough, shortness of breath, PND or orthopnea.  GASTROINTESTINAL:  No nausea, vomiting or diarrhea.  GENITOURINARY:  No dysuria, frequency or urgency. No Blood in urine  MUSCULOSKELETAL:  no joint aches, no muscle pain  SKIN:  No change in skin, hair or nails.    Physical Exam:  Vital Signs Last 24 Hrs  T(C): 36.6 (2025 12:01), Max: 36.6 (2025 12:01)  T(F): 97.8 (2025 12:01), Max: 97.8 (2025 12:01)  HR: 63 (2025 12:01) (63 - 65)  BP: 114/64 (2025 12:01) (114/64 - 131/82)  BP(mean): --  RR: 19 (2025 12:01) (19 - 19)  SpO2: 98% (2025 12:01) (95% - 98%)  Parameters below as of 2025 12:01  Patient On (Oxygen Delivery Method): room air  GEN: NAD  HEENT: normocephalic and atraumatic. EOMI. PERRL.    NECK: Supple.  No lymphadenopathy   LUNGS: Clear to auscultation.  HEART: Regular rate and rhythm   ABDOMEN: Soft, nontender, and nondistended.   Guerrero in place, no CVA tenderness, R nephrostomy  EXTREMITIES: Without edema. PICC looks fine  PSYCHIATRIC: Awake and alert but not oriented   SKIN: No rash     Labs:      140  |  106  |  18  ----------------------------<  118[H]  3.4[L]   |  27  |  0.80    Ca    9.2      2025 08:35  Phos  2.7     01-06  Mg     2.3     01-06                        12.8   16.05 )-----------( 427      ( 2025 08:35 )             40.3     PT/INR - ( 2025 07:30 )   PT: 12.2 sec;   INR: 1.03 ratio    PTT - ( 2025 07:30 )  PTT:30.5 sec  Urinalysis Basic - ( 2025 08:35 )    Color: x / Appearance: x / SG: x / pH: x  Gluc: 118 mg/dL / Ketone: x  / Bili: x / Urobili: x   Blood: x / Protein: x / Nitrite: x   Leuk Esterase: x / RBC: x / WBC x   Sq Epi: x / Non Sq Epi: x / Bacteria: x    RECENT CULTURES:   @ 22:20 Clean Catch     >=3 organisms. Probable collection contamination. Culture includes  >100,000 CFU/ml Klebsiella pneumoniae Susceptibility to follow.  >100,000 CFU/ml Pseudomonas aeruginosa Susceptibility to follow.     @ 10:31 .Blood BLOOD     No growth at 72 Hours     @ 10:25 .Blood BLOOD     No growth at 72 Hours    12- @ 16:20 Catheterized Catheterized     Culture grew 3 or more types of organisms which indicate  collection contamination; consider recollection only if clinically  indicated.    All imaging and other data have been reviewed.  < from: Xray Chest 1 View-PORTABLE IMMEDIATE (25 @ 10:47) >  IMPRESSION: Scarring with small granuloma in the right midlung again   noted. Small hilar calcified lymph nodes again seen.      Assessment and Plan:   80 yo man with PMH of HTN, CHF, PE (on eliquis), Myasthenia Gravis, and chronic LE edema, nephrolithiasis (s/p nephrostomy tube placement), urosepsis, BPH was transferred from [facility] for hypertension and elevated WBC.  No complaints, no fever, chills, chest pain, SOB, abdominal or flank pain, n/v/d/c or dysuria.    In last admission had some work up done for calcified granulomas in chest CT. Negative sputum for AFB x3 and negative fungal markers.   Also last time had CR pseudomonas for which completed zerbaxa.   Leukocytosis seems chronic he had numbers around 20 most days.   Since going for  procedure will treat him with ABx prior to surgery due to bacteriuria.   If there is carbapenem resistant bacteria will switch to zerbaxa prior to surgery.     # UTI?  # Chronic guerrero and nephrostomy   # Leukocytosis     - Blood cultures NGTD   - UCx with Klebsiella and pseudomonas so far, will follow sensitivity   - Monitor CBC   - Has been scheduled for cystoscopy- TURP, bladder cysto litholapaxy- R ureteroscopy on   - Restarted meropenem today to decrease possible complications    - If CRE then will switch to zerbaxa     Will follow.    Jordin Mckinley MD  Division of Infectious Diseases   Please call ID service at [phone] with any question.    50 minutes spent on total encounter assessing patient, examination, chart review, counseling and coordinating care by the attending physician/nurse/care manager.

## 2025-01-07 NOTE — PROGRESS NOTE ADULT - PROBLEM SELECTOR PLAN 1
known for recurrent UTI presented to the ED 2/2 urology referral for IV antibiotics prior to procedure on 1/9/25.  - H/O hosp in 9/2024, required emergent nephrostomy tube placement after failed stent placement for 7mm kidney stone. followed by ICU care for Klebsiella Bacteremia, urosepsis/ septic shock.    - H/O hosp with urosepsis on 11/20/24 required nephrostomy tube exchange 11/29/24,   - scheduled for cystoscopy- TURP, bladder cysto litholapaxy- R ureteroscopy on 01/06/25.   - completed 7 day course of IV zosyn in NH before transfer  - in ED WBC 14.58, Lac 3.4   - leucocytosis pt on steroid prednisone   - UA -> UCx  contamination   - BCx x2, neg    - ID (Dr. Mckinley) consulted, restarted iv abx meropenem  48 hr -  plan for or Thursday

## 2025-01-07 NOTE — PROGRESS NOTE ADULT - SUBJECTIVE AND OBJECTIVE BOX
Patient is a 79y old  Male who presents with a chief complaint of UTI (07 Jan 2025 11:56)      INTERVAL HPI/OVERNIGHT EVENTS: Patient seen and examined at bedside. No overnight events occurred. Patient has no complaints at this time. Denies fevers, chills, headache, lightheadedness, chest pain, dyspnea, abdominal pain, n/v/d/c.    MEDICATIONS  (STANDING):  enoxaparin Injectable 120 milliGRAM(s) SubCutaneous every 12 hours  finasteride 5 milliGRAM(s) Oral daily  furosemide    Tablet 20 milliGRAM(s) Oral daily  meropenem  IVPB 1000 milliGRAM(s) IV Intermittent every 8 hours  metoprolol succinate ER 25 milliGRAM(s) Oral daily  multivitamin 1 Tablet(s) Oral daily  pantoprazole    Tablet 40 milliGRAM(s) Oral before breakfast  polyethylene glycol 3350 17 Gram(s) Oral daily  predniSONE   Tablet 10 milliGRAM(s) Oral daily  risperiDONE   Tablet 0.25 milliGRAM(s) Oral at bedtime  senna 1 Tablet(s) Oral at bedtime  tamsulosin 0.4 milliGRAM(s) Oral at bedtime    MEDICATIONS  (PRN):  acetaminophen     Tablet .. 650 milliGRAM(s) Oral every 6 hours PRN Mild Pain (1 - 3)  aluminum hydroxide/magnesium hydroxide/simethicone Suspension 30 milliLiter(s) Oral every 4 hours PRN Dyspepsia  melatonin 3 milliGRAM(s) Oral at bedtime PRN Insomnia  ondansetron Injectable 4 milliGRAM(s) IV Push every 8 hours PRN Nausea and/or Vomiting      Allergies    ofloxacin (Unknown)  gatifloxacin (Unknown)  Cipro (Unknown)  levofloxacin (Unknown)    Intolerances    Avelox (Other)  telithromycin (Other)  fluoroquinolone antibiotics (Other)  Ketek (Other)      REVIEW OF SYSTEMS:  CONSTITUTIONAL: No fever or chills  HEENT:  No headache, no sore throat  RESPIRATORY: No cough, wheezing, or shortness of breath  CARDIOVASCULAR: No chest pain, palpitations  GASTROINTESTINAL: No abd pain, nausea, vomiting, or diarrhea  GENITOURINARY: No dysuria, frequency, or hematuria  NEUROLOGICAL: no focal weakness or dizziness  MUSCULOSKELETAL: no myalgias     Vital Signs Last 24 Hrs  T(C): 36.6 (07 Jan 2025 12:01), Max: 36.6 (07 Jan 2025 12:01)  T(F): 97.8 (07 Jan 2025 12:01), Max: 97.8 (07 Jan 2025 12:01)  HR: 63 (07 Jan 2025 12:01) (63 - 65)  BP: 114/64 (07 Jan 2025 12:01) (114/64 - 131/82)  BP(mean): --  RR: 19 (07 Jan 2025 12:01) (19 - 19)  SpO2: 98% (07 Jan 2025 12:01) (95% - 98%)    Parameters below as of 07 Jan 2025 12:01  Patient On (Oxygen Delivery Method): room air        PHYSICAL EXAM:  GENERAL: NAD,   HEAD:  Atraumatic, Normocephalic  EYES: EOMI, PERRLA, conjunctiva and sclera clear  ENMT:   Moist mucous membranes  NECK: Supple, No JVD  NERVOUS SYSTEM:  Alert & Oriented X3; Motor Strength 5/5 B/L upper and lower extremities; DTRs 2+ intact and symmetric  CHEST/LUNG:  percussion bilaterally; No rales, rhonchi, wheezing,   HEART: Regular rate and rhythm; No murmur  ABDOMEN: Soft, Nontender, Nondistended; Bowel sounds present  EXTREMITIES:  2+ Peripheral Pulses, No clubbing, cyanosis, or edema  SKIN: No rashes or lesions , nephrostomy +  gu guerrero     LABS:                        12.8   16.05 )-----------( 427      ( 07 Jan 2025 08:35 )             40.3     CBC Full  -  ( 07 Jan 2025 08:35 )  WBC Count : 16.05 K/uL  Hemoglobin : 12.8 g/dL  Hematocrit : 40.3 %  Platelet Count - Automated : 427 K/uL  Mean Cell Volume : 92.2 fl  Mean Cell Hemoglobin : 29.3 pg  Mean Cell Hemoglobin Concentration : 31.8 g/dL  Auto Neutrophil # : 11.33 K/uL  Auto Lymphocyte # : 2.03 K/uL  Auto Monocyte # : 1.44 K/uL  Auto Eosinophil # : 0.95 K/uL  Auto Basophil # : 0.10 K/uL  Auto Neutrophil % : 70.7 %  Auto Lymphocyte % : 12.6 %  Auto Monocyte % : 9.0 %  Auto Eosinophil % : 5.9 %  Auto Basophil % : 0.6 %    07 Jan 2025 08:35    140    |  106    |  18     ----------------------------<  118    3.4     |  27     |  0.80     Ca    9.2        07 Jan 2025 08:35      PT/INR - ( 06 Jan 2025 07:30 )   PT: 12.2 sec;   INR: 1.03 ratio         PTT - ( 06 Jan 2025 07:30 )  PTT:30.5 sec  Urinalysis Basic - ( 07 Jan 2025 08:35 )    Color: x / Appearance: x / SG: x / pH: x  Gluc: 118 mg/dL / Ketone: x  / Bili: x / Urobili: x   Blood: x / Protein: x / Nitrite: x   Leuk Esterase: x / RBC: x / WBC x   Sq Epi: x / Non Sq Epi: x / Bacteria: x      CAPILLARY BLOOD GLUCOSE            Culture - Urine (collected 01-03-25 @ 22:20)  Source: Clean Catch  Final Report (01-06-25 @ 18:27):    >=3 organisms. Probable collection contamination. Culture includes    >100,000 CFU/ml Klebsiella pneumoniae Susceptibility to follow.    >100,000 CFU/ml Pseudomonas aeruginosa Susceptibility to follow.    Urinalysis with Rflx Culture (collected 01-03-25 @ 22:20)    Culture - Blood (collected 01-03-25 @ 10:31)  Source: .Blood BLOOD  Preliminary Report (01-06-25 @ 17:01):    No growth at 72 Hours    Culture - Blood (collected 01-03-25 @ 10:25)  Source: .Blood BLOOD  Preliminary Report (01-06-25 @ 17:01):    No growth at 72 Hours        RADIOLOGY & ADDITIONAL TESTS:    Personally reviewed.     Consultant(s) Notes Reviewed:  [x] YES  [ ] NO

## 2025-01-07 NOTE — PROGRESS NOTE ADULT - PROBLEM SELECTOR PLAN 2
CT A/P 11/20/24: Percutaneous R nephrostomy tube. No hydronephrosis. Unremarkable L kidney. UB collapsed around guerrero. UB stone x3 measuring up to 1.0 cm.  - scheduled for cystoscopy- TURP, bladder cysto litholapaxy- R ureteroscopy on 01/06/24.   - care plan per Urology dr solorzano reschedule for or Thursday as need 48 hr  iv abx

## 2025-01-07 NOTE — PROGRESS NOTE ADULT - ASSESSMENT
79 year old male with PMH HTN, CHF, PE (on eliquis), Myasthenia Gravis, and chronic LE edema, BPH, nephrolithiasis (s/p nephrostomy tube placement), urosepsis, admitted for UTI treatment prior to scheduled for cystoscopy- TURP, bladder cysto litholapaxy- R ureteroscopy.    - Plan for OR Thursday. Patient needs to be on antibiotics 48 hours prior to surgery  - Continue guerrero/nephrostomy; monitor I&Os  - Eliquis currently held in anticipation of procedure  - Pain control PRN    Discussed with Dr Antoine

## 2025-01-07 NOTE — PROGRESS NOTE ADULT - SUBJECTIVE AND OBJECTIVE BOX
Patient is a 79y old  Male who presents with a chief complaint of UTI (07 Jan 2025 11:56)    Patient seen and examined at bedside. Pt without complaints of abd pain, dysuria, fever or chills    MEDICATIONS:  MEDICATIONS  (STANDING):  enoxaparin Injectable 120 milliGRAM(s) SubCutaneous every 12 hours  finasteride 5 milliGRAM(s) Oral daily  furosemide    Tablet 20 milliGRAM(s) Oral daily  meropenem  IVPB 1000 milliGRAM(s) IV Intermittent every 8 hours  metoprolol succinate ER 25 milliGRAM(s) Oral daily  multivitamin 1 Tablet(s) Oral daily  pantoprazole    Tablet 40 milliGRAM(s) Oral before breakfast  polyethylene glycol 3350 17 Gram(s) Oral daily  predniSONE   Tablet 10 milliGRAM(s) Oral daily  risperiDONE   Tablet 0.25 milliGRAM(s) Oral at bedtime  senna 1 Tablet(s) Oral at bedtime  tamsulosin 0.4 milliGRAM(s) Oral at bedtime    MEDICATIONS  (PRN):  acetaminophen     Tablet .. 650 milliGRAM(s) Oral every 6 hours PRN Mild Pain (1 - 3)  aluminum hydroxide/magnesium hydroxide/simethicone Suspension 30 milliLiter(s) Oral every 4 hours PRN Dyspepsia  melatonin 3 milliGRAM(s) Oral at bedtime PRN Insomnia  ondansetron Injectable 4 milliGRAM(s) IV Push every 8 hours PRN Nausea and/or Vomiting      Allergies    ofloxacin (Unknown)  gatifloxacin (Unknown)  Cipro (Unknown)  levofloxacin (Unknown)    Intolerances    Avelox (Other)  telithromycin (Other)  fluoroquinolone antibiotics (Other)  Ketek (Other)      T(C): 36.6 (01-07-25 @ 12:01), Max: 36.8 (01-05-25 @ 20:42)  T(F): 97.8 (01-07-25 @ 12:01), Max: 98.2 (01-05-25 @ 20:42)  HR: 63 (01-07-25 @ 12:01) (55 - 65)  BP: 114/64 (01-07-25 @ 12:01) (112/69 - 135/80)  RR: 19 (01-07-25 @ 12:01) (17 - 19)  SpO2: 98% (01-07-25 @ 12:01) (94% - 98%)          01-05-25 @ 07:01  -  01-06-25 @ 07:00  --------------------------------------------------------  IN:  Total IN: 0 mL    OUT:    Indwelling Catheter - Urethral (mL): 750 mL    Nephrostomy Tube (mL): 10 mL  Total OUT: 760 mL    Total NET: -760 mL      01-06-25 @ 07:01  -  01-07-25 @ 07:00  --------------------------------------------------------  IN:  Total IN: 0 mL    OUT:    Indwelling Catheter - Urethral (mL): 850 mL    Nephrostomy Tube (mL): 200 mL  Total OUT: 1050 mL    Total NET: -1050 mL      01-07-25 @ 07:01  -  01-07-25 @ 14:16  --------------------------------------------------------  IN:  Total IN: 0 mL    OUT:    Indwelling Catheter - Urethral (mL): 975 mL    Nephrostomy Tube (mL): 900 mL  Total OUT: 1875 mL    Total NET: -1875 mL          LABS:      CBC Full  -  ( 07 Jan 2025 08:35 )  WBC Count : 16.05 K/uL  RBC Count : 4.37 M/uL  Hemoglobin : 12.8 g/dL  Hematocrit : 40.3 %  Platelet Count - Automated : 427 K/uL  Mean Cell Volume : 92.2 fl  Mean Cell Hemoglobin : 29.3 pg  Mean Cell Hemoglobin Concentration : 31.8 g/dL  Auto Neutrophil # : 11.33 K/uL  Auto Lymphocyte # : 2.03 K/uL  Auto Monocyte # : 1.44 K/uL  Auto Eosinophil # : 0.95 K/uL  Auto Basophil # : 0.10 K/uL  Auto Neutrophil % : 70.7 %  Auto Lymphocyte % : 12.6 %  Auto Monocyte % : 9.0 %  Auto Eosinophil % : 5.9 %  Auto Basophil % : 0.6 %    01-07    140  |  106  |  18  ----------------------------<  118[H]  3.4[L]   |  27  |  0.80    Ca    9.2      07 Jan 2025 08:35  Phos  2.7     01-06  Mg     2.3     01-06      PT/INR - ( 06 Jan 2025 07:30 )   PT: 12.2 sec;   INR: 1.03 ratio         PTT - ( 06 Jan 2025 07:30 )  PTT:30.5 sec    Urinalysis Basic - ( 07 Jan 2025 08:35 )    Color: x / Appearance: x / SG: x / pH: x  Gluc: 118 mg/dL / Ketone: x  / Bili: x / Urobili: x   Blood: x / Protein: x / Nitrite: x   Leuk Esterase: x / RBC: x / WBC x   Sq Epi: x / Non Sq Epi: x / Bacteria: x      RECENT CULTURES:  01-03 Clean Catch XXXX XXXX   >=3 organisms. Probable collection contamination. Culture includes  >100,000 CFU/ml Klebsiella pneumoniae Susceptibility to follow.  >100,000 CFU/ml Pseudomonas aeruginosa Susceptibility to follow.    01-03 .Blood BLOOD XXXX XXXX   No growth at 72 Hours    01-03 .Blood BLOOD XXXX XXXX   No growth at 72 Hours              Physical Exam    Constitutional: alert, no acute distress    Abdomen: soft, nontender, nondistended, no CVA, Right nephrostomy tube with scant yellow urine.    Genitourinary: Restrepo with clear yellow urine

## 2025-01-07 NOTE — PROGRESS NOTE ADULT - SUBJECTIVE AND OBJECTIVE BOX
[facility]                                                       Dr. Linares, Dr. Collado, Dr. Tian, Dr. Brown, Dr. Vallejo, Dr. Manzo                                      [facility], [facility], [address]                                                 [address] [address]                                      Ph: [phone]  Fax: [phone]                                         Ph: [phone]  Fax: [phone]      Patient is a 79y old  Male who presents with a chief complaint of UTI (07 Jan 2025 11:38)    Patient seen in follow up for hypokalemia.        PAST MEDICAL HISTORY:  Calculus of kidney    Club foot    Myasthenia gravis    Hypertension    Diabetes    Urinary tract infection    Hyperlipidemia    Other muscle wasting and atrophy    H/O spinal stenosis    History of thrombocytopenia    H/O CHF      MEDICATIONS  (STANDING):  enoxaparin Injectable 120 milliGRAM(s) SubCutaneous every 12 hours  finasteride 5 milliGRAM(s) Oral daily  furosemide    Tablet 20 milliGRAM(s) Oral daily  meropenem  IVPB 1000 milliGRAM(s) IV Intermittent every 8 hours  metoprolol succinate ER 25 milliGRAM(s) Oral daily  multivitamin 1 Tablet(s) Oral daily  pantoprazole    Tablet 40 milliGRAM(s) Oral before breakfast  polyethylene glycol 3350 17 Gram(s) Oral daily  potassium chloride    Tablet ER 40 milliEquivalent(s) Oral once  predniSONE   Tablet 10 milliGRAM(s) Oral daily  risperiDONE   Tablet 0.25 milliGRAM(s) Oral at bedtime  senna 1 Tablet(s) Oral at bedtime  tamsulosin 0.4 milliGRAM(s) Oral at bedtime    MEDICATIONS  (PRN):  acetaminophen     Tablet .. 650 milliGRAM(s) Oral every 6 hours PRN Mild Pain (1 - 3)  aluminum hydroxide/magnesium hydroxide/simethicone Suspension 30 milliLiter(s) Oral every 4 hours PRN Dyspepsia  melatonin 3 milliGRAM(s) Oral at bedtime PRN Insomnia  ondansetron Injectable 4 milliGRAM(s) IV Push every 8 hours PRN Nausea and/or Vomiting    T(C): 36.4 (01-07-25 @ 05:04), Max: 36.8 (01-06-25 @ 06:30)  HR: 65 (01-07-25 @ 05:04) (55 - 65)  BP: 131/82 (01-07-25 @ 05:04) (130/78 - 135/80)  RR: 19 (01-07-25 @ 05:04) (17 - 19)  SpO2: 95% (01-07-25 @ 05:04) (94% - 95%)  Wt(kg): --  I&O's Detail    06 Jan 2025 07:01  -  07 Jan 2025 07:00  --------------------------------------------------------  IN:    IV PiggyBack: 50 mL    IV PiggyBack: 200 mL    Oral Fluid: 600 mL  Total IN: 850 mL    OUT:    Indwelling Catheter - Urethral (mL): 850 mL    Nephrostomy Tube (mL): 200 mL  Total OUT: 1050 mL    Total NET: -200 mL      07 Jan 2025 07:01  -  07 Jan 2025 11:57  --------------------------------------------------------  IN:  Total IN: 0 mL    OUT:    Indwelling Catheter - Urethral (mL): 425 mL    Nephrostomy Tube (mL): 650 mL  Total OUT: 1075 mL    Total NET: -1075 mL          PHYSICAL EXAM:  General: No distress  Respiratory: b/l air entry  Cardiovascular: S1 S2  Gastrointestinal: soft  Extremities:  + edema                              12.8   16.05 )-----------( 427      ( 07 Jan 2025 08:35 )             40.3     01-07    140  |  106  |  18  ----------------------------<  118[H]  3.4[L]   |  27  |  0.80    Ca    9.2      07 Jan 2025 08:35  Phos  2.7     01-06  Mg     2.3     01-06            Urinalysis Basic - ( 07 Jan 2025 08:35 )    Color: x / Appearance: x / SG: x / pH: x  Gluc: 118 mg/dL / Ketone: x  / Bili: x / Urobili: x   Blood: x / Protein: x / Nitrite: x   Leuk Esterase: x / RBC: x / WBC x   Sq Epi: x / Non Sq Epi: x / Bacteria: x        Sodium, Serum: 140 (01-07 @ 08:35)  Sodium, Serum: 140 (01-06 @ 07:30)  Sodium, Serum: 141 (01-05 @ 07:55)  Sodium, Serum: 139 (01-04 @ 07:18)    Creatinine, Serum: 0.80 (01-07 @ 08:35)  Creatinine, Serum: 0.81 (01-06 @ 07:30)  Creatinine, Serum: 0.73 (01-05 @ 07:55)  Creatinine, Serum: 0.85 (01-04 @ 07:18)    Potassium, Serum: 3.4 (01-07 @ 08:35)  Potassium, Serum: 3.9 (01-06 @ 20:11)  Potassium, Serum: 3.1 (01-06 @ 07:30)  Potassium, Serum: 3.5 (01-05 @ 07:55)    Hemoglobin: 12.8 (01-07 @ 08:35)  Hemoglobin: 12.7 (01-06 @ 07:30)  Hemoglobin: 12.6 (01-05 @ 07:55)  Hemoglobin: 12.7 (01-04 @ 07:18)

## 2025-01-08 ENCOUNTER — TRANSCRIPTION ENCOUNTER (OUTPATIENT)
Age: 80
End: 2025-01-08

## 2025-01-08 LAB
-  AMIKACIN: SIGNIFICANT CHANGE UP
-  AMPICILLIN/SULBACTAM: SIGNIFICANT CHANGE UP
-  AMPICILLIN: SIGNIFICANT CHANGE UP
-  AZTREONAM: SIGNIFICANT CHANGE UP
-  AZTREONAM: SIGNIFICANT CHANGE UP
-  CEFAZOLIN: SIGNIFICANT CHANGE UP
-  CEFEPIME: SIGNIFICANT CHANGE UP
-  CEFEPIME: SIGNIFICANT CHANGE UP
-  CEFTAZIDIME/AVIBACTAM: SIGNIFICANT CHANGE UP
-  CEFTAZIDIME: SIGNIFICANT CHANGE UP
-  CEFTOLOZANE/TAZOBACTAM: SIGNIFICANT CHANGE UP
-  CEFTRIAXONE: SIGNIFICANT CHANGE UP
-  CEFUROXIME: SIGNIFICANT CHANGE UP
-  CIPROFLOXACIN: SIGNIFICANT CHANGE UP
-  CIPROFLOXACIN: SIGNIFICANT CHANGE UP
-  ERTAPENEM: SIGNIFICANT CHANGE UP
-  GENTAMICIN: SIGNIFICANT CHANGE UP
-  IMIPENEM: SIGNIFICANT CHANGE UP
-  IMIPENEM: SIGNIFICANT CHANGE UP
-  LEVOFLOXACIN: SIGNIFICANT CHANGE UP
-  LEVOFLOXACIN: SIGNIFICANT CHANGE UP
-  MEROPENEM: SIGNIFICANT CHANGE UP
-  MEROPENEM: SIGNIFICANT CHANGE UP
-  NITROFURANTOIN: SIGNIFICANT CHANGE UP
-  PIPERACILLIN/TAZOBACTAM: SIGNIFICANT CHANGE UP
-  PIPERACILLIN/TAZOBACTAM: SIGNIFICANT CHANGE UP
-  TOBRAMYCIN: SIGNIFICANT CHANGE UP
-  TRIMETHOPRIM/SULFAMETHOXAZOLE: SIGNIFICANT CHANGE UP
ALBUMIN SERPL ELPH-MCNC: 2.5 G/DL — LOW (ref 3.3–5)
ALP SERPL-CCNC: 58 U/L — SIGNIFICANT CHANGE UP (ref 40–120)
ALT FLD-CCNC: 20 U/L — SIGNIFICANT CHANGE UP (ref 12–78)
ANION GAP SERPL CALC-SCNC: 8 MMOL/L — SIGNIFICANT CHANGE UP (ref 5–17)
AST SERPL-CCNC: 15 U/L — SIGNIFICANT CHANGE UP (ref 15–37)
BASOPHILS # BLD AUTO: 0.11 K/UL — SIGNIFICANT CHANGE UP (ref 0–0.2)
BASOPHILS NFR BLD AUTO: 0.9 % — SIGNIFICANT CHANGE UP (ref 0–2)
BILIRUB SERPL-MCNC: 0.2 MG/DL — SIGNIFICANT CHANGE UP (ref 0.2–1.2)
BUN SERPL-MCNC: 16 MG/DL — SIGNIFICANT CHANGE UP (ref 7–23)
CALCIUM SERPL-MCNC: 9.4 MG/DL — SIGNIFICANT CHANGE UP (ref 8.5–10.1)
CHLORIDE SERPL-SCNC: 107 MMOL/L — SIGNIFICANT CHANGE UP (ref 96–108)
CO2 SERPL-SCNC: 26 MMOL/L — SIGNIFICANT CHANGE UP (ref 22–31)
CREAT SERPL-MCNC: 0.73 MG/DL — SIGNIFICANT CHANGE UP (ref 0.5–1.3)
CULTURE RESULTS: ABNORMAL
CULTURE RESULTS: SIGNIFICANT CHANGE UP
CULTURE RESULTS: SIGNIFICANT CHANGE UP
EGFR: 91 ML/MIN/1.73M2 — SIGNIFICANT CHANGE UP
EOSINOPHIL # BLD AUTO: 0.99 K/UL — HIGH (ref 0–0.5)
EOSINOPHIL NFR BLD AUTO: 8 % — HIGH (ref 0–6)
GLUCOSE SERPL-MCNC: 87 MG/DL — SIGNIFICANT CHANGE UP (ref 70–99)
HCT VFR BLD CALC: 39.8 % — SIGNIFICANT CHANGE UP (ref 39–50)
HGB BLD-MCNC: 12.7 G/DL — LOW (ref 13–17)
IMM GRANULOCYTES NFR BLD AUTO: 1.3 % — HIGH (ref 0–0.9)
LYMPHOCYTES # BLD AUTO: 2.82 K/UL — SIGNIFICANT CHANGE UP (ref 1–3.3)
LYMPHOCYTES # BLD AUTO: 22.8 % — SIGNIFICANT CHANGE UP (ref 13–44)
MAGNESIUM SERPL-MCNC: 2.2 MG/DL — SIGNIFICANT CHANGE UP (ref 1.6–2.6)
MCHC RBC-ENTMCNC: 29.5 PG — SIGNIFICANT CHANGE UP (ref 27–34)
MCHC RBC-ENTMCNC: 31.9 G/DL — LOW (ref 32–36)
MCV RBC AUTO: 92.3 FL — SIGNIFICANT CHANGE UP (ref 80–100)
METHOD TYPE: SIGNIFICANT CHANGE UP
METHOD TYPE: SIGNIFICANT CHANGE UP
MONOCYTES # BLD AUTO: 1.43 K/UL — HIGH (ref 0–0.9)
MONOCYTES NFR BLD AUTO: 11.5 % — SIGNIFICANT CHANGE UP (ref 2–14)
NEUTROPHILS # BLD AUTO: 6.88 K/UL — SIGNIFICANT CHANGE UP (ref 1.8–7.4)
NEUTROPHILS NFR BLD AUTO: 55.5 % — SIGNIFICANT CHANGE UP (ref 43–77)
NRBC # BLD: 0 /100 WBCS — SIGNIFICANT CHANGE UP (ref 0–0)
PHOSPHATE SERPL-MCNC: 2.3 MG/DL — LOW (ref 2.5–4.5)
PLATELET # BLD AUTO: 380 K/UL — SIGNIFICANT CHANGE UP (ref 150–400)
POTASSIUM SERPL-MCNC: 4 MMOL/L — SIGNIFICANT CHANGE UP (ref 3.5–5.3)
POTASSIUM SERPL-SCNC: 4 MMOL/L — SIGNIFICANT CHANGE UP (ref 3.5–5.3)
PROT SERPL-MCNC: 6.3 G/DL — SIGNIFICANT CHANGE UP (ref 6–8.3)
RBC # BLD: 4.31 M/UL — SIGNIFICANT CHANGE UP (ref 4.2–5.8)
RBC # FLD: 16.5 % — HIGH (ref 10.3–14.5)
SODIUM SERPL-SCNC: 141 MMOL/L — SIGNIFICANT CHANGE UP (ref 135–145)
SPECIMEN SOURCE: SIGNIFICANT CHANGE UP
SPECIMEN SOURCE: SIGNIFICANT CHANGE UP
WBC # BLD: 12.39 K/UL — HIGH (ref 3.8–10.5)
WBC # FLD AUTO: 12.39 K/UL — HIGH (ref 3.8–10.5)

## 2025-01-08 PROCEDURE — 93970 EXTREMITY STUDY: CPT | Mod: 26

## 2025-01-08 PROCEDURE — 99233 SBSQ HOSP IP/OBS HIGH 50: CPT

## 2025-01-08 PROCEDURE — 99232 SBSQ HOSP IP/OBS MODERATE 35: CPT

## 2025-01-08 PROCEDURE — G0545: CPT

## 2025-01-08 RX ORDER — SODIUM CHLORIDE 9 MG/ML
1000 INJECTION, SOLUTION INTRAMUSCULAR; INTRAVENOUS; SUBCUTANEOUS
Refills: 0 | Status: DISCONTINUED | OUTPATIENT
Start: 2025-01-08 | End: 2025-01-09

## 2025-01-08 RX ORDER — CEFTOLOZANE AND TAZOBACTAM 1; .5 G/10ML; G/10ML
1500 INJECTION, POWDER, LYOPHILIZED, FOR SOLUTION INTRAVENOUS EVERY 8 HOURS
Refills: 0 | Status: DISCONTINUED | OUTPATIENT
Start: 2025-01-08 | End: 2025-01-09

## 2025-01-08 RX ADMIN — MEROPENEM 100 MILLIGRAM(S): 1 INJECTION, POWDER, FOR SOLUTION INTRAVENOUS at 08:16

## 2025-01-08 RX ADMIN — TAMSULOSIN HYDROCHLORIDE 0.4 MILLIGRAM(S): 0.4 CAPSULE ORAL at 20:04

## 2025-01-08 RX ADMIN — CEFTOLOZANE AND TAZOBACTAM 100 MILLIGRAM(S): 1; .5 INJECTION, POWDER, LYOPHILIZED, FOR SOLUTION INTRAVENOUS at 17:24

## 2025-01-08 RX ADMIN — RISPERIDONE 0.25 MILLIGRAM(S): 0.5 TABLET ORAL at 20:04

## 2025-01-08 RX ADMIN — CEFTOLOZANE AND TAZOBACTAM 100 MILLIGRAM(S): 1; .5 INJECTION, POWDER, LYOPHILIZED, FOR SOLUTION INTRAVENOUS at 10:12

## 2025-01-08 RX ADMIN — ACETAMINOPHEN 650 MILLIGRAM(S): 80 SOLUTION/ DROPS ORAL at 20:04

## 2025-01-08 RX ADMIN — SENNOSIDES 1 TABLET(S): 8.6 TABLET, FILM COATED ORAL at 20:04

## 2025-01-08 RX ADMIN — ACETAMINOPHEN 650 MILLIGRAM(S): 80 SOLUTION/ DROPS ORAL at 21:04

## 2025-01-08 NOTE — DIETITIAN INITIAL EVALUATION ADULT - NS FNS DIET ORDER
Diet, NPO after Midnight:      NPO Start Date: 08-Jan-2025,   NPO Start Time: 23:59  Except Medications (01-08-25 @ 08:50)

## 2025-01-08 NOTE — DIETITIAN INITIAL EVALUATION ADULT - NSFNSPHYEXAMSKINFT_GEN_A_CORE
Pressure Injury 1: Left:, heel, Unstageable  Pressure Injury 2: Right:, heel, Stage I  Pressure Injury 3: right buttocks, Stage I

## 2025-01-08 NOTE — PROGRESS NOTE ADULT - SUBJECTIVE AND OBJECTIVE BOX
Interval Events:  Patient seen and examined at bedside. Patient without complaints of abd pain, dysuria, fever or chills. Plan for OR tomorrow for TURP. Guerrero in place draining clear yellow urine. Patient refusing medications this AM.     MEDICATIONS:  MEDICATIONS  (STANDING):  finasteride 5 milliGRAM(s) Oral daily  furosemide    Tablet 20 milliGRAM(s) Oral daily  meropenem  IVPB 1000 milliGRAM(s) IV Intermittent every 8 hours  metoprolol succinate ER 25 milliGRAM(s) Oral daily  multivitamin 1 Tablet(s) Oral daily  pantoprazole    Tablet 40 milliGRAM(s) Oral before breakfast  polyethylene glycol 3350 17 Gram(s) Oral daily  predniSONE   Tablet 10 milliGRAM(s) Oral daily  risperiDONE   Tablet 0.25 milliGRAM(s) Oral at bedtime  senna 1 Tablet(s) Oral at bedtime  tamsulosin 0.4 milliGRAM(s) Oral at bedtime    MEDICATIONS  (PRN):  acetaminophen     Tablet .. 650 milliGRAM(s) Oral every 6 hours PRN Mild Pain (1 - 3)  aluminum hydroxide/magnesium hydroxide/simethicone Suspension 30 milliLiter(s) Oral every 4 hours PRN Dyspepsia  melatonin 3 milliGRAM(s) Oral at bedtime PRN Insomnia  ondansetron Injectable 4 milliGRAM(s) IV Push every 8 hours PRN Nausea and/or Vomiting      Allergies    ofloxacin (Unknown)  gatifloxacin (Unknown)  Cipro (Unknown)  levofloxacin (Unknown)    Intolerances    Avelox (Other)  telithromycin (Other)  fluoroquinolone antibiotics (Other)  Ketek (Other)      T(C): 36.4 (01-08-25 @ 06:42), Max: 36.6 (01-06-25 @ 11:16)  T(F): 97.5 (01-08-25 @ 06:42), Max: 97.8 (01-06-25 @ 11:16)  HR: 57 (01-08-25 @ 06:42) (55 - 65)  BP: 119/62 (01-08-25 @ 06:42) (114/64 - 135/80)  RR: 20 (01-08-25 @ 06:42) (18 - 20)  SpO2: 96% (01-08-25 @ 06:42) (94% - 98%)          01-06-25 @ 07:01  -  01-07-25 @ 07:00  --------------------------------------------------------  IN:  Total IN: 0 mL    OUT:    Indwelling Catheter - Urethral (mL): 850 mL    Nephrostomy Tube (mL): 200 mL  Total OUT: 1050 mL    Total NET: -1050 mL      01-07-25 @ 07:01  -  01-08-25 @ 07:00  --------------------------------------------------------  IN:  Total IN: 0 mL    OUT:    Indwelling Catheter - Urethral (mL): 1425 mL    Nephrostomy Tube (mL): 1450 mL  Total OUT: 2875 mL    Total NET: -2875 mL          LABS:      CBC Full  -  ( 07 Jan 2025 08:35 )  WBC Count : 16.05 K/uL  RBC Count : 4.37 M/uL  Hemoglobin : 12.8 g/dL  Hematocrit : 40.3 %  Platelet Count - Automated : 427 K/uL  Mean Cell Volume : 92.2 fl  Mean Cell Hemoglobin : 29.3 pg  Mean Cell Hemoglobin Concentration : 31.8 g/dL  Auto Neutrophil # : 11.33 K/uL  Auto Lymphocyte # : 2.03 K/uL  Auto Monocyte # : 1.44 K/uL  Auto Eosinophil # : 0.95 K/uL  Auto Basophil # : 0.10 K/uL  Auto Neutrophil % : 70.7 %  Auto Lymphocyte % : 12.6 %  Auto Monocyte % : 9.0 %  Auto Eosinophil % : 5.9 %  Auto Basophil % : 0.6 %    01-07    140  |  106  |  18  ----------------------------<  118[H]  3.4[L]   |  27  |  0.80    Ca    9.2      07 Jan 2025 08:35          Urinalysis Basic - ( 07 Jan 2025 08:35 )    Color: x / Appearance: x / SG: x / pH: x  Gluc: 118 mg/dL / Ketone: x  / Bili: x / Urobili: x   Blood: x / Protein: x / Nitrite: x   Leuk Esterase: x / RBC: x / WBC x   Sq Epi: x / Non Sq Epi: x / Bacteria: x    Culture - Urine (01.03.25 @ 22:20)    -  Resistance Gene to Carbapenem: Nondet   Specimen Source: Clean Catch   Culture Results:   >=3 organisms. Probable collection contamination. Culture includes  >100,000 CFU/ml Pseudomonas aeruginosa (Carbapenem Resistant)  >100,000 CFU/ml Klebsiella pneumoniae  Unable to evaluate further due to Pseudomonas  overgrowth   Organism Identification: Pseudomonas aeruginosa (Carbapenem Resistant)   Organism: Pseudomonas aeruginosa (Carbapenem Resistant)   Method Type: CarbaR      Physical Exam  Constitutional: alert, no acute distress  Abdomen: soft, nontender, nondistended, no CVA, Right Nephrostomy tube with scant yellow urine  Genitourinary: Guerrero with clear yellow urine      Assessment:  79 year old male with PMH HTN, CHF, PE (on eliquis), Myasthenia Gravis, and chronic LE edema, BPH, nephrolithiasis (s/p nephrostomy tube placement), urosepsis, admitted for UTI treatment prior to scheduled for cystoscopy- TURP, bladder cysto litholapaxy- R ureteroscopy.    Plan  - Plan for OR tomorrow  - Maintain guerrero/nephrostomy, monitor output  - Continue Abx  - Will hold lovenox tonight in anticipation of OR tomorrow  - NPO after midnight for OR tomorrow  - Appreciate preop clearance from Cardiology  - Pain control PRN

## 2025-01-08 NOTE — DIETITIAN INITIAL EVALUATION ADULT - ORAL INTAKE PTA/DIET HISTORY
Pt admitted from [facility]. Reviewed transfer documents; pt was on a NCS, low Na, mechanical soft diet. +LPS 30ml BID.

## 2025-01-08 NOTE — PROGRESS NOTE ADULT - SUBJECTIVE AND OBJECTIVE BOX
CHIEF COMPLAINT/INTERVAL HISTORY:  Pt. seen and evaluated for UTI and BPH.  Pt. lying in bed in no distress.  Tolerating IV antibiotics.  Denies having CP or SOB.    REVIEW OF SYSTEMS:  No fever, CP, SOB, or abdominal pain     Vital Signs Last 24 Hrs  T(C): 36.4 (08 Jan 2025 06:42), Max: 36.6 (07 Jan 2025 12:01)  T(F): 97.5 (08 Jan 2025 06:42), Max: 97.8 (07 Jan 2025 12:01)  HR: 57 (08 Jan 2025 06:42) (57 - 64)  BP: 119/62 (08 Jan 2025 06:42) (114/64 - 129/72)  BP(mean): --  RR: 20 (08 Jan 2025 06:42) (18 - 20)  SpO2: 96% (08 Jan 2025 06:42) (96% - 98%)    Parameters below as of 08 Jan 2025 06:42  Patient On (Oxygen Delivery Method): room air        PHYSICAL EXAM:  GENERAL: NAD  HEENT: hearing normal, patient keeping his eyes closed  Chest: CTA bilaterally, no wheezing  CV: S1S2, RRR,   GI: soft, +BS, NT/ND  : +guerrero catheter  Musculoskeletal: no LE edema by visualization.  Pt. did not want me to touch his legs.  Psychiatric: affect nL, mood nL  Skin: warm and dry    LABS:                        12.8   16.05 )-----------( 427      ( 07 Jan 2025 08:35 )             40.3     01-07    140  |  106  |  18  ----------------------------<  118[H]  3.4[L]   |  27  |  0.80    Ca    9.2      07 Jan 2025 08:35        Urinalysis Basic - ( 07 Jan 2025 08:35 )    Color: x / Appearance: x / SG: x / pH: x  Gluc: 118 mg/dL / Ketone: x  / Bili: x / Urobili: x   Blood: x / Protein: x / Nitrite: x   Leuk Esterase: x / RBC: x / WBC x   Sq Epi: x / Non Sq Epi: x / Bacteria: x

## 2025-01-08 NOTE — DIETITIAN INITIAL EVALUATION ADULT - PROBLEM SELECTOR PLAN 3
Chronic  - on home metoprolol 25mg PO   - continue with hold parameters   - Monitor routine hemodynamics   - DASH diet

## 2025-01-08 NOTE — PRE-OP CHECKLIST - SELECT TESTS ORDERED
CBC/CMP/PT/PTT/INR/Hepatic Function/Type and Screen/Urinalysis/EKG/CXR
BMP/CBC/CMP/PT/PTT/INR/Type and Cross/EKG/CXR

## 2025-01-08 NOTE — DISCHARGE NOTE PROVIDER - NSDCCPCAREPLAN_GEN_ALL_CORE_FT
PRINCIPAL DISCHARGE DIAGNOSIS  Diagnosis: Acute UTI  Assessment and Plan of Treatment: Completed course of IV antibiotics. You also underwent a cystoscopy- TURP, bladder cysto litholapaxy- R ureteroscopy on 01/09/25 and tolerated procedure well. Follow up with Urology within 2 weeks of discharge.      SECONDARY DISCHARGE DIAGNOSES  Diagnosis: Lung granuloma  Assessment and Plan of Treatment: This is a chronic finding, you were evaluated by ID during hospitalization. Please follow up with Pulmonology and PCP within 2 weeks of discharge. Consider repeat CT chest scan in 3-6 months.    Diagnosis: Thyroid nodule  Assessment and Plan of Treatment: You were noted to have a 2.6cm thyroid nodule on the left side. It is recommended you get a routine thyroid ultrasound as outpatient for further evaluation    Diagnosis: Leukocytosis  Assessment and Plan of Treatment: You were noted to have persistently elevated white count during admission (and during previous admissions). This could be likely due to the infection you had or due to you being on chronic steroid medication. It is recommended you follow up with a Heme/Onc doctor as outpatient for further evaluation.

## 2025-01-08 NOTE — DIETITIAN INITIAL EVALUATION ADULT - PROBLEM SELECTOR PLAN 2
CT A/P 11/20/24: Percutaneous R nephrostomy tube. No hydronephrosis. Unremarkable L kidney. UB collapsed around guerrero. UB stone x3 measuring up to 1.0 cm.  - scheduled for cystoscopy- TURP, bladder cysto litholapaxy- R ureteroscopy on 01/06/24.   - care plan per Urology, f/u recs

## 2025-01-08 NOTE — DIETITIAN INITIAL EVALUATION ADULT - OTHER INFO
Pt is a "78 yo M with PMHx HTN, CHF, PE (on eliquis), Myasthenia Gravis, and chronic LE edema, nephrolithiasis (s/p nephrostomy tube placement), urosepsis, BPH admitted for UTI treatment prior to scheduled for cystoscopy- TURP, bladder cysto litholapaxy- R ureteroscopy on 01/09/24."    Visited pt at bedside this afternoon. Pt resting during visit. Poor historian. Attempted to interview; pt states he is not answering any questions at this time. Observed untouched breakfast tray at bedside. Pt states he is not hungry today. Good intake of meals yesterday. PO intakes % per nursing documentation. Tolerating diet well. NKFA. No N/V noted. +BM 1/7; bowel regimen rx. Bedscale wt of 205# obtained. 2+ BL leg edema noted. Skin: unstageable to BL heel, stage I to BL buttocks. Pt currently on DASH/TLC, soft/bite size diet. Pt refused diet education at this time. Reviewed labs; K 3.4 (L). KCl rx. RD remains available and will continue to follow-up.

## 2025-01-08 NOTE — DISCHARGE NOTE PROVIDER - NSDCMRMEDTOKEN_GEN_ALL_CORE_FT
acetaminophen 325 mg oral capsule: 2 cap(s) orally every 6 hours as needed for  mild pain  Eliquis 5 mg oral tablet: 1 tab(s) orally 2 times a day  finasteride 5 mg oral tablet: 1 tab(s) orally once a day  furosemide 20 mg oral tablet: 1 tab(s) orally once a day  Metoprolol Succinate ER 25 mg oral tablet, extended release: 1 tab(s) orally once a day  Multiple Vitamins oral tablet: 1 tab(s) orally once a day  omeprazole 40 mg oral delayed release capsule: 1 cap(s) orally once a day  polyethylene glycol 3350 oral powder for reconstitution: 17 gram(s) orally once a day  Praluent Pen 75 mg/mL subcutaneous solution: 75 milligram(s) subcutaneous every 2 weeks  predniSONE 10 mg oral tablet: 1 tab(s) orally once a day  RisperDAL 0.25 mg oral tablet: 1 tab(s) orally once a day (at bedtime)  senna leaf extract oral tablet: 1 tab(s) orally once a day (before a meal)  tamsulosin 0.4 mg oral capsule: 1 cap(s) orally once a day (at bedtime)   acetaminophen 325 mg oral capsule: 2 cap(s) orally every 6 hours as needed for  mild pain  aluminum hydroxide-magnesium hydroxide 200 mg-200 mg/5 mL oral suspension: 30 milliliter(s) orally every 4 hours As needed Dyspepsia  apixaban 5 mg oral tablet: 1 tab(s) orally 2 times a day  finasteride 5 mg oral tablet: 1 tab(s) orally once a day  furosemide 20 mg oral tablet: 1 tab(s) orally once a day  lidocaine 4% topical film: Apply topically to affected area once a day  metoprolol succinate 25 mg oral tablet, extended release: 1 tab(s) orally once a day  Multiple Vitamins oral tablet: 1 tab(s) orally once a day  omeprazole 40 mg oral delayed release capsule: 1 cap(s) orally once a day  oxyCODONE 5 mg oral tablet: 1 tab(s) orally every 6 hours As needed Severe Pain (7 - 10)  polyethylene glycol 3350 oral powder for reconstitution: 17 gram(s) orally once a day  Praluent Pen 75 mg/mL subcutaneous solution: 75 milligram(s) subcutaneous every 2 weeks  predniSONE 10 mg oral tablet: 1 tab(s) orally once a day  RisperDAL 0.25 mg oral tablet: 1 tab(s) orally once a day (at bedtime)  senna leaf extract oral tablet: 1 tab(s) orally once a day (before a meal)  tamsulosin 0.4 mg oral capsule: 1 cap(s) orally once a day (at bedtime)

## 2025-01-08 NOTE — PROGRESS NOTE ADULT - SUBJECTIVE AND OBJECTIVE BOX
[facility] -- Janel Jackson Pannella, Patel, Savella Goodger, Cohen  Office # [phone]      Follow Up:  PE , HTN     Subjective/Observations:   No events overnight resting comfortably in bed.  No complaints of chest pain, dyspnea, or palpitations reported. No signs of orthopnea or PND.      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      Club foot  Surgery at birth for Club Foot Right foot      Pilonidal cyst  Surgery 40 years ago      H/O colonoscopy      H/O prostate biopsy      Nephrostomy present          MEDICATIONS  (STANDING):  ceftolozane/tazobactam IVPB 1500 milliGRAM(s) IV Intermittent every 8 hours  finasteride 5 milliGRAM(s) Oral daily  furosemide    Tablet 20 milliGRAM(s) Oral daily  metoprolol succinate ER 25 milliGRAM(s) Oral daily  multivitamin 1 Tablet(s) Oral daily  pantoprazole    Tablet 40 milliGRAM(s) Oral before breakfast  polyethylene glycol 3350 17 Gram(s) Oral daily  predniSONE   Tablet 10 milliGRAM(s) Oral daily  risperiDONE   Tablet 0.25 milliGRAM(s) Oral at bedtime  senna 1 Tablet(s) Oral at bedtime  sodium chloride 0.9%. 1000 milliLiter(s) (50 mL/Hr) IV Continuous <Continuous>  tamsulosin 0.4 milliGRAM(s) Oral at bedtime    MEDICATIONS  (PRN):  acetaminophen     Tablet .. 650 milliGRAM(s) Oral every 6 hours PRN Mild Pain (1 - 3)  aluminum hydroxide/magnesium hydroxide/simethicone Suspension 30 milliLiter(s) Oral every 4 hours PRN Dyspepsia  melatonin 3 milliGRAM(s) Oral at bedtime PRN Insomnia  ondansetron Injectable 4 milliGRAM(s) IV Push every 8 hours PRN Nausea and/or Vomiting      Allergies    ofloxacin (Unknown)  gatifloxacin (Unknown)  Cipro (Unknown)  levofloxacin (Unknown)    Intolerances    Avelox (Other)  telithromycin (Other)  fluoroquinolone antibiotics (Other)  Ketek (Other)      Vital Signs Last 24 Hrs  T(C): 36.4 (2025 06:42), Max: 36.6 (2025 12:01)  T(F): 97.5 (2025 06:42), Max: 97.8 (2025 12:01)  HR: 57 (2025 06:42) (57 - 64)  BP: 119/62 (2025 06:42) (114/64 - 129/72)  BP(mean): --  RR: 20 (2025 06:42) (18 - 20)  SpO2: 96% (2025 06:42) (96% - 98%)    Parameters below as of 2025 06:42  Patient On (Oxygen Delivery Method): room air        I&O's Summary    2025 07:01  -  2025 07:00  --------------------------------------------------------  IN: 1015 mL / OUT: 2875 mL / NET: -1860 mL    2025 07:01  -  2025 11:51  --------------------------------------------------------  IN: 75 mL / OUT: 0 mL / NET: 75 mL          PHYSICAL EXAM:  TELE: off tele   Constitutional: NAD, awake and alert, well-developed  HEENT: Moist Mucous Membranes, Anicteric  Pulmonary: Non-labored, breath sounds are clear bilaterally, No wheezing, crackles or rhonchi  Cardiovascular: Regular, S1 and S2 nl, No murmurs, rubs, gallops or clicks  Gastrointestinal: Bowel Sounds present, soft, nontender.   Lymph: No lymphadenopathy. No peripheral edema.  Skin: No visible rashes or ulcers.  Psych:  Mood & affect appropriate    LABS: All Labs Reviewed:                        12.8   16.05 )-----------( 427      ( 2025 08:35 )             40.3                         12.7   14.04 )-----------( 401      ( 2025 07:30 )             39.5     2025 08:35    140    |  106    |  18     ----------------------------<  118    3.4     |  27     |  0.80   2025 20:11    x      |  x      |  x      ----------------------------<  x      3.9     |  x      |  x      2025 07:30    140    |  107    |  18     ----------------------------<  101    3.1     |  27     |  0.81     Ca    9.2        2025 08:35  Ca    9.5        2025 07:30  Phos  2.7       2025 07:30  Mg     2.3       2025 07:30               EC Lead ECG:   Ventricular Rate 67 BPM    Atrial Rate 67 BPM    P-R Interval 152 ms    QRS Duration 114 ms    Q-T Interval 362 ms    QTC Calculation(Bazett) 382 ms    P Axis 41 degrees    R Axis -52 degrees    T Axis 69 degrees    Diagnosis Line Sinus rhythm withsinus arrhythmia with occasional premature ventricular complexes  Left anterior fascicular block  Nonspecific T wave abnormality  Abnormal ECG  When compared with ECG of 2025 10:38,  premature ventricular complexes are now present  T wave inversion more evident in Lateral leads  QT has shortened  Confirmed by BRENDAN GONZALEZ ([id number]) on 2025 5:48:12 PM (25 @ 07:40)      TRANSTHORACIC ECHOCARDIOGRAM REPORT  ________________________________________________________________________________                                      _______       Pt. Name:       DEION HEATH Study Date:    2024  MRN:            [id number]       YOB: 1945  Accession #:    [id number]      Age:           79 years  Account#:       [id number]     Gender:        M  Heart Rate:                    Height:        65.75 in (167.00 cm)  Rhythm:                        Weight:   199.00 lb (90.27 kg)  Blood Pressure: 103/58 mmHg    BSA/BMI:       1.99 m² / 32.37 kg/m²  ________________________________________________________________________________________  Referring Physician:    [id number] Curly Byrnes  Interpreting Physician: Lyndsay Marin MD  Primary Sonographer:    Ashli Arana CS    CPT:                ECHO TTE WO CON COMP W DOPP - 78252.m  Indication(s):      Abnormal electrocardiogram ECG/EKG - R94.31  Procedure:          Transthoracic echocardiogram with 2-D, M-mode and complete                      spectral and color flow Doppler.  Ordering Location:  ICU1  Admission Status:   Inpatient  Contrast Injection: Verbal consent was obtained for injection of Ultrasonic                      Enhancing Agent following a discussion of risks and                      benefits.                      Endocardial visualization enhanced with Definity Ultrasound                      enhancing agent.  Agitated Saline:    Injection with agitated saline was performed toevaluate for                      intracardiac shunting.  Study Information:  Image quality for this study is technically difficult.    _______________________________________________________________________________________     CONCLUSIONS:      1. Technically difficult image quality.   2. Agitated saline injection was negative for intracardiac shunt (though there is poor visualization of the LV during injection of agitated saline)   3. Despite definity use, the LV endocardium is still not well visualized.   4. In certain views, the LVEF appears grossly preserved though the apex appears hypokinetic.    ________________________________________________________________________________________  FINDINGS:     Interatrial Septum:  Agitated saline injection was negative for intracardiac shunt.  ________________________________________________________________________________________        Radiology:         [facility] -- Janel Jackson Pannella, Patel, Savella Goodger, Cohen  Office # [phone]      Follow Up:  PE , HTN     Subjective/Observations:   No events overnight resting comfortably in bed.  No complaints of chest pain, dyspnea, or palpitations reported. No signs of orthopnea or PND.      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      Club foot  Surgery at birth for Club Foot Right foot      Pilonidal cyst  Surgery 40 years ago      H/O colonoscopy      H/O prostate biopsy      Nephrostomy present          MEDICATIONS  (STANDING):  ceftolozane/tazobactam IVPB 1500 milliGRAM(s) IV Intermittent every 8 hours  finasteride 5 milliGRAM(s) Oral daily  furosemide    Tablet 20 milliGRAM(s) Oral daily  metoprolol succinate ER 25 milliGRAM(s) Oral daily  multivitamin 1 Tablet(s) Oral daily  pantoprazole    Tablet 40 milliGRAM(s) Oral before breakfast  polyethylene glycol 3350 17 Gram(s) Oral daily  predniSONE   Tablet 10 milliGRAM(s) Oral daily  risperiDONE   Tablet 0.25 milliGRAM(s) Oral at bedtime  senna 1 Tablet(s) Oral at bedtime  sodium chloride 0.9%. 1000 milliLiter(s) (50 mL/Hr) IV Continuous <Continuous>  tamsulosin 0.4 milliGRAM(s) Oral at bedtime    MEDICATIONS  (PRN):  acetaminophen     Tablet .. 650 milliGRAM(s) Oral every 6 hours PRN Mild Pain (1 - 3)  aluminum hydroxide/magnesium hydroxide/simethicone Suspension 30 milliLiter(s) Oral every 4 hours PRN Dyspepsia  melatonin 3 milliGRAM(s) Oral at bedtime PRN Insomnia  ondansetron Injectable 4 milliGRAM(s) IV Push every 8 hours PRN Nausea and/or Vomiting      Allergies    ofloxacin (Unknown)  gatifloxacin (Unknown)  Cipro (Unknown)  levofloxacin (Unknown)    Intolerances    Avelox (Other)  telithromycin (Other)  fluoroquinolone antibiotics (Other)  Ketek (Other)      Vital Signs Last 24 Hrs  T(C): 36.4 (2025 06:42), Max: 36.6 (2025 12:01)  T(F): 97.5 (2025 06:42), Max: 97.8 (2025 12:01)  HR: 57 (2025 06:42) (57 - 64)  BP: 119/62 (2025 06:42) (114/64 - 129/72)  BP(mean): --  RR: 20 (2025 06:42) (18 - 20)  SpO2: 96% (2025 06:42) (96% - 98%)    Parameters below as of 2025 06:42  Patient On (Oxygen Delivery Method): room air        I&O's Summary    2025 07:01  -  2025 07:00  --------------------------------------------------------  IN: 1015 mL / OUT: 2875 mL / NET: -1860 mL    2025 07:01  -  2025 11:51  --------------------------------------------------------  IN: 75 mL / OUT: 0 mL / NET: 75 mL          PHYSICAL EXAM:  TELE: off tele   Constitutional: NAD, awake and alert, well-developed  HEENT: Moist Mucous Membranes, Anicteric  Pulmonary: Non-labored, breath sounds are clear bilaterally, No wheezing, crackles or rhonchi  Cardiovascular: Regular, S1 and S2 nl, Murmur +  Gastrointestinal: Bowel Sounds present, soft, nontender.   Lymph: No lymphadenopathy. No peripheral edema.  Skin: No visible rashes or ulcers.  Psych:  Mood & affect appropriate    LABS: All Labs Reviewed:                           1605 )-----------( 427      ( 2025 08:35 )             40.3                         12.7   14.04 )-----------( 401      ( 2025 07:30 )             39.5     2025 08:35    140    |  106    |  18     ----------------------------<  118    3.4     |  27     |  0.80   2025 20:11    x      |  x      |  x      ----------------------------<  x      3.9     |  x      |  x      2025 07:30    140    |  107    |  18     ----------------------------<  101    3.1     |  27     |  0.81     Ca    9.2        2025 08:35  Ca    9.5        2025 07:30  Phos  2.7       2025 07:30  Mg     2.3       2025 07:30               EC Lead ECG:   Ventricular Rate 67 BPM    Atrial Rate 67 BPM    P-R Interval 152 ms    QRS Duration 114 ms    Q-T Interval 362 ms    QTC Calculation(Bazett) 382 ms    P Axis 41 degrees    R Axis -52 degrees    T Axis 69 degrees    Diagnosis Line Sinus rhythm withsinus arrhythmia with occasional premature ventricular complexes  Left anterior fascicular block  Nonspecific T wave abnormality  Abnormal ECG  When compared with ECG of 2025 10:38,  premature ventricular complexes are now present  T wave inversion more evident in Lateral leads  QT has shortened  Confirmed by BRENDAN GONZALEZ ([id number]) on 2025 5:48:12 PM (25 @ 07:40)      TRANSTHORACIC ECHOCARDIOGRAM REPORT  ________________________________________________________________________________                                      _______       Pt. Name:       DEION HEATH Study Date:    2024  MRN:            [id number]       YOB: 1945  Accession #:    [id number]      Age:           79 years  Account#:       [id number]     Gender:        M  Heart Rate:                    Height:        65.75 in (167.00 cm)  Rhythm:                        Weight:   199.00 lb (90.27 kg)  Blood Pressure: 103/58 mmHg    BSA/BMI:       1.99 m² / 32.37 kg/m²  ________________________________________________________________________________________  Referring Physician:    [id number] Curly Byrnes  Interpreting Physician: Lyndsay Marin MD  Primary Sonographer:    Ashli Arana CS    CPT:                ECHO TTE WO CON COMP W DOPP - 20553.m  Indication(s):      Abnormal electrocardiogram ECG/EKG - R94.31  Procedure:          Transthoracic echocardiogram with 2-D, M-mode and complete                      spectral and color flow Doppler.  Ordering Location:  ICU1  Admission Status:   Inpatient  Contrast Injection: Verbal consent was obtained for injection of Ultrasonic                      Enhancing Agent following a discussion of risks and                      benefits.                      Endocardial visualization enhanced with Definity Ultrasound                      enhancing agent.  Agitated Saline:    Injection with agitated saline was performed toevaluate for                      intracardiac shunting.  Study Information:  Image quality for this study is technically difficult.    _______________________________________________________________________________________     CONCLUSIONS:      1. Technically difficult image quality.   2. Agitated saline injection was negative for intracardiac shunt (though there is poor visualization of the LV during injection of agitated saline)   3. Despite definity use, the LV endocardium is still not well visualized.   4. In certain views, the LVEF appears grossly preserved though the apex appears hypokinetic.    ________________________________________________________________________________________  FINDINGS:     Interatrial Septum:  Agitated saline injection was negative for intracardiac shunt.  ________________________________________________________________________________________        Radiology:

## 2025-01-08 NOTE — PROVIDER CONTACT NOTE (OTHER) - SITUATION
rec'd pt ed with puss on penis at insertion of catheter
admited for UTI
pt complaining of 10/10 left leg pain. pt requesting Tylenol. Tylenol is ordered for 1-3 pain.

## 2025-01-08 NOTE — PROGRESS NOTE ADULT - SUBJECTIVE AND OBJECTIVE BOX
[facility]  INFECTIOUS DISEASES   [address] [place] [address]  Tel: [phone]     Fax: [phone]  ========================================================  MD Nikolas Oquendo Michelle, MD Shah, Kaushal, MD Sunjit, Jaspal, MD Sehrish Shahid, MD   ========================================================    [id number]  DEION HEATH     Follow up: No fever, no new overnight event.     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  Club foot  Surgery at birth for Club Foot Right foot  Pilonidal cyst  Surgery 40 years ago  H/O colonoscopy  H/O prostate biopsy  Nephrostomy present    Social Hx: No current smoking, EtOH or drugs     FAMILY HISTORY:  Family history of stroke (Father)  Father -  age 62    Family history of kidney disease (Mother)  Mother -  age 67    Family history of diabetes mellitus type II (Sibling)  Brother & Sister    Allergie  ofloxacin (Unknown)  gatifloxacin (Unknown)  Cipro (Unknown)  levofloxacin (Unknown)    Avelox (Other)  telithromycin (Other)  fluoroquinolone antibiotics (Other)  Ketek (Other)    MEDICATIONS  (STANDING):  meropenem  IVPB 1000 milliGRAM(s) IV Intermittent Once     REVIEW OF SYSTEMS:  CONSTITUTIONAL:  No Fever or chills  HEENT:   No diplopia or blurred vision.  No earache, sore throat or runny nose.  CARDIOVASCULAR:  No chest pain or SOB  RESPIRATORY:  No cough, shortness of breath, PND or orthopnea.  GASTROINTESTINAL:  No nausea, vomiting or diarrhea.  GENITOURINARY:  No dysuria, frequency or urgency. No Blood in urine  MUSCULOSKELETAL:  no joint aches, no muscle pain  SKIN:  No change in skin, hair or nails.    Physical Exam:  Vital Signs Last 24 Hrs  T(C): 36.4 (2025 06:42), Max: 36.6 (2025 20:40)  T(F): 97.5 (2025 06:42), Max: 97.8 (2025 20:40)  HR: 57 (2025 06:42) (57 - 64)  BP: 119/62 (2025 06:42) (119/62 - 129/72)  BP(mean): --  RR: 20 (2025 06:42) (18 - 20)  SpO2: 96% (2025 06:42) (96% - 96%)  Parameters below as of 2025 06:42  Patient On (Oxygen Delivery Method): room air  GEN: NAD  HEENT: normocephalic and atraumatic. EOMI. PERRL.    NECK: Supple.  No lymphadenopathy   LUNGS: Clear to auscultation.  HEART: Regular rate and rhythm   ABDOMEN: Soft, nontender, and nondistended.   Guerrero in place, no CVA tenderness, R nephrostomy  EXTREMITIES: Without edema. PICC looks fine  PSYCHIATRIC: Awake and alert but not oriented   SKIN: No rash     Labs:      140  |  106  |  18  ----------------------------<  118[H]  3.4[L]   |  27  |  0.80    Ca    9.2      2025 08:35                        12.8   16.05 )-----------( 427      ( 2025 08:35 )             40.3    Urinalysis Basic - ( 2025 08:35 )    Color: x / Appearance: x / SG: x / pH: x  Gluc: 118 mg/dL / Ketone: x  / Bili: x / Urobili: x   Blood: x / Protein: x / Nitrite: x   Leuk Esterase: x / RBC: x / WBC x   Sq Epi: x / Non Sq Epi: x / Bacteria: x    RECENT CULTURES:   @ 22:20 Clean Catch Pseudomonas aeruginosa (Carbapenem Resistant)    >=3 organisms. Probable collection contamination. Culture includes  >100,000 CFU/ml Pseudomonas aeruginosa (Carbapenem Resistant)  >100,000 CFU/ml Klebsiella pneumoniae  Unable to evaluate further due to Pseudomonas  overgrowth     @ 10:31 .Blood BLOOD     No growth at 4 days     @ 10:25 .Blood BLOOD     No growth at 4 days    12-27 @ 16:20 Catheterized Catheterized     Culture grew 3 or more types of organisms which indicate  collection contamination; consider recollection only if clinically  indicated.    All imaging and other data have been reviewed.  < from: Xray Chest 1 View-PORTABLE IMMEDIATE (25 @ 10:47) >  IMPRESSION: Scarring with small granuloma in the right midlung again   noted. Small hilar calcified lymph nodes again seen.      Assessment and Plan:   80 yo man with PMH of HTN, CHF, PE (on eliquis), Myasthenia Gravis, and chronic LE edema, nephrolithiasis (s/p nephrostomy tube placement), urosepsis, BPH was transferred from [facility] for hypertension and elevated WBC.  No complaints, no fever, chills, chest pain, SOB, abdominal or flank pain, n/v/d/c or dysuria.    In last admission had some work up done for calcified granulomas in chest CT. Negative sputum for AFB x3 and negative fungal markers.   Also last time had CR pseudomonas for which completed zerbaxa.   Leukocytosis seems chronic he had numbers around 20 most days.   Since going for  procedure will treat him with ABx prior to surgery due to bacteriuria.   If there is carbapenem resistant bacteria will switch to zerbaxa prior to surgery.     # UTI?  # Chronic guerrero and nephrostomy   # Leukocytosis     - Blood cultures NGTD   - UCx with Klebsiella and CR pseudomonas so far, will follow sensitivity   - Monitor CBC 16 today slightly higher   - Has been scheduled for cystoscopy- TURP, bladder cysto litholapaxy- R ureteroscopy on   - Restarted meropenem but due to CR pseudomonas in urine will stop it  - Start Zebaxa today     Will follow.  D/W Urology team.     Jordin Mckinley MD  Division of Infectious Diseases   Please call ID service at [phone] with any question.    50 minutes spent on total encounter assessing patient, examination, chart review, counseling and coordinating care by the attending physician/nurse/care manager.

## 2025-01-08 NOTE — DIETITIAN INITIAL EVALUATION ADULT - PROBLEM SELECTOR PLAN 1
known for recurrent UTI presented to the ED 2/2 urology referral for IV antibiotics prior to procedure on 1/6/25.  - H/O hosp in 9/2024, required emergent nephrostomy tube placement after failed stent placement for 7mm kidney stone. followed by ICU care for Klebsiella Bacteremia, urosepsis/ septic shock.    - H/O hosp with urosepsis on 11/20/24 required nephrostomy tube exchange 11/29/24,   - scheduled for cystoscopy- TURP, bladder cysto litholapaxy- R ureteroscopy on 01/06/24.   - completed 7 day course of IV zosyn in NH before transfer  - in ED WBC 14.58, Lac 3.4   - S/P 2L NS bolus   - monitor off antibiotics per ID recs  - UA -> UCx pending, f/u results  - BCx x2, f/u results   - ID (Dr. Mckinley) consulted, recs appreciated

## 2025-01-08 NOTE — CHART NOTE - NSCHARTNOTEFT_GEN_A_CORE
Called by RN for patient complaining of 10/10 LE Patient seen and examined at bedside.      T(C): 36.6 (01-07-25 @ 20:40), Max: 36.6 (01-07-25 @ 12:01)  HR: 64 (01-07-25 @ 20:40) (63 - 65)  BP: 129/72 (01-07-25 @ 20:40) (114/64 - 131/82)  RR: 18 (01-07-25 @ 20:40) (18 - 19)  SpO2: 96% (01-07-25 @ 20:40) (95% - 98%)  Wt(kg): --    Physical Exam:  Gen: well appearing, NAD  HEENT: NCAT, PEERLA b/l, EOMI b/l, no conjunctival erythema  Cardio: regular rate and rhythm, +s1s2, no murmurs, rubs, or gallops  Pulm: CTA b/l, no wheezes, rales or rhonchi  Abdomen: soft, nontender, nondistended, +BS x4 quadrants, no guarding  Extremities: no clubbing, cyanosis or edema, +2 pedal pulses  Neuro: AAOx3, moving all 4 extremities, sensation intact  Skin: warm and dry    Assessment/Plan  80yo Male with PMH of Called by RN for patient complaining of new-onset 10/10 LE pain. Patient seen and examined at bedside. Pt endorses that this pain started this morning and has improved w/ pain medication.       T(C): 36.6 (01-07-25 @ 20:40), Max: 36.6 (01-07-25 @ 12:01)  HR: 64 (01-07-25 @ 20:40) (63 - 65)  BP: 129/72 (01-07-25 @ 20:40) (114/64 - 131/82)  RR: 18 (01-07-25 @ 20:40) (18 - 19)  SpO2: 96% (01-07-25 @ 20:40) (95% - 98%)  Wt(kg): --    Physical Exam:  Gen: well appearing, NAD  Cardio: regular rate and rhythm, +s1s2, no murmurs, rubs, or gallops  Pulm: CTA b/l, no wheezes, rales or rhonchi  Extremities: tender to palpation at left calf. no clubbing, cyanosis or edema    Assessment/Plan  80yo Male with new-onset LE pain    Duplex LE Called by RN for patient complaining of new-onset 10/10 LE pain. Patient seen and examined at bedside. Pt endorses that this pain started this morning and has improved w/ pain medication.       T(C): 36.6 (01-07-25 @ 20:40), Max: 36.6 (01-07-25 @ 12:01)  HR: 64 (01-07-25 @ 20:40) (63 - 65)  BP: 129/72 (01-07-25 @ 20:40) (114/64 - 131/82)  RR: 18 (01-07-25 @ 20:40) (18 - 19)  SpO2: 96% (01-07-25 @ 20:40) (95% - 98%)  Wt(kg): --    Physical Exam:  Gen: well appearing, NAD  Cardio: regular rate and rhythm, +s1s2, no murmurs, rubs, or gallops  Pulm: CTA b/l, no wheezes, rales or rhonchi  Extremities: tender to palpation at left calf. no clubbing, cyanosis or edema    Assessment/Plan  80yo Male with new-onset LE pain  B/L Duplex LE ordered: No evidence of DVT

## 2025-01-08 NOTE — DIETITIAN INITIAL EVALUATION ADULT - PERTINENT LABORATORY DATA
01-07    140  |  106  |  18  ----------------------------<  118[H]  3.4[L]   |  27  |  0.80    Ca    9.2      07 Jan 2025 08:35    A1C with Estimated Average Glucose Result: 5.9 % (01-04-25 @ 07:18)  A1C with Estimated Average Glucose Result: 5.6 % (11-20-24 @ 11:36)  A1C with Estimated Average Glucose Result: 6.2 % (09-10-24 @ 04:48)

## 2025-01-08 NOTE — DISCHARGE NOTE PROVIDER - NSDCFUSCHEDAPPT_GEN_ALL_CORE_FT
Spencer Driscoll  [facility] Physician Former[facility]  UROLOGY [place] [address]  Scheduled Appointment: 01/09/2025

## 2025-01-08 NOTE — PHARMACOTHERAPY INTERVENTION NOTE - COMMENTS
Patient is an 79 year old male currently ordered for meropenem 1000 mg Q8H for UTI. Per discussion with Dr. Mckinley, MD would like to switch to Zerbaxa as culture is growing carbapenem resistant pseudomonas. Discussed with Dr. Mckinley and recommended Zerbaxa 1.5 mg Q8H. MD agreed and orders were updated.

## 2025-01-08 NOTE — DIETITIAN INITIAL EVALUATION ADULT - PROBLEM SELECTOR PLAN 5
Lung granuloma.   CT Chest/Ab/Pelv with IV Con 11/2024: Evidence of granulomatous infection in lungs, liver and spleen. Consistent with previous imaging in 11/2023  - Per chart review, pt has been aware of this finding  - CXR consistent on this admission  - Quant indeterminate, fungitell negative, aspergillus galactomannan negative at that time  - ID (Dr. Mckinley) consulted, f/u recs

## 2025-01-08 NOTE — PROGRESS NOTE ADULT - ASSESSMENT
80 yo M with PMHx HTN, CHF, PE (on eliquis), Myasthenia Gravis, and chronic LE edema, nephrolithiasis (s/p nephrostomy tube placement), urosepsis, BPH admitted for UTI treatment prior to scheduled for cystoscopy- TURP, bladder cysto litholapaxy- R ureteroscopy on 01/09/24.        Problem/Plan - 1:  ·  Problem: UTI (urinary tract infection).   ·  Plan: known for recurrent UTI presented to the ED 2/2 urology referral for IV antibiotics prior to procedure on 1/9/25.  -  9/2024, required emergent nephrostomy tube placement after failed stent placement for 7mm kidney stone. followed by ICU care for Klebsiella Bacteremia, urosepsis/ septic shock.    - H/O hosp with urosepsis on 11/20/24 required nephrostomy tube exchange 11/29/24,   - scheduled for cystoscopy- TURP, bladder cysto litholapaxy- R ureteroscopy on 01/09/25.   - completed 7 day course of IV zosyn before transfer  - in ED WBC 14.58, Lac 3.4   - leucocytosis pt on steroid prednisone   - UA -> UCx  contamination   - BCx x2, neg    - ID (Dr. Mckinley) consulted, started on Zerbaxa -  plan for or Thursday.     Problem/Plan - 2:  ·  Problem: Urolithiasis.   ·  Plan: CT A/P 11/20/24: Percutaneous R nephrostomy tube. No hydronephrosis. Unremarkable L kidney. UB collapsed around guerrero. UB stone x3 measuring up to 1.0 cm.  - scheduled for cystoscopy- TURP, bladder cysto litholapaxy- R ureteroscopy on 01/09/25.   - care plan per Urology dr solorzano resciliana for or Thursday as need 48 hr  iv abx.  Pt. without s/s of ACS, decompensated CHF, unstable arrythmia, or symptomatic aortic stenosis.  No absolute contraindication from medical perspective to proceed with planned urologic procedure.  Appreciated cardiology preop evaluation.      Problem/Plan - 3:  ·  Problem: Hypertension.   ·  Plan: Chronic  - on home metoprolol succinate 25mg PO daily  - DASH diet.     Problem/Plan - 4:  ·  Problem: Chronic CHF.   ·  Plan: Chronic CHF.   H/O of CHF, unspecified type however on GDMT for HFrEF  - TTE (9/2024): LVEF 55-60%, no diastolic dysfunction, moderate MR  - Evaluated by cardiology during admission at [facility] (9/2024) for acute CHF, started on GDMT  - c/w home metoprolol and lasix with hold parameters   - Strict Is&Os q6h  - Does not appear clinically volume overloaded  - Cardiology ([facility]) consulted,     Problem/Plan - 5:  ·  Problem: Lung granuloma.   ·  Plan: Lung granuloma.   CT Chest/Ab/Pelv with IV Con 11/2024: Evidence of granulomatous infection in lungs, liver and spleen. Consistent with previous imaging in 11/2023  - Per chart review, pt has been aware of this finding  - CXR consistent on this admission  - Quant indeterminate, fungitell negative, aspergillus galactomannan negative at that time  - ID (Dr. Mckinley) consulted, -.     Problem/Plan - 6:  ·  Problem: Thyroid nodule.   ·  Plan: CT Chest 11/20/24:  2.6 cm left lobe thyroid nodule and evidence of granulomatous infection in lungs, liver and spleen  - TSH 0.58 11/20/24  -non-urgent thyroid US. out pt.     Problem/Plan - 7:  ·  Problem: Personal history of pulmonary embolism.   ·  Plan: Chronic  - c/w home eliquis 5mg BID- on lovenox pending or.  Now off anticoagulation for planned urologic procedure tomorrow.      Problem/Plan - 8:  ·  Problem: Myasthenia gravis.   ·  Plan: Chronic  - c/w home prednisone.     Problem/Plan - 9:  ·  Problem: BPH (benign prostatic hyperplasia).   ·  Plan: - c/w home flomax and finasteride.     Problem/Plan - 10:  ·  Problem: Need for prophylactic measure.   ·  Plan; - c/w home eliquis 5mg BID for hx pe .- will hold for or / discuss uro team- Pre-op Sunday night for procedure Monday morning, 1/7. but now reschedule for thursday.    ---  TIME SPENT:  51 minutes spent on total encounter. The necessity of the time spent during the encounter on this date of service was due to:     direct patient care including but not limited to reviewing chart, medications ,laboratory data, imaging reports, discussion of plan of care with consultants on the case, coordination of care with multidisciplinary team involved in the case and discussion of plan with patient.  Patient agreeable to plan of care and verbalized understanding the anticipated hospital course and treatment plan.    ---

## 2025-01-08 NOTE — DIETITIAN INITIAL EVALUATION ADULT - PERTINENT MEDS FT
MEDICATIONS  (STANDING):  ceftolozane/tazobactam IVPB 1500 milliGRAM(s) IV Intermittent every 8 hours  finasteride 5 milliGRAM(s) Oral daily  furosemide    Tablet 20 milliGRAM(s) Oral daily  metoprolol succinate ER 25 milliGRAM(s) Oral daily  multivitamin 1 Tablet(s) Oral daily  pantoprazole    Tablet 40 milliGRAM(s) Oral before breakfast  polyethylene glycol 3350 17 Gram(s) Oral daily  predniSONE   Tablet 10 milliGRAM(s) Oral daily  risperiDONE   Tablet 0.25 milliGRAM(s) Oral at bedtime  senna 1 Tablet(s) Oral at bedtime  sodium chloride 0.9%. 1000 milliLiter(s) (50 mL/Hr) IV Continuous <Continuous>  tamsulosin 0.4 milliGRAM(s) Oral at bedtime    MEDICATIONS  (PRN):  acetaminophen     Tablet .. 650 milliGRAM(s) Oral every 6 hours PRN Mild Pain (1 - 3)  aluminum hydroxide/magnesium hydroxide/simethicone Suspension 30 milliLiter(s) Oral every 4 hours PRN Dyspepsia  melatonin 3 milliGRAM(s) Oral at bedtime PRN Insomnia  ondansetron Injectable 4 milliGRAM(s) IV Push every 8 hours PRN Nausea and/or Vomiting

## 2025-01-08 NOTE — DIETITIAN INITIAL EVALUATION ADULT - ADD RECOMMEND
1) Continue current diet as tolerated; emphasis on HBV protein sources  2) Recommend ensure max protein shake daily (150kcal, 30gm protein per 11 fl oz serving)  3) Continue MVI daily, recommend Vit C 500mg BID  4) Monitor po intake, diet tolerance, weight trends, labs, GI function, skin integrity

## 2025-01-08 NOTE — PROVIDER CONTACT NOTE (OTHER) - ACTION/TREATMENT ORDERED:
no new order at tjhis time
Give Tylenol for pain. Dr. Rocha will come see patient. US was ordered.
md made aware and will come to evaluate patient.

## 2025-01-08 NOTE — DIETITIAN INITIAL EVALUATION ADULT - NAME AND PHONE
The procedural consent was signed. A history and physical note was completed in the chart. Meron Jacobs, LUCIA

## 2025-01-08 NOTE — PROGRESS NOTE ADULT - ASSESSMENT
78 yo M with PMHx HTN, CHF, PE (on eliquis), Myasthenia Gravis, and chronic LE edema, nephrolithiasis (s/p nephrostomy tube placement), urosepsis, BPH admitted for abx and cystoscopy procedure    Admitted for cystoscopy cardiac optimization   - Echo from 09/2024: EF 55-60% moderate MR  - continue home furosemide 20mg QD  - no sign fluid overload   - Strict I/Os, daily weights  - EKG: NSR    - plan for surgery on 1/9 OR  - patient is a moderate risk patient for low risk procedure  - hx pe on eliquis would hold eliquis at least 4 doses prior, for OR tomorrow 1/9   Currently no active cardiac conditions. No signs of ischemia, ADHF, clinical exam not consistent with severe stenotic valvular disease, no unstable arrhythmias noted. Therefore able to proceed with this intermediate risk  without any further cardiac workup. Routine hemodynamic monitoring is suggested during the procedure    - Other cardiovascular workup will depend on clinical course.  - All other workup per primary team.  80 yo M with PMHx HTN, CHF, PE (on eliquis), Myasthenia Gravis, and chronic LE edema, nephrolithiasis (s/p nephrostomy tube placement), urosepsis, BPH admitted for abx and cystoscopy procedure    Admitted for cystoscopy cardiac optimization   - Echo from 09/2024: EF 55-60% moderate MR  - continue home furosemide 20mg QD  - no sign fluid overload   - Strict I/Os, daily weights  - EKG: NSR    - plan for surgery on 1/9 OR  - patient is a moderate risk patient for low risk procedure  - hx pe on eliquis would hold eliquis at least 4 doses prior, for OR tomorrow 1/9 , should be on fd lovenox or heparin   Currently no active cardiac conditions. No signs of ischemia, ADHF, clinical exam not consistent with severe stenotic valvular disease, no unstable arrhythmias noted. Therefore able to proceed with this intermediate risk  without any further cardiac workup. Routine hemodynamic monitoring is suggested during the procedure    - Other cardiovascular workup will depend on clinical course.  - All other workup per primary team.

## 2025-01-08 NOTE — DISCHARGE NOTE PROVIDER - CARE PROVIDER_API CALL
Lloyd Kay  Internal Medicine  [address], [address]  [place] [address]  Phone: [phone]  Fax: [phone]  Follow Up Time:

## 2025-01-08 NOTE — DIETITIAN INITIAL EVALUATION ADULT - PROBLEM SELECTOR PLAN 6
CT Chest 11/20/24:  2.6 cm left lobe thyroid nodule and evidence of granulomatous infection in lungs, liver and spleen  - TSH 0.58 11/20/24  - Primary team to consider non-urgent thyroid US.

## 2025-01-08 NOTE — DISCHARGE NOTE PROVIDER - HOSPITAL COURSE
Joesph Gaxiola is an 78 yo M with PMHx HTN, CHF, PE (on eliquis), Myasthenia Gravis, and chronic LE edema, nephrolithiasis (s/p nephrostomy tube placement), urosepsis, BPH presented to the ED 2/2 urology referral for an admission for IV antibiotics prior to procedure on 1/6/25. Patient is poor historian, unsure of the nature of hospitalization. But denies denies fever, chills, chest pain, SOB, abdominal pain, n/v/d/c or dysuria, or any other complaints.     Of note, pt recently admitted to [facility] 11/20/24 with urosepsis, required nephrostomy tube exchange 11/29/24, was scheduled for a F/U urology evaluation scheduled for cystoscopy- TURP, bladder cysto litholapaxy- R ureteroscopy on 01/06/24. Previously, was also admitted to [facility] in 9/2024 and urgently transferred to [facility] for emergent nephrostomy tube placement after failed stent placement for 7mm kidney stone. Managed in ICU for septic shock/urosepsis and Klebsiella Bacteremia, treated with IV rocephin and vasopressors.     Per Nursing home paperwork, pt was stable for a transfer with an RCRI class II risk - 6%. Arrived to ED with nephrostomy tube, chronic guerrero and PICC line in place. Pt has completed  a 7 days course of Zosyn IV for UTI 2/2 proteus and pseudomonas per paperwork review.       ED Course:   Vitals: T 97.5, HR 95, /83, RR 18 with SpO2 of 98% on RA   Labs: WBC 14.58, Lac 3.4   UA pending  CXR: Scarring with small granuloma in the right midlung again noted. Small hilar calcified lymph nodes again seen.  EKG: NSR @ 92  Received in the ED:  2L NS IV bolus,     Pt. was admitted with ID and Urology consultation.  Pt. was given a course of IV antibiotics.  Blood cultures showed no growth.  Urine culture grew Pseudomonas aeruginosa   (carbipenem resistant) and Klebsiella pneumoniae.  IV antibiotics were adjusted per ID.  Bilateral LE doppler: Gastrocnemius veins and soleal veins were not visualized. No obvious   thrombus at the visualized bilateral lower extremity deep veins.  Pt. had preop cardiology evaluation.  Pt. scheduled for cystoscopy- TURP, bladder cysto litholapaxy- R ureteroscopy on 1/9.           Joesph Gaxiola is an 80 yo M with PMHx HTN, CHF, PE (on eliquis), Myasthenia Gravis, and chronic LE edema, nephrolithiasis (s/p nephrostomy tube placement), urosepsis, BPH presented to the ED 2/2 urology referral for an admission for IV antibiotics prior to procedure on 1/6/25. Patient is poor historian, unsure of the nature of hospitalization. But denies denies fever, chills, chest pain, SOB, abdominal pain, n/v/d/c or dysuria, or any other complaints.     Of note, pt recently admitted to [facility] 11/20/24 with urosepsis, required nephrostomy tube exchange 11/29/24, was scheduled for a F/U urology evaluation scheduled for cystoscopy- TURP, bladder cysto litholapaxy- R ureteroscopy on 01/06/24. Previously, was also admitted to [facility] in 9/2024 and urgently transferred to [facility] for emergent nephrostomy tube placement after failed stent placement for 7mm kidney stone. Managed in ICU for septic shock/urosepsis and Klebsiella Bacteremia, treated with IV rocephin and vasopressors.     Per Nursing home paperwork, pt was stable for a transfer with an RCRI class II risk - 6%. Arrived to ED with nephrostomy tube, chronic guerrero and PICC line in place. Pt has completed  a 7 days course of Zosyn IV for UTI 2/2 proteus and pseudomonas per paperwork review.       ED Course:   Vitals: T 97.5, HR 95, /83, RR 18 with SpO2 of 98% on RA   Labs: WBC 14.58, Lac 3.4   UA pending  CXR: Scarring with small granuloma in the right midlung again noted. Small hilar calcified lymph nodes again seen.  EKG: NSR @ 92  Received in the ED:  2L NS IV bolus,     Pt. was admitted with ID and Urology consultation.  Pt. was given a course of IV antibiotics.  Blood cultures showed no growth.  Urine culture grew Pseudomonas aeruginosa   (carbipenem resistant) and Klebsiella pneumoniae.  IV antibiotics were adjusted per ID.  Bilateral LE doppler: Gastrocnemius veins and soleal veins were not visualized. No obvious   thrombus at the visualized bilateral lower extremity deep veins.  Pt. had preop cardiology evaluation.  Pt. scheduled for cystoscopy- TURP, bladder cysto litholapaxy- R ureteroscopy on 1/9 and tolerated procedure well.             Joesph Gaxiola is an 78 yo M with PMHx HTN, CHF, PE (on eliquis), Myasthenia Gravis, and chronic LE edema, nephrolithiasis (s/p nephrostomy tube placement), urosepsis, BPH presented to the ED 2/2 urology referral for an admission for IV antibiotics prior to procedure on 1/6/25. Patient is poor historian, unsure of the nature of hospitalization. But denies denies fever, chills, chest pain, SOB, abdominal pain, n/v/d/c or dysuria, or any other complaints.     Of note, pt recently admitted to [facility] 11/20/24 with urosepsis, required nephrostomy tube exchange 11/29/24, was scheduled for a F/U urology evaluation scheduled for cystoscopy- TURP, bladder cysto litholapaxy- R ureteroscopy on 01/06/24. Previously, was also admitted to [facility] in 9/2024 and urgently transferred to [facility] for emergent nephrostomy tube placement after failed stent placement for 7mm kidney stone. Managed in ICU for septic shock/urosepsis and Klebsiella Bacteremia, treated with IV rocephin and vasopressors.     Per Nursing home paperwork, pt was stable for a transfer with an RCRI class II risk - 6%. Arrived to ED with nephrostomy tube, chronic guerrero and PICC line in place. Pt has completed  a 7 days course of Zosyn IV for UTI 2/2 proteus and pseudomonas per paperwork review.       ED Course:   Vitals: T 97.5, HR 95, /83, RR 18 with SpO2 of 98% on RA   Labs: WBC 14.58, Lac 3.4   UA pending  CXR: Scarring with small granuloma in the right midlung again noted. Small hilar calcified lymph nodes again seen.  EKG: NSR @ 92  Received in the ED:  2L NS IV bolus,     Pt. was admitted with ID and Urology consultation.  Pt. was given a course of IV antibiotics.  Blood cultures showed no growth.  Urine culture grew Pseudomonas aeruginosa   (carbipenem resistant) and Klebsiella pneumoniae.  IV antibiotics were adjusted per ID.  Bilateral LE doppler: Gastrocnemius veins and soleal veins were not visualized. No obvious   thrombus at the visualized bilateral lower extremity deep veins.  Pt. had preop cardiology evaluation.  Pt. scheduled for cystoscopy- TURP, bladder cysto litholapaxy- R ureteroscopy on 1/9 and tolerated procedure well.  OR culture _____________.           Joesph Gaxiola is an 78 yo M with PMHx HTN, CHF, PE (on eliquis), Myasthenia Gravis, and chronic LE edema, nephrolithiasis (s/p nephrostomy tube placement), urosepsis, BPH presented to the ED 2/2 urology referral for an admission for IV antibiotics prior to procedure on 1/6/25. Patient is poor historian, unsure of the nature of hospitalization. But denies denies fever, chills, chest pain, SOB, abdominal pain, n/v/d/c or dysuria, or any other complaints.     Of note, pt recently admitted to [facility] 11/20/24 with urosepsis, required nephrostomy tube exchange 11/29/24, was scheduled for a F/U urology evaluation scheduled for cystoscopy- TURP, bladder cysto litholapaxy- R ureteroscopy on 01/06/24. Previously, was also admitted to [facility] in 9/2024 and urgently transferred to [facility] for emergent nephrostomy tube placement after failed stent placement for 7mm kidney stone. Managed in ICU for septic shock/urosepsis and Klebsiella Bacteremia, treated with IV rocephin and vasopressors.     Per Nursing home paperwork, pt was stable for a transfer with an RCRI class II risk - 6%. Arrived to ED with nephrostomy tube, chronic guerrero and PICC line in place. Pt has completed  a 7 days course of Zosyn IV for UTI 2/2 proteus and pseudomonas per paperwork review.       ED Course:   Vitals: T 97.5, HR 95, /83, RR 18 with SpO2 of 98% on RA   Labs: WBC 14.58, Lac 3.4   UA pending  CXR: Scarring with small granuloma in the right midlung again noted. Small hilar calcified lymph nodes again seen.  EKG: NSR @ 92  Received in the ED:  2L NS IV bolus,     Pt. was admitted with ID and Urology consultation.  Pt. was given a course of IV antibiotics.  Blood cultures showed no growth.  Urine culture grew Pseudomonas aeruginosa   (carbipenem resistant) and Klebsiella pneumoniae.  IV antibiotics were adjusted per ID.  Bilateral LE doppler: Gastrocnemius veins and soleal veins were not visualized. No obvious   thrombus at the visualized bilateral lower extremity deep veins.  Pt. had preop cardiology evaluation.  Pt. scheduled for cystoscopy- TURP, bladder cysto litholapaxy- R ureteroscopy on 1/9 and tolerated procedure well.  OR culture +pseudomonas aeruginosa and candida albicans.           Joesph Gaxiola is an 80 yo M with PMHx HTN, CHF, PE (on eliquis), Myasthenia Gravis, and chronic LE edema, nephrolithiasis (s/p nephrostomy tube placement), urosepsis, BPH presented to the ED 2/2 urology referral for an admission for IV antibiotics prior to procedure on 1/6/25. Patient is poor historian, unsure of the nature of hospitalization. But denies denies fever, chills, chest pain, SOB, abdominal pain, n/v/d/c or dysuria, or any other complaints.     Of note, pt recently admitted to [facility] 11/20/24 with urosepsis, required nephrostomy tube exchange 11/29/24, was scheduled for a F/U urology evaluation scheduled for cystoscopy- TURP, bladder cysto litholapaxy- R ureteroscopy on 01/06/24. Previously, was also admitted to [facility] in 9/2024 and urgently transferred to [facility] for emergent nephrostomy tube placement after failed stent placement for 7mm kidney stone. Managed in ICU for septic shock/urosepsis and Klebsiella Bacteremia, treated with IV rocephin and vasopressors.     Per Nursing home paperwork, pt was stable for a transfer with an RCRI class II risk - 6%. Arrived to ED with nephrostomy tube, chronic guerrero and PICC line in place. Pt has completed  a 7 days course of Zosyn IV for UTI 2/2 proteus and pseudomonas per paperwork review.       ED Course:   Vitals: T 97.5, HR 95, /83, RR 18 with SpO2 of 98% on RA   Labs: WBC 14.58, Lac 3.4   UA pending  CXR: Scarring with small granuloma in the right midlung again noted. Small hilar calcified lymph nodes again seen.  EKG: NSR @ 92  Received in the ED:  2L NS IV bolus,     Pt. was admitted with ID and Urology consultation.  Pt. was given a course of IV antibiotics.  Blood cultures showed no growth.  Urine culture grew Pseudomonas aeruginosa   (carbipenem resistant) and Klebsiella pneumoniae.  IV antibiotics were adjusted per ID.  Bilateral LE doppler: Gastrocnemius veins and soleal veins were not visualized. No obvious   thrombus at the visualized bilateral lower extremity deep veins.  Pt. had preop cardiology evaluation.  Pt. scheduled for cystoscopy- TURP, bladder cysto litholapaxy- R ureteroscopy on 1/9 and tolerated procedure well.  OR culture +pseudomonas aeruginosa and candida albicans. Patient completed course of IV antibiotics and condition improved clinically through hospital stay. Patient medically stable for discharge to [facility].    Total time spent on discharge including coordination of care by attending physician: 42 minutes

## 2025-01-08 NOTE — DIETITIAN INITIAL EVALUATION ADULT - PROBLEM SELECTOR PLAN 4
Chronic CHF.   H/O of CHF, unspecified type however on GDMT for HFrEF  - TTE (9/2024): LVEF 55-60%, no diastolic dysfunction, moderate MR  - Evaluated by cardiology during admission at [facility] (9/2024) for acute CHF, started on GDMT  - c/w home metoprolol, spironolactone and lasix with hold parameters   - Strict Is&Os q6h  - Does not appear clinically volume overloaded  - Cardiology ([facility]) consulted, recs appreciated.

## 2025-01-09 ENCOUNTER — TRANSCRIPTION ENCOUNTER (OUTPATIENT)
Age: 80
End: 2025-01-09

## 2025-01-09 ENCOUNTER — APPOINTMENT (OUTPATIENT)
Dept: UROLOGY | Facility: HOSPITAL | Age: 80
End: 2025-01-09

## 2025-01-09 LAB
ANION GAP SERPL CALC-SCNC: 9 MMOL/L — SIGNIFICANT CHANGE UP (ref 5–17)
APTT BLD: 32.4 SEC — SIGNIFICANT CHANGE UP (ref 24.5–35.6)
BASOPHILS # BLD AUTO: 0.09 K/UL — SIGNIFICANT CHANGE UP (ref 0–0.2)
BASOPHILS NFR BLD AUTO: 0.8 % — SIGNIFICANT CHANGE UP (ref 0–2)
BUN SERPL-MCNC: 16 MG/DL — SIGNIFICANT CHANGE UP (ref 7–23)
CALCIUM SERPL-MCNC: 9.3 MG/DL — SIGNIFICANT CHANGE UP (ref 8.5–10.1)
CHLORIDE SERPL-SCNC: 108 MMOL/L — SIGNIFICANT CHANGE UP (ref 96–108)
CO2 SERPL-SCNC: 26 MMOL/L — SIGNIFICANT CHANGE UP (ref 22–31)
CREAT SERPL-MCNC: 0.75 MG/DL — SIGNIFICANT CHANGE UP (ref 0.5–1.3)
EGFR: 91 ML/MIN/1.73M2 — SIGNIFICANT CHANGE UP
EOSINOPHIL # BLD AUTO: 0.78 K/UL — HIGH (ref 0–0.5)
EOSINOPHIL NFR BLD AUTO: 6.5 % — HIGH (ref 0–6)
GLUCOSE SERPL-MCNC: 103 MG/DL — HIGH (ref 70–99)
HCT VFR BLD CALC: 40.6 % — SIGNIFICANT CHANGE UP (ref 39–50)
HGB BLD-MCNC: 12.9 G/DL — LOW (ref 13–17)
IMM GRANULOCYTES NFR BLD AUTO: 1 % — HIGH (ref 0–0.9)
INR BLD: 0.98 RATIO — SIGNIFICANT CHANGE UP (ref 0.85–1.16)
LYMPHOCYTES # BLD AUTO: 1.12 K/UL — SIGNIFICANT CHANGE UP (ref 1–3.3)
LYMPHOCYTES # BLD AUTO: 9.3 % — LOW (ref 13–44)
MAGNESIUM SERPL-MCNC: 2 MG/DL — SIGNIFICANT CHANGE UP (ref 1.6–2.6)
MCHC RBC-ENTMCNC: 29.1 PG — SIGNIFICANT CHANGE UP (ref 27–34)
MCHC RBC-ENTMCNC: 31.8 G/DL — LOW (ref 32–36)
MCV RBC AUTO: 91.4 FL — SIGNIFICANT CHANGE UP (ref 80–100)
MONOCYTES # BLD AUTO: 0.94 K/UL — HIGH (ref 0–0.9)
MONOCYTES NFR BLD AUTO: 7.8 % — SIGNIFICANT CHANGE UP (ref 2–14)
NEUTROPHILS # BLD AUTO: 8.93 K/UL — HIGH (ref 1.8–7.4)
NEUTROPHILS NFR BLD AUTO: 74.6 % — SIGNIFICANT CHANGE UP (ref 43–77)
NRBC # BLD: 0 /100 WBCS — SIGNIFICANT CHANGE UP (ref 0–0)
PHOSPHATE SERPL-MCNC: 2.3 MG/DL — LOW (ref 2.5–4.5)
PLATELET # BLD AUTO: 364 K/UL — SIGNIFICANT CHANGE UP (ref 150–400)
POTASSIUM SERPL-MCNC: 3.9 MMOL/L — SIGNIFICANT CHANGE UP (ref 3.5–5.3)
POTASSIUM SERPL-SCNC: 3.9 MMOL/L — SIGNIFICANT CHANGE UP (ref 3.5–5.3)
PROTHROM AB SERPL-ACNC: 11.5 SEC — SIGNIFICANT CHANGE UP (ref 9.9–13.4)
RBC # BLD: 4.44 M/UL — SIGNIFICANT CHANGE UP (ref 4.2–5.8)
RBC # FLD: 16 % — HIGH (ref 10.3–14.5)
SODIUM SERPL-SCNC: 143 MMOL/L — SIGNIFICANT CHANGE UP (ref 135–145)
WBC # BLD: 11.98 K/UL — HIGH (ref 3.8–10.5)
WBC # FLD AUTO: 11.98 K/UL — HIGH (ref 3.8–10.5)

## 2025-01-09 PROCEDURE — 88305 TISSUE EXAM BY PATHOLOGIST: CPT | Mod: 26

## 2025-01-09 PROCEDURE — 50431 NJX PX NFROSGRM &/URTRGRM: CPT | Mod: RT

## 2025-01-09 PROCEDURE — 99233 SBSQ HOSP IP/OBS HIGH 50: CPT

## 2025-01-09 PROCEDURE — 52318 REMOVE BLADDER STONE: CPT

## 2025-01-09 PROCEDURE — 99232 SBSQ HOSP IP/OBS MODERATE 35: CPT

## 2025-01-09 PROCEDURE — 88300 SURGICAL PATH GROSS: CPT | Mod: 26

## 2025-01-09 PROCEDURE — G0545: CPT

## 2025-01-09 PROCEDURE — 99232 SBSQ HOSP IP/OBS MODERATE 35: CPT | Mod: 57

## 2025-01-09 PROCEDURE — 52601 PROSTATECTOMY (TURP): CPT

## 2025-01-09 DEVICE — LASER FIBER MOSES 550 D/F/L: Type: IMPLANTABLE DEVICE | Site: RIGHT | Status: FUNCTIONAL

## 2025-01-09 DEVICE — URETERAL CATH OPEN END FLEXI-TIP 5FR .038" X 70CM: Type: IMPLANTABLE DEVICE | Site: RIGHT | Status: FUNCTIONAL

## 2025-01-09 DEVICE — GUIDEWIRE SENSOR DUAL-FLEX NITINOL STRAIGHT .035" X 150CM: Type: IMPLANTABLE DEVICE | Site: RIGHT | Status: FUNCTIONAL

## 2025-01-09 RX ORDER — ACETAMINOPHEN 80 MG/.8ML
1000 SOLUTION/ DROPS ORAL ONCE
Refills: 0 | Status: DISCONTINUED | OUTPATIENT
Start: 2025-01-09 | End: 2025-01-09

## 2025-01-09 RX ORDER — METOPROLOL TARTRATE 50 MG
25 TABLET ORAL DAILY
Refills: 0 | Status: DISCONTINUED | OUTPATIENT
Start: 2025-01-09 | End: 2025-01-14

## 2025-01-09 RX ORDER — ONDANSETRON 4 MG/1
4 TABLET ORAL EVERY 8 HOURS
Refills: 0 | Status: DISCONTINUED | OUTPATIENT
Start: 2025-01-09 | End: 2025-01-14

## 2025-01-09 RX ORDER — ACETAMINOPHEN 80 MG/.8ML
650 SOLUTION/ DROPS ORAL EVERY 6 HOURS
Refills: 0 | Status: DISCONTINUED | OUTPATIENT
Start: 2025-01-09 | End: 2025-01-14

## 2025-01-09 RX ORDER — GINKGO BILOBA 40 MG
3 CAPSULE ORAL AT BEDTIME
Refills: 0 | Status: DISCONTINUED | OUTPATIENT
Start: 2025-01-09 | End: 2025-01-14

## 2025-01-09 RX ORDER — SODIUM CHLORIDE 9 MG/ML
1000 INJECTION, SOLUTION INTRAMUSCULAR; INTRAVENOUS; SUBCUTANEOUS
Refills: 0 | Status: DISCONTINUED | OUTPATIENT
Start: 2025-01-09 | End: 2025-01-11

## 2025-01-09 RX ORDER — SENNOSIDES 8.6 MG/1
1 TABLET, FILM COATED ORAL AT BEDTIME
Refills: 0 | Status: DISCONTINUED | OUTPATIENT
Start: 2025-01-09 | End: 2025-01-14

## 2025-01-09 RX ORDER — POTASSIUM PHOSPHATE, MONOBASIC AND POTASSIUM PHOSPHATE, DIBASIC 224; 236 MG/ML; MG/ML
30 INJECTION, SOLUTION INTRAVENOUS ONCE
Refills: 0 | Status: COMPLETED | OUTPATIENT
Start: 2025-01-09 | End: 2025-01-09

## 2025-01-09 RX ORDER — OXYCODONE HCL 15 MG
5 TABLET ORAL ONCE
Refills: 0 | Status: DISCONTINUED | OUTPATIENT
Start: 2025-01-09 | End: 2025-01-10

## 2025-01-09 RX ORDER — PANTOPRAZOLE 40 MG/1
40 TABLET, DELAYED RELEASE ORAL
Refills: 0 | Status: DISCONTINUED | OUTPATIENT
Start: 2025-01-09 | End: 2025-01-14

## 2025-01-09 RX ORDER — PREDNISONE 5 MG
10 TABLET ORAL DAILY
Refills: 0 | Status: DISCONTINUED | OUTPATIENT
Start: 2025-01-09 | End: 2025-01-14

## 2025-01-09 RX ORDER — POLYETHYLENE GLYCOL 3350 17 G/DOSE
17 POWDER (GRAM) ORAL DAILY
Refills: 0 | Status: DISCONTINUED | OUTPATIENT
Start: 2025-01-09 | End: 2025-01-14

## 2025-01-09 RX ORDER — TAMSULOSIN HYDROCHLORIDE 0.4 MG/1
0.4 CAPSULE ORAL AT BEDTIME
Refills: 0 | Status: DISCONTINUED | OUTPATIENT
Start: 2025-01-09 | End: 2025-01-14

## 2025-01-09 RX ORDER — SODIUM CHLORIDE 9 MG/ML
1000 INJECTION, SOLUTION INTRAVENOUS
Refills: 0 | Status: DISCONTINUED | OUTPATIENT
Start: 2025-01-09 | End: 2025-01-09

## 2025-01-09 RX ORDER — FUROSEMIDE 20 MG
20 TABLET ORAL DAILY
Refills: 0 | Status: DISCONTINUED | OUTPATIENT
Start: 2025-01-09 | End: 2025-01-14

## 2025-01-09 RX ORDER — FINASTERIDE 5 MG
5 TABLET ORAL DAILY
Refills: 0 | Status: DISCONTINUED | OUTPATIENT
Start: 2025-01-09 | End: 2025-01-14

## 2025-01-09 RX ORDER — RISPERIDONE 0.5 MG/1
0.25 TABLET ORAL AT BEDTIME
Refills: 0 | Status: DISCONTINUED | OUTPATIENT
Start: 2025-01-09 | End: 2025-01-14

## 2025-01-09 RX ORDER — MAG HYDROX/ALUMINUM HYD/SIMETH 200-200-20
30 SUSPENSION, ORAL (FINAL DOSE FORM) ORAL EVERY 4 HOURS
Refills: 0 | Status: DISCONTINUED | OUTPATIENT
Start: 2025-01-09 | End: 2025-01-14

## 2025-01-09 RX ORDER — B COMPLEX, C NO.20/FOLIC ACID 1 MG
1 CAPSULE ORAL DAILY
Refills: 0 | Status: DISCONTINUED | OUTPATIENT
Start: 2025-01-09 | End: 2025-01-14

## 2025-01-09 RX ORDER — HYDROMORPHONE HCL 4 MG
0.5 TABLET ORAL
Refills: 0 | Status: DISCONTINUED | OUTPATIENT
Start: 2025-01-09 | End: 2025-01-09

## 2025-01-09 RX ORDER — CEFTOLOZANE AND TAZOBACTAM 1; .5 G/10ML; G/10ML
1500 INJECTION, POWDER, LYOPHILIZED, FOR SOLUTION INTRAVENOUS EVERY 8 HOURS
Refills: 0 | Status: COMPLETED | OUTPATIENT
Start: 2025-01-09 | End: 2025-01-12

## 2025-01-09 RX ORDER — ONDANSETRON 4 MG/1
4 TABLET ORAL ONCE
Refills: 0 | Status: DISCONTINUED | OUTPATIENT
Start: 2025-01-09 | End: 2025-01-09

## 2025-01-09 RX ADMIN — RISPERIDONE 0.25 MILLIGRAM(S): 0.5 TABLET ORAL at 22:53

## 2025-01-09 RX ADMIN — CEFTOLOZANE AND TAZOBACTAM 100 MILLIGRAM(S): 1; .5 INJECTION, POWDER, LYOPHILIZED, FOR SOLUTION INTRAVENOUS at 10:28

## 2025-01-09 RX ADMIN — PANTOPRAZOLE 40 MILLIGRAM(S): 40 TABLET, DELAYED RELEASE ORAL at 05:54

## 2025-01-09 RX ADMIN — TAMSULOSIN HYDROCHLORIDE 0.4 MILLIGRAM(S): 0.4 CAPSULE ORAL at 22:53

## 2025-01-09 RX ADMIN — SODIUM CHLORIDE 50 MILLILITER(S): 9 INJECTION, SOLUTION INTRAMUSCULAR; INTRAVENOUS; SUBCUTANEOUS at 00:03

## 2025-01-09 RX ADMIN — POTASSIUM PHOSPHATE, MONOBASIC AND POTASSIUM PHOSPHATE, DIBASIC 83.33 MILLIMOLE(S): 224; 236 INJECTION, SOLUTION INTRAVENOUS at 11:32

## 2025-01-09 RX ADMIN — SODIUM CHLORIDE 75 MILLILITER(S): 9 INJECTION, SOLUTION INTRAVENOUS at 23:01

## 2025-01-09 RX ADMIN — CEFTOLOZANE AND TAZOBACTAM 100 MILLIGRAM(S): 1; .5 INJECTION, POWDER, LYOPHILIZED, FOR SOLUTION INTRAVENOUS at 02:16

## 2025-01-09 RX ADMIN — Medication 20 MILLIGRAM(S): at 05:53

## 2025-01-09 RX ADMIN — Medication 25 MILLIGRAM(S): at 05:54

## 2025-01-09 RX ADMIN — SENNOSIDES 1 TABLET(S): 8.6 TABLET, FILM COATED ORAL at 22:54

## 2025-01-09 RX ADMIN — Medication 10 MILLIGRAM(S): at 05:54

## 2025-01-09 NOTE — BRIEF OPERATIVE NOTE - NSICDXBRIEFPROCEDURE_GEN_ALL_CORE_FT
PROCEDURES:  Complex cystolitholapaxy 09-Jan-2025 20:41:43  Spencer Driscoll  TURP 09-Jan-2025 20:41:49  Spencer Driscoll  Nephrostogram 09-Jan-2025 20:41:55  Spencer Driscoll

## 2025-01-09 NOTE — PROGRESS NOTE ADULT - SUBJECTIVE AND OBJECTIVE BOX
Interval Events:  Patient seen and examined at bedside. No complaints offered. Patient for OR today. NPO confirmed.     MEDICATIONS:  MEDICATIONS  (STANDING):  ceftolozane/tazobactam IVPB 1500 milliGRAM(s) IV Intermittent every 8 hours  finasteride 5 milliGRAM(s) Oral daily  furosemide    Tablet 20 milliGRAM(s) Oral daily  metoprolol succinate ER 25 milliGRAM(s) Oral daily  multivitamin 1 Tablet(s) Oral daily  pantoprazole    Tablet 40 milliGRAM(s) Oral before breakfast  polyethylene glycol 3350 17 Gram(s) Oral daily  potassium phosphate IVPB 30 milliMole(s) IV Intermittent once  predniSONE   Tablet 10 milliGRAM(s) Oral daily  risperiDONE   Tablet 0.25 milliGRAM(s) Oral at bedtime  senna 1 Tablet(s) Oral at bedtime  sodium chloride 0.9%. 1000 milliLiter(s) (50 mL/Hr) IV Continuous <Continuous>  tamsulosin 0.4 milliGRAM(s) Oral at bedtime    MEDICATIONS  (PRN):  acetaminophen     Tablet .. 650 milliGRAM(s) Oral every 6 hours PRN Mild Pain (1 - 3)  aluminum hydroxide/magnesium hydroxide/simethicone Suspension 30 milliLiter(s) Oral every 4 hours PRN Dyspepsia  melatonin 3 milliGRAM(s) Oral at bedtime PRN Insomnia  ondansetron Injectable 4 milliGRAM(s) IV Push every 8 hours PRN Nausea and/or Vomiting      Allergies    ofloxacin (Unknown)  gatifloxacin (Unknown)  Cipro (Unknown)  levofloxacin (Unknown)    Intolerances    Avelox (Other)  telithromycin (Other)  fluoroquinolone antibiotics (Other)  Ketek (Other)      T(C): 36.3 (01-09-25 @ 05:43), Max: 37.1 (01-08-25 @ 14:56)  T(F): 97.3 (01-09-25 @ 05:43), Max: 98.8 (01-08-25 @ 14:56)  HR: 76 (01-09-25 @ 05:43) (57 - 76)  BP: 138/73 (01-09-25 @ 05:43) (114/64 - 144/72)  RR: 18 (01-09-25 @ 05:43) (17 - 20)  SpO2: 92% (01-09-25 @ 05:43) (92% - 98%)          01-07-25 @ 07:01  -  01-08-25 @ 07:00  --------------------------------------------------------  IN:  Total IN: 0 mL    OUT:    Indwelling Catheter - Urethral (mL): 1425 mL    Nephrostomy Tube (mL): 1450 mL  Total OUT: 2875 mL    Total NET: -2875 mL      01-08-25 @ 07:01  -  01-09-25 @ 07:00  --------------------------------------------------------  IN:  Total IN: 0 mL    OUT:    Indwelling Catheter - Urethral (mL): 850 mL    Nephrostomy Tube (mL): 600 mL  Total OUT: 1450 mL    Total NET: -1450 mL      01-09-25 @ 07:01  -  01-09-25 @ 10:52  --------------------------------------------------------  IN:  Total IN: 0 mL    OUT:    Indwelling Catheter - Urethral (mL): 950 mL    Nephrostomy Tube (mL): 700 mL  Total OUT: 1650 mL    Total NET: -1650 mL          LABS:      CBC Full  -  ( 09 Jan 2025 08:20 )  WBC Count : 11.98 K/uL  RBC Count : 4.44 M/uL  Hemoglobin : 12.9 g/dL  Hematocrit : 40.6 %  Platelet Count - Automated : 364 K/uL  Mean Cell Volume : 91.4 fl  Mean Cell Hemoglobin : 29.1 pg  Mean Cell Hemoglobin Concentration : 31.8 g/dL  Auto Neutrophil # : 8.93 K/uL  Auto Lymphocyte # : 1.12 K/uL  Auto Monocyte # : 0.94 K/uL  Auto Eosinophil # : 0.78 K/uL  Auto Basophil # : 0.09 K/uL  Auto Neutrophil % : 74.6 %  Auto Lymphocyte % : 9.3 %  Auto Monocyte % : 7.8 %  Auto Eosinophil % : 6.5 %  Auto Basophil % : 0.8 %    01-09    143  |  108  |  16  ----------------------------<  103[H]  3.9   |  26  |  0.75    Ca    9.3      09 Jan 2025 08:20  Phos  2.3     01-09  Mg     2.0     01-09    TPro  6.3  /  Alb  2.5[L]  /  TBili  0.2  /  DBili  x   /  AST  15  /  ALT  20  /  AlkPhos  58  01-08    PT/INR - ( 09 Jan 2025 08:20 )   PT: 11.5 sec;   INR: 0.98 ratio         PTT - ( 09 Jan 2025 08:20 )  PTT:32.4 sec    Urinalysis Basic - ( 09 Jan 2025 08:20 )    Color: x / Appearance: x / SG: x / pH: x  Gluc: 103 mg/dL / Ketone: x  / Bili: x / Urobili: x   Blood: x / Protein: x / Nitrite: x   Leuk Esterase: x / RBC: x / WBC x   Sq Epi: x / Non Sq Epi: x / Bacteria: x      Physical Exam    Constitutional: alert, no acute distress  Abdomen: soft, nontender, nondistended, no CVA, Right Nephrostomy tube with scant yellow urine  Genitourinary: Guerrero with clear yellow urine      Assessment:  79 year old male with PMH HTN, CHF, PE (on eliquis), Myasthenia Gravis, and chronic LE edema, BPH, nephrolithiasis (s/p nephrostomy tube placement), urosepsis, admitted for UTI treatment prior to scheduled for cystoscopy- TURP, bladder cysto litholapaxy- R ureteroscopy.    Plan  - Plan for OR today  - Maintain guerrero/nephrostomy, monitor output  - Continue Abx  - Last day of lovenox on 1/7  - NPO  - Appreciate preop clearance from Cardiology  - Pain control PRN

## 2025-01-09 NOTE — PROGRESS NOTE ADULT - SUBJECTIVE AND OBJECTIVE BOX
[facility] -- Janel Jackson Pannella, Patel, Savella Goodger, Cohen  Office # [phone]      Follow Up:    PE htn   Subjective/Observations:   No events overnight resting comfortably in bed.  No complaints of chest pain, dyspnea, or palpitations reported. No signs of orthopnea or PND.      REVIEW OF SYSTEMS: All other review of systems is negative unless indicated above    PAST MEDICAL & SURGICAL HISTORY:  Calculus of kidney      Club foot  Born Right Foot      Myasthenia gravis      Hypertension      Diabetes  Type 2 - does not take medications - monitors Blood Glucose at home - diet controlled      Urinary tract infection  notes h/o UTI's      Hyperlipidemia      Other muscle wasting and atrophy      H/O spinal stenosis      History of thrombocytopenia      H/O CHF      Elective surgery   age 11 @ [facility] - cut under Patella secondary 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      Club foot  Surgery at birth for Club Foot Right foot      Pilonidal cyst  Surgery 40 years ago      H/O colonoscopy      H/O prostate biopsy      Nephrostomy present          MEDICATIONS  (STANDING):  ceftolozane/tazobactam IVPB 1500 milliGRAM(s) IV Intermittent every 8 hours  finasteride 5 milliGRAM(s) Oral daily  furosemide    Tablet 20 milliGRAM(s) Oral daily  metoprolol succinate ER 25 milliGRAM(s) Oral daily  multivitamin 1 Tablet(s) Oral daily  pantoprazole    Tablet 40 milliGRAM(s) Oral before breakfast  polyethylene glycol 3350 17 Gram(s) Oral daily  potassium phosphate IVPB 30 milliMole(s) IV Intermittent once  predniSONE   Tablet 10 milliGRAM(s) Oral daily  risperiDONE   Tablet 0.25 milliGRAM(s) Oral at bedtime  senna 1 Tablet(s) Oral at bedtime  sodium chloride 0.9%. 1000 milliLiter(s) (50 mL/Hr) IV Continuous <Continuous>  tamsulosin 0.4 milliGRAM(s) Oral at bedtime    MEDICATIONS  (PRN):  acetaminophen     Tablet .. 650 milliGRAM(s) Oral every 6 hours PRN Mild Pain (1 - 3)  aluminum hydroxide/magnesium hydroxide/simethicone Suspension 30 milliLiter(s) Oral every 4 hours PRN Dyspepsia  melatonin 3 milliGRAM(s) Oral at bedtime PRN Insomnia  ondansetron Injectable 4 milliGRAM(s) IV Push every 8 hours PRN Nausea and/or Vomiting      Allergies    ofloxacin (Unknown)  gatifloxacin (Unknown)  Cipro (Unknown)  levofloxacin (Unknown)    Intolerances    Avelox (Other)  telithromycin (Other)  fluoroquinolone antibiotics (Other)  Ketek (Other)      Vital Signs Last 24 Hrs  T(C): 36.3 (2025 05:43), Max: 37.1 (2025 14:56)  T(F): 97.3 (2025 05:43), Max: 98.8 (2025 14:56)  HR: 76 (2025 05:43) (59 - 76)  BP: 138/73 (2025 05:43) (116/56 - 144/72)  BP(mean): --  RR: 18 (2025 05:43) (17 - 19)  SpO2: 92% (2025 05:43) (92% - 97%)    Parameters below as of 2025 05:43  Patient On (Oxygen Delivery Method): room air        I&O's Summary    2025 07:01  -  2025 07:00  --------------------------------------------------------  IN: 925 mL / OUT: 1450 mL / NET: -525 mL          PHYSICAL EXAM:  TELE: off tele   Constitutional: NAD, awake and alert, well-developed  HEENT: Moist Mucous Membranes, Anicteric  Pulmonary: Non-labored, breath sounds are clear bilaterally, No wheezing, crackles or rhonchi  Cardiovascular: Regular, S1 and S2 nl, murmur   Gastrointestinal: Bowel Sounds present, soft, nontender.   Lymph: No lymphadenopathy. No peripheral edema.  Skin: No visible rashes or ulcers.  Psych:  Mood & affect appropriate    LABS: All Labs Reviewed:                        12.9   11.98 )-----------( 364      ( 2025 08:20 )             40.6                         12.7   12.39 )-----------( 380      ( 2025 14:15 )             39.8                         12.8   16.05 )-----------( 427      ( 2025 08:35 )             40.3     2025 08:20    143    |  108    |  16     ----------------------------<  103    3.9     |  26     |  0.75   2025 14:15    141    |  107    |  16     ----------------------------<  87     4.0     |  26     |  0.73   2025 08:35    140    |  106    |  18     ----------------------------<  118    3.4     |  27     |  0.80     Ca    9.3        2025 08:20  Ca    9.4        2025 14:15  Ca    9.2        2025 08:35  Phos  2.3       2025 08:20  Phos  2.3       2025 14:15  Mg     2.0       2025 08:20  Mg     2.2       2025 14:15    TPro  6.3    /  Alb  2.5    /  TBili  0.2    /  DBili  x      /  AST  15     /  ALT  20     /  AlkPhos  58     2025 14:15    PT/INR - ( 2025 08:20 )   PT: 11.5 sec;   INR: 0.98 ratio         PTT - ( 2025 08:20 )  PTT:32.4 sec         EC Lead ECG:   Ventricular Rate 67 BPM    Atrial Rate 67 BPM    P-R Interval 152 ms    QRS Duration 114 ms    Q-T Interval 362 ms    QTC Calculation(Bazett) 382 ms    P Axis 41 degrees    R Axis -52 degrees    T Axis 69 degrees    Diagnosis Line Sinus rhythm withsinus arrhythmia with occasional premature ventricular complexes  Left anterior fascicular block  Nonspecific T wave abnormality  Abnormal ECG  When compared with ECG of 2025 10:38,  premature ventricular complexes are now present  T wave inversion more evident in Lateral leads  QT has shortened  Confirmed by BRENDAN GONZALEZ ([id number]) on 2025 5:48:12 PM (25 @ 07:40)      TRANSTHORACIC ECHOCARDIOGRAM REPORT  ________________________________________________________________________________                                      _______       Pt. Name:       DEION HEATH Study Date:    2024  MRN:            [id number]       YOB: 1945  Accession #:    [id number]      Age:           79 years  Account#:       [id number]     Gender:        M  Heart Rate:                    Height:        65.75 in (167.00 cm)  Rhythm:                        Weight:   199.00 lb (90.27 kg)  Blood Pressure: 103/58 mmHg    BSA/BMI:       1.99 m² / 32.37 kg/m²  ________________________________________________________________________________________  Referring Physician:    [id number] Curly Byrnes  Interpreting Physician: Lyndsay Marin MD  Primary Sonographer:    Ashli Arana MARCELINO    CPT:                ECHO TTE WO CON COMP W DOPP - 09750.m  Indication(s):      Abnormal electrocardiogram ECG/EKG - R94.31  Procedure:          Transthoracic echocardiogram with 2-D, M-mode and complete                      spectral and color flow Doppler.  Ordering Location:  ICU1  Admission Status:   Inpatient  Contrast Injection: Verbal consent was obtained for injection of Ultrasonic                      Enhancing Agent following a discussion of risks and                      benefits.                      Endocardial visualization enhanced with Definity Ultrasound                      enhancing agent.  Agitated Saline:    Injection with agitated saline was performed toevaluate for                      intracardiac shunting.  Study Information:  Image quality for this study is technically difficult.    _______________________________________________________________________________________     CONCLUSIONS:      1. Technically difficult image quality.   2. Agitated saline injection was negative for intracardiac shunt (though there is poor visualization of the LV during injection of agitated saline)   3. Despite definity use, the LV endocardium is still not well visualized.   4. In certain views, the LVEF appears grossly preserved though the apex appears hypokinetic.    ________________________________________________________________________________________  FINDINGS:     Interatrial Septum:  Agitated saline injection was negative for intracardiac shunt.  ________________________________________________________________________________________  Electronically signed on 2024 at 2:37:09 PM by Lyndsay Marin MD      *** Final ***      Radiology:

## 2025-01-09 NOTE — BRIEF OPERATIVE NOTE - NSICDXBRIEFPREOP_GEN_ALL_CORE_FT
PRE-OP DIAGNOSIS:  History of insertion of nephrostomy tube 09-Jan-2025 20:43:36  Spencer Driscoll  Enlarged prostate with urinary retention 09-Jan-2025 20:43:45  Spencer Driscoll  Bladder stones 09-Jan-2025 20:43:52  Spencer Driscoll

## 2025-01-09 NOTE — PROGRESS NOTE ADULT - ASSESSMENT
Hypokalemia  H/o Nephrolithiasis , Chronic guerrero, Rt PCN  h/o MS  Hypertension  UTI  For cystoscopy    Stable renal indices. Potassium levels better. For OR today. On low dose lasix.  follow up.   Trend BP and titrate BP meds as needed. Will sign off. Please recall if needed. Thank you.

## 2025-01-09 NOTE — BRIEF OPERATIVE NOTE - NSICDXBRIEFPOSTOP_GEN_ALL_CORE_FT
POST-OP DIAGNOSIS:  Nephrostomy status 09-Jan-2025 20:44:03  Spencer Driscoll  Enlarged prostate with urinary retention 09-Jan-2025 20:44:16  Spencer Driscoll  Bladder stones 09-Jan-2025 20:44:22  Spencer Driscoll

## 2025-01-09 NOTE — PROGRESS NOTE ADULT - ASSESSMENT
80 yo M with PMHx HTN, CHF, PE (on eliquis), Myasthenia Gravis, and chronic LE edema, nephrolithiasis (s/p nephrostomy tube placement), urosepsis, BPH admitted for UTI treatment prior to scheduled for cystoscopy- TURP, bladder cysto litholapaxy- R ureteroscopy on 01/09/24.        Problem/Plan - 1:  ·  Problem: UTI (urinary tract infection).   ·  Plan: known for recurrent UTI presented to the ED 2/2 urology referral for IV antibiotics prior to procedure on 1/9/25.  -  9/2024, required emergent nephrostomy tube placement after failed stent placement for 7mm kidney stone. followed by ICU care for Klebsiella Bacteremia, urosepsis/ septic shock.    - H/O hosp with urosepsis on 11/20/24 required nephrostomy tube exchange 11/29/24,   - scheduled for cystoscopy- TURP, bladder cysto litholapaxy- R ureteroscopy on 01/09/25.   - completed 7 day course of IV zosyn before transfer  - in ED WBC 14.58, Lac 3.4   - leucocytosis pt on steroid prednisone   - UA -> UCx  probable collection contamination. +Pseudomonas aeruginosa (carbapenem resistant), + ESBL Klebsiella pneumoniae  - BCx x2, neg    - ID (Dr. Mckinley) consulted, started on Zerbaxa -  plan for OR today.  .     Problem/Plan - 2:  ·  Problem: Urolithiasis.   ·  Plan: CT A/P 11/20/24: Percutaneous R nephrostomy tube. No hydronephrosis. Unremarkable L kidney. UB collapsed around guerrero. UB stone x3 measuring up to 1.0 cm.  - scheduled for cystoscopy- TURP, bladder cysto litholapaxy- R ureteroscopy on 01/09/25.   - care plan per Urology dr rashad schmidt for OR today. Pt. without s/s of ACS, decompensated CHF, unstable arrythmia, or symptomatic aortic stenosis.  No absolute contraindication from medical perspective to proceed with planned urologic procedure.  Appreciated cardiology preop evaluation.      Problem/Plan - 3:  ·  Problem: Hypertension.   ·  Plan: Chronic  - on home metoprolol succinate 25mg PO daily  - DASH diet.     Problem/Plan - 4:  ·  Problem: Chronic CHF.   ·  Plan: Chronic CHF.   H/O of CHF, unspecified type however on GDMT for HFrEF  - TTE (9/2024): LVEF 55-60%, no diastolic dysfunction, moderate MR  - Evaluated by cardiology during admission at [facility] (9/2024) for acute CHF, started on GDMT  - c/w home metoprolol and lasix with hold parameters   - Strict Is&Os q6h  - Does not appear clinically volume overloaded  - Cardiology ([facility]) consulted,     Problem/Plan - 5:  ·  Problem: Lung granuloma.   ·  Plan: Lung granuloma.   CT Chest/Ab/Pelv with IV Con 11/2024: Evidence of granulomatous infection in lungs, liver and spleen. Consistent with previous imaging in 11/2023  - Per chart review, pt has been aware of this finding  - CXR consistent on this admission  - Quant indeterminate, fungitell negative, aspergillus galactomannan negative at that time  - ID (Dr. Mckinley) consulted, -.     Problem/Plan - 6:  ·  Problem: Thyroid nodule.   ·  Plan: CT Chest 11/20/24:  2.6 cm left lobe thyroid nodule and evidence of granulomatous infection in lungs, liver and spleen  - TSH 0.58 11/20/24  -non-urgent thyroid US. out pt.     Problem/Plan - 7:  ·  Problem: Personal history of pulmonary embolism.   ·  Plan: Chronic  - c/w home eliquis 5mg BID- on lovenox pending or.  Now off anticoagulation for planned urologic procedure today.      Problem/Plan - 8:  ·  Problem: Myasthenia gravis.   ·  Plan: Chronic  - c/w home prednisone.     Problem/Plan - 9:  ·  Problem: BPH (benign prostatic hyperplasia).   ·  Plan: - c/w home flomax and finasteride.     Problem/Plan - 10:  ·  Problem: Need for prophylactic measure.   ·  Plan; - c/w home eliquis 5mg BID for hx pe .- held for OR today.     ---  TIME SPENT:  52 minutes spent on total encounter. The necessity of the time spent during the encounter on this date of service was due to:     direct patient care including but not limited to reviewing chart, medications ,laboratory data, imaging reports, discussion of plan of care with consultants on the case, coordination of care with multidisciplinary team involved in the case and discussion of plan with patient.  Patient agreeable to plan of care and verbalized understanding the anticipated hospital course and treatment plan.    ---

## 2025-01-09 NOTE — PROGRESS NOTE ADULT - ASSESSMENT
78 yo M with PMHx HTN, CHF, PE (on eliquis), Myasthenia Gravis, and chronic LE edema, nephrolithiasis (s/p nephrostomy tube placement), urosepsis, BPH admitted for abx and cystoscopy procedure    Admitted for cystoscopy cardiac optimization   - Echo from 09/2024: EF 55-60% moderate MR  - continue home furosemide 20mg QD  - no sign fluid overload on room air   - Strict I/Os, daily weights  - EKG: NSR    - plan for TURP bladder cysto litholapaxy- R ureteroscopy today 1/9   - patient is a moderate risk patient for low risk procedure  _Eliquis held for OR  Currently no active cardiac conditions. No signs of ischemia, ADHF, clinical exam not consistent with severe stenotic valvular disease, no unstable arrhythmias noted. Therefore able to proceed with this intermediate risk  without any further cardiac workup. Routine hemodynamic monitoring is suggested during the procedure      -Bp stable   - Other cardiovascular workup will depend on clinical course.  - All other workup per primary team.    80 yo M with PMHx HTN, CHF, PE (on eliquis), Myasthenia Gravis, and chronic LE edema, nephrolithiasis (s/p nephrostomy tube placement), urosepsis, BPH admitted for abx and cystoscopy procedure    - Echo from 09/2024: EF 55-60% moderate MR  - continue home furosemide 20mg QD  - No signs of significant ischemia or volume overload.     - plan for TURP bladder cysto litholapaxy- R ureteroscopy today 1/9   - patient is a moderate risk patient for low risk procedure  - Eliquis held for OR  - Currently no active cardiac conditions. No signs of ischemia, ADHF, clinical exam not consistent with severe stenotic valvular disease, no unstable arrhythmias noted. Therefore able to proceed with this intermediate risk  without any further cardiac workup. Routine hemodynamic monitoring is suggested during the procedure

## 2025-01-09 NOTE — PROGRESS NOTE ADULT - SUBJECTIVE AND OBJECTIVE BOX
[facility]  INFECTIOUS DISEASES   [address] [place] [address]  Tel: [phone]     Fax: [phone]  ========================================================  MD Nikolas Oquendo Michelle, MD Shah, Kaushal, MD Sunjit, Jaspal, MD Sehrish Shahid, MD   ========================================================    [id number]  DEION HEATH     Follow up: No fever, no new overnight event.     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  Club foot  Surgery at birth for Club Foot Right foot  Pilonidal cyst  Surgery 40 years ago  H/O colonoscopy  H/O prostate biopsy  Nephrostomy present    Social Hx: No current smoking, EtOH or drugs     FAMILY HISTORY:  Family history of stroke (Father)  Father -  age 62    Family history of kidney disease (Mother)  Mother -  age 67    Family history of diabetes mellitus type II (Sibling)  Brother & Sister    Allergie  ofloxacin (Unknown)  gatifloxacin (Unknown)  Cipro (Unknown)  levofloxacin (Unknown)    Avelox (Other)  telithromycin (Other)  fluoroquinolone antibiotics (Other)  Ketek (Other)    MEDICATIONS  (STANDING):  meropenem  IVPB 1000 milliGRAM(s) IV Intermittent Once     REVIEW OF SYSTEMS:  CONSTITUTIONAL:  No Fever or chills  HEENT:   No diplopia or blurred vision.  No earache, sore throat or runny nose.  CARDIOVASCULAR:  No chest pain or SOB  RESPIRATORY:  No cough, shortness of breath, PND or orthopnea.  GASTROINTESTINAL:  No nausea, vomiting or diarrhea.  GENITOURINARY:  No dysuria, frequency or urgency. No Blood in urine  MUSCULOSKELETAL:  no joint aches, no muscle pain  SKIN:  No change in skin, hair or nails.    Physical Exam:  Vital Signs Last 24 Hrs  T(C): 36.7 (2025 16:42), Max: 36.9 (2025 13:11)  T(F): 98 (2025 16:42), Max: 98.4 (2025 13:11)  HR: 88 (2025 16:42) (64 - 88)  BP: 127/65 (2025 16:42) (127/65 - 144/72)  BP(mean): 83 (2025 16:42) (83 - 83)  RR: 14 (2025 16:42) (14 - 18)  SpO2: 98% (2025 16:42) (92% - 98%)  Parameters below as of 2025 16:42  Patient On (Oxygen Delivery Method): room air  GEN: NAD  HEENT: normocephalic and atraumatic. EOMI. PERRL.    NECK: Supple.  No lymphadenopathy   LUNGS: Clear to auscultation.  HEART: Regular rate and rhythm   ABDOMEN: Soft, nontender, and nondistended.   Guerrero in place, no CVA tenderness, R nephrostomy  EXTREMITIES: Without edema. PICC looks fine  PSYCHIATRIC: Awake and alert but not oriented   SKIN: No rash     Labs;      143  |  108  |  16  ----------------------------<  103[H]  3.9   |  26  |  0.75    Ca    9.3      2025 08:20  Phos  2.3       Mg     2.0         TPro  6.3  /  Alb  2.5[L]  /  TBili  0.2  /  DBili  x   /  AST  15  /  ALT  20  /  AlkPhos  58                            12.9   11.98 )-----------( 364      ( 2025 08:20 )             40.6       PT/INR - ( 2025 08:20 )   PT: 11.5 sec;   INR: 0.98 ratio         PTT - ( 2025 08:20 )  PTT:32.4 sec  Urinalysis Basic - ( 2025 08:20 )    Color: x / Appearance: x / SG: x / pH: x  Gluc: 103 mg/dL / Ketone: x  / Bili: x / Urobili: x   Blood: x / Protein: x / Nitrite: x   Leuk Esterase: x / RBC: x / WBC x   Sq Epi: x / Non Sq Epi: x / Bacteria: x      LIVER FUNCTIONS - ( 2025 14:15 )  Alb: 2.5 g/dL / Pro: 6.3 g/dL / ALK PHOS: 58 U/L / ALT: 20 U/L / AST: 15 U/L / GGT: x             RECENT CULTURES:   @ 22:20 Clean Catch Klebsiella pneumoniae ESBL  Pseudomonas aeruginosa (Carbapenem Resistant)    >=3 organisms. Probable collection contamination. Culture includes  >100,000 CFU/ml Pseudomonas aeruginosa (Carbapenem Resistant)  >100,000 CFU/ml Klebsiella pneumoniae ESBL  Unable to evaluate further due to Pseudomonas  overgrowth     @ 10:31 .Blood BLOOD     No growth at 5 days     @ 10:25 .Blood BLOOD     No growth at 5 days    12- @ 16:20 Catheterized Catheterized     Culture grew 3 or more types of organisms which indicate  collection contamination; consider recollection only if clinically  indicated.    All imaging and other data have been reviewed.  < from: Xray Chest 1 View-PORTABLE IMMEDIATE (25 @ 10:47) >  IMPRESSION: Scarring with small granuloma in the right midlung again   noted. Small hilar calcified lymph nodes again seen.      Assessment and Plan:   80 yo man with PMH of HTN, CHF, PE (on eliquis), Myasthenia Gravis, and chronic LE edema, nephrolithiasis (s/p nephrostomy tube placement), urosepsis, BPH was transferred from [facility] for hypertension and elevated WBC.  No complaints, no fever, chills, chest pain, SOB, abdominal or flank pain, n/v/d/c or dysuria.    In last admission had some work up done for calcified granulomas in chest CT. Negative sputum for AFB x3 and negative fungal markers.   Also last time had CR pseudomonas for which completed zerbaxa.   Leukocytosis seems chronic he had numbers around 20 most days.   Since going for  procedure will treat him with ABx prior to surgery due to bacteriuria.     # UTI?  # Chronic guerrero and nephrostomy   # Leukocytosis     - Blood cultures NGTD   - UCx with ESBL Klebsiella and CR pseudomonas   - Monitor CBC ~12 today   - Has been scheduled for cystoscopy- TURP, bladder cysto litholapaxy- R ureteroscopy later today   - Continue Zebaxa for now    Will follow.    Jordin Mckinley MD  Division of Infectious Diseases   Please call ID service at [phone] with any question.    50 minutes spent on total encounter assessing patient, examination, chart review, counseling and coordinating care by the attending physician/nurse/care manager.

## 2025-01-09 NOTE — PROGRESS NOTE ADULT - SUBJECTIVE AND OBJECTIVE BOX
[facility]                                                       Dr. Linares, Dr. Collado, Dr. Tian, Dr. Brown, Dr. Vallejo, Dr. Manzo                                      [facility], [facility], [address]                                                 [address] [address]                                      Ph: [phone]  Fax: [phone]                                         Ph: [phone]  Fax: [phone]      Patient is a 79y old  Male who presents with a chief complaint of UTI (07 Jan 2025 11:38)    Patient seen in follow up for hypokalemia.        PAST MEDICAL HISTORY:  Calculus of kidney    Club foot    Myasthenia gravis    Hypertension    Diabetes    Urinary tract infection    Hyperlipidemia    Other muscle wasting and atrophy    H/O spinal stenosis    History of thrombocytopenia    H/O CHF      MEDICATIONS  (STANDING):  ceftolozane/tazobactam IVPB 1500 milliGRAM(s) IV Intermittent every 8 hours  finasteride 5 milliGRAM(s) Oral daily  furosemide    Tablet 20 milliGRAM(s) Oral daily  metoprolol succinate ER 25 milliGRAM(s) Oral daily  multivitamin 1 Tablet(s) Oral daily  pantoprazole    Tablet 40 milliGRAM(s) Oral before breakfast  polyethylene glycol 3350 17 Gram(s) Oral daily  predniSONE   Tablet 10 milliGRAM(s) Oral daily  risperiDONE   Tablet 0.25 milliGRAM(s) Oral at bedtime  senna 1 Tablet(s) Oral at bedtime    sodium chloride 0.9%. 1000 milliLiter(s) (50 mL/Hr) IV Continuous <Continuous>  tamsulosin 0.4 milliGRAM(s) Oral at bedtime    MEDICATIONS  (PRN):  acetaminophen     Tablet .. 650 milliGRAM(s) Oral every 6 hours PRN Mild Pain (1 - 3)  aluminum hydroxide/magnesium hydroxide/simethicone Suspension 30 milliLiter(s) Oral every 4 hours PRN Dyspepsia  melatonin 3 milliGRAM(s) Oral at bedtime PRN Insomnia  ondansetron Injectable 4 milliGRAM(s) IV Push every 8 hours PRN Nausea and/or Vomiting    T(C): 36.9 (01-09-25 @ 13:11), Max: 37.1 (01-08-25 @ 14:56)  HR: 70 (01-09-25 @ 13:11) (57 - 76)  BP: 139/69 (01-09-25 @ 13:11) (116/56 - 144/72)  RR: 18 (01-09-25 @ 13:11)  SpO2: 94% (01-09-25 @ 13:11)  Wt(kg): --  I&O's Detail    08 Jan 2025 07:01  -  09 Jan 2025 07:00  --------------------------------------------------------  IN:    IV PiggyBack: 50 mL    IV PiggyBack: 75 mL    IV PiggyBack: 200 mL    Oral Fluid: 600 mL  Total IN: 925 mL    OUT:    Indwelling Catheter - Urethral (mL): 850 mL    Nephrostomy Tube (mL): 600 mL  Total OUT: 1450 mL    Total NET: -525 mL      09 Jan 2025 07:01  -  09 Jan 2025 13:54  --------------------------------------------------------  IN:  Total IN: 0 mL    OUT:    Indwelling Catheter - Urethral (mL): 1250 mL    Nephrostomy Tube (mL): 900 mL  Total OUT: 2150 mL    Total NET: -2150 mL                PHYSICAL EXAM:  General: No distress  Respiratory: b/l air entry  Cardiovascular: S1 S2  Gastrointestinal: soft  Extremities:  + edema                               LABORATORY:                        12.9   11.98 )-----------( 364      ( 09 Jan 2025 08:20 )             40.6     01-09    143  |  108  |  16  ----------------------------<  103[H]  3.9   |  26  |  0.75    Ca    9.3      09 Jan 2025 08:20  Phos  2.3     01-09  Mg     2.0     01-09    TPro  6.3  /  Alb  2.5[L]  /  TBili  0.2  /  DBili  x   /  AST  15  /  ALT  20  /  AlkPhos  58  01-08    Sodium: 143 mmol/L (01-09 @ 08:20)  Sodium: 141 mmol/L (01-08 @ 14:15)    Potassium: 3.9 mmol/L (01-09 @ 08:20)  Potassium: 4.0 mmol/L (01-08 @ 14:15)    Hemoglobin: 12.9 g/dL (01-09 @ 08:20)  Hemoglobin: 12.7 g/dL (01-08 @ 14:15)  Hemoglobin: 12.8 g/dL (01-07 @ 08:35)    Creatinine, Serum 0.75 (01-09 @ 08:20)  Creatinine, Serum 0.73 (01-08 @ 14:15)  Creatinine, Serum 0.80 (01-07 @ 08:35)        LIVER FUNCTIONS - ( 08 Jan 2025 14:15 )  Alb: 2.5 g/dL / Pro: 6.3 g/dL / ALK PHOS: 58 U/L / ALT: 20 U/L / AST: 15 U/L / GGT: x           Urinalysis Basic - ( 09 Jan 2025 08:20 )    Color: x / Appearance: x / SG: x / pH: x  Gluc: 103 mg/dL / Ketone: x  / Bili: x / Urobili: x   Blood: x / Protein: x / Nitrite: x   Leuk Esterase: x / RBC: x / WBC x   Sq Epi: x / Non Sq Epi: x / Bacteria: x

## 2025-01-09 NOTE — PROGRESS NOTE ADULT - SUBJECTIVE AND OBJECTIVE BOX
CHIEF COMPLAINT/INTERVAL HISTORY:  Pt. seen and evaluated for UTI and BPH.  Pt. is in no distress.  Denies having CP or SOB.  Tolerating IV antibiotics.     REVIEW OF SYSTEMS:  No fever, CP, SOB, or abdominal pain     Vital Signs Last 24 Hrs  T(C): 36.3 (09 Jan 2025 05:43), Max: 37.1 (08 Jan 2025 14:56)  T(F): 97.3 (09 Jan 2025 05:43), Max: 98.8 (08 Jan 2025 14:56)  HR: 76 (09 Jan 2025 05:43) (59 - 76)  BP: 138/73 (09 Jan 2025 05:43) (116/56 - 144/72)  BP(mean): --  RR: 18 (09 Jan 2025 05:43) (17 - 19)  SpO2: 92% (09 Jan 2025 05:43) (92% - 97%)    Parameters below as of 09 Jan 2025 05:43  Patient On (Oxygen Delivery Method): room air        PHYSICAL EXAM:  GENERAL: NAD  HEENT: EOMI, hearing normal, conjunctiva and sclera clear  Chest: CTA bilaterally, no wheezing  CV: S1S2, RRR,   GI: soft, +BS, NT/ND  : +guerrero catheter  Musculoskeletal: no LE edema by visualization.  Pt. did not want any one to touch his legs.   Psychiatric: affect nL, mood nL  Skin: warm and dry    LABS:                        12.9   11.98 )-----------( 364      ( 09 Jan 2025 08:20 )             40.6     01-09    143  |  108  |  16  ----------------------------<  103[H]  3.9   |  26  |  0.75    Ca    9.3      09 Jan 2025 08:20  Phos  2.3     01-08  Mg     2.0     01-09    TPro  6.3  /  Alb  2.5[L]  /  TBili  0.2  /  DBili  x   /  AST  15  /  ALT  20  /  AlkPhos  58  01-08    PT/INR - ( 09 Jan 2025 08:20 )   PT: 11.5 sec;   INR: 0.98 ratio         PTT - ( 09 Jan 2025 08:20 )  PTT:32.4 sec  Urinalysis Basic - ( 09 Jan 2025 08:20 )    Color: x / Appearance: x / SG: x / pH: x  Gluc: 103 mg/dL / Ketone: x  / Bili: x / Urobili: x   Blood: x / Protein: x / Nitrite: x   Leuk Esterase: x / RBC: x / WBC x   Sq Epi: x / Non Sq Epi: x / Bacteria: x

## 2025-01-10 LAB
ANION GAP SERPL CALC-SCNC: 9 MMOL/L — SIGNIFICANT CHANGE UP (ref 5–17)
BASOPHILS # BLD AUTO: 0.01 K/UL — SIGNIFICANT CHANGE UP (ref 0–0.2)
BASOPHILS NFR BLD AUTO: 0.1 % — SIGNIFICANT CHANGE UP (ref 0–2)
BUN SERPL-MCNC: 14 MG/DL — SIGNIFICANT CHANGE UP (ref 7–23)
CALCIUM SERPL-MCNC: 9.3 MG/DL — SIGNIFICANT CHANGE UP (ref 8.5–10.1)
CHLORIDE SERPL-SCNC: 107 MMOL/L — SIGNIFICANT CHANGE UP (ref 96–108)
CO2 SERPL-SCNC: 23 MMOL/L — SIGNIFICANT CHANGE UP (ref 22–31)
CREAT SERPL-MCNC: 0.67 MG/DL — SIGNIFICANT CHANGE UP (ref 0.5–1.3)
EGFR: 94 ML/MIN/1.73M2 — SIGNIFICANT CHANGE UP
EOSINOPHIL # BLD AUTO: 0 K/UL — SIGNIFICANT CHANGE UP (ref 0–0.5)
EOSINOPHIL NFR BLD AUTO: 0 % — SIGNIFICANT CHANGE UP (ref 0–6)
GLUCOSE SERPL-MCNC: 168 MG/DL — HIGH (ref 70–99)
HCT VFR BLD CALC: 39 % — SIGNIFICANT CHANGE UP (ref 39–50)
HGB BLD-MCNC: 12.7 G/DL — LOW (ref 13–17)
IMM GRANULOCYTES NFR BLD AUTO: 0.9 % — SIGNIFICANT CHANGE UP (ref 0–0.9)
LYMPHOCYTES # BLD AUTO: 0.96 K/UL — LOW (ref 1–3.3)
LYMPHOCYTES # BLD AUTO: 6.3 % — LOW (ref 13–44)
MAGNESIUM SERPL-MCNC: 1.9 MG/DL — SIGNIFICANT CHANGE UP (ref 1.6–2.6)
MCHC RBC-ENTMCNC: 29.6 PG — SIGNIFICANT CHANGE UP (ref 27–34)
MCHC RBC-ENTMCNC: 32.6 G/DL — SIGNIFICANT CHANGE UP (ref 32–36)
MCV RBC AUTO: 90.9 FL — SIGNIFICANT CHANGE UP (ref 80–100)
MONOCYTES # BLD AUTO: 0.79 K/UL — SIGNIFICANT CHANGE UP (ref 0–0.9)
MONOCYTES NFR BLD AUTO: 5.2 % — SIGNIFICANT CHANGE UP (ref 2–14)
NEUTROPHILS # BLD AUTO: 13.39 K/UL — HIGH (ref 1.8–7.4)
NEUTROPHILS NFR BLD AUTO: 87.5 % — HIGH (ref 43–77)
NRBC # BLD: 0 /100 WBCS — SIGNIFICANT CHANGE UP (ref 0–0)
PHOSPHATE SERPL-MCNC: 3 MG/DL — SIGNIFICANT CHANGE UP (ref 2.5–4.5)
PLATELET # BLD AUTO: 367 K/UL — SIGNIFICANT CHANGE UP (ref 150–400)
POTASSIUM SERPL-MCNC: 4.1 MMOL/L — SIGNIFICANT CHANGE UP (ref 3.5–5.3)
POTASSIUM SERPL-SCNC: 4.1 MMOL/L — SIGNIFICANT CHANGE UP (ref 3.5–5.3)
RBC # BLD: 4.29 M/UL — SIGNIFICANT CHANGE UP (ref 4.2–5.8)
RBC # FLD: 15.9 % — HIGH (ref 10.3–14.5)
SODIUM SERPL-SCNC: 139 MMOL/L — SIGNIFICANT CHANGE UP (ref 135–145)
WBC # BLD: 15.28 K/UL — HIGH (ref 3.8–10.5)
WBC # FLD AUTO: 15.28 K/UL — HIGH (ref 3.8–10.5)

## 2025-01-10 PROCEDURE — 99232 SBSQ HOSP IP/OBS MODERATE 35: CPT

## 2025-01-10 PROCEDURE — 99233 SBSQ HOSP IP/OBS HIGH 50: CPT

## 2025-01-10 PROCEDURE — G0545: CPT

## 2025-01-10 RX ORDER — APIXABAN 5 MG/1
5 TABLET, FILM COATED ORAL
Refills: 0 | Status: DISCONTINUED | OUTPATIENT
Start: 2025-01-10 | End: 2025-01-14

## 2025-01-10 RX ADMIN — CEFTOLOZANE AND TAZOBACTAM 100 MILLIGRAM(S): 1; .5 INJECTION, POWDER, LYOPHILIZED, FOR SOLUTION INTRAVENOUS at 06:22

## 2025-01-10 RX ADMIN — Medication 10 MILLIGRAM(S): at 06:22

## 2025-01-10 RX ADMIN — Medication 25 MILLIGRAM(S): at 06:23

## 2025-01-10 RX ADMIN — CEFTOLOZANE AND TAZOBACTAM 100 MILLIGRAM(S): 1; .5 INJECTION, POWDER, LYOPHILIZED, FOR SOLUTION INTRAVENOUS at 14:03

## 2025-01-10 RX ADMIN — ACETAMINOPHEN 650 MILLIGRAM(S): 80 SOLUTION/ DROPS ORAL at 10:00

## 2025-01-10 RX ADMIN — ACETAMINOPHEN 650 MILLIGRAM(S): 80 SOLUTION/ DROPS ORAL at 09:09

## 2025-01-10 RX ADMIN — SENNOSIDES 1 TABLET(S): 8.6 TABLET, FILM COATED ORAL at 21:58

## 2025-01-10 RX ADMIN — PANTOPRAZOLE 40 MILLIGRAM(S): 40 TABLET, DELAYED RELEASE ORAL at 06:22

## 2025-01-10 RX ADMIN — Medication 5 MILLIGRAM(S): at 12:52

## 2025-01-10 RX ADMIN — Medication 1 TABLET(S): at 12:51

## 2025-01-10 RX ADMIN — APIXABAN 5 MILLIGRAM(S): 5 TABLET, FILM COATED ORAL at 18:15

## 2025-01-10 RX ADMIN — CEFTOLOZANE AND TAZOBACTAM 100 MILLIGRAM(S): 1; .5 INJECTION, POWDER, LYOPHILIZED, FOR SOLUTION INTRAVENOUS at 21:59

## 2025-01-10 RX ADMIN — Medication 20 MILLIGRAM(S): at 06:22

## 2025-01-10 RX ADMIN — Medication 17 GRAM(S): at 12:52

## 2025-01-10 RX ADMIN — TAMSULOSIN HYDROCHLORIDE 0.4 MILLIGRAM(S): 0.4 CAPSULE ORAL at 21:58

## 2025-01-10 RX ADMIN — Medication 5 MILLIGRAM(S): at 21:58

## 2025-01-10 RX ADMIN — Medication 5 MILLIGRAM(S): at 22:28

## 2025-01-10 RX ADMIN — RISPERIDONE 0.25 MILLIGRAM(S): 0.5 TABLET ORAL at 21:57

## 2025-01-10 RX ADMIN — SODIUM CHLORIDE 50 MILLILITER(S): 9 INJECTION, SOLUTION INTRAMUSCULAR; INTRAVENOUS; SUBCUTANEOUS at 14:03

## 2025-01-10 NOTE — PROGRESS NOTE ADULT - SUBJECTIVE AND OBJECTIVE BOX
CHIEF COMPLAINT/INTERVAL HISTORY:  Pt. seen and evaluated for UTI and BPH.  Pt. is s/p TURP and cystolitholapaxy.  Pt. is in no distress.  Denies having any pain.  Tolerating IV antibiotics.      REVIEW OF SYSTEMS:  No fever, CP, SOB, or abdominal pain.      Vital Signs Last 24 Hrs  T(C): 36.4 (10 Kan 2025 06:49), Max: 36.9 (09 Jan 2025 13:11)  T(F): 97.6 (10 Kan 2025 06:49), Max: 98.4 (09 Jan 2025 13:11)  HR: 67 (10 Kan 2025 06:49) (67 - 88)  BP: 125/61 (10 Kan 2025 06:49) (125/61 - 156/73)  BP(mean): 99 (09 Jan 2025 21:45) (83 - 106)  RR: 16 (10 Kan 2025 06:49) (11 - 18)  SpO2: 94% (10 Kan 2025 06:49) (92% - 100%)    Parameters below as of 10 Kan 2025 06:49  Patient On (Oxygen Delivery Method): room air        PHYSICAL EXAM:  GENERAL: NAD  HEENT: EOMI, hearing normal, conjunctiva and sclera clear  Chest: CTA bilaterally, no wheezing  CV: S1S2, RRR,   GI: soft, +BS, NT/ND  : +guerrero catheter  Musculoskeletal: no LE edema  Psychiatric: affect nL, mood nL  Skin: warm and dry    LABS:                        12.7   15.28 )-----------( 367      ( 10 Kan 2025 08:26 )             39.0     01-10    139  |  107  |  14  ----------------------------<  168[H]  4.1   |  23  |  0.67    Ca    9.3      10 Kan 2025 08:26  Phos  3.0     01-10  Mg     1.9     01-10    TPro  6.3  /  Alb  2.5[L]  /  TBili  0.2  /  DBili  x   /  AST  15  /  ALT  20  /  AlkPhos  58  01-08    PT/INR - ( 09 Jan 2025 08:20 )   PT: 11.5 sec;   INR: 0.98 ratio         PTT - ( 09 Jan 2025 08:20 )  PTT:32.4 sec  Urinalysis Basic - ( 10 Kan 2025 08:26 )    Color: x / Appearance: x / SG: x / pH: x  Gluc: 168 mg/dL / Ketone: x  / Bili: x / Urobili: x   Blood: x / Protein: x / Nitrite: x   Leuk Esterase: x / RBC: x / WBC x   Sq Epi: x / Non Sq Epi: x / Bacteria: x

## 2025-01-10 NOTE — PROGRESS NOTE ADULT - SUBJECTIVE AND OBJECTIVE BOX
[facility] -- Janel Jackson Pannella, Patel, Savella, Goodger, Cohen: Office # [phone]    Follow Up:  PE, HTN    Subjective/Observations: No events overnight resting comfortably in bed.  No complaints of chest pain, dyspnea, or palpitations reported. No signs of orthopnea or PND.     REVIEW OF SYSTEMS: All other review of systems are negative unless indicated above    PAST MEDICAL & SURGICAL HISTORY:  Calculus of kidney      Club foot  Born Right Foot      Myasthenia gravis      Hypertension      Diabetes  Type 2 - does not take medications - monitors Blood Glucose at home - diet controlled      Urinary tract infection  notes h/o UTI's      Hyperlipidemia      Other muscle wasting and atrophy      H/O spinal stenosis      History of thrombocytopenia      H/O CHF      Elective surgery  1956 age 11 @ [facility] - cut under Patella secondary 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      Club foot  Surgery at birth for Club Foot Right foot      Pilonidal cyst  Surgery 40 years ago      H/O colonoscopy      H/O prostate biopsy      Nephrostomy present          MEDICATIONS  (STANDING):  apixaban 5 milliGRAM(s) Oral two times a day  ceftolozane/tazobactam IVPB 1500 milliGRAM(s) IV Intermittent every 8 hours  finasteride 5 milliGRAM(s) Oral daily  furosemide    Tablet 20 milliGRAM(s) Oral daily  metoprolol succinate ER 25 milliGRAM(s) Oral daily  multivitamin 1 Tablet(s) Oral daily  pantoprazole    Tablet 40 milliGRAM(s) Oral before breakfast  polyethylene glycol 3350 17 Gram(s) Oral daily  predniSONE   Tablet 10 milliGRAM(s) Oral daily  risperiDONE   Tablet 0.25 milliGRAM(s) Oral at bedtime  senna 1 Tablet(s) Oral at bedtime  sodium chloride 0.9%. 1000 milliLiter(s) (50 mL/Hr) IV Continuous <Continuous>  tamsulosin 0.4 milliGRAM(s) Oral at bedtime    MEDICATIONS  (PRN):  acetaminophen     Tablet .. 650 milliGRAM(s) Oral every 6 hours PRN Mild Pain (1 - 3)  aluminum hydroxide/magnesium hydroxide/simethicone Suspension 30 milliLiter(s) Oral every 4 hours PRN Dyspepsia  melatonin 3 milliGRAM(s) Oral at bedtime PRN Insomnia  ondansetron Injectable 4 milliGRAM(s) IV Push every 8 hours PRN Nausea and/or Vomiting  oxyCODONE    IR 5 milliGRAM(s) Oral once PRN Moderate Pain (4 - 6)    Allergies    ofloxacin (Unknown)  gatifloxacin (Unknown)  Cipro (Unknown)  levofloxacin (Unknown)    Intolerances    Avelox (Other)  telithromycin (Other)  fluoroquinolone antibiotics (Other)  Ketek (Other)    Vital Signs Last 24 Hrs  T(C): 36.3 (10 Kan 2025 11:57), Max: 36.9 (09 Jan 2025 13:11)  T(F): 97.4 (10 Kan 2025 11:57), Max: 98.4 (09 Jan 2025 13:11)  HR: 61 (10 Kan 2025 11:57) (61 - 88)  BP: 115/71 (10 Kan 2025 11:57) (115/71 - 156/73)  BP(mean): 99 (09 Jan 2025 21:45) (83 - 106)  RR: 18 (10 Kan 2025 11:57) (11 - 18)  SpO2: 97% (10 Kan 2025 11:57) (92% - 100%)    Parameters below as of 10 Kan 2025 11:57  Patient On (Oxygen Delivery Method): room air      I&O's Summary    09 Jan 2025 07:01  -  10 Kan 2025 07:00  --------------------------------------------------------  IN: 3840 mL / OUT: 99606 mL / NET: -07292 mL    10 Kan 2025 07:01  -  10 Kan 2025 12:21  --------------------------------------------------------  IN: 0 mL / OUT: 5500 mL / NET: -5500 mL      Weight (kg): 117.9 (01-09 @ 16:28)    TELE: Off tele   PHYSICAL EXAM:  Constitutional: NAD, awake and alert  HEENT: Moist Mucous Membranes, Anicteric  Pulmonary: Non-labored, breath sounds are clear bilaterally, No wheezing, rales or rhonchi  Cardiovascular: Regular, S1 and S2, No murmurs, No rubs, gallops or clicks  Gastrointestinal:  soft, nontender, nondistended   Lymph: No peripheral edema. No lymphadenopathy.   Skin: No visible rashes or ulcers.  Psych:  Mood & affect appropriate      LABS: All Labs Reviewed:                        12.7   15.28 )-----------( 367      ( 10 Kan 2025 08:26 )             39.0                         12.9   11.98 )-----------( 364      ( 09 Jan 2025 08:20 )             40.6                         12.7   12.39 )-----------( 380      ( 08 Jan 2025 14:15 )             39.8     10 Kan 2025 08:26    139    |  107    |  14     ----------------------------<  168    4.1     |  23     |  0.67   09 Jan 2025 08:20    143    |  108    |  16     ----------------------------<  103    3.9     |  26     |  0.75   08 Jan 2025 14:15    141    |  107    |  16     ----------------------------<  87     4.0     |  26     |  0.73     Ca    9.3        10 Kan 2025 08:26  Ca    9.3        09 Jan 2025 08:20  Ca    9.4        08 Jan 2025 14:15  Phos  3.0       10 Kan 2025 08:26  Phos  2.3       09 Jan 2025 08:20  Phos  2.3       08 Jan 2025 14:15  Mg     1.9       10 Kan 2025 08:26  Mg     2.0       09 Jan 2025 08:20  Mg     2.2       08 Jan 2025 14:15    TPro  6.3    /  Alb  2.5    /  TBili  0.2    /  DBili  x      /  AST  15     /  ALT  20     /  AlkPhos  58     08 Jan 2025 14:15   LIVER FUNCTIONS - ( 08 Jan 2025 14:15 )  Alb: 2.5 g/dL / Pro: 6.3 g/dL / ALK PHOS: 58 U/L / ALT: 20 U/L / AST: 15 U/L / GGT: x           PT/INR - ( 09 Jan 2025 08:20 )   PT: 11.5 sec;   INR: 0.98 ratio         PTT - ( 09 Jan 2025 08:20 )  PTT:32.4 secCholesterol: 161 mg/dL (01-04-25 @ 07:18)  HDL Cholesterol: 56 mg/dL (01-04-25 @ 07:18)  Triglycerides, Serum: 97 mg/dL (01-04-25 @ 07:18)  Cholesterol: 113 mg/dL (11-20-24 @ 11:36)  HDL Cholesterol: 45 mg/dL (11-20-24 @ 11:36)  Triglycerides, Serum: 61 mg/dL (11-20-24 @ 11:36)    12 Lead ECG:   Ventricular Rate 67 BPM    Atrial Rate 67 BPM    P-R Interval 152 ms    QRS Duration 114 ms    Q-T Interval 362 ms    QTC Calculation(Bazett) 382 ms    P Axis 41 degrees    R Axis -52 degrees    T Axis 69 degrees    Diagnosis Line Sinus rhythm withsinus arrhythmia with occasional premature ventricular complexes  Left anterior fascicular block  Nonspecific T wave abnormality  Abnormal ECG  When compared with ECG of 03-JAN-2025 10:38,  premature ventricular complexes are now present  T wave inversion more evident in Lateral leads  QT has shortened  Confirmed by BRENDAN GONZALEZ ([id number]) on 1/5/2025 5:48:12 PM (01-04-25 @ 07:40)      TRANSTHORACIC ECHOCARDIOGRAM REPORT  ________________________________________________________________________________                                      _______       Pt. Name:       DEION HEATH Study Date:    11/20/2024  MRN:            [id number]       YOB: 1945  Accession #:    [id number]      Age:           79 years  Account#:       [id number]     Gender:        M  Heart Rate:                    Height:        65.75 in (167.00 cm)  Rhythm:                        Weight:   199.00 lb (90.27 kg)  Blood Pressure: 103/58 mmHg    BSA/BMI:       1.99 m² / 32.37 kg/m²  ________________________________________________________________________________________  Referring Physician:    [id number] Curly Byrnes  Interpreting Physician: Lyndsay Marin MD  Primary Sonographer:    Ashli Arana MARCELINO    CPT:                ECHO TTE WO CON COMP W DOPP - 25171.m  Indication(s):      Abnormal electrocardiogram ECG/EKG - R94.31  Procedure:          Transthoracic echocardiogram with 2-D, M-mode and complete                      spectral and color flow Doppler.  Ordering Location:  ICU1  Admission Status:   Inpatient  Contrast Injection: Verbal consent was obtained for injection of Ultrasonic                      Enhancing Agent following a discussion of risks and                      benefits.                      Endocardial visualization enhanced with Definity Ultrasound                      enhancing agent.  Agitated Saline:    Injection with agitated saline was performed toevaluate for                      intracardiac shunting.  Study Information:  Image quality for this study is technically difficult.    _______________________________________________________________________________________     CONCLUSIONS:      1. Technically difficult image quality.   2. Agitated saline injection was negative for intracardiac shunt (though there is poor visualization of the LV during injection of agitated saline)   3. Despite definity use, the LV endocardium is still not well visualized.   4. In certain views, the LVEF appears grossly preserved though the apex appears hypokinetic.    ________________________________________________________________________________________  FINDINGS:     Interatrial Septum:  Agitated saline injection was negative for intracardiac shunt.  ________________________________________________________________________________________  Electronically signed on 11/20/2024 at 2:37:09 PM by Lyndsay Marin MD      *** Final ***

## 2025-01-10 NOTE — PROGRESS NOTE ADULT - ASSESSMENT
80 yo M with PMHx HTN, CHF, PE (on eliquis), Myasthenia Gravis, and chronic LE edema, nephrolithiasis (s/p nephrostomy tube placement), urosepsis, BPH admitted for abx and cystoscopy procedure    - Echo from 09/2024: EF 55-60% moderate MR  - continue home furosemide 20mg QD  - No signs of significant ischemia or volume overload.     - s/p TURP bladder cysto litholapaxy- R ureteroscopy  1/9 tolerated procedure w/o cardiac complications  - Eliquis held for OR now resumed      - Monitor creatinine and electrolytes. Keep K>4, Mg>2  - Further cardiac workup will depend on clinical course.   - All other workup per primary team  Thank you for the consult  Will continue to follow  Art Santana DNP, [facility]  Cardiology   Call Teams

## 2025-01-10 NOTE — PROGRESS NOTE ADULT - ASSESSMENT
80 yo M with PMHx HTN, CHF, PE (on eliquis), Myasthenia Gravis, and chronic LE edema, nephrolithiasis (s/p nephrostomy tube placement), urosepsis, BPH admitted for UTI treatment prior to scheduled for cystoscopy- TURP, bladder cysto litholapaxy- R ureteroscopy on 01/09/24.        Problem/Plan - 1:  ·  Problem: UTI (urinary tract infection).   ·  Plan: known for recurrent UTI presented to the ED 2/2 urology referral for IV antibiotics prior to procedure on 1/9/25.  -  9/2024, required emergent nephrostomy tube placement after failed stent placement for 7mm kidney stone. followed by ICU care for Klebsiella Bacteremia, urosepsis/ septic shock.    - H/O hosp with urosepsis on 11/20/24 required nephrostomy tube exchange 11/29/24,   - s/p cystoscopy- TURP, bladder cysto litholapaxy- R ureteroscopy on 01/09/25.   - completed 7 day course of IV zosyn before transfer  - in ED WBC 14.58, Lac 3.4   - leucocytosis pt on steroid prednisone   - UA -> UCx  probable collection contamination. +Pseudomonas aeruginosa (carbapenem resistant), + ESBL Klebsiella pneumoniae  - BCx x2, neg    - ID (Dr. Mckinley) consulted, continue on Zerbaxa.     Problem/Plan - 2:  ·  Problem: Urolithiasis.   ·  Plan: CT A/P 11/20/24: Percutaneous R nephrostomy tube. No hydronephrosis. Unremarkable L kidney. UB collapsed around guerrero. UB stone x3 measuring up to 1.0 cm.  - s/p cystoscopy- TURP, bladder cysto litholapaxy- R ureteroscopy on 01/09/25.   - Urology f/u     Problem/Plan - 3:  ·  Problem: Hypertension.   ·  Plan: Chronic  - on home metoprolol succinate 25mg PO daily  - DASH diet.     Problem/Plan - 4:  ·  Problem: Chronic CHF.   ·  Plan: Chronic CHF.   H/O of CHF, unspecified type however on GDMT for HFrEF  - TTE (9/2024): LVEF 55-60%, no diastolic dysfunction, moderate MR  - Evaluated by cardiology during admission at [facility] (9/2024) for acute CHF, started on GDMT  - c/w home metoprolol and lasix with hold parameters   - Strict Is&Os q6h  - Does not appear clinically volume overloaded  - Cardiology ([facility]) consulted,     Problem/Plan - 5:  ·  Problem: Lung granuloma.   ·  Plan: Lung granuloma.   CT Chest/Ab/Pelv with IV Con 11/2024: Evidence of granulomatous infection in lungs, liver and spleen. Consistent with previous imaging in 11/2023  - Per chart review, pt has been aware of this finding  - CXR consistent on this admission  - Quant indeterminate, fungitell negative, aspergillus galactomannan negative at that time  - ID (Dr. Mckinley) consulted, -.     Problem/Plan - 6:  ·  Problem: Thyroid nodule.   ·  Plan: CT Chest 11/20/24:  2.6 cm left lobe thyroid nodule and evidence of granulomatous infection in lungs, liver and spleen  - TSH 0.58 11/20/24  -non-urgent thyroid US. out pt.     Problem/Plan - 7:  ·  Problem: Personal history of pulmonary embolism.   ·  Plan: Chronic  - resume home eliquis 5mg BID     Problem/Plan - 8:  ·  Problem: Myasthenia gravis.   ·  Plan: Chronic  - c/w home prednisone.     Problem/Plan - 9:  ·  Problem: BPH (benign prostatic hyperplasia).   ·  Plan: - c/w home flomax and finasteride.     Problem/Plan - 10:  ·  Problem: Need for prophylactic measure.   ·  Plan; - c/w home eliquis 5mg BID for hx pe .    ---  TIME SPENT:  51 minutes spent on total encounter. The necessity of the time spent during the encounter on this date of service was due to:     direct patient care including but not limited to reviewing chart, medications ,laboratory data, imaging reports, discussion of plan of care with consultants on the case, coordination of care with multidisciplinary team involved in the case and discussion of plan with patient.  Patient agreeable to plan of care and verbalized understanding the anticipated hospital course and treatment plan.    ---

## 2025-01-10 NOTE — PROGRESS NOTE ADULT - SUBJECTIVE AND OBJECTIVE BOX
Interval Events:  Patient seen and examined at bedside. Patient s/p TURP POD 1. Patient without complaints. Denies abdominal pain, dysuria, nausea, vomiting, fever, or chills. Guerrero in place draining clear fruit punch colored urine. CBI clamped this AM, will reassess.     MEDICATIONS:  MEDICATIONS  (STANDING):  ceftolozane/tazobactam IVPB 1500 milliGRAM(s) IV Intermittent every 8 hours  finasteride 5 milliGRAM(s) Oral daily  furosemide    Tablet 20 milliGRAM(s) Oral daily  metoprolol succinate ER 25 milliGRAM(s) Oral daily  multivitamin 1 Tablet(s) Oral daily  pantoprazole    Tablet 40 milliGRAM(s) Oral before breakfast  polyethylene glycol 3350 17 Gram(s) Oral daily  predniSONE   Tablet 10 milliGRAM(s) Oral daily  risperiDONE   Tablet 0.25 milliGRAM(s) Oral at bedtime  senna 1 Tablet(s) Oral at bedtime  sodium chloride 0.9%. 1000 milliLiter(s) (50 mL/Hr) IV Continuous <Continuous>  tamsulosin 0.4 milliGRAM(s) Oral at bedtime    MEDICATIONS  (PRN):  acetaminophen     Tablet .. 650 milliGRAM(s) Oral every 6 hours PRN Mild Pain (1 - 3)  aluminum hydroxide/magnesium hydroxide/simethicone Suspension 30 milliLiter(s) Oral every 4 hours PRN Dyspepsia  melatonin 3 milliGRAM(s) Oral at bedtime PRN Insomnia  ondansetron Injectable 4 milliGRAM(s) IV Push every 8 hours PRN Nausea and/or Vomiting  oxyCODONE    IR 5 milliGRAM(s) Oral once PRN Moderate Pain (4 - 6)      Allergies    ofloxacin (Unknown)  gatifloxacin (Unknown)  Cipro (Unknown)  levofloxacin (Unknown)    Intolerances    Avelox (Other)  telithromycin (Other)  fluoroquinolone antibiotics (Other)  Ketek (Other)      T(C): 36.4 (01-10-25 @ 06:49), Max: 37.1 (01-08-25 @ 14:56)  T(F): 97.6 (01-10-25 @ 06:49), Max: 98.8 (01-08-25 @ 14:56)  HR: 67 (01-10-25 @ 06:49) (59 - 88)  BP: 125/61 (01-10-25 @ 06:49) (116/56 - 156/73)  RR: 16 (01-10-25 @ 06:49) (11 - 19)  SpO2: 94% (01-10-25 @ 06:49) (92% - 100%)    Height (cm): 167.6 (01-09-25 @ 16:28)  Weight (kg): 117.9 (01-09-25 @ 16:28)  BMI (kg/m2): 42 (01-09-25 @ 16:28)  BSA (m2): 2.24 (01-09-25 @ 16:28)      01-08-25 @ 07:01  -  01-09-25 @ 07:00  --------------------------------------------------------  IN:  Total IN: 0 mL    OUT:    Indwelling Catheter - Urethral (mL): 850 mL    Nephrostomy Tube (mL): 600 mL  Total OUT: 1450 mL    Total NET: -1450 mL      01-09-25 @ 07:01  -  01-10-25 @ 07:00  --------------------------------------------------------  IN:  Total IN: 0 mL    OUT:    Indwelling Catheter - Urethral (mL): 1250 mL    Nephrostomy Tube (mL): 900 mL  Total OUT: 2150 mL    Total NET: -2150 mL          LABS:      CBC Full  -  ( 10 Kan 2025 08:26 )  WBC Count : 15.28 K/uL  RBC Count : 4.29 M/uL  Hemoglobin : 12.7 g/dL  Hematocrit : 39.0 %  Platelet Count - Automated : 367 K/uL  Mean Cell Volume : 90.9 fl  Mean Cell Hemoglobin : 29.6 pg  Mean Cell Hemoglobin Concentration : 32.6 g/dL  Auto Neutrophil # : 13.39 K/uL  Auto Lymphocyte # : 0.96 K/uL  Auto Monocyte # : 0.79 K/uL  Auto Eosinophil # : 0.00 K/uL  Auto Basophil # : 0.01 K/uL  Auto Neutrophil % : 87.5 %  Auto Lymphocyte % : 6.3 %  Auto Monocyte % : 5.2 %  Auto Eosinophil % : 0.0 %  Auto Basophil % : 0.1 %    01-10    139  |  107  |  14  ----------------------------<  168[H]  4.1   |  23  |  0.67    Ca    9.3      10 Kan 2025 08:26  Phos  3.0     01-10  Mg     1.9     01-10    TPro  6.3  /  Alb  2.5[L]  /  TBili  0.2  /  DBili  x   /  AST  15  /  ALT  20  /  AlkPhos  58  01-08    PT/INR - ( 09 Jan 2025 08:20 )   PT: 11.5 sec;   INR: 0.98 ratio         PTT - ( 09 Jan 2025 08:20 )  PTT:32.4 sec    Urinalysis Basic - ( 10 Kan 2025 08:26 )    Color: x / Appearance: x / SG: x / pH: x  Gluc: 168 mg/dL / Ketone: x  / Bili: x / Urobili: x   Blood: x / Protein: x / Nitrite: x   Leuk Esterase: x / RBC: x / WBC x   Sq Epi: x / Non Sq Epi: x / Bacteria: x      Physical Exam  Constitutional: alert, no acute distress  Abdomen: soft, nontender, nondistended, no CVA tenderness, Right nephrostomy tube removed; dressing in place CDI  Genitourinary: no bladder distention, +guerrero draining clear fruit punch colored urine, CBI clamped        Assessment  79 year old male with PMH HTN, CHF, PE (on eliquis), Myasthenia Gravis, and chronic LE edema, BPH, nephrolithiasis (s/p nephrostomy tube placement), urosepsis, admitted for UTI treatment prior to scheduled for cystoscopy- TURP, bladder cysto litholapaxy- R ureteroscopy.  POD #1 s/p TURP    Plan:  - CBI clamped, monitor urine color  - Maintain guerrero, monitor output  - Continue Abx  - Pain control PRN  - Will consider resuming Eliquis tonight if urine color clear  - Rest of care per primary team  D/w Dr. Antoine

## 2025-01-10 NOTE — PROGRESS NOTE ADULT - SUBJECTIVE AND OBJECTIVE BOX
[facility]  INFECTIOUS DISEASES   [address] [place] [address]  Tel: [phone]     Fax: [phone]  ========================================================  MD Nikolas Oquendo Michelle, MD Shah, Kaushal, MD Sunjit, Jaspal, MD Sehrish Shahid, MD   ========================================================    [id number]  DEION HEATH     Follow up: No fever, no new overnight event.   Yesterday had surgery now on CBI, no pain or complaint.     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  Club foot  Surgery at birth for Club Foot Right foot  Pilonidal cyst  Surgery 40 years ago  H/O colonoscopy  H/O prostate biopsy  Nephrostomy present    Social Hx: No current smoking, EtOH or drugs     FAMILY HISTORY:  Family history of stroke (Father)  Father -  age 62    Family history of kidney disease (Mother)  Mother -  age 67    Family history of diabetes mellitus type II (Sibling)  Brother & Sister    Allergie  ofloxacin (Unknown)  gatifloxacin (Unknown)  Cipro (Unknown)  levofloxacin (Unknown)    Avelox (Other)  telithromycin (Other)  fluoroquinolone antibiotics (Other)  Ketek (Other)    MEDICATIONS  (STANDING):  meropenem  IVPB 1000 milliGRAM(s) IV Intermittent Once     REVIEW OF SYSTEMS:  CONSTITUTIONAL:  No Fever or chills  HEENT:   No diplopia or blurred vision.  No earache, sore throat or runny nose.  CARDIOVASCULAR:  No chest pain or SOB  RESPIRATORY:  No cough, shortness of breath, PND or orthopnea.  GASTROINTESTINAL:  No nausea, vomiting or diarrhea.  GENITOURINARY:  No dysuria, frequency or urgency. No Blood in urine  MUSCULOSKELETAL:  no joint aches, no muscle pain  SKIN:  No change in skin, hair or nails.    Physical Exam:  Vital Signs Last 24 Hrs  T(C): 36.3 (10 Kan 2025 11:57), Max: 36.7 (2025 16:28)  T(F): 97.4 (10 Kan 2025 11:57), Max: 98 (2025 16:28)  HR: 61 (10 Kan 2025 11:57) (61 - 88)  BP: 115/71 (10 Kan 2025 11:57) (115/71 - 156/73)  BP(mean): 99 (2025 21:45) (83 - 106)  RR: 18 (10 Kan 2025 11:57) (11 - 18)  SpO2: 97% (10 Kan 2025 11:57) (92% - 100%)  Parameters below as of 10 Kan 2025 11:57  Patient On (Oxygen Delivery Method): room air  GEN: NAD  HEENT: normocephalic and atraumatic. EOMI. PERRL.    NECK: Supple.  No lymphadenopathy   LUNGS: Clear to auscultation.  HEART: Regular rate and rhythm   ABDOMEN: Soft, nontender, and nondistended.   Guerrero in place with CBI clear urine, no CVA tenderness  EXTREMITIES: Without edema. PICC looks fine  PSYCHIATRIC: Awake and alert but not oriented   SKIN: No rash     Labs:   01-10    139  |  107  |  14  ----------------------------<  168[H]  4.1   |  23  |  0.67    Ca    9.3      10 Kan 2025 08:26  Phos  3.0     -10  Mg     1.9     10                        12.7   15.28 )-----------( 367      ( 10 Kan 2025 08:26 )             39.0     PT/INR - ( 2025 08:20 )   PT: 11.5 sec;   INR: 0.98 ratio    PTT - ( 2025 08:20 )  PTT:32.4 sec  Urinalysis Basic - ( 10 Kan 2025 08:26 )    Color: x / Appearance: x / SG: x / pH: x  Gluc: 168 mg/dL / Ketone: x  / Bili: x / Urobili: x   Blood: x / Protein: x / Nitrite: x   Leuk Esterase: x / RBC: x / WBC x   Sq Epi: x / Non Sq Epi: x / Bacteria: x    RECENT CULTURES:   @ 22:20 Clean Catch Klebsiella pneumoniae ESBL  Pseudomonas aeruginosa (Carbapenem Resistant)    >=3 organisms. Probable collection contamination. Culture includes  >100,000 CFU/ml Pseudomonas aeruginosa (Carbapenem Resistant)  >100,000 CFU/ml Klebsiella pneumoniae ESBL  Unable to evaluate further due to Pseudomonas  overgrowth     @ 10:31 .Blood BLOOD     No growth at 5 days     @ 10:25 .Blood BLOOD     No growth at 5 days    12- @ 16:20 Catheterized Catheterized     Culture grew 3 or more types of organisms which indicate  collection contamination; consider recollection only if clinically  indicated.    All imaging and other data have been reviewed.  < from: Xray Chest 1 View-PORTABLE IMMEDIATE (25 @ 10:47) >  IMPRESSION: Scarring with small granuloma in the right midlung again   noted. Small hilar calcified lymph nodes again seen.      Assessment and Plan:   80 yo man with PMH of HTN, CHF, PE (on eliquis), Myasthenia Gravis, and chronic LE edema, nephrolithiasis (s/p nephrostomy tube placement), urosepsis, BPH was transferred from [facility] for hypertension and elevated WBC.  No complaints, no fever, chills, chest pain, SOB, abdominal or flank pain, n/v/d/c or dysuria.    In last admission had some work up done for calcified granulomas in chest CT. Negative sputum for AFB x3 and negative fungal markers.   Also last time had CR pseudomonas for which completed zerbaxa.   Leukocytosis seems chronic he had numbers around 20 most days.   Since was going for  procedure decided to treat him with ABx prior to surgery due to bacteriuria.     # UTI?  # Chronic guerrero and nephrostomy   # Leukocytosis     - Blood cultures NGTD   - UCx with ESBL Klebsiella and CR pseudomonas   - Monitor CBC ~15 today possibly reactive post op  - AS/P cystoscopy- TURP, bladder cysto litholapaxy- R ureteroscopy later on   - Continue Zebaxa to complete 5days total if remains stable     Will follow.  Dr. Luevano is covering this weekend.     Jordin Mckinley MD  Division of Infectious Diseases   Please call ID service at [phone] with any question.    50 minutes spent on total encounter assessing patient, examination, chart review, counseling and coordinating care by the attending physician/nurse/care manager.

## 2025-01-11 LAB
ANION GAP SERPL CALC-SCNC: 6 MMOL/L — SIGNIFICANT CHANGE UP (ref 5–17)
BASOPHILS # BLD AUTO: 0 K/UL — SIGNIFICANT CHANGE UP (ref 0–0.2)
BASOPHILS NFR BLD AUTO: 0 % — SIGNIFICANT CHANGE UP (ref 0–2)
BUN SERPL-MCNC: 17 MG/DL — SIGNIFICANT CHANGE UP (ref 7–23)
CALCIUM SERPL-MCNC: 8.9 MG/DL — SIGNIFICANT CHANGE UP (ref 8.5–10.1)
CHLORIDE SERPL-SCNC: 110 MMOL/L — HIGH (ref 96–108)
CO2 SERPL-SCNC: 25 MMOL/L — SIGNIFICANT CHANGE UP (ref 22–31)
CREAT SERPL-MCNC: 0.72 MG/DL — SIGNIFICANT CHANGE UP (ref 0.5–1.3)
EGFR: 92 ML/MIN/1.73M2 — SIGNIFICANT CHANGE UP
EOSINOPHIL # BLD AUTO: 0 K/UL — SIGNIFICANT CHANGE UP (ref 0–0.5)
EOSINOPHIL NFR BLD AUTO: 0 % — SIGNIFICANT CHANGE UP (ref 0–6)
GLUCOSE SERPL-MCNC: 111 MG/DL — HIGH (ref 70–99)
HCT VFR BLD CALC: 35.4 % — LOW (ref 39–50)
HGB BLD-MCNC: 11.2 G/DL — LOW (ref 13–17)
LYMPHOCYTES # BLD AUTO: 18 % — SIGNIFICANT CHANGE UP (ref 13–44)
LYMPHOCYTES # BLD AUTO: 3.15 K/UL — SIGNIFICANT CHANGE UP (ref 1–3.3)
MCHC RBC-ENTMCNC: 29.2 PG — SIGNIFICANT CHANGE UP (ref 27–34)
MCHC RBC-ENTMCNC: 31.6 G/DL — LOW (ref 32–36)
MCV RBC AUTO: 92.4 FL — SIGNIFICANT CHANGE UP (ref 80–100)
MONOCYTES # BLD AUTO: 2.97 K/UL — HIGH (ref 0–0.9)
MONOCYTES NFR BLD AUTO: 17 % — HIGH (ref 2–14)
NEUTROPHILS # BLD AUTO: 11.36 K/UL — HIGH (ref 1.8–7.4)
NEUTROPHILS NFR BLD AUTO: 65 % — SIGNIFICANT CHANGE UP (ref 43–77)
NRBC # BLD: SIGNIFICANT CHANGE UP /100 WBCS (ref 0–0)
PLATELET # BLD AUTO: 359 K/UL — SIGNIFICANT CHANGE UP (ref 150–400)
POTASSIUM SERPL-MCNC: 3.5 MMOL/L — SIGNIFICANT CHANGE UP (ref 3.5–5.3)
POTASSIUM SERPL-SCNC: 3.5 MMOL/L — SIGNIFICANT CHANGE UP (ref 3.5–5.3)
RBC # BLD: 3.83 M/UL — LOW (ref 4.2–5.8)
RBC # FLD: 16.2 % — HIGH (ref 10.3–14.5)
SODIUM SERPL-SCNC: 141 MMOL/L — SIGNIFICANT CHANGE UP (ref 135–145)
WBC # BLD: 17.48 K/UL — HIGH (ref 3.8–10.5)
WBC # FLD AUTO: 17.48 K/UL — HIGH (ref 3.8–10.5)

## 2025-01-11 PROCEDURE — 99233 SBSQ HOSP IP/OBS HIGH 50: CPT

## 2025-01-11 PROCEDURE — 99232 SBSQ HOSP IP/OBS MODERATE 35: CPT

## 2025-01-11 RX ORDER — OXYCODONE HCL 15 MG
5 TABLET ORAL ONCE
Refills: 0 | Status: DISCONTINUED | OUTPATIENT
Start: 2025-01-11 | End: 2025-01-12

## 2025-01-11 RX ADMIN — APIXABAN 5 MILLIGRAM(S): 5 TABLET, FILM COATED ORAL at 09:12

## 2025-01-11 RX ADMIN — Medication 10 MILLIGRAM(S): at 09:11

## 2025-01-11 RX ADMIN — SENNOSIDES 1 TABLET(S): 8.6 TABLET, FILM COATED ORAL at 22:43

## 2025-01-11 RX ADMIN — PANTOPRAZOLE 40 MILLIGRAM(S): 40 TABLET, DELAYED RELEASE ORAL at 12:17

## 2025-01-11 RX ADMIN — Medication 1 TABLET(S): at 09:11

## 2025-01-11 RX ADMIN — CEFTOLOZANE AND TAZOBACTAM 100 MILLIGRAM(S): 1; .5 INJECTION, POWDER, LYOPHILIZED, FOR SOLUTION INTRAVENOUS at 22:44

## 2025-01-11 RX ADMIN — Medication 25 MILLIGRAM(S): at 09:11

## 2025-01-11 RX ADMIN — ACETAMINOPHEN 650 MILLIGRAM(S): 80 SOLUTION/ DROPS ORAL at 13:26

## 2025-01-11 RX ADMIN — TAMSULOSIN HYDROCHLORIDE 0.4 MILLIGRAM(S): 0.4 CAPSULE ORAL at 22:43

## 2025-01-11 RX ADMIN — Medication 20 MILLIGRAM(S): at 09:11

## 2025-01-11 RX ADMIN — APIXABAN 5 MILLIGRAM(S): 5 TABLET, FILM COATED ORAL at 17:28

## 2025-01-11 RX ADMIN — RISPERIDONE 0.25 MILLIGRAM(S): 0.5 TABLET ORAL at 22:43

## 2025-01-11 RX ADMIN — CEFTOLOZANE AND TAZOBACTAM 100 MILLIGRAM(S): 1; .5 INJECTION, POWDER, LYOPHILIZED, FOR SOLUTION INTRAVENOUS at 06:42

## 2025-01-11 RX ADMIN — ACETAMINOPHEN 650 MILLIGRAM(S): 80 SOLUTION/ DROPS ORAL at 14:26

## 2025-01-11 RX ADMIN — CEFTOLOZANE AND TAZOBACTAM 100 MILLIGRAM(S): 1; .5 INJECTION, POWDER, LYOPHILIZED, FOR SOLUTION INTRAVENOUS at 13:26

## 2025-01-11 RX ADMIN — Medication 17 GRAM(S): at 17:28

## 2025-01-11 RX ADMIN — Medication 5 MILLIGRAM(S): at 09:11

## 2025-01-11 NOTE — PROGRESS NOTE ADULT - SUBJECTIVE AND OBJECTIVE BOX
POD#2 s/p TURP    S: Patient seen and examined at bedside.  No acute overnight events.  Patient reports no new complaints at this time. Patient with 3-way CBI in place running at very slow speed.     MEDICATIONS:  acetaminophen     Tablet .. 650 milliGRAM(s) Oral every 6 hours PRN  aluminum hydroxide/magnesium hydroxide/simethicone Suspension 30 milliLiter(s) Oral every 4 hours PRN  apixaban 5 milliGRAM(s) Oral two times a day  ceftolozane/tazobactam IVPB 1500 milliGRAM(s) IV Intermittent every 8 hours  finasteride 5 milliGRAM(s) Oral daily  furosemide    Tablet 20 milliGRAM(s) Oral daily  melatonin 3 milliGRAM(s) Oral at bedtime PRN  metoprolol succinate ER 25 milliGRAM(s) Oral daily  multivitamin 1 Tablet(s) Oral daily  ondansetron Injectable 4 milliGRAM(s) IV Push every 8 hours PRN  pantoprazole    Tablet 40 milliGRAM(s) Oral before breakfast  polyethylene glycol 3350 17 Gram(s) Oral daily  predniSONE   Tablet 10 milliGRAM(s) Oral daily  risperiDONE   Tablet 0.25 milliGRAM(s) Oral at bedtime  senna 1 Tablet(s) Oral at bedtime  sodium chloride 0.9%. 1000 milliLiter(s) IV Continuous <Continuous>  tamsulosin 0.4 milliGRAM(s) Oral at bedtime      O:  Vital Signs Last 24 Hrs  T(C): 36.4 (11 Jan 2025 05:06), Max: 36.6 (10 Kan 2025 20:47)  T(F): 97.5 (11 Jan 2025 05:06), Max: 97.9 (10 Kan 2025 20:47)  HR: 59 (11 Jan 2025 05:06) (59 - 78)  BP: 133/82 (11 Jan 2025 05:06) (115/71 - 133/82)  BP(mean): --  RR: 19 (11 Jan 2025 05:06) (16 - 19)  SpO2: 98% (11 Jan 2025 05:06) (93% - 98%)    Parameters below as of 11 Jan 2025 05:06  Patient On (Oxygen Delivery Method): room air        Constitutional: alert, no acute distress  Abdomen: soft, nontender, nondistended, no CVA tenderness, Right nephrostomy tube removed; dressing in place CDI  Genitourinary: no bladder distention, +guerrero draining clear fruit punch colored urine, CBI running at slow speed    I&O SUMMARY:    01-09-25 @ 07:01  -  01-10-25 @ 07:00  --------------------------------------------------------  IN:    Continuous Bladder Irrigation (mL): 3200 mL    IV PiggyBack: 100 mL    Oral Fluid: 240 mL    sodium chloride 0.9%: 300 mL  Total IN: 3840 mL    OUT:    Continuous Bladder Irrigation (mL): 72602 mL    Indwelling Catheter - Urethral (mL): 1250 mL    Nephrostomy Tube (mL): 900 mL  Total OUT: 49717 mL    Total NET: -07295 mL      01-10-25 @ 07:01  -  01-11-25 @ 05:53  --------------------------------------------------------  IN:    Continuous Bladder Irrigation (mL): 9000 mL    IV PiggyBack: 100 mL    IV PiggyBack: 30 mL    sodium chloride 0.9%: 600 mL  Total IN: 9730 mL    OUT:    Continuous Bladder Irrigation (mL): 27196 mL    Stool (mL): 2500 mL  Total OUT: 14884 mL    Total NET: -9270 mL          LABS:                        12.7   15.28 )-----------( 367      ( 10 Kan 2025 08:26 )             39.0     01-10    139  |  107  |  14  ----------------------------<  168[H]  4.1   |  23  |  0.67    Ca    9.3      10 Kan 2025 08:26  Phos  3.0     01-10  Mg     1.9     01-10      PT/INR - ( 09 Jan 2025 08:20 )   PT: 11.5 sec;   INR: 0.98 ratio         PTT - ( 09 Jan 2025 08:20 )  PTT:32.4 sec            RADIOLOGY:    ASSESSMENT: 79 year old male with PMH HTN, CHF, PE (on eliquis), Myasthenia Gravis, and chronic LE edema, BPH, nephrolithiasis (s/p nephrostomy tube placement), urosepsis, admitted for UTI treatment prior to scheduled for cystoscopy- TURP, bladder cysto litholapaxy- R ureteroscopy.  POD #2 s/p TURP    Plan:  - CBI remained running at minimal speed overnight, clamped this AM, will continue to monitor urine color  - Maintain guerrero, monitor output  - Continue Abx  - Pain control PRN  - Eliquis 5mg resumed yesterday evening, will continue patient's full dose AC of eliquis 5mg 2x/day starting this AM after f/u of AM CBC to monitor H/H  - Rest of care per primary team    To be discussed with Dr. Antoine

## 2025-01-11 NOTE — PROGRESS NOTE ADULT - SUBJECTIVE AND OBJECTIVE BOX
[facility] - Janel Jackson, Peggy, Evan, Jovana, Tomas Thibodeaux  Office Number:  [phone]    Patient resting comfortably in bed in NAD.  Laying flat with no respiratory distress.  No complaints of chest pain, dyspnea, palpitations, PND, or orthopnea.    F/U for:  CHF    MEDICATIONS  (STANDING):  apixaban 5 milliGRAM(s) Oral two times a day  ceftolozane/tazobactam IVPB 1500 milliGRAM(s) IV Intermittent every 8 hours  finasteride 5 milliGRAM(s) Oral daily  furosemide    Tablet 20 milliGRAM(s) Oral daily  metoprolol succinate ER 25 milliGRAM(s) Oral daily  multivitamin 1 Tablet(s) Oral daily  pantoprazole    Tablet 40 milliGRAM(s) Oral before breakfast  polyethylene glycol 3350 17 Gram(s) Oral daily  predniSONE   Tablet 10 milliGRAM(s) Oral daily  risperiDONE   Tablet 0.25 milliGRAM(s) Oral at bedtime  senna 1 Tablet(s) Oral at bedtime  tamsulosin 0.4 milliGRAM(s) Oral at bedtime    MEDICATIONS  (PRN):  acetaminophen     Tablet .. 650 milliGRAM(s) Oral every 6 hours PRN Mild Pain (1 - 3)  aluminum hydroxide/magnesium hydroxide/simethicone Suspension 30 milliLiter(s) Oral every 4 hours PRN Dyspepsia  melatonin 3 milliGRAM(s) Oral at bedtime PRN Insomnia  ondansetron Injectable 4 milliGRAM(s) IV Push every 8 hours PRN Nausea and/or Vomiting      Allergies    ofloxacin (Unknown)  gatifloxacin (Unknown)  Cipro (Unknown)  levofloxacin (Unknown)    Intolerances    Avelox (Other)  telithromycin (Other)  fluoroquinolone antibiotics (Other)  Ketek (Other)      Vital Signs Last 24 Hrs  T(C): 36.4 (11 Jan 2025 11:41), Max: 36.6 (10 Kan 2025 20:47)  T(F): 97.5 (11 Jan 2025 11:41), Max: 97.9 (10 Kan 2025 20:47)  HR: 69 (11 Jan 2025 11:41) (59 - 78)  BP: 127/65 (11 Jan 2025 11:41) (115/71 - 133/82)  BP(mean): --  RR: 19 (11 Jan 2025 11:41) (18 - 19)  SpO2: 98% (11 Jan 2025 11:41) (93% - 98%)    Parameters below as of 11 Jan 2025 11:41  Patient On (Oxygen Delivery Method): room air        I&O's Summary    10 Kan 2025 07:01  -  11 Jan 2025 07:00  --------------------------------------------------------  IN: 9730 mL / OUT: 20195 mL / NET: -9270 mL    11 Jan 2025 07:01  -  11 Jan 2025 11:47  --------------------------------------------------------  IN: 0 mL / OUT: 900 mL / NET: -900 mL        ON EXAM:    Constitutional: NAD, awake and alert  HEENT: Moist Mucous Membranes, Anicteric  Pulmonary: Non-labored, breath sounds are clear bilaterally, No wheezing, rales or rhonchi  Cardiovascular: Regular, S1 and S2, No murmurs, No rubs, gallops or clicks.  Distant HS  Gastrointestinal:  soft, nontender, nondistended   Lymph: No peripheral edema. No lymphadenopathy.   Skin: No visible rashes or ulcers.  Psych:  Mood & affect appropriate      LABS: All Labs Reviewed:                        11.2   17.48 )-----------( 359      ( 11 Jan 2025 08:30 )             35.4                         12.7   15.28 )-----------( 367      ( 10 Kan 2025 08:26 )             39.0                         12.9   11.98 )-----------( 364      ( 09 Jan 2025 08:20 )             40.6     11 Jan 2025 08:30    141    |  110    |  17     ----------------------------<  111    3.5     |  25     |  0.72   10 Kan 2025 08:26    139    |  107    |  14     ----------------------------<  168    4.1     |  23     |  0.67   09 Jan 2025 08:20    143    |  108    |  16     ----------------------------<  103    3.9     |  26     |  0.75     Ca    8.9        11 Jan 2025 08:30  Ca    9.3        10 Jan 2025 08:26  Ca    9.3        09 Jan 2025 08:20  Phos  3.0       10 Jan 2025 08:26  Phos  2.3       09 Jan 2025 08:20  Phos  2.3       08 Jan 2025 14:15  Mg     1.9       10 Jan 2025 08:26  Mg     2.0       09 Jan 2025 08:20  Mg     2.2       08 Jan 2025 14:15    TPro  6.3    /  Alb  2.5    /  TBili  0.2    /  DBili  x      /  AST  15     /  ALT  20     /  AlkPhos  58     08 Jan 2025 14:15          Blood Culture: Organism --  Gram Stain Blood -- Gram Stain --  Specimen Source OR Collect Bladder (from O.R.)  Culture-Blood --

## 2025-01-11 NOTE — PROGRESS NOTE ADULT - ASSESSMENT
78 yo M with PMHx HTN, CHF, PE (on eliquis), Myasthenia Gravis, and chronic LE edema, nephrolithiasis (s/p nephrostomy tube placement), urosepsis, BPH admitted for UTI treatment prior to scheduled for cystoscopy- TURP, bladder cysto litholapaxy- R ureteroscopy on 01/09/24.        Problem/Plan - 1:  ·  Problem: UTI (urinary tract infection).   ·  Plan: known for recurrent UTI presented to the ED 2/2 urology referral for IV antibiotics prior to procedure on 1/9/25.  -  9/2024, required emergent nephrostomy tube placement after failed stent placement for 7mm kidney stone. followed by ICU care for Klebsiella Bacteremia, urosepsis/ septic shock.    - H/O hosp with urosepsis on 11/20/24 required nephrostomy tube exchange 11/29/24,   - s/p cystoscopy- TURP, bladder cysto litholapaxy- R ureteroscopy on 01/09/25.   - completed 7 day course of IV zosyn before transfer  - in ED WBC 14.58, Lac 3.4   - leucocytosis pt on steroid prednisone   - UA -> UCx  probable collection contamination. +Pseudomonas aeruginosa (carbapenem resistant), + ESBL Klebsiella pneumoniae  - BCx x2, neg    - ID (Dr. Mckinley) consulted, continue on Zerbaxa.     Problem/Plan - 2:  ·  Problem: Urolithiasis.   ·  Plan: CT A/P 11/20/24: Percutaneous R nephrostomy tube. No hydronephrosis. Unremarkable L kidney. UB collapsed around guerrero. UB stone x3 measuring up to 1.0 cm.  - s/p cystoscopy- TURP, bladder cysto litholapaxy- R ureteroscopy on 01/09/25.   - Urology f/u     Problem/Plan - 3:  ·  Problem: Hypertension.   ·  Plan: Chronic  - on home metoprolol succinate 25mg PO daily  - DASH diet.     Problem/Plan - 4:  ·  Problem: Chronic CHF.   ·  Plan: Chronic CHF.   H/O of CHF, unspecified type however on GDMT for HFrEF  - TTE (9/2024): LVEF 55-60%, no diastolic dysfunction, moderate MR  - Evaluated by cardiology during admission at [facility] (9/2024) for acute CHF, started on GDMT  - c/w home metoprolol and lasix with hold parameters   - Strict Is&Os q6h  - Does not appear clinically volume overloaded  - Cardiology ([place] [facility]) consulted,     Problem/Plan - 5:  ·  Problem: Lung granuloma.   ·  Plan: Lung granuloma.   CT Chest/Ab/Pelv with IV Con 11/2024: Evidence of granulomatous infection in lungs, liver and spleen. Consistent with previous imaging in 11/2023  - Per chart review, pt has been aware of this finding  - CXR consistent on this admission  - Quant indeterminate, fungitell negative, aspergillus galactomannan negative at that time  - ID (Dr. Mckinley) consulted, -.     Problem/Plan - 6:  ·  Problem: Thyroid nodule.   ·  Plan: CT Chest 11/20/24:  2.6 cm left lobe thyroid nodule and evidence of granulomatous infection in lungs, liver and spleen  - TSH 0.58 11/20/24  -non-urgent thyroid US. out pt.     Problem/Plan - 7:   Problem: Personal history of pulmonary embolism.   ·  Plan: Chronic  - resume home eliquis 5mg BID     Problem/Plan - 8:  ·  Problem: Myasthenia gravis.   ·  Plan: Chronic  - c/w home prednisone.     Problem/Plan - 9:  ·  Problem: BPH (benign prostatic hyperplasia).   ·  Plan: - c/w home flomax and finasteride.     Problem/Plan - 10:  ·  Problem: Need for prophylactic measure.   ·  Plan; - c/w home eliquis 5mg BID for hx pe .    ---  TIME SPENT:  51 minutes spent on total encounter. The necessity of the time spent during the encounter on this date of service was due to:     direct patient care including but not limited to reviewing chart, medications ,laboratory data, imaging reports, discussion of plan of care with consultants on the case, coordination of care with multidisciplinary team involved in the case and discussion of plan with patient.  Patient agreeable to plan of care and verbalized understanding the anticipated hospital course and treatment plan.    ---

## 2025-01-11 NOTE — PROVIDER CONTACT NOTE (MEDICATION) - SITUATION
Pt screamed for pain on the bilateral feet without touch   Acetaminophen 650 mg administered and didn't relieve the pain

## 2025-01-11 NOTE — PROGRESS NOTE ADULT - SUBJECTIVE AND OBJECTIVE BOX
CHIEF COMPLAINT/INTERVAL HISTORY:  Pt. seen and evaluated for UTI and BPH.  Pt. is in no distress.  Denies having pain.  CBI clamped this morning.  Urine in guerrero collection bag is slightly orange in color.  Tolerating IV antibiotics.     REVIEW OF SYSTEMS:  No fever, CP, SOB, or abdominal pain    Vital Signs Last 24 Hrs  T(C): 36.4 (11 Jan 2025 05:06), Max: 36.6 (10 Kan 2025 20:47)  T(F): 97.5 (11 Jan 2025 05:06), Max: 97.9 (10 Kan 2025 20:47)  HR: 59 (11 Jan 2025 05:06) (59 - 78)  BP: 133/82 (11 Jan 2025 05:06) (115/71 - 133/82)  BP(mean): --  RR: 19 (11 Jan 2025 05:06) (18 - 19)  SpO2: 98% (11 Jan 2025 05:06) (93% - 98%)    Parameters below as of 11 Jan 2025 05:06  Patient On (Oxygen Delivery Method): room air        PHYSICAL EXAM:  GENERAL: NAD  HEENT: EOMI, hearing normal, conjunctiva and sclera clear  Chest: CTA bilaterally, no wheezing  CV: S1S2, RRR,   GI: soft, +BS, NT/ND  : +guerrero catheter with slight orange colored urine   Musculoskeletal: no LE edema  Psychiatric: affect nL, mood nL  Skin: warm and dry    LABS:                        11.2   17.48 )-----------( 359      ( 11 Jan 2025 08:30 )             35.4     01-11    141  |  110[H]  |  17  ----------------------------<  111[H]  3.5   |  25  |  0.72    Ca    8.9      11 Jan 2025 08:30  Phos  3.0     01-10  Mg     1.9     01-10        Urinalysis Basic - ( 11 Jan 2025 08:30 )    Color: x / Appearance: x / SG: x / pH: x  Gluc: 111 mg/dL / Ketone: x  / Bili: x / Urobili: x   Blood: x / Protein: x / Nitrite: x   Leuk Esterase: x / RBC: x / WBC x   Sq Epi: x / Non Sq Epi: x / Bacteria: x

## 2025-01-11 NOTE — PROVIDER CONTACT NOTE (MEDICATION) - NAME OF MD/NP/PA/DO NOTIFIED:
INFECTIOUS DISEASE PROGRESS NOTE    [address] [facility] Patient Status:  Inpatient    10/2/1923 MRN [id number]   Location [facility] 5SW/SE Attending Mariajose Mendoza MD   Hosp Day # 3 PCP Maddy Amor MD     Subjective:  Patient seen and examin disease)     Restless leg syndrome     HTN (hypertension)     History of cholecystectomy     Carotid stenosis     Microscopic hematuria     Mitral regurgitation     Perivalvular leak of prosthetic heart valve     Diastolic CHF ([address]     DNR (do not resusci Leukocytosis, better    PLAN:    - continue Zosyn - day #4  - follow BCx - ngtd  - follow fever curve, WBC  - if all cx negative, afebrile and wbc decr will d/c abx  - reviewed labs, micro, imaging reports  - will follow      Amanda Remi T

## 2025-01-11 NOTE — PROGRESS NOTE ADULT - ASSESSMENT
78 yo M with PMHx HTN, CHF, PE (on eliquis), Myasthenia Gravis, and chronic LE edema, nephrolithiasis (s/p nephrostomy tube placement), urosepsis, BPH admitted for abx and cystoscopy procedure    - Echo from 09/2024: EF 55-60% moderate MR  - continue home furosemide 20mg QD  - No signs of significant ischemia or volume overload.     - s/p TURP bladder cysto litholapaxy- R ureteroscopy  1/9 tolerated procedure w/o cardiac complications  - Eliquis held for OR now resumed      - Monitor creatinine and electrolytes. Keep K>4, Mg>2  - Further cardiac workup will depend on clinical course.   - All other workup per primary team

## 2025-01-12 LAB
-  AMIKACIN: SIGNIFICANT CHANGE UP
-  AZTREONAM: SIGNIFICANT CHANGE UP
-  CEFEPIME: SIGNIFICANT CHANGE UP
-  CEFTAZIDIME: SIGNIFICANT CHANGE UP
-  CIPROFLOXACIN: SIGNIFICANT CHANGE UP
-  IMIPENEM: SIGNIFICANT CHANGE UP
-  LEVOFLOXACIN: SIGNIFICANT CHANGE UP
-  MEROPENEM: SIGNIFICANT CHANGE UP
-  PIPERACILLIN/TAZOBACTAM: SIGNIFICANT CHANGE UP
ANION GAP SERPL CALC-SCNC: 6 MMOL/L — SIGNIFICANT CHANGE UP (ref 5–17)
BASOPHILS # BLD AUTO: 0.06 K/UL — SIGNIFICANT CHANGE UP (ref 0–0.2)
BASOPHILS NFR BLD AUTO: 0.3 % — SIGNIFICANT CHANGE UP (ref 0–2)
BLANDM BLD POS QL PROBE: SIGNIFICANT CHANGE UP
BUN SERPL-MCNC: 14 MG/DL — SIGNIFICANT CHANGE UP (ref 7–23)
CALCIUM SERPL-MCNC: 8.8 MG/DL — SIGNIFICANT CHANGE UP (ref 8.5–10.1)
CHLORIDE SERPL-SCNC: 108 MMOL/L — SIGNIFICANT CHANGE UP (ref 96–108)
CO2 SERPL-SCNC: 27 MMOL/L — SIGNIFICANT CHANGE UP (ref 22–31)
CREAT SERPL-MCNC: 0.56 MG/DL — SIGNIFICANT CHANGE UP (ref 0.5–1.3)
CULTURE RESULTS: ABNORMAL
EGFR: 99 ML/MIN/1.73M2 — SIGNIFICANT CHANGE UP
EOSINOPHIL # BLD AUTO: 0.51 K/UL — HIGH (ref 0–0.5)
EOSINOPHIL NFR BLD AUTO: 2.8 % — SIGNIFICANT CHANGE UP (ref 0–6)
GLUCOSE SERPL-MCNC: 77 MG/DL — SIGNIFICANT CHANGE UP (ref 70–99)
HCT VFR BLD CALC: 33.4 % — LOW (ref 39–50)
HGB BLD-MCNC: 10.5 G/DL — LOW (ref 13–17)
IMM GRANULOCYTES NFR BLD AUTO: 1 % — HIGH (ref 0–0.9)
LYMPHOCYTES # BLD AUTO: 22.7 % — SIGNIFICANT CHANGE UP (ref 13–44)
LYMPHOCYTES # BLD AUTO: 4.19 K/UL — HIGH (ref 1–3.3)
MAGNESIUM SERPL-MCNC: 2.2 MG/DL — SIGNIFICANT CHANGE UP (ref 1.6–2.6)
MCHC RBC-ENTMCNC: 28.8 PG — SIGNIFICANT CHANGE UP (ref 27–34)
MCHC RBC-ENTMCNC: 31.4 G/DL — LOW (ref 32–36)
MCV RBC AUTO: 91.8 FL — SIGNIFICANT CHANGE UP (ref 80–100)
METHOD TYPE: SIGNIFICANT CHANGE UP
METHOD TYPE: SIGNIFICANT CHANGE UP
MONOCYTES # BLD AUTO: 2.47 K/UL — HIGH (ref 0–0.9)
MONOCYTES NFR BLD AUTO: 13.4 % — SIGNIFICANT CHANGE UP (ref 2–14)
NEUTROPHILS # BLD AUTO: 11 K/UL — HIGH (ref 1.8–7.4)
NEUTROPHILS NFR BLD AUTO: 59.8 % — SIGNIFICANT CHANGE UP (ref 43–77)
NRBC # BLD: 0 /100 WBCS — SIGNIFICANT CHANGE UP (ref 0–0)
ORGANISM # SPEC MICROSCOPIC CNT: ABNORMAL
ORGANISM # SPEC MICROSCOPIC CNT: ABNORMAL
ORGANISM # SPEC MICROSCOPIC CNT: SIGNIFICANT CHANGE UP
PHOSPHATE SERPL-MCNC: 1.8 MG/DL — LOW (ref 2.5–4.5)
PLATELET # BLD AUTO: 355 K/UL — SIGNIFICANT CHANGE UP (ref 150–400)
POTASSIUM SERPL-MCNC: 3.6 MMOL/L — SIGNIFICANT CHANGE UP (ref 3.5–5.3)
POTASSIUM SERPL-SCNC: 3.6 MMOL/L — SIGNIFICANT CHANGE UP (ref 3.5–5.3)
RBC # BLD: 3.64 M/UL — LOW (ref 4.2–5.8)
RBC # FLD: 16.2 % — HIGH (ref 10.3–14.5)
SODIUM SERPL-SCNC: 141 MMOL/L — SIGNIFICANT CHANGE UP (ref 135–145)
SPECIMEN SOURCE: SIGNIFICANT CHANGE UP
WBC # BLD: 18.42 K/UL — HIGH (ref 3.8–10.5)
WBC # FLD AUTO: 18.42 K/UL — HIGH (ref 3.8–10.5)

## 2025-01-12 PROCEDURE — 99232 SBSQ HOSP IP/OBS MODERATE 35: CPT

## 2025-01-12 PROCEDURE — 99233 SBSQ HOSP IP/OBS HIGH 50: CPT

## 2025-01-12 RX ORDER — POTASSIUM PHOSPHATE, MONOBASIC AND POTASSIUM PHOSPHATE, DIBASIC 224; 236 MG/ML; MG/ML
30 INJECTION, SOLUTION INTRAVENOUS ONCE
Refills: 0 | Status: COMPLETED | OUTPATIENT
Start: 2025-01-12 | End: 2025-01-12

## 2025-01-12 RX ADMIN — ACETAMINOPHEN 650 MILLIGRAM(S): 80 SOLUTION/ DROPS ORAL at 06:24

## 2025-01-12 RX ADMIN — CEFTOLOZANE AND TAZOBACTAM 100 MILLIGRAM(S): 1; .5 INJECTION, POWDER, LYOPHILIZED, FOR SOLUTION INTRAVENOUS at 15:04

## 2025-01-12 RX ADMIN — POTASSIUM PHOSPHATE, MONOBASIC AND POTASSIUM PHOSPHATE, DIBASIC 83.33 MILLIMOLE(S): 224; 236 INJECTION, SOLUTION INTRAVENOUS at 17:57

## 2025-01-12 RX ADMIN — Medication 25 MILLIGRAM(S): at 06:24

## 2025-01-12 RX ADMIN — Medication 5 MILLIGRAM(S): at 12:41

## 2025-01-12 RX ADMIN — Medication 20 MILLIGRAM(S): at 06:26

## 2025-01-12 RX ADMIN — CEFTOLOZANE AND TAZOBACTAM 100 MILLIGRAM(S): 1; .5 INJECTION, POWDER, LYOPHILIZED, FOR SOLUTION INTRAVENOUS at 06:25

## 2025-01-12 RX ADMIN — ACETAMINOPHEN 650 MILLIGRAM(S): 80 SOLUTION/ DROPS ORAL at 18:54

## 2025-01-12 RX ADMIN — Medication 1 TABLET(S): at 12:41

## 2025-01-12 RX ADMIN — CEFTOLOZANE AND TAZOBACTAM 100 MILLIGRAM(S): 1; .5 INJECTION, POWDER, LYOPHILIZED, FOR SOLUTION INTRAVENOUS at 21:22

## 2025-01-12 RX ADMIN — PANTOPRAZOLE 40 MILLIGRAM(S): 40 TABLET, DELAYED RELEASE ORAL at 06:24

## 2025-01-12 RX ADMIN — RISPERIDONE 0.25 MILLIGRAM(S): 0.5 TABLET ORAL at 21:20

## 2025-01-12 RX ADMIN — Medication 5 MILLIGRAM(S): at 06:58

## 2025-01-12 RX ADMIN — ACETAMINOPHEN 650 MILLIGRAM(S): 80 SOLUTION/ DROPS ORAL at 19:44

## 2025-01-12 RX ADMIN — Medication 10 MILLIGRAM(S): at 06:24

## 2025-01-12 RX ADMIN — TAMSULOSIN HYDROCHLORIDE 0.4 MILLIGRAM(S): 0.4 CAPSULE ORAL at 21:20

## 2025-01-12 RX ADMIN — APIXABAN 5 MILLIGRAM(S): 5 TABLET, FILM COATED ORAL at 06:25

## 2025-01-12 RX ADMIN — ACETAMINOPHEN 650 MILLIGRAM(S): 80 SOLUTION/ DROPS ORAL at 07:24

## 2025-01-12 RX ADMIN — Medication 17 GRAM(S): at 12:41

## 2025-01-12 RX ADMIN — Medication 5 MILLIGRAM(S): at 07:26

## 2025-01-12 RX ADMIN — APIXABAN 5 MILLIGRAM(S): 5 TABLET, FILM COATED ORAL at 17:58

## 2025-01-12 RX ADMIN — SENNOSIDES 1 TABLET(S): 8.6 TABLET, FILM COATED ORAL at 21:20

## 2025-01-12 NOTE — PROGRESS NOTE ADULT - SUBJECTIVE AND OBJECTIVE BOX
[facility] - Janel Jackson, Peggy, Evan, Jovana, Tomas Thibodeaux  Office Number:  [phone]    Patient resting comfortably in bed in NAD.  Laying flat with no respiratory distress.  No complaints of chest pain, dyspnea, palpitations, PND, or orthopnea.    F/U for:  HTN, PE      MEDICATIONS  (STANDING):  apixaban 5 milliGRAM(s) Oral two times a day  ceftolozane/tazobactam IVPB 1500 milliGRAM(s) IV Intermittent every 8 hours  finasteride 5 milliGRAM(s) Oral daily  furosemide    Tablet 20 milliGRAM(s) Oral daily  metoprolol succinate ER 25 milliGRAM(s) Oral daily  multivitamin 1 Tablet(s) Oral daily  pantoprazole    Tablet 40 milliGRAM(s) Oral before breakfast  polyethylene glycol 3350 17 Gram(s) Oral daily  potassium phosphate IVPB 30 milliMole(s) IV Intermittent once  predniSONE   Tablet 10 milliGRAM(s) Oral daily  risperiDONE   Tablet 0.25 milliGRAM(s) Oral at bedtime  senna 1 Tablet(s) Oral at bedtime  tamsulosin 0.4 milliGRAM(s) Oral at bedtime    MEDICATIONS  (PRN):  acetaminophen     Tablet .. 650 milliGRAM(s) Oral every 6 hours PRN Mild Pain (1 - 3)  aluminum hydroxide/magnesium hydroxide/simethicone Suspension 30 milliLiter(s) Oral every 4 hours PRN Dyspepsia  melatonin 3 milliGRAM(s) Oral at bedtime PRN Insomnia  ondansetron Injectable 4 milliGRAM(s) IV Push every 8 hours PRN Nausea and/or Vomiting      Allergies    ofloxacin (Unknown)  gatifloxacin (Unknown)  Cipro (Unknown)  levofloxacin (Unknown)    Intolerances    Avelox (Other)  telithromycin (Other)  fluoroquinolone antibiotics (Other)  Ketek (Other)      Vital Signs Last 24 Hrs  T(C): 36.4 (12 Jan 2025 05:07), Max: 36.6 (11 Jan 2025 22:30)  T(F): 97.6 (12 Jan 2025 05:07), Max: 97.8 (11 Jan 2025 22:30)  HR: 56 (12 Jan 2025 05:07) (56 - 69)  BP: 114/55 (12 Jan 2025 05:07) (114/55 - 130/79)  BP(mean): --  RR: 18 (12 Jan 2025 05:07) (15 - 19)  SpO2: 95% (12 Jan 2025 05:07) (95% - 98%)    Parameters below as of 12 Jan 2025 05:07  Patient On (Oxygen Delivery Method): room air        I&O's Summary    11 Jan 2025 07:01  -  12 Jan 2025 07:00  --------------------------------------------------------  IN: 0 mL / OUT: 4701 mL / NET: -4701 mL        ON EXAM:    Constitutional: NAD, awake and alert  HEENT: Moist Mucous Membranes, Anicteric  Pulmonary: Non-labored, breath sounds are clear bilaterally, No wheezing, rales or rhonchi  Cardiovascular: Regular, S1 and S2, No murmurs, No rubs, gallops or clicks.  Distant HS  Gastrointestinal:  soft, nontender, nondistended   Lymph: No peripheral edema. No lymphadenopathy.   Skin: No visible rashes or ulcers.  Psych:  Mood & affect appropriate    LABS: All Labs Reviewed:                        10.5   18.42 )-----------( 355      ( 12 Jan 2025 07:30 )             33.4                         11.2   17.48 )-----------( 359      ( 11 Jan 2025 08:30 )             35.4                         12.7   15.28 )-----------( 367      ( 10 Kan 2025 08:26 )             39.0     12 Jan 2025 07:30    141    |  108    |  14     ----------------------------<  77     3.6     |  27     |  0.56   11 Jan 2025 08:30    141    |  110    |  17     ----------------------------<  111    3.5     |  25     |  0.72   10 Kan 2025 08:26    139    |  107    |  14     ----------------------------<  168    4.1     |  23     |  0.67     Ca    8.8        12 Jan 2025 07:30  Ca    8.9        11 Jan 2025 08:30  Ca    9.3        10 Jan 2025 08:26  Phos  1.8       12 Jan 2025 07:30  Phos  3.0       10 Jan 2025 08:26  Mg     2.2       12 Jan 2025 07:30  Mg     1.9       10 Kan 2025 08:26            Blood Culture: Organism --  Gram Stain Blood -- Gram Stain --  Specimen Source OR Collect Bladder (from O.R.)  Culture-Blood --

## 2025-01-12 NOTE — PROGRESS NOTE ADULT - ASSESSMENT
78 yo M with PMHx HTN, CHF, PE (on eliquis), Myasthenia Gravis, and chronic LE edema, nephrolithiasis (s/p nephrostomy tube placement), urosepsis, BPH admitted for UTI treatment prior to scheduled for cystoscopy- TURP, bladder cysto litholapaxy- R ureteroscopy on 01/09/24.        Problem/Plan - 1:  ·  Problem: UTI (urinary tract infection).   ·  Plan: known for recurrent UTI presented to the ED 2/2 urology referral for IV antibiotics prior to procedure on 1/9/25.  -  9/2024, required emergent nephrostomy tube placement after failed stent placement for 7mm kidney stone. followed by ICU care for Klebsiella Bacteremia, urosepsis/ septic shock.    - H/O hosp with urosepsis on 11/20/24 required nephrostomy tube exchange 11/29/24,   - s/p cystoscopy- TURP, bladder cysto litholapaxy- R ureteroscopy on 01/09/25.   - completed 7 day course of IV zosyn before transfer  - in ED WBC 14.58, Lac 3.4   - leukocytosis pt on steroid prednisone.  Also likely reactive to recent urologic procedure.  Pt. without fever, feels well, and denies having diarrhea.  Will continue to monitor.    - UA -> UCx  probable collection contamination. +Pseudomonas aeruginosa (carbapenem resistant), + ESBL Klebsiella pneumoniae  - BCx x2, neg    - ID (Dr. Mckinley) consulted, continue on Zerbaxa.     Problem/Plan - 2:  ·  Problem: Urolithiasis.   ·  Plan: CT A/P 11/20/24: Percutaneous R nephrostomy tube. No hydronephrosis. Unremarkable L kidney. UB collapsed around guerrero. UB stone x3 measuring up to 1.0 cm.  - s/p cystoscopy- TURP, bladder cysto litholapaxy- R ureteroscopy on 01/09/25.   - guerrero catheter removed this morning.  - Urology f/u     Problem/Plan - 3:  ·  Problem: Hypertension.   ·  Plan: Chronic  - on home metoprolol succinate 25mg PO daily  - DASH diet.     Problem/Plan - 4:  ·  Problem: Chronic CHF.   ·  Plan: Chronic CHF.   H/O of CHF, unspecified type however on GDMT for HFrEF  - TTE (9/2024): LVEF 55-60%, no diastolic dysfunction, moderate MR  - Evaluated by cardiology during admission at [facility] (9/2024) for acute CHF, started on GDMT  - c/w home metoprolol and lasix with hold parameters   - Strict Is&Os q6h  - Does not appear clinically volume overloaded  - Cardiology ([place] [facility]) consulted,     Problem/Plan - 5:  ·  Problem: Lung granuloma.   ·  Plan: Lung granuloma.   CT Chest/Ab/Pelv with IV Con 11/2024: Evidence of granulomatous infection in lungs, liver and spleen. Consistent with previous imaging in 11/2023  - Per chart review, pt has been aware of this finding  - CXR consistent on this admission  - Quant indeterminate, fungitell negative, aspergillus galactomannan negative at that time  - ID (Dr. Mckinley) consulted, -.     Problem/Plan - 6:  ·  Problem: Thyroid nodule.   ·  Plan: CT Chest 11/20/24:  2.6 cm left lobe thyroid nodule and evidence of granulomatous infection in lungs, liver and spleen  - TSH 0.58 11/20/24  -non-urgent thyroid US. out pt.     Problem/Plan - 7:   Problem: Personal history of pulmonary embolism.   ·  Plan: Chronic  - continue home eliquis 5mg BID     Problem/Plan - 8:  ·  Problem: Myasthenia gravis.   ·  Plan: Chronic  - c/w home prednisone.     Problem/Plan - 9:  ·  Problem: BPH (benign prostatic hyperplasia).   ·  Plan: - c/w home flomax and finasteride.     Problem/Plan - 10:  ·  Problem: Need for prophylactic measure.   ·  Plan; - c/w home eliquis 5mg BID for hx pe .    ---  TIME SPENT:  51 minutes spent on total encounter. The necessity of the time spent during the encounter on this date of service was due to:     direct patient care including but not limited to reviewing chart, medications ,laboratory data, imaging reports, discussion of plan of care with consultants on the case, coordination of care with multidisciplinary team involved in the case and discussion of plan with patient.  Patient agreeable to plan of care and verbalized understanding the anticipated hospital course and treatment plan.    ---

## 2025-01-12 NOTE — SOCIAL WORK PROGRESS NOTE - NSSWPROGRESSNOTE_GEN_ALL_CORE
CLEM spoke with MD who reported pt is not ready for DC today. Pt remains on IV antibiotics. SW to follow.

## 2025-01-12 NOTE — PROGRESS NOTE ADULT - SUBJECTIVE AND OBJECTIVE BOX
POD# s/p    S: Patient seen and examined at bedside.  No acute overnight events.  Patient reports no new complaints at this time.  Voiding/+ Guerrero, ambulating and tolerating diet. Patient denies any fever, chills, chest pain, shortness of breath, nausea, vomiting, or urinary complaints.    MEDICATIONS:  acetaminophen     Tablet .. 650 milliGRAM(s) Oral every 6 hours PRN  aluminum hydroxide/magnesium hydroxide/simethicone Suspension 30 milliLiter(s) Oral every 4 hours PRN  apixaban 5 milliGRAM(s) Oral two times a day  ceftolozane/tazobactam IVPB 1500 milliGRAM(s) IV Intermittent every 8 hours  finasteride 5 milliGRAM(s) Oral daily  furosemide    Tablet 20 milliGRAM(s) Oral daily  melatonin 3 milliGRAM(s) Oral at bedtime PRN  metoprolol succinate ER 25 milliGRAM(s) Oral daily  multivitamin 1 Tablet(s) Oral daily  ondansetron Injectable 4 milliGRAM(s) IV Push every 8 hours PRN  pantoprazole    Tablet 40 milliGRAM(s) Oral before breakfast  polyethylene glycol 3350 17 Gram(s) Oral daily  potassium phosphate IVPB 30 milliMole(s) IV Intermittent once  predniSONE   Tablet 10 milliGRAM(s) Oral daily  risperiDONE   Tablet 0.25 milliGRAM(s) Oral at bedtime  senna 1 Tablet(s) Oral at bedtime  tamsulosin 0.4 milliGRAM(s) Oral at bedtime      O:  Vital Signs Last 24 Hrs  T(C): 36.4 (12 Jan 2025 05:07), Max: 36.6 (11 Jan 2025 22:30)  T(F): 97.6 (12 Jan 2025 05:07), Max: 97.8 (11 Jan 2025 22:30)  HR: 56 (12 Jan 2025 05:07) (56 - 69)  BP: 114/55 (12 Jan 2025 05:07) (114/55 - 130/79)  BP(mean): --  RR: 18 (12 Jan 2025 05:07) (15 - 19)  SpO2: 95% (12 Jan 2025 05:07) (95% - 98%)    Parameters below as of 12 Jan 2025 05:07  Patient On (Oxygen Delivery Method): room air        PHYSICAL EXAM:  GENERAL: No acute distress, lying comfortably in bed  HEAD:  Atraumatic, Normocephalic  CHEST/LUNG: Non labored respirations, no accessory muscle use.   HEART: Regular rate and rhythm; No murmurs, rubs, or gallops  ABDOMEN: Soft, non-tender, non-distended; no palpable bladder, no suprapubic tenderness/ + Suprapubic Tube draining clear yellow urine.  BACK: No CVAT B/L  : + Circumcised / Non circumcised male. B/L descended testicles x 2. No lesions or palpable masses noted B/L. No meatal discharge. + Indwelling Guerrero in place, draining clear yellow urine.  EXT: calves non-tender b/l, no edema  NEUROLOGY: A&O x 3, no focal deficits    I&O SUMMARY:    01-11-25 @ 07:01  -  01-12-25 @ 07:00  --------------------------------------------------------  IN:  Total IN: 0 mL    OUT:    Continuous Bladder Irrigation (mL): 3100 mL    Indwelling Catheter - Urethral (mL): 1600 mL    Voided (mL): 1 mL  Total OUT: 4701 mL    Total NET: -4701 mL          LABS:                        10.5   18.42 )-----------( 355      ( 12 Jan 2025 07:30 )             33.4     01-12    141  |  108  |  14  ----------------------------<  77  3.6   |  27  |  0.56    Ca    8.8      12 Jan 2025 07:30  Phos  1.8     01-12  Mg     2.2     01-12                  RADIOLOGY:    ASSESSMENT: 80y/o Male POD# s/p    PLAN:  - VSSAF  - AM labs   - Continue diet  - Continue antibiotics  - Pain control as needed  - Monitor intake and output of guerrero   - Rest of care per primary    To be discussed with      Surgical Team Spectralink: [id number]   POD#3 s/p TURP    S: Patient seen and examined at bedside.  No acute overnight events.  Patient reports no new complaints at this time. +BERNADETTE Guerrero clamped.    MEDICATIONS:  acetaminophen     Tablet .. 650 milliGRAM(s) Oral every 6 hours PRN  aluminum hydroxide/magnesium hydroxide/simethicone Suspension 30 milliLiter(s) Oral every 4 hours PRN  apixaban 5 milliGRAM(s) Oral two times a day  ceftolozane/tazobactam IVPB 1500 milliGRAM(s) IV Intermittent every 8 hours  finasteride 5 milliGRAM(s) Oral daily  furosemide    Tablet 20 milliGRAM(s) Oral daily  melatonin 3 milliGRAM(s) Oral at bedtime PRN  metoprolol succinate ER 25 milliGRAM(s) Oral daily  multivitamin 1 Tablet(s) Oral daily  ondansetron Injectable 4 milliGRAM(s) IV Push every 8 hours PRN  pantoprazole    Tablet 40 milliGRAM(s) Oral before breakfast  polyethylene glycol 3350 17 Gram(s) Oral daily  potassium phosphate IVPB 30 milliMole(s) IV Intermittent once  predniSONE   Tablet 10 milliGRAM(s) Oral daily  risperiDONE   Tablet 0.25 milliGRAM(s) Oral at bedtime  senna 1 Tablet(s) Oral at bedtime  tamsulosin 0.4 milliGRAM(s) Oral at bedtime      O:  Vital Signs Last 24 Hrs  T(C): 36.4 (12 Jan 2025 05:07), Max: 36.6 (11 Jan 2025 22:30)  T(F): 97.6 (12 Jan 2025 05:07), Max: 97.8 (11 Jan 2025 22:30)  HR: 56 (12 Jan 2025 05:07) (56 - 69)  BP: 114/55 (12 Jan 2025 05:07) (114/55 - 130/79)  RR: 18 (12 Jan 2025 05:07) (15 - 19)  SpO2: 95% (12 Jan 2025 05:07) (95% - 98%)    Parameters below as of 12 Jan 2025 05:07  Patient On (Oxygen Delivery Method): room air        PHYSICAL EXAM:  GENERAL: No acute distress, lying comfortably in bed  HEAD:  Atraumatic, Normocephalic  CHEST/LUNG: Non labored respirations, no accessory muscle use.   HEART: Regular rate and rhythm  ABDOMEN: Soft, non-tender, non-distended; no palpable bladder, no suprapubic tenderness  : + Indwelling Guerrero in place, draining clear yellow urine.  NEUROLOGY: no focal deficits    I&O SUMMARY:    01-11-25 @ 07:01  -  01-12-25 @ 07:00  --------------------------------------------------------  IN:  Total IN: 0 mL    OUT:    Continuous Bladder Irrigation (mL): 3100 mL    Indwelling Catheter - Urethral (mL): 1600 mL    Voided (mL): 1 mL  Total OUT: 4701 mL    Total NET: -4701 mL          LABS:                        10.5   18.42 )-----------( 355      ( 12 Jan 2025 07:30 )             33.4     01-12    141  |  108  |  14  ----------------------------<  77  3.6   |  27  |  0.56    Ca    8.8      12 Jan 2025 07:30  Phos  1.8     01-12  Mg     2.2     01-12          ASSESSMENT: 78y/o Male PMHx HTN, CHF, PE (on eliquis), Myasthenia Gravis, and chronic LE edema, BPH, nephrolithiasis (s/p nephrostomy tube placement), urosepsis, admitted for UTI treatment prior to scheduled for TURP, bladder cysto litholapaxy- R ureteroscopy. Now POD#3 s/p TURP.     PLAN:  - VSSAF, Urine clear/yellow  - D/c guerrero this AM, follow up TOV  - Continue abx  - Continue eliquis  - Pain control as needed  - Rest of care per primary    Discussed with Dr. Antoine.    Surgical Team Spectralink: [phone]

## 2025-01-12 NOTE — PROGRESS NOTE ADULT - ASSESSMENT
80 yo M with PMHx HTN, CHF, PE (on eliquis), Myasthenia Gravis, and chronic LE edema, nephrolithiasis (s/p nephrostomy tube placement), urosepsis, BPH admitted for abx and cystoscopy procedure    - Echo from 09/2024: EF 55-60% moderate MR  - continue home furosemide 20mg QD  - No signs of significant ischemia or volume overload.     - s/p TURP bladder cysto litholapaxy- R ureteroscopy  1/9 tolerated procedure w/o cardiac complications  - Eliquis held for OR now resumed      - Monitor creatinine and electrolytes. Keep K>4, Mg>2  - Further cardiac workup will depend on clinical course.   - All other workup per primary team

## 2025-01-12 NOTE — PROGRESS NOTE ADULT - SUBJECTIVE AND OBJECTIVE BOX
CHIEF COMPLAINT/INTERVAL HISTORY:  Pt. seen and evaluated for UTI and BPH.  Pt. is in no distress.  Tolerating IV antibiotics.  Denies having CP, SOB, or diarrhea.  Restrepo catheter removed this morning.      REVIEW OF SYSTEMS:  No fever, CP, SOB, or abdominal pain    Vital Signs Last 24 Hrs  T(C): 36.4 (12 Jan 2025 05:07), Max: 36.6 (11 Jan 2025 22:30)  T(F): 97.6 (12 Jan 2025 05:07), Max: 97.8 (11 Jan 2025 22:30)  HR: 56 (12 Jan 2025 05:07) (56 - 69)  BP: 114/55 (12 Jan 2025 05:07) (114/55 - 130/79)  BP(mean): --  RR: 18 (12 Jan 2025 05:07) (15 - 19)  SpO2: 95% (12 Jan 2025 05:07) (95% - 98%)    Parameters below as of 12 Jan 2025 05:07  Patient On (Oxygen Delivery Method): room air        PHYSICAL EXAM:  GENERAL: NAD  HEENT: EOMI, hearing normal, conjunctiva and sclera clear  Chest: CTA bilaterally, no wheezing  CV: S1S2, RRR,   GI: soft, +BS, NT/ND  Musculoskeletal: no LE edema  Psychiatric: affect nL, mood nL  Skin: warm and dry    LABS:                        10.5   18.42 )-----------( 355      ( 12 Jan 2025 07:30 )             33.4     01-12    141  |  108  |  14  ----------------------------<  77  3.6   |  27  |  0.56    Ca    8.8      12 Jan 2025 07:30  Phos  1.8     01-12  Mg     2.2     01-12        Urinalysis Basic - ( 12 Jan 2025 07:30 )    Color: x / Appearance: x / SG: x / pH: x  Gluc: 77 mg/dL / Ketone: x  / Bili: x / Urobili: x   Blood: x / Protein: x / Nitrite: x   Leuk Esterase: x / RBC: x / WBC x   Sq Epi: x / Non Sq Epi: x / Bacteria: x

## 2025-01-13 LAB
ANION GAP SERPL CALC-SCNC: 8 MMOL/L — SIGNIFICANT CHANGE UP (ref 5–17)
BASOPHILS # BLD AUTO: 0.05 K/UL — SIGNIFICANT CHANGE UP (ref 0–0.2)
BASOPHILS NFR BLD AUTO: 0.3 % — SIGNIFICANT CHANGE UP (ref 0–2)
BUN SERPL-MCNC: 15 MG/DL — SIGNIFICANT CHANGE UP (ref 7–23)
CALCIUM SERPL-MCNC: 9.5 MG/DL — SIGNIFICANT CHANGE UP (ref 8.5–10.1)
CHLORIDE SERPL-SCNC: 106 MMOL/L — SIGNIFICANT CHANGE UP (ref 96–108)
CO2 SERPL-SCNC: 27 MMOL/L — SIGNIFICANT CHANGE UP (ref 22–31)
CREAT SERPL-MCNC: 0.62 MG/DL — SIGNIFICANT CHANGE UP (ref 0.5–1.3)
EGFR: 96 ML/MIN/1.73M2 — SIGNIFICANT CHANGE UP
EOSINOPHIL # BLD AUTO: 0.2 K/UL — SIGNIFICANT CHANGE UP (ref 0–0.5)
EOSINOPHIL NFR BLD AUTO: 1.2 % — SIGNIFICANT CHANGE UP (ref 0–6)
GLUCOSE SERPL-MCNC: 126 MG/DL — HIGH (ref 70–99)
HCT VFR BLD CALC: 34.3 % — LOW (ref 39–50)
HGB BLD-MCNC: 11.1 G/DL — LOW (ref 13–17)
IMM GRANULOCYTES NFR BLD AUTO: 1 % — HIGH (ref 0–0.9)
LYMPHOCYTES # BLD AUTO: 1.47 K/UL — SIGNIFICANT CHANGE UP (ref 1–3.3)
LYMPHOCYTES # BLD AUTO: 8.5 % — LOW (ref 13–44)
MAGNESIUM SERPL-MCNC: 2.2 MG/DL — SIGNIFICANT CHANGE UP (ref 1.6–2.6)
MCHC RBC-ENTMCNC: 29.3 PG — SIGNIFICANT CHANGE UP (ref 27–34)
MCHC RBC-ENTMCNC: 32.4 G/DL — SIGNIFICANT CHANGE UP (ref 32–36)
MCV RBC AUTO: 90.5 FL — SIGNIFICANT CHANGE UP (ref 80–100)
MONOCYTES # BLD AUTO: 1.89 K/UL — HIGH (ref 0–0.9)
MONOCYTES NFR BLD AUTO: 10.9 % — SIGNIFICANT CHANGE UP (ref 2–14)
NEUTROPHILS # BLD AUTO: 13.52 K/UL — HIGH (ref 1.8–7.4)
NEUTROPHILS NFR BLD AUTO: 78.1 % — HIGH (ref 43–77)
NRBC # BLD: 0 /100 WBCS — SIGNIFICANT CHANGE UP (ref 0–0)
PHOSPHATE SERPL-MCNC: 2.3 MG/DL — LOW (ref 2.5–4.5)
PLATELET # BLD AUTO: 375 K/UL — SIGNIFICANT CHANGE UP (ref 150–400)
POTASSIUM SERPL-MCNC: 3.8 MMOL/L — SIGNIFICANT CHANGE UP (ref 3.5–5.3)
POTASSIUM SERPL-SCNC: 3.8 MMOL/L — SIGNIFICANT CHANGE UP (ref 3.5–5.3)
RBC # BLD: 3.79 M/UL — LOW (ref 4.2–5.8)
RBC # FLD: 16.3 % — HIGH (ref 10.3–14.5)
SODIUM SERPL-SCNC: 141 MMOL/L — SIGNIFICANT CHANGE UP (ref 135–145)
WBC # BLD: 17.31 K/UL — HIGH (ref 3.8–10.5)
WBC # FLD AUTO: 17.31 K/UL — HIGH (ref 3.8–10.5)

## 2025-01-13 PROCEDURE — 99232 SBSQ HOSP IP/OBS MODERATE 35: CPT

## 2025-01-13 PROCEDURE — G0545: CPT

## 2025-01-13 PROCEDURE — 99233 SBSQ HOSP IP/OBS HIGH 50: CPT

## 2025-01-13 RX ORDER — LIDOCAINE 50 MG/G
1 OINTMENT TOPICAL DAILY
Refills: 0 | Status: DISCONTINUED | OUTPATIENT
Start: 2025-01-13 | End: 2025-01-14

## 2025-01-13 RX ORDER — SOD PHOS DI, MONO/K PHOS MONO 250 MG
1 TABLET ORAL
Refills: 0 | Status: COMPLETED | OUTPATIENT
Start: 2025-01-13 | End: 2025-01-13

## 2025-01-13 RX ORDER — OXYCODONE HCL 15 MG
5 TABLET ORAL EVERY 6 HOURS
Refills: 0 | Status: DISCONTINUED | OUTPATIENT
Start: 2025-01-13 | End: 2025-01-14

## 2025-01-13 RX ADMIN — Medication 25 MILLIGRAM(S): at 05:27

## 2025-01-13 RX ADMIN — Medication 10 MILLIGRAM(S): at 05:27

## 2025-01-13 RX ADMIN — LIDOCAINE 1 PATCH: 50 OINTMENT TOPICAL at 21:13

## 2025-01-13 RX ADMIN — Medication 5 MILLIGRAM(S): at 09:18

## 2025-01-13 RX ADMIN — ACETAMINOPHEN 650 MILLIGRAM(S): 80 SOLUTION/ DROPS ORAL at 09:07

## 2025-01-13 RX ADMIN — ACETAMINOPHEN 650 MILLIGRAM(S): 80 SOLUTION/ DROPS ORAL at 08:07

## 2025-01-13 RX ADMIN — APIXABAN 5 MILLIGRAM(S): 5 TABLET, FILM COATED ORAL at 17:18

## 2025-01-13 RX ADMIN — RISPERIDONE 0.25 MILLIGRAM(S): 0.5 TABLET ORAL at 21:04

## 2025-01-13 RX ADMIN — PANTOPRAZOLE 40 MILLIGRAM(S): 40 TABLET, DELAYED RELEASE ORAL at 05:27

## 2025-01-13 RX ADMIN — Medication 1 TABLET(S): at 11:16

## 2025-01-13 RX ADMIN — Medication 5 MILLIGRAM(S): at 11:16

## 2025-01-13 RX ADMIN — Medication 1 TABLET(S): at 13:30

## 2025-01-13 RX ADMIN — LIDOCAINE 1 PATCH: 50 OINTMENT TOPICAL at 19:05

## 2025-01-13 RX ADMIN — TAMSULOSIN HYDROCHLORIDE 0.4 MILLIGRAM(S): 0.4 CAPSULE ORAL at 21:03

## 2025-01-13 RX ADMIN — SENNOSIDES 1 TABLET(S): 8.6 TABLET, FILM COATED ORAL at 21:03

## 2025-01-13 RX ADMIN — Medication 1 TABLET(S): at 17:19

## 2025-01-13 RX ADMIN — APIXABAN 5 MILLIGRAM(S): 5 TABLET, FILM COATED ORAL at 05:27

## 2025-01-13 RX ADMIN — Medication 17 GRAM(S): at 11:16

## 2025-01-13 RX ADMIN — Medication 5 MILLIGRAM(S): at 10:18

## 2025-01-13 RX ADMIN — LIDOCAINE 1 PATCH: 50 OINTMENT TOPICAL at 09:18

## 2025-01-13 RX ADMIN — Medication 20 MILLIGRAM(S): at 05:27

## 2025-01-13 NOTE — PROGRESS NOTE ADULT - SUBJECTIVE AND OBJECTIVE BOX
[facility] -- Janel Jackson Pannella, Patel, Savella Goodger, Cohen  Office # [phone]      Follow Up:  HTN PE     Subjective/Observations:   No events overnight resting comfortably in bed.  No complaints of chest pain, dyspnea, or palpitations reported. No signs of orthopnea or PND.      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      Club foot  Surgery at birth for Club Foot Right foot      Pilonidal cyst  Surgery 40 years ago      H/O colonoscopy      H/O prostate biopsy      Nephrostomy present          MEDICATIONS  (STANDING):  apixaban 5 milliGRAM(s) Oral two times a day  finasteride 5 milliGRAM(s) Oral daily  furosemide    Tablet 20 milliGRAM(s) Oral daily  metoprolol succinate ER 25 milliGRAM(s) Oral daily  multivitamin 1 Tablet(s) Oral daily  pantoprazole    Tablet 40 milliGRAM(s) Oral before breakfast  polyethylene glycol 3350 17 Gram(s) Oral daily  predniSONE   Tablet 10 milliGRAM(s) Oral daily  risperiDONE   Tablet 0.25 milliGRAM(s) Oral at bedtime  senna 1 Tablet(s) Oral at bedtime  tamsulosin 0.4 milliGRAM(s) Oral at bedtime    MEDICATIONS  (PRN):  acetaminophen     Tablet .. 650 milliGRAM(s) Oral every 6 hours PRN Mild Pain (1 - 3)  aluminum hydroxide/magnesium hydroxide/simethicone Suspension 30 milliLiter(s) Oral every 4 hours PRN Dyspepsia  melatonin 3 milliGRAM(s) Oral at bedtime PRN Insomnia  ondansetron Injectable 4 milliGRAM(s) IV Push every 8 hours PRN Nausea and/or Vomiting      Allergies    ofloxacin (Unknown)  gatifloxacin (Unknown)  Cipro (Unknown)  levofloxacin (Unknown)    Intolerances    Avelox (Other)  telithromycin (Other)  fluoroquinolone antibiotics (Other)  Ketek (Other)      Vital Signs Last 24 Hrs  T(C): 36.6 (2025 05:16), Max: 36.6 (2025 20:45)  T(F): 97.8 (2025 05:16), Max: 97.8 (2025 20:45)  HR: 62 (2025 05:16) (58 - 69)  BP: 125/65 (2025 05:16) (123/74 - 137/77)  BP(mean): --  RR: 18 (2025 05:16) (17 - 18)  SpO2: 95% (2025 05:16) (95% - 98%)    Parameters below as of 2025 05:16  Patient On (Oxygen Delivery Method): room air        I&O's Summary    2025 07:01  -  2025 07:00  --------------------------------------------------------  IN: 100 mL / OUT: 1 mL / NET: 99 mL          PHYSICAL EXAM:  TELE: not on tele   Constitutional: NAD, awake and alert, well-developed  HEENT: Moist Mucous Membranes, Anicteric  Pulmonary: Non-labored, breath sounds are clear bilaterally, No wheezing, crackles or rhonchi  Cardiovascular: Regular, S1 and S2 nl, murmur   Gastrointestinal: Bowel Sounds present, soft, nontender.   Lymph: No lymphadenopathy. No peripheral edema.  Skin: No visible rashes or ulcers.  Psych:  Mood & affect appropriate    LABS: All Labs Reviewed:                        10.5   18.42 )-----------( 355      ( 2025 07:30 )             33.4                         11.2   17.48 )-----------( 359      ( 2025 08:30 )             35.4                         12.7   15.28 )-----------( 367      ( 10 Kan 2025 08:26 )             39.0     2025 07:30    141    |  108    |  14     ----------------------------<  77     3.6     |  27     |  0.56   2025 08:30    141    |  110    |  17     ----------------------------<  111    3.5     |  25     |  0.72   10 2025 08:26    139    |  107    |  14     ----------------------------<  168    4.1     |  23     |  0.67     Ca    8.8        2025 07:30  Ca    8.9        2025 08:30  Ca    9.3        10 2025 08:26  Phos  1.8       2025 07:30  Phos  3.0       10 Kan 2025 08:26  Mg     2.2       2025 07:30  Mg     1.9       10 Kan 2025 08:26               EC Lead ECG:   Ventricular Rate 67 BPM    Atrial Rate 67 BPM    P-R Interval 152 ms    QRS Duration 114 ms    Q-T Interval 362 ms    QTC Calculation(Bazett) 382 ms    P Axis 41 degrees    R Axis -52 degrees    T Axis 69 degrees    Diagnosis Line Sinus rhythm withsinus arrhythmia with occasional premature ventricular complexes  Left anterior fascicular block  Nonspecific T wave abnormality  Abnormal ECG  When compared with ECG of 2025 10:38,  premature ventricular complexes are now present  T wave inversion more evident in Lateral leads  QT has shortened  Confirmed by BRENDAN GONZALEZ ([id number]) on 2025 5:48:12 PM (25 @ 07:40)      TRANSTHORACIC ECHOCARDIOGRAM REPORT  ________________________________________________________________________________                                      _______       Pt. Name:       DEION HEATH Study Date:    2024  MRN:            [id number]       YOB: 1945  Accession #:    [id number]      Age:           79 years  Account#:       [id number]     Gender:        M  Heart Rate:                    Height:        65.75 in (167.00 cm)  Rhythm:                        Weight:   199.00 lb (90.27 kg)  Blood Pressure: 103/58 mmHg    BSA/BMI:       1.99 m² / 32.37 kg/m²  ________________________________________________________________________________________  Referring Physician:    [id number] Curly Byrnes  Interpreting Physician: Lyndsay Marin MD  Primary Sonographer:    Ashli Arana MARCELINO    CPT:                ECHO TTE WO CON COMP W DOPP - 35211.m  Indication(s):      Abnormal electrocardiogram ECG/EKG - R94.31  Procedure:          Transthoracic echocardiogram with 2-D, M-mode and complete                      spectral and color flow Doppler.  Ordering Location:  ICU1  Admission Status:   Inpatient  Contrast Injection: Verbal consent was obtained for injection of Ultrasonic                      Enhancing Agent following a discussion of risks and                      benefits.                      Endocardial visualization enhanced with Definity Ultrasound                      enhancing agent.  Agitated Saline:    Injection with agitated saline was performed toevaluate for                      intracardiac shunting.  Study Information:  Image quality for this study is technically difficult.    _______________________________________________________________________________________     CONCLUSIONS:      1. Technically difficult image quality.   2. Agitated saline injection was negative for intracardiac shunt (though there is poor visualization of the LV during injection of agitated saline)   3. Despite definity use, the LV endocardium is still not well visualized.   4. In certain views, the LVEF appears grossly preserved though the apex appears hypokinetic.    ________________________________________________________________________________________  FINDINGS:     Interatrial Septum:  Agitated saline injection was negative for intracardiac shunt.  ________________________________________________________________________________________  Electronically signed on 2024 at 2:37:09 PM by Lyndsay Marin MD      *** Final ***      Radiology:         [facility] -- Janel Jackson Pannella, Patel, Savella Goodger, Cohen  Office # [phone]      Follow Up:  HTN PE     Subjective/Observations:    No complaints of chest pain, dyspnea, or palpitations reported. No signs of orthopnea or PND.  on room air       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      Club foot  Surgery at birth for Club Foot Right foot      Pilonidal cyst  Surgery 40 years ago      H/O colonoscopy      H/O prostate biopsy      Nephrostomy present          MEDICATIONS  (STANDING):  apixaban 5 milliGRAM(s) Oral two times a day  finasteride 5 milliGRAM(s) Oral daily  furosemide    Tablet 20 milliGRAM(s) Oral daily  metoprolol succinate ER 25 milliGRAM(s) Oral daily  multivitamin 1 Tablet(s) Oral daily  pantoprazole    Tablet 40 milliGRAM(s) Oral before breakfast  polyethylene glycol 3350 17 Gram(s) Oral daily  predniSONE   Tablet 10 milliGRAM(s) Oral daily  risperiDONE   Tablet 0.25 milliGRAM(s) Oral at bedtime  senna 1 Tablet(s) Oral at bedtime  tamsulosin 0.4 milliGRAM(s) Oral at bedtime    MEDICATIONS  (PRN):  acetaminophen     Tablet .. 650 milliGRAM(s) Oral every 6 hours PRN Mild Pain (1 - 3)  aluminum hydroxide/magnesium hydroxide/simethicone Suspension 30 milliLiter(s) Oral every 4 hours PRN Dyspepsia  melatonin 3 milliGRAM(s) Oral at bedtime PRN Insomnia  ondansetron Injectable 4 milliGRAM(s) IV Push every 8 hours PRN Nausea and/or Vomiting      Allergies    ofloxacin (Unknown)  gatifloxacin (Unknown)  Cipro (Unknown)  levofloxacin (Unknown)    Intolerances    Avelox (Other)  telithromycin (Other)  fluoroquinolone antibiotics (Other)  Ketek (Other)      Vital Signs Last 24 Hrs  T(C): 36.6 (2025 05:16), Max: 36.6 (2025 20:45)  T(F): 97.8 (2025 05:16), Max: 97.8 (2025 20:45)  HR: 62 (2025 05:16) (58 - 69)  BP: 125/65 (2025 05:16) (123/74 - 137/77)  BP(mean): --  RR: 18 (2025 05:16) (17 - 18)  SpO2: 95% (2025 05:16) (95% - 98%)    Parameters below as of 2025 05:16  Patient On (Oxygen Delivery Method): room air        I&O's Summary    2025 07:01  -  2025 07:00  --------------------------------------------------------  IN: 100 mL / OUT: 1 mL / NET: 99 mL          PHYSICAL EXAM:  TELE: not on tele   Constitutional: NAD, awake and alert, well-developed  HEENT: Moist Mucous Membranes, Anicteric  Pulmonary: Non-labored, breath sounds are clear bilaterally, No wheezing, crackles or rhonchi  Cardiovascular: Regular, S1 and S2 nl, murmur   Gastrointestinal: Bowel Sounds present, soft, nontender.   Lymph: No lymphadenopathy. No peripheral edema.  Skin: No visible rashes or ulcers.  Psych:  Mood & affect appropriate    LABS: All Labs Reviewed:                        10.5   18.42 )-----------( 355      ( 2025 07:30 )             33.4                         11.2   17.48 )-----------( 359      ( 2025 08:30 )             35.4                         12.7   15.28 )-----------( 367      ( 10 Kan 2025 08:26 )             39.0     2025 07:30    141    |  108    |  14     ----------------------------<  77     3.6     |  27     |  0.56   2025 08:30    141    |  110    |  17     ----------------------------<  111    3.5     |  25     |  0.72   10 2025 08:26    139    |  107    |  14     ----------------------------<  168    4.1     |  23     |  0.67     Ca    8.8        2025 07:30  Ca    8.9        2025 08:30  Ca    9.3        10 Kan 2025 08:26  Phos  1.8       2025 07:30  Phos  3.0       10 Kan 2025 08:26  Mg     2.2       2025 07:30  Mg     1.9       10 Kan 2025 08:26               EC Lead ECG:   Ventricular Rate 67 BPM    Atrial Rate 67 BPM    P-R Interval 152 ms    QRS Duration 114 ms    Q-T Interval 362 ms    QTC Calculation(Bazett) 382 ms    P Axis 41 degrees    R Axis -52 degrees    T Axis 69 degrees    Diagnosis Line Sinus rhythm withsinus arrhythmia with occasional premature ventricular complexes  Left anterior fascicular block  Nonspecific T wave abnormality  Abnormal ECG  When compared with ECG of 2025 10:38,  premature ventricular complexes are now present  T wave inversion more evident in Lateral leads  QT has shortened  Confirmed by BRENDAN GONZALEZ ([id number]) on 2025 5:48:12 PM (25 @ 07:40)      TRANSTHORACIC ECHOCARDIOGRAM REPORT  ________________________________________________________________________________                                      _______       Pt. Name:       DEION HEATH Study Date:    2024  MRN:            [id number]       YOB: 1945  Accession #:    [id number]      Age:           79 years  Account#:       [id number]     Gender:        M  Heart Rate:                    Height:        65.75 in (167.00 cm)  Rhythm:                        Weight:   199.00 lb (90.27 kg)  Blood Pressure: 103/58 mmHg    BSA/BMI:       1.99 m² / 32.37 kg/m²  ________________________________________________________________________________________  Referring Physician:    [id number] Curly Byrnes  Interpreting Physician: Lyndsay Marin MD  Primary Sonographer:    Ashli Arana MARCELINO    CPT:                ECHO TTE WO CON COMP W DOPP - 56873.m  Indication(s):      Abnormal electrocardiogram ECG/EKG - R94.31  Procedure:          Transthoracic echocardiogram with 2-D, M-mode and complete                      spectral and color flow Doppler.  Ordering Location:  ICU1  Admission Status:   Inpatient  Contrast Injection: Verbal consent was obtained for injection of Ultrasonic                      Enhancing Agent following a discussion of risks and                      benefits.                      Endocardial visualization enhanced with Definity Ultrasound                      enhancing agent.  Agitated Saline:    Injection with agitated saline was performed toevaluate for                      intracardiac shunting.  Study Information:  Image quality for this study is technically difficult.    _______________________________________________________________________________________     CONCLUSIONS:      1. Technically difficult image quality.   2. Agitated saline injection was negative for intracardiac shunt (though there is poor visualization of the LV during injection of agitated saline)   3. Despite definity use, the LV endocardium is still not well visualized.   4. In certain views, the LVEF appears grossly preserved though the apex appears hypokinetic.    ________________________________________________________________________________________  FINDINGS:     Interatrial Septum:  Agitated saline injection was negative for intracardiac shunt.  ________________________________________________________________________________________  Electronically signed on 2024 at 2:37:09 PM by Lyndsay Marin MD      *** Final ***      Radiology:

## 2025-01-13 NOTE — PROGRESS NOTE ADULT - SUBJECTIVE AND OBJECTIVE BOX
SUBJECTIVE:  Patient seen and examined at bedside.  No overnight events.  Patient with no new complaints at this time.    Vital Signs Last 24 Hrs  T(C): 36.6 (13 Jan 2025 05:16), Max: 36.6 (12 Jan 2025 20:45)  T(F): 97.8 (13 Jan 2025 05:16), Max: 97.8 (12 Jan 2025 20:45)  HR: 62 (13 Jan 2025 05:16) (58 - 69)  BP: 125/65 (13 Jan 2025 05:16) (123/74 - 137/77)  BP(mean): --  RR: 18 (13 Jan 2025 05:16) (17 - 18)  SpO2: 95% (13 Jan 2025 05:16) (95% - 98%)    Parameters below as of 13 Jan 2025 05:16  Patient On (Oxygen Delivery Method): room air        PHYSICAL EXAM:  GENERAL: No acute distress, well-developed  HEAD:  Atraumatic, Normocephalic  CHEST/LUNG: equal rise and fall of chest  ABDOMEN: Soft, non-tender, non-distended  NEUROLOGY: responding appropriately, no focal deficits    I&O's Summary    12 Jan 2025 07:01  -  13 Jan 2025 07:00  --------------------------------------------------------  IN: 100 mL / OUT: 1 mL / NET: 99 mL      I&O's Detail    12 Jan 2025 07:01  -  13 Jan 2025 07:00  --------------------------------------------------------  IN:    IV PiggyBack: 100 mL  Total IN: 100 mL    OUT:    Stool (mL): 1 mL  Total OUT: 1 mL    Total NET: 99 mL        MEDICATIONS  (STANDING):  apixaban 5 milliGRAM(s) Oral two times a day  finasteride 5 milliGRAM(s) Oral daily  furosemide    Tablet 20 milliGRAM(s) Oral daily  metoprolol succinate ER 25 milliGRAM(s) Oral daily  multivitamin 1 Tablet(s) Oral daily  pantoprazole    Tablet 40 milliGRAM(s) Oral before breakfast  polyethylene glycol 3350 17 Gram(s) Oral daily  predniSONE   Tablet 10 milliGRAM(s) Oral daily  risperiDONE   Tablet 0.25 milliGRAM(s) Oral at bedtime  senna 1 Tablet(s) Oral at bedtime  tamsulosin 0.4 milliGRAM(s) Oral at bedtime    MEDICATIONS  (PRN):  acetaminophen     Tablet .. 650 milliGRAM(s) Oral every 6 hours PRN Mild Pain (1 - 3)  aluminum hydroxide/magnesium hydroxide/simethicone Suspension 30 milliLiter(s) Oral every 4 hours PRN Dyspepsia  melatonin 3 milliGRAM(s) Oral at bedtime PRN Insomnia  ondansetron Injectable 4 milliGRAM(s) IV Push every 8 hours PRN Nausea and/or Vomiting    LABS:                        10.5   18.42 )-----------( 355      ( 12 Jan 2025 07:30 )             33.4     01-12    141  |  108  |  14  ----------------------------<  77  3.6   |  27  |  0.56    Ca    8.8      12 Jan 2025 07:30  Phos  1.8     01-12  Mg     2.2     01-12        Urinalysis Basic - ( 12 Jan 2025 07:30 )    Color: x / Appearance: x / SG: x / pH: x  Gluc: 77 mg/dL / Ketone: x  / Bili: x / Urobili: x   Blood: x / Protein: x / Nitrite: x   Leuk Esterase: x / RBC: x / WBC x   Sq Epi: x / Non Sq Epi: x / Bacteria: x      RADIOLOGY & ADDITIONAL STUDIES:    ASSESSMENT    ASSESSMENT: 78y/o Male PMHx HTN, CHF, PE (on eliquis), Myasthenia Gravis, and chronic LE edema, BPH, nephrolithiasis (s/p nephrostomy tube placement), urosepsis, admitted for UTI treatment prior to scheduled for TURP, bladder cysto litholapaxy- R ureteroscopy. Now POD#3 s/p TURP.     PLAN:  - VSSAF, Urine clear/yellow  - voiding spontaneously  - Continue abx  - Continue eliquis  - Pain control as needed  - Rest of care per primary  - signing off, reconsult PRN, f/u in office    Surgical Team Spectralink: [id number]

## 2025-01-13 NOTE — PROVIDER CONTACT NOTE (CRITICAL VALUE NOTIFICATION) - TEST AND RESULT REPORTED:
urine CS from 1/3 amended report >3 organism probable collection contamination >100,000 pseudomonas origanosa, >100,000 Klebseila  pnemonia ESBL
Urine culture

## 2025-01-13 NOTE — PROGRESS NOTE ADULT - SUBJECTIVE AND OBJECTIVE BOX
INTERVAL HPI/OVERNIGHT EVENTS:  Patient seen and examined at bedside. Patient states he has pain in his lower back. Patient denies any chest pain or shortness of breath. States his back pain is chronic, worsened when sitting up. Denies any radiation of pain to lower extremities or any saddle anesthesia.    ROS: All other review of systems is negative unless indicated above.    MEDICATIONS  (STANDING):  apixaban 5 milliGRAM(s) Oral two times a day  finasteride 5 milliGRAM(s) Oral daily  furosemide    Tablet 20 milliGRAM(s) Oral daily  lidocaine   4% Patch 1 Patch Transdermal daily  metoprolol succinate ER 25 milliGRAM(s) Oral daily  multivitamin 1 Tablet(s) Oral daily  pantoprazole    Tablet 40 milliGRAM(s) Oral before breakfast  polyethylene glycol 3350 17 Gram(s) Oral daily  predniSONE   Tablet 10 milliGRAM(s) Oral daily  risperiDONE   Tablet 0.25 milliGRAM(s) Oral at bedtime  senna 1 Tablet(s) Oral at bedtime  tamsulosin 0.4 milliGRAM(s) Oral at bedtime    MEDICATIONS  (PRN):  acetaminophen     Tablet .. 650 milliGRAM(s) Oral every 6 hours PRN Mild Pain (1 - 3)  aluminum hydroxide/magnesium hydroxide/simethicone Suspension 30 milliLiter(s) Oral every 4 hours PRN Dyspepsia  melatonin 3 milliGRAM(s) Oral at bedtime PRN Insomnia  ondansetron Injectable 4 milliGRAM(s) IV Push every 8 hours PRN Nausea and/or Vomiting  oxyCODONE    IR 5 milliGRAM(s) Oral every 6 hours PRN Severe Pain (7 - 10)      Allergies    ofloxacin (Unknown)  gatifloxacin (Unknown)  Cipro (Unknown)  levofloxacin (Unknown)    Intolerances    Avelox (Other)  telithromycin (Other)  fluoroquinolone antibiotics (Other)  Ketek (Other)          Vital Signs Last 24 Hrs  T(C): 36.5 (13 Jan 2025 12:31), Max: 36.6 (13 Jan 2025 05:16)  T(F): 97.7 (13 Jan 2025 12:31), Max: 97.8 (13 Jan 2025 05:16)  HR: 65 (13 Jan 2025 12:31) (62 - 65)  BP: 147/81 (13 Jan 2025 12:31) (125/65 - 147/81)  BP(mean): --  RR: 19 (13 Jan 2025 12:31) (18 - 19)  SpO2: 93% (13 Jan 2025 12:31) (93% - 95%)    Parameters below as of 13 Jan 2025 12:31  Patient On (Oxygen Delivery Method): room air        01-12 @ 07:01  -  01-13 @ 07:00  --------------------------------------------------------  IN: 100 mL / OUT: 1 mL / NET: 99 mL    01-13 @ 07:01 - 01-13 @ 20:52  --------------------------------------------------------  IN: 0 mL / OUT: 1850 mL / NET: -1850 mL        PHYSICAL EXAM:  GENERAL: laying in bed, complaining of back pain  HEENT: EOMI, hearing normal, conjunctiva and sclera clear  Chest: CTA bilaterally, no wheezing  CV: S1S2, RRR  GI: soft, +BS, NT/ND  Musculoskeletal: no LE edema  Psychiatric: affect nL, mood nL  Skin: warm and dry      LABS:                        11.1   17.31 )-----------( 375      ( 13 Jan 2025 10:30 )             34.3     01-13    141  |  106  |  15  ----------------------------<  126[H]  3.8   |  27  |  0.62    Ca    9.5      13 Jan 2025 10:30  Phos  2.3     01-13  Mg     2.2     01-13        Urinalysis Basic - ( 13 Jan 2025 10:30 )    Color: x / Appearance: x / SG: x / pH: x  Gluc: 126 mg/dL / Ketone: x  / Bili: x / Urobili: x   Blood: x / Protein: x / Nitrite: x   Leuk Esterase: x / RBC: x / WBC x   Sq Epi: x / Non Sq Epi: x / Bacteria: x        RADIOLOGY & ADDITIONAL TESTS:

## 2025-01-13 NOTE — PROGRESS NOTE ADULT - ASSESSMENT
80 yo M with PMHx HTN, CHF, PE (on eliquis), Myasthenia Gravis, and chronic LE edema, nephrolithiasis (s/p nephrostomy tube placement), urosepsis, BPH admitted for abx and cystoscopy procedure    - Echo from 09/2024: EF 55-60% moderate MR  - continue home furosemide 20mg QD  - No signs of significant ischemia or volume overload.     - s/p TURP bladder cysto litholapaxy- R ureteroscopy  1/9 tolerated procedure w/o cardiac complications  - Eliquis  resumed     -bp stable on bb      - Monitor creatinine and electrolytes. Keep K>4, Mg>2  - Further cardiac workup will depend on clinical course.   - All other workup per primary team

## 2025-01-13 NOTE — PROGRESS NOTE ADULT - TIME BILLING
Reviewing chart notes and data, face to face time counseling the patient, coordinating care with SW/CM at [facility].

## 2025-01-13 NOTE — PROVIDER CONTACT NOTE (CRITICAL VALUE NOTIFICATION) - SITUATION
MD made aware Urine culture from 1/9 <12 10,000-49,000 pseudomonas anginosa 10,000-49,000 candida albicans

## 2025-01-13 NOTE — PROGRESS NOTE ADULT - NSPROGADDITIONALINFOA_GEN_ALL_CORE
I agree with assessment and plan. OR for cysto, urs and TURP. Risk, benefits and alternatives discussed, patient opted to proceed.
I agree with assessment and plan. OR Thursday
plan of care discussed with son over phone- D/C planning in AM

## 2025-01-13 NOTE — PROGRESS NOTE ADULT - SUBJECTIVE AND OBJECTIVE BOX
[facility]  INFECTIOUS DISEASES   [address] [place] [address]  Tel: [phone]     Fax: [phone]  ========================================================  MD Nikolas Oquendo Michelle, MD Shah, Kaushal, MD Sunjit, Jaspal, MD Sehrish Shahid, MD   ========================================================    [id number]  DEION HEATH     Follow up: No fever, no new overnight event.   Complaining of low back pain.     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  Club foot  Surgery at birth for Club Foot Right foot  Pilonidal cyst  Surgery 40 years ago  H/O colonoscopy  H/O prostate biopsy  Nephrostomy present    Social Hx: No current smoking, EtOH or drugs     FAMILY HISTORY:  Family history of stroke (Father)  Father -  age 62    Family history of kidney disease (Mother)  Mother -  age 67    Family history of diabetes mellitus type II (Sibling)  Brother & Sister    Allergie  ofloxacin (Unknown)  gatifloxacin (Unknown)  Cipro (Unknown)  levofloxacin (Unknown)    Avelox (Other)  telithromycin (Other)  fluoroquinolone antibiotics (Other)  Ketek (Other)    MEDICATIONS  (STANDING):  meropenem  IVPB 1000 milliGRAM(s) IV Intermittent Once     REVIEW OF SYSTEMS:  CONSTITUTIONAL:  No Fever or chills  HEENT:   No diplopia or blurred vision.  No earache, sore throat or runny nose.  CARDIOVASCULAR:  No chest pain or SOB  RESPIRATORY:  No cough, shortness of breath, PND or orthopnea.  GASTROINTESTINAL:  No nausea, vomiting or diarrhea.  GENITOURINARY:  No dysuria, frequency or urgency. No Blood in urine  MUSCULOSKELETAL:  no joint aches, no muscle pain  SKIN:  No change in skin, hair or nails.    Physical Exam:  Vital Signs Last 24 Hrs  T(C): 36.6 (2025 05:16), Max: 36.6 (2025 20:45)  T(F): 97.8 (2025 05:16), Max: 97.8 (2025 20:45)  HR: 62 (2025 05:16) (58 - 69)  BP: 125/65 (2025 05:16) (123/74 - 137/77)  BP(mean): --  RR: 18 (2025 05:16) (17 - 18)  SpO2: 95% (2025 05:16) (95% - 98%)  Parameters below as of 2025 05:16  Patient On (Oxygen Delivery Method): room air  GEN: NAD  HEENT: normocephalic and atraumatic. EOMI. PERRL.    NECK: Supple.  No lymphadenopathy   LUNGS: Clear to auscultation.  HEART: Regular rate and rhythm   ABDOMEN: Soft, nontender, and nondistended.   Guerrero in place with CBI clear urine, no CVA tenderness  EXTREMITIES: Without edema. PICC looks fine  PSYCHIATRIC: Awake and alert but not oriented   SKIN: No rash     Labs;      141  |  108  |  14  ----------------------------<  77  3.6   |  27  |  0.56    Ca    8.8      2025 07:30  Phos  1.8       Mg     2.2     12                        10.5   18.42 )-----------( 355      ( 2025 07:30 )             33.4     Urinalysis Basic - ( 2025 07:30 )    Color: x / Appearance: x / SG: x / pH: x  Gluc: 77 mg/dL / Ketone: x  / Bili: x / Urobili: x   Blood: x / Protein: x / Nitrite: x   Leuk Esterase: x / RBC: x / WBC x   Sq Epi: x / Non Sq Epi: x / Bacteria: x    RECENT CULTURES:   @ 18:15 OR Collect Bladder (from O.R.) Pseudomonas aeruginosa (Carbapenem Resistant)    10,000 - 49,000 CFU/mL Pseudomonas aeruginosa (Carbapenem Resistant)  10,000 - 49,000 CFU/mL Candida albicans "Susceptibilities not performed"     @ 22:20 Clean Catch Klebsiella pneumoniae ESBL  Pseudomonas aeruginosa (Carbapenem Resistant)    >=3 organisms. Probable collection contamination. Culture includes  >100,000 CFU/ml Pseudomonas aeruginosa (Carbapenem Resistant)  >100,000 CFU/ml Klebsiella pneumoniae ESBL  Unable to evaluate further due to Pseudomonas  overgrowth     @ 10:31 .Blood BLOOD     No growth at 5 days     @ 10:25 .Blood BLOOD     No growth at 5 days    All imaging and other data have been reviewed.  < from: Xray Chest 1 View-PORTABLE IMMEDIATE (25 @ 10:47) >  IMPRESSION: Scarring with small granuloma in the right midlung again   noted. Small hilar calcified lymph nodes again seen.      Assessment and Plan:   80 yo man with PMH of HTN, CHF, PE (on eliquis), Myasthenia Gravis, and chronic LE edema, nephrolithiasis (s/p nephrostomy tube placement), urosepsis, BPH was transferred from [facility] for hypertension and elevated WBC.  No complaints, no fever, chills, chest pain, SOB, abdominal or flank pain, n/v/d/c or dysuria.    In last admission had some work up done for calcified granulomas in chest CT. Negative sputum for AFB x3 and negative fungal markers.   Also last time had CR pseudomonas for which completed zerbaxa.   Leukocytosis seems chronic he had numbers around 20 most days.   Since was going for  procedure decided to treat him with ABx prior to surgery due to bacteriuria, with zerbaxs, UCx grew the same pseudomonas.     # UTI?  # Chronic guerrero and nephrostomy   # Leukocytosis     - Blood cultures NGTD   - UCx with ESBL Klebsiella and CR pseudomonas   - Monitor CBC ~18, could be reactive but can call hematology (chronically high)  - AS/P cystoscopy- TURP, bladder cysto litholapaxy- R ureteroscopy later on   - Completed 5days of Zebaxa on , will watch off Antibiotics     Will sign off please call with any question.     Jordin Mckinley MD  Division of Infectious Diseases   Please call ID service at [phone] with any question.    50 minutes spent on total encounter assessing patient, examination, chart review, counseling and coordinating care by the attending physician/nurse/care manager.

## 2025-01-13 NOTE — PROGRESS NOTE ADULT - ASSESSMENT
80 yo M with PMHx HTN, CHF, PE (on eliquis), Myasthenia Gravis, and chronic LE edema, nephrolithiasis (s/p nephrostomy tube placement), urosepsis, BPH admitted for UTI treatment prior to scheduled for cystoscopy- TURP, bladder cysto litholapaxy- R ureteroscopy on 01/09/24.        Problem/Plan - 1:  ·  Problem: UTI (urinary tract infection).   ·  Plan: known for recurrent UTI presented to the ED 2/2 urology referral for IV antibiotics prior to procedure on 1/9/25.  -  9/2024, required emergent nephrostomy tube placement after failed stent placement for 7mm kidney stone. followed by ICU care for Klebsiella Bacteremia, urosepsis/ septic shock.    - H/O hosp with urosepsis on 11/20/24 required nephrostomy tube exchange 11/29/24,   - s/p cystoscopy- TURP, bladder cysto litholapaxy- R ureteroscopy on 01/09/25.   - completed 7 day course of IV zosyn before transfer  - in ED WBC 14.58, Lac 3.4   - leukocytosis pt on steroid prednisone.  Also likely reactive to recent urologic procedure.  Pt. without fever, feels well, and denies having diarrhea.  Will continue to monitor.    - UA -> UCx  probable collection contamination. +Pseudomonas aeruginosa (carbapenem resistant), + ESBL Klebsiella pneumoniae  - BCx x2, neg    - ID (Dr. Mckinley) consulted, continue on Zerbaxa- last dose today     Problem/Plan - 2:  ·  Problem: Urolithiasis.   ·  Plan: CT A/P 11/20/24: Percutaneous R nephrostomy tube. No hydronephrosis. Unremarkable L kidney. UB collapsed around guerrero. UB stone x3 measuring up to 1.0 cm.  - s/p cystoscopy- TURP, bladder cysto litholapaxy- R ureteroscopy on 01/09/25.   - guerrero catheter removed 1/12- voiding spontaneously  - Urology f/u     Problem/Plan - 3:  ·  Problem: Hypertension.   ·  Plan: Chronic  - on home metoprolol succinate 25mg PO daily  - DASH diet.     Problem/Plan - 4:  ·  Problem: Chronic CHF.   ·  Plan: Chronic CHF.   H/O of CHF, unspecified type however on GDMT for HFrEF  - TTE (9/2024): LVEF 55-60%, no diastolic dysfunction, moderate MR  - Evaluated by cardiology during admission at [facility] (9/2024) for acute CHF, started on GDMT  - c/w home metoprolol and lasix with hold parameters   - Strict Is&Os q6h  - Does not appear clinically volume overloaded  - Cardiology ([facility]) consulted,     Problem/Plan - 5:  ·  Problem: Lung granuloma.   ·  Plan: Lung granuloma.   CT Chest/Ab/Pelv with IV Con 11/2024: Evidence of granulomatous infection in lungs, liver and spleen. Consistent with previous imaging in 11/2023  - Per chart review, pt has been aware of this finding  - CXR consistent on this admission  - Quant indeterminate, fungitell negative, aspergillus galactomannan negative at that time  - ID (Dr. Mckinley) consulted, -.     Problem/Plan - 6:  ·  Problem: Thyroid nodule.   ·  Plan: CT Chest 11/20/24:  2.6 cm left lobe thyroid nodule and evidence of granulomatous infection in lungs, liver and spleen  - TSH 0.58 11/20/24  -non-urgent thyroid US. out pt.     Problem/Plan - 7:   Problem: Personal history of pulmonary embolism.   ·  Plan: Chronic  - continue home eliquis 5mg BID     Problem/Plan - 8:  ·  Problem: Myasthenia gravis.   ·  Plan: Chronic  - c/w home prednisone.     Problem/Plan - 9:  ·  Problem: BPH (benign prostatic hyperplasia).   ·  Plan: - c/w home flomax and finasteride.     Problem/Plan - 10:  ·  Problem: Need for prophylactic measure.   ·  Plan; - c/w home eliquis 5mg BID for hx pe .    #Back pain  -No radiation to LE or saddle anesthesia  -could be due to muscle spasms or hospital bed  -Oxycodone IR 5mg Q6H PRN severe pain- tolerated well

## 2025-01-14 ENCOUNTER — TRANSCRIPTION ENCOUNTER (OUTPATIENT)
Age: 80
End: 2025-01-14

## 2025-01-14 VITALS
HEART RATE: 74 BPM | SYSTOLIC BLOOD PRESSURE: 113 MMHG | RESPIRATION RATE: 18 BRPM | OXYGEN SATURATION: 91 % | DIASTOLIC BLOOD PRESSURE: 71 MMHG | TEMPERATURE: 98 F

## 2025-01-14 PROCEDURE — 82365 CALCULUS SPECTROSCOPY: CPT

## 2025-01-14 PROCEDURE — 99285 EMERGENCY DEPT VISIT HI MDM: CPT

## 2025-01-14 PROCEDURE — 76000 FLUOROSCOPY <1 HR PHYS/QHP: CPT

## 2025-01-14 PROCEDURE — 87040 BLOOD CULTURE FOR BACTERIA: CPT

## 2025-01-14 PROCEDURE — 83605 ASSAY OF LACTIC ACID: CPT

## 2025-01-14 PROCEDURE — 93005 ELECTROCARDIOGRAM TRACING: CPT

## 2025-01-14 PROCEDURE — C1889: CPT

## 2025-01-14 PROCEDURE — 81001 URINALYSIS AUTO W/SCOPE: CPT

## 2025-01-14 PROCEDURE — 83735 ASSAY OF MAGNESIUM: CPT

## 2025-01-14 PROCEDURE — 85025 COMPLETE CBC W/AUTO DIFF WBC: CPT

## 2025-01-14 PROCEDURE — 99232 SBSQ HOSP IP/OBS MODERATE 35: CPT

## 2025-01-14 PROCEDURE — 87086 URINE CULTURE/COLONY COUNT: CPT

## 2025-01-14 PROCEDURE — 93970 EXTREMITY STUDY: CPT

## 2025-01-14 PROCEDURE — 71045 X-RAY EXAM CHEST 1 VIEW: CPT

## 2025-01-14 PROCEDURE — C1758: CPT

## 2025-01-14 PROCEDURE — C1769: CPT

## 2025-01-14 PROCEDURE — 86850 RBC ANTIBODY SCREEN: CPT

## 2025-01-14 PROCEDURE — 84100 ASSAY OF PHOSPHORUS: CPT

## 2025-01-14 PROCEDURE — 84132 ASSAY OF SERUM POTASSIUM: CPT

## 2025-01-14 PROCEDURE — 80061 LIPID PANEL: CPT

## 2025-01-14 PROCEDURE — 83036 HEMOGLOBIN GLYCOSYLATED A1C: CPT

## 2025-01-14 PROCEDURE — 86900 BLOOD TYPING SEROLOGIC ABO: CPT

## 2025-01-14 PROCEDURE — 85730 THROMBOPLASTIN TIME PARTIAL: CPT

## 2025-01-14 PROCEDURE — 36415 COLL VENOUS BLD VENIPUNCTURE: CPT

## 2025-01-14 PROCEDURE — 88300 SURGICAL PATH GROSS: CPT

## 2025-01-14 PROCEDURE — 80048 BASIC METABOLIC PNL TOTAL CA: CPT

## 2025-01-14 PROCEDURE — 87077 CULTURE AEROBIC IDENTIFY: CPT

## 2025-01-14 PROCEDURE — 97162 PT EVAL MOD COMPLEX 30 MIN: CPT

## 2025-01-14 PROCEDURE — 85610 PROTHROMBIN TIME: CPT

## 2025-01-14 PROCEDURE — 88305 TISSUE EXAM BY PATHOLOGIST: CPT

## 2025-01-14 PROCEDURE — 99239 HOSP IP/OBS DSCHRG MGMT >30: CPT

## 2025-01-14 PROCEDURE — 80053 COMPREHEN METABOLIC PANEL: CPT

## 2025-01-14 PROCEDURE — 86901 BLOOD TYPING SEROLOGIC RH(D): CPT

## 2025-01-14 PROCEDURE — 87186 SC STD MICRODIL/AGAR DIL: CPT

## 2025-01-14 RX ORDER — METOPROLOL TARTRATE 50 MG
1 TABLET ORAL
Refills: 0 | DISCHARGE

## 2025-01-14 RX ORDER — LIDOCAINE 50 MG/G
1 OINTMENT TOPICAL
Qty: 0 | Refills: 0 | DISCHARGE
Start: 2025-01-14

## 2025-01-14 RX ORDER — OXYCODONE HCL 15 MG
1 TABLET ORAL
Qty: 0 | Refills: 0 | DISCHARGE
Start: 2025-01-14

## 2025-01-14 RX ORDER — MAG HYDROX/ALUMINUM HYD/SIMETH 200-200-20
30 SUSPENSION, ORAL (FINAL DOSE FORM) ORAL
Qty: 0 | Refills: 0 | DISCHARGE
Start: 2025-01-14

## 2025-01-14 RX ORDER — METOPROLOL TARTRATE 50 MG
1 TABLET ORAL
Qty: 0 | Refills: 0 | DISCHARGE
Start: 2025-01-14

## 2025-01-14 RX ORDER — APIXABAN 5 MG/1
1 TABLET, FILM COATED ORAL
Qty: 0 | Refills: 0 | DISCHARGE
Start: 2025-01-14

## 2025-01-14 RX ADMIN — Medication 20 MILLIGRAM(S): at 05:08

## 2025-01-14 RX ADMIN — PANTOPRAZOLE 40 MILLIGRAM(S): 40 TABLET, DELAYED RELEASE ORAL at 08:24

## 2025-01-14 RX ADMIN — Medication 25 MILLIGRAM(S): at 05:08

## 2025-01-14 RX ADMIN — LIDOCAINE 1 PATCH: 50 OINTMENT TOPICAL at 08:23

## 2025-01-14 RX ADMIN — Medication 5 MILLIGRAM(S): at 08:24

## 2025-01-14 RX ADMIN — APIXABAN 5 MILLIGRAM(S): 5 TABLET, FILM COATED ORAL at 05:07

## 2025-01-14 RX ADMIN — Medication 1 TABLET(S): at 08:24

## 2025-01-14 RX ADMIN — ACETAMINOPHEN 650 MILLIGRAM(S): 80 SOLUTION/ DROPS ORAL at 06:17

## 2025-01-14 RX ADMIN — ACETAMINOPHEN 650 MILLIGRAM(S): 80 SOLUTION/ DROPS ORAL at 12:26

## 2025-01-14 RX ADMIN — Medication 17 GRAM(S): at 08:24

## 2025-01-14 RX ADMIN — Medication 5 MILLIGRAM(S): at 13:04

## 2025-01-14 RX ADMIN — Medication 10 MILLIGRAM(S): at 05:08

## 2025-01-14 NOTE — PROGRESS NOTE ADULT - SUBJECTIVE AND OBJECTIVE BOX
Patient seen and examined at bedside. States he is feeling well this AM. States his back pain has completely resolved from yesterday, eager to go back to [facility]. Denies any chest pain, SOB, abd pain at this time. Denies any dysuria, hematuria, states he has been voiding well without any complaints. Patient completed course of IV antibiotics, medically stable for discharge. Son, Olvin, called via phone and agreeable with discharge plan.    Physical Exam:  GENERAL: laying in bed, NAD, pleasant  HEENT: EOMI, hearing normal, conjunctiva and sclera clear  Chest: CTA bilaterally, no wheezing  CV: S1S2, RRR  GI: soft, +BS, NT/ND  Musculoskeletal: no LE edema  Psychiatric: affect nL, mood nL  Skin: warm and dry    Please refer to updated D/C note for further details.

## 2025-01-14 NOTE — DISCHARGE NOTE NURSING/CASE MANAGEMENT/SOCIAL WORK - PATIENT PORTAL LINK FT
You can access the FollowMyHealth Patient Portal offered by [facility] by registering at the following website: [email or web address]. By joining [facility]’s FollowMyHealth portal, you will also be able to view your health information using other applications (apps) compatible with our system.

## 2025-01-14 NOTE — DISCHARGE NOTE NURSING/CASE MANAGEMENT/SOCIAL WORK - FINANCIAL ASSISTANCE
[facility] provides services at a reduced cost to those who are determined to be eligible through [facility]’s financial assistance program. Information regarding [facility]’s financial assistance program can be found by going to [email or web address] or by calling [phone].

## 2025-01-14 NOTE — CAREGIVER ENGAGEMENT NOTE - CAREGIVER OUTREACH NOTES - FREE TEXT
Per MD, pt stable for d/c today. [facility] accepting back today. CLEM spoke with son Olvin regarding d/c plan for today. He is agreeable and expressed understanding. CLEM scheduled paula via [facility] for 12pm  today. SW to follow and remain available for any needs.

## 2025-01-14 NOTE — PROGRESS NOTE ADULT - SUBJECTIVE AND OBJECTIVE BOX
[facility] -- Janel Jackson Pannella, Patel, Savella Goodger, Cohen  Office # [phone]      Follow Up:    htn pe   Subjective/Observations:   Seen bedside, remains on room air sleeping in NAD      REVIEW OF SYSTEMS: All other review of systems is negative unless indicated above    PAST MEDICAL & SURGICAL HISTORY:  Calculus of kidney      Club foot  Born Right Foot      Myasthenia gravis      Hypertension      Diabetes  Type 2 - does not take medications - monitors Blood Glucose at home - diet controlled      Urinary tract infection  notes h/o UTI's      Hyperlipidemia      Other muscle wasting and atrophy      H/O spinal stenosis      History of thrombocytopenia      H/O CHF      Elective surgery   age 11 @ [facility] - cut under Patella secondary 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      Club foot  Surgery at birth for Club Foot Right foot      Pilonidal cyst  Surgery 40 years ago      H/O colonoscopy      H/O prostate biopsy      Nephrostomy present          MEDICATIONS  (STANDING):  apixaban 5 milliGRAM(s) Oral two times a day  finasteride 5 milliGRAM(s) Oral daily  furosemide    Tablet 20 milliGRAM(s) Oral daily  lidocaine   4% Patch 1 Patch Transdermal daily  metoprolol succinate ER 25 milliGRAM(s) Oral daily  multivitamin 1 Tablet(s) Oral daily  pantoprazole    Tablet 40 milliGRAM(s) Oral before breakfast  polyethylene glycol 3350 17 Gram(s) Oral daily  predniSONE   Tablet 10 milliGRAM(s) Oral daily  risperiDONE   Tablet 0.25 milliGRAM(s) Oral at bedtime  senna 1 Tablet(s) Oral at bedtime  tamsulosin 0.4 milliGRAM(s) Oral at bedtime    MEDICATIONS  (PRN):  acetaminophen     Tablet .. 650 milliGRAM(s) Oral every 6 hours PRN Mild Pain (1 - 3)  aluminum hydroxide/magnesium hydroxide/simethicone Suspension 30 milliLiter(s) Oral every 4 hours PRN Dyspepsia  melatonin 3 milliGRAM(s) Oral at bedtime PRN Insomnia  ondansetron Injectable 4 milliGRAM(s) IV Push every 8 hours PRN Nausea and/or Vomiting  oxyCODONE    IR 5 milliGRAM(s) Oral every 6 hours PRN Severe Pain (7 - 10)      Allergies    ofloxacin (Unknown)  gatifloxacin (Unknown)  Cipro (Unknown)  levofloxacin (Unknown)    Intolerances    Avelox (Other)  telithromycin (Other)  fluoroquinolone antibiotics (Other)  Ketek (Other)      Vital Signs Last 24 Hrs  T(C): 36.7 (:), Max: 36.7 (2025 21:)  T(F): 98 (:), Max: 98 (:)  HR: 69 (2025 05:10) (64 - 69)  BP: 120/75 (2025 05:10) (112/72 - 147/81)  BP(mean): --  RR: 18 (:) (18 - 19)  SpO2: 96% (:) (93% - 96%)    Parameters below as of :  Patient On (Oxygen Delivery Method): room air        I&O's Summary    2025 07:01  -  2025 07:00  --------------------------------------------------------  IN: 0 mL / OUT: 2850 mL / NET: -2850 mL      TELE: not on tele   Constitutional: NAD, awake and alert, well-developed  HEENT: Moist Mucous Membranes, Anicteric  Pulmonary: Non-labored, breath sounds are clear bilaterally, No wheezing, crackles or rhonchi  Cardiovascular: Regular, S1 and S2 nl, murmur   Gastrointestinal: Bowel Sounds present, soft, nontender.   Lymph: No lymphadenopathy. No peripheral edema.  Skin: No visible rashes or ulcers.  Psych:  Mood & affect appropriate  LABS: All Labs Reviewed:                        11.1   17.31 )-----------( 375      ( 2025 10:30 )             34.3                         10.5   18.42 )-----------( 355      ( 2025 07:30 )             33.4     2025 10:30    141    |  106    |  15     ----------------------------<  126    3.8     |  27     |  0.62   2025 07:30    141    |  108    |  14     ----------------------------<  77     3.6     |  27     |  0.56     Ca    9.5        2025 10:30  Ca    8.8        2025 07:30  Phos  2.3       2025 10:30  Phos  1.8       2025 07:30  Mg     2.2       2025 10:30  Mg     2.2       2025 07:30               EC Lead ECG:   Ventricular Rate 67 BPM    Atrial Rate 67 BPM    P-R Interval 152 ms    QRS Duration 114 ms    Q-T Interval 362 ms    QTC Calculation(Bazett) 382 ms    P Axis 41 degrees    R Axis -52 degrees    T Axis 69 degrees    Diagnosis Line Sinus rhythm withsinus arrhythmia with occasional premature ventricular complexes  Left anterior fascicular block  Nonspecific T wave abnormality  Abnormal ECG  When compared with ECG of 2025 10:38,  premature ventricular complexes are now present  T wave inversion more evident in Lateral leads  QT has shortened  Confirmed by BRENDAN GONZALEZ ([id number]) on 2025 5:48:12 PM (25 @ 07:40)      TRANSTHORACIC ECHOCARDIOGRAM REPORT  ________________________________________________________________________________                                      _______       Pt. Name:       DEION HEATH Study Date:    2024  MRN:            [id number]       YOB: 1945  Accession #:    [id number]      Age:           79 years  Account#:       [id number]     Gender:        M  Heart Rate:                    Height:        65.75 in (167.00 cm)  Rhythm:                        Weight:   199.00 lb (90.27 kg)  Blood Pressure: 103/58 mmHg    BSA/BMI:       1.99 m² / 32.37 kg/m²  ________________________________________________________________________________________  Referring Physician:    [id number] Curly Byrnes  Interpreting Physician: Lyndsay Marin MD  Primary Sonographer:    Ashli Arana RDCS    CPT:                ECHO TTE WO CON COMP W DOPP - 97158.m  Indication(s):      Abnormal electrocardiogram ECG/EKG - R94.31  Procedure:          Transthoracic echocardiogram with 2-D, M-mode and complete                      spectral and color flow Doppler.  Ordering Location:  ICU1  Admission Status:   Inpatient  Contrast Injection: Verbal consent was obtained for injection of Ultrasonic                      Enhancing Agent following a discussion of risks and                      benefits.                      Endocardial visualization enhanced with Definity Ultrasound                      enhancing agent.  Agitated Saline:    Injection with agitated saline was performed toevaluate for                      intracardiac shunting.  Study Information:  Image quality for this study is technically difficult.    _______________________________________________________________________________________     CONCLUSIONS:      1. Technically difficult image quality.   2. Agitated saline injection was negative for intracardiac shunt (though there is poor visualization of the LV during injection of agitated saline)   3. Despite definity use, the LV endocardium is still not well visualized.   4. In certain views, the LVEF appears grossly preserved though the apex appears hypokinetic.    ________________________________________________________________________________________  FINDINGS:     Interatrial Septum:  Agitated saline injection was negative for intracardiac shunt.  ________________________________________________________________________________________  Electronically signed on 2024 at 2:37:09 PM by Lyndsay Marin MD      *** Final ***      Radiology:

## 2025-01-14 NOTE — PROGRESS NOTE ADULT - NS ATTEND AMEND GEN_ALL_CORE FT
80 yo M with PMHx HTN, CHF, PE (on eliquis), Myasthenia Gravis, and chronic LE edema, nephrolithiasis (s/p nephrostomy tube placement), urosepsis, BPH admitted for abx and cystoscopy procedure    - Echo from 09/2024: EF 55-60% moderate MR  - continue home furosemide 20mg QD  - No signs of significant ischemia or volume overload.     - s/p TURP bladder cysto litholapaxy- R ureteroscopy  1/9 tolerated procedure w/o cardiac complications  - Eliquis  resumed     -bp stable on bb      - Monitor creatinine and electrolytes. Keep K>4, Mg>2  - Further cardiac workup will depend on clinical course
80 yo M with PMHx HTN, CHF, PE (on eliquis), Myasthenia Gravis, and chronic LE edema, nephrolithiasis (s/p nephrostomy tube placement), urosepsis, BPH admitted for abx and cystoscopy procedure    - continue home furosemide 20mg QD  - s/p TURP bladder cysto litholapaxy- R ureteroscopy  1/9 tolerated procedure w/o cardiac complications  - Eliquis held for OR now resumed
78 yo M with PMHx HTN, CHF, PE (on eliquis), Myasthenia Gravis, and chronic LE edema, nephrolithiasis (s/p nephrostomy tube placement), urosepsis, BPH admitted for abx and cystoscopy procedure    - No signs of significant ischemia or volume overload.   - ekg nonischemic     - Echo from 09/2024: EF 55-60% moderate MR  - continue home furosemide 20mg QD  - no sign fluid overload   - Needs K repletion this AM.   - Strict I/Os, daily weights    - plan for surgery on 1/9  - hx pe on eliquis, now on hold, cont full dose lovenox.  - Currently no active cardiac conditions. No signs of ischemia, ADHF, clinical exam not consistent with severe stenotic valvular disease, no unstable arrhythmias noted. Therefore able to proceed with this intermediate risk  without any further cardiac workup. Routine hemodynamic monitoring is suggested during the procedure.
80 yo M with PMHx HTN, CHF, PE (on eliquis), Myasthenia Gravis, and chronic LE edema, nephrolithiasis (s/p nephrostomy tube placement), urosepsis, BPH admitted for abx and cystoscopy procedure    - Echo from 09/2024: EF 55-60% moderate MR  - Appears compensated from HF POV.   - continue home furosemide 20mg QD    - s/p TURP bladder cysto litholapaxy- R ureteroscopy  1/9 tolerated procedure w/o cardiac complications  - Eliquis  resumed     -bp stable on bb
80 yo M with PMHx HTN, CHF, PE (on eliquis), Myasthenia Gravis, and chronic LE edema, nephrolithiasis (s/p nephrostomy tube placement), urosepsis, BPH admitted for abx and cystoscopy procedure    - No signs of significant ischemia or volume overload.   - ekg nonsischemic     - Echo from 09/2024: EF 55-60% moderate MR  - continue home furosemide 20mg QD  - no sign fluid overload   - Strict I/Os, daily weights      - plan for surgery on Monday OR  - hx pe on eliquis would hold eliquis at least 4 doses prior, can transition to iv heparin or fd lovenox   Currently no active cardiac conditions. No signs of ischemia, ADHF, clinical exam not consistent with severe stenotic valvular disease, no unstable arrhythmias noted. Therefore able to proceed with this intermediate risk  without any further cardiac workup. Routine hemodynamic monitoring is suggested during the procedure
78 yo M with PMHx HTN, CHF, PE (on eliquis), Myasthenia Gravis, and chronic LE edema, nephrolithiasis (s/p nephrostomy tube placement), urosepsis, BPH admitted for abx and cystoscopy procedure    - No signs of significant ischemia or volume overload.   - ekg nonischemic     - Echo from 09/2024: EF 55-60% moderate MR  - continue home furosemide 20mg QD  - no sign fluid overload   - Needs K repletion this AM.   - Strict I/Os, daily weights    - plan for surgery on Monday OR  - hx pe on eliquis, now on hold, should be on heparin in the interim given history of PE.   Currently no active cardiac conditions. No signs of ischemia, ADHF, clinical exam not consistent with severe stenotic valvular disease, no unstable arrhythmias noted. Therefore able to proceed with this intermediate risk  without any further cardiac workup. Routine hemodynamic monitoring is suggested during the procedure.
78 yo M with PMHx HTN, CHF, PE (on eliquis), Myasthenia Gravis, and chronic LE edema, nephrolithiasis (s/p nephrostomy tube placement), urosepsis, BPH admitted for abx and cystoscopy procedure    Admitted for cystoscopy cardiac optimization   - Echo from 09/2024: EF 55-60% moderate MR  - continue home furosemide 20mg QD  - no sign fluid overload   - Strict I/Os, daily weights  - EKG: NSR    - plan for surgery on 1/9 OR  - patient is a moderate risk patient for low risk procedure  - hx pe on eliquis would hold eliquis at least 4 doses prior, for OR tomorrow 1/9 , should be on fd lovenox or heparin   Currently no active cardiac conditions. No signs of ischemia, ADHF, clinical exam not consistent with severe stenotic valvular disease, no unstable arrhythmias noted. Therefore able to proceed with this intermediate risk  without any further cardiac workup. Routine hemodynamic monitoring is suggested during the procedure

## 2025-01-14 NOTE — PROGRESS NOTE ADULT - ASSESSMENT
80 yo M with PMHx HTN, CHF, PE (on eliquis), Myasthenia Gravis, and chronic LE edema, nephrolithiasis (s/p nephrostomy tube placement), urosepsis, BPH admitted for abx and cystoscopy procedure    - Echo from 09/2024: EF 55-60% moderate MR  - continue home furosemide 20mg QD  - No signs of significant ischemia or volume overload.     - s/p TURP bladder cysto litholapaxy- R ureteroscopy  1/9 tolerated procedure w/o cardiac complications  - Eliquis  resumed     -bp stable on bb      - Monitor creatinine and electrolytes. Keep K>4, Mg>2  - Further cardiac workup will depend on clinical course    - All other workup per primary team

## 2025-01-14 NOTE — PROGRESS NOTE ADULT - PROVIDER SPECIALTY LIST ADULT
Hospitalist
Infectious Disease
Infectious Disease
Urology
Urology
Cardiology
Hospitalist
Infectious Disease
Urology
Cardiology
Hospitalist
Infectious Disease
Nephrology
Urology
Cardiology
Hospitalist
Hospitalist
Nephrology
Hospitalist

## 2025-01-14 NOTE — PATIENT CHOICE NOTE. - NSPTCHOICESTATE_GEN_ALL_CORE

## 2025-01-14 NOTE — ED ADULT NURSE NOTE - NS ED NURSE RECORD ANOTHER VITAL SIGN
Anesthesia Post Evaluation    Patient: Maddie Ya    Procedure(s) Performed: EGD    Final Anesthesia Type: MAC      Patient location during evaluation: GI PACU  Patient participation: Yes- Able to Participate  Level of consciousness: awake and alert and oriented  Post-procedure vital signs: reviewed and stable  Pain management: adequate  Airway patency: patent    PONV status at discharge: No PONV  Anesthetic complications: no      Cardiovascular status: blood pressure returned to baseline and stable  Respiratory status: unassisted, spontaneous ventilation and room air  Hydration status: euvolemic  Follow-up not needed.  Comments: Refer to nursing note for pain/dari score upon discharge from recovery.               Vitals Value Taken Time   /80 01/14/25 1047   Temp 36.8 °C (98.2 °F) 01/14/25 1047   Pulse 86 01/14/25 1047   Resp 20 01/14/25 1047   SpO2 95 % 01/14/25 1047         No case tracking events are documented in the log.      Pain/Dari Score: No data recorded        
Yes

## 2025-01-17 NOTE — H&P ADULT - PROBLEM SELECTOR PLAN 1
- Echo (6/2021): EF 55-60%  - No signs/symptoms of volume overload at present  - Continue home Lasix 40mg qd Pt meets sepsis criteria on admission 2/2 hydronephrosis  - CT a/p: Left perinephric stranding and moderate to severe left hydronephrosis and hydroureter to the level of the left ureterovesicular junction. No nephrolithiasis  - UA: many bacteria, large LE, small blood, 32 WBC, 5 RBC  - Lactate 3.9->2.6  - Given: 2L IV NS bolus x1, 0.5L IV NS bolus x1, 0.5L IV NS bolus x1, IV Zosyn in ED  - Continue IV Zosyn   - Will hold off on further IVF given Hx of CHF  - Trend WBC and monitor for fever  - F/u UCx, BCx x2  - Uro consulted in ED, f/u recs Yes Pt meets sepsis criteria on admission 2/2 UTI and possible cholecystitis   - CT c/a/p: distended gallbladder per wet read, f/u official report   - UA: many bacteria, large LE, small blood, 32 WBC, 5 RBC  - Lactate 3.9->2.6  - Given: 2L IV NS bolus x1, 0.5L IV NS bolus x1, 0.5L IV NS bolus x1, IV Zosyn in ED  - Continue IV Zosyn   - Will hold off on further IVF given Hx of CHF  - Trend WBC and monitor for fever  - F/u UCx, BCx x2  - Uro consulted in ED, f/u recs  - ID (Dr. Mckinley) consulted, f/u recs Pt meets sepsis criteria on admission 2/2 UTI, possible cholecystitis and PNA  - CT c/a/p: distended gallbladder without stones and patchy RLL infiltrates indicative of PNA per wet read, f/u official report   - UA: many bacteria, large LE, small blood, 32 WBC, 5 RBC  - Lactate 3.9->2.6  - Given 2L IV NS bolus x1, 0.5L IV NS bolus x1, 0.5L IV NS bolus x1, IV Zosyn in ED  - Continue IV Zosyn   - Will hold off on further IVF given Hx of CHF  - Trend WBC and monitor for fever  - F/u UCx, BCx x2  - Tylenol PRN for fever/pain   - ID (Dr. Mckinley) consulted, f/u recs

## 2025-02-05 ENCOUNTER — INPATIENT (INPATIENT)
Facility: HOSPITAL | Age: 80
LOS: 7 days | Discharge: NURSING, REHAB OR LONG-TERM CARE | DRG: 907 | End: 2025-02-13
Attending: FAMILY MEDICINE | Admitting: STUDENT IN AN ORGANIZED HEALTH CARE EDUCATION/TRAINING PROGRAM
Payer: MEDICARE

## 2025-02-05 ENCOUNTER — TRANSCRIPTION ENCOUNTER (OUTPATIENT)
Age: 80
End: 2025-02-05

## 2025-02-05 VITALS
HEIGHT: 66 IN | DIASTOLIC BLOOD PRESSURE: 63 MMHG | TEMPERATURE: 98 F | OXYGEN SATURATION: 97 % | RESPIRATION RATE: 16 BRPM | HEART RATE: 108 BPM | WEIGHT: 279.99 LBS | SYSTOLIC BLOOD PRESSURE: 122 MMHG

## 2025-02-05 DIAGNOSIS — R31.9 HEMATURIA, UNSPECIFIED: ICD-10-CM

## 2025-02-05 DIAGNOSIS — Z98.89 OTHER SPECIFIED POSTPROCEDURAL STATES: Chronic | ICD-10-CM

## 2025-02-05 DIAGNOSIS — L89.629 PRESSURE ULCER OF LEFT HEEL, UNSPECIFIED STAGE: ICD-10-CM

## 2025-02-05 DIAGNOSIS — Z86.711 PERSONAL HISTORY OF PULMONARY EMBOLISM: ICD-10-CM

## 2025-02-05 DIAGNOSIS — I10 ESSENTIAL (PRIMARY) HYPERTENSION: ICD-10-CM

## 2025-02-05 DIAGNOSIS — Z93.6 OTHER ARTIFICIAL OPENINGS OF URINARY TRACT STATUS: Chronic | ICD-10-CM

## 2025-02-05 DIAGNOSIS — E04.1 NONTOXIC SINGLE THYROID NODULE: ICD-10-CM

## 2025-02-05 DIAGNOSIS — J84.10 PULMONARY FIBROSIS, UNSPECIFIED: ICD-10-CM

## 2025-02-05 DIAGNOSIS — L05.91 PILONIDAL CYST WITHOUT ABSCESS: Chronic | ICD-10-CM

## 2025-02-05 DIAGNOSIS — Z29.9 ENCOUNTER FOR PROPHYLACTIC MEASURES, UNSPECIFIED: ICD-10-CM

## 2025-02-05 DIAGNOSIS — Z41.9 ENCOUNTER FOR PROCEDURE FOR PURPOSES OTHER THAN REMEDYING HEALTH STATE, UNSPECIFIED: Chronic | ICD-10-CM

## 2025-02-05 DIAGNOSIS — N40.0 BENIGN PROSTATIC HYPERPLASIA WITHOUT LOWER URINARY TRACT SYMPTOMS: ICD-10-CM

## 2025-02-05 DIAGNOSIS — E78.5 HYPERLIPIDEMIA, UNSPECIFIED: ICD-10-CM

## 2025-02-05 DIAGNOSIS — Z98.890 OTHER SPECIFIED POSTPROCEDURAL STATES: Chronic | ICD-10-CM

## 2025-02-05 DIAGNOSIS — I50.9 HEART FAILURE, UNSPECIFIED: ICD-10-CM

## 2025-02-05 DIAGNOSIS — Z86.69 PERSONAL HISTORY OF OTHER DISEASES OF THE NERVOUS SYSTEM AND SENSE ORGANS: ICD-10-CM

## 2025-02-05 DIAGNOSIS — Q66.89 OTHER SPECIFIED CONGENITAL DEFORMITIES OF FEET: Chronic | ICD-10-CM

## 2025-02-05 LAB
ALBUMIN SERPL ELPH-MCNC: 2.4 G/DL — LOW (ref 3.3–5)
ALP SERPL-CCNC: 76 U/L — SIGNIFICANT CHANGE UP (ref 40–120)
ALT FLD-CCNC: 19 U/L — SIGNIFICANT CHANGE UP (ref 12–78)
ANION GAP SERPL CALC-SCNC: 16 MMOL/L — SIGNIFICANT CHANGE UP (ref 5–17)
ANISOCYTOSIS BLD QL: SLIGHT — SIGNIFICANT CHANGE UP
APPEARANCE UR: ABNORMAL
APTT BLD: 29.7 SEC — SIGNIFICANT CHANGE UP (ref 24.5–35.6)
AST SERPL-CCNC: 16 U/L — SIGNIFICANT CHANGE UP (ref 15–37)
BACTERIA # UR AUTO: ABNORMAL /HPF
BASOPHILS # BLD AUTO: 0 K/UL — SIGNIFICANT CHANGE UP (ref 0–0.2)
BASOPHILS # BLD AUTO: 0.08 K/UL — SIGNIFICANT CHANGE UP (ref 0–0.2)
BASOPHILS NFR BLD AUTO: 0 % — SIGNIFICANT CHANGE UP (ref 0–2)
BASOPHILS NFR BLD AUTO: 0.2 % — SIGNIFICANT CHANGE UP (ref 0–2)
BILIRUB SERPL-MCNC: 0.2 MG/DL — SIGNIFICANT CHANGE UP (ref 0.2–1.2)
BILIRUB UR-MCNC: NEGATIVE — SIGNIFICANT CHANGE UP
BUN SERPL-MCNC: 19 MG/DL — SIGNIFICANT CHANGE UP (ref 7–23)
CALCIUM SERPL-MCNC: 9.3 MG/DL — SIGNIFICANT CHANGE UP (ref 8.5–10.1)
CHLORIDE SERPL-SCNC: 107 MMOL/L — SIGNIFICANT CHANGE UP (ref 96–108)
CO2 SERPL-SCNC: 18 MMOL/L — LOW (ref 22–31)
COLOR SPEC: ABNORMAL
CREAT SERPL-MCNC: 1.2 MG/DL — SIGNIFICANT CHANGE UP (ref 0.5–1.3)
DIFF PNL FLD: ABNORMAL
EGFR: 61 ML/MIN/1.73M2 — SIGNIFICANT CHANGE UP
EOSINOPHIL # BLD AUTO: 0 K/UL — SIGNIFICANT CHANGE UP (ref 0–0.5)
EOSINOPHIL # BLD AUTO: 0.01 K/UL — SIGNIFICANT CHANGE UP (ref 0–0.5)
EOSINOPHIL NFR BLD AUTO: 0 % — SIGNIFICANT CHANGE UP (ref 0–6)
EOSINOPHIL NFR BLD AUTO: 0 % — SIGNIFICANT CHANGE UP (ref 0–6)
EPI CELLS # UR: PRESENT
GLUCOSE SERPL-MCNC: 283 MG/DL — HIGH (ref 70–99)
GLUCOSE UR QL: 250 MG/DL
HCT VFR BLD CALC: 25.6 % — LOW (ref 39–50)
HCT VFR BLD CALC: 25.6 % — LOW (ref 39–50)
HCT VFR BLD CALC: 33.4 % — LOW (ref 39–50)
HGB BLD-MCNC: 10.5 G/DL — LOW (ref 13–17)
HGB BLD-MCNC: 8 G/DL — LOW (ref 13–17)
HGB BLD-MCNC: 8.2 G/DL — LOW (ref 13–17)
HYALINE CASTS # UR AUTO: PRESENT
IMM GRANULOCYTES NFR BLD AUTO: 2.7 % — HIGH (ref 0–0.9)
INR BLD: 1.35 RATIO — HIGH (ref 0.85–1.16)
KETONES UR-MCNC: NEGATIVE MG/DL — SIGNIFICANT CHANGE UP
LACTATE SERPL-SCNC: 7.8 MMOL/L — CRITICAL HIGH (ref 0.7–2)
LACTATE SERPL-SCNC: 9.4 MMOL/L — CRITICAL HIGH (ref 0.7–2)
LEUKOCYTE ESTERASE UR-ACNC: ABNORMAL
LG PLATELETS BLD QL AUTO: SLIGHT — SIGNIFICANT CHANGE UP
LIDOCAIN IGE QN: 29 U/L — SIGNIFICANT CHANGE UP (ref 13–75)
LYMPHOCYTES # BLD AUTO: 1.7 K/UL — SIGNIFICANT CHANGE UP (ref 1–3.3)
LYMPHOCYTES # BLD AUTO: 1.94 K/UL — SIGNIFICANT CHANGE UP (ref 1–3.3)
LYMPHOCYTES # BLD AUTO: 4.8 % — LOW (ref 13–44)
LYMPHOCYTES # BLD AUTO: 6 % — LOW (ref 13–44)
MACROCYTES BLD QL: SLIGHT — SIGNIFICANT CHANGE UP
MAGNESIUM SERPL-MCNC: 2.2 MG/DL — SIGNIFICANT CHANGE UP (ref 1.6–2.6)
MANUAL SMEAR VERIFICATION: SIGNIFICANT CHANGE UP
MCHC RBC-ENTMCNC: 28.2 PG — SIGNIFICANT CHANGE UP (ref 27–34)
MCHC RBC-ENTMCNC: 28.2 PG — SIGNIFICANT CHANGE UP (ref 27–34)
MCHC RBC-ENTMCNC: 29 PG — SIGNIFICANT CHANGE UP (ref 27–34)
MCHC RBC-ENTMCNC: 31.3 G/DL — LOW (ref 32–36)
MCHC RBC-ENTMCNC: 31.4 G/DL — LOW (ref 32–36)
MCHC RBC-ENTMCNC: 32 G/DL — SIGNIFICANT CHANGE UP (ref 32–36)
MCV RBC AUTO: 89.5 FL — SIGNIFICANT CHANGE UP (ref 80–100)
MCV RBC AUTO: 90.1 FL — SIGNIFICANT CHANGE UP (ref 80–100)
MCV RBC AUTO: 90.5 FL — SIGNIFICANT CHANGE UP (ref 80–100)
MONOCYTES # BLD AUTO: 2.27 K/UL — HIGH (ref 0–0.9)
MONOCYTES # BLD AUTO: 2.76 K/UL — HIGH (ref 0–0.9)
MONOCYTES NFR BLD AUTO: 7 % — SIGNIFICANT CHANGE UP (ref 2–14)
MONOCYTES NFR BLD AUTO: 7.8 % — SIGNIFICANT CHANGE UP (ref 2–14)
MYELOCYTES NFR BLD: 2 % — HIGH (ref 0–0)
NEUTROPHILS # BLD AUTO: 27.51 K/UL — HIGH (ref 1.8–7.4)
NEUTROPHILS # BLD AUTO: 29.69 K/UL — HIGH (ref 1.8–7.4)
NEUTROPHILS NFR BLD AUTO: 84 % — HIGH (ref 43–77)
NEUTROPHILS NFR BLD AUTO: 84.5 % — HIGH (ref 43–77)
NEUTS BAND # BLD: 1 % — SIGNIFICANT CHANGE UP (ref 0–8)
NEUTS BAND NFR BLD: 1 % — SIGNIFICANT CHANGE UP (ref 0–8)
NITRITE UR-MCNC: NEGATIVE — SIGNIFICANT CHANGE UP
NRBC # BLD: 0 /100 WBCS — SIGNIFICANT CHANGE UP (ref 0–0)
NRBC # BLD: SIGNIFICANT CHANGE UP /100 WBCS (ref 0–0)
NRBC BLD-RTO: 0 /100 WBCS — SIGNIFICANT CHANGE UP (ref 0–0)
NRBC BLD-RTO: SIGNIFICANT CHANGE UP /100 WBCS (ref 0–0)
PH UR: 7 — SIGNIFICANT CHANGE UP (ref 5–8)
PLAT MORPH BLD: NORMAL — SIGNIFICANT CHANGE UP
PLATELET # BLD AUTO: 390 K/UL — SIGNIFICANT CHANGE UP (ref 150–400)
PLATELET # BLD AUTO: 441 K/UL — HIGH (ref 150–400)
PLATELET # BLD AUTO: 560 K/UL — HIGH (ref 150–400)
POIKILOCYTOSIS BLD QL AUTO: SLIGHT — SIGNIFICANT CHANGE UP
POTASSIUM SERPL-MCNC: 3.8 MMOL/L — SIGNIFICANT CHANGE UP (ref 3.5–5.3)
POTASSIUM SERPL-SCNC: 3.8 MMOL/L — SIGNIFICANT CHANGE UP (ref 3.5–5.3)
PROT SERPL-MCNC: 6.2 G/DL — SIGNIFICANT CHANGE UP (ref 6–8.3)
PROT UR-MCNC: >=1000 MG/DL
PROTHROM AB SERPL-ACNC: 15.9 SEC — HIGH (ref 9.9–13.4)
RBC # BLD: 2.83 M/UL — LOW (ref 4.2–5.8)
RBC # BLD: 2.84 M/UL — LOW (ref 4.2–5.8)
RBC # BLD: 3.73 M/UL — LOW (ref 4.2–5.8)
RBC # FLD: 16.3 % — HIGH (ref 10.3–14.5)
RBC # FLD: 16.5 % — HIGH (ref 10.3–14.5)
RBC # FLD: 16.7 % — HIGH (ref 10.3–14.5)
RBC BLD AUTO: ABNORMAL
RBC CASTS # UR COMP ASSIST: ABNORMAL /HPF
SCHISTOCYTES BLD QL AUTO: SLIGHT — SIGNIFICANT CHANGE UP
SODIUM SERPL-SCNC: 141 MMOL/L — SIGNIFICANT CHANGE UP (ref 135–145)
SP GR SPEC: 1.03 — HIGH (ref 1–1.03)
TOXIC GRANULES BLD QL SMEAR: PRESENT — SIGNIFICANT CHANGE UP
UROBILINOGEN FLD QL: 0.2 MG/DL — SIGNIFICANT CHANGE UP (ref 0.2–1)
WBC # BLD: 27.97 K/UL — HIGH (ref 3.8–10.5)
WBC # BLD: 32.37 K/UL — HIGH (ref 3.8–10.5)
WBC # BLD: 35.2 K/UL — HIGH (ref 3.8–10.5)
WBC # FLD AUTO: 27.97 K/UL — HIGH (ref 3.8–10.5)
WBC # FLD AUTO: 32.37 K/UL — HIGH (ref 3.8–10.5)
WBC # FLD AUTO: 35.2 K/UL — HIGH (ref 3.8–10.5)
WBC UR QL: 16 /HPF — HIGH (ref 0–5)

## 2025-02-05 PROCEDURE — 71260 CT THORAX DX C+: CPT | Mod: 26

## 2025-02-05 PROCEDURE — 52214 CYSTOSCOPY AND TREATMENT: CPT | Mod: 58

## 2025-02-05 PROCEDURE — 93010 ELECTROCARDIOGRAM REPORT: CPT

## 2025-02-05 PROCEDURE — 52001 CYSTO W/IRRG&EVAC MLT CLOTS: CPT | Mod: 58,59

## 2025-02-05 PROCEDURE — 51705 CHANGE OF BLADDER TUBE: CPT

## 2025-02-05 PROCEDURE — 99223 1ST HOSP IP/OBS HIGH 75: CPT | Mod: 24,25

## 2025-02-05 PROCEDURE — 99223 1ST HOSP IP/OBS HIGH 75: CPT | Mod: GC

## 2025-02-05 PROCEDURE — 99291 CRITICAL CARE FIRST HOUR: CPT | Mod: FT,25

## 2025-02-05 PROCEDURE — 74177 CT ABD & PELVIS W/CONTRAST: CPT | Mod: 26

## 2025-02-05 RX ORDER — HYDROMORPHONE HYDROCHLORIDE 4 MG/ML
1 INJECTION, SOLUTION INTRAMUSCULAR; INTRAVENOUS; SUBCUTANEOUS ONCE
Refills: 0 | Status: DISCONTINUED | OUTPATIENT
Start: 2025-02-05 | End: 2025-02-05

## 2025-02-05 RX ORDER — ACETAMINOPHEN 160 MG/5ML
1000 SUSPENSION ORAL ONCE
Refills: 0 | Status: COMPLETED | OUTPATIENT
Start: 2025-02-05 | End: 2025-02-05

## 2025-02-05 RX ORDER — PROTHROMBIN COMPLEX CONCENTRATE (HUMAN) 25.5; 16.5; 24; 22; 22; 26 [IU]/ML; [IU]/ML; [IU]/ML; [IU]/ML; [IU]/ML; [IU]/ML
2500 POWDER, FOR SOLUTION INTRAVENOUS ONCE
Refills: 0 | Status: COMPLETED | OUTPATIENT
Start: 2025-02-05 | End: 2025-02-05

## 2025-02-05 RX ORDER — MORPHINE SULFATE 60 MG/1
2 TABLET, FILM COATED, EXTENDED RELEASE ORAL ONCE
Refills: 0 | Status: DISCONTINUED | OUTPATIENT
Start: 2025-02-05 | End: 2025-02-05

## 2025-02-05 RX ORDER — HYDROMORPHONE HYDROCHLORIDE 4 MG/ML
0.5 INJECTION, SOLUTION INTRAMUSCULAR; INTRAVENOUS; SUBCUTANEOUS
Refills: 0 | Status: DISCONTINUED | OUTPATIENT
Start: 2025-02-05 | End: 2025-02-06

## 2025-02-05 RX ORDER — MORPHINE SULFATE 60 MG/1
4 TABLET, FILM COATED, EXTENDED RELEASE ORAL ONCE
Refills: 0 | Status: DISCONTINUED | OUTPATIENT
Start: 2025-02-05 | End: 2025-02-05

## 2025-02-05 RX ORDER — CEFTOLOZANE AND TAZOBACTAM 1; .5 G/10ML; G/10ML
1500 INJECTION, POWDER, LYOPHILIZED, FOR SOLUTION INTRAVENOUS EVERY 8 HOURS
Refills: 0 | Status: DISCONTINUED | OUTPATIENT
Start: 2025-02-06 | End: 2025-02-09

## 2025-02-05 RX ORDER — ONDANSETRON 4 MG/1
4 TABLET, ORALLY DISINTEGRATING ORAL ONCE
Refills: 0 | Status: DISCONTINUED | OUTPATIENT
Start: 2025-02-05 | End: 2025-02-06

## 2025-02-05 RX ORDER — ONDANSETRON 4 MG/1
4 TABLET, ORALLY DISINTEGRATING ORAL ONCE
Refills: 0 | Status: COMPLETED | OUTPATIENT
Start: 2025-02-05 | End: 2025-02-05

## 2025-02-05 RX ORDER — CEFTOLOZANE AND TAZOBACTAM 1; .5 G/10ML; G/10ML
1500 INJECTION, POWDER, LYOPHILIZED, FOR SOLUTION INTRAVENOUS ONCE
Refills: 0 | Status: COMPLETED | OUTPATIENT
Start: 2025-02-05 | End: 2025-02-05

## 2025-02-05 RX ORDER — PROTHROMBIN COMPLEX CONCENTRATE (HUMAN) 25.5; 16.5; 24; 22; 22; 26 [IU]/ML; [IU]/ML; [IU]/ML; [IU]/ML; [IU]/ML; [IU]/ML
3000 POWDER, FOR SOLUTION INTRAVENOUS ONCE
Refills: 0 | Status: DISCONTINUED | OUTPATIENT
Start: 2025-02-05 | End: 2025-02-05

## 2025-02-05 RX ORDER — BACTERIOSTATIC SODIUM CHLORIDE 0.9 %
1000 VIAL (ML) INJECTION ONCE
Refills: 0 | Status: COMPLETED | OUTPATIENT
Start: 2025-02-05 | End: 2025-02-05

## 2025-02-05 RX ORDER — SODIUM CHLORIDE 9 G/ML
1000 INJECTION, SOLUTION INTRAVENOUS
Refills: 0 | Status: DISCONTINUED | OUTPATIENT
Start: 2025-02-05 | End: 2025-02-06

## 2025-02-05 RX ADMIN — Medication 1000 MILLILITER(S): at 20:25

## 2025-02-05 RX ADMIN — ACETAMINOPHEN 400 MILLIGRAM(S): 160 SUSPENSION ORAL at 19:10

## 2025-02-05 RX ADMIN — SODIUM CHLORIDE 75 MILLILITER(S): 9 INJECTION, SOLUTION INTRAVENOUS at 23:09

## 2025-02-05 RX ADMIN — MORPHINE SULFATE 4 MILLIGRAM(S): 60 TABLET, FILM COATED, EXTENDED RELEASE ORAL at 17:46

## 2025-02-05 RX ADMIN — Medication 1000 MILLILITER(S): at 17:47

## 2025-02-05 RX ADMIN — ONDANSETRON 4 MILLIGRAM(S): 4 TABLET, ORALLY DISINTEGRATING ORAL at 17:46

## 2025-02-05 RX ADMIN — HYDROMORPHONE HYDROCHLORIDE 1 MILLIGRAM(S): 4 INJECTION, SOLUTION INTRAMUSCULAR; INTRAVENOUS; SUBCUTANEOUS at 20:16

## 2025-02-05 RX ADMIN — Medication 1000 MILLILITER(S): at 19:44

## 2025-02-05 RX ADMIN — CEFTOLOZANE AND TAZOBACTAM 100 MILLIGRAM(S): 1; .5 INJECTION, POWDER, LYOPHILIZED, FOR SOLUTION INTRAVENOUS at 18:47

## 2025-02-05 RX ADMIN — PROTHROMBIN COMPLEX CONCENTRATE (HUMAN) 400 INTERNATIONAL UNIT(S): 25.5; 16.5; 24; 22; 22; 26 POWDER, FOR SOLUTION INTRAVENOUS at 20:39

## 2025-02-05 RX ADMIN — MORPHINE SULFATE 2 MILLIGRAM(S): 60 TABLET, FILM COATED, EXTENDED RELEASE ORAL at 19:10

## 2025-02-05 NOTE — H&P ADULT - HISTORY OF PRESENT ILLNESS
78yo M PMH  HTN, HFrEF (prior TTE 11/2024 with EF 35%), PE (on Eliquis), Myasthenia Gravis, and chronic LE edema, nephrolithiasis (s/p nephrostomy tube placement), urosepsis, BPH presents to ER from [facility] with report of large amount of blood from the penis, and penile pain. Son (HCP Olvin) at bedside reports the bleeding started earlier this afternoon on 2/5/2025. Patient responds to name on admission but does not know the year or that he is at the hospital. Son admits that the patient (his father) can be forgetful.       In the ED   Vitals: T: 97.6F --> 98.3F, BP: 134/77 --> 64/39--> 122/63--> 134/77, --> 98, RR: 16, Sat: 99% on room air  Labs: WBC elevated 32.37-->35.20, H/H low 10.5/33.4--> 8/25.6, Plt high 560--> 441, Lactate elevated 3.4  UA: negative nitrites, trace LE WBC 16 elevated, TNTC RBCs, present squamous epithelial cells, Glucose 250 elevated, large blood  Imaging:   CT chest/abdomen/pelvis:  Blood products within the urinary bladder with suspicion for active bleed   in the region of a TURP defect.  Left thyroid nodule, measuring 2.4 cm, for which thyroid ultrasound is   recommended.    EKG: sinus rhythm, VR 94, QT/QTc 380/475    Received: zerbaxa 1.5g x1, NS bolus 1L x2, Ofirmev 1g x1, Dilaudid 1mg IVP x1, morphine 4m IVP x1, morphine 2mg x1, Zofran 4mg IVP x1`, Balfaxar 2500 IU x1, Balfaxar 3000 IU x1  Ordered for LR 1L @75cc/hr, Packed Red Blood Cells 1U    80yo M PMH  HTN, HFrEF (prior TTE 11/2024 with EF 35%), PE (on Eliquis), Myasthenia Gravis, and chronic LE edema, nephrolithiasis (s/p nephrostomy tube placement), urosepsis, BPH presents to ER from [facility] with report of large amount of blood from the penis, and penile pain. Son (HCP Olvin) at bedside reports the bleeding started earlier this afternoon on 2/5/2025. Patient responds to name on admission but does not know the year or that he is at the hospital. Son admits that the patient (his father) can be forgetful. Patient vomited x1 in the ED, was initially complaining of pain but now denies.       In the ED   Vitals: T: 97.6F --> 98.3F, BP: 134/77 --> 64/39--> 122/63--> 134/77, --> 98, RR: 16, Sat: 99% on room air  Labs: WBC elevated 32.37-->35.20, H/H low 10.5/33.4--> 8/25.6, Plt high 560--> 441, Lactate elevated 3.4  UA: negative nitrites, trace LE WBC 16 elevated, TNTC RBCs, present squamous epithelial cells, Glucose 250 elevated, large blood  Imaging:   CT chest/abdomen/pelvis:  Blood products within the urinary bladder with suspicion for active bleed   in the region of a TURP defect.  Left thyroid nodule, measuring 2.4 cm, for which thyroid ultrasound is   recommended.    EKG: sinus rhythm, VR 94, QT/QTc 380/475    Received: zerbaxa 1.5g x1, NS bolus 1L x2, Ofirmev 1g x1, Dilaudid 1mg IVP x1, morphine 4m IVP x1, morphine 2mg x1, Zofran 4mg IVP x1`, Balfaxar 2500 IU x1, Balfaxar 3000 IU x1  Ordered for LR 1L @75cc/hr, Packed Red Blood Cells 1U

## 2025-02-05 NOTE — H&P ADULT - PROBLEM SELECTOR PLAN 2
Chronic, however hypotensive in the ED, improved with fluids   - on home metoprolol 25mg PO   - continue with hold parameters   - Monitor routine hemodynamics   - Consider DASH diet when able Chronic, however hypotensive in the ED, improved with fluids for emergent OR intervention  - on home metoprolol 25mg PO   - continue BB with hold parameters   - Monitor routine hemodynamics   - Consider DASH diet when able

## 2025-02-05 NOTE — CONSULT NOTE ADULT - ASSESSMENT
78 y/o M with a h/o HTN, HFrEF (prior TTE 11/2024 with EF 35%), PE (on Eliquis), Myasthenia Gravis, BPH (s/p TURP), chronic LE edema, nephrolithiasis (s/p nephrostomy tube placement), MDR UTI, with:    # Septic/hemorrhagic shock  # UTI  # Hematuria  # Acute blood loss anemia    Admit to ICU. 80 y/o M with a h/o HTN, HFrEF (prior TTE 11/2024 with EF 35%), PE (on Eliquis), Myasthenia Gravis, BPH (s/p TURP), chronic LE edema, nephrolithiasis (s/p nephrostomy tube placement), MDR UTI, with:    # Septic/hemorrhagic shock  # UTI  # Hematuria  # Acute blood loss anemia    Admit to ICU.    S/p 2L crystalloid bolus with initial improvement in hemodynamics however ultimately required IV vasopressor support. Will utilize norepinephrine infusion as necessary to maintain a MAP > 65.    Hgb 10.5 --> 8.0. Transfused 1u PRBC. Will trend CBC Q 4-6 hours and transfuse blood product as necessary to maintain euvolemia and Hgb > 7.0. S/p PCC for off-label reversal of apixaban. Hold anticoagulation.    S/p emergent cystoscopy with evacuation of clot and initiation of CBI.    Send blood and urine cultures. Start empiric course of Zerbaxa given history of MDR UTI. Will benefit from ID evaluation.      Case discussed with the patient and his son at the bedside. Diagnosis, prognosis, and management plan outlined. All questions answered and concerns addressed.    Case discussed with eICU physician, Dr. Oneill.

## 2025-02-05 NOTE — H&P ADULT - PROBLEM SELECTOR PLAN 10
Chronic  RN instructions: apply betadine moistened gauze with abdominal pad and wrap with Kaley daily

## 2025-02-05 NOTE — H&P ADULT - ASSESSMENT
80yo M PMH  HTN, HFrEF (prior TTE 11/2024 with EF 35%), PE (on Eliquis), Myasthenia Gravis, and chronic LE edema, nephrolithiasis (s/p nephrostomy tube placement), urosepsis, BPH presents to ER from [facility] with report of large amount of blood from the penis, and penile pain. Admitted for hematuria, active bleed within bladder requiring emergent surgery with urology

## 2025-02-05 NOTE — H&P ADULT - PROBLEM SELECTOR PLAN 9
Chronic   c/w home flomax and finasteride. Chronic   c/w home flomax and finasteride.    GERD:   Continue home omeprazole interchange with pantoprazole

## 2025-02-05 NOTE — H&P ADULT - PROBLEM SELECTOR PROBLEM 7
[Outside X-rays] : outside x-rays [Cervical Spine] : cervical spine [I independently reviewed and interpreted images and report] : I independently reviewed and interpreted images and report [FreeTextEntry1] : C5,6 DDD History of myasthenia gravis

## 2025-02-05 NOTE — ED ADULT NURSE NOTE - OBJECTIVE STATEMENT
pt's son states that pt was sent in for hema pt's son states that pt was sent in for hematuria. pt noted to be anxious and tearful. pt was medicated per orders and three way urinary catheter was placed by Dr Frey in the ED. pt tolerated it well. pt noted to have bloody urine in the drainage bag. pt and pt's son updated on plan of care.

## 2025-02-05 NOTE — CONSULT NOTE ADULT - NSCONSULTADDITIONALINFOA_GEN_ALL_CORE
I agree with assessment and plan. CT scan reviewed, large bladder clot with signs of contrast near the TURP defect suggesting active bleeding.  I discussed with the patient and son need for Cystoscopy and clot evacuation with possible bladder/prostate fulguration. Risks, benefits and alternatives discussed. They opted to proceed with surgery.

## 2025-02-05 NOTE — H&P ADULT - PROBLEM SELECTOR PLAN 4
Per chart review, pt has been aware of this finding  CT: LUNGS AND LARGE AIRWAYS: Reticular opacities in both lungs. Calcified   granulomas in the right upper lobe.  Patient to follow up outpatient for continued monitoring

## 2025-02-05 NOTE — BRIEF OPERATIVE NOTE - NSICDXBRIEFPREOP_GEN_ALL_CORE_FT
PRE-OP DIAGNOSIS:  Clot hematuria 05-Feb-2025 22:46:24  Spencer Driscoll  Clot retention of urine 05-Feb-2025 22:46:31  Spencer Driscoll  Blood clot in bladder 05-Feb-2025 22:47:01  Spencer Driscoll

## 2025-02-05 NOTE — ED PROVIDER NOTE - PROGRESS NOTE DETAILS
3-way guerrero catheter inserted, (+)hematuria, patient received morphine, pain controlled, reviwed recent chart, concern for UTI, has taken Zerbexa in the past, will start now spoke with radiology regarding ct scan, concern for active bleeding in bladder, spoke with surgical PA, will come to see the patient surgical PA spoke with urology attending plan to take to OR for control of bleeding, plan to reverese patient with andexanat, ICU PA called pharmacy states adnexa is not recommended for bladder bleeding, recommnd balfaxar, will order now

## 2025-02-05 NOTE — H&P ADULT - PROBLEM SELECTOR PLAN 3
Chronic CHF.   H/O of CHF, unspecified type however on GDMT for HFrEF  - TTE (11/2024): techinically difficult. LVEF is estimated at 35%. The apex, mid to spical anteroseptal wall and mid to distal anterolateral walls appear severely hypokinetic.  - c/w home metoprolol with hold parameters   - Strict Is&Os q6h  - Does not appear clinically volume overloaded on admission Chronic   H/O of CHF, unspecified type however not on GDMT for HFrEF  - TTE (11/2024): techinically difficult. LVEF is estimated at 35%. The apex, mid to spical anteroseptal wall and mid to distal anterolateral walls appear severely hypokinetic.  - c/w home furosemide  - Strict Is&Os, daily weights, fluid restriction  - Does not appear clinically volume overloaded on admission

## 2025-02-05 NOTE — H&P ADULT - NSHPPHYSICALEXAM_GEN_ALL_CORE
T(C): 36.4 (02-05-25 @ 20:56), Max: 37.3 (02-05-25 @ 19:06)  HR: 91 (02-05-25 @ 20:56) (91 - 108)  BP: 100/65 (02-05-25 @ 20:56) (64/39 - 149/109)  RR: 16 (02-05-25 @ 20:56) (16 - 16)  SpO2: 99% (02-05-25 @ 20:56) (97% - 99%)  Wt(kg): --    Physical Exam:   GENERAL:  NAD, ill-appearing elderly male   HEENT: head NC/AT; PERRL, +arcus senilis, conjunctiva & sclera clear; dry mucous membranes  RESPIRATORY: CTA B/L, no wheezing, rales, rhonchi  CARDIOVASCULAR: S1&S2, RRR  ABDOMEN: soft, non-tender, +distended/firm  + hypoactive bowel sounds x4 quadrants  MUSCULOSKELETAL:  no clubbing, cyanosis or edema of all 4 extremities  VASCULAR: Radial pulses 2+ bilaterally  SKIN: warm and dry, pale   NEUROLOGIC: AA&O X1 to person; answers some questions, follows commands appropriately

## 2025-02-05 NOTE — H&P ADULT - ATTENDING COMMENTS
Patient seen and observed at bedside prior to OR. Patient went to OR for hematuria and concern for active bleed by Urology team. To be sent to ICU after OR due to concern of severe sepsis 2/2 to UTI. patient with significant leukocytosis. Has discomfort in penile region and barak hematuria. Somewhat lethagic on exam. Will defer management to ICU team after patient leaves OR. Would monitor CBC closely, transfuse if hgb <7 and empirically give Zerbaxa due to hx of resistant UTIs. Would consult ID in AM.

## 2025-02-05 NOTE — BRIEF OPERATIVE NOTE - NSICDXBRIEFPOSTOP_GEN_ALL_CORE_FT
POST-OP DIAGNOSIS:  Clot retention of urine 05-Feb-2025 22:46:42  Spencer Driscoll  Blood clot in bladder 05-Feb-2025 22:46:51  Spencer Driscoll

## 2025-02-05 NOTE — CONSULT NOTE ADULT - SUBJECTIVE AND OBJECTIVE BOX
Patient is a 79y old  Male who presents with a chief complaint of hematuria, active bleed within bladder requiring emergent surgery with urology (2025 20:50)      BRIEF HOSPITAL COURSE: 78 y/o M with a h/o HTN, HFrEF (prior TTE 2024 with EF 35%), PE (on Eliquis), Myasthenia Gravis, BPH (s/p TURP), chronic LE edema, nephrolithiasis (s/p nephrostomy tube placement), MDR UTI, presents to ED from [facility] with report of voluminous hematuria and penile pain that began earlier in the day. The patient is very distressed secondary to pain and unable to provide much history at the time of exam. CT A/P reveals blood products within the bladder and concern for active bleeding from the site of TURP defect. Noted to be hypotensive with a lactate 9+. He has now went to the OR for emergent cystoscopy with evacuation of clots and initiation of CBI. Transfused 1u PRBC and started on IV vasopressor support.        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 No nausea, vomiting, or hematemesis; No diarrhea or constipation. No melena or hematochezia.  GENITOURINARY: (+)hematuria and penile pain  NEUROLOGICAL: No headaches, memory loss, loss of strength, numbness, or tremors  SKIN: No itching, burning, rashes, or lesions   MUSCULOSKELETAL: No joint pain or swelling; No muscle, back, or extremity pain  PSYCHIATRIC: No depression, anxiety, mood swings, or difficulty sleeping      Medications:  ceftolozane/tazobactam IVPB 1500 milliGRAM(s) IV Intermittent every 8 hours        acetaminophen     Tablet .. 650 milliGRAM(s) Oral every 6 hours PRN  HYDROmorphone  Injectable 0.5 milliGRAM(s) IV Push every 4 hours PRN  HYDROmorphone  Injectable 1 milliGRAM(s) IV Push every 4 hours PRN        pantoprazole    Tablet 40 milliGRAM(s) Oral before breakfast    tamsulosin 0.4 milliGRAM(s) Oral at bedtime    finasteride 5 milliGRAM(s) Oral daily        chlorhexidine 2% Cloths 1 Application(s) Topical <User Schedule>            ICU Vital Signs Last 24 Hrs  T(C): 36.4 (2025 23:45), Max: 37.3 (2025 19:06)  T(F): 97.5 (2025 23:45), Max: 99.1 (2025 19:06)  HR: 108 (2025 00:06) (91 - 116)  BP: 109/61 (2025 00:06) (64/39 - 149/109)  BP(mean): 72 (2025 00:06) (54 - 101)  ABP: --  ABP(mean): --  RR: 17 (2025 00:06) (15 - 20)  SpO2: 99% (2025 00:06) (97% - 100%)    O2 Parameters below as of 2025 00:06  Patient On (Oxygen Delivery Method): nasal cannula  O2 Flow (L/min): 2              I&O's Detail        LABS:                        8.2    27.97 )-----------( 390      ( 2025 22:27 )             25.6     02-05    141  |  107  |  19  ----------------------------<  283[H]  3.8   |  18[L]  |  1.20    Ca    9.3      2025 17:45  Mg     2.2     02-    TPro  6.2  /  Alb  2.4[L]  /  TBili  0.2  /  DBili  x   /  AST  16  /  ALT  19  /  AlkPhos  76  -          CAPILLARY BLOOD GLUCOSE      POCT Blood Glucose.: 191 mg/dL (2025 22:56)    PT/INR - ( 2025 17:45 )   PT: 15.9 sec;   INR: 1.35 ratio         PTT - ( 2025 17:45 )  PTT:29.7 sec  Urinalysis Basic - ( 2025 17:59 )    Color: Red / Appearance: Turbid / S.035 / pH: x  Gluc: x / Ketone: Negative mg/dL  / Bili: Negative / Urobili: 0.2 mg/dL   Blood: x / Protein: >=1000 mg/dL / Nitrite: Negative   Leuk Esterase: Trace / RBC: Too Numerous to count /HPF / WBC 16 /HPF   Sq Epi: x / Non Sq Epi: x / Bacteria: Few /HPF      CULTURES:        Physical Examination:    General: severely distressed secondary to pain, screaming and writhing in bed    HEENT: Pupils equal, reactive to light.  Symmetric.    PULM: Clear to auscultation bilaterally, no significant sputum production    CVS: Regular rate and rhythm, no murmurs, rubs, or gallops    ABD: Soft, nondistended, nontender, normoactive bowel sounds, no masses    EXT: No edema, nontender    SKIN: Warm and well perfused, no rashes noted.    NEURO: poorly communicative/cooperative secondary to distress, moving all extremities purposefully        RADIOLOGY:     < from: CT Abdomen and Pelvis w/ IV Cont (25 @ 18:24) >  FINDINGS:  CHEST:  LUNGS AND LARGE AIRWAYS: Reticular opacities in both lungs. Calcified   granulomas in the right upper lobe.  PLEURA: No pleural effusion.  VESSELS: Aortic calcifications. Coronary artery calcifications.  HEART: Heart size is normal. No pericardial effusion.  MEDIASTINUM AND KONRAD: Coarse right hilar calcifications.  CHEST WALL AND LOWER NECK: Left thyroid nodule, measuring 2.4 cm.    ABDOMEN AND PELVIS:  LIVER: Calcified granulomas.  BILE DUCTS: Normal caliber.  GALLBLADDER: Within normal limits.  SPLEEN: Calcified granulomas.  PANCREAS: Within normal limits.  ADRENALS: Within normal limits.  KIDNEYS/URETERS: No hydronephrosis. Right extrarenal pelvis.    BLADDER: Contains heterogeneous material, probablyblood products.   Hyperdense foci in the bladder and near the TURP defect (series 301,   image 42), suspicious for active bleed. Restrepo catheter balloon is present   within the urinary bladder.  REPRODUCTIVE ORGANS: TURP defect.    BOWEL: No bowel obstruction. Appendix is normal. Moderate amount of stool   in the rectum.  PERITONEUM/RETROPERITONEUM: Within normal limits.  VESSELS: Atherosclerotic changes.  LYMPH NODES: No lymphadenopathy.  ABDOMINAL WALL: Within normal limits.  BONES: Degenerativechanges.    IMPRESSION:  Blood products within the urinary bladder with suspicion for active bleed   in the region of a TURP defect.    Left thyroid nodule, measuring 2.4 cm, for which thyroid ultrasound is   recommended.    < end of copied text >          CRITICAL CARE TIME SPENT: 53 mins  Time spent evaluating/treating patient with medical issues that pose a high risk for life threatening deterioration and/or end-organ damage, reviewing data/labs/imaging, discussing case with multidisciplinary team, discussing plan/goals of care with patient/family. Non-inclusive of procedure time. The date of entry of this note reflects the date of services rendered.

## 2025-02-05 NOTE — ED PROVIDER NOTE - CLINICAL SUMMARY MEDICAL DECISION MAKING FREE TEXT BOX
79 male with hematuria, penile pain, abdomen soft, will insert guerrero catheter presumed urinary obstruction, f/u cbc, cmp, pt/ptt, lactate, blood cultures, ua, pain control, iv lfuids, antibiotics, ct chest, ct abdomen/pelvis

## 2025-02-05 NOTE — ED ADULT NURSE NOTE - NSFALLHARMRISKINTERV_ED_ALL_ED
Assistance OOB with selected safe patient handling equipment if applicable/Assistance with ambulation/Communicate risk of Fall with Harm to all staff, patient, and family/Monitor gait and stability/Monitor for mental status changes and reorient to person, place, and time, as needed/Provide visual cue: red socks, yellow wristband, yellow gown, etc/Reinforce activity limits and safety measures with patient and family/Toileting schedule using arm’s reach rule for commode and bathroom/Use of alarms - bed, stretcher, chair and/or video monitoring/Bed in lowest position, wheels locked, appropriate side rails in place/Call bell, personal items and telephone in reach/Instruct patient to call for assistance before getting out of bed/chair/stretcher/Non-slip footwear applied when patient is off stretcher/[place] to call system/Physically safe environment - no spills, clutter or unnecessary equipment/Purposeful Proactive Rounding/Room/bathroom lighting operational, light cord in reach

## 2025-02-05 NOTE — H&P ADULT - PROBLEM SELECTOR PLAN 5
Per chart, pt has been aware of this finding  CT: CHEST WALL AND LOWER NECK: Left thyroid nodule, measuring 2.4 cm.

## 2025-02-05 NOTE — CONSULT NOTE ADULT - SUBJECTIVE AND OBJECTIVE BOX
SURGERY PA CONSULT NOTE:    CHIEF COMPLAINT:  Patient is a 79y old  Male who presents with a chief complaint of abdominal pain and hematuria     HPI FROM ED:  78 yo M with PMHx HTN, CHF, PE (on eliquis), Myasthenia Gravis, and chronic LE edema, nephrolithiasis (s/p nephrostomy tube placement), urosepsis, BPH presented to the ED with severe abdominal pain since today and hematuria which started in the nursing home.      PAST MEDICAL HISTORY:  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    Club foot  Surgery at birth for Club Foot Right foot    Pilonidal cyst  Surgery 40 years ago    H/O colonoscopy  H/O prostate biopsy  Nephrostomy present      PAST SURGICAL HISTORY:  TURP 2025    REVIEW OF SYSTEMS:  CONSTITUTIONAL: No weakness, fevers or chills  EYES/ENT: No visual changes;  No vertigo or throat pain   NECK: No pain or stiffness  RESPIRATORY: No cough, wheezing, hemoptysis; No shortness of breath  CARDIOVASCULAR: No chest pain or palpitations  GASTROINTESTINAL: + lower abdominal pain  GENITOURINARY: +hematuria  NEUROLOGICAL: No numbness or weakness  SKIN: No itching, rashes      MEDICATIONS:  Home Medications:  acetaminophen 325 mg oral capsule: 2 cap(s) orally every 6 hours as needed for  mild pain (2025 14:37)  aluminum hydroxide-magnesium hydroxide 200 mg-200 mg/5 mL oral suspension: 30 milliliter(s) orally every 4 hours As needed Dyspepsia (2025 11:31)  apixaban 5 mg oral tablet: 1 tab(s) orally 2 times a day (2025 11:31)  finasteride 5 mg oral tablet: 1 tab(s) orally once a day (2025 14:37)  furosemide 20 mg oral tablet: 1 tab(s) orally once a day (2025 14:37)  lidocaine 4% topical film: Apply topically to affected area once a day (2025 11:31)  metoprolol succinate 25 mg oral tablet, extended release: 1 tab(s) orally once a day (2025 11:31)  Multiple Vitamins oral tablet: 1 tab(s) orally once a day (2025 14:37)  omeprazole 40 mg oral delayed release capsule: 1 cap(s) orally once a day (2025 14:37)  oxyCODONE 5 mg oral tablet: 1 tab(s) orally every 6 hours As needed Severe Pain (7 - 10) (2025 11:31)  polyethylene glycol 3350 oral powder for reconstitution: 17 gram(s) orally once a day (2025 14:37)  Praluent Pen 75 mg/mL subcutaneous solution: 75 milligram(s) subcutaneous every 2 weeks (2025 14:37)  predniSONE 10 mg oral tablet: 1 tab(s) orally once a day (2025 14:37)  RisperDAL 0.25 mg oral tablet: 1 tab(s) orally once a day (at bedtime) (2025 14:37)  senna leaf extract oral tablet: 1 tab(s) orally once a day (before a meal) (2025 14:37)  tamsulosin 0.4 mg oral capsule: 1 cap(s) orally once a day (at bedtime) (2025 14:37)    MEDICATIONS  (STANDING):  prothrombin complex concentrate human-lans IVPB (BALFAXAR) 2500 International Unit(s) IV Intermittent once    MEDICATIONS  (PRN):      ALLERGIES:  Allergies    ofloxacin (Unknown)  gatifloxacin (Unknown)  levofloxacin (Unknown)  Cipro (Unknown)    Intolerances    Ketek (Other)  telithromycin (Other)  fluoroquinolone antibiotics (Other)  Avelox (Other)      FAMILY HISTORY:  FAMILY HISTORY:  Family history of stroke (Father)  Father -  age 62    Family history of kidney disease (Mother)  Mother -  age 67    Family history of diabetes mellitus type II (Sibling)  Brother & Sister        VITAL SIGNS:  Vital Signs Last 24 Hrs  T(C): 36.8 (2025 20:21), Max: 37.3 (2025 19:06)  T(F): 98.3 (2025 20:21), Max: 99.1 (2025 19:06)  HR: 98 (2025 20:21) (92 - 108)  BP: 134/77 (2025 20:21) (64/39 - 149/109)  RR: 16 (2025 20:21) (16 - 16)  SpO2: 99% (2025 20:21) (97% - 99%)    Parameters below as of 2025 20:14  Patient On (Oxygen Delivery Method): room air        PHYSICAL EXAM:  GENERAL: patient in distress due to pain, anxious  HEAD:  Atraumatic, normocephalic  EYES: EOMI, PERRLA, conjunctiva and sclera clear  NECK: Supple, trachea midline  HEART: Regular rate and rhythm  LUNGS: Unlabored respirations  ABDOMEN: Soft, lower abdominal tenderness  EXTREMITIES: no calf tenderness bilaterally  NERVOUS SYSTEM:  A&Ox3   SKIN: No rashes or lesions     LABS:                        10.5   32.37 )-----------( 560      ( 2025 17:45 )             33.4     141  |  107  |  19  ----------------------------<  283[H]  3.8   |  18[L]  |  1.20    Ca    9.3      2025 17:45  Mg     2.2     02-05    TPro  6.2  /  Alb  2.4[L]  /  TBili  0.2  /  DBili  x   /  AST  16  /  ALT  19  /  AlkPhos  76  02-05    PT/INR - ( 2025 17:45 )   PT: 15.9 sec;   INR: 1.35 ratio       PTT - ( 2025 17:45 )  PTT:29.7 sec  Urinalysis Basic - ( 2025 17:59 )    Color: Red / Appearance: Turbid / S.035 / pH: x  Gluc: x / Ketone: Negative mg/dL  / Bili: Negative / Urobili: 0.2 mg/dL   Blood: x / Protein: >=1000 mg/dL / Nitrite: Negative   Leuk Esterase: Trace / RBC: Too Numerous to count /HPF / WBC 16 /HPF   Sq Epi: x / Non Sq Epi: x / Bacteria: Few /HPF      LIVER FUNCTIONS - ( 2025 17:45 )  Alb: 2.4 g/dL / Pro: 6.2 g/dL / ALK PHOS: 76 U/L / ALT: 19 U/L / AST: 16 U/L / GGT: x           Urinalysis with Rflx Culture (collected 2025 17:59)    RADIOLOGY & ADDITIONAL STUDIES:  < from: CT Abdomen and Pelvis w/ IV Cont (25 @ 18:24) >    ACC: [id number] EXAM:  CT ABDOMEN AND PELVIS IC   ORDERED BY: CYNTHIA ROSADO     ACC: [id number] EXAM:  CT CHEST IC   ORDERED BY: CYNTHIA ROSADO   PROCEDURE DATE:  2025    INTERPRETATION:  CLINICAL INFORMATION: Penile pain. Hematuria.    COMPARISON: 2024.    CONTRAST/COMPLICATIONS:  IV Contrast: Omnipaque 350  90 cc administered   10 cc discarded  Oral Contrast: NONE    PROCEDURE:  CT of the Chest, Abdomen and Pelvis was performed.  Sagittal and coronal reformats were performed.    FINDINGS:  CHEST:  LUNGS AND LARGE AIRWAYS: Reticular opacities in both lungs. Calcified granulomas in the right upper lobe.  PLEURA: No pleural effusion.  VESSELS: Aortic calcifications. Coronary artery calcifications.  HEART: Heart size is normal. No pericardial effusion.  MEDIASTINUM AND KONRAD: Coarse right hilar calcifications.  CHEST WALL AND LOWER NECK: Left thyroid nodule, measuring 2.4 cm.    ABDOMEN AND PELVIS:  LIVER: Calcified granulomas.  BILE DUCTS: Normal caliber.  GALLBLADDER: Within normal limits.  SPLEEN: Calcified granulomas.  PANCREAS: Within normal limits.  ADRENALS: Within normal limits.  KIDNEYS/URETERS: No hydronephrosis. Right extrarenal pelvis.    BLADDER: Contains heterogeneous material, probably blood products.   Hyperdense foci in the bladder and near the TURP defect (series 301, image 42), suspicious for active bleed. Guerrero catheter balloon is present within the urinary bladder.  REPRODUCTIVE ORGANS: TURP defect.    BOWEL: No bowel obstruction. Appendix is normal. Moderate amount of stool in the rectum.  PERITONEUM/RETROPERITONEUM: Within normal limits.  VESSELS: Atherosclerotic changes.  LYMPH NODES: No lymphadenopathy.  ABDOMINAL WALL: Within normal limits.  BONES: Degenerativechanges.    IMPRESSION:  Blood products within the urinary bladder with suspicion for active bleed in the region of a TURP defect.    Left thyroid nodule, measuring 2.4 cm, for which thyroid ultrasound is recommended.    First finding was discussed with Dr. ROSADO on 2025 at 7:04 PM by Dr. Saunders with read back confirmation.    --- End of Report ---    ASSESSMENT:  78 yo M with PMHx HTN, CHF, PE (on eliquis), Myasthenia Gravis, and chronic LE edema, nephrolithiasis (s/p nephrostomy tube placement), urosepsis, BPH presented to the ED with severe abdominal pain since today and hematuria. CT demonstrates hyperdense foci in the bladder and near the TURP defect;  guerrero with clots and +hematuria    PLAN:  - require medicine admission   - optimized for OR this evening; Procedure: cysto and clot evacuation   - reverse AC  - keep NPO  - IVF  - monitor vital signs; earlier hypotensive, now improving on fluids  - elevated lactate; repeat  - stat CBC  - need type and screen; may require pRBC  - pain control   - continue 3-way guerrero that was placed in the ED  - irrigate as needed  - consulted ICU for post op management/urosepsis   - case discussed with Dr. Antoine, ED provider, ICU PA, and patient's son at bedside SURGERY PA CONSULT NOTE:    CHIEF COMPLAINT:  Patient is a 79y old  Male who presents with a chief complaint of abdominal pain and hematuria     HPI FROM ED:  78 yo M with PMHx HTN, CHF, PE (on eliquis), Myasthenia Gravis, and chronic LE edema, nephrolithiasis (s/p nephrostomy tube placement), urosepsis, BPH presented to the ED with severe abdominal pain since today and hematuria which started in the nursing home.      PAST MEDICAL HISTORY:  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    Club foot  Surgery at birth for Club Foot Right foot    Pilonidal cyst  Surgery 40 years ago    H/O colonoscopy  H/O prostate biopsy  Nephrostomy present      PAST SURGICAL HISTORY:  TURP 2025    REVIEW OF SYSTEMS:  CONSTITUTIONAL: No weakness, fevers or chills  EYES/ENT: No visual changes;  No vertigo or throat pain   NECK: No pain or stiffness  RESPIRATORY: No cough, wheezing, hemoptysis; No shortness of breath  CARDIOVASCULAR: No chest pain or palpitations  GASTROINTESTINAL: + lower abdominal pain  GENITOURINARY: +hematuria  NEUROLOGICAL: No numbness or weakness  SKIN: No itching, rashes      MEDICATIONS:  Home Medications:  acetaminophen 325 mg oral capsule: 2 cap(s) orally every 6 hours as needed for  mild pain (2025 14:37)  aluminum hydroxide-magnesium hydroxide 200 mg-200 mg/5 mL oral suspension: 30 milliliter(s) orally every 4 hours As needed Dyspepsia (2025 11:31)  apixaban 5 mg oral tablet: 1 tab(s) orally 2 times a day (2025 11:31)  finasteride 5 mg oral tablet: 1 tab(s) orally once a day (2025 14:37)  furosemide 20 mg oral tablet: 1 tab(s) orally once a day (2025 14:37)  lidocaine 4% topical film: Apply topically to affected area once a day (2025 11:31)  metoprolol succinate 25 mg oral tablet, extended release: 1 tab(s) orally once a day (2025 11:31)  Multiple Vitamins oral tablet: 1 tab(s) orally once a day (2025 14:37)  omeprazole 40 mg oral delayed release capsule: 1 cap(s) orally once a day (2025 14:37)  oxyCODONE 5 mg oral tablet: 1 tab(s) orally every 6 hours As needed Severe Pain (7 - 10) (2025 11:31)  polyethylene glycol 3350 oral powder for reconstitution: 17 gram(s) orally once a day (2025 14:37)  Praluent Pen 75 mg/mL subcutaneous solution: 75 milligram(s) subcutaneous every 2 weeks (2025 14:37)  predniSONE 10 mg oral tablet: 1 tab(s) orally once a day (2025 14:37)  RisperDAL 0.25 mg oral tablet: 1 tab(s) orally once a day (at bedtime) (2025 14:37)  senna leaf extract oral tablet: 1 tab(s) orally once a day (before a meal) (2025 14:37)  tamsulosin 0.4 mg oral capsule: 1 cap(s) orally once a day (at bedtime) (2025 14:37)    MEDICATIONS  (STANDING):  prothrombin complex concentrate human-lans IVPB (BALFAXAR) 2500 International Unit(s) IV Intermittent once    MEDICATIONS  (PRN):      ALLERGIES:  Allergies    ofloxacin (Unknown)  gatifloxacin (Unknown)  levofloxacin (Unknown)  Cipro (Unknown)    Intolerances    Ketek (Other)  telithromycin (Other)  fluoroquinolone antibiotics (Other)  Avelox (Other)      FAMILY HISTORY:  FAMILY HISTORY:  Family history of stroke (Father)  Father -  age 62    Family history of kidney disease (Mother)  Mother -  age 67    Family history of diabetes mellitus type II (Sibling)  Brother & Sister        VITAL SIGNS:  Vital Signs Last 24 Hrs  T(C): 36.8 (2025 20:21), Max: 37.3 (2025 19:06)  T(F): 98.3 (2025 20:21), Max: 99.1 (2025 19:06)  HR: 98 (2025 20:21) (92 - 108)  BP: 134/77 (2025 20:21) (64/39 - 149/109)  RR: 16 (2025 20:21) (16 - 16)  SpO2: 99% (2025 20:21) (97% - 99%)    Parameters below as of 2025 20:14  Patient On (Oxygen Delivery Method): room air        PHYSICAL EXAM:  GENERAL: patient in distress due to pain, anxious  HEAD:  Atraumatic, normocephalic  EYES: EOMI, PERRLA, conjunctiva and sclera clear  NECK: Supple, trachea midline  HEART: Regular rate and rhythm  LUNGS: Unlabored respirations  ABDOMEN: Soft, lower abdominal tenderness  EXTREMITIES: no calf tenderness bilaterally  NERVOUS SYSTEM:  A&Ox3   SKIN: No rashes or lesions     LABS:                        10.5   32.37 )-----------( 560      ( 2025 17:45 )             33.4     141  |  107  |  19  ----------------------------<  283[H]  3.8   |  18[L]  |  1.20    Ca    9.3      2025 17:45  Mg     2.2     02-05    TPro  6.2  /  Alb  2.4[L]  /  TBili  0.2  /  DBili  x   /  AST  16  /  ALT  19  /  AlkPhos  76  02-05    PT/INR - ( 2025 17:45 )   PT: 15.9 sec;   INR: 1.35 ratio       PTT - ( 2025 17:45 )  PTT:29.7 sec  Urinalysis Basic - ( 2025 17:59 )    Color: Red / Appearance: Turbid / S.035 / pH: x  Gluc: x / Ketone: Negative mg/dL  / Bili: Negative / Urobili: 0.2 mg/dL   Blood: x / Protein: >=1000 mg/dL / Nitrite: Negative   Leuk Esterase: Trace / RBC: Too Numerous to count /HPF / WBC 16 /HPF   Sq Epi: x / Non Sq Epi: x / Bacteria: Few /HPF      LIVER FUNCTIONS - ( 2025 17:45 )  Alb: 2.4 g/dL / Pro: 6.2 g/dL / ALK PHOS: 76 U/L / ALT: 19 U/L / AST: 16 U/L / GGT: x           Urinalysis with Rflx Culture (collected 2025 17:59)    RADIOLOGY & ADDITIONAL STUDIES:  < from: CT Abdomen and Pelvis w/ IV Cont (25 @ 18:24) >    ACC: [id number] EXAM:  CT ABDOMEN AND PELVIS IC   ORDERED BY: CYNTHIA ROSADO     ACC: [id number] EXAM:  CT CHEST IC   ORDERED BY: CYNTHIA ROSADO   PROCEDURE DATE:  2025    INTERPRETATION:  CLINICAL INFORMATION: Penile pain. Hematuria.    COMPARISON: 2024.    CONTRAST/COMPLICATIONS:  IV Contrast: Omnipaque 350  90 cc administered   10 cc discarded  Oral Contrast: NONE    PROCEDURE:  CT of the Chest, Abdomen and Pelvis was performed.  Sagittal and coronal reformats were performed.    FINDINGS:  CHEST:  LUNGS AND LARGE AIRWAYS: Reticular opacities in both lungs. Calcified granulomas in the right upper lobe.  PLEURA: No pleural effusion.  VESSELS: Aortic calcifications. Coronary artery calcifications.  HEART: Heart size is normal. No pericardial effusion.  MEDIASTINUM AND KONRAD: Coarse right hilar calcifications.  CHEST WALL AND LOWER NECK: Left thyroid nodule, measuring 2.4 cm.    ABDOMEN AND PELVIS:  LIVER: Calcified granulomas.  BILE DUCTS: Normal caliber.  GALLBLADDER: Within normal limits.  SPLEEN: Calcified granulomas.  PANCREAS: Within normal limits.  ADRENALS: Within normal limits.  KIDNEYS/URETERS: No hydronephrosis. Right extrarenal pelvis.    BLADDER: Contains heterogeneous material, probably blood products.   Hyperdense foci in the bladder and near the TURP defect (series 301, image 42), suspicious for active bleed. Guerrero catheter balloon is present within the urinary bladder.  REPRODUCTIVE ORGANS: TURP defect.    BOWEL: No bowel obstruction. Appendix is normal. Moderate amount of stool in the rectum.  PERITONEUM/RETROPERITONEUM: Within normal limits.  VESSELS: Atherosclerotic changes.  LYMPH NODES: No lymphadenopathy.  ABDOMINAL WALL: Within normal limits.  BONES: Degenerativechanges.    IMPRESSION:  Blood products within the urinary bladder with suspicion for active bleed in the region of a TURP defect.    Left thyroid nodule, measuring 2.4 cm, for which thyroid ultrasound is recommended.    First finding was discussed with Dr. ROSADO on 2025 at 7:04 PM by Dr. Saunders with read back confirmation.    --- End of Report ---    ASSESSMENT:  78 yo M with PMHx HTN, CHF, PE (on eliquis), Myasthenia Gravis, and chronic LE edema, nephrolithiasis (s/p nephrostomy tube placement), urosepsis, BPH presented to the ED with severe abdominal pain since today and hematuria. CT demonstrates large bladder clot with signs of contrast near the TURP defect suggesting active bleeding;  guerrero with clots and +hematuria. Patient in acute suprapubic pain secondary to clot retention.     PLAN:  - require medicine admission   - optimized for OR this evening; Procedure: cysto and clot evacuation   - reverse AC  - keep NPO  - IVF  - monitor vital signs; earlier hypotensive, now improving on fluids  - elevated lactate; repeat  - stat CBC  - need type and screen; may require pRBC  - pain control   - continue 3-way guerrero that was placed in the ED  - irrigate as needed  - consulted ICU for post op management/urosepsis   - case discussed with Dr. Antoine, ED provider, ICU PA, and patient's son at bedside

## 2025-02-05 NOTE — H&P ADULT - PROBLEM SELECTOR PLAN 1
Patient h/o nephrolithiasis s/p nephrostomy tube placement,  s/p TURP at [facility] in 1/2025 now presents with hematuria, found to have bleeding within bladder on imaging  CT: Blood products within the urinary bladder with suspicion for active bleed   in the region of a TURP defect.  Received: Zerbaxa 1.5g x1, NS bolus 1L x2, Ofirmev 1g x1, Dilaudid 1mg IVP x1, morphine 4m IVP x1, morphine 2mg x1, Zofran 4mg IVP x1`, Balfaxar 2500 IU x1, Balfaxar 3000 IU x1  Ordered for LR 1L @75cc/hr, Packed Red Blood Cells 1U   Plan for emergent surgery with urology Dr. Antoine  - reverse AC (Eliquis) with Balfaxar  - keep NPO  - IVF  - monitor vital signs; earlier hypotensive, now improving on fluids  - elevated lactate; repeat  - Maintain active type and screen; may require pRBC  - pain control   - continue 3-way guerrero that was placed in the ED, irrigate as needed  - consulted ICU for post op management/urosepsis Patient h/o nephrolithiasis s/p nephrostomy tube placement,  s/p TURP at [facility] in 1/2025 now presents with hematuria, found to have bleeding within bladder on imaging  CT: Blood products within the urinary bladder with suspicion for active bleed   in the region of a TURP defect.  Received: Zerbaxa 1.5g x1, NS bolus 1L x2, Ofirmev 1g x1, Dilaudid 1mg IVP x1, morphine 4m IVP x1, morphine 2mg x1, Zofran 4mg IVP x1`, Balfaxar 2500 IU x1, Balfaxar 3000 IU x1  Ordered for LR 1L @75cc/hr, Packed Red Blood Cells 1U   Plan for emergent surgery with urology Dr. Antoine, following  - reverse AC (Eliquis) with Balfaxar  - keep NPO  - IVF for BP support as patient had episode of hypotension  - monitor vital signs; earlier hypotensive, now improving on fluids  - elevated lactate; will repeat 6 hours after initial draw  - Maintain active type and screen; may require pRBC (1U Ordered)  - pain control   - continue 3-way guerrero that was placed in the ED, irrigate as needed  - ICU consulted for post op management/urosepsis  - ID Dr. Mckinley for antibiotic management given history of recurrent Pseudomonas (Carbapenem resistant) UTI

## 2025-02-05 NOTE — ED PROVIDER NOTE - OBJECTIVE STATEMENT
80 yo M with PMHx HTN, CHF, PE (on eliquis), Myasthenia Gravis, and chronic LE edema, nephrolithiasis (s/p nephrostomy tube placement), urosepsis, BPH on Eliquis presents to ER from [facility] with report of blood in the penis, and penile pain.

## 2025-02-06 ENCOUNTER — TRANSCRIPTION ENCOUNTER (OUTPATIENT)
Age: 80
End: 2025-02-06

## 2025-02-06 LAB
ALBUMIN SERPL ELPH-MCNC: 2.3 G/DL — LOW (ref 3.3–5)
ALP SERPL-CCNC: 70 U/L — SIGNIFICANT CHANGE UP (ref 40–120)
ALT FLD-CCNC: 15 U/L — SIGNIFICANT CHANGE UP (ref 12–78)
ANION GAP SERPL CALC-SCNC: 10 MMOL/L — SIGNIFICANT CHANGE UP (ref 5–17)
ANION GAP SERPL CALC-SCNC: 11 MMOL/L — SIGNIFICANT CHANGE UP (ref 5–17)
ANION GAP SERPL CALC-SCNC: 9 MMOL/L — SIGNIFICANT CHANGE UP (ref 5–17)
AST SERPL-CCNC: 15 U/L — SIGNIFICANT CHANGE UP (ref 15–37)
BILIRUB SERPL-MCNC: 0.4 MG/DL — SIGNIFICANT CHANGE UP (ref 0.2–1.2)
BUN SERPL-MCNC: 19 MG/DL — SIGNIFICANT CHANGE UP (ref 7–23)
BUN SERPL-MCNC: 19 MG/DL — SIGNIFICANT CHANGE UP (ref 7–23)
BUN SERPL-MCNC: 21 MG/DL — SIGNIFICANT CHANGE UP (ref 7–23)
CALCIUM SERPL-MCNC: 8.4 MG/DL — LOW (ref 8.5–10.1)
CALCIUM SERPL-MCNC: 8.4 MG/DL — LOW (ref 8.5–10.1)
CALCIUM SERPL-MCNC: 8.6 MG/DL — SIGNIFICANT CHANGE UP (ref 8.5–10.1)
CHLORIDE SERPL-SCNC: 109 MMOL/L — HIGH (ref 96–108)
CHLORIDE SERPL-SCNC: 109 MMOL/L — HIGH (ref 96–108)
CHLORIDE SERPL-SCNC: 110 MMOL/L — HIGH (ref 96–108)
CO2 SERPL-SCNC: 19 MMOL/L — LOW (ref 22–31)
CO2 SERPL-SCNC: 21 MMOL/L — LOW (ref 22–31)
CO2 SERPL-SCNC: 23 MMOL/L — SIGNIFICANT CHANGE UP (ref 22–31)
CREAT SERPL-MCNC: 1.1 MG/DL — SIGNIFICANT CHANGE UP (ref 0.5–1.3)
CREAT SERPL-MCNC: 1.2 MG/DL — SIGNIFICANT CHANGE UP (ref 0.5–1.3)
CREAT SERPL-MCNC: 1.3 MG/DL — SIGNIFICANT CHANGE UP (ref 0.5–1.3)
CULTURE RESULTS: SIGNIFICANT CHANGE UP
EGFR: 55 ML/MIN/1.73M2 — LOW
EGFR: 61 ML/MIN/1.73M2 — SIGNIFICANT CHANGE UP
EGFR: 67 ML/MIN/1.73M2 — SIGNIFICANT CHANGE UP
GLUCOSE SERPL-MCNC: 132 MG/DL — HIGH (ref 70–99)
GLUCOSE SERPL-MCNC: 172 MG/DL — HIGH (ref 70–99)
GLUCOSE SERPL-MCNC: 195 MG/DL — HIGH (ref 70–99)
HCT VFR BLD CALC: 28.1 % — LOW (ref 39–50)
HCT VFR BLD CALC: 29.6 % — LOW (ref 39–50)
HCT VFR BLD CALC: 31.7 % — LOW (ref 39–50)
HGB BLD-MCNC: 10.5 G/DL — LOW (ref 13–17)
HGB BLD-MCNC: 9.3 G/DL — LOW (ref 13–17)
HGB BLD-MCNC: 9.8 G/DL — LOW (ref 13–17)
LACTATE SERPL-SCNC: 6.3 MMOL/L — CRITICAL HIGH (ref 0.7–2)
LACTATE SERPL-SCNC: 8.8 MMOL/L — CRITICAL HIGH (ref 0.7–2)
MAGNESIUM SERPL-MCNC: 1.8 MG/DL — SIGNIFICANT CHANGE UP (ref 1.6–2.6)
MAGNESIUM SERPL-MCNC: 1.8 MG/DL — SIGNIFICANT CHANGE UP (ref 1.6–2.6)
MAGNESIUM SERPL-MCNC: 1.9 MG/DL — SIGNIFICANT CHANGE UP (ref 1.6–2.6)
MCHC RBC-ENTMCNC: 29.2 PG — SIGNIFICANT CHANGE UP (ref 27–34)
MCHC RBC-ENTMCNC: 29.4 PG — SIGNIFICANT CHANGE UP (ref 27–34)
MCHC RBC-ENTMCNC: 29.6 PG — SIGNIFICANT CHANGE UP (ref 27–34)
MCHC RBC-ENTMCNC: 33.1 G/DL — SIGNIFICANT CHANGE UP (ref 32–36)
MCV RBC AUTO: 88.4 FL — SIGNIFICANT CHANGE UP (ref 80–100)
MCV RBC AUTO: 88.9 FL — SIGNIFICANT CHANGE UP (ref 80–100)
MCV RBC AUTO: 89.3 FL — SIGNIFICANT CHANGE UP (ref 80–100)
MRSA PCR RESULT.: SIGNIFICANT CHANGE UP
NRBC # BLD: 0 /100 WBCS — SIGNIFICANT CHANGE UP (ref 0–0)
NRBC BLD-RTO: 0 /100 WBCS — SIGNIFICANT CHANGE UP (ref 0–0)
PHOSPHATE SERPL-MCNC: 2.9 MG/DL — SIGNIFICANT CHANGE UP (ref 2.5–4.5)
PHOSPHATE SERPL-MCNC: 3.6 MG/DL — SIGNIFICANT CHANGE UP (ref 2.5–4.5)
PHOSPHATE SERPL-MCNC: 4.4 MG/DL — SIGNIFICANT CHANGE UP (ref 2.5–4.5)
PLATELET # BLD AUTO: 360 K/UL — SIGNIFICANT CHANGE UP (ref 150–400)
PLATELET # BLD AUTO: 384 K/UL — SIGNIFICANT CHANGE UP (ref 150–400)
PLATELET # BLD AUTO: 387 K/UL — SIGNIFICANT CHANGE UP (ref 150–400)
POTASSIUM SERPL-MCNC: 4.3 MMOL/L — SIGNIFICANT CHANGE UP (ref 3.5–5.3)
POTASSIUM SERPL-MCNC: 4.4 MMOL/L — SIGNIFICANT CHANGE UP (ref 3.5–5.3)
POTASSIUM SERPL-MCNC: 4.7 MMOL/L — SIGNIFICANT CHANGE UP (ref 3.5–5.3)
POTASSIUM SERPL-SCNC: 4.3 MMOL/L — SIGNIFICANT CHANGE UP (ref 3.5–5.3)
POTASSIUM SERPL-SCNC: 4.4 MMOL/L — SIGNIFICANT CHANGE UP (ref 3.5–5.3)
POTASSIUM SERPL-SCNC: 4.7 MMOL/L — SIGNIFICANT CHANGE UP (ref 3.5–5.3)
PROT SERPL-MCNC: 5.5 G/DL — LOW (ref 6–8.3)
RBC # BLD: 3.18 M/UL — LOW (ref 4.2–5.8)
RBC # BLD: 3.33 M/UL — LOW (ref 4.2–5.8)
RBC # BLD: 3.55 M/UL — LOW (ref 4.2–5.8)
RBC # FLD: 15.8 % — HIGH (ref 10.3–14.5)
RBC # FLD: 16.1 % — HIGH (ref 10.3–14.5)
RBC # FLD: 16.1 % — HIGH (ref 10.3–14.5)
S AUREUS DNA NOSE QL NAA+PROBE: SIGNIFICANT CHANGE UP
SODIUM SERPL-SCNC: 139 MMOL/L — SIGNIFICANT CHANGE UP (ref 135–145)
SODIUM SERPL-SCNC: 141 MMOL/L — SIGNIFICANT CHANGE UP (ref 135–145)
SODIUM SERPL-SCNC: 141 MMOL/L — SIGNIFICANT CHANGE UP (ref 135–145)
SPECIMEN SOURCE: SIGNIFICANT CHANGE UP
WBC # BLD: 42.89 K/UL — CRITICAL HIGH (ref 3.8–10.5)
WBC # BLD: 47.62 K/UL — CRITICAL HIGH (ref 3.8–10.5)
WBC # BLD: 49.5 K/UL — CRITICAL HIGH (ref 3.8–10.5)
WBC # FLD AUTO: 42.89 K/UL — CRITICAL HIGH (ref 3.8–10.5)
WBC # FLD AUTO: 47.62 K/UL — CRITICAL HIGH (ref 3.8–10.5)
WBC # FLD AUTO: 49.5 K/UL — CRITICAL HIGH (ref 3.8–10.5)

## 2025-02-06 PROCEDURE — 99222 1ST HOSP IP/OBS MODERATE 55: CPT

## 2025-02-06 PROCEDURE — G0545: CPT

## 2025-02-06 PROCEDURE — 99233 SBSQ HOSP IP/OBS HIGH 50: CPT | Mod: GC

## 2025-02-06 PROCEDURE — 99232 SBSQ HOSP IP/OBS MODERATE 35: CPT

## 2025-02-06 RX ORDER — ANTISEPTIC SURGICAL SCRUB 0.04 MG/ML
1 SOLUTION TOPICAL
Refills: 0 | Status: DISCONTINUED | OUTPATIENT
Start: 2025-02-06 | End: 2025-02-13

## 2025-02-06 RX ORDER — SENNOSIDES 8.6 MG
1 TABLET ORAL AT BEDTIME
Refills: 0 | Status: DISCONTINUED | OUTPATIENT
Start: 2025-02-06 | End: 2025-02-13

## 2025-02-06 RX ORDER — PREDNISONE 5 MG/1
10 TABLET ORAL DAILY
Refills: 0 | Status: DISCONTINUED | OUTPATIENT
Start: 2025-02-06 | End: 2025-02-13

## 2025-02-06 RX ORDER — PANTOPRAZOLE 20 MG/1
40 TABLET, DELAYED RELEASE ORAL
Refills: 0 | Status: DISCONTINUED | OUTPATIENT
Start: 2025-02-06 | End: 2025-02-13

## 2025-02-06 RX ORDER — TAMSULOSIN HYDROCHLORIDE 0.4 MG/1
0.4 CAPSULE ORAL AT BEDTIME
Refills: 0 | Status: DISCONTINUED | OUTPATIENT
Start: 2025-02-06 | End: 2025-02-13

## 2025-02-06 RX ORDER — HYDROMORPHONE HYDROCHLORIDE 4 MG/ML
0.5 INJECTION, SOLUTION INTRAMUSCULAR; INTRAVENOUS; SUBCUTANEOUS EVERY 4 HOURS
Refills: 0 | Status: DISCONTINUED | OUTPATIENT
Start: 2025-02-06 | End: 2025-02-13

## 2025-02-06 RX ORDER — ACETAMINOPHEN 160 MG/5ML
650 SUSPENSION ORAL EVERY 6 HOURS
Refills: 0 | Status: DISCONTINUED | OUTPATIENT
Start: 2025-02-06 | End: 2025-02-13

## 2025-02-06 RX ORDER — HYDROMORPHONE HYDROCHLORIDE 4 MG/ML
1 INJECTION, SOLUTION INTRAMUSCULAR; INTRAVENOUS; SUBCUTANEOUS EVERY 4 HOURS
Refills: 0 | Status: DISCONTINUED | OUTPATIENT
Start: 2025-02-06 | End: 2025-02-10

## 2025-02-06 RX ORDER — POLYETHYLENE GLYCOL 3350 17 G/17G
17 POWDER, FOR SOLUTION ORAL DAILY
Refills: 0 | Status: DISCONTINUED | OUTPATIENT
Start: 2025-02-06 | End: 2025-02-13

## 2025-02-06 RX ADMIN — POLYETHYLENE GLYCOL 3350 17 GRAM(S): 17 POWDER, FOR SOLUTION ORAL at 13:23

## 2025-02-06 RX ADMIN — TAMSULOSIN HYDROCHLORIDE 0.4 MILLIGRAM(S): 0.4 CAPSULE ORAL at 21:27

## 2025-02-06 RX ADMIN — CEFTOLOZANE AND TAZOBACTAM 100 MILLIGRAM(S): 1; .5 INJECTION, POWDER, LYOPHILIZED, FOR SOLUTION INTRAVENOUS at 18:00

## 2025-02-06 RX ADMIN — HYDROMORPHONE HYDROCHLORIDE 1 MILLIGRAM(S): 4 INJECTION, SOLUTION INTRAMUSCULAR; INTRAVENOUS; SUBCUTANEOUS at 02:08

## 2025-02-06 RX ADMIN — CEFTOLOZANE AND TAZOBACTAM 100 MILLIGRAM(S): 1; .5 INJECTION, POWDER, LYOPHILIZED, FOR SOLUTION INTRAVENOUS at 03:16

## 2025-02-06 RX ADMIN — PREDNISONE 10 MILLIGRAM(S): 5 TABLET ORAL at 13:23

## 2025-02-06 RX ADMIN — CEFTOLOZANE AND TAZOBACTAM 100 MILLIGRAM(S): 1; .5 INJECTION, POWDER, LYOPHILIZED, FOR SOLUTION INTRAVENOUS at 09:35

## 2025-02-06 RX ADMIN — Medication 5 MILLIGRAM(S): at 13:23

## 2025-02-06 RX ADMIN — HYDROMORPHONE HYDROCHLORIDE 1 MILLIGRAM(S): 4 INJECTION, SOLUTION INTRAMUSCULAR; INTRAVENOUS; SUBCUTANEOUS at 02:30

## 2025-02-06 RX ADMIN — ANTISEPTIC SURGICAL SCRUB 1 APPLICATION(S): 0.04 SOLUTION TOPICAL at 05:45

## 2025-02-06 RX ADMIN — Medication 1 TABLET(S): at 21:27

## 2025-02-06 RX ADMIN — PANTOPRAZOLE 40 MILLIGRAM(S): 20 TABLET, DELAYED RELEASE ORAL at 06:38

## 2025-02-06 NOTE — CARE COORDINATION ASSESSMENT. - FACILITY OF RESIDENCE YN
Pt known to [facility], pt is in the process of transitioning to long term care per son./Yes

## 2025-02-06 NOTE — DIETITIAN INITIAL EVALUATION ADULT - PROBLEM SELECTOR PLAN 1
Patient h/o nephrolithiasis s/p nephrostomy tube placement,  s/p TURP at [facility] in 1/2025 now presents with hematuria, found to have bleeding within bladder on imaging  CT: Blood products within the urinary bladder with suspicion for active bleed   in the region of a TURP defect.  Received: Zerbaxa 1.5g x1, NS bolus 1L x2, Ofirmev 1g x1, Dilaudid 1mg IVP x1, morphine 4m IVP x1, morphine 2mg x1, Zofran 4mg IVP x1`, Balfaxar 2500 IU x1, Balfaxar 3000 IU x1  Ordered for LR 1L @75cc/hr, Packed Red Blood Cells 1U   Plan for emergent surgery with urology Dr. Antoine, following  - reverse AC (Eliquis) with Balfaxar  - keep NPO  - IVF for BP support as patient had episode of hypotension  - monitor vital signs; earlier hypotensive, now improving on fluids  - elevated lactate; will repeat 6 hours after initial draw  - Maintain active type and screen; may require pRBC (1U Ordered)  - pain control   - continue 3-way guerrero that was placed in the ED, irrigate as needed  - ICU consulted for post op management/urosepsis  - ID Dr. Mckinley for antibiotic management given history of recurrent Pseudomonas (Carbapenem resistant) UTI

## 2025-02-06 NOTE — DIETITIAN INITIAL EVALUATION ADULT - OTHER INFO
Pt is a "78yo M PMH  HTN, HFrEF (prior TTE 11/2024 with EF 35%), PE (on Eliquis), Myasthenia Gravis, and chronic LE edema, nephrolithiasis (s/p nephrostomy tube placement), urosepsis, BPH presents to ER from [facility] with report of large amount of blood from the penis, and penile pain. Admitted for hematuria, active bleed within bladder requiring emergent surgery with urology."    Visited pt at bedside this am. Pt sleeping soundly during visit. Observed untouched breakfast tray left at bedside. Nutrition-related hx obtained from transfer papers. NKFA. No report N/V. No BMs this far. Bedscale wt of 205# obtained. No edema noted. Skin: stage I to sacrum, unstageable to L foot. Pt currently on DASH/TLC diet. Will provide ensure max protein shake for additional kcal/protein. Recommend continued assistance/encouragement at meal times. Diet education not appropriate. RD remains available and will continue to follow-up.

## 2025-02-06 NOTE — DIETITIAN INITIAL EVALUATION ADULT - PERTINENT LABORATORY DATA
02-06    141  |  109[H]  |  21  ----------------------------<  195[H]  4.7   |  23  |  1.20    Ca    8.4[L]      06 Feb 2025 06:05  Phos  3.6     02-06  Mg     1.8     02-06    TPro  5.5[L]  /  Alb  2.3[L]  /  TBili  0.4  /  DBili  x   /  AST  15  /  ALT  15  /  AlkPhos  70  02-06  POCT Blood Glucose.: 191 mg/dL (02-05-25 @ 22:56)  A1C with Estimated Average Glucose Result: 5.9 % (01-04-25 @ 07:18)  A1C with Estimated Average Glucose Result: 5.6 % (11-20-24 @ 11:36)  A1C with Estimated Average Glucose Result: 6.2 % (09-10-24 @ 04:48)

## 2025-02-06 NOTE — DIETITIAN INITIAL EVALUATION ADULT - PROBLEM SELECTOR PLAN 3
Chronic   H/O of CHF, unspecified type however not on GDMT for HFrEF  - TTE (11/2024): techinically difficult. LVEF is estimated at 35%. The apex, mid to spical anteroseptal wall and mid to distal anterolateral walls appear severely hypokinetic.  - c/w home furosemide  - Strict Is&Os, daily weights, fluid restriction  - Does not appear clinically volume overloaded on admission

## 2025-02-06 NOTE — PROGRESS NOTE ADULT - SUBJECTIVE AND OBJECTIVE BOX
Patient is a 79y old  Male who presents with a chief complaint of hematuria, active bleed within bladder requiring emergent surgery with urology (06 Feb 2025 10:17)       INTERVAL HPI/OVERNIGHT EVENTS: Patient seen and examined at bedside. Lethargic, wont answer     MEDICATIONS  (STANDING):  ceftolozane/tazobactam IVPB 1500 milliGRAM(s) IV Intermittent every 8 hours  chlorhexidine 2% Cloths 1 Application(s) Topical <User Schedule>  finasteride 5 milliGRAM(s) Oral daily  pantoprazole    Tablet 40 milliGRAM(s) Oral before breakfast  polyethylene glycol 3350 17 Gram(s) Oral daily  predniSONE   Tablet 10 milliGRAM(s) Oral daily  senna 1 Tablet(s) Oral at bedtime  tamsulosin 0.4 milliGRAM(s) Oral at bedtime    MEDICATIONS  (PRN):  acetaminophen     Tablet .. 650 milliGRAM(s) Oral every 6 hours PRN Temp greater or equal to 38C (100.4F), Mild Pain (1 - 3)  HYDROmorphone  Injectable 0.5 milliGRAM(s) IV Push every 4 hours PRN Moderate Pain (4 - 6)  HYDROmorphone  Injectable 1 milliGRAM(s) IV Push every 4 hours PRN Severe Pain (7 - 10)      Allergies    ofloxacin (Unknown)  gatifloxacin (Unknown)  levofloxacin (Unknown)  Cipro (Unknown)    Intolerances    Ketek (Other)  telithromycin (Other)  fluoroquinolone antibiotics (Other)  Avelox (Other)      REVIEW OF SYSTEMS:  Unable to obtain due to Lethargy   Vital Signs Last 24 Hrs  T(C): 36.4 (06 Feb 2025 07:59), Max: 37.3 (05 Feb 2025 19:06)  T(F): 97.5 (06 Feb 2025 07:59), Max: 99.1 (05 Feb 2025 19:06)  HR: 93 (06 Feb 2025 08:00) (91 - 116)  BP: 114/75 (06 Feb 2025 08:00) (64/39 - 149/109)  BP(mean): 86 (06 Feb 2025 08:00) (54 - 101)  RR: 15 (06 Feb 2025 08:00) (9 - 20)  SpO2: 100% (06 Feb 2025 08:00) (96% - 100%)    Parameters below as of 06 Feb 2025 08:00  Patient On (Oxygen Delivery Method): room air        PHYSICAL EXAM:  GENERAL:  NAD, ill-appearing, Lethargic   HEENT: head NC/AT; PERRL, +arcus senilis, conjunctiva & sclera clear; dry mucous membranes  RESPIRATORY: CTA B/L, no wheezing, rales, rhonchi  CARDIOVASCULAR: S1&S2, RRR  ABDOMEN: soft, non-tender, +distended/firm  + hypoactive bowel sounds x4 quadrants  MUSCULOSKELETAL:  no clubbing, cyanosis or edema of all 4 extremities  VASCULAR: Radial pulses 2+ bilaterally  SKIN: warm and dry, pale   NEUROLOGIC: AA&O X1 to person; answers some questions, follows commands appropriately    LABS:                        9.8    49.50 )-----------( 360      ( 06 Feb 2025 06:05 )             29.6     06 Feb 2025 06:05    141    |  109    |  21     ----------------------------<  195    4.7     |  23     |  1.20     Ca    8.4        06 Feb 2025 06:05  Phos  3.6       06 Feb 2025 06:05  Mg     1.8       06 Feb 2025 06:05    TPro  5.5    /  Alb  2.3    /  TBili  0.4    /  DBili  x      /  AST  15     /  ALT  15     /  AlkPhos  70     06 Feb 2025 00:53    PT/INR - ( 05 Feb 2025 17:45 )   PT: 15.9 sec;   INR: 1.35 ratio         PTT - ( 05 Feb 2025 17:45 )  PTT:29.7 sec  CAPILLARY BLOOD GLUCOSE      POCT Blood Glucose.: 191 mg/dL (05 Feb 2025 22:56)  POCT Blood Glucose.: 241 mg/dL (05 Feb 2025 20:12)    BLOOD CULTURE    RADIOLOGY & ADDITIONAL TESTS:    Imaging Personally Reviewed:  [ ] YES     Consultant(s) Notes Reviewed:      Care Discussed with Consultants/Other Providers:

## 2025-02-06 NOTE — DIETITIAN INITIAL EVALUATION ADULT - PROBLEM SELECTOR PLAN 2
Chronic, however hypotensive in the ED, improved with fluids for emergent OR intervention  - on home metoprolol 25mg PO   - continue BB with hold parameters   - Monitor routine hemodynamics   - Consider DASH diet when able

## 2025-02-06 NOTE — CARE COORDINATION ASSESSMENT. - REASON FOR CONSULT
Pt unable to provide information for assessment at this time due to known dementia.   SW reached out to pt's son/Olvin Gaxiola at ([phone]) in order to conduct assessment and to discuss SW roles with good understanding./coordination of care/discharge planning

## 2025-02-06 NOTE — PROGRESS NOTE ADULT - SUBJECTIVE AND OBJECTIVE BOX
INTERVAL HPI/OVERNIGHT EVENTS:     SUBJECTIVE:     Review of Systems:  Constitutional: No fever, chills, fatigue  Neuro: No headache, numbness, weakness  Resp: No cough, wheezing, shortness of breath  CVS: No chest pain, palpitations, leg swelling  GI: No abdominal pain, nausea, vomiting, diarrhea   : No dysuria, frequency, incontinence  Skin: No itching, burning, rashes, or lesions   Msk: No joint pain or swelling  Psych: No depression, anxiety, mood swings    ICU Vital Signs Last 24 Hrs  T(C): 36.4 (06 Feb 2025 07:59), Max: 37.3 (05 Feb 2025 19:06)  T(F): 97.5 (06 Feb 2025 07:59), Max: 99.1 (05 Feb 2025 19:06)  HR: 93 (06 Feb 2025 08:00) (91 - 116)  BP: 114/75 (06 Feb 2025 08:00) (64/39 - 149/109)  BP(mean): 86 (06 Feb 2025 08:00) (54 - 101)  ABP: --  ABP(mean): --  RR: 15 (06 Feb 2025 08:00) (9 - 20)  SpO2: 100% (06 Feb 2025 08:00) (96% - 100%)    O2 Parameters below as of 06 Feb 2025 08:00  Patient On (Oxygen Delivery Method): room air              02-05-25 @ 07:01  -  02-06-25 @ 07:00  --------------------------------------------------------  IN: 56721 mL / OUT: 13326 mL / NET: -1300 mL        CAPILLARY BLOOD GLUCOSE      POCT Blood Glucose.: 191 mg/dL (05 Feb 2025 22:56)      I&O's Summary    05 Feb 2025 07:01  -  06 Feb 2025 07:00  --------------------------------------------------------  IN: 57662 mL / OUT: 70033 mL / NET: -1300 mL        PHYSICAL EXAM:  General: NAD  Neurology: awake and alert  HEENT: NC/AT  Respiratory: CTA b/l, no rales or rhonchi noted  Cardiovascular: RRR, normal S1S2, no M/R/G  Abdomen: soft, NT/ND, +BS, no palpable masses  Extremities: WWP, no clubbing, cyanosis, or edema  Skin: warm/dry      Meds:  ceftolozane/tazobactam IVPB IV Intermittent      finasteride Oral  predniSONE   Tablet Oral      acetaminophen     Tablet .. Oral PRN  HYDROmorphone  Injectable IV Push PRN  HYDROmorphone  Injectable IV Push PRN        pantoprazole    Tablet Oral  polyethylene glycol 3350 Oral  senna Oral    tamsulosin Oral        chlorhexidine 2% Cloths Topical                              9.8    49.50 )-----------( 360      ( 06 Feb 2025 06:05 )             29.6     Bands 1.0    02-06    141  |  109[H]  |  21  ----------------------------<  195[H]  4.7   |  23  |  1.20    Ca    8.4[L]      06 Feb 2025 06:05  Phos  3.6     02-06  Mg     1.8     02-06    TPro  5.5[L]  /  Alb  2.3[L]  /  TBili  0.4  /  DBili  x   /  AST  15  /  ALT  15  /  AlkPhos  70  02-06    Lactate 8.8           02-06 @ 00:53    Lactate 7.8           02-05 @ 20:32    Lactate 9.4           02-05 @ 17:45          PT/INR - ( 05 Feb 2025 17:45 )   PT: 15.9 sec;   INR: 1.35 ratio         PTT - ( 05 Feb 2025 17:45 )  PTT:29.7 sec  Urinalysis Basic - ( 06 Feb 2025 06:05 )    Color: x / Appearance: x / SG: x / pH: x  Gluc: 195 mg/dL / Ketone: x  / Bili: x / Urobili: x   Blood: x / Protein: x / Nitrite: x   Leuk Esterase: x / RBC: x / WBC x   Sq Epi: x / Non Sq Epi: x / Bacteria: x                  Radiology:    Bedside Ultrasound:    Tubes/Lines:      GLOBAL ISSUE/BEST PRACTICE:  Analgesia:  Sedation:  HOB elevation: Y  Stress ulcer prophylaxis:  VTE prophylaxis:  Glycemic control:  Nutrition:    CODE STATUS:        INTERVAL HPI/OVERNIGHT EVENTS: 80 y/o M with a h/o HTN, HFrEF (prior TTE 11/2024 with EF 35%), PE (on Eliquis), Myasthenia Gravis, BPH (s/p TURP on 1/6/25), chronic LE edema, nephrolithiasis (s/p R nephrostomy tube placement), MDR UTI, presents to ED from [facility] with report of voluminous hematuria and penile pain that began earlier in the day. The patient is very distressed secondary to pain and unable to provide much history at the time of exam. CT A/P reveals blood products within the bladder and concern for active bleeding from the site of TURP defect. Noted to be hypotensive with a lactate 9+. He has now went to the OR for emergent cystoscopy with evacuation of clots and initiation of CBI. Transfused 1u PRBC and started on IV vasopressor support. Now of pressors with stable vitals.    SUBJECTIVE:     Review of Systems: pt unwilling to talk, providers unable to assess    ICU Vital Signs Last 24 Hrs  T(C): 36.4 (06 Feb 2025 07:59), Max: 37.3 (05 Feb 2025 19:06)  T(F): 97.5 (06 Feb 2025 07:59), Max: 99.1 (05 Feb 2025 19:06)  HR: 93 (06 Feb 2025 08:00) (91 - 116)  BP: 114/75 (06 Feb 2025 08:00) (64/39 - 149/109)  BP(mean): 86 (06 Feb 2025 08:00) (54 - 101)  ABP: --  ABP(mean): --  RR: 15 (06 Feb 2025 08:00) (9 - 20)  SpO2: 100% (06 Feb 2025 08:00) (96% - 100%)    O2 Parameters below as of 06 Feb 2025 08:00  Patient On (Oxygen Delivery Method): room air      02-05-25 @ 07:01  -  02-06-25 @ 07:00  --------------------------------------------------------  IN: 15609 mL / OUT: 00012 mL / NET: -1300 mL        CAPILLARY BLOOD GLUCOSE      POCT Blood Glucose.: 191 mg/dL (05 Feb 2025 22:56)      I&O's Summar    05 Feb 2025 07:01  -  06 Feb 2025 07:00  --------------------------------------------------------  IN: 94164 mL / OUT: 68464 mL / NET: -1300 mL        PHYSICAL EXAM:  General: agitated, laying comfortably in bed  Neurology: awake and alert  HEENT: pt refused exam  Respiratory: pt refused exam  Cardiovascular: pt refused exam  Abdomen: pt refused exam  Extremities: pt refused exam  Skin: pt refused exam      Meds:  ceftolozane/tazobactam IVPB IV Intermittent    finasteride Oral  predniSONE   Tablet Oral    acetaminophen     Tablet .. Oral PRN  HYDROmorphone  Injectable IV Push PRN  HYDROmorphone  Injectable IV Push PRN    pantoprazole    Tablet Oral  polyethylene glycol 3350 Oral  senna Oral    tamsulosin Oral    chlorhexidine 2% Cloths Topical                              9.8    49.50 )-----------( 360      ( 06 Feb 2025 06:05 )             29.6     Bands 1.0    02-06    141  |  109[H]  |  21  ----------------------------<  195[H]  4.7   |  23  |  1.20    Ca    8.4[L]      06 Feb 2025 06:05  Phos  3.6     02-06  Mg     1.8     02-06    TPro  5.5[L]  /  Alb  2.3[L]  /  TBili  0.4  /  DBili  x   /  AST  15  /  ALT  15  /  AlkPhos  70  02-06    Lactate 8.8           02-06 @ 00:53    Lactate 7.8           02-05 @ 20:32    Lactate 9.4           02-05 @ 17:45      PT/INR - ( 05 Feb 2025 17:45 )   PT: 15.9 sec;   INR: 1.35 ratio      PTT - ( 05 Feb 2025 17:45 )  PTT:29.7 sec  Urinalysis Basic - ( 06 Feb 2025 06:05 )    Color: x / Appearance: x / SG: x / pH: x  Gluc: 195 mg/dL / Ketone: x  / Bili: x / Urobili: x   Blood: x / Protein: x / Nitrite: x   Leuk Esterase: x / RBC: x / WBC x   Sq Epi: x / Non Sq Epi: x / Bacteria: x    Radiology: X    Bedside Ultrasound: X    Tubes/Lines: 3-way guerrero catheter    GLOBAL ISSUE/BEST PRACTICE:  Analgesia: tylenol, hydromorphone  Sedation: X  HOB elevation: Y  Stress ulcer prophylaxis: protonix  VTE prophylaxis: SCDs  Glycemic control: X  Nutrition: NPO    CODE STATUS: full code

## 2025-02-06 NOTE — DIETITIAN INITIAL EVALUATION ADULT - ADD RECOMMEND
1) Recommend DASH/TLC, Consistent Carbohydrate, soft/bite size diet; assistance/encouragement at meal times  2) Recommend ensure max protein shake daily (150kcal, 30gm protein per 11 fl oz serving)  3) Recommend MVI daily and Vit C 500mg BID  4) Monitor po intake, diet tolerance, weight trends, labs, GI function, skin integrity

## 2025-02-06 NOTE — PROVIDER CONTACT NOTE (EICU) - BACKGROUND
79M w/ HTN, HFrEF, PE, MG, BPH, MDR UTI. Presents w/ hematuria and penile pain. Imaging revealed active bleeding at TURP defect site. Labs revealed leukocytosis and lactic acidosis. S/p PCC for eliquis reversal. Pt brought to OR by urology s/p cystoscopy w/ clot evacuation. Was on/off low dose pressors during the case and given 1 pRBC. Started on CBI.

## 2025-02-06 NOTE — PATIENT PROFILE ADULT - FALL HARM RISK - HARM RISK INTERVENTIONS

## 2025-02-06 NOTE — CONSULT NOTE ADULT - SUBJECTIVE AND OBJECTIVE BOX
[facility]  INFECTIOUS DISEASES - Lalita Mckinley, [address], [place] [address]  Tel: [phone]     Fax: [phone]  =======================================================    [id number]  DEION IVANA     CC: Patient is a 79y old  Male who presents with a chief complaint of hematuria, active bleed within bladder requiring emergent surgery with urology (2025 12:27)    HPI:  80yo M PMH  HTN, HFrEF (prior TTE 2024 with EF 35%), PE (on Eliquis), Myasthenia Gravis, and chronic LE edema, nephrolithiasis (s/p nephrostomy tube placement), urosepsis, BPH, who presented from [facility] with report of large amount of blood from the penis, and penile pain. Per records the bleeding started earlier this afternoon on 2025. S/p cystoscopy with clot evacuation overnight. Of note had TURP on 25.    Patient currently denies any pain, but did not allow further history or examination. He reported just wanting to sleep.     PAST MEDICAL & SURGICAL HISTORY:  Calculus of kidney      Club foot  Born Right Foot      Myasthenia gravis      Hypertension      Diabetes  Type 2 - does not take medications - monitors Blood Glucose at home - diet controlled      Urinary tract infection  notes h/o UTI's      Hyperlipidemia      Other muscle wasting and atrophy      H/O spinal stenosis      History of thrombocytopenia      H/O CHF      Elective surgery  4 age 11 @ [facility] - cut under Patella secondary 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      Club foot  Surgery at birth for Club Foot Right foot      Pilonidal cyst  Surgery 40 years ago      H/O colonoscopy      H/O prostate biopsy      Nephrostomy present          Social Hx:     FAMILY HISTORY:  Family history of stroke (Father)  Father -  age 62    Family history of kidney disease (Mother)  Mother -  age 67    Family history of diabetes mellitus type II (Sibling)  Brother & Sister        Allergies    ofloxacin (Unknown)  gatifloxacin (Unknown)  levofloxacin (Unknown)  Cipro (Unknown)    Intolerances    Ketek (Other)  telithromycin (Other)  fluoroquinolone antibiotics (Other)  Avelox (Other)      Antibiotics:  MEDICATIONS  (STANDING):  ceftolozane/tazobactam IVPB 1500 milliGRAM(s) IV Intermittent every 8 hours  chlorhexidine 2% Cloths 1 Application(s) Topical <User Schedule>  finasteride 5 milliGRAM(s) Oral daily  pantoprazole    Tablet 40 milliGRAM(s) Oral before breakfast  polyethylene glycol 3350 17 Gram(s) Oral daily  predniSONE   Tablet 10 milliGRAM(s) Oral daily  senna 1 Tablet(s) Oral at bedtime  tamsulosin 0.4 milliGRAM(s) Oral at bedtime    MEDICATIONS  (PRN):  acetaminophen     Tablet .. 650 milliGRAM(s) Oral every 6 hours PRN Temp greater or equal to 38C (100.4F), Mild Pain (1 - 3)  HYDROmorphone  Injectable 0.5 milliGRAM(s) IV Push every 4 hours PRN Moderate Pain (4 - 6)  HYDROmorphone  Injectable 1 milliGRAM(s) IV Push every 4 hours PRN Severe Pain (7 - 10)       REVIEW OF SYSTEMS:  unable to obtain    Physical Exam:  Vital Signs Last 24 Hrs  T(C): 36.4 (2025 11:55), Max: 37.3 (2025 19:06)  T(F): 97.5 (2025 11:55), Max: 99.1 (2025 19:06)  HR: 95 (2025 11:00) (91 - 116)  BP: 121/69 (2025 11:00) (64/39 - 149/109)  BP(mean): 81 (2025 11:00) (54 - 101)  RR: 15 (2025 11:00) (9 - 20)  SpO2: 99% (2025 11:00) (96% - 100%)    Parameters below as of 2025 11:00  Patient On (Oxygen Delivery Method): room air      Height (cm): 167.6 ( @ 16:34)  Weight (kg): 127 ( @ 16:34)  BMI (kg/m2): 45.2 ( @ 16:34)  BSA (m2): 2.31 (02-05 @ 16:34)    *patient refused physical exam*    Labs:      141  |  109[H]  |  21  ----------------------------<  195[H]  4.7   |  23  |  1.20    Ca    8.4[L]      2025 06:05  Phos  3.6       Mg     1.8         TPro  5.5[L]  /  Alb  2.3[L]  /  TBili  0.4  /  DBili  x   /  AST  15  /  ALT  15  /  AlkPhos  70                            9.8    49.50 )-----------( 360      ( 2025 06:05 )             29.6     PT/INR - ( 2025 17:45 )   PT: 15.9 sec;   INR: 1.35 ratio         PTT - ( 2025 17:45 )  PTT:29.7 sec  Urinalysis Basic - ( 2025 06:05 )    Color: x / Appearance: x / SG: x / pH: x  Gluc: 195 mg/dL / Ketone: x  / Bili: x / Urobili: x   Blood: x / Protein: x / Nitrite: x   Leuk Esterase: x / RBC: x / WBC x   Sq Epi: x / Non Sq Epi: x / Bacteria: x      LIVER FUNCTIONS - ( 2025 00:53 )  Alb: 2.3 g/dL / Pro: 5.5 g/dL / ALK PHOS: 70 U/L / ALT: 15 U/L / AST: 15 U/L / GGT: x                           RECENT CULTURES:        All imaging and other data have been reviewed.  < from: CT Chest w/ IV Cont (25 @ 18:24) >  FINDINGS:  CHEST:  LUNGS AND LARGE AIRWAYS: Reticular opacities in both lungs. Calcified   granulomas in the right upper lobe.  PLEURA: No pleural effusion.  VESSELS: Aortic calcifications. Coronary artery calcifications.  HEART: Heart size is normal. No pericardial effusion.  MEDIASTINUM AND KONRAD: Coarse right hilar calcifications.  CHEST WALL AND LOWER NECK: Left thyroid nodule, measuring 2.4 cm.    ABDOMEN AND PELVIS:  LIVER: Calcified granulomas.  BILE DUCTS: Normal caliber.  GALLBLADDER: Within normal limits.  SPLEEN: Calcified granulomas.  PANCREAS: Within normal limits.  ADRENALS: Within normal limits.  KIDNEYS/URETERS: No hydronephrosis. Right extrarenal pelvis.    BLADDER: Contains heterogeneous material, probablyblood products.   Hyperdense foci in the bladder and near the TURP defect (series 301,   image 42), suspicious for active bleed. Restrepo catheter balloon is present   within the urinary bladder.  REPRODUCTIVE ORGANS: TURP defect.    BOWEL: No bowel obstruction. Appendix is normal. Moderate amount of stool   in the rectum.  PERITONEUM/RETROPERITONEUM: Within normal limits.  VESSELS: Atherosclerotic changes.  LYMPH NODES: No lymphadenopathy.  ABDOMINAL WALL: Within normal limits.  BONES: Degenerativechanges.    IMPRESSION:  Blood products within the urinary bladder with suspicion for active bleed   in the region of a TURP defect.    Left thyroid nodule, measuring 2.4 cm, for which thyroid ultrasound is   recommended.    First finding was discussed with Dr. ROSADO on 2025 at 7:04 PM by   Dr. Saunders with read back confirmation.    --- End of Report ---        < end of copied text >

## 2025-02-06 NOTE — DIETITIAN INITIAL EVALUATION ADULT - PROBLEM SELECTOR PLAN 9
Chronic   c/w home flomax and finasteride.    GERD:   Continue home omeprazole interchange with pantoprazole

## 2025-02-06 NOTE — PROGRESS NOTE ADULT - ASSESSMENT
78 y/o M with a h/o HTN, HFrEF (prior TTE 11/2024 with EF 35%), PE (on Eliquis), Myasthenia Gravis, BPH (s/p TURP), chronic LE edema, nephrolithiasis (s/p nephrostomy tube placement), MDR UTI, with:    # Septic/hemorrhagic shock  # UTI  # Hematuria  # Acute blood loss anemia    Neuro:   80 y/o M with a h/o HTN, HFrEF (prior TTE 11/2024 with EF 35%), PE (on Eliquis), Myasthenia Gravis, BPH (s/p TURP on 1/6/25), chronic LE edema, nephrolithiasis (s/p R nephrostomy tube placement), MDR UTI, with:    # Septic/hemorrhagic shock  # UTI  # Hematuria  # Acute blood loss anemia    Neuro:   78 y/o M with a h/o HTN, HFrEF (prior TTE 11/2024 with EF 35%), PE (on Eliquis), Myasthenia Gravis, BPH (s/p TURP on 1/6/25), chronic LE edema, nephrolithiasis (s/p R nephrostomy tube placement), MDR UTI, with:    # Septic/hemorrhagic shock  # UTI  # Hematuria  # Acute blood loss anemia    Neuro:  CV:  Pulm:  GI:  :  ID:  Heme:  Endo:   78 y/o M with a h/o HTN, HFrEF (prior TTE 11/2024 with EF 35%), PE (on Eliquis), Myasthenia Gravis, BPH (s/p TURP on 1/6/25), chronic LE edema, nephrolithiasis (s/p R nephrostomy tube placement), MDR UTI, with:    # Septic/hemorrhagic shock  # UTI  # Hematuria  # Acute blood loss anemia    Neuro:  #Analgesia   - tylenol, hydromorphone  #Myasthenia gravis  - continue home prednisone 10 mg QD    CV:  #Hemorrhagic/septic shock  - s/p 1u pRBC and pressors  - vitals stable off pressors  - Hb 9.8 (from 10.5), will obtain repeat CBC to trend  #HFrEF  - last TTE on 11/2024 with EF 35%  - obtain repeat TTE  #HTN  - hold home metoprolol    Pulm:  # hx b/l PE in 1/2025  - hold AC    GI:  #Constipation: senna, miralax  #Diet: NPO  #GI prophylaxis: protonix 40 mg QD    :  #Hematuria:  - s/p cysto/clot evac  - CBI clamped with light pink output  - f/u urology recs  #BPH:  - continue home tamsulosin and finasteride  #Nephrolithiasis:  - R nephrosotomy tube in place    ID:  #h/o MDR UTI  - on Zebrexa   - f/u BCx  - consult ID    Heme:  #Acute blood loss anemia  - transfused 1u pRBCs  - vitals stable  - trend Hb  #DVT prophylaxis: SCDs    Endo:  - A1c 5.9%   78 y/o M with a h/o HTN, HFrEF (prior TTE 11/2024 with EF 35%), PE (on Eliquis), Myasthenia Gravis, BPH (s/p TURP on 1/6/25), chronic LE edema, nephrolithiasis (s/p R nephrostomy tube placement), MDR UTI, with:    # Septic/hemorrhagic shock  # UTI  # Hematuria  # Acute blood loss anemia    Neuro: confused at baseline  #Analgesia   - tylenol, hydromorphone  #Myasthenia gravis  - continue home prednisone 10 mg QD    CV:  #Hemorrhagic/septic shock  - s/p 1u pRBC and pressors  - vitals stable off pressors  - Hb 9.8 (from 10.5), will obtain repeat CBC to trend  #HFrEF  - last TTE on 11/2024 with EF 35%  - F/u TTE  #HTN  - hold home metoprolol    Pulm:  # hx b/l PE in 1/2025  - hold AC    GI:  #Constipation: senna, miralax  #Diet: NPO  #GI prophylaxis: protonix 40 mg QD    :  #Hematuria:  - s/p cysto/clot evac  - CBI clamped with light pink output  - f/u urology recs  #BPH:  - continue home tamsulosin and finasteride  #Nephrolithiasis:  - R nephrostomy tube in place    ID:  #h/o MDR UTI  - on Zerbaxa   - f/u BCx  - consult ID    Heme:  #Acute blood loss anemia  - transfused 1u pRBCs  - vitals stable  - trend Hb  #DVT prophylaxis: SCDs    Endo:  - A1c 5.9%  - continue home prednisone for MG

## 2025-02-06 NOTE — DISCHARGE NOTE PROVIDER - NSDCCPCAREPLAN_GEN_ALL_CORE_FT
PRINCIPAL DISCHARGE DIAGNOSIS  Diagnosis: Hematuria  Assessment and Plan of Treatment: hematuria/urinary obstruction - s/p urgent cystoscopy/clot evacuation, CBI ; completed IV antibiotic for UTI. guerrero removed;passed voiding trial  continue flomax, finasteride. f/u urology out patient.      SECONDARY DISCHARGE DIAGNOSES  Diagnosis: Anemia due to acute blood loss  Assessment and Plan of Treatment: Required blood transfusion and Brief pressor support.   started on b12, iron supplement. f/u pcp out patient, yuriy for sign of bleeding.  out patient PCP follow up    Diagnosis: Hypovolemic shock  Assessment and Plan of Treatment: due to acute bleeding from hematuria. s/p transfusion, resolved    Diagnosis: Pulmonary embolism  Assessment and Plan of Treatment: as per discission with patient PCP, patient was diagnosed with PE 1-1.5 years ago . patient at risk of VTE, eliquis lowered to prophylaxis dose. monitor sign of bleeding.    Diagnosis: Imaging abnormalities  Assessment and Plan of Treatment: CT show : Reticular opacities in both lungs. Calcified granulomas in the right upper lobe.  left thyroid nodule.   follow up pulmonary  and PCP out patient.       Diagnosis: Chronic systolic heart failure  Assessment and Plan of Treatment: resumed on metoprolol and decreased lasix, weight monitor, BP monitor, follow up with your cardiology out patient    Diagnosis: Pressure ulcer of heel, left, unstageable  Assessment and Plan of Treatment: MRI negative for OM.  dressing change as per order, podiatry followup in 2 weeks     PRINCIPAL DISCHARGE DIAGNOSIS  Diagnosis: Hematuria  Assessment and Plan of Treatment: hematuria/urinary obstruction - s/p urgent cystoscopy/clot evacuation, CBI ; completed IV antibiotic for UTI. leukocytosis persist but improved.  geurrero removed;passed voiding trial  continue flomax, finasteride. f/u urology out patient.      SECONDARY DISCHARGE DIAGNOSES  Diagnosis: Anemia due to acute blood loss  Assessment and Plan of Treatment: Required blood transfusion and Brief pressor support.   started on b12, iron supplement. f/u pcp out patient, yuriy for sign of bleeding.  out patient PCP follow up    Diagnosis: Hypovolemic shock  Assessment and Plan of Treatment: due to acute bleeding from hematuria. s/p transfusion, resolved    Diagnosis: Pulmonary embolism  Assessment and Plan of Treatment: as per discission with patient PCP, patient was diagnosed with PE 1-1.5 years ago . patient at risk of VTE, eliquis lowered to prophylaxis dose. monitor sign of bleeding.    Diagnosis: Imaging abnormalities  Assessment and Plan of Treatment: CT show : Reticular opacities in both lungs. Calcified granulomas in the right upper lobe.  left thyroid nodule.   follow up pulmonary  and PCP out patient.       Diagnosis: Chronic systolic heart failure  Assessment and Plan of Treatment: resumed on metoprolol and decreased lasix, weight monitor, BP monitor, follow up with your cardiology out patient    Diagnosis: Pressure ulcer of heel, left, unstageable  Assessment and Plan of Treatment: MRI negative for OM.  dressing change as per order, podiatry followup in 2 weeks

## 2025-02-06 NOTE — CARE COORDINATION ASSESSMENT. - NSCAREPROVIDERS_GEN_ALL_CORE_FT
CARE PROVIDERS:  Accepting Physician: Philip De Jesus  Access Services: Chris English  Access Services: Ashli Muniz  Administration: Ludwig Cortes  Admitting: Philip De Jesus  Attending: Philip De Jesus  Consultant: Rachel Connor  Consultant: Spencer Driscoll  Consultant: Khan, Fahrina  Consultant: Ludwig Stein  Consultant: Lesley Knight  Consultant: Mar Oneill  Consultant: Sherman Fitzgerald  Covering Team: Zoraida Herndon  ED Attending: Claus Frey ED Nurse: Magdi Perez  Infection Control: Zev Pineda  Infection Control: Thibodeaux, Sheron  Nurse: Joy Rodriguez  Nurse: Madelyn Ng  Nurse: Tracy Grewal  Nurse: Yair Gastelum  Nurse: Danyell Tierney  Nurse: Rick Wright  Oncology: Julieta Jacob  Ordered: Doctor, Unknown  Ordered: ADM, User  Outpatient Provider: Spencer Collado  Outpatient Provider: Ciro Linares  Outpatient Provider: Lloyd Kay  Outpatient Provider: Zay Cohen  Outpatient Provider: Yoni Castellon  Outpatient Provider: Vinh Woods  Outpatient Provider: Colt Wade  Override: Sandy Álvarez  Override: Art Balbuena  Override: Gildardo Sanchez  PCA/Nursing Assistant: Maddy Costello  PCA/Nursing Assistant: Tanya Pacheco  Primary Team: Hilary Page  Primary Team: Aryan Acevedo  Primary Team: Philip De Jesus  Primary Team: Rob Spears  Primary Team: Austin Khalil  Primary Team: Era Bertrand  Primary Team: Elena Griffin  Primary Team: Carlos Brenner  Registered Dietitian: Meron Jacobs  : Evelyne Loera  Student: Kenji Campbell  Student: Tee Grider  Team: PLV  Hospitalists, Team

## 2025-02-06 NOTE — DISCHARGE NOTE PROVIDER - ATTENDING DISCHARGE PHYSICAL EXAMINATION:
vital stable   GENERAL: NAD, lying in bed comfortably  HEAD:  Normocephalic  EYES:  conjunctiva and sclera clear  ENT: Moist mucous membranes  NECK: Supple  CHEST/LUNG: Clear to auscultation bilaterally; No rales or rhonchi; no wheezing. Unlabored respirations  HEART: Regular rate and rhythm; S1S2+  ABDOMEN: Bowel sounds present; Soft, Nontender, Nondistended.   EXTREMITIES:  + distal Peripheral Pulses;  No cyanosis, or edema  NERVOUS SYSTEM:  Alert & Oriented X3;  No gross focal deficits   MSK: moves all extremities , Improved strength LLE 4/5, RLE 3/5.   SKIN:  Left heel unstageable PU no surrounding erythema or  discharge.

## 2025-02-06 NOTE — PATIENT PROFILE ADULT - NSPRONUTRITIONRISK_GEN_A_NUR
----- Message from Braulio Galeano sent at 8/13/2024  4:14 PM CDT -----  Contact: Amanda Patel is needing a call back to schedule new patient appointment. Please call back at [phone].    Thank you braulio   Pressure injury stage 2 or greater

## 2025-02-06 NOTE — DISCHARGE NOTE PROVIDER - HOSPITAL COURSE
HPI:  78yo M PMH  HTN, HFrEF (prior TTE 11/2024 with EF 35%), PE (on Eliquis), Myasthenia Gravis, and chronic LE edema, nephrolithiasis (s/p nephrostomy tube placement), urosepsis, BPH presents to ER from [facility] with report of large amount of blood from the penis, and penile pain. Son (HCP Olvin) at bedside reports the bleeding started earlier this afternoon on 2/5/2025. Patient responds to name on admission but does not know the year or that he is at the hospital. Son admits that the patient (his father) can be forgetful. Patient vomited x1 in the ED, was initially complaining of pain but now denies.       In the ED   Vitals: T: 97.6F --> 98.3F, BP: 134/77 --> 64/39--> 122/63--> 134/77, --> 98, RR: 16, Sat: 99% on room air  Labs: WBC elevated 32.37-->35.20, H/H low 10.5/33.4--> 8/25.6, Plt high 560--> 441, Lactate elevated 3.4  UA: negative nitrites, trace LE WBC 16 elevated, TNTC RBCs, present squamous epithelial cells, Glucose 250 elevated, large blood  Imaging:   CT chest/abdomen/pelvis:  Blood products within the urinary bladder with suspicion for active bleed   in the region of a TURP defect.  Left thyroid nodule, measuring 2.4 cm, for which thyroid ultrasound is   recommended.    EKG: sinus rhythm, VR 94, QT/QTc 380/475    Received: zerbaxa 1.5g x1, NS bolus 1L x2, Ofirmev 1g x1, Dilaudid 1mg IVP x1, morphine 4m IVP x1, morphine 2mg x1, Zofran 4mg IVP x1`, Balfaxar 2500 IU x1, Balfaxar 3000 IU x1  Ordered for LR 1L @75cc/hr, Packed Red Blood Cells 1U    (05 Feb 2025 20:50)      ---  HOSPITAL COURSE: 80 y/o M with a h/o HTN, HFrEF (prior TTE 11/2024 with EF 35%), PE (on Eliquis), Myasthenia Gravis, BPH (s/p TURP on 1/6/25), chronic LE edema, nephrolithiasis (s/p R nephrostomy tube placement), MDR UTI, presents to ED from [facility] with report of voluminous hematuria and penile pain that began earlier in the day. CT A/P revealed blood products within the bladder and concern for active bleeding from the site of TURP defect. Pt was hypotensive with a lactate 9+. He went to the OR for emergent cystoscopy with evacuation of clots and initiation of CBI. Transfused 1u PRBC and started on IV vasopressor support. Was weaned off pressors with stable vitals.    ---  CONSULTANTS:     ---  TIME SPENT:  I, the attending physician, was physically present for the key portions of the evaluation and management (E/M) service provided. The total amount of time spent reviewing the hospital notes, laboratory values, imaging findings, assessing/counseling the patient, discussing with consultant physicians, social work, nursing staff was -- minutes    ---  Primary care provider was made aware of plan for discharge:      [  ] NO     [  ] YES   HPI:  78yo M PMH  HTN, HFrEF (prior TTE 11/2024 with EF 35%), PE (on Eliquis), Myasthenia Gravis, and chronic LE edema, nephrolithiasis (s/p nephrostomy tube placement), urosepsis, BPH presents to ER from [facility] with report of large amount of blood from the penis, and penile pain. Son (HCP Olvin) at bedside reports the bleeding started earlier this afternoon on 2/5/2025. Patient responds to name on admission but does not know the year or that he is at the hospital. Son admits that the patient (his father) can be forgetful. Patient vomited x1 in the ED, was initially complaining of pain but now denies.       In the ED   Vitals: T: 97.6F --> 98.3F, BP: 134/77 --> 64/39--> 122/63--> 134/77, --> 98, RR: 16, Sat: 99% on room air  Labs: WBC elevated 32.37-->35.20, H/H low 10.5/33.4--> 8/25.6, Plt high 560--> 441, Lactate elevated 3.4  UA: negative nitrites, trace LE WBC 16 elevated, TNTC RBCs, present squamous epithelial cells, Glucose 250 elevated, large blood  Imaging:   CT chest/abdomen/pelvis:  Blood products within the urinary bladder with suspicion for active bleed   in the region of a TURP defect.  Left thyroid nodule, measuring 2.4 cm, for which thyroid ultrasound is   recommended.    EKG: sinus rhythm, VR 94, QT/QTc 380/475    Received: zerbaxa 1.5g x1, NS bolus 1L x2, Ofirmev 1g x1, Dilaudid 1mg IVP x1, morphine 4m IVP x1, morphine 2mg x1, Zofran 4mg IVP x1`, Balfaxar 2500 IU x1, Balfaxar 3000 IU x1  Ordered for LR 1L @75cc/hr, Packed Red Blood Cells 1U    (05 Feb 2025 20:50)      ---  HOSPITAL COURSE: 78 y/o M with a h/o HTN, HFrEF (prior TTE 11/2024 with EF 35%), PE (on Eliquis), Myasthenia Gravis, BPH (s/p TURP on 1/6/25), chronic LE edema, nephrolithiasis (s/p R nephrostomy tube placement), MDR UTI, presents to ED from [facility] with report of voluminous hematuria and penile pain that began earlier in the day. CT A/P revealed blood products within the bladder and concern for active bleeding from the site of TURP defect. Pt was hypotensive with a lactate 9+. He went to the OR for emergent cystoscopy with evacuation of clots and initiation of CBI. Transfused 1u PRBC and started on IV vasopressor support. Was weaned off pressors with stable vitals. guerrero was discontinued and patient passed voiding trial. seen by ID , Completed course of IV antibiotic , leukocytosis persist but much improved.   podiatry consulted for Left heel unstageable PU, , MRI negative for OM. seen by vascular Sx. dressing changed as ordered   h/o PE: AC decreased to prophylaxis dosing.     ---  CONSULTANTS:   ID, podiatry , vascular Sx , urology

## 2025-02-06 NOTE — DIETITIAN INITIAL EVALUATION ADULT - ORAL INTAKE PTA/DIET HISTORY
Pt admitted from [facility]. Reviewed transfer documents; pt was on a mechanical soft, NCS, low Na diet. + LPS 30ml po BID.

## 2025-02-06 NOTE — DISCHARGE NOTE PROVIDER - NSDCMRMEDTOKEN_GEN_ALL_CORE_FT
acetaminophen 325 mg oral capsule: 2 cap(s) orally every 6 hours as needed for  mild pain  aluminum hydroxide-magnesium hydroxide 200 mg-200 mg/5 mL oral suspension: 30 milliliter(s) orally every 4 hours As needed Dyspepsia  apixaban 5 mg oral tablet: 1 tab(s) orally 2 times a day  finasteride 5 mg oral tablet: 1 tab(s) orally once a day  furosemide 20 mg oral tablet: 1 tab(s) orally once a day  metoprolol succinate 25 mg oral tablet, extended release: 1 tab(s) orally once a day  omeprazole 40 mg oral delayed release capsule: 1 cap(s) orally once a day  polyethylene glycol 3350 oral powder for reconstitution: 17 gram(s) orally once a day  Praluent Pen 75 mg/mL subcutaneous solution: 75 milligram(s) subcutaneous every 2 weeks  predniSONE 10 mg oral tablet: 1 tab(s) orally once a day  senna leaf extract oral tablet: 1 tab(s) orally once a day (before a meal)  tamsulosin 0.4 mg oral capsule: 1 cap(s) orally once a day (at bedtime)   acetaminophen 325 mg oral capsule: 2 cap(s) orally every 6 hours as needed for  mild pain  cyanocobalamin 1000 mcg oral tablet: 1 tab(s) orally once a day  Eliquis 2.5 mg oral tablet: 1 tab(s) orally 2 times a day  ferrous sulfate 325 mg (65 mg elemental iron) oral tablet: 1 tab(s) orally every other day  finasteride 5 mg oral tablet: 1 tab(s) orally once a day  furosemide 20 mg oral tablet: 1 tab(s) orally every other day hold  if SBP&lt;110  metoprolol succinate 25 mg oral tablet, extended release: 1 tab(s) orally once a day hold if SBP&lt;110, HR &lt;60  omeprazole 40 mg oral delayed release capsule: 1 cap(s) orally once a day  polyethylene glycol 3350 oral powder for reconstitution: 17 gram(s) orally once a day  Praluent Pen 75 mg/mL subcutaneous solution: 75 milligram(s) subcutaneous every 2 weeks  predniSONE 10 mg oral tablet: 1 tab(s) orally once a day  senna leaf extract oral tablet: 1 tab(s) orally once a day (before a meal)  tamsulosin 0.4 mg oral capsule: 1 cap(s) orally once a day (at bedtime)

## 2025-02-06 NOTE — PROVIDER CONTACT NOTE (EICU) - ASSESSMENT
Telehealth evaluation precludes physical exam. On camera assessment pt is non-toxic appearing, good VS, CBI ongoing w/ urine that is clearing up/less bloody

## 2025-02-06 NOTE — PROVIDER CONTACT NOTE (EICU) - RECOMMENDATIONS
- pressors weaned off, maintain MAP >65; POCUS if BP drops to assess fluid responsiveness  - continue CBI per urology  - serial CBCs, transfuse for hgb <7  - agree w/ abx for possible UTI, ID consulted given extensive hx of MDRO  - transfer to ICU post-op; plan discussed w/ ICU BENITO santos

## 2025-02-06 NOTE — PROGRESS NOTE ADULT - SUBJECTIVE AND OBJECTIVE BOX
Post Operative Note  Patient: DEION HEATH 79y (1945) Male   MRN: [id number]  Location: [facility] ICU1 20A  Visit: 02-05-25 Inpatient  Date: 02-06-25 @ 01:32    Procedure: S/P cystoscopy with clot evacuation     Subjective:   Patient seen and examined at bedside, reports no complaints at this time and does not want to be spoken to    Objective:  Vitals: T(F): 97.5 (02-05-25 @ 23:45), Max: 99.1 (02-05-25 @ 19:06)  HR: 112 (02-06-25 @ 01:00)  BP: 124/85 (02-06-25 @ 01:00) (64/39 - 149/109)  RR: 17 (02-06-25 @ 01:00)  SpO2: 100% (02-06-25 @ 01:00)  Vent Settings:     In:   02-05-25 @ 07:01  -  02-06-25 @ 01:32  --------------------------------------------------------  IN: 3000 mL    IV Fluids:     Out:   02-05-25 @ 07:01  -  02-06-25 @ 01:32  --------------------------------------------------------  OUT: 4250 mL      Voided Urine:   02-05-25 @ 07:01  -  02-06-25 @ 01:32  --------------------------------------------------------  OUT: 4250 mL    GENERAL:   Male lying comfortably in bed in NAD.  HEENT:  NC/AT. Sclera white. Mucous membranes moist.  CARDIO: Tachy  RESPIRATORY:  Nonlabored breathing, no accessory muscle use.  : +Restrepo, clear output in the tubing at the moment  EXTREMITIES: No calf tenderness bilaterally.  SKIN:  No jaundice, pallor, or cyanosis  NEURO:  A&O x 3    Medications: [Standing]  acetaminophen     Tablet .. 650 milliGRAM(s) Oral every 6 hours PRN  ceftolozane/tazobactam IVPB 1500 milliGRAM(s) IV Intermittent every 8 hours  chlorhexidine 2% Cloths 1 Application(s) Topical <User Schedule>  finasteride 5 milliGRAM(s) Oral daily  HYDROmorphone  Injectable 0.5 milliGRAM(s) IV Push every 4 hours PRN  HYDROmorphone  Injectable 1 milliGRAM(s) IV Push every 4 hours PRN  pantoprazole    Tablet 40 milliGRAM(s) Oral before breakfast  tamsulosin 0.4 milliGRAM(s) Oral at bedtime    Medications: [PRN]  acetaminophen     Tablet .. 650 milliGRAM(s) Oral every 6 hours PRN  ceftolozane/tazobactam IVPB 1500 milliGRAM(s) IV Intermittent every 8 hours  chlorhexidine 2% Cloths 1 Application(s) Topical <User Schedule>  finasteride 5 milliGRAM(s) Oral daily  HYDROmorphone  Injectable 0.5 milliGRAM(s) IV Push every 4 hours PRN  HYDROmorphone  Injectable 1 milliGRAM(s) IV Push every 4 hours PRN  pantoprazole    Tablet 40 milliGRAM(s) Oral before breakfast  tamsulosin 0.4 milliGRAM(s) Oral at bedtime    Labs:                        10.5   47.62 )-----------( 384      ( 06 Feb 2025 00:53 )             31.7     139  |  109[H]  |  19  ----------------------------<  172[H]  4.3   |  19[L]  |  1.30    Ca    8.4[L]      06 Feb 2025 00:53  Phos  4.4     02-06  Mg     1.8     02-06    TPro  5.5[L]  /  Alb  2.3[L]  /  TBili  0.4  /  DBili  x   /  AST  15  /  ALT  15  /  AlkPhos  70  02-06    PT/INR - ( 05 Feb 2025 17:45 )   PT: 15.9 sec;   INR: 1.35 ratio       PTT - ( 05 Feb 2025 17:45 )  PTT:29.7 sec    Imaging:  No post-op imaging studies    Assessment:  79y male patient S/P cystoscopy with clot evacuation POD#0 received 1 unit pRBC in OR in the ICU, meeting sepsis criteria, elevated/increasing WBC, no febrile, but now tachycardic, elevated lactate, patient with traction, inflated with 40cc, will release in the AM if urine color improves    Plan:  - Keep NPO  - Continue abx  - Pain control  - Zofran PRN  - Incentive spirometry  - SCDs  - Hold ACs  - Trend CBC  - Will continue to monitor

## 2025-02-06 NOTE — PROGRESS NOTE ADULT - ASSESSMENT
80yo M PMH  HTN, HFrEF (prior TTE 11/2024 with EF 35%), PE (on Eliquis), Myasthenia Gravis, and chronic LE edema, nephrolithiasis (s/p nephrostomy tube placement), urosepsis, BPH presents to ER from [facility] with report of large amount of blood from the penis, and penile pain. Admitted for hematuria, active bleed within bladder requiring emergent surgery with urology

## 2025-02-06 NOTE — PROGRESS NOTE ADULT - PROBLEM SELECTOR PLAN 1
Patient h/o nephrolithiasis s/p nephrostomy tube placement,  s/p TURP at [facility] in 1/2025 now presents with hematuria, found to have bleeding within bladder on imaging  CT: Blood products within the urinary bladder with suspicion for active bleed   in the region of a TURP defect.  Received: Zerbaxa 1.5g x1, NS bolus 1L x2, Ofirmev 1g x1, Dilaudid 1mg IVP x1, morphine 4m IVP x1, morphine 2mg x1, Zofran 4mg IVP x1`, Balfaxar 2500 IU x1, Balfaxar 3000 IU x1  Ordered for LR 1L @75cc/hr, Packed Red Blood Cells 1U   Plan for emergent surgery with urology Dr. Antoine, following  - S/p  Balfaxar  - Started on diet today   - IVF for BP support as patient had episode of hypotension  - monitor vital signs; earlier hypotensive, now improving on fluids  - elevated lactate; will repeat 6 hours after initial draw  - Maintain active type and screen; may require pRBC (1U Ordered)  - pain control   - continue 3-way guerrero that was placed in the ED, irrigate as needed  - ID Dr. Mckinley for antibiotic management given history of recurrent Pseudomonas (Carbapenem resistant) UTI

## 2025-02-06 NOTE — DIETITIAN INITIAL EVALUATION ADULT - PERTINENT MEDS FT
MEDICATIONS  (STANDING):  ceftolozane/tazobactam IVPB 1500 milliGRAM(s) IV Intermittent every 8 hours  chlorhexidine 2% Cloths 1 Application(s) Topical <User Schedule>  finasteride 5 milliGRAM(s) Oral daily  pantoprazole    Tablet 40 milliGRAM(s) Oral before breakfast  polyethylene glycol 3350 17 Gram(s) Oral daily  predniSONE   Tablet 10 milliGRAM(s) Oral daily  senna 1 Tablet(s) Oral at bedtime  tamsulosin 0.4 milliGRAM(s) Oral at bedtime    MEDICATIONS  (PRN):  acetaminophen     Tablet .. 650 milliGRAM(s) Oral every 6 hours PRN Temp greater or equal to 38C (100.4F), Mild Pain (1 - 3)  HYDROmorphone  Injectable 0.5 milliGRAM(s) IV Push every 4 hours PRN Moderate Pain (4 - 6)  HYDROmorphone  Injectable 1 milliGRAM(s) IV Push every 4 hours PRN Severe Pain (7 - 10)

## 2025-02-06 NOTE — CARE COORDINATION ASSESSMENT. - PRO ARRIVE FROM
Pt known to [facility], the patient has been at  since September 2024./inpatient rehabilitation facility

## 2025-02-06 NOTE — CONSULT NOTE ADULT - ASSESSMENT
80yo M PMH  HTN, HFrEF (prior TTE 11/2024 with EF 35%), PE (on Eliquis), Myasthenia Gravis, and chronic LE edema, nephrolithiasis (s/p nephrostomy tube placement), urosepsis, BPH, who presented from [facility] with report of large amount of blood from the penis, and penile pain. Per records the bleeding started earlier this afternoon on 2/5/2025. Of note had TURP on 1/9/25.    CT A/P showed blood products within the urinary bladder with suspicion for active bleed in the region of a TURP defect. S/p cystoscopy with clot evacuation overnight. Would continue empiric antibiotics given urological procedure involving mucosal bleed. WBC 49 bu no fever. Had prior urine culture which grew MDR pseudomonas.    #Leukocytosis  #Hematuria  #Hx of MDR pseudomonas in urine    -can continue cefotolozane/tazobactam pending cultures  -blood and urine culture pending  -monitor WBC    Thank you for courtesy of this consult.     Will follow.  Discussed with the primary team.     Rachel Connor MD  Division of Infectious Diseases   Cell [phone] between 8am and 6pm   After 6pm and weekends please call ID service at [phone].     55 minutes spent on total encounter assessing patient, examination, chart review, counseling and coordinating care by the attending physician/nurse/care manager.

## 2025-02-06 NOTE — DISCHARGE NOTE PROVIDER - PROVIDER TOKENS
PROVIDER:[TOKEN:[[id number]],FOLLOWUP:[1 week]],PROVIDER:[TOKEN:[[id number]],FOLLOWUP:[2 weeks]],PROVIDER:[TOKEN:[[id number]],FOLLOWUP:[2 weeks]],PROVIDER:[TOKEN:[[id number]:[2 weeks]]

## 2025-02-06 NOTE — DISCHARGE NOTE PROVIDER - CARE PROVIDERS DIRECT ADDRESSES
[email or web address]

## 2025-02-06 NOTE — DISCHARGE NOTE PROVIDER - CARE PROVIDER_API CALL
Colt Wade  Internal Medicine  [address] [place] [address]  Phone: [phone]  Fax: [phone]  Follow Up Time: 1 week    Vinh Woods  Cardiology  [address] [place] [address]  Phone: [phone]  Fax: [phone]  Follow Up Time: 2 weeks    Lisa Vitale  Podiatric Medicine and Surgery  [address] [place] [address]  Phone: [phone]  Fax: [phone]  Follow Up Time: 2 weeks    Spencer Ramirez  Urology  [address] [place] [address]  Phone: [phone]  Fax: [phone]  Follow Up Time: 2 weeks

## 2025-02-07 DIAGNOSIS — L89.620 PRESSURE ULCER OF LEFT HEEL, UNSTAGEABLE: ICD-10-CM

## 2025-02-07 LAB
ALBUMIN SERPL ELPH-MCNC: 1.9 G/DL — LOW (ref 3.3–5)
ALP SERPL-CCNC: 52 U/L — SIGNIFICANT CHANGE UP (ref 40–120)
ALT FLD-CCNC: 14 U/L — SIGNIFICANT CHANGE UP (ref 12–78)
ANION GAP SERPL CALC-SCNC: 7 MMOL/L — SIGNIFICANT CHANGE UP (ref 5–17)
AST SERPL-CCNC: 23 U/L — SIGNIFICANT CHANGE UP (ref 15–37)
BASOPHILS # BLD AUTO: 0.07 K/UL — SIGNIFICANT CHANGE UP (ref 0–0.2)
BASOPHILS NFR BLD AUTO: 0.2 % — SIGNIFICANT CHANGE UP (ref 0–2)
BILIRUB SERPL-MCNC: 0.2 MG/DL — SIGNIFICANT CHANGE UP (ref 0.2–1.2)
BUN SERPL-MCNC: 17 MG/DL — SIGNIFICANT CHANGE UP (ref 7–23)
CALCIUM SERPL-MCNC: 8.8 MG/DL — SIGNIFICANT CHANGE UP (ref 8.5–10.1)
CHLORIDE SERPL-SCNC: 111 MMOL/L — HIGH (ref 96–108)
CO2 SERPL-SCNC: 25 MMOL/L — SIGNIFICANT CHANGE UP (ref 22–31)
CREAT SERPL-MCNC: 0.77 MG/DL — SIGNIFICANT CHANGE UP (ref 0.5–1.3)
EGFR: 90 ML/MIN/1.73M2 — SIGNIFICANT CHANGE UP
EOSINOPHIL # BLD AUTO: 0.24 K/UL — SIGNIFICANT CHANGE UP (ref 0–0.5)
EOSINOPHIL NFR BLD AUTO: 0.9 % — SIGNIFICANT CHANGE UP (ref 0–6)
GLUCOSE SERPL-MCNC: 102 MG/DL — HIGH (ref 70–99)
HCT VFR BLD CALC: 23 % — LOW (ref 39–50)
HCT VFR BLD CALC: 23.9 % — LOW (ref 39–50)
HGB BLD-MCNC: 7.5 G/DL — LOW (ref 13–17)
HGB BLD-MCNC: 7.8 G/DL — LOW (ref 13–17)
IMM GRANULOCYTES NFR BLD AUTO: 1.7 % — HIGH (ref 0–0.9)
LACTATE SERPL-SCNC: 1.6 MMOL/L — SIGNIFICANT CHANGE UP (ref 0.7–2)
LACTATE SERPL-SCNC: 2.6 MMOL/L — HIGH (ref 0.7–2)
LACTATE SERPL-SCNC: 2.8 MMOL/L — HIGH (ref 0.7–2)
LYMPHOCYTES # BLD AUTO: 2.23 K/UL — SIGNIFICANT CHANGE UP (ref 1–3.3)
LYMPHOCYTES # BLD AUTO: 7.9 % — LOW (ref 13–44)
MAGNESIUM SERPL-MCNC: 2.1 MG/DL — SIGNIFICANT CHANGE UP (ref 1.6–2.6)
MCHC RBC-ENTMCNC: 29.2 PG — SIGNIFICANT CHANGE UP (ref 27–34)
MCHC RBC-ENTMCNC: 32.6 G/DL — SIGNIFICANT CHANGE UP (ref 32–36)
MCV RBC AUTO: 89.5 FL — SIGNIFICANT CHANGE UP (ref 80–100)
MONOCYTES # BLD AUTO: 2.78 K/UL — HIGH (ref 0–0.9)
MONOCYTES NFR BLD AUTO: 9.9 % — SIGNIFICANT CHANGE UP (ref 2–14)
NEUTROPHILS # BLD AUTO: 22.27 K/UL — HIGH (ref 1.8–7.4)
NEUTROPHILS NFR BLD AUTO: 79.4 % — HIGH (ref 43–77)
NRBC # BLD: 0 /100 WBCS — SIGNIFICANT CHANGE UP (ref 0–0)
NRBC BLD-RTO: 0 /100 WBCS — SIGNIFICANT CHANGE UP (ref 0–0)
PHOSPHATE SERPL-MCNC: 2.1 MG/DL — LOW (ref 2.5–4.5)
PLATELET # BLD AUTO: 329 K/UL — SIGNIFICANT CHANGE UP (ref 150–400)
POTASSIUM SERPL-MCNC: 4.4 MMOL/L — SIGNIFICANT CHANGE UP (ref 3.5–5.3)
POTASSIUM SERPL-SCNC: 4.4 MMOL/L — SIGNIFICANT CHANGE UP (ref 3.5–5.3)
PROT SERPL-MCNC: 4.9 G/DL — LOW (ref 6–8.3)
RBC # BLD: 2.57 M/UL — LOW (ref 4.2–5.8)
RBC # FLD: 16 % — HIGH (ref 10.3–14.5)
SODIUM SERPL-SCNC: 143 MMOL/L — SIGNIFICANT CHANGE UP (ref 135–145)
WBC # BLD: 28.07 K/UL — HIGH (ref 3.8–10.5)
WBC # FLD AUTO: 28.07 K/UL — HIGH (ref 3.8–10.5)

## 2025-02-07 PROCEDURE — 99233 SBSQ HOSP IP/OBS HIGH 50: CPT

## 2025-02-07 PROCEDURE — 99221 1ST HOSP IP/OBS SF/LOW 40: CPT

## 2025-02-07 PROCEDURE — G0545: CPT

## 2025-02-07 PROCEDURE — 99232 SBSQ HOSP IP/OBS MODERATE 35: CPT

## 2025-02-07 RX ORDER — POTASSIUM PHOSPHATE, MONOBASIC POTASSIUM PHOSPHATE, DIBASIC 236; 224 MG/ML; MG/ML
30 INJECTION, SOLUTION INTRAVENOUS ONCE
Refills: 0 | Status: COMPLETED | OUTPATIENT
Start: 2025-02-07 | End: 2025-02-07

## 2025-02-07 RX ORDER — BACTERIOSTATIC SODIUM CHLORIDE 0.9 %
250 VIAL (ML) INJECTION ONCE
Refills: 0 | Status: COMPLETED | OUTPATIENT
Start: 2025-02-07 | End: 2025-02-07

## 2025-02-07 RX ADMIN — POLYETHYLENE GLYCOL 3350 17 GRAM(S): 17 POWDER, FOR SOLUTION ORAL at 11:32

## 2025-02-07 RX ADMIN — PREDNISONE 10 MILLIGRAM(S): 5 TABLET ORAL at 06:47

## 2025-02-07 RX ADMIN — CEFTOLOZANE AND TAZOBACTAM 100 MILLIGRAM(S): 1; .5 INJECTION, POWDER, LYOPHILIZED, FOR SOLUTION INTRAVENOUS at 17:36

## 2025-02-07 RX ADMIN — HYDROMORPHONE HYDROCHLORIDE 0.5 MILLIGRAM(S): 4 INJECTION, SOLUTION INTRAMUSCULAR; INTRAVENOUS; SUBCUTANEOUS at 01:30

## 2025-02-07 RX ADMIN — Medication 5 MILLIGRAM(S): at 11:32

## 2025-02-07 RX ADMIN — POTASSIUM PHOSPHATE, MONOBASIC POTASSIUM PHOSPHATE, DIBASIC 83.33 MILLIMOLE(S): 236; 224 INJECTION, SOLUTION INTRAVENOUS at 15:41

## 2025-02-07 RX ADMIN — CEFTOLOZANE AND TAZOBACTAM 100 MILLIGRAM(S): 1; .5 INJECTION, POWDER, LYOPHILIZED, FOR SOLUTION INTRAVENOUS at 11:31

## 2025-02-07 RX ADMIN — PANTOPRAZOLE 40 MILLIGRAM(S): 20 TABLET, DELAYED RELEASE ORAL at 06:47

## 2025-02-07 RX ADMIN — Medication 250 MILLILITER(S): at 11:32

## 2025-02-07 RX ADMIN — HYDROMORPHONE HYDROCHLORIDE 0.5 MILLIGRAM(S): 4 INJECTION, SOLUTION INTRAMUSCULAR; INTRAVENOUS; SUBCUTANEOUS at 01:04

## 2025-02-07 RX ADMIN — CEFTOLOZANE AND TAZOBACTAM 100 MILLIGRAM(S): 1; .5 INJECTION, POWDER, LYOPHILIZED, FOR SOLUTION INTRAVENOUS at 03:08

## 2025-02-07 NOTE — PROGRESS NOTE ADULT - ASSESSMENT
80yo M PMH  HTN, HFrEF (prior TTE 11/2024 with EF 35%), PE (on Eliquis), Myasthenia Gravis, and chronic LE edema, nephrolithiasis (s/p nephrostomy tube placement), urosepsis, BPH, who presented from [facility] with report of large amount of blood from the penis, and penile pain. Per records the bleeding started earlier this afternoon on 2/5/2025. Of note had TURP on 1/9/25.    CT A/P showed blood products within the urinary bladder with suspicion for active bleed in the region of a TURP defect. S/p cystoscopy with clot evacuation on 2/5. Would continue empiric antibiotics given urological procedure involving mucosal bleed, but active UTI seems less likely. No fever and WBC improved today. Blood cultures currently no growth. Urine culture unrevealing.    #Leukocytosis  #Hematuria  #Hx of MDR pseudomonas in urine    -continue cefotolozane/tazobactam until 2/8 to complete 3 days from procedure  -follow cultures to completion  -monitor WBC    Rachel Connor MD  Division of Infectious Diseases   Cell [phone] between 8am and 6pm   After 6pm and weekends please call ID service at [phone].     35 minutes spent on total encounter assessing patient, examination, chart review, counseling and coordinating care by the attending physician/nurse/care manager.

## 2025-02-07 NOTE — PROGRESS NOTE ADULT - PROBLEM SELECTOR PLAN 1
Patient h/o nephrolithiasis s/p nephrostomy tube placement,  s/p TURP at [facility] in 1/2025 now presents with hematuria, found to have bleeding within bladder on imaging  - S/p Urgent Clot removal per    - S/p  Balfaxar  - HD stable   - elevated lactate; improving   - Maintain active type and screen; may require pRBC (1U Ordered)  - pain control   - continue 3-way guerrero that was placed in the ED, irrigate as needed  - ID Dr. Connor. To continue IV Zerbaxa

## 2025-02-07 NOTE — CONSULT NOTE ADULT - SUBJECTIVE AND OBJECTIVE BOX
Patient is a 79y old  Male who presents with a chief complaint of hematuria, active bleed within bladder requiring emergent surgery with urology (07 Feb 2025 09:08). Patient seen at bedside for left heel ulcer, denies fever.      HPI:  78yo M PMH  HTN, HFrEF (prior TTE 11/2024 with EF 35%), PE (on Eliquis), Myasthenia Gravis, and chronic LE edema, nephrolithiasis (s/p nephrostomy tube placement), urosepsis, BPH presents to ER from [facility] with report of large amount of blood from the penis, and penile pain. Son (HCP Olvin) at bedside reports the bleeding started earlier this afternoon on 2/5/2025. Patient responds to name on admission but does not know the year or that he is at the hospital. Son admits that the patient (his father) can be forgetful. Patient vomited x1 in the ED, was initially complaining of pain but now denies.       In the ED   Vitals: T: 97.6F --> 98.3F, BP: 134/77 --> 64/39--> 122/63--> 134/77, --> 98, RR: 16, Sat: 99% on room air  Labs: WBC elevated 32.37-->35.20, H/H low 10.5/33.4--> 8/25.6, Plt high 560--> 441, Lactate elevated 3.4  UA: negative nitrites, trace LE WBC 16 elevated, TNTC RBCs, present squamous epithelial cells, Glucose 250 elevated, large blood  Imaging:   CT chest/abdomen/pelvis:  Blood products within the urinary bladder with suspicion for active bleed   in the region of a TURP defect.  Left thyroid nodule, measuring 2.4 cm, for which thyroid ultrasound is   recommended.    EKG: sinus rhythm, VR 94, QT/QTc 380/475    Received: zerbaxa 1.5g x1, NS bolus 1L x2, Ofirmev 1g x1, Dilaudid 1mg IVP x1, morphine 4m IVP x1, morphine 2mg x1, Zofran 4mg IVP x1`, Balfaxar 2500 IU x1, Balfaxar 3000 IU x1  Ordered for LR 1L @75cc/hr, Packed Red Blood Cells 1U    (05 Feb 2025 20:50)      PAST MEDICAL & SURGICAL HISTORY:  Calculus of kidney      Club foot  Born Right Foot      Myasthenia gravis      Hypertension      Diabetes  Type 2 - does not take medications - monitors Blood Glucose at home - diet controlled      Urinary tract infection  notes h/o UTI's      Hyperlipidemia      Other muscle wasting and atrophy      H/O spinal stenosis      History of thrombocytopenia      H/O CHF      Elective surgery  1956 age 11 @ [facility] - cut under Patella secondary 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      Club foot  Surgery at birth for Club Foot Right foot      Pilonidal cyst  Surgery 40 years ago      H/O colonoscopy      H/O prostate biopsy      Nephrostomy present          MEDICATIONS  (STANDING):  ceftolozane/tazobactam IVPB 1500 milliGRAM(s) IV Intermittent every 8 hours  chlorhexidine 2% Cloths 1 Application(s) Topical <User Schedule>  finasteride 5 milliGRAM(s) Oral daily  pantoprazole    Tablet 40 milliGRAM(s) Oral before breakfast  polyethylene glycol 3350 17 Gram(s) Oral daily  potassium phosphate IVPB 30 milliMole(s) IV Intermittent once  predniSONE   Tablet 10 milliGRAM(s) Oral daily  senna 1 Tablet(s) Oral at bedtime  tamsulosin 0.4 milliGRAM(s) Oral at bedtime    MEDICATIONS  (PRN):  acetaminophen     Tablet .. 650 milliGRAM(s) Oral every 6 hours PRN Temp greater or equal to 38C (100.4F), Mild Pain (1 - 3)  HYDROmorphone  Injectable 0.5 milliGRAM(s) IV Push every 4 hours PRN Moderate Pain (4 - 6)  HYDROmorphone  Injectable 1 milliGRAM(s) IV Push every 4 hours PRN Severe Pain (7 - 10)      Allergies    ofloxacin (Unknown)  gatifloxacin (Unknown)  levofloxacin (Unknown)  Cipro (Unknown)    Intolerances    Ketek (Other)  telithromycin (Other)  fluoroquinolone antibiotics (Other)  Avelox (Other)      VITALS:    Vital Signs Last 24 Hrs  T(C): 37 (07 Feb 2025 11:13), Max: 37.2 (06 Feb 2025 16:23)  T(F): 98.6 (07 Feb 2025 11:13), Max: 98.9 (06 Feb 2025 16:23)  HR: 84 (07 Feb 2025 11:13) (68 - 115)  BP: 100/64 (07 Feb 2025 11:13) (100/64 - 137/121)  BP(mean): 73 (07 Feb 2025 04:00) (63 - 128)  RR: 18 (07 Feb 2025 11:13) (11 - 24)  SpO2: 94% (07 Feb 2025 11:13) (94% - 100%)    Parameters below as of 07 Feb 2025 11:13  Patient On (Oxygen Delivery Method): room air        LABS:                          7.8    x     )-----------( x        ( 07 Feb 2025 10:05 )             23.9       02-07    143  |  111[H]  |  17  ----------------------------<  102[H]  4.4   |  25  |  0.77    Ca    8.8      07 Feb 2025 06:10  Phos  2.1     02-07  Mg     2.1     02-07    TPro  4.9[L]  /  Alb  1.9[L]  /  TBili  0.2  /  DBili  x   /  AST  23  /  ALT  14  /  AlkPhos  52  02-07      CAPILLARY BLOOD GLUCOSE          PT/INR - ( 05 Feb 2025 17:45 )   PT: 15.9 sec;   INR: 1.35 ratio         PTT - ( 05 Feb 2025 17:45 )  PTT:29.7 sec    PHYSICAL EXAM   General: Pleasant  male NAD & AOX3.     Integument:  Skin cold, dry bilateral.     #1 Ulcerations on left heel : wounds covered with stable eschar, wound size 2x1.5cm; NEGATIVE yamilet-wound erythema, purulence, fluctuance, tracking/tunneling or probe to bone.    Vascular: Dorsalis Pedis and Posterior Tibial pulses palpable 1/4.  Capillary re-fill time less then 3 seconds digits 1-5 bilateral.     Neuro: Protective sensation diminished to the level of the digits bilateral.   MSK: Muscle strength 4/5 all major muscle groups bilateral.     RADIOLOGY & ADDITIONAL STUDIES:  Left foot Xray : pending   Patient is a 79y old  Male who presents with a chief complaint of hematuria, active bleed within bladder requiring emergent surgery with urology (07 Feb 2025 09:08). Patient seen at bedside for left heel ulcer, denies fever.      HPI:  80yo M PMH  HTN, HFrEF (prior TTE 11/2024 with EF 35%), PE (on Eliquis), Myasthenia Gravis, and chronic LE edema, nephrolithiasis (s/p nephrostomy tube placement), urosepsis, BPH presents to ER from [facility] with report of large amount of blood from the penis, and penile pain. Son (HCP Olvin) at bedside reports the bleeding started earlier this afternoon on 2/5/2025. Patient responds to name on admission but does not know the year or that he is at the hospital. Son admits that the patient (his father) can be forgetful. Patient vomited x1 in the ED, was initially complaining of pain but now denies.       In the ED   Vitals: T: 97.6F --> 98.3F, BP: 134/77 --> 64/39--> 122/63--> 134/77, --> 98, RR: 16, Sat: 99% on room air  Labs: WBC elevated 32.37-->35.20, H/H low 10.5/33.4--> 8/25.6, Plt high 560--> 441, Lactate elevated 3.4  UA: negative nitrites, trace LE WBC 16 elevated, TNTC RBCs, present squamous epithelial cells, Glucose 250 elevated, large blood  Imaging:   CT chest/abdomen/pelvis:  Blood products within the urinary bladder with suspicion for active bleed   in the region of a TURP defect.  Left thyroid nodule, measuring 2.4 cm, for which thyroid ultrasound is   recommended.    EKG: sinus rhythm, VR 94, QT/QTc 380/475    Received: zerbaxa 1.5g x1, NS bolus 1L x2, Ofirmev 1g x1, Dilaudid 1mg IVP x1, morphine 4m IVP x1, morphine 2mg x1, Zofran 4mg IVP x1`, Balfaxar 2500 IU x1, Balfaxar 3000 IU x1  Ordered for LR 1L @75cc/hr, Packed Red Blood Cells 1U    (05 Feb 2025 20:50)      PAST MEDICAL & SURGICAL HISTORY:  Calculus of kidney      Club foot  Born Right Foot      Myasthenia gravis      Hypertension      Diabetes  Type 2 - does not take medications - monitors Blood Glucose at home - diet controlled      Urinary tract infection  notes h/o UTI's      Hyperlipidemia      Other muscle wasting and atrophy      H/O spinal stenosis      History of thrombocytopenia      H/O CHF      Elective surgery  1956 age 11 @ [facility] - cut under Patella secondary 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      Club foot  Surgery at birth for Club Foot Right foot      Pilonidal cyst  Surgery 40 years ago      H/O colonoscopy      H/O prostate biopsy      Nephrostomy present          MEDICATIONS  (STANDING):  ceftolozane/tazobactam IVPB 1500 milliGRAM(s) IV Intermittent every 8 hours  chlorhexidine 2% Cloths 1 Application(s) Topical <User Schedule>  finasteride 5 milliGRAM(s) Oral daily  pantoprazole    Tablet 40 milliGRAM(s) Oral before breakfast  polyethylene glycol 3350 17 Gram(s) Oral daily  potassium phosphate IVPB 30 milliMole(s) IV Intermittent once  predniSONE   Tablet 10 milliGRAM(s) Oral daily  senna 1 Tablet(s) Oral at bedtime  tamsulosin 0.4 milliGRAM(s) Oral at bedtime    MEDICATIONS  (PRN):  acetaminophen     Tablet .. 650 milliGRAM(s) Oral every 6 hours PRN Temp greater or equal to 38C (100.4F), Mild Pain (1 - 3)  HYDROmorphone  Injectable 0.5 milliGRAM(s) IV Push every 4 hours PRN Moderate Pain (4 - 6)  HYDROmorphone  Injectable 1 milliGRAM(s) IV Push every 4 hours PRN Severe Pain (7 - 10)      Allergies    ofloxacin (Unknown)  gatifloxacin (Unknown)  levofloxacin (Unknown)  Cipro (Unknown)    Intolerances    Ketek (Other)  telithromycin (Other)  fluoroquinolone antibiotics (Other)  Avelox (Other)      VITALS:    Vital Signs Last 24 Hrs  T(C): 37 (07 Feb 2025 11:13), Max: 37.2 (06 Feb 2025 16:23)  T(F): 98.6 (07 Feb 2025 11:13), Max: 98.9 (06 Feb 2025 16:23)  HR: 84 (07 Feb 2025 11:13) (68 - 115)  BP: 100/64 (07 Feb 2025 11:13) (100/64 - 137/121)  BP(mean): 73 (07 Feb 2025 04:00) (63 - 128)  RR: 18 (07 Feb 2025 11:13) (11 - 24)  SpO2: 94% (07 Feb 2025 11:13) (94% - 100%)    Parameters below as of 07 Feb 2025 11:13  Patient On (Oxygen Delivery Method): room air        LABS:                          7.8    x     )-----------( x        ( 07 Feb 2025 10:05 )             23.9       02-07    143  |  111[H]  |  17  ----------------------------<  102[H]  4.4   |  25  |  0.77    Ca    8.8      07 Feb 2025 06:10  Phos  2.1     02-07  Mg     2.1     02-07    TPro  4.9[L]  /  Alb  1.9[L]  /  TBili  0.2  /  DBili  x   /  AST  23  /  ALT  14  /  AlkPhos  52  02-07      CAPILLARY BLOOD GLUCOSE          PT/INR - ( 05 Feb 2025 17:45 )   PT: 15.9 sec;   INR: 1.35 ratio         PTT - ( 05 Feb 2025 17:45 )  PTT:29.7 sec    PHYSICAL EXAM   General: Pleasant  male NAD & AOX3.     Integument:  Skin cold, dry bilateral.     #1 Ulcerations on left heel : wounds covered with stable eschar, wound size 2.0cm x 1.5cm; NEGATIVE yamilet-wound erythema, purulence, fluctuance, tracking/tunneling or probe to bone.    Vascular: Dorsalis Pedis and Posterior Tibial pulses palpable 1/4.  Capillary re-fill time less then 3 seconds digits 1-5 bilateral.     Neuro: Protective sensation diminished to the level of the digits bilateral.   MSK: Muscle strength 4/5 all major muscle groups bilateral.     RADIOLOGY & ADDITIONAL STUDIES:  Left foot Xray : pending

## 2025-02-07 NOTE — PROGRESS NOTE ADULT - ASSESSMENT
78yo M PMH  HTN, HFrEF (prior TTE 11/2024 with EF 35%), PE (on Eliquis), Myasthenia Gravis, and chronic LE edema, nephrolithiasis (s/p nephrostomy tube placement), urosepsis, BPH presents to ER from [facility] with report of large amount of blood from the penis, and penile pain. Admitted for hematuria, active bleed within bladder requiring emergent surgery with urology, s/p Urgent clot removal     CT A/P showed blood products within the urinary bladder with suspicion for active bleed in the region of a TURP defect. S/p cystoscopy with clot evacuation overnight. Would continue empiric antibiotics given urological procedure involving mucosal bleed. WBC 49 bu no fever. Had prior urine culture which grew MDR pseudomonas.    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

## 2025-02-07 NOTE — PROGRESS NOTE ADULT - SUBJECTIVE AND OBJECTIVE BOX
POD#2 s/p bladder clot evacuation    S: Patient seen and examined at bedside.  No acute overnight events.  Patient reports no new complaints at this time. Would not like to be spoken to.     MEDICATIONS:  acetaminophen     Tablet .. 650 milliGRAM(s) Oral every 6 hours PRN  ceftolozane/tazobactam IVPB 1500 milliGRAM(s) IV Intermittent every 8 hours  chlorhexidine 2% Cloths 1 Application(s) Topical <User Schedule>  finasteride 5 milliGRAM(s) Oral daily  HYDROmorphone  Injectable 0.5 milliGRAM(s) IV Push every 4 hours PRN  HYDROmorphone  Injectable 1 milliGRAM(s) IV Push every 4 hours PRN  pantoprazole    Tablet 40 milliGRAM(s) Oral before breakfast  polyethylene glycol 3350 17 Gram(s) Oral daily  predniSONE   Tablet 10 milliGRAM(s) Oral daily  senna 1 Tablet(s) Oral at bedtime  tamsulosin 0.4 milliGRAM(s) Oral at bedtime      O:  Vital Signs Last 24 Hrs  T(C): 36.7 (07 Feb 2025 04:00), Max: 37.2 (06 Feb 2025 16:23)  T(F): 98.1 (07 Feb 2025 04:00), Max: 98.9 (06 Feb 2025 16:23)  HR: 68 (07 Feb 2025 04:00) (68 - 115)  BP: 112/61 (07 Feb 2025 04:00) (93/69 - 137/121)  BP(mean): 73 (07 Feb 2025 04:00) (55 - 128)  RR: 20 (07 Feb 2025 04:00) (11 - 24)  SpO2: 100% (07 Feb 2025 04:00) (95% - 100%)    Parameters below as of 06 Feb 2025 23:00  Patient On (Oxygen Delivery Method): room air        PHYSICAL EXAM:  GENERAL: No acute distress, lying comfortably in bed  HEAD:  Atraumatic, Normocephalic  CHEST/LUNG: Non labored respirations, no accessory muscle use. CTAB; No wheezes, rales, or rhonchi  HEART: Regular rate and rhythm; No murmurs, rubs, or gallops  ABDOMEN: Soft, non-tender, non-distended; bowel sounds+  EXT: calves non-tender b/l, no edema  NEUROLOGY: A&O x 3, no focal deficits  : 3-way guerrero in place with clear yellow urine, minimal hematuric streaking noted. CBI running.  I&O SUMMARY:    02-06-25 @ 07:01  -  02-07-25 @ 07:00  --------------------------------------------------------  IN:    Continuous Bladder Irrigation (mL): 09823 mL    IV PiggyBack: 300 mL    Oral Fluid: 860 mL  Total IN: 71277 mL    OUT:    Continuous Bladder Irrigation (mL): 77851 mL  Total OUT: 87365 mL    Total NET: -2990 mL          LABS:                        9.3    42.89 )-----------( 387      ( 06 Feb 2025 13:30 )             28.1     02-07    143  |  111[H]  |  17  ----------------------------<  102[H]  4.4   |  25  |  0.77    Ca    8.8      07 Feb 2025 06:10  Phos  2.1     02-07  Mg     2.1     02-07    TPro  4.9[L]  /  Alb  1.9[L]  /  TBili  0.2  /  DBili  x   /  AST  23  /  ALT  14  /  AlkPhos  52  02-07    PT/INR - ( 05 Feb 2025 17:45 )   PT: 15.9 sec;   INR: 1.35 ratio         PTT - ( 05 Feb 2025 17:45 )  PTT:29.7 sec      Lactate, Blood: 6.3 mmol/L (02-06 @ 13:30)  Lactate, Blood: 8.8 mmol/L (02-06 @ 00:53)  Lactate, Blood: 7.8 mmol/L (02-05 @ 20:32)  Lactate, Blood: 9.4 mmol/L (02-05 @ 17:45)        RADIOLOGY:    ASSESSMENT:   79y male patient POD#2 S/P cystoscopy with clot evacuation; received 1 unit pRBC in OR was transferred to ICU as patient was meeting sepsis criteria with elevated/increasing WBC, no febrile, but with tachycardia elevated lactate. Downgraded to floor yesterday, now with downtrending WBC (28) and downtrending H/H (7.5/23). 3-way Guerrero originally with balloon tamponade inflated with 40cc and traction. Traction released yesterday, urine continuing to improve in color with clear yellow urine noted in tubing and reservoir of guerrero with minimal clotting/streaking of hematuria noted.    Plan:  - Regular diet  - Continue IV abx  - Continue finasteride and flomax  - Continue CBI  - Pain control  - SCDs  - Hold ACs  - Trend CBC and lactate   - Monitor I&Os  - Will continue to monitor    Case to be discussed with Dr. Antoine  Surgical Team Spectralink: [id number]

## 2025-02-07 NOTE — CONSULT NOTE ADULT - PROBLEM SELECTOR RECOMMENDATION 9
Patient seen and evaluated. For critically ill patient experiencing multiorgan failure and comorbidities, impaired perfusion and bedbound can contribute to skin failure with development of pressure  ulcerations    Continue to off loading feet with pressure relief boots bilaterally   Ordered to apply Silvadene cream on left heel  Wound dressing : Silvadene, 4x4 gauze, abd pad, klings. Frequency : Every 2 days  Recs rotating patient every 2 hours for pressure relief    Medical management as per primary team   Podiatry will follow while in house. No surgical intervention at this time   Stable from podiatry standpoint  Thank you for the consult  Discussed with all attendings. Patient seen and evaluated. For critically ill patient experiencing multiorgan failure and comorbidities, impaired perfusion and bedbound can contribute to skin failure with development of pressure  ulcerations    Continue to off loading feet with pressure relief boots bilaterally   Ordered to apply dry sterile dressing on left heel  Wound dressing : clean with normal saline,  4x4 gauze, abd pad, klings. Frequency : Every 2 days  Recs rotating patient every 2 hours for pressure relief    Medical management as per primary team   Podiatry will follow while in house. No surgical intervention at this time   Stable from podiatry standpoint  Thank you for the consult  Discussed with all attendings. Patient seen and evaluated. For critically ill patient experiencing multiorgan failure and comorbidities, impaired perfusion and bedbound can contribute to skin failure with development of pressure  ulcerations    Continue to off loading feet with pressure relief boots bilaterally   Ordered to apply dry sterile dressing on left heel  Wound dressing : clean with normal saline,  4x4 gauze, abd pad, klings. Frequency : Every 2 days  Recs rotating patient every 2 hours for pressure relief    Medical management as per primary team   Podiatry will follow while in house. No surgical intervention at this time   Thank you for the consult  Discussed with all attendings. Patient seen and evaluated. For critically ill patient experiencing multiorgan failure and comorbidities, impaired perfusion and bedbound can contribute to skin failure with development of pressure  ulcerations    Continue to off loading feet with pressure relief boots bilaterally   Ordered to apply dry sterile dressing on left heel  Wound dressing : clean with normal saline,  4x4 gauze, abd pad, klings. Frequency : Every 2 days  Recs rotating patient every 2 hours for pressure relief    Medical management as per primary team   Podiatry will follow while in house.   Thank you for the consult  Discussed with all attendings.

## 2025-02-07 NOTE — PROGRESS NOTE ADULT - SUBJECTIVE AND OBJECTIVE BOX
[facility]  INFECTIOUS DISEASES - [address], [place] [address]  Tel: [phone]     Fax: [phone]  =======================================================    DEION HEATH [id number]    Follow up: No fevers. Denies any pain or SOB.    Allergies:  Ketek (Other)  telithromycin (Other)  ofloxacin (Unknown)  gatifloxacin (Unknown)  fluoroquinolone antibiotics (Other)  Avelox (Other)  levofloxacin (Unknown)  Cipro (Unknown)      Antibiotics:  acetaminophen     Tablet .. 650 milliGRAM(s) Oral every 6 hours PRN  ceftolozane/tazobactam IVPB 1500 milliGRAM(s) IV Intermittent every 8 hours  chlorhexidine 2% Cloths 1 Application(s) Topical <User Schedule>  finasteride 5 milliGRAM(s) Oral daily  HYDROmorphone  Injectable 0.5 milliGRAM(s) IV Push every 4 hours PRN  HYDROmorphone  Injectable 1 milliGRAM(s) IV Push every 4 hours PRN  pantoprazole    Tablet 40 milliGRAM(s) Oral before breakfast  polyethylene glycol 3350 17 Gram(s) Oral daily  predniSONE   Tablet 10 milliGRAM(s) Oral daily  senna 1 Tablet(s) Oral at bedtime  tamsulosin 0.4 milliGRAM(s) Oral at bedtime       REVIEW OF SYSTEMS: *limited*  CONSTITUTIONAL:  No Feves  CARDIOVASCULAR:  No chest pain or SOB  RESPIRATORY:  No cough, shortness of breath  GASTROINTESTINAL:  No nausea, vomiting or diarrhea.  MUSCULOSKELETAL:  no back pain  NEUROLOGIC:  No headache     Physical Exam:  ICU Vital Signs Last 24 Hrs  T(C): 37 (07 Feb 2025 11:13), Max: 37.1 (07 Feb 2025 00:12)  T(F): 98.6 (07 Feb 2025 11:13), Max: 98.8 (07 Feb 2025 00:12)  HR: 84 (07 Feb 2025 11:13) (68 - 106)  BP: 100/64 (07 Feb 2025 11:13) (100/64 - 136/56)  BP(mean): 73 (07 Feb 2025 04:00) (63 - 105)  ABP: --  ABP(mean): --  RR: 18 (07 Feb 2025 11:13) (11 - 24)  SpO2: 94% (07 Feb 2025 11:13) (94% - 100%)    O2 Parameters below as of 07 Feb 2025 11:13  Patient On (Oxygen Delivery Method): room air           GEN: NAD  HEENT: normocephalic and atraumatic.  NECK: Supple.    LUNGS: Normal respiratory effort  HEART: Regular rate and rhythm   ABDOMEN: Soft, nontender, and nondistended.   : + Restrepo   EXTREMITIES: No leg edema.  NEUROLOGIC: Answering some simple questions    Labs:  02-07    143  |  111[H]  |  17  ----------------------------<  102[H]  4.4   |  25  |  0.77    Ca    8.8      07 Feb 2025 06:10  Phos  2.1     02-07  Mg     2.1     02-07    TPro  4.9[L]  /  Alb  1.9[L]  /  TBili  0.2  /  DBili  x   /  AST  23  /  ALT  14  /  AlkPhos  52  02-07                          7.8    x     )-----------( x        ( 07 Feb 2025 10:05 )             23.9     PT/INR - ( 05 Feb 2025 17:45 )   PT: 15.9 sec;   INR: 1.35 ratio         PTT - ( 05 Feb 2025 17:45 )  PTT:29.7 sec  Urinalysis Basic - ( 07 Feb 2025 06:10 )    Color: x / Appearance: x / SG: x / pH: x  Gluc: 102 mg/dL / Ketone: x  / Bili: x / Urobili: x   Blood: x / Protein: x / Nitrite: x   Leuk Esterase: x / RBC: x / WBC x   Sq Epi: x / Non Sq Epi: x / Bacteria: x      LIVER FUNCTIONS - ( 07 Feb 2025 06:10 )  Alb: 1.9 g/dL / Pro: 4.9 g/dL / ALK PHOS: 52 U/L / ALT: 14 U/L / AST: 23 U/L / GGT: x             RECENT CULTURES:  02-06 @ 01:00 Catheterized Catheterized     <10,000 CFU/mL Normal Urogenital Fariba        02-05 @ 17:50 .Blood BLOOD     No growth at 24 hours        02-05 @ 17:45 .Blood BLOOD     No growth at 24 hours              All imaging and data are reviewed.

## 2025-02-07 NOTE — PROGRESS NOTE ADULT - SUBJECTIVE AND OBJECTIVE BOX
Patient is a 79y old  Male who presents with a chief complaint of hematuria, active bleed within bladder requiring emergent surgery with urology (07 Feb 2025 08:00)       INTERVAL HPI/OVERNIGHT EVENTS: Patient seen and examined at bedside. Denies any new symptoms, complaints. Denies chest pain, palpitations, sob, n/v/d/c     MEDICATIONS  (STANDING):  ceftolozane/tazobactam IVPB 1500 milliGRAM(s) IV Intermittent every 8 hours  chlorhexidine 2% Cloths 1 Application(s) Topical <User Schedule>  finasteride 5 milliGRAM(s) Oral daily  pantoprazole    Tablet 40 milliGRAM(s) Oral before breakfast  polyethylene glycol 3350 17 Gram(s) Oral daily  predniSONE   Tablet 10 milliGRAM(s) Oral daily  senna 1 Tablet(s) Oral at bedtime  tamsulosin 0.4 milliGRAM(s) Oral at bedtime    MEDICATIONS  (PRN):  acetaminophen     Tablet .. 650 milliGRAM(s) Oral every 6 hours PRN Temp greater or equal to 38C (100.4F), Mild Pain (1 - 3)  HYDROmorphone  Injectable 0.5 milliGRAM(s) IV Push every 4 hours PRN Moderate Pain (4 - 6)  HYDROmorphone  Injectable 1 milliGRAM(s) IV Push every 4 hours PRN Severe Pain (7 - 10)      Allergies    ofloxacin (Unknown)  gatifloxacin (Unknown)  levofloxacin (Unknown)  Cipro (Unknown)    Intolerances    Ketek (Other)  telithromycin (Other)  fluoroquinolone antibiotics (Other)  Avelox (Other)      REVIEW OF SYSTEMS:  Per HPI. All other ROS Noted Negative   Vital Signs Last 24 Hrs  T(C): 36.7 (07 Feb 2025 04:00), Max: 37.2 (06 Feb 2025 16:23)  T(F): 98.1 (07 Feb 2025 04:00), Max: 98.9 (06 Feb 2025 16:23)  HR: 68 (07 Feb 2025 04:00) (68 - 115)  BP: 112/61 (07 Feb 2025 04:00) (97/36 - 137/121)  BP(mean): 73 (07 Feb 2025 04:00) (55 - 128)  RR: 20 (07 Feb 2025 04:00) (11 - 24)  SpO2: 100% (07 Feb 2025 04:00) (95% - 100%)    Parameters below as of 06 Feb 2025 23:00  Patient On (Oxygen Delivery Method): room air        PHYSICAL EXAM:  GENERAL: NAD, well-groomed, well-developed  HEAD:  Atraumatic, Normocephalic  EYES: EOMI, PERRLA, conjunctiva and sclera clear  ENMT: No tonsillar erythema, exudates, or enlargement; Moist mucous membranes, Good dentition, No lesions  NECK: Supple, No JVD, Normal thyroid  NERVOUS SYSTEM:  Alert & Oriented X3, Good concentration; Motor Strength 5/5 B/L upper and lower extremities; DTRs 2+ intact and symmetric  CHEST/LUNG: Clear to auscultation bilaterally; No rales, rhonchi, wheezing, or rubs  HEART: Regular rate and rhythm; No murmurs, rubs, or gallops  ABDOMEN: Soft, Nontender, Nondistended; Bowel sounds present  EXTREMITIES:  2+ Peripheral Pulses, No clubbing, cyanosis, or edema  LYMPH: No lymphadenopathy noted  SKIN: No rashes or lesions    LABS:                        7.5    28.07 )-----------( 329      ( 07 Feb 2025 06:10 )             23.0     07 Feb 2025 06:10    143    |  111    |  17     ----------------------------<  102    4.4     |  25     |  0.77     Ca    8.8        07 Feb 2025 06:10  Phos  2.1       07 Feb 2025 06:10  Mg     2.1       07 Feb 2025 06:10    TPro  4.9    /  Alb  1.9    /  TBili  0.2    /  DBili  x      /  AST  23     /  ALT  14     /  AlkPhos  52     07 Feb 2025 06:10    PT/INR - ( 05 Feb 2025 17:45 )   PT: 15.9 sec;   INR: 1.35 ratio         PTT - ( 05 Feb 2025 17:45 )  PTT:29.7 sec  CAPILLARY BLOOD GLUCOSE        BLOOD CULTURE  02-06 @ 01:00   <10,000 CFU/mL Normal Urogenital Fariba  --  --  02-05 @ 17:50   No growth at 24 hours  --  --  02-05 @ 17:45   No growth at 24 hours  --  --    RADIOLOGY & ADDITIONAL TESTS:    Imaging Personally Reviewed:  [ ] YES     Consultant(s) Notes Reviewed:      Care Discussed with Consultants/Other Providers:

## 2025-02-07 NOTE — CONSULT NOTE ADULT - ATTENDING COMMENTS
Patient was seen by resident and not willing to allow wound assessment at this time   Ordered x- rays to r/o OM   Recommend vascular consult to assess healing potential for unstageable ulcer to the left heel   Will change wound care dressing to santyl once x- rays obtained to r/o OM  May need MRI to r/o OM once patient is stable  Will c/w local wound care and offloading   Keep heel offloaded with pillows under the calf to alleviate the pressure and aid with healing

## 2025-02-08 LAB
ALBUMIN SERPL ELPH-MCNC: 1.9 G/DL — LOW (ref 3.3–5)
ALP SERPL-CCNC: 52 U/L — SIGNIFICANT CHANGE UP (ref 40–120)
ALT FLD-CCNC: 13 U/L — SIGNIFICANT CHANGE UP (ref 12–78)
ANION GAP SERPL CALC-SCNC: 7 MMOL/L — SIGNIFICANT CHANGE UP (ref 5–17)
AST SERPL-CCNC: 15 U/L — SIGNIFICANT CHANGE UP (ref 15–37)
BASOPHILS # BLD AUTO: 0.04 K/UL — SIGNIFICANT CHANGE UP (ref 0–0.2)
BASOPHILS # BLD AUTO: 0.04 K/UL — SIGNIFICANT CHANGE UP (ref 0–0.2)
BASOPHILS NFR BLD AUTO: 0.2 % — SIGNIFICANT CHANGE UP (ref 0–2)
BASOPHILS NFR BLD AUTO: 0.2 % — SIGNIFICANT CHANGE UP (ref 0–2)
BILIRUB SERPL-MCNC: 0.2 MG/DL — SIGNIFICANT CHANGE UP (ref 0.2–1.2)
BUN SERPL-MCNC: 15 MG/DL — SIGNIFICANT CHANGE UP (ref 7–23)
CALCIUM SERPL-MCNC: 8.5 MG/DL — SIGNIFICANT CHANGE UP (ref 8.5–10.1)
CHLORIDE SERPL-SCNC: 112 MMOL/L — HIGH (ref 96–108)
CO2 SERPL-SCNC: 24 MMOL/L — SIGNIFICANT CHANGE UP (ref 22–31)
CREAT SERPL-MCNC: 0.67 MG/DL — SIGNIFICANT CHANGE UP (ref 0.5–1.3)
EGFR: 94 ML/MIN/1.73M2 — SIGNIFICANT CHANGE UP
EOSINOPHIL # BLD AUTO: 0.15 K/UL — SIGNIFICANT CHANGE UP (ref 0–0.5)
EOSINOPHIL # BLD AUTO: 0.57 K/UL — HIGH (ref 0–0.5)
EOSINOPHIL NFR BLD AUTO: 0.8 % — SIGNIFICANT CHANGE UP (ref 0–6)
EOSINOPHIL NFR BLD AUTO: 3.3 % — SIGNIFICANT CHANGE UP (ref 0–6)
GLUCOSE SERPL-MCNC: 80 MG/DL — SIGNIFICANT CHANGE UP (ref 70–99)
HCT VFR BLD CALC: 20.7 % — CRITICAL LOW (ref 39–50)
HCT VFR BLD CALC: 26.7 % — LOW (ref 39–50)
HGB BLD-MCNC: 6.6 G/DL — CRITICAL LOW (ref 13–17)
HGB BLD-MCNC: 8.5 G/DL — LOW (ref 13–17)
IMM GRANULOCYTES NFR BLD AUTO: 1.3 % — HIGH (ref 0–0.9)
IMM GRANULOCYTES NFR BLD AUTO: 1.6 % — HIGH (ref 0–0.9)
LYMPHOCYTES # BLD AUTO: 12 % — LOW (ref 13–44)
LYMPHOCYTES # BLD AUTO: 13.1 % — SIGNIFICANT CHANGE UP (ref 13–44)
LYMPHOCYTES # BLD AUTO: 2.24 K/UL — SIGNIFICANT CHANGE UP (ref 1–3.3)
LYMPHOCYTES # BLD AUTO: 2.27 K/UL — SIGNIFICANT CHANGE UP (ref 1–3.3)
MCHC RBC-ENTMCNC: 28.6 PG — SIGNIFICANT CHANGE UP (ref 27–34)
MCHC RBC-ENTMCNC: 28.6 PG — SIGNIFICANT CHANGE UP (ref 27–34)
MCHC RBC-ENTMCNC: 31.8 G/DL — LOW (ref 32–36)
MCHC RBC-ENTMCNC: 31.9 G/DL — LOW (ref 32–36)
MCV RBC AUTO: 89.6 FL — SIGNIFICANT CHANGE UP (ref 80–100)
MCV RBC AUTO: 89.9 FL — SIGNIFICANT CHANGE UP (ref 80–100)
MONOCYTES # BLD AUTO: 1.81 K/UL — HIGH (ref 0–0.9)
MONOCYTES # BLD AUTO: 1.81 K/UL — HIGH (ref 0–0.9)
MONOCYTES NFR BLD AUTO: 10.4 % — SIGNIFICANT CHANGE UP (ref 2–14)
MONOCYTES NFR BLD AUTO: 9.7 % — SIGNIFICANT CHANGE UP (ref 2–14)
NEUTROPHILS # BLD AUTO: 12.36 K/UL — HIGH (ref 1.8–7.4)
NEUTROPHILS # BLD AUTO: 14.18 K/UL — HIGH (ref 1.8–7.4)
NEUTROPHILS NFR BLD AUTO: 71.4 % — SIGNIFICANT CHANGE UP (ref 43–77)
NEUTROPHILS NFR BLD AUTO: 76 % — SIGNIFICANT CHANGE UP (ref 43–77)
NRBC # BLD: 0 /100 WBCS — SIGNIFICANT CHANGE UP (ref 0–0)
NRBC # BLD: 0 /100 WBCS — SIGNIFICANT CHANGE UP (ref 0–0)
NRBC BLD-RTO: 0 /100 WBCS — SIGNIFICANT CHANGE UP (ref 0–0)
NRBC BLD-RTO: 0 /100 WBCS — SIGNIFICANT CHANGE UP (ref 0–0)
PHOSPHATE SERPL-MCNC: 2.4 MG/DL — LOW (ref 2.5–4.5)
PLATELET # BLD AUTO: 294 K/UL — SIGNIFICANT CHANGE UP (ref 150–400)
PLATELET # BLD AUTO: 307 K/UL — SIGNIFICANT CHANGE UP (ref 150–400)
POTASSIUM SERPL-MCNC: 3.5 MMOL/L — SIGNIFICANT CHANGE UP (ref 3.5–5.3)
POTASSIUM SERPL-SCNC: 3.5 MMOL/L — SIGNIFICANT CHANGE UP (ref 3.5–5.3)
PROT SERPL-MCNC: 4.7 G/DL — LOW (ref 6–8.3)
RBC # BLD: 2.31 M/UL — LOW (ref 4.2–5.8)
RBC # BLD: 2.97 M/UL — LOW (ref 4.2–5.8)
RBC # FLD: 15.4 % — HIGH (ref 10.3–14.5)
RBC # FLD: 16 % — HIGH (ref 10.3–14.5)
SODIUM SERPL-SCNC: 143 MMOL/L — SIGNIFICANT CHANGE UP (ref 135–145)
WBC # BLD: 17.33 K/UL — HIGH (ref 3.8–10.5)
WBC # BLD: 18.67 K/UL — HIGH (ref 3.8–10.5)
WBC # FLD AUTO: 17.33 K/UL — HIGH (ref 3.8–10.5)
WBC # FLD AUTO: 18.67 K/UL — HIGH (ref 3.8–10.5)

## 2025-02-08 PROCEDURE — 99233 SBSQ HOSP IP/OBS HIGH 50: CPT

## 2025-02-08 PROCEDURE — 73630 X-RAY EXAM OF FOOT: CPT | Mod: 26,LT

## 2025-02-08 RX ORDER — POTASSIUM PHOSPHATE, MONOBASIC POTASSIUM PHOSPHATE, DIBASIC 236; 224 MG/ML; MG/ML
30 INJECTION, SOLUTION INTRAVENOUS ONCE
Refills: 0 | Status: COMPLETED | OUTPATIENT
Start: 2025-02-08 | End: 2025-02-08

## 2025-02-08 RX ORDER — SODIUM PHOSPHATE, DIBASIC, ANHYDROUS, POTASSIUM PHOSPHATE, MONOBASIC, AND SODIUM PHOSPHATE, MONOBASIC, MONOHYDRATE 852; 155; 130 MG/1; MG/1; MG/1
1 TABLET, COATED ORAL
Refills: 0 | Status: DISCONTINUED | OUTPATIENT
Start: 2025-02-09 | End: 2025-02-09

## 2025-02-08 RX ADMIN — PREDNISONE 10 MILLIGRAM(S): 5 TABLET ORAL at 06:43

## 2025-02-08 RX ADMIN — ANTISEPTIC SURGICAL SCRUB 1 APPLICATION(S): 0.04 SOLUTION TOPICAL at 06:47

## 2025-02-08 RX ADMIN — TAMSULOSIN HYDROCHLORIDE 0.4 MILLIGRAM(S): 0.4 CAPSULE ORAL at 22:44

## 2025-02-08 RX ADMIN — PANTOPRAZOLE 40 MILLIGRAM(S): 20 TABLET, DELAYED RELEASE ORAL at 06:44

## 2025-02-08 RX ADMIN — POLYETHYLENE GLYCOL 3350 17 GRAM(S): 17 POWDER, FOR SOLUTION ORAL at 11:01

## 2025-02-08 RX ADMIN — POTASSIUM PHOSPHATE, MONOBASIC POTASSIUM PHOSPHATE, DIBASIC 83.33 MILLIMOLE(S): 236; 224 INJECTION, SOLUTION INTRAVENOUS at 20:03

## 2025-02-08 RX ADMIN — CEFTOLOZANE AND TAZOBACTAM 100 MILLIGRAM(S): 1; .5 INJECTION, POWDER, LYOPHILIZED, FOR SOLUTION INTRAVENOUS at 10:53

## 2025-02-08 RX ADMIN — Medication 5 MILLIGRAM(S): at 11:01

## 2025-02-08 RX ADMIN — CEFTOLOZANE AND TAZOBACTAM 100 MILLIGRAM(S): 1; .5 INJECTION, POWDER, LYOPHILIZED, FOR SOLUTION INTRAVENOUS at 02:09

## 2025-02-08 RX ADMIN — CEFTOLOZANE AND TAZOBACTAM 100 MILLIGRAM(S): 1; .5 INJECTION, POWDER, LYOPHILIZED, FOR SOLUTION INTRAVENOUS at 18:47

## 2025-02-08 NOTE — PROGRESS NOTE ADULT - SUBJECTIVE AND OBJECTIVE BOX
Patient is a 79y old  Male who presents with a chief complaint of hematuria, active bleed within bladder requiring emergent surgery with urology (07 Feb 2025 16:24)       INTERVAL HPI/OVERNIGHT EVENTS: Patient seen and examined at bedside. Awake, alert. Denies any new symptoms, complaints. No chest pain, palpitations sob    MEDICATIONS  (STANDING):  ceftolozane/tazobactam IVPB 1500 milliGRAM(s) IV Intermittent every 8 hours  chlorhexidine 2% Cloths 1 Application(s) Topical <User Schedule>  finasteride 5 milliGRAM(s) Oral daily  pantoprazole    Tablet 40 milliGRAM(s) Oral before breakfast  polyethylene glycol 3350 17 Gram(s) Oral daily  predniSONE   Tablet 10 milliGRAM(s) Oral daily  senna 1 Tablet(s) Oral at bedtime  tamsulosin 0.4 milliGRAM(s) Oral at bedtime    MEDICATIONS  (PRN):  acetaminophen     Tablet .. 650 milliGRAM(s) Oral every 6 hours PRN Temp greater or equal to 38C (100.4F), Mild Pain (1 - 3)  HYDROmorphone  Injectable 0.5 milliGRAM(s) IV Push every 4 hours PRN Moderate Pain (4 - 6)  HYDROmorphone  Injectable 1 milliGRAM(s) IV Push every 4 hours PRN Severe Pain (7 - 10)      Allergies    ofloxacin (Unknown)  gatifloxacin (Unknown)  levofloxacin (Unknown)  Cipro (Unknown)    Intolerances    Ketek (Other)  telithromycin (Other)  fluoroquinolone antibiotics (Other)  Avelox (Other)      REVIEW OF SYSTEMS:  Per HPI. All other ROS Noted Negative   Vital Signs Last 24 Hrs  T(C): 36.6 (08 Feb 2025 04:40), Max: 37 (07 Feb 2025 11:13)  T(F): 97.8 (08 Feb 2025 04:40), Max: 98.6 (07 Feb 2025 11:13)  HR: 63 (08 Feb 2025 04:40) (63 - 84)  BP: 121/70 (08 Feb 2025 04:40) (100/64 - 121/70)  BP(mean): --  RR: 16 (08 Feb 2025 04:40) (16 - 18)  SpO2: 95% (08 Feb 2025 04:40) (94% - 95%)    Parameters below as of 08 Feb 2025 04:40  Patient On (Oxygen Delivery Method): room air        PHYSICAL EXAM:  GENERAL: NAD, Restrepo C in place   HEAD:  Atraumatic, Normocephalic  EYES: EOMI, PERRLA, conjunctiva and sclera clear  ENMT: No tonsillar erythema, exudates, or enlargement; Moist mucous membranes, Good dentition, No lesions  NECK: Supple, No JVD, Normal thyroid  NERVOUS SYSTEM:  Alert & Oriented X3, Good concentration; Motor Strength 5/5 B/L upper and lower extremities; DTRs 2+ intact and symmetric  CHEST/LUNG: Clear to auscultation bilaterally; No rales, rhonchi, wheezing, or rubs  HEART: Regular rate and rhythm; No murmurs, rubs, or gallops  ABDOMEN: Soft, Nontender, Nondistended; Bowel sounds present  EXTREMITIES:  2+ Peripheral Pulses, No clubbing, cyanosis, or edema  LYMPH: No lymphadenopathy noted  SKIN: No rashes or lesions    LABS:                        7.8    x     )-----------( x        ( 07 Feb 2025 10:05 )             23.9       Ca    8.8        07 Feb 2025 06:10        CAPILLARY BLOOD GLUCOSE        BLOOD CULTURE  02-06 @ 01:00   <10,000 CFU/mL Normal Urogenital Fariba  --  --  02-05 @ 17:50   No growth at 48 Hours  --  --  02-05 @ 17:45   No growth at 48 Hours  --  --    RADIOLOGY & ADDITIONAL TESTS:    Imaging Personally Reviewed:  [ ] YES     Consultant(s) Notes Reviewed:      Care Discussed with Consultants/Other Providers:

## 2025-02-08 NOTE — PROVIDER CONTACT NOTE (CRITICAL VALUE NOTIFICATION) - ACTION/TREATMENT ORDERED:
"Subjective:       Patient ID: Ned Oliveira is a 64 y.o. male.    Chief Complaint: Follow-up (needs refills on diabetic and pressure medications )    HPI   Mr. Oliveira is a 64 y.o M w/ a medical history significant for HTN, T2DM, CAD, Adrenal adenoma, Aortic atherosclerosis, HLD, BPH s/p inguinal hernia repair who is presenting to clinic to establish care and for medication refill.     On assessment today, the patient reports that he has burning CP and reports mild SOB w/ strenuous exertion, which improves w/ rest (5 min's). However, this has been going on for many years the pain is unchanged and not severe.      Per chart review; In 2009 he had an abnormal stress echo (developed angina) and LHC demonstrated diffuse "3VD".  Referred to CT surgery for CABG considering DMII, but was lost to follow up because of financial reasons.        Review of Systems   Constitutional: Negative for chills and fever.   HENT: Negative for sore throat and tinnitus.    Eyes: Negative for photophobia.        Blurry vision    Respiratory: Negative for cough, chest tightness, shortness of breath and wheezing.    Cardiovascular: Positive for chest pain (strenous exercise ). Negative for palpitations and leg swelling.   Gastrointestinal: Negative for abdominal pain, diarrhea, nausea and vomiting.        Chest burning    Genitourinary: Negative for difficulty urinating and dysuria.   Musculoskeletal: Negative for arthralgias and joint swelling.   Skin: Negative for color change and pallor.   Neurological: Negative for dizziness and light-headedness.       Objective:         Vitals:    03/18/19 1412 03/18/19 1459   BP: (!) 144/80 135/75   BP Location: Left arm    Patient Position: Sitting    BP Method: Large (Manual)    Pulse: 64    SpO2: 98%    Weight: 65 kg (143 lb 4.8 oz)    Height: 5' 3" (1.6 m)        Physical Exam   Constitutional: He is oriented to person, place, and time. He appears well-developed and well-nourished.   HENT:   Head: "
ongoing
Normocephalic and atraumatic.   Eyes: EOM are normal. Pupils are equal, round, and reactive to light.   Neck: Normal range of motion.   Cardiovascular: Normal rate and regular rhythm.   Pulmonary/Chest: Effort normal and breath sounds normal.   Abdominal: Soft. Bowel sounds are normal.   Musculoskeletal: Normal range of motion. He exhibits no edema.   Neurological: He is alert and oriented to person, place, and time.       Assessment:       1. Medication refill    2. Urinary retention    3. BPH with obstruction/lower urinary tract symptoms    4. Essential hypertension    5. Screening for AAA (abdominal aortic aneurysm)        Plan:       Medical refill  - meds refilled  - switched from Crestor to lipitor     CAD  - discussed with patient if his pain occurs more frequently, intensity changes he needs to go to ED immediately  - continue ASA and metoprolol (HR 64) will consider up titrating if symptoms persist  - continue high intensity statin    Health Maintenance   - AAA screening  - Pneumonia shot          Louis Herrera MD     Internal Medicine PGY-2        
She is going to look into his chart and will put some orders.
ongoing
ongoing
One unit of blood ordered

## 2025-02-08 NOTE — PROGRESS NOTE ADULT - SUBJECTIVE AND OBJECTIVE BOX
SUBJECTIVE:  Patient seen and examined at bedside.  No overnight events.  Patient reports no new complaints at this time, states doing well. Patient denies any fever, chills, chest pain, shortness of breath, nausea, vomiting, or urinary complaints.    VITALS  Vital Signs Last 24 Hrs  T(C): 36.6 (08 Feb 2025 04:40), Max: 36.6 (08 Feb 2025 04:40)  T(F): 97.8 (08 Feb 2025 04:40), Max: 97.8 (08 Feb 2025 04:40)  HR: 63 (08 Feb 2025 04:40) (63 - 63)  BP: 121/70 (08 Feb 2025 04:40) (121/70 - 121/70)  RR: 16 (08 Feb 2025 04:40) (16 - 16)  SpO2: 95% (08 Feb 2025 04:40) (95% - 95%)    Parameters below as of 08 Feb 2025 04:40  Patient On (Oxygen Delivery Method): room air        PHYSICAL EXAM  GENERAL:  Well-developed Male lying comfortably in bed in [facility].  HEENT:  NC/AT. Sclera white. Mucous membranes moist.  RESPIRATORY:  Nonlabored breathing, no accessory muscle use.   ABDOMEN:  Soft, nondistended, nontender.     INTAKE & OUTPUT  I&O's Summary    07 Feb 2025 07:01  -  08 Feb 2025 07:00  --------------------------------------------------------  IN: 0 mL / OUT: 2100 mL / NET: -2100 mL      I&O's Detail    07 Feb 2025 07:01  -  08 Feb 2025 07:00  --------------------------------------------------------  IN:  Total IN: 0 mL    OUT:    Continuous Bladder Irrigation (mL): 1700 mL    Indwelling Catheter - Urethral (mL): 400 mL  Total OUT: 2100 mL    Total NET: -2100 mL          MEDICATIONS  MEDICATIONS  (STANDING):  ceftolozane/tazobactam IVPB 1500 milliGRAM(s) IV Intermittent every 8 hours  chlorhexidine 2% Cloths 1 Application(s) Topical <User Schedule>  finasteride 5 milliGRAM(s) Oral daily  pantoprazole    Tablet 40 milliGRAM(s) Oral before breakfast  polyethylene glycol 3350 17 Gram(s) Oral daily  potassium phosphate IVPB 30 milliMole(s) IV Intermittent once  predniSONE   Tablet 10 milliGRAM(s) Oral daily  senna 1 Tablet(s) Oral at bedtime  tamsulosin 0.4 milliGRAM(s) Oral at bedtime    MEDICATIONS  (PRN):  acetaminophen     Tablet .. 650 milliGRAM(s) Oral every 6 hours PRN Temp greater or equal to 38C (100.4F), Mild Pain (1 - 3)  HYDROmorphone  Injectable 0.5 milliGRAM(s) IV Push every 4 hours PRN Moderate Pain (4 - 6)  HYDROmorphone  Injectable 1 milliGRAM(s) IV Push every 4 hours PRN Severe Pain (7 - 10)      LABS:                        6.6    17.33 )-----------( 307      ( 08 Feb 2025 07:35 )             20.7     02-08    143  |  112[H]  |  15  ----------------------------<  80  3.5   |  24  |  0.67    Ca    8.5      08 Feb 2025 07:35  Phos  2.4     02-08  Mg     2.1     02-07    TPro  4.7[L]  /  Alb  1.9[L]  /  TBili  0.2  /  DBili  x   /  AST  15  /  ALT  13  /  AlkPhos  52  02-08        Culture - Urine (collected 06 Feb 2025 01:00)  Source: Catheterized Catheterized  Final Report (06 Feb 2025 23:31):    <10,000 CFU/mL Normal Urogenital Fariba    Urinalysis with Rflx Culture (collected 05 Feb 2025 17:59)    Culture - Blood (collected 05 Feb 2025 17:50)  Source: .Blood BLOOD  Preliminary Report (08 Feb 2025 03:02):    No growth at 48 Hours    Culture - Blood (collected 05 Feb 2025 17:45)  Source: .Blood BLOOD  Preliminary Report (08 Feb 2025 03:02):    No growth at 48 Hours

## 2025-02-08 NOTE — PROGRESS NOTE ADULT - ASSESSMENT
80yo M PMH  HTN, HFrEF (prior TTE 11/2024 with EF 35%), PE (on Eliquis), Myasthenia Gravis, and chronic LE edema, nephrolithiasis (s/p nephrostomy tube placement), urosepsis, BPH presents to ER from [facility] with report of large amount of blood from the penis, and penile pain. Admitted for hematuria, active bleed within bladder requiring emergent surgery with urology, s/p Urgent clot removal     CT A/P showed blood products within the urinary bladder with suspicion for active bleed in the region of a TURP defect. S/p cystoscopy with clot evacuation overnight. Would continue empiric antibiotics given urological procedure involving mucosal bleed. WBC 49 bu no fever. Had prior urine culture which grew MDR pseudomonas.    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

## 2025-02-08 NOTE — PROVIDER CONTACT NOTE (CRITICAL VALUE NOTIFICATION) - PERSON GIVING RESULT:
Jaky Rice- lab
Ashutosh Kc-lab
Gregg Edge
Gregg Jason/ Lab
Nani Fernandez from lab
Anuradha Herr
Shital from the lab
Orly Kc

## 2025-02-08 NOTE — PROGRESS NOTE ADULT - ASSESSMENT
ASSESSMENT & PLAN  79y male patient POD#3 S/P cystoscopy with clot evacuation; received 1 unit pRBC in OR was transferred to ICU as patient was meeting sepsis criteria with elevated/increasing WBC, no febrile, but with tachycardia elevated lactate. Downgraded to floor, now with downtrending WBC 17.33 from 28.07 and downtrending H/H 6.6/20.7 and 7.8/23.9. 3-way Restrepo originally with balloon tamponade inflated with 40cc and traction. Traction released 2/6, urine color light pink in tubing and fruit punched color in reservoir, off CBI.    - Regular diet  - Continue IV abx  - Continue finasteride and flomax  - Pain control  - SCDs  - Hold ACs  - Trend WBC   - Monitor I&Os  - Will continue to monitor

## 2025-02-08 NOTE — PROVIDER CONTACT NOTE (CRITICAL VALUE NOTIFICATION) - NAME OF MD/NP/PA/DO NOTIFIED:
Dr Frey
Ludwig Stein, PA
Dr Katelin Brenner
Jordin
[facility]ryan Aceved[facility]
Dr. Brenner
[facility]Ayan Aceved[facility]

## 2025-02-08 NOTE — PROGRESS NOTE ADULT - PROBLEM SELECTOR PLAN 1
Patient h/o nephrolithiasis s/p nephrostomy tube placement,  s/p TURP at [facility] in 1/2025 now presents with hematuria, found to have bleeding within bladder on imaging  - S/p Urgent Clot removal per    - S/p  Balfaxar  - HD stable   - elevated lactate; improving   - Maintain active type and screen; may require pRBC (1U Ordered)  - pain control   - continue 3-way guerrero that was placed in the ED, irrigate as needed  - ID Dr. Connor. To continue IV Zerbaxa  -Left Heel Ulcer. Podiatry consulted. X rays Pending.

## 2025-02-09 LAB
ALBUMIN SERPL ELPH-MCNC: 1.7 G/DL — LOW (ref 3.3–5)
ALP SERPL-CCNC: 58 U/L — SIGNIFICANT CHANGE UP (ref 40–120)
ALT FLD-CCNC: 11 U/L — LOW (ref 12–78)
ANION GAP SERPL CALC-SCNC: 6 MMOL/L — SIGNIFICANT CHANGE UP (ref 5–17)
AST SERPL-CCNC: 12 U/L — LOW (ref 15–37)
BASOPHILS # BLD AUTO: 0.05 K/UL — SIGNIFICANT CHANGE UP (ref 0–0.2)
BASOPHILS NFR BLD AUTO: 0.4 % — SIGNIFICANT CHANGE UP (ref 0–2)
BILIRUB SERPL-MCNC: 0.2 MG/DL — SIGNIFICANT CHANGE UP (ref 0.2–1.2)
BUN SERPL-MCNC: 14 MG/DL — SIGNIFICANT CHANGE UP (ref 7–23)
CALCIUM SERPL-MCNC: 7.9 MG/DL — LOW (ref 8.5–10.1)
CHLORIDE SERPL-SCNC: 113 MMOL/L — HIGH (ref 96–108)
CO2 SERPL-SCNC: 25 MMOL/L — SIGNIFICANT CHANGE UP (ref 22–31)
CREAT SERPL-MCNC: 0.56 MG/DL — SIGNIFICANT CHANGE UP (ref 0.5–1.3)
EGFR: 99 ML/MIN/1.73M2 — SIGNIFICANT CHANGE UP
EOSINOPHIL # BLD AUTO: 0.61 K/UL — HIGH (ref 0–0.5)
EOSINOPHIL NFR BLD AUTO: 4.4 % — SIGNIFICANT CHANGE UP (ref 0–6)
GLUCOSE SERPL-MCNC: 106 MG/DL — HIGH (ref 70–99)
HCT VFR BLD CALC: 22.2 % — LOW (ref 39–50)
HCT VFR BLD CALC: 23.9 % — LOW (ref 39–50)
HGB BLD-MCNC: 7.3 G/DL — LOW (ref 13–17)
HGB BLD-MCNC: 8 G/DL — LOW (ref 13–17)
IMM GRANULOCYTES NFR BLD AUTO: 1.7 % — HIGH (ref 0–0.9)
LYMPHOCYTES # BLD AUTO: 16.8 % — SIGNIFICANT CHANGE UP (ref 13–44)
LYMPHOCYTES # BLD AUTO: 2.32 K/UL — SIGNIFICANT CHANGE UP (ref 1–3.3)
MCHC RBC-ENTMCNC: 28.5 PG — SIGNIFICANT CHANGE UP (ref 27–34)
MCHC RBC-ENTMCNC: 32.9 G/DL — SIGNIFICANT CHANGE UP (ref 32–36)
MCV RBC AUTO: 86.7 FL — SIGNIFICANT CHANGE UP (ref 80–100)
MONOCYTES # BLD AUTO: 1.45 K/UL — HIGH (ref 0–0.9)
MONOCYTES NFR BLD AUTO: 10.5 % — SIGNIFICANT CHANGE UP (ref 2–14)
NEUTROPHILS # BLD AUTO: 9.1 K/UL — HIGH (ref 1.8–7.4)
NEUTROPHILS NFR BLD AUTO: 66.2 % — SIGNIFICANT CHANGE UP (ref 43–77)
NRBC # BLD: 0 /100 WBCS — SIGNIFICANT CHANGE UP (ref 0–0)
NRBC BLD-RTO: 0 /100 WBCS — SIGNIFICANT CHANGE UP (ref 0–0)
PHOSPHATE SERPL-MCNC: 3.4 MG/DL — SIGNIFICANT CHANGE UP (ref 2.5–4.5)
PLATELET # BLD AUTO: 285 K/UL — SIGNIFICANT CHANGE UP (ref 150–400)
POTASSIUM SERPL-MCNC: 3.8 MMOL/L — SIGNIFICANT CHANGE UP (ref 3.5–5.3)
POTASSIUM SERPL-SCNC: 3.8 MMOL/L — SIGNIFICANT CHANGE UP (ref 3.5–5.3)
PROT SERPL-MCNC: 4.6 G/DL — LOW (ref 6–8.3)
RBC # BLD: 2.56 M/UL — LOW (ref 4.2–5.8)
RBC # FLD: 16 % — HIGH (ref 10.3–14.5)
SODIUM SERPL-SCNC: 144 MMOL/L — SIGNIFICANT CHANGE UP (ref 135–145)
WBC # BLD: 13.77 K/UL — HIGH (ref 3.8–10.5)
WBC # FLD AUTO: 13.77 K/UL — HIGH (ref 3.8–10.5)

## 2025-02-09 PROCEDURE — 99233 SBSQ HOSP IP/OBS HIGH 50: CPT

## 2025-02-09 RX ADMIN — TAMSULOSIN HYDROCHLORIDE 0.4 MILLIGRAM(S): 0.4 CAPSULE ORAL at 22:28

## 2025-02-09 RX ADMIN — ACETAMINOPHEN 650 MILLIGRAM(S): 160 SUSPENSION ORAL at 23:38

## 2025-02-09 RX ADMIN — ANTISEPTIC SURGICAL SCRUB 1 APPLICATION(S): 0.04 SOLUTION TOPICAL at 06:16

## 2025-02-09 RX ADMIN — POLYETHYLENE GLYCOL 3350 17 GRAM(S): 17 POWDER, FOR SOLUTION ORAL at 12:32

## 2025-02-09 RX ADMIN — CEFTOLOZANE AND TAZOBACTAM 100 MILLIGRAM(S): 1; .5 INJECTION, POWDER, LYOPHILIZED, FOR SOLUTION INTRAVENOUS at 02:45

## 2025-02-09 RX ADMIN — Medication 1 TABLET(S): at 22:29

## 2025-02-09 RX ADMIN — ACETAMINOPHEN 650 MILLIGRAM(S): 160 SUSPENSION ORAL at 02:53

## 2025-02-09 RX ADMIN — PANTOPRAZOLE 40 MILLIGRAM(S): 20 TABLET, DELAYED RELEASE ORAL at 06:16

## 2025-02-09 RX ADMIN — PREDNISONE 10 MILLIGRAM(S): 5 TABLET ORAL at 06:16

## 2025-02-09 RX ADMIN — Medication 5 MILLIGRAM(S): at 12:32

## 2025-02-09 RX ADMIN — SODIUM PHOSPHATE, DIBASIC, ANHYDROUS, POTASSIUM PHOSPHATE, MONOBASIC, AND SODIUM PHOSPHATE, MONOBASIC, MONOHYDRATE 1 PACKET(S): 852; 155; 130 TABLET, COATED ORAL at 06:16

## 2025-02-09 NOTE — PROGRESS NOTE ADULT - PROBLEM SELECTOR PLAN 1
Patient h/o nephrolithiasis s/p nephrostomy tube placement,  s/p TURP at [facility] in 1/2025 now presents with hematuria, found to have bleeding within bladder on imaging  - S/p Urgent Clot removal per    - Acute Blood Loss anemia. S/p PRBC Transfusion. Monitor h/h. Transfuse to keep Hb >8.0  - S/p  Balfaxar  - HD stable   - elevated lactate; improving   - Maintain active type and screen; may require pRBC (1U Ordered)  - pain control   - continue 3-way guerrero that was placed in the ED, irrigate as needed.  following   - ID Dr. Connor. To continue IV Zerbaxa  -Left Heel Ulcer. Podiatry consulted. X rays Pending.

## 2025-02-09 NOTE — PROGRESS NOTE ADULT - ASSESSMENT
78yo M PMH  HTN, HFrEF (prior TTE 11/2024 with EF 35%), PE (on Eliquis), Myasthenia Gravis, and chronic LE edema, nephrolithiasis (s/p nephrostomy tube placement), urosepsis, BPH presents to ER from [facility] with report of large amount of blood from the penis, and penile pain. Admitted for hematuria, active bleed within bladder requiring emergent surgery with urology, s/p Urgent Cystoscopy and clot evacuation     CT A/P showed blood products within the urinary bladder with suspicion for active bleed in the region of a TURP defect. S/p cystoscopy with clot evacuation overnight. Would continue empiric antibiotics given urological procedure involving mucosal bleed. WBC 49 bu no fever. Had prior urine culture which grew MDR pseudomonas.    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

## 2025-02-09 NOTE — PROGRESS NOTE ADULT - SUBJECTIVE AND OBJECTIVE BOX
Patient is a 79y old  Male who presents with a chief complaint of hematuria, active bleed within bladder requiring emergent surgery with urology (09 Feb 2025 07:21)       INTERVAL HPI/OVERNIGHT EVENTS: Patient seen and examined at bedside. Awake, alert, no distress. Denies chest pain, sob. no abdominal pain     MEDICATIONS  (STANDING):  ceftolozane/tazobactam IVPB 1500 milliGRAM(s) IV Intermittent every 8 hours  chlorhexidine 2% Cloths 1 Application(s) Topical <User Schedule>  finasteride 5 milliGRAM(s) Oral daily  pantoprazole    Tablet 40 milliGRAM(s) Oral before breakfast  polyethylene glycol 3350 17 Gram(s) Oral daily  predniSONE   Tablet 10 milliGRAM(s) Oral daily  senna 1 Tablet(s) Oral at bedtime  tamsulosin 0.4 milliGRAM(s) Oral at bedtime    MEDICATIONS  (PRN):  acetaminophen     Tablet .. 650 milliGRAM(s) Oral every 6 hours PRN Temp greater or equal to 38C (100.4F), Mild Pain (1 - 3)  HYDROmorphone  Injectable 0.5 milliGRAM(s) IV Push every 4 hours PRN Moderate Pain (4 - 6)  HYDROmorphone  Injectable 1 milliGRAM(s) IV Push every 4 hours PRN Severe Pain (7 - 10)      Allergies    ofloxacin (Unknown)  gatifloxacin (Unknown)  levofloxacin (Unknown)  Cipro (Unknown)    Intolerances    Ketek (Other)  telithromycin (Other)  fluoroquinolone antibiotics (Other)  Avelox (Other)      REVIEW OF SYSTEMS:  Per HPI. All other ROS Noted Negative   Vital Signs Last 24 Hrs  T(C): 36.7 (09 Feb 2025 05:28), Max: 36.8 (08 Feb 2025 12:53)  T(F): 98 (09 Feb 2025 05:28), Max: 98.3 (08 Feb 2025 12:53)  HR: 69 (09 Feb 2025 05:28) (69 - 81)  BP: 111/64 (09 Feb 2025 05:28) (111/64 - 121/72)  BP(mean): --  RR: 20 (09 Feb 2025 05:28) (18 - 20)  SpO2: 95% (09 Feb 2025 05:28) (94% - 95%)    Parameters below as of 09 Feb 2025 05:28  Patient On (Oxygen Delivery Method): room air        PHYSICAL EXAM:  GENERAL: NAD, Restrepo C in place   HEAD:  Atraumatic, Normocephalic  EYES: EOMI, PERRLA, conjunctiva and sclera clear  ENMT: No tonsillar erythema, exudates, or enlargement; Moist mucous membranes, Good dentition, No lesions  NECK: Supple, No JVD, Normal thyroid  NERVOUS SYSTEM:  Alert & Oriented X3, Good concentration; Motor Strength 5/5 B/L upper and lower extremities; DTRs 2+ intact and symmetric  CHEST/LUNG: Clear to auscultation bilaterally; No rales, rhonchi, wheezing, or rubs  HEART: Regular rate and rhythm; No murmurs, rubs, or gallops  ABDOMEN: Soft, Nontender, Nondistended; Bowel sounds present  EXTREMITIES:  2+ Peripheral Pulses, No clubbing, cyanosis, or edema  LYMPH: No lymphadenopathy noted  SKIN: No rashes or lesions    LABS:                        7.3    13.77 )-----------( 285      ( 09 Feb 2025 05:57 )             22.2     09 Feb 2025 05:57    144    |  113    |  14     ----------------------------<  106    3.8     |  25     |  0.56     Ca    7.9        09 Feb 2025 05:57  Phos  3.4       09 Feb 2025 05:57    TPro  4.6    /  Alb  1.7    /  TBili  0.2    /  DBili  x      /  AST  12     /  ALT  11     /  AlkPhos  58     09 Feb 2025 05:57      CAPILLARY BLOOD GLUCOSE        BLOOD CULTURE  02-06 @ 01:00   <10,000 CFU/mL Normal Urogenital Fariba  --  --  02-05 @ 17:50   No growth at 72 Hours  --  --  02-05 @ 17:45   No growth at 72 Hours  --  --    RADIOLOGY & ADDITIONAL TESTS:    Imaging Personally Reviewed:  [ ] YES     Consultant(s) Notes Reviewed:      Care Discussed with Consultants/Other Providers:

## 2025-02-09 NOTE — PROGRESS NOTE ADULT - SUBJECTIVE AND OBJECTIVE BOX
POD#4 s/p  clot evacuation     SUBJECTIVE:  Patient seen and examined at bedside.  No overnight events.  Patient reports no new complaints at this time. +guerrero with yellow urine    VITALS  Vital Signs Last 24 Hrs  T(C): 36.7 (09 Feb 2025 05:28), Max: 36.8 (08 Feb 2025 12:53)  T(F): 98 (09 Feb 2025 05:28), Max: 98.3 (08 Feb 2025 12:53)  HR: 69 (09 Feb 2025 05:28) (69 - 81)  BP: 111/64 (09 Feb 2025 05:28) (111/64 - 121/72)  RR: 20 (09 Feb 2025 05:28) (18 - 20)  SpO2: 95% (09 Feb 2025 05:28) (94% - 95%)    Parameters below as of 09 Feb 2025 05:28  Patient On (Oxygen Delivery Method): room air    PHYSICAL EXAM  GENERAL: Male lying comfortably in bed in NAD.  HEENT:  NC/AT. Sclera white. Mucous membranes moist.  CARDIO:  Regular rate and rhythm.   RESPIRATORY:  Nonlabored breathing, no accessory muscle use.   ABDOMEN:  Soft, nondistended, nontender.   : Guerrero bag with yellow clear urine  EXTREMITIES: No calf tenderness bilaterally.  SKIN:  No jaundice, pallor, or cyanosis    INTAKE & OUTPUT  I&O's Summary    08 Feb 2025 07:01  -  09 Feb 2025 07:00  --------------------------------------------------------  IN: 0 mL / OUT: 3950 mL / NET: -3950 mL      I&O's Detail    08 Feb 2025 07:01  -  09 Feb 2025 07:00  --------------------------------------------------------  IN:  Total IN: 0 mL    OUT:    Continuous Bladder Irrigation (mL): 2300 mL    Indwelling Catheter - Urethral (mL): 1650 mL  Total OUT: 3950 mL    Total NET: -3950 mL          MEDICATIONS  MEDICATIONS  (STANDING):  ceftolozane/tazobactam IVPB 1500 milliGRAM(s) IV Intermittent every 8 hours  chlorhexidine 2% Cloths 1 Application(s) Topical <User Schedule>  finasteride 5 milliGRAM(s) Oral daily  pantoprazole    Tablet 40 milliGRAM(s) Oral before breakfast  polyethylene glycol 3350 17 Gram(s) Oral daily  potassium phosphate / sodium phosphate Powder (PHOS-NaK) 1 Packet(s) Oral three times a day before meals  predniSONE   Tablet 10 milliGRAM(s) Oral daily  senna 1 Tablet(s) Oral at bedtime  tamsulosin 0.4 milliGRAM(s) Oral at bedtime    MEDICATIONS  (PRN):  acetaminophen     Tablet .. 650 milliGRAM(s) Oral every 6 hours PRN Temp greater or equal to 38C (100.4F), Mild Pain (1 - 3)  HYDROmorphone  Injectable 0.5 milliGRAM(s) IV Push every 4 hours PRN Moderate Pain (4 - 6)  HYDROmorphone  Injectable 1 milliGRAM(s) IV Push every 4 hours PRN Severe Pain (7 - 10)      LABS:                        7.3    13.77 )-----------( 285      ( 09 Feb 2025 05:57 )             22.2     143  |  112[H]  |  15  ----------------------------<  80  3.5   |  24  |  0.67    Ca    8.5      08 Feb 2025 07:35  Phos  2.4     02-08    TPro  4.7[L]  /  Alb  1.9[L]  /  TBili  0.2  /  DBili  x   /  AST  15  /  ALT  13  /  AlkPhos  52  02-08      ASSESSMENT & PLAN  79y male patient POD#4 S/P cystoscopy with clot evacuation; received 1 unit pRBC in OR was transferred to ICU as patient was meeting sepsis criteria with elevated/increasing WBC, no febrile, but with tachycardia elevated lactate. Downgraded to floor, now with downtrending WBC 17.33 from 28.07 and downtrending H/H 6.6/20.7 and 7.8/23.9. 3-way Guerrero originally with balloon tamponade inflated with 40cc and traction. Traction released 2/6, urine color light pink in tubing and fruit punched color in reservoir, off CBI. Now patient with clear yellow urine in the guerrero bag without any abdominal pain or discomfort     - Regular diet  - Continue IV abx  - Continue finasteride and flomax  - Pain control  - SCDs  - Hold ACs  - Trend WBC   - Monitor I&Os  - Will continue to monitor     POD#4 s/p  clot evacuation     SUBJECTIVE:  Patient seen and examined at bedside.  No overnight events.  Patient reports no new complaints at this time. +guerrero with yellow urine    VITALS  Vital Signs Last 24 Hrs  T(C): 36.7 (09 Feb 2025 05:28), Max: 36.8 (08 Feb 2025 12:53)  T(F): 98 (09 Feb 2025 05:28), Max: 98.3 (08 Feb 2025 12:53)  HR: 69 (09 Feb 2025 05:28) (69 - 81)  BP: 111/64 (09 Feb 2025 05:28) (111/64 - 121/72)  RR: 20 (09 Feb 2025 05:28) (18 - 20)  SpO2: 95% (09 Feb 2025 05:28) (94% - 95%)    Parameters below as of 09 Feb 2025 05:28  Patient On (Oxygen Delivery Method): room air    PHYSICAL EXAM  GENERAL: Male lying comfortably in bed in NAD.  HEENT:  NC/AT. Sclera white. Mucous membranes moist.  CARDIO:  Regular rate and rhythm.   RESPIRATORY:  Nonlabored breathing, no accessory muscle use.   ABDOMEN:  Soft, nondistended, nontender.   : Guerrero bag with yellow clear urine  EXTREMITIES: No calf tenderness bilaterally.  SKIN:  No jaundice, pallor, or cyanosis    INTAKE & OUTPUT  I&O's Summary    08 Feb 2025 07:01  -  09 Feb 2025 07:00  --------------------------------------------------------  IN: 0 mL / OUT: 3950 mL / NET: -3950 mL      I&O's Detail    08 Feb 2025 07:01  -  09 Feb 2025 07:00  --------------------------------------------------------  IN:  Total IN: 0 mL    OUT:    Continuous Bladder Irrigation (mL): 2300 mL    Indwelling Catheter - Urethral (mL): 1650 mL  Total OUT: 3950 mL    Total NET: -3950 mL          MEDICATIONS  MEDICATIONS  (STANDING):  ceftolozane/tazobactam IVPB 1500 milliGRAM(s) IV Intermittent every 8 hours  chlorhexidine 2% Cloths 1 Application(s) Topical <User Schedule>  finasteride 5 milliGRAM(s) Oral daily  pantoprazole    Tablet 40 milliGRAM(s) Oral before breakfast  polyethylene glycol 3350 17 Gram(s) Oral daily  potassium phosphate / sodium phosphate Powder (PHOS-NaK) 1 Packet(s) Oral three times a day before meals  predniSONE   Tablet 10 milliGRAM(s) Oral daily  senna 1 Tablet(s) Oral at bedtime  tamsulosin 0.4 milliGRAM(s) Oral at bedtime    MEDICATIONS  (PRN):  acetaminophen     Tablet .. 650 milliGRAM(s) Oral every 6 hours PRN Temp greater or equal to 38C (100.4F), Mild Pain (1 - 3)  HYDROmorphone  Injectable 0.5 milliGRAM(s) IV Push every 4 hours PRN Moderate Pain (4 - 6)  HYDROmorphone  Injectable 1 milliGRAM(s) IV Push every 4 hours PRN Severe Pain (7 - 10)      LABS:                        7.3    13.77 )-----------( 285      ( 09 Feb 2025 05:57 )             22.2     143  |  112[H]  |  15  ----------------------------<  80  3.5   |  24  |  0.67    Ca    8.5      08 Feb 2025 07:35  Phos  2.4     02-08    TPro  4.7[L]  /  Alb  1.9[L]  /  TBili  0.2  /  DBili  x   /  AST  15  /  ALT  13  /  AlkPhos  52  02-08      ASSESSMENT & PLAN  79y male patient POD#4 S/P cystoscopy with clot evacuation; received 1 unit pRBC in OR was transferred to ICU as patient was meeting sepsis criteria with elevated/increasing WBC, no febrile, but with tachycardia elevated lactate. Downgraded to floor, now with downtrending WBC 17.33 from 28.07 and downtrending H/H 6.6/20.7 and 7.8/23.9. 3-way Guerrero originally with balloon tamponade inflated with 40cc and traction. Traction released 2/6, urine color light pink in tubing and fruit punched color in reservoir, off CBI. Now patient with clear yellow urine in the guerrero bag without any abdominal pain or discomfort     - Regular diet  - Continue IV abx  - Continue finasteride and flomax  - Pain control  - SCDs  - Hold ACs at this time until h/h stable, but can try prophylactic heparin/lovenox  - Trend WBC   - Monitor I&Os  - Will continue to monitor

## 2025-02-10 LAB
ANION GAP SERPL CALC-SCNC: 3 MMOL/L — LOW (ref 5–17)
BUN SERPL-MCNC: 15 MG/DL — SIGNIFICANT CHANGE UP (ref 7–23)
CALCIUM SERPL-MCNC: 8.1 MG/DL — LOW (ref 8.5–10.1)
CHLORIDE SERPL-SCNC: 113 MMOL/L — HIGH (ref 96–108)
CO2 SERPL-SCNC: 26 MMOL/L — SIGNIFICANT CHANGE UP (ref 22–31)
CREAT SERPL-MCNC: 0.59 MG/DL — SIGNIFICANT CHANGE UP (ref 0.5–1.3)
EGFR: 97 ML/MIN/1.73M2 — SIGNIFICANT CHANGE UP
GLUCOSE SERPL-MCNC: 100 MG/DL — HIGH (ref 70–99)
HCT VFR BLD CALC: 26.3 % — LOW (ref 39–50)
HGB BLD-MCNC: 8.2 G/DL — LOW (ref 13–17)
MCHC RBC-ENTMCNC: 28.1 PG — SIGNIFICANT CHANGE UP (ref 27–34)
MCHC RBC-ENTMCNC: 31.2 G/DL — LOW (ref 32–36)
MCV RBC AUTO: 90.1 FL — SIGNIFICANT CHANGE UP (ref 80–100)
NRBC # BLD: 0 /100 WBCS — SIGNIFICANT CHANGE UP (ref 0–0)
NRBC BLD-RTO: 0 /100 WBCS — SIGNIFICANT CHANGE UP (ref 0–0)
PHOSPHATE SERPL-MCNC: 2.8 MG/DL — SIGNIFICANT CHANGE UP (ref 2.5–4.5)
PLATELET # BLD AUTO: 314 K/UL — SIGNIFICANT CHANGE UP (ref 150–400)
POTASSIUM SERPL-MCNC: 3.8 MMOL/L — SIGNIFICANT CHANGE UP (ref 3.5–5.3)
POTASSIUM SERPL-SCNC: 3.8 MMOL/L — SIGNIFICANT CHANGE UP (ref 3.5–5.3)
RBC # BLD: 2.92 M/UL — LOW (ref 4.2–5.8)
RBC # FLD: 16.1 % — HIGH (ref 10.3–14.5)
SODIUM SERPL-SCNC: 142 MMOL/L — SIGNIFICANT CHANGE UP (ref 135–145)
WBC # BLD: 17.07 K/UL — HIGH (ref 3.8–10.5)
WBC # FLD AUTO: 17.07 K/UL — HIGH (ref 3.8–10.5)

## 2025-02-10 PROCEDURE — 99233 SBSQ HOSP IP/OBS HIGH 50: CPT

## 2025-02-10 RX ORDER — ENOXAPARIN SODIUM 100 MG/ML
40 INJECTION SUBCUTANEOUS EVERY 24 HOURS
Refills: 0 | Status: DISCONTINUED | OUTPATIENT
Start: 2025-02-10 | End: 2025-02-12

## 2025-02-10 RX ADMIN — Medication 1 TABLET(S): at 22:26

## 2025-02-10 RX ADMIN — ACETAMINOPHEN 650 MILLIGRAM(S): 160 SUSPENSION ORAL at 00:30

## 2025-02-10 RX ADMIN — HYDROMORPHONE HYDROCHLORIDE 1 MILLIGRAM(S): 4 INJECTION, SOLUTION INTRAMUSCULAR; INTRAVENOUS; SUBCUTANEOUS at 01:30

## 2025-02-10 RX ADMIN — PREDNISONE 10 MILLIGRAM(S): 5 TABLET ORAL at 06:06

## 2025-02-10 RX ADMIN — TAMSULOSIN HYDROCHLORIDE 0.4 MILLIGRAM(S): 0.4 CAPSULE ORAL at 22:26

## 2025-02-10 RX ADMIN — ENOXAPARIN SODIUM 40 MILLIGRAM(S): 100 INJECTION SUBCUTANEOUS at 11:50

## 2025-02-10 RX ADMIN — HYDROMORPHONE HYDROCHLORIDE 1 MILLIGRAM(S): 4 INJECTION, SOLUTION INTRAMUSCULAR; INTRAVENOUS; SUBCUTANEOUS at 00:24

## 2025-02-10 RX ADMIN — Medication 5 MILLIGRAM(S): at 11:50

## 2025-02-10 RX ADMIN — PANTOPRAZOLE 40 MILLIGRAM(S): 20 TABLET, DELAYED RELEASE ORAL at 06:05

## 2025-02-10 RX ADMIN — POLYETHYLENE GLYCOL 3350 17 GRAM(S): 17 POWDER, FOR SOLUTION ORAL at 11:50

## 2025-02-10 RX ADMIN — ANTISEPTIC SURGICAL SCRUB 1 APPLICATION(S): 0.04 SOLUTION TOPICAL at 08:57

## 2025-02-10 NOTE — PROGRESS NOTE ADULT - PROBLEM SELECTOR PLAN 8
On Praluent injection, will hold  Patient to resume on discharge

## 2025-02-10 NOTE — PROGRESS NOTE ADULT - PROBLEM SELECTOR PROBLEM 7
History of myasthenia gravis

## 2025-02-10 NOTE — PROGRESS NOTE ADULT - PROBLEM SELECTOR PLAN 7
Chronic  - c/w home prednisone

## 2025-02-10 NOTE — PROGRESS NOTE ADULT - SUBJECTIVE AND OBJECTIVE BOX
[facility]  INFECTIOUS DISEASES - [address], [place] [address]  Tel: [phone]     Fax: [phone]  =======================================================    DEION HEATH [id number]    Follow up: No fevers. Not fully cooperative with exam, told me to "leave the room". Denies any pain or SOB.    Allergies:  Ketek (Other)  telithromycin (Other)  ofloxacin (Unknown)  gatifloxacin (Unknown)  fluoroquinolone antibiotics (Other)  Avelox (Other)  levofloxacin (Unknown)  Cipro (Unknown)      Antibiotics:  acetaminophen     Tablet .. 650 milliGRAM(s) Oral every 6 hours PRN  chlorhexidine 2% Cloths 1 Application(s) Topical <User Schedule>  enoxaparin Injectable 40 milliGRAM(s) SubCutaneous every 24 hours  finasteride 5 milliGRAM(s) Oral daily  HYDROmorphone  Injectable 0.5 milliGRAM(s) IV Push every 4 hours PRN  HYDROmorphone  Injectable 1 milliGRAM(s) IV Push every 4 hours PRN  pantoprazole    Tablet 40 milliGRAM(s) Oral before breakfast  polyethylene glycol 3350 17 Gram(s) Oral daily  predniSONE   Tablet 10 milliGRAM(s) Oral daily  senna 1 Tablet(s) Oral at bedtime  tamsulosin 0.4 milliGRAM(s) Oral at bedtime       REVIEW OF SYSTEMS:   *limited*  CONSTITUTIONAL:  No Feves  CARDIOVASCULAR:  No chest pain or SOB  RESPIRATORY:  No cough, shortness of breath  GASTROINTESTINAL:  No nausea, vomiting or diarrhea.  MUSCULOSKELETAL:  no back pain  NEUROLOGIC:  No headache       Physical Exam:  ICU Vital Signs Last 24 Hrs  T(C): 36.4 (10 Feb 2025 11:53), Max: 36.9 (10 Feb 2025 06:00)  T(F): 97.6 (10 Feb 2025 11:53), Max: 98.5 (10 Feb 2025 06:00)  HR: 64 (10 Feb 2025 11:53) (62 - 74)  BP: 114/48 (10 Feb 2025 11:53) (106/62 - 126/73)  BP(mean): --  ABP: --  ABP(mean): --  RR: 19 (10 Feb 2025 11:53) (18 - 20)  SpO2: 100% (10 Feb 2025 11:53) (95% - 100%)    O2 Parameters below as of 10 Feb 2025 11:53  Patient On (Oxygen Delivery Method): room air           GEN: NAD  HEENT: normocephalic and atraumatic.  NECK: Supple.    LUNGS: Normal respiratory effort  HEART: Regular rate and rhythm   ABDOMEN: Soft, nontender, and nondistended.   : + Restrepo with light yellow urine  EXTREMITIES: No leg edema.  NEUROLOGIC: Answering some simple questions      Labs:  02-10    142  |  113[H]  |  15  ----------------------------<  100[H]  3.8   |  26  |  0.59    Ca    8.1[L]      10 Feb 2025 07:05  Phos  2.8     02-10    TPro  4.6[L]  /  Alb  1.7[L]  /  TBili  0.2  /  DBili  x   /  AST  12[L]  /  ALT  11[L]  /  AlkPhos  58  02-09                          8.2    17.07 )-----------( 314      ( 10 Feb 2025 07:05 )             26.3       Urinalysis Basic - ( 10 Feb 2025 07:05 )    Color: x / Appearance: x / SG: x / pH: x  Gluc: 100 mg/dL / Ketone: x  / Bili: x / Urobili: x   Blood: x / Protein: x / Nitrite: x   Leuk Esterase: x / RBC: x / WBC x   Sq Epi: x / Non Sq Epi: x / Bacteria: x      LIVER FUNCTIONS - ( 09 Feb 2025 05:57 )  Alb: 1.7 g/dL / Pro: 4.6 g/dL / ALK PHOS: 58 U/L / ALT: 11 U/L / AST: 12 U/L / GGT: x             RECENT CULTURES:  02-06 @ 01:00 Catheterized Catheterized     <10,000 CFU/mL Normal Urogenital Fariba        02-05 @ 17:50 .Blood BLOOD     No growth at 4 days        02-05 @ 17:45 .Blood BLOOD     No growth at 4 days              All imaging and data are reviewed.

## 2025-02-10 NOTE — PROGRESS NOTE ADULT - PROBLEM SELECTOR PROBLEM 10
Pressure ulcer of left heel

## 2025-02-10 NOTE — PROGRESS NOTE ADULT - PROBLEM SELECTOR PLAN 10
Chronic  RN instructions: apply betadine moistened gauze with abdominal pad and wrap with Kaley daily
Podiatry w/u in progress
Chronic  RN instructions: apply betadine moistened gauze with abdominal pad and wrap with Kaley daily

## 2025-02-10 NOTE — PROGRESS NOTE ADULT - ASSESSMENT
78yo M PMH  HTN, HFrEF (prior TTE 11/2024 with EF 35%), PE (on Eliquis), Myasthenia Gravis, and chronic LE edema, nephrolithiasis (s/p nephrostomy tube placement), urosepsis, BPH presents to ER from [facility] with report of large amount of blood from the penis, and penile pain. Admitted for hematuria, active bleed within bladder requiring emergent surgery with urology, s/p Urgent Cystoscopy and clot evacuation     CT A/P showed blood products within the urinary bladder with suspicion for active bleed in the region of a TURP defect. S/p cystoscopy with clot evacuation overnight. Would continue empiric antibiotics given urological procedure involving mucosal bleed. WBC 49 bu no fever. Had prior urine culture which grew MDR pseudomonas.    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

## 2025-02-10 NOTE — PROGRESS NOTE ADULT - PROBLEM SELECTOR PLAN 6
Chronic  - on home eliquis BID, held setting of active bleed
Chronic  - on home eliquis BID, will hold in setting of active bleed

## 2025-02-10 NOTE — PROGRESS NOTE ADULT - PROBLEM SELECTOR PLAN 1
Patient h/o nephrolithiasis s/p nephrostomy tube placement,  s/p TURP at [facility] in 1/2025 now presents with hematuria, found to have bleeding within bladder on imaging  - S/p Urgent Cystoscopy and Clot evacuation per    - Acute Blood Loss anemia. S/p PRBC Transfusion. Monitor h/h. Transfuse to keep Hb >8.0  - S/p  Balfaxar  - HD stable   - Maintain active type and screen; s/p 1 Unit PRBC during the hospital course   - pain control PRN meds   - continue 3-way guerrero that was placed in the ED, irrigate as needed.  following   - ID Dr. Connor. Completed course of  IV Zerbaxa. Worsening Leukocytosis noted   -Left Heel Ulcer. Podiatry consulted. X rays Performed, Pending read

## 2025-02-10 NOTE — PROGRESS NOTE ADULT - ASSESSMENT
80yo M PMH  HTN, HFrEF (prior TTE 11/2024 with EF 35%), PE (on Eliquis), Myasthenia Gravis, and chronic LE edema, nephrolithiasis (s/p nephrostomy tube placement), urosepsis, BPH, who presented from [facility] with report of large amount of blood from the penis, and penile pain. Per records the bleeding started earlier this afternoon on 2/5/2025. Of note had TURP on 1/9/25.    CT A/P showed blood products within the urinary bladder with suspicion for active bleed in the region of a TURP defect. S/p cystoscopy with clot evacuation on 2/5. Received empiric antibiotics given urological procedure involving mucosal bleed, but active UTI seems less likely.  Blood cultures remain no growth. Urine culture unrevealing. WBC 17, but appears to have baseline leukocytosis and similar to prior labs. No fevers.    #Leukocytosis  #Hematuria  #Hx of MDR pseudomonas in urine    -observe off antibiotics  -monitor WBC    Rachel Connor MD  Division of Infectious Diseases   Cell [phone] between 8am and 6pm   After 6pm and weekends please call ID service at [phone].     35 minutes spent on total encounter assessing patient, examination, chart review, counseling and coordinating care by the attending physician/nurse/care manager.

## 2025-02-10 NOTE — PROGRESS NOTE ADULT - PROBLEM SELECTOR PLAN 11
DVT ppx: SCDs  Patient received reversal of agent of Eliquis due to active bleed

## 2025-02-10 NOTE — PROGRESS NOTE ADULT - SUBJECTIVE AND OBJECTIVE BOX
PROGRESS NOTE   Patient is a 79y old  Male who presents with a chief complaint of hematuria, active bleed within bladder requiring emergent surgery with urology (10 Feb 2025 13:10)      HPI:  80yo M PMH  HTN, HFrEF (prior TTE 11/2024 with EF 35%), PE (on Eliquis), Myasthenia Gravis, and chronic LE edema, nephrolithiasis (s/p nephrostomy tube placement), urosepsis, BPH presents to ER from [facility] with report of large amount of blood from the penis, and penile pain. Son (HCP Olvin) at bedside reports the bleeding started earlier this afternoon on 2/5/2025. Patient responds to name on admission but does not know the year or that he is at the hospital. Son admits that the patient (his father) can be forgetful. Patient vomited x1 in the ED, was initially complaining of pain but now denies.       In the ED   Vitals: T: 97.6F --> 98.3F, BP: 134/77 --> 64/39--> 122/63--> 134/77, --> 98, RR: 16, Sat: 99% on room air  Labs: WBC elevated 32.37-->35.20, H/H low 10.5/33.4--> 8/25.6, Plt high 560--> 441, Lactate elevated 3.4  UA: negative nitrites, trace LE WBC 16 elevated, TNTC RBCs, present squamous epithelial cells, Glucose 250 elevated, large blood  Imaging:   CT chest/abdomen/pelvis:  Blood products within the urinary bladder with suspicion for active bleed   in the region of a TURP defect.  Left thyroid nodule, measuring 2.4 cm, for which thyroid ultrasound is   recommended.    EKG: sinus rhythm, VR 94, QT/QTc 380/475    Received: zerbaxa 1.5g x1, NS bolus 1L x2, Ofirmev 1g x1, Dilaudid 1mg IVP x1, morphine 4m IVP x1, morphine 2mg x1, Zofran 4mg IVP x1`, Balfaxar 2500 IU x1, Balfaxar 3000 IU x1  Ordered for LR 1L @75cc/hr, Packed Red Blood Cells 1U    (05 Feb 2025 20:50)      Vital Signs Last 24 Hrs  T(C): 36.4 (10 Feb 2025 11:53), Max: 36.9 (10 Feb 2025 06:00)  T(F): 97.6 (10 Feb 2025 11:53), Max: 98.5 (10 Feb 2025 06:00)  HR: 64 (10 Feb 2025 11:53) (62 - 74)  BP: 114/48 (10 Feb 2025 11:53) (106/62 - 126/73)  BP(mean): --  RR: 19 (10 Feb 2025 11:53) (18 - 20)  SpO2: 100% (10 Feb 2025 11:53) (95% - 100%)    Parameters below as of 10 Feb 2025 11:53  Patient On (Oxygen Delivery Method): room air                              8.2    17.07 )-----------( 314      ( 10 Feb 2025 07:05 )             26.3               02-10    142  |  113[H]  |  15  ----------------------------<  100[H]  3.8   |  26  |  0.59    Ca    8.1[L]      10 Feb 2025 07:05  Phos  2.8     02-10    TPro  4.6[L]  /  Alb  1.7[L]  /  TBili  0.2  /  DBili  x   /  AST  12[L]  /  ALT  11[L]  /  AlkPhos  58  02-09      PHYSICAL EXAM  Patient refused physical examination.

## 2025-02-10 NOTE — PROGRESS NOTE ADULT - PROBLEM SELECTOR PLAN 1
Patient was seen at the bedside, however he refused physical examination and focused examination of heel wound.  Explained to patient that the wound needs to be monitored to asses infection risk and wound heeling, however patient still refused physical exam and dressing change.  Podiatry will continue to follow and attempt examination.  Discussed with Dr. Vitale. Patient was seen at the bedside, however he refused physical examination and focused examination of heel wound.  Explained to patient that the wound needs to be monitored to asses infection risk and wound healing, however patient still refused physical exam and dressing change.  Podiatry will continue to follow and attempt examination.  Discussed with Dr. Vitale.

## 2025-02-10 NOTE — PROGRESS NOTE ADULT - ATTENDING COMMENTS
Patient still refusing wound assessment and not willing to answer any questions   Will discuss with primary team and also with patient's son   Unable to assess the wound and provide necessary recommendations   X- rays noted with plantar heel spur but no cortical erosions  Recommend local wound care and offloading with pillows in the area of the calf to alleviate  pressure on the heel and aide with healing process   Patient is still at high risk for Osteomyelitis, sepsis, loss of limb and loss of life  Apply dry dressing to heel  Will re-assess if patient is willing to allow wound assessment and care
78 y/o M with a h/o HTN, HFrEF (prior TTE 11/2024 with EF 35%), PE (on Eliquis), Myasthenia Gravis, BPH (s/p TURP), chronic LE edema, nephrolithiasis (s/p nephrostomy tube placement), MDR UTI, presents to ED from [facility] with report of voluminous hematuria and penile pain that began earlier in the day. CT A/P reveals blood products within the bladder and concern for active bleeding from the site of TURP defect. Noted to be hypotensive with a lactate 9+. He has now went to the OR for emergent cystoscopy with evacuation of clots and initiation of CBI. Transfused 1u PRBC and started on IV vasopressor support. Now off vasopressor support, comfortable with downtrending lactate.    Neuro: MS likely at baseline. Pain control with tylenol and dilaudid prn. MG: continue home prednisone  CV: initially in shock state, hemorrhagic vs septic. Responded to fluids, pressors, and blood. Lactate downtrending. Hx HF with EF 35%. Pending TTE. Holding home metoprolol given soft BPs.  Pulm: hx b/l PE. Holding AC given bleed.  GI: Diet ordered. Senna, miralax.  : Hematuria, s/p cystoscopy with clot evac. CBI clamped. Uro following. Continue home tamsulosin and finasteride. R nephrostomy tube in place  ID: hx MDR UTI. Continue with zerbaxa and f/u ucx. ID following.  Heme: H/H stable s/p transfusion. Can get AM CBC as next.  DVT prophylaxis: SCDs  Endo: Hemoglobin a1c WNL. Cntinue home steroids.  Ethics: Pt DNR/DNI    Stable for downgrade to medicine with remote telemetry.

## 2025-02-10 NOTE — PROGRESS NOTE ADULT - PROBLEM SELECTOR PLAN 2
Chronic, however hypotensive in the ED, improved with fluids for emergent OR intervention  - on home metoprolol 25mg PO   - continue BB with hold parameters   - Monitor routine hemodynamics   - Consider DASH diet when able
Chronic, however hypotensive in the ED, improved with fluids for emergent OR intervention  - on home metoprolol 25mg PO   - continue BB with hold parameters   - Monitor routine hemodynamics
Chronic, however hypotensive in the ED, improved with fluids for emergent OR intervention  - on home metoprolol 25mg PO   - continue BB with hold parameters   - Monitor routine hemodynamics   - Consider DASH diet when able

## 2025-02-10 NOTE — PROGRESS NOTE ADULT - SUBJECTIVE AND OBJECTIVE BOX
Patient is a 79y old  Male who presents with a chief complaint of hematuria, active bleed within bladder requiring emergent surgery with urology (09 Feb 2025 08:23)       INTERVAL HPI/OVERNIGHT EVENTS: Patient seen and examined at bedside. No new complaints/Events noted. Denies chest pain, palpitations sob     MEDICATIONS  (STANDING):  chlorhexidine 2% Cloths 1 Application(s) Topical <User Schedule>  enoxaparin Injectable 40 milliGRAM(s) SubCutaneous every 24 hours  finasteride 5 milliGRAM(s) Oral daily  pantoprazole    Tablet 40 milliGRAM(s) Oral before breakfast  polyethylene glycol 3350 17 Gram(s) Oral daily  predniSONE   Tablet 10 milliGRAM(s) Oral daily  senna 1 Tablet(s) Oral at bedtime  tamsulosin 0.4 milliGRAM(s) Oral at bedtime    MEDICATIONS  (PRN):  acetaminophen     Tablet .. 650 milliGRAM(s) Oral every 6 hours PRN Temp greater or equal to 38C (100.4F), Mild Pain (1 - 3)  HYDROmorphone  Injectable 0.5 milliGRAM(s) IV Push every 4 hours PRN Moderate Pain (4 - 6)  HYDROmorphone  Injectable 1 milliGRAM(s) IV Push every 4 hours PRN Severe Pain (7 - 10)      Allergies    ofloxacin (Unknown)  gatifloxacin (Unknown)  levofloxacin (Unknown)  Cipro (Unknown)    Intolerances    Ketek (Other)  telithromycin (Other)  fluoroquinolone antibiotics (Other)  Avelox (Other)      REVIEW OF SYSTEMS:  Per HPI. All other ROS Noted Negative   Vital Signs Last 24 Hrs  T(C): 36.9 (10 Feb 2025 06:00), Max: 36.9 (10 Feb 2025 06:00)  T(F): 98.5 (10 Feb 2025 06:00), Max: 98.5 (10 Feb 2025 06:00)  HR: 74 (10 Feb 2025 06:00) (62 - 74)  BP: 116/67 (10 Feb 2025 06:00) (106/62 - 126/73)  BP(mean): --  RR: 18 (10 Feb 2025 06:00) (18 - 20)  SpO2: 95% (10 Feb 2025 06:00) (95% - 97%)    Parameters below as of 10 Feb 2025 06:00  Patient On (Oxygen Delivery Method): room air        PHYSICAL EXAM:  GENERAL: NAD, Restrepo C in place   HEAD:  Atraumatic, Normocephalic  EYES: EOMI, PERRLA, conjunctiva and sclera clear  ENMT: No tonsillar erythema, exudates, or enlargement; Moist mucous membranes, Good dentition, No lesions  NECK: Supple, No JVD, Normal thyroid  NERVOUS SYSTEM:  Alert & Oriented X3, Good concentration; Motor Strength 5/5 B/L upper and lower extremities; DTRs 2+ intact and symmetric  CHEST/LUNG: Clear to auscultation bilaterally; No rales, rhonchi, wheezing, or rubs  HEART: Regular rate and rhythm; No murmurs, rubs, or gallops  ABDOMEN: Soft, Nontender, Nondistended; Bowel sounds present  EXTREMITIES:  2+ Peripheral Pulses, No clubbing, cyanosis, or edema  LYMPH: No lymphadenopathy noted  SKIN: No rashes or lesions    LABS:                        8.2    17.07 )-----------( 314      ( 10 Feb 2025 07:05 )             26.3     10 Feb 2025 07:05    142    |  113    |  15     ----------------------------<  100    3.8     |  26     |  0.59     Ca    8.1        10 Feb 2025 07:05  Phos  2.8       10 Feb 2025 07:05        CAPILLARY BLOOD GLUCOSE        BLOOD CULTURE    RADIOLOGY & ADDITIONAL TESTS:    Imaging Personally Reviewed:  [ ] YES     Consultant(s) Notes Reviewed:      Care Discussed with Consultants/Other Providers:

## 2025-02-10 NOTE — PROGRESS NOTE ADULT - PROBLEM SELECTOR PLAN 3
Chronic   H/O of CHF, unspecified type however not on GDMT for HFrEF  - TTE (11/2024): techinically difficult. LVEF is estimated at 35%. The apex, mid to spical anteroseptal wall and mid to distal anterolateral walls appear severely hypokinetic.  - c/w home furosemide  - Strict Is&Os, daily weights, fluid restriction  - Euvolemic
Chronic   H/O of CHF, unspecified type however not on GDMT for HFrEF  - TTE (11/2024): techinically difficult. LVEF is estimated at 35%. The apex, mid to spical anteroseptal wall and mid to distal anterolateral walls appear severely hypokinetic.  - c/w home furosemide  - Strict Is&Os, daily weights, fluid restriction  - Euvolemic
Chronic   H/O of CHF, unspecified type however not on GDMT for HFrEF  - TTE (11/2024): techinically difficult. LVEF is estimated at 35%. The apex, mid to spical anteroseptal wall and mid to distal anterolateral walls appear severely hypokinetic.  - c/w home furosemide  - Strict Is&Os, daily weights, fluid restriction  - Does not appear clinically volume overloaded on admission
Chronic   H/O of CHF, unspecified type however not on GDMT for HFrEF  - TTE (11/2024): techinically difficult. LVEF is estimated at 35%. The apex, mid to spical anteroseptal wall and mid to distal anterolateral walls appear severely hypokinetic.  - c/w home furosemide  - Strict Is&Os, daily weights, fluid restriction  - Euvolemic
Chronic   H/O of CHF, unspecified type however not on GDMT for HFrEF  - TTE (11/2024): techinically difficult. LVEF is estimated at 35%. The apex, mid to spical anteroseptal wall and mid to distal anterolateral walls appear severely hypokinetic.  - c/w home furosemide  - Strict Is&Os, daily weights, fluid restriction  - Euvolemic

## 2025-02-11 LAB
ALBUMIN SERPL ELPH-MCNC: 2.1 G/DL — LOW (ref 3.3–5)
ALP SERPL-CCNC: 65 U/L — SIGNIFICANT CHANGE UP (ref 40–120)
ALT FLD-CCNC: 12 U/L — SIGNIFICANT CHANGE UP (ref 12–78)
ANION GAP SERPL CALC-SCNC: 3 MMOL/L — LOW (ref 5–17)
AST SERPL-CCNC: 11 U/L — LOW (ref 15–37)
BASOPHILS # BLD AUTO: 0 K/UL — SIGNIFICANT CHANGE UP (ref 0–0.2)
BASOPHILS NFR BLD AUTO: 0 % — SIGNIFICANT CHANGE UP (ref 0–2)
BILIRUB SERPL-MCNC: 0.3 MG/DL — SIGNIFICANT CHANGE UP (ref 0.2–1.2)
BUN SERPL-MCNC: 15 MG/DL — SIGNIFICANT CHANGE UP (ref 7–23)
CALCIUM SERPL-MCNC: 8.6 MG/DL — SIGNIFICANT CHANGE UP (ref 8.5–10.1)
CHLORIDE SERPL-SCNC: 110 MMOL/L — HIGH (ref 96–108)
CO2 SERPL-SCNC: 28 MMOL/L — SIGNIFICANT CHANGE UP (ref 22–31)
CREAT SERPL-MCNC: 0.69 MG/DL — SIGNIFICANT CHANGE UP (ref 0.5–1.3)
CULTURE RESULTS: SIGNIFICANT CHANGE UP
CULTURE RESULTS: SIGNIFICANT CHANGE UP
EGFR: 93 ML/MIN/1.73M2 — SIGNIFICANT CHANGE UP
EOSINOPHIL # BLD AUTO: 0.73 K/UL — HIGH (ref 0–0.5)
EOSINOPHIL NFR BLD AUTO: 4 % — SIGNIFICANT CHANGE UP (ref 0–6)
GLUCOSE SERPL-MCNC: 80 MG/DL — SIGNIFICANT CHANGE UP (ref 70–99)
HCT VFR BLD CALC: 29 % — LOW (ref 39–50)
HGB BLD-MCNC: 9.1 G/DL — LOW (ref 13–17)
LYMPHOCYTES # BLD AUTO: 19 % — SIGNIFICANT CHANGE UP (ref 13–44)
LYMPHOCYTES # BLD AUTO: 3.48 K/UL — HIGH (ref 1–3.3)
MCHC RBC-ENTMCNC: 28.2 PG — SIGNIFICANT CHANGE UP (ref 27–34)
MCHC RBC-ENTMCNC: 31.4 G/DL — LOW (ref 32–36)
MCV RBC AUTO: 89.8 FL — SIGNIFICANT CHANGE UP (ref 80–100)
MONOCYTES # BLD AUTO: 1.65 K/UL — HIGH (ref 0–0.9)
MONOCYTES NFR BLD AUTO: 9 % — SIGNIFICANT CHANGE UP (ref 2–14)
NEUTROPHILS # BLD AUTO: 12.08 K/UL — HIGH (ref 1.8–7.4)
NEUTROPHILS NFR BLD AUTO: 64 % — SIGNIFICANT CHANGE UP (ref 43–77)
NRBC # BLD: SIGNIFICANT CHANGE UP /100 WBCS (ref 0–0)
NRBC BLD-RTO: SIGNIFICANT CHANGE UP /100 WBCS (ref 0–0)
PLATELET # BLD AUTO: 374 K/UL — SIGNIFICANT CHANGE UP (ref 150–400)
POTASSIUM SERPL-MCNC: 3.9 MMOL/L — SIGNIFICANT CHANGE UP (ref 3.5–5.3)
POTASSIUM SERPL-SCNC: 3.9 MMOL/L — SIGNIFICANT CHANGE UP (ref 3.5–5.3)
PROT SERPL-MCNC: 5.3 G/DL — LOW (ref 6–8.3)
RBC # BLD: 3.23 M/UL — LOW (ref 4.2–5.8)
RBC # FLD: 16 % — HIGH (ref 10.3–14.5)
SODIUM SERPL-SCNC: 141 MMOL/L — SIGNIFICANT CHANGE UP (ref 135–145)
SPECIMEN SOURCE: SIGNIFICANT CHANGE UP
SPECIMEN SOURCE: SIGNIFICANT CHANGE UP
WBC # BLD: 18.3 K/UL — HIGH (ref 3.8–10.5)
WBC # FLD AUTO: 18.3 K/UL — HIGH (ref 3.8–10.5)

## 2025-02-11 PROCEDURE — 99231 SBSQ HOSP IP/OBS SF/LOW 25: CPT

## 2025-02-11 PROCEDURE — 99232 SBSQ HOSP IP/OBS MODERATE 35: CPT

## 2025-02-11 RX ORDER — APIXABAN 5 MG/1
2.5 TABLET, FILM COATED ORAL
Refills: 0 | Status: DISCONTINUED | OUTPATIENT
Start: 2025-02-12 | End: 2025-02-12

## 2025-02-11 RX ADMIN — POLYETHYLENE GLYCOL 3350 17 GRAM(S): 17 POWDER, FOR SOLUTION ORAL at 13:11

## 2025-02-11 RX ADMIN — PANTOPRAZOLE 40 MILLIGRAM(S): 20 TABLET, DELAYED RELEASE ORAL at 05:54

## 2025-02-11 RX ADMIN — Medication 1 TABLET(S): at 21:26

## 2025-02-11 RX ADMIN — ENOXAPARIN SODIUM 40 MILLIGRAM(S): 100 INJECTION SUBCUTANEOUS at 13:11

## 2025-02-11 RX ADMIN — Medication 5 MILLIGRAM(S): at 13:11

## 2025-02-11 RX ADMIN — TAMSULOSIN HYDROCHLORIDE 0.4 MILLIGRAM(S): 0.4 CAPSULE ORAL at 21:26

## 2025-02-11 RX ADMIN — PREDNISONE 10 MILLIGRAM(S): 5 TABLET ORAL at 05:54

## 2025-02-11 RX ADMIN — ANTISEPTIC SURGICAL SCRUB 1 APPLICATION(S): 0.04 SOLUTION TOPICAL at 05:49

## 2025-02-11 NOTE — PROGRESS NOTE ADULT - SUBJECTIVE AND OBJECTIVE BOX
Patient is a 79y old  Male who presents with a chief complaint of hematuria, active bleed within bladder requiring emergent surgery with urology (10 Feb 2025 16:33)    Interval Events:  Patient seen and examined at bedside. No overnight events. Patient aggravated this morning and uncooperative with exam. Patient reports no new complaints at this time. +Restrepo with yellow urine.       MEDICATIONS:  MEDICATIONS  (STANDING):  chlorhexidine 2% Cloths 1 Application(s) Topical <User Schedule>  enoxaparin Injectable 40 milliGRAM(s) SubCutaneous every 24 hours  finasteride 5 milliGRAM(s) Oral daily  pantoprazole    Tablet 40 milliGRAM(s) Oral before breakfast  polyethylene glycol 3350 17 Gram(s) Oral daily  predniSONE   Tablet 10 milliGRAM(s) Oral daily  senna 1 Tablet(s) Oral at bedtime  tamsulosin 0.4 milliGRAM(s) Oral at bedtime    MEDICATIONS  (PRN):  acetaminophen     Tablet .. 650 milliGRAM(s) Oral every 6 hours PRN Temp greater or equal to 38C (100.4F), Mild Pain (1 - 3)  HYDROmorphone  Injectable 0.5 milliGRAM(s) IV Push every 4 hours PRN Moderate Pain (4 - 6)  HYDROmorphone  Injectable 1 milliGRAM(s) IV Push every 4 hours PRN Severe Pain (7 - 10)      Allergies    ofloxacin (Unknown)  gatifloxacin (Unknown)  levofloxacin (Unknown)  Cipro (Unknown)    Intolerances    Ketek (Other)  telithromycin (Other)  fluoroquinolone antibiotics (Other)  Avelox (Other)      T(C): 36.4 (02-11-25 @ 05:00), Max: 36.9 (02-10-25 @ 06:00)  T(F): 97.5 (02-11-25 @ 05:00), Max: 98.5 (02-10-25 @ 06:00)  HR: 65 (02-11-25 @ 05:00) (62 - 83)  BP: 116/75 (02-11-25 @ 05:00) (106/62 - 126/73)  RR: 18 (02-11-25 @ 05:00) (18 - 20)  SpO2: 100% (02-11-25 @ 05:00) (95% - 100%)          02-09-25 @ 07:01  -  02-10-25 @ 07:00  --------------------------------------------------------  IN:  Total IN: 0 mL    OUT:    Indwelling Catheter - Urethral (mL): 1260 mL  Total OUT: 1260 mL    Total NET: -1260 mL      02-10-25 @ 07:01  -  02-11-25 @ 07:00  --------------------------------------------------------  IN:  Total IN: 0 mL    OUT:    Indwelling Catheter - Urethral (mL): 2700 mL  Total OUT: 2700 mL    Total NET: -2700 mL          LABS:      CBC Full  -  ( 11 Feb 2025 06:10 )  WBC Count : 18.30 K/uL  RBC Count : 3.23 M/uL  Hemoglobin : 9.1 g/dL  Hematocrit : 29.0 %  Platelet Count - Automated : 374 K/uL  Mean Cell Volume : 89.8 fl  Mean Cell Hemoglobin : 28.2 pg  Mean Cell Hemoglobin Concentration : 31.4 g/dL  Auto Neutrophil # : x  Auto Lymphocyte # : x  Auto Monocyte # : x  Auto Eosinophil # : x  Auto Basophil # : x  Auto Neutrophil % : x  Auto Lymphocyte % : x  Auto Monocyte % : x  Auto Eosinophil % : x  Auto Basophil % : x    02-11    141  |  110[H]  |  15  ----------------------------<  80  3.9   |  28  |  0.69    Ca    8.6      11 Feb 2025 06:10  Phos  2.8     02-10    TPro  5.3[L]  /  Alb  2.1[L]  /  TBili  0.3  /  DBili  x   /  AST  11[L]  /  ALT  12  /  AlkPhos  65  02-11        Urinalysis Basic - ( 11 Feb 2025 06:10 )    Color: x / Appearance: x / SG: x / pH: x  Gluc: 80 mg/dL / Ketone: x  / Bili: x / Urobili: x   Blood: x / Protein: x / Nitrite: x   Leuk Esterase: x / RBC: x / WBC x   Sq Epi: x / Non Sq Epi: x / Bacteria: x        Physical Exam    Constitutional: alert, aggravated, uncooperative  Abdomen: soft, nontender, nondistended, no CVA  Genitourinary: no bladder distention, +Restrepo with clear yellow urine

## 2025-02-11 NOTE — PROGRESS NOTE ADULT - ASSESSMENT
79M, MG, HTN, chronic systolic CHF/EF 35%, hx PE/eliquis, hx nephrolithiasis; mgmt for:  -hematuria/urinary obstruction - s/p urgent cystoscopy/clot evacuation; completed IV abxs; per , TOV - guerrero out - monitoring for recurrence of obstructive sxs; continue flomax, finasteride  -anemia requiring transfusion - s/p PRBCs - hgb stable - monitoring for further transfusion need  -hx VTE - a/c held while active bleeding/monitoring hgb- per  - resume a/c at half-dose and half-frequency -5mg bid-> 2.5mg daily - start tomorrow 2/12; per , continue prophy lovenox while eliquis at lowered dose  -CV - monitoring vol status, hemodynamics  -L heel ulcer - local wound care per Podiatry  -neuro - continue home regimen  -HL - resume home regimen at d/ch  -dvt prophy - no a/c  -PT mobilization

## 2025-02-11 NOTE — PROGRESS NOTE ADULT - ASSESSMENT
79y male patient POD#4 S/P cystoscopy with clot evacuation; received 1 unit pRBC in OR was transferred to ICU as patient was meeting sepsis criteria with elevated/increasing WBC, no febrile, but with tachycardia elevated lactate. Downgraded to floor, now with downtrending WBC 17.33 from 28.07 and downtrending H/H 6.6/20.7 and 7.8/23.9. 3-way Guerrero originally with balloon tamponade inflated with 40cc and traction. Traction released 2/6, urine color light pink in tubing and fruit punched color in reservoir, off CBI. Now patient with clear yellow urine in the guerrero bag without any abdominal pain or discomfort.  Today H/H 9.1/29.0. WBC 18.30, afebrile; not currently on antibiotics as per ID    - Regular diet  - Continue Finasteride and Flomax  - Pain control prn  - Continue DVT PPX with LVX  - Trend WBC  - Monitor I&Os  - Will continue to monitor 79y male patient POD#4 S/P cystoscopy with clot evacuation; received 1 unit pRBC in OR was transferred to ICU as patient was meeting sepsis criteria with elevated/increasing WBC, no febrile, but with tachycardia elevated lactate. Downgraded to floor, now with downtrending WBC 17.33 from 28.07 and downtrending H/H 6.6/20.7 and 7.8/23.9. 3-way Guerrero originally with balloon tamponade inflated with 40cc and traction. Traction released 2/6, urine color light pink in tubing and fruit punched color in reservoir, off CBI. Now patient with clear yellow urine in the guerrero bag without any abdominal pain or discomfort.  Today H/H 9.1/29.0. WBC 18.30, afebrile; not currently on antibiotics as per ID    - Regular diet  - Continue Finasteride and Flomax  - Abx stopped per ID  - Pain control prn  - Continue DVT PPX with LVX  - Monitor H/H  - Trend WBC  - Monitor I&Os  - Will continue to monitor  To be discussed with Dr. Antoine 79y male patient POD#4 S/P cystoscopy with clot evacuation; received 1 unit pRBC in OR was transferred to ICU as patient was meeting sepsis criteria with elevated/increasing WBC, no febrile, but with tachycardia elevated lactate. Downgraded to floor, now with downtrending WBC 17.33 from 28.07 and downtrending H/H 6.6/20.7 and 7.8/23.9. 3-way Guerrero originally with balloon tamponade inflated with 40cc and traction. Traction released 2/6, urine color light pink in tubing and fruit punched color in reservoir, off CBI. Now patient with clear yellow urine in the guerrero bag without any abdominal pain or discomfort.  Today H/H 9.1/29.0. WBC 18.30, afebrile; not currently on antibiotics as per ID    - Plan for TOV today  - May restart half dose of Eliquis, once a day  - May continue DVT PPX with LVX while admitted  - Patient to follow up with Dr. Antoine in office within 1 week of discharge  - Regular diet   - Continue Finasteride and Flomax  - Abx stopped per ID  - Pain control prn  - Monitor H/H  - Trend WBC  - Monitor I&Os  - Will sign off at this time, recall prn  Discussed with Dr. Antoine   1 unit prbc

## 2025-02-12 LAB
ANION GAP SERPL CALC-SCNC: 5 MMOL/L — SIGNIFICANT CHANGE UP (ref 5–17)
BUN SERPL-MCNC: 14 MG/DL — SIGNIFICANT CHANGE UP (ref 7–23)
CALCIUM SERPL-MCNC: 8.9 MG/DL — SIGNIFICANT CHANGE UP (ref 8.5–10.1)
CHLORIDE SERPL-SCNC: 108 MMOL/L — SIGNIFICANT CHANGE UP (ref 96–108)
CO2 SERPL-SCNC: 27 MMOL/L — SIGNIFICANT CHANGE UP (ref 22–31)
CREAT SERPL-MCNC: 0.67 MG/DL — SIGNIFICANT CHANGE UP (ref 0.5–1.3)
EGFR: 94 ML/MIN/1.73M2 — SIGNIFICANT CHANGE UP
GLUCOSE SERPL-MCNC: 108 MG/DL — HIGH (ref 70–99)
HCT VFR BLD CALC: 31.6 % — LOW (ref 39–50)
HGB BLD-MCNC: 10 G/DL — LOW (ref 13–17)
MCHC RBC-ENTMCNC: 28.5 PG — SIGNIFICANT CHANGE UP (ref 27–34)
MCHC RBC-ENTMCNC: 31.6 G/DL — LOW (ref 32–36)
MCV RBC AUTO: 90 FL — SIGNIFICANT CHANGE UP (ref 80–100)
NRBC BLD AUTO-RTO: 0 /100 WBCS — SIGNIFICANT CHANGE UP (ref 0–0)
PLATELET # BLD AUTO: 422 K/UL — HIGH (ref 150–400)
POTASSIUM SERPL-MCNC: 4.1 MMOL/L — SIGNIFICANT CHANGE UP (ref 3.5–5.3)
POTASSIUM SERPL-SCNC: 4.1 MMOL/L — SIGNIFICANT CHANGE UP (ref 3.5–5.3)
RBC # BLD: 3.51 M/UL — LOW (ref 4.2–5.8)
RBC # FLD: 16.7 % — HIGH (ref 10.3–14.5)
SODIUM SERPL-SCNC: 140 MMOL/L — SIGNIFICANT CHANGE UP (ref 135–145)
WBC # BLD: 20.56 K/UL — HIGH (ref 3.8–10.5)
WBC # FLD AUTO: 20.56 K/UL — HIGH (ref 3.8–10.5)

## 2025-02-12 PROCEDURE — G0545: CPT

## 2025-02-12 PROCEDURE — 99221 1ST HOSP IP/OBS SF/LOW 40: CPT

## 2025-02-12 PROCEDURE — 99233 SBSQ HOSP IP/OBS HIGH 50: CPT

## 2025-02-12 PROCEDURE — 99232 SBSQ HOSP IP/OBS MODERATE 35: CPT

## 2025-02-12 RX ORDER — ALPRAZOLAM 2 MG
0.25 TABLET ORAL ONCE
Refills: 0 | Status: DISCONTINUED | OUTPATIENT
Start: 2025-02-12 | End: 2025-02-13

## 2025-02-12 RX ORDER — ENOXAPARIN SODIUM 100 MG/ML
40 INJECTION SUBCUTANEOUS EVERY 12 HOURS
Refills: 0 | Status: DISCONTINUED | OUTPATIENT
Start: 2025-02-13 | End: 2025-02-13

## 2025-02-12 RX ORDER — SODIUM CHLORIDE 9 G/ML
1000 INJECTION, SOLUTION INTRAVENOUS
Refills: 0 | Status: COMPLETED | OUTPATIENT
Start: 2025-02-12 | End: 2025-02-12

## 2025-02-12 RX ADMIN — SODIUM CHLORIDE 50 MILLILITER(S): 9 INJECTION, SOLUTION INTRAVENOUS at 11:39

## 2025-02-12 RX ADMIN — ACETAMINOPHEN 650 MILLIGRAM(S): 160 SUSPENSION ORAL at 20:00

## 2025-02-12 RX ADMIN — POLYETHYLENE GLYCOL 3350 17 GRAM(S): 17 POWDER, FOR SOLUTION ORAL at 11:38

## 2025-02-12 RX ADMIN — ACETAMINOPHEN 650 MILLIGRAM(S): 160 SUSPENSION ORAL at 08:10

## 2025-02-12 RX ADMIN — PANTOPRAZOLE 40 MILLIGRAM(S): 20 TABLET, DELAYED RELEASE ORAL at 05:22

## 2025-02-12 RX ADMIN — APIXABAN 2.5 MILLIGRAM(S): 5 TABLET, FILM COATED ORAL at 10:21

## 2025-02-12 RX ADMIN — ACETAMINOPHEN 650 MILLIGRAM(S): 160 SUSPENSION ORAL at 09:10

## 2025-02-12 RX ADMIN — TAMSULOSIN HYDROCHLORIDE 0.4 MILLIGRAM(S): 0.4 CAPSULE ORAL at 21:21

## 2025-02-12 RX ADMIN — Medication 5 MILLIGRAM(S): at 11:38

## 2025-02-12 RX ADMIN — Medication 1 TABLET(S): at 21:20

## 2025-02-12 RX ADMIN — ENOXAPARIN SODIUM 40 MILLIGRAM(S): 100 INJECTION SUBCUTANEOUS at 11:38

## 2025-02-12 RX ADMIN — PREDNISONE 10 MILLIGRAM(S): 5 TABLET ORAL at 05:22

## 2025-02-12 NOTE — PROGRESS NOTE ADULT - PROBLEM SELECTOR PLAN 1
Patient had been refusing wound assessment and finally agreed today  Left heel unstageable eschar with no fluctuance   X- rays with no osseous erosions, given the chronicity of the wound will order MRI to r/o OM  Had a phone discussion with patient's son Mr. Bah who provided complete history, patient has been non ambulatory since August of 2023. without any hx of trauma. Patient has hx of Myasthenia Gravis. Per the son patient did not move from the couch or the bed after USP and slowly got deconditioned and is now very weak in his legs to walk.   Recommend PT consult and treatment for stretching and strengthening exercise for BLE.   Recommend vascular consult to assess wound healing potential  Recommend heels to be offloaded with pillows under the area of the calf to alleviate the biomechanical pressure   Recommend dry dressing to the left foot every other day  Discussed treatment plan with patient' son and the patient. Both verbalized understanding.

## 2025-02-12 NOTE — CONSULT NOTE ADULT - REASON FOR ADMISSION
hematuria, active bleed within bladder requiring emergent surgery with urology

## 2025-02-12 NOTE — PHARMACOTHERAPY INTERVENTION NOTE - COMMENTS
Patient is a 79 year old male with PMH PE admitted for hematuria requiring emergent surgery. Patient takes Eliquis 5mg BID for history of PE, which was held during admission due to anemia requiring transfusion. Patient was re-started on eliquis at 2.5mg QD in combination with lovenox 40mg QD. Discussed w/ Dr. Yi and recommended switching to heparin drip or full dose lovenox if concern for bleed as patient not being appropriately covered for history of PE with current treatment regimen. MD will review and adjust per clinical discretion.  Patient is a 79 year old male with PMH PE admitted for hematuria requiring emergent surgery. Patient takes Eliquis 5mg BID for history of PE, which was held during admission due to anemia requiring transfusion. Patient was re-started on eliquis at 2.5mg QD in combination with lovenox 40mg QD. Discussed w/ Dr. Yi and recommended switching to heparin drip or full dose lovenox if concern for bleed as patient is not being appropriately dosed for history of PE with current treatment regimen. MD will review and adjust per clinical discretion.

## 2025-02-12 NOTE — PROGRESS NOTE ADULT - ASSESSMENT
79M, MG, HTN, chronic systolic CHF/EF 35%, hx PE/eliquis, hx nephrolithiasis; mgmt for:  -hematuria/urinary obstruction - s/p urgent cystoscopy/clot evacuation; completed IV abxs; per , TOV - guerrero out - monitoring for recurrence of obstructive sxs; continue flomax, finasteride  -anemia requiring transfusion - s/p PRBCs - hgb stable - monitoring for further transfusion need  -hx VTE - a/c held while active bleeding/monitoring hgb- per  - resume a/c at half-dose and half-frequency -5mg bid-> 2.5mg daily   per , continue prophy lovenox while eliquis at lowered dose  -CV - monitoring vol status, hemodynamics  -L heel ulcer - local wound care per Podiatry, MRI to r/o OM , vascular eval called   -neuro - continue home regimen  -HL - resume home regimen at d/ch  -dvt prophy - on lovenox, eliquis   -PT mobilization   79M, MG, HTN, chronic systolic CHF/EF 35%, hx PE/eliquis, hx nephrolithiasis; mgmt for:  -hematuria/urinary obstruction - s/p urgent cystoscopy/clot evacuation; completed IV abxs; per , TOV - guerrero out - monitoring for recurrence of obstructive sxs; continue flomax, finasteride  -anemia requiring transfusion - s/p PRBCs - hgb stable - monitoring for further transfusion need  -h/o PE: called and spoke to Chip Noland (patient primary care doctor ), patient was diagnosed with PE 1-1.5 years ago and was on eliquis since then . CT with IV contrast in Nov 2024 and 2/5/25  both negative for PE. , however, patient is ?wheelchair bound after he was in the Rehab since September 2024.  patient's risk of recurrent VTE high. will benefit from prophylctic dose. will use Lovenox for DVT prophylaxis for now. and transit to eliquis 2.5mg bid if no podiatry intervention.   -CV - monitoring vol status, hemodynamics  -L heel ulcer - local wound care per Podiatry, MRI to r/o OM , vascular eval called   -neuro - continue home regimen  -HL - resume home regimen at d/ch  -dvt prophy - on lovenox  -PT mobilization

## 2025-02-12 NOTE — PROGRESS NOTE ADULT - PROBLEM SELECTOR PROBLEM 2
HTN (hypertension)
History of myasthenia gravis
HTN (hypertension)

## 2025-02-12 NOTE — PROGRESS NOTE ADULT - PROBLEM SELECTOR PROBLEM 1
Pressure ulcer, heel, left, unstageable
Pressure ulcer of left heel
Hematuria

## 2025-02-12 NOTE — PROGRESS NOTE ADULT - SUBJECTIVE AND OBJECTIVE BOX
Patient is a 79y old  Male who presents with a chief complaint of hematuria, active bleed within bladder requiring emergent surgery with urology (12 Feb 2025 12:54)      Subjective:  INTERVAL HPI/OVERNIGHT EVENTS: Patient seen and examined at bedside.  Patient has no complaints at this time.     MEDICATIONS  (STANDING):  ALPRAZolam 0.25 milliGRAM(s) Oral once  apixaban 2.5 milliGRAM(s) Oral <User Schedule>  chlorhexidine 2% Cloths 1 Application(s) Topical <User Schedule>  enoxaparin Injectable 40 milliGRAM(s) SubCutaneous every 24 hours  finasteride 5 milliGRAM(s) Oral daily  pantoprazole    Tablet 40 milliGRAM(s) Oral before breakfast  polyethylene glycol 3350 17 Gram(s) Oral daily  predniSONE   Tablet 10 milliGRAM(s) Oral daily  senna 1 Tablet(s) Oral at bedtime  tamsulosin 0.4 milliGRAM(s) Oral at bedtime    MEDICATIONS  (PRN):  acetaminophen     Tablet .. 650 milliGRAM(s) Oral every 6 hours PRN Temp greater or equal to 38C (100.4F), Mild Pain (1 - 3)  HYDROmorphone  Injectable 0.5 milliGRAM(s) IV Push every 4 hours PRN Moderate Pain (4 - 6)  HYDROmorphone  Injectable 1 milliGRAM(s) IV Push every 4 hours PRN Severe Pain (7 - 10)      Allergies    ofloxacin (Unknown)  gatifloxacin (Unknown)  levofloxacin (Unknown)  Cipro (Unknown)    Intolerances    Ketek (Other)  telithromycin (Other)  fluoroquinolone antibiotics (Other)  Avelox (Other)      REVIEW OF SYSTEMS:  CONSTITUTIONAL: No fever or chills  HEENT:  No headache, no sore throat  RESPIRATORY: No cough or shortness of breath  CARDIOVASCULAR: No chest pain or palpitations  GASTROINTESTINAL: No abd pain, nausea, vomiting, or diarrhea      Objective:  Vital Signs Last 24 Hrs  T(C): 36.8 (12 Feb 2025 12:21), Max: 37 (12 Feb 2025 04:22)  T(F): 98.2 (12 Feb 2025 12:21), Max: 98.6 (12 Feb 2025 04:22)  HR: 69 (12 Feb 2025 12:21) (69 - 90)  BP: 123/76 (12 Feb 2025 12:21) (123/76 - 144/73)  BP(mean): --  RR: 20 (12 Feb 2025 12:21) (18 - 20)  SpO2: 94% (12 Feb 2025 12:21) (93% - 98%)    Parameters below as of 12 Feb 2025 12:21  Patient On (Oxygen Delivery Method): room air        GENERAL: NAD, lying in bed comfortably  HEAD:  Normocephalic  EYES:  conjunctiva and sclera clear  ENT: Moist mucous membranes  NECK: Supple  CHEST/LUNG: Clear to auscultation bilaterally; No rales or rhonchi; no wheezing. Unlabored respirations  HEART: Regular rate and rhythm; S1S2+  ABDOMEN: Bowel sounds present; Soft, Nontender, Nondistended.   EXTREMITIES:  + distal Peripheral Pulses;  No cyanosis, or edema  NERVOUS SYSTEM:  Alert & Oriented X3;  No gross focal deficits   MSK: moves all extremities but decreased strength B/L LE 3-/5   SKIN:  Left heel unstageable PU no surrounding erythema or  discharge.      LABS:                        10.0   20.56 )-----------( 422      ( 12 Feb 2025 08:28 )             31.6     12 Feb 2025 08:28    140    |  108    |  14     ----------------------------<  108    4.1     |  27     |  0.67     Ca    8.9        12 Feb 2025 08:28        Urinalysis Basic - ( 12 Feb 2025 08:28 )    Color: x / Appearance: x / SG: x / pH: x  Gluc: 108 mg/dL / Ketone: x  / Bili: x / Urobili: x   Blood: x / Protein: x / Nitrite: x   Leuk Esterase: x / RBC: x / WBC x   Sq Epi: x / Non Sq Epi: x / Bacteria: x      CAPILLARY BLOOD GLUCOSE            Culture - Urine (collected 02-06-25 @ 01:00)  Source: Catheterized Catheterized  Final Report (02-06-25 @ 23:31):    <10,000 CFU/mL Normal Urogenital Fariba    Urinalysis with Rflx Culture (collected 02-05-25 @ 17:59)    Culture - Blood (collected 02-05-25 @ 17:50)  Source: .Blood BLOOD  Final Report (02-11-25 @ 03:00):    No growth at 5 days    Culture - Blood (collected 02-05-25 @ 17:45)  Source: .Blood BLOOD  Final Report (02-11-25 @ 03:00):    No growth at 5 days        RADIOLOGY & ADDITIONAL TESTS:    Personally reviewed.     Consultant(s) Notes Reviewed:  [x] YES  [ ] NO    Plan of care discussed with patient ; all questions answered

## 2025-02-12 NOTE — CHART NOTE - NSCHARTNOTEFT_GEN_A_CORE
Patient seen and examined at bedside this AM.  Restrepo was removed yesterday. Patient voiding independently, clear yellow urine.  Currently wearing diapers. No complaints of difficulty with urination, dysuria, hematuria, or abdominal pain.  AC resumed, being managed by primary team.  Rest of care per primary team.  Cleared for discharge from urologic perspective.  Will need to follow up in outpatient urology within 1 week of discharge. Patient seen and examined at bedside this AM.  Restrepo was removed yesterday. Patient voiding independently, clear yellow urine.  Currently wearing diapers. No complaints of difficulty with urination, dysuria, hematuria, or abdominal pain.  AC resumed, being managed by primary team.  Rest of care per primary team.  Please obtain PVRs  Cleared for discharge from urologic perspective.  Will need to follow up in outpatient urology within 1 week of discharge.  Discussed with Dr. Antoine

## 2025-02-12 NOTE — PHARMACOTHERAPY INTERVENTION NOTE - OUTCOME
[Patient Declined  Services] : - None: Patient declined  services pending [FreeTextEntry3] :  Shared Language [place] with MD

## 2025-02-12 NOTE — PROGRESS NOTE ADULT - TIME BILLING
Note written by attending. Meds, labs, vitals, chart reviewed. Plan d/w patient, consultants
Note written by attending. Meds, labs, vitals, chart reviewed.
direct patient care including but not limited to reviewing chart, medications ,laboratory data, imaging reports, discussion of plan of care with consultants on the case, coordination of care with multidisciplinary team involved in the case and discussion of plan with patient.  Patient and family agreeable to plan of care and verbalized understanding the anticipated hospital course and treatment plan.
I spent 50 minutes providing medical care for this patient. This includes reviewing labs, consultant notes, vital signs, ins and outs, medication list, any imaging obtained, examining and assessing patient, discussing with patient and/or HCP or representative of patient. This does not include any procedure related time.
Note written by attending. Meds, labs, vitals, chart reviewed. Plan d/w patient, consultants. D/w Son Olvin at bedside. Updated him
Note written by attending. Meds, labs, vitals, chart reviewed. Plan d/w patient, consultants
Note written by attending. Meds, labs, vitals, chart reviewed. Plan d/w patient, consultants. D/w Son Olvin at bedside. Updated him

## 2025-02-12 NOTE — PROGRESS NOTE ADULT - SUBJECTIVE AND OBJECTIVE BOX
79y year old Male seen at [facility] 2EAS 206 W1 for ----------.  Denies any fever, chills, nausea, vomiting, chest pain, shortness of breath, or calf pain at this time.    Allergies    ofloxacin (Unknown)  gatifloxacin (Unknown)  levofloxacin (Unknown)  Cipro (Unknown)    Intolerances    Ketek (Other)  telithromycin (Other)  fluoroquinolone antibiotics (Other)  Avelox (Other)      MEDICATIONS  (STANDING):  ALPRAZolam 0.25 milliGRAM(s) Oral once  chlorhexidine 2% Cloths 1 Application(s) Topical <User Schedule>  finasteride 5 milliGRAM(s) Oral daily  pantoprazole    Tablet 40 milliGRAM(s) Oral before breakfast  polyethylene glycol 3350 17 Gram(s) Oral daily  predniSONE   Tablet 10 milliGRAM(s) Oral daily  senna 1 Tablet(s) Oral at bedtime  tamsulosin 0.4 milliGRAM(s) Oral at bedtime    MEDICATIONS  (PRN):  acetaminophen     Tablet .. 650 milliGRAM(s) Oral every 6 hours PRN Temp greater or equal to 38C (100.4F), Mild Pain (1 - 3)  HYDROmorphone  Injectable 0.5 milliGRAM(s) IV Push every 4 hours PRN Moderate Pain (4 - 6)  HYDROmorphone  Injectable 1 milliGRAM(s) IV Push every 4 hours PRN Severe Pain (7 - 10)      Vital Signs Last 24 Hrs  T(C): 36.8 (12 Feb 2025 12:21), Max: 37 (12 Feb 2025 04:22)  T(F): 98.2 (12 Feb 2025 12:21), Max: 98.6 (12 Feb 2025 04:22)  HR: 69 (12 Feb 2025 12:21) (69 - 90)  BP: 123/76 (12 Feb 2025 12:21) (123/76 - 144/73)  BP(mean): --  RR: 20 (12 Feb 2025 12:21) (18 - 20)  SpO2: 94% (12 Feb 2025 12:21) (93% - 98%)    Parameters below as of 12 Feb 2025 12:21  Patient On (Oxygen Delivery Method): room air        PHYSICAL EXAM:  Vascular: ----------  Neurological: ----------  Musculoskeletal: ----------  Dermatological: ----------    CBC Full  -  ( 12 Feb 2025 08:28 )  WBC Count : 20.56 K/uL  RBC Count : 3.51 M/uL  Hemoglobin : 10.0 g/dL  Hematocrit : 31.6 %  Platelet Count - Automated : 422 K/uL  Mean Cell Volume : 90.0 fl  Mean Cell Hemoglobin : 28.5 pg  Mean Cell Hemoglobin Concentration : 31.6 g/dL  Auto Neutrophil # : x  Auto Lymphocyte # : x  Auto Monocyte # : x  Auto Eosinophil # : x  Auto Basophil # : x  Auto Neutrophil % : x  Auto Lymphocyte % : x  Auto Monocyte % : x  Auto Eosinophil % : x  Auto Basophil % : x      ----------CHEM PANEL----------            Imaging: ----------   79y year old Male seen at [facility] 2EAS 206 W1 for - left heel wound with necrotic tissue. Patient was resting in bed comfortably. Patient complained os severe pain to the foot when requested to assess the wound.   Denies any fever, chills, nausea, vomiting, chest pain, shortness of breath, or calf pain at this time.    Allergies    ofloxacin (Unknown)  gatifloxacin (Unknown)  levofloxacin (Unknown)  Cipro (Unknown)    Intolerances    Ketek (Other)  telithromycin (Other)  fluoroquinolone antibiotics (Other)  Avelox (Other)      MEDICATIONS  (STANDING):  ALPRAZolam 0.25 milliGRAM(s) Oral once  chlorhexidine 2% Cloths 1 Application(s) Topical <User Schedule>  finasteride 5 milliGRAM(s) Oral daily  pantoprazole    Tablet 40 milliGRAM(s) Oral before breakfast  polyethylene glycol 3350 17 Gram(s) Oral daily  predniSONE   Tablet 10 milliGRAM(s) Oral daily  senna 1 Tablet(s) Oral at bedtime  tamsulosin 0.4 milliGRAM(s) Oral at bedtime    MEDICATIONS  (PRN):  acetaminophen     Tablet .. 650 milliGRAM(s) Oral every 6 hours PRN Temp greater or equal to 38C (100.4F), Mild Pain (1 - 3)  HYDROmorphone  Injectable 0.5 milliGRAM(s) IV Push every 4 hours PRN Moderate Pain (4 - 6)  HYDROmorphone  Injectable 1 milliGRAM(s) IV Push every 4 hours PRN Severe Pain (7 - 10)      Vital Signs Last 24 Hrs  T(C): 36.8 (12 Feb 2025 12:21), Max: 37 (12 Feb 2025 04:22)  T(F): 98.2 (12 Feb 2025 12:21), Max: 98.6 (12 Feb 2025 04:22)  HR: 69 (12 Feb 2025 12:21) (69 - 90)  BP: 123/76 (12 Feb 2025 12:21) (123/76 - 144/73)  BP(mean): --  RR: 20 (12 Feb 2025 12:21) (18 - 20)  SpO2: 94% (12 Feb 2025 12:21) (93% - 98%)    Parameters below as of 12 Feb 2025 12:21  Patient On (Oxygen Delivery Method): room air        PHYSICAL EXAM:  Vascular: DP palpable b/l, PT non-palpable b/l. CFT bout 5 seconds to the digits b/l. Pedal hair absent   Neurological: -Light touch sensation intact to the foot   Musculoskeletal: 3/5 muscle strength for all muscles and tendons crossing the foot and ankle b/l.   Dermatological: 2.0cm x 1.5 cm necrotic eschar to the plantar lateral aspect of the left heel, mild edema, no erythema, stable with no fluctuance, no drainage, no proximal streaking     CBC Full  -  ( 12 Feb 2025 08:28 )  WBC Count : 20.56 K/uL  RBC Count : 3.51 M/uL  Hemoglobin : 10.0 g/dL  Hematocrit : 31.6 %  Platelet Count - Automated : 422 K/uL  Mean Cell Volume : 90.0 fl  Mean Cell Hemoglobin : 28.5 pg  Mean Cell Hemoglobin Concentration : 31.6 g/dL  Auto Neutrophil # : x  Auto Lymphocyte # : x  Auto Monocyte # : x  Auto Eosinophil # : x  Auto Basophil # : x  Auto Neutrophil % : x  Auto Lymphocyte % : x  Auto Monocyte % : x  Auto Eosinophil % : x  Auto Basophil % : x      02-12    140  |  108  |  14  ----------------------------<  108[H]  4.1   |  27  |  0.67    Ca    8.9      12 Feb 2025 08:28    TPro  5.3[L]  /  Alb  2.1[L]  /  TBili  0.3  /  DBili  x   /  AST  11[L]  /  ALT  12  /  AlkPhos  65  02-11                          10.0   20.56 )-----------( 422      ( 12 Feb 2025 08:28 )             31.6             Imaging: ----------    ACC: [id number] EXAM: XR FOOT COMP MIN 3 VIEWS LT ORDERED BY: LAITH LARSEN    PROCEDURE DATE: 02/08/2025        INTERPRETATION: LEFT FOOT: AP, LATERAL, OBLIQUE    CLINICAL INFORMATION: Chronic heel ulcer.    COMPARISON: None available    FINDINGS:    Diffuse soft tissue swelling over the dorsum of the foot.  Soft tissue vascular calcifications.    There are diffuse hammertoe deformities limiting evaluation of the toes.  There is patchy osteopenia.  No definite focal osteolysis or acute fracture is noted.    Prominent plantar calcaneal spur.    IMPRESSION:    Findings as discussed above.    --- End of Report ---            RUBINA YOUNG MD; Attending Radiologist  This document has been electronically signed. Feb 10 2025 3:43PM

## 2025-02-12 NOTE — PROGRESS NOTE ADULT - SUBJECTIVE AND OBJECTIVE BOX
[facility]  INFECTIOUS DISEASES - [address], [place] [address]  Tel: [phone]     Fax: [phone]  =======================================================    DEION HEATH [id number]    Follow up: No fevers. Reports feeling well. Denies any pain, SOB, nausea or diarrhea. No diarrhea or wounds on back noted per RN. Restrepo removed yesterday, patient denies any dysuria or difficulty with urination.    Allergies:  Ketek (Other)  telithromycin (Other)  ofloxacin (Unknown)  gatifloxacin (Unknown)  fluoroquinolone antibiotics (Other)  Avelox (Other)  levofloxacin (Unknown)  Cipro (Unknown)      Antibiotics:  acetaminophen     Tablet .. 650 milliGRAM(s) Oral every 6 hours PRN  apixaban 2.5 milliGRAM(s) Oral <User Schedule>  chlorhexidine 2% Cloths 1 Application(s) Topical <User Schedule>  enoxaparin Injectable 40 milliGRAM(s) SubCutaneous every 24 hours  finasteride 5 milliGRAM(s) Oral daily  HYDROmorphone  Injectable 0.5 milliGRAM(s) IV Push every 4 hours PRN  HYDROmorphone  Injectable 1 milliGRAM(s) IV Push every 4 hours PRN  pantoprazole    Tablet 40 milliGRAM(s) Oral before breakfast  polyethylene glycol 3350 17 Gram(s) Oral daily  predniSONE   Tablet 10 milliGRAM(s) Oral daily  senna 1 Tablet(s) Oral at bedtime  tamsulosin 0.4 milliGRAM(s) Oral at bedtime       REVIEW OF SYSTEMS:  CONSTITUTIONAL:  No Feves  CARDIOVASCULAR:  No chest pain or SOB  RESPIRATORY:  No cough, shortness of breath  GASTROINTESTINAL:  No nausea, vomiting or diarrhea.  MUSCULOSKELETAL:  no back pain  NEUROLOGIC:  No headache       Physical Exam:  ICU Vital Signs Last 24 Hrs  T(C): 36.8 (12 Feb 2025 12:21), Max: 37 (12 Feb 2025 04:22)  T(F): 98.2 (12 Feb 2025 12:21), Max: 98.6 (12 Feb 2025 04:22)  HR: 69 (12 Feb 2025 12:21) (69 - 90)  BP: 123/76 (12 Feb 2025 12:21) (123/76 - 144/73)  BP(mean): --  ABP: --  ABP(mean): --  RR: 20 (12 Feb 2025 12:21) (18 - 20)  SpO2: 94% (12 Feb 2025 12:21) (93% - 98%)    O2 Parameters below as of 12 Feb 2025 12:21  Patient On (Oxygen Delivery Method): room air         GEN: NAD  HEENT: normocephalic and atraumatic.  NECK: Supple.    LUNGS: Normal respiratory effort  HEART: Regular rate and rhythm   ABDOMEN: Soft, nontender, and nondistended.   EXTREMITIES: No leg edema.  NEUROLOGIC: Answering some simple questions, conversant, knows he is in the hospital    Labs:  02-12    140  |  108  |  14  ----------------------------<  108[H]  4.1   |  27  |  0.67    Ca    8.9      12 Feb 2025 08:28    TPro  5.3[L]  /  Alb  2.1[L]  /  TBili  0.3  /  DBili  x   /  AST  11[L]  /  ALT  12  /  AlkPhos  65  02-11                          10.0   20.56 )-----------( 422      ( 12 Feb 2025 08:28 )             31.6       Urinalysis Basic - ( 12 Feb 2025 08:28 )    Color: x / Appearance: x / SG: x / pH: x  Gluc: 108 mg/dL / Ketone: x  / Bili: x / Urobili: x   Blood: x / Protein: x / Nitrite: x   Leuk Esterase: x / RBC: x / WBC x   Sq Epi: x / Non Sq Epi: x / Bacteria: x      LIVER FUNCTIONS - ( 11 Feb 2025 06:10 )  Alb: 2.1 g/dL / Pro: 5.3 g/dL / ALK PHOS: 65 U/L / ALT: 12 U/L / AST: 11 U/L / GGT: x             RECENT CULTURES:  02-06 @ 01:00 Catheterized Catheterized     <10,000 CFU/mL Normal Urogenital Fariba        02-05 @ 17:50 .Blood BLOOD     No growth at 5 days        02-05 @ 17:45 .Blood BLOOD     No growth at 5 days              All imaging and data are reviewed.

## 2025-02-12 NOTE — SOCIAL WORK PROGRESS NOTE - NSSWPROGRESSNOTE_GEN_ALL_CORE
Pt remains acute as per MD, plan remains for Dc to [facility] when medically cleared. SW to continue to follow.

## 2025-02-12 NOTE — PROGRESS NOTE ADULT - ASSESSMENT
78yo M PMH  HTN, HFrEF (prior TTE 11/2024 with EF 35%), PE (on Eliquis), Myasthenia Gravis, and chronic LE edema, nephrolithiasis (s/p nephrostomy tube placement), urosepsis, BPH, who presented from [facility] with report of large amount of blood from the penis, and penile pain. Per records the bleeding started earlier this afternoon on 2/5/2025. Of note had TURP on 1/9/25.    CT A/P showed blood products within the urinary bladder with suspicion for active bleed in the region of a TURP defect. S/p cystoscopy with clot evacuation on 2/5. Received empiric antibiotics given urological procedure involving mucosal bleed, but active UTI seems less likely.  Blood cultures remain no growth. Urine culture unrevealing.     WBC 20 today, but no fever and patient denies any complaints. Restrepo removed and denies any issues with urination. Also noted to have baseline elevated WBC during prior admissions.    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 Yi  -I will be away from 2/13-2/23/2025. I will be covered by Dr. Mckinley and Dr. Turner.    Rachel Connor MD  Division of Infectious Diseases   Cell [phone] between 8am and 6pm   After 6pm and weekends please call ID service at [phone].     35 minutes spent on total encounter assessing patient, examination, chart review, counseling and coordinating care by the attending physician/nurse/care manager.      78yo M PMH  HTN, HFrEF (prior TTE 11/2024 with EF 35%), PE (on Eliquis), Myasthenia Gravis, and chronic LE edema, nephrolithiasis (s/p nephrostomy tube placement), urosepsis, BPH, who presented from [facility] with report of large amount of blood from the penis, and penile pain. Per records the bleeding started earlier this afternoon on 2/5/2025. Of note had TURP on 1/9/25.    CT A/P showed blood products within the urinary bladder with suspicion for active bleed in the region of a TURP defect. S/p cystoscopy with clot evacuation on 2/5. Received empiric antibiotics given urological procedure involving mucosal bleed, but active UTI seems less likely.  Blood cultures remain no growth. Urine culture unrevealing.     WBC 20 today, but no fever and patient denies any complaints. Restrepo removed and denies any issues with urination. Also noted to have baseline elevated WBC during prior admissions.    #Leukocytosis  #L heel ulcer  #Hx of MDR pseudomonas in urine    -observe off antibiotics  -L foot MRI pending  -bladder scan  -monitor WBC--if continues to increase consider repeat urinalysis, urine and blood cultures  -discussed with Dr. Yi  -I will be away from 2/13-2/23/2025. I will be covered by Dr. Mckinley and Dr. Turner.    Rachel Connor MD  Division of Infectious Diseases   Cell [phone] between 8am and 6pm   After 6pm and weekends please call ID service at [phone].     35 minutes spent on total encounter assessing patient, examination, chart review, counseling and coordinating care by the attending physician/nurse/care manager.

## 2025-02-12 NOTE — CONSULT NOTE ADULT - SUBJECTIVE AND OBJECTIVE BOX
Vascular Attending:  Dr King      HPI:  78yo M PMH  HTN, HFrEF (prior TTE 11/2024 with EF 35%), PE (on Eliquis), Myasthenia Gravis, and chronic LE edema, nephrolithiasis (s/p nephrostomy tube placement), urosepsis, BPH presents to ER from [facility] with report of large amount of blood from the penis, and penile pain. Son (HCP Olvin) at bedside reports the bleeding started earlier this afternoon on 2/5/2025. Patient responds to name on admission but does not know the year or that he is at the hospital. Son admits that the patient (his father) can be forgetful. Patient vomited x1 in the ED, was initially complaining of pain but now denies.     Pt has been hospitalized since 2/5/25 and was at rehab prior. Noted with left heel unstageable DTI and vascular surgery consulted. Pt with limited interest in discussing his medical history and says he was "put in here to do whatever needs to be done and I don't want to know what".  He reports he has been nonambulatory for some time and doesn't remember what he did before    In the ED   Vitals: T: 97.6F --> 98.3F, BP: 134/77 --> 64/39--> 122/63--> 134/77, --> 98, RR: 16, Sat: 99% on room air  Labs: WBC elevated 32.37-->35.20, H/H low 10.5/33.4--> 8/25.6, Plt high 560--> 441, Lactate elevated 3.4  UA: negative nitrites, trace LE WBC 16 elevated, TNTC RBCs, present squamous epithelial cells, Glucose 250 elevated, large blood  Imaging:   CT chest/abdomen/pelvis:  Blood products within the urinary bladder with suspicion for active bleed   in the region of a TURP defect.  Left thyroid nodule, measuring 2.4 cm, for which thyroid ultrasound is   recommended.    EKG: sinus rhythm, VR 94, QT/QTc 380/475    Received: zerbaxa 1.5g x1, NS bolus 1L x2, Ofirmev 1g x1, Dilaudid 1mg IVP x1, morphine 4m IVP x1, morphine 2mg x1, Zofran 4mg IVP x1`, Balfaxar 2500 IU x1, Balfaxar 3000 IU x1  Ordered for LR 1L @75cc/hr, Packed Red Blood Cells 1U    (05 Feb 2025 20:50)      PAST MEDICAL & SURGICAL HISTORY:  Calculus of kidney  Club foot  Born Right Foot  Myasthenia gravis  Hypertension  Diabetes  Type 2 - does not take medications - monitors Blood Glucose at home - diet controlled  Urinary tract infection  Hyperlipidemia  Other muscle wasting and atrophy  H/O spinal stenosis  History of thrombocytopenia  H/O CHF  Elective surgery  1956 age 11 @ [facility] - cut under Patella secondary 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  Club foot  Surgery at birth for Club Foot Right foot  Pilonidal cyst  Surgery 40 years ago  H/O colonoscopy  H/O prostate biopsy  Nephrostomy present          REVIEW OF SYSTEMS-limited sec to pt uncooperative  General:	    Skin/Breast:  	  Ophthalmologic:  	  ENMT:	    Respiratory and Thorax:  	  Cardiovascular:	    Gastrointestinal:	    Genitourinary:	    Musculoskeletal:	    Neurological:	    Psychiatric:	    Hematology/Lymphatics:	    Endocrine:	    Allergic/Immunologic:	    MEDICATIONS  (STANDING):  apixaban 2.5 milliGRAM(s) Oral <User Schedule>  chlorhexidine 2% Cloths 1 Application(s) Topical <User Schedule>  enoxaparin Injectable 40 milliGRAM(s) SubCutaneous every 24 hours  finasteride 5 milliGRAM(s) Oral daily  pantoprazole    Tablet 40 milliGRAM(s) Oral before breakfast  polyethylene glycol 3350 17 Gram(s) Oral daily  predniSONE   Tablet 10 milliGRAM(s) Oral daily  senna 1 Tablet(s) Oral at bedtime  tamsulosin 0.4 milliGRAM(s) Oral at bedtime    MEDICATIONS  (PRN):  acetaminophen     Tablet .. 650 milliGRAM(s) Oral every 6 hours PRN Temp greater or equal to 38C (100.4F), Mild Pain (1 - 3)  HYDROmorphone  Injectable 0.5 milliGRAM(s) IV Push every 4 hours PRN Moderate Pain (4 - 6)  HYDROmorphone  Injectable 1 milliGRAM(s) IV Push every 4 hours PRN Severe Pain (7 - 10)      Allergies  ofloxacin (Unknown)  gatifloxacin (Unknown)  levofloxacin (Unknown)  Cipro (Unknown)    Intolerances  Ketek (Other)  telithromycin (Other)  fluoroquinolone antibiotics (Other)  Avelox (Other)      SOCIAL HISTORY: Was at rehab and has not walked in some time      Vital Signs Last 24 Hrs  T(C): 36.8 (12 Feb 2025 12:21), Max: 37 (12 Feb 2025 04:22)  T(F): 98.2 (12 Feb 2025 12:21), Max: 98.6 (12 Feb 2025 04:22)  HR: 69 (12 Feb 2025 12:21) (69 - 90)  BP: 123/76 (12 Feb 2025 12:21) (123/76 - 144/73)  BP(mean): --  RR: 20 (12 Feb 2025 12:21) (18 - 20)  SpO2: 94% (12 Feb 2025 12:21) (93% - 98%)    Parameters below as of 12 Feb 2025 12:21  Patient On (Oxygen Delivery Method): room air        PHYSICAL EXAM:  Constitutional: Elderly M in NAD  Neck: No JVD  Respiratory: Diminished at bases  Cardiovascular: S1, S2  Gastrointestinal: obese, soft ND, NT  Extremities: No edema. R foot bony deformity noted. L heel with dry, unstageable ulcer, no flucuance or erythema  Neurological: Alert and oriented  Pulses:   Right:                                                                          Left:  FEM [x ]2+ [ ]1+ [ ]doppler                                             FEM [ ]2+ [ ]1+ [ ]doppler    POP [ ]2+ [ ]1+ [ ]doppler                                             POP [ ]2+ [ ]1+ [ ]doppler    DP [ ]2+ [ ]1+ [x ]doppler                                                DP [ ]2+ [x ]1+ [ ]doppler  PT[ ]2+ [ ]1+ [ ]doppler                                                  PT [ ]2+ [ ]1+ [ ]doppler      LABS:                        10.0   20.56 )-----------( 422      ( 12 Feb 2025 08:28 )             31.6     02-12    140  |  108  |  14  ----------------------------<  108[H]  4.1   |  27  |  0.67    Ca    8.9      12 Feb 2025 08:28    TPro  5.3[L]  /  Alb  2.1[L]  /  TBili  0.3  /  DBili  x   /  AST  11[L]  /  ALT  12  /  AlkPhos  65  02-11      Urinalysis Basic - ( 12 Feb 2025 08:28 )    Color: x / Appearance: x / SG: x / pH: x  Gluc: 108 mg/dL / Ketone: x  / Bili: x / Urobili: x   Blood: x / Protein: x / Nitrite: x   Leuk Esterase: x / RBC: x / WBC x   Sq Epi: x / Non Sq Epi: x / Bacteria: x    RECENT CULTURES:  02-06 Catheterized Catheterized XXXX XXXX   <10,000 CFU/mL Normal Urogenital Fariba    02-05 .Blood BLOOD XXXX XXXX   No growth at 5 days    02-05 .Blood BLOOD XXXX XXXX   No growth at 5 days          RADIOLOGY & ADDITIONAL STUDIES

## 2025-02-13 ENCOUNTER — TRANSCRIPTION ENCOUNTER (OUTPATIENT)
Age: 80
End: 2025-02-13

## 2025-02-13 ENCOUNTER — INPATIENT (INPATIENT)
Facility: HOSPITAL | Age: 80
LOS: 4 days | Discharge: NURSING, REHAB OR LONG-TERM CARE | DRG: 951 | End: 2025-02-18
Attending: STUDENT IN AN ORGANIZED HEALTH CARE EDUCATION/TRAINING PROGRAM | Admitting: INTERNAL MEDICINE
Payer: MEDICARE

## 2025-02-13 VITALS
WEIGHT: 250 LBS | TEMPERATURE: 98 F | OXYGEN SATURATION: 97 % | HEIGHT: 66 IN | DIASTOLIC BLOOD PRESSURE: 70 MMHG | HEART RATE: 94 BPM | SYSTOLIC BLOOD PRESSURE: 110 MMHG | RESPIRATION RATE: 18 BRPM

## 2025-02-13 VITALS
SYSTOLIC BLOOD PRESSURE: 138 MMHG | RESPIRATION RATE: 18 BRPM | TEMPERATURE: 98 F | OXYGEN SATURATION: 98 % | DIASTOLIC BLOOD PRESSURE: 77 MMHG | HEART RATE: 99 BPM

## 2025-02-13 DIAGNOSIS — Z98.890 OTHER SPECIFIED POSTPROCEDURAL STATES: Chronic | ICD-10-CM

## 2025-02-13 DIAGNOSIS — Z41.9 ENCOUNTER FOR PROCEDURE FOR PURPOSES OTHER THAN REMEDYING HEALTH STATE, UNSPECIFIED: Chronic | ICD-10-CM

## 2025-02-13 DIAGNOSIS — Q66.89 OTHER SPECIFIED CONGENITAL DEFORMITIES OF FEET: Chronic | ICD-10-CM

## 2025-02-13 DIAGNOSIS — Z98.89 OTHER SPECIFIED POSTPROCEDURAL STATES: Chronic | ICD-10-CM

## 2025-02-13 DIAGNOSIS — Z93.6 OTHER ARTIFICIAL OPENINGS OF URINARY TRACT STATUS: Chronic | ICD-10-CM

## 2025-02-13 DIAGNOSIS — L05.91 PILONIDAL CYST WITHOUT ABSCESS: Chronic | ICD-10-CM

## 2025-02-13 LAB
ALBUMIN SERPL ELPH-MCNC: 2.2 G/DL — LOW (ref 3.3–5)
ALP SERPL-CCNC: 61 U/L — SIGNIFICANT CHANGE UP (ref 40–120)
ALT FLD-CCNC: 11 U/L — LOW (ref 12–78)
ANION GAP SERPL CALC-SCNC: 6 MMOL/L — SIGNIFICANT CHANGE UP (ref 5–17)
AST SERPL-CCNC: 13 U/L — LOW (ref 15–37)
BASOPHILS # BLD AUTO: 0.08 K/UL — SIGNIFICANT CHANGE UP (ref 0–0.2)
BASOPHILS NFR BLD AUTO: 0.5 % — SIGNIFICANT CHANGE UP (ref 0–2)
BILIRUB SERPL-MCNC: 0.3 MG/DL — SIGNIFICANT CHANGE UP (ref 0.2–1.2)
BUN SERPL-MCNC: 17 MG/DL — SIGNIFICANT CHANGE UP (ref 7–23)
CALCIUM SERPL-MCNC: 8.6 MG/DL — SIGNIFICANT CHANGE UP (ref 8.5–10.1)
CHLORIDE SERPL-SCNC: 108 MMOL/L — SIGNIFICANT CHANGE UP (ref 96–108)
CO2 SERPL-SCNC: 24 MMOL/L — SIGNIFICANT CHANGE UP (ref 22–31)
CREAT SERPL-MCNC: 0.64 MG/DL — SIGNIFICANT CHANGE UP (ref 0.5–1.3)
EGFR: 95 ML/MIN/1.73M2 — SIGNIFICANT CHANGE UP
EOSINOPHIL # BLD AUTO: 0.45 K/UL — SIGNIFICANT CHANGE UP (ref 0–0.5)
EOSINOPHIL NFR BLD AUTO: 2.7 % — SIGNIFICANT CHANGE UP (ref 0–6)
FERRITIN SERPL-MCNC: 48 NG/ML — SIGNIFICANT CHANGE UP (ref 30–400)
FOLATE SERPL-MCNC: 9.5 NG/ML — SIGNIFICANT CHANGE UP
GLUCOSE SERPL-MCNC: 110 MG/DL — HIGH (ref 70–99)
HCT VFR BLD CALC: 28.4 % — LOW (ref 39–50)
HGB BLD-MCNC: 8.9 G/DL — LOW (ref 13–17)
IMM GRANULOCYTES NFR BLD AUTO: 2.8 % — HIGH (ref 0–0.9)
IRON SATN MFR SERPL: 18 UG/DL — LOW (ref 45–165)
IRON SATN MFR SERPL: 7 % — LOW (ref 16–55)
LYMPHOCYTES # BLD AUTO: 1.05 K/UL — SIGNIFICANT CHANGE UP (ref 1–3.3)
LYMPHOCYTES # BLD AUTO: 6.3 % — LOW (ref 13–44)
MCHC RBC-ENTMCNC: 28.5 PG — SIGNIFICANT CHANGE UP (ref 27–34)
MCHC RBC-ENTMCNC: 31.3 G/DL — LOW (ref 32–36)
MCV RBC AUTO: 91 FL — SIGNIFICANT CHANGE UP (ref 80–100)
MONOCYTES # BLD AUTO: 1.14 K/UL — HIGH (ref 0–0.9)
MONOCYTES NFR BLD AUTO: 6.9 % — SIGNIFICANT CHANGE UP (ref 2–14)
NEUTROPHILS # BLD AUTO: 13.35 K/UL — HIGH (ref 1.8–7.4)
NEUTROPHILS NFR BLD AUTO: 80.8 % — HIGH (ref 43–77)
NRBC BLD AUTO-RTO: 0 /100 WBCS — SIGNIFICANT CHANGE UP (ref 0–0)
PLATELET # BLD AUTO: 414 K/UL — HIGH (ref 150–400)
POTASSIUM SERPL-MCNC: 3.4 MMOL/L — LOW (ref 3.5–5.3)
POTASSIUM SERPL-SCNC: 3.4 MMOL/L — LOW (ref 3.5–5.3)
PROT SERPL-MCNC: 5.4 G/DL — LOW (ref 6–8.3)
RBC # BLD: 3.12 M/UL — LOW (ref 4.2–5.8)
RBC # BLD: 3.12 M/UL — LOW (ref 4.2–5.8)
RBC # FLD: 16.5 % — HIGH (ref 10.3–14.5)
RETICS #: 137.6 K/UL — HIGH (ref 25–125)
RETICS/RBC NFR: 4.4 % — HIGH (ref 0.5–2.5)
SODIUM SERPL-SCNC: 138 MMOL/L — SIGNIFICANT CHANGE UP (ref 135–145)
TIBC SERPL-MCNC: 280 UG/DL — SIGNIFICANT CHANGE UP (ref 220–430)
UIBC SERPL-MCNC: 262 UG/DL — SIGNIFICANT CHANGE UP (ref 110–370)
VIT B12 SERPL-MCNC: 389 PG/ML — SIGNIFICANT CHANGE UP (ref 232–1245)
WBC # BLD: 16.54 K/UL — HIGH (ref 3.8–10.5)
WBC # FLD AUTO: 16.54 K/UL — HIGH (ref 3.8–10.5)

## 2025-02-13 PROCEDURE — 85018 HEMOGLOBIN: CPT

## 2025-02-13 PROCEDURE — 96375 TX/PRO/DX INJ NEW DRUG ADDON: CPT | Mod: XU

## 2025-02-13 PROCEDURE — P9040: CPT

## 2025-02-13 PROCEDURE — 99232 SBSQ HOSP IP/OBS MODERATE 35: CPT

## 2025-02-13 PROCEDURE — 87086 URINE CULTURE/COLONY COUNT: CPT

## 2025-02-13 PROCEDURE — 73718 MRI LOWER EXTREMITY W/O DYE: CPT | Mod: 26,LT

## 2025-02-13 PROCEDURE — 83605 ASSAY OF LACTIC ACID: CPT

## 2025-02-13 PROCEDURE — 71260 CT THORAX DX C+: CPT | Mod: MC

## 2025-02-13 PROCEDURE — 85730 THROMBOPLASTIN TIME PARTIAL: CPT

## 2025-02-13 PROCEDURE — 99239 HOSP IP/OBS DSCHRG MGMT >30: CPT

## 2025-02-13 PROCEDURE — 73718 MRI LOWER EXTREMITY W/O DYE: CPT | Mod: MC

## 2025-02-13 PROCEDURE — 81001 URINALYSIS AUTO W/SCOPE: CPT

## 2025-02-13 PROCEDURE — 87641 MR-STAPH DNA AMP PROBE: CPT

## 2025-02-13 PROCEDURE — P9016: CPT

## 2025-02-13 PROCEDURE — 80048 BASIC METABOLIC PNL TOTAL CA: CPT

## 2025-02-13 PROCEDURE — 74177 CT ABD & PELVIS W/CONTRAST: CPT | Mod: MC

## 2025-02-13 PROCEDURE — 86901 BLOOD TYPING SEROLOGIC RH(D): CPT

## 2025-02-13 PROCEDURE — 82728 ASSAY OF FERRITIN: CPT

## 2025-02-13 PROCEDURE — 85014 HEMATOCRIT: CPT

## 2025-02-13 PROCEDURE — 84100 ASSAY OF PHOSPHORUS: CPT

## 2025-02-13 PROCEDURE — 85045 AUTOMATED RETICULOCYTE COUNT: CPT

## 2025-02-13 PROCEDURE — 36430 TRANSFUSION BLD/BLD COMPNT: CPT

## 2025-02-13 PROCEDURE — 83540 ASSAY OF IRON: CPT

## 2025-02-13 PROCEDURE — 82962 GLUCOSE BLOOD TEST: CPT

## 2025-02-13 PROCEDURE — 85610 PROTHROMBIN TIME: CPT

## 2025-02-13 PROCEDURE — 93923 UPR/LXTR ART STDY 3+ LVLS: CPT

## 2025-02-13 PROCEDURE — 36415 COLL VENOUS BLD VENIPUNCTURE: CPT

## 2025-02-13 PROCEDURE — 99285 EMERGENCY DEPT VISIT HI MDM: CPT

## 2025-02-13 PROCEDURE — 96374 THER/PROPH/DIAG INJ IV PUSH: CPT | Mod: XU

## 2025-02-13 PROCEDURE — 99291 CRITICAL CARE FIRST HOUR: CPT

## 2025-02-13 PROCEDURE — 83690 ASSAY OF LIPASE: CPT

## 2025-02-13 PROCEDURE — 85027 COMPLETE CBC AUTOMATED: CPT

## 2025-02-13 PROCEDURE — 82607 VITAMIN B-12: CPT

## 2025-02-13 PROCEDURE — 86850 RBC ANTIBODY SCREEN: CPT

## 2025-02-13 PROCEDURE — G0545: CPT

## 2025-02-13 PROCEDURE — 73630 X-RAY EXAM OF FOOT: CPT

## 2025-02-13 PROCEDURE — 51702 INSERT TEMP BLADDER CATH: CPT

## 2025-02-13 PROCEDURE — 80053 COMPREHEN METABOLIC PANEL: CPT

## 2025-02-13 PROCEDURE — 82746 ASSAY OF FOLIC ACID SERUM: CPT

## 2025-02-13 PROCEDURE — 87640 STAPH A DNA AMP PROBE: CPT

## 2025-02-13 PROCEDURE — 83735 ASSAY OF MAGNESIUM: CPT

## 2025-02-13 PROCEDURE — 86900 BLOOD TYPING SEROLOGIC ABO: CPT

## 2025-02-13 PROCEDURE — 93005 ELECTROCARDIOGRAM TRACING: CPT

## 2025-02-13 PROCEDURE — 86923 COMPATIBILITY TEST ELECTRIC: CPT

## 2025-02-13 PROCEDURE — 85025 COMPLETE CBC W/AUTO DIFF WBC: CPT

## 2025-02-13 PROCEDURE — 83550 IRON BINDING TEST: CPT

## 2025-02-13 PROCEDURE — 87040 BLOOD CULTURE FOR BACTERIA: CPT

## 2025-02-13 RX ORDER — CYANOCOBALAMIN 1000 UG/ML
1000 INJECTION INTRAMUSCULAR; SUBCUTANEOUS DAILY
Refills: 0 | Status: DISCONTINUED | OUTPATIENT
Start: 2025-02-13 | End: 2025-02-18

## 2025-02-13 RX ORDER — CYANOCOBALAMIN (VITAMIN B-12) 1000MCG/ML
1 VIAL (ML) INJECTION
Qty: 0 | Refills: 0 | DISCHARGE
Start: 2025-02-13

## 2025-02-13 RX ORDER — APIXABAN 2.5 MG/1
2.5 TABLET, FILM COATED ORAL
Refills: 0 | Status: DISCONTINUED | OUTPATIENT
Start: 2025-02-13 | End: 2025-02-18

## 2025-02-13 RX ORDER — CYANOCOBALAMIN (VITAMIN B-12) 1000MCG/ML
1000 VIAL (ML) INJECTION DAILY
Refills: 0 | Status: DISCONTINUED | OUTPATIENT
Start: 2025-02-13 | End: 2025-02-13

## 2025-02-13 RX ORDER — SENNA 187 MG
1 TABLET ORAL DAILY
Refills: 0 | Status: DISCONTINUED | OUTPATIENT
Start: 2025-02-13 | End: 2025-02-18

## 2025-02-13 RX ORDER — TAMSULOSIN HYDROCHLORIDE 0.4 MG/1
0.4 CAPSULE ORAL AT BEDTIME
Refills: 0 | Status: DISCONTINUED | OUTPATIENT
Start: 2025-02-13 | End: 2025-02-18

## 2025-02-13 RX ORDER — METOPROLOL SUCCINATE 25 MG
1 TABLET, EXTENDED RELEASE 24 HR ORAL
Qty: 30 | Refills: 0
Start: 2025-02-13 | End: 2025-03-14

## 2025-02-13 RX ORDER — PREDNISONE 20 MG/1
10 TABLET ORAL DAILY
Refills: 0 | Status: DISCONTINUED | OUTPATIENT
Start: 2025-02-13 | End: 2025-02-18

## 2025-02-13 RX ORDER — APIXABAN 5 MG/1
1 TABLET, FILM COATED ORAL
Qty: 60 | Refills: 0
Start: 2025-02-13 | End: 2025-03-14

## 2025-02-13 RX ORDER — FINASTERIDE 1 MG/1
5 TABLET, FILM COATED ORAL DAILY
Refills: 0 | Status: DISCONTINUED | OUTPATIENT
Start: 2025-02-13 | End: 2025-02-18

## 2025-02-13 RX ORDER — APIXABAN 2.5 MG/1
2.5 TABLET, FILM COATED ORAL
Refills: 0 | Status: DISCONTINUED | OUTPATIENT
Start: 2025-02-13 | End: 2025-02-13

## 2025-02-13 RX ORDER — FERROUS SULFATE 137(45) MG
325 TABLET, EXTENDED RELEASE ORAL DAILY
Refills: 0 | Status: DISCONTINUED | OUTPATIENT
Start: 2025-02-13 | End: 2025-02-18

## 2025-02-13 RX ORDER — FERROUS SULFATE 325(65) MG
325 TABLET ORAL
Refills: 0 | Status: DISCONTINUED | OUTPATIENT
Start: 2025-02-13 | End: 2025-02-13

## 2025-02-13 RX ORDER — FUROSEMIDE 10 MG/ML
20 INJECTION INTRAMUSCULAR; INTRAVENOUS DAILY
Refills: 0 | Status: DISCONTINUED | OUTPATIENT
Start: 2025-02-13 | End: 2025-02-18

## 2025-02-13 RX ORDER — FERROUS SULFATE 325(65) MG
1 TABLET ORAL
Qty: 0 | Refills: 0 | DISCHARGE
Start: 2025-02-13

## 2025-02-13 RX ORDER — POLYETHYLENE GLYCOL 3350 17 G/17G
17 POWDER, FOR SOLUTION ORAL DAILY
Refills: 0 | Status: DISCONTINUED | OUTPATIENT
Start: 2025-02-13 | End: 2025-02-18

## 2025-02-13 RX ORDER — METOPROLOL SUCCINATE 50 MG/1
25 TABLET, EXTENDED RELEASE ORAL DAILY
Refills: 0 | Status: DISCONTINUED | OUTPATIENT
Start: 2025-02-13 | End: 2025-02-18

## 2025-02-13 RX ADMIN — PREDNISONE 10 MILLIGRAM(S): 5 TABLET ORAL at 05:06

## 2025-02-13 RX ADMIN — HYDROMORPHONE HYDROCHLORIDE 0.5 MILLIGRAM(S): 4 INJECTION, SOLUTION INTRAMUSCULAR; INTRAVENOUS; SUBCUTANEOUS at 05:05

## 2025-02-13 RX ADMIN — Medication 0.25 MILLIGRAM(S): at 07:40

## 2025-02-13 RX ADMIN — POLYETHYLENE GLYCOL 3350 17 GRAM(S): 17 POWDER, FOR SOLUTION ORAL at 11:08

## 2025-02-13 RX ADMIN — ACETAMINOPHEN 650 MILLIGRAM(S): 160 SUSPENSION ORAL at 04:08

## 2025-02-13 RX ADMIN — ENOXAPARIN SODIUM 40 MILLIGRAM(S): 100 INJECTION SUBCUTANEOUS at 05:05

## 2025-02-13 RX ADMIN — PANTOPRAZOLE 40 MILLIGRAM(S): 20 TABLET, DELAYED RELEASE ORAL at 05:05

## 2025-02-13 RX ADMIN — Medication 5 MILLIGRAM(S): at 11:08

## 2025-02-13 NOTE — PROGRESS NOTE ADULT - REASON FOR ADMISSION
hematuria, active bleed within bladder requiring emergent surgery with urology

## 2025-02-13 NOTE — PROGRESS NOTE ADULT - SUBJECTIVE AND OBJECTIVE BOX
[facility]  INFECTIOUS DISEASES - Lalita Mckinley, [place] [address]  Tel: [phone]     Fax: [phone]  =======================================================    MARYLEONORA DEION [id number]    Follow up:     Allergies:  Ketek (Other)  telithromycin (Other)  ofloxacin (Unknown)  gatifloxacin (Unknown)  fluoroquinolone antibiotics (Other)  Avelox (Other)  levofloxacin (Unknown)  Cipro (Unknown)      Antibiotics:  acetaminophen     Tablet .. 650 milliGRAM(s) Oral every 6 hours PRN  apixaban 2.5 milliGRAM(s) Oral <User Schedule>  chlorhexidine 2% Cloths 1 Application(s) Topical <User Schedule>  enoxaparin Injectable 40 milliGRAM(s) SubCutaneous every 24 hours  finasteride 5 milliGRAM(s) Oral daily  HYDROmorphone  Injectable 0.5 milliGRAM(s) IV Push every 4 hours PRN  HYDROmorphone  Injectable 1 milliGRAM(s) IV Push every 4 hours PRN  pantoprazole    Tablet 40 milliGRAM(s) Oral before breakfast  polyethylene glycol 3350 17 Gram(s) Oral daily  predniSONE   Tablet 10 milliGRAM(s) Oral daily  senna 1 Tablet(s) Oral at bedtime  tamsulosin 0.4 milliGRAM(s) Oral at bedtime       REVIEW OF SYSTEMS:  CONSTITUTIONAL:  No Feves  CARDIOVASCULAR:  No chest pain or SOB  RESPIRATORY:  No cough, shortness of breath  GASTROINTESTINAL:  No nausea, vomiting or diarrhea.  MUSCULOSKELETAL:  no back pain  NEUROLOGIC:  No headache      Physical Exam:  Vital Signs Last 24 Hrs  T(C): 36.3 (13 Feb 2025 05:00), Max: 36.8 (12 Feb 2025 12:21)  T(F): 97.4 (13 Feb 2025 05:00), Max: 98.2 (12 Feb 2025 12:21)  HR: 66 (13 Feb 2025 05:00) (66 - 86)  BP: 136/83 (13 Feb 2025 05:00) (109/53 - 143/79)  BP(mean): --  RR: 18 (13 Feb 2025 05:00) (18 - 20)  SpO2: 98% (13 Feb 2025 05:00) (94% - 99%)    Parameters below as of 13 Feb 2025 05:00  Patient On (Oxygen Delivery Method): room air      GEN: NAD  HEENT: normocephalic and atraumatic.  NECK: Supple.    LUNGS: Normal respiratory effort  HEART: Regular rate and rhythm   ABDOMEN: Soft, nontender, and nondistended.   EXTREMITIES: No leg edema.  NEUROLOGIC: Answering some simple questions, conversant, knows he is in the hospital    Labs:  WBC Count: 16.54 K/uL (02-13 @ 07:10)  WBC Count: 20.56 K/uL (02-12 @ 08:28)  WBC Count: 18.30 K/uL (02-11 @ 06:10)  WBC Count: 17.07 K/uL (02-10 @ 07:05)  WBC Count: 13.77 K/uL (02-09 @ 05:57)  WBC Count: 18.67 K/uL (02-08 @ 18:45)  WBC Count: 17.33 K/uL (02-08 @ 07:35)               8.9    16.54 )-----------( 414      ( 13 Feb 2025 07:10 )             28.4     02-13    138  |  108  |  17  ----------------------------<  110[H]  3.4[L]   |  24  |  0.64    Ca    8.6      13 Feb 2025 07:10    TPro  5.4[L]  /  Alb  2.2[L]  /  TBili  0.3  /  DBili  x   /  AST  13[L]  /  ALT  11[L]  /  AlkPhos  61  02-13    Creatinine: 0.64 (02-13)  Creatinine: 0.67 (02-12)  Creatinine: 0.69 (02-11)  Creatinine: 0.59 (02-10)  Creatinine: 0.56 (02-09)  Creatinine: 0.67 (02-08)  Creatinine: 0.77 (02-07)  Creatinine: 1.10 (02-06)    Urinalysis with Rflx Culture (02.05.25 @ 17:59)   Urine Appearance: Turbid   Color: Red   Specific Gravity: 1.035   pH Urine: 7.0   Protein, Urine: >=1000 mg/dL   Glucose Qualitative, Urine: 250 mg/dL   Ketone - Urine: Negative mg/dL   Blood, Urine: Large   Bilirubin: Negative   Urobilinogen: 0.2 mg/dL   Leukocyte Esterase Concentration: Trace   Nitrite: Negative  Urine Microscopic-Add On (NC) (02.05.25 @ 17:59)   White Blood Cell - Urine: 16 /HPF   Red Blood Cell - Urine: Too Numerous to count /HPF   Bacteria: Few /HPF   Hyaline Casts: Present   Squamous Epithelial Cells: Present      RECENT CULTURES:  Catheterized Catheterized  02-06-25   <10,000 CFU/mL Normal Urogenital Fariba  --  --      .Blood BLOOD  02-05-25   No growth at 5 days  --  --      RADIOLOGY:  < from: MR Foot No Cont, Left (02.13.25 @ 08:36) >    ACC: [id number] EXAM:  MR FOOT LT   ORDERED BY:  NITZA MILLER     PROCEDURE DATE:  02/13/2025      INTERPRETATION:  EXAMINATION: MR FOOT LEFT    CLINICAL INDICATION: Left heel unstaged ulcer, with concern for   osteomyelitis.    COMPARISON: Radiographs of the left foot dated 2/8/2025    TECHNIQUE: Multiplanar, multi-sequence MRI of the left hindfoot was   performed without intravenous contrast.    INTERPRETATION:    Bones: No MRI evidence for osteomyelitis. Trace subtalar effusion. Mild   chondral thinning at the tibiotalar joint. Mild scattered midfoot   osteophytes.    Soft Tissues: Significant localized muscle edema involves the intrinsic   muscles of the plantar foot which could relate to underlying neuropathy   or possibly injury. Mild insertional Achilles tendinosis. Medial cord   plantar fasciitis with thickening up to 9 mm and internal intermediate T2   edema. Dorsal foot and bimalleolar nonspecific edema. No localized soft   tissue abscess.    IMPRESSION:  1.  No evidence of osteomyelitis or abscess at the hindfoot.  2.  Significant localized muscle edema involves the intrinsic muscles of   the plantar foot which could relate to underlying neuropathy or possibly   injury.  3.  Chronic findings include mild Achilles insertional tendinosis, medial   cord plantar fasciitis, and mild midfoot arthrosis.    --- End of Report ---    < end of copied text >     [facility]  INFECTIOUS DISEASES - Lalita Mckinley, [place] [address]  Tel: [phone]     Fax: [phone]  =======================================================    DEION HEATH [id number]    Follow up: Seen and examined at bedside. Afebrile. C/O severe pain in the R leg.     Allergies:  Ketek (Other)  telithromycin (Other)  ofloxacin (Unknown)  gatifloxacin (Unknown)  fluoroquinolone antibiotics (Other)  Avelox (Other)  levofloxacin (Unknown)  Cipro (Unknown)      Antibiotics:  acetaminophen     Tablet .. 650 milliGRAM(s) Oral every 6 hours PRN  apixaban 2.5 milliGRAM(s) Oral <User Schedule>  chlorhexidine 2% Cloths 1 Application(s) Topical <User Schedule>  enoxaparin Injectable 40 milliGRAM(s) SubCutaneous every 24 hours  finasteride 5 milliGRAM(s) Oral daily  HYDROmorphone  Injectable 0.5 milliGRAM(s) IV Push every 4 hours PRN  HYDROmorphone  Injectable 1 milliGRAM(s) IV Push every 4 hours PRN  pantoprazole    Tablet 40 milliGRAM(s) Oral before breakfast  polyethylene glycol 3350 17 Gram(s) Oral daily  predniSONE   Tablet 10 milliGRAM(s) Oral daily  senna 1 Tablet(s) Oral at bedtime  tamsulosin 0.4 milliGRAM(s) Oral at bedtime       REVIEW OF SYSTEMS:  CONSTITUTIONAL:  No Fevers  CARDIOVASCULAR:  No chest pain or SOB  RESPIRATORY:  No cough, shortness of breath  GASTROINTESTINAL:  No nausea, vomiting or diarrhea.  MUSCULOSKELETAL:  no back pain  NEUROLOGIC:  No headache      Physical Exam:  Vital Signs Last 24 Hrs  T(C): 36.3 (13 Feb 2025 05:00), Max: 36.8 (12 Feb 2025 12:21)  T(F): 97.4 (13 Feb 2025 05:00), Max: 98.2 (12 Feb 2025 12:21)  HR: 66 (13 Feb 2025 05:00) (66 - 86)  BP: 136/83 (13 Feb 2025 05:00) (109/53 - 143/79)  BP(mean): --  RR: 18 (13 Feb 2025 05:00) (18 - 20)  SpO2: 98% (13 Feb 2025 05:00) (94% - 99%)    Parameters below as of 13 Feb 2025 05:00  Patient On (Oxygen Delivery Method): room air      GEN: NAD  HEENT: normocephalic and atraumatic.  NECK: Supple.    LUNGS: Normal respiratory effort  HEART: Regular rate and rhythm   ABDOMEN: Soft, nontender, and nondistended.   EXTREMITIES: No leg edema.  NEUROLOGIC: Answering some simple questions, conversant, knows he is in the hospital    Labs:  WBC Count: 16.54 K/uL (02-13 @ 07:10)  WBC Count: 20.56 K/uL (02-12 @ 08:28)  WBC Count: 18.30 K/uL (02-11 @ 06:10)  WBC Count: 17.07 K/uL (02-10 @ 07:05)  WBC Count: 13.77 K/uL (02-09 @ 05:57)  WBC Count: 18.67 K/uL (02-08 @ 18:45)  WBC Count: 17.33 K/uL (02-08 @ 07:35)               8.9    16.54 )-----------( 414      ( 13 Feb 2025 07:10 )             28.4     02-13    138  |  108  |  17  ----------------------------<  110[H]  3.4[L]   |  24  |  0.64    Ca    8.6      13 Feb 2025 07:10    TPro  5.4[L]  /  Alb  2.2[L]  /  TBili  0.3  /  DBili  x   /  AST  13[L]  /  ALT  11[L]  /  AlkPhos  61  02-13    Creatinine: 0.64 (02-13)  Creatinine: 0.67 (02-12)  Creatinine: 0.69 (02-11)  Creatinine: 0.59 (02-10)  Creatinine: 0.56 (02-09)  Creatinine: 0.67 (02-08)  Creatinine: 0.77 (02-07)  Creatinine: 1.10 (02-06)    Urinalysis with Rflx Culture (02.05.25 @ 17:59)   Urine Appearance: Turbid   Color: Red   Specific Gravity: 1.035   pH Urine: 7.0   Protein, Urine: >=1000 mg/dL   Glucose Qualitative, Urine: 250 mg/dL   Ketone - Urine: Negative mg/dL   Blood, Urine: Large   Bilirubin: Negative   Urobilinogen: 0.2 mg/dL   Leukocyte Esterase Concentration: Trace   Nitrite: Negative  Urine Microscopic-Add On (NC) (02.05.25 @ 17:59)   White Blood Cell - Urine: 16 /HPF   Red Blood Cell - Urine: Too Numerous to count /HPF   Bacteria: Few /HPF   Hyaline Casts: Present   Squamous Epithelial Cells: Present      RECENT CULTURES:  Catheterized Catheterized  02-06-25   <10,000 CFU/mL Normal Urogenital Fariba  --  --      .Blood BLOOD  02-05-25   No growth at 5 days  --  --      RADIOLOGY:  < from: MR Foot No Cont, Left (02.13.25 @ 08:36) >    ACC: [id number] EXAM:  MR FOOT LT   ORDERED BY:  NITZA MILLER     PROCEDURE DATE:  02/13/2025      INTERPRETATION:  EXAMINATION: MR FOOT LEFT    CLINICAL INDICATION: Left heel unstaged ulcer, with concern for   osteomyelitis.    COMPARISON: Radiographs of the left foot dated 2/8/2025    TECHNIQUE: Multiplanar, multi-sequence MRI of the left hindfoot was   performed without intravenous contrast.    INTERPRETATION:    Bones: No MRI evidence for osteomyelitis. Trace subtalar effusion. Mild   chondral thinning at the tibiotalar joint. Mild scattered midfoot   osteophytes.    Soft Tissues: Significant localized muscle edema involves the intrinsic   muscles of the plantar foot which could relate to underlying neuropathy   or possibly injury. Mild insertional Achilles tendinosis. Medial cord   plantar fasciitis with thickening up to 9 mm and internal intermediate T2   edema. Dorsal foot and bimalleolar nonspecific edema. No localized soft   tissue abscess.    IMPRESSION:  1.  No evidence of osteomyelitis or abscess at the hindfoot.  2.  Significant localized muscle edema involves the intrinsic muscles of   the plantar foot which could relate to underlying neuropathy or possibly   injury.  3.  Chronic findings include mild Achilles insertional tendinosis, medial   cord plantar fasciitis, and mild midfoot arthrosis.    --- End of Report ---    < end of copied text >

## 2025-02-13 NOTE — PROGRESS NOTE ADULT - PROVIDER SPECIALTY LIST ADULT
Critical Care
Hospitalist
Podiatry
Urology
Hospitalist
Hospitalist
Infectious Disease
Urology
Infectious Disease
Hospitalist
Podiatry
Hospitalist

## 2025-02-13 NOTE — PROGRESS NOTE ADULT - ASSESSMENT
78yo M PMH  HTN, HFrEF (prior TTE 11/2024 with EF 35%), PE (on Eliquis), Myasthenia Gravis, and chronic LE edema, nephrolithiasis (s/p nephrostomy tube placement), urosepsis, BPH, who presented from [facility] with report of large amount of blood from the penis, and penile pain. Per records the bleeding started earlier this afternoon on 2/5/2025. Of note had TURP on 1/9/25. CT A/P showed blood products within the urinary bladder with suspicion for active bleed in the region of a TURP defect. S/p cystoscopy with clot evacuation on 2/5. Received empiric antibiotics given urological procedure involving mucosal bleed, but active UTI seems less likely.  Blood cultures remain no growth. Urine culture unrevealing.     2/12: WBC 20 today, but no fever and patient denies any complaints. Restrepo removed and denies any issues with urination. Also noted to have baseline elevated WBC during prior admissions.  2/13:       #Leukocytosis  #L heel ulcer  #Hx of MDR pseudomonas in urine    -observe off antibiotics  -monitor WBC--if continues to increase consider repeat urinalysis, urine and blood cultures    Discussed with primary team    Ирина Turner MD  Attending Physician  Division of Infectious Diseases   Available via [employer] Teams           80yo M PMH  HTN, HFrEF (prior TTE 11/2024 with EF 35%), PE (on Eliquis), Myasthenia Gravis, and chronic LE edema, nephrolithiasis (s/p nephrostomy tube placement), urosepsis, BPH, who presented from [facility] with report of large amount of blood from the penis, and penile pain. Per records the bleeding started earlier this afternoon on 2/5/2025. Of note had TURP on 1/9/25. CT A/P showed blood products within the urinary bladder with suspicion for active bleed in the region of a TURP defect. S/p cystoscopy with clot evacuation on 2/5. Received empiric antibiotics given urological procedure involving mucosal bleed, but active UTI seems less likely.  Blood cultures remain no growth. Urine culture unrevealing.     2/12: WBC 20 today, but no fever and patient denies any complaints. Restrepo removed and denies any issues with urination. Also noted to have baseline elevated WBC during prior admissions.  2/13: Afebrile. Leukocytosis is down trending, WBC: 16k today. MRI L foot without any concern for OM or abscess.      #Leukocytosis  #L heel ulcer  #Hx of MDR pseudomonas in urine    -observe off antibiotics. No osteomyelitis on MRI L foot  -monitor WBC--if continues to increase consider repeat urinalysis, urine and blood cultures    Discussed with primary team    Ирина Turner MD  Attending Physician  Division of Infectious Diseases   Available via [employer] Teams

## 2025-02-13 NOTE — DISCHARGE NOTE NURSING/CASE MANAGEMENT/SOCIAL WORK - PATIENT PORTAL LINK FT
You can access the FollowMyHealth Patient Portal offered by [facility] by registering at the following website: [email or web address]. By joining [employer]’s FollowMyHealth portal, you will also be able to view your health information using other applications (apps) compatible with our system.

## 2025-02-13 NOTE — DISCHARGE NOTE NURSING/CASE MANAGEMENT/SOCIAL WORK - FINANCIAL ASSISTANCE
[facility] provides services at a reduced cost to those who are determined to be eligible through [facility]’s financial assistance program. Information regarding [facility]’s financial assistance program can be found by going to [email or web address] or by calling [phone].

## 2025-02-13 NOTE — ED PROVIDER NOTE - CLINICAL SUMMARY MEDICAL DECISION MAKING FREE TEXT BOX
78 yo M with complex medical hx, from [facility], here to change his facility. Son does not want pt to go back to [facility].   Will get basic labs and await SW/Case management intervention in AM  Pt has no medical complaints at this time.

## 2025-02-13 NOTE — DISCHARGE NOTE NURSING/CASE MANAGEMENT/SOCIAL WORK - NSDCPEELIQUISCOMP_GEN_ALL_CORE
negative Apixaban/Eliquis is used to treat and prevent blood clots. If you are not able to swallow the tablets whole, they may be crushed and mixed in water, apple juice, or applesauce and promptly taken within four hours. Never skip a dose of Apixaban/Eliquis. If you forget to take your Apixaban/Eliquis, take a dose as soon as you remember. If it is almost time for your next Apixaban/Eliquis dose, wait until then and take a regular dose. DO NOT take an extra pill to ‘catch up’.  NEVER TAKE A DOUBLE DOSE. Notify your doctor that you missed a dose. Take Apixaban/Eliquis at the same time each morning and evening. Apixaban/Eliquis may be taken with other medication or food. Regular rate & rhythm, normal S1, S2; no murmurs, gallops or rubs; no S3, S4

## 2025-02-13 NOTE — DISCHARGE NOTE NURSING/CASE MANAGEMENT/SOCIAL WORK - NSDCPEFALRISK_GEN_ALL_CORE
For information on Fall & Injury Prevention, visit: [email or web address] OR  [email or web address] OR  https://www.cdc.gov/steadi/patient.html

## 2025-02-13 NOTE — ED PROVIDER NOTE - OBJECTIVE STATEMENT
80yo M PMH  HTN, HFrEF (prior TTE 11/2024 with EF 35%), PE (on Eliquis), Myasthenia Gravis, and chronic LE edema, nephrolithiasis (s/p nephrostomy tube placement), urosepsis, BPH is brought to ED from [facility] as pt and son are unhappy with care there. Pt was discharged from [facility] back to [facility] this afternoon. Son states pt was sent back to the same room from where he came, and pt has a problem with that roommate. Son states the roommate is deaf and hence plays everything very loud and his father was very sensitive to noise. Son states had he known that patient was going back to the same room he would not have allowed pt to return there. At this time, Son wants to change facility. Pt has no medical complaints at this time.

## 2025-02-13 NOTE — SOCIAL WORK PROGRESS NOTE - NSSWPROGRESSNOTE_GEN_ALL_CORE
Patient to be discharged back to [facility].  Son is in agreement.  Requested transport for 3:00 via Ambulance ( [facility]  ). Copy of chart to follow

## 2025-02-13 NOTE — CHART NOTE - NSCHARTNOTEFT_GEN_A_CORE
Nutrition follow up ( chart reviewed, events noted) Brief hx:      Factors impacting intake: [ ] none [ ] nausea  [ ] vomiting [ ] diarrhea [ ] constipation  [ ]chewing problems [ ] swallowing issues  [ ] other:     Diet Prescription: Diet, DASH/TLC:   Sodium & Cholesterol Restricted  Consistent Carbohydrate {No Snacks}  Supplement Feeding Modality:  Oral  Ensure Max Cans or Servings Per Day:  1       Frequency:  Daily (02-06-25 @ 12:15)    Intake:     Current Weight:       Pertinent Medications: MEDICATIONS  (STANDING):  chlorhexidine 2% Cloths 1 Application(s) Topical <User Schedule>  enoxaparin Injectable 40 milliGRAM(s) SubCutaneous every 12 hours  finasteride 5 milliGRAM(s) Oral daily  pantoprazole    Tablet 40 milliGRAM(s) Oral before breakfast  polyethylene glycol 3350 17 Gram(s) Oral daily  predniSONE   Tablet 10 milliGRAM(s) Oral daily  senna 1 Tablet(s) Oral at bedtime  tamsulosin 0.4 milliGRAM(s) Oral at bedtime    MEDICATIONS  (PRN):  acetaminophen     Tablet .. 650 milliGRAM(s) Oral every 6 hours PRN Temp greater or equal to 38C (100.4F), Mild Pain (1 - 3)  HYDROmorphone  Injectable 0.5 milliGRAM(s) IV Push every 4 hours PRN Moderate Pain (4 - 6)  HYDROmorphone  Injectable 1 milliGRAM(s) IV Push every 4 hours PRN Severe Pain (7 - 10)    Pertinent Labs: 02-13 Na138 mmol/L Glu 110 mg/dL[H] K+ 3.4 mmol/L[L] Cr  0.64 mg/dL BUN 17 mg/dL 02-10 Phos 2.8 mg/dL 02-13 Alb 2.2 g/dL[L]     CAPILLARY BLOOD GLUCOSE        Skin:     Estimated Needs:   [ ] no change since previous assessment  [ ] recalculated:     Previous Nutrition Diagnosis:   [ ] Inadequate Energy Intake [ ]Inadequate Oral Intake [ ] Excessive Energy Intake   [ ] Underweight [ ] Increased Nutrient Needs [ ] Overweight/Obesity   [ ] Altered GI Function [ ] Unintended Weight Loss [ ] Food & Nutrition Related Knowledge Deficit [ ] Malnutrition     Nutrition Diagnosis is [ ] ongoing  [ ] resolved [ ] not applicable     New Nutrition Diagnosis: [ ] not applicable       Interventions:   Recommend  [ ] Change Diet To:  [ ] Nutrition Supplement  [ ] Nutrition Support  [ ] Other:     Monitoring and Evaluation:   [ ] PO intake [ x ] Tolerance to diet prescription [ x ] weights [ x ] labs[ x ] follow up per protocol  [ ] other: Nutrition follow up ( chart reviewed, events noted) Brief hx:  Pt is a "80yo M PMH  HTN, HFrEF (prior TTE 11/2024 with EF 35%), PE (on Eliquis), Myasthenia Gravis, and chronic LE edema, nephrolithiasis (s/p nephrostomy tube placement), urosepsis, BPH presents to ER from [facility] with report of large amount of blood from the penis, and penile pain. Admitted for hematuria, active bleed within bladder requiring emergent surgery with urology."    Visited pt at bedside this am. Pt sleeping soundly during visit. Observed untouched breakfast tray left at bedside. Nutrition-related hx obtained from transfer papers. NKFA. No report N/V. No BMs this far. Bedscale wt of 205# obtained. No edema noted. Skin: stage I to sacrum, unstageable to L foot. Pt currently on DASH/TLC diet. Will provide ensure max protein shake for additional kcal/protein. Recommend continued assistance/encouragement at meal times. Diet education not appropriate. RD remains available and will continue to follow-up.  0 = swallows foods/liquids without difficulty      Factors impacting intake: [ ] none [ ] nausea  [ ] vomiting [ ] diarrhea [ ] constipation  [ ]chewing problems [ ] swallowing issues  [ ] other:     Diet Prescription: Diet, DASH/TLC:   Sodium & Cholesterol Restricted  Consistent Carbohydrate {No Snacks}  Supplement Feeding Modality:  Oral  Ensure Max Cans or Servings Per Day:  1       Frequency:  Daily (02-06-25 @ 12:15)    Intake:     Current Weight:       Pertinent Medications: MEDICATIONS  (STANDING):  chlorhexidine 2% Cloths 1 Application(s) Topical <User Schedule>  enoxaparin Injectable 40 milliGRAM(s) SubCutaneous every 12 hours  finasteride 5 milliGRAM(s) Oral daily  pantoprazole    Tablet 40 milliGRAM(s) Oral before breakfast  polyethylene glycol 3350 17 Gram(s) Oral daily  predniSONE   Tablet 10 milliGRAM(s) Oral daily  senna 1 Tablet(s) Oral at bedtime  tamsulosin 0.4 milliGRAM(s) Oral at bedtime    MEDICATIONS  (PRN):  acetaminophen     Tablet .. 650 milliGRAM(s) Oral every 6 hours PRN Temp greater or equal to 38C (100.4F), Mild Pain (1 - 3)  HYDROmorphone  Injectable 0.5 milliGRAM(s) IV Push every 4 hours PRN Moderate Pain (4 - 6)  HYDROmorphone  Injectable 1 milliGRAM(s) IV Push every 4 hours PRN Severe Pain (7 - 10)    Pertinent Labs: 02-13 Na138 mmol/L Glu 110 mg/dL[H] K+ 3.4 mmol/L[L] Cr  0.64 mg/dL BUN 17 mg/dL 02-10 Phos 2.8 mg/dL 02-13 Alb 2.2 g/dL[L]     CAPILLARY BLOOD GLUCOSE        Skin:     Estimated Needs:   [ ] no change since previous assessment  [ ] recalculated:     Previous Nutrition Diagnosis:   [ ] Inadequate Energy Intake [ ]Inadequate Oral Intake [ ] Excessive Energy Intake   [ ] Underweight [ ] Increased Nutrient Needs [ ] Overweight/Obesity   [ ] Altered GI Function [ ] Unintended Weight Loss [ ] Food & Nutrition Related Knowledge Deficit [ ] Malnutrition     Nutrition Diagnosis is [ ] ongoing  [ ] resolved [ ] not applicable     New Nutrition Diagnosis: [ ] not applicable       Interventions:   Recommend  [ ] Change Diet To:  [ ] Nutrition Supplement  [ ] Nutrition Support  [ ] Other:     Monitoring and Evaluation:   [ ] PO intake [ x ] Tolerance to diet prescription [ x ] weights [ x ] labs[ x ] follow up per protocol  [ ] other: Nutrition follow up ( chart reviewed, events noted) Brief hx:  Pt is a "78yo M PMH  HTN, HFrEF (prior TTE 11/2024 with EF 35%), PE (on Eliquis), Myasthenia Gravis, and chronic LE edema, nephrolithiasis (s/p nephrostomy tube placement), urosepsis, BPH presented with hematuria, active bleed within bladder requiring emergent surgery with urology."    Pt not in room at time of RD visit. Per nsg, po intake fair to good , receives tray set up assistance      Factors impacting intake: [ ] none [ ] nausea  [ ] vomiting [ ] diarrhea [ ] constipation  [ ]chewing problems [ ] swallowing issues  [ ] other:     Diet Prescription: Diet, DASH/TLC:   Sodium & Cholesterol Restricted  Consistent Carbohydrate {No Snacks}  Supplement Feeding Modality:  Oral  Ensure Max Cans or Servings Per Day:  1       Frequency:  Daily (02-06-25 @ 12:15)    Intake: >50-75%    Current Weight:  125.6# ( 2/22/25)                            127# ( 2/5/25)      Pertinent Medications: MEDICATIONS  (STANDING):  chlorhexidine 2% Cloths 1 Application(s) Topical <User Schedule>  enoxaparin Injectable 40 milliGRAM(s) SubCutaneous every 12 hours  finasteride 5 milliGRAM(s) Oral daily  pantoprazole    Tablet 40 milliGRAM(s) Oral before breakfast  polyethylene glycol 3350 17 Gram(s) Oral daily  predniSONE   Tablet 10 milliGRAM(s) Oral daily  senna 1 Tablet(s) Oral at bedtime  tamsulosin 0.4 milliGRAM(s) Oral at bedtime    MEDICATIONS  (PRN):  acetaminophen     Tablet .. 650 milliGRAM(s) Oral every 6 hours PRN Temp greater or equal to 38C (100.4F), Mild Pain (1 - 3)  HYDROmorphone  Injectable 0.5 milliGRAM(s) IV Push every 4 hours PRN Moderate Pain (4 - 6)  HYDROmorphone  Injectable 1 milliGRAM(s) IV Push every 4 hours PRN Severe Pain (7 - 10)    Pertinent Labs: 02-13 Na138 mmol/L Glu 110 mg/dL[H] K+ 3.4 mmol/L[L] Cr  0.64 mg/dL BUN 17 mg/dL 02-10 Phos 2.8 mg/dL 02-13 Alb 2.2 g/dL[L]    Skin: stage I PI to sacrum          unstageable to L lateral foot          Estimated Needs:   [X ] no change since previous assessment  [ ] recalculated:     Previous Nutrition Diagnosis:   [ ] Inadequate Energy Intake [ ]Inadequate Oral Intake [ ] Excessive Energy Intake   [ ] Underweight [X ] Increased Nutrient Needs [ ] Overweight/Obesity   [ ] Altered GI Function [ ] Unintended Weight Loss [ ] Food & Nutrition Related Knowledge Deficit [ ] Malnutrition     Nutrition Diagnosis is [X ] ongoing  [ ] resolved [ ] not applicable     New Nutrition Diagnosis: [X ] not applicable     Interventions:   Recommend  [ ] Change Diet To:  [ ] Nutrition Supplement  [ ] Nutrition Support  [X ] Other: continue current PO     Monitoring and Evaluation:   [X ] PO intake [ x ] Tolerance to diet prescription [ x ] weights [ x ] labs[ x ] follow up per protocol  [ X] other: skin integrity

## 2025-02-13 NOTE — ED PROVIDER NOTE - CONSTITUTIONAL MENTATION
Silver Nitrate Text: The wound bed was treated with silver nitrate after the biopsy was performed. awake/alert

## 2025-02-13 NOTE — ED ADULT TRIAGE NOTE - CHIEF COMPLAINT QUOTE
Pt was recently discharged from hospital, sent from [facility], c/o body pain also verbalized being unhappy with nursing home.

## 2025-02-14 LAB
ALBUMIN SERPL ELPH-MCNC: 2.3 G/DL — LOW (ref 3.3–5)
ALP SERPL-CCNC: 61 U/L — SIGNIFICANT CHANGE UP (ref 40–120)
ALT FLD-CCNC: 14 U/L — SIGNIFICANT CHANGE UP (ref 12–78)
ANION GAP SERPL CALC-SCNC: 9 MMOL/L — SIGNIFICANT CHANGE UP (ref 5–17)
APTT BLD: 30.9 SEC — SIGNIFICANT CHANGE UP (ref 24.5–35.6)
AST SERPL-CCNC: 14 U/L — LOW (ref 15–37)
BASOPHILS # BLD AUTO: 0.07 K/UL — SIGNIFICANT CHANGE UP (ref 0–0.2)
BASOPHILS NFR BLD AUTO: 0.5 % — SIGNIFICANT CHANGE UP (ref 0–2)
BILIRUB SERPL-MCNC: 0.3 MG/DL — SIGNIFICANT CHANGE UP (ref 0.2–1.2)
BUN SERPL-MCNC: 18 MG/DL — SIGNIFICANT CHANGE UP (ref 7–23)
CALCIUM SERPL-MCNC: 8.8 MG/DL — SIGNIFICANT CHANGE UP (ref 8.5–10.1)
CHLORIDE SERPL-SCNC: 108 MMOL/L — SIGNIFICANT CHANGE UP (ref 96–108)
CO2 SERPL-SCNC: 23 MMOL/L — SIGNIFICANT CHANGE UP (ref 22–31)
CREAT SERPL-MCNC: 0.72 MG/DL — SIGNIFICANT CHANGE UP (ref 0.5–1.3)
EGFR: 92 ML/MIN/1.73M2 — SIGNIFICANT CHANGE UP
EOSINOPHIL # BLD AUTO: 0.76 K/UL — HIGH (ref 0–0.5)
EOSINOPHIL NFR BLD AUTO: 5.5 % — SIGNIFICANT CHANGE UP (ref 0–6)
GLUCOSE SERPL-MCNC: 89 MG/DL — SIGNIFICANT CHANGE UP (ref 70–99)
HCT VFR BLD CALC: 30 % — LOW (ref 39–50)
HGB BLD-MCNC: 9.6 G/DL — LOW (ref 13–17)
IMM GRANULOCYTES NFR BLD AUTO: 1.7 % — HIGH (ref 0–0.9)
INR BLD: 0.99 RATIO — SIGNIFICANT CHANGE UP (ref 0.85–1.16)
LYMPHOCYTES # BLD AUTO: 24.4 % — SIGNIFICANT CHANGE UP (ref 13–44)
LYMPHOCYTES # BLD AUTO: 3.39 K/UL — HIGH (ref 1–3.3)
MCHC RBC-ENTMCNC: 28.7 PG — SIGNIFICANT CHANGE UP (ref 27–34)
MCHC RBC-ENTMCNC: 32 G/DL — SIGNIFICANT CHANGE UP (ref 32–36)
MCV RBC AUTO: 89.6 FL — SIGNIFICANT CHANGE UP (ref 80–100)
MONOCYTES # BLD AUTO: 1.65 K/UL — HIGH (ref 0–0.9)
MONOCYTES NFR BLD AUTO: 11.9 % — SIGNIFICANT CHANGE UP (ref 2–14)
NEUTROPHILS # BLD AUTO: 7.77 K/UL — HIGH (ref 1.8–7.4)
NEUTROPHILS NFR BLD AUTO: 56 % — SIGNIFICANT CHANGE UP (ref 43–77)
NRBC BLD AUTO-RTO: 0 /100 WBCS — SIGNIFICANT CHANGE UP (ref 0–0)
PLATELET # BLD AUTO: 496 K/UL — HIGH (ref 150–400)
POTASSIUM SERPL-MCNC: 3.7 MMOL/L — SIGNIFICANT CHANGE UP (ref 3.5–5.3)
POTASSIUM SERPL-SCNC: 3.7 MMOL/L — SIGNIFICANT CHANGE UP (ref 3.5–5.3)
PROT SERPL-MCNC: 6.1 G/DL — SIGNIFICANT CHANGE UP (ref 6–8.3)
PROTHROM AB SERPL-ACNC: 11.6 SEC — SIGNIFICANT CHANGE UP (ref 9.9–13.4)
RBC # BLD: 3.35 M/UL — LOW (ref 4.2–5.8)
RBC # FLD: 16.5 % — HIGH (ref 10.3–14.5)
SODIUM SERPL-SCNC: 140 MMOL/L — SIGNIFICANT CHANGE UP (ref 135–145)
WBC # BLD: 13.87 K/UL — HIGH (ref 3.8–10.5)
WBC # FLD AUTO: 13.87 K/UL — HIGH (ref 3.8–10.5)

## 2025-02-14 RX ORDER — ACETAMINOPHEN 500 MG/5ML
1000 LIQUID (ML) ORAL ONCE
Refills: 0 | Status: COMPLETED | OUTPATIENT
Start: 2025-02-14 | End: 2025-02-14

## 2025-02-14 RX ORDER — ACETAMINOPHEN 500 MG/5ML
975 LIQUID (ML) ORAL ONCE
Refills: 0 | Status: COMPLETED | OUTPATIENT
Start: 2025-02-14 | End: 2025-02-14

## 2025-02-14 RX ADMIN — TAMSULOSIN HYDROCHLORIDE 0.4 MILLIGRAM(S): 0.4 CAPSULE ORAL at 22:30

## 2025-02-14 RX ADMIN — Medication 40 MILLIGRAM(S): at 06:59

## 2025-02-14 RX ADMIN — Medication 975 MILLIGRAM(S): at 04:20

## 2025-02-14 RX ADMIN — APIXABAN 2.5 MILLIGRAM(S): 2.5 TABLET, FILM COATED ORAL at 06:59

## 2025-02-14 RX ADMIN — Medication 400 MILLIGRAM(S): at 17:22

## 2025-02-14 RX ADMIN — FUROSEMIDE 20 MILLIGRAM(S): 10 INJECTION INTRAMUSCULAR; INTRAVENOUS at 06:59

## 2025-02-14 RX ADMIN — PREDNISONE 10 MILLIGRAM(S): 20 TABLET ORAL at 06:59

## 2025-02-14 RX ADMIN — Medication 4 MILLIGRAM(S): at 07:40

## 2025-02-14 RX ADMIN — Medication 975 MILLIGRAM(S): at 03:20

## 2025-02-14 RX ADMIN — Medication 4 MILLIGRAM(S): at 07:10

## 2025-02-14 NOTE — PHYSICAL THERAPY INITIAL EVALUATION ADULT - PERTINENT HX OF CURRENT PROBLEM, REHAB EVAL
Pt defers any attempt at physical therapy, Pt reports I do not move and in a lot of pain if touched. Pt defers any attempt at physical therapy, Pt reports I do not move and in a lot of pain if touched.  Despite encouragement Pt refuses to work with PT.

## 2025-02-14 NOTE — SOCIAL WORK PROGRESS NOTE - NSSWPROGRESSNOTE_GEN_ALL_CORE
Sw was consulted to the ED to speak with the patient and family. Pt is currently at [facility] with transition to long term care. Pt and family are requesting a new facility at this time. Pt and family were explained the process and that he might have to return to orginal facility as he currently has no medicare days and is currently medicaid pending. Pts son expressed understanding and is looking at other facilities. Sw communicating with facility and family at this time. Sw made available for any further assistance needed.

## 2025-02-14 NOTE — SOCIAL WORK PROGRESS NOTE - NSSWPROGRESSNOTE_GEN_ALL_CORE
Patients son is concerned about return to [facility] as his father is having difficulty  with roommate at nursing home. Attempted to request a change of room but currently there are no other beds available at [facility] . Multiple referrals to other facilities have been made but with no bed offer. The patients Medicare Benefits have been exhausted and the [employer] Medicaid is out of network for multiple facilities or there are no bed offers.  Son states he has an  Bowen Vishal [phone]/ [phone] who has been retained to facilitate Medicaid to Nursing Home Medicaid. Son requested SW call to speak with him regarding getting a letter of retainment as advised earlier by Abad Zepeda so he can be considered for placement. Currently there is no other payor source. Patient to be admitted. Educated son on IM letter and right to appeal the discharge. Agreed to touch base in the morning . Sw anticipating patient will be medically ready for discharge. Tasked for weekend social work.  Spoke to [facility] to discuss discharge back. As family has said they do not want him to return they have given away his beds. They agreed to touch base on Monday.

## 2025-02-14 NOTE — ED ADULT NURSE REASSESSMENT NOTE - STATUS
awaiting consult
Social work/awaiting consult
right upper extremity 5/5, patient complains of right shoulder pain with internal rotation. Left shoulder/elbow not tested. Left hand/wrist 3/5

## 2025-02-14 NOTE — ED ADULT NURSE REASSESSMENT NOTE - COMFORT CARE
meal provided/po fluids offered/repositioned/side rails up/warm blanket provided
plan of care explained/repositioned/wait time explained/warm blanket provided
Offered to change  diaper> he refused
repositioned/wait time explained/warm blanket provided

## 2025-02-14 NOTE — ED ADULT NURSE NOTE - NSFALLRISKINTERV_ED_ALL_ED

## 2025-02-14 NOTE — ED ADULT NURSE REASSESSMENT NOTE - PAIN RATING/NUMBER SCALE (0-10): ACTIVITY
10 (severe pain)
0 (no pain/absence of nonverbal indicators of pain)

## 2025-02-14 NOTE — ED ADULT NURSE REASSESSMENT NOTE - NSFALLRISKINTERV_ED_ALL_ED
Assistance OOB with selected safe patient handling equipment if applicable/Assistance with ambulation/Communicate fall risk and risk factors to all staff, patient, and family/Monitor gait and stability/Provide visual cue: yellow wristband, yellow gown, etc/Reinforce activity limits and safety measures with patient and family/Call bell, personal items and telephone in reach/Instruct patient to call for assistance before getting out of bed/chair/stretcher/Non-slip footwear applied when patient is off stretcher/[place] to call system/Physically safe environment - no spills, clutter or unnecessary equipment/Purposeful Proactive Rounding/Room/bathroom lighting operational, light cord in reach

## 2025-02-14 NOTE — ED ADULT NURSE NOTE - OBJECTIVE STATEMENT
Off going nurse stated pt was a discharge from hospital 2/13 and was sent to [facility] for rehab. Pt started screaming he was having pain per transfer record, and was sent directly back to ED. On arrival to shift. Pt had no complaints and was resting comfortably at 11pm

## 2025-02-14 NOTE — PHYSICAL THERAPY INITIAL EVALUATION ADULT - SPECIFY REASON(S)
Request for PT evaluation rec'd, Pt rec'd supine in bed, Pt reports I don't move, do not try to move me. PT removed the sheet from the patients feet the patient shouted in pain from removing the cover Pt rec'd supine in bed, Pt reports I don't move, do not try to move me. PT removed the sheet from the patients feet the patient shouted in pain.

## 2025-02-14 NOTE — ED ADULT NURSE NOTE - CHIEF COMPLAINT QUOTE
Pt was recently discharged from hospital, sent from [facility], c/o body pain also verbalized being unhappy with nursing home.

## 2025-02-15 DIAGNOSIS — Z87.438 PERSONAL HISTORY OF OTHER DISEASES OF MALE GENITAL ORGANS: ICD-10-CM

## 2025-02-15 DIAGNOSIS — I50.9 HEART FAILURE, UNSPECIFIED: ICD-10-CM

## 2025-02-15 DIAGNOSIS — E78.5 HYPERLIPIDEMIA, UNSPECIFIED: ICD-10-CM

## 2025-02-15 DIAGNOSIS — K21.9 GASTRO-ESOPHAGEAL REFLUX DISEASE WITHOUT ESOPHAGITIS: ICD-10-CM

## 2025-02-15 DIAGNOSIS — Z86.69 PERSONAL HISTORY OF OTHER DISEASES OF THE NERVOUS SYSTEM AND SENSE ORGANS: ICD-10-CM

## 2025-02-15 DIAGNOSIS — Z86.711 PERSONAL HISTORY OF PULMONARY EMBOLISM: ICD-10-CM

## 2025-02-15 DIAGNOSIS — L89.620 PRESSURE ULCER OF LEFT HEEL, UNSTAGEABLE: ICD-10-CM

## 2025-02-15 DIAGNOSIS — Z78.9 OTHER SPECIFIED HEALTH STATUS: ICD-10-CM

## 2025-02-15 DIAGNOSIS — Z29.9 ENCOUNTER FOR PROPHYLACTIC MEASURES, UNSPECIFIED: ICD-10-CM

## 2025-02-15 DIAGNOSIS — I10 ESSENTIAL (PRIMARY) HYPERTENSION: ICD-10-CM

## 2025-02-15 PROCEDURE — 99222 1ST HOSP IP/OBS MODERATE 55: CPT | Mod: GC

## 2025-02-15 RX ORDER — MELATONIN 5 MG
3 TABLET ORAL AT BEDTIME
Refills: 0 | Status: DISCONTINUED | OUTPATIENT
Start: 2025-02-15 | End: 2025-02-18

## 2025-02-15 RX ORDER — ACETAMINOPHEN 500 MG/5ML
650 LIQUID (ML) ORAL EVERY 6 HOURS
Refills: 0 | Status: DISCONTINUED | OUTPATIENT
Start: 2025-02-15 | End: 2025-02-18

## 2025-02-15 RX ORDER — ACETAMINOPHEN 500 MG/5ML
1000 LIQUID (ML) ORAL ONCE
Refills: 0 | Status: COMPLETED | OUTPATIENT
Start: 2025-02-15 | End: 2025-02-15

## 2025-02-15 RX ORDER — ACETAMINOPHEN 500 MG/5ML
650 LIQUID (ML) ORAL ONCE
Refills: 0 | Status: COMPLETED | OUTPATIENT
Start: 2025-02-15 | End: 2025-02-15

## 2025-02-15 RX ADMIN — APIXABAN 2.5 MILLIGRAM(S): 2.5 TABLET, FILM COATED ORAL at 17:16

## 2025-02-15 RX ADMIN — Medication 325 MILLIGRAM(S): at 15:50

## 2025-02-15 RX ADMIN — PREDNISONE 10 MILLIGRAM(S): 20 TABLET ORAL at 05:42

## 2025-02-15 RX ADMIN — CYANOCOBALAMIN 1000 MICROGRAM(S): 1000 INJECTION INTRAMUSCULAR; SUBCUTANEOUS at 15:50

## 2025-02-15 RX ADMIN — Medication 400 MILLIGRAM(S): at 15:50

## 2025-02-15 RX ADMIN — FUROSEMIDE 20 MILLIGRAM(S): 10 INJECTION INTRAMUSCULAR; INTRAVENOUS at 05:41

## 2025-02-15 RX ADMIN — Medication 650 MILLIGRAM(S): at 05:38

## 2025-02-15 RX ADMIN — FINASTERIDE 5 MILLIGRAM(S): 1 TABLET, FILM COATED ORAL at 15:50

## 2025-02-15 RX ADMIN — APIXABAN 2.5 MILLIGRAM(S): 2.5 TABLET, FILM COATED ORAL at 05:43

## 2025-02-15 RX ADMIN — METOPROLOL SUCCINATE 25 MILLIGRAM(S): 50 TABLET, EXTENDED RELEASE ORAL at 05:37

## 2025-02-15 RX ADMIN — Medication 40 MILLIGRAM(S): at 06:16

## 2025-02-15 RX ADMIN — Medication 650 MILLIGRAM(S): at 06:30

## 2025-02-15 RX ADMIN — Medication 1000 MILLIGRAM(S): at 16:30

## 2025-02-15 RX ADMIN — TAMSULOSIN HYDROCHLORIDE 0.4 MILLIGRAM(S): 0.4 CAPSULE ORAL at 23:05

## 2025-02-15 NOTE — H&P ADULT - PROBLEM SELECTOR PLAN 1
Previous admission with heel wound. Patient refusing wound assessment  - Left heel unstageable eschar with no fluctuance previously   - No OM per MRI  - Recently at [facility]   - Patient refused vascular studies   - Recommend heel offloading with pillows under the area of the calf to alleviate the biomechanical pressure   - Recommend dry dressing to the left foot every other day Previous admission with heel wound. Patient refusing wound assessment, some pain before admission  - PRN analgesics  - Left heel unstageable eschar with no fluctuance previously   - No OM per MRI  - Recently at [facility]   - Patient refused vascular studies   - Recommend heel offloading with pillows under the area of the calf to alleviate the biomechanical pressure   - Recommend dry dressing to the left foot every other day

## 2025-02-15 NOTE — H&P ADULT - HISTORY OF PRESENT ILLNESS
78yo M PMH  HTN, HFrEF (prior TTE 11/2024 with EF 35%), PE (on Eliquis), Myasthenia Gravis, and chronic LE edema, nephrolithiasis (s/p nephrostomy tube placement), urosepsis, BPH presents to ER from [facility] with desire to transition rehab care to a different facility. Recently admitted to hospitals from 2/5 - 2/13 for hematuria, found to have blood products in bladder, required cystoscopy and blood transfusion. Briefly required vasopressors during previous admission. Currently, patient has no complaints.     ED course:   Vitals: T 97.6F, HR 66, /67, RR 16, SpO2 94% RA  Labs: WBC 13, Hgb 9.6, Albumin 2.3,  80yo M PMH  HTN, HFrEF (prior TTE 11/2024 with EF 35%), PE (on Eliquis), Myasthenia Gravis, and chronic LE edema, nephrolithiasis (s/p nephrostomy tube placement), urosepsis, BPH presents to ER from [facility] with desire to transition rehab care to a different facility. Recently admitted to [facility] from 2/5 - 2/13 for hematuria, found to have blood products in bladder, required cystoscopy and blood transfusion. Briefly required vasopressors during previous admission. Currently, patient has no complaints and is refusing to participate in HPI and is referring to son to share information. Per son, pt was complaining of pain in the right flank 1-2 days.     ED course:   Vitals: T 97.6F, HR 66, /67, RR 16, SpO2 94% RA  Labs: WBC 13, Hgb 9.6, Albumin 2.3,

## 2025-02-15 NOTE — PATIENT PROFILE ADULT - NSTOBACCONEVERSMOKERY/N_GEN_A
Your lab tests and x-rays were reassuring.  Although you have a low sodium its not as low as it was before.  Your hemoglobin was slightly low and he will need follow-up on that when you see Dr. Epps.  The CAT scan of your abdomen just showed constipation.  There were no postoperative complications.  Use your MiraLAX as directed.  Return to the emergency department if excessive vomiting, increasing pain, increasing weakness or focal neurologic weakness.  Your CAT scan of your head looked reassuringly normal.  
Yes

## 2025-02-15 NOTE — ED ADULT NURSE REASSESSMENT NOTE - NS ED NURSE REASSESS COMMENT FT1
1430:  task rn:  patient was evaluated by PT, but he refused.  told the PT to not touch him and to get out of his room.  He is refusing any sort of PT evaluation.  RN was made aware.  lolita montes.
NO change in gen status.  Asleep in bed.
Report received from Jorge MAYES. Pt asleep in stretcher rise and fall of chest noted. . No other signs of distress noted. Plan of care continued.
Patient was observed soaking wet. I offered to change his diapers and he yelled at me "LEAVE ME ALONE. I WANT TO SLEEP'. He was informed he is wet and sitting in it could potential cause a skin breakdown, and he yelled "I DONT CARE. PLEASE JUST LET ME SLEEP" Pt was informed I will allow him to get some rest and will try again later.

## 2025-02-15 NOTE — SOCIAL WORK PROGRESS NOTE - NSSWPROGRESSNOTE_GEN_ALL_CORE
Met with patient . Educated him on IM letter and patient rights. Provided him a copy . Discussed discharge planning. He deferred to son. Social Work to remain available

## 2025-02-15 NOTE — SOCIAL WORK PROGRESS NOTE - NSSWPROGRESSNOTE_GEN_ALL_CORE
Spoke with patients son who continues to decline placement/return to [facility]. Educated him on IM letter.  Provided him the phone number to make an appeal and the patients rights as documented in the IM letter. To leave a copy by patients bedside

## 2025-02-15 NOTE — H&P ADULT - ASSESSMENT
80yo M PMH  HTN, HFrEF (prior TTE 11/2024 with EF 35%), PE (on Eliquis), Myasthenia Gravis, and chronic LE edema, nephrolithiasis (s/p nephrostomy tube placement), urosepsis, BPH presents to ER from [facility] with desire to transition rehab care to a different facility. No new complaints since previous discharge.

## 2025-02-15 NOTE — H&P ADULT - ATTENDING COMMENTS
80yo M PMH  HTN, HFrEF (prior TTE 11/2024 with EF 35%), PE (on Eliquis), Myasthenia Gravis, and chronic LE edema, nephrolithiasis (s/p nephrostomy tube placement), urosepsis, BPH presents to ER from [facility] with desire to transition rehab care to a different facility. No new complaints since previous discharge.     PT eval, social work and case management to work on arranging dispo.  Podiatry consulted to assess any further needs of care for chronic le wounds.    Shubham Deluca, Attending Physician

## 2025-02-15 NOTE — PATIENT PROFILE ADULT - FUNCTIONAL SCREEN CURRENT LEVEL: SWALLOWING (IF SCORE 2 OR MORE FOR ANY ITEM, CONSULT REHAB SERVICES), MLM)
Patient: Lior Nogueira Date: 2017   : 1958 Attending: Pantera Celis MD   59 year old male        Chiefcomplaint: ARF.    Subjective: stable, no new complaints except for some SOB.    Reviewed: Allergies, Medical History, Surgical History, Social History and Family History.    Vital Last Value 24 HourRange   Temperature 98.2 °F (36.8 °C) Temp  Min: 98 °F (36.7 °C)  Max: 98.2 °F (36.8 °C)   Pulse 93 Pulse  Min: 75  Max: 95   Respiratory 18 Resp  Min: 18  Max: 18   Blood Pressure (!) 166/107 BP  Min: 137/94  Max: 166/107   ArtBP   No Data Recorded   Pulse Oximetry 96 % SpO2  Min: 96 %  Max: 98 %     Vital Today Admitted   Weight 70.3 kg Weight: 70.3 kg   Height N/A Height: 6' 1\" (185.4 cm)   BMI N/A BMI (Calculated): 20.49     Weight over the past 48 Hours:  No data found.       Intake/Output:    Last StoolOccurrence: 0 (17)         I/O last 3 completed shifts:  In: 3421.8 [I.V.:3421.8]  Out: 2370 [Urine:2300; Drains:70]      Intake/Output Summary (Last 24 hours) at 17 1123  Last data filed at 17 0646   Gross per 24 hour   Intake             2906 ml   Output             2220 ml   Net              686 ml       Medications/Infusions:  Scheduled:   • famotidine  20 mg Intravenous Daily   • furosemide  20 mg Intravenous Once   • heparin (porcine)  5,000 Units Subcutaneous 3 times per day   • sodium chloride (PF)  2 mL Injection 2 times per day       Continuous Infusions:   • sodium chloride 0.9% infusion 150 mL/hr at 17 0850       Physical Exam:  GEN: ALERT and ORIENTED, NAD  HEENT: NO PALLOR, NO ICTERUS  NECK: SUPPPLE, NO MASS, NO JVD  LUNGS: SYMMETRIC AIR ENTRY, CTA  CVS: S1 S2, NO RUB  ABDO: Soft, Not Distended, + Tenderness  EXT: NO C/C/EDEMA    LaboratoryResults:    Lab Results   Component Value Date    SODIUM 143 2017    POTASSIUM 4.2 2017    CHLORIDE 108 (H) 2017    CO2 25 2017    ANIONGAP 14 2017    BUN 38 (H) 2017    CREATININE 1.40  (H) 06/22/2017    CALCIUM 8.8 06/22/2017    URIC 6.6 06/22/2017    GLUCOSE 92 06/22/2017    LIPA 225 06/19/2017    WBC 5.7 06/22/2017    HCT 33.4 (L) 06/22/2017     Lab Results   Component Value Date    HGB 11.5 (L) 06/22/2017     06/22/2017    INR 1.2 06/19/2017    MG 2.1 06/22/2017    PHOS 2.8 06/22/2017    ALKPT 49 06/22/2017    BILIRUBIN 0.6 06/22/2017     (H) 06/22/2017     (H) 06/22/2017    ALBUMIN 2.5 (L) 06/22/2017    GFRA 62 06/22/2017    GFRNA 54 06/22/2017     Creatinine   Date Value Ref Range Status   06/22/2017 1.40 (H) 0.67 - 1.17 mg/dL Final   06/21/2017 1.85 (H) 0.67 - 1.17 mg/dL Final   06/20/2017 1.60 (H) 0.67 - 1.17 mg/dL Final   06/19/2017 1.21 (H) 0.67 - 1.17 mg/dL Final     BUN   Date Value Ref Range Status   06/22/2017 38 (H) 6 - 20 mg/dL Final   06/21/2017 39 (H) 6 - 20 mg/dL Final   06/20/2017 23 (H) 6 - 20 mg/dL Final   06/19/2017 19 6 - 20 mg/dL Final     Potassium   Date Value Ref Range Status   06/22/2017 4.2 3.4 - 5.1 mmol/L Final   06/21/2017 5.4 (H) 3.4 - 5.1 mmol/L Final   06/20/2017 5.6 (H) 3.4 - 5.1 mmol/L Final   06/19/2017 4.3 3.4 - 5.1 mmol/L Final     Comment:     Slight to moderate hemolysis, result may be falsely increased.     HGB   Date Value Ref Range Status   06/22/2017 11.5 (L) 13.0 - 17.0 g/dL Final   06/21/2017 13.0 13.0 - 17.0 g/dL Final   06/20/2017 13.1 13.0 - 17.0 g/dL Final   06/19/2017 14.3 13.0 - 17.0 g/dL Final     MAGNESIUM   Date Value Ref Range Status   06/22/2017 2.1 1.7 - 2.4 mg/dL Final   06/21/2017 2.2 1.7 - 2.4 mg/dL Final   06/20/2017 1.9 1.7 - 2.4 mg/dL Final     PHOSPHORUS   Date Value Ref Range Status   06/22/2017 2.8 2.4 - 4.7 mg/dL Final     Urine Panel  Lab Results   Component Value Date    URBC NEGATIVE 06/20/2017    UKET NEGATIVE 06/20/2017    USPG >1.030 (H) 06/20/2017    UPROT 30 (A) 06/20/2017    UWBC NEGATIVE 06/20/2017    UBILI NEGATIVE 06/20/2017    UPH 5.5 06/20/2017    UBACTR NONE SEEN 06/20/2017        Diagnosis/Plan:  ARF  HTN  S/p Laprotomy     PLAN:  Clinically, prerenal.   Continue IVF 50 cc/hr.  Continue same medication.  BP is stable, meds on hold.  H/H stable, will recheck.  Avoid NSAIDS.  Renal US pending.  BMP, CBC in am.    Sherrie Grigsby MD.  Nephrology.     0 = swallows foods/liquids without difficulty

## 2025-02-15 NOTE — H&P ADULT - PROBLEM SELECTOR PLAN 2
H/O of CHF, unspecified type however not on GDMT for HFrEF  - TTE (11/2024): techinically difficult. LVEF is estimated at 35%. The apex, mid to spical anteroseptal wall and mid to distal anterolateral walls appear severely hypokinetic.  - c/w home furosemide, BB  - DASH diet  - Euvolemic.

## 2025-02-15 NOTE — PATIENT PROFILE ADULT - FALL HARM RISK - RISK INTERVENTIONS
Assistance OOB with selected safe patient handling equipment/Assistance with ambulation/Communicate Fall Risk and Risk Factors to all staff, patient, and family/Discuss with provider need for PT consult/Monitor gait and stability/Reinforce activity limits and safety measures with patient and family/Visual Cue: Yellow wristband/Bed in lowest position, wheels locked, appropriate side rails in place/Call bell, personal items and telephone in reach/Instruct patient to call for assistance before getting out of bed or chair/Non-slip footwear when patient is out of bed/[place] to call system/Physically safe environment - no spills, clutter or unnecessary equipment/Purposeful Proactive Rounding/Room/bathroom lighting operational, light cord in reach

## 2025-02-15 NOTE — H&P ADULT - NSHPPHYSICALEXAM_GEN_ALL_CORE
T(C): 36.4 (02-15-25 @ 08:21), Max: 37.1 (02-15-25 @ 00:00)  HR: 66 (02-15-25 @ 08:21) (66 - 79)  BP: 128/67 (02-15-25 @ 08:21) (128/67 - 156/68)  RR: 16 (02-15-25 @ 08:21) (16 - 18)  SpO2: 94% (02-15-25 @ 08:21) (94% - 99%)    GENERAL: patient appears well, no acute distress, appropriate, pleasant  EYES: sclera clear, no exudates  ENMT: oropharynx clear without erythema, no exudates, moist mucous membranes  NECK: supple, soft, no thyromegaly noted  LUNGS: good air entry bilaterally, clear to auscultation, symmetric breath sounds, no wheezing or rhonchi appreciated  HEART: soft S1/S2, regular rate and rhythm, no murmurs noted, no lower extremity edema  GASTROINTESTINAL: abdomen is soft, nontender, nondistended, normoactive bowel sounds, no palpable masses  INTEGUMENT: +Left heel wound  MUSCULOSKELETAL: no clubbing or cyanosis, no obvious deformity  NEUROLOGIC: awake, alert, oriented x3, good muscle tone in 4 extremities, no obvious sensory deficits  PSYCHIATRIC: mood is good, affect is congruent, linear and logical thought process  HEME/LYMPH: no palpable supraclavicular nodules, no obvious ecchymosis or petechiae

## 2025-02-16 LAB
ALBUMIN SERPL ELPH-MCNC: 2.3 G/DL — LOW (ref 3.3–5)
ALP SERPL-CCNC: 61 U/L — SIGNIFICANT CHANGE UP (ref 40–120)
ALT FLD-CCNC: 17 U/L — SIGNIFICANT CHANGE UP (ref 12–78)
ANION GAP SERPL CALC-SCNC: 10 MMOL/L — SIGNIFICANT CHANGE UP (ref 5–17)
AST SERPL-CCNC: 20 U/L — SIGNIFICANT CHANGE UP (ref 15–37)
BASOPHILS # BLD AUTO: 0.06 K/UL — SIGNIFICANT CHANGE UP (ref 0–0.2)
BASOPHILS NFR BLD AUTO: 0.4 % — SIGNIFICANT CHANGE UP (ref 0–2)
BILIRUB SERPL-MCNC: 0.3 MG/DL — SIGNIFICANT CHANGE UP (ref 0.2–1.2)
BUN SERPL-MCNC: 16 MG/DL — SIGNIFICANT CHANGE UP (ref 7–23)
CALCIUM SERPL-MCNC: 8.9 MG/DL — SIGNIFICANT CHANGE UP (ref 8.5–10.1)
CHLORIDE SERPL-SCNC: 108 MMOL/L — SIGNIFICANT CHANGE UP (ref 96–108)
CO2 SERPL-SCNC: 24 MMOL/L — SIGNIFICANT CHANGE UP (ref 22–31)
CREAT SERPL-MCNC: 0.63 MG/DL — SIGNIFICANT CHANGE UP (ref 0.5–1.3)
EGFR: 96 ML/MIN/1.73M2 — SIGNIFICANT CHANGE UP
EOSINOPHIL # BLD AUTO: 0.44 K/UL — SIGNIFICANT CHANGE UP (ref 0–0.5)
EOSINOPHIL NFR BLD AUTO: 3 % — SIGNIFICANT CHANGE UP (ref 0–6)
GLUCOSE SERPL-MCNC: 110 MG/DL — HIGH (ref 70–99)
HCT VFR BLD CALC: 30 % — LOW (ref 39–50)
HGB BLD-MCNC: 9.5 G/DL — LOW (ref 13–17)
IMM GRANULOCYTES NFR BLD AUTO: 1.4 % — HIGH (ref 0–0.9)
LYMPHOCYTES # BLD AUTO: 1.18 K/UL — SIGNIFICANT CHANGE UP (ref 1–3.3)
LYMPHOCYTES # BLD AUTO: 8 % — LOW (ref 13–44)
MCHC RBC-ENTMCNC: 28.3 PG — SIGNIFICANT CHANGE UP (ref 27–34)
MCHC RBC-ENTMCNC: 31.7 G/DL — LOW (ref 32–36)
MCV RBC AUTO: 89.3 FL — SIGNIFICANT CHANGE UP (ref 80–100)
MONOCYTES # BLD AUTO: 1.15 K/UL — HIGH (ref 0–0.9)
MONOCYTES NFR BLD AUTO: 7.8 % — SIGNIFICANT CHANGE UP (ref 2–14)
NEUTROPHILS # BLD AUTO: 11.68 K/UL — HIGH (ref 1.8–7.4)
NEUTROPHILS NFR BLD AUTO: 79.4 % — HIGH (ref 43–77)
NRBC BLD AUTO-RTO: 0 /100 WBCS — SIGNIFICANT CHANGE UP (ref 0–0)
PLATELET # BLD AUTO: 575 K/UL — HIGH (ref 150–400)
POTASSIUM SERPL-MCNC: 3.6 MMOL/L — SIGNIFICANT CHANGE UP (ref 3.5–5.3)
POTASSIUM SERPL-SCNC: 3.6 MMOL/L — SIGNIFICANT CHANGE UP (ref 3.5–5.3)
PROT SERPL-MCNC: 6 G/DL — SIGNIFICANT CHANGE UP (ref 6–8.3)
RBC # BLD: 3.36 M/UL — LOW (ref 4.2–5.8)
RBC # FLD: 16.4 % — HIGH (ref 10.3–14.5)
SODIUM SERPL-SCNC: 142 MMOL/L — SIGNIFICANT CHANGE UP (ref 135–145)
WBC # BLD: 14.72 K/UL — HIGH (ref 3.8–10.5)
WBC # FLD AUTO: 14.72 K/UL — HIGH (ref 3.8–10.5)

## 2025-02-16 PROCEDURE — 99233 SBSQ HOSP IP/OBS HIGH 50: CPT

## 2025-02-16 RX ADMIN — POLYETHYLENE GLYCOL 3350 17 GRAM(S): 17 POWDER, FOR SOLUTION ORAL at 13:00

## 2025-02-16 RX ADMIN — Medication 650 MILLIGRAM(S): at 04:47

## 2025-02-16 RX ADMIN — Medication 650 MILLIGRAM(S): at 21:25

## 2025-02-16 RX ADMIN — METOPROLOL SUCCINATE 25 MILLIGRAM(S): 50 TABLET, EXTENDED RELEASE ORAL at 04:48

## 2025-02-16 RX ADMIN — Medication 1 TABLET(S): at 12:59

## 2025-02-16 RX ADMIN — PREDNISONE 10 MILLIGRAM(S): 20 TABLET ORAL at 04:48

## 2025-02-16 RX ADMIN — Medication 650 MILLIGRAM(S): at 20:27

## 2025-02-16 RX ADMIN — APIXABAN 2.5 MILLIGRAM(S): 2.5 TABLET, FILM COATED ORAL at 17:42

## 2025-02-16 RX ADMIN — APIXABAN 2.5 MILLIGRAM(S): 2.5 TABLET, FILM COATED ORAL at 04:49

## 2025-02-16 RX ADMIN — Medication 40 MILLIGRAM(S): at 04:48

## 2025-02-16 RX ADMIN — FUROSEMIDE 20 MILLIGRAM(S): 10 INJECTION INTRAMUSCULAR; INTRAVENOUS at 04:48

## 2025-02-16 RX ADMIN — TAMSULOSIN HYDROCHLORIDE 0.4 MILLIGRAM(S): 0.4 CAPSULE ORAL at 20:27

## 2025-02-16 RX ADMIN — Medication 650 MILLIGRAM(S): at 05:45

## 2025-02-16 RX ADMIN — CYANOCOBALAMIN 1000 MICROGRAM(S): 1000 INJECTION INTRAMUSCULAR; SUBCUTANEOUS at 12:59

## 2025-02-16 RX ADMIN — Medication 325 MILLIGRAM(S): at 12:58

## 2025-02-16 RX ADMIN — FINASTERIDE 5 MILLIGRAM(S): 1 TABLET, FILM COATED ORAL at 12:59

## 2025-02-16 NOTE — DISCHARGE NOTE PROVIDER - NSDCMRMEDTOKEN_GEN_ALL_CORE_FT
acetaminophen 325 mg oral capsule: 2 cap(s) orally every 6 hours as needed for  mild pain  cyanocobalamin 1000 mcg oral tablet: 1 tab(s) orally once a day  Eliquis 2.5 mg oral tablet: 1 tab(s) orally 2 times a day  ferrous sulfate 325 mg (65 mg elemental iron) oral tablet: 1 tab(s) orally every other day  finasteride 5 mg oral tablet: 1 tab(s) orally once a day  furosemide 20 mg oral tablet: 1 tab(s) orally every other day hold  if SBP&lt;110  metoprolol succinate 25 mg oral tablet, extended release: 1 tab(s) orally once a day hold if SBP&lt;110, HR &lt;60  omeprazole 40 mg oral delayed release capsule: 1 cap(s) orally once a day  polyethylene glycol 3350 oral powder for reconstitution: 17 gram(s) orally once a day  Praluent Pen 75 mg/mL subcutaneous solution: 75 milligram(s) subcutaneous every 2 weeks  predniSONE 10 mg oral tablet: 1 tab(s) orally once a day  senna leaf extract oral tablet: 1 tab(s) orally once a day (before a meal)  tamsulosin 0.4 mg oral capsule: 1 cap(s) orally once a day (at bedtime)

## 2025-02-16 NOTE — CARE COORDINATION ASSESSMENT. - OTHER PERTINENT DISCHARGE PLANNING INFORMATION:
Pt from [facility], was just discharged back there and having difficulties with his roomate and returned back to the hospital. Referral for new SNF sent to 68 facilities none are accepting at this time. Pt and son appealing DC. SW to follow.

## 2025-02-16 NOTE — CARE COORDINATION ASSESSMENT. - NSCAREPROVIDERS_GEN_ALL_CORE_FT
CARE PROVIDERS:  Accepting Physician: Shubham Deluca  Access Services: Jazmine Traylor  Administration: Deny Escalante  Administration: Carlitos Hayden  Administration: Aren Pina  Administration: Garcia Mena  Admitting: Shubham Deluca  Attending: Shubham Deluca  Case Management: Paula Carrington  Consultant: Jordin Mckinley  Consultant: Reinaldo Vallejo  Consultant: Ирина Turner  ED Attending: Kyle Hilton  ED Attending2: Carlin Dangelo  ED Nurse: Daniel Garcia  Emergency Medicine: Robbie Sales  Nurse: Behringer, Megan  Nurse: Eva Reyez  Oncology: Julieta Jacob  Outpatient Provider: Spencer Collado  Outpatient Provider: Colt Wade  Outpatient Provider: Yoni Castellon  Outpatient Provider: Vinh Woods  Outpatient Provider: Ciro Linares  Outpatient Provider: Lloyd Kay  Outpatient Provider: Zay Cohen  Override: Carolina Cardoza  Override: Renetta Gomez  PCA/Nursing Assistant: [place] Lindor, Emmanuella  PCA/Nursing Assistant: Clari Rivers  Physical Therapy: Armando Singh  Primary Team: Lionel Munoz  Primary Team: Jazmine Smith  Primary Team: Janes Mays  Registered Dietitian: Latesha Jarquin  Research: Jenise Salazar  : Zoraida Buckner  Team: PLV  Hospitalists, Team

## 2025-02-16 NOTE — DISCHARGE NOTE PROVIDER - PROVIDER TOKENS
PROVIDER:[TOKEN:[[id number]]],PROVIDER:[TOKEN:[[id number]]],PROVIDER:[TOKEN:[[id number]]],PROVIDER:[TOKEN:[[id number]]]

## 2025-02-16 NOTE — DISCHARGE NOTE PROVIDER - CARE PROVIDERS DIRECT ADDRESSES
[email or web address]

## 2025-02-16 NOTE — DISCHARGE NOTE PROVIDER - NSDCQMPCI_CARD_ALL_CORE
Subjective:       Patient ID: Shivani Brink is a 49 y.o. female.    Chief Complaint: Abdominal Pain      HPI  Ms. Brink presents to clinic for abdominal pain.   The pain is more on the right side.   She has some frequency and urgency.   She has not had fever.   The pain started this morning and has progressed.   She denies any vaginal discharge, vaginal pain, hematuria.     Review of Systems   Constitutional: Negative for fever.   Gastrointestinal: Positive for abdominal pain. Negative for nausea and vomiting.   Genitourinary: Positive for frequency and urgency. Negative for dysuria, hematuria, vaginal discharge and vaginal pain.       Medication List with Changes/Refills   Current Medications    ATORVASTATIN (LIPITOR) 20 MG TABLET    Take 1 tablet (20 mg total) by mouth once daily.    BLOOD SUGAR DIAGNOSTIC STRP    1 each by Misc.(Non-Drug; Combo Route) route 3 (three) times daily.    BLOOD-GLUCOSE METER (CONTOUR METER) MISC    Use as directed    FLUTICASONE (FLONASE) 50 MCG/ACTUATION NASAL SPRAY    2 sprays by Each Nare route once daily.    HYDROCHLOROTHIAZIDE (MICROZIDE) 12.5 MG CAPSULE    Take 1 capsule (12.5 mg total) by mouth once daily.    IBUPROFEN (ADVIL,MOTRIN) 800 MG TABLET    TAKE 1 TABLET BY MOUTH TWICE A DAY WITH MEALS    LANCETS MISC    1 each by Misc.(Non-Drug; Combo Route) route 3 (three) times daily.    LISINOPRIL 10 MG TABLET    Take 1 tablet (10 mg total) by mouth once daily.    METFORMIN (GLUCOPHAGE) 500 MG TABLET    Take 2 tablets (1,000 mg total) by mouth 2 (two) times daily with meals.    MULTIVITAMIN (ONE DAILY MULTIVITAMIN) PER TABLET    Take 1 tablet by mouth once daily.       Patient Active Problem List   Diagnosis    HTN (hypertension)    Hyperlipidemia    Special screening for malignant neoplasms, colon    Benign neoplasm of ascending colon    Benign neoplasm of transverse colon    Benign colon polyp    Postmenopausal    Diabetes    Type 2 diabetes mellitus without  complication, without long-term current use of insulin         Objective:     Physical Exam   Constitutional: She is oriented to person, place, and time. She appears well-developed and well-nourished. No distress.   HENT:   Head: Normocephalic and atraumatic.   Right Ear: External ear normal.   Left Ear: External ear normal.   Eyes: EOM are normal. Right eye exhibits no discharge. Left eye exhibits no discharge.   Cardiovascular: Normal rate and regular rhythm.    Pulmonary/Chest: Effort normal and breath sounds normal. No respiratory distress. She has no wheezes.   Abdominal: Soft. Bowel sounds are normal. She exhibits no distension. There is no tenderness.   Right lower quadrant tenderness - more closer to groin   No pain around appendix   Psoas / obturator sign negative    Musculoskeletal: She exhibits no edema.   Neurological: She is alert and oriented to person, place, and time.   Skin: Skin is warm and dry. She is not diaphoretic. No erythema.   Psychiatric: She has a normal mood and affect.   Vitals reviewed.    Vitals:    12/11/17 1119   BP: (!) 140/70   Pulse: 88   Resp: 16   Temp: 98.1 °F (36.7 °C)       Assessment/  PLAN     Pelvic pain  -     POCT URINE DIPSTICK WITHOUT MICROSCOPE  -     hydrocodone-acetaminophen 5-325mg (NORCO) 5-325 mg per tablet; Take 1 tablet by mouth every 6 (six) hours as needed for Pain.  Dispense: 10 tablet; Refill: 0  -     Cancel: CT Renal Stone Study ABD Pelvis WO; Future; Expected date: 12/11/2017  -     CT Renal Stone Study ABD Pelvis WO; Future; Expected date: 12/11/2017    Nephrolithiasis  -     CT Renal Stone Study ABD Pelvis WO; Future; Expected date: 12/11/2017  - increase water , reviewed charts and patient used to see urology , but has lost follow up   - likely having another kidney stone   - return precautions advised       Plan as above  Rtc prn     Pilar Garg MD  [facility]      No

## 2025-02-16 NOTE — DISCHARGE NOTE PROVIDER - ATTENDING DISCHARGE PHYSICAL EXAMINATION:
T(C): 36.3 (02-18-25 @ 05:01), Max: 37.3 (02-17-25 @ 11:55)  HR: 70 (02-18-25 @ 05:01) (70 - 75)  BP: 141/76 (02-18-25 @ 05:01) (122/74 - 141/76)  RR: 18 (02-18-25 @ 05:01) (18 - 18)  SpO2: 96% (02-18-25 @ 05:01) (96% - 98%)    PHYSICAL EXAM:  GENERAL: NAD, laying in bed  HEAD:  Atraumatic, Normocephalic  EYES: PEERL, conjunctiva and sclera clear  ENMT: Moist mucous membranes, Supple, No JVD  CHEST/LUNG: Clear to auscultation bilaterally, good air entry, non-labored breathing  HEART: RRR; S1/S2, No murmur  ABDOMEN: Soft, Nontender, Nondistended; Bowel sounds present  EXTREMITIES: No calf tenderness, No cyanosis, No edema  SKIN: Warm, perfused  NERVOUS SYSTEM:  awake and alert    Patient seen and examined at bedside. No events overnight. Patient noted to have leukocytosis, seen by ID, Dr. Celis- discussed with Dr. Celis, if UA and RVP negative cleared for dc back to [facility], which was negative. Patient without acute complaints at this time. Discussed with son Olivn Gaxiola, stable for dc to [facility] today.

## 2025-02-16 NOTE — PROGRESS NOTE ADULT - NSPROGADDITIONALINFOA_GEN_ALL_CORE
updated son Olvin Gaxiola over the phone, all questions answered  patient appealed discharge, discussed with CLEM attempted to update marti Gaxiola over the phone, no answer- left VM with callback number  patient appealed discharge, discussed with CLEM

## 2025-02-16 NOTE — DISCHARGE NOTE PROVIDER - CARE PROVIDER_API CALL
Colt Wade  Internal Medicine  [address] [place] [address]  Phone: [phone]  Fax: [phone]  Follow Up Time:     Vinh Woods  Cardiology  [address] [place] [address]  Phone: [phone]  Fax: [phone]  Follow Up Time:     Lisa Vitale  Podiatric Medicine and Surgery  [address] [place] [address]  Phone: [phone]  Fax: [phone]  Follow Up Time:     Spencer Ramirez  Urology  [address] [place] [address]  Phone: [phone]  Fax: [phone]  Follow Up Time:

## 2025-02-16 NOTE — DISCHARGE NOTE PROVIDER - NSDCCPCAREPLAN_GEN_ALL_CORE_FT
PRINCIPAL DISCHARGE DIAGNOSIS  Diagnosis: Pressure ulcer, heel, left, unstageable  Assessment and Plan of Treatment: MRI negative for OM.  dressing change as per order, podiatry followup in 2 weeks  Wound care instructions:  Keep b/l heels offloaded with pillows under the area of the calf to alleviate the pressure on the heels  Apply AQuacel, gauze, abd and veronica to the left heel  Dressing change every other day to Left foot      SECONDARY DISCHARGE DIAGNOSES  Diagnosis: History of BPH  Assessment and Plan of Treatment: You had urgent cystoscopy/clot evacuation, on last admission with bladder irrigation; completed IV antibiotic for UTI.  guerrero removed;passed voiding trial  continue flomax, finasteride.   f/u urology out patient       PRINCIPAL DISCHARGE DIAGNOSIS  Diagnosis: Pressure ulcer, heel, left, unstageable  Assessment and Plan of Treatment: MRI negative for OM.  dressing change as per order, podiatry followup in 2 weeks  Wound care instructions:  Keep b/l heels offloaded with pillows under the area of the calf to alleviate the pressure on the heels  Apply AQuacel, gauze, abd and veronica to the left heel  Dressing change every other day to Left foot      SECONDARY DISCHARGE DIAGNOSES  Diagnosis: History of BPH  Assessment and Plan of Treatment: You had urgent cystoscopy/clot evacuation, on last admission with bladder irrigation; completed IV antibiotic for UTI.  guerrero removed;passed voiding trial  continue flomax, finasteride.   folllow up with urology out patient

## 2025-02-16 NOTE — DISCHARGE NOTE PROVIDER - HOSPITAL COURSE
FROM ADMISSION H+P:   HPI:  80yo M PMH  HTN, HFrEF (prior TTE 11/2024 with EF 35%), PE (on Eliquis), Myasthenia Gravis, and chronic LE edema, nephrolithiasis (s/p nephrostomy tube placement), urosepsis, BPH presents to ER from [facility] with desire to transition rehab care to a different facility. Recently admitted to [facility] from 2/5 - 2/13 for hematuria, found to have blood products in bladder, required cystoscopy and blood transfusion. Briefly required vasopressors during previous admission. Currently, patient has no complaints and is refusing to participate in HPI and is referring to son to share information. Per son, pt was complaining of pain in the right flank 1-2 days.     ED course:   Vitals: T 97.6F, HR 66, /67, RR 16, SpO2 94% RA  Labs: WBC 13, Hgb 9.6, Albumin 2.3,  (15 Feb 2025 13:03)      ---  HOSPITAL COURSE:     Patient admitted for [facility] placement. Medically stable and without acute issues at this time, he remained without acute complaints. SW working on [facility] placement, which patient appealed and now in progress. Medically stable for dc to [facility]     ---  TIME SPENT:  I, the attending physician, was physically present for the key portions of the evaluation and management (E/M) service provided. The total amount of time spent reviewing the hospital notes, laboratory values, imaging findings, assessing/counseling the patient, discussing with consultant physicians, social work, nursing staff was 51 minutes     FROM ADMISSION H+P:   HPI:  78yo M PMH  HTN, HFrEF (prior TTE 11/2024 with EF 35%), PE (on Eliquis), Myasthenia Gravis, and chronic LE edema, nephrolithiasis (s/p nephrostomy tube placement), urosepsis, BPH presents to ER from [facility] with desire to transition rehab care to a different facility. Recently admitted to [facility]"" from 2/5 - 2/13 for hematuria, found to have blood products in bladder, required cystoscopy and blood transfusion. Briefly required vasopressors during previous admission. Currently, patient has no complaints and is refusing to participate in HPI and is referring to son to share information. Per son, pt was complaining of pain in the right flank 1-2 days.     ED course:   Vitals: T 97.6F, HR 66, /67, RR 16, SpO2 94% RA  Labs: WBC 13, Hgb 9.6, Albumin 2.3,  (15 Feb 2025 13:03)      ---  HOSPITAL COURSE:     Patient admitted for [facility] placement. Medically stable and without acute issues at this time, he remained without acute complaints. Leukocytosis noted and seen by ID, RVP and UA performed negative and likely due to chronic steroid use. Medically stable for dc to [facility]     ---  TIME SPENT:  I, the attending physician, was physically present for the key portions of the evaluation and management (E/M) service provided. The total amount of time spent reviewing the hospital notes, laboratory values, imaging findings, assessing/counseling the patient, discussing with consultant physicians, social work, nursing staff was 51 minutes

## 2025-02-17 LAB
ANION GAP SERPL CALC-SCNC: 6 MMOL/L — SIGNIFICANT CHANGE UP (ref 5–17)
BUN SERPL-MCNC: 17 MG/DL — SIGNIFICANT CHANGE UP (ref 7–23)
CALCIUM SERPL-MCNC: 9 MG/DL — SIGNIFICANT CHANGE UP (ref 8.5–10.1)
CHLORIDE SERPL-SCNC: 109 MMOL/L — HIGH (ref 96–108)
CO2 SERPL-SCNC: 27 MMOL/L — SIGNIFICANT CHANGE UP (ref 22–31)
CREAT SERPL-MCNC: 0.76 MG/DL — SIGNIFICANT CHANGE UP (ref 0.5–1.3)
EGFR: 90 ML/MIN/1.73M2 — SIGNIFICANT CHANGE UP
GLUCOSE SERPL-MCNC: 86 MG/DL — SIGNIFICANT CHANGE UP (ref 70–99)
HCT VFR BLD CALC: 31.1 % — LOW (ref 39–50)
HGB BLD-MCNC: 9.6 G/DL — LOW (ref 13–17)
MCHC RBC-ENTMCNC: 27.8 PG — SIGNIFICANT CHANGE UP (ref 27–34)
MCHC RBC-ENTMCNC: 30.9 G/DL — LOW (ref 32–36)
MCV RBC AUTO: 90.1 FL — SIGNIFICANT CHANGE UP (ref 80–100)
NRBC BLD AUTO-RTO: 0 /100 WBCS — SIGNIFICANT CHANGE UP (ref 0–0)
PLATELET # BLD AUTO: 543 K/UL — HIGH (ref 150–400)
POTASSIUM SERPL-MCNC: 3.2 MMOL/L — LOW (ref 3.5–5.3)
POTASSIUM SERPL-SCNC: 3.2 MMOL/L — LOW (ref 3.5–5.3)
RBC # BLD: 3.45 M/UL — LOW (ref 4.2–5.8)
RBC # FLD: 16.4 % — HIGH (ref 10.3–14.5)
SODIUM SERPL-SCNC: 142 MMOL/L — SIGNIFICANT CHANGE UP (ref 135–145)
WBC # BLD: 15.38 K/UL — HIGH (ref 3.8–10.5)
WBC # FLD AUTO: 15.38 K/UL — HIGH (ref 3.8–10.5)

## 2025-02-17 PROCEDURE — 99232 SBSQ HOSP IP/OBS MODERATE 35: CPT

## 2025-02-17 RX ADMIN — APIXABAN 2.5 MILLIGRAM(S): 2.5 TABLET, FILM COATED ORAL at 17:33

## 2025-02-17 RX ADMIN — Medication 650 MILLIGRAM(S): at 20:53

## 2025-02-17 RX ADMIN — METOPROLOL SUCCINATE 25 MILLIGRAM(S): 50 TABLET, EXTENDED RELEASE ORAL at 06:37

## 2025-02-17 RX ADMIN — FUROSEMIDE 20 MILLIGRAM(S): 10 INJECTION INTRAMUSCULAR; INTRAVENOUS at 06:37

## 2025-02-17 RX ADMIN — TAMSULOSIN HYDROCHLORIDE 0.4 MILLIGRAM(S): 0.4 CAPSULE ORAL at 21:46

## 2025-02-17 RX ADMIN — Medication 40 MILLIEQUIVALENT(S): at 11:59

## 2025-02-17 RX ADMIN — Medication 650 MILLIGRAM(S): at 19:53

## 2025-02-17 RX ADMIN — Medication 1 TABLET(S): at 12:00

## 2025-02-17 RX ADMIN — APIXABAN 2.5 MILLIGRAM(S): 2.5 TABLET, FILM COATED ORAL at 06:37

## 2025-02-17 RX ADMIN — Medication 325 MILLIGRAM(S): at 12:00

## 2025-02-17 RX ADMIN — POLYETHYLENE GLYCOL 3350 17 GRAM(S): 17 POWDER, FOR SOLUTION ORAL at 12:00

## 2025-02-17 RX ADMIN — CYANOCOBALAMIN 1000 MICROGRAM(S): 1000 INJECTION INTRAMUSCULAR; SUBCUTANEOUS at 12:00

## 2025-02-17 RX ADMIN — Medication 650 MILLIGRAM(S): at 09:40

## 2025-02-17 RX ADMIN — Medication 3 MILLIGRAM(S): at 19:54

## 2025-02-17 RX ADMIN — FINASTERIDE 5 MILLIGRAM(S): 1 TABLET, FILM COATED ORAL at 12:00

## 2025-02-17 RX ADMIN — PREDNISONE 10 MILLIGRAM(S): 20 TABLET ORAL at 06:38

## 2025-02-17 RX ADMIN — Medication 650 MILLIGRAM(S): at 10:06

## 2025-02-17 RX ADMIN — Medication 40 MILLIGRAM(S): at 06:37

## 2025-02-17 NOTE — PROGRESS NOTE ADULT - PROBLEM SELECTOR PLAN 3
- cont home metoprolol 25mg PO   - Monitor routine hemodynamics
- cont home metoprolol 25mg PO   - Monitor routine hemodynamics

## 2025-02-17 NOTE — DIETITIAN INITIAL EVALUATION ADULT - PROBLEM SELECTOR PLAN 1
Previous admission with heel wound. Patient refusing wound assessment, some pain before admission  - PRN analgesics  - Left heel unstageable eschar with no fluctuance previously   - No OM per MRI  - Recently at [facility]   - Patient refused vascular studies   - Recommend heel offloading with pillows under the area of the calf to alleviate the biomechanical pressure   - Recommend dry dressing to the left foot every other day

## 2025-02-17 NOTE — DIETITIAN INITIAL EVALUATION ADULT - ADD RECOMMEND
Encourage compliance to rx therapeutic diet. 1.5L po FR per MD order. Encourage consumption of lean protein sources with all meals. Declined oral nutritional supplements. Offered no food preferences. Recommend MVI with minerals daily, 500mg Vit C BID.

## 2025-02-17 NOTE — DIETITIAN INITIAL EVALUATION ADULT - CALCULATED TO (G/KG)
LIZANDRO                   FOOD ALLERGY & ANAPHYLAXIS EMERGENCY CARE PLAN  Food Allergy Research & Education         Name: Nas Vanita PALENCIA:  [id number]    Allergy to: Milk  Weight: 16 lbs 13 oz lbs.  Asthma:  No    -NOTE: Do not depend on antihistamines or inhalers (bronchodilators) to treat a severe reaction. USE EPINEPHRINE.     MEDICATIONS/DOSES  Epinephrine Brand: EpiPen Jr.   Epinephrine Dose: 0.15 mg IM  Antihistamine Brand or Generic: Zyrtec (Cetirizine)  Antihistamine Dose: 2.5mg         FARE                   FOOD ALLERGY & ANAPHYLAXIS EMERGENCY CARE PLAN   Food Allergy Research & Education           EMERGENCY CONTACTS - CALL 911  DOCTOR:  Segun Zhou DO   PHONE: [phone]  PARENT/GUARDIAN:              PHONE:  OTHER EMERGENCY CONTACTS  NAME/RELATIONSHIP:   PHONE:   NAME/RELATIONSHIP:    PHONE:           PARENT/GUARDIAN AUTHORIZATION SIGNATURE     DATE              PHYSICIAN/H CP AUTHORIZATION SIGNATURE         DATE  FORM PROVIDED COURTESY OF FOOD ALLERGY RESEARCH & EDUCATION (FARE) ([email or web address]) 2014     8.94

## 2025-02-17 NOTE — DIETITIAN INITIAL EVALUATION ADULT - NUTRITIONGOAL OUTCOME2
Pt to comply with rx therapeutic diet for optimal nutritional status & to facilitate gradual wt loss until BMI w/n more desirable range.

## 2025-02-17 NOTE — DIETITIAN INITIAL EVALUATION ADULT - PERTINENT MEDS FT
MEDICATIONS  (STANDING):  apixaban 2.5 milliGRAM(s) Oral two times a day  cyanocobalamin 1000 MICROGram(s) Oral daily  ferrous    sulfate 325 milliGRAM(s) Oral daily  finasteride 5 milliGRAM(s) Oral daily  furosemide    Tablet 20 milliGRAM(s) Oral daily  metoprolol succinate ER 25 milliGRAM(s) Oral daily  pantoprazole    Tablet 40 milliGRAM(s) Oral before breakfast  polyethylene glycol 3350 17 Gram(s) Oral daily  predniSONE   Tablet 10 milliGRAM(s) Oral daily  senna 1 Tablet(s) Oral daily  tamsulosin 0.4 milliGRAM(s) Oral at bedtime    MEDICATIONS  (PRN):  acetaminophen     Tablet .. 650 milliGRAM(s) Oral every 6 hours PRN Mild Pain (1 - 3)  melatonin 3 milliGRAM(s) Oral at bedtime PRN Insomnia

## 2025-02-17 NOTE — SOCIAL WORK PROGRESS NOTE - NSSWPROGRESSNOTE_GEN_ALL_CORE
CLEM spoke with pts son at length- pt has lost his appeal with [employer], as of 2/18 at noon will start incurring a hospital bill. Son asked for referral to be sent to local rehabs and see if they will take knowing he has secured an elder care  to work on medicaid trang- no accepting as of yet. Son aware he will likely have ot return back to [facility]- they have no bed today and are working on making changes to accommodate him tomorrow Tuesday- CLEM manager aware. CLEM to follow.

## 2025-02-17 NOTE — PROGRESS NOTE ADULT - PROBLEM SELECTOR PLAN 8
- Continue home omeprazole interchange with pantoprazole.
- Continue home omeprazole interchange with pantoprazole.

## 2025-02-17 NOTE — PROGRESS NOTE ADULT - NSPROGADDITIONALINFOA_GEN_ALL_CORE
attempted to update marti Gaxiola over the phone, no answer- left VM with callback number  patient appealed discharge, discussed with CLEM Updated son Olvin Gaxiola over the phone, all questions answered

## 2025-02-17 NOTE — PROGRESS NOTE ADULT - ASSESSMENT
78yo M PMH  HTN, HFrEF (prior TTE 11/2024 with EF 35%), PE (on Eliquis), Myasthenia Gravis, and chronic LE edema, nephrolithiasis (s/p nephrostomy tube placement), urosepsis, BPH presents to ER from [facility] with desire to transition rehab care to a different facility. No new complaints since previous discharge. 
80yo M PMH  HTN, HFrEF (prior TTE 11/2024 with EF 35%), PE (on Eliquis), Myasthenia Gravis, and chronic LE edema, nephrolithiasis (s/p nephrostomy tube placement), urosepsis, BPH presents to ER from [facility] with desire to transition rehab care to a different facility. No new complaints since previous discharge.

## 2025-02-17 NOTE — PROGRESS NOTE ADULT - PROBLEM SELECTOR PLAN 2
- H/O of CHF, unspecified type however not on GDMT for HFrEF  - TTE (11/2024): techinically difficult. LVEF is estimated at 35%. The apex, mid to spical anteroseptal wall and mid to distal anterolateral walls appear severely hypokinetic.  - c/w home furosemide, BB  - DASH diet  - Euvolemic.
- H/O of CHF, unspecified type however not on GDMT for HFrEF  - TTE (11/2024): techinically difficult. LVEF is estimated at 35%. The apex, mid to spical anteroseptal wall and mid to distal anterolateral walls appear severely hypokinetic.  - c/w home furosemide, BB  - DASH diet  - Euvolemic.

## 2025-02-17 NOTE — PROGRESS NOTE ADULT - SUBJECTIVE AND OBJECTIVE BOX
Patient is a 79y old  Male who presents with a chief complaint of     Patient seen and examined at bedside. No events overnight. Refuses to answer questions and yelling. Appears comfortable and in no acute pain at this time    ALLERGIES:  Ketek (Other)  gatifloxacin (Unknown)  ofloxacin (Unknown)  Avelox (Other)  levofloxacin (Unknown)  telithromycin (Other)  Cipro (Unknown)  fluoroquinolone antibiotics (Other)    MEDICATIONS  (STANDING):  apixaban 2.5 milliGRAM(s) Oral two times a day  cyanocobalamin 1000 MICROGram(s) Oral daily  ferrous    sulfate 325 milliGRAM(s) Oral daily  finasteride 5 milliGRAM(s) Oral daily  furosemide    Tablet 20 milliGRAM(s) Oral daily  metoprolol succinate ER 25 milliGRAM(s) Oral daily  pantoprazole    Tablet 40 milliGRAM(s) Oral before breakfast  polyethylene glycol 3350 17 Gram(s) Oral daily  predniSONE   Tablet 10 milliGRAM(s) Oral daily  senna 1 Tablet(s) Oral daily  tamsulosin 0.4 milliGRAM(s) Oral at bedtime    MEDICATIONS  (PRN):  acetaminophen     Tablet .. 650 milliGRAM(s) Oral every 6 hours PRN Mild Pain (1 - 3)  melatonin 3 milliGRAM(s) Oral at bedtime PRN Insomnia    Vital Signs Last 24 Hrs  T(F): 99.1 (17 Feb 2025 11:55), Max: 99.1 (17 Feb 2025 11:55)  HR: 71 (17 Feb 2025 11:55) (67 - 71)  BP: 122/78 (17 Feb 2025 11:55) (120/71 - 122/78)  RR: 18 (17 Feb 2025 11:55) (18 - 18)  SpO2: 98% (17 Feb 2025 11:55) (98% - 99%)  I&O's Summary      PHYSICAL EXAM:  GENERAL: NAD, laying in bed  HEAD:  Atraumatic, Normocephalic  EYES: PEERL, conjunctiva and sclera clear  ENMT: Moist mucous membranes, Supple, No JVD  CHEST/LUNG: Clear to auscultation bilaterally, good air entry, non-labored breathing  HEART: RRR; S1/S2, No murmur  ABDOMEN: Soft, Nontender, Nondistended; Bowel sounds present  EXTREMITIES: No calf tenderness, No cyanosis, No edema  SKIN: Warm, perfused  NERVOUS SYSTEM:  awake and alert      LABS:                        9.6    15.38 )-----------( 543      ( 17 Feb 2025 06:34 )             31.1     02-17    142  |  109  |  17  ----------------------------<  86  3.2   |  27  |  0.76    Ca    9.0      17 Feb 2025 06:34    TPro  6.0  /  Alb  2.3  /  TBili  0.3  /  DBili  x   /  AST  20  /  ALT  17  /  AlkPhos  61  02-16              01-04 Chol 161 mg/dL LDL -- HDL 56 mg/dL Trig 97 mg/dL                  Urinalysis Basic - ( 17 Feb 2025 06:34 )    Color: x / Appearance: x / SG: x / pH: x  Gluc: 86 mg/dL / Ketone: x  / Bili: x / Urobili: x   Blood: x / Protein: x / Nitrite: x   Leuk Esterase: x / RBC: x / WBC x   Sq Epi: x / Non Sq Epi: x / Bacteria: x            RADIOLOGY & ADDITIONAL TESTS:    Care Discussed with Consultants/Other Providers:   
Patient is a 79y old  Male who presents with a chief complaint of     Patient seen and examined at bedside. No events overnight. Patient very upset, states he wasn't able to sleep and constantly bothered by everyone. Refuses to answer questions and yelling. Appears comfortable and in no acute pain at this time    ALLERGIES:  Ketek (Other)  gatifloxacin (Unknown)  ofloxacin (Unknown)  Avelox (Other)  levofloxacin (Unknown)  telithromycin (Other)  Cipro (Unknown)  fluoroquinolone antibiotics (Other)    MEDICATIONS  (STANDING):  apixaban 2.5 milliGRAM(s) Oral two times a day  cyanocobalamin 1000 MICROGram(s) Oral daily  ferrous    sulfate 325 milliGRAM(s) Oral daily  finasteride 5 milliGRAM(s) Oral daily  furosemide    Tablet 20 milliGRAM(s) Oral daily  metoprolol succinate ER 25 milliGRAM(s) Oral daily  pantoprazole    Tablet 40 milliGRAM(s) Oral before breakfast  polyethylene glycol 3350 17 Gram(s) Oral daily  predniSONE   Tablet 10 milliGRAM(s) Oral daily  senna 1 Tablet(s) Oral daily  tamsulosin 0.4 milliGRAM(s) Oral at bedtime    MEDICATIONS  (PRN):  acetaminophen     Tablet .. 650 milliGRAM(s) Oral every 6 hours PRN Mild Pain (1 - 3)  melatonin 3 milliGRAM(s) Oral at bedtime PRN Insomnia    Vital Signs Last 24 Hrs  T(F): 97.4 (16 Feb 2025 04:56), Max: 98.4 (15 Feb 2025 16:43)  HR: 62 (16 Feb 2025 04:56) (62 - 81)  BP: 120/77 (16 Feb 2025 04:56) (114/50 - 140/64)  RR: 18 (16 Feb 2025 04:56) (18 - 18)  SpO2: 97% (16 Feb 2025 04:56) (97% - 98%)  I&O's Summary      PHYSICAL EXAM:  GENERAL: NAD, laying in bed  HEAD:  Atraumatic, Normocephalic  EYES: PEERL, conjunctiva and sclera clear  ENMT: Moist mucous membranes, Supple, No JVD  CHEST/LUNG: Clear to auscultation bilaterally, good air entry, non-labored breathing  HEART: RRR; S1/S2, No murmur  ABDOMEN: Soft, Nontender, Nondistended; Bowel sounds present  EXTREMITIES: No calf tenderness, No cyanosis, No edema  SKIN: Warm, perfused  NERVOUS SYSTEM:  awake and alert    LABS:                        9.5    14.72 )-----------( 575      ( 16 Feb 2025 07:27 )             30.0     02-16    142  |  108  |  16  ----------------------------<  110  3.6   |  24  |  0.63    Ca    8.9      16 Feb 2025 07:27    TPro  6.0  /  Alb  2.3  /  TBili  0.3  /  DBili  x   /  AST  20  /  ALT  17  /  AlkPhos  61  02-16      PT/INR - ( 14 Feb 2025 06:53 )   PT: 11.6 sec;   INR: 0.99 ratio         PTT - ( 14 Feb 2025 06:53 )  PTT:30.9 sec        01-04 Chol 161 mg/dL LDL -- HDL 56 mg/dL Trig 97 mg/dL                  Urinalysis Basic - ( 16 Feb 2025 07:27 )    Color: x / Appearance: x / SG: x / pH: x  Gluc: 110 mg/dL / Ketone: x  / Bili: x / Urobili: x   Blood: x / Protein: x / Nitrite: x   Leuk Esterase: x / RBC: x / WBC x   Sq Epi: x / Non Sq Epi: x / Bacteria: x            RADIOLOGY & ADDITIONAL TESTS:    Care Discussed with Consultants/Other Providers:

## 2025-02-17 NOTE — DIETITIAN INITIAL EVALUATION ADULT - FUNCTIONAL SCREEN CURRENT LEVEL: SWALLOWING (IF SCORE 2 OR MORE FOR ANY ITEM, CONSULT REHAB SERVICES), MLM)
Juan Harrington  CARDIOVASCULAR DISEASE  [address]  [place] [address]  Phone: [phone]  Fax: [phone]  Established Patient  Follow Up Time: 2 weeks  
0 = swallows foods/liquids without difficulty

## 2025-02-17 NOTE — PROGRESS NOTE ADULT - PROBLEM SELECTOR PLAN 1
- Previous admission with heel wound. Patient refusing wound assessment, some pain before admission  - PRN analgesics  - Left heel unstageable eschar with no fluctuance previously   - No OM per MRI  - Recently at [facility]   - Patient refused vascular studies   - Recommend heel offloading with pillows under the area of the calf to alleviate the biomechanical pressure   - Recommend dry dressing to the left foot every other day
- Previous admission with heel wound. Patient refusing wound assessment, some pain before admission  - PRN analgesics  - Left heel unstageable eschar with no fluctuance previously   - No OM per MRI  - Recently at [facility]   - Patient refused vascular studies   - Recommend heel offloading with pillows under the area of the calf to alleviate the biomechanical pressure   - Recommend dry dressing to the left foot every other day

## 2025-02-17 NOTE — DIETITIAN INITIAL EVALUATION ADULT - OTHER INFO
78yo M PMH  HTN, HFrEF (prior TTE 11/2024 with EF 35%), PE (on Eliquis), Myasthenia Gravis, and chronic LE edema, nephrolithiasis (s/p nephrostomy tube placement), urosepsis, BPH presents to ER from [facility] with desire to transition rehab care to a different facility. No new complaints since previous discharge.     Pt A+Ox3-4 at visit; observed during lunch meal. Dash/TLC diet rx. Pt minimally engaged during nutrition interview and wanting to finish his meal; interview kept brief. Meals well tolerated. % documented in EMR past 2 days. No report difficulty chewing/swallowing reported. No report N/V. Large soft light brown BM 2/16. Pta from [facility]; transfer records not available. Hx HF on lasix. Pt unsure of UBW or any weight changes. Low K 3.2 (2/17)- Klor con powder rx. Low Fe 18, Ferritin 48 on Feso4 supplementation. Vitamin B12 389 (low end normal)- on B12 supplementation per MD order. Pt declined diet education. Encourage po intake with emphasis on HBV protein sources. Declined protein supplements.

## 2025-02-17 NOTE — DIETITIAN INITIAL EVALUATION ADULT - PERTINENT LABORATORY DATA
02-17    142  |  109[H]  |  17  ----------------------------<  86  3.2[L]   |  27  |  0.76    Ca    9.0      17 Feb 2025 06:34    TPro  6.0  /  Alb  2.3[L]  /  TBili  0.3  /  DBili  x   /  AST  20  /  ALT  17  /  AlkPhos  61  02-16  A1C with Estimated Average Glucose Result: 5.9 % (01-04-25 @ 07:18)  A1C with Estimated Average Glucose Result: 5.6 % (11-20-24 @ 11:36)  A1C with Estimated Average Glucose Result: 6.2 % (09-10-24 @ 04:48)

## 2025-02-18 ENCOUNTER — TRANSCRIPTION ENCOUNTER (OUTPATIENT)
Age: 80
End: 2025-02-18

## 2025-02-18 VITALS
OXYGEN SATURATION: 98 % | HEART RATE: 86 BPM | TEMPERATURE: 98 F | DIASTOLIC BLOOD PRESSURE: 74 MMHG | RESPIRATION RATE: 18 BRPM | SYSTOLIC BLOOD PRESSURE: 112 MMHG

## 2025-02-18 DIAGNOSIS — I87.2 VENOUS INSUFFICIENCY (CHRONIC) (PERIPHERAL): ICD-10-CM

## 2025-02-18 LAB
ANION GAP SERPL CALC-SCNC: 10 MMOL/L — SIGNIFICANT CHANGE UP (ref 5–17)
APPEARANCE UR: ABNORMAL
BASOPHILS # BLD AUTO: 0.09 K/UL — SIGNIFICANT CHANGE UP (ref 0–0.2)
BASOPHILS NFR BLD AUTO: 0.4 % — SIGNIFICANT CHANGE UP (ref 0–2)
BILIRUB UR-MCNC: NEGATIVE — SIGNIFICANT CHANGE UP
BUN SERPL-MCNC: 19 MG/DL — SIGNIFICANT CHANGE UP (ref 7–23)
CALCIUM SERPL-MCNC: 8.8 MG/DL — SIGNIFICANT CHANGE UP (ref 8.5–10.1)
CHLORIDE SERPL-SCNC: 106 MMOL/L — SIGNIFICANT CHANGE UP (ref 96–108)
CO2 SERPL-SCNC: 24 MMOL/L — SIGNIFICANT CHANGE UP (ref 22–31)
COLOR SPEC: YELLOW — SIGNIFICANT CHANGE UP
CREAT SERPL-MCNC: 0.83 MG/DL — SIGNIFICANT CHANGE UP (ref 0.5–1.3)
DIFF PNL FLD: ABNORMAL
EGFR: 88 ML/MIN/1.73M2 — SIGNIFICANT CHANGE UP
EOSINOPHIL # BLD AUTO: 0.69 K/UL — HIGH (ref 0–0.5)
EOSINOPHIL NFR BLD AUTO: 3.3 % — SIGNIFICANT CHANGE UP (ref 0–6)
GLUCOSE SERPL-MCNC: 82 MG/DL — SIGNIFICANT CHANGE UP (ref 70–99)
GLUCOSE UR QL: NEGATIVE MG/DL — SIGNIFICANT CHANGE UP
HCT VFR BLD CALC: 32 % — LOW (ref 39–50)
HGB BLD-MCNC: 10.1 G/DL — LOW (ref 13–17)
IMM GRANULOCYTES NFR BLD AUTO: 2.2 % — HIGH (ref 0–0.9)
KETONES UR-MCNC: NEGATIVE MG/DL — SIGNIFICANT CHANGE UP
LEUKOCYTE ESTERASE UR-ACNC: ABNORMAL
LYMPHOCYTES # BLD AUTO: 16.8 % — SIGNIFICANT CHANGE UP (ref 13–44)
LYMPHOCYTES # BLD AUTO: 3.53 K/UL — HIGH (ref 1–3.3)
MCHC RBC-ENTMCNC: 28.5 PG — SIGNIFICANT CHANGE UP (ref 27–34)
MCHC RBC-ENTMCNC: 31.6 G/DL — LOW (ref 32–36)
MCV RBC AUTO: 90.4 FL — SIGNIFICANT CHANGE UP (ref 80–100)
MONOCYTES # BLD AUTO: 1.83 K/UL — HIGH (ref 0–0.9)
MONOCYTES NFR BLD AUTO: 8.7 % — SIGNIFICANT CHANGE UP (ref 2–14)
NEUTROPHILS # BLD AUTO: 14.43 K/UL — HIGH (ref 1.8–7.4)
NEUTROPHILS NFR BLD AUTO: 68.6 % — SIGNIFICANT CHANGE UP (ref 43–77)
NITRITE UR-MCNC: NEGATIVE — SIGNIFICANT CHANGE UP
NRBC BLD AUTO-RTO: 0 /100 WBCS — SIGNIFICANT CHANGE UP (ref 0–0)
PH UR: 7 — SIGNIFICANT CHANGE UP (ref 5–8)
PLATELET # BLD AUTO: 651 K/UL — HIGH (ref 150–400)
POTASSIUM SERPL-MCNC: 3.7 MMOL/L — SIGNIFICANT CHANGE UP (ref 3.5–5.3)
POTASSIUM SERPL-SCNC: 3.7 MMOL/L — SIGNIFICANT CHANGE UP (ref 3.5–5.3)
PROT UR-MCNC: NEGATIVE MG/DL — SIGNIFICANT CHANGE UP
RAPID RVP RESULT: SIGNIFICANT CHANGE UP
RBC # BLD: 3.54 M/UL — LOW (ref 4.2–5.8)
RBC # FLD: 16.3 % — HIGH (ref 10.3–14.5)
SARS-COV-2 RNA SPEC QL NAA+PROBE: SIGNIFICANT CHANGE UP
SODIUM SERPL-SCNC: 140 MMOL/L — SIGNIFICANT CHANGE UP (ref 135–145)
SP GR SPEC: 1.01 — SIGNIFICANT CHANGE UP (ref 1–1.03)
UROBILINOGEN FLD QL: 0.2 MG/DL — SIGNIFICANT CHANGE UP (ref 0.2–1)
WBC # BLD: 20.84 K/UL — HIGH (ref 3.8–10.5)
WBC # FLD AUTO: 20.84 K/UL — HIGH (ref 3.8–10.5)

## 2025-02-18 PROCEDURE — 0225U NFCT DS DNA&RNA 21 SARSCOV2: CPT

## 2025-02-18 PROCEDURE — 85027 COMPLETE CBC AUTOMATED: CPT

## 2025-02-18 PROCEDURE — 85610 PROTHROMBIN TIME: CPT

## 2025-02-18 PROCEDURE — 80048 BASIC METABOLIC PNL TOTAL CA: CPT

## 2025-02-18 PROCEDURE — 99239 HOSP IP/OBS DSCHRG MGMT >30: CPT

## 2025-02-18 PROCEDURE — 85025 COMPLETE CBC W/AUTO DIFF WBC: CPT

## 2025-02-18 PROCEDURE — 80053 COMPREHEN METABOLIC PANEL: CPT

## 2025-02-18 PROCEDURE — 85730 THROMBOPLASTIN TIME PARTIAL: CPT

## 2025-02-18 PROCEDURE — 99221 1ST HOSP IP/OBS SF/LOW 40: CPT

## 2025-02-18 PROCEDURE — 99285 EMERGENCY DEPT VISIT HI MDM: CPT | Mod: 25

## 2025-02-18 PROCEDURE — 81001 URINALYSIS AUTO W/SCOPE: CPT

## 2025-02-18 PROCEDURE — 36415 COLL VENOUS BLD VENIPUNCTURE: CPT

## 2025-02-18 PROCEDURE — 99223 1ST HOSP IP/OBS HIGH 75: CPT

## 2025-02-18 RX ORDER — CLOTRIMAZOLE AND BETAMETHASONE DIPROPIONATE 10; .64 MG/G; MG/G
1 CREAM TOPICAL DAILY
Refills: 0 | Status: DISCONTINUED | OUTPATIENT
Start: 2025-02-18 | End: 2025-02-18

## 2025-02-18 RX ADMIN — CYANOCOBALAMIN 1000 MICROGRAM(S): 1000 INJECTION INTRAMUSCULAR; SUBCUTANEOUS at 11:37

## 2025-02-18 RX ADMIN — Medication 325 MILLIGRAM(S): at 11:37

## 2025-02-18 RX ADMIN — Medication 40 MILLIGRAM(S): at 05:00

## 2025-02-18 RX ADMIN — PREDNISONE 10 MILLIGRAM(S): 20 TABLET ORAL at 05:00

## 2025-02-18 RX ADMIN — Medication 1 TABLET(S): at 11:37

## 2025-02-18 RX ADMIN — POLYETHYLENE GLYCOL 3350 17 GRAM(S): 17 POWDER, FOR SOLUTION ORAL at 11:36

## 2025-02-18 RX ADMIN — METOPROLOL SUCCINATE 25 MILLIGRAM(S): 50 TABLET, EXTENDED RELEASE ORAL at 05:01

## 2025-02-18 RX ADMIN — APIXABAN 2.5 MILLIGRAM(S): 2.5 TABLET, FILM COATED ORAL at 05:00

## 2025-02-18 RX ADMIN — FINASTERIDE 5 MILLIGRAM(S): 1 TABLET, FILM COATED ORAL at 11:36

## 2025-02-18 RX ADMIN — FUROSEMIDE 20 MILLIGRAM(S): 10 INJECTION INTRAMUSCULAR; INTRAVENOUS at 05:01

## 2025-02-18 NOTE — CHART NOTE - NSCHARTNOTEFT_GEN_A_CORE
The patient was seen and examined on the day of discharge by the attending physician. Patient medically stable for discharge. Please see discharge note for additional information regarding the hospital course and the day of discharge. None

## 2025-02-18 NOTE — DISCHARGE NOTE NURSING/CASE MANAGEMENT/SOCIAL WORK - PATIENT PORTAL LINK FT
You can access the FollowMyHealth Patient Portal offered by [facility] by registering at the following website: [email or web address]. By joining [employer]’s FollowMyHealth portal, you will also be able to view your health information using other applications (apps) compatible with our system.

## 2025-02-18 NOTE — SOCIAL WORK PROGRESS NOTE - NSSWPROGRESSNOTE_GEN_ALL_CORE
Pt to be DC to [facility] today at 12pm via Bro(paula). Pt/son aware and in agreement with plan for DC. SW to remain available for follow up to ensure safe transition upon DC.

## 2025-02-18 NOTE — DISCHARGE NOTE NURSING/CASE MANAGEMENT/SOCIAL WORK - NSDCPEFALRISK_GEN_ALL_CORE
For information on Fall & Injury Prevention, visit: [email or web address] OR  [email or web address] OR  https://www.cdc.gov/steadi/patient.html

## 2025-02-18 NOTE — CHART NOTE - NSCHARTNOTESELECT_GEN_ALL_CORE
Event Note Odomzo Pregnancy And Lactation Text: This medication is Pregnancy Category X and is absolutely contraindicated during pregnancy. It is unknown if it is excreted in breast milk.

## 2025-02-18 NOTE — CONSULT NOTE ADULT - TIME BILLING
Thank you for your consult.   Please call us with any concerns or questions.   We will continue to follow.   Further recommendations to follow based on clinical  progression as well as availability of further lab data    Sha Celis MD   Division of Infectious Diseases  [facility]   Cell [phone] between 8am and 6pm   After 6pm and weekends please call ID service at [phone].

## 2025-02-18 NOTE — DISCHARGE NOTE NURSING/CASE MANAGEMENT/SOCIAL WORK - FINANCIAL ASSISTANCE
[facility] provides services at a reduced cost to those who are determined to be eligible through [facility]’s financial assistance program. Information regarding [facility]’s financial assistance program can be found by going to [email or web address] or by calling [phone].

## 2025-02-18 NOTE — CONSULT NOTE ADULT - SUBJECTIVE AND OBJECTIVE BOX
Chief Complaint: Right ankle ulcer    HPI: Patient was admitted for cellulitis right ankle I was called to see the patient for right ankle ulcer    PAST MEDICAL & SURGICAL HISTORY:  Calculus of kidney      Club foot  Born Right Foot      Myasthenia gravis      Hypertension      Diabetes  Type 2 - does not take medications - monitors Blood Glucose at home - diet controlled      Urinary tract infection  notes h/o UTI's      Hyperlipidemia      Other muscle wasting and atrophy      H/O spinal stenosis      History of thrombocytopenia      H/O CHF      Elective surgery  1956 age 11 @ [facility] - cut under Patella secondary 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      Club foot  Surgery at birth for Club Foot Right foot      Pilonidal cyst  Surgery 40 years ago      H/O colonoscopy      H/O prostate biopsy      Nephrostomy present          Allergies    gatifloxacin (Unknown)  ofloxacin (Unknown)  levofloxacin (Unknown)  Cipro (Unknown)    Intolerances    Ketek (Other)  Avelox (Other)  telithromycin (Other)  fluoroquinolone antibiotics (Other)      MEDICATIONS  (STANDING):  apixaban 2.5 milliGRAM(s) Oral two times a day  cyanocobalamin 1000 MICROGram(s) Oral daily  ferrous    sulfate 325 milliGRAM(s) Oral daily  finasteride 5 milliGRAM(s) Oral daily  furosemide    Tablet 20 milliGRAM(s) Oral daily  metoprolol succinate ER 25 milliGRAM(s) Oral daily  pantoprazole    Tablet 40 milliGRAM(s) Oral before breakfast  polyethylene glycol 3350 17 Gram(s) Oral daily  predniSONE   Tablet 10 milliGRAM(s) Oral daily  senna 1 Tablet(s) Oral daily  tamsulosin 0.4 milliGRAM(s) Oral at bedtime    MEDICATIONS  (PRN):  acetaminophen     Tablet .. 650 milliGRAM(s) Oral every 6 hours PRN Mild Pain (1 - 3)  melatonin 3 milliGRAM(s) Oral at bedtime PRN Insomnia      FAMILY HISTORY:  Family history of stroke (Father)  Father -  age 62    Family history of kidney disease (Mother)  Mother -  age 67    Family history of diabetes mellitus type II (Sibling)  Brother & Sister            ROS:  CONSTITUTIONAL: No fever, weight loss, or fatigue  EYES: No eye pain, visual disturbances, or discharge  ENMT:  No difficulty hearing, tinnitus, vertigo; No sinus or throat pain  NECK: No pain or stiffness  BREASTS: No pain, masses, or nipple discharge  RESPIRATORY: No cough, wheezing, chills or hemoptysis; No shortness of breath  CARDIOVASCULAR: No chest pain, palpitations, dizziness, or leg swelling  GASTROINTESTINAL: No abdominal or epigastric pain. No nausea, vomiting, or hematemesis; No diarrhea or constipation. No melena or hematochezia.  GENITOURINARY: No dysuria, frequency, hematuria, or incontinence  NEUROLOGICAL: No headaches, memory loss, loss of strength, numbness, or tremors  SKIN: No itching, burning, rashes, or lesions   LYMPH NODES: No enlarged glands  ENDOCRINE: No heat or cold intolerance; No hair loss  MUSCULOSKELETAL: No joint pain or swelling; No muscle, back, or extremity pain  PSYCHIATRIC: No depression, anxiety, mood swings, or difficulty sleeping  HEME/LYMPH: No easy bruising, or bleeding gums  ALLERGY AND IMMUNOLOGIC: No hives or eczema    PHYSICAL EXAM-      Vital Signs Last 24 Hrs  T(C): 36.4 (2025 11:31), Max: 36.5 (2025 20:08)  T(F): 97.5 (:), Max: 97.7 (2025 20:08)  HR: 86 (2025 11:31) (70 - 86)  BP: 112/74 (2025 11:31) (112/74 - 141/76)  BP(mean): --  RR: 18 (2025 11:31) (18 - 18)  SpO2: 98% (2025 11:31) (96% - 98%)    Parameters below as of 2025 11:31  Patient On (Oxygen Delivery Method): room air        Constitutional: well developed, well nourished, no apparent distress, alert, oriented x 3.  Neck: Supple   Pulmonary: no respiratory distress, normal respiratory rhythm and effort, lungs are clear to auscultation/percussion. No CVA tenderness.  Cardiovascular: heart rate normal, normal sinus rhythm; no murmurs, gallops, rubs, heaves or thrills   Abdomen: soft, non-tender, +BS, no guarding/rebound/rigidity.  Vascular: Lower extremities are well perfused.   Extremities: WNL  Skin: Right medial ankle with dermatitis, no ulcer, no drainage, cellulitis has resolved.                             10.1   20.84 )-----------( 651      ( 2025 05:50 )             32.0     02-18    140  |  106  |  19  ----------------------------<  82  3.7   |  24  |  0.83    Ca    8.8      2025 05:50        Radiology:    
[facility]  INFECTIOUS DISEASES   [address] [place] [address]  Tel: [phone]     Fax: [phone]  ======================================================  MD Lalita Oquendo Kaushal, MD Cho, Michelle, MD   ======================================================    Assessment/Recommendations     79-year-old male with history of hypertension and heart failure with reduced EF pulmonary embolism on Eliquis myasthenia gravis and chronic lower extremity edema nephrolithiasis status post nephrostomy tube placement BPH with recent hematuria requiring cystoscopy without UTI presented again from rehab center for requesting different facility and infectious disease consulted for leukocytosis.    Patient Seen and examined   WBC Elevated to to 20,000 but patient has chronic fluctuating leukocytosis for last 2 to 3 years with or without infectious etiology  Afebrile  Hemodynamically stable  Trend WBC and temperature curve through hospital course    Cultures:No cultures performed through this hospital course    Imaging: none for this hospital course   MR Foot No Cont, Left (25 @ 08:36) No evidence of osteomyelitis or abscess at the hindfoot.Significant localized muscle edema involves the intrinsic muscles of the plantar foot which could relate to underlying neuropathy or possibly injury. Chronic findings include mild Achilles insertional tendinosis, medial cord plantar fasciitis, and mild midfoot arthrosis.  Antibiotics: Recommend monitoring off of antibiotics    Recommended RVP and urine analysis and if both negative recommend monitoring WBC as an outpatient with no obvious concerns for infectious etiology based on history physical examination past history of leukocytosis and current set of labs    Chronic Prednisone therapy could predispose to fluctuating leucocytosis   Recommend Incentive spirometry as tolerated when awake    Daily CBC, CMP, Mg, PO4 while on Antibiotics     Records, reports from primary team, nursing, consultant reviewed  Plan explained to patient   Case discussed with Primary team   _________________________________________________-  Patient is a 79y old  Male who presents with a chief complaint of Other specified health status     (2025 14:18)    HPI:  78yo M PMH  HTN, HFrEF (prior TTE 2024 with EF 35%), PE (on Eliquis), Myasthenia Gravis, and chronic LE edema, nephrolithiasis (s/p nephrostomy tube placement), urosepsis, BPH presents to ER from [facility] with desire to transition rehab care to a different facility. Recently admitted to \[facility]\"" from  -  for hematuria, found to have blood products in bladder, required cystoscopy and blood transfusion. Briefly required vasopressors during previous admission. Currently, patient has no complaints and is refusing to participate in HPI and is referring to son to share information. Per son, pt was complaining of pain in the right flank 1-2 days.     ED course:   Vitals: T 97.6F, HR 66, /67, RR 16, SpO2 94% RA  Labs: WBC 13, Hgb 9.6, Albumin 2.3,  (15 Feb 2025 13:03)    As mentioned above infectious disease consulted with worsening leukocytosis since this hospital stay but at present patient denies any new complaints of fever chills nausea vomiting diarrhea difficulty breathing does have occasional nonproductive cough.  Extremely sensitive on touch to bilateral feet which he mentioned that has been chronic and he is limited to being bedbound    PAST MEDICAL & SURGICAL HISTORY:  Calculus of kidney  Club foot  Born Right Foot  Myasthenia gravis  Hypertension  Diabetes  Type 2 - does not take medications - monitors Blood Glucose at home - diet controlled  Urinary tract infection  Hyperlipidemia  Other muscle wasting and atrophy  H/O spinal stenosis  History of thrombocytopenia  H/O CHF  Elective surgery  4 age 11 @ [facility] - cut under Patella secondary 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  Club foot  Surgery at birth for Club Foot Right foot  Pilonidal cyst  Surgery 40 years ago  H/O colonoscopy  H/O prostate biopsy  Nephrostomy present    Social History:  Lives at [facility], does not want to return (15 Feb 2025 13:03)    FAMILY HISTORY:  Family history of stroke (Father)  Father -  age 62  Family history of kidney disease (Mother)  Mother -  age 67  Family history of diabetes mellitus type II (Sibling)  Brother & Sister    Recent Ill Contacts:	None known  Recent Travel History:	no   Recent Animal/Insect Exposure/Tick Bites: no      REVIEW OF SYSTEMS  11 systems reviewed, no other symptoms except as above      Allergies    gatifloxacin (Unknown)  ofloxacin (Unknown)  levofloxacin (Unknown)  Cipro (Unknown)    Intolerances    Ketek (Other)  Avelox (Other)  telithromycin (Other)  fluoroquinolone antibiotics (Other)      Medications:   acetaminophen     Tablet .. 650 milliGRAM(s) Oral every 6 hours PRN  apixaban 2.5 milliGRAM(s) Oral two times a day  cyanocobalamin 1000 MICROGram(s) Oral daily  ferrous    sulfate 325 milliGRAM(s) Oral daily  finasteride 5 milliGRAM(s) Oral daily  furosemide    Tablet 20 milliGRAM(s) Oral daily  melatonin 3 milliGRAM(s) Oral at bedtime PRN  metoprolol succinate ER 25 milliGRAM(s) Oral daily  pantoprazole    Tablet 40 milliGRAM(s) Oral before breakfast  polyethylene glycol 3350 17 Gram(s) Oral daily  predniSONE   Tablet 10 milliGRAM(s) Oral daily  senna 1 Tablet(s) Oral daily  tamsulosin 0.4 milliGRAM(s) Oral at bedtime        ____________________________________________  Physical Examination:    Vital Signs Last 24 Hrs  T(C): 36.3 (2025 05:01), Max: 37.3 (2025 11:55)  T(F): 97.3 (2025 05:01), Max: 99.1 (2025 11:55)  HR: 70 (2025 05:01) (70 - 75)  BP: 141/76 (2025 05:01) (122/74 - 141/76)  RR: 18 (2025 05:01) (18 - 18)  SpO2: 96% (2025 05:01) (96% - 98%)  O2 Parameters below as of 2025 05:01  Patient On (Oxygen Delivery Method): room air    General: No acute distress while lying in bed  unless examined upon then has chronic pain all over his body  Neuro: AAO*4, No obvious acute motor deficit  HEENT: Pupils equal, reactive to light, Oral mucosa moist  PULM: Clear to auscultation bilaterally, except decreased at bases   CVS: Regular rhythm and controlled rate  ABD: Soft, nondistended, nontender, normoactive bowel sounds, no CVA tenderness  EXT: No b/l LE edema, nontender with pedal pulse palpable , Ch muscle wasting with chronic venous stasis dermatosis  SKIN: Warm and well perfused, no acute rashes   NO obvious palpable lymphadenopathy     _______________________________________________________    Lab Results:                        10.1   20.84 )-----------( 651      ( 2025 05:50 )             32.0     02-18    140  |  106  |  19  ----------------------------<  82  3.7   |  24  |  0.83    Ca    8.8      2025 05:50          Urinalysis Basic - ( 2025 05:50 )    Color: x / Appearance: x / SG: x / pH: x  Gluc: 82 mg/dL / Ketone: x  / Bili: x / Urobili: x   Blood: x / Protein: x / Nitrite: x   Leuk Esterase: x / RBC: x / WBC x   Sq Epi: x / Non Sq Epi: x / Bacteria: x        MICROBIOLOGY/PATHOLOGY/ RADIOLOGY: Reviewed

## 2025-02-19 ENCOUNTER — NON-APPOINTMENT (OUTPATIENT)
Age: 80
End: 2025-02-19

## 2025-04-02 ENCOUNTER — NON-APPOINTMENT (OUTPATIENT)
Age: 80
End: 2025-04-02

## 2025-04-02 ENCOUNTER — APPOINTMENT (OUTPATIENT)
Dept: CARDIOLOGY | Facility: CLINIC | Age: 80
End: 2025-04-02
Payer: MEDICARE

## 2025-04-02 VITALS
HEIGHT: 66 IN | DIASTOLIC BLOOD PRESSURE: 81 MMHG | OXYGEN SATURATION: 99 % | SYSTOLIC BLOOD PRESSURE: 135 MMHG | HEART RATE: 66 BPM

## 2025-04-02 VITALS — SYSTOLIC BLOOD PRESSURE: 134 MMHG | DIASTOLIC BLOOD PRESSURE: 78 MMHG

## 2025-04-02 DIAGNOSIS — I50.9 HEART FAILURE, UNSPECIFIED: ICD-10-CM

## 2025-04-02 DIAGNOSIS — E78.00 PURE HYPERCHOLESTEROLEMIA, UNSPECIFIED: ICD-10-CM

## 2025-04-02 DIAGNOSIS — I50.20 UNSPECIFIED SYSTOLIC (CONGESTIVE) HEART FAILURE: ICD-10-CM

## 2025-04-02 PROCEDURE — 99215 OFFICE O/P EST HI 40 MIN: CPT

## 2025-04-02 PROCEDURE — G2211 COMPLEX E/M VISIT ADD ON: CPT

## 2025-04-02 PROCEDURE — 93000 ELECTROCARDIOGRAM COMPLETE: CPT

## (undated) DEVICE — WARMING BLANKET UPPER ADULT

## (undated) DEVICE — DRAPE C ARM UNIVERSAL

## (undated) DEVICE — VENODYNE/SCD SLEEVE CALF MEDIUM

## (undated) DEVICE — DRAPE DRAINAGE BAG URO CATCH II

## (undated) DEVICE — FOLEY CATH 3-WAY 22FR 30CC LATEX HEMATURIA

## (undated) DEVICE — GOWN LG

## (undated) DEVICE — SYR LUER LOK 10CC

## (undated) DEVICE — ELCTR CUTTING 22/24FR

## (undated) DEVICE — PACK CYSTO

## (undated) DEVICE — GLV 7 PROTEXIS (WHITE)

## (undated) DEVICE — PLV-SCD MACHINE: Type: DURABLE MEDICAL EQUIPMENT

## (undated) DEVICE — ELCTR BIVAP BIPOLAR VAPORIZATION 26FR

## (undated) DEVICE — UROVAC

## (undated) DEVICE — SOL IRR POUR H2O 1000ML

## (undated) DEVICE — SOL INJ NS 0.9% 1000ML

## (undated) DEVICE — GLV 7.5 PROTEXIS (WHITE)

## (undated) DEVICE — PLV/PSP-ESU 3580: Type: DURABLE MEDICAL EQUIPMENT

## (undated) DEVICE — PROBE PNEUMATIC DISP 1.6X453MM

## (undated) DEVICE — SOL IRR BAG H2O 3000ML

## (undated) DEVICE — PLV-LASER - LUMENIS PULSE MOSES 2.0 1468: Type: DURABLE MEDICAL EQUIPMENT

## (undated) DEVICE — VENODYNE/SCD SLEEVE CALF LARGE

## (undated) DEVICE — SEAL BIOPSY PORT ADJUSTABLE UP TO 9F

## (undated) DEVICE — SOL IRR BAG NS 0.9% 3000ML

## (undated) DEVICE — SYR LUER LOK 30CC